# Patient Record
Sex: FEMALE | Race: BLACK OR AFRICAN AMERICAN | NOT HISPANIC OR LATINO | URBAN - METROPOLITAN AREA
[De-identification: names, ages, dates, MRNs, and addresses within clinical notes are randomized per-mention and may not be internally consistent; named-entity substitution may affect disease eponyms.]

---

## 2020-12-24 ENCOUNTER — EMERGENCY (EMERGENCY)
Facility: HOSPITAL | Age: 65
LOS: 1 days | Discharge: HOME | End: 2020-12-24
Attending: EMERGENCY MEDICINE | Admitting: EMERGENCY MEDICINE
Payer: MEDICARE

## 2020-12-24 VITALS
RESPIRATION RATE: 18 BRPM | HEIGHT: 67 IN | HEART RATE: 78 BPM | TEMPERATURE: 98 F | OXYGEN SATURATION: 99 % | DIASTOLIC BLOOD PRESSURE: 79 MMHG | WEIGHT: 167.99 LBS | SYSTOLIC BLOOD PRESSURE: 125 MMHG

## 2020-12-24 VITALS
OXYGEN SATURATION: 100 % | RESPIRATION RATE: 16 BRPM | TEMPERATURE: 98 F | DIASTOLIC BLOOD PRESSURE: 74 MMHG | HEART RATE: 72 BPM | SYSTOLIC BLOOD PRESSURE: 125 MMHG

## 2020-12-24 DIAGNOSIS — M50.90 CERVICAL DISC DISORDER, UNSPECIFIED, UNSPECIFIED CERVICAL REGION: Chronic | ICD-10-CM

## 2020-12-24 DIAGNOSIS — R10.13 EPIGASTRIC PAIN: ICD-10-CM

## 2020-12-24 DIAGNOSIS — Z98.890 OTHER SPECIFIED POSTPROCEDURAL STATES: Chronic | ICD-10-CM

## 2020-12-24 DIAGNOSIS — R07.89 OTHER CHEST PAIN: ICD-10-CM

## 2020-12-24 PROCEDURE — 84484 ASSAY OF TROPONIN QUANT: CPT

## 2020-12-24 PROCEDURE — 80053 COMPREHEN METABOLIC PANEL: CPT

## 2020-12-24 PROCEDURE — 99284 EMERGENCY DEPT VISIT MOD MDM: CPT | Mod: 25

## 2020-12-24 PROCEDURE — 71046 X-RAY EXAM CHEST 2 VIEWS: CPT

## 2020-12-24 PROCEDURE — 36415 COLL VENOUS BLD VENIPUNCTURE: CPT

## 2020-12-24 PROCEDURE — 99285 EMERGENCY DEPT VISIT HI MDM: CPT

## 2020-12-24 PROCEDURE — 85025 COMPLETE CBC W/AUTO DIFF WBC: CPT

## 2020-12-24 PROCEDURE — 96374 THER/PROPH/DIAG INJ IV PUSH: CPT

## 2020-12-24 PROCEDURE — 71046 X-RAY EXAM CHEST 2 VIEWS: CPT | Mod: 26

## 2020-12-24 RX ORDER — OXYCODONE HYDROCHLORIDE 5 MG/1
1 TABLET ORAL
Qty: 6 | Refills: 0
Start: 2020-12-24 | End: 2020-12-26

## 2020-12-24 RX ORDER — OXYCODONE HYDROCHLORIDE 5 MG/1
60 TABLET ORAL ONCE
Refills: 0 | Status: DISCONTINUED | OUTPATIENT
Start: 2020-12-24 | End: 2020-12-24

## 2020-12-24 RX ORDER — FAMOTIDINE 10 MG/ML
20 INJECTION INTRAVENOUS ONCE
Refills: 0 | Status: COMPLETED | OUTPATIENT
Start: 2020-12-24 | End: 2020-12-24

## 2020-12-24 RX ORDER — OXYCODONE HYDROCHLORIDE 5 MG/1
1 TABLET ORAL
Qty: 18 | Refills: 0
Start: 2020-12-24 | End: 2020-12-26

## 2020-12-24 RX ORDER — GABAPENTIN 400 MG/1
600 CAPSULE ORAL ONCE
Refills: 0 | Status: COMPLETED | OUTPATIENT
Start: 2020-12-24 | End: 2020-12-24

## 2020-12-24 RX ORDER — GABAPENTIN 400 MG/1
1 CAPSULE ORAL
Qty: 21 | Refills: 0
Start: 2020-12-24 | End: 2020-12-30

## 2020-12-24 RX ADMIN — OXYCODONE HYDROCHLORIDE 60 MILLIGRAM(S): 5 TABLET ORAL at 09:02

## 2020-12-24 RX ADMIN — GABAPENTIN 600 MILLIGRAM(S): 400 CAPSULE ORAL at 09:02

## 2020-12-24 RX ADMIN — Medication 30 MILLILITER(S): at 07:58

## 2020-12-24 RX ADMIN — FAMOTIDINE 20 MILLIGRAM(S): 10 INJECTION INTRAVENOUS at 07:58

## 2020-12-24 NOTE — ED PROVIDER NOTE - PROVIDER TOKENS
PROVIDER:[TOKEN:[[id number]]],PROVIDER:[TOKEN:[[id number]]],PROVIDER:[TOKEN:[[id number]]]

## 2020-12-24 NOTE — ED PROVIDER NOTE - CHPI ED SYMPTOMS NEG
+SOB with cp, no PIÑA, no decreased exercise capacity/no back pain/no dizziness/no fever/no nausea/no diaphoresis

## 2020-12-24 NOTE — ED PROVIDER NOTE - NSFOLLOWUPINSTRUCTIONS_ED_ALL_ED_FT
WHAT YOU NEED TO KNOW ABOUT EPIGASTRIC PAIN:    What do I need to know about epigastric pain? Epigastric pain is felt in the middle of the upper abdomen, between the ribs and the bellybutton. The pain may be mild or severe. Pain may spread from or to another part of your body. Epigastric pain may be a sign of a serious health problem that needs to be treated.     What causes epigastric pain? The cause of your pain may not be known. The following are common causes:     Inflammation of your stomach, liver, pancreas, or intestines  Heart problems, such as a heart attack  Digestion problems, such as indigestion, GERD, or lactose intolerance  Medical conditions, such as an ulcer, a hernia, irritable bowel syndrome (IBS), or cancer  A blockage in your bowels or gallbladder  A bladder infection  An injury or previous surgery in your abdomen    What other signs and symptoms may I have with epigastric pain? Signs and symptoms will depend on what is causing your pain.    Nausea, vomiting, bloating, constipation, or diarrhea  Loss of appetite, weight loss, feeling of fullness as you start to eat  Movement relieves the pain or makes it worse, or only certain positions are comfortable  Pain when you eat, or pain that is relieved when you eat or have a bowel movement  Sore throat or a hoarse voice    How is epigastric pain diagnosed and treated? Your healthcare provider will feel your abdomen to see if it is tender or rigid. He may change or stop any medicine you are taking that is causing your pain. Your pain may go away without treatment, or you may need any of the following:     Medicines may be given to treat pain or stop vomiting. You may also need medicines to reduce or control stomach acid, or treat an infections  Blood or urine tests may show problems such as infection or inflammation. The tests may also show how well your liver is working.  An x-ray is used to check your kidneys and bladder.  An ultrasound is used to check your gallbladder for stones or other blockage.  A bowel movement sample may be tested for blood.     How can I manage my symptoms?     Keep a record of your symptoms. Include when the pain starts, how long it lasts, and if it is sharp or dull. Also include any foods you ate or activities you did before the pain started. Keep track of anything that helped the pain.       Eat a variety of healthy foods. Healthy foods include fruits, vegetables, whole-grain breads, low-fat dairy products, beans, lean meats, and fish. Ask if you need to be on a special diet. Certain foods may cause your pain, such as alcohol or foods that are high in fat. You may need to eat smaller meals and to eat more often than usual.      Drink liquids as directed. Ask how much liquid to drink each day and which liquids are best for you. Do not have drinks that contain alcohol or caffeine.    Call 911 for any of the following:     You have any of the following signs of a heart attack:   Squeezing, pressure, or pain in your chest   and any of the following:   Discomfort or pain in your back, neck, jaw, stomach, or arm   Shortness of breath  Nausea or vomiting  Lightheadedness or a sudden cold sweat  You have severe pain that radiates to your jaw or back.    When should I seek immediate care?   You have severe pain that starts suddenly and quickly gets worse.  You cannot have a bowel movement and are vomiting.  You vomit or cough up blood.  You see blood in your urine or bowel movement.  You feel drowsy and your breathing is slower than usual.    When should I contact my healthcare provider?     You have a fever or chills.  You have yellowing of your skin or the whites of your eyes.  You vomit often or several times in a row.   You lose weight without trying.  You have questions or concerns about your condition or care.

## 2020-12-24 NOTE — ED PROVIDER NOTE - PATIENT PORTAL LINK FT
You can access the FollowMyHealth Patient Portal offered by [facility] by registering at the following website: [email or web address]. By joining [employer]’s FollowMyHealth portal, you will also be able to view your health information using other applications (apps) compatible with our system.

## 2020-12-24 NOTE — ED PROVIDER NOTE - CLINICAL SUMMARY MEDICAL DECISION MAKING FREE TEXT BOX
here w/ epigastric and LUQ pain that radiates to the chest after eating. concern for GI source but will also r/o ACS w/ EKG and 1 troponin. pt already w/ stress test done this year and symptoms are atypical. to f/u both her GI and Cards at [facility]. Asking for # for other spine surgeons for 2nd opinion while awaiting her f/u with her own on 12/30. #s given.

## 2020-12-24 NOTE — ED PROVIDER NOTE - OBJECTIVE STATEMENT
64yo F hx of many spinal surgeries at [facility] last August and planned for ?revision, here /w complaint of 2 weeks intermittent chest pain that radiates into her epigastric region and LUQ. Denies N/V/D, radiation to shoulders/jaw. Had stress test earlier this year prior to her spinal surgery and told normal. Saw a GI in the past but declined colonoscopy, no hx of endoscopy recently. pain always starts a little bit after eating, never right sided. denies hx of abdominal surgery.

## 2020-12-24 NOTE — ED PROVIDER NOTE - CARE PROVIDER_API CALL
Sukhi Velasquez  ORTHOPEDICS  [address] [place] [address]  Phone: [phone]  Fax: [phone]  Follow Up Time:     Estiven Luz (MD; MS)  Neurosurgery  [address] [place] [address]  Phone: [phone]  Fax: [phone]  Follow Up Time:     Paul Norton  NEUROSURGERY  [address], [address] [place] [address]  Phone: [phone]  Fax: [phone]  Follow Up Time:

## 2020-12-24 NOTE — ED ADULT NURSE NOTE - OBJECTIVE STATEMENT
64 y/o female with on and off cp for 2 weeks, pt more concerns on her hx of multiple cervical and spina sx  to seek a consults to a neurosurgeon.

## 2020-12-24 NOTE — ED ADULT NURSE NOTE - NSIMPLEMENTINTERV_GEN_ALL_ED
Implemented All Fall with Harm Risk Interventions:  [place] to call system. Call bell, personal items and telephone within reach. Instruct patient to call for assistance. Room bathroom lighting operational. Non-slip footwear when patient is off stretcher. Physically safe environment: no spills, clutter or unnecessary equipment. Stretcher in lowest position, wheels locked, appropriate side rails in place. Provide visual cue, wrist band, yellow gown, etc. Monitor gait and stability. Monitor for mental status changes and reorient to person, place, and time. Review medications for side effects contributing to fall risk. Reinforce activity limits and safety measures with patient and family. Provide visual clues: red socks.

## 2020-12-24 NOTE — ED PROVIDER NOTE - CARE PROVIDERS DIRECT ADDRESSES
[email or web address]

## 2020-12-24 NOTE — ED PROVIDER NOTE - PSH
Cervical disc disease  cervical sx  H/O breast surgery  left breast implant 1980's  H/O Spinal surgery

## 2020-12-29 PROBLEM — I10 ESSENTIAL (PRIMARY) HYPERTENSION: Chronic | Status: ACTIVE | Noted: 2020-12-24

## 2021-01-01 ENCOUNTER — OUTPATIENT (OUTPATIENT)
Dept: OUTPATIENT SERVICES | Facility: HOSPITAL | Age: 66
LOS: 1 days | End: 2021-01-01
Payer: MEDICARE

## 2021-01-01 DIAGNOSIS — Z98.890 OTHER SPECIFIED POSTPROCEDURAL STATES: Chronic | ICD-10-CM

## 2021-01-01 DIAGNOSIS — M50.90 CERVICAL DISC DISORDER, UNSPECIFIED, UNSPECIFIED CERVICAL REGION: Chronic | ICD-10-CM

## 2021-01-05 ENCOUNTER — EMERGENCY (EMERGENCY)
Facility: HOSPITAL | Age: 66
LOS: 1 days | Discharge: HOME | End: 2021-01-05
Attending: EMERGENCY MEDICINE | Admitting: EMERGENCY MEDICINE
Payer: MEDICARE

## 2021-01-05 VITALS
HEART RATE: 79 BPM | RESPIRATION RATE: 18 BRPM | HEIGHT: 67 IN | OXYGEN SATURATION: 100 % | TEMPERATURE: 98 F | DIASTOLIC BLOOD PRESSURE: 83 MMHG | SYSTOLIC BLOOD PRESSURE: 117 MMHG

## 2021-01-05 VITALS
TEMPERATURE: 98 F | RESPIRATION RATE: 17 BRPM | OXYGEN SATURATION: 100 % | DIASTOLIC BLOOD PRESSURE: 82 MMHG | SYSTOLIC BLOOD PRESSURE: 155 MMHG | HEART RATE: 74 BPM

## 2021-01-05 DIAGNOSIS — R07.89 OTHER CHEST PAIN: ICD-10-CM

## 2021-01-05 DIAGNOSIS — M54.9 DORSALGIA, UNSPECIFIED: ICD-10-CM

## 2021-01-05 DIAGNOSIS — M50.90 CERVICAL DISC DISORDER, UNSPECIFIED, UNSPECIFIED CERVICAL REGION: Chronic | ICD-10-CM

## 2021-01-05 DIAGNOSIS — Z98.890 OTHER SPECIFIED POSTPROCEDURAL STATES: Chronic | ICD-10-CM

## 2021-01-05 LAB
ALBUMIN SERPL ELPH-MCNC: 4.2 G/DL — SIGNIFICANT CHANGE UP (ref 3.3–5)
ALP SERPL-CCNC: 112 U/L — SIGNIFICANT CHANGE UP (ref 40–120)
ALT FLD-CCNC: 17 U/L — SIGNIFICANT CHANGE UP (ref 10–45)
ANION GAP SERPL CALC-SCNC: 12 MMOL/L — SIGNIFICANT CHANGE UP (ref 5–17)
APPEARANCE UR: CLEAR — SIGNIFICANT CHANGE UP
AST SERPL-CCNC: 20 U/L — SIGNIFICANT CHANGE UP (ref 10–40)
BACTERIA # UR AUTO: PRESENT /HPF
BASOPHILS # BLD AUTO: 0.03 K/UL — SIGNIFICANT CHANGE UP (ref 0–0.2)
BASOPHILS NFR BLD AUTO: 0.4 % — SIGNIFICANT CHANGE UP (ref 0–2)
BILIRUB SERPL-MCNC: 0.2 MG/DL — SIGNIFICANT CHANGE UP (ref 0.2–1.2)
BILIRUB UR-MCNC: NEGATIVE — SIGNIFICANT CHANGE UP
BUN SERPL-MCNC: 10 MG/DL — SIGNIFICANT CHANGE UP (ref 7–23)
CALCIUM SERPL-MCNC: 9.1 MG/DL — SIGNIFICANT CHANGE UP (ref 8.4–10.5)
CHLORIDE SERPL-SCNC: 102 MMOL/L — SIGNIFICANT CHANGE UP (ref 96–108)
CO2 SERPL-SCNC: 24 MMOL/L — SIGNIFICANT CHANGE UP (ref 22–31)
COLOR SPEC: YELLOW — SIGNIFICANT CHANGE UP
CREAT SERPL-MCNC: 0.57 MG/DL — SIGNIFICANT CHANGE UP (ref 0.5–1.3)
DIFF PNL FLD: NEGATIVE — SIGNIFICANT CHANGE UP
EOSINOPHIL # BLD AUTO: 0.1 K/UL — SIGNIFICANT CHANGE UP (ref 0–0.5)
EOSINOPHIL NFR BLD AUTO: 1.3 % — SIGNIFICANT CHANGE UP (ref 0–6)
EPI CELLS # UR: SIGNIFICANT CHANGE UP /HPF (ref 0–5)
GLUCOSE SERPL-MCNC: 100 MG/DL — HIGH (ref 70–99)
GLUCOSE UR QL: NEGATIVE — SIGNIFICANT CHANGE UP
HCT VFR BLD CALC: 43.8 % — SIGNIFICANT CHANGE UP (ref 34.5–45)
HGB BLD-MCNC: 13.8 G/DL — SIGNIFICANT CHANGE UP (ref 11.5–15.5)
IMM GRANULOCYTES NFR BLD AUTO: 0.1 % — SIGNIFICANT CHANGE UP (ref 0–1.5)
KETONES UR-MCNC: NEGATIVE — SIGNIFICANT CHANGE UP
LEUKOCYTE ESTERASE UR-ACNC: ABNORMAL
LYMPHOCYTES # BLD AUTO: 3.66 K/UL — HIGH (ref 1–3.3)
LYMPHOCYTES # BLD AUTO: 46.4 % — HIGH (ref 13–44)
MCHC RBC-ENTMCNC: 27.2 PG — SIGNIFICANT CHANGE UP (ref 27–34)
MCHC RBC-ENTMCNC: 31.5 GM/DL — LOW (ref 32–36)
MCV RBC AUTO: 86.2 FL — SIGNIFICANT CHANGE UP (ref 80–100)
MONOCYTES # BLD AUTO: 0.7 K/UL — SIGNIFICANT CHANGE UP (ref 0–0.9)
MONOCYTES NFR BLD AUTO: 8.9 % — SIGNIFICANT CHANGE UP (ref 2–14)
NEUTROPHILS # BLD AUTO: 3.38 K/UL — SIGNIFICANT CHANGE UP (ref 1.8–7.4)
NEUTROPHILS NFR BLD AUTO: 42.9 % — LOW (ref 43–77)
NITRITE UR-MCNC: NEGATIVE — SIGNIFICANT CHANGE UP
NRBC # BLD: 0 /100 WBCS — SIGNIFICANT CHANGE UP (ref 0–0)
PH UR: 6 — SIGNIFICANT CHANGE UP (ref 5–8)
PLATELET # BLD AUTO: 206 K/UL — SIGNIFICANT CHANGE UP (ref 150–400)
POTASSIUM SERPL-MCNC: 4.3 MMOL/L — SIGNIFICANT CHANGE UP (ref 3.5–5.3)
POTASSIUM SERPL-SCNC: 4.3 MMOL/L — SIGNIFICANT CHANGE UP (ref 3.5–5.3)
PROT SERPL-MCNC: 7.3 G/DL — SIGNIFICANT CHANGE UP (ref 6–8.3)
PROT UR-MCNC: NEGATIVE MG/DL — SIGNIFICANT CHANGE UP
RBC # BLD: 5.08 M/UL — SIGNIFICANT CHANGE UP (ref 3.8–5.2)
RBC # FLD: 14.8 % — HIGH (ref 10.3–14.5)
RBC CASTS # UR COMP ASSIST: < 5 /HPF — SIGNIFICANT CHANGE UP
SODIUM SERPL-SCNC: 138 MMOL/L — SIGNIFICANT CHANGE UP (ref 135–145)
SP GR SPEC: 1.01 — SIGNIFICANT CHANGE UP (ref 1–1.03)
TROPONIN T SERPL-MCNC: <0.01 NG/ML — SIGNIFICANT CHANGE UP (ref 0–0.01)
UROBILINOGEN FLD QL: 0.2 E.U./DL — SIGNIFICANT CHANGE UP
WBC # BLD: 7.88 K/UL — SIGNIFICANT CHANGE UP (ref 3.8–10.5)
WBC # FLD AUTO: 7.88 K/UL — SIGNIFICANT CHANGE UP (ref 3.8–10.5)
WBC UR QL: < 5 /HPF — SIGNIFICANT CHANGE UP

## 2021-01-05 PROCEDURE — 85025 COMPLETE CBC W/AUTO DIFF WBC: CPT

## 2021-01-05 PROCEDURE — 71275 CT ANGIOGRAPHY CHEST: CPT

## 2021-01-05 PROCEDURE — 71275 CT ANGIOGRAPHY CHEST: CPT | Mod: 26

## 2021-01-05 PROCEDURE — 36415 COLL VENOUS BLD VENIPUNCTURE: CPT

## 2021-01-05 PROCEDURE — 81001 URINALYSIS AUTO W/SCOPE: CPT

## 2021-01-05 PROCEDURE — 87086 URINE CULTURE/COLONY COUNT: CPT

## 2021-01-05 PROCEDURE — 99284 EMERGENCY DEPT VISIT MOD MDM: CPT | Mod: 25

## 2021-01-05 PROCEDURE — 80053 COMPREHEN METABOLIC PANEL: CPT

## 2021-01-05 PROCEDURE — 74177 CT ABD & PELVIS W/CONTRAST: CPT | Mod: 26

## 2021-01-05 PROCEDURE — 74177 CT ABD & PELVIS W/CONTRAST: CPT

## 2021-01-05 PROCEDURE — 84484 ASSAY OF TROPONIN QUANT: CPT

## 2021-01-05 PROCEDURE — 93005 ELECTROCARDIOGRAM TRACING: CPT

## 2021-01-05 PROCEDURE — 99285 EMERGENCY DEPT VISIT HI MDM: CPT

## 2021-01-05 PROCEDURE — 93010 ELECTROCARDIOGRAM REPORT: CPT

## 2021-01-05 RX ORDER — NITROFURANTOIN MACROCRYSTAL 50 MG
1 CAPSULE ORAL
Qty: 10 | Refills: 0
Start: 2021-01-05 | End: 2021-01-09

## 2021-01-05 RX ORDER — NITROFURANTOIN MACROCRYSTAL 50 MG
100 CAPSULE ORAL ONCE
Refills: 0 | Status: COMPLETED | OUTPATIENT
Start: 2021-01-05 | End: 2021-01-05

## 2021-01-05 RX ORDER — IOHEXOL 300 MG/ML
30 INJECTION, SOLUTION INTRAVENOUS ONCE
Refills: 0 | Status: COMPLETED | OUTPATIENT
Start: 2021-01-05 | End: 2021-01-05

## 2021-01-05 RX ORDER — SODIUM CHLORIDE 9 MG/ML
1000 INJECTION INTRAMUSCULAR; INTRAVENOUS; SUBCUTANEOUS ONCE
Refills: 0 | Status: COMPLETED | OUTPATIENT
Start: 2021-01-05 | End: 2021-01-05

## 2021-01-05 RX ORDER — CEFUROXIME AXETIL 250 MG
1 TABLET ORAL
Qty: 20 | Refills: 0
Start: 2021-01-05 | End: 2021-01-14

## 2021-01-05 RX ORDER — OXYCODONE HYDROCHLORIDE 5 MG/1
30 TABLET ORAL ONCE
Refills: 0 | Status: DISCONTINUED | OUTPATIENT
Start: 2021-01-05 | End: 2021-01-05

## 2021-01-05 RX ORDER — MORPHINE SULFATE 50 MG/1
4 CAPSULE, EXTENDED RELEASE ORAL ONCE
Refills: 0 | Status: DISCONTINUED | OUTPATIENT
Start: 2021-01-05 | End: 2021-01-05

## 2021-01-05 RX ADMIN — Medication 100 MILLIGRAM(S): at 03:54

## 2021-01-05 RX ADMIN — OXYCODONE HYDROCHLORIDE 30 MILLIGRAM(S): 5 TABLET ORAL at 02:45

## 2021-01-05 RX ADMIN — SODIUM CHLORIDE 1000 MILLILITER(S): 9 INJECTION INTRAMUSCULAR; INTRAVENOUS; SUBCUTANEOUS at 01:30

## 2021-01-05 RX ADMIN — IOHEXOL 30 MILLILITER(S): 300 INJECTION, SOLUTION INTRAVENOUS at 01:31

## 2021-01-05 RX ADMIN — SODIUM CHLORIDE 1000 MILLILITER(S): 9 INJECTION INTRAMUSCULAR; INTRAVENOUS; SUBCUTANEOUS at 02:29

## 2021-01-05 RX ADMIN — OXYCODONE HYDROCHLORIDE 30 MILLIGRAM(S): 5 TABLET ORAL at 02:15

## 2021-01-05 NOTE — ED PROVIDER NOTE - NSFOLLOWUPINSTRUCTIONS_ED_ALL_ED_FT
Acute Low Back Pain    WHAT YOU NEED TO KNOW:    Acute low back pain is sudden discomfort in your lower back area that lasts for up to 6 weeks. The discomfort makes it difficult to tolerate activity.     DISCHARGE INSTRUCTIONS:    Return to the emergency department if:   •You have severe pain.      •You have sudden stiffness and heaviness on both buttocks down to both legs.      •You have numbness or weakness in one leg, or pain in both legs.      •You have numbness in your genital area or across your lower back.      •You cannot control your urine or bowel movements.      Contact your healthcare provider if:   •You have a fever.      •You have pain at night or when you rest.      •Your pain does not get better with treatment.      •You have pain that worsens when you cough or sneeze.      •You suddenly feel something pop or snap in your back.      •You have questions or concerns about your condition or care.      Medicines: You may need any of the following:   •NSAIDs help decrease swelling and pain. This medicine is available with or without a doctor's order. NSAIDs can cause stomach bleeding or kidney problems in certain people. If you take blood thinner medicine, always ask your healthcare provider if NSAIDs are safe for you. Always read the medicine label and follow directions.      •Acetaminophen decreases pain and fever. It is available without a doctor's order. Ask how much to take and how often to take it. Follow directions. Read the labels of all other medicines you are using to see if they also contain acetaminophen, or ask your doctor or pharmacist. Acetaminophen can cause liver damage if not taken correctly. Do not use more than 4 grams (4,000 milligrams) total of acetaminophen in one day.       •Muscle relaxers decrease pain by relaxing the muscles in your lower spine.      •Prescription pain medicine may be given. Ask your healthcare provider how to take this medicine safely. Some prescription pain medicines contain acetaminophen. Do not take other medicines that contain acetaminophen without talking to your healthcare provider. Too much acetaminophen may cause liver damage. Prescription pain medicine may cause constipation. Ask your healthcare provider how to prevent or treat constipation.       Self-care:   •Stay active as much as you can without causing more pain. Bed rest could make your back pain worse. Start with some light exercises, such as walking. Avoid heavy lifting until your pain is gone. Ask for more information about the activities or exercises that are right for you.       •Apply ice on your back for 15 to 20 minutes every hour or as directed. Use an ice pack, or put crushed ice in a plastic bag. Cover it with a towel before you apply it to your skin. Ice helps prevent tissue damage and decreases swelling and pain.       •Apply heat on your back for 20 to 30 minutes every 2 hours for as many days as directed. Heat helps decrease pain and muscle spasms. Alternate heat and ice.      Prevent acute low back pain:   •Use proper body mechanics. ?Bend at the hips and knees when you  objects. Do not bend from the waist. Use your leg muscles as you lift the load. Do not use your back. Keep the object close to your chest as you lift it. Try not to twist or lift anything above your waist.      ?Change your position often when you stand for long periods of time. Rest one foot on a small box or footrest, and then switch to the other foot often.      ?Try not to sit for long periods of time. When you do, sit in a straight-backed chair with your feet flat on the floor. Never reach, pull, or push while you are sitting.      •Do exercises that strengthen your back muscles. Warm up before you exercise. Ask your healthcare provider the best exercises for you.      •Maintain a healthy weight. Ask your healthcare provider how much you should weigh. Ask him or her to help you create a weight loss plan if you are overweight.      Follow up with your healthcare provider as directed: Return for a follow-up visit if you still have pain after 1 to 3 weeks of treatment. You may need to visit an orthopedist if your back pain lasts longer than 12 weeks. Write down your questions so you remember to ask them during your visits.

## 2021-01-05 NOTE — ED ADULT NURSE NOTE - CHPI ED NUR SEVERITY2
enoxaparin, simethicone, metoprolol, colace, senna, atorvastatin, protonix, senna PAIN SCALE 4 OF 10.

## 2021-01-05 NOTE — ED PROVIDER NOTE - PATIENT PORTAL LINK FT
You can access the FollowMyHealth Patient Portal offered by [facility] by registering at the following website: [email or web address]. By joining [employer]’s FollowMyHealth portal, you will also be able to view your health information using other applications (apps) compatible with our system.

## 2021-01-05 NOTE — ED ADULT NURSE REASSESSMENT NOTE - NS ED NURSE REASSESS COMMENT FT1
pt. refused Morphine, which was originally ordered for pain, OXY IR ordered, awaiting readiness from pharmacy, states will go to restroom for ccms sample as soon as she is able

## 2021-01-05 NOTE — ED ADULT TRIAGE NOTE - ARRIVAL INFO ADDITIONAL COMMENTS
pt c/o 1 day of headache, chest pain.   hx shows visits for epigastric pain in the past and has seen GI but refused colonoscopy.

## 2021-01-05 NOTE — ED PROVIDER NOTE - OBJECTIVE STATEMENT
64 y/o f hx multiple spine surgeries at [facility], last 8/2020 presents c/o back pain which radiates to her left shoulder, left chest, left abdomen and down her leg since her last surgery.  Pt stating her surgeon told her she will need another surgery, she is not happy with her care there and wants to follow up with [facility].  Pt reports her symptoms unchanged but ran out of pain medication which was given when she came to [facility] ED 11 days ago with similar complaints.  Pt stating she doesn't have a pain management doctor currently, hasn't tried to make appointments with neurosurgery or GI as recommended.  Pt denies numbness/tingling to ext, weakness, saddle anesthesia, bowel/bladder incontinence, all other ROS negative.

## 2021-01-05 NOTE — ED ADULT NURSE NOTE - NS ED NURSE DC INFO COMPLEXITY
40w Simple: Patient demonstrates quick and easy understanding/Patient asked questions/Verbalized Understanding

## 2021-01-05 NOTE — ED ADULT NURSE NOTE - OBJECTIVE STATEMENT
· Arrival Info Additional Comments: pt c/o 1 day of headache, chest pain.   hx shows visits for epigastric pain in the past and has seen GI but refused colonoscopy.    denies complaint otherwise at present

## 2021-01-05 NOTE — ED ADULT NURSE REASSESSMENT NOTE - NS ED NURSE REASSESS COMMENT FT1
dispo. as noted, [facility] [place] notified to call EMS for t/p, awaiting arrival dispo. as noted, [facility] [place] notified to call EMS for t/p, awaiting arrival, address confirmed with pt.

## 2021-01-05 NOTE — ED PROVIDER NOTE - CARE PROVIDER_API CALL
Sharad Song  ANESTHESIOLOGY  [address] [place] [address]  Phone: [phone]  Fax: [phone]  Follow Up Time:

## 2021-01-05 NOTE — ED ADULT NURSE REASSESSMENT NOTE - NS ED NURSE REASSESS COMMENT FT1
pt. states doesn't want to wait for ambulance, informed that we have no control over when t/p arrives, understanding verbalized,  and wants to take cab home, edt notified, and will take pt. to cab via w/c, for t/p home

## 2021-01-06 LAB
CULTURE RESULTS: SIGNIFICANT CHANGE UP
SPECIMEN SOURCE: SIGNIFICANT CHANGE UP

## 2021-01-08 ENCOUNTER — APPOINTMENT (OUTPATIENT)
Dept: NEUROSURGERY | Facility: CLINIC | Age: 66
End: 2021-01-08
Payer: MEDICARE

## 2021-01-08 VITALS
TEMPERATURE: 97.8 F | SYSTOLIC BLOOD PRESSURE: 116 MMHG | HEART RATE: 70 BPM | RESPIRATION RATE: 18 BRPM | HEIGHT: 67 IN | DIASTOLIC BLOOD PRESSURE: 78 MMHG | WEIGHT: 160 LBS | BODY MASS INDEX: 25.11 KG/M2 | OXYGEN SATURATION: 99 %

## 2021-01-08 DIAGNOSIS — M54.5 LOW BACK PAIN: ICD-10-CM

## 2021-01-08 DIAGNOSIS — M25.511 PAIN IN RIGHT SHOULDER: ICD-10-CM

## 2021-01-08 PROCEDURE — 99205 OFFICE O/P NEW HI 60 MIN: CPT

## 2021-01-09 PROBLEM — M54.5 LOW BACK PAIN: Status: ACTIVE | Noted: 2021-01-08

## 2021-01-09 NOTE — DATA REVIEWED
[de-identified] : CT thoracic/lumbar spine 7/23/19:\par Impression: \par Loosening of the right vertebral screw at T6. No evidence of hardware fracture.\par Multilevel degenerative disc disease, stable from 5/25/19.

## 2021-01-09 NOTE — PHYSICAL EXAM
[General Appearance - In No Acute Distress] : in no acute distress [General Appearance - Alert] : alert [Oriented To Time, Place, And Person] : oriented to person, place, and time [No Visual Abnormalities] : no visible abnormailities [Antalgic] : antalgic [Sclera] : the sclera and conjunctiva were normal [Outer Ear] : the ears and nose were normal in appearance [Neck Appearance] : the appearance of the neck was normal [Abnormal Walk] : normal gait [Skin Color & Pigmentation] : normal skin color and pigmentation [FreeTextEntry1] : exam limited due to pain [Able to toe walk] : the patient was not able to toe walk [Able to heel walk] : the patient was not able to heel walk

## 2021-01-09 NOTE — HISTORY OF PRESENT ILLNESS
[de-identified] : 65 year old woman with past medical history HTN, sleep apnea, anxiety and multiple spinal surgeries who comes to the office today for second opinion regarding mid and low back pain. \par \par Ms. Redd states she has had approximately 10-15 spinal surgeries beginning in 2010. Her initial surgery was cervical spinal fusion at [facility] in [place] (she is unsure of name of surgeon). She states this surgery was done due to neck pain and difficulty walking. \par After this surgery, she sought opinion at [facility] with Dr. Trinidad. Over the past 10 years, she has had multiple surgeries with Dr. Trinidad. \par Her last lumbar surgery was in 12/2020 and was a lumbar-pelvis fusion. Following this surgery, she states she developed worsening low back pain radiating to LEFT lower quadrant as well as mid back pain radiating to LEFT breast. This pain is particularly worsened with walking and standing. She also reports abdominal distention beginning last year after prior surgery. She states she had multiple opinions with gastroenterologists as well as OBGYN and workup was negative.\par \par She states she did have revision of cervical surgery in August 2020 with Dr. Trinidad.\par \par She is in a wheelchair primarily. She states she does walk at home with assistance of grab bars but pain is very severe when she begins to walk. She states she has self- catheterized due to urinary retention since 2010. She denies bowel incontinence but reports worsening constipation due to increase in pain medications. She recently began bowel regimen and constipation has improved.\par \par She also reports worsening RIGHT Shoulder pain. She previously had LEFT shoulder surgery in [place] many years ago.\par She reports bilateral lower extremity weakness and upper extremity weakness (LEFT greater than RIGHT).

## 2021-01-09 NOTE — PLAN
[FreeTextEntry1] : CT thoracic and lumbar spine reviewed by Dr. Perez with patient, demonstrates loosening of T6 screw.\par Given patient's complex history involving many spinal surgeries, recommend consultation with spine specialist - will refer to Dr. Estiven Luz.\par \par Recommend evaluation of right shoulder pain - refer to Dr. [place]. Patient will call and schedule appointment.\par \par Patient verbalizes agreement and understanding with plan of care.\par

## 2021-01-11 NOTE — HISTORY OF PRESENT ILLNESS
[de-identified] : Patient is a 64 yo F with PMH of HTN, cervical and lumbar stenosis (h/o multiple cervical/thoracic/lumbar fusions) presents for neurosurgical evaluation.\par T9-S1 posterior fusion with Dr. Trinidad.@ [facility] (last sx on 12/12/2019 for lumbar, 8/2020 for cervical)\par She reports progressively worsening mid back to lower back pain which got markedly worsening after her first lumbar spine surgery in 2010. Pain is described as constant dull pain on her mid back (between scapula) down to her lower back radiating to LLE causing difficulty walking/standing. She can walk very short distance (less than 5 steps) and has been in wheelchair since 2010 and needs assist for ambulation. She endorses numbness on LLE but denies pain/weakness/numbness on upper extremities, BB dysfunction. She has been taking multiple medications for pain (Oxycotin/methocarbamol/gabapentin) which moderately relieve her pain but recur in 2-3 hours after. Previously she tried ESIs/PT prior to her surgery in 2010 but did not try after surgery.

## 2021-01-12 ENCOUNTER — APPOINTMENT (OUTPATIENT)
Dept: SPINE | Facility: CLINIC | Age: 66
End: 2021-01-12
Payer: MEDICARE

## 2021-01-12 ENCOUNTER — APPOINTMENT (OUTPATIENT)
Dept: SPINE | Facility: CLINIC | Age: 66
End: 2021-01-12

## 2021-01-12 VITALS
SYSTOLIC BLOOD PRESSURE: 110 MMHG | OXYGEN SATURATION: 99 % | BODY MASS INDEX: 25.71 KG/M2 | HEART RATE: 80 BPM | WEIGHT: 160 LBS | DIASTOLIC BLOOD PRESSURE: 78 MMHG | TEMPERATURE: 97.7 F | HEIGHT: 66 IN | RESPIRATION RATE: 20 BRPM

## 2021-01-12 VITALS
TEMPERATURE: 97.3 F | HEIGHT: 67 IN | WEIGHT: 160 LBS | HEART RATE: 65 BPM | OXYGEN SATURATION: 99 % | SYSTOLIC BLOOD PRESSURE: 91 MMHG | DIASTOLIC BLOOD PRESSURE: 63 MMHG | BODY MASS INDEX: 25.11 KG/M2 | RESPIRATION RATE: 14 BRPM

## 2021-01-12 PROCEDURE — 99214 OFFICE O/P EST MOD 30 MIN: CPT

## 2021-01-12 NOTE — REVIEW OF SYSTEMS
[Leg Weakness] : leg weakness [Numbness] : numbness [Tingling] : tingling [Difficulty Walking] : difficulty walking [Frequent Falls] : frequent falls [As Noted in HPI] : as noted in HPI [Negative] : Endocrine [FreeTextEntry8] : chronic urinary retention

## 2021-01-12 NOTE — HISTORY OF PRESENT ILLNESS
[de-identified] : Patient is a 66 yo ambidextrity F with PMH of HTN, CTS (b/l sx), chronic urinary retention (straight cath since 2010), cervical and lumbar stenosis (h/o multiple cervical/thoracic/lumbar fusions) presents for neurosurgical evaluation.\par As per patient, she had 3 cervical spine surgery (last sx in 8/2020 with Dr. Trinidad @ [facility]) and 21 thoracolumbar surgeries with Dr. Trinidad.@ [facility] (last sx on 12/12/2019)\par She reports progressively worsening neck and back pain which markedly worsening after her last lumbar surgeries. She reports severe constant dull pain on her posterior neck radiating to b/l shoulders to LEFT arm/hand and paresthesia on her R fingers causing significant difficulty performing ADLs (grabbing objects, writing) and severe constant dull pain on her lower back radiating to anterior LEFT side groin and b/l knee with b/l LE numbness/paresthesia. Pain is worsening by any body movement and walks very short distance (less than 5 steps) and has been using wheelchair mostly of time since 2010. She reports significant difficulty ambulating and balance problem causing multiple falls (once a week for the past one year). She was unable to make follow up appointment with pain management since 2008 and currently taking Oxycontin/Tramadol rx from ED (scheduled for new pain management at the end of this month).

## 2021-01-12 NOTE — PHYSICAL EXAM
[General Appearance - Alert] : alert [General Appearance - In No Acute Distress] : in no acute distress [General Appearance - Well Nourished] : well nourished [General Appearance - Well-Appearing] : healthy appearing [Oriented To Time, Place, And Person] : oriented to person, place, and time [Impaired Insight] : insight and judgment were intact [Affect] : the affect was normal [Memory Recent] : recent memory was not impaired [Person] : oriented to person [Place] : oriented to place [Time] : oriented to time [4] : T1 abductor digiti minimi 4/5 [2] : L2 Iliopsoas 2/5 [3] : L3 quadriceps 3/5 [5] : S1 flexor hallucis longus 5/5 [Romberg's Sign] : a positive Romberg's sign was present [Dysesthesia] : dysesthesia was present [Limited Balance] : the patient's balance was impaired [1+] : Brachioradialis left 1+ [2+] : Ankle jerk left 2+ [Straight-Leg Raise Test - Left] : straight leg raise of the left leg was positive [Straight-Leg Raise Test - Right] : straight leg raise of the right leg was positive [Antalgic] : antalgic [Soni] : Soni's sign was not demonstrated [FreeTextEntry6] : limited ROM on b/l shoulder joint 2/2 pain [FreeTextEntry8] : abnormal gait, unable to perform tandem gait 2/2 severe balance problem [Able to toe walk] : the patient was not able to toe walk [Able to heel walk] : the patient was not able to heel walk

## 2021-01-12 NOTE — ASSESSMENT
[FreeTextEntry1] : PLAN\par - f/u with pain management as scheduled\par - EOS full body AP/Lat\par - CT myelogram C/T/L spine\par - obtain previous medical record\par - RTC after complete images/medical record from previous surgeon

## 2021-01-12 NOTE — DATA REVIEWED
[de-identified] : C-spine wo on 6/16/2020 @ [facility]\par L-spine wo on 3/9/2020 @ [facility] [de-identified] : C-spine wo on 11/21/2020, 6/16/2020 @ [facility]SH\par L-spine wo on 6/16/2020 @ [facility]SH\par  [de-identified] : scoliosis Xray on 6/16/2020 @ [facility]

## 2021-01-20 ENCOUNTER — RESULT REVIEW (OUTPATIENT)
Age: 66
End: 2021-01-20

## 2021-01-20 ENCOUNTER — OUTPATIENT (OUTPATIENT)
Dept: OUTPATIENT SERVICES | Facility: HOSPITAL | Age: 66
LOS: 1 days | End: 2021-01-20
Payer: MEDICARE

## 2021-01-20 ENCOUNTER — APPOINTMENT (OUTPATIENT)
Dept: PHYSICAL MEDICINE AND REHAB | Facility: CLINIC | Age: 66
End: 2021-01-20
Payer: MEDICARE

## 2021-01-20 VITALS
OXYGEN SATURATION: 98 % | HEIGHT: 66 IN | WEIGHT: 160 LBS | BODY MASS INDEX: 25.71 KG/M2 | TEMPERATURE: 97.7 F | RESPIRATION RATE: 16 BRPM

## 2021-01-20 DIAGNOSIS — Z98.890 OTHER SPECIFIED POSTPROCEDURAL STATES: Chronic | ICD-10-CM

## 2021-01-20 DIAGNOSIS — M50.90 CERVICAL DISC DISORDER, UNSPECIFIED, UNSPECIFIED CERVICAL REGION: Chronic | ICD-10-CM

## 2021-01-20 PROCEDURE — 73030 X-RAY EXAM OF SHOULDER: CPT

## 2021-01-20 PROCEDURE — 73030 X-RAY EXAM OF SHOULDER: CPT | Mod: 26,50

## 2021-01-20 PROCEDURE — 99203 OFFICE O/P NEW LOW 30 MIN: CPT

## 2021-01-20 NOTE — DATA REVIEWED
[FreeTextEntry1] : XR SHOULDER 01/2021: Right arthroplasty. Left degenerative change of the glenohumeral joint with bone contacting bone and proliferative periarticular osteophytes and innumerable surrounding intra-articular loose bodies.

## 2021-01-20 NOTE — ASSESSMENT
[FreeTextEntry1] : Impression:\par 1. Left GH DJD\par \par Plan: After review of the history, physical examination, and imaging, the patient's symptoms are consistent with Left GH DJD. The patient is s/p R-TSA and CVA. The The imaging results and diagnosis were discussed with the patient. At this time the patient is in need of a Neurologist and General PMR to follow her post-CVA. I have also provided a referral to Dr. Sharp for evaluation for TSA. The patient will return to the office as needed. The patient expressed verbal understanding and is in agreement with the plan of care. All of the patient's questions and concerns were addressed during today's visit.\par

## 2021-01-20 NOTE — HISTORY OF PRESENT ILLNESS
[FreeTextEntry1] : Ms. VANNA DIAZ is a very pleasant 65 year female who comes in for evaluation of bilateral shoulder pain  that has been ongoing for 1 year without any specific injury or inciting event. The patient underwent R-TSA at [facility] and states she was told she is in need of the Left. She has also had 3 Strokes in the past few years without Neuro/PMR follow up. The pain is located primarily  on bilateral shoulders intermittent in nature and described as sharp. The pain is rated as 7/10 and ranges from 5-10/10. The patient's symptoms are aggravated by laying down, sitting and alleviated by OxyContin and Gabapentin. The patient denies any night pain, numbness/tingling, weakness, or bowel/bladder dysfunction. The patient has no other complaints at this time.\par

## 2021-01-20 NOTE — PHYSICAL EXAM
[FreeTextEntry1] : CERVICAL\par STRENGTH: 5/5 bilateral shoulder abductors, elbow flexors, elbow extensors, wrist extensors, hand intrinsics, long finger flexors to D3 and D5.\par TONE: Normal, No clonus.\par REFLEXES: Symmetric biceps, triceps, brachioradialis, pronator teres. Soni's negative bilaterally.\par SENSATION: Grossly intact to light touch bilateral upper extremities.\par INSP: Spine alignment is midline, with no evidence of scoliosis.\par CERVICAL ROM: Flexion, extension, side-bending, rotation, oblique extension all full and pain free.  \par Shoulder AROM: Limited ABd/Flex/IR/ER B/L (+) pain Left. \par PALP: There is no tenderness over the midline spinous processes, paravertebral muscles, trapezius, levator scapulae or shoulder region bilaterally.\par SPECIAL: Spurling's maneuver negative bilaterally. Axial Compression negative.

## 2021-01-22 ENCOUNTER — APPOINTMENT (OUTPATIENT)
Dept: ORTHOPEDIC SURGERY | Facility: CLINIC | Age: 66
End: 2021-01-22
Payer: MEDICARE

## 2021-01-22 VITALS — HEIGHT: 66 IN | BODY MASS INDEX: 25.71 KG/M2 | WEIGHT: 160 LBS

## 2021-01-22 DIAGNOSIS — T84.019A BROKEN INTERNAL JOINT PROSTHESIS, UNSPECIFIED SITE, INITIAL ENCOUNTER: ICD-10-CM

## 2021-01-22 DIAGNOSIS — M19.012 PRIMARY OSTEOARTHRITIS, LEFT SHOULDER: ICD-10-CM

## 2021-01-22 PROCEDURE — 99204 OFFICE O/P NEW MOD 45 MIN: CPT

## 2021-01-27 ENCOUNTER — EMERGENCY (EMERGENCY)
Facility: HOSPITAL | Age: 66
LOS: 1 days | Discharge: HOME | End: 2021-01-27
Attending: EMERGENCY MEDICINE | Admitting: EMERGENCY MEDICINE
Payer: MEDICARE

## 2021-01-27 VITALS
HEIGHT: 67 IN | HEART RATE: 56 BPM | WEIGHT: 162.04 LBS | RESPIRATION RATE: 16 BRPM | TEMPERATURE: 98 F | DIASTOLIC BLOOD PRESSURE: 92 MMHG | SYSTOLIC BLOOD PRESSURE: 142 MMHG | OXYGEN SATURATION: 97 %

## 2021-01-27 DIAGNOSIS — Z79.899 OTHER LONG TERM (CURRENT) DRUG THERAPY: ICD-10-CM

## 2021-01-27 DIAGNOSIS — R30.0 DYSURIA: ICD-10-CM

## 2021-01-27 DIAGNOSIS — R30.9 PAINFUL MICTURITION, UNSPECIFIED: ICD-10-CM

## 2021-01-27 DIAGNOSIS — Z88.2 ALLERGY STATUS TO SULFONAMIDES: ICD-10-CM

## 2021-01-27 DIAGNOSIS — M54.9 DORSALGIA, UNSPECIFIED: ICD-10-CM

## 2021-01-27 DIAGNOSIS — M50.90 CERVICAL DISC DISORDER, UNSPECIFIED, UNSPECIFIED CERVICAL REGION: Chronic | ICD-10-CM

## 2021-01-27 DIAGNOSIS — Z98.890 OTHER SPECIFIED POSTPROCEDURAL STATES: Chronic | ICD-10-CM

## 2021-01-27 DIAGNOSIS — I10 ESSENTIAL (PRIMARY) HYPERTENSION: ICD-10-CM

## 2021-01-27 PROCEDURE — 99284 EMERGENCY DEPT VISIT MOD MDM: CPT

## 2021-01-27 NOTE — ED ADULT TRIAGE NOTE - OTHER COMPLAINTS
pt c.o chronic back pain since 2009. hx multiple spinal cord surgeries. denies recent fall or injury. pt ambulates with cane.

## 2021-01-27 NOTE — ED ADULT NURSE NOTE - OBJECTIVE STATEMENT
received A&Ox3 65years old female pt brought in by EMS. pt's c/o " I have to self catherize to pee, and I have been having infection, I was on antibiotic, but burning sensation is back and I have lower back pain that radiates to the stomach." pt is pointing at the pelvic area for the radiated pain. aching flank pain on the left side. pt denies n/v/d/hematuria/fever. pt states the urine has been clear but causes burning sensation. pt has hx of multiple spinal surgeries, which is why pt has to self catherizie to urinate. and pt is wheelchair bound. currently, speaks in full sentences.

## 2021-01-27 NOTE — ED ADULT NURSE NOTE - PRIMARY CARE PROVIDER
Patient reports flank pain on a scale of 6 or 7/10 that started about 45 minutes ago. Patient reports that the pain comes in waves and his pain at that point is a 10/10. Patient had abdominal surgery about a week ago for cancer and a liver biopsy, but does not this this is related to that.
unknown

## 2021-01-28 VITALS
HEART RATE: 60 BPM | TEMPERATURE: 98 F | DIASTOLIC BLOOD PRESSURE: 88 MMHG | SYSTOLIC BLOOD PRESSURE: 140 MMHG | RESPIRATION RATE: 17 BRPM | OXYGEN SATURATION: 98 %

## 2021-01-28 LAB
APPEARANCE UR: CLEAR — SIGNIFICANT CHANGE UP
BILIRUB UR-MCNC: NEGATIVE — SIGNIFICANT CHANGE UP
COLOR SPEC: YELLOW — SIGNIFICANT CHANGE UP
DIFF PNL FLD: NEGATIVE — SIGNIFICANT CHANGE UP
GLUCOSE UR QL: NEGATIVE — SIGNIFICANT CHANGE UP
KETONES UR-MCNC: NEGATIVE — SIGNIFICANT CHANGE UP
LEUKOCYTE ESTERASE UR-ACNC: NEGATIVE — SIGNIFICANT CHANGE UP
NITRITE UR-MCNC: NEGATIVE — SIGNIFICANT CHANGE UP
PH UR: 7 — SIGNIFICANT CHANGE UP (ref 5–8)
PROT UR-MCNC: NEGATIVE MG/DL — SIGNIFICANT CHANGE UP
SP GR SPEC: 1.02 — SIGNIFICANT CHANGE UP (ref 1–1.03)
UROBILINOGEN FLD QL: 0.2 E.U./DL — SIGNIFICANT CHANGE UP

## 2021-01-28 PROCEDURE — 87086 URINE CULTURE/COLONY COUNT: CPT

## 2021-01-28 PROCEDURE — 74176 CT ABD & PELVIS W/O CONTRAST: CPT | Mod: 26,MC

## 2021-01-28 PROCEDURE — 81003 URINALYSIS AUTO W/O SCOPE: CPT

## 2021-01-28 PROCEDURE — 74176 CT ABD & PELVIS W/O CONTRAST: CPT

## 2021-01-28 PROCEDURE — 99284 EMERGENCY DEPT VISIT MOD MDM: CPT

## 2021-01-28 RX ORDER — IBUPROFEN 200 MG
600 TABLET ORAL ONCE
Refills: 0 | Status: COMPLETED | OUTPATIENT
Start: 2021-01-28 | End: 2021-01-28

## 2021-01-28 RX ORDER — DIAZEPAM 5 MG
5 TABLET ORAL ONCE
Refills: 0 | Status: DISCONTINUED | OUTPATIENT
Start: 2021-01-28 | End: 2021-01-28

## 2021-01-28 RX ORDER — METHOCARBAMOL 500 MG/1
750 TABLET, FILM COATED ORAL ONCE
Refills: 0 | Status: COMPLETED | OUTPATIENT
Start: 2021-01-28 | End: 2021-01-28

## 2021-01-28 RX ORDER — OXYCODONE AND ACETAMINOPHEN 5; 325 MG/1; MG/1
1 TABLET ORAL ONCE
Refills: 0 | Status: DISCONTINUED | OUTPATIENT
Start: 2021-01-28 | End: 2021-01-28

## 2021-01-28 RX ORDER — OXYCODONE AND ACETAMINOPHEN 5; 325 MG/1; MG/1
2 TABLET ORAL ONCE
Refills: 0 | Status: DISCONTINUED | OUTPATIENT
Start: 2021-01-28 | End: 2021-01-28

## 2021-01-28 RX ADMIN — METHOCARBAMOL 750 MILLIGRAM(S): 500 TABLET, FILM COATED ORAL at 00:59

## 2021-01-28 RX ADMIN — OXYCODONE AND ACETAMINOPHEN 2 TABLET(S): 5; 325 TABLET ORAL at 01:10

## 2021-01-28 NOTE — ED PROVIDER NOTE - CLINICAL SUMMARY MEDICAL DECISION MAKING FREE TEXT BOX
66 y/o F with back pain, flank pain, will check urine with culture and CT stone study, reassess. Patient given five mg of Valium, pO Motrin for pain. 64 y/o F with back pain, flank pain, will check urine with culture and CT stone study, reassess. Patient given Robaxin PO for pain.   On re-evaluation, pt is AAox3 and in NAD. vitals stable. pt instructed to follow up with spine surgeon as planned, return to ED for worsening condition. Pt expresses understanding.

## 2021-01-28 NOTE — ED PROVIDER NOTE - OBJECTIVE STATEMENT
no fever and no chills. 66 y/o F with chronic back pain, chronic urinary retention, self caths at home. Patient completed course for Abx for UTI 2d ago, comes in with persistent burning with urinary, pain with urination, pain radiating to L back. No fever, chills. Patient well appearing in ED.

## 2021-01-28 NOTE — ED PROVIDER NOTE - PATIENT PORTAL LINK FT
You can access the FollowMyHealth Patient Portal offered by [facility] by registering at the following website: [email or web address]. By joining [email or web address]’s FollowMyHealth portal, you will also be able to view your health information using other applications (apps) compatible with our system.

## 2021-01-28 NOTE — ED PROVIDER NOTE - PHYSICAL EXAMINATION
VITAL SIGNS: I have reviewed nursing notes and confirm.  CONSTITUTIONAL: Well-developed; well-nourished; in no acute distress.   SKIN:  warm and dry, no acute rash.   HEAD:  normocephalic, atraumatic.  EYES: EOM intact; conjunctiva and sclera clear.  ENT: No nasal discharge; airway clear.   NECK: Supple; non tender.  CARD: S1, S2 normal; no murmurs, gallops, or rubs. Regular rate and rhythm.   RESP:  Clear to auscultation b/l, no wheezes, rales or rhonchi.  ABD: Minimal CVAT to L side. Normal bowel sounds; soft; non-distended; non-tender; no guarding/ rebound.  EXT: Normal ROM. No clubbing, cyanosis or edema. 2+ pulses to b/l ue/le.  NEURO: Alert, oriented, grossly unremarkable  PSYCH: Cooperative, mood and affect appropriate.

## 2021-01-29 ENCOUNTER — APPOINTMENT (OUTPATIENT)
Dept: GASTROENTEROLOGY | Facility: CLINIC | Age: 66
End: 2021-01-29
Payer: MEDICARE

## 2021-01-29 VITALS
DIASTOLIC BLOOD PRESSURE: 66 MMHG | TEMPERATURE: 97.3 F | BODY MASS INDEX: 25.9 KG/M2 | WEIGHT: 165 LBS | OXYGEN SATURATION: 73 % | HEIGHT: 67 IN | HEART RATE: 73 BPM | SYSTOLIC BLOOD PRESSURE: 106 MMHG

## 2021-01-29 DIAGNOSIS — R10.12 LEFT UPPER QUADRANT PAIN: ICD-10-CM

## 2021-01-29 DIAGNOSIS — G89.29 LEFT UPPER QUADRANT PAIN: ICD-10-CM

## 2021-01-29 PROCEDURE — 99203 OFFICE O/P NEW LOW 30 MIN: CPT

## 2021-01-29 NOTE — ASSESSMENT
[FreeTextEntry1] : 66 yo female with LUQ pain/ discomfort, possible cyst of the pancreas on CT scan\par \par - EGD-EUS at [facility]\par - NPO after midnight\par - D/w pt regarding COVID testing pre-procedure as well as escort post-procedure\par - Risks of the procedure including bleeding, perforation, etc d/w the patient\par \par

## 2021-01-29 NOTE — HISTORY OF PRESENT ILLNESS
[FreeTextEntry1] : 64 yo female with LUQ pain/ discomfort.  Pt with a hx of multiple spine surgeries.  Pt with hx of abdominal pain, pain on the left side -- told that she was constipated.  Wanted to see a GI MD.  Told that there was nothing on the AXR and was distended on the LT side.  No N/V/D, no constipation. No BRBPR, no dark stools.  Appetite is decreased, weight is stable.  Gynecologist does not see anything wrong per patient..  Takes two 200 mg tabs of Advil if opiod does not work.  Last colonoscopy was a year ago for screening  -- normal, Dr. Croft.  EGD in [place] in the 1980's for stomach pain -- was normal.  1/5/21 CBC and CMP was normal.  1/28/21 CT A/P without contrast showed no acute abdominopelvic findings.  Another CT on 1/5/21 showed no PE, mild colonic diverticulosis without diverticulitis, limited visualization of uncinate process of pancreas demonstrates a possible hypodensity/ cystic structure.  \par \par No famhx of gastric, colon or pancreatic cancer.   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

## 2021-01-29 NOTE — PHYSICAL EXAM
[General Appearance - Alert] : alert [Sclera] : the sclera and conjunctiva were normal [Outer Ear] : the ears and nose were normal in appearance [Neck Appearance] : the appearance of the neck was normal [] : no respiratory distress [Abdomen Soft] : soft [Abdomen Mass (___ Cm)] : no abdominal mass palpated [Involuntary Movements] : no involuntary movements were seen [Skin Color & Pigmentation] : normal skin color and pigmentation [No Focal Deficits] : no focal deficits [Oriented To Time, Place, And Person] : oriented to person, place, and time [FreeTextEntry1] : mild LUQ discomfort

## 2021-01-30 LAB
CULTURE RESULTS: NO GROWTH — SIGNIFICANT CHANGE UP
SPECIMEN SOURCE: SIGNIFICANT CHANGE UP

## 2021-02-01 DIAGNOSIS — Z71.89 OTHER SPECIFIED COUNSELING: ICD-10-CM

## 2021-02-10 ENCOUNTER — APPOINTMENT (OUTPATIENT)
Dept: INTERNAL MEDICINE | Facility: CLINIC | Age: 66
End: 2021-02-10
Payer: MEDICARE

## 2021-02-10 VITALS
TEMPERATURE: 97 F | RESPIRATION RATE: 15 BRPM | WEIGHT: 165 LBS | HEIGHT: 67 IN | DIASTOLIC BLOOD PRESSURE: 90 MMHG | BODY MASS INDEX: 25.9 KG/M2 | HEART RATE: 67 BPM | OXYGEN SATURATION: 100 % | SYSTOLIC BLOOD PRESSURE: 150 MMHG

## 2021-02-10 DIAGNOSIS — Z00.00 ENCOUNTER FOR GENERAL ADULT MEDICAL EXAMINATION W/OUT ABNORMAL FINDINGS: ICD-10-CM

## 2021-02-10 DIAGNOSIS — Z74.8 OTHER PROBLEMS RELATED TO CARE PROVIDER DEPENDENCY: ICD-10-CM

## 2021-02-10 PROCEDURE — G0439: CPT

## 2021-02-10 PROCEDURE — 99204 OFFICE O/P NEW MOD 45 MIN: CPT | Mod: 25,GC

## 2021-02-11 PROBLEM — Z74.8 ASSISTANCE WITH TRANSPORTATION: Status: ACTIVE | Noted: 2021-02-11

## 2021-02-16 DIAGNOSIS — N39.0 URINARY TRACT INFECTION, SITE NOT SPECIFIED: ICD-10-CM

## 2021-02-16 DIAGNOSIS — R63.8 OTHER SYMPTOMS AND SIGNS CONCERNING FOOD AND FLUID INTAKE: ICD-10-CM

## 2021-02-16 RX ORDER — ADHESIVE TAPE 3"X 2.3 YD
50 MCG TAPE, NON-MEDICATED TOPICAL
Qty: 30 | Refills: 2 | Status: COMPLETED | COMMUNITY
Start: 2021-02-16 | End: 2021-02-16

## 2021-02-22 PROBLEM — K21.9 CHRONIC GERD: Status: RESOLVED | Noted: 2021-02-10 | Resolved: 2021-02-22

## 2021-02-22 PROBLEM — G56.03 BILATERAL CARPAL TUNNEL SYNDROME: Status: RESOLVED | Noted: 2021-01-12 | Resolved: 2021-02-22

## 2021-02-23 ENCOUNTER — APPOINTMENT (OUTPATIENT)
Dept: SPINE | Facility: CLINIC | Age: 66
End: 2021-02-23
Payer: MEDICARE

## 2021-02-23 ENCOUNTER — EMERGENCY (EMERGENCY)
Facility: HOSPITAL | Age: 66
LOS: 1 days | Discharge: HOME | End: 2021-02-23
Attending: EMERGENCY MEDICINE | Admitting: EMERGENCY MEDICINE
Payer: MEDICARE

## 2021-02-23 VITALS
HEART RATE: 77 BPM | TEMPERATURE: 98 F | SYSTOLIC BLOOD PRESSURE: 148 MMHG | DIASTOLIC BLOOD PRESSURE: 72 MMHG | RESPIRATION RATE: 19 BRPM | OXYGEN SATURATION: 99 %

## 2021-02-23 VITALS
OXYGEN SATURATION: 99 % | RESPIRATION RATE: 18 BRPM | SYSTOLIC BLOOD PRESSURE: 144 MMHG | DIASTOLIC BLOOD PRESSURE: 98 MMHG | HEART RATE: 83 BPM | BODY MASS INDEX: 25.9 KG/M2 | WEIGHT: 165 LBS | HEIGHT: 67 IN | TEMPERATURE: 98.3 F

## 2021-02-23 VITALS
RESPIRATION RATE: 18 BRPM | WEIGHT: 119.93 LBS | OXYGEN SATURATION: 100 % | SYSTOLIC BLOOD PRESSURE: 145 MMHG | DIASTOLIC BLOOD PRESSURE: 84 MMHG | TEMPERATURE: 98 F | HEIGHT: 67 IN | HEART RATE: 81 BPM

## 2021-02-23 DIAGNOSIS — Z98.890 OTHER SPECIFIED POSTPROCEDURAL STATES: Chronic | ICD-10-CM

## 2021-02-23 DIAGNOSIS — M50.90 CERVICAL DISC DISORDER, UNSPECIFIED, UNSPECIFIED CERVICAL REGION: Chronic | ICD-10-CM

## 2021-02-23 DIAGNOSIS — R10.32 LEFT LOWER QUADRANT PAIN: ICD-10-CM

## 2021-02-23 DIAGNOSIS — Z20.822 CONTACT WITH AND (SUSPECTED) EXPOSURE TO COVID-19: ICD-10-CM

## 2021-02-23 DIAGNOSIS — G56.03 CARPAL TUNNEL SYNDROM,BILATERAL UPPER LIMBS: ICD-10-CM

## 2021-02-23 DIAGNOSIS — K21.9 GASTRO-ESOPHAGEAL REFLUX DISEASE W/OUT ESOPHAGITIS: ICD-10-CM

## 2021-02-23 LAB
ALBUMIN SERPL ELPH-MCNC: 4 G/DL — SIGNIFICANT CHANGE UP (ref 3.3–5)
ALP SERPL-CCNC: 101 U/L — SIGNIFICANT CHANGE UP (ref 40–120)
ALT FLD-CCNC: 14 U/L — SIGNIFICANT CHANGE UP (ref 10–45)
ANION GAP SERPL CALC-SCNC: 12 MMOL/L — SIGNIFICANT CHANGE UP (ref 5–17)
APPEARANCE UR: CLEAR — SIGNIFICANT CHANGE UP
AST SERPL-CCNC: 29 U/L — SIGNIFICANT CHANGE UP (ref 10–40)
BASOPHILS # BLD AUTO: 0.02 K/UL — SIGNIFICANT CHANGE UP (ref 0–0.2)
BASOPHILS NFR BLD AUTO: 0.2 % — SIGNIFICANT CHANGE UP (ref 0–2)
BILIRUB SERPL-MCNC: 0.4 MG/DL — SIGNIFICANT CHANGE UP (ref 0.2–1.2)
BILIRUB UR-MCNC: NEGATIVE — SIGNIFICANT CHANGE UP
BUN SERPL-MCNC: 10 MG/DL — SIGNIFICANT CHANGE UP (ref 7–23)
CALCIUM SERPL-MCNC: 9.4 MG/DL — SIGNIFICANT CHANGE UP (ref 8.4–10.5)
CHLORIDE SERPL-SCNC: 103 MMOL/L — SIGNIFICANT CHANGE UP (ref 96–108)
CO2 SERPL-SCNC: 20 MMOL/L — LOW (ref 22–31)
COLOR SPEC: YELLOW — SIGNIFICANT CHANGE UP
CREAT SERPL-MCNC: 0.56 MG/DL — SIGNIFICANT CHANGE UP (ref 0.5–1.3)
DIFF PNL FLD: NEGATIVE — SIGNIFICANT CHANGE UP
EOSINOPHIL # BLD AUTO: 0.1 K/UL — SIGNIFICANT CHANGE UP (ref 0–0.5)
EOSINOPHIL NFR BLD AUTO: 1 % — SIGNIFICANT CHANGE UP (ref 0–6)
GLUCOSE SERPL-MCNC: 88 MG/DL — SIGNIFICANT CHANGE UP (ref 70–99)
GLUCOSE UR QL: NEGATIVE — SIGNIFICANT CHANGE UP
HCT VFR BLD CALC: 43 % — SIGNIFICANT CHANGE UP (ref 34.5–45)
HGB BLD-MCNC: 13.7 G/DL — SIGNIFICANT CHANGE UP (ref 11.5–15.5)
IMM GRANULOCYTES NFR BLD AUTO: 0.5 % — SIGNIFICANT CHANGE UP (ref 0–1.5)
KETONES UR-MCNC: ABNORMAL MG/DL
LACTATE SERPL-SCNC: 1.4 MMOL/L — SIGNIFICANT CHANGE UP (ref 0.5–2)
LEUKOCYTE ESTERASE UR-ACNC: NEGATIVE — SIGNIFICANT CHANGE UP
LIDOCAIN IGE QN: 11 U/L — SIGNIFICANT CHANGE UP (ref 7–60)
LYMPHOCYTES # BLD AUTO: 2.02 K/UL — SIGNIFICANT CHANGE UP (ref 1–3.3)
LYMPHOCYTES # BLD AUTO: 21 % — SIGNIFICANT CHANGE UP (ref 13–44)
MCHC RBC-ENTMCNC: 28 PG — SIGNIFICANT CHANGE UP (ref 27–34)
MCHC RBC-ENTMCNC: 31.9 GM/DL — LOW (ref 32–36)
MCV RBC AUTO: 87.8 FL — SIGNIFICANT CHANGE UP (ref 80–100)
MONOCYTES # BLD AUTO: 0.73 K/UL — SIGNIFICANT CHANGE UP (ref 0–0.9)
MONOCYTES NFR BLD AUTO: 7.6 % — SIGNIFICANT CHANGE UP (ref 2–14)
NEUTROPHILS # BLD AUTO: 6.71 K/UL — SIGNIFICANT CHANGE UP (ref 1.8–7.4)
NEUTROPHILS NFR BLD AUTO: 69.7 % — SIGNIFICANT CHANGE UP (ref 43–77)
NITRITE UR-MCNC: NEGATIVE — SIGNIFICANT CHANGE UP
NRBC # BLD: 0 /100 WBCS — SIGNIFICANT CHANGE UP (ref 0–0)
PH UR: 7 — SIGNIFICANT CHANGE UP (ref 5–8)
PLATELET # BLD AUTO: 170 K/UL — SIGNIFICANT CHANGE UP (ref 150–400)
POTASSIUM SERPL-MCNC: 4.5 MMOL/L — SIGNIFICANT CHANGE UP (ref 3.5–5.3)
POTASSIUM SERPL-SCNC: 4.5 MMOL/L — SIGNIFICANT CHANGE UP (ref 3.5–5.3)
PROT SERPL-MCNC: 7.2 G/DL — SIGNIFICANT CHANGE UP (ref 6–8.3)
PROT UR-MCNC: NEGATIVE MG/DL — SIGNIFICANT CHANGE UP
RBC # BLD: 4.9 M/UL — SIGNIFICANT CHANGE UP (ref 3.8–5.2)
RBC # FLD: 15.3 % — HIGH (ref 10.3–14.5)
SODIUM SERPL-SCNC: 135 MMOL/L — SIGNIFICANT CHANGE UP (ref 135–145)
SP GR SPEC: 1.01 — SIGNIFICANT CHANGE UP (ref 1–1.03)
UROBILINOGEN FLD QL: 0.2 E.U./DL — SIGNIFICANT CHANGE UP
WBC # BLD: 9.63 K/UL — SIGNIFICANT CHANGE UP (ref 3.8–10.5)
WBC # FLD AUTO: 9.63 K/UL — SIGNIFICANT CHANGE UP (ref 3.8–10.5)

## 2021-02-23 PROCEDURE — 71045 X-RAY EXAM CHEST 1 VIEW: CPT | Mod: 26

## 2021-02-23 PROCEDURE — 99285 EMERGENCY DEPT VISIT HI MDM: CPT

## 2021-02-23 PROCEDURE — 83690 ASSAY OF LIPASE: CPT

## 2021-02-23 PROCEDURE — G1004: CPT

## 2021-02-23 PROCEDURE — U0003: CPT

## 2021-02-23 PROCEDURE — 96375 TX/PRO/DX INJ NEW DRUG ADDON: CPT

## 2021-02-23 PROCEDURE — 85025 COMPLETE CBC W/AUTO DIFF WBC: CPT

## 2021-02-23 PROCEDURE — 71045 X-RAY EXAM CHEST 1 VIEW: CPT

## 2021-02-23 PROCEDURE — 99215 OFFICE O/P EST HI 40 MIN: CPT

## 2021-02-23 PROCEDURE — 74177 CT ABD & PELVIS W/CONTRAST: CPT | Mod: 26,ME

## 2021-02-23 PROCEDURE — 80053 COMPREHEN METABOLIC PANEL: CPT

## 2021-02-23 PROCEDURE — U0005: CPT

## 2021-02-23 PROCEDURE — 74177 CT ABD & PELVIS W/CONTRAST: CPT

## 2021-02-23 PROCEDURE — 96361 HYDRATE IV INFUSION ADD-ON: CPT

## 2021-02-23 PROCEDURE — 81003 URINALYSIS AUTO W/O SCOPE: CPT

## 2021-02-23 PROCEDURE — 96374 THER/PROPH/DIAG INJ IV PUSH: CPT | Mod: XU

## 2021-02-23 PROCEDURE — 83605 ASSAY OF LACTIC ACID: CPT

## 2021-02-23 PROCEDURE — 36415 COLL VENOUS BLD VENIPUNCTURE: CPT

## 2021-02-23 PROCEDURE — 99284 EMERGENCY DEPT VISIT MOD MDM: CPT | Mod: 25

## 2021-02-23 RX ORDER — DIPHENHYDRAMINE HCL 50 MG
25 CAPSULE ORAL ONCE
Refills: 0 | Status: COMPLETED | OUTPATIENT
Start: 2021-02-23 | End: 2021-02-23

## 2021-02-23 RX ORDER — OXYCODONE AND ACETAMINOPHEN 5; 325 MG/1; MG/1
1 TABLET ORAL ONCE
Refills: 0 | Status: DISCONTINUED | OUTPATIENT
Start: 2021-02-23 | End: 2021-02-23

## 2021-02-23 RX ORDER — OXYCODONE HYDROCHLORIDE 5 MG/1
5 TABLET ORAL ONCE
Refills: 0 | Status: DISCONTINUED | OUTPATIENT
Start: 2021-02-23 | End: 2021-02-23

## 2021-02-23 RX ORDER — SODIUM CHLORIDE 9 MG/ML
1000 INJECTION INTRAMUSCULAR; INTRAVENOUS; SUBCUTANEOUS ONCE
Refills: 0 | Status: COMPLETED | OUTPATIENT
Start: 2021-02-23 | End: 2021-02-23

## 2021-02-23 RX ORDER — MORPHINE SULFATE 50 MG/1
4 CAPSULE, EXTENDED RELEASE ORAL ONCE
Refills: 0 | Status: DISCONTINUED | OUTPATIENT
Start: 2021-02-23 | End: 2021-02-23

## 2021-02-23 RX ORDER — ONDANSETRON 8 MG/1
4 TABLET, FILM COATED ORAL ONCE
Refills: 0 | Status: COMPLETED | OUTPATIENT
Start: 2021-02-23 | End: 2021-02-23

## 2021-02-23 RX ORDER — IOHEXOL 300 MG/ML
30 INJECTION, SOLUTION INTRAVENOUS ONCE
Refills: 0 | Status: COMPLETED | OUTPATIENT
Start: 2021-02-23 | End: 2021-02-23

## 2021-02-23 RX ADMIN — SODIUM CHLORIDE 1000 MILLILITER(S): 9 INJECTION INTRAMUSCULAR; INTRAVENOUS; SUBCUTANEOUS at 18:45

## 2021-02-23 RX ADMIN — Medication 25 MILLIGRAM(S): at 20:05

## 2021-02-23 RX ADMIN — ONDANSETRON 4 MILLIGRAM(S): 8 TABLET, FILM COATED ORAL at 18:45

## 2021-02-23 RX ADMIN — OXYCODONE HYDROCHLORIDE 5 MILLIGRAM(S): 5 TABLET ORAL at 22:10

## 2021-02-23 RX ADMIN — MORPHINE SULFATE 4 MILLIGRAM(S): 50 CAPSULE, EXTENDED RELEASE ORAL at 20:05

## 2021-02-23 RX ADMIN — SODIUM CHLORIDE 1000 MILLILITER(S): 9 INJECTION INTRAMUSCULAR; INTRAVENOUS; SUBCUTANEOUS at 19:45

## 2021-02-23 RX ADMIN — IOHEXOL 30 MILLILITER(S): 300 INJECTION, SOLUTION INTRAVENOUS at 18:45

## 2021-02-23 NOTE — ED ADULT NURSE NOTE - NSIMPLEMENTINTERV_GEN_ALL_ED
Implemented All Fall Risk Interventions:  [place] to call system. Call bell, personal items and telephone within reach. Instruct patient to call for assistance. Room bathroom lighting operational. Non-slip footwear when patient is off stretcher. Physically safe environment: no spills, clutter or unnecessary equipment. Stretcher in lowest position, wheels locked, appropriate side rails in place. Provide visual cue, wrist band, yellow gown, etc. Monitor gait and stability. Monitor for mental status changes and reorient to person, place, and time. Review medications for side effects contributing to fall risk. Reinforce activity limits and safety measures with patient and family.

## 2021-02-23 NOTE — ED ADULT TRIAGE NOTE - CHIEF COMPLAINT QUOTE
Pt c/o left sided abdominal pain that began last night w/o associated sx. Denies fever, chills, NVD, back pain, urinary sx. Pt wheelchair bound from back pain.

## 2021-02-23 NOTE — ED PROVIDER NOTE - PROGRESS NOTE DETAILS
?opoid derivative allergy, Denies OP swelling when administered morphine, requesting benadryl Patient was sent to ED from Dr Luz's office; CT shows possible colitis and mild swelling of left psoas m, mild stranding around left femoral vessels.  There is no erythema or swelling of LLQ or left thigh.  No fever or leukocytosis.  patient states she chronically has loose stools due to her spine issues, and they have not worsened.  Discussed with neurosurg PA; plan for DC home. patient unable to walk, says she could not even get to bathroom at home; barely able to stand at bedside without legs buckling.  will treat pain, may need to admit for PT salome patient unable to walk due to pain, says she could not even get to bathroom at home; barely able to stand at bedside without legs buckling.  just received morphine for the pain, will admit for PT babitaal patient unable to walk due to pain, says she could not even get to bathroom at home; when I tried walking with her she was barely able to stand at bedside without legs buckling and said she could not take a step.  just received morphine for the pain, will admit for PT eval patient just walked up to me at workstation requesting more pain medication.  she is ambulating with a steady gait.  stable for DC. patient refusing Percocet, wants oxycodone without acetaminophen.

## 2021-02-23 NOTE — ED PROVIDER NOTE - PATIENT PORTAL LINK FT
You can access the FollowMyHealth Patient Portal offered by [facility] by registering at the following website: [email or web address]. By joining [employer]’s FollowMyHealth portal, you will also be able to view your health information using other applications (apps) compatible with our system.

## 2021-02-23 NOTE — ED PROVIDER NOTE - SHIFT CHANGE DETAILS
65F wheelchair bound from extensive spinal surgeries c/o llq abd pain. labs wnl. s/p ct a/p.    dispo: ct read

## 2021-02-23 NOTE — ED PROVIDER NOTE - OBJECTIVE STATEMENT
66 yo hx of multiple spine surgeries presenting with llq pain for one day, raditating to leg and assoc dysuria, intermittently straight caths. Denies f/c, NVD, black/bloody stool, urinary complaints, focal weakness/numbness, lightheadedness, back pain, rashes, recent travel, recent antibiotics, sick contacts, SOB/CP, URI symptoms. Normal PO intake, normal BMs, denies prior abdominal surgeries.

## 2021-02-23 NOTE — ED PROVIDER NOTE - CLINICAL SUMMARY MEDICAL DECISION MAKING FREE TEXT BOX
64 yo hx of multiple spine surgeries at [facility] presenting with llq pain for one day, radiating to leg and assoc dysuria, intermittently straight caths. no numbness, focal neuro complaints.  consider SBO, cystitis, colitis, inguinal hernia, vs other  -labs, CT a/p, ivf, reassess  Signed out to ALANA Mares.

## 2021-02-23 NOTE — ED PROVIDER NOTE - NSFOLLOWUPINSTRUCTIONS_ED_ALL_ED_FT
Take your pain medications as prescribed.  Follow up with your doctor tomorrow.  Return to the Emergency Department if you have any new or worsening symptoms, or if you have any concerns.  ===========================    Abdominal Pain    WHAT YOU NEED TO KNOW:    Abdominal pain can be dull, achy, or sharp. You may have pain in one area of your abdomen, or in your entire abdomen. Your pain may be caused by a condition such as constipation, food sensitivity or poisoning, infection, or a blockage. Abdominal pain can also be from a hernia, appendicitis, or an ulcer. Liver, gallbladder, or kidney conditions can also cause abdominal pain. The cause of your abdominal pain may be unknown.     DISCHARGE INSTRUCTIONS:    Return to the emergency department if:   •You have new chest pain or shortness of breath.      •You have pulsing pain in your upper abdomen or lower back that suddenly becomes constant.      •Your pain is in the right lower abdominal area and worsens with movement.       •You have a fever over 100.4°F (38°C) or shaking chills.       •You are vomiting and cannot keep food or liquids down.       •Your pain does not improve or gets worse over the next 8 to 12 hours.       •You see blood in your vomit or bowel movements, or they look black and tarry.       •Your skin or the whites of your eyes turn yellow.       •You are a woman and have a large amount of vaginal bleeding that is not your monthly period.       Contact your healthcare provider if:   •You have pain in your lower back.       •You are a man and have pain in your testicles.      •You have pain when you urinate.       •You have questions or concerns about your condition or care.      Follow up with your healthcare provider within 24 hours or as directed: Write down your questions so you remember to ask them during your visits.     Medicines:   •Medicines may be given to calm your stomach and prevent vomiting or to decrease pain. Ask how to take pain medicine safely.      •Take your medicine as directed. Contact your healthcare provider if you think your medicine is not helping or if you have side effects. Tell him of her if you are allergic to any medicine. Keep a list of the medicines, vitamins, and herbs you take. Include the amounts, and when and why you take them. Bring the list or the pill bottles to follow-up visits. Carry your medicine list with you in case of an emergency.

## 2021-02-23 NOTE — ED ADULT NURSE REASSESSMENT NOTE - NS ED NURSE REASSESS COMMENT FT1
EDT back to bedside helping pt. to get dressed
pt. dispo. as noted, in private room getting dressed, pt. calling car service, will awaiting arrival in ED
pt. refused Percocet, states "I don't take that," provider notified
pt. requesting to walk to restroom, EDT assisting,  remains at bedside as well
pt. medicated as noted, tim. well, awaiting final dispo. papers

## 2021-02-23 NOTE — ED ADULT NURSE NOTE - OBJECTIVE STATEMENT
Pt presents to ER with LLQ pain starting a few days ago associated with urinary frequency and urgency. Pt hx multiple spinal surgeries leaving her wheelchair bound at baseline, and pt straight caths herself for urine every few hours. Pt denies fevers, chills. Denies hematuria.

## 2021-02-24 LAB — SARS-COV-2 RNA SPEC QL NAA+PROBE: SIGNIFICANT CHANGE UP

## 2021-02-24 NOTE — ASSESSMENT
[FreeTextEntry1] : PLAN\par - will obtain Dr. Trinidad office record\par - will contact Dr. Mclaughlin to discuss further management plan (SCS trials or intrathecal pain pump)\par - smoking cessation\par - will consider sx in 6 month to one year after smoking cessation

## 2021-02-24 NOTE — END OF VISIT
[FreeTextEntry3] : Is good to see the patient today in the office.  As you may remember she was referral from one of my partners and has undergone extensive cervicothoracic and thoracolumbar fusions done at [facility].  We really need to obtain her prior records to understand what change of events led her to having this combination of surgeries as this is quite atypical.  Her screws are haloing and pulling out and she has severe sagittal imbalance as evidenced by her EOS x-ray which shows her severely pitched forward with compensation in her hips and her knees as well as her ankles.  I had a genesis discussion with her regarding the need for smoking cessation and also to reduce her pain medication as even if we were to entertain surgery would be impossible to control her pain postoperatively.  I have spoken to her pain doctor and will come up with a multidisciplinary plan to proceed.  She was suffering from severe groin and abdominal pain and so we encouraged her to go to the emergency department in fact escorted her down there.  I answered all of her questions to the best my ability.  I followed up on all of her action items and we have much work to do before proceeding.  Our next up would be to obtain the records and review.\par \par Additionally she reports that her hair is falling out and several other metabolic disturbances I am going to reach out to her internal medicine doctor who I actually went to medical school with to see if there is anything that can be done from that standpoint.\par \par Estiven Luz M.D., M.Sc.\par \par Department of Neurosurgery\par [facility]\par [facility]\par [facility]\par [place], [place]\par [email or web address] [phone] [Time Spent: ___ minutes] : I have spent [unfilled] minutes of time on the encounter.

## 2021-02-24 NOTE — HISTORY OF PRESENT ILLNESS
[de-identified] : Patient is a 64 yo ambidextrous F with PMH of HTN, CTS (b/l sx), chronic urinary retention (straight cath since 2010), cervical and lumbar stenosis (h/o multiple cervical/thoracic/lumbar fusions) presents for neurosurgical evaluation.\par As per patient, she had 3 cervical spine surgery (last sx in 8/2020 with Dr. Trinidad @ [facility]) and 21 thoracolumbar surgeries with Dr. Trinidad.@ [facility] (last sx on 12/12/2019)\par She reports progressively worsening neck and back pain which markedly worsening after her last lumbar surgeries. She reports severe constant dull pain on her posterior neck radiating to b/l shoulders to LEFT arm/hand and paresthesia on her R fingers causing significant difficulty performing ADLs (grabbing objects, writing) and severe constant dull pain on her lower back radiating to anterior LEFT side groin and b/l knee with b/l LE numbness/paresthesia. Pain is worsening by any body movement and walks very short distance (less than 5 steps) and has been using wheelchair mostly of time since 2010. She reports significant difficulty ambulating and balance problem causing multiple falls (once a week for the past one year). She is currently taking Oxycodone(30mg)/Oxycontin (60mg) from pain management. She was seen by Dr. Mclaughlin who recommends pain pump if she is not surgical candidate.\par \par Current daily cigarette smoker

## 2021-02-24 NOTE — DATA REVIEWED
[de-identified] : C-spine wo on 11/21/2020, 6/16/2020 @ [facility]\par CT myelogram whole spine on 2/21/2021 @ [facility] showed multilevel pseudoarthsis @ C/T/L spine\par L-spine wo on 6/16/2020 @ [facility]\par  [de-identified] : C-spine wo on 6/16/2020 @ [facility]\par L-spine wo on 3/9/2020 @ [facility] [de-identified] : EOS full body xray @ [facility] [de-identified] : scoliosis Xray on 6/16/2020 @ [facility]

## 2021-03-04 ENCOUNTER — APPOINTMENT (OUTPATIENT)
Dept: INTERNAL MEDICINE | Facility: CLINIC | Age: 66
End: 2021-03-04

## 2021-03-12 ENCOUNTER — NON-APPOINTMENT (OUTPATIENT)
Age: 66
End: 2021-03-12

## 2021-03-25 ENCOUNTER — APPOINTMENT (OUTPATIENT)
Dept: NEUROLOGY | Facility: CLINIC | Age: 66
End: 2021-03-25

## 2021-04-01 ENCOUNTER — APPOINTMENT (OUTPATIENT)
Dept: INTERNAL MEDICINE | Facility: CLINIC | Age: 66
End: 2021-04-01

## 2021-04-30 ENCOUNTER — APPOINTMENT (OUTPATIENT)
Dept: INTERNAL MEDICINE | Facility: CLINIC | Age: 66
End: 2021-04-30
Payer: MEDICARE

## 2021-04-30 VITALS
TEMPERATURE: 98.1 F | RESPIRATION RATE: 14 BRPM | BODY MASS INDEX: 25.43 KG/M2 | OXYGEN SATURATION: 98 % | WEIGHT: 162 LBS | DIASTOLIC BLOOD PRESSURE: 82 MMHG | HEIGHT: 67 IN | HEART RATE: 80 BPM | SYSTOLIC BLOOD PRESSURE: 128 MMHG

## 2021-04-30 PROCEDURE — 99204 OFFICE O/P NEW MOD 45 MIN: CPT

## 2021-04-30 RX ORDER — NALOXEGOL OXALATE 25 MG/1
TABLET, FILM COATED ORAL
Refills: 0 | Status: DISCONTINUED | COMMUNITY
End: 2021-04-30

## 2021-04-30 RX ORDER — NITROFURANTOIN (MONOHYDRATE/MACROCRYSTALS) 25; 75 MG/1; MG/1
100 CAPSULE ORAL
Refills: 0 | Status: DISCONTINUED | COMMUNITY
End: 2021-04-30

## 2021-04-30 RX ORDER — SIMVASTATIN 40 MG/1
40 TABLET, FILM COATED ORAL
Refills: 0 | Status: DISCONTINUED | COMMUNITY
End: 2021-04-30

## 2021-04-30 NOTE — PLAN
[FreeTextEntry1] : CVA - continue asa\par  - neuro eval with Dr Qiu\par HTN - cont metoprolol\par HLD- continue simvastatin\par OPioid dependence - continue weaning with pain management\par Anxiety- see below with smoking - she is on duloxetine- continue\par - she is interested in clonazpeam - Explained I do not advise this until she is off opioids.\par Smoker - just quit but afraid of bouncing back- we discussed use of wellbutrin - khan tart- reviewed potential adverse effects\par Tinea of foot - rx for clotrimazole\par \par f/up one month\par \par SOCIAL -M11 Q

## 2021-04-30 NOTE — HISTORY OF PRESENT ILLNESS
[de-identified] : THis is a 65 yo f with HTN, GAMALIEL, multiple spinal surgeries in the past\par HLD, CVA x3 - last one in [place] in 2016 (on asa)\par She is transferring her care from [place] to [facility].  She has seen a number of doctors on the [facility] team so far.\par She is requesting an M11 Q form to be completed. \par She self catheterizes since 2000.  \par She is from [place], came here for surgery and never returned.\par First surgery in 2009, last surgery 8/2020.\par She has been detoxing from cigarettes and opioids.  Currently down from 30mg to 15mg  Oxycontin 60mg to 20mg - following with pain management\par No friends or family here.\par

## 2021-04-30 NOTE — PHYSICAL EXAM
[Normal] : normal rate, regular rhythm, normal S1 and S2 and no murmur heard [No Edema] : there was no peripheral edema [de-identified] : thickned toenails b/l

## 2021-05-28 ENCOUNTER — APPOINTMENT (OUTPATIENT)
Dept: INTERNAL MEDICINE | Facility: CLINIC | Age: 66
End: 2021-05-28
Payer: MEDICARE

## 2021-05-28 VITALS
WEIGHT: 165 LBS | BODY MASS INDEX: 25.9 KG/M2 | HEIGHT: 67 IN | HEART RATE: 72 BPM | DIASTOLIC BLOOD PRESSURE: 80 MMHG | OXYGEN SATURATION: 99 % | SYSTOLIC BLOOD PRESSURE: 110 MMHG | RESPIRATION RATE: 14 BRPM

## 2021-05-28 DIAGNOSIS — R35.0 FREQUENCY OF MICTURITION: ICD-10-CM

## 2021-05-28 PROCEDURE — 99214 OFFICE O/P EST MOD 30 MIN: CPT

## 2021-05-28 RX ORDER — OXYBUTYNIN CHLORIDE 10 MG/1
10 TABLET, EXTENDED RELEASE ORAL
Refills: 0 | Status: DISCONTINUED | COMMUNITY
End: 2021-05-28

## 2021-05-29 NOTE — PHYSICAL EXAM
[Normal] : normal rate, regular rhythm, normal S1 and S2 and no murmur heard [No Edema] : there was no peripheral edema [de-identified] : thickned toenails b/l

## 2021-05-29 NOTE — REVIEW OF SYSTEMS
[Fatigue] : fatigue [Joint Pain] : joint pain [Back Pain] : back pain [Anxiety] : anxiety [Negative] : Heme/Lymph

## 2021-05-29 NOTE — PLAN
[FreeTextEntry1] : Urinary frequency - stop oxybutinin and start Myrbetriq\par - referral to  Dr Quintana\par - continue to take nitrofurantoin\par \par Smoking cessation- stop bupropion start chantix and monitor\par - reviewed need for complete smoking cessation before proceeding with cervical spinal surgery\par \par Pain management  - continue close f/up with pain management team\par \par Needs labs - she does not want it done today - will get it at next visit.
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
## 2021-06-10 ENCOUNTER — APPOINTMENT (OUTPATIENT)
Dept: NEUROLOGY | Facility: CLINIC | Age: 66
End: 2021-06-10
Payer: MEDICARE

## 2021-06-10 VITALS
BODY MASS INDEX: 25.74 KG/M2 | HEART RATE: 66 BPM | TEMPERATURE: 98.2 F | HEIGHT: 67 IN | DIASTOLIC BLOOD PRESSURE: 82 MMHG | SYSTOLIC BLOOD PRESSURE: 125 MMHG | OXYGEN SATURATION: 98 % | WEIGHT: 164 LBS

## 2021-06-10 DIAGNOSIS — R51.9 HEADACHE, UNSPECIFIED: ICD-10-CM

## 2021-06-10 PROCEDURE — 99204 OFFICE O/P NEW MOD 45 MIN: CPT

## 2021-06-19 NOTE — ED ADULT NURSE NOTE - NSFALLRSKUNASSIST_ED_ALL_ED
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 enjoys spending time with her boys, photography and exercising.   Employment- She is not currently working. She is trying to file for disability. Patient quit her last job at the end of May. Prior to that she was having issues at work because of her use. She overdosed and had to miss a day of work, was drinking at work on her lunch and would often call in to work.   Living- currently has an apartment that she rents, but is unsure what will happen as she no longer has a job or a way to pay her rent.   Financial- States that she is not doing well. She went from having a trust fund to not knowing how she will pay her rent. (Patient was very illusive when asked how she got the money to buy her drugs and alcohol. She stated that she does not buy them at all. Based on how she answered certain questions/the questions she avoided it is suspected that she may have been selling herself or trading sex for her drugs. This is not confirmed.)     3. What activities have you engaged in when using alcohol/other drugs that could be hazardous to you or others (i.e. driving a car/motorcycle/boat, operating machinery, unsafe sex, sharing needles for drugs or tattoos, etc     Has used a needle one time in the " "past but states that it was a long time ago. She states that she has been wanting to do this again recently (she has been wanting use/craving heroin again).   Unprotected sex.   Multiple sexual partners.   Driving.     4. How much time do you spend getting, using or getting over using alcohol or drugs? (DSM)     Patient reports that \"all day everyday\" consistent of getting using or recovering from use.     5. Reasons for drinking/drug use (Use the space below to record answers. It may not be necessary to ask each item.)  Like the feeling yes   Trying to forget problems yes   To cope with stress yes   To relieve physical pain yes   To cope with anxiety yes   To cope with depression yes   To relax or unwind yes   Makes it easier to talk with people yes   Partner encourages use yes   Most friends drink or use no   To cope with family problems yes   Afraid of withdrawal symptoms/to feel better yes   Other (specify) To function- alcohol     A. What concerns other people about your alcohol or drug use/Has anyone told you that you use too much? What did they say? (DSM)    Ex-boyfriend- is sober and has witnessed her relapse this time for the past year and a half. He feels that she is reckless, irresponsible and violent when she is drinking. She also has episodes of verbal aggression toward others that come out of nowhere.   Mother- has written the patient's eulogy. She is worried about her dying. Told the patient to delete her number and not to contact her. She does not believe that the patient should have to go through treatment again, she should just \"get it\" by now.   Ex-- is sober and knows that her drinking is very dangerous. He is worried about her safety and wellbeing as well as the people that she is around.   Best friend- is concerned about her behaviors and judgement of the situations that she has been putting herself in. 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 " She feels that the patient is going to die.     B. What did you think about that/ do you think you have a problem with alcohol or drug use?     She states that she knows that what they say is true, and she feels that her body is shutting down, she has bruises everywhere and states that she doesn't really eat.   What they say is true, but sadly they don't know the full extent of what is going on.     6. What changes are you willing to make? What substance are you willing to stop using? How are you going to do that? Have you tried that before? What interfered with your success with that goal?     She states that right now there is a part of her that wants help and another part of her that doesn't think that she can make it through an 11th treatment. She states that she is ready to die; she is not suicidal but she knows what her use has done/is doing to her body.     7. What would be helpful to you in making this change?     Leaving her apartment, and not living across the street from a bar or a liquor store.   She states that she has the option to go to [place] for a vacation with a friend to get away, but that she is undecided if she wants to do this.     Dimension IV Ratings   Readiness for Change - The placing authority must use the criteria in Dimension IV to determine a client s readiness for change.   RISK DESCRIPTIONS - Severity ratin  Client displays verbal compliance, but lacks consistent behaviors, has low motivation for change; and is passively involved in treatment.    REASONS SEVERITY WAS ASSIGNED - (What information did the person provide that supports your assessment of his or her readiness to change? How aware is the person of problems caused by continued use? How willing is she or he to make changes? What does the person feel would be helpful? What has the person been able to do without help?)    Client verbalizes that she feels that she has a problem with substance use, however also verbalizes a  great deal of contemplation and non-commitment to change at this time. She states that she is very unsure if she truly wants to stop her use at this time, but that she does know that it is a major problem that may be killing her. She was unsure about attending treatment at this time, but was not completely closed of to it. She was very inconsistent throughout the assessment in her motivation for change and treatment. She was calm and cooperative throughout the assessment, but did struggle with accurate timelines. She was also very evasive when questions about how she was getting drugs and risky behavior.         DIMENSION V - Relapse, Continued Use, and Continued Problem Potential   1. In what ways have you tried to control, cut-down or quit your use? If you have had periods of sobriety, how did you accomplish that? What was helpful? What happened to prevent you from continuing your sobriety? (DSM)     Patient reports that she has tried to quit before. She reports that she recently tried to taper herself off of alcohol. She has also gone to detox.   She states that she has had periods of sobriety.   She reports that she has had about 2.5 years of sobriety about 4 times in her life. With the most recent time this has happened being in 2016.   Her most recent longest period of sobriety since then was about 45 days starting in April 2018.   She relapsed due to relationships, co-parenting, finances, not having resources, not being able to hold a job, and her mental health.     2. Have you experienced cravings? If yes, ask follow up questions to determine if the person recognizes triggers and if the person has had any success in dealing with them.     Patient does endorse cravings.   She states that she is currently craving beer and heroin.   She states that she experiences cravings on a daily basis.   Some of her triggers are her using friends, the fact that she is about to lose her apartment, having a bar across the  street from her apartment, having money, and getting drugs from people for free.     3A. Have you been treated for alcohol/other drug abuse/dependence? yes  3B.  Number of times (Lif[facility]) (over what period): 10   3C.  Number of times completed treatment (Lif[facility]):  6  3D.  During the past 3 years have you participated in outpatient and/or residential?  yes  3E.  When and where? [facility] 2017- successful, [facility] - successful, [facility]resident and  [id number], unsure of others.   3F.  What was helpful?  What was not? Helpful- nothing from [facility] [place] [facility]- the EMDR therapy. Not Helpful- co-ed settings.     4. Support group participation: Have you/do you attend support group meetings to reduce/stop your alcohol/drug use? How recently? What was your experience? Are you willing to restart? If the person has not participated, is he or she willing?     Patient reports that she has attended AA sporadically since she was 18 years old.   She sometimes goes on days that she does not drink.   She has not been going to meetings lately/for the past month.     5. What would assist you in staying sober/straight?     Finances, being cautious about relationships.     Dimension V Ratings   Relapse/Continued Use/Continued problem potential - The placing authority must use the criteria in Dimension V to determine a client s relapse, continued use, and continued problem potential.   RISK DESCRIPTIONS - Severity ratin  No awareness of the negative impact of mental health problems or substance abuse.  No coping skills to arrest mental health or addiction illnesses, or prevent relapse.    REASONS SEVERITY WAS ASSIGNED - (What information did the person provide that indicates his or her understanding of relapse issues? What about the person s experience indicates how prone he or she is to relapse? What coping skills does the person have that decrease relapse potential?)      Patient reports that  "she has been through a number of treatments in her life, and so she appears to have some knowledge and understanding of addiction and recovery. Despite this knowledge she continues to use and presented as very contemplative about stopping or going to treatment which may indicate a lack of insight into her use and seriousness of the impact that it has had on her life, as well as her physical and mental health. Patient appears to lack the necessary coping skills to arrest her use outside of a controlled environment at this time, as well as to prevent relapse. Her mental health may also place her at an increased risk of continued use/relapse as she does not consistently take her medications or receive services to manage them effectively.          DIMENSION VI - Recovery Environment   1. Are you employed/attending school? Tell me about that.     Patient is not currently working.   She drinks during the day, and has set up a network of people who will support her using/give her drugs.     2A. Describe a typical day; evening for you. Work, school, social, leisure, volunteer, spiritual practices. Include time spent obtaining, using, recovering from drugs or alcohol. (DSM)     Waits until about 10 am to go drink, and then will sometimes go to different bars so they don't know how much she has been drinking. She states that she will occasionally go grocery shopping as well.   Patient reports that a typical day looks like the following;   Wake up, shower, get ready, go to the bar, meet up with other using friends and spend the rest of the day with them.   She reports that she has her kids on weekends, but has not had them for about 1 week. She states that she has been honest with their fathers about what is going on with her. She also states that she does not drink around her kids.   She states that she had a \"horrible acid trip\" the other day, and spent hours running around her neighborhood thinking that the  were out to " get her.   She reports that some days, depending on what she is using, experience some psychosis.     2B. How often do you spend more time than you planned using or use more than you planned? (DSM)     Patient reports that both of these things happen daily.     3. How important is using to your social connections? Do many of your family or friends use?     Patient states that most of her true friends don't use. She has a lot of sober people who know what is going on with her.   She also states that she has a solid network of using friends as well.   Patient's family does not use.     4A. Are you currently in a significant relationship?  No- but she states that it is complicated.   4B.  If yes, how long?    4C. Sexual Orientation:  Bisexual    5A. Who do you live with? Alone.  5B. Tell me about their alcohol/drug use and mental health issues. NA.  5C. Are you concerned for your safety there? No- but feels that a man from the bar may be stalking her.   5D. Are you concerned about the safety of anyone else who lives with you? NA    6A. Do you have children who live with you? no  If the person lives with children, ask follow-up questions to determine the person's relationship and responsibility, both legal and care giving; and what arrangements are made for supervision for the children when the person is not available.      6B. Do you have children who do not live with you?  yes, Explain:  2 sons (9, 6) Live with their fathers full time, she gets them on weekends. She has shared custody. Pays child support to oldest's dad.   If yes, ask follow up questions to learn where the children are, who has custody and what the person't relaltionship and responsibility is with these children and what hopes the person has for his or her future with these children.    7A. Who supports you in making changes in your alcohol or drug use? What are they willing to do to support you? Who is upset or angry about you making changes in your  alcohol or drug use? How big a problem is this for you?      Ex-, ex-boyfriend, best friend, other sober friends      7B. This table is provided to record information about the person s relationships and available support It is not necessary to ask each item; only to get a comprehensive picture of their support system.  How often can you count on the following people when you need someone?   Partner / Spouse    Parent(s)/Aunt(s)/Uncle(s)/Grandparents Always supportive   Sibling(s)/Cousin(s)    Child(victorina)    Other relative(s)    Friend(s)/neighbor(s)- sober friends Always supportive   Child(victorina) s father(s)/mother(s) (1) Always supportive   Support group member(s) Always supportive   Community of dahlia members    /counselor/therapist/healer Always supportive   Other (specify)      8A. What is your current living situation?     Lives in an apartment alone.     8B. What is your long term plan for where you will be living?    Patient reports that she has no idea where she will be living.   She would like to have either a 2 bedroom apartment or a home of her own.     8C. Tell me about your living environment/neighborhood? Ask enough follow up questions to determine safety, criminal activity, availability of alcohol and drugs, supportive or antagonistic to the person making changes.      Patient reports that her neighborhood is not safe. There is a lot of drinking and use in the area.     9. Criminal justice history: Gather current/recent history and any significant history related to substance use--Arrests? Convictions? Circumstances? Alcohol or drug involvement? Sentences? Still on probation or parole? Expectations of the court? Current court order? Any sex offenses - lifetime? What level? (DSM)    Patient reports that she had a DWI when she was 17, but she is not sure if it is still on her record. She may also have a charge for possession of paraphernalia.     10. What obstacles exist to  participating in treatment? (Time off work, childcare, funding, transportation, pending longterm time, living situation)    She herself and her willingness to go to treatment may be a barrier, as well as people in her life who are telling her not to go and that she doesn't need it.   She also reports that the idea of having to pack up her apartment and put it into a storage unit that she can't currently get into may also be a barrier.   She also states that she is tired of the confinement of the treatment setting.     Dimension VI Ratings   Recovery environment - The placing authority must use the criteria in Dimension VI to determine a client s recovery environment.   RISK DESCRIPTIONS - Severity ratin  Client has (A) Chronically antagonistic significant other, living environment, family, peer gorup or long-term criminal justice involvement that is harmful to recovery or treatment progress, or (B) Client has an actively antagonistic significant other, family, work, or living environment with immediate threat to the client's safety and well-being.      REASONS SEVERITY WAS ASSIGNED - (What support does the person have for making changes? What structure/stability does the person have in his or her daily life that willincrease the likelihood that changes can be sustained? 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 This appears to indicate an antagonistic environment. She reports that she is unsure of how she will pay her rent for her apartment, and is at risk of losing it. She was unsure of where she would live if that happened. Patient has developed a solid network of people who support her substance use by either giving her drugs or money for drugs. She does share that she has people in her life who are highly supportive of her recovery as well, however she  distances herself from these people when she is actively using. It does not appear that these supportive people are actively involved in her life at this time. Patient denies any current legal involvement. She is not currently working and so appears to lack any structured or meaningful activity in her daily life.                Client Choice/Exceptions     Would you like services specific to language, age, gender, culture, Latter day preference, race, ethnicity, sexual orientation or disability?  yes    If yes, specify:  Women's program- not [facility].    What particular treatment choices and options would you like to have?  Residential    Do you have a preference for a particular treatment program?  [facility], any [facility] program.         DSM-V Criteria for Substance Abuse  Instructions  Determine whether the client currently meets the criteria for a Substance Use Disorder using the diagnostic criteria in the DSM-V, pp. 481-589. Current means during the most recent 12 months outside a facility that controls access to substances.    Category of substance Severity ICD-10 Code/DSM V Code   [x]  Alcohol Use Disorder [] Mild  [] Moderate  [x] Severe [] (F10.10) (305.00)  [] (F10.20) (303.90)  [x] (F10.20) (303.90)   [x]  Cannabis Use Disorder [] Mild  [] Moderate  [x] Severe [] (F12.10) (305.20)  [] (F12.20) (304.30)  [x] (F12.20) (304.30)   [x] Hallucinogen Use Disorder [] Mild  [] Moderate  [x] Severe [] (F16.10) (305.30)  [] (F16.20) (304.50)  [x] (F16.20) (304.50)   []  Inhalant Use Disorder [] Mild  [] Moderate  [] Severe [] (F18.10) (305.90)  [] (F18.20) (304.60)  [] (F18.20) (304.60)   []  Opioid Use Disorder [] Mild  [] Moderate  [] Severe [] (F11.10) (305.50)  [] (F11.20) (304.00)  [] (F11.20) (304.00)   [x]  Sedative, Hypnotic, or Anxiolytic Use Disorder [] Mild  [] Moderate  [x] Severe [] (F13.10) (305.40)   [] (F13.20) (304.10)  [x] (F13.20) (304.10)   [x]  Stimulant Related Disorders []  Mild  [] Moderate  [x] Severe [] (F15.10) (305.70) Amphetamine type substance  [] (F14.10) (305.60) Cocaine  [] (F15.10) (305.70) Other or unspecified stimulant  [] (F15.20) (304.40) Amphetamine type substance  [] (F14.20) (304.20) Cocaine  [] (F15.20) (304.40) Other or unspecified stimulant  [x] (F15.20) (304.40) Amphetamine type substance  [x] (F14.20) (304.20) Cocaine  [] (F15.20) (304.40) Other or unspecified stimulant   []  Tobacco use Disorder [] Mild  [] Moderate  [] Severe [] (Z72.0) (305.1)  [] (F17.200) (305.1)  [] (F17.200) (305.1)   []  Other (or unknown) Substance Use Disorder [] Mild  [] Moderate  [] Severe [] (F19.10) (305.90)  [] (F19.20) (304.90)  [] (F19.20) (304.90)       Suggested Level of Care Necessary for Recovery  [x]  Inpatient  []  Extended Care [x]  Residential []  Outpatient  []  None            Collateral Contact Summary   Number of contacts made:  1  Contact with referring person:  yes     If court related records were reviewed, summarize here:  NA     [x]   Information from collateral contacts supported/largely agreed with information from the client and associated risk ratings.   []   Information from collateral contacts was significantly different from information from the client and lead to different risk ratings.      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   Patient is recommended to complete Inpatient treatment, or Residential treatment after going to detox.    Patient should seek medical attention  to monitor withdrawal if use is stopped prior to attending treatment.   This assessment can be updated with additional information within a 6 month period.      Collateral Contacts     Please duplicate this page for each contact.  If this includes information which is sensitive and not public, separate this page from the rest of the assessment before sharing.  Retain the page in the assessment file.   Name    Alanna Aguiar/Radius Relationship     Phone Number    [phone] Releases    yes       Information Provided:      Alanna reported the following:    Patient has had consistent issues with alcohol for at least the past year and a half.   Recently she has gotten crisis calls from the patient's friends stating that the patient is using harder drugs such as acid.   Patient has been to detox multiple times recently, but does not stay.   Her friends are calling her stating that they are concerned about her, and end up bringing her in to the ED.   They recently brought her in to [facility], where the patient was placed on a hold as they were unsure of her safety.   She believes that the patient's use stems from financial stress (not being able to afford her apartment or storage unit), and that they patient was benefit from therapy or an [facility] worker, however the patient has not agreed to any of that.   Patient is a risk to herself when she is using.   When she is sober she does well.     Attempted to contact Alanna 7/16/18 @ [phone]. No answer, left message to return call with any information.     Collateral Contacts     Name    Medical Record/Chart   Relationship     Phone Number     Releases           Information Provided:      Patient's medical record indicates numerous admissions to EDs and hospital units in the past 6 months.   Her ED visits are generally for suicidal thoughts or for her substance use.   Patient was admitted to [facility] ED on 7/16/18, and is currently admitted on the mental health floor.    Patient's UA from her ED admission on 7/16/18 tested positive for amphetamines, opiates, benzodiazepines and THC.         Staff Name and Title:  VERNA Kolb    Date:  7/9/2018  Time:  10:51 AM       no

## 2021-06-20 NOTE — ASSESSMENT
[FreeTextEntry1] : 1. Chronic daily headaches \par 2. Myeloradiculopathy\par 3. Chronic pain syndrome \par \par Start MRI brain and if negative will set up SPECT scan of spine

## 2021-06-20 NOTE — HISTORY OF PRESENT ILLNESS
[FreeTextEntry1] : Patient with multiple cervical and thoracic surgeries over the last 20 years with apparently no relief. She has been on a wheelchair since 2010 and has seen multiple doctors and also seen Dr. Mclaughlin for pain. In addition she is apparently going to go forward with a pain pump. \par \par She is here for headaches that have happened since first cervical surgery in 2008.

## 2021-06-22 ENCOUNTER — NON-APPOINTMENT (OUTPATIENT)
Age: 66
End: 2021-06-22

## 2021-06-28 ENCOUNTER — NON-APPOINTMENT (OUTPATIENT)
Age: 66
End: 2021-06-28

## 2021-07-01 ENCOUNTER — APPOINTMENT (OUTPATIENT)
Dept: SPINE | Facility: CLINIC | Age: 66
End: 2021-07-01
Payer: MEDICARE

## 2021-07-01 ENCOUNTER — NON-APPOINTMENT (OUTPATIENT)
Age: 66
End: 2021-07-01

## 2021-07-01 VITALS
TEMPERATURE: 98.4 F | HEIGHT: 67 IN | BODY MASS INDEX: 25.74 KG/M2 | RESPIRATION RATE: 18 BRPM | DIASTOLIC BLOOD PRESSURE: 89 MMHG | WEIGHT: 164 LBS | SYSTOLIC BLOOD PRESSURE: 124 MMHG | OXYGEN SATURATION: 99 % | HEART RATE: 98 BPM

## 2021-07-01 PROCEDURE — 99215 OFFICE O/P EST HI 40 MIN: CPT

## 2021-07-01 NOTE — DISCUSSION/SUMMARY
[de-identified] : I long discussion with the patient regarding the natural history of sagittal spinal deformity and she needs a very large operation to fix this.  She has some work-up that is still pending with one of our neurologist so I am going to touch base with them afterwards but have tentatively offered her a revision C2 to the pelvis posterior fusion with an L5 pedicle subtraction osteotomy.  This would need to be done over 2 days would be associated with approximately 18 to 20 hours of total surgery with extensive blood loss and potential a multiday stay in the ICU and a 1 to 2-week hospital stay with a virtual guarantee of rehab afterwards.  She seems have a good understanding and she is taking excellent care of her self including quitting smoking and so I would like to let work-up continue continue and tentatively have operative dates planned August 30 and September 1

## 2021-07-01 NOTE — HISTORY OF PRESENT ILLNESS
[de-identified] : The patient is a 66-year old woman who returns for evaluation of her severe spinal deformity

## 2021-07-06 ENCOUNTER — RX RENEWAL (OUTPATIENT)
Age: 66
End: 2021-07-06

## 2021-07-07 ENCOUNTER — NON-APPOINTMENT (OUTPATIENT)
Age: 66
End: 2021-07-07

## 2021-07-09 ENCOUNTER — APPOINTMENT (OUTPATIENT)
Dept: NEUROLOGY | Facility: CLINIC | Age: 66
End: 2021-07-09

## 2021-07-16 ENCOUNTER — APPOINTMENT (OUTPATIENT)
Dept: NEUROLOGY | Facility: CLINIC | Age: 66
End: 2021-07-16
Payer: MEDICARE

## 2021-07-16 VITALS
HEIGHT: 67 IN | DIASTOLIC BLOOD PRESSURE: 77 MMHG | BODY MASS INDEX: 25.74 KG/M2 | TEMPERATURE: 97.3 F | OXYGEN SATURATION: 98 % | HEART RATE: 63 BPM | WEIGHT: 164 LBS | SYSTOLIC BLOOD PRESSURE: 114 MMHG

## 2021-07-16 PROCEDURE — 99215 OFFICE O/P EST HI 40 MIN: CPT

## 2021-07-19 LAB
COTINE: 64.9 NG/ML
COTININE SERPL-MCNC: 1.3 NG/ML

## 2021-07-23 ENCOUNTER — NON-APPOINTMENT (OUTPATIENT)
Age: 66
End: 2021-07-23

## 2021-07-27 ENCOUNTER — APPOINTMENT (OUTPATIENT)
Dept: INTERNAL MEDICINE | Facility: CLINIC | Age: 66
End: 2021-07-27
Payer: MEDICARE

## 2021-07-27 VITALS
OXYGEN SATURATION: 97 % | WEIGHT: 164 LBS | BODY MASS INDEX: 25.74 KG/M2 | SYSTOLIC BLOOD PRESSURE: 124 MMHG | TEMPERATURE: 98.2 F | HEART RATE: 62 BPM | HEIGHT: 67 IN | DIASTOLIC BLOOD PRESSURE: 80 MMHG

## 2021-07-27 DIAGNOSIS — N39.0 URINARY TRACT INFECTION, SITE NOT SPECIFIED: ICD-10-CM

## 2021-07-27 DIAGNOSIS — M19.012 PRIMARY OSTEOARTHRITIS, LEFT SHOULDER: ICD-10-CM

## 2021-07-27 PROCEDURE — 36415 COLL VENOUS BLD VENIPUNCTURE: CPT

## 2021-07-27 PROCEDURE — 99214 OFFICE O/P EST MOD 30 MIN: CPT

## 2021-07-27 RX ORDER — SULFAMETHOXAZOLE AND TRIMETHOPRIM 800; 160 MG/1; MG/1
800-160 TABLET ORAL TWICE DAILY
Qty: 10 | Refills: 0 | Status: DISCONTINUED | COMMUNITY
Start: 2021-06-24 | End: 2021-07-27

## 2021-07-27 NOTE — HISTORY OF PRESENT ILLNESS
[FreeTextEntry1] : back pain and neck pain [de-identified] : THis is a 65 yo f with HTN, GAMALIEL, multiple spinal surgeries in the past\par HLD, CVA x3 - last one in [place] in 2016 (on asa)\par Recurrent UTI - having symptoms of burning and urinary frequency\par continues to take nitrofurantoin qod \par New dx of MS by Dr Qiu - surgery on hold for now.  Plan for LP for confirmation - Surgery on hold for now. \par Following with Dr Mclaughlin for her pain\par Stopped TOB completely!

## 2021-07-27 NOTE — PHYSICAL EXAM
[Normal] : normal rate, regular rhythm, normal S1 and S2 and no murmur heard [de-identified] : trace edema

## 2021-07-27 NOTE — PLAN
[FreeTextEntry1] : MS - reviewed the need for LP - - she will f/up with Dr Qiu\par \par UTI - start cipro\par ua and urine cx -  today and await results\par - Can continue myrbetriq\par \par Pain management - close f/up with Dr Mclaughlin\par \par SMoking - continue chantix\par \par Back pain- plan for surgery in the future with Dr Luz

## 2021-07-28 LAB
ALBUMIN SERPL ELPH-MCNC: 4.6 G/DL
ALP BLD-CCNC: 133 U/L
ALT SERPL-CCNC: 21 U/L
ANION GAP SERPL CALC-SCNC: 11 MMOL/L
APPEARANCE: CLEAR
AST SERPL-CCNC: 20 U/L
BASOPHILS # BLD AUTO: 0.03 K/UL
BASOPHILS NFR BLD AUTO: 0.6 %
BILIRUB SERPL-MCNC: 0.3 MG/DL
BILIRUBIN URINE: NEGATIVE
BLOOD URINE: NEGATIVE
BUN SERPL-MCNC: 12 MG/DL
CALCIUM SERPL-MCNC: 9.5 MG/DL
CHLORIDE SERPL-SCNC: 101 MMOL/L
CHOLEST SERPL-MCNC: 167 MG/DL
CO2 SERPL-SCNC: 26 MMOL/L
COLOR: YELLOW
CREAT SERPL-MCNC: 0.69 MG/DL
EOSINOPHIL # BLD AUTO: 0.1 K/UL
EOSINOPHIL NFR BLD AUTO: 1.9 %
ESTIMATED AVERAGE GLUCOSE: 134 MG/DL
GLUCOSE QUALITATIVE U: NEGATIVE
GLUCOSE SERPL-MCNC: 94 MG/DL
HBA1C MFR BLD HPLC: 6.3 %
HCT VFR BLD CALC: 42.7 %
HDLC SERPL-MCNC: 72 MG/DL
HGB BLD-MCNC: 13 G/DL
IMM GRANULOCYTES NFR BLD AUTO: 0.2 %
KETONES URINE: NEGATIVE
LDLC SERPL CALC-MCNC: 58 MG/DL
LEUKOCYTE ESTERASE URINE: NEGATIVE
LYMPHOCYTES # BLD AUTO: 2.55 K/UL
LYMPHOCYTES NFR BLD AUTO: 48.9 %
MAN DIFF?: NORMAL
MCHC RBC-ENTMCNC: 27.4 PG
MCHC RBC-ENTMCNC: 30.4 GM/DL
MCV RBC AUTO: 90.1 FL
MONOCYTES # BLD AUTO: 0.46 K/UL
MONOCYTES NFR BLD AUTO: 8.8 %
NEUTROPHILS # BLD AUTO: 2.06 K/UL
NEUTROPHILS NFR BLD AUTO: 39.6 %
NITRITE URINE: NEGATIVE
NONHDLC SERPL-MCNC: 95 MG/DL
PH URINE: 6
PLATELET # BLD AUTO: 188 K/UL
POTASSIUM SERPL-SCNC: 4.8 MMOL/L
PROT SERPL-MCNC: 6.8 G/DL
PROTEIN URINE: NEGATIVE
RBC # BLD: 4.74 M/UL
RBC # FLD: 14.8 %
SODIUM SERPL-SCNC: 139 MMOL/L
SPECIFIC GRAVITY URINE: 1.02
TRIGL SERPL-MCNC: 182 MG/DL
TSH SERPL-ACNC: 1.76 UIU/ML
UROBILINOGEN URINE: NORMAL
WBC # FLD AUTO: 5.21 K/UL

## 2021-07-29 ENCOUNTER — RX RENEWAL (OUTPATIENT)
Age: 66
End: 2021-07-29

## 2021-08-02 LAB
ALBUMIN SERPL ELPH-MCNC: 4.6 G/DL
ALP BLD-CCNC: 126 U/L
ALT SERPL-CCNC: 21 U/L
ANION GAP SERPL CALC-SCNC: 13 MMOL/L
APTT BLD: 26.8 SEC
AST SERPL-CCNC: 20 U/L
BASOPHILS # BLD AUTO: 0.03 K/UL
BASOPHILS NFR BLD AUTO: 0.6 %
BILIRUB SERPL-MCNC: 0.3 MG/DL
BUN SERPL-MCNC: 12 MG/DL
CALCIUM SERPL-MCNC: 9.7 MG/DL
CHLORIDE SERPL-SCNC: 102 MMOL/L
CO2 SERPL-SCNC: 24 MMOL/L
CREAT SERPL-MCNC: 0.7 MG/DL
EOSINOPHIL # BLD AUTO: 0.1 K/UL
EOSINOPHIL NFR BLD AUTO: 1.9 %
GLUCOSE SERPL-MCNC: 99 MG/DL
HCT VFR BLD CALC: 43.9 %
HGB BLD-MCNC: 13.8 G/DL
IMM GRANULOCYTES NFR BLD AUTO: 0.2 %
INR PPP: 0.9 RATIO
LYMPHOCYTES # BLD AUTO: 2.69 K/UL
LYMPHOCYTES NFR BLD AUTO: 52.1 %
MAN DIFF?: NORMAL
MCHC RBC-ENTMCNC: 28.3 PG
MCHC RBC-ENTMCNC: 31.4 GM/DL
MCV RBC AUTO: 90.1 FL
MONOCYTES # BLD AUTO: 0.52 K/UL
MONOCYTES NFR BLD AUTO: 10.1 %
NEUTROPHILS # BLD AUTO: 1.81 K/UL
NEUTROPHILS NFR BLD AUTO: 35.1 %
PLATELET # BLD AUTO: 204 K/UL
POTASSIUM SERPL-SCNC: 5.2 MMOL/L
PROT SERPL-MCNC: 7 G/DL
PT BLD: 10.7 SEC
RBC # BLD: 4.87 M/UL
RBC # FLD: 14.6 %
SODIUM SERPL-SCNC: 140 MMOL/L
WBC # FLD AUTO: 5.16 K/UL

## 2021-08-03 ENCOUNTER — RX RENEWAL (OUTPATIENT)
Age: 66
End: 2021-08-03

## 2021-08-13 ENCOUNTER — NON-APPOINTMENT (OUTPATIENT)
Age: 66
End: 2021-08-13

## 2021-08-25 ENCOUNTER — RX RENEWAL (OUTPATIENT)
Age: 66
End: 2021-08-25

## 2021-08-26 ENCOUNTER — RX RENEWAL (OUTPATIENT)
Age: 66
End: 2021-08-26

## 2021-08-31 ENCOUNTER — NON-APPOINTMENT (OUTPATIENT)
Age: 66
End: 2021-08-31

## 2021-09-01 PROCEDURE — G9005: CPT

## 2021-09-07 ENCOUNTER — NON-APPOINTMENT (OUTPATIENT)
Age: 66
End: 2021-09-07

## 2021-09-07 ENCOUNTER — APPOINTMENT (OUTPATIENT)
Dept: UROLOGY | Facility: CLINIC | Age: 66
End: 2021-09-07
Payer: MEDICARE

## 2021-09-07 VITALS — DIASTOLIC BLOOD PRESSURE: 83 MMHG | SYSTOLIC BLOOD PRESSURE: 123 MMHG | TEMPERATURE: 98 F | HEART RATE: 78 BPM

## 2021-09-07 PROCEDURE — 99204 OFFICE O/P NEW MOD 45 MIN: CPT

## 2021-09-07 PROCEDURE — 51798 US URINE CAPACITY MEASURE: CPT

## 2021-09-07 NOTE — HISTORY OF PRESENT ILLNESS
[FreeTextEntry1] : 65 yo F  presents with recurrent UTI.  Pt is wheelchair bound since  following spinal surgery. She has been performing intermittent catheterization since .  Lately she has been catheterizing several times an hour due to irritative symptoms, but historically she caths 4-6 times per day.  She was placed on Mirabegron but it is not helping her.   She is unable to provide a urine sample today. \par \par PMH: HTN, Emphysema/COPD, Sleep Apnea, HLD, CVA x3, MS, GAMALIEL\par PSH: Cervical laminectomy, Multiple spinal surgeries \par FH:\par SH:Former smoker; No current alcohol usage; No illicit drug use; Single; Unemployed\par Allergies: Opioids\par Meds: Nitrofurantoin qod, Mybertriq, Simvstatin\par \par UA:\par \par

## 2021-09-07 NOTE — ASSESSMENT
[FreeTextEntry1] : Pt is a 65 yo F with neurogenic bladder following spinal surgery >10 years ago. She performs intermittent catheterization and presents today complaining of multiple recent UTI's however she does not present with documented positive urine cultures.  \par \par We discussed her symptoms and I sent a urine for culture and cytology. Given her history, I will start with a cystoscopy once her urine culture results are available.

## 2021-09-07 NOTE — PHYSICAL EXAM

## 2021-09-07 NOTE — ADDENDUM
[FreeTextEntry1] : A portion of this note was written by [Latonya Ramirez] on 09/03/2021 acting as a scribe for Dr. Quintana. \par \par I have personally reviewed the chart and agree that the record accurately reflects my personal performance of the history, physical exam, assessment, and plan.

## 2021-09-07 NOTE — LETTER BODY
[Dear  ___] : Dear  [unfilled], [Consult Letter:] : I had the pleasure of evaluating your patient, [unfilled]. [Please see my note below.] : Please see my note below. [Consult Closing:] : Thank you very much for allowing me to participate in the care of this patient.  If you have any questions, please do not hesitate to contact me. [Sincerely,] : Sincerely, [FreeTextEntry3] : Dr. Claudia Quintana

## 2021-09-09 ENCOUNTER — RX RENEWAL (OUTPATIENT)
Age: 66
End: 2021-09-09

## 2021-09-09 RX ORDER — LIDOCAINE AND PRILOCAINE 25; 25 MG/G; MG/G
2.5-2.5 CREAM TOPICAL
Qty: 5 | Refills: 3 | Status: ACTIVE | COMMUNITY
Start: 2021-04-30 | End: 1900-01-01

## 2021-09-10 LAB
BACTERIA UR CULT: NORMAL
URINE CYTOLOGY: NORMAL

## 2021-09-15 DIAGNOSIS — K64.4 RESIDUAL HEMORRHOIDAL SKIN TAGS: ICD-10-CM

## 2021-09-27 ENCOUNTER — APPOINTMENT (OUTPATIENT)
Dept: INTERNAL MEDICINE | Facility: CLINIC | Age: 66
End: 2021-09-27
Payer: MEDICARE

## 2021-09-27 VITALS
HEIGHT: 67 IN | WEIGHT: 164 LBS | RESPIRATION RATE: 18 BRPM | TEMPERATURE: 97 F | OXYGEN SATURATION: 97 % | SYSTOLIC BLOOD PRESSURE: 126 MMHG | BODY MASS INDEX: 25.74 KG/M2 | HEART RATE: 68 BPM | DIASTOLIC BLOOD PRESSURE: 78 MMHG

## 2021-09-27 DIAGNOSIS — L30.9 DERMATITIS, UNSPECIFIED: ICD-10-CM

## 2021-09-27 DIAGNOSIS — K59.09 OTHER CONSTIPATION: ICD-10-CM

## 2021-09-27 PROCEDURE — 99214 OFFICE O/P EST MOD 30 MIN: CPT

## 2021-09-27 RX ORDER — TRIAMCINOLONE ACETONIDE 1 MG/G
0.1 CREAM TOPICAL 3 TIMES DAILY
Qty: 1 | Refills: 0 | Status: ACTIVE | COMMUNITY
Start: 2021-09-27 | End: 1900-01-01

## 2021-09-27 NOTE — PLAN
[FreeTextEntry1] : Eczema trial of triamcinolone\par  explained prilocaine not for this.issue\par \par Restart wellbutirn to help maintain her non-smoking status\par \par Urinary frequency - f/up Dr Quintana for botox\par \par  {Back pain - will review options with Dr Estiven Luz\par \par needs prevnar and pneumovax- for next visit.

## 2021-09-27 NOTE — HISTORY OF PRESENT ILLNESS
[FreeTextEntry1] : back pain and neck pain [de-identified] : THis is a 65 yo f with HTN, GAMALIEL, multiple spinal surgeries in the past\par HLD, CVA x3 - last one in [place] in 2016 (on asa)\par Recurrent UTI - having symptoms of burning and urinary frequency may be getting botox with Dr Quintana\par Rash on both arms - began 4 weeks ago. Saw another doctor - her pain doctor - prescribed  \par Constipation remains an issue \par Following with Dr Jean-Baptiste tomorrow.  She was told she DOES NOT have MS.   COntinues to have severe back pain\par Not smoking - .  [place] recalled but doing ok without it.   \par already had flu chot.\par needs pna vax

## 2021-09-27 NOTE — PHYSICAL EXAM
[Normal] : no acute distress, well nourished, well developed and well-appearing [No Edema] : there was no peripheral edema [de-identified] : dry scaling skin right forearm

## 2021-09-28 ENCOUNTER — APPOINTMENT (OUTPATIENT)
Dept: UROLOGY | Facility: CLINIC | Age: 66
End: 2021-09-28
Payer: MEDICARE

## 2021-09-28 VITALS — HEART RATE: 75 BPM | SYSTOLIC BLOOD PRESSURE: 119 MMHG | TEMPERATURE: 98.6 F | DIASTOLIC BLOOD PRESSURE: 86 MMHG

## 2021-09-28 PROCEDURE — 52000 CYSTOURETHROSCOPY: CPT

## 2021-09-28 PROCEDURE — 99214 OFFICE O/P EST MOD 30 MIN: CPT | Mod: 25

## 2021-09-29 LAB
BILIRUB UR QL STRIP: NORMAL
CLARITY UR: CLEAR
COLLECTION METHOD: NORMAL
GLUCOSE UR-MCNC: NORMAL
HCG UR QL: 0.2 EU/DL
HGB UR QL STRIP.AUTO: NORMAL
KETONES UR-MCNC: NORMAL
LEUKOCYTE ESTERASE UR QL STRIP: NORMAL
NITRITE UR QL STRIP: NORMAL
PH UR STRIP: 6
PROT UR STRIP-MCNC: NORMAL
SP GR UR STRIP: >-1.03

## 2021-09-29 NOTE — ASSESSMENT
[FreeTextEntry1] : Pt is a 65 yo F with neurogenic bladder following spinal surgery >10 years ago. She performs intermittent catheterization and presents today for cystoscopy.  On cystoscopy she has no tumor and no stone in the bladder.  She is interested in a trial of intravesical botox and I have reviewed with her the risks and benefits.  She would like to proceed.  Today's urine was sent for culture.

## 2021-09-29 NOTE — ADDENDUM
[FreeTextEntry1] : A portion of this note was written by [Latonya Ramirez] on 09/28/2021  acting as a scribe for Dr. Quintana. \par \par I have personally reviewed the chart and agree that the record accurately reflects my personal performance of the history, physical exam, assessment, and plan.

## 2021-09-29 NOTE — PHYSICAL EXAM

## 2021-09-29 NOTE — HISTORY OF PRESENT ILLNESS
[FreeTextEntry1] : 65 yo F  presents with recurrent UTI, and is here today for cystoscopy. Pt is wheelchair bound since  following spinal surgery. She has been performing intermittent catheterization since .  Lately she has been catheterizing several times an hour due to irritative symptoms, but historically she caths 4-6 times per day.  She was placed on Mirabegron but it is not helping her.   \par \par PMH: HTN, emphysema/COPD,sleep apnea, HLD, CVA x3, MS, GAMALIEL\par PSH: cervical laminectomy, multiple spinal surgeries \par FH:\par SH:former smoker; no current alcohol usage; no illicit drug use; single; unemployed\par Allergies: Opioids\par Meds: nitrofurantoin qod, mybertriq, simvstatin\par \par UA:\par \par

## 2021-09-30 ENCOUNTER — NON-APPOINTMENT (OUTPATIENT)
Age: 66
End: 2021-09-30

## 2021-09-30 ENCOUNTER — APPOINTMENT (OUTPATIENT)
Dept: SPINE | Facility: CLINIC | Age: 66
End: 2021-09-30
Payer: MEDICARE

## 2021-09-30 ENCOUNTER — TRANSCRIPTION ENCOUNTER (OUTPATIENT)
Age: 66
End: 2021-09-30

## 2021-09-30 VITALS
BODY MASS INDEX: 25.74 KG/M2 | WEIGHT: 164 LBS | HEART RATE: 67 BPM | OXYGEN SATURATION: 98 % | TEMPERATURE: 98 F | DIASTOLIC BLOOD PRESSURE: 87 MMHG | SYSTOLIC BLOOD PRESSURE: 130 MMHG | HEIGHT: 67 IN | RESPIRATION RATE: 18 BRPM

## 2021-09-30 LAB — BACTERIA UR CULT: NORMAL

## 2021-09-30 PROCEDURE — 99215 OFFICE O/P EST HI 40 MIN: CPT

## 2021-10-04 NOTE — HISTORY OF PRESENT ILLNESS
[de-identified] : The patient returns today.  On her last visit we had discussed fairly extensive surgery but I was asked by her neurologist here at [facility] to hold off as there was some confusion about whether or not she may have multiple sclerosis she was seen by 2 other neurologist at [place] who say that they do not believe that she has multiple sclerosis she is here to determine neck steps

## 2021-10-04 NOTE — DISCUSSION/SUMMARY
[de-identified] : Should the patient need spinal surgery she would need a fairly extensive 2-day two-stage operation.  I would like to clarify about her status and so I would like to reach out to the MS center at [place] to figure out exactly what is going on.  Ultimately I think she does need a lumbar puncture and for her pain I have asked my colleague to pursue an intrathecal pump and potentially to perform the lumbar puncture at the same time we can follow-up with her as soon as we have more information

## 2021-10-14 ENCOUNTER — APPOINTMENT (OUTPATIENT)
Dept: UROLOGY | Facility: CLINIC | Age: 66
End: 2021-10-14
Payer: MEDICARE

## 2021-10-14 ENCOUNTER — APPOINTMENT (OUTPATIENT)
Dept: UROLOGY | Facility: CLINIC | Age: 66
End: 2021-10-14

## 2021-10-14 VITALS — HEART RATE: 75 BPM | TEMPERATURE: 98 F | DIASTOLIC BLOOD PRESSURE: 74 MMHG | SYSTOLIC BLOOD PRESSURE: 110 MMHG

## 2021-10-14 VITALS — SYSTOLIC BLOOD PRESSURE: 110 MMHG | TEMPERATURE: 97.8 F | DIASTOLIC BLOOD PRESSURE: 74 MMHG | HEART RATE: 75 BPM

## 2021-10-14 PROCEDURE — 51784 ANAL/URINARY MUSCLE STUDY: CPT

## 2021-10-14 PROCEDURE — 51798 US URINE CAPACITY MEASURE: CPT | Mod: 59

## 2021-10-14 PROCEDURE — 51797 INTRAABDOMINAL PRESSURE TEST: CPT

## 2021-10-14 PROCEDURE — 51728 CYSTOMETROGRAM W/VP: CPT

## 2021-10-14 PROCEDURE — 99214 OFFICE O/P EST MOD 30 MIN: CPT | Mod: 25

## 2021-10-14 PROCEDURE — 51741 ELECTRO-UROFLOWMETRY FIRST: CPT

## 2021-10-14 NOTE — ASSESSMENT
[FreeTextEntry1] : Pt is a 67 yo F with MS/neurogenic bladder who is very bothered by urgency/frequency.  She performs intermittent catheterization and presents today for cystoscopy/Botox injection. Today's cystoscopy was normal. I injected 100 units of Botox. Pt will return for a f/u in 7-10 days.  She will bring her baseline UDS records from [facility] for my records.  \par \par Note- 100 Units chosen as I do not yet have access to her UDS study.  May need additional injection.  Will decide at  next visit.  \par \par Pt expressed understanding.

## 2021-10-14 NOTE — ADDENDUM
[FreeTextEntry1] : A portion of this note was written by [Latonya Ramirez] on 10/14/2021 acting as a scribe for Dr. Quintana. \par \par I have personally reviewed the chart and agree that the record accurately reflects my personal performance of the history, physical exam, assessment, and plan.

## 2021-10-14 NOTE — HISTORY OF PRESENT ILLNESS
[FreeTextEntry1] : 65 yo F  with MS on CIC with bothersome urgency/frequency here today for cystoscopy/Botox injection. Pt is wheelchair bound since .  Lately due to overactive bladder symptoms she has been catheterizing several times an hour due to irritative symptoms, but historically she caths 4-6 times per day.  She was placed on Mirabegron but it is not helping her.  She reports prior UDS at [place] where she has been receiving care but has not yet provided her records.  \par \par Udip: negative\par \par PMH: HTN, emphysema/COPD,sleep apnea, HLD, CVA x3, MS, GAMALIEL\par PSH: cervical laminectomy, multiple spinal surgeries \par FH:\par SH:former smoker; no current alcohol usage; no illicit drug use; single; unemployed\par Allergies: Opioids\par Meds: nitrofurantoin qod, mybertriq, simvstatin\par \par \par

## 2021-10-18 LAB — BACTERIA UR CULT: NORMAL

## 2021-11-05 ENCOUNTER — NON-APPOINTMENT (OUTPATIENT)
Age: 66
End: 2021-11-05

## 2021-11-30 ENCOUNTER — RX RENEWAL (OUTPATIENT)
Age: 66
End: 2021-11-30

## 2021-12-13 ENCOUNTER — APPOINTMENT (OUTPATIENT)
Dept: NEUROSURGERY | Facility: CLINIC | Age: 66
End: 2021-12-13
Payer: MEDICARE

## 2021-12-13 PROCEDURE — 99212 OFFICE O/P EST SF 10 MIN: CPT

## 2021-12-13 PROCEDURE — 99214 OFFICE O/P EST MOD 30 MIN: CPT

## 2021-12-13 NOTE — ASSESSMENT
[FreeTextEntry1] : we discussed the risk, benefits and alternatives of IT pump placement including butnot limited to bleeding infection, cSF leak, meningitis, catheter occlusion, granuloma causing cord compression and paralysis, overdose, withdrawl, pump failure, need for further procedures including revision/removal. She would like ot proceed. We will place 20ml programmable medtronic IT pump. I will discuss any CSF studies to be sent per neurology to r/o MS.

## 2021-12-15 ENCOUNTER — APPOINTMENT (OUTPATIENT)
Dept: BREAST CENTER | Facility: CLINIC | Age: 66
End: 2021-12-15

## 2021-12-21 ENCOUNTER — APPOINTMENT (OUTPATIENT)
Dept: INTERNAL MEDICINE | Facility: CLINIC | Age: 66
End: 2021-12-21
Payer: MEDICARE

## 2021-12-21 VITALS
SYSTOLIC BLOOD PRESSURE: 121 MMHG | OXYGEN SATURATION: 97 % | HEART RATE: 84 BPM | TEMPERATURE: 98.3 F | BODY MASS INDEX: 25.43 KG/M2 | WEIGHT: 162 LBS | DIASTOLIC BLOOD PRESSURE: 78 MMHG | HEIGHT: 67 IN

## 2021-12-21 DIAGNOSIS — E78.5 HYPERLIPIDEMIA, UNSPECIFIED: ICD-10-CM

## 2021-12-21 PROCEDURE — 93000 ELECTROCARDIOGRAM COMPLETE: CPT

## 2021-12-21 PROCEDURE — 99214 OFFICE O/P EST MOD 30 MIN: CPT

## 2021-12-21 RX ORDER — CIPROFLOXACIN AND DEXAMETHASONE 3; 1 MG/ML; MG/ML
0.3-0.1 SUSPENSION/ DROPS AURICULAR (OTIC) TWICE DAILY
Qty: 1 | Refills: 0 | Status: DISCONTINUED | COMMUNITY
Start: 2021-08-26 | End: 2021-12-21

## 2021-12-21 RX ORDER — VARENICLINE TARTRATE 0.5 (11)-1
0.5 MG X 11 & KIT ORAL
Qty: 1 | Refills: 2 | Status: DISCONTINUED | COMMUNITY
Start: 2021-05-28 | End: 2021-12-21

## 2021-12-21 RX ORDER — DICLOFENAC SODIUM 1% 10 MG/G
1 GEL TOPICAL DAILY
Qty: 1 | Refills: 2 | Status: DISCONTINUED | COMMUNITY
Start: 2021-02-16 | End: 2021-12-21

## 2021-12-21 RX ORDER — CIPROFLOXACIN HYDROCHLORIDE 500 MG/1
500 TABLET, FILM COATED ORAL
Qty: 14 | Refills: 0 | Status: DISCONTINUED | COMMUNITY
Start: 2021-07-07 | End: 2021-12-21

## 2021-12-21 RX ORDER — ASPIRIN 81 MG
81 TABLET, DELAYED RELEASE (ENTERIC COATED) ORAL
Refills: 0 | Status: DISCONTINUED | COMMUNITY
End: 2021-12-21

## 2021-12-21 RX ORDER — CLOTRIMAZOLE 10 MG/G
1 CREAM TOPICAL 3 TIMES DAILY
Qty: 1 | Refills: 3 | Status: DISCONTINUED | COMMUNITY
Start: 2021-04-30 | End: 2021-12-21

## 2021-12-21 RX ORDER — VARENICLINE TARTRATE 1 MG/1
1 TABLET, FILM COATED ORAL TWICE DAILY
Qty: 60 | Refills: 2 | Status: DISCONTINUED | COMMUNITY
Start: 2021-07-06 | End: 2021-12-21

## 2021-12-21 RX ORDER — ESOMEPRAZOLE MAGNESIUM 40 MG/1
40 CAPSULE, DELAYED RELEASE ORAL
Refills: 0 | Status: DISCONTINUED | COMMUNITY
End: 2021-12-21

## 2021-12-23 ENCOUNTER — APPOINTMENT (OUTPATIENT)
Dept: HEART AND VASCULAR | Facility: CLINIC | Age: 66
End: 2021-12-23
Payer: MEDICARE

## 2021-12-23 VITALS
SYSTOLIC BLOOD PRESSURE: 122 MMHG | TEMPERATURE: 98 F | OXYGEN SATURATION: 98 % | RESPIRATION RATE: 12 BRPM | DIASTOLIC BLOOD PRESSURE: 82 MMHG | HEART RATE: 82 BPM

## 2021-12-23 DIAGNOSIS — R06.02 SHORTNESS OF BREATH: ICD-10-CM

## 2021-12-23 PROCEDURE — 93306 TTE W/DOPPLER COMPLETE: CPT

## 2021-12-23 PROCEDURE — 99214 OFFICE O/P EST MOD 30 MIN: CPT

## 2021-12-23 NOTE — PHYSICAL EXAM
[Well Developed] : well developed [Well Nourished] : well nourished [No Acute Distress] : no acute distress [Normal Conjunctiva] : normal conjunctiva [Normal Venous Pressure] : normal venous pressure [No Carotid Bruit] : no carotid bruit [Normal S1, S2] : normal S1, S2 [No Murmur] : no murmur [No Rub] : no rub [No Gallop] : no gallop [Clear Lung Fields] : clear lung fields [Good Air Entry] : good air entry [No Respiratory Distress] : no respiratory distress  [Soft] : abdomen soft [Non Tender] : non-tender [No Masses/organomegaly] : no masses/organomegaly [Normal Bowel Sounds] : normal bowel sounds [No Edema] : no edema [No Cyanosis] : no cyanosis [No Clubbing] : no clubbing [No Rash] : no rash [Moves all extremities] : moves all extremities [No Focal Deficits] : no focal deficits [Normal Speech] : normal speech [Alert and Oriented] : alert and oriented [Normal memory] : normal memory [de-identified] : wheelchair

## 2021-12-23 NOTE — HISTORY OF PRESENT ILLNESS
[FreeTextEntry1] : for cardiac evaluation prior to IT pain implant\par limited functional capacity due to spine issues\par no new cardiac c/o\par chest pain is more breast pain\par sob at baseline

## 2022-01-03 PROBLEM — E78.5 HYPERLIPIDEMIA: Status: ACTIVE | Noted: 2021-02-16

## 2022-01-06 ENCOUNTER — RX RENEWAL (OUTPATIENT)
Age: 67
End: 2022-01-06

## 2022-01-06 LAB
ALBUMIN SERPL ELPH-MCNC: 4.3 G/DL
ALP BLD-CCNC: 144 U/L
ALT SERPL-CCNC: 23 U/L
ANION GAP SERPL CALC-SCNC: 13 MMOL/L
APTT BLD: 28.4 SEC
AST SERPL-CCNC: 25 U/L
BASOPHILS # BLD AUTO: 0.04 K/UL
BASOPHILS NFR BLD AUTO: 0.7 %
BILIRUB SERPL-MCNC: 0.2 MG/DL
BUN SERPL-MCNC: 13 MG/DL
CALCIUM SERPL-MCNC: 9.2 MG/DL
CHLORIDE SERPL-SCNC: 105 MMOL/L
CO2 SERPL-SCNC: 21 MMOL/L
CREAT SERPL-MCNC: 0.71 MG/DL
EOSINOPHIL # BLD AUTO: 0.15 K/UL
EOSINOPHIL NFR BLD AUTO: 2.6 %
GLUCOSE SERPL-MCNC: 126 MG/DL
HCT VFR BLD CALC: 44.3 %
HGB BLD-MCNC: 14 G/DL
IMM GRANULOCYTES NFR BLD AUTO: 0.2 %
INR PPP: 0.88 RATIO
LYMPHOCYTES # BLD AUTO: 2.94 K/UL
LYMPHOCYTES NFR BLD AUTO: 51.7 %
MAN DIFF?: NORMAL
MCHC RBC-ENTMCNC: 28.3 PG
MCHC RBC-ENTMCNC: 31.6 GM/DL
MCV RBC AUTO: 89.7 FL
MONOCYTES # BLD AUTO: 0.44 K/UL
MONOCYTES NFR BLD AUTO: 7.7 %
NEUTROPHILS # BLD AUTO: 2.11 K/UL
NEUTROPHILS NFR BLD AUTO: 37.1 %
PLATELET # BLD AUTO: 207 K/UL
POTASSIUM SERPL-SCNC: 4.8 MMOL/L
PROT SERPL-MCNC: 6.8 G/DL
PT BLD: 10.5 SEC
RBC # BLD: 4.94 M/UL
RBC # FLD: 14.9 %
SODIUM SERPL-SCNC: 140 MMOL/L
WBC # FLD AUTO: 5.69 K/UL

## 2022-01-11 NOTE — HISTORY OF PRESENT ILLNESS
[Sleep Apnea] : sleep apnea [No Adverse Anesthesia Reaction] : no adverse anesthesia reaction in self or family member [Asthma] : no asthma [COPD] : no COPD [FreeTextEntry1] : IT Pain pump implantation [FreeTextEntry2] : 1/13/22 [FreeTextEntry3] : Dr Ott [FreeTextEntry4] : THis is a 65 yo f with HTN, GAMALIEL (never on cpap- not tolerated), multiple spinal surgeries in the past\par HLD, CVA x3 - last one in [place] in 2016 (on asa)\par She describes a feeling of tightness in her chest when she exerts herself- mopping, cooking, cleaning  \par Also describes significant vision changes over the past 3 months.- wears over the counter glasses\par Continues to not smoke\par remains on opioid therapy through pain management\par

## 2022-01-11 NOTE — REVIEW OF SYSTEMS
[Fatigue] : fatigue [Dyspnea on Exertion] : dyspnea on exertion [Joint Pain] : joint pain [Negative] : Psychiatric [Cough] : no cough [FreeTextEntry5] : as above

## 2022-01-11 NOTE — PHYSICAL EXAM
[Normal] : affect was normal and insight and judgment were intact [de-identified] : trace swelling b/l le

## 2022-01-12 LAB — SARS-COV-2 N GENE NPH QL NAA+PROBE: NOT DETECTED

## 2022-01-12 NOTE — ASU PATIENT PROFILE, ADULT - FALL HARM RISK - CONCLUSION
How Severe Are Your Spot(S)?: mild What Is The Reason For Today's Visit?: Full Body Skin Examination What Is The Reason For Today's Visit? (Being Monitored For X): concerning skin lesions on an annual basis Fall with Harm Risk

## 2022-01-12 NOTE — ASU PATIENT PROFILE, ADULT - NSICDXPASTSURGICALHX_GEN_ALL_CORE_FT
PAST SURGICAL HISTORY:  H/O shoulder surgery     Previous back surgery     S/P cervical discectomy x4

## 2022-01-12 NOTE — ASU PATIENT PROFILE, ADULT - NSICDXPASTMEDICALHX_GEN_ALL_CORE_FT
PAST MEDICAL HISTORY:  Acute UTI     Anxiety and depression     Arthritis Left shoulder    Cervical pseudoarthrosis and lumbar spine    Cervical spondylosis     Chronic GERD     Chronic headaches     Chronic LUQ pain     Chronic UTI     Degenerative joint disease left shoulder    Eczema     External hemorrhoids     H/O carpal tunnel syndrome Bilateral    H/O kyphosis     H/O low back pain     High cholesterol     History of chronic constipation     Hypertension     Lumbosacral spondylosis     Multiple sclerosis     Pancreas cyst     Pre-diabetes     Right shoulder pain     Scoliosis     Seizure     SS (spinal stenosis)     Stroke x3    Urinary incontinence     Wheelchair dependent

## 2022-01-12 NOTE — ASU PATIENT PROFILE, ADULT - FALL HARM RISK - HARM RISK INTERVENTIONS

## 2022-01-12 NOTE — ASU PATIENT PROFILE, ADULT - NS PREOP UNDERSTANDS INFO
Pt states that she will be picked up from access at 6:30 am, unable to change time, Dr Ott made aware, pt need reswab for Covid in AM/yes

## 2022-01-13 ENCOUNTER — INPATIENT (INPATIENT)
Facility: HOSPITAL | Age: 67
LOS: 5 days | Discharge: NURSING, REHAB OR LONG-TERM CARE | End: 2022-01-19
Attending: STUDENT IN AN ORGANIZED HEALTH CARE EDUCATION/TRAINING PROGRAM | Admitting: STUDENT IN AN ORGANIZED HEALTH CARE EDUCATION/TRAINING PROGRAM
Payer: MEDICARE

## 2022-01-13 ENCOUNTER — APPOINTMENT (OUTPATIENT)
Dept: NEUROSURGERY | Facility: HOSPITAL | Age: 67
End: 2022-01-13
Payer: MEDICARE

## 2022-01-13 VITALS
WEIGHT: 162.92 LBS | OXYGEN SATURATION: 98 % | DIASTOLIC BLOOD PRESSURE: 80 MMHG | RESPIRATION RATE: 17 BRPM | TEMPERATURE: 98 F | HEIGHT: 67 IN | HEART RATE: 72 BPM | SYSTOLIC BLOOD PRESSURE: 118 MMHG

## 2022-01-13 DIAGNOSIS — Z91.19 PATIENT'S NONCOMPLIANCE WITH OTHER MEDICAL TREATMENT AND REGIMEN: ICD-10-CM

## 2022-01-13 DIAGNOSIS — F41.9 ANXIETY DISORDER, UNSPECIFIED: ICD-10-CM

## 2022-01-13 DIAGNOSIS — I10 ESSENTIAL (PRIMARY) HYPERTENSION: ICD-10-CM

## 2022-01-13 DIAGNOSIS — G89.29 OTHER CHRONIC PAIN: ICD-10-CM

## 2022-01-13 DIAGNOSIS — F11.20 OPIOID DEPENDENCE, UNCOMPLICATED: ICD-10-CM

## 2022-01-13 DIAGNOSIS — Z87.891 PERSONAL HISTORY OF NICOTINE DEPENDENCE: ICD-10-CM

## 2022-01-13 DIAGNOSIS — Z98.890 OTHER SPECIFIED POSTPROCEDURAL STATES: Chronic | ICD-10-CM

## 2022-01-13 DIAGNOSIS — Z96.611 PRESENCE OF RIGHT ARTIFICIAL SHOULDER JOINT: ICD-10-CM

## 2022-01-13 DIAGNOSIS — R52 PAIN, UNSPECIFIED: ICD-10-CM

## 2022-01-13 DIAGNOSIS — F32.A DEPRESSION, UNSPECIFIED: ICD-10-CM

## 2022-01-13 DIAGNOSIS — E78.5 HYPERLIPIDEMIA, UNSPECIFIED: ICD-10-CM

## 2022-01-13 DIAGNOSIS — R73.03 PREDIABETES: ICD-10-CM

## 2022-01-13 DIAGNOSIS — G47.33 OBSTRUCTIVE SLEEP APNEA (ADULT) (PEDIATRIC): ICD-10-CM

## 2022-01-13 DIAGNOSIS — G89.4 CHRONIC PAIN SYNDROME: ICD-10-CM

## 2022-01-13 DIAGNOSIS — Z99.3 DEPENDENCE ON WHEELCHAIR: ICD-10-CM

## 2022-01-13 DIAGNOSIS — M50.90 CERVICAL DISC DISORDER, UNSPECIFIED, UNSPECIFIED CERVICAL REGION: Chronic | ICD-10-CM

## 2022-01-13 DIAGNOSIS — M54.9 DORSALGIA, UNSPECIFIED: ICD-10-CM

## 2022-01-13 DIAGNOSIS — Z86.73 PERSONAL HISTORY OF TRANSIENT ISCHEMIC ATTACK (TIA), AND CEREBRAL INFARCTION WITHOUT RESIDUAL DEFICITS: ICD-10-CM

## 2022-01-13 DIAGNOSIS — K21.9 GASTRO-ESOPHAGEAL REFLUX DISEASE WITHOUT ESOPHAGITIS: ICD-10-CM

## 2022-01-13 DIAGNOSIS — J44.9 CHRONIC OBSTRUCTIVE PULMONARY DISEASE, UNSPECIFIED: ICD-10-CM

## 2022-01-13 DIAGNOSIS — G35 MULTIPLE SCLEROSIS: ICD-10-CM

## 2022-01-13 DIAGNOSIS — Z79.891 LONG TERM (CURRENT) USE OF OPIATE ANALGESIC: ICD-10-CM

## 2022-01-13 DIAGNOSIS — Z98.1 ARTHRODESIS STATUS: ICD-10-CM

## 2022-01-13 DIAGNOSIS — Z88.5 ALLERGY STATUS TO NARCOTIC AGENT: ICD-10-CM

## 2022-01-13 LAB
ALBUMIN SERPL ELPH-MCNC: 4 G/DL — SIGNIFICANT CHANGE UP (ref 3.5–5.2)
ALP SERPL-CCNC: 101 U/L — SIGNIFICANT CHANGE UP (ref 30–115)
ALT FLD-CCNC: 20 U/L — SIGNIFICANT CHANGE UP (ref 0–41)
ANION GAP SERPL CALC-SCNC: 15 MMOL/L — HIGH (ref 7–14)
APPEARANCE CSF: CLEAR — SIGNIFICANT CHANGE UP
AST SERPL-CCNC: 22 U/L — SIGNIFICANT CHANGE UP (ref 0–41)
BASOPHILS # BLD AUTO: 0.02 K/UL — SIGNIFICANT CHANGE UP (ref 0–0.2)
BASOPHILS NFR BLD AUTO: 0.2 % — SIGNIFICANT CHANGE UP (ref 0–1)
BILIRUB SERPL-MCNC: 0.3 MG/DL — SIGNIFICANT CHANGE UP (ref 0.2–1.2)
BUN SERPL-MCNC: 16 MG/DL — SIGNIFICANT CHANGE UP (ref 10–20)
CALCIUM SERPL-MCNC: 8.8 MG/DL — SIGNIFICANT CHANGE UP (ref 8.5–10.1)
CHLORIDE SERPL-SCNC: 104 MMOL/L — SIGNIFICANT CHANGE UP (ref 98–110)
CO2 SERPL-SCNC: 21 MMOL/L — SIGNIFICANT CHANGE UP (ref 17–32)
COLOR CSF: SIGNIFICANT CHANGE UP
CREAT SERPL-MCNC: 0.7 MG/DL — SIGNIFICANT CHANGE UP (ref 0.7–1.5)
EOSINOPHIL # BLD AUTO: 0.06 K/UL — SIGNIFICANT CHANGE UP (ref 0–0.7)
EOSINOPHIL NFR BLD AUTO: 0.6 % — SIGNIFICANT CHANGE UP (ref 0–8)
GLUCOSE SERPL-MCNC: 129 MG/DL — HIGH (ref 70–99)
HCT VFR BLD CALC: 40.8 % — SIGNIFICANT CHANGE UP (ref 37–47)
HGB BLD-MCNC: 13.1 G/DL — SIGNIFICANT CHANGE UP (ref 12–16)
IMM GRANULOCYTES NFR BLD AUTO: 0.3 % — SIGNIFICANT CHANGE UP (ref 0.1–0.3)
LYMPHOCYTES # BLD AUTO: 1.68 K/UL — SIGNIFICANT CHANGE UP (ref 1.2–3.4)
LYMPHOCYTES # BLD AUTO: 16.2 % — LOW (ref 20.5–51.1)
MCHC RBC-ENTMCNC: 27.8 PG — SIGNIFICANT CHANGE UP (ref 27–31)
MCHC RBC-ENTMCNC: 32.1 G/DL — SIGNIFICANT CHANGE UP (ref 32–37)
MCV RBC AUTO: 86.6 FL — SIGNIFICANT CHANGE UP (ref 81–99)
MONOCYTES # BLD AUTO: 0.24 K/UL — SIGNIFICANT CHANGE UP (ref 0.1–0.6)
MONOCYTES NFR BLD AUTO: 2.3 % — SIGNIFICANT CHANGE UP (ref 1.7–9.3)
NEUTROPHILS # BLD AUTO: 8.32 K/UL — HIGH (ref 1.4–6.5)
NEUTROPHILS # CSF: SIGNIFICANT CHANGE UP % (ref 0–6)
NEUTROPHILS NFR BLD AUTO: 80.4 % — HIGH (ref 42.2–75.2)
NRBC # BLD: 0 /100 WBCS — SIGNIFICANT CHANGE UP (ref 0–0)
NRBC NFR CSF: 0 /UL — SIGNIFICANT CHANGE UP (ref 0–5)
PLATELET # BLD AUTO: 199 K/UL — SIGNIFICANT CHANGE UP (ref 130–400)
POTASSIUM SERPL-MCNC: 3.9 MMOL/L — SIGNIFICANT CHANGE UP (ref 3.5–5)
POTASSIUM SERPL-SCNC: 3.9 MMOL/L — SIGNIFICANT CHANGE UP (ref 3.5–5)
PROT CSF-MCNC: 195 MG/DL — HIGH (ref 15–45)
PROT SERPL-MCNC: 6.4 G/DL — SIGNIFICANT CHANGE UP (ref 6–8)
RBC # BLD: 4.71 M/UL — SIGNIFICANT CHANGE UP (ref 4.2–5.4)
RBC # CSF: 317 /UL — SIGNIFICANT CHANGE UP (ref 0–0)
RBC # FLD: 14.7 % — HIGH (ref 11.5–14.5)
SARS-COV-2 RNA SPEC QL NAA+PROBE: SIGNIFICANT CHANGE UP
SODIUM SERPL-SCNC: 140 MMOL/L — SIGNIFICANT CHANGE UP (ref 135–146)
TUBE TYPE: SIGNIFICANT CHANGE UP
WBC # BLD: 10.35 K/UL — SIGNIFICANT CHANGE UP (ref 4.8–10.8)
WBC # FLD AUTO: 10.35 K/UL — SIGNIFICANT CHANGE UP (ref 4.8–10.8)

## 2022-01-13 PROCEDURE — 99223 1ST HOSP IP/OBS HIGH 75: CPT

## 2022-01-13 PROCEDURE — 62362 IMPLANT SPINE INFUSION PUMP: CPT

## 2022-01-13 PROCEDURE — 62350 IMPLANT SPINAL CANAL CATH: CPT

## 2022-01-13 RX ORDER — OXYCODONE HYDROCHLORIDE 5 MG/1
20 TABLET ORAL EVERY 8 HOURS
Refills: 0 | Status: DISCONTINUED | OUTPATIENT
Start: 2022-01-13 | End: 2022-01-19

## 2022-01-13 RX ORDER — ACETAMINOPHEN 500 MG
650 TABLET ORAL EVERY 6 HOURS
Refills: 0 | Status: DISCONTINUED | OUTPATIENT
Start: 2022-01-13 | End: 2022-01-13

## 2022-01-13 RX ORDER — HYDROMORPHONE HYDROCHLORIDE 2 MG/ML
1 INJECTION INTRAMUSCULAR; INTRAVENOUS; SUBCUTANEOUS
Refills: 0 | Status: DISCONTINUED | OUTPATIENT
Start: 2022-01-13 | End: 2022-01-13

## 2022-01-13 RX ORDER — METHOCARBAMOL 500 MG/1
750 TABLET, FILM COATED ORAL THREE TIMES A DAY
Refills: 0 | Status: DISCONTINUED | OUTPATIENT
Start: 2022-01-13 | End: 2022-01-19

## 2022-01-13 RX ORDER — SODIUM CHLORIDE 9 MG/ML
1000 INJECTION, SOLUTION INTRAVENOUS
Refills: 0 | Status: DISCONTINUED | OUTPATIENT
Start: 2022-01-13 | End: 2022-01-13

## 2022-01-13 RX ORDER — SODIUM CHLORIDE 9 MG/ML
1000 INJECTION INTRAMUSCULAR; INTRAVENOUS; SUBCUTANEOUS
Refills: 0 | Status: DISCONTINUED | OUTPATIENT
Start: 2022-01-13 | End: 2022-01-15

## 2022-01-13 RX ORDER — ACETAMINOPHEN 500 MG
650 TABLET ORAL EVERY 6 HOURS
Refills: 0 | Status: DISCONTINUED | OUTPATIENT
Start: 2022-01-13 | End: 2022-01-19

## 2022-01-13 RX ORDER — CEFAZOLIN SODIUM 1 G
1000 VIAL (EA) INJECTION EVERY 8 HOURS
Refills: 0 | Status: COMPLETED | OUTPATIENT
Start: 2022-01-13 | End: 2022-01-14

## 2022-01-13 RX ORDER — OXYCODONE HYDROCHLORIDE 5 MG/1
15 TABLET ORAL EVERY 4 HOURS
Refills: 0 | Status: DISCONTINUED | OUTPATIENT
Start: 2022-01-13 | End: 2022-01-13

## 2022-01-13 RX ORDER — BACLOFEN 100 %
10 POWDER (GRAM) MISCELLANEOUS
Refills: 0 | Status: DISCONTINUED | OUTPATIENT
Start: 2022-01-13 | End: 2022-01-19

## 2022-01-13 RX ORDER — ACETAMINOPHEN 500 MG
1000 TABLET ORAL ONCE
Refills: 0 | Status: DISCONTINUED | OUTPATIENT
Start: 2022-01-13 | End: 2022-01-13

## 2022-01-13 RX ORDER — PANTOPRAZOLE SODIUM 20 MG/1
40 TABLET, DELAYED RELEASE ORAL
Refills: 0 | Status: DISCONTINUED | OUTPATIENT
Start: 2022-01-13 | End: 2022-01-19

## 2022-01-13 RX ORDER — NALOXONE HYDROCHLORIDE 4 MG/.1ML
4 SPRAY NASAL ONCE
Refills: 0 | Status: DISCONTINUED | OUTPATIENT
Start: 2022-01-13 | End: 2022-01-14

## 2022-01-13 RX ORDER — OXYCODONE HYDROCHLORIDE 5 MG/1
15 TABLET ORAL EVERY 8 HOURS
Refills: 0 | Status: DISCONTINUED | OUTPATIENT
Start: 2022-01-13 | End: 2022-01-13

## 2022-01-13 RX ORDER — ONDANSETRON 8 MG/1
4 TABLET, FILM COATED ORAL EVERY 4 HOURS
Refills: 0 | Status: DISCONTINUED | OUTPATIENT
Start: 2022-01-13 | End: 2022-01-19

## 2022-01-13 RX ORDER — NALOXEGOL OXALATE 12.5 MG/1
25 TABLET, FILM COATED ORAL
Refills: 0 | Status: DISCONTINUED | OUTPATIENT
Start: 2022-01-13 | End: 2022-01-19

## 2022-01-13 RX ORDER — OXYCODONE HYDROCHLORIDE 5 MG/1
20 TABLET ORAL EVERY 12 HOURS
Refills: 0 | Status: DISCONTINUED | OUTPATIENT
Start: 2022-01-13 | End: 2022-01-13

## 2022-01-13 RX ORDER — DIPHENHYDRAMINE HCL 50 MG
25 CAPSULE ORAL ONCE
Refills: 0 | Status: COMPLETED | OUTPATIENT
Start: 2022-01-13 | End: 2022-01-13

## 2022-01-13 RX ORDER — ONDANSETRON 8 MG/1
4 TABLET, FILM COATED ORAL ONCE
Refills: 0 | Status: DISCONTINUED | OUTPATIENT
Start: 2022-01-13 | End: 2022-01-13

## 2022-01-13 RX ORDER — OXYCODONE HYDROCHLORIDE 5 MG/1
20 TABLET ORAL EVERY 8 HOURS
Refills: 0 | Status: DISCONTINUED | OUTPATIENT
Start: 2022-01-13 | End: 2022-01-13

## 2022-01-13 RX ORDER — DIPHENHYDRAMINE HCL 50 MG
25 CAPSULE ORAL EVERY 4 HOURS
Refills: 0 | Status: DISCONTINUED | OUTPATIENT
Start: 2022-01-13 | End: 2022-01-19

## 2022-01-13 RX ORDER — OXYCODONE HYDROCHLORIDE 5 MG/1
15 TABLET ORAL EVERY 4 HOURS
Refills: 0 | Status: DISCONTINUED | OUTPATIENT
Start: 2022-01-13 | End: 2022-01-14

## 2022-01-13 RX ORDER — SENNA PLUS 8.6 MG/1
2 TABLET ORAL AT BEDTIME
Refills: 0 | Status: DISCONTINUED | OUTPATIENT
Start: 2022-01-13 | End: 2022-01-18

## 2022-01-13 RX ADMIN — OXYCODONE HYDROCHLORIDE 15 MILLIGRAM(S): 5 TABLET ORAL at 21:25

## 2022-01-13 RX ADMIN — HYDROMORPHONE HYDROCHLORIDE 1 MILLIGRAM(S): 2 INJECTION INTRAMUSCULAR; INTRAVENOUS; SUBCUTANEOUS at 13:40

## 2022-01-13 RX ADMIN — OXYCODONE HYDROCHLORIDE 20 MILLIGRAM(S): 5 TABLET ORAL at 17:54

## 2022-01-13 RX ADMIN — Medication 100 MILLIGRAM(S): at 17:54

## 2022-01-13 RX ADMIN — METHOCARBAMOL 750 MILLIGRAM(S): 500 TABLET, FILM COATED ORAL at 15:28

## 2022-01-13 RX ADMIN — HYDROMORPHONE HYDROCHLORIDE 1 MILLIGRAM(S): 2 INJECTION INTRAMUSCULAR; INTRAVENOUS; SUBCUTANEOUS at 13:56

## 2022-01-13 RX ADMIN — OXYCODONE HYDROCHLORIDE 15 MILLIGRAM(S): 5 TABLET ORAL at 20:49

## 2022-01-13 RX ADMIN — Medication 25 MILLIGRAM(S): at 17:17

## 2022-01-13 RX ADMIN — METHOCARBAMOL 750 MILLIGRAM(S): 500 TABLET, FILM COATED ORAL at 21:08

## 2022-01-13 RX ADMIN — OXYCODONE HYDROCHLORIDE 20 MILLIGRAM(S): 5 TABLET ORAL at 21:45

## 2022-01-13 RX ADMIN — Medication 25 MILLIGRAM(S): at 20:49

## 2022-01-13 RX ADMIN — HYDROMORPHONE HYDROCHLORIDE 1 MILLIGRAM(S): 2 INJECTION INTRAMUSCULAR; INTRAVENOUS; SUBCUTANEOUS at 13:05

## 2022-01-13 RX ADMIN — HYDROMORPHONE HYDROCHLORIDE 1 MILLIGRAM(S): 2 INJECTION INTRAMUSCULAR; INTRAVENOUS; SUBCUTANEOUS at 13:20

## 2022-01-13 RX ADMIN — SODIUM CHLORIDE 75 MILLILITER(S): 9 INJECTION, SOLUTION INTRAVENOUS at 13:00

## 2022-01-13 RX ADMIN — OXYCODONE HYDROCHLORIDE 20 MILLIGRAM(S): 5 TABLET ORAL at 21:08

## 2022-01-13 RX ADMIN — Medication 650 MILLIGRAM(S): at 17:55

## 2022-01-13 NOTE — PRE-OP CHECKLIST - 1.
Patient can not ambulate on own. Patient is wheelchair bound and falls frequently. Patient states needs homecare/aide, usually has 9 hours.

## 2022-01-13 NOTE — H&P ADULT - ASSESSMENT
66F with chronic back pain and multiple spinal surgeries, questionable myelinating dz presenting for placement of pain pump     PLAN   - OR today with Dr. Ott for pain pump   - STAT covid swab prior to OR   - CSF to be sent per neurology recc  - Anticipate DC home today   - Discussed case with attending  66F with chronic back pain and multiple spinal surgeries, questionable myelinating dz presenting for placement of pain pump     PLAN   - OR today with Dr. Ott for pain pump   - STAT covid swab prior to OR   - CSF to be sent per neurology recc  - Discussed case with attending

## 2022-01-13 NOTE — PROGRESS NOTE ADULT - SUBJECTIVE AND OBJECTIVE BOX
NEUROSURGERY POST OP CHECK 01-13-22 @ 17:03    Dx: 66y Female with multiple surgeries done at OSH, presenting with worsening chronic back pain and work up for demyelinating disease POD#0 s/p pain pump     MEDICATIONS  (STANDING):  acetaminophen     Tablet .. 650 milliGRAM(s) Oral every 6 hours  acetaminophen   IVPB .. 1000 milliGRAM(s) IV Intermittent once  baclofen 10 milliGRAM(s) Oral two times a day  ceFAZolin   IVPB 1000 milliGRAM(s) IV Intermittent every 8 hours  lactated ringers. 1000 milliLiter(s) (75 mL/Hr) IV Continuous <Continuous>  methocarbamol 750 milliGRAM(s) Oral three times a day  naloxegol 25 milliGRAM(s) Oral before breakfast  oxyCODONE    IR 15 milliGRAM(s) Oral every 4 hours  oxyCODONE  ER Tablet 20 milliGRAM(s) Oral every 12 hours  pantoprazole    Tablet 40 milliGRAM(s) Oral before breakfast  sodium chloride 0.9%. 1000 milliLiter(s) (75 mL/Hr) IV Continuous <Continuous>    MEDICATIONS  (PRN):  diphenhydrAMINE 25 milliGRAM(s) Oral every 4 hours PRN Rash and/or Itching  HYDROmorphone  Injectable 1 milliGRAM(s) IV Push every 10 minutes PRN Severe Pain (7 - 10)  ondansetron Injectable 4 milliGRAM(s) IV Push once PRN Nausea and/or Vomiting  ondansetron Injectable 4 milliGRAM(s) IV Push every 4 hours PRN Nausea and/or Vomiting  senna 2 Tablet(s) Oral at bedtime PRN Constipation                          13.1   10.35 )-----------( 199      ( 13 Jan 2022 14:12 )             40.8     01-13    140  |  104  |  16  ----------------------------<  129<H>  3.9   |  21  |  0.7    Ca    8.8      13 Jan 2022 14:12    TPro  6.4  /  Alb  4.0  /  TBili  0.3  /  DBili  x   /  AST  22  /  ALT  20  /  AlkPhos  101  01-13    I&O's Summary    13 Jan 2022 07:01  -  13 Jan 2022 17:03  --------------------------------------------------------  IN: 150 mL / OUT: 450 mL / NET: -300 mL        PHYSICAL EXAM:  Awake, alert, following commands   PERRL, EOMI   MAEx4 with good strength   Incision - scant blood, intact   SILT     T(C): 36.6 (01-13-22 @ 16:41), Max: 36.8 (01-13-22 @ 15:00)  HR: 93 (01-13-22 @ 16:41) (72 - 93)  BP: 125/59 (01-13-22 @ 16:41) (118/80 - 177/94)  RR: 18 (01-13-22 @ 16:41) (9 - 22)  SpO2: 98% (01-13-22 @ 16:41) (97% - 100%)

## 2022-01-13 NOTE — PATIENT PROFILE ADULT - FALL HARM RISK - HARM RISK INTERVENTIONS
Assistance with ambulation/Assistance OOB with selected safe patient handling equipment/Communicate Risk of Fall with Harm to all staff/Discuss with provider need for PT consult/Monitor gait and stability/Provide patient with walking aids - walker, cane, crutches/Reinforce activity limits and safety measures with patient and family/Sit up slowly, dangle for a short time, stand at bedside before walking/Tailored Fall Risk Interventions/Use of alarms - bed, chair and/or voice tab/Visual Cue: Yellow wristband and red socks/Bed in lowest position, wheels locked, appropriate side rails in place/Call bell, personal items and telephone in reach/Instruct patient to call for assistance before getting out of bed or chair/Non-slip footwear when patient is out of bed/[place] to call system/Physically safe environment - no spills, clutter or unnecessary equipment/Purposeful Proactive Rounding/Room/bathroom lighting operational, light cord in reach

## 2022-01-13 NOTE — PROGRESS NOTE ADULT - ASSESSMENT
66F with multiple spinal surgeries done at OSH POD#0 s/p intrathecal pain pump     PLAN   - Low dose intrathecal morphine, will continue home doses of narcotics - pain management follow   - F/U case management / SW for dc planning for transportation and home health needs   -  66F with multiple spinal surgeries done at OSH POD#0 s/p intrathecal pain pump     PLAN   - Low dose intrathecal morphine, will continue home doses of narcotics - pain management follow   - F/U case management / SW for dc planning for transportation and home health needs   - PT/OT  - SCD for DVT ppx   - LE dopplers ordered   - Advanced diet as tolerated  - Discussed case with attending

## 2022-01-13 NOTE — H&P ADULT - HISTORY OF PRESENT ILLNESS
66F with hx of multiple spinal surgeries presenting with chronic back pain, and questionable myelinating dz. Requested CSF per neurology work up.

## 2022-01-13 NOTE — PATIENT PROFILE ADULT - NSTRANSFERDENTURES_GEN_A_NUR
[FreeTextEntry1] : Ms. Mortensen is a 43 year old female presenting to the office for a follow up visit for allergic rhinitis, asthma, cough, GERD, low vitamin D, post-nasal drip, sinus issues, snoring and shortness of breath. Her chief complaint is current illness.\par -she notes she got the flu in January, which left her with SOB, coughing, and wheezing,\par she was given prednisone, inhalers and ABx from [place] ([facility]), and they helped her symptoms, but the symptoms returned once she finished those medications\par -she currently has symptoms of chest tightness and back pain\par -she is producing some sputum, light green / off yellow or clear in color\par -she reports having some heartburn, occurring nightly when she goes to bed. She takes pepcid after she has her symptoms\par -she is currently using symbicort, budesonide via albuterol, zyrtec, nasal spray (flonase), and nasal wash, and believe they are helping her allergy sx. She still has some sputum buildup in her sinuses\par -she notes she believe she gets too much sleep, and wakes up tired\par -she is unsure if she snores\par -she denies any chest pain, chest pressure, diarrhea, constipation, dysphagia, dizziness, sour taste in the mouth full/upper/lower

## 2022-01-13 NOTE — H&P ADULT - NSHPPHYSICALEXAM_GEN_ALL_CORE
AOx3, appropriate, follows commands  PERRL, EOMI  CHANDLER x 4 with good strength   Sensation intact to light touch

## 2022-01-14 LAB — IGG CSF-MCNC: 19 MG/DL — HIGH

## 2022-01-14 PROCEDURE — 93970 EXTREMITY STUDY: CPT | Mod: 26

## 2022-01-14 RX ORDER — DIAZEPAM 5 MG
5 TABLET ORAL ONCE
Refills: 0 | Status: DISCONTINUED | OUTPATIENT
Start: 2022-01-14 | End: 2022-01-14

## 2022-01-14 RX ORDER — OXYCODONE HYDROCHLORIDE 5 MG/1
20 TABLET ORAL EVERY 4 HOURS
Refills: 0 | Status: DISCONTINUED | OUTPATIENT
Start: 2022-01-14 | End: 2022-01-15

## 2022-01-14 RX ORDER — SODIUM CHLORIDE 0.65 %
1 AEROSOL, SPRAY (ML) NASAL THREE TIMES A DAY
Refills: 0 | Status: DISCONTINUED | OUTPATIENT
Start: 2022-01-14 | End: 2022-01-19

## 2022-01-14 RX ORDER — GABAPENTIN 400 MG/1
600 CAPSULE ORAL THREE TIMES A DAY
Refills: 0 | Status: DISCONTINUED | OUTPATIENT
Start: 2022-01-14 | End: 2022-01-19

## 2022-01-14 RX ORDER — NALOXONE HYDROCHLORIDE 4 MG/.1ML
2 SPRAY NASAL
Refills: 0 | Status: DISCONTINUED | OUTPATIENT
Start: 2022-01-14 | End: 2022-01-19

## 2022-01-14 RX ADMIN — OXYCODONE HYDROCHLORIDE 20 MILLIGRAM(S): 5 TABLET ORAL at 22:48

## 2022-01-14 RX ADMIN — Medication 100 MILLIGRAM(S): at 14:35

## 2022-01-14 RX ADMIN — OXYCODONE HYDROCHLORIDE 20 MILLIGRAM(S): 5 TABLET ORAL at 14:35

## 2022-01-14 RX ADMIN — OXYCODONE HYDROCHLORIDE 20 MILLIGRAM(S): 5 TABLET ORAL at 18:49

## 2022-01-14 RX ADMIN — Medication 1 SPRAY(S): at 18:50

## 2022-01-14 RX ADMIN — Medication 650 MILLIGRAM(S): at 01:00

## 2022-01-14 RX ADMIN — Medication 25 MILLIGRAM(S): at 00:43

## 2022-01-14 RX ADMIN — METHOCARBAMOL 750 MILLIGRAM(S): 500 TABLET, FILM COATED ORAL at 05:02

## 2022-01-14 RX ADMIN — Medication 25 MILLIGRAM(S): at 22:48

## 2022-01-14 RX ADMIN — Medication 650 MILLIGRAM(S): at 05:01

## 2022-01-14 RX ADMIN — OXYCODONE HYDROCHLORIDE 20 MILLIGRAM(S): 5 TABLET ORAL at 05:14

## 2022-01-14 RX ADMIN — OXYCODONE HYDROCHLORIDE 15 MILLIGRAM(S): 5 TABLET ORAL at 00:43

## 2022-01-14 RX ADMIN — OXYCODONE HYDROCHLORIDE 20 MILLIGRAM(S): 5 TABLET ORAL at 14:55

## 2022-01-14 RX ADMIN — Medication 100 MILLIGRAM(S): at 01:26

## 2022-01-14 RX ADMIN — GABAPENTIN 600 MILLIGRAM(S): 400 CAPSULE ORAL at 14:36

## 2022-01-14 RX ADMIN — Medication 5 MILLIGRAM(S): at 21:30

## 2022-01-14 RX ADMIN — METHOCARBAMOL 750 MILLIGRAM(S): 500 TABLET, FILM COATED ORAL at 14:35

## 2022-01-14 RX ADMIN — NALOXEGOL OXALATE 25 MILLIGRAM(S): 12.5 TABLET, FILM COATED ORAL at 08:15

## 2022-01-14 RX ADMIN — Medication 650 MILLIGRAM(S): at 18:48

## 2022-01-14 RX ADMIN — Medication 25 MILLIGRAM(S): at 05:03

## 2022-01-14 RX ADMIN — PANTOPRAZOLE SODIUM 40 MILLIGRAM(S): 20 TABLET, DELAYED RELEASE ORAL at 05:02

## 2022-01-14 RX ADMIN — Medication 10 MILLIGRAM(S): at 05:02

## 2022-01-14 RX ADMIN — Medication 25 MILLIGRAM(S): at 18:49

## 2022-01-14 RX ADMIN — Medication 25 MILLIGRAM(S): at 14:36

## 2022-01-14 RX ADMIN — OXYCODONE HYDROCHLORIDE 15 MILLIGRAM(S): 5 TABLET ORAL at 05:02

## 2022-01-14 RX ADMIN — GABAPENTIN 600 MILLIGRAM(S): 400 CAPSULE ORAL at 22:49

## 2022-01-14 RX ADMIN — Medication 5 MILLIGRAM(S): at 02:05

## 2022-01-14 RX ADMIN — METHOCARBAMOL 750 MILLIGRAM(S): 500 TABLET, FILM COATED ORAL at 22:49

## 2022-01-14 RX ADMIN — Medication 650 MILLIGRAM(S): at 00:07

## 2022-01-14 RX ADMIN — OXYCODONE HYDROCHLORIDE 20 MILLIGRAM(S): 5 TABLET ORAL at 18:58

## 2022-01-14 RX ADMIN — Medication 10 MILLIGRAM(S): at 18:50

## 2022-01-14 NOTE — PHYSICAL THERAPY INITIAL EVALUATION ADULT - PERTINENT HX OF CURRENT PROBLEM, REHAB EVAL
66F with hx of multiple spinal surgeries presenting with chronic back pain, and questionable myelinating dz. Requested CSF per neurology work up. Admitted for placement of pain pump

## 2022-01-14 NOTE — PHYSICAL THERAPY INITIAL EVALUATION ADULT - GAIT DEVIATIONS NOTED, PT EVAL
unsteady gait, guarded with c/o pain on low back on exertion./decreased angelo/decreased step length/decreased stride length/increased stride width

## 2022-01-14 NOTE — PHYSICAL THERAPY INITIAL EVALUATION ADULT - GENERAL OBSERVATIONS, REHAB EVAL
Patient encountered lying in bed. Agreed to participate in therapy. Patient c/o low back pain on movement and exertion.

## 2022-01-14 NOTE — PROVIDER CONTACT NOTE (OTHER) - ACTION/TREATMENT ORDERED:
Patient has recently been seen by PCP within the last 6 months per protocol (12/20). Will refill DM supplies for 12 months.  Lab Results   Component Value Date/Time    HBA1C 6.6 (H) 11/05/2020 07:29 AM      Lab Results   Component Value Date/Time    MALBCRT see below 11/05/2020 07:28 AM    MICROALBUR <1.2 11/05/2020 07:28 AM      Lab Results   Component Value Date/Time    ALKPHOSPHAT 73 07/19/2019 06:34 AM    ASTSGOT 16 07/19/2019 06:34 AM    ALTSGPT 35 11/05/2020 07:29 AM    TBILIRUBIN 0.3 07/19/2019 06:34 AM         pt was medicated as orders

## 2022-01-14 NOTE — PHYSICAL THERAPY INITIAL EVALUATION ADULT - ADDITIONAL COMMENTS
Patient lives in building with elevator. She was independent in ADLs and ambulation short distances only holding to walls and furniture at home. Patient has electric wheelchair for community mobility.

## 2022-01-14 NOTE — OCCUPATIONAL THERAPY INITIAL EVALUATION ADULT - STANDING BALANCE: DYNAMIC, REHAB EVAL
Breathing spontaneous and unlabored. Breath sounds clear and equal bilaterally with regular rhythm. fair minus

## 2022-01-14 NOTE — OCCUPATIONAL THERAPY INITIAL EVALUATION ADULT - RANGE OF MOTION EXAMINATION
Bilateral shoulders ~0-90 ROM with hx of shoulder surgeries, distal WFL, c/o pain at shoulders and R wrist

## 2022-01-14 NOTE — OCCUPATIONAL THERAPY INITIAL EVALUATION ADULT - ADDITIONAL COMMENTS
As per pt she never takes a shower without someone assisting her, she has had HHAs in the past, unclear how long ago.  She reports she gets assistance from neighbors.

## 2022-01-14 NOTE — OCCUPATIONAL THERAPY INITIAL EVALUATION ADULT - GENERAL OBSERVATIONS, REHAB EVAL
Pt recv'd and left semifowler in bed +right abdominal dressing + lower lumbar dressing +alarm +callbell

## 2022-01-15 PROCEDURE — 99024 POSTOP FOLLOW-UP VISIT: CPT

## 2022-01-15 RX ORDER — HEPARIN SODIUM 5000 [USP'U]/ML
5000 INJECTION INTRAVENOUS; SUBCUTANEOUS EVERY 8 HOURS
Refills: 0 | Status: DISCONTINUED | OUTPATIENT
Start: 2022-01-15 | End: 2022-01-19

## 2022-01-15 RX ORDER — OXYCODONE HYDROCHLORIDE 5 MG/1
20 TABLET ORAL EVERY 4 HOURS
Refills: 0 | Status: DISCONTINUED | OUTPATIENT
Start: 2022-01-15 | End: 2022-01-17

## 2022-01-15 RX ADMIN — GABAPENTIN 600 MILLIGRAM(S): 400 CAPSULE ORAL at 06:00

## 2022-01-15 RX ADMIN — OXYCODONE HYDROCHLORIDE 20 MILLIGRAM(S): 5 TABLET ORAL at 18:09

## 2022-01-15 RX ADMIN — OXYCODONE HYDROCHLORIDE 20 MILLIGRAM(S): 5 TABLET ORAL at 14:18

## 2022-01-15 RX ADMIN — OXYCODONE HYDROCHLORIDE 20 MILLIGRAM(S): 5 TABLET ORAL at 21:54

## 2022-01-15 RX ADMIN — Medication 25 MILLIGRAM(S): at 05:57

## 2022-01-15 RX ADMIN — Medication 650 MILLIGRAM(S): at 18:12

## 2022-01-15 RX ADMIN — Medication 650 MILLIGRAM(S): at 02:01

## 2022-01-15 RX ADMIN — OXYCODONE HYDROCHLORIDE 20 MILLIGRAM(S): 5 TABLET ORAL at 09:52

## 2022-01-15 RX ADMIN — HEPARIN SODIUM 5000 UNIT(S): 5000 INJECTION INTRAVENOUS; SUBCUTANEOUS at 21:14

## 2022-01-15 RX ADMIN — OXYCODONE HYDROCHLORIDE 20 MILLIGRAM(S): 5 TABLET ORAL at 05:59

## 2022-01-15 RX ADMIN — NALOXEGOL OXALATE 25 MILLIGRAM(S): 12.5 TABLET, FILM COATED ORAL at 08:32

## 2022-01-15 RX ADMIN — OXYCODONE HYDROCHLORIDE 20 MILLIGRAM(S): 5 TABLET ORAL at 09:54

## 2022-01-15 RX ADMIN — Medication 10 MILLIGRAM(S): at 18:09

## 2022-01-15 RX ADMIN — GABAPENTIN 600 MILLIGRAM(S): 400 CAPSULE ORAL at 14:18

## 2022-01-15 RX ADMIN — Medication 25 MILLIGRAM(S): at 09:52

## 2022-01-15 RX ADMIN — METHOCARBAMOL 750 MILLIGRAM(S): 500 TABLET, FILM COATED ORAL at 06:00

## 2022-01-15 RX ADMIN — Medication 650 MILLIGRAM(S): at 18:09

## 2022-01-15 RX ADMIN — Medication 25 MILLIGRAM(S): at 18:09

## 2022-01-15 RX ADMIN — METHOCARBAMOL 750 MILLIGRAM(S): 500 TABLET, FILM COATED ORAL at 14:18

## 2022-01-15 RX ADMIN — Medication 650 MILLIGRAM(S): at 05:59

## 2022-01-15 RX ADMIN — HEPARIN SODIUM 5000 UNIT(S): 5000 INJECTION INTRAVENOUS; SUBCUTANEOUS at 15:40

## 2022-01-15 RX ADMIN — OXYCODONE HYDROCHLORIDE 20 MILLIGRAM(S): 5 TABLET ORAL at 21:17

## 2022-01-15 RX ADMIN — PANTOPRAZOLE SODIUM 40 MILLIGRAM(S): 20 TABLET, DELAYED RELEASE ORAL at 08:31

## 2022-01-15 RX ADMIN — Medication 10 MILLIGRAM(S): at 05:59

## 2022-01-15 RX ADMIN — GABAPENTIN 600 MILLIGRAM(S): 400 CAPSULE ORAL at 21:13

## 2022-01-15 RX ADMIN — OXYCODONE HYDROCHLORIDE 20 MILLIGRAM(S): 5 TABLET ORAL at 02:01

## 2022-01-15 RX ADMIN — METHOCARBAMOL 750 MILLIGRAM(S): 500 TABLET, FILM COATED ORAL at 21:13

## 2022-01-15 RX ADMIN — Medication 25 MILLIGRAM(S): at 02:01

## 2022-01-15 RX ADMIN — OXYCODONE HYDROCHLORIDE 20 MILLIGRAM(S): 5 TABLET ORAL at 14:19

## 2022-01-15 RX ADMIN — Medication 25 MILLIGRAM(S): at 14:18

## 2022-01-15 NOTE — PROGRESS NOTE ADULT - SUBJECTIVE AND OBJECTIVE BOX
POD # 2    S/P IT pain pump placement     pt seen and examined in room sitting in the chair pt is in NAD eating breakfast         Vital Signs Last 24 Hrs  T(C): 36.2 (15 Adria 2022 13:43), Max: 37.1 (14 Jan 2022 21:29)  T(F): 97.1 (15 Adria 2022 13:43), Max: 98.8 (14 Jan 2022 21:29)  HR: 78 (15 Adria 2022 13:43) (72 - 80)  BP: 154/67 (15 Adria 2022 13:43) (152/91 - 164/95)  BP(mean): --  RR: 18 (15 Adria 2022 13:43) (18 - 18)  SpO2: 98% (15 Adria 2022 13:43) (98% - 99%)    PHYSICAL EXAM:    ALert, Follows commands  MAEX4   MS bilateral UE's 5/5         bilateral LE's 5/5   Back incision clean dry intact         MEDICATIONS:  Antibiotics:    Neuro:  acetaminophen     Tablet .. 650 milliGRAM(s) Oral every 6 hours  baclofen 10 milliGRAM(s) Oral two times a day  diphenhydrAMINE 25 milliGRAM(s) Oral every 4 hours PRN  gabapentin 600 milliGRAM(s) Oral three times a day  methocarbamol 750 milliGRAM(s) Oral three times a day  ondansetron Injectable 4 milliGRAM(s) IV Push every 4 hours PRN  oxyCODONE    IR 20 milliGRAM(s) Oral every 4 hours  oxyCODONE  ER Tablet 20 milliGRAM(s) Oral every 8 hours    Anticoagulation:  heparin   Injectable 5000 Unit(s) SubCutaneous every 8 hours    OTHER:  bisacodyl 5 milliGRAM(s) Oral every 12 hours PRN  naloxegol 25 milliGRAM(s) Oral before breakfast  naloxone Injectable 2 milliGRAM(s) IV Push every 3 minutes PRN  pantoprazole    Tablet 40 milliGRAM(s) Oral before breakfast  senna 2 Tablet(s) Oral at bedtime PRN  sodium chloride 0.65% Nasal 1 Spray(s) Both Nostrils three times a day PRN    IVF:        LABS:                        13.1   10.35 )-----------( 199      ( 13 Jan 2022 14:12 )             40.8     01-13    140  |  104  |  16  ----------------------------<  129<H>  3.9   |  21  |  0.7    Ca    8.8      13 Jan 2022 14:12    TPro  x   /  Alb  3787  /  TBili  x   /  DBili  x   /  AST  x   /  ALT  x   /  AlkPhos  x   01-13        A/p      S/P IT pain pump placement            start SQ heparin            pain control per Pain management            PT/OT/Rehab

## 2022-01-16 PROCEDURE — 99024 POSTOP FOLLOW-UP VISIT: CPT

## 2022-01-16 RX ADMIN — METHOCARBAMOL 750 MILLIGRAM(S): 500 TABLET, FILM COATED ORAL at 05:10

## 2022-01-16 RX ADMIN — METHOCARBAMOL 750 MILLIGRAM(S): 500 TABLET, FILM COATED ORAL at 14:23

## 2022-01-16 RX ADMIN — OXYCODONE HYDROCHLORIDE 20 MILLIGRAM(S): 5 TABLET ORAL at 15:18

## 2022-01-16 RX ADMIN — Medication 25 MILLIGRAM(S): at 12:42

## 2022-01-16 RX ADMIN — METHOCARBAMOL 750 MILLIGRAM(S): 500 TABLET, FILM COATED ORAL at 21:09

## 2022-01-16 RX ADMIN — OXYCODONE HYDROCHLORIDE 20 MILLIGRAM(S): 5 TABLET ORAL at 21:10

## 2022-01-16 RX ADMIN — OXYCODONE HYDROCHLORIDE 20 MILLIGRAM(S): 5 TABLET ORAL at 02:13

## 2022-01-16 RX ADMIN — OXYCODONE HYDROCHLORIDE 20 MILLIGRAM(S): 5 TABLET ORAL at 21:29

## 2022-01-16 RX ADMIN — PANTOPRAZOLE SODIUM 40 MILLIGRAM(S): 20 TABLET, DELAYED RELEASE ORAL at 05:10

## 2022-01-16 RX ADMIN — HEPARIN SODIUM 5000 UNIT(S): 5000 INJECTION INTRAVENOUS; SUBCUTANEOUS at 05:11

## 2022-01-16 RX ADMIN — Medication 650 MILLIGRAM(S): at 00:47

## 2022-01-16 RX ADMIN — OXYCODONE HYDROCHLORIDE 20 MILLIGRAM(S): 5 TABLET ORAL at 12:42

## 2022-01-16 RX ADMIN — OXYCODONE HYDROCHLORIDE 20 MILLIGRAM(S): 5 TABLET ORAL at 14:23

## 2022-01-16 RX ADMIN — NALOXEGOL OXALATE 25 MILLIGRAM(S): 12.5 TABLET, FILM COATED ORAL at 09:08

## 2022-01-16 RX ADMIN — HEPARIN SODIUM 5000 UNIT(S): 5000 INJECTION INTRAVENOUS; SUBCUTANEOUS at 21:10

## 2022-01-16 RX ADMIN — OXYCODONE HYDROCHLORIDE 20 MILLIGRAM(S): 5 TABLET ORAL at 21:59

## 2022-01-16 RX ADMIN — OXYCODONE HYDROCHLORIDE 20 MILLIGRAM(S): 5 TABLET ORAL at 13:12

## 2022-01-16 RX ADMIN — GABAPENTIN 600 MILLIGRAM(S): 400 CAPSULE ORAL at 05:10

## 2022-01-16 RX ADMIN — Medication 10 MILLIGRAM(S): at 05:11

## 2022-01-16 RX ADMIN — OXYCODONE HYDROCHLORIDE 20 MILLIGRAM(S): 5 TABLET ORAL at 05:13

## 2022-01-16 RX ADMIN — Medication 25 MILLIGRAM(S): at 21:29

## 2022-01-16 RX ADMIN — Medication 650 MILLIGRAM(S): at 05:11

## 2022-01-16 RX ADMIN — GABAPENTIN 600 MILLIGRAM(S): 400 CAPSULE ORAL at 21:10

## 2022-01-16 RX ADMIN — GABAPENTIN 600 MILLIGRAM(S): 400 CAPSULE ORAL at 14:23

## 2022-01-16 RX ADMIN — Medication 650 MILLIGRAM(S): at 05:13

## 2022-01-16 RX ADMIN — Medication 25 MILLIGRAM(S): at 01:34

## 2022-01-16 RX ADMIN — OXYCODONE HYDROCHLORIDE 20 MILLIGRAM(S): 5 TABLET ORAL at 01:30

## 2022-01-16 RX ADMIN — OXYCODONE HYDROCHLORIDE 20 MILLIGRAM(S): 5 TABLET ORAL at 05:10

## 2022-01-16 RX ADMIN — HEPARIN SODIUM 5000 UNIT(S): 5000 INJECTION INTRAVENOUS; SUBCUTANEOUS at 14:23

## 2022-01-16 NOTE — PROGRESS NOTE ADULT - SUBJECTIVE AND OBJECTIVE BOX
POD # 3    S/P IT Pain pump Placement     pt seen and examined at bedside pt is alert, states her pain is improved       Vital Signs Last 24 Hrs  T(C): 36.4 (16 Jan 2022 05:01), Max: 36.7 (15 Adria 2022 17:14)  T(F): 97.6 (16 Jan 2022 05:01), Max: 98.1 (15 Adria 2022 17:14)  HR: 87 (16 Jan 2022 05:01) (76 - 87)  BP: 155/97 (16 Jan 2022 05:01) (127/83 - 155/97)  BP(mean): --  RR: 18 (16 Jan 2022 05:01) (18 - 18)  SpO2: 100% (16 Jan 2022 05:01) (98% - 100%)    PHYSICAL EXAM:    Alert, PERRL   MAEX4   MS bilateral UE';s equal         bilateral LE's equal   incision clean dry intact           MEDICATIONS:  Antibiotics:    Neuro:  acetaminophen     Tablet .. 650 milliGRAM(s) Oral every 6 hours  baclofen 10 milliGRAM(s) Oral two times a day  diphenhydrAMINE 25 milliGRAM(s) Oral every 4 hours PRN  gabapentin 600 milliGRAM(s) Oral three times a day  methocarbamol 750 milliGRAM(s) Oral three times a day  ondansetron Injectable 4 milliGRAM(s) IV Push every 4 hours PRN  oxyCODONE    IR 20 milliGRAM(s) Oral every 4 hours PRN  oxyCODONE  ER Tablet 20 milliGRAM(s) Oral every 8 hours    Anticoagulation:  heparin   Injectable 5000 Unit(s) SubCutaneous every 8 hours    OTHER:  bisacodyl 5 milliGRAM(s) Oral every 12 hours PRN  naloxegol 25 milliGRAM(s) Oral before breakfast  naloxone Injectable 2 milliGRAM(s) IV Push every 3 minutes PRN  pantoprazole    Tablet 40 milliGRAM(s) Oral before breakfast  senna 2 Tablet(s) Oral at bedtime PRN  sodium chloride 0.65% Nasal 1 Spray(s) Both Nostrils three times a day PRN      A/ P               S/P IT pain pump placement                    pain control                    PT/OT/Rehab

## 2022-01-17 ENCOUNTER — TRANSCRIPTION ENCOUNTER (OUTPATIENT)
Age: 67
End: 2022-01-17

## 2022-01-17 LAB
ANION GAP SERPL CALC-SCNC: 19 MMOL/L — HIGH (ref 7–14)
BUN SERPL-MCNC: 15 MG/DL — SIGNIFICANT CHANGE UP (ref 10–20)
CALCIUM SERPL-MCNC: 10 MG/DL — SIGNIFICANT CHANGE UP (ref 8.5–10.1)
CHLORIDE SERPL-SCNC: 102 MMOL/L — SIGNIFICANT CHANGE UP (ref 98–110)
CO2 SERPL-SCNC: 20 MMOL/L — SIGNIFICANT CHANGE UP (ref 17–32)
CREAT SERPL-MCNC: 0.7 MG/DL — SIGNIFICANT CHANGE UP (ref 0.7–1.5)
GLUCOSE SERPL-MCNC: 140 MG/DL — HIGH (ref 70–99)
HCT VFR BLD CALC: 43.7 % — SIGNIFICANT CHANGE UP (ref 37–47)
HGB BLD-MCNC: 14.5 G/DL — SIGNIFICANT CHANGE UP (ref 12–16)
MAGNESIUM SERPL-MCNC: 1.9 MG/DL — SIGNIFICANT CHANGE UP (ref 1.8–2.4)
MCHC RBC-ENTMCNC: 28 PG — SIGNIFICANT CHANGE UP (ref 27–31)
MCHC RBC-ENTMCNC: 33.2 G/DL — SIGNIFICANT CHANGE UP (ref 32–37)
MCV RBC AUTO: 84.5 FL — SIGNIFICANT CHANGE UP (ref 81–99)
NRBC # BLD: 0 /100 WBCS — SIGNIFICANT CHANGE UP (ref 0–0)
PHOSPHATE SERPL-MCNC: 3.4 MG/DL — SIGNIFICANT CHANGE UP (ref 2.1–4.9)
PLATELET # BLD AUTO: 239 K/UL — SIGNIFICANT CHANGE UP (ref 130–400)
POTASSIUM SERPL-MCNC: 3.9 MMOL/L — SIGNIFICANT CHANGE UP (ref 3.5–5)
POTASSIUM SERPL-SCNC: 3.9 MMOL/L — SIGNIFICANT CHANGE UP (ref 3.5–5)
RBC # BLD: 5.17 M/UL — SIGNIFICANT CHANGE UP (ref 4.2–5.4)
RBC # FLD: 14.9 % — HIGH (ref 11.5–14.5)
SARS-COV-2 RNA SPEC QL NAA+PROBE: SIGNIFICANT CHANGE UP
SODIUM SERPL-SCNC: 141 MMOL/L — SIGNIFICANT CHANGE UP (ref 135–146)
WBC # BLD: 8.87 K/UL — SIGNIFICANT CHANGE UP (ref 4.8–10.8)
WBC # FLD AUTO: 8.87 K/UL — SIGNIFICANT CHANGE UP (ref 4.8–10.8)

## 2022-01-17 PROCEDURE — 74019 RADEX ABDOMEN 2 VIEWS: CPT | Mod: 26

## 2022-01-17 RX ORDER — POLYETHYLENE GLYCOL 3350 17 G/17G
17 POWDER, FOR SOLUTION ORAL DAILY
Refills: 0 | Status: DISCONTINUED | OUTPATIENT
Start: 2022-01-17 | End: 2022-01-18

## 2022-01-17 RX ORDER — HEPARIN SODIUM 5000 [USP'U]/ML
5000 INJECTION INTRAVENOUS; SUBCUTANEOUS
Qty: 0 | Refills: 0 | DISCHARGE
Start: 2022-01-17

## 2022-01-17 RX ORDER — OXYBUTYNIN CHLORIDE 5 MG
10 TABLET ORAL
Refills: 0 | Status: DISCONTINUED | OUTPATIENT
Start: 2022-01-17 | End: 2022-01-19

## 2022-01-17 RX ORDER — OXYCODONE HYDROCHLORIDE 5 MG/1
20 TABLET ORAL EVERY 6 HOURS
Refills: 0 | Status: DISCONTINUED | OUTPATIENT
Start: 2022-01-17 | End: 2022-01-19

## 2022-01-17 RX ORDER — SENNA PLUS 8.6 MG/1
2 TABLET ORAL
Qty: 0 | Refills: 0 | DISCHARGE
Start: 2022-01-17

## 2022-01-17 RX ORDER — ACETAMINOPHEN 500 MG
2 TABLET ORAL
Qty: 0 | Refills: 0 | DISCHARGE
Start: 2022-01-17

## 2022-01-17 RX ADMIN — Medication 25 MILLIGRAM(S): at 16:46

## 2022-01-17 RX ADMIN — OXYCODONE HYDROCHLORIDE 20 MILLIGRAM(S): 5 TABLET ORAL at 23:27

## 2022-01-17 RX ADMIN — GABAPENTIN 600 MILLIGRAM(S): 400 CAPSULE ORAL at 05:03

## 2022-01-17 RX ADMIN — Medication 650 MILLIGRAM(S): at 05:03

## 2022-01-17 RX ADMIN — Medication 650 MILLIGRAM(S): at 17:22

## 2022-01-17 RX ADMIN — METHOCARBAMOL 750 MILLIGRAM(S): 500 TABLET, FILM COATED ORAL at 05:03

## 2022-01-17 RX ADMIN — Medication 10 MILLIGRAM(S): at 17:22

## 2022-01-17 RX ADMIN — OXYCODONE HYDROCHLORIDE 20 MILLIGRAM(S): 5 TABLET ORAL at 13:05

## 2022-01-17 RX ADMIN — Medication 10 MILLIGRAM(S): at 05:02

## 2022-01-17 RX ADMIN — OXYCODONE HYDROCHLORIDE 20 MILLIGRAM(S): 5 TABLET ORAL at 22:53

## 2022-01-17 RX ADMIN — NALOXEGOL OXALATE 25 MILLIGRAM(S): 12.5 TABLET, FILM COATED ORAL at 07:56

## 2022-01-17 RX ADMIN — HEPARIN SODIUM 5000 UNIT(S): 5000 INJECTION INTRAVENOUS; SUBCUTANEOUS at 05:04

## 2022-01-17 RX ADMIN — METHOCARBAMOL 750 MILLIGRAM(S): 500 TABLET, FILM COATED ORAL at 13:05

## 2022-01-17 RX ADMIN — Medication 25 MILLIGRAM(S): at 10:46

## 2022-01-17 RX ADMIN — OXYCODONE HYDROCHLORIDE 20 MILLIGRAM(S): 5 TABLET ORAL at 21:38

## 2022-01-17 RX ADMIN — OXYCODONE HYDROCHLORIDE 20 MILLIGRAM(S): 5 TABLET ORAL at 10:46

## 2022-01-17 RX ADMIN — METHOCARBAMOL 750 MILLIGRAM(S): 500 TABLET, FILM COATED ORAL at 21:35

## 2022-01-17 RX ADMIN — Medication 650 MILLIGRAM(S): at 05:33

## 2022-01-17 RX ADMIN — OXYCODONE HYDROCHLORIDE 20 MILLIGRAM(S): 5 TABLET ORAL at 05:03

## 2022-01-17 RX ADMIN — HEPARIN SODIUM 5000 UNIT(S): 5000 INJECTION INTRAVENOUS; SUBCUTANEOUS at 13:05

## 2022-01-17 RX ADMIN — GABAPENTIN 600 MILLIGRAM(S): 400 CAPSULE ORAL at 21:35

## 2022-01-17 RX ADMIN — Medication 650 MILLIGRAM(S): at 11:34

## 2022-01-17 RX ADMIN — OXYCODONE HYDROCHLORIDE 20 MILLIGRAM(S): 5 TABLET ORAL at 04:34

## 2022-01-17 RX ADMIN — Medication 25 MILLIGRAM(S): at 22:57

## 2022-01-17 RX ADMIN — Medication 25 MILLIGRAM(S): at 04:34

## 2022-01-17 RX ADMIN — HEPARIN SODIUM 5000 UNIT(S): 5000 INJECTION INTRAVENOUS; SUBCUTANEOUS at 21:35

## 2022-01-17 RX ADMIN — Medication 5 MILLIGRAM(S): at 22:02

## 2022-01-17 RX ADMIN — GABAPENTIN 600 MILLIGRAM(S): 400 CAPSULE ORAL at 13:05

## 2022-01-17 RX ADMIN — OXYCODONE HYDROCHLORIDE 20 MILLIGRAM(S): 5 TABLET ORAL at 22:08

## 2022-01-17 RX ADMIN — PANTOPRAZOLE SODIUM 40 MILLIGRAM(S): 20 TABLET, DELAYED RELEASE ORAL at 05:03

## 2022-01-17 RX ADMIN — OXYCODONE HYDROCHLORIDE 20 MILLIGRAM(S): 5 TABLET ORAL at 16:45

## 2022-01-17 RX ADMIN — POLYETHYLENE GLYCOL 3350 17 GRAM(S): 17 POWDER, FOR SOLUTION ORAL at 16:49

## 2022-01-17 NOTE — CONSULT NOTE ADULT - PROBLEM SELECTOR RECOMMENDATION 9
Patient now with postoperative pain improved.  1) Decrease oxycodone IR to 20mg Q6h; may plan tapering schedule back to 15mg Q8h over the next 3 days  2) Continue oxycodone ER 20mg Q8h; on discharge, patient can transition back to oxycodone ER 15mg Q8h  3) Continue IT morphine at low rate; may adjust in follow up   4) Continue gabapentin 600mg TID  5) Continue baclofen 10mg BID  6) Continue methocarbamol 750mg TID  7) Continue acetaminophen 650mg Q6h
Acute postsurgical pain in the setting of chronic generalized pain.  1) Increase oxycodone ER to 20mg Q8h  2) Change home oxycodone IR 15mg to Q4h prn  3) If patient fully awake and alert w/o evidence of sedation, may consider one time orders for hydromorphone IV 1mg prn but would be wary in the setting of GAMALIEL  4) Patient started very low dose intrathecal morphine, will have this increased on follow up with outpatient pain provider  5) Would change acetaminophen 650mg Q6h to standing  6) Continue baclofen 10mg BID  7) Continue methocarbamol 750mg TID; if concern for sedation, would consider holding one of the two muscle relaxant medications
Acute post surgical pain in the setting of chronic generalized pain.  1) Continue oxycodone ER 20mg Q8h  2) Increase oxycodone IR to 20mg Q4h prn; recommend tapering back to home regimen within 3-5 days  3) Continue IT morphine at low rate; will increase at outpatient follow up  4) Continue gabapentin 600mg TID  5) Continue baclofen 10mg BID  6) Continue methocarbamol 750mg TID  7) Continue acetaminophen 650mg Q6h

## 2022-01-17 NOTE — DISCHARGE NOTE PROVIDER - NSDCACTIVITY_GEN_ALL_CORE
Return to Work/School allowed/Showering allowed/Stairs allowed/Walking - Indoors allowed/Walking - Outdoors allowed/Follow Instructions Provided by your Surgical Team Return to Work/School allowed/Do not drive or operate machinery/Showering allowed/Do not make important decisions/Stairs allowed/Walking - Indoors allowed/No heavy lifting/straining/Walking - Outdoors allowed/Follow Instructions Provided by your Surgical Team

## 2022-01-17 NOTE — DISCHARGE NOTE PROVIDER - NSDCFUADDINST_GEN_ALL_CORE_FT
- Upon discharge,  please call to schedule a follow up with Dr. Ott in 1-2 weeks.  - Upon discharge, please call to make a follow up appointment with your primary care provider to discuss your recent hospitalization/operation.  - Keep dressings dry for 48 hours after which you may remove dressing and cleanse with soap and water in the shower (no scrubbing). Leave the steri strips (white tape) on your incisions, they will fall off on their own. Run water and soap over incision sites and pat dry (no scrubbing). No submerging your incision sites in water (i.e. no swimming or baths) for 2-3 weeks and avoid exposing the area to jets/streams of water.   - You can resume your normal activities as tolerated, but avoid heavy (>15lb.) lifting and strenuous exercise for 4-6 weeks.    - ***You were prescribed narcotic pain medications, take these only as needed.  Your pain should subside over the next few days.  While taking narcotic pain medications, you should not drive or operate heavy machinery.  If you were prescribed Percocet (oxycodone-acetaminophen) and are taking it with other medications containing acetaminophen (Tylenol), reduce your dose of percocet so that you  do not exceed 3,000 mg of acetaminophen per day.  - Narcotic pain medicine tends to cause constipation, you have can take an over-the-counter stool softener 3 times a day to help prevent that. Hold the stool softener if you start to have loose stools.  - If you experience fevers, chills, increasing abdominal pain, nausea, vomiting, inability to pass stool or gas, bleeding, or any other acute symptoms, please call your doctor and report to the emergency room immediately for further management.

## 2022-01-17 NOTE — CONSULT NOTE ADULT - CONSULT REASON
Post-surgical pain, chronic pain patient
Acute postoperative pain s/p intrathecal morphine pump placement
back pain
Acute postoperative pain s/p intrathecal morphine pump placement

## 2022-01-17 NOTE — DISCHARGE NOTE PROVIDER - NSDCFUADDAPPT_GEN_ALL_CORE_FT
Please call to make an appointment with Dr. Ott in 2 weeks. Please call to make an appointment with Dr. Ott in 2 weeks.    Please call your gastroenterologist for follow up appointment after discharge from rehab.

## 2022-01-17 NOTE — DISCHARGE NOTE PROVIDER - NSDCMRMEDTOKEN_GEN_ALL_CORE_FT
acetaminophen 325 mg oral tablet: 2 tab(s) orally every 6 hours  baclofen 10 mg oral tablet: orally 2 times a day  bisacodyl 5 mg oral delayed release tablet: 1 tab(s) orally every 12 hours, As needed, Constipation  diphenhydrAMINE 25 mg oral capsule: 1 cap(s) orally 3 times a day, As Needed  gabapentin 600 mg oral tablet: 1 tab(s) orally 3 times a day  heparin: 5000 unit(s) subcutaneous every 8 hours  methocarbamol 750 mg oral tablet: 2 tab(s) orally 3 times a day  Movantik 25 mg oral tablet: 1 tab(s) orally once a day (in the morning)  Myrbetriq 50 mg oral tablet, extended release: orally 2 times a day  omeprazole 20 mg oral delayed release capsule: orally 2 times a day  oxyCODONE 15 mg oral tablet: orally every 8 hours  OxyCONTIN 15 mg oral tablet, extended release: orally every 8 hours  senna oral tablet: 2 tab(s) orally once a day (at bedtime), As needed, Constipation   acetaminophen 325 mg oral tablet: 2 tab(s) orally every 6 hours  baclofen 10 mg oral tablet: orally 2 times a day  diphenhydrAMINE 25 mg oral capsule: 1 cap(s) orally 3 times a day, As Needed  gabapentin 600 mg oral tablet: 1 tab(s) orally 3 times a day  heparin: 5000 unit(s) subcutaneous every 8 hours  methocarbamol 750 mg oral tablet: 2 tab(s) orally 3 times a day  Movantik 25 mg oral tablet: 1 tab(s) orally once a day (in the morning)  Myrbetriq 50 mg oral tablet, extended release: orally 2 times a day  omeprazole 20 mg oral delayed release capsule: orally 2 times a day  oxyCODONE 15 mg oral tablet: orally every 8 hours  OxyCONTIN 15 mg oral tablet, extended release: orally every 8 hours  polyethylene glycol 3350 oral powder for reconstitution: 17 gram(s) orally 2 times a day  senna oral tablet: 2 tab(s) orally once a day (at bedtime)  sodium biphosphate-sodium phosphate 19 g-7 g rectal enema: 1 each rectal once a day, As needed, constipation

## 2022-01-17 NOTE — DISCHARGE NOTE PROVIDER - HOSPITAL COURSE
Patient is a 65 y/o woman with extensive history of chronic pain who presented on 1/13/2022 for placement of an intrathecal morphine pump. The patient has had 23 spine surgeries ranging from the cervical, thoracic, and lumbar spine. She has had minimal pain relief with these surgeries and has thus ended up on high dose chronic opioid therapy. Her pain and generalized weakness has also left her wheelchair bound with limited functional ability. The patient describes her pain as aching and throbbing and notes it diffusely throughout the spine. She also endorses chronic pain in both shoulder and has had shoulder replacement on the right.     Patient underwent placement of intrathecal pain pump with Dr. Ott 1/13/2022. Patient tolerated procedure well, was extubated in the OR and transferred to PACU for post operative monitoring. Patient was then transferred to the floor. Patient seen by pain management for post operative pain control, recommending adjustment of pain regimen. Patient was seen by PT/Physiatry recommending SNF. Patient has been tolerating PO diet, walking with assistance, and having normal urination/BM as patient self catheters. Patient is cleared for discharge and to follow up with Dr. Ott in 2 weeks upon discharge. Patient is a 67 y/o woman with extensive history of chronic pain who presented on 1/13/2022 for placement of an intrathecal morphine pump. The patient has had 23 spine surgeries ranging from the cervical, thoracic, and lumbar spine. She has had minimal pain relief with these surgeries and has thus ended up on high dose chronic opioid therapy. Her pain and generalized weakness has also left her wheelchair bound with limited functional ability. The patient describes her pain as aching and throbbing and notes it diffusely throughout the spine. She also endorses chronic pain in both shoulder and has had shoulder replacement on the right.     Patient underwent placement of intrathecal pain pump with Dr. Ott 1/13/2022. Patient tolerated procedure well, was extubated in the OR and transferred to PACU for post operative monitoring. Patient was then transferred to the floor. Patient seen by pain management for post operative pain control, recommending adjustment of pain regimen. Patient was seen by PT/Physiatry recommending SNF. Patient has been tolerating PO diet, walking with assistance, and having normal urination/BM as patient self catheters.   Patient reports a BM containing blood in the AM of 1/17/22.  Spoke with Gastroenterology fellow regarding patient's self report of "blood in stool".   Patient did not show hospital staff stool containing blood.  He noted patients history of pain medications and likelihood of constipation.  Also noted stable Hgb of 14.  KUB unremarkable.  Given stable Hgb patient would not be a candidate for colonoscopy on this admission.  He recommended increasing Miralax dose to BID, ordering Senna QHS and PRN Enemas.  Patient is stable to be d/c'd to SNF.  Can follow up with GI as outpatient PRN. Patient is cleared for discharge and to follow up with Dr. Ott in 2 weeks upon discharge.

## 2022-01-17 NOTE — DISCHARGE NOTE PROVIDER - CARE PROVIDER_API CALL
Farrah Ott)  Surgery  Neurosurgery  [address] [place] [address], [address]  [place] [address]  Phone: [phone]  Fax: [phone]  Follow Up Time: 2 weeks

## 2022-01-17 NOTE — CONSULT NOTE ADULT - SUBJECTIVE AND OBJECTIVE BOX
HPI:  66F with hx of multiple spinal surgeries presenting with chronic back pain, and questionable myelinating dz. Requested CSF per neurology work up.    s/p placement of pain pump on 1/13. Prior to hospitalization she was ambulating short distance in her apt and uses electric WC for long distance mobility    PAST MEDICAL & SURGICAL HISTORY:  Hypertension    SS (spinal stenosis)    High cholesterol    Stroke  x3    H/O carpal tunnel syndrome  Bilateral    Chronic GERD    History of chronic constipation    Seizure    Urinary incontinence    Pre-diabetes    Acute UTI    Anxiety and depression    Arthritis  Left shoulder    Eczema    External hemorrhoids    H/O kyphosis    Multiple sclerosis    Pancreas cyst    Scoliosis    Wheelchair dependent    Cervical pseudoarthrosis  and lumbar spine    Right shoulder pain    Cervical spondylosis    Chronic headaches    Chronic LUQ pain    Chronic UTI    Degenerative joint disease  left shoulder    H/O low back pain    Lumbosacral spondylosis    COPD (chronic obstructive pulmonary disease)    S/P cervical discectomy  x4    Previous back surgery    H/O shoulder surgery        Hospital Course:    TODAY'S SUBJECTIVE & REVIEW OF SYMPTOMS:     Constitutional WNL   Cardio WNL   Resp WNL   GI WNL  Heme WNL  Endo WNL  Skin WNL  MSK back pain  Neuro WNL  Cognitive WNL  Psych WNL      MEDICATIONS  (STANDING):  acetaminophen     Tablet .. 650 milliGRAM(s) Oral every 6 hours  baclofen 10 milliGRAM(s) Oral two times a day  gabapentin 600 milliGRAM(s) Oral three times a day  methocarbamol 750 milliGRAM(s) Oral three times a day  naloxegol 25 milliGRAM(s) Oral before breakfast  oxyCODONE    IR 20 milliGRAM(s) Oral every 4 hours  oxyCODONE  ER Tablet 20 milliGRAM(s) Oral every 8 hours  pantoprazole    Tablet 40 milliGRAM(s) Oral before breakfast  sodium chloride 0.9%. 1000 milliLiter(s) (75 mL/Hr) IV Continuous <Continuous>    MEDICATIONS  (PRN):  bisacodyl 5 milliGRAM(s) Oral every 12 hours PRN Constipation  diphenhydrAMINE 25 milliGRAM(s) Oral every 4 hours PRN Rash and/or Itching  naloxone Injectable 2 milliGRAM(s) IV Push every 3 minutes PRN opioid overdose  ondansetron Injectable 4 milliGRAM(s) IV Push every 4 hours PRN Nausea and/or Vomiting  senna 2 Tablet(s) Oral at bedtime PRN Constipation      FAMILY HISTORY:      Allergies    oxycodone (Pruritus)    Intolerances        SOCIAL HISTORY:    [  ] Etoh  [  ] Smoking  [  ] Substance abuse     Home Environment:  [  x ] Home Alone  [   ] Lives with Family  [   ] Home Health Aid    Dwelling:  [  x ] Apartment  [   ] Private House  [   ] Adult Home  [   ] Skilled Nursing Facility      [   ] Short Term  [   ] Long Term  [   ] Stairs       Elevator [ x  ]    FUNCTIONAL STATUS PTA: (Check all that apply)  Ambulation: [ x   ]Independent    [   ] Dependent     [   ] Non-Ambulatory  Assistive Device: [   ] SA Cane  [   ]  Q Cane  [   ] Walker  [x   ]  Wheelchair  ADL : [ x  ] Independent  [    ]  Dependent       Vital Signs Last 24 Hrs  T(C): 36.1 (14 Jan 2022 09:50), Max: 37 (14 Jan 2022 01:14)  T(F): 97 (14 Jan 2022 09:50), Max: 98.6 (14 Jan 2022 01:14)  HR: 77 (14 Jan 2022 09:50) (77 - 99)  BP: 124/70 (14 Jan 2022 09:50) (124/70 - 138/65)  BP(mean): --  RR: 18 (14 Jan 2022 09:50) (17 - 18)  SpO2: 99% (14 Jan 2022 09:50) (97% - 99%)      PHYSICAL EXAM: Awake & Alert  GENERAL: NAD  HEAD:  Normocephalic  CHEST/LUNG: Clear   HEART: S1S2+  ABDOMEN: Soft, Nontender  EXTREMITIES:  no calf tenderness    NERVOUS SYSTEM:  Cranial Nerves 2-12 intact [   ] Abnormal  [   ]  ROM: WFL all extremities [   ]  Abnormal [  x ]able to move UE > LE  Motor Strength: WFL all extremities  [   ]  Abnormal [x   ]  Sensation: intact to light touch [   x] Abnormal [   ]    FUNCTIONAL STATUS:  Bed Mobility: Independent [   ]  Supervision [   ]  Needs Assistance [  x ]  N/A [   ]  Transfers: Independent [   ]  Supervision [   ]  Needs Assistance [ x  ]  N/A [   ]   Ambulation: Independent [   ]  Supervision [   ]  Needs Assistance [ x  ]  N/A [   ]  ADL: Independent [   ] Requires Assistance [   ] N/A [   ]      LABS:                        13.1   10.35 )-----------( 199      ( 13 Jan 2022 14:12 )             40.8     01-13    140  |  104  |  16  ----------------------------<  129<H>  3.9   |  21  |  0.7    Ca    8.8      13 Jan 2022 14:12    TPro  x   /  Alb  3787  /  TBili  x   /  DBili  x   /  AST  x   /  ALT  x   /  AlkPhos  x   01-13          RADIOLOGY & ADDITIONAL STUDIES:  
Pain Medicine Follow Up Visit      Subjective  Patient states that incisional pain has improved significantly. At this point, she primarily complains of her chronic diffuse pain, including pain throughout her upper, middle, and lower back, her bilateral shoulders, and her lower extremities. The patient denies having any side effects with her medications, including constipation, sedation, or pruritis.       Current Medication Regimen  acetaminophen     Tablet .. 650 milliGRAM(s) Oral every 6 hours  baclofen 10 milliGRAM(s) Oral two times a day  bisacodyl 5 milliGRAM(s) Oral every 12 hours PRN  diphenhydrAMINE 25 milliGRAM(s) Oral every 4 hours PRN  gabapentin 600 milliGRAM(s) Oral three times a day  heparin   Injectable 5000 Unit(s) SubCutaneous every 8 hours  methocarbamol 750 milliGRAM(s) Oral three times a day  naloxegol 25 milliGRAM(s) Oral before breakfast  naloxone Injectable 2 milliGRAM(s) IV Push every 3 minutes PRN  ondansetron Injectable 4 milliGRAM(s) IV Push every 4 hours PRN  oxyCODONE    IR 20 milliGRAM(s) Oral every 4 hours PRN  oxyCODONE  ER Tablet 20 milliGRAM(s) Oral every 8 hours  pantoprazole    Tablet 40 milliGRAM(s) Oral before breakfast  senna 2 Tablet(s) Oral at bedtime PRN  sodium chloride 0.65% Nasal 1 Spray(s) Both Nostrils three times a day PRN    Allergies  oxycodone (Pruritus)      Physical Exam  T(C): 36 (01-17-22 @ 09:00), Max: 36.7 (01-16-22 @ 12:39)  HR: 118 (01-17-22 @ 09:00) (95 - 118)  BP: 126/89 (01-17-22 @ 09:00) (126/78 - 161/99)  RR: 18 (01-17-22 @ 09:00) (18 - 18)  SpO2: 100% (01-17-22 @ 09:00) (95% - 100%)  Gen: NAD, patient sitting in her wheelchair  Eyes: no glasses, scleral icterus  Head: Normocephalic / Atraumatic  CV: no JVD  Lungs: nonlabored breathing  Abdomen: nondistended, soft  Back: tenderness to palpation  Neuro: AOx3, Cranial nerves intact   Psych: normal affect        
Pain Medicine Follow Up Visit      Subjective  Patient complains of pain at the right upper quadrant intrathecal pump implant site as well as her chronic lower extremity pain. She denies having any side effects with her pain medications, including sedation, but reports that she continues to have breakthrough pain.     Current Medication Regimen  acetaminophen     Tablet .. 650 milliGRAM(s) Oral every 6 hours  baclofen 10 milliGRAM(s) Oral two times a day  bisacodyl 5 milliGRAM(s) Oral every 12 hours PRN  diphenhydrAMINE 25 milliGRAM(s) Oral every 4 hours PRN  gabapentin 600 milliGRAM(s) Oral three times a day  methocarbamol 750 milliGRAM(s) Oral three times a day  naloxegol 25 milliGRAM(s) Oral before breakfast  naloxone Injectable 2 milliGRAM(s) IV Push every 3 minutes PRN  ondansetron Injectable 4 milliGRAM(s) IV Push every 4 hours PRN  oxyCODONE    IR 20 milliGRAM(s) Oral every 4 hours  oxyCODONE  ER Tablet 20 milliGRAM(s) Oral every 8 hours  pantoprazole    Tablet 40 milliGRAM(s) Oral before breakfast  senna 2 Tablet(s) Oral at bedtime PRN  sodium chloride 0.9%. 1000 milliLiter(s) IV Continuous <Continuous>    Allergies  oxycodone (Pruritus)    Physical Exam  T(C): 36.1 (01-14-22 @ 09:50), Max: 37 (01-14-22 @ 01:14)  HR: 77 (01-14-22 @ 09:50) (77 - 99)  BP: 124/70 (01-14-22 @ 09:50) (124/70 - 138/65)  RR: 18 (01-14-22 @ 09:50) (18 - 18)  SpO2: 99% (01-14-22 @ 09:50) (98% - 99%)  Gen: Patient tearful during interaction  Eyes: no glasses, no scleral icterus  Head: Normocephalic / Atraumatic  CV: JVD  Lungs: nonlabored breathing  Abdomen: incision clean/dry/intact  : no jiménez catheter in place  Neuro: AOx3, Cranial nerves intact   Psych: normal affect
Pain Medicine Consult Note    History of Present Illness  Patient is a 65 y/o woman with extensive history of chronic pain who presented on 1/13/2022 for placement of an intrathecal morphine pump. The patient has had 23 spine surgeries ranging from the cervical, thoracic, and lumbar spine. She has had minimal pain relief with these surgeries and has thus ended up on high dose chronic opioid therapy. Her pain and generalized weakness has also left her wheelchair bound with limited functional ability. The patient describes her pain as aching and throbbing and notes it diffusely throughout the spine. She also endorses chronic pain in both shoulder and has had shoulder replacement on the right.     Current Inpatient Medication Regimen:  acetaminophen     Tablet .. 650 milliGRAM(s) Oral every 6 hours PRN  acetaminophen   IVPB .. 1000 milliGRAM(s) IV Intermittent once  baclofen 10 milliGRAM(s) Oral two times a day  ceFAZolin   IVPB 1000 milliGRAM(s) IV Intermittent every 8 hours  diphenhydrAMINE 25 milliGRAM(s) Oral every 4 hours PRN  HYDROmorphone  Injectable 1 milliGRAM(s) IV Push every 10 minutes PRN  lactated ringers. 1000 milliLiter(s) IV Continuous <Continuous>  methocarbamol 750 milliGRAM(s) Oral three times a day  naloxegol 25 milliGRAM(s) Oral before breakfast  ondansetron Injectable 4 milliGRAM(s) IV Push once PRN  ondansetron Injectable 4 milliGRAM(s) IV Push every 4 hours PRN  oxyCODONE    IR 15 milliGRAM(s) Oral every 8 hours PRN  oxyCODONE  ER Tablet 20 milliGRAM(s) Oral every 12 hours  pantoprazole    Tablet 40 milliGRAM(s) Oral before breakfast  senna 2 Tablet(s) Oral at bedtime PRN  sodium chloride 0.9%. 1000 milliLiter(s) IV Continuous <Continuous>    Home Analgesic Regimen:  Per patient and PDMP  oxycodone ER 15mg Q8h  oxycodone IR 15mg Q8h prn    Allergies:  oxycodone (Pruritus)    Past Medical History:  Hypertension  SS (spinal stenosis)  High cholesterol  Stroke  GERD  Seizure  GAMALIEL non compliant with CPAP  H/O carpal tunnel syndrome  History of chronic constipation  Urinary incontinence  Pre-diabetes  Acute UTI  Anxiety and depression  Eczema  External hemorrhoids  H/O kyphosis  Possible multiple sclerosis  Pancreas cyst  Wheelchair dependent  Cervical pseudoarthrosis  Right shoulder pain  Cervical spondylosis  Chronic headaches  Chronic LUQ pain  Chronic UTI  COPD (chronic obstructive pulmonary disease)    Past Surgical History:  Extensive cervical, thoracic, and lumbar fusions  right shoulder replacement    Family History:  Denies    Social History:  Tobacco - former longstanding smoker, quit in 2021   EtOH - denies  Drugs - denies    Review of Systems:  General: no fevers, chills  Eyes: no diplopia, blurred vision  ENT: no rhinorrhea  CV: no chest pain  Resp: no cough, dyspnea  GI: no abdominal pain, +constipation  : +urinary incontinence  Neuro: +generalized weakness in the lower extremities    Physical Exam:  T(C): 36.4 (01-13-22 @ 12:54), Max: 36.7 (01-13-22 @ 08:40)  HR: 80 (01-13-22 @ 13:45) (72 - 92)  BP: 134/96 (01-13-22 @ 13:45) (118/80 - 177/94)  RR: 22 (01-13-22 @ 13:45) (9 - 22)  SpO2: 100% (01-13-22 @ 13:45) (97% - 100%)  Gen: +patient complaining of incisional pain in the PACU  Eyes: no glasses, scleral icterus  Head: Normocephalic / Atraumatic  CV: no JVD  Lungs: nonlabored breathing  Abdomen: nondistended, [  ] soft  : no jiménez catheter in place  Neuro: AOx3, Cranial nerves intact  Extremities: limited ROM in bilateral shoulders  Gait: wheelchair bound  Psych: normal affect      Labs:  CBC  10.35 K/uL [4.80 - 10.80] > 13.1 g/dL [12.0 - 16.0] / 40.8 % [37.0 - 47.0] < 199 K/uL [130 - 400]      Imaging Studies:  n/a    45 minutes of face to face time was spent with the patient, more than 50% of which was spent on counseling.

## 2022-01-17 NOTE — DISCHARGE NOTE PROVIDER - NSDCCPCAREPLAN_GEN_ALL_CORE_FT
PRINCIPAL DISCHARGE DIAGNOSIS  Diagnosis: Chronic back pain  Assessment and Plan of Treatment:

## 2022-01-17 NOTE — PROGRESS NOTE ADULT - SUBJECTIVE AND OBJECTIVE BOX
Subjective: 66yFemale with a pmhx of G89.4/01244,41668    ^G89.4/08290,59606    Handoff    MEWS Score    Hypertension    SS (spinal stenosis)    High cholesterol    Stroke    H/O carpal tunnel syndrome    H/O carpal tunnel syndrome    Chronic GERD    History of chronic constipation    Seizure    Urinary incontinence    Pre-diabetes    Acute UTI    Anxiety    Anxiety and depression    Arthritis    Eczema    External hemorrhoids    H/O kyphosis    Multiple sclerosis    Pancreas cyst    Scoliosis    Wheelchair dependent    Cervical pseudoarthrosis    Right shoulder pain    Cervical spondylosis    Chronic headaches    Chronic LUQ pain    Chronic UTI    Degenerative joint disease    H/O low back pain    Lumbosacral spondylosis    COPD (chronic obstructive pulmonary disease)    Chronic generalized pain    S/P cervical discectomy    Previous back surgery    H/O shoulder surgery    SysAdmin_VstLnk      POD # 4  S/P IT Pain pump Placement   pt seen and examined at bedside. pt is alert, states her pain is improved         Allergies    oxycodone (Pruritus)    Intolerances        Vital Signs Last 24 Hrs  T(C): 36 (17 Jan 2022 09:00), Max: 36.7 (16 Jan 2022 12:39)  T(F): 96.8 (17 Jan 2022 09:00), Max: 98.1 (16 Jan 2022 12:39)  HR: 118 (17 Jan 2022 09:00) (95 - 118)  BP: 126/89 (17 Jan 2022 09:00) (126/78 - 161/99)  BP(mean): --  RR: 18 (17 Jan 2022 09:00) (18 - 18)  SpO2: 100% (17 Jan 2022 09:00) (95% - 100%)      acetaminophen     Tablet .. 650 milliGRAM(s) Oral every 6 hours  baclofen 10 milliGRAM(s) Oral two times a day  bisacodyl 5 milliGRAM(s) Oral every 12 hours PRN  diphenhydrAMINE 25 milliGRAM(s) Oral every 4 hours PRN  gabapentin 600 milliGRAM(s) Oral three times a day  heparin   Injectable 5000 Unit(s) SubCutaneous every 8 hours  methocarbamol 750 milliGRAM(s) Oral three times a day  naloxegol 25 milliGRAM(s) Oral before breakfast  naloxone Injectable 2 milliGRAM(s) IV Push every 3 minutes PRN  ondansetron Injectable 4 milliGRAM(s) IV Push every 4 hours PRN  oxyCODONE    IR 20 milliGRAM(s) Oral every 4 hours PRN  oxyCODONE  ER Tablet 20 milliGRAM(s) Oral every 8 hours  pantoprazole    Tablet 40 milliGRAM(s) Oral before breakfast  senna 2 Tablet(s) Oral at bedtime PRN  sodium chloride 0.65% Nasal 1 Spray(s) Both Nostrils three times a day PRN        01-16-22 @ 07:01  -  01-17-22 @ 07:00  --------------------------------------------------------  IN: 0 mL / OUT: 1100 mL / NET: -1100 mL        REVIEW OF SYSTEMS    [x ] A ten-point review of systems was otherwise negative except as noted.  [ ] Due to altered mental status/intubation, subjective information were not able to be obtained from the patient. History was obtained, to the extent possible, from review of the chart and collateral sources of information.      Exam:  Alert, PERRL   MAEX4   MS bilateral UE';s equal         bilateral LE's equal   incision clean dry intact               Imaging:    Assessment/Plan:                 S/P IT pain pump placement                    pain control - medications adjusted as per pain management recommendations                   PT/OT/Rehab                    pending auth for dc

## 2022-01-17 NOTE — CONSULT NOTE ADULT - ASSESSMENT
Patient is a 67 y/o woman with a longstanding history of chronic pain throughout the cervical, thoracic, and lumbar spine s/p multiple surgeries who now presented s/p intrathecal morphine pump implantation.
Patient is a 65 y/o woman with a longstanding history of chronic pain throughout the cervical, thoracic, and lumbar spine s/p multiple surgeries who now is POD#4 s/p intrathecal morphine pump implantation.
IMPRESSION: Rehab of chronic back pain / s/p intrathecal pain pump insertion    PRECAUTIONS: [   ] Cardiac  [   ] Respiratory  [   ] Seizures [   ] Contact Isolation  [   ] Droplet Isolation  [   ] Other    Weight Bearing Status:     RECOMMENDATION:    Out of Bed to Chair     DVT/Decubiti Prophylaxis    REHAB PLAN:     [ x   ] Bedside P/T 3-5 times a week   [ x   ]   Bedside O/T  2-3 times a week             [    ] Speech Therapy               [    ]  No Rehab Therapy Indicated   Conditioning/ROM                                    ADL  Bed Mobility                                               Conditioning/ROM  Transfers                                                     Bed Mobility  Sitting /Standing Balance                         Transfers                                        Gait Training                                               Sitting/Standing Balance  Stair Training [   ]Applicable                    Home equipment Eval                                                                        Splinting  [   ] Only      GOALS:   ADL   [  x  ]   Independent                    Transfers  [   x ] Independent                          Ambulation  [   x ] Independent     [ x    ] With device                            [    ]  CG                                                         [    ]  CG                                                                  [    ] CG                            [    ] Min A                                                   [    ] Min A                                                              [    ] Min  A          DISCHARGE PLAN:   [    ]  Good candidate for Intensive Rehabilitation/Hospital based                                             Will tolerate 3hrs Intensive Rehab Daily                                       [ x    ]  Short Term Rehab in Skilled Nursing Facility                           vs            [ x    ]  Home with Outpatient or  services                                         [     ]  Possible Candidate for Intensive Hospital based Rehab                                       
Patient is a 67 y/o woman with a longstanding history of chronic pain throughout the cervical, thoracic, and lumbar spine s/p multiple surgeries who now is POD#1 s/p intrathecal morphine pump implantation.

## 2022-01-18 ENCOUNTER — TRANSCRIPTION ENCOUNTER (OUTPATIENT)
Age: 67
End: 2022-01-18

## 2022-01-18 LAB — OLIGOCLONAL BANDS CSF ELPH-IMP: SIGNIFICANT CHANGE UP

## 2022-01-18 PROCEDURE — 99024 POSTOP FOLLOW-UP VISIT: CPT

## 2022-01-18 RX ORDER — SENNA PLUS 8.6 MG/1
2 TABLET ORAL AT BEDTIME
Refills: 0 | Status: DISCONTINUED | OUTPATIENT
Start: 2022-01-18 | End: 2022-01-19

## 2022-01-18 RX ORDER — POLYETHYLENE GLYCOL 3350 17 G/17G
17 POWDER, FOR SOLUTION ORAL
Qty: 0 | Refills: 0 | DISCHARGE
Start: 2022-01-18

## 2022-01-18 RX ORDER — POLYETHYLENE GLYCOL 3350 17 G/17G
17 POWDER, FOR SOLUTION ORAL
Refills: 0 | Status: DISCONTINUED | OUTPATIENT
Start: 2022-01-18 | End: 2022-01-19

## 2022-01-18 RX ADMIN — Medication 25 MILLIGRAM(S): at 21:40

## 2022-01-18 RX ADMIN — GABAPENTIN 600 MILLIGRAM(S): 400 CAPSULE ORAL at 21:39

## 2022-01-18 RX ADMIN — Medication 10 MILLIGRAM(S): at 17:23

## 2022-01-18 RX ADMIN — PANTOPRAZOLE SODIUM 40 MILLIGRAM(S): 20 TABLET, DELAYED RELEASE ORAL at 05:13

## 2022-01-18 RX ADMIN — METHOCARBAMOL 750 MILLIGRAM(S): 500 TABLET, FILM COATED ORAL at 13:13

## 2022-01-18 RX ADMIN — GABAPENTIN 600 MILLIGRAM(S): 400 CAPSULE ORAL at 13:13

## 2022-01-18 RX ADMIN — Medication 650 MILLIGRAM(S): at 17:23

## 2022-01-18 RX ADMIN — Medication 25 MILLIGRAM(S): at 17:35

## 2022-01-18 RX ADMIN — OXYCODONE HYDROCHLORIDE 20 MILLIGRAM(S): 5 TABLET ORAL at 08:51

## 2022-01-18 RX ADMIN — HEPARIN SODIUM 5000 UNIT(S): 5000 INJECTION INTRAVENOUS; SUBCUTANEOUS at 05:13

## 2022-01-18 RX ADMIN — OXYCODONE HYDROCHLORIDE 20 MILLIGRAM(S): 5 TABLET ORAL at 05:16

## 2022-01-18 RX ADMIN — Medication 650 MILLIGRAM(S): at 01:23

## 2022-01-18 RX ADMIN — METHOCARBAMOL 750 MILLIGRAM(S): 500 TABLET, FILM COATED ORAL at 21:39

## 2022-01-18 RX ADMIN — OXYCODONE HYDROCHLORIDE 20 MILLIGRAM(S): 5 TABLET ORAL at 13:14

## 2022-01-18 RX ADMIN — Medication 650 MILLIGRAM(S): at 23:57

## 2022-01-18 RX ADMIN — Medication 10 MILLIGRAM(S): at 05:13

## 2022-01-18 RX ADMIN — SENNA PLUS 2 TABLET(S): 8.6 TABLET ORAL at 21:39

## 2022-01-18 RX ADMIN — POLYETHYLENE GLYCOL 3350 17 GRAM(S): 17 POWDER, FOR SOLUTION ORAL at 17:23

## 2022-01-18 RX ADMIN — OXYCODONE HYDROCHLORIDE 20 MILLIGRAM(S): 5 TABLET ORAL at 21:40

## 2022-01-18 RX ADMIN — NALOXEGOL OXALATE 25 MILLIGRAM(S): 12.5 TABLET, FILM COATED ORAL at 05:15

## 2022-01-18 RX ADMIN — METHOCARBAMOL 750 MILLIGRAM(S): 500 TABLET, FILM COATED ORAL at 05:12

## 2022-01-18 RX ADMIN — Medication 10 MILLIGRAM(S): at 05:12

## 2022-01-18 RX ADMIN — OXYCODONE HYDROCHLORIDE 20 MILLIGRAM(S): 5 TABLET ORAL at 17:35

## 2022-01-18 RX ADMIN — OXYCODONE HYDROCHLORIDE 20 MILLIGRAM(S): 5 TABLET ORAL at 05:46

## 2022-01-18 RX ADMIN — POLYETHYLENE GLYCOL 3350 17 GRAM(S): 17 POWDER, FOR SOLUTION ORAL at 11:12

## 2022-01-18 RX ADMIN — Medication 650 MILLIGRAM(S): at 05:12

## 2022-01-18 RX ADMIN — Medication 25 MILLIGRAM(S): at 08:51

## 2022-01-18 RX ADMIN — Medication 650 MILLIGRAM(S): at 05:42

## 2022-01-18 RX ADMIN — HEPARIN SODIUM 5000 UNIT(S): 5000 INJECTION INTRAVENOUS; SUBCUTANEOUS at 21:40

## 2022-01-18 RX ADMIN — GABAPENTIN 600 MILLIGRAM(S): 400 CAPSULE ORAL at 05:13

## 2022-01-18 RX ADMIN — Medication 650 MILLIGRAM(S): at 11:12

## 2022-01-18 RX ADMIN — HEPARIN SODIUM 5000 UNIT(S): 5000 INJECTION INTRAVENOUS; SUBCUTANEOUS at 13:19

## 2022-01-18 RX ADMIN — Medication 5 MILLIGRAM(S): at 21:39

## 2022-01-18 RX ADMIN — Medication 650 MILLIGRAM(S): at 00:53

## 2022-01-18 NOTE — DISCHARGE NOTE NURSING/CASE MANAGEMENT/SOCIAL WORK - NSDCPEFALRISK_GEN_ALL_CORE
For information on Fall & Injury Prevention, visit: [email or web address] OR  [email or web address] OR  https://www.cdc.gov/steadi/patient.html

## 2022-01-18 NOTE — PROGRESS NOTE ADULT - SUBJECTIVE AND OBJECTIVE BOX
Subjective: 66yFemale with a pmhx of G89.4/37074,34020    ^G89.4/02172,73373    Handoff    MEWS Score    Hypertension    SS (spinal stenosis)    High cholesterol    Stroke    H/O carpal tunnel syndrome    H/O carpal tunnel syndrome    Chronic GERD    History of chronic constipation    Seizure    Urinary incontinence    Pre-diabetes    Acute UTI    Anxiety    Anxiety and depression    Arthritis    Eczema    External hemorrhoids    H/O kyphosis    Multiple sclerosis    Pancreas cyst    Scoliosis    Wheelchair dependent    Cervical pseudoarthrosis    Right shoulder pain    Cervical spondylosis    Chronic headaches    Chronic LUQ pain    Chronic UTI    Degenerative joint disease    H/O low back pain    Lumbosacral spondylosis    COPD (chronic obstructive pulmonary disease)    Insertion, intrathecal pain pump    Chronic back pain    Chronic generalized pain    S/P cervical discectomy    Previous back surgery    H/O shoulder surgery    SysAdmin_VstLnk        POD # 5  S/P IT Pain pump Placement     Pt seen and examined at bedside.  Pt c/o blood in her stool from yesterday.  Pt also c/o abdominal distention.  Pt sts she wants to go to SNF.  KUB performed: < from: Xray Abdomen 2 Views (01.17.22 @ 15:27) >  Impression:  Nonobstructive bowel gas pattern.    < end of copied text >      Allergies    oxycodone (Pruritus)    Intolerances      Vital Signs Last 24 Hrs  T(C): 36.9 (18 Jan 2022 05:10), Max: 36.9 (18 Jan 2022 05:10)  T(F): 98.4 (18 Jan 2022 05:10), Max: 98.4 (18 Jan 2022 05:10)  HR: 92 (18 Jan 2022 05:10) (92 - 121)  BP: 125/67 (18 Jan 2022 05:10) (123/85 - 136/79)  BP(mean): --  RR: 18 (18 Jan 2022 05:10) (18 - 18)  SpO2: 98% (18 Jan 2022 05:10) (98% - 99%)      acetaminophen     Tablet .. 650 milliGRAM(s) Oral every 6 hours  baclofen 10 milliGRAM(s) Oral two times a day  bisacodyl 5 milliGRAM(s) Oral every 12 hours PRN  diphenhydrAMINE 25 milliGRAM(s) Oral every 4 hours PRN  gabapentin 600 milliGRAM(s) Oral three times a day  heparin   Injectable 5000 Unit(s) SubCutaneous every 8 hours  methocarbamol 750 milliGRAM(s) Oral three times a day  naloxegol 25 milliGRAM(s) Oral before breakfast  naloxone Injectable 2 milliGRAM(s) IV Push every 3 minutes PRN  ondansetron Injectable 4 milliGRAM(s) IV Push every 4 hours PRN  oxybutynin 10 milliGRAM(s) Oral two times a day  oxyCODONE    IR 20 milliGRAM(s) Oral every 6 hours PRN  oxyCODONE  ER Tablet 20 milliGRAM(s) Oral every 8 hours  pantoprazole    Tablet 40 milliGRAM(s) Oral before breakfast  polyethylene glycol 3350 17 Gram(s) Oral daily  senna 2 Tablet(s) Oral at bedtime PRN  sodium chloride 0.65% Nasal 1 Spray(s) Both Nostrils three times a day PRN        01-17-22 @ 07:01  -  01-18-22 @ 07:00  --------------------------------------------------------  IN: 295 mL / OUT: 950 mL / NET: -655 mL        REVIEW OF SYSTEMS    [x ] A ten-point review of systems was otherwise negative except as noted.  [ ] Due to altered mental status/intubation, subjective information were not able to be obtained from the patient. History was obtained, to the extent possible, from review of the chart and collateral sources of information.      Neuro Exam:  AAOX3. Verbal function intact  pt not cooperative for exam  seated in wheelchair in bathroom doorway unwilling to move  abdomen soft, tender to touch  MAEx4      Wound: intact    CBC Full  -  ( 17 Jan 2022 11:00 )  WBC Count : 8.87 K/uL  RBC Count : 5.17 M/uL  Hemoglobin : 14.5 g/dL  Hematocrit : 43.7 %  Platelet Count - Automated : 239 K/uL  Mean Cell Volume : 84.5 fL  Mean Cell Hemoglobin : 28.0 pg  Mean Cell Hemoglobin Concentration : 33.2 g/dL  Auto Neutrophil # : x  Auto Lymphocyte # : x  Auto Monocyte # : x  Auto Eosinophil # : x  Auto Basophil # : x  Auto Neutrophil % : x  Auto Lymphocyte % : x  Auto Monocyte % : x  Auto Eosinophil % : x  Auto Basophil % : x    01-17    141  |  102  |  15  ----------------------------<  140<H>  3.9   |  20  |  0.7    Ca    10.0      17 Jan 2022 11:00  Phos  3.4     01-17  Mg     1.9     01-17          I&O's Detail    17 Jan 2022 07:01  -  18 Jan 2022 07:00  --------------------------------------------------------  IN:    Oral Fluid: 295 mL  Total IN: 295 mL    OUT:    Intermittent Catheterization - Urethral (mL): 500 mL    Voided (mL): 450 mL  Total OUT: 950 mL    Total NET: -655 mL        I&O's Summary    17 Jan 2022 07:01  -  18 Jan 2022 07:00  --------------------------------------------------------  IN: 295 mL / OUT: 950 mL / NET: -655 mL          Assessment/Plan:   S/P IT pain pump placement   pain control - medications adjusted as per pain management recommendations  PT/OT/Rehab   Pt has auth for d/c to SNF  will d/w attg

## 2022-01-18 NOTE — PROGRESS NOTE ADULT - REASON FOR ADMISSION
Placement of Pain pump

## 2022-01-18 NOTE — DISCHARGE NOTE NURSING/CASE MANAGEMENT/SOCIAL WORK - PATIENT PORTAL LINK FT
You can access the FollowMyHealth Patient Portal offered by [facility] by registering at the following website: [email or web address]. By joining [employer]’s FollowMyHealth portal, you will also be able to view your health information using other applications (apps) compatible with our system.

## 2022-01-18 NOTE — CHART NOTE - NSCHARTNOTEFT_GEN_A_CORE
PACU ANESTHESIA ADMISSION NOTE      Procedure: intrathecal pump placement  Post op diagnosis:  chronic pain    ____  Intubated  TV:______       Rate: ______      FiO2: ______    __X__  Patent Airway    __X__  Full return of protective reflexes    __X__  Full recovery from anesthesia / back to baseline status    Vitals:  T(F): 97.6  HR: 92  BP: 143/90   RR: 20   SpO2: 96%     Mental Status:  __X__ Awake   ___X__ Alert   _____ Drowsy   _____ Sedated    Nausea/Vomiting:  ____ Yes, See Post - Op Orders      __X__ No    Pain Scale (0-10):  _____    Treatment: ____ None    __X__ See Post - Op/PCA Orders    Post - Operative Fluids:   ____ Oral   _X___ See Post - Op Orders    Plan: Discharge:   ____Home       __X___Floor     _____Critical Care    _____  Other:_________________    Comments:
Called by the bedside RN multiple times throughout the day regarding patient being abusive to staff, requesting medications then subsequently refusing the medications.   Multiple visits to bedside by the PA on staff were made. At each instance patient was found very agitated at bedside, yelling at nursing staff and accusing the RN of withholding her medication. When asked for description of each event, patient speaks in a roundabout way, changing her version of events multiple times. Throughout these conversation, patient multiple times stated that she wants to go home.  Arrangements for transportation/discharge was offered to the patient but patient became very agitated and argumentative with the PA as well as refusing to be discharged due to lack of HHA and/or transportation.  Patient admitted that she had home medications at bedside and in presence of Nurse Manager were placed in an envelope, sealed and sent to pharmacy for safekeeping.    Patient is requesting all of her medications to be given at one time, at the same time refusing to state her name for the RN in order to confirm identity for medication administration purposes.   Dr. Ott informed of situation and a three way conversation between Dr. Ott, patient and PA took place, where patient was informed of protocols for medication administration and agreed to cooperate with staff.
Spoke with Gastroenterology fellow regarding patient's self report of "blood in stool".   Patient did not show hospital staff stool containing blood.  He noted patients history of pain medications and likelihood of constipation.  Also noted stable Hgb of 14.  KUB unremarkable.  Given stable Hgb patient would not be a candidate for colonoscopy on this admission.  He recommended increasing Miralax dose to BID, ordering Senna QHS and PRN Enemas.  Patient is stable to be d/c'd to SNF.  Can follow up with GI as outpatient PRN. d/w attg

## 2022-01-19 VITALS
OXYGEN SATURATION: 97 % | SYSTOLIC BLOOD PRESSURE: 184 MMHG | HEART RATE: 114 BPM | DIASTOLIC BLOOD PRESSURE: 78 MMHG | RESPIRATION RATE: 18 BRPM | TEMPERATURE: 102 F

## 2022-01-19 LAB — MBP CSF-MCNC: 4.4 NG/ML — SIGNIFICANT CHANGE UP (ref 0–4.7)

## 2022-01-19 RX ADMIN — OXYCODONE HYDROCHLORIDE 20 MILLIGRAM(S): 5 TABLET ORAL at 06:22

## 2022-01-19 RX ADMIN — OXYCODONE HYDROCHLORIDE 20 MILLIGRAM(S): 5 TABLET ORAL at 14:45

## 2022-01-19 RX ADMIN — HEPARIN SODIUM 5000 UNIT(S): 5000 INJECTION INTRAVENOUS; SUBCUTANEOUS at 06:29

## 2022-01-19 RX ADMIN — Medication 650 MILLIGRAM(S): at 06:23

## 2022-01-19 RX ADMIN — GABAPENTIN 600 MILLIGRAM(S): 400 CAPSULE ORAL at 06:22

## 2022-01-19 RX ADMIN — Medication 25 MILLIGRAM(S): at 15:38

## 2022-01-19 RX ADMIN — GABAPENTIN 600 MILLIGRAM(S): 400 CAPSULE ORAL at 14:44

## 2022-01-19 RX ADMIN — METHOCARBAMOL 750 MILLIGRAM(S): 500 TABLET, FILM COATED ORAL at 14:45

## 2022-01-19 RX ADMIN — Medication 5 MILLIGRAM(S): at 10:19

## 2022-01-19 RX ADMIN — Medication 25 MILLIGRAM(S): at 06:22

## 2022-01-19 RX ADMIN — PANTOPRAZOLE SODIUM 40 MILLIGRAM(S): 20 TABLET, DELAYED RELEASE ORAL at 06:22

## 2022-01-19 RX ADMIN — Medication 10 MILLIGRAM(S): at 06:23

## 2022-01-19 RX ADMIN — POLYETHYLENE GLYCOL 3350 17 GRAM(S): 17 POWDER, FOR SOLUTION ORAL at 06:23

## 2022-01-19 RX ADMIN — OXYCODONE HYDROCHLORIDE 20 MILLIGRAM(S): 5 TABLET ORAL at 02:51

## 2022-01-19 RX ADMIN — Medication 25 MILLIGRAM(S): at 02:56

## 2022-01-19 RX ADMIN — METHOCARBAMOL 750 MILLIGRAM(S): 500 TABLET, FILM COATED ORAL at 06:22

## 2022-01-21 NOTE — PROGRESS NOTE ADULT - SUBJECTIVE AND OBJECTIVE BOX
Please see pain management evaluation and recommendation by Dr. Lama on 01/13/2022.  D/W house staff to follow the recommendation of Dr. Lama. No

## 2022-01-25 ENCOUNTER — APPOINTMENT (OUTPATIENT)
Dept: NEUROSURGERY | Facility: CLINIC | Age: 67
End: 2022-01-25
Payer: MEDICARE

## 2022-01-25 PROCEDURE — 99024 POSTOP FOLLOW-UP VISIT: CPT

## 2022-01-27 RX ORDER — OXYCODONE HYDROCHLORIDE 5 MG/1
0 TABLET ORAL
Qty: 0 | Refills: 0 | DISCHARGE

## 2022-01-27 RX ORDER — ASPIRIN/CALCIUM CARB/MAGNESIUM 324 MG
1 TABLET ORAL
Qty: 0 | Refills: 0 | DISCHARGE

## 2022-01-29 NOTE — CDI QUERY NOTE - NSCDIOTHERTXTBX_GEN_ALL_CORE_HH
CDI Clarification 1:  Clinical Indicators:    66F with hx of multiple spinal surgeries presenting with chronic back pain, for placement of pain pump    Pre-Anesthesia Evaluation Adult: Home Medications:   OxyCONTIN 15 mg oral tablet, extended release	  oxyCODONE 15 mg oral tablet	  gabapentin 600 mg oral tablet	    01/13 S/P IT Pain pump Placement .  OR: the patient on significant amount of narcotics over years….    Medication:  - Low dose intrathecal morphine, will continue home doses of narcotics - pain management will  follow   - as per pain management - increase oxycodone IR to 20mcg q4h, maintain other meds  oxyCODONE    IR 20 milliGRAM(s) Oral every 4 hours  oxyCODONE  ER Tablet 20 milliGRAM(s) Oral every 8 hours        QUERY  Based on above clinical findings and your professional judgment please indicate if additional diagnosis is applicable  	Opioid Dependency to chronic back pain management  	Other ( Please specify)  	Clinically unable to determine.      Thank you,

## 2022-01-31 NOTE — HISTORY OF PRESENT ILLNESS
[Other Location: e.g. School (Enter Location, City,State)___] : at [unfilled], at the time of the visit. [Medical Office: ([facility])___] : at the medical office located in  [Verbal consent obtained from patient] : the patient, [unfilled] [FreeTextEntry1] : Ms. Redd is in the SNF s/p IT pump placement as she is unsafe to go home pending establishment of home care. She is not happy with the level of pain control as they are not providing her baseline narcotic medication in the NH. She has an appointment with Dr. Mclaughlin next week. CSF studies to be reviewed by neurology

## 2022-01-31 NOTE — ASSESSMENT
[FreeTextEntry1] : She has an appointment with Dr. Mclaughlin tomorrow at which point her medications can be clarified for the NH. The positive side is she has been getting PT which is helping. She is adressing issues related to her CIC which she does at home with the NH. I will see her in office for wound check in 1-2 weeks pending her ability to get to the office.

## 2022-02-18 PROBLEM — M41.9 SCOLIOSIS, UNSPECIFIED: Chronic | Status: ACTIVE | Noted: 2022-01-13

## 2022-02-18 PROBLEM — E78.00 PURE HYPERCHOLESTEROLEMIA, UNSPECIFIED: Chronic | Status: ACTIVE | Noted: 2022-01-13

## 2022-02-18 PROBLEM — M19.90 UNSPECIFIED OSTEOARTHRITIS, UNSPECIFIED SITE: Chronic | Status: ACTIVE | Noted: 2022-01-13

## 2022-02-18 PROBLEM — F41.9 ANXIETY DISORDER, UNSPECIFIED: Chronic | Status: ACTIVE | Noted: 2022-01-13

## 2022-02-18 PROBLEM — Z99.3 DEPENDENCE ON WHEELCHAIR: Chronic | Status: ACTIVE | Noted: 2022-01-13

## 2022-02-18 PROBLEM — R73.03 PREDIABETES: Chronic | Status: ACTIVE | Noted: 2022-01-13

## 2022-02-18 PROBLEM — R51.9 HEADACHE, UNSPECIFIED: Chronic | Status: ACTIVE | Noted: 2022-01-13

## 2022-02-18 PROBLEM — Z87.39 PERSONAL HISTORY OF OTHER DISEASES OF THE MUSCULOSKELETAL SYSTEM AND CONNECTIVE TISSUE: Chronic | Status: ACTIVE | Noted: 2022-01-13

## 2022-02-18 PROBLEM — I63.9 CEREBRAL INFARCTION, UNSPECIFIED: Chronic | Status: ACTIVE | Noted: 2022-01-13

## 2022-02-18 PROBLEM — Z86.69 PERSONAL HISTORY OF OTHER DISEASES OF THE NERVOUS SYSTEM AND SENSE ORGANS: Chronic | Status: ACTIVE | Noted: 2022-01-13

## 2022-02-18 PROBLEM — Z87.19 PERSONAL HISTORY OF OTHER DISEASES OF THE DIGESTIVE SYSTEM: Chronic | Status: ACTIVE | Noted: 2022-01-13

## 2022-02-18 PROBLEM — L30.9 DERMATITIS, UNSPECIFIED: Chronic | Status: ACTIVE | Noted: 2022-01-13

## 2022-02-18 PROBLEM — M48.00 SPINAL STENOSIS, SITE UNSPECIFIED: Chronic | Status: ACTIVE | Noted: 2022-01-13

## 2022-02-18 PROBLEM — J44.9 CHRONIC OBSTRUCTIVE PULMONARY DISEASE, UNSPECIFIED: Chronic | Status: ACTIVE | Noted: 2022-01-13

## 2022-02-18 PROBLEM — M47.812 SPONDYLOSIS WITHOUT MYELOPATHY OR RADICULOPATHY, CERVICAL REGION: Chronic | Status: ACTIVE | Noted: 2022-01-13

## 2022-02-18 PROBLEM — S12.9XXA FRACTURE OF NECK, UNSPECIFIED, INITIAL ENCOUNTER: Chronic | Status: ACTIVE | Noted: 2022-01-13

## 2022-02-18 PROBLEM — K21.9 GASTRO-ESOPHAGEAL REFLUX DISEASE WITHOUT ESOPHAGITIS: Chronic | Status: ACTIVE | Noted: 2022-01-13

## 2022-02-18 PROBLEM — K86.2 CYST OF PANCREAS: Chronic | Status: ACTIVE | Noted: 2022-01-13

## 2022-02-18 PROBLEM — M47.817 SPONDYLOSIS WITHOUT MYELOPATHY OR RADICULOPATHY, LUMBOSACRAL REGION: Chronic | Status: ACTIVE | Noted: 2022-01-13

## 2022-02-22 ENCOUNTER — APPOINTMENT (OUTPATIENT)
Dept: NEUROSURGERY | Facility: CLINIC | Age: 67
End: 2022-02-22
Payer: MEDICARE

## 2022-02-22 VITALS
WEIGHT: 162 LBS | TEMPERATURE: 97.6 F | BODY MASS INDEX: 25.43 KG/M2 | RESPIRATION RATE: 16 BRPM | HEIGHT: 67 IN | OXYGEN SATURATION: 99 %

## 2022-02-22 PROCEDURE — 99212 OFFICE O/P EST SF 10 MIN: CPT

## 2022-03-02 ENCOUNTER — NON-APPOINTMENT (OUTPATIENT)
Age: 67
End: 2022-03-02

## 2022-03-03 NOTE — ED PROVIDER NOTE - CLINICAL SUMMARY MEDICAL DECISION MAKING FREE TEXT BOX
Department  notified of request for emir BURROUGHS referrals started. Assigned CM/SW to follow up with pt/family on further discharge planning.      Darleen Luna   March 03, 2022   12:35 66 y/o f hx multiple back surgeries at [facility], self catheterizes 2/2 chronic retention presents c/o persistent back pain radiating to left chest and abdomen for the past 5 months; pt seen 11 days ago for similar complaints, d/c with outpatient referrals, pt stating she hasn't followed up yet, trying to change her doctors to [facility].  W/u in ED negative, including labs, trop, EKG, CT chest/abd/pelvis.  +trace leuks and bacteria in urine, will treat with ceftin.  D/c to make appointments with pain management, neurosurgery and GI.

## 2022-03-04 ENCOUNTER — RX RENEWAL (OUTPATIENT)
Age: 67
End: 2022-03-04

## 2022-03-11 ENCOUNTER — RX RENEWAL (OUTPATIENT)
Age: 67
End: 2022-03-11

## 2022-03-22 ENCOUNTER — APPOINTMENT (OUTPATIENT)
Dept: NEUROSURGERY | Facility: CLINIC | Age: 67
End: 2022-03-22
Payer: MEDICARE

## 2022-03-22 VITALS — WEIGHT: 162 LBS | BODY MASS INDEX: 25.43 KG/M2 | HEIGHT: 67 IN | RESPIRATION RATE: 16 BRPM

## 2022-03-22 PROCEDURE — 99213 OFFICE O/P EST LOW 20 MIN: CPT

## 2022-03-31 ENCOUNTER — APPOINTMENT (OUTPATIENT)
Dept: SPINE | Facility: CLINIC | Age: 67
End: 2022-03-31
Payer: MEDICARE

## 2022-03-31 VITALS
DIASTOLIC BLOOD PRESSURE: 81 MMHG | HEART RATE: 78 BPM | OXYGEN SATURATION: 91 % | WEIGHT: 160 LBS | TEMPERATURE: 97.7 F | HEIGHT: 67 IN | SYSTOLIC BLOOD PRESSURE: 113 MMHG | BODY MASS INDEX: 25.11 KG/M2

## 2022-03-31 DIAGNOSIS — M47.817 SPONDYLOSIS W/OUT MYELOPATHY OR RADICULOPATHY, LUMBOSACRAL REGION: ICD-10-CM

## 2022-03-31 PROCEDURE — 99215 OFFICE O/P EST HI 40 MIN: CPT

## 2022-04-04 ENCOUNTER — RX RENEWAL (OUTPATIENT)
Age: 67
End: 2022-04-04

## 2022-04-06 PROBLEM — M47.817 LUMBOSACRAL SPONDYLOSIS: Status: ACTIVE | Noted: 2021-07-01

## 2022-04-06 NOTE — HISTORY OF PRESENT ILLNESS
[de-identified] : The patient recently underwent intrathecal pain pump implantation by one of my colleagues at [place] she is done well and continues to be abstinent from nicotine and tobacco products she is interested in returning to her family in [place] and is here to discuss surgical intervention

## 2022-04-06 NOTE — DISCUSSION/SUMMARY
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 I think it is reasonable to consider surgical intervention I need to conference with some of my colleagues to discuss but tentatively I have offered her a date May 16 to revise this this would be a C2 to the pelvis with a revision and I retreatment of the pseudoarthrosis with possible extension to C1 she understands what a significant undertaking this and is willing to proceed and we will collect all the necessary clearances as she I will need neurology cardiology medicine and pulmonary clearance.\par \par Estiven Luz M.D., M.Sc.\par \[facility] Department of Neurosurgery\par [facility] [employer] [facility]\par [facility]\par [facility]\par [place], [place]\par [email or web address]\par [phone]

## 2022-04-06 NOTE — PHYSICAL EXAM
[Antalgic] : antalgic [Stooped] : stooped [Wheelchair] : uses a wheelchair [Soni's Sign] : negative Soni's sign [UE/LE] : Sensory: Intact in bilateral upper & lower extremities

## 2022-04-14 NOTE — ED ADULT TRIAGE NOTE - BSA (M2)
1.85 Inflammation Suggestive Of Cancer Camouflage Histology Text: There was a dense lymphocytic infiltrate which prevented adequate histologic evaluation of adjacent structures.  Residual cancer cells may be masked by the dense inflammation.

## 2022-04-16 RX ORDER — MULTIVIT 47/IRON/FOLATE 1/DHA 27-1-300MG
27-0.6-0.4-3 CAPSULE ORAL
Qty: 90 | Refills: 0 | Status: ACTIVE | COMMUNITY
Start: 2021-04-30 | End: 1900-01-01

## 2022-04-21 DIAGNOSIS — F51.04 PSYCHOPHYSIOLOGIC INSOMNIA: ICD-10-CM

## 2022-04-21 RX ORDER — CAMPHOR 0.45 %
25 GEL (GRAM) TOPICAL
Qty: 20 | Refills: 0 | Status: ACTIVE | COMMUNITY
Start: 2022-04-21 | End: 1900-01-01

## 2022-04-21 RX ORDER — MELATONIN 3 MG
3 CAPSULE ORAL
Qty: 30 | Refills: 5 | Status: ACTIVE | COMMUNITY
Start: 2022-04-21 | End: 1900-01-01

## 2022-05-04 ENCOUNTER — APPOINTMENT (OUTPATIENT)
Dept: INTERNAL MEDICINE | Facility: CLINIC | Age: 67
End: 2022-05-04
Payer: MEDICARE

## 2022-05-04 VITALS
HEART RATE: 79 BPM | WEIGHT: 160 LBS | SYSTOLIC BLOOD PRESSURE: 112 MMHG | TEMPERATURE: 98.6 F | BODY MASS INDEX: 25.11 KG/M2 | DIASTOLIC BLOOD PRESSURE: 80 MMHG | OXYGEN SATURATION: 98 % | HEIGHT: 67 IN

## 2022-05-04 PROCEDURE — 36415 COLL VENOUS BLD VENIPUNCTURE: CPT

## 2022-05-04 PROCEDURE — 99214 OFFICE O/P EST MOD 30 MIN: CPT

## 2022-05-04 RX ORDER — OXYCODONE HYDROCHLORIDE 30 MG/1
TABLET ORAL
Refills: 0 | Status: DISCONTINUED | COMMUNITY
End: 2022-05-04

## 2022-05-06 ENCOUNTER — NON-APPOINTMENT (OUTPATIENT)
Age: 67
End: 2022-05-06

## 2022-05-06 LAB
ALBUMIN SERPL ELPH-MCNC: 4.5 G/DL
ALP BLD-CCNC: 135 U/L
ALT SERPL-CCNC: 15 U/L
ANION GAP SERPL CALC-SCNC: 12 MMOL/L
APTT BLD: 27.8 SEC
AST SERPL-CCNC: 17 U/L
BASOPHILS # BLD AUTO: 0.04 K/UL
BASOPHILS NFR BLD AUTO: 0.7 %
BILIRUB SERPL-MCNC: 0.3 MG/DL
BUN SERPL-MCNC: 13 MG/DL
CALCIUM SERPL-MCNC: 9.1 MG/DL
CHLORIDE SERPL-SCNC: 106 MMOL/L
CO2 SERPL-SCNC: 22 MMOL/L
CREAT SERPL-MCNC: 0.6 MG/DL
EGFR: 98 ML/MIN/1.73M2
EOSINOPHIL # BLD AUTO: 0.11 K/UL
EOSINOPHIL NFR BLD AUTO: 2 %
ESTIMATED AVERAGE GLUCOSE: 157 MG/DL
GLUCOSE SERPL-MCNC: 108 MG/DL
HBA1C MFR BLD HPLC: 7.1 %
HCT VFR BLD CALC: 43.7 %
HGB BLD-MCNC: 13.8 G/DL
IMM GRANULOCYTES NFR BLD AUTO: 0.2 %
INR PPP: 0.91 RATIO
LYMPHOCYTES # BLD AUTO: 2.46 K/UL
LYMPHOCYTES NFR BLD AUTO: 45.8 %
MAN DIFF?: NORMAL
MCHC RBC-ENTMCNC: 27.8 PG
MCHC RBC-ENTMCNC: 31.6 GM/DL
MCV RBC AUTO: 87.9 FL
MONOCYTES # BLD AUTO: 0.41 K/UL
MONOCYTES NFR BLD AUTO: 7.6 %
NEUTROPHILS # BLD AUTO: 2.34 K/UL
NEUTROPHILS NFR BLD AUTO: 43.7 %
PLATELET # BLD AUTO: 198 K/UL
POTASSIUM SERPL-SCNC: 4.9 MMOL/L
PROT SERPL-MCNC: 6.8 G/DL
PT BLD: 10.6 SEC
RBC # BLD: 4.97 M/UL
RBC # FLD: 15.3 %
SODIUM SERPL-SCNC: 141 MMOL/L
WBC # FLD AUTO: 5.37 K/UL

## 2022-05-10 NOTE — ASSESSMENT
[Patient Requires Further Testing] : Patient requires further testing [FreeTextEntry4] : cardiac evaluation before proceeding with spinal surgery

## 2022-05-10 NOTE — HISTORY OF PRESENT ILLNESS
[Sleep Apnea] : sleep apnea [No Adverse Anesthesia Reaction] : no adverse anesthesia reaction in self or family member [Asthma] : no asthma [COPD] : no COPD [FreeTextEntry1] : spinal surgery [FreeTextEntry2] : TBD [FreeTextEntry3] : Dr Luz [FreeTextEntry4] : THis is a 65 yo f with HTN, GAMALIEL (never on cpap- not tolerated), multiple spinal surgeries in the past\par HLD, CVA x3 - last one in [place] in 2016 (on asa)\par \par pain pump in place - following montly with pain management  -Dr Mclaughlin\par

## 2022-05-10 NOTE — PHYSICAL EXAM
[Normal] : affect was normal and insight and judgment were intact [de-identified] : trace swelling b/l le

## 2022-05-11 ENCOUNTER — APPOINTMENT (OUTPATIENT)
Dept: OPHTHALMOLOGY | Facility: CLINIC | Age: 67
End: 2022-05-11

## 2022-05-12 ENCOUNTER — APPOINTMENT (OUTPATIENT)
Dept: HEART AND VASCULAR | Facility: CLINIC | Age: 67
End: 2022-05-12

## 2022-05-23 ENCOUNTER — NON-APPOINTMENT (OUTPATIENT)
Age: 67
End: 2022-05-23

## 2022-05-24 ENCOUNTER — OUTPATIENT (OUTPATIENT)
Dept: OUTPATIENT SERVICES | Facility: HOSPITAL | Age: 67
LOS: 1 days | End: 2022-05-24
Payer: MEDICARE

## 2022-05-24 ENCOUNTER — APPOINTMENT (OUTPATIENT)
Dept: HEART AND VASCULAR | Facility: CLINIC | Age: 67
End: 2022-05-24
Payer: MEDICARE

## 2022-05-24 ENCOUNTER — APPOINTMENT (OUTPATIENT)
Dept: CT IMAGING | Facility: HOSPITAL | Age: 67
End: 2022-05-24

## 2022-05-24 VITALS
WEIGHT: 160 LBS | DIASTOLIC BLOOD PRESSURE: 90 MMHG | BODY MASS INDEX: 25.11 KG/M2 | OXYGEN SATURATION: 94 % | SYSTOLIC BLOOD PRESSURE: 120 MMHG | HEART RATE: 94 BPM | HEIGHT: 67 IN | RESPIRATION RATE: 14 BRPM | TEMPERATURE: 97 F

## 2022-05-24 DIAGNOSIS — M50.90 CERVICAL DISC DISORDER, UNSPECIFIED, UNSPECIFIED CERVICAL REGION: Chronic | ICD-10-CM

## 2022-05-24 DIAGNOSIS — Z98.890 OTHER SPECIFIED POSTPROCEDURAL STATES: Chronic | ICD-10-CM

## 2022-05-24 DIAGNOSIS — R07.9 CHEST PAIN, UNSPECIFIED: ICD-10-CM

## 2022-05-24 DIAGNOSIS — R94.31 ABNORMAL ELECTROCARDIOGRAM [ECG] [EKG]: ICD-10-CM

## 2022-05-24 PROCEDURE — 93000 ELECTROCARDIOGRAM COMPLETE: CPT

## 2022-05-24 PROCEDURE — 72125 CT NECK SPINE W/O DYE: CPT | Mod: 26

## 2022-05-24 PROCEDURE — 72131 CT LUMBAR SPINE W/O DYE: CPT | Mod: 26

## 2022-05-24 PROCEDURE — 99215 OFFICE O/P EST HI 40 MIN: CPT

## 2022-05-24 PROCEDURE — 71046 X-RAY EXAM CHEST 2 VIEWS: CPT | Mod: 26

## 2022-05-24 PROCEDURE — 72128 CT CHEST SPINE W/O DYE: CPT | Mod: 26

## 2022-05-24 NOTE — HISTORY OF PRESENT ILLNESS
[FreeTextEntry1] : for cardiac evaluation prior to extensive spinal surgery\par limited functional capacity\par c/o episodes of chest pains

## 2022-05-24 NOTE — PHYSICAL EXAM
[Well Developed] : well developed [Well Nourished] : well nourished [No Acute Distress] : no acute distress [Normal Conjunctiva] : normal conjunctiva [Normal Venous Pressure] : normal venous pressure [No Carotid Bruit] : no carotid bruit [Normal S1, S2] : normal S1, S2 [No Murmur] : no murmur [No Rub] : no rub [No Gallop] : no gallop [Clear Lung Fields] : clear lung fields [Good Air Entry] : good air entry [No Respiratory Distress] : no respiratory distress  [Soft] : abdomen soft [Non Tender] : non-tender [No Masses/organomegaly] : no masses/organomegaly [Normal Bowel Sounds] : normal bowel sounds [No Edema] : no edema [No Cyanosis] : no cyanosis [No Clubbing] : no clubbing [No Rash] : no rash [Moves all extremities] : moves all extremities [No Focal Deficits] : no focal deficits [Normal Speech] : normal speech [Alert and Oriented] : alert and oriented [Normal memory] : normal memory [de-identified] : wheelchair

## 2022-05-24 NOTE — DISCUSSION/SUMMARY
[FreeTextEntry1] : abnormal ecg/cp\par prior echo reviewed with normal LVEF\par extensive spinal surgerypropused/ high risk\par recommend CT angio coronaries as best assessment of CAD and risk stratification in this case\par discussed with patient\par prior notes reviewed

## 2022-05-27 ENCOUNTER — APPOINTMENT (OUTPATIENT)
Dept: OPHTHALMOLOGY | Facility: CLINIC | Age: 67
End: 2022-05-27
Payer: MEDICARE

## 2022-05-27 ENCOUNTER — NON-APPOINTMENT (OUTPATIENT)
Age: 67
End: 2022-05-27

## 2022-05-27 DIAGNOSIS — Z96.611 PRESENCE OF RIGHT ARTIFICIAL SHOULDER JOINT: ICD-10-CM

## 2022-05-27 DIAGNOSIS — Z01.818 ENCOUNTER FOR OTHER PREPROCEDURAL EXAMINATION: ICD-10-CM

## 2022-05-27 DIAGNOSIS — M47.812 SPONDYLOSIS WITHOUT MYELOPATHY OR RADICULOPATHY, CERVICAL REGION: ICD-10-CM

## 2022-05-27 DIAGNOSIS — M51.84 OTHER INTERVERTEBRAL DISC DISORDERS, THORACIC REGION: ICD-10-CM

## 2022-05-27 DIAGNOSIS — M54.9 DORSALGIA, UNSPECIFIED: ICD-10-CM

## 2022-05-27 DIAGNOSIS — M48.04 SPINAL STENOSIS, THORACIC REGION: ICD-10-CM

## 2022-05-27 DIAGNOSIS — M41.84 OTHER FORMS OF SCOLIOSIS, THORACIC REGION: ICD-10-CM

## 2022-05-27 DIAGNOSIS — M48.02 SPINAL STENOSIS, CERVICAL REGION: ICD-10-CM

## 2022-05-27 PROCEDURE — 92004 COMPRE OPH EXAM NEW PT 1/>: CPT

## 2022-06-01 ENCOUNTER — RX RENEWAL (OUTPATIENT)
Age: 67
End: 2022-06-01

## 2022-06-09 ENCOUNTER — APPOINTMENT (OUTPATIENT)
Dept: SPINE | Facility: CLINIC | Age: 67
End: 2022-06-09
Payer: MEDICARE

## 2022-06-09 ENCOUNTER — APPOINTMENT (OUTPATIENT)
Dept: CT IMAGING | Facility: HOSPITAL | Age: 67
End: 2022-06-09

## 2022-06-09 ENCOUNTER — NON-APPOINTMENT (OUTPATIENT)
Age: 67
End: 2022-06-09

## 2022-06-09 VITALS
TEMPERATURE: 97 F | HEART RATE: 76 BPM | OXYGEN SATURATION: 90 % | HEIGHT: 67 IN | RESPIRATION RATE: 14 BRPM | SYSTOLIC BLOOD PRESSURE: 110 MMHG | DIASTOLIC BLOOD PRESSURE: 76 MMHG | BODY MASS INDEX: 25.11 KG/M2 | WEIGHT: 160 LBS

## 2022-06-09 PROCEDURE — 99215 OFFICE O/P EST HI 40 MIN: CPT

## 2022-06-10 NOTE — DISCUSSION/SUMMARY
[de-identified] : On her last visit we had indicated her for a very large surgery including a C2 to pelvis with a pedicle subtraction osteotomy on further review and given her pain control issues I think that such a large surgery would be ill advised very clearly the majority of her pain emanates from her mid thoracic region where she has severe subchondral cyst formation and haloing of her screws I think that we could accomplish our goals of relieving her pain and stabilizing her spinal issues by connecting her to construct some revising all of the halo screws this would be the form of a T1-L1 posterior revision instrumented fusion we discussed the risk benefits and alternatives to surgery today estimated blood loss and difficulties with postop pain control given her current level of narcotics despite these issues and the risk profile she wishes to proceed we will schedule her surgery for July 27 to allow optimal time for medical and cardiac clearance.\par \par Estiven Luz M.D., M.Sc.\par \par Department of Neurosurgery\par [facility]\par [facility]\par [facility]\par [place], [place]\talia [email or web address] [phone]

## 2022-06-10 NOTE — HISTORY OF PRESENT ILLNESS
[de-identified] : The patient returns today to discuss surgery.  We had previously indicated her for surgery but after work-up by her pain management doctor and my colleague in neurosurgery who placed her intrathecal pain pump we decided to postpone surgery she continues to have severe incapacitating back and midthoracic pain.  This limits her mobility.  Additionally she endorses the fear and anxiety of knowing that screws are loose and that she has unresolved structural spinal issues as an impediment to her improvement

## 2022-06-17 ENCOUNTER — RX RENEWAL (OUTPATIENT)
Age: 67
End: 2022-06-17

## 2022-06-22 ENCOUNTER — APPOINTMENT (OUTPATIENT)
Dept: PULMONOLOGY | Facility: CLINIC | Age: 67
End: 2022-06-22
Payer: MEDICARE

## 2022-06-22 VITALS
DIASTOLIC BLOOD PRESSURE: 87 MMHG | HEIGHT: 67 IN | SYSTOLIC BLOOD PRESSURE: 128 MMHG | TEMPERATURE: 97.7 F | OXYGEN SATURATION: 98 % | HEART RATE: 72 BPM

## 2022-06-22 DIAGNOSIS — F17.200 NICOTINE DEPENDENCE, UNSPECIFIED, UNCOMPLICATED: ICD-10-CM

## 2022-06-22 DIAGNOSIS — Z23 ENCOUNTER FOR IMMUNIZATION: ICD-10-CM

## 2022-06-22 DIAGNOSIS — G47.33 OBSTRUCTIVE SLEEP APNEA (ADULT) (PEDIATRIC): ICD-10-CM

## 2022-06-22 DIAGNOSIS — R06.83 SNORING: ICD-10-CM

## 2022-06-22 PROCEDURE — 99204 OFFICE O/P NEW MOD 45 MIN: CPT

## 2022-06-22 PROCEDURE — 99205 OFFICE O/P NEW HI 60 MIN: CPT | Mod: GC,25

## 2022-06-22 PROCEDURE — 90471 IMMUNIZATION ADMIN: CPT

## 2022-06-22 PROCEDURE — 90677 PCV20 VACCINE IM: CPT

## 2022-06-23 LAB — SARS-COV-2 N GENE NPH QL NAA+PROBE: NOT DETECTED

## 2022-06-27 ENCOUNTER — APPOINTMENT (OUTPATIENT)
Dept: PULMONOLOGY | Facility: CLINIC | Age: 67
End: 2022-06-27
Payer: MEDICARE

## 2022-06-27 PROCEDURE — 94727 GAS DIL/WSHOT DETER LNG VOL: CPT

## 2022-06-27 PROCEDURE — 94729 DIFFUSING CAPACITY: CPT

## 2022-06-27 PROCEDURE — 94060 EVALUATION OF WHEEZING: CPT

## 2022-06-28 ENCOUNTER — APPOINTMENT (OUTPATIENT)
Dept: NEUROSURGERY | Facility: CLINIC | Age: 67
End: 2022-06-28

## 2022-06-28 PROCEDURE — 99214 OFFICE O/P EST MOD 30 MIN: CPT

## 2022-07-22 ENCOUNTER — RX RENEWAL (OUTPATIENT)
Age: 67
End: 2022-07-22

## 2022-08-02 ENCOUNTER — OUTPATIENT (OUTPATIENT)
Dept: OUTPATIENT SERVICES | Facility: HOSPITAL | Age: 67
LOS: 1 days | End: 2022-08-02
Payer: MEDICARE

## 2022-08-02 ENCOUNTER — APPOINTMENT (OUTPATIENT)
Dept: SLEEP CENTER | Facility: HOME HEALTH | Age: 67
End: 2022-08-02

## 2022-08-02 DIAGNOSIS — Z98.890 OTHER SPECIFIED POSTPROCEDURAL STATES: Chronic | ICD-10-CM

## 2022-08-02 DIAGNOSIS — M50.90 CERVICAL DISC DISORDER, UNSPECIFIED, UNSPECIFIED CERVICAL REGION: Chronic | ICD-10-CM

## 2022-08-02 PROCEDURE — 95800 SLP STDY UNATTENDED: CPT | Mod: 26

## 2022-08-02 PROCEDURE — 95800 SLP STDY UNATTENDED: CPT

## 2022-08-03 DIAGNOSIS — G47.33 OBSTRUCTIVE SLEEP APNEA (ADULT) (PEDIATRIC): ICD-10-CM

## 2022-08-08 PROBLEM — G47.33 OBSTRUCTIVE SLEEP APNEA: Status: ACTIVE | Noted: 2022-08-08

## 2022-08-11 ENCOUNTER — APPOINTMENT (OUTPATIENT)
Dept: CT IMAGING | Facility: HOSPITAL | Age: 67
End: 2022-08-11

## 2022-08-11 ENCOUNTER — OUTPATIENT (OUTPATIENT)
Dept: OUTPATIENT SERVICES | Facility: HOSPITAL | Age: 67
LOS: 1 days | End: 2022-08-11
Payer: MEDICARE

## 2022-08-11 ENCOUNTER — NON-APPOINTMENT (OUTPATIENT)
Age: 67
End: 2022-08-11

## 2022-08-11 ENCOUNTER — APPOINTMENT (OUTPATIENT)
Dept: SLEEP CENTER | Facility: HOSPITAL | Age: 67
End: 2022-08-11

## 2022-08-11 ENCOUNTER — APPOINTMENT (OUTPATIENT)
Dept: HEART AND VASCULAR | Facility: CLINIC | Age: 67
End: 2022-08-11

## 2022-08-11 VITALS
RESPIRATION RATE: 14 BRPM | SYSTOLIC BLOOD PRESSURE: 130 MMHG | BODY MASS INDEX: 30.45 KG/M2 | OXYGEN SATURATION: 98 % | WEIGHT: 194 LBS | HEART RATE: 70 BPM | HEIGHT: 67 IN | TEMPERATURE: 98 F | DIASTOLIC BLOOD PRESSURE: 80 MMHG

## 2022-08-11 DIAGNOSIS — Z98.890 OTHER SPECIFIED POSTPROCEDURAL STATES: Chronic | ICD-10-CM

## 2022-08-11 DIAGNOSIS — I20.9 ANGINA PECTORIS, UNSPECIFIED: ICD-10-CM

## 2022-08-11 DIAGNOSIS — Z01.810 ENCOUNTER FOR PREPROCEDURAL CARDIOVASCULAR EXAMINATION: ICD-10-CM

## 2022-08-11 DIAGNOSIS — G47.33 OBSTRUCTIVE SLEEP APNEA (ADULT) (PEDIATRIC): ICD-10-CM

## 2022-08-11 DIAGNOSIS — M50.90 CERVICAL DISC DISORDER, UNSPECIFIED, UNSPECIFIED CERVICAL REGION: Chronic | ICD-10-CM

## 2022-08-11 LAB — POCT ISTAT CREATININE: 0.6 MG/DL — SIGNIFICANT CHANGE UP (ref 0.5–1.3)

## 2022-08-11 PROCEDURE — 95810 POLYSOM 6/> YRS 4/> PARAM: CPT

## 2022-08-11 PROCEDURE — 99214 OFFICE O/P EST MOD 30 MIN: CPT

## 2022-08-11 PROCEDURE — 95810 POLYSOM 6/> YRS 4/> PARAM: CPT | Mod: 26

## 2022-08-11 PROCEDURE — 82565 ASSAY OF CREATININE: CPT

## 2022-08-11 PROCEDURE — 75574 CT ANGIO HRT W/3D IMAGE: CPT | Mod: 26

## 2022-08-11 PROCEDURE — 75574 CT ANGIO HRT W/3D IMAGE: CPT

## 2022-08-11 PROCEDURE — 93000 ELECTROCARDIOGRAM COMPLETE: CPT

## 2022-08-12 NOTE — DISCUSSION/SUMMARY
[EKG obtained to assist in diagnosis and management of assessed problem(s)] : EKG obtained to assist in diagnosis and management of assessed problem(s) [FreeTextEntry1] : pre-op/ chest pains\par high risk surgery with prolonged anesthesia\par c/o chest pains- recommend CTA for rsik stratification/ evaluate CAD\par \par \par Addendum: 8/12/2022\par CTA no significant CAD, echo with normal LVEF and no significant valve disease

## 2022-08-12 NOTE — HISTORY OF PRESENT ILLNESS
[FreeTextEntry1] : cardiac evaluation prior to spine surgery\par need prolonged anesthesia\par limited functional capacity\par c/o chest pains

## 2022-08-12 NOTE — DISCHARGE NOTE PROVIDER - NSDCHOSPICE_GEN_A_CORE
No Cytokeratin Ae1/Ae3 - Negative Histology Text: CK staining demonstrates a normal density and pattern.

## 2022-08-12 NOTE — PHYSICAL EXAM
[Well Developed] : well developed [Well Nourished] : well nourished [No Acute Distress] : no acute distress [Normal Conjunctiva] : normal conjunctiva [Normal Venous Pressure] : normal venous pressure [No Carotid Bruit] : no carotid bruit [Normal S1, S2] : normal S1, S2 [No Murmur] : no murmur [No Rub] : no rub [No Gallop] : no gallop [Clear Lung Fields] : clear lung fields [Good Air Entry] : good air entry [No Respiratory Distress] : no respiratory distress  [Soft] : abdomen soft [Non Tender] : non-tender [No Masses/organomegaly] : no masses/organomegaly [Normal Bowel Sounds] : normal bowel sounds [No Edema] : no edema [No Cyanosis] : no cyanosis [No Clubbing] : no clubbing [No Rash] : no rash [Moves all extremities] : moves all extremities [No Focal Deficits] : no focal deficits [Normal Speech] : normal speech [Alert and Oriented] : alert and oriented [Normal memory] : normal memory [de-identified] : wheelchair

## 2022-08-15 ENCOUNTER — NON-APPOINTMENT (OUTPATIENT)
Age: 67
End: 2022-08-15

## 2022-08-15 ENCOUNTER — APPOINTMENT (OUTPATIENT)
Dept: INTERNAL MEDICINE | Facility: CLINIC | Age: 67
End: 2022-08-15

## 2022-08-15 VITALS
BODY MASS INDEX: 30.45 KG/M2 | WEIGHT: 194 LBS | DIASTOLIC BLOOD PRESSURE: 80 MMHG | HEIGHT: 67 IN | OXYGEN SATURATION: 98 % | RESPIRATION RATE: 14 BRPM | SYSTOLIC BLOOD PRESSURE: 132 MMHG | TEMPERATURE: 97.8 F | HEART RATE: 66 BPM

## 2022-08-15 DIAGNOSIS — Z01.818 ENCOUNTER FOR OTHER PREPROCEDURAL EXAMINATION: ICD-10-CM

## 2022-08-15 DIAGNOSIS — I10 ESSENTIAL (PRIMARY) HYPERTENSION: ICD-10-CM

## 2022-08-15 DIAGNOSIS — J44.9 CHRONIC OBSTRUCTIVE PULMONARY DISEASE, UNSPECIFIED: ICD-10-CM

## 2022-08-15 LAB — SARS-COV-2 N GENE NPH QL NAA+PROBE: NOT DETECTED

## 2022-08-15 PROCEDURE — 36415 COLL VENOUS BLD VENIPUNCTURE: CPT

## 2022-08-15 PROCEDURE — 99214 OFFICE O/P EST MOD 30 MIN: CPT

## 2022-08-15 RX ORDER — METHOCARBAMOL 750 MG/1
750 TABLET, FILM COATED ORAL 3 TIMES DAILY
Qty: 90 | Refills: 0 | Status: DISCONTINUED | COMMUNITY
Start: 2022-06-17 | End: 2022-08-15

## 2022-08-15 RX ORDER — ACETAMINOPHEN 500 MG/1
500 TABLET ORAL
Qty: 30 | Refills: 11 | Status: ACTIVE | COMMUNITY
Start: 2022-08-15 | End: 1900-01-01

## 2022-08-15 RX ORDER — BUPROPION HYDROCHLORIDE 150 MG/1
150 TABLET, FILM COATED, EXTENDED RELEASE ORAL
Qty: 60 | Refills: 5 | Status: ACTIVE | COMMUNITY
Start: 2021-04-30 | End: 1900-01-01

## 2022-08-16 ENCOUNTER — TRANSCRIPTION ENCOUNTER (OUTPATIENT)
Age: 67
End: 2022-08-16

## 2022-08-16 VITALS
DIASTOLIC BLOOD PRESSURE: 79 MMHG | SYSTOLIC BLOOD PRESSURE: 112 MMHG | HEIGHT: 66 IN | WEIGHT: 194.23 LBS | TEMPERATURE: 98 F | OXYGEN SATURATION: 100 % | RESPIRATION RATE: 18 BRPM | HEART RATE: 61 BPM

## 2022-08-16 LAB
ALBUMIN SERPL ELPH-MCNC: 4.4 G/DL
ALP BLD-CCNC: 118 U/L
ALT SERPL-CCNC: 13 U/L
ANION GAP SERPL CALC-SCNC: 11 MMOL/L
APTT BLD: 31.5 SEC
AST SERPL-CCNC: 18 U/L
BASOPHILS # BLD AUTO: 0.02 K/UL
BASOPHILS NFR BLD AUTO: 0.3 %
BILIRUB SERPL-MCNC: 0.3 MG/DL
BUN SERPL-MCNC: 11 MG/DL
CALCIUM SERPL-MCNC: 9.5 MG/DL
CHLORIDE SERPL-SCNC: 104 MMOL/L
CO2 SERPL-SCNC: 22 MMOL/L
CREAT SERPL-MCNC: 0.67 MG/DL
EGFR: 95 ML/MIN/1.73M2
EOSINOPHIL # BLD AUTO: 0.12 K/UL
EOSINOPHIL NFR BLD AUTO: 2.1 %
GLUCOSE SERPL-MCNC: 95 MG/DL
HCT VFR BLD CALC: 42 %
HGB BLD-MCNC: 12.8 G/DL
IMM GRANULOCYTES NFR BLD AUTO: 0 %
INR PPP: 0.97 RATIO
LYMPHOCYTES # BLD AUTO: 3.06 K/UL
LYMPHOCYTES NFR BLD AUTO: 53.5 %
MAN DIFF?: NORMAL
MCHC RBC-ENTMCNC: 27 PG
MCHC RBC-ENTMCNC: 30.5 GM/DL
MCV RBC AUTO: 88.6 FL
MONOCYTES # BLD AUTO: 0.44 K/UL
MONOCYTES NFR BLD AUTO: 7.7 %
NEUTROPHILS # BLD AUTO: 2.08 K/UL
NEUTROPHILS NFR BLD AUTO: 36.4 %
PLATELET # BLD AUTO: 198 K/UL
POTASSIUM SERPL-SCNC: 4.4 MMOL/L
PROT SERPL-MCNC: 7 G/DL
PT BLD: 11.2 SEC
RBC # BLD: 4.74 M/UL
RBC # FLD: 15 %
SODIUM SERPL-SCNC: 138 MMOL/L
WBC # FLD AUTO: 5.72 K/UL

## 2022-08-16 RX ORDER — OXYCODONE HYDROCHLORIDE 5 MG/1
0 TABLET ORAL
Qty: 0 | Refills: 0 | DISCHARGE

## 2022-08-16 RX ORDER — METOPROLOL TARTRATE 50 MG
0 TABLET ORAL
Qty: 0 | Refills: 0 | DISCHARGE

## 2022-08-16 RX ORDER — BACLOFEN 100 %
0 POWDER (GRAM) MISCELLANEOUS
Qty: 0 | Refills: 0 | DISCHARGE

## 2022-08-16 RX ORDER — OMEPRAZOLE 10 MG/1
0 CAPSULE, DELAYED RELEASE ORAL
Qty: 0 | Refills: 0 | DISCHARGE

## 2022-08-16 RX ORDER — METHOCARBAMOL 500 MG/1
2 TABLET, FILM COATED ORAL
Qty: 0 | Refills: 0 | DISCHARGE

## 2022-08-16 RX ORDER — GABAPENTIN 400 MG/1
1 CAPSULE ORAL
Qty: 0 | Refills: 0 | DISCHARGE

## 2022-08-16 RX ORDER — POVIDONE-IODINE 5 %
1 AEROSOL (ML) TOPICAL ONCE
Refills: 0 | Status: COMPLETED | OUTPATIENT
Start: 2022-08-17 | End: 2022-08-17

## 2022-08-16 RX ORDER — GABAPENTIN 400 MG/1
0 CAPSULE ORAL
Qty: 0 | Refills: 0 | DISCHARGE

## 2022-08-16 NOTE — PRE-OP CHECKLIST - INTERNAL PROSTHESES
hardware in spine, morphine pump/yes(specify) hardware in spine, morphine pump, right shoulder/yes(specify)

## 2022-08-16 NOTE — PRE-OP CHECKLIST - NS PREOP CHK CHLOROHEX WASH
History  Chief Complaint   Patient presents with    Earache     Pt presents to the ED for evaluation of headaches for the last 2 weeks, pt reports "a pop" in the left ear with "clear" drainage yesterday, and now left eye pain  Pt has hx of stroke, reports left side weakness frm the stroke     Headache       History provided by:  Patient and relative   used: Yes     76-year-old female presented with mild frontal headache which has been present for the last few weeks since she had her stroke  Patient has residual left facial droop and left-sided weakness  States the headaches wax and wane and she takes Motrin, Tylenol with some improvement  No worsening today  She developed some left-sided ear pain today and had felt a popping sensation in the here last night with some drainage of fluid  On exam here there is no fluid but the tympanic membrane is not bulging with some surrounding erythema  Will treat for otitis media  Blood pressure elevated  Patient states that this is her baseline  She was recently started on the medication after her CVA  Prior to Admission Medications   Prescriptions Last Dose Informant Patient Reported? Taking?    Canagliflozin (INVOKANA PO)   Yes No   Sig: Take by mouth   acetaminophen (TYLENOL) 500 MG chewable tablet   No No   Sig: Chew 1 tablet every 6 (six) hours as needed for mild pain   atorvastatin (LIPITOR) 80 mg tablet   No No   Sig: Take 1 tablet by mouth every evening   butalbital-acetaminophen-caffeine (FIORICET,ESGIC) -40 mg per tablet   No No   Sig: Take 1 tablet by mouth every 6 (six) hours as needed for headaches   cholecalciferol (VITAMIN D3) 1,000 units tablet   No No   Sig: Take 1 tablet by mouth daily   clonazePAM (KlonoPIN) 0 5 mg tablet   Yes No   Sig: Take 0 5 mg by mouth daily at bedtime as needed for anxiety or seizures     escitalopram (LEXAPRO) 10 mg tablet   No No   Sig: Take 1 tablet by mouth daily   fenofibrate (TRICOR) 145 mg tablet   Yes No   Sig: Take 145 mg by mouth daily   gabapentin (NEURONTIN) 300 mg capsule   No No   Sig: Take 1 capsule by mouth daily at bedtime   insulin lispro (HumaLOG) 100 units/mL injection   No No   Sig: Inject 12 Units under the skin 3 (three) times a day with meals   metoprolol tartrate (LOPRESSOR) 50 mg tablet   Yes No   Sig: Take 25 mg by mouth     pioglitazone (ACTOS) 30 mg tablet   Yes No   Sig: Take 30 mg by mouth daily   topiramate (TOPAMAX) 50 MG tablet   No No   Sig: Take 1 tablet by mouth daily at bedtime   valsartan-hydrochlorothiazide (DIOVAN-HCT) 320-25 MG per tablet   Yes No   Sig: Take 1 tablet by mouth daily      Facility-Administered Medications: None       Past Medical History:   Diagnosis Date    Diabetes mellitus ([facility] [address] )     Hyperlipidemia     Hypertension     Stroke [facility])        Past Surgical History:   Procedure Laterality Date    HYSTERECTOMY         History reviewed  No pertinent family history  I have reviewed and agree with the history as documented  Social History   Substance Use Topics    Smoking status: Never Smoker    Smokeless tobacco: Never Used    Alcohol use No        Review of Systems   Constitutional: Negative for activity change, appetite change, fatigue and fever  HENT: Positive for ear discharge and ear pain  Eyes: Negative for photophobia and visual disturbance  Respiratory: Negative for chest tightness and shortness of breath  Gastrointestinal: Negative for abdominal pain, nausea and vomiting  Musculoskeletal: Negative for back pain and neck pain  Skin: Negative for color change and pallor  Neurological: Positive for dizziness and headaches  Negative for weakness  Psychiatric/Behavioral: Negative for behavioral problems and confusion  All other systems reviewed and are negative        Physical Exam  ED Triage Vitals   Temperature Pulse Respirations Blood Pressure SpO2   11/28/17 1740 11/28/17 1737 11/28/17 1737 11/28/17 1737 11/28/17 1737 98 3 °F (36 8 °C) 91 18 (!) 206/92 96 %      Temp Source Heart Rate Source Patient Position - Orthostatic VS BP Location FiO2 (%)   11/28/17 1740 11/28/17 1737 11/28/17 1737 11/28/17 1737 --   Oral Monitor Sitting Right arm       Pain Score       11/28/17 1740       8           Orthostatic Vital Signs  Vitals:    11/28/17 1737 11/28/17 1759 11/28/17 1843   BP: (!) 206/92 (!) 201/97 (!) 181/104   Pulse: 91 82 82   Patient Position - Orthostatic VS: Sitting Sitting Lying       Physical Exam   Constitutional: She is oriented to person, place, and time  She appears well-developed and well-nourished  No distress  HENT:   Head: Normocephalic and atraumatic  Mouth/Throat: Oropharynx is clear and moist    Left TM mildly bulging with surrounding erythema  Eyes: Conjunctivae are normal  Pupils are equal, round, and reactive to light  Neck: Normal range of motion  Neck supple  Cardiovascular: Normal rate and regular rhythm  Pulmonary/Chest: Effort normal and breath sounds normal    Abdominal: Soft  She exhibits no distension  There is no tenderness  Neurological: She is alert and oriented to person, place, and time  No cranial nerve deficit or sensory deficit  She exhibits normal muscle tone  Coordination normal    Skin: Skin is warm and dry  No rash noted  Psychiatric: She has a normal mood and affect  Her behavior is normal    Nursing note and vitals reviewed  ED Medications  Medications   amoxicillin-clavulanate (AUGMENTIN) 875-125 mg per tablet 1 tablet (1 tablet Oral Given 11/28/17 1801)   acetaminophen (TYLENOL) tablet 975 mg (975 mg Oral Given 11/28/17 1801)       Diagnostic Studies  Results Reviewed     None                 No orders to display              Procedures  Procedures       Phone Contacts  ED Phone Contact    ED Course  ED Course as of Nov 28 1846   Tue Nov 28, 2017   1839  Patient's symptoms improves of somewhat with Tylenol    Hypertension is stable and she states that this is approximately her baseline  MDM  Number of Diagnoses or Management Options  Acute otitis media:   Headache:   Diagnosis management comments: 50-year-old female presented with left-sided ear pain beginning last night with a pop  She had previously drained some fluid last night  No evidence of external fluid  There is an effusion behind the tympanic membrane with some erythema  Will treat for otitis media  She also mentioned chronic headaches over the past few weeks with no recent changes  Will treat with antibiotics, have follow-up and return if symptoms worsen  Amount and/or Complexity of Data Reviewed  Obtain history from someone other than the patient: yes    Patient Progress  Patient progress: improved    CritCare Time    Disposition  Final diagnoses:   Acute otitis media   Headache     Time reflects when diagnosis was documented in both MDM as applicable and the Disposition within this note     Time User Action Codes Description Comment    11/28/2017  6:42 PM Brenden Marquez [H66 [id number]] Acute otitis media     11/28/2017  6:42 PM Nadir Ashlee [facility] Headache       ED Disposition     ED Disposition Condition Comment    Discharge  Melinda Carvajal discharge to home/self care      Condition at discharge: Good        Follow-up Information     Follow up With Specialties Details Why Contact Info Additional 45 Bryanna Irwin MD    [address] [address] [address] [place] [address] Emergency Department Emergency Medicine  If symptoms worsen [address] [id number] [phone] [facility] ED, [address], [place], [place], [address]        Patient's Medications   Discharge Prescriptions    AMOXICILLIN-CLAVULANATE (AUGMENTIN) 875-125 MG PER TABLET    Take 1 tablet by mouth every 12 (twelve) hours for 7 days       Start Date: 11/28/2017End Date: 12/5/2017       Order Dose: 1 tablet       Quantity: 14 tablet    Refills: 0     No discharge procedures on file      ED Provider  Electronically Signed by           Suzie Plata MD  11/28/17 5772 in ASU:

## 2022-08-16 NOTE — PATIENT PROFILE ADULT - FUNCTIONAL ASSESSMENT - DAILY ACTIVITY SECTION LABEL
-- DO NOT REPLY / DO NOT REPLY ALL --  -- Message is from the [facility]--      Patient is requesting a medication refill - medication is on active list   ONE LEFT  Was Medication Pended? Yes.    Rx Name and Dose:  HYDROcodone-acetaminophen (NORCO) 5-325 MG per tablet    Duration: 90 days    Pharmacy  [id number] - [address] [address] [place] [address] At [address] [place] [address]    Patient confirmed the above pharmacy as correct?  Yes    Does this request need an existing or new prescription at a pharmacy to be sent to a new pharmacy location?   No    Caller Information       Type Contact Phone    02/28/2022 10:02 AM CST Phone (Incoming) Betty March (Self) [phone] (M)          Alternative phone number: NONE    Turnaround time given to caller:   \"This message will be sent to [state Provider's name]. The clinical team will fulfill your request as soon as they review your message.\"  
.

## 2022-08-16 NOTE — PATIENT PROFILE ADULT - FALL HARM RISK - HARM RISK INTERVENTIONS
Assistance with ambulation/Assistance OOB with selected safe patient handling equipment/Communicate Risk of Fall with Harm to all staff/Discuss with provider need for PT consult/Monitor gait and stability/Provide patient with walking aids - walker, cane, crutches/Reinforce activity limits and safety measures with patient and family/Sit up slowly, dangle for a short time, stand at bedside before walking/Tailored Fall Risk Interventions/Use of alarms - bed, chair and/or voice tab/Visual Cue: Yellow wristband and red socks/Bed in lowest position, wheels locked, appropriate side rails in place/Call bell, personal items and telephone in reach/Instruct patient to call for assistance before getting out of bed or chair/Non-slip footwear when patient is out of bed/[place] to call system/Physically safe environment - no spills, clutter or unnecessary equipment/Purposeful Proactive Rounding/Room/bathroom lighting operational, light cord in reach

## 2022-08-16 NOTE — PATIENT PROFILE ADULT - WILL THE PATIENT ACCEPT THE PFIZER COVID-19 VACCINE IF ELIGIBLE AND IT IS AVAILABLE?
Patient unsure exactly when this happened. She notes hitting side on wall when stumbling in rush to go to bathroom and also sitting awkwardly when getting on commode.   -Nontender to palpation without noticeable swelling, but pain with hip flexion or when changing positions. Overall pain improved today, relieved by tylenol.  -Negative hip Xray, no acute fractures.   -Fall precautions, bedpan in addition to bedside commode  -Heat packs.   -Tylenol PRN for pain. -S/p IVF in ED  -Treatment of C. diff as above  -Bcx, GIcx negative  -hold antihypertensives.  -No fever, hypotension for 4 days. WBC improving.   -Lactate 2.4, will continue to trend. Not applicable

## 2022-08-16 NOTE — HISTORY OF PRESENT ILLNESS
[FreeTextEntry1] : spinal surgery [FreeTextEntry2] : 8/17/22 [FreeTextEntry3] : Dr Luz  [FreeTextEntry4] : THis is a 66 yo f with HTN, GAMALIEL, multiple spinal surgeries in the past - currently on pump\par HLD, CVA x3 - last one in [place] in 2016 (on asa- currently on hold pre-op)\par last 3 weeks ankles have been swollen - worse in AM and improves during the day -  right worse than left.  \par She finds the diclofenac help her leg pains. \par recently started on tizanadine, methacarbamol stopped \par s/p cardiac eval with Dr Irving

## 2022-08-16 NOTE — PLAN
[FreeTextEntry1] : \par Refill buproprion to help with smoking management post surgery\par \par HOLD diclofenac.\par \par ASA already on hold

## 2022-08-17 ENCOUNTER — TRANSCRIPTION ENCOUNTER (OUTPATIENT)
Age: 67
End: 2022-08-17

## 2022-08-17 ENCOUNTER — INPATIENT (INPATIENT)
Facility: HOSPITAL | Age: 67
LOS: 12 days | Discharge: NURSING, REHAB OR LONG-TERM CARE | DRG: 457 | End: 2022-08-30
Attending: NEUROLOGICAL SURGERY | Admitting: NEUROLOGICAL SURGERY
Payer: MEDICARE

## 2022-08-17 DIAGNOSIS — Z98.890 OTHER SPECIFIED POSTPROCEDURAL STATES: Chronic | ICD-10-CM

## 2022-08-17 DIAGNOSIS — Z96.611 PRESENCE OF RIGHT ARTIFICIAL SHOULDER JOINT: Chronic | ICD-10-CM

## 2022-08-17 LAB
ANION GAP SERPL CALC-SCNC: 11 MMOL/L — SIGNIFICANT CHANGE UP (ref 5–17)
APTT BLD: 24.1 SEC — LOW (ref 27.5–35.5)
BASE EXCESS BLDA CALC-SCNC: -3 MMOL/L — LOW (ref -2–3)
BASE EXCESS BLDA CALC-SCNC: -4.9 MMOL/L — LOW (ref -2–3)
BASE EXCESS BLDA CALC-SCNC: 0.5 MMOL/L — SIGNIFICANT CHANGE UP (ref -2–3)
BLD GP AB SCN SERPL QL: NEGATIVE — SIGNIFICANT CHANGE UP
BUN SERPL-MCNC: 9 MG/DL — SIGNIFICANT CHANGE UP (ref 7–23)
CA-I BLDA-SCNC: 1.13 MMOL/L — LOW (ref 1.15–1.33)
CA-I BLDA-SCNC: 1.2 MMOL/L — SIGNIFICANT CHANGE UP (ref 1.15–1.33)
CA-I BLDA-SCNC: 1.3 MMOL/L — SIGNIFICANT CHANGE UP (ref 1.15–1.33)
CALCIUM SERPL-MCNC: 7.9 MG/DL — LOW (ref 8.4–10.5)
CHLORIDE SERPL-SCNC: 109 MMOL/L — HIGH (ref 96–108)
CO2 BLDA-SCNC: 22 MMOL/L — SIGNIFICANT CHANGE UP (ref 19–24)
CO2 BLDA-SCNC: 24 MMOL/L — SIGNIFICANT CHANGE UP (ref 19–24)
CO2 BLDA-SCNC: 27 MMOL/L — HIGH (ref 19–24)
CO2 SERPL-SCNC: 19 MMOL/L — LOW (ref 22–31)
COHGB MFR BLDA: 1.6 % — SIGNIFICANT CHANGE UP
COHGB MFR BLDA: 1.7 % — SIGNIFICANT CHANGE UP
COHGB MFR BLDA: 3 % — SIGNIFICANT CHANGE UP
CREAT SERPL-MCNC: 0.56 MG/DL — SIGNIFICANT CHANGE UP (ref 0.5–1.3)
EGFR: 99 ML/MIN/1.73M2 — SIGNIFICANT CHANGE UP
GLUCOSE BLDA-MCNC: 115 MG/DL — HIGH (ref 70–99)
GLUCOSE BLDA-MCNC: 182 MG/DL — HIGH (ref 70–99)
GLUCOSE BLDA-MCNC: 190 MG/DL — HIGH (ref 70–99)
GLUCOSE BLDC GLUCOMTR-MCNC: 172 MG/DL — HIGH (ref 70–99)
GLUCOSE SERPL-MCNC: 202 MG/DL — HIGH (ref 70–99)
HCO3 BLDA-SCNC: 21 MMOL/L — SIGNIFICANT CHANGE UP (ref 21–28)
HCO3 BLDA-SCNC: 23 MMOL/L — SIGNIFICANT CHANGE UP (ref 21–28)
HCO3 BLDA-SCNC: 25 MMOL/L — SIGNIFICANT CHANGE UP (ref 21–28)
HCT VFR BLD CALC: 32.9 % — LOW (ref 34.5–45)
HGB BLD-MCNC: 10.7 G/DL — LOW (ref 11.5–15.5)
HGB BLDA-MCNC: 11.1 G/DL — LOW (ref 11.7–16.1)
HGB BLDA-MCNC: 11.4 G/DL — LOW (ref 11.7–16.1)
HGB BLDA-MCNC: 11.8 G/DL — SIGNIFICANT CHANGE UP (ref 11.7–16.1)
INR BLD: 1.1 — SIGNIFICANT CHANGE UP (ref 0.88–1.16)
LACTATE SERPL-SCNC: 3.2 MMOL/L — HIGH (ref 0.5–2)
MAGNESIUM SERPL-MCNC: 1.8 MG/DL — SIGNIFICANT CHANGE UP (ref 1.6–2.6)
MCHC RBC-ENTMCNC: 28.5 PG — SIGNIFICANT CHANGE UP (ref 27–34)
MCHC RBC-ENTMCNC: 32.5 GM/DL — SIGNIFICANT CHANGE UP (ref 32–36)
MCV RBC AUTO: 87.7 FL — SIGNIFICANT CHANGE UP (ref 80–100)
METHGB MFR BLDA: 0 % — SIGNIFICANT CHANGE UP
METHGB MFR BLDA: 0.3 % — SIGNIFICANT CHANGE UP
METHGB MFR BLDA: 0.5 % — SIGNIFICANT CHANGE UP
NRBC # BLD: 0 /100 WBCS — SIGNIFICANT CHANGE UP (ref 0–0)
OXYHGB MFR BLDA: 96.3 % — HIGH (ref 90–95)
OXYHGB MFR BLDA: 97.1 % — HIGH (ref 90–95)
OXYHGB MFR BLDA: 97.4 % — HIGH (ref 90–95)
PCO2 BLDA: 41 MMHG — HIGH (ref 32–35)
PCO2 BLDA: 42 MMHG — HIGH (ref 32–35)
PCO2 BLDA: 42 MMHG — HIGH (ref 32–35)
PH BLDA: 7.31 — LOW (ref 7.35–7.45)
PH BLDA: 7.34 — LOW (ref 7.35–7.45)
PH BLDA: 7.4 — SIGNIFICANT CHANGE UP (ref 7.35–7.45)
PHOSPHATE SERPL-MCNC: 4.4 MG/DL — SIGNIFICANT CHANGE UP (ref 2.5–4.5)
PLATELET # BLD AUTO: 143 K/UL — LOW (ref 150–400)
PO2 BLDA: 248 MMHG — HIGH (ref 83–108)
PO2 BLDA: 248 MMHG — HIGH (ref 83–108)
PO2 BLDA: 251 MMHG — HIGH (ref 83–108)
POTASSIUM BLDA-SCNC: 3.6 MMOL/L — SIGNIFICANT CHANGE UP (ref 3.5–5.1)
POTASSIUM BLDA-SCNC: 4 MMOL/L — SIGNIFICANT CHANGE UP (ref 3.5–5.1)
POTASSIUM BLDA-SCNC: 4.3 MMOL/L — SIGNIFICANT CHANGE UP (ref 3.5–5.1)
POTASSIUM SERPL-MCNC: 4.2 MMOL/L — SIGNIFICANT CHANGE UP (ref 3.5–5.3)
POTASSIUM SERPL-SCNC: 4.2 MMOL/L — SIGNIFICANT CHANGE UP (ref 3.5–5.3)
PROTHROM AB SERPL-ACNC: 13.1 SEC — SIGNIFICANT CHANGE UP (ref 10.5–13.4)
RBC # BLD: 3.75 M/UL — LOW (ref 3.8–5.2)
RBC # FLD: 14.1 % — SIGNIFICANT CHANGE UP (ref 10.3–14.5)
RH IG SCN BLD-IMP: POSITIVE — SIGNIFICANT CHANGE UP
SAO2 % BLDA: 99.3 % — HIGH (ref 94–98)
SAO2 % BLDA: 99.3 % — HIGH (ref 94–98)
SAO2 % BLDA: 99.4 % — HIGH (ref 94–98)
SODIUM BLDA-SCNC: 133 MMOL/L — LOW (ref 136–145)
SODIUM BLDA-SCNC: 133 MMOL/L — LOW (ref 136–145)
SODIUM BLDA-SCNC: 134 MMOL/L — LOW (ref 136–145)
SODIUM SERPL-SCNC: 139 MMOL/L — SIGNIFICANT CHANGE UP (ref 135–145)
WBC # BLD: 10.99 K/UL — HIGH (ref 3.8–10.5)
WBC # FLD AUTO: 10.99 K/UL — HIGH (ref 3.8–10.5)

## 2022-08-17 PROCEDURE — 22843 INSERT SPINE FIXATION DEVICE: CPT

## 2022-08-17 PROCEDURE — 62350 IMPLANT SPINAL CANAL CATH: CPT

## 2022-08-17 PROCEDURE — 22614 ARTHRD PST TQ 1NTRSPC EA ADD: CPT | Mod: 62

## 2022-08-17 PROCEDURE — 72020 X-RAY EXAM OF SPINE 1 VIEW: CPT | Mod: 26

## 2022-08-17 PROCEDURE — 22610 ARTHRD PST TQ 1NTRSPC THRC: CPT | Mod: 62

## 2022-08-17 PROCEDURE — 61783 SCAN PROC SPINAL: CPT

## 2022-08-17 PROCEDURE — 20939 BONE MARROW ASPIR BONE GRFG: CPT

## 2022-08-17 PROCEDURE — 22843 INSERT SPINE FIXATION DEVICE: CPT | Mod: 80

## 2022-08-17 DEVICE — IMPLANTABLE DEVICE: Type: IMPLANTABLE DEVICE | Status: FUNCTIONAL

## 2022-08-17 DEVICE — DURASEAL: Type: IMPLANTABLE DEVICE | Status: FUNCTIONAL

## 2022-08-17 DEVICE — GRAFT BONE VITOSS BIMODAL 5CC: Type: IMPLANTABLE DEVICE | Status: FUNCTIONAL

## 2022-08-17 DEVICE — SCREW EVEREST POLY 6.5X40MM: Type: IMPLANTABLE DEVICE | Status: FUNCTIONAL

## 2022-08-17 DEVICE — SURGIFLO HEMOSTATIC MATRIX KIT: Type: IMPLANTABLE DEVICE | Status: FUNCTIONAL

## 2022-08-17 DEVICE — SURGIFOAM PAD 8CM X 12.5CM X 10MM (100): Type: IMPLANTABLE DEVICE | Status: FUNCTIONAL

## 2022-08-17 DEVICE — GRAFT BONE INFUSE KIT LG: Type: IMPLANTABLE DEVICE | Status: FUNCTIONAL

## 2022-08-17 DEVICE — SET SCREW EVEREST CUSTOM: Type: IMPLANTABLE DEVICE | Status: FUNCTIONAL

## 2022-08-17 DEVICE — SCREW POLY 6.5X45MM: Type: IMPLANTABLE DEVICE | Status: FUNCTIONAL

## 2022-08-17 DEVICE — GRAFT VITOSIS BIMODAL 10CC: Type: IMPLANTABLE DEVICE | Status: FUNCTIONAL

## 2022-08-17 DEVICE — SCREW EVEREST POLY 5.5X35MM: Type: IMPLANTABLE DEVICE | Status: FUNCTIONAL

## 2022-08-17 DEVICE — SCREW EVEREST POLY 5.5X40MM: Type: IMPLANTABLE DEVICE | Status: FUNCTIONAL

## 2022-08-17 RX ORDER — CEFAZOLIN SODIUM 1 G
2000 VIAL (EA) INJECTION EVERY 8 HOURS
Refills: 0 | Status: COMPLETED | OUTPATIENT
Start: 2022-08-17 | End: 2022-08-18

## 2022-08-17 RX ORDER — CHLORHEXIDINE GLUCONATE 213 G/1000ML
1 SOLUTION TOPICAL ONCE
Refills: 0 | Status: COMPLETED | OUTPATIENT
Start: 2022-08-17 | End: 2022-08-17

## 2022-08-17 RX ORDER — NALOXONE HYDROCHLORIDE 4 MG/.1ML
0.1 SPRAY NASAL
Refills: 0 | Status: DISCONTINUED | OUTPATIENT
Start: 2022-08-17 | End: 2022-08-30

## 2022-08-17 RX ORDER — GLUCAGON INJECTION, SOLUTION 0.5 MG/.1ML
1 INJECTION, SOLUTION SUBCUTANEOUS ONCE
Refills: 0 | Status: DISCONTINUED | OUTPATIENT
Start: 2022-08-17 | End: 2022-08-17

## 2022-08-17 RX ORDER — APREPITANT 80 MG/1
40 CAPSULE ORAL ONCE
Refills: 0 | Status: COMPLETED | OUTPATIENT
Start: 2022-08-17 | End: 2022-08-17

## 2022-08-17 RX ORDER — ACETAMINOPHEN 500 MG
1000 TABLET ORAL ONCE
Refills: 0 | Status: COMPLETED | OUTPATIENT
Start: 2022-08-17 | End: 2022-08-17

## 2022-08-17 RX ORDER — DEXTROSE 50 % IN WATER 50 %
12.5 SYRINGE (ML) INTRAVENOUS ONCE
Refills: 0 | Status: DISCONTINUED | OUTPATIENT
Start: 2022-08-17 | End: 2022-08-25

## 2022-08-17 RX ORDER — CELECOXIB 200 MG/1
200 CAPSULE ORAL ONCE
Refills: 0 | Status: COMPLETED | OUTPATIENT
Start: 2022-08-17 | End: 2022-08-17

## 2022-08-17 RX ORDER — SODIUM CHLORIDE 9 MG/ML
1000 INJECTION, SOLUTION INTRAVENOUS
Refills: 0 | Status: DISCONTINUED | OUTPATIENT
Start: 2022-08-17 | End: 2022-08-17

## 2022-08-17 RX ORDER — ONDANSETRON 8 MG/1
4 TABLET, FILM COATED ORAL EVERY 6 HOURS
Refills: 0 | Status: DISCONTINUED | OUTPATIENT
Start: 2022-08-17 | End: 2022-08-30

## 2022-08-17 RX ORDER — ACETAMINOPHEN 500 MG
1000 TABLET ORAL EVERY 8 HOURS
Refills: 0 | Status: DISCONTINUED | OUTPATIENT
Start: 2022-08-17 | End: 2022-08-23

## 2022-08-17 RX ORDER — ONDANSETRON 8 MG/1
4 TABLET, FILM COATED ORAL EVERY 6 HOURS
Refills: 0 | Status: DISCONTINUED | OUTPATIENT
Start: 2022-08-17 | End: 2022-08-17

## 2022-08-17 RX ORDER — GABAPENTIN 400 MG/1
300 CAPSULE ORAL ONCE
Refills: 0 | Status: COMPLETED | OUTPATIENT
Start: 2022-08-17 | End: 2022-08-17

## 2022-08-17 RX ORDER — DEXAMETHASONE 0.5 MG/5ML
4 ELIXIR ORAL EVERY 6 HOURS
Refills: 0 | Status: COMPLETED | OUTPATIENT
Start: 2022-08-17 | End: 2022-08-20

## 2022-08-17 RX ORDER — ALBUTEROL 90 UG/1
2.5 AEROSOL, METERED ORAL EVERY 6 HOURS
Refills: 0 | Status: DISCONTINUED | OUTPATIENT
Start: 2022-08-17 | End: 2022-08-30

## 2022-08-17 RX ORDER — CHLORHEXIDINE GLUCONATE 213 G/1000ML
1 SOLUTION TOPICAL DAILY
Refills: 0 | Status: DISCONTINUED | OUTPATIENT
Start: 2022-08-17 | End: 2022-08-18

## 2022-08-17 RX ORDER — METHADONE HYDROCHLORIDE 40 MG/1
18 TABLET ORAL ONCE
Refills: 0 | Status: DISCONTINUED | OUTPATIENT
Start: 2022-08-17 | End: 2022-08-17

## 2022-08-17 RX ORDER — GABAPENTIN 400 MG/1
600 CAPSULE ORAL THREE TIMES A DAY
Refills: 0 | Status: DISCONTINUED | OUTPATIENT
Start: 2022-08-17 | End: 2022-08-17

## 2022-08-17 RX ORDER — SODIUM CHLORIDE 9 MG/ML
1000 INJECTION INTRAMUSCULAR; INTRAVENOUS; SUBCUTANEOUS
Refills: 0 | Status: DISCONTINUED | OUTPATIENT
Start: 2022-08-17 | End: 2022-08-18

## 2022-08-17 RX ORDER — DEXTROSE 50 % IN WATER 50 %
25 SYRINGE (ML) INTRAVENOUS ONCE
Refills: 0 | Status: DISCONTINUED | OUTPATIENT
Start: 2022-08-17 | End: 2022-08-17

## 2022-08-17 RX ORDER — CHOLECALCIFEROL (VITAMIN D3) 125 MCG
2000 CAPSULE ORAL DAILY
Refills: 0 | Status: DISCONTINUED | OUTPATIENT
Start: 2022-08-17 | End: 2022-08-30

## 2022-08-17 RX ORDER — SENNA PLUS 8.6 MG/1
2 TABLET ORAL AT BEDTIME
Refills: 0 | Status: DISCONTINUED | OUTPATIENT
Start: 2022-08-17 | End: 2022-08-30

## 2022-08-17 RX ORDER — GABAPENTIN 400 MG/1
600 CAPSULE ORAL THREE TIMES A DAY
Refills: 0 | Status: DISCONTINUED | OUTPATIENT
Start: 2022-08-17 | End: 2022-08-30

## 2022-08-17 RX ORDER — DEXTROSE 50 % IN WATER 50 %
15 SYRINGE (ML) INTRAVENOUS ONCE
Refills: 0 | Status: DISCONTINUED | OUTPATIENT
Start: 2022-08-17 | End: 2022-08-17

## 2022-08-17 RX ORDER — PANTOPRAZOLE SODIUM 20 MG/1
40 TABLET, DELAYED RELEASE ORAL
Refills: 0 | Status: DISCONTINUED | OUTPATIENT
Start: 2022-08-17 | End: 2022-08-30

## 2022-08-17 RX ORDER — SIMVASTATIN 20 MG/1
40 TABLET, FILM COATED ORAL AT BEDTIME
Refills: 0 | Status: DISCONTINUED | OUTPATIENT
Start: 2022-08-17 | End: 2022-08-30

## 2022-08-17 RX ORDER — DIAZEPAM 5 MG
5 TABLET ORAL EVERY 8 HOURS
Refills: 0 | Status: DISCONTINUED | OUTPATIENT
Start: 2022-08-17 | End: 2022-08-21

## 2022-08-17 RX ORDER — DULOXETINE HYDROCHLORIDE 30 MG/1
60 CAPSULE, DELAYED RELEASE ORAL DAILY
Refills: 0 | Status: DISCONTINUED | OUTPATIENT
Start: 2022-08-17 | End: 2022-08-30

## 2022-08-17 RX ORDER — HYDROMORPHONE HYDROCHLORIDE 2 MG/ML
30 INJECTION INTRAMUSCULAR; INTRAVENOUS; SUBCUTANEOUS
Refills: 0 | Status: DISCONTINUED | OUTPATIENT
Start: 2022-08-17 | End: 2022-08-20

## 2022-08-17 RX ORDER — NALOXEGOL OXALATE 12.5 MG/1
25 TABLET, FILM COATED ORAL DAILY
Refills: 0 | Status: DISCONTINUED | OUTPATIENT
Start: 2022-08-17 | End: 2022-08-30

## 2022-08-17 RX ORDER — DEXTROSE 50 % IN WATER 50 %
25 SYRINGE (ML) INTRAVENOUS ONCE
Refills: 0 | Status: DISCONTINUED | OUTPATIENT
Start: 2022-08-17 | End: 2022-08-25

## 2022-08-17 RX ORDER — NOREPINEPHRINE BITARTRATE/D5W 8 MG/250ML
0.05 PLASTIC BAG, INJECTION (ML) INTRAVENOUS
Qty: 8 | Refills: 0 | Status: DISCONTINUED | OUTPATIENT
Start: 2022-08-17 | End: 2022-08-17

## 2022-08-17 RX ORDER — DIPHENHYDRAMINE HCL 50 MG
25 CAPSULE ORAL EVERY 6 HOURS
Refills: 0 | Status: DISCONTINUED | OUTPATIENT
Start: 2022-08-17 | End: 2022-08-30

## 2022-08-17 RX ORDER — BUPROPION HYDROCHLORIDE 150 MG/1
150 TABLET, EXTENDED RELEASE ORAL DAILY
Refills: 0 | Status: DISCONTINUED | OUTPATIENT
Start: 2022-08-18 | End: 2022-08-30

## 2022-08-17 RX ORDER — ACETAMINOPHEN 500 MG
650 TABLET ORAL EVERY 6 HOURS
Refills: 0 | Status: DISCONTINUED | OUTPATIENT
Start: 2022-08-17 | End: 2022-08-17

## 2022-08-17 RX ORDER — OXYCODONE HYDROCHLORIDE 5 MG/1
1 TABLET ORAL
Qty: 0 | Refills: 0 | DISCHARGE

## 2022-08-17 RX ORDER — POLYETHYLENE GLYCOL 3350 17 G/17G
17 POWDER, FOR SOLUTION ORAL DAILY
Refills: 0 | Status: DISCONTINUED | OUTPATIENT
Start: 2022-08-17 | End: 2022-08-21

## 2022-08-17 RX ORDER — METOPROLOL TARTRATE 50 MG
50 TABLET ORAL
Refills: 0 | Status: DISCONTINUED | OUTPATIENT
Start: 2022-08-17 | End: 2022-08-30

## 2022-08-17 RX ORDER — SODIUM CHLORIDE 9 MG/ML
1000 INJECTION INTRAMUSCULAR; INTRAVENOUS; SUBCUTANEOUS ONCE
Refills: 0 | Status: COMPLETED | OUTPATIENT
Start: 2022-08-17 | End: 2022-08-17

## 2022-08-17 RX ORDER — NITROFURANTOIN MACROCRYSTAL 50 MG
50 CAPSULE ORAL
Refills: 0 | Status: DISCONTINUED | OUTPATIENT
Start: 2022-08-17 | End: 2022-08-18

## 2022-08-17 RX ORDER — INSULIN LISPRO 100/ML
VIAL (ML) SUBCUTANEOUS
Refills: 0 | Status: DISCONTINUED | OUTPATIENT
Start: 2022-08-17 | End: 2022-08-30

## 2022-08-17 RX ADMIN — CHLORHEXIDINE GLUCONATE 1 APPLICATION(S): 213 SOLUTION TOPICAL at 07:08

## 2022-08-17 RX ADMIN — Medication 2: at 23:38

## 2022-08-17 RX ADMIN — Medication 1 APPLICATION(S): at 07:09

## 2022-08-17 RX ADMIN — CELECOXIB 200 MILLIGRAM(S): 200 CAPSULE ORAL at 07:06

## 2022-08-17 RX ADMIN — Medication 400 MILLIGRAM(S): at 23:18

## 2022-08-17 RX ADMIN — SODIUM CHLORIDE 1000 MILLILITER(S): 9 INJECTION INTRAMUSCULAR; INTRAVENOUS; SUBCUTANEOUS at 19:26

## 2022-08-17 RX ADMIN — Medication 100 MILLIGRAM(S): at 23:08

## 2022-08-17 RX ADMIN — Medication 1000 MILLIGRAM(S): at 07:09

## 2022-08-17 RX ADMIN — APREPITANT 40 MILLIGRAM(S): 80 CAPSULE ORAL at 07:06

## 2022-08-17 RX ADMIN — Medication 1000 MILLIGRAM(S): at 23:18

## 2022-08-17 RX ADMIN — Medication 4 MILLIGRAM(S): at 23:18

## 2022-08-17 RX ADMIN — Medication 1000 MILLIGRAM(S): at 07:06

## 2022-08-17 RX ADMIN — CELECOXIB 200 MILLIGRAM(S): 200 CAPSULE ORAL at 07:09

## 2022-08-17 NOTE — H&P ADULT - NSHPLABSRESULTS_GEN_ALL_CORE
< from: CT Lumbar Spine No Cont (05.24.22 @ 22:28) >    Status post anterior cervical discectomy and fusion at C2-C3 with ventral   plate and screw fixation and interbody cage device. There is no evidence   of hardware fracture. There is no evidence of lucency surrounding the   hardware.    Status post decompressive laminectomy and posterior fusion C2-T6. There   are bilateral lateral mass screws C2-C5, left lateral mass screw at C6,   and bilateral transpedicular screws T1-T6. There is lucency surrounding   the right C2 lateral massscrew (series 2 image 23). The left C2 lateral   mass screw extends along the lateral aspect of the spinal canal. There is   lucency surrounding the bilateral T5 and T6 screws consistent with   hardware loosening.    Status posterior fusion T9 through the pelvis with decompressive   laminectomies at these levels.. There are bilateral transpedicular screws   and vertical rods T9-T12 and L2-L5. There is a left S1 screw and   bilateral sacroiliac screws. There are abandoned screws in the right   sacrum. There is lucency surrounding the bilateral T9-T11 transpedicular   screws and the left T12 transpedicular screw compatible with loosening.   There is increased sclerosis of the T8-T11 vertebral bodies compared to   prior CT chest with increase lucency in the endplates of T8-T9 and   T9-T10. These findings are suspicious for osteolysis in the setting of   hardware loosening. The left T9 transpedicular screw extends superiorly   into the T8-T9 disc space with increased superior displacement compared   to prior CT chest.    Status post discectomy with anterior fusion L5-S1.    Intrathecal pain pump device is noted. The leads enter the spinal canal   at the L4 level and extend cranially to the T8-T9 level. The visualized   portions of the leads are intact without evidence of fracture.    < end of copied text >    < from: CT Lumbar Spine No Cont (05.24.22 @ 22:28) >    MPRESSION:    Posterior fusion C2-T6 with lucency involving the T5 and T6 screws   compatible with hardware loosening.    Posterior fusion T9 through the pelvis. Hardware loosening and osteolysis   involving the T9-T11 screws bilaterally with increased sclerosis in the   vertebral bodies T8-T10 and erosive changes at the endplates of T8-9 and   T9-10 compared to prior CT chest 1/5/2021. Lucency surrounding the left   T12 transpedicular screw suspicious for loosening.    < end of copied text >

## 2022-08-17 NOTE — H&P ADULT - NSICDXPASTSURGICALHX_GEN_ALL_CORE_FT
PAST SURGICAL HISTORY:  H/O breast surgery left breast implant 1980's    H/O Spinal surgery lumbar x 30    H/O total shoulder replacement, right     S/P cervical discectomy x4

## 2022-08-17 NOTE — PROGRESS NOTE ADULT - SUBJECTIVE AND OBJECTIVE BOX
H&P:  "68 y/o female with PMHx HTN, HLD, CVA x 3 (last was 2016 with right hand weakness residual), GAMALIEL not using CPAP, Emphysema, ex-25 pack year smoker quit 2021, Chronic Constipation on Movantik, Urinary Incontinence chronically self straight cath at home, chronic UTI, Breast Implant Rupture with Chronic Leak, B/L CTS s/p release, s/p Intrathecal Morphine pump for pain, with chronic neck and back pain worsening for years s/p multiple spinal surgeries including laminectomies and fusion of cervical, thoracic and lumbar spine, most recently done in 2019 and 2020 at [facility] by Dr. Trinidad.  She is wheelchair dependent.  She also has history of right shoulder replacement.  She reports bilateral shoulder pain, bilateral leg pain and burning to the bilateral calves.  She reports right hand greater than arm and leg weakness."    68 yo F with history of HTN, HLD, prior strokes with residual RUE weakness, GAMALIEL noncompliant with CPAP, COPD, prior extensive smoking history, chronic constipation, urinary incontinence, chronic UTI, ruptured breast implant, bilateral CTS release surgery, intrathecal morphine pump and chronic pain s/p multiple spinal surgeries who is now s/p T3 - L1 posterior fusion (EBL 500cc, 1L UOP, 4200 crystalloid, 1u PRBC) and admitted to NSICU.  She remains intubated with progressive improvement in level of arousal    Drug Dosing Weight  Height (cm): 167.6 (17 Aug 2022 07:46)  Weight (kg): 88.1 (17 Aug 2022 07:46)  BMI (kg/m2): 31.4 (17 Aug 2022 07:46)  BSA (m2): 1.98 (17 Aug 2022 07:46)    PAST MEDICAL & SURGICAL HISTORY:  HTN (hypertension)  SS (spinal stenosis)  High cholesterol  Strokex3  H/O carpal tunnel syndrome Bilateral  Chronic GERD  History of chronic constipation  Urinary incontinence  self cath as needed  Pre-diabetes  Anxiety and depression  Eczema  H/O kyphosis  Pancreas cyst  Scoliosis  Wheelchair dependent  Cervical pseudoarthrosis  and lumbar spine  Cervical spondylosis  Chronic headaches  Degenerative joint disease  left shoulder  Lumbosacral spondylosis  COPD (chronic obstructive pulmonary disease)  H/O Spinal surgery lumbar x 30  H/O breast surgery  left breast implant 1980&#x27;s  S/P cervical discectomy  x4  H/O total shoulder replacement, right      REVIEW OF SYSTEMS: [ ] Unable to Assess due to neurologic exam   [x] All ROS addressed below are non-contributory, except:  Neuro: [ ] Headache [ ] Back pain [ ] Numbness [ ] Weakness [ ] Ataxia [ ] Dizziness [ ] Aphasia [ ] Dysarthria [ ] Visual disturbance  Resp: [ ] Shortness of breath/dyspnea, [ ] Orthopnea [ ] Cough  CV: [ ] Chest pain [ ] Palpitation [ ] Lightheadedness [ ] Syncope  Renal: [ ] Thirst [ ] Edema  GI: [ ] Nausea [ ] Emesis [ ] Abdominal pain [ ] Constipation [ ] Diarrhea  Hem: [ ] Hematemesis [ ] bright red blood per rectum  ID: [ ] Fever [ ] Chills [ ] Dysuria  ENT: [ ] Rhinorrhea    PHYSICAL EXAM:    General: No Acute Distress   Neurological: awake, refuses examination, wiggles toes, moves UE b/l AG purposeful  Pulmonary: Clear to Auscultation, No Rales, No Rhonchi, No Wheezes   Cardiovascular: S1, S2, Regular Rate and Rhythm   Gastrointestinal: Soft, Nontender, Nondistended   Extremities: No calf tenderness       ICU Vital Signs Last 24 Hrs  T(C): 36.8 (18 Aug 2022 01:50), Max: 36.8 (17 Aug 2022 22:05)  T(F): 98.2 (18 Aug 2022 01:50), Max: 98.2 (17 Aug 2022 22:05)  HR: 69 (18 Aug 2022 01:00) (64 - 84)  BP: 125/64 (17 Aug 2022 19:30) (87/51 - 141/85)  BP(mean): 89 (17 Aug 2022 19:30) (63 - 108)  ABP: 115/100 (18 Aug 2022 01:00) (79/62 - 118/76)  ABP(mean): 108 (18 Aug 2022 01:00) (70 - 108)  RR: 16 (18 Aug 2022 01:00) (14 - 18)  SpO2: 100% (18 Aug 2022 01:00) (98% - 100%)      08-17-22 @ 07:01  -  08-18-22 @ 01:51  --------------------------------------------------------  IN: 1603.4 mL / OUT: 720 mL / NET: 883.4 mL    Mode: SIMV with PS, RR (machine): 8, TV (machine): 450, FiO2: 100, PEEP: 5, PS: 10, ITime: 0.1, MAP: 8, PIP: 14    acetaminophen     Tablet .. 1000 milliGRAM(s) Oral every 8 hours  ALBUTerol   0.042% 2.5 milliGRAM(s) Nebulizer every 6 hours PRN  bisacodyl 5 milliGRAM(s) Oral every 12 hours  buPROPion XL (24-Hour) . 150 milliGRAM(s) Oral daily  ceFAZolin   IVPB 2000 milliGRAM(s) IV Intermittent every 8 hours  chlorhexidine 2% Cloths 1 Application(s) Topical daily  cholecalciferol 2000 Unit(s) Oral daily  dexAMETHasone  Injectable 4 milliGRAM(s) IV Push every 6 hours  dextrose 50% Injectable 25 Gram(s) IV Push once  dextrose 50% Injectable 12.5 Gram(s) IV Push once  diazepam    Tablet 5 milliGRAM(s) Oral every 8 hours  diphenhydrAMINE 25 milliGRAM(s) Oral every 6 hours PRN  DULoxetine 60 milliGRAM(s) Oral daily  gabapentin 600 milliGRAM(s) Oral three times a day  HYDROmorphone PCA (1 mG/mL) 30 milliLiter(s) PCA Continuous PCA Continuous  insulin lispro (ADMELOG) corrective regimen sliding scale   SubCutaneous Before meals and at bedtime  metoprolol tartrate 50 milliGRAM(s) Oral two times a day  naloxegol 25 milliGRAM(s) Oral daily  naloxone Injectable 0.1 milliGRAM(s) IV Push every 3 minutes PRN  nitrofurantoin monohydrate/macrocrystals (MACROBID) 50 milliGRAM(s) Oral <User Schedule>  ondansetron Injectable 4 milliGRAM(s) IV Push every 6 hours PRN  pantoprazole    Tablet 40 milliGRAM(s) Oral before breakfast  polyethylene glycol 3350 17 Gram(s) Oral daily  senna 2 Tablet(s) Oral at bedtime  simvastatin 40 milliGRAM(s) Oral at bedtime  sodium chloride 0.9%. 1000 milliLiter(s) (75 mL/Hr) IV Continuous <Continuous>      LABS:  Na: 139 (08-17 @ 18:08)  K: 4.2 (08-17 @ 18:08)  Cl: 109 (08-17 @ 18:08)  CO2: 19 (08-17 @ 18:08)  BUN: 9 (08-17 @ 18:08)  Cr: 0.56 (08-17 @ 18:08)  Glu: 202(08-17 @ 18:08)    Hgb: 10.7 (08-17 @ 18:08)  Hct: 32.9 (08-17 @ 18:08)  WBC: 10.99 (08-17 @ 18:08)  Plt: 143 (08-17 @ 18:08)    INR: 1.10 08-17-22 @ 18:26  PTT: 24.1 08-17-22 @ 18:26    ABG - ( 17 Aug 2022 16:47 )  pH, Arterial: 7.31  pH, Blood: x     /  pCO2: 42    /  pO2: 251   / HCO3: 21    / Base Excess: -4.9  /  SaO2: x

## 2022-08-17 NOTE — BRIEF OPERATIVE NOTE - OPERATION/FINDINGS
T3-L1 posterior fusion Revision of T3-L1 posterior fusion. Primary repair of durotomy with gore-reginaldo suture and fat graft.  Revision of intrathecal catheter tubing for morphine pump.  (Pump interrogated at end of procedure, correct settings confirmed by [employer] rep.)

## 2022-08-17 NOTE — H&P ADULT - ASSESSMENT
68 y/o female with PMHx HTN, HLD, CVA x 3 (last was 2016 with right hand weakness residual), GAMALIEL not using CPAP, Emphysema, ex-25 pack year smoker quit 2021, Chronic Constipation on Movantik, Urinary Incontinence chronically self straight cath at home, chronic UTI, Breast Implant Rupture with Chronic Leak, B/L CTS s/p release, s/p Intrathecal Morphine pump for pain, with chronic neck and back pain worsening for years s/p multiple spinal surgeries including laminectomies and fusion of cervical, thoracic and lumbar spine, most recently done in 2019 and 2020 at [facility] by Dr. Trinidad. CT findings include hardware loosening at T5, T6, T9-T12, for revision of fusion T1-L1 today.    - NPO  - 3M  - Perioperative enhanced antibiotics   - SCDs  - anticipate NCC ICU postop for neuromonitoring and pain control  - Dr. Gurwinder Mclaughlin for pain consult, recs for pre/intra/postop obtained, communicated with anesthesia, and appreciated  - continue home meds  - continue intermittent straight cath once jiménez d/c tomorrow  - patient once again recommended to see plastic surgery as outpatient for chronic breast implant rupture, risk of YEHUDA-ALCL if textured implants.  Patient unsure of implant type and states she was recommended to see plastic surgeon but refused to see for consult due to financial reasons.  Consider breast referral if patient refusing plastics.    All above d/w Dr. Luz who formed above plan.

## 2022-08-17 NOTE — H&P ADULT - NSICDXPASTMEDICALHX_GEN_ALL_CORE_FT
PAST MEDICAL HISTORY:  Anxiety and depression     Cervical pseudoarthrosis and lumbar spine    Cervical spondylosis     Chronic GERD     Chronic headaches     COPD (chronic obstructive pulmonary disease)     Degenerative joint disease left shoulder    Eczema     H/O carpal tunnel syndrome Bilateral    H/O kyphosis     High cholesterol     History of chronic constipation     HTN (hypertension)     Lumbosacral spondylosis     Pancreas cyst     Pre-diabetes     Scoliosis     SS (spinal stenosis)     Stroke x3    Urinary incontinence self cath as needed    Wheelchair dependent

## 2022-08-17 NOTE — H&P ADULT - NSHPPHYSICALEXAM_GEN_ALL_CORE
Constitutional:  68 y/o female awake, alert in no acute distress.  Eyes:  Sclera anicteric, conjunctiva noninjected.  ENMT: Oropharyngeal mucosa moist, pink. Tongue midline.    Respiratory: Clear to auscultation bilaterally.  No rales, rhonchi, wheezes.  Cardiovascular: Regular rate and rhythm.  S1, S2 heard.  Gastrointestinal:  Soft, nontender, nondistended.  +BS.  Genitourinary:  Deferred.  Rectal: Deferred.  Vascular: Extremities warm, no ulcers, no discoloration of skin.   Neurological: Gen: AA&O x 3, conversant, appropriate.      CN II-XII grossly intact.    Motor: CHANDLER x 4, 4+/5 throughout L UE/LE.  Right UE/LE 4/5.    Sens: Sensation intact to light touch throughout, but hyperesthesia bilateral medial calves.    DTRs: 2+ symmetric throughout.    Plantar downgoing bilaterally.  No clonus.      No pronator drift, no dysmetria.  Skin: Warm, dry, no erythema.

## 2022-08-17 NOTE — H&P ADULT - HISTORY OF PRESENT ILLNESS
68 y/o female with PMHx HTN, HLD, CVA x 3 (last was 2016 with right hand weakness residual), GAMALIEL not using CPAP, Emphysema, ex-25 pack year smoker quit 2021, Chronic Constipation on Movantik, Urinary Incontinence chronically self straight cath at home, chronic UTI, Breast Implant Rupture with Chronic Leak, B/L CTS s/p release, s/p Intrathecal Morphine pump for pain, with chronic neck and back pain worsening for years s/p multiple spinal surgeries including laminectomies and fusion of cervical, thoracic and lumbar spine, most recently done in 2019 and 2020 at [facility] by Dr. Trinidad.  She is wheelchair dependent.  She also has history of right shoulder replacement.  She reports bilateral shoulder pain, bilateral leg pain and burning to the bilateral calves.  She reports right hand greater than arm and leg weakness.   ***********************************************  ADULT NSICU H&P  VANNA DIAZ [id number] [facility] [id number]  ***********************************************  H&P:  "68 y/o female with PMHx HTN, HLD, CVA x 3 (last was 2016 with right hand weakness residual), GAMALIEL not using CPAP, Emphysema, ex-25 pack year smoker quit 2021, Chronic Constipation on Movantik, Urinary Incontinence chronically self straight cath at home, chronic UTI, Breast Implant Rupture with Chronic Leak, B/L CTS s/p release, s/p Intrathecal Morphine pump for pain, with chronic neck and back pain worsening for years s/p multiple spinal surgeries including laminectomies and fusion of cervical, thoracic and lumbar spine, most recently done in 2019 and 2020 at [facility] by Dr. Trinidad.  She is wheelchair dependent.  She also has history of right shoulder replacement.  She reports bilateral shoulder pain, bilateral leg pain and burning to the bilateral calves.  She reports right hand greater than arm and leg weakness."    68 yo F with history of HTN, HLD, prior strokes with residual RUE weakness, GAMALIEL noncompliant with CPAP, COPD, prior extensive smoking history, chronic constipation, urinary incontinence, chronic UTI, ruptured breast implant, bilateral CTS release surgery, intrathecal morphine pump and chronic pain s/p multiple spinal surgeries who is now s/p T3 - L1 posterior fusion (EBL 500cc, 1L UOP, 4200 crystalloid, 1u PRBC) and admitted to NSICU.  She remains intubated with progressive improvement in level of arousal    VITALS:    ICU Vital Signs Last 24 Hrs  T(C): 35.2 (17 Aug 2022 18:03), Max: 35.2 (17 Aug 2022 18:03)  T(F): 95.4 (17 Aug 2022 18:03), Max: 95.4 (17 Aug 2022 18:03)  HR: --  BP: --  BP(mean): --  ABP: --  ABP(mean): --  RR: --  SpO2: --            I&O's Summary      EXAM:     Hiren Coma Scale: 2/1T/6 = 7  Deferred full examination at this time due to hemodynamic instability  Intubated on pressure support and breathing spontaneously  Opens eyes to noxious stimuli  Gaze conjugate  PERRL (NPi > 3 bilaterally)  No gaze preference  Spontaneously moving BL UE/LE    LABORATORY DATA:    ABG - ( 17 Aug 2022 16:47 )  pH, Arterial: 7.31  pH, Blood: x     /  pCO2: 42    /  pO2: 251   / HCO3: 21    / Base Excess: -4.9  /  SaO2: x                                       10.7   10.99 )-----------( 143      ( 17 Aug 2022 18:08 )             32.9             MEDICATIONS  (STANDING):  acetaminophen     Tablet .. 1000 milliGRAM(s) Oral every 8 hours  bisacodyl 5 milliGRAM(s) Oral every 12 hours  ceFAZolin   IVPB 2000 milliGRAM(s) IV Intermittent every 8 hours  chlorhexidine 2% Cloths 1 Application(s) Topical daily  cholecalciferol 2000 Unit(s) Oral daily  dexAMETHasone  Injectable 4 milliGRAM(s) IV Push every 6 hours  dextrose 5%. 1000 milliLiter(s) (50 mL/Hr) IV Continuous <Continuous>  dextrose 5%. 1000 milliLiter(s) (100 mL/Hr) IV Continuous <Continuous>  dextrose 50% Injectable 25 Gram(s) IV Push once  dextrose 50% Injectable 12.5 Gram(s) IV Push once  dextrose 50% Injectable 25 Gram(s) IV Push once  diazepam    Tablet 5 milliGRAM(s) Oral every 8 hours  DULoxetine 60 milliGRAM(s) Oral daily  gabapentin 600 milliGRAM(s) Oral three times a day  gabapentin 600 milliGRAM(s) Oral three times a day  glucagon  Injectable 1 milliGRAM(s) IntraMuscular once  HYDROmorphone PCA (1 mG/mL) 30 milliLiter(s) PCA Continuous PCA Continuous  insulin lispro (ADMELOG) corrective regimen sliding scale   SubCutaneous three times a day before meals  metoprolol tartrate 50 milliGRAM(s) Oral two times a day  naloxegol 25 milliGRAM(s) Oral daily  nitrofurantoin monohydrate/macrocrystals (MACROBID) 50 milliGRAM(s) Oral <User Schedule>  norepinephrine Infusion 0.05 MICROgram(s)/kG/Min (8.26 mL/Hr) IV Continuous <Continuous>  ondansetron   Disintegrating Tablet 4 milliGRAM(s) Oral every 6 hours  pantoprazole    Tablet 40 milliGRAM(s) Oral before breakfast  polyethylene glycol 3350 17 Gram(s) Oral daily  senna 2 Tablet(s) Oral at bedtime  simvastatin 40 milliGRAM(s) Oral at bedtime  sodium chloride 0.9% Bolus 1000 milliLiter(s) IV Bolus once  sodium chloride 0.9%. 1000 milliLiter(s) (75 mL/Hr) IV Continuous <Continuous>    MEDICATIONS  (PRN):  acetaminophen     Tablet .. 650 milliGRAM(s) Oral every 6 hours PRN Temp greater or equal to 38C (100.4F), Mild Pain (1 - 3)  ALBUTerol   0.042% 2.5 milliGRAM(s) Nebulizer every 6 hours PRN Shortness of Breath and/or Wheezing  dextrose Oral Gel 15 Gram(s) Oral once PRN Blood Glucose LESS THAN 70 milliGRAM(s)/deciliter  diphenhydrAMINE 25 milliGRAM(s) Oral every 6 hours PRN Rash and/or Itching  naloxone Injectable 0.1 milliGRAM(s) IV Push every 3 minutes PRN For ANY of the following changes in patient status:  A. RR LESS THAN 10 breaths per minute, B. Oxygen saturation LESS THAN 90%, C. Sedation score of 6  ondansetron Injectable 4 milliGRAM(s) IV Push every 6 hours PRN Nausea        ***********************************************  ASSESSMENT AND PLAN  ***********************************************    This is a case of a T3 - L1 posterior fusion (EBL 500cc, 1L UOP, 4200 crystalloid, 1u PRBC) in a 68 yo F with history of HTN, HLD, prior strokes with residual RUE weakness, GAMALIEL noncompliant with CPAP, COPD, prior extensive smoking history, chronic constipation, urinary incontinence, chronic UTI, ruptured breast implant, bilateral CTS release surgery, intrathecal morphine pump.    NEURO  #S/p T3 - L1 posterior fusion, Dr. Luz, POD#0  - Admit NSICU, Q1h neuro checks / Q1h vital signs  - Acute pain consultation, dilaudid PCA  - Home medications    PULM  - Currently on pressure support passing SBT; consider extubation when patient awake and following commands  - SpO2 goal > 92%, supplemental O2 and pulm toileting as needed    CARDIO  - BP goal: MAP > 85 pending full examination, Levophed     GI  - Diet: NPO for now anticipating extubation  - Stress ulcer prophylaxis: PPI unless extubated    /RENAL  - Monitor UOP/volume status, BUN/SCr    HEME  - Maintain Hb > 7.0, PLT > 100,000K  - SCDs, holding chemoppx    ID  - Monitor for infectious s/s, fever curve, leukocytosis  - Periop ancef    ENDO  #History of pre-diabetes  - RASS      08-17-22 @ 18:42

## 2022-08-17 NOTE — PROGRESS NOTE ADULT - SUBJECTIVE AND OBJECTIVE BOX
NEUROSURGERY POST OP NOTE:    POD# 0 S/P    S:       T(C): 36.8 (08-17-22 @ 22:05), Max: 36.8 (08-17-22 @ 22:05)  HR: 65 (08-17-22 @ 22:00) (65 - 84)  BP: 125/64 (08-17-22 @ 19:30) (87/51 - 141/85)  RR: 18 (08-17-22 @ 22:00) (14 - 18)  SpO2: 100% (08-17-22 @ 22:00) (98% - 100%)      08-17-22 @ 07:01  -  08-17-22 @ 23:07  --------------------------------------------------------  IN: 1153.4 mL / OUT: 125 mL / NET: 1028.4 mL        acetaminophen     Tablet .. 1000 milliGRAM(s) Oral every 8 hours  ALBUTerol   0.042% 2.5 milliGRAM(s) Nebulizer every 6 hours PRN  bisacodyl 5 milliGRAM(s) Oral every 12 hours  ceFAZolin   IVPB 2000 milliGRAM(s) IV Intermittent every 8 hours  chlorhexidine 2% Cloths 1 Application(s) Topical daily  cholecalciferol 2000 Unit(s) Oral daily  dexAMETHasone  Injectable 4 milliGRAM(s) IV Push every 6 hours  dextrose 50% Injectable 25 Gram(s) IV Push once  dextrose 50% Injectable 12.5 Gram(s) IV Push once  diazepam    Tablet 5 milliGRAM(s) Oral every 8 hours  diphenhydrAMINE 25 milliGRAM(s) Oral every 6 hours PRN  DULoxetine 60 milliGRAM(s) Oral daily  gabapentin 600 milliGRAM(s) Oral three times a day  HYDROmorphone PCA (1 mG/mL) 30 milliLiter(s) PCA Continuous PCA Continuous  insulin lispro (ADMELOG) corrective regimen sliding scale   SubCutaneous Before meals and at bedtime  metoprolol tartrate 50 milliGRAM(s) Oral two times a day  naloxegol 25 milliGRAM(s) Oral daily  naloxone Injectable 0.1 milliGRAM(s) IV Push every 3 minutes PRN  nitrofurantoin monohydrate/macrocrystals (MACROBID) 50 milliGRAM(s) Oral <User Schedule>  norepinephrine Infusion 0.05 MICROgram(s)/kG/Min IV Continuous <Continuous>  ondansetron Injectable 4 milliGRAM(s) IV Push every 6 hours PRN  pantoprazole    Tablet 40 milliGRAM(s) Oral before breakfast  polyethylene glycol 3350 17 Gram(s) Oral daily  senna 2 Tablet(s) Oral at bedtime  simvastatin 40 milliGRAM(s) Oral at bedtime  sodium chloride 0.9%. 1000 milliLiter(s) IV Continuous <Continuous>      RADIOLOGY:     Exam:    WOUND/DRAINS:    DEVICES:       Assessment: 67yFemale      Plan:       NEUROSURGERY POST OP NOTE:    POD# 0 S/P T3-L1 fusion    S: Patient appears lethargic, constantly needs arousal to maintain conversation. will hold off on PCA pump until patient is less lethargic. Able to OE spontaneously, refuses to answer orientation questions due to agitation, follow commands in all extremities, lifts UE antigravity distally, wiggles toes to commands, pain limited exam.       T(C): 36.8 (08-17-22 @ 22:05), Max: 36.8 (08-17-22 @ 22:05)  HR: 65 (08-17-22 @ 22:00) (65 - 84)  BP: 125/64 (08-17-22 @ 19:30) (87/51 - 141/85)  RR: 18 (08-17-22 @ 22:00) (14 - 18)  SpO2: 100% (08-17-22 @ 22:00) (98% - 100%)      08-17-22 @ 07:01  -  08-17-22 @ 23:07  --------------------------------------------------------  IN: 1153.4 mL / OUT: 125 mL / NET: 1028.4 mL        acetaminophen     Tablet .. 1000 milliGRAM(s) Oral every 8 hours  ALBUTerol   0.042% 2.5 milliGRAM(s) Nebulizer every 6 hours PRN  bisacodyl 5 milliGRAM(s) Oral every 12 hours  ceFAZolin   IVPB 2000 milliGRAM(s) IV Intermittent every 8 hours  chlorhexidine 2% Cloths 1 Application(s) Topical daily  cholecalciferol 2000 Unit(s) Oral daily  dexAMETHasone  Injectable 4 milliGRAM(s) IV Push every 6 hours  dextrose 50% Injectable 25 Gram(s) IV Push once  dextrose 50% Injectable 12.5 Gram(s) IV Push once  diazepam    Tablet 5 milliGRAM(s) Oral every 8 hours  diphenhydrAMINE 25 milliGRAM(s) Oral every 6 hours PRN  DULoxetine 60 milliGRAM(s) Oral daily  gabapentin 600 milliGRAM(s) Oral three times a day  HYDROmorphone PCA (1 mG/mL) 30 milliLiter(s) PCA Continuous PCA Continuous  insulin lispro (ADMELOG) corrective regimen sliding scale   SubCutaneous Before meals and at bedtime  metoprolol tartrate 50 milliGRAM(s) Oral two times a day  naloxegol 25 milliGRAM(s) Oral daily  naloxone Injectable 0.1 milliGRAM(s) IV Push every 3 minutes PRN  nitrofurantoin monohydrate/macrocrystals (MACROBID) 50 milliGRAM(s) Oral <User Schedule>  norepinephrine Infusion 0.05 MICROgram(s)/kG/Min IV Continuous <Continuous>  ondansetron Injectable 4 milliGRAM(s) IV Push every 6 hours PRN  pantoprazole    Tablet 40 milliGRAM(s) Oral before breakfast  polyethylene glycol 3350 17 Gram(s) Oral daily  senna 2 Tablet(s) Oral at bedtime  simvastatin 40 milliGRAM(s) Oral at bedtime  sodium chloride 0.9%. 1000 milliLiter(s) IV Continuous <Continuous>      RADIOLOGY:   < from: Xray Spine 1 View, Bedside (08.17.22 @ 14:41) >  IMPRESSION: Patient status spinal fusion revision now extending from C2   through the pelvis.          Exam:  General: NAD, pt is comfortably sitting up in bed, on room air  HEENT: EOMI b/l, face symmetric, tongue midline, neck FROM  Cardiovascular: Regular rate and rhythm  Respiratory: nonlabored breathing, normal chest rise  GI: abdomen soft, nontender, nondistended  Neuro: CN II-XII grossly intact, PERRL 3mm briskly reactive   CHANDLER x4 spontaneously, lifts UE antigrav distally, wiggles toes to commands  Extremities: distal pulses 2+ x4  Wound/incision: unable to view spine incision, pain limited  Drain: 4 deep HMVs        Assessment:   68 y/o female with PMHx HTN, HLD, CVA x 3 (last was 2016 with right hand weakness residual), GAMALIEL not using CPAP, Emphysema, ex-25 pack year smoker quit 2021, Chronic Constipation on Movantik, Urinary Incontinence chronically self straight cath at home, chronic UTI, Breast Implant Rupture with Chronic Leak, B/L CTS s/p release, s/p Intrathecal Morphine pump for pain, with chronic neck and back pain worsening for years s/p multiple spinal surgeries including laminectomies and fusion of cervical, thoracic and lumbar spine, most recently done in 2019 and 2020 at [facility] by Dr. Trinidad. CT findings include hardware loosening at T5, T6, T9-T12. S/p T3-L1 revision of fusion, c/b durotomy with primary repair, loss of left side motors, and repair of intrathecal morphine pump (8/17).      Plan:    NEURO:  - neuro/spinal/vital checks q1hr  - pain management reccs: Valium 5q8, dilaudid PCA  - cont movantik 25mg qd  - 4 deep HMVs, monitor outputs   - decadron 4q6 x 3 days  - ERAS protocol: tylenol 1g q8, gabapentin 600mg TID  - PT/OT when able   - cont wellbutrin 150mg qd, duloxetine 60mg qd    CARDS:  - MAP goal > 85  - off levophed gtt  - cont lopressor 50mg BID, lipitor 40mg qd    PULM:   - extrubated in NSICU to NC 8/17  - Albuterol 2.5q6 PRN    GI:   - ADAT  - protonix while on decadron  - bowel regimen    RENAL:  - jiménez in place, remove in AM  - h/o chronic urniary retnation, straight cath at home    HEME:   - SCDs for DVT ppx  - no chemoprophylaxis at this time     ID:   - post op ancef, macrobid for chronic UTI  - afebrile, no leukocytosis    ENDO:  - ISS, f/u A1C    DISPO: ICU status, full code, dispo pend PT/OT     Assessment and Plan d/w Dr. Luz and Dr. Beck

## 2022-08-18 LAB
A1C WITH ESTIMATED AVERAGE GLUCOSE RESULT: 6.6 % — HIGH (ref 4–5.6)
ANION GAP SERPL CALC-SCNC: 9 MMOL/L — SIGNIFICANT CHANGE UP (ref 5–17)
BASOPHILS # BLD AUTO: 0.01 K/UL — SIGNIFICANT CHANGE UP (ref 0–0.2)
BASOPHILS NFR BLD AUTO: 0.1 % — SIGNIFICANT CHANGE UP (ref 0–2)
BUN SERPL-MCNC: 10 MG/DL — SIGNIFICANT CHANGE UP (ref 7–23)
CALCIUM SERPL-MCNC: 8.5 MG/DL — SIGNIFICANT CHANGE UP (ref 8.4–10.5)
CHLORIDE SERPL-SCNC: 105 MMOL/L — SIGNIFICANT CHANGE UP (ref 96–108)
CO2 SERPL-SCNC: 23 MMOL/L — SIGNIFICANT CHANGE UP (ref 22–31)
CREAT SERPL-MCNC: 0.54 MG/DL — SIGNIFICANT CHANGE UP (ref 0.5–1.3)
EGFR: 100 ML/MIN/1.73M2 — SIGNIFICANT CHANGE UP
EOSINOPHIL # BLD AUTO: 0 K/UL — SIGNIFICANT CHANGE UP (ref 0–0.5)
EOSINOPHIL NFR BLD AUTO: 0 % — SIGNIFICANT CHANGE UP (ref 0–6)
ESTIMATED AVERAGE GLUCOSE: 143 MG/DL — HIGH (ref 68–114)
GLUCOSE BLDC GLUCOMTR-MCNC: 138 MG/DL — HIGH (ref 70–99)
GLUCOSE BLDC GLUCOMTR-MCNC: 148 MG/DL — HIGH (ref 70–99)
GLUCOSE BLDC GLUCOMTR-MCNC: 178 MG/DL — HIGH (ref 70–99)
GLUCOSE BLDC GLUCOMTR-MCNC: 185 MG/DL — HIGH (ref 70–99)
GLUCOSE SERPL-MCNC: 152 MG/DL — HIGH (ref 70–99)
HCT VFR BLD CALC: 32.5 % — LOW (ref 34.5–45)
HCV AB S/CO SERPL IA: 0.03 S/CO — SIGNIFICANT CHANGE UP
HCV AB SERPL-IMP: SIGNIFICANT CHANGE UP
HGB BLD-MCNC: 10.7 G/DL — LOW (ref 11.5–15.5)
IMM GRANULOCYTES NFR BLD AUTO: 0.5 % — SIGNIFICANT CHANGE UP (ref 0–1.5)
LYMPHOCYTES # BLD AUTO: 1.27 K/UL — SIGNIFICANT CHANGE UP (ref 1–3.3)
LYMPHOCYTES # BLD AUTO: 9.8 % — LOW (ref 13–44)
MAGNESIUM SERPL-MCNC: 1.9 MG/DL — SIGNIFICANT CHANGE UP (ref 1.6–2.6)
MCHC RBC-ENTMCNC: 28.5 PG — SIGNIFICANT CHANGE UP (ref 27–34)
MCHC RBC-ENTMCNC: 32.9 GM/DL — SIGNIFICANT CHANGE UP (ref 32–36)
MCV RBC AUTO: 86.4 FL — SIGNIFICANT CHANGE UP (ref 80–100)
MONOCYTES # BLD AUTO: 0.9 K/UL — SIGNIFICANT CHANGE UP (ref 0–0.9)
MONOCYTES NFR BLD AUTO: 6.9 % — SIGNIFICANT CHANGE UP (ref 2–14)
NEUTROPHILS # BLD AUTO: 10.7 K/UL — HIGH (ref 1.8–7.4)
NEUTROPHILS NFR BLD AUTO: 82.7 % — HIGH (ref 43–77)
NRBC # BLD: 0 /100 WBCS — SIGNIFICANT CHANGE UP (ref 0–0)
PHOSPHATE SERPL-MCNC: 3.1 MG/DL — SIGNIFICANT CHANGE UP (ref 2.5–4.5)
PLATELET # BLD AUTO: 169 K/UL — SIGNIFICANT CHANGE UP (ref 150–400)
POTASSIUM SERPL-MCNC: 4.5 MMOL/L — SIGNIFICANT CHANGE UP (ref 3.5–5.3)
POTASSIUM SERPL-SCNC: 4.5 MMOL/L — SIGNIFICANT CHANGE UP (ref 3.5–5.3)
RBC # BLD: 3.76 M/UL — LOW (ref 3.8–5.2)
RBC # FLD: 13.9 % — SIGNIFICANT CHANGE UP (ref 10.3–14.5)
SODIUM SERPL-SCNC: 137 MMOL/L — SIGNIFICANT CHANGE UP (ref 135–145)
WBC # BLD: 12.95 K/UL — HIGH (ref 3.8–10.5)
WBC # FLD AUTO: 12.95 K/UL — HIGH (ref 3.8–10.5)

## 2022-08-18 RX ORDER — MAGNESIUM OXIDE 400 MG ORAL TABLET 241.3 MG
400 TABLET ORAL ONCE
Refills: 0 | Status: COMPLETED | OUTPATIENT
Start: 2022-08-18 | End: 2022-08-18

## 2022-08-18 RX ORDER — ENOXAPARIN SODIUM 100 MG/ML
40 INJECTION SUBCUTANEOUS
Refills: 0 | Status: DISCONTINUED | OUTPATIENT
Start: 2022-08-18 | End: 2022-08-30

## 2022-08-18 RX ORDER — NITROFURANTOIN MACROCRYSTAL 50 MG
50 CAPSULE ORAL
Refills: 0 | Status: DISCONTINUED | OUTPATIENT
Start: 2022-08-18 | End: 2022-08-30

## 2022-08-18 RX ADMIN — Medication 1000 MILLIGRAM(S): at 22:26

## 2022-08-18 RX ADMIN — Medication 4 MILLIGRAM(S): at 06:40

## 2022-08-18 RX ADMIN — MAGNESIUM OXIDE 400 MG ORAL TABLET 400 MILLIGRAM(S): 241.3 TABLET ORAL at 09:58

## 2022-08-18 RX ADMIN — PANTOPRAZOLE SODIUM 40 MILLIGRAM(S): 20 TABLET, DELAYED RELEASE ORAL at 06:41

## 2022-08-18 RX ADMIN — BUPROPION HYDROCHLORIDE 150 MILLIGRAM(S): 150 TABLET, EXTENDED RELEASE ORAL at 11:39

## 2022-08-18 RX ADMIN — Medication 4 MILLIGRAM(S): at 11:39

## 2022-08-18 RX ADMIN — GABAPENTIN 600 MILLIGRAM(S): 400 CAPSULE ORAL at 22:24

## 2022-08-18 RX ADMIN — GABAPENTIN 600 MILLIGRAM(S): 400 CAPSULE ORAL at 14:30

## 2022-08-18 RX ADMIN — Medication 50 MILLIGRAM(S): at 18:18

## 2022-08-18 RX ADMIN — DULOXETINE HYDROCHLORIDE 60 MILLIGRAM(S): 30 CAPSULE, DELAYED RELEASE ORAL at 11:38

## 2022-08-18 RX ADMIN — SIMVASTATIN 40 MILLIGRAM(S): 20 TABLET, FILM COATED ORAL at 22:25

## 2022-08-18 RX ADMIN — HYDROMORPHONE HYDROCHLORIDE 30 MILLILITER(S): 2 INJECTION INTRAMUSCULAR; INTRAVENOUS; SUBCUTANEOUS at 11:27

## 2022-08-18 RX ADMIN — Medication 1000 MILLIGRAM(S): at 14:30

## 2022-08-18 RX ADMIN — ENOXAPARIN SODIUM 40 MILLIGRAM(S): 100 INJECTION SUBCUTANEOUS at 22:26

## 2022-08-18 RX ADMIN — Medication 50 MILLIGRAM(S): at 11:26

## 2022-08-18 RX ADMIN — Medication 2000 UNIT(S): at 11:38

## 2022-08-18 RX ADMIN — Medication 5 MILLIGRAM(S): at 18:19

## 2022-08-18 RX ADMIN — Medication 1000 MILLIGRAM(S): at 06:40

## 2022-08-18 RX ADMIN — Medication 1000 MILLIGRAM(S): at 07:13

## 2022-08-18 RX ADMIN — GABAPENTIN 600 MILLIGRAM(S): 400 CAPSULE ORAL at 06:40

## 2022-08-18 RX ADMIN — CHLORHEXIDINE GLUCONATE 1 APPLICATION(S): 213 SOLUTION TOPICAL at 11:40

## 2022-08-18 RX ADMIN — Medication 4 MILLIGRAM(S): at 18:16

## 2022-08-18 RX ADMIN — Medication 2: at 22:25

## 2022-08-18 RX ADMIN — Medication 2: at 18:14

## 2022-08-18 RX ADMIN — Medication 100 MILLIGRAM(S): at 06:40

## 2022-08-18 RX ADMIN — Medication 5 MILLIGRAM(S): at 06:40

## 2022-08-18 RX ADMIN — Medication 1000 MILLIGRAM(S): at 23:20

## 2022-08-18 RX ADMIN — Medication 50 MILLIGRAM(S): at 06:41

## 2022-08-18 RX ADMIN — NALOXEGOL OXALATE 25 MILLIGRAM(S): 12.5 TABLET, FILM COATED ORAL at 11:39

## 2022-08-18 NOTE — PHYSICAL THERAPY INITIAL EVALUATION ADULT - GAIT DEVIATIONS NOTED, PT EVAL
Demo unsteady gait during small ambulation trial 2/2 BLE weakness./decreased angelo/increased time in double stance/decreased velocity of limb motion/decreased step length/decreased stride length/decreased weight-shifting ability

## 2022-08-18 NOTE — OCCUPATIONAL THERAPY INITIAL EVALUATION ADULT - FINE MOTOR COORDINATION, RIGHT HAND, MANIPULATE OBJECTS, OT EVAL
Patient with difficulty R hand grasp 3/5- difficulty with pincer grasp utilizing a pen to sign papers/moderate impairment

## 2022-08-18 NOTE — PROGRESS NOTE ADULT - SUBJECTIVE AND OBJECTIVE BOX
INTERVAL HISTORY: HPI:  ***********************************************  ADULT NSICU H&P  VANNA DIAZ [id number] [facility] [id number]  ***********************************************  H&P:  "66 y/o female with PMHx HTN, HLD, CVA x 3 (last was 2016 with right hand weakness residual), GAMALIEL not using CPAP, Emphysema, ex-25 pack year smoker quit 2021, Chronic Constipation on Movantik, Urinary Incontinence chronically self straight cath at home, chronic UTI, Breast Implant Rupture with Chronic Leak, B/L CTS s/p release, s/p Intrathecal Morphine pump for pain, with chronic neck and back pain worsening for years s/p multiple spinal surgeries including laminectomies and fusion of cervical, thoracic and lumbar spine, most recently done in 2019 and 2020 at [facility] by Dr. Trinidad.  She is wheelchair dependent.  She also has history of right shoulder replacement.  She reports bilateral shoulder pain, bilateral leg pain and burning to the bilateral calves.  She reports right hand greater than arm and leg weakness."    66 yo F with history of HTN, HLD, prior strokes with residual RUE weakness, GAMALIEL noncompliant with CPAP, COPD, prior extensive smoking history, chronic constipation, urinary incontinence, chronic UTI, ruptured breast implant, bilateral CTS release surgery, intrathecal morphine pump and chronic pain s/p multiple spinal surgeries who is now s/p T3 - L1 posterior fusion (EBL 500cc, 1L UOP, 4200 crystalloid, 1u PRBC) and admitted to NSICU.  She remains intubated with progressive improvement in level of arousal    VITALS:    ICU Vital Signs Last 24 Hrs  T(C): 35.2 (17 Aug 2022 18:03), Max: 35.2 (17 Aug 2022 18:03)  T(F): 95.4 (17 Aug 2022 18:03), Max: 95.4 (17 Aug 2022 18:03)  HR: --  BP: --  BP(mean): --  ABP: --  ABP(mean): --  RR: --  SpO2: --            I&O's Summary      EXAM:     Hiren Coma Scale: 2/1T/6 = 7  Deferred full examination at this time due to hemodynamic instability  Intubated on pressure support and breathing spontaneously  Opens eyes to noxious stimuli  Gaze conjugate  PERRL (NPi > 3 bilaterally)  No gaze preference  Spontaneously moving BL UE/LE    LABORATORY DATA:    ABG - ( 17 Aug 2022 16:47 )  pH, Arterial: 7.31  pH, Blood: x     /  pCO2: 42    /  pO2: 251   / HCO3: 21    / Base Excess: -4.9  /  SaO2: x                                       10.7   10.99 )-----------( 143      ( 17 Aug 2022 18:08 )             32.9             MEDICATIONS  (STANDING):  acetaminophen     Tablet .. 1000 milliGRAM(s) Oral every 8 hours  bisacodyl 5 milliGRAM(s) Oral every 12 hours  ceFAZolin   IVPB 2000 milliGRAM(s) IV Intermittent every 8 hours  chlorhexidine 2% Cloths 1 Application(s) Topical daily  cholecalciferol 2000 Unit(s) Oral daily  dexAMETHasone  Injectable 4 milliGRAM(s) IV Push every 6 hours  dextrose 5%. 1000 milliLiter(s) (50 mL/Hr) IV Continuous <Continuous>  dextrose 5%. 1000 milliLiter(s) (100 mL/Hr) IV Continuous <Continuous>  dextrose 50% Injectable 25 Gram(s) IV Push once  dextrose 50% Injectable 12.5 Gram(s) IV Push once  dextrose 50% Injectable 25 Gram(s) IV Push once  diazepam    Tablet 5 milliGRAM(s) Oral every 8 hours  DULoxetine 60 milliGRAM(s) Oral daily  gabapentin 600 milliGRAM(s) Oral three times a day  gabapentin 600 milliGRAM(s) Oral three times a day  glucagon  Injectable 1 milliGRAM(s) IntraMuscular once  HYDROmorphone PCA (1 mG/mL) 30 milliLiter(s) PCA Continuous PCA Continuous  insulin lispro (ADMELOG) corrective regimen sliding scale   SubCutaneous three times a day before meals  metoprolol tartrate 50 milliGRAM(s) Oral two times a day  naloxegol 25 milliGRAM(s) Oral daily  nitrofurantoin monohydrate/macrocrystals (MACROBID) 50 milliGRAM(s) Oral <User Schedule>  norepinephrine Infusion 0.05 MICROgram(s)/kG/Min (8.26 mL/Hr) IV Continuous <Continuous>  ondansetron   Disintegrating Tablet 4 milliGRAM(s) Oral every 6 hours  pantoprazole    Tablet 40 milliGRAM(s) Oral before breakfast  polyethylene glycol 3350 17 Gram(s) Oral daily  senna 2 Tablet(s) Oral at bedtime  simvastatin 40 milliGRAM(s) Oral at bedtime  sodium chloride 0.9% Bolus 1000 milliLiter(s) IV Bolus once  sodium chloride 0.9%. 1000 milliLiter(s) (75 mL/Hr) IV Continuous <Continuous>    MEDICATIONS  (PRN):  acetaminophen     Tablet .. 650 milliGRAM(s) Oral every 6 hours PRN Temp greater or equal to 38C (100.4F), Mild Pain (1 - 3)  ALBUTerol   0.042% 2.5 milliGRAM(s) Nebulizer every 6 hours PRN Shortness of Breath and/or Wheezing  dextrose Oral Gel 15 Gram(s) Oral once PRN Blood Glucose LESS THAN 70 milliGRAM(s)/deciliter  diphenhydrAMINE 25 milliGRAM(s) Oral every 6 hours PRN Rash and/or Itching  naloxone Injectable 0.1 milliGRAM(s) IV Push every 3 minutes PRN For ANY of the following changes in patient status:  A. RR LESS THAN 10 breaths per minute, B. Oxygen saturation LESS THAN 90%, C. Sedation score of 6  ondansetron Injectable 4 milliGRAM(s) IV Push every 6 hours PRN Nausea        ***********************************************  ASSESSMENT AND PLAN  ***********************************************    This is a case of a T3 - L1 posterior fusion (EBL 500cc, 1L UOP, 4200 crystalloid, 1u PRBC) in a 66 yo F with history of HTN, HLD, prior strokes with residual RUE weakness, GAMALIEL noncompliant with CPAP, COPD, prior extensive smoking history, chronic constipation, urinary incontinence, chronic UTI, ruptured breast implant, bilateral CTS release surgery, intrathecal morphine pump.    NEURO  #S/p T3 - L1 posterior fusion, Dr. Luz, POD#0  - Admit NSICU, Q1h neuro checks / Q1h vital signs  - Acute pain consultation, dilaudid PCA  - Home medications    PULM  - Currently on pressure support passing SBT; consider extubation when patient awake and following commands  - SpO2 goal > 92%, supplemental O2 and pulm toileting as needed    CARDIO  - BP goal: MAP > 85 pending full examination, Levophed     GI  - Diet: NPO for now anticipating extubation  - Stress ulcer prophylaxis: PPI unless extubated    /RENAL  - Monitor UOP/volume status, BUN/SCr    HEME  - Maintain Hb > 7.0, PLT > 100,000K  - SCDs, holding chemoppx    ID  - Monitor for infectious s/s, fever curve, leukocytosis  - Periop ancef    ENDO  #History of pre-diabetes  - RASS      08-17-22 @ 18:42       (17 Aug 2022 07:46)      MEDICATIONS  (STANDING):  acetaminophen     Tablet .. 1000 milliGRAM(s) Oral every 8 hours  bisacodyl 5 milliGRAM(s) Oral every 12 hours  buPROPion XL (24-Hour) . 150 milliGRAM(s) Oral daily  ceFAZolin   IVPB 2000 milliGRAM(s) IV Intermittent every 8 hours  chlorhexidine 2% Cloths 1 Application(s) Topical daily  cholecalciferol 2000 Unit(s) Oral daily  dexAMETHasone  Injectable 4 milliGRAM(s) IV Push every 6 hours  dextrose 50% Injectable 25 Gram(s) IV Push once  dextrose 50% Injectable 12.5 Gram(s) IV Push once  diazepam    Tablet 5 milliGRAM(s) Oral every 8 hours  DULoxetine 60 milliGRAM(s) Oral daily  gabapentin 600 milliGRAM(s) Oral three times a day  HYDROmorphone PCA (1 mG/mL) 30 milliLiter(s) PCA Continuous PCA Continuous  insulin lispro (ADMELOG) corrective regimen sliding scale   SubCutaneous Before meals and at bedtime  metoprolol tartrate 50 milliGRAM(s) Oral two times a day  naloxegol 25 milliGRAM(s) Oral daily  nitrofurantoin monohydrate/macrocrystals (MACROBID) 50 milliGRAM(s) Oral <User Schedule>  pantoprazole    Tablet 40 milliGRAM(s) Oral before breakfast  polyethylene glycol 3350 17 Gram(s) Oral daily  senna 2 Tablet(s) Oral at bedtime  simvastatin 40 milliGRAM(s) Oral at bedtime  sodium chloride 0.9%. 1000 milliLiter(s) (75 mL/Hr) IV Continuous <Continuous>    MEDICATIONS  (PRN):  ALBUTerol   0.042% 2.5 milliGRAM(s) Nebulizer every 6 hours PRN Shortness of Breath and/or Wheezing  diphenhydrAMINE 25 milliGRAM(s) Oral every 6 hours PRN Rash and/or Itching  naloxone Injectable 0.1 milliGRAM(s) IV Push every 3 minutes PRN For ANY of the following changes in patient status:  A. RR LESS THAN 10 breaths per minute, B. Oxygen saturation LESS THAN 90%, C. Sedation score of 6  ondansetron Injectable 4 milliGRAM(s) IV Push every 6 hours PRN Nausea      Drug Dosing Weight  Height (cm): 167.6 (17 Aug 2022 07:46)  Weight (kg): 88.1 (17 Aug 2022 07:46)  BMI (kg/m2): 31.4 (17 Aug 2022 07:46)  BSA (m2): 1.98 (17 Aug 2022 07:46)    PAST MEDICAL & SURGICAL HISTORY:  HTN (hypertension)  SS (spinal stenosis)  High cholesterol  Strokex3  H/O carpal tunnel syndrome Bilateral  Chronic GERD  History of chronic constipation  Urinary incontinence  self cath as needed  Pre-diabetes  Anxiety and depression  Eczema  H/O kyphosis  Pancreas cyst  Scoliosis  Wheelchair dependent  Cervical pseudoarthrosis  and lumbar spine  Cervical spondylosis  Chronic headaches  Degenerative joint disease  left shoulder  Lumbosacral spondylosis  COPD (chronic obstructive pulmonary disease)  H/O Spinal surgery lumbar x 30  H/O breast surgery  left breast implant 1980&#x27;s  S/P cervical discectomy  x4  H/O total shoulder replacement, right      REVIEW OF SYSTEMS: [ ] Unable to Assess due to neurologic exam   [ ] All ROS addressed below are non-contributory, except:  Neuro: [ ] Headache [ ] Back pain [ ] Numbness [ ] Weakness [ ] Ataxia [ ] Dizziness [ ] Aphasia [ ] Dysarthria [ ] Visual disturbance  Resp: [ ] Shortness of breath/dyspnea, [ ] Orthopnea [ ] Cough  CV: [ ] Chest pain [ ] Palpitation [ ] Lightheadedness [ ] Syncope  Renal: [ ] Thirst [ ] Edema  GI: [ ] Nausea [ ] Emesis [ ] Abdominal pain [ ] Constipation [ ] Diarrhea  Hem: [ ] Hematemesis [ ] bright red blood per rectum  ID: [ ] Fever [ ] Chills [ ] Dysuria  ENT: [ ] Rhinorrhea    PHYSICAL EXAM:    General: No Acute Distress   Neurological: awake,   muscle Strength normal in all four extremities, No Drift, Sensation to Light Touch Intact  Pulmonary: Clear to Auscultation, No Rales, No Rhonchi, No Wheezes   Cardiovascular: S1, S2, Regular Rate and Rhythm   Gastrointestinal: Soft, Nontender, Nondistended   Extremities: No calf tenderness     ICU Vital Signs Last 24 Hrs  T(C): 36.8 (17 Aug 2022 22:05), Max: 36.8 (17 Aug 2022 22:05)  T(F): 98.2 (17 Aug 2022 22:05), Max: 98.2 (17 Aug 2022 22:05)  HR: 64 (17 Aug 2022 23:00) (64 - 84)  BP: 125/64 (17 Aug 2022 19:30) (87/51 - 141/85)  BP(mean): 89 (17 Aug 2022 19:30) (63 - 108)  ABP: 110/87 (17 Aug 2022 23:00) (79/62 - 118/76)  ABP(mean): 99 (17 Aug 2022 23:00) (70 - 99)  RR: 16 (17 Aug 2022 23:00) (14 - 18)  SpO2: 100% (17 Aug 2022 23:00) (98% - 100%)    O2 Parameters below as of 17 Aug 2022 23:00  Patient On (Oxygen Delivery Method): nasal cannula  O2 Flow (L/min): 2        ABG - ( 17 Aug 2022 16:47 )  pH, Arterial: 7.31  pH, Blood: x     /  pCO2: 42    /  pO2: 251   / HCO3: 21    / Base Excess: -4.9  /  SaO2: x         I&O's Detail    17 Aug 2022 07:01  -  18 Aug 2022 01:06  --------------------------------------------------------  IN:    Norepinephrine: 3.4 mL    sodium chloride 0.9%: 150 mL    Sodium Chloride 0.9% Bolus: 1000 mL  Total IN: 1153.4 mL    OUT:    Indwelling Catheter - Urethral (mL): 125 mL  Total OUT: 125 mL    Total NET: 1028.4 mL        Mode: SIMV with PS  RR (machine): 8  TV (machine): 450  FiO2: 100  PEEP: 5  PS: 10  ITime: 0.1  MAP: 8  PIP: 14        LABS:  CBC Full  -  ( 17 Aug 2022 18:08 )  WBC Count : 10.99 K/uL  RBC Count : 3.75 M/uL  Hemoglobin : 10.7 g/dL  Hematocrit : 32.9 %  Platelet Count - Automated : 143 K/uL  Mean Cell Volume : 87.7 fl  Mean Cell Hemoglobin : 28.5 pg  Mean Cell Hemoglobin Concentration : 32.5 gm/dL    08-17    139  |  109<H>  |  9   ----------------------------<  202<H>  4.2   |  19<L>  |  0.56    Ca    7.9<L>      17 Aug 2022 18:08  Phos  4.4     08-17  Mg     1.8     08-17      PT/INR - ( 17 Aug 2022 18:26 )   PT: 13.1 sec;   INR: 1.10          PTT - ( 17 Aug 2022 18:26 )  PTT:24.1 sec      RADIOLOGY & ADDITIONAL STUDIES:   INTERVAL HISTORY: HPI:  ***********************************************  ADULT NSICU H&P  VANNA DIAZ [id number] [facility] [id number]  ***********************************************  H&P:  "66 y/o female with PMHx HTN, HLD, CVA x 3 (last was 2016 with right hand weakness residual), GAMALIEL not using CPAP, Emphysema, ex-25 pack year smoker quit 2021, Chronic Constipation on Movantik, Urinary Incontinence chronically self straight cath at home, chronic UTI, Breast Implant Rupture with Chronic Leak, B/L CTS s/p release, s/p Intrathecal Morphine pump for pain, with chronic neck and back pain worsening for years s/p multiple spinal surgeries including laminectomies and fusion of cervical, thoracic and lumbar spine, most recently done in 2019 and 2020 at [facility] by Dr. Trinidad.  She is wheelchair dependent.  She also has history of right shoulder replacement.  She reports bilateral shoulder pain, bilateral leg pain and burning to the bilateral calves.  She reports right hand greater than arm and leg weakness."    68 yo F with history of HTN, HLD, prior strokes with residual RUE weakness, GAMALIEL noncompliant with CPAP, COPD, prior extensive smoking history, chronic constipation, urinary incontinence, chronic UTI, ruptured breast implant, bilateral CTS release surgery, intrathecal morphine pump and chronic pain s/p multiple spinal surgeries who is now s/p T3 - L1 posterior fusion (EBL 500cc, 1L UOP, 4200 crystalloid, 1u PRBC) and admitted to NSICU.  She remains intubated with progressive improvement in level of arousal    Drug Dosing Weight  Height (cm): 167.6 (17 Aug 2022 07:46)  Weight (kg): 88.1 (17 Aug 2022 07:46)  BMI (kg/m2): 31.4 (17 Aug 2022 07:46)  BSA (m2): 1.98 (17 Aug 2022 07:46)    PAST MEDICAL & SURGICAL HISTORY:  HTN (hypertension)  SS (spinal stenosis)  High cholesterol  Strokex3  H/O carpal tunnel syndrome Bilateral  Chronic GERD  History of chronic constipation  Urinary incontinence  self cath as needed  Pre-diabetes  Anxiety and depression  Eczema  H/O kyphosis  Pancreas cyst  Scoliosis  Wheelchair dependent  Cervical pseudoarthrosis  and lumbar spine  Cervical spondylosis  Chronic headaches  Degenerative joint disease  left shoulder  Lumbosacral spondylosis  COPD (chronic obstructive pulmonary disease)  H/O Spinal surgery lumbar x 30  H/O breast surgery  left breast implant 1980&#x27;s  S/P cervical discectomy  x4  H/O total shoulder replacement, right      REVIEW OF SYSTEMS: [ ] Unable to Assess due to neurologic exam   [x] All ROS addressed below are non-contributory, except:  Neuro: [ ] Headache [ ] Back pain [ ] Numbness [ ] Weakness [ ] Ataxia [ ] Dizziness [ ] Aphasia [ ] Dysarthria [ ] Visual disturbance  Resp: [ ] Shortness of breath/dyspnea, [ ] Orthopnea [ ] Cough  CV: [ ] Chest pain [ ] Palpitation [ ] Lightheadedness [ ] Syncope  Renal: [ ] Thirst [ ] Edema  GI: [ ] Nausea [ ] Emesis [ ] Abdominal pain [ ] Constipation [ ] Diarrhea  Hem: [ ] Hematemesis [ ] bright red blood per rectum  ID: [ ] Fever [ ] Chills [ ] Dysuria  ENT: [ ] Rhinorrhea    PHYSICAL EXAM:    General: No Acute Distress   Neurological: awake, refuses examination, wiggles toes, moves UE b/l AG purposeful  Pulmonary: Clear to Auscultation, No Rales, No Rhonchi, No Wheezes   Cardiovascular: S1, S2, Regular Rate and Rhythm   Gastrointestinal: Soft, Nontender, Nondistended   Extremities: No calf tenderness       ICU Vital Signs Last 24 Hrs  T(C): 36.8 (18 Aug 2022 01:50), Max: 36.8 (17 Aug 2022 22:05)  T(F): 98.2 (18 Aug 2022 01:50), Max: 98.2 (17 Aug 2022 22:05)  HR: 69 (18 Aug 2022 01:00) (64 - 84)  BP: 125/64 (17 Aug 2022 19:30) (87/51 - 141/85)  BP(mean): 89 (17 Aug 2022 19:30) (63 - 108)  ABP: 115/100 (18 Aug 2022 01:00) (79/62 - 118/76)  ABP(mean): 108 (18 Aug 2022 01:00) (70 - 108)  RR: 16 (18 Aug 2022 01:00) (14 - 18)  SpO2: 100% (18 Aug 2022 01:00) (98% - 100%)      08-17-22 @ 07:01  -  08-18-22 @ 01:51  --------------------------------------------------------  IN: 1603.4 mL / OUT: 720 mL / NET: 883.4 mL    Mode: SIMV with PS, RR (machine): 8, TV (machine): 450, FiO2: 100, PEEP: 5, PS: 10, ITime: 0.1, MAP: 8, PIP: 14    acetaminophen     Tablet .. 1000 milliGRAM(s) Oral every 8 hours  ALBUTerol   0.042% 2.5 milliGRAM(s) Nebulizer every 6 hours PRN  bisacodyl 5 milliGRAM(s) Oral every 12 hours  buPROPion XL (24-Hour) . 150 milliGRAM(s) Oral daily  ceFAZolin   IVPB 2000 milliGRAM(s) IV Intermittent every 8 hours  chlorhexidine 2% Cloths 1 Application(s) Topical daily  cholecalciferol 2000 Unit(s) Oral daily  dexAMETHasone  Injectable 4 milliGRAM(s) IV Push every 6 hours  dextrose 50% Injectable 25 Gram(s) IV Push once  dextrose 50% Injectable 12.5 Gram(s) IV Push once  diazepam    Tablet 5 milliGRAM(s) Oral every 8 hours  diphenhydrAMINE 25 milliGRAM(s) Oral every 6 hours PRN  DULoxetine 60 milliGRAM(s) Oral daily  gabapentin 600 milliGRAM(s) Oral three times a day  HYDROmorphone PCA (1 mG/mL) 30 milliLiter(s) PCA Continuous PCA Continuous  insulin lispro (ADMELOG) corrective regimen sliding scale   SubCutaneous Before meals and at bedtime  metoprolol tartrate 50 milliGRAM(s) Oral two times a day  naloxegol 25 milliGRAM(s) Oral daily  naloxone Injectable 0.1 milliGRAM(s) IV Push every 3 minutes PRN  nitrofurantoin monohydrate/macrocrystals (MACROBID) 50 milliGRAM(s) Oral <User Schedule>  ondansetron Injectable 4 milliGRAM(s) IV Push every 6 hours PRN  pantoprazole    Tablet 40 milliGRAM(s) Oral before breakfast  polyethylene glycol 3350 17 Gram(s) Oral daily  senna 2 Tablet(s) Oral at bedtime  simvastatin 40 milliGRAM(s) Oral at bedtime  sodium chloride 0.9%. 1000 milliLiter(s) (75 mL/Hr) IV Continuous <Continuous>      LABS:  Na: 139 (08-17 @ 18:08)  K: 4.2 (08-17 @ 18:08)  Cl: 109 (08-17 @ 18:08)  CO2: 19 (08-17 @ 18:08)  BUN: 9 (08-17 @ 18:08)  Cr: 0.56 (08-17 @ 18:08)  Glu: 202(08-17 @ 18:08)    Hgb: 10.7 (08-17 @ 18:08)  Hct: 32.9 (08-17 @ 18:08)  WBC: 10.99 (08-17 @ 18:08)  Plt: 143 (08-17 @ 18:08)    INR: 1.10 08-17-22 @ 18:26  PTT: 24.1 08-17-22 @ 18:26    ABG - ( 17 Aug 2022 16:47 )  pH, Arterial: 7.31  pH, Blood: x     /  pCO2: 42    /  pO2: 251   / HCO3: 21    / Base Excess: -4.9  /  SaO2: x

## 2022-08-18 NOTE — OCCUPATIONAL THERAPY INITIAL EVALUATION ADULT - LEVEL OF INDEPENDENCE: GROOMING, OT EVAL
Patient able to grasp paper towel with R hand and bring to face to wipe tears from eyes- patient with fine motor deficits/stand-by assist

## 2022-08-18 NOTE — PHYSICAL THERAPY INITIAL EVALUATION ADULT - ADDITIONAL COMMENTS
Patient is a limited community ambulator who lives alone in an elevator access apartment, no [facility]. Patient reports owning motorized WC for community ambulation and states she "furniture surfs" when ambulating in house. Patient endorses several falls in past 6 months. Patient states she does not have any HHA and reports being independent with all ADLs. Of note, patient owns RW at home for use but does not use.

## 2022-08-18 NOTE — PROGRESS NOTE ADULT - SUBJECTIVE AND OBJECTIVE BOX
***********************************************  ADULT NSICU PROGRESS NOTE  VANNA DIAZ [id number] [facility] [id number]  ***********************************************    24H INTERVAL EVENTS: no events overnight.      ROS: negative except per mentioned above in 24h interval events.      HOSPITAL COURSE CARRIED FORWARD:    "H&P:  "68 y/o female with PMHx HTN, HLD, CVA x 3 (last was 2016 with right hand weakness residual), GAMALIEL not using CPAP, Emphysema, ex-25 pack year smoker quit 2021, Chronic Constipation on Movantik, Urinary Incontinence chronically self straight cath at home, chronic UTI, Breast Implant Rupture with Chronic Leak, B/L CTS s/p release, s/p Intrathecal Morphine pump for pain, with chronic neck and back pain worsening for years s/p multiple spinal surgeries including laminectomies and fusion of cervical, thoracic and lumbar spine, most recently done in 2019 and 2020 at [facility] by Dr. Trinidad.  She is wheelchair dependent.  She also has history of right shoulder replacement.  She reports bilateral shoulder pain, bilateral leg pain and burning to the bilateral calves.  She reports right hand greater than arm and leg weakness."    68 yo F with history of HTN, HLD, prior strokes with residual RUE weakness, GAMALIEL noncompliant with CPAP, COPD, prior extensive smoking history, chronic constipation, urinary incontinence, chronic UTI, ruptured breast implant, bilateral CTS release surgery, intrathecal morphine pump and chronic pain s/p multiple spinal surgeries who is now s/p T3 - L1 posterior fusion (EBL 500cc, 1L UOP, 4200 crystalloid, 1u PRBC) and admitted to NSICU.  She remains intubated with progressive improvement in level of arousal"    8/18: extubated overnight; stepped down.    VITALS:    ICU Vital Signs Last 24 Hrs  T(C): 36.8 (18 Aug 2022 13:35), Max: 36.9 (18 Aug 2022 09:16)  T(F): 98.3 (18 Aug 2022 13:35), Max: 98.4 (18 Aug 2022 09:16)  HR: 72 (18 Aug 2022 13:35) (64 - 96)  BP: 139/70 (18 Aug 2022 13:35) (87/51 - 162/80)  BP(mean): 90 (18 Aug 2022 12:30) (63 - 117)  ABP: 139/91 (18 Aug 2022 05:00) (79/62 - 139/91)  ABP(mean): 111 (18 Aug 2022 05:00) (70 - 122)  RR: 18 (18 Aug 2022 13:35) (14 - 18)  SpO2: 100% (18 Aug 2022 13:35) (98% - 100%)    O2 Parameters below as of 18 Aug 2022 13:35  Patient On (Oxygen Delivery Method): nasal cannula  O2 Flow (L/min): 2          Mode: SIMV with PS  RR (machine): 8  TV (machine): 450  FiO2: 100  PEEP: 5  PS: 10  ITime: 0.1  MAP: 8  PIP: 14      I&O's Summary    17 Aug 2022 07:01  -  18 Aug 2022 07:00  --------------------------------------------------------  IN: 1978.4 mL / OUT: 1530 mL / NET: 448.4 mL    18 Aug 2022 07:01  -  18 Aug 2022 15:34  --------------------------------------------------------  IN: 465 mL / OUT: 1075 mL / NET: -610 mL        EXAM:     Bedford Coma Scale:     General: normocephalic, atraumatic, laying in bed, in no distress  Neuro     MS: A/Ox3, cooperative, normal attention, no neglect, comprehension intact, speech with preserved fluency, naming, and repetition    CN: PERRL, VF FTC, EOMI and no ptosis bilaterally, sensation intact to crude touch V1-V3, face symmetric, hearing grossly intact    Mot: bulk normal, tone normal, power (RL) UE: 4/5 LE: 5/3    Sens: intact to crude touch but diminished in bilateral lower extremities    Reflexes: deferred    Coord: no dysmetria or ataxia on finger to nose, no focal bradykinetic movements    Gait: deferred  Chest: nonlabored respirations, no adventitious lung sounds bilaterally, heart regular rate/rhythm, present S1/S2, no murmurs or rubs  Abdomen: nondistended, soft and nontender without peritoneal signs, normoactive bowel sounds  Extremities: no clubbing, well-perfused, no edema    LABORATORY DATA:    ABG - ( 17 Aug 2022 16:47 )  pH, Arterial: 7.31  pH, Blood: x     /  pCO2: 42    /  pO2: 251   / HCO3: 21    / Base Excess: -4.9  /  SaO2: x                                       10.7   12.95 )-----------( 169      ( 18 Aug 2022 05:40 )             32.5     08-18    137  |  105  |  10  ----------------------------<  152<H>  4.5   |  23  |  0.54    Ca    8.5      18 Aug 2022 05:40  Phos  3.1     08-18  Mg     1.9     08-18        PT/INR - ( 17 Aug 2022 18:26 )   PT: 13.1 sec;   INR: 1.10          PTT - ( 17 Aug 2022 18:26 )  PTT:24.1 sec    MEDICATIONS  (STANDING):  acetaminophen     Tablet .. 1000 milliGRAM(s) Oral every 8 hours  bisacodyl 5 milliGRAM(s) Oral every 12 hours  buPROPion XL (24-Hour) . 150 milliGRAM(s) Oral daily  cholecalciferol 2000 Unit(s) Oral daily  dexAMETHasone  Injectable 4 milliGRAM(s) IV Push every 6 hours  dextrose 50% Injectable 25 Gram(s) IV Push once  dextrose 50% Injectable 12.5 Gram(s) IV Push once  diazepam    Tablet 5 milliGRAM(s) Oral every 8 hours  DULoxetine 60 milliGRAM(s) Oral daily  enoxaparin Injectable 40 milliGRAM(s) SubCutaneous <User Schedule>  gabapentin 600 milliGRAM(s) Oral three times a day  HYDROmorphone PCA (1 mG/mL) 30 milliLiter(s) PCA Continuous PCA Continuous  insulin lispro (ADMELOG) corrective regimen sliding scale   SubCutaneous Before meals and at bedtime  metoprolol tartrate 50 milliGRAM(s) Oral two times a day  naloxegol 25 milliGRAM(s) Oral daily  nitrofurantoin macrocrystals (MACRODANTIN) 50 milliGRAM(s) Oral <User Schedule>  pantoprazole    Tablet 40 milliGRAM(s) Oral before breakfast  polyethylene glycol 3350 17 Gram(s) Oral daily  senna 2 Tablet(s) Oral at bedtime  simvastatin 40 milliGRAM(s) Oral at bedtime    MEDICATIONS  (PRN):  ALBUTerol   0.042% 2.5 milliGRAM(s) Nebulizer every 6 hours PRN Shortness of Breath and/or Wheezing  diphenhydrAMINE 25 milliGRAM(s) Oral every 6 hours PRN Rash and/or Itching  naloxone Injectable 0.1 milliGRAM(s) IV Push every 3 minutes PRN For ANY of the following changes in patient status:  A. RR LESS THAN 10 breaths per minute, B. Oxygen saturation LESS THAN 90%, C. Sedation score of 6  ondansetron Injectable 4 milliGRAM(s) IV Push every 6 hours PRN Nausea      ***********************************************  ASSESSMENT AND PLAN  ***********************************************  This is a case of a T3 - L1 posterior fusion (EBL 500cc, 1L UOP, 4200 crystalloid, 1u PRBC) in a 68 yo F with history of HTN, HLD, prior strokes with residual RUE weakness, GAMALIEL noncompliant with CPAP, COPD, prior extensive smoking history, chronic constipation, urinary incontinence, chronic UTI, ruptured breast implant, bilateral CTS release surgery, intrathecal morphine pump.    NEURO  #S/p T3 - L1 posterior fusion, Dr. Luz, POD#1  - Admit NSICU, Q4h neuro checks / Q4h vital signs  - Acute pain consultation, dilaudid PCA  - Home medications    PULM  - Extubated 8/17, no issues  - SpO2 goal > 92%, supplemental O2 and pulm toileting as needed    CARDIO  - BP goal: NORMOTENSION    GI  - Diet: ADAT  - Stress ulcer prophylaxis: no    /RENAL  - Monitor UOP/volume status, BUN/SCr    HEME  - Maintain Hb > 7.0, PLT > 100,000K  - SCDs, holding chemoppx    ID  - Monitor for infectious s/s, fever curve, leukocytosis  - Periop ancef    ENDO  #History of pre-diabetes  - RASS

## 2022-08-18 NOTE — OCCUPATIONAL THERAPY INITIAL EVALUATION ADULT - MODIFIED CLINICAL TEST OF SENSORY INTEGRATION IN BALANCE TEST
Patient performed functional side step x 10 to R, x 5 to L with Min A x 2 persons with b/l HH and trunk asssit

## 2022-08-18 NOTE — PHYSICAL THERAPY INITIAL EVALUATION ADULT - IMPAIRMENTS CONTRIBUTING TO GAIT DEVIATIONS, PT EVAL
impaired endurance/impaired balance/decreased flexibility/pain/impaired postural control/decreased ROM/decreased strength

## 2022-08-18 NOTE — OCCUPATIONAL THERAPY INITIAL EVALUATION ADULT - ADDITIONAL COMMENTS
Patient reports living alone in an elevator access apartment building with no ZACKARY. Patient states she owns an electric wheelchair for community ambulation and owns a RW however does not use 2/2 multiple falls from decreased b/l hand grasp. Patient furniture surfs at home. Patient has a bathtub shower with shower chair and grab bars. Patient able to use electric w/c to grocery shop.

## 2022-08-18 NOTE — OCCUPATIONAL THERAPY INITIAL EVALUATION ADULT - MD ORDER
CT findings include hardware loosening at T5, T6, T9-T12. S/p T3-L1 revision of fusion, c/b durotomy with primary repair, loss of left side motors, and repair of intrathecal morphine pump (8/17).

## 2022-08-18 NOTE — PHYSICAL THERAPY INITIAL EVALUATION ADULT - BED MOBILITY LIMITATIONS, REHAB EVAL
Tolerated sitting EOB for ~10 minutes, demos good trunk control/static balance while EOB./decreased ability to use legs for bridging/pushing/impaired ability to control trunk for mobility

## 2022-08-18 NOTE — PHYSICAL THERAPY INITIAL EVALUATION ADULT - MANUAL MUSCLE TESTING RESULTS, REHAB EVAL
(B) GS/TA/RHL/FHL 5/5 strength; (B) hip flexion 4/5; R knee extension 4+/5; L: knee extension 4-/5; L  strength 4/5; R  strength 3/5; L shoulder flexion 2+/5; L elbow flex/ext 3+/5; R shoulder flexion/extension 3+/5; R elbow flex/ext 4/5 strength.

## 2022-08-18 NOTE — PHYSICAL THERAPY INITIAL EVALUATION ADULT - ACTIVE RANGE OF MOTION EXAMINATION, REHAB EVAL
**Impaired L shoulder ROM **/bilateral upper extremity Active ROM was WFL (within functional limits)/bilateral  lower extremity Active ROM was WFL (within functional limits)

## 2022-08-18 NOTE — PHYSICAL THERAPY INITIAL EVALUATION ADULT - GENERAL OBSERVATIONS, REHAB EVAL
PT IE completed. Patient received supine in bed +tele, +IV, +2LO2 via NC, +hemovac x4, +spinal dressing C/D/I, +(B) SCDs, NAD, willing to work with PT.

## 2022-08-18 NOTE — PROGRESS NOTE ADULT - SUBJECTIVE AND OBJECTIVE BOX
S/Overnight events: given IV tylenol for pain control. Patient remains relatively lethargic, difficult to tolerate PO. holding PCA due to lethargy. pain limited exam.       Hospital Course:   8/17: POD0 s/p T3-L1 revision of fusion. extubated in NSICU  8/18: KAMRAN overnight.         Vital Signs Last 24 Hrs  T(C): 36.8 (17 Aug 2022 22:05), Max: 36.8 (17 Aug 2022 22:05)  T(F): 98.2 (17 Aug 2022 22:05), Max: 98.2 (17 Aug 2022 22:05)  HR: 64 (17 Aug 2022 23:00) (64 - 84)  BP: 125/64 (17 Aug 2022 19:30) (87/51 - 141/85)  BP(mean): 89 (17 Aug 2022 19:30) (63 - 108)  RR: 16 (17 Aug 2022 23:00) (14 - 18)  SpO2: 100% (17 Aug 2022 23:00) (98% - 100%)    Parameters below as of 17 Aug 2022 23:00  Patient On (Oxygen Delivery Method): nasal cannula  O2 Flow (L/min): 2      I&O's Detail    17 Aug 2022 07:01  -  18 Aug 2022 00:59  --------------------------------------------------------  IN:    Norepinephrine: 3.4 mL    sodium chloride 0.9%: 150 mL    Sodium Chloride 0.9% Bolus: 1000 mL  Total IN: 1153.4 mL    OUT:    Indwelling Catheter - Urethral (mL): 125 mL  Total OUT: 125 mL    Total NET: 1028.4 mL        I&O's Summary    17 Aug 2022 07:01  -  18 Aug 2022 00:59  --------------------------------------------------------  IN: 1153.4 mL / OUT: 125 mL / NET: 1028.4 mL        PHYSICAL EXAM:  General: NAD, pt is comfortably sitting up in bed, on room air  HEENT: EOMI b/l, face symmetric, tongue midline, neck FROM  Cardiovascular: Regular rate and rhythm  Respiratory: nonlabored breathing, normal chest rise  GI: abdomen soft, nontender, nondistended  Neuro: CN II-XII grossly intact, PERRL 3mm briskly reactive   CHANDLER x4 spontaneously, lifts UE antigrav distally, wiggles toes to commands  Extremities: distal pulses 2+ x4  Wound/incision: unable to view spine incision, pain limited  Drain: 4 deep HMVs      TUBES/LINES:  [] CVC  [] A-line  [] Lumbar Drain  [] Ventriculostomy  [x] Jiménez  [] Other    DIET:  [] NPO  [x] Mechanical  [] Tube feeds    LABS:                        10.7   10.99 )-----------( 143      ( 17 Aug 2022 18:08 )             32.9     08-17    139  |  109<H>  |  9   ----------------------------<  202<H>  4.2   |  19<L>  |  0.56    Ca    7.9<L>      17 Aug 2022 18:08  Phos  4.4     08-17  Mg     1.8     08-17      PT/INR - ( 17 Aug 2022 18:26 )   PT: 13.1 sec;   INR: 1.10          PTT - ( 17 Aug 2022 18:26 )  PTT:24.1 sec        CAPILLARY BLOOD GLUCOSE      POCT Blood Glucose.: 172 mg/dL (17 Aug 2022 23:33)      Drug Levels: [] N/A    CSF Analysis: [] N/A      Allergies    oxycodone (Pruritus)    Intolerances      MEDICATIONS:  Antibiotics:  ceFAZolin   IVPB 2000 milliGRAM(s) IV Intermittent every 8 hours  nitrofurantoin monohydrate/macrocrystals (MACROBID) 50 milliGRAM(s) Oral <User Schedule>    Neuro:  acetaminophen     Tablet .. 1000 milliGRAM(s) Oral every 8 hours  buPROPion XL (24-Hour) . 150 milliGRAM(s) Oral daily  diazepam    Tablet 5 milliGRAM(s) Oral every 8 hours  diphenhydrAMINE 25 milliGRAM(s) Oral every 6 hours PRN  DULoxetine 60 milliGRAM(s) Oral daily  gabapentin 600 milliGRAM(s) Oral three times a day  HYDROmorphone PCA (1 mG/mL) 30 milliLiter(s) PCA Continuous PCA Continuous  ondansetron Injectable 4 milliGRAM(s) IV Push every 6 hours PRN    Anticoagulation:    OTHER:  ALBUTerol   0.042% 2.5 milliGRAM(s) Nebulizer every 6 hours PRN  bisacodyl 5 milliGRAM(s) Oral every 12 hours  chlorhexidine 2% Cloths 1 Application(s) Topical daily  dexAMETHasone  Injectable 4 milliGRAM(s) IV Push every 6 hours  dextrose 50% Injectable 25 Gram(s) IV Push once  dextrose 50% Injectable 12.5 Gram(s) IV Push once  insulin lispro (ADMELOG) corrective regimen sliding scale   SubCutaneous Before meals and at bedtime  metoprolol tartrate 50 milliGRAM(s) Oral two times a day  naloxegol 25 milliGRAM(s) Oral daily  naloxone Injectable 0.1 milliGRAM(s) IV Push every 3 minutes PRN  pantoprazole    Tablet 40 milliGRAM(s) Oral before breakfast  polyethylene glycol 3350 17 Gram(s) Oral daily  senna 2 Tablet(s) Oral at bedtime  simvastatin 40 milliGRAM(s) Oral at bedtime    IVF:  cholecalciferol 2000 Unit(s) Oral daily  sodium chloride 0.9%. 1000 milliLiter(s) IV Continuous <Continuous>    CULTURES:    RADIOLOGY & ADDITIONAL TESTS:  < from: Xray Spine 1 View, Bedside (08.17.22 @ 14:41) >  IMPRESSION: Patient status spinal fusion revision now extending from C2   through the pelvis.        ASSESSMENT:  66 y/o female with PMHx HTN, HLD, CVA x 3 (last was 2016 with right hand weakness residual), GAMALIEL not using CPAP, Emphysema, ex-25 pack year smoker quit 2021, Chronic Constipation on Movantik, Urinary Incontinence chronically self straight cath at home, chronic UTI, Breast Implant Rupture with Chronic Leak, B/L CTS s/p release, s/p Intrathecal Morphine pump for pain, with chronic neck and back pain worsening for years s/p multiple spinal surgeries including laminectomies and fusion of cervical, thoracic and lumbar spine, most recently done in 2019 and 2020 at [facility] by Dr. Trinidad. CT findings include hardware loosening at T5, T6, T9-T12. S/p T3-L1 revision of fusion, c/b durotomy with primary repair, loss of left side motors, and repair of intrathecal morphine pump (8/17).    M40.209    Handoff    HTN (hypertension)    Back pain    Hypertension    SS (spinal stenosis)    High cholesterol    Stroke    H/O carpal tunnel syndrome    H/O carpal tunnel syndrome    Chronic GERD    History of chronic constipation    Seizure    Urinary incontinence    Pre-diabetes    Acute UTI    Anxiety    Anxiety and depression    Arthritis    Eczema    External hemorrhoids    H/O kyphosis    Multiple sclerosis    Pancreas cyst    Scoliosis    Wheelchair dependent    Cervical pseudoarthrosis    Right shoulder pain    Cervical spondylosis    Chronic headaches    Chronic LUQ pain    Chronic UTI    Degenerative joint disease    H/O low back pain    Lumbosacral spondylosis    COPD (chronic obstructive pulmonary disease)    Back pain    Lumbar pseudoarthrosis    Pseudoarthrosis of lumbar spine    Fusion, spine, thoracolumbar, posterior approach    Cervical disc disease    H/O Spinal surgery    H/O breast surgery    S/P cervical discectomy    Previous back surgery    H/O shoulder surgery    H/O total shoulder replacement, right    [email or web address]        PLAN:  NEURO:  - neuro/spinal/vital checks q1hr  - pain management reccs: Valium 5q8, dilaudid PCA  - cont movantik 25mg qd  - 4 deep HMVs, monitor outputs   - decadron 4q6 x 3 days  - ERAS protocol: tylenol 1g q8, gabapentin 600mg TID  - PT/OT when able   - cont wellbutrin 150mg qd, duloxetine 60mg qd    CARDS:  - MAP goal > 85  - off levophed gtt  - cont lopressor 50mg BID, lipitor 40mg qd    PULM:   - extrubated in NSICU to NC 8/17  - Albuterol 2.5q6 PRN    GI:   - ADAT  - protonix while on decadron  - bowel regimen    RENAL:  - jiménez in place, remove in AM  - h/o chronic urniary retnation, straight cath at home    HEME:   - SCDs for DVT ppx  - no chemoprophylaxis at this time     ID:   - post op ancef, macrobid for chronic UTI  - afebrile, no leukocytosis    ENDO:  - ISS, f/u A1C    DISPO: ICU status, full code, dispo pend PT/OT     Assessment and Plan d/w Dr. Luz and Dr. eBck

## 2022-08-18 NOTE — OCCUPATIONAL THERAPY INITIAL EVALUATION ADULT - PERTINENT HX OF CURRENT PROBLEM, REHAB EVAL
66 y/o female with PMHx HTN, HLD, CVA x 3 (last was 2016 with right hand weakness residual), GAMALIEL not using CPAP, Emphysema, ex-25 pack year smoker quit 2021, Chronic Constipation on Movantik, Urinary Incontinence chronically self straight cath at home, chronic UTI, Breast Implant Rupture with Chronic Leak, B/L CTS s/p release, s/p Intrathecal Morphine pump for pain, with chronic neck and back pain worsening for years s/p multiple spinal surgeries including laminectomies and fusion of cervical, thoracic and lumbar spine, most recently done in 2019 and 2020 at [facility] by Dr. Trinidad.

## 2022-08-18 NOTE — OCCUPATIONAL THERAPY INITIAL EVALUATION ADULT - DIAGNOSIS, OT EVAL
Patient POD #1 s/p T3-L1 fusion, presents with decreased sensation and muscle strength to BLE, decreased muscle strength of BUE LUE>RUE, deficits with overall balance and activity tolerance impacting independence with functional activities and mobility.

## 2022-08-19 LAB
GLUCOSE BLDC GLUCOMTR-MCNC: 146 MG/DL — HIGH (ref 70–99)
GLUCOSE BLDC GLUCOMTR-MCNC: 159 MG/DL — HIGH (ref 70–99)
GLUCOSE BLDC GLUCOMTR-MCNC: 164 MG/DL — HIGH (ref 70–99)
GLUCOSE BLDC GLUCOMTR-MCNC: 197 MG/DL — HIGH (ref 70–99)
GLUCOSE BLDC GLUCOMTR-MCNC: 197 MG/DL — HIGH (ref 70–99)

## 2022-08-19 PROCEDURE — 99233 SBSQ HOSP IP/OBS HIGH 50: CPT

## 2022-08-19 PROCEDURE — 99024 POSTOP FOLLOW-UP VISIT: CPT

## 2022-08-19 RX ADMIN — PANTOPRAZOLE SODIUM 40 MILLIGRAM(S): 20 TABLET, DELAYED RELEASE ORAL at 06:27

## 2022-08-19 RX ADMIN — Medication 50 MILLIGRAM(S): at 07:30

## 2022-08-19 RX ADMIN — GABAPENTIN 600 MILLIGRAM(S): 400 CAPSULE ORAL at 14:38

## 2022-08-19 RX ADMIN — Medication 5 MILLIGRAM(S): at 00:22

## 2022-08-19 RX ADMIN — Medication 1000 MILLIGRAM(S): at 06:27

## 2022-08-19 RX ADMIN — POLYETHYLENE GLYCOL 3350 17 GRAM(S): 17 POWDER, FOR SOLUTION ORAL at 13:13

## 2022-08-19 RX ADMIN — Medication 5 MILLIGRAM(S): at 06:27

## 2022-08-19 RX ADMIN — Medication 4 MILLIGRAM(S): at 06:26

## 2022-08-19 RX ADMIN — Medication 1000 MILLIGRAM(S): at 07:27

## 2022-08-19 RX ADMIN — Medication 1000 MILLIGRAM(S): at 15:10

## 2022-08-19 RX ADMIN — Medication 1000 MILLIGRAM(S): at 14:38

## 2022-08-19 RX ADMIN — Medication 2000 UNIT(S): at 13:14

## 2022-08-19 RX ADMIN — Medication 4 MILLIGRAM(S): at 13:14

## 2022-08-19 RX ADMIN — GABAPENTIN 600 MILLIGRAM(S): 400 CAPSULE ORAL at 06:27

## 2022-08-19 RX ADMIN — BUPROPION HYDROCHLORIDE 150 MILLIGRAM(S): 150 TABLET, EXTENDED RELEASE ORAL at 13:14

## 2022-08-19 RX ADMIN — Medication 4 MILLIGRAM(S): at 19:09

## 2022-08-19 RX ADMIN — NALOXEGOL OXALATE 25 MILLIGRAM(S): 12.5 TABLET, FILM COATED ORAL at 14:39

## 2022-08-19 RX ADMIN — DULOXETINE HYDROCHLORIDE 60 MILLIGRAM(S): 30 CAPSULE, DELAYED RELEASE ORAL at 12:34

## 2022-08-19 RX ADMIN — Medication 50 MILLIGRAM(S): at 19:10

## 2022-08-19 RX ADMIN — Medication 50 MILLIGRAM(S): at 06:27

## 2022-08-19 RX ADMIN — Medication 2: at 07:39

## 2022-08-19 RX ADMIN — Medication 4 MILLIGRAM(S): at 00:23

## 2022-08-19 NOTE — PROGRESS NOTE ADULT - SUBJECTIVE AND OBJECTIVE BOX
About to have drain removed.  Feeling a lot better since surgery.  Denies any severe pain at this time.        Remaining ROS negative       PHYSICAL EXAM:    General: no acute distress, lying on side for drain removal  HEENT: anicteric sclera; MMM  Cardiovascular: +S1/S2, RRR, no murmurs, rubs or gallops  Respiratory: CTA B/L; no W/R/R  Gastrointestinal: soft, NT/ND; +BSx4  Extremities: WWP; no edema  Psychiatric: pleasant mood and affect  Dermatologic: 4 drains present on back    VITAL SIGNS:  Vital Signs Last 24 Hrs  T(C): 37 (19 Aug 2022 06:00), Max: 37 (19 Aug 2022 06:00)  T(F): 98.6 (19 Aug 2022 06:00), Max: 98.6 (19 Aug 2022 06:00)  HR: 66 (19 Aug 2022 09:52) (66 - 80)  BP: 127/81 (19 Aug 2022 09:52) (124/63 - 166/90)  BP(mean): --  RR: 16 (19 Aug 2022 09:52) (16 - 18)  SpO2: 98% (19 Aug 2022 09:52) (98% - 100%)    Parameters below as of 19 Aug 2022 09:52  Patient On (Oxygen Delivery Method): room air          MEDICATIONS:  MEDICATIONS  (STANDING):  acetaminophen     Tablet .. 1000 milliGRAM(s) Oral every 8 hours  bisacodyl 5 milliGRAM(s) Oral every 12 hours  buPROPion XL (24-Hour) . 150 milliGRAM(s) Oral daily  cholecalciferol 2000 Unit(s) Oral daily  dexAMETHasone  Injectable 4 milliGRAM(s) IV Push every 6 hours  dextrose 50% Injectable 25 Gram(s) IV Push once  dextrose 50% Injectable 12.5 Gram(s) IV Push once  diazepam    Tablet 5 milliGRAM(s) Oral every 8 hours  DULoxetine 60 milliGRAM(s) Oral daily  enoxaparin Injectable 40 milliGRAM(s) SubCutaneous <User Schedule>  gabapentin 600 milliGRAM(s) Oral three times a day  HYDROmorphone PCA (1 mG/mL) 30 milliLiter(s) PCA Continuous PCA Continuous  insulin lispro (ADMELOG) corrective regimen sliding scale   SubCutaneous Before meals and at bedtime  metoprolol tartrate 50 milliGRAM(s) Oral two times a day  naloxegol 25 milliGRAM(s) Oral daily  nitrofurantoin macrocrystals (MACRODANTIN) 50 milliGRAM(s) Oral <User Schedule>  pantoprazole    Tablet 40 milliGRAM(s) Oral before breakfast  polyethylene glycol 3350 17 Gram(s) Oral daily  senna 2 Tablet(s) Oral at bedtime  simvastatin 40 milliGRAM(s) Oral at bedtime    MEDICATIONS  (PRN):  ALBUTerol   0.042% 2.5 milliGRAM(s) Nebulizer every 6 hours PRN Shortness of Breath and/or Wheezing  diphenhydrAMINE 25 milliGRAM(s) Oral every 6 hours PRN Rash and/or Itching  naloxone Injectable 0.1 milliGRAM(s) IV Push every 3 minutes PRN For ANY of the following changes in patient status:  A. RR LESS THAN 10 breaths per minute, B. Oxygen saturation LESS THAN 90%, C. Sedation score of 6  ondansetron Injectable 4 milliGRAM(s) IV Push every 6 hours PRN Nausea      ALLERGIES:  Allergies    oxycodone (Pruritus)    Intolerances        LABS:                        10.7   12.95 )-----------( 169      ( 18 Aug 2022 05:40 )             32.5     08-18    137  |  105  |  10  ----------------------------<  152<H>  4.5   |  23  |  0.54    Ca    8.5      18 Aug 2022 05:40  Phos  3.1     08-18  Mg     1.9     08-18      PT/INR - ( 17 Aug 2022 18:26 )   PT: 13.1 sec;   INR: 1.10          PTT - ( 17 Aug 2022 18:26 )  PTT:24.1 sec    CAPILLARY BLOOD GLUCOSE      POCT Blood Glucose.: 146 mg/dL (19 Aug 2022 12:48)      RADIOLOGY & ADDITIONAL TESTS: Reviewed.

## 2022-08-19 NOTE — PROGRESS NOTE ADULT - SUBJECTIVE AND OBJECTIVE BOX
HPI:  "66 y/o female with PMHx HTN, HLD, CVA x 3 (last was 2016 with right hand weakness residual), GAMALIEL not using CPAP, Emphysema, ex-25 pack year smoker quit 2021, Chronic Constipation on Movantik, Urinary Incontinence chronically self straight cath at home, chronic UTI, Breast Implant Rupture with Chronic Leak, B/L CTS s/p release, s/p Intrathecal Morphine pump for pain, with chronic neck and back pain worsening for years s/p multiple spinal surgeries including laminectomies and fusion of cervical, thoracic and lumbar spine, most recently done in 2019 and 2020 at [facility] by Dr. Trinidad.  She is wheelchair dependent.  She also has history of right shoulder replacement.  She reports bilateral shoulder pain, bilateral leg pain and burning to the bilateral calves.  She reports right hand greater than arm and leg weakness." Now s/p T3 - L1 posterior fusion (EBL 500cc, 1L UOP, 4200 crystalloid, 1u PRBC) and admitted to NSICU.        Subjective:  Patient reports mild pain and denies any other complaints.     Hospital course:  8/17: POD0 s/p T3-L1 revision of fusion. extubated in NSICU  8/18: POD1 KAMRAN overnight. Started on PCA. Tx to SDU. PT/OT rec AR.  8/19: POD2, KAMRAN overnight, neuro exam stable    Vital Signs Last 24 Hrs  T(C): 36.8 (18 Aug 2022 21:04), Max: 36.9 (18 Aug 2022 09:16)  T(F): 98.3 (18 Aug 2022 21:04), Max: 98.4 (18 Aug 2022 09:16)  HR: 80 (18 Aug 2022 21:04) (69 - 96)  BP: 126/68 (18 Aug 2022 21:04) (121/68 - 162/80)  BP(mean): 90 (18 Aug 2022 12:30) (90 - 117)  RR: 18 (18 Aug 2022 21:04) (16 - 18)  SpO2: 100% (18 Aug 2022 21:04) (100% - 100%)    Parameters below as of 18 Aug 2022 21:04  Patient On (Oxygen Delivery Method): room air        I&O's Summary    17 Aug 2022 07:01  -  18 Aug 2022 07:00  --------------------------------------------------------  IN: 1978.4 mL / OUT: 1530 mL / NET: 448.4 mL    18 Aug 2022 07:01  -  19 Aug 2022 00:44  --------------------------------------------------------  IN: 465 mL / OUT: 1975 mL / NET: -1510 mL        PHYSICAL EXAM:  General: patient seen laying supine in bed in NAD  Neuro: AAOx3, follows commands, opens eyes spontaneously, speech clear and fluent,  face symmetric,  sensation intact to light touch throughout, LUE strength 4+/5, RUE 4+/5, b/l LEs HF/DF/PF 4+/5 KF/KE 5/5  HEENT: PERRL  Neck: supple  Cardiac: RRR, S1S2  Pulmonary: chest rise symmetric  Abdomen: soft, nontender, nondistended  Ext: perfusing well  Skin: warm, dry  Wound: thoracolumbar incision dressing c/d/i        TUBES/LINES:  [] Castillo  [] Lumbar Drain  [x] Wound Drains - HMV x 4  [] Others      DIET:  [] NPO  [x] Mechanical  [] Tube feeds    LABS:                        10.7   12.95 )-----------( 169      ( 18 Aug 2022 05:40 )             32.5     08-18    137  |  105  |  10  ----------------------------<  152<H>  4.5   |  23  |  0.54    Ca    8.5      18 Aug 2022 05:40  Phos  3.1     08-18  Mg     1.9     08-18      PT/INR - ( 17 Aug 2022 18:26 )   PT: 13.1 sec;   INR: 1.10          PTT - ( 17 Aug 2022 18:26 )  PTT:24.1 sec        CAPILLARY BLOOD GLUCOSE      POCT Blood Glucose.: 185 mg/dL (18 Aug 2022 22:18)  POCT Blood Glucose.: 178 mg/dL (18 Aug 2022 17:57)  POCT Blood Glucose.: 138 mg/dL (18 Aug 2022 11:27)  POCT Blood Glucose.: 148 mg/dL (18 Aug 2022 06:30)      Drug Levels: [] N/A    CSF Analysis: [] N/A      Allergies    oxycodone (Pruritus)    Intolerances      MEDICATIONS:  Antibiotics:  nitrofurantoin macrocrystals (MACRODANTIN) 50 milliGRAM(s) Oral <User Schedule>    Neuro:  acetaminophen     Tablet .. 1000 milliGRAM(s) Oral every 8 hours  buPROPion XL (24-Hour) . 150 milliGRAM(s) Oral daily  diazepam    Tablet 5 milliGRAM(s) Oral every 8 hours  diphenhydrAMINE 25 milliGRAM(s) Oral every 6 hours PRN  DULoxetine 60 milliGRAM(s) Oral daily  gabapentin 600 milliGRAM(s) Oral three times a day  HYDROmorphone PCA (1 mG/mL) 30 milliLiter(s) PCA Continuous PCA Continuous  ondansetron Injectable 4 milliGRAM(s) IV Push every 6 hours PRN    Anticoagulation:  enoxaparin Injectable 40 milliGRAM(s) SubCutaneous <User Schedule>    OTHER:  ALBUTerol   0.042% 2.5 milliGRAM(s) Nebulizer every 6 hours PRN  bisacodyl 5 milliGRAM(s) Oral every 12 hours  dexAMETHasone  Injectable 4 milliGRAM(s) IV Push every 6 hours  dextrose 50% Injectable 25 Gram(s) IV Push once  dextrose 50% Injectable 12.5 Gram(s) IV Push once  insulin lispro (ADMELOG) corrective regimen sliding scale   SubCutaneous Before meals and at bedtime  metoprolol tartrate 50 milliGRAM(s) Oral two times a day  naloxegol 25 milliGRAM(s) Oral daily  naloxone Injectable 0.1 milliGRAM(s) IV Push every 3 minutes PRN  pantoprazole    Tablet 40 milliGRAM(s) Oral before breakfast  polyethylene glycol 3350 17 Gram(s) Oral daily  senna 2 Tablet(s) Oral at bedtime  simvastatin 40 milliGRAM(s) Oral at bedtime    IVF:  cholecalciferol 2000 Unit(s) Oral daily    CULTURES:    RADIOLOGY & ADDITIONAL TESTS:    M40.209    Handoff    MEWS Score    HTN (hypertension)    Back pain    Hypertension    SS (spinal stenosis)    High cholesterol    Stroke    H/O carpal tunnel syndrome    H/O carpal tunnel syndrome    Chronic GERD    History of chronic constipation    Seizure    Urinary incontinence    Pre-diabetes    Acute UTI    Anxiety    Anxiety and depression    Arthritis    Eczema    External hemorrhoids    H/O kyphosis    Multiple sclerosis    Pancreas cyst    Scoliosis    Wheelchair dependent    Cervical pseudoarthrosis    Right shoulder pain    Cervical spondylosis    Chronic headaches    Chronic LUQ pain    Chronic UTI    Degenerative joint disease    H/O low back pain    Lumbosacral spondylosis    COPD (chronic obstructive pulmonary disease)    Back pain    Lumbar pseudoarthrosis    Pseudoarthrosis of lumbar spine    Fusion, spine, thoracolumbar, posterior approach    Cervical disc disease    H/O Spinal surgery    H/O breast surgery    S/P cervical discectomy    Previous back surgery    H/O shoulder surgery    H/O total shoulder replacement, right    [email or web address]        ASSESSMENT:  66 y/o female with PMHx HTN, HLD, CVA x 3 (last was 2016 with right hand weakness residual), GAMALIEL not using CPAP, Emphysema, ex-25 pack year smoker quit 2021, Chronic Constipation on Movantik, Urinary Incontinence chronically self straight cath at home, chronic UTI, Breast Implant Rupture with Chronic Leak, B/L CTS s/p release, s/p Intrathecal Morphine pump for pain, with chronic neck and back pain worsening for years s/p multiple spinal surgeries including laminectomies and fusion of cervical, thoracic and lumbar spine, most recently done in 2019 and 2020 at [facility] by Dr. Trinidad. CT findings include hardware loosening at T5, T6, T9-T12. S/p T3-L1 revision of fusion, c/b durotomy with primary repair, loss of left side motors, and repair of intrathecal morphine pump (8/17).    PLAN:  NEURO:  - neuro/spinal/vital checks q4hr  - pain management reccs: Valium 5q8, dilaudid PCA  - cont movantik 25mg qd  - 4 deep HMVs, monitor outputs   - decadron 4q6 x 3 days  - ERAS protocol: tylenol 1g q8, gabapentin 600mg TID  - PT/OT when able   - cont wellbutrin 150mg qd, duloxetine 60mg qd    CARDS:  - normotensive SBP goal  - cont lopressor 50mg BID, lipitor 40mg qd    PULM:   - extrubated in NSICU to NC 8/17  - Albuterol 2.5q6 PRN    GI:   - regular diet  - protonix while on decadron  - bowel regimen  - last BM 8/18    RENAL:  - straight cath prn  - h/o chronic urniary retnation, straight cath at home    HEME:   - SCDs for DVT ppx, SQL    ID:   - macrobid for chronic UTI  - afebrile, no leukocytosis    ENDO:  - ISS, A1C 6.6    DISPO: SDU status, full code, PT/OT rec AR    Assessment and Plan d/w Dr. Luz           Assessment:  Present when checked    []  GCS  E   V  M     Heart Failure: []Acute, [] acute on chronic , []chronic  Heart Failure:  [] Diastolic (HFpEF), [] Systolic (HFrEF), []Combined (HFpEF and HFrEF), [] RHF, [] Pulm HTN, [] Other    [] DARRYL, [] ATN, [] AIN, [] other  [] CKD1, [] CKD2, [] CKD 3, [] CKD 4, [] CKD 5, []ESRD    Encephalopathy: [] Metabolic, [] Hepatic, [] toxic, [] Neurological, [] Other    Abnormal Nurtitional Status: [] malnurtition (see nutrition note), [ ]underweight: BMI < 19, [] morbid obesity: BMI >40, [] Cachexia    [] Sepsis  [] hypovolemic shock,[] cardiogenic shock, [] hemorrhagic shock, [] neuogenic shock  [] Acute Respiratory Failure  []Cerebral edema, [] Brain compression/ herniation,   [] Functional quadriplegia  [] Acute blood loss anemia

## 2022-08-19 NOTE — PROCEDURE NOTE - ADDITIONAL PROCEDURE DETAILS
Dressing was removed, area was cleansed with chlorhexidine. drain was removed. area was cleansed again, and puncture hole was closed with steri strip.

## 2022-08-20 LAB
ANION GAP SERPL CALC-SCNC: 12 MMOL/L — SIGNIFICANT CHANGE UP (ref 5–17)
BUN SERPL-MCNC: 16 MG/DL — SIGNIFICANT CHANGE UP (ref 7–23)
CALCIUM SERPL-MCNC: 8.6 MG/DL — SIGNIFICANT CHANGE UP (ref 8.4–10.5)
CHLORIDE SERPL-SCNC: 100 MMOL/L — SIGNIFICANT CHANGE UP (ref 96–108)
CO2 SERPL-SCNC: 22 MMOL/L — SIGNIFICANT CHANGE UP (ref 22–31)
CREAT SERPL-MCNC: 0.53 MG/DL — SIGNIFICANT CHANGE UP (ref 0.5–1.3)
EGFR: 101 ML/MIN/1.73M2 — SIGNIFICANT CHANGE UP
GLUCOSE BLDC GLUCOMTR-MCNC: 150 MG/DL — HIGH (ref 70–99)
GLUCOSE BLDC GLUCOMTR-MCNC: 201 MG/DL — HIGH (ref 70–99)
GLUCOSE BLDC GLUCOMTR-MCNC: 202 MG/DL — HIGH (ref 70–99)
GLUCOSE BLDC GLUCOMTR-MCNC: 215 MG/DL — HIGH (ref 70–99)
GLUCOSE SERPL-MCNC: 176 MG/DL — HIGH (ref 70–99)
HCT VFR BLD CALC: 33 % — LOW (ref 34.5–45)
HGB BLD-MCNC: 10.4 G/DL — LOW (ref 11.5–15.5)
MAGNESIUM SERPL-MCNC: 1.9 MG/DL — SIGNIFICANT CHANGE UP (ref 1.6–2.6)
MCHC RBC-ENTMCNC: 28.4 PG — SIGNIFICANT CHANGE UP (ref 27–34)
MCHC RBC-ENTMCNC: 31.5 GM/DL — LOW (ref 32–36)
MCV RBC AUTO: 90.2 FL — SIGNIFICANT CHANGE UP (ref 80–100)
NRBC # BLD: 0 /100 WBCS — SIGNIFICANT CHANGE UP (ref 0–0)
PHOSPHATE SERPL-MCNC: 3.2 MG/DL — SIGNIFICANT CHANGE UP (ref 2.5–4.5)
PLATELET # BLD AUTO: 152 K/UL — SIGNIFICANT CHANGE UP (ref 150–400)
POTASSIUM SERPL-MCNC: 4.1 MMOL/L — SIGNIFICANT CHANGE UP (ref 3.5–5.3)
POTASSIUM SERPL-SCNC: 4.1 MMOL/L — SIGNIFICANT CHANGE UP (ref 3.5–5.3)
RBC # BLD: 3.66 M/UL — LOW (ref 3.8–5.2)
RBC # FLD: 14.7 % — HIGH (ref 10.3–14.5)
SODIUM SERPL-SCNC: 134 MMOL/L — LOW (ref 135–145)
WBC # BLD: 11.66 K/UL — HIGH (ref 3.8–10.5)
WBC # FLD AUTO: 11.66 K/UL — HIGH (ref 3.8–10.5)

## 2022-08-20 PROCEDURE — 99233 SBSQ HOSP IP/OBS HIGH 50: CPT

## 2022-08-20 PROCEDURE — 99024 POSTOP FOLLOW-UP VISIT: CPT

## 2022-08-20 RX ORDER — HYDROMORPHONE HYDROCHLORIDE 2 MG/ML
0.25 INJECTION INTRAMUSCULAR; INTRAVENOUS; SUBCUTANEOUS ONCE
Refills: 0 | Status: DISCONTINUED | OUTPATIENT
Start: 2022-08-20 | End: 2022-08-20

## 2022-08-20 RX ORDER — HYDROMORPHONE HYDROCHLORIDE 2 MG/ML
4 INJECTION INTRAMUSCULAR; INTRAVENOUS; SUBCUTANEOUS EVERY 4 HOURS
Refills: 0 | Status: DISCONTINUED | OUTPATIENT
Start: 2022-08-20 | End: 2022-08-26

## 2022-08-20 RX ORDER — HYDROMORPHONE HYDROCHLORIDE 2 MG/ML
6 INJECTION INTRAMUSCULAR; INTRAVENOUS; SUBCUTANEOUS EVERY 4 HOURS
Refills: 0 | Status: DISCONTINUED | OUTPATIENT
Start: 2022-08-20 | End: 2022-08-26

## 2022-08-20 RX ADMIN — PANTOPRAZOLE SODIUM 40 MILLIGRAM(S): 20 TABLET, DELAYED RELEASE ORAL at 06:51

## 2022-08-20 RX ADMIN — ENOXAPARIN SODIUM 40 MILLIGRAM(S): 100 INJECTION SUBCUTANEOUS at 21:37

## 2022-08-20 RX ADMIN — HYDROMORPHONE HYDROCHLORIDE 6 MILLIGRAM(S): 2 INJECTION INTRAMUSCULAR; INTRAVENOUS; SUBCUTANEOUS at 17:00

## 2022-08-20 RX ADMIN — DULOXETINE HYDROCHLORIDE 60 MILLIGRAM(S): 30 CAPSULE, DELAYED RELEASE ORAL at 11:59

## 2022-08-20 RX ADMIN — Medication 50 MILLIGRAM(S): at 06:51

## 2022-08-20 RX ADMIN — Medication 4: at 22:45

## 2022-08-20 RX ADMIN — Medication 4 MILLIGRAM(S): at 18:26

## 2022-08-20 RX ADMIN — Medication 5 MILLIGRAM(S): at 14:50

## 2022-08-20 RX ADMIN — Medication 2000 UNIT(S): at 12:00

## 2022-08-20 RX ADMIN — HYDROMORPHONE HYDROCHLORIDE 6 MILLIGRAM(S): 2 INJECTION INTRAMUSCULAR; INTRAVENOUS; SUBCUTANEOUS at 22:40

## 2022-08-20 RX ADMIN — POLYETHYLENE GLYCOL 3350 17 GRAM(S): 17 POWDER, FOR SOLUTION ORAL at 12:00

## 2022-08-20 RX ADMIN — Medication 1000 MILLIGRAM(S): at 15:00

## 2022-08-20 RX ADMIN — Medication 5 MILLIGRAM(S): at 07:10

## 2022-08-20 RX ADMIN — GABAPENTIN 600 MILLIGRAM(S): 400 CAPSULE ORAL at 14:51

## 2022-08-20 RX ADMIN — Medication 5 MILLIGRAM(S): at 12:01

## 2022-08-20 RX ADMIN — GABAPENTIN 600 MILLIGRAM(S): 400 CAPSULE ORAL at 06:53

## 2022-08-20 RX ADMIN — NALOXEGOL OXALATE 25 MILLIGRAM(S): 12.5 TABLET, FILM COATED ORAL at 12:00

## 2022-08-20 RX ADMIN — Medication 50 MILLIGRAM(S): at 06:50

## 2022-08-20 RX ADMIN — Medication 1000 MILLIGRAM(S): at 08:00

## 2022-08-20 RX ADMIN — HYDROMORPHONE HYDROCHLORIDE 0.25 MILLIGRAM(S): 2 INJECTION INTRAMUSCULAR; INTRAVENOUS; SUBCUTANEOUS at 03:28

## 2022-08-20 RX ADMIN — Medication 50 MILLIGRAM(S): at 18:25

## 2022-08-20 RX ADMIN — SENNA PLUS 2 TABLET(S): 8.6 TABLET ORAL at 00:25

## 2022-08-20 RX ADMIN — ENOXAPARIN SODIUM 40 MILLIGRAM(S): 100 INJECTION SUBCUTANEOUS at 00:25

## 2022-08-20 RX ADMIN — SIMVASTATIN 40 MILLIGRAM(S): 20 TABLET, FILM COATED ORAL at 21:37

## 2022-08-20 RX ADMIN — GABAPENTIN 600 MILLIGRAM(S): 400 CAPSULE ORAL at 00:26

## 2022-08-20 RX ADMIN — Medication 4 MILLIGRAM(S): at 06:53

## 2022-08-20 RX ADMIN — HYDROMORPHONE HYDROCHLORIDE 6 MILLIGRAM(S): 2 INJECTION INTRAMUSCULAR; INTRAVENOUS; SUBCUTANEOUS at 17:14

## 2022-08-20 RX ADMIN — Medication 1000 MILLIGRAM(S): at 00:25

## 2022-08-20 RX ADMIN — SIMVASTATIN 40 MILLIGRAM(S): 20 TABLET, FILM COATED ORAL at 00:26

## 2022-08-20 RX ADMIN — Medication 2: at 00:24

## 2022-08-20 RX ADMIN — Medication 4: at 12:15

## 2022-08-20 RX ADMIN — Medication 5 MILLIGRAM(S): at 00:47

## 2022-08-20 RX ADMIN — HYDROMORPHONE HYDROCHLORIDE 0.25 MILLIGRAM(S): 2 INJECTION INTRAMUSCULAR; INTRAVENOUS; SUBCUTANEOUS at 04:16

## 2022-08-20 RX ADMIN — Medication 1000 MILLIGRAM(S): at 14:50

## 2022-08-20 RX ADMIN — Medication 4: at 08:26

## 2022-08-20 RX ADMIN — BUPROPION HYDROCHLORIDE 150 MILLIGRAM(S): 150 TABLET, EXTENDED RELEASE ORAL at 12:01

## 2022-08-20 RX ADMIN — Medication 1000 MILLIGRAM(S): at 01:07

## 2022-08-20 RX ADMIN — Medication 1000 MILLIGRAM(S): at 07:14

## 2022-08-20 RX ADMIN — HYDROMORPHONE HYDROCHLORIDE 0.25 MILLIGRAM(S): 2 INJECTION INTRAMUSCULAR; INTRAVENOUS; SUBCUTANEOUS at 01:15

## 2022-08-20 RX ADMIN — Medication 4 MILLIGRAM(S): at 00:46

## 2022-08-20 RX ADMIN — GABAPENTIN 600 MILLIGRAM(S): 400 CAPSULE ORAL at 21:37

## 2022-08-20 RX ADMIN — HYDROMORPHONE HYDROCHLORIDE 6 MILLIGRAM(S): 2 INJECTION INTRAMUSCULAR; INTRAVENOUS; SUBCUTANEOUS at 21:37

## 2022-08-20 RX ADMIN — HYDROMORPHONE HYDROCHLORIDE 0.25 MILLIGRAM(S): 2 INJECTION INTRAMUSCULAR; INTRAVENOUS; SUBCUTANEOUS at 02:00

## 2022-08-20 RX ADMIN — Medication 4 MILLIGRAM(S): at 12:00

## 2022-08-20 NOTE — PROVIDER CONTACT NOTE (OTHER) - ACTION/TREATMENT ORDERED:
Pt was not bladder scanned. Pt did not allow for Yamileth RN to attempt to catheterize her. Pt attempted to catheterize herself, but failed. Eventually REDDY Harrington successfully catheterized patient.

## 2022-08-20 NOTE — PROGRESS NOTE ADULT - SUBJECTIVE AND OBJECTIVE BOX
Feeling a lot of pain.  Also has not had a bowel movement.       Remaining ROS negative       PHYSICAL EXAM:    General: no acute distress, sitting up in bed  HEENT: anicteric sclera; MMM  Cardiovascular: +S1/S2, RRR, no murmurs, rubs or gallops  Respiratory: CTA B/L; no W/R/R  Gastrointestinal: soft, NT/ND; +BSx4  Extremities: WWP; no edema  Psychiatric: pleasant mood and affect      VITAL SIGNS:  Vital Signs Last 24 Hrs  T(C): 36.7 (20 Aug 2022 10:00), Max: 36.7 (20 Aug 2022 10:00)  T(F): 98 (20 Aug 2022 10:00), Max: 98 (20 Aug 2022 10:00)  HR: 82 (20 Aug 2022 10:00) (70 - 82)  BP: 146/78 (20 Aug 2022 10:00) (114/84 - 175/84)  BP(mean): --  RR: 17 (20 Aug 2022 10:00) (17 - 18)  SpO2: 82% (20 Aug 2022 10:00) (82% - 100%)    Parameters below as of 20 Aug 2022 10:00  Patient On (Oxygen Delivery Method): room air          MEDICATIONS:  MEDICATIONS  (STANDING):  acetaminophen     Tablet .. 1000 milliGRAM(s) Oral every 8 hours  bisacodyl 5 milliGRAM(s) Oral every 12 hours  buPROPion XL (24-Hour) . 150 milliGRAM(s) Oral daily  cholecalciferol 2000 Unit(s) Oral daily  dexAMETHasone  Injectable 4 milliGRAM(s) IV Push every 6 hours  dextrose 50% Injectable 25 Gram(s) IV Push once  dextrose 50% Injectable 12.5 Gram(s) IV Push once  diazepam    Tablet 5 milliGRAM(s) Oral every 8 hours  DULoxetine 60 milliGRAM(s) Oral daily  enoxaparin Injectable 40 milliGRAM(s) SubCutaneous <User Schedule>  gabapentin 600 milliGRAM(s) Oral three times a day  HYDROmorphone PCA (1 mG/mL) 30 milliLiter(s) PCA Continuous PCA Continuous  insulin lispro (ADMELOG) corrective regimen sliding scale   SubCutaneous Before meals and at bedtime  metoprolol tartrate 50 milliGRAM(s) Oral two times a day  naloxegol 25 milliGRAM(s) Oral daily  nitrofurantoin macrocrystals (MACRODANTIN) 50 milliGRAM(s) Oral <User Schedule>  pantoprazole    Tablet 40 milliGRAM(s) Oral before breakfast  polyethylene glycol 3350 17 Gram(s) Oral daily  senna 2 Tablet(s) Oral at bedtime  simvastatin 40 milliGRAM(s) Oral at bedtime    MEDICATIONS  (PRN):  ALBUTerol   0.042% 2.5 milliGRAM(s) Nebulizer every 6 hours PRN Shortness of Breath and/or Wheezing  diphenhydrAMINE 25 milliGRAM(s) Oral every 6 hours PRN Rash and/or Itching  naloxone Injectable 0.1 milliGRAM(s) IV Push every 3 minutes PRN For ANY of the following changes in patient status:  A. RR LESS THAN 10 breaths per minute, B. Oxygen saturation LESS THAN 90%, C. Sedation score of 6  ondansetron Injectable 4 milliGRAM(s) IV Push every 6 hours PRN Nausea      ALLERGIES:  Allergies    oxycodone (Pruritus)    Intolerances        LABS:                        10.4   11.66 )-----------( 152      ( 20 Aug 2022 10:11 )             33.0     08-20    134<L>  |  100  |  16  ----------------------------<  176<H>  4.1   |  22  |  0.53    Ca    8.6      20 Aug 2022 10:11  Phos  3.2     08-20  Mg     1.9     08-20          CAPILLARY BLOOD GLUCOSE      POCT Blood Glucose.: 202 mg/dL (20 Aug 2022 11:39)      RADIOLOGY & ADDITIONAL TESTS: Reviewed.

## 2022-08-20 NOTE — PROVIDER CONTACT NOTE (OTHER) - ASSESSMENT
Pt was last straight catheterized at 7pm urine output=1,000cc, however now patient is requesting to be straight catheterized again even though it hasn't been six hours since last catheterization.

## 2022-08-20 NOTE — PROVIDER CONTACT NOTE (OTHER) - ASSESSMENT
Attempted to bladder scan patient prior to catheterization but pt became upset. Pt stated "they never bladder scan me before I need to be catheterized. Why are you doing this now. Your not treating me like a human being. My pu**y is raw. Why can't you just do it".

## 2022-08-20 NOTE — PROGRESS NOTE ADULT - SUBJECTIVE AND OBJECTIVE BOX
HPI:  H&P:  "66 y/o female with PMHx HTN, HLD, CVA x 3 (last was 2016 with right hand weakness residual), GAMALIEL not using CPAP, Emphysema, ex-25 pack year smoker quit 2021, Chronic Constipation on Movantik, Urinary Incontinence chronically self straight cath at home, chronic UTI, Breast Implant Rupture with Chronic Leak, B/L CTS s/p release, s/p Intrathecal Morphine pump for pain, with chronic neck and back pain worsening for years s/p multiple spinal surgeries including laminectomies and fusion of cervical, thoracic and lumbar spine, most recently done in 2019 and 2020 at [facility] by Dr. Trinidad.  She is wheelchair dependent.  She also has history of right shoulder replacement.  She reports bilateral shoulder pain, bilateral leg pain and burning to the bilateral calves.  She reports right hand greater than arm and leg weakness."    66 yo F with history of HTN, HLD, prior strokes with residual RUE weakness, GAMALIEL noncompliant with CPAP, COPD, prior extensive smoking history, chronic constipation, urinary incontinence, chronic UTI, ruptured breast implant, bilateral CTS release surgery, intrathecal morphine pump and chronic pain s/p multiple spinal surgeries who is now s/p T3 - L1 posterior fusion (EBL 500cc, 1L UOP, 4200 crystalloid, 1u PRBC) and admitted to NSICU.  She remains intubated with progressive improvement in level of arousal      Subjective:  Overnight patient c/o pain uncontrolled by current pain regimen and dilaudid PCA. Patient alert and satting 100% on RA at time of assessment. Given additional 0.25 IVP dilaudid.   Hospital course:  8/17: POD0 s/p T3-L1 revision of fusion. extubated in NSICU  8/18: POD1 KAMRAN overnight. Started on PCA. Tx to SDU. PT/OT rec AR.  8/19: POD2, overnight episode of agitation and screaming due to concern over pain control. neuro exam stable.  8/20: POD3, extra 0.25mg IV dilaudid given o/n for pain. neuro exam stable    Vital Signs Last 24 Hrs  T(C): 36.3 (20 Aug 2022 01:00), Max: 37 (19 Aug 2022 06:00)  T(F): 97.3 (20 Aug 2022 01:00), Max: 98.6 (19 Aug 2022 06:00)  HR: 72 (20 Aug 2022 01:00) (66 - 80)  BP: 165/87 (20 Aug 2022 01:00) (114/84 - 166/90)  BP(mean): --  RR: 17 (20 Aug 2022 01:00) (16 - 18)  SpO2: 94% (20 Aug 2022 01:00) (94% - 100%)    Parameters below as of 20 Aug 2022 01:00  Patient On (Oxygen Delivery Method): room air        I&O's Summary    18 Aug 2022 07:01  -  19 Aug 2022 07:00  --------------------------------------------------------  IN: 465 mL / OUT: 3780 mL / NET: -3315 mL    19 Aug 2022 07:01  -  20 Aug 2022 01:49  --------------------------------------------------------  IN: 600 mL / OUT: 2150 mL / NET: -1550 mL        PHYSICAL EXAM:  General: patient seen laying supine in bed in NAD  Neuro: AAOx3, follows commands, opens eyes spontaneously, speech clear and fluent,  face symmetric,  sensation intact to light touch throughout, LUE strength 4+/5, RUE 4+/5, b/l LEs HF/DF/PF 4+/5 KF/KE 5/5  HEENT: PERRL  Neck: supple  Cardiac: RRR, S1S2  Pulmonary: chest rise symmetric  Abdomen: soft, nontender, nondistended  Ext: perfusing well  Skin: warm, dry  Wound: thoracolumbar incision dressing c/d/i        TUBES/LINES:  [] Castillo  [] Lumbar Drain  [x] Wound Drains - HMV x 3  [] Others      DIET:  [] NPO  [x] Mechanical  [] Tube feeds  LABS:                        10.7   12.95 )-----------( 169      ( 18 Aug 2022 05:40 )             32.5     08-18    137  |  105  |  10  ----------------------------<  152<H>  4.5   |  23  |  0.54    Ca    8.5      18 Aug 2022 05:40  Phos  3.1     08-18  Mg     1.9     08-18              CAPILLARY BLOOD GLUCOSE      POCT Blood Glucose.: 197 mg/dL (19 Aug 2022 23:56)  POCT Blood Glucose.: 197 mg/dL (19 Aug 2022 21:58)  POCT Blood Glucose.: 164 mg/dL (19 Aug 2022 17:06)  POCT Blood Glucose.: 146 mg/dL (19 Aug 2022 12:48)  POCT Blood Glucose.: 159 mg/dL (19 Aug 2022 07:31)      Drug Levels: [] N/A    CSF Analysis: [] N/A      Allergies    oxycodone (Pruritus)    Intolerances      MEDICATIONS:  Antibiotics:  nitrofurantoin macrocrystals (MACRODANTIN) 50 milliGRAM(s) Oral <User Schedule>    Neuro:  acetaminophen     Tablet .. 1000 milliGRAM(s) Oral every 8 hours  buPROPion XL (24-Hour) . 150 milliGRAM(s) Oral daily  diazepam    Tablet 5 milliGRAM(s) Oral every 8 hours  diphenhydrAMINE 25 milliGRAM(s) Oral every 6 hours PRN  DULoxetine 60 milliGRAM(s) Oral daily  gabapentin 600 milliGRAM(s) Oral three times a day  HYDROmorphone PCA (1 mG/mL) 30 milliLiter(s) PCA Continuous PCA Continuous  ondansetron Injectable 4 milliGRAM(s) IV Push every 6 hours PRN    Anticoagulation:  enoxaparin Injectable 40 milliGRAM(s) SubCutaneous <User Schedule>    OTHER:  ALBUTerol   0.042% 2.5 milliGRAM(s) Nebulizer every 6 hours PRN  bisacodyl 5 milliGRAM(s) Oral every 12 hours  dexAMETHasone  Injectable 4 milliGRAM(s) IV Push every 6 hours  dextrose 50% Injectable 25 Gram(s) IV Push once  dextrose 50% Injectable 12.5 Gram(s) IV Push once  insulin lispro (ADMELOG) corrective regimen sliding scale   SubCutaneous Before meals and at bedtime  metoprolol tartrate 50 milliGRAM(s) Oral two times a day  naloxegol 25 milliGRAM(s) Oral daily  naloxone Injectable 0.1 milliGRAM(s) IV Push every 3 minutes PRN  pantoprazole    Tablet 40 milliGRAM(s) Oral before breakfast  polyethylene glycol 3350 17 Gram(s) Oral daily  senna 2 Tablet(s) Oral at bedtime  simvastatin 40 milliGRAM(s) Oral at bedtime    IVF:  cholecalciferol 2000 Unit(s) Oral daily    CULTURES:    RADIOLOGY & ADDITIONAL TESTS:    M40.209    Handoff    MEWS Score    HTN (hypertension)    Back pain    Hypertension    SS (spinal stenosis)    High cholesterol    Stroke    H/O carpal tunnel syndrome    H/O carpal tunnel syndrome    Chronic GERD    History of chronic constipation    Seizure    Urinary incontinence    Pre-diabetes    Acute UTI    Anxiety    Anxiety and depression    Arthritis    Eczema    External hemorrhoids    H/O kyphosis    Multiple sclerosis    Pancreas cyst    Scoliosis    Wheelchair dependent    Cervical pseudoarthrosis    Right shoulder pain    Cervical spondylosis    Chronic headaches    Chronic LUQ pain    Chronic UTI    Degenerative joint disease    H/O low back pain    Lumbosacral spondylosis    COPD (chronic obstructive pulmonary disease)    Back pain    Lumbar pseudoarthrosis    Pseudoarthrosis of lumbar spine    Fusion, spine, thoracolumbar, posterior approach    Cervical disc disease    H/O Spinal surgery    H/O breast surgery    S/P cervical discectomy    Previous back surgery    H/O shoulder surgery    H/O total shoulder replacement, right    SysAdmin_[email or web address]        ASSESSMENT:  66 y/o female with PMHx HTN, HLD, CVA x 3 (last was 2016 with right hand weakness residual), GAMALIEL not using CPAP, Emphysema, ex-25 pack year smoker quit 2021, Chronic Constipation on Movantik, Urinary Incontinence chronically self straight cath at home, chronic UTI, Breast Implant Rupture with Chronic Leak, B/L CTS s/p release, s/p Intrathecal Morphine pump for pain, with chronic neck and back pain worsening for years s/p multiple spinal surgeries including laminectomies and fusion of cervical, thoracic and lumbar spine, most recently done in 2019 and 2020 at [facility] by Dr. Trinidad. CT findings include hardware loosening at T5, T6, T9-T12. S/p T3-L1 revision of fusion, c/b durotomy with primary repair, loss of left side motors, and repair of intrathecal morphine pump (8/17).    PLAN:  NEURO:  - neuro/spinal/vital checks q4hr  - pain management reccs: Valium 5q8, dilaudid PCA  - cont movantik 25mg qd  - 3 deep HMVs, monitor outputs, HMV#4 d/c 8/19  - decadron 4q6 x 3 days  - ERAS protocol: tylenol 1g q8, gabapentin 600mg TID  - PT/OT when able   - cont wellbutrin 150mg qd, duloxetine 60mg qd  - postop xrays when drains out    CARDS:  - normotensive SBP goal  - cont lopressor 50mg BID, lipitor 40mg qd    PULM:   - extrubated in NSICU to NC 8/17  - Albuterol 2.5q6 PRN    GI:   - regular diet  - protonix while on decadron  - bowel regimen  - last BM 8/18    RENAL:  - straight cath prn  - h/o chronic urniary retnation, straight cath at home    HEME:   - SCDs for DVT ppx, SQL    ID:   - macrobid for chronic UTI  - afebrile, no leukocytosis    ENDO:  - ISS, A1C 6.6    DISPO: SDU status, full code, PT/OT rec AR    Assessment and Plan d/w Dr. Luz           Assessment:  Present when checked    []  GCS  E   V  M     Heart Failure: []Acute, [] acute on chronic , []chronic  Heart Failure:  [] Diastolic (HFpEF), [] Systolic (HFrEF), []Combined (HFpEF and HFrEF), [] RHF, [] Pulm HTN, [] Other    [] DARRYL, [] ATN, [] AIN, [] other  [] CKD1, [] CKD2, [] CKD 3, [] CKD 4, [] CKD 5, []ESRD    Encephalopathy: [] Metabolic, [] Hepatic, [] toxic, [] Neurological, [] Other    Abnormal Nurtitional Status: [] malnurtition (see nutrition note), [ ]underweight: BMI < 19, [] morbid obesity: BMI >40, [] Cachexia    [] Sepsis  [] hypovolemic shock,[] cardiogenic shock, [] hemorrhagic shock, [] neuogenic shock  [] Acute Respiratory Failure  []Cerebral edema, [] Brain compression/ herniation,   [] Functional quadriplegia  [] Acute blood loss anemia   HPI:  H&P:  "66 y/o female with PMHx HTN, HLD, CVA x 3 (last was 2016 with right hand weakness residual), GAMALIEL not using CPAP, Emphysema, ex-25 pack year smoker quit 2021, Chronic Constipation on Movantik, Urinary Incontinence chronically self straight cath at home, chronic UTI, Breast Implant Rupture with Chronic Leak, B/L CTS s/p release, s/p Intrathecal Morphine pump for pain, with chronic neck and back pain worsening for years s/p multiple spinal surgeries including laminectomies and fusion of cervical, thoracic and lumbar spine, most recently done in 2019 and 2020 at [facility] by Dr. Trinidad.  She is wheelchair dependent.  She also has history of right shoulder replacement.  She reports bilateral shoulder pain, bilateral leg pain and burning to the bilateral calves.  She reports right hand greater than arm and leg weakness."    66 yo F with history of HTN, HLD, prior strokes with residual RUE weakness, GAMALIEL noncompliant with CPAP, COPD, prior extensive smoking history, chronic constipation, urinary incontinence, chronic UTI, ruptured breast implant, bilateral CTS release surgery, intrathecal morphine pump and chronic pain s/p multiple spinal surgeries who is now s/p T3 - L1 posterior fusion (EBL 500cc, 1L UOP, 4200 crystalloid, 1u PRBC) and admitted to NSICU.  She remains intubated with progressive improvement in level of arousal      Subjective:  Overnight patient c/o pain uncontrolled by current pain regimen and dilaudid PCA. Patient alert and satting 100% on RA at time of assessment. Given additional 0.25 IVP dilaudid.   Hospital course:  8/17: POD0 s/p T3-L1 revision of fusion. extubated in NSICU  8/18: POD1 KAMRAN overnight. Started on PCA. Tx to SDU. PT/OT rec AR.  8/19: POD2, overnight episode of agitation and screaming due to concern over pain control. neuro exam stable. 1 HMV drain removed.  8/20: POD3, extra 0.25mg IV dilaudid given o/n for pain. neuro exam stable    Vital Signs Last 24 Hrs  T(C): 36.3 (20 Aug 2022 01:00), Max: 37 (19 Aug 2022 06:00)  T(F): 97.3 (20 Aug 2022 01:00), Max: 98.6 (19 Aug 2022 06:00)  HR: 72 (20 Aug 2022 01:00) (66 - 80)  BP: 165/87 (20 Aug 2022 01:00) (114/84 - 166/90)  BP(mean): --  RR: 17 (20 Aug 2022 01:00) (16 - 18)  SpO2: 94% (20 Aug 2022 01:00) (94% - 100%)    Parameters below as of 20 Aug 2022 01:00  Patient On (Oxygen Delivery Method): room air        I&O's Summary    18 Aug 2022 07:01  -  19 Aug 2022 07:00  --------------------------------------------------------  IN: 465 mL / OUT: 3780 mL / NET: -3315 mL    19 Aug 2022 07:01  -  20 Aug 2022 01:49  --------------------------------------------------------  IN: 600 mL / OUT: 2150 mL / NET: -1550 mL        PHYSICAL EXAM:  General: patient seen laying supine in bed in NAD  Neuro: AAOx3, follows commands, opens eyes spontaneously, speech clear and fluent,  face symmetric,  sensation intact to light touch throughout, LUE strength 4+/5, RUE 4+/5, b/l LEs HF/DF/PF 4+/5 KF/KE 5/5  HEENT: PERRL  Neck: supple  Cardiac: RRR, S1S2  Pulmonary: chest rise symmetric  Abdomen: soft, nontender, nondistended  Ext: perfusing well  Skin: warm, dry  Wound: thoracolumbar incision dressing c/d/i        TUBES/LINES:  [] Castillo  [] Lumbar Drain  [x] Wound Drains - HMV x 3  [] Others      DIET:  [] NPO  [x] Mechanical  [] Tube feeds  LABS:                        10.7   12.95 )-----------( 169      ( 18 Aug 2022 05:40 )             32.5     08-18    137  |  105  |  10  ----------------------------<  152<H>  4.5   |  23  |  0.54    Ca    8.5      18 Aug 2022 05:40  Phos  3.1     08-18  Mg     1.9     08-18              CAPILLARY BLOOD GLUCOSE      POCT Blood Glucose.: 197 mg/dL (19 Aug 2022 23:56)  POCT Blood Glucose.: 197 mg/dL (19 Aug 2022 21:58)  POCT Blood Glucose.: 164 mg/dL (19 Aug 2022 17:06)  POCT Blood Glucose.: 146 mg/dL (19 Aug 2022 12:48)  POCT Blood Glucose.: 159 mg/dL (19 Aug 2022 07:31)      Drug Levels: [] N/A    CSF Analysis: [] N/A      Allergies    oxycodone (Pruritus)    Intolerances      MEDICATIONS:  Antibiotics:  nitrofurantoin macrocrystals (MACRODANTIN) 50 milliGRAM(s) Oral <User Schedule>    Neuro:  acetaminophen     Tablet .. 1000 milliGRAM(s) Oral every 8 hours  buPROPion XL (24-Hour) . 150 milliGRAM(s) Oral daily  diazepam    Tablet 5 milliGRAM(s) Oral every 8 hours  diphenhydrAMINE 25 milliGRAM(s) Oral every 6 hours PRN  DULoxetine 60 milliGRAM(s) Oral daily  gabapentin 600 milliGRAM(s) Oral three times a day  HYDROmorphone PCA (1 mG/mL) 30 milliLiter(s) PCA Continuous PCA Continuous  ondansetron Injectable 4 milliGRAM(s) IV Push every 6 hours PRN    Anticoagulation:  enoxaparin Injectable 40 milliGRAM(s) SubCutaneous <User Schedule>    OTHER:  ALBUTerol   0.042% 2.5 milliGRAM(s) Nebulizer every 6 hours PRN  bisacodyl 5 milliGRAM(s) Oral every 12 hours  dexAMETHasone  Injectable 4 milliGRAM(s) IV Push every 6 hours  dextrose 50% Injectable 25 Gram(s) IV Push once  dextrose 50% Injectable 12.5 Gram(s) IV Push once  insulin lispro (ADMELOG) corrective regimen sliding scale   SubCutaneous Before meals and at bedtime  metoprolol tartrate 50 milliGRAM(s) Oral two times a day  naloxegol 25 milliGRAM(s) Oral daily  naloxone Injectable 0.1 milliGRAM(s) IV Push every 3 minutes PRN  pantoprazole    Tablet 40 milliGRAM(s) Oral before breakfast  polyethylene glycol 3350 17 Gram(s) Oral daily  senna 2 Tablet(s) Oral at bedtime  simvastatin 40 milliGRAM(s) Oral at bedtime    IVF:  cholecalciferol 2000 Unit(s) Oral daily    CULTURES:    RADIOLOGY & ADDITIONAL TESTS:    M40.209    Handoff    MEWS Score    HTN (hypertension)    Back pain    Hypertension    SS (spinal stenosis)    High cholesterol    Stroke    H/O carpal tunnel syndrome    H/O carpal tunnel syndrome    Chronic GERD    History of chronic constipation    Seizure    Urinary incontinence    Pre-diabetes    Acute UTI    Anxiety    Anxiety and depression    Arthritis    Eczema    External hemorrhoids    H/O kyphosis    Multiple sclerosis    Pancreas cyst    Scoliosis    Wheelchair dependent    Cervical pseudoarthrosis    Right shoulder pain    Cervical spondylosis    Chronic headaches    Chronic LUQ pain    Chronic UTI    Degenerative joint disease    H/O low back pain    Lumbosacral spondylosis    COPD (chronic obstructive pulmonary disease)    Back pain    Lumbar pseudoarthrosis    Pseudoarthrosis of lumbar spine    Fusion, spine, thoracolumbar, posterior approach    Cervical disc disease    H/O Spinal surgery    H/O breast surgery    S/P cervical discectomy    Previous back surgery    H/O shoulder surgery    H/O total shoulder replacement, right    SysAdmin_VstLnk        ASSESSMENT:  66 y/o female with PMHx HTN, HLD, CVA x 3 (last was 2016 with right hand weakness residual), GAMALIEL not using CPAP, Emphysema, ex-25 pack year smoker quit 2021, Chronic Constipation on Movantik, Urinary Incontinence chronically self straight cath at home, chronic UTI, Breast Implant Rupture with Chronic Leak, B/L CTS s/p release, s/p Intrathecal Morphine pump for pain, with chronic neck and back pain worsening for years s/p multiple spinal surgeries including laminectomies and fusion of cervical, thoracic and lumbar spine, most recently done in 2019 and 2020 at [facility] by Dr. Trinidad. CT findings include hardware loosening at T5, T6, T9-T12. S/p T3-L1 revision of fusion, c/b durotomy with primary repair, loss of left side motors, and repair of intrathecal morphine pump (8/17).    PLAN:  NEURO:  - neuro/spinal/vital checks q4hr  - pain management reccs: Valium 5q8, dilaudid PCA  - cont movantik 25mg qd  - 3 deep HMVs, monitor outputs, HMV#4 d/c 8/19  - decadron 4q6 x 3 days  - ERAS protocol: tylenol 1g q8, gabapentin 600mg TID  - PT/OT when able   - cont wellbutrin 150mg qd, duloxetine 60mg qd  - postop xrays when drains out    CARDS:  - normotensive SBP goal  - cont lopressor 50mg BID, lipitor 40mg qd    PULM:   - extrubated in NSICU to NC 8/17  - Albuterol 2.5q6 PRN    GI:   - regular diet  - protonix while on decadron  - bowel regimen  - last BM 8/18    RENAL:  - straight cath prn  - h/o chronic urniary retnation, straight cath at home    HEME:   - SCDs for DVT ppx, SQL    ID:   - macrobid for chronic UTI  - afebrile, no leukocytosis    ENDO:  - ISS, A1C 6.6    DISPO: SDU status, full code, PT/OT rec AR    Assessment and Plan d/w Dr. Luz           Assessment:  Present when checked    []  GCS  E   V  M     Heart Failure: []Acute, [] acute on chronic , []chronic  Heart Failure:  [] Diastolic (HFpEF), [] Systolic (HFrEF), []Combined (HFpEF and HFrEF), [] RHF, [] Pulm HTN, [] Other    [] DARRYL, [] ATN, [] AIN, [] other  [] CKD1, [] CKD2, [] CKD 3, [] CKD 4, [] CKD 5, []ESRD    Encephalopathy: [] Metabolic, [] Hepatic, [] toxic, [] Neurological, [] Other    Abnormal Nurtitional Status: [] malnurtition (see nutrition note), [ ]underweight: BMI < 19, [] morbid obesity: BMI >40, [] Cachexia    [] Sepsis  [] hypovolemic shock,[] cardiogenic shock, [] hemorrhagic shock, [] neuogenic shock  [] Acute Respiratory Failure  []Cerebral edema, [] Brain compression/ herniation,   [] Functional quadriplegia  [] Acute blood loss anemia

## 2022-08-20 NOTE — PROVIDER CONTACT NOTE (OTHER) - ASSESSMENT
Pt was in pain at a level 10/10. Despite receiving PCA Dilaudid and Valium 5mg. Pt insisted on getting additional pain medication.

## 2022-08-20 NOTE — PROVIDER CONTACT NOTE (OTHER) - ASSESSMENT
Pt was in pain rated 10/10 so I reached out to ALANA Godoy in hopes of getting one time dose of medication to calm pt.

## 2022-08-21 LAB
ALBUMIN SERPL ELPH-MCNC: 3.6 G/DL — SIGNIFICANT CHANGE UP (ref 3.3–5)
ALP SERPL-CCNC: 80 U/L — SIGNIFICANT CHANGE UP (ref 40–120)
ALT FLD-CCNC: 24 U/L — SIGNIFICANT CHANGE UP (ref 10–45)
ANION GAP SERPL CALC-SCNC: 10 MMOL/L — SIGNIFICANT CHANGE UP (ref 5–17)
AST SERPL-CCNC: 23 U/L — SIGNIFICANT CHANGE UP (ref 10–40)
BILIRUB SERPL-MCNC: 0.3 MG/DL — SIGNIFICANT CHANGE UP (ref 0.2–1.2)
BUN SERPL-MCNC: 17 MG/DL — SIGNIFICANT CHANGE UP (ref 7–23)
CALCIUM SERPL-MCNC: 8.9 MG/DL — SIGNIFICANT CHANGE UP (ref 8.4–10.5)
CHLORIDE SERPL-SCNC: 100 MMOL/L — SIGNIFICANT CHANGE UP (ref 96–108)
CO2 SERPL-SCNC: 28 MMOL/L — SIGNIFICANT CHANGE UP (ref 22–31)
CREAT SERPL-MCNC: 0.7 MG/DL — SIGNIFICANT CHANGE UP (ref 0.5–1.3)
EGFR: 94 ML/MIN/1.73M2 — SIGNIFICANT CHANGE UP
GLUCOSE BLDC GLUCOMTR-MCNC: 125 MG/DL — HIGH (ref 70–99)
GLUCOSE BLDC GLUCOMTR-MCNC: 156 MG/DL — HIGH (ref 70–99)
GLUCOSE BLDC GLUCOMTR-MCNC: 183 MG/DL — HIGH (ref 70–99)
GLUCOSE BLDC GLUCOMTR-MCNC: 207 MG/DL — HIGH (ref 70–99)
GLUCOSE SERPL-MCNC: 154 MG/DL — HIGH (ref 70–99)
HCT VFR BLD CALC: 33.4 % — LOW (ref 34.5–45)
HGB BLD-MCNC: 10.9 G/DL — LOW (ref 11.5–15.5)
MAGNESIUM SERPL-MCNC: 2 MG/DL — SIGNIFICANT CHANGE UP (ref 1.6–2.6)
MCHC RBC-ENTMCNC: 28.3 PG — SIGNIFICANT CHANGE UP (ref 27–34)
MCHC RBC-ENTMCNC: 32.6 GM/DL — SIGNIFICANT CHANGE UP (ref 32–36)
MCV RBC AUTO: 86.8 FL — SIGNIFICANT CHANGE UP (ref 80–100)
NRBC # BLD: 0 /100 WBCS — SIGNIFICANT CHANGE UP (ref 0–0)
PHOSPHATE SERPL-MCNC: 3 MG/DL — SIGNIFICANT CHANGE UP (ref 2.5–4.5)
PLATELET # BLD AUTO: 216 K/UL — SIGNIFICANT CHANGE UP (ref 150–400)
POTASSIUM SERPL-MCNC: 4.2 MMOL/L — SIGNIFICANT CHANGE UP (ref 3.5–5.3)
POTASSIUM SERPL-SCNC: 4.2 MMOL/L — SIGNIFICANT CHANGE UP (ref 3.5–5.3)
PROT SERPL-MCNC: 6.5 G/DL — SIGNIFICANT CHANGE UP (ref 6–8.3)
RBC # BLD: 3.85 M/UL — SIGNIFICANT CHANGE UP (ref 3.8–5.2)
RBC # FLD: 14.5 % — SIGNIFICANT CHANGE UP (ref 10.3–14.5)
SODIUM SERPL-SCNC: 138 MMOL/L — SIGNIFICANT CHANGE UP (ref 135–145)
WBC # BLD: 12.37 K/UL — HIGH (ref 3.8–10.5)
WBC # FLD AUTO: 12.37 K/UL — HIGH (ref 3.8–10.5)

## 2022-08-21 PROCEDURE — 99024 POSTOP FOLLOW-UP VISIT: CPT

## 2022-08-21 PROCEDURE — 99232 SBSQ HOSP IP/OBS MODERATE 35: CPT

## 2022-08-21 RX ORDER — HYDROMORPHONE HYDROCHLORIDE 2 MG/ML
0.5 INJECTION INTRAMUSCULAR; INTRAVENOUS; SUBCUTANEOUS ONCE
Refills: 0 | Status: DISCONTINUED | OUTPATIENT
Start: 2022-08-21 | End: 2022-08-21

## 2022-08-21 RX ORDER — LACTULOSE 10 G/15ML
20 SOLUTION ORAL ONCE
Refills: 0 | Status: COMPLETED | OUTPATIENT
Start: 2022-08-21 | End: 2022-08-21

## 2022-08-21 RX ORDER — DIAZEPAM 5 MG
5 TABLET ORAL EVERY 6 HOURS
Refills: 0 | Status: DISCONTINUED | OUTPATIENT
Start: 2022-08-21 | End: 2022-08-24

## 2022-08-21 RX ORDER — POLYETHYLENE GLYCOL 3350 17 G/17G
17 POWDER, FOR SOLUTION ORAL EVERY 12 HOURS
Refills: 0 | Status: DISCONTINUED | OUTPATIENT
Start: 2022-08-21 | End: 2022-08-30

## 2022-08-21 RX ORDER — LIDOCAINE 4 G/100G
1 CREAM TOPICAL EVERY 24 HOURS
Refills: 0 | Status: DISCONTINUED | OUTPATIENT
Start: 2022-08-21 | End: 2022-08-30

## 2022-08-21 RX ORDER — HYDROMORPHONE HYDROCHLORIDE 2 MG/ML
0.25 INJECTION INTRAMUSCULAR; INTRAVENOUS; SUBCUTANEOUS EVERY 4 HOURS
Refills: 0 | Status: DISCONTINUED | OUTPATIENT
Start: 2022-08-21 | End: 2022-08-21

## 2022-08-21 RX ORDER — HYDROMORPHONE HYDROCHLORIDE 2 MG/ML
0.25 INJECTION INTRAMUSCULAR; INTRAVENOUS; SUBCUTANEOUS ONCE
Refills: 0 | Status: DISCONTINUED | OUTPATIENT
Start: 2022-08-21 | End: 2022-08-22

## 2022-08-21 RX ADMIN — Medication 1000 MILLIGRAM(S): at 08:10

## 2022-08-21 RX ADMIN — LIDOCAINE 1 PATCH: 4 CREAM TOPICAL at 22:03

## 2022-08-21 RX ADMIN — Medication 1000 MILLIGRAM(S): at 18:10

## 2022-08-21 RX ADMIN — NALOXEGOL OXALATE 25 MILLIGRAM(S): 12.5 TABLET, FILM COATED ORAL at 12:34

## 2022-08-21 RX ADMIN — HYDROMORPHONE HYDROCHLORIDE 0.5 MILLIGRAM(S): 2 INJECTION INTRAMUSCULAR; INTRAVENOUS; SUBCUTANEOUS at 21:02

## 2022-08-21 RX ADMIN — Medication 5 MILLIGRAM(S): at 12:30

## 2022-08-21 RX ADMIN — Medication 5 MILLIGRAM(S): at 12:34

## 2022-08-21 RX ADMIN — HYDROMORPHONE HYDROCHLORIDE 6 MILLIGRAM(S): 2 INJECTION INTRAMUSCULAR; INTRAVENOUS; SUBCUTANEOUS at 04:46

## 2022-08-21 RX ADMIN — Medication 1000 MILLIGRAM(S): at 07:18

## 2022-08-21 RX ADMIN — HYDROMORPHONE HYDROCHLORIDE 6 MILLIGRAM(S): 2 INJECTION INTRAMUSCULAR; INTRAVENOUS; SUBCUTANEOUS at 05:45

## 2022-08-21 RX ADMIN — HYDROMORPHONE HYDROCHLORIDE 6 MILLIGRAM(S): 2 INJECTION INTRAMUSCULAR; INTRAVENOUS; SUBCUTANEOUS at 13:52

## 2022-08-21 RX ADMIN — HYDROMORPHONE HYDROCHLORIDE 6 MILLIGRAM(S): 2 INJECTION INTRAMUSCULAR; INTRAVENOUS; SUBCUTANEOUS at 22:03

## 2022-08-21 RX ADMIN — LIDOCAINE 1 PATCH: 4 CREAM TOPICAL at 18:46

## 2022-08-21 RX ADMIN — LIDOCAINE 1 PATCH: 4 CREAM TOPICAL at 09:42

## 2022-08-21 RX ADMIN — Medication 1000 MILLIGRAM(S): at 00:19

## 2022-08-21 RX ADMIN — HYDROMORPHONE HYDROCHLORIDE 6 MILLIGRAM(S): 2 INJECTION INTRAMUSCULAR; INTRAVENOUS; SUBCUTANEOUS at 14:30

## 2022-08-21 RX ADMIN — LIDOCAINE 1 PATCH: 4 CREAM TOPICAL at 09:41

## 2022-08-21 RX ADMIN — SIMVASTATIN 40 MILLIGRAM(S): 20 TABLET, FILM COATED ORAL at 22:03

## 2022-08-21 RX ADMIN — HYDROMORPHONE HYDROCHLORIDE 6 MILLIGRAM(S): 2 INJECTION INTRAMUSCULAR; INTRAVENOUS; SUBCUTANEOUS at 22:42

## 2022-08-21 RX ADMIN — HYDROMORPHONE HYDROCHLORIDE 6 MILLIGRAM(S): 2 INJECTION INTRAMUSCULAR; INTRAVENOUS; SUBCUTANEOUS at 17:58

## 2022-08-21 RX ADMIN — Medication 2: at 17:57

## 2022-08-21 RX ADMIN — Medication 5 MILLIGRAM(S): at 00:19

## 2022-08-21 RX ADMIN — GABAPENTIN 600 MILLIGRAM(S): 400 CAPSULE ORAL at 13:52

## 2022-08-21 RX ADMIN — ENOXAPARIN SODIUM 40 MILLIGRAM(S): 100 INJECTION SUBCUTANEOUS at 22:04

## 2022-08-21 RX ADMIN — Medication 50 MILLIGRAM(S): at 05:58

## 2022-08-21 RX ADMIN — GABAPENTIN 600 MILLIGRAM(S): 400 CAPSULE ORAL at 22:04

## 2022-08-21 RX ADMIN — Medication 1000 MILLIGRAM(S): at 17:11

## 2022-08-21 RX ADMIN — Medication 5 MILLIGRAM(S): at 07:18

## 2022-08-21 RX ADMIN — Medication 1000 MILLIGRAM(S): at 01:20

## 2022-08-21 RX ADMIN — HYDROMORPHONE HYDROCHLORIDE 6 MILLIGRAM(S): 2 INJECTION INTRAMUSCULAR; INTRAVENOUS; SUBCUTANEOUS at 10:26

## 2022-08-21 RX ADMIN — SENNA PLUS 2 TABLET(S): 8.6 TABLET ORAL at 22:04

## 2022-08-21 RX ADMIN — Medication 2000 UNIT(S): at 12:32

## 2022-08-21 RX ADMIN — DULOXETINE HYDROCHLORIDE 60 MILLIGRAM(S): 30 CAPSULE, DELAYED RELEASE ORAL at 12:33

## 2022-08-21 RX ADMIN — HYDROMORPHONE HYDROCHLORIDE 6 MILLIGRAM(S): 2 INJECTION INTRAMUSCULAR; INTRAVENOUS; SUBCUTANEOUS at 18:46

## 2022-08-21 RX ADMIN — HYDROMORPHONE HYDROCHLORIDE 0.5 MILLIGRAM(S): 2 INJECTION INTRAMUSCULAR; INTRAVENOUS; SUBCUTANEOUS at 21:27

## 2022-08-21 RX ADMIN — Medication 5 MILLIGRAM(S): at 19:00

## 2022-08-21 RX ADMIN — Medication 4: at 23:21

## 2022-08-21 RX ADMIN — GABAPENTIN 600 MILLIGRAM(S): 400 CAPSULE ORAL at 05:58

## 2022-08-21 RX ADMIN — BUPROPION HYDROCHLORIDE 150 MILLIGRAM(S): 150 TABLET, EXTENDED RELEASE ORAL at 12:33

## 2022-08-21 RX ADMIN — Medication 50 MILLIGRAM(S): at 07:41

## 2022-08-21 RX ADMIN — PANTOPRAZOLE SODIUM 40 MILLIGRAM(S): 20 TABLET, DELAYED RELEASE ORAL at 07:19

## 2022-08-21 RX ADMIN — HYDROMORPHONE HYDROCHLORIDE 6 MILLIGRAM(S): 2 INJECTION INTRAMUSCULAR; INTRAVENOUS; SUBCUTANEOUS at 09:39

## 2022-08-21 RX ADMIN — Medication 2: at 08:19

## 2022-08-21 NOTE — CHART NOTE - NSCHARTNOTEFT_GEN_A_CORE
POD 4. KAMRAN overnight. PCA off now on PO meds. pending rehab, hx of chronic constipation, increased miralax to twice a day and given lactulose and had bowel movement.Valium increased to q 6 hours for muscle spasm. Pending XRs

## 2022-08-21 NOTE — PROGRESS NOTE ADULT - SUBJECTIVE AND OBJECTIVE BOX
HPI:  H&P:  "68 y/o female with PMHx HTN, HLD, CVA x 3 (last was 2016 with right hand weakness residual), GAMALIEL not using CPAP, Emphysema, ex-25 pack year smoker quit 2021, Chronic Constipation on Movantik, Urinary Incontinence chronically self straight cath at home, chronic UTI, Breast Implant Rupture with Chronic Leak, B/L CTS s/p release, s/p Intrathecal Morphine pump for pain, with chronic neck and back pain worsening for years s/p multiple spinal surgeries including laminectomies and fusion of cervical, thoracic and lumbar spine, most recently done in 2019 and 2020 at [facility] by Dr. Trinidad.  She is wheelchair dependent.  She also has history of right shoulder replacement.  She reports bilateral shoulder pain, bilateral leg pain and burning to the bilateral calves.  She reports right hand greater than arm and leg weakness."    66 yo F with history of HTN, HLD, prior strokes with residual RUE weakness, GAMALIEL noncompliant with CPAP, COPD, prior extensive smoking history, chronic constipation, urinary incontinence, chronic UTI, ruptured breast implant, bilateral CTS release surgery, intrathecal morphine pump and chronic pain s/p multiple spinal surgeries who is now s/p T3 - L1 posterior fusion (EBL 500cc, 1L UOP, 4200 crystalloid, 1u PRBC) and admitted to NSICU.  She remains intubated with progressive improvement in level of arousal      Subjective:  Overnight patient c/o pain uncontrolled by current pain regimen and dilaudid PCA. Patient alert and satting 100% on RA at time of assessment. Given additional 0.25 IVP dilaudid.   Hospital course:  8/17: POD0 s/p T3-L1 revision of fusion. extubated in NSICU  8/18: POD1 KAMRAN overnight. Started on PCA. Tx to SDU. PT/OT rec AR.  8/19: POD2, overnight episode of agitation and screaming due to concern over pain control. neuro exam stable. 1 HMV drain removed.  8/20: POD3, extra 0.25mg IV dilaudid given o/n for pain. neuro exam stable  8/21: POD 4. KAMRAN overnight. PCA off now on PO meds. pending rehab    Vital Signs Last 24 Hrs  T(C): 36.6 (20 Aug 2022 20:58), Max: 36.7 (20 Aug 2022 10:00)  T(F): 97.9 (20 Aug 2022 20:58), Max: 98 (20 Aug 2022 10:00)  HR: 66 (20 Aug 2022 20:58) (66 - 82)  BP: 152/95 (20 Aug 2022 20:58) (145/74 - 175/84)  BP(mean): --  RR: 18 (20 Aug 2022 20:58) (17 - 18)  SpO2: 95% (20 Aug 2022 20:58) (82% - 98%)    Parameters below as of 20 Aug 2022 20:58  Patient On (Oxygen Delivery Method): room air        I&O's Detail    19 Aug 2022 07:01  -  20 Aug 2022 07:00  --------------------------------------------------------  IN:    Oral Fluid: 600 mL  Total IN: 600 mL    OUT:    Accordian (mL): 255 mL    Accordian (mL): 255 mL    Accordian (mL): 200 mL    Accordian (mL): 5 mL    Intermittent Catheterization - Urethral (mL): 1500 mL    Voided (mL): 1000 mL  Total OUT: 3215 mL    Total NET: -2615 mL      20 Aug 2022 07:01  -  21 Aug 2022 00:13  --------------------------------------------------------  IN:  Total IN: 0 mL    OUT:    Accordian (mL): 100 mL    Accordian (mL): 50 mL    Accordian (mL): 50 mL    Intermittent Catheterization - Urethral (mL): 2250 mL  Total OUT: 2450 mL    Total NET: -2450 mL        I&O's Summary    19 Aug 2022 07:01  -  20 Aug 2022 07:00  --------------------------------------------------------  IN: 600 mL / OUT: 3215 mL / NET: -2615 mL    20 Aug 2022 07:01  -  21 Aug 2022 00:13  --------------------------------------------------------  IN: 0 mL / OUT: 2450 mL / NET: -2450 mL        PHYSICAL EXAM:  General: patient seen laying supine in bed in NAD  Neuro: AAOx3, follows commands, opens eyes spontaneously, speech clear and fluent,  face symmetric,  sensation intact to light touch throughout, LUE strength 4+/5, RUE 4+/5, b/l LEs HF/DF/PF 4+/5 KF/KE 5/5  HEENT: PERRL  Neck: supple  Cardiac: RRR, S1S2  Pulmonary: chest rise symmetric  Abdomen: soft, nontender, nondistended  Ext: perfusing well  Skin: warm, dry  Wound: thoracolumbar incision dressing c/d/i      TUBES/LINES:  [] CVC  [] A-line  [] Lumbar Drain  [] Ventriculostomy  [] Other    DIET:  [] NPO  [] Mechanical  [] Tube feeds    LABS:                        10.4   11.66 )-----------( 152      ( 20 Aug 2022 10:11 )             33.0     08-20    134<L>  |  100  |  16  ----------------------------<  176<H>  4.1   |  22  |  0.53    Ca    8.6      20 Aug 2022 10:11  Phos  3.2     08-20  Mg     1.9     08-20              CAPILLARY BLOOD GLUCOSE      POCT Blood Glucose.: 215 mg/dL (20 Aug 2022 22:33)  POCT Blood Glucose.: 150 mg/dL (20 Aug 2022 17:44)  POCT Blood Glucose.: 202 mg/dL (20 Aug 2022 11:39)  POCT Blood Glucose.: 201 mg/dL (20 Aug 2022 08:06)      Drug Levels: [] N/A    CSF Analysis: [] N/A      Allergies    oxycodone (Pruritus)    Intolerances      MEDICATIONS:  Antibiotics:  nitrofurantoin macrocrystals (MACRODANTIN) 50 milliGRAM(s) Oral <User Schedule>    Neuro:  acetaminophen     Tablet .. 1000 milliGRAM(s) Oral every 8 hours  buPROPion XL (24-Hour) . 150 milliGRAM(s) Oral daily  diazepam    Tablet 5 milliGRAM(s) Oral every 8 hours  diphenhydrAMINE 25 milliGRAM(s) Oral every 6 hours PRN  DULoxetine 60 milliGRAM(s) Oral daily  gabapentin 600 milliGRAM(s) Oral three times a day  HYDROmorphone   Tablet 4 milliGRAM(s) Oral every 4 hours PRN  HYDROmorphone   Tablet 6 milliGRAM(s) Oral every 4 hours PRN  ondansetron Injectable 4 milliGRAM(s) IV Push every 6 hours PRN    Anticoagulation:  enoxaparin Injectable 40 milliGRAM(s) SubCutaneous <User Schedule>    OTHER:  ALBUTerol   0.042% 2.5 milliGRAM(s) Nebulizer every 6 hours PRN  bisacodyl 5 milliGRAM(s) Oral every 12 hours  dextrose 50% Injectable 25 Gram(s) IV Push once  dextrose 50% Injectable 12.5 Gram(s) IV Push once  insulin lispro (ADMELOG) corrective regimen sliding scale   SubCutaneous Before meals and at bedtime  metoprolol tartrate 50 milliGRAM(s) Oral two times a day  naloxegol 25 milliGRAM(s) Oral daily  naloxone Injectable 0.1 milliGRAM(s) IV Push every 3 minutes PRN  pantoprazole    Tablet 40 milliGRAM(s) Oral before breakfast  polyethylene glycol 3350 17 Gram(s) Oral daily  senna 2 Tablet(s) Oral at bedtime  simvastatin 40 milliGRAM(s) Oral at bedtime    IVF:  cholecalciferol 2000 Unit(s) Oral daily    CULTURES:    RADIOLOGY & ADDITIONAL TESTS:      ASSESSMENT:  68 y/o female with PMHx HTN, HLD, CVA x 3 (last was 2016 with right hand weakness residual), GAMALIEL not using CPAP, Emphysema, ex-25 pack year smoker quit 2021, Chronic Constipation on Movantik, Urinary Incontinence chronically self straight cath at home, chronic UTI, Breast Implant Rupture with Chronic Leak, B/L CTS s/p release, s/p Intrathecal Morphine pump for pain, with chronic neck and back pain worsening for years s/p multiple spinal surgeries including laminectomies and fusion of cervical, thoracic and lumbar spine, most recently done in 2019 and 2020 at [facility] by Dr. Trinidad. CT findings include hardware loosening at T5, T6, T9-T12. S/p T3-L1 revision of fusion, c/b durotomy with primary repair, loss of left side motors, and repair of intrathecal morphine pump (8/17)    M40.209    Handoff    MEWS Score    HTN (hypertension)    Back pain    Hypertension    SS (spinal stenosis)    High cholesterol    Stroke    H/O carpal tunnel syndrome    H/O carpal tunnel syndrome    Chronic GERD    History of chronic constipation    Seizure    Urinary incontinence    Pre-diabetes    Acute UTI    Anxiety    Anxiety and depression    Arthritis    Eczema    External hemorrhoids    H/O kyphosis    Multiple sclerosis    Pancreas cyst    Scoliosis    Wheelchair dependent    Cervical pseudoarthrosis    Right shoulder pain    Cervical spondylosis    Chronic headaches    Chronic LUQ pain    Chronic UTI    Degenerative joint disease    H/O low back pain    Lumbosacral spondylosis    COPD (chronic obstructive pulmonary disease)    Back pain    Lumbar pseudoarthrosis    Pseudoarthrosis of lumbar spine    Fusion, spine, thoracolumbar, posterior approach    Cervical disc disease    H/O Spinal surgery    H/O breast surgery    S/P cervical discectomy    Previous back surgery    H/O shoulder surgery    H/O total shoulder replacement, right    [id number]        PLAN:  NEURO:  - neuro/spinal/vital checks q4hr  - pain management reccs: Valium 5q8, dilaudid PO  - cont movantik 25mg qd  - 3 deep HMVs, monitor outputs, HMV#4 d/c 8/19  - decadron 4q6 x 3 days  - ERAS protocol: tylenol 1g q8, gabapentin 600mg TID  - PT/OT when able   - cont wellbutrin 150mg qd, duloxetine 60mg qd  - postop xrays when drains out    CARDS:  - normotensive SBP goal  - cont lopressor 50mg BID, lipitor 40mg qd    PULM:   - extrubated in NSICU to NC 8/17  - Albuterol 2.5q6 PRN    GI:   - regular diet  - protonix while on decadron  - bowel regimen  - last BM 8/18    RENAL:  - straight cath prn  - h/o chronic urniary retnation, straight cath at home    HEME:   - SCDs for DVT ppx, SQL    ID:   - macrobid for chronic UTI  - afebrile, no leukocytosis    ENDO:  - ISS, A1C 6.6    DISPO: SDU status, full code, PT/OT rec AR    Assessment and Plan d/w Dr. Luz       Assessment:  Present when checked    []  GCS  E   V  M     Heart Failure: []Acute, [] acute on chronic , []chronic  Heart Failure:  [] Diastolic (HFpEF), [] Systolic (HFrEF), []Combined (HFpEF and HFrEF), [] RHF, [] Pulm HTN, [] Other    [] DARRYL, [] ATN, [] AIN, [] other  [] CKD1, [] CKD2, [] CKD 3, [] CKD 4, [] CKD 5, []ESRD    Encephalopathy: [] Metabolic, [] Hepatic, [] toxic, [] Neurological, [] Other    Abnormal Nurtitional Status: [] malnurtition (see nutrition note), [ ]underweight: BMI < 19, [] morbid obesity: BMI >40, [] Cachexia    [] Sepsis  [] hypovolemic shock,[] cardiogenic shock, [] hemorrhagic shock, [] neuogenic shock  [] Acute Respiratory Failure  []Cerebral edema, [] Brain compression/ herniation,   [] Functional quadriplegia  [] Acute blood loss anemia

## 2022-08-21 NOTE — PROGRESS NOTE ADULT - SUBJECTIVE AND OBJECTIVE BOX
Feeling in less pain today.  Still no bowel movement.    Remaining ROS negative       PHYSICAL EXAM:    General: WDWN  HEENT: anicteric sclera; MMM  Respiratory: normal respiratory effort  Neurological: no focal deficits  Psychiatric: pleasant mood and affect  Dermatologic: 3 drains in place    VITAL SIGNS:  Vital Signs Last 24 Hrs  T(C): 36.6 (22 Aug 2022 06:00), Max: 37.1 (21 Aug 2022 13:00)  T(F): 97.8 (22 Aug 2022 06:00), Max: 98.8 (21 Aug 2022 13:00)  HR: 79 (22 Aug 2022 06:00) (71 - 84)  BP: 132/79 (22 Aug 2022 06:00) (110/63 - 168/85)  BP(mean): --  RR: 16 (22 Aug 2022 06:00) (16 - 20)  SpO2: 100% (22 Aug 2022 06:00) (97% - 100%)    Parameters below as of 22 Aug 2022 06:00  Patient On (Oxygen Delivery Method): room air          MEDICATIONS:  MEDICATIONS  (STANDING):  acetaminophen     Tablet .. 1000 milliGRAM(s) Oral every 8 hours  bisacodyl 5 milliGRAM(s) Oral every 12 hours  buPROPion XL (24-Hour) . 150 milliGRAM(s) Oral daily  cholecalciferol 2000 Unit(s) Oral daily  dextrose 50% Injectable 25 Gram(s) IV Push once  dextrose 50% Injectable 12.5 Gram(s) IV Push once  diazepam    Tablet 5 milliGRAM(s) Oral every 6 hours  DULoxetine 60 milliGRAM(s) Oral daily  enoxaparin Injectable 40 milliGRAM(s) SubCutaneous <User Schedule>  gabapentin 600 milliGRAM(s) Oral three times a day  insulin lispro (ADMELOG) corrective regimen sliding scale   SubCutaneous Before meals and at bedtime  lidocaine   4% Patch 1 Patch Transdermal every 24 hours  lidocaine   4% Patch 1 Patch Transdermal every 24 hours  metoprolol tartrate 50 milliGRAM(s) Oral two times a day  naloxegol 25 milliGRAM(s) Oral daily  nitrofurantoin macrocrystals (MACRODANTIN) 50 milliGRAM(s) Oral <User Schedule>  pantoprazole    Tablet 40 milliGRAM(s) Oral before breakfast  polyethylene glycol 3350 17 Gram(s) Oral every 12 hours  senna 2 Tablet(s) Oral at bedtime  simvastatin 40 milliGRAM(s) Oral at bedtime    MEDICATIONS  (PRN):  ALBUTerol   0.042% 2.5 milliGRAM(s) Nebulizer every 6 hours PRN Shortness of Breath and/or Wheezing  diphenhydrAMINE 25 milliGRAM(s) Oral every 6 hours PRN Rash and/or Itching  HYDROmorphone   Tablet 4 milliGRAM(s) Oral every 4 hours PRN Moderate Pain (4 - 6)  HYDROmorphone   Tablet 6 milliGRAM(s) Oral every 4 hours PRN Severe Pain (7 - 10)  HYDROmorphone  Injectable 0.25 milliGRAM(s) IV Push every 4 hours PRN breakthrough pain  naloxone Injectable 0.1 milliGRAM(s) IV Push every 3 minutes PRN For ANY of the following changes in patient status:  A. RR LESS THAN 10 breaths per minute, B. Oxygen saturation LESS THAN 90%, C. Sedation score of 6  ondansetron Injectable 4 milliGRAM(s) IV Push every 6 hours PRN Nausea      ALLERGIES:  Allergies    oxycodone (Pruritus)    Intolerances        LABS:                        11.4   10.41 )-----------( 225      ( 22 Aug 2022 07:16 )             35.3     08-22    138  |  101  |  17  ----------------------------<  195<H>  4.2   |  27  |  0.65    Ca    8.8      22 Aug 2022 07:16  Phos  3.2     08-22  Mg     2.1     08-22    TPro  6.5  /  Alb  3.6  /  TBili  0.3  /  DBili  x   /  AST  23  /  ALT  24  /  AlkPhos  80  08-21        CAPILLARY BLOOD GLUCOSE      POCT Blood Glucose.: 160 mg/dL (22 Aug 2022 07:51)      RADIOLOGY & ADDITIONAL TESTS: Reviewed.

## 2022-08-22 LAB
ANION GAP SERPL CALC-SCNC: 10 MMOL/L — SIGNIFICANT CHANGE UP (ref 5–17)
BUN SERPL-MCNC: 17 MG/DL — SIGNIFICANT CHANGE UP (ref 7–23)
CALCIUM SERPL-MCNC: 8.8 MG/DL — SIGNIFICANT CHANGE UP (ref 8.4–10.5)
CHLORIDE SERPL-SCNC: 101 MMOL/L — SIGNIFICANT CHANGE UP (ref 96–108)
CO2 SERPL-SCNC: 27 MMOL/L — SIGNIFICANT CHANGE UP (ref 22–31)
CREAT SERPL-MCNC: 0.65 MG/DL — SIGNIFICANT CHANGE UP (ref 0.5–1.3)
EGFR: 96 ML/MIN/1.73M2 — SIGNIFICANT CHANGE UP
GLUCOSE BLDC GLUCOMTR-MCNC: 144 MG/DL — HIGH (ref 70–99)
GLUCOSE BLDC GLUCOMTR-MCNC: 151 MG/DL — HIGH (ref 70–99)
GLUCOSE BLDC GLUCOMTR-MCNC: 160 MG/DL — HIGH (ref 70–99)
GLUCOSE BLDC GLUCOMTR-MCNC: 168 MG/DL — HIGH (ref 70–99)
GLUCOSE SERPL-MCNC: 195 MG/DL — HIGH (ref 70–99)
HCT VFR BLD CALC: 35.3 % — SIGNIFICANT CHANGE UP (ref 34.5–45)
HGB BLD-MCNC: 11.4 G/DL — LOW (ref 11.5–15.5)
MAGNESIUM SERPL-MCNC: 2.1 MG/DL — SIGNIFICANT CHANGE UP (ref 1.6–2.6)
MCHC RBC-ENTMCNC: 28.2 PG — SIGNIFICANT CHANGE UP (ref 27–34)
MCHC RBC-ENTMCNC: 32.3 GM/DL — SIGNIFICANT CHANGE UP (ref 32–36)
MCV RBC AUTO: 87.4 FL — SIGNIFICANT CHANGE UP (ref 80–100)
NRBC # BLD: 0 /100 WBCS — SIGNIFICANT CHANGE UP (ref 0–0)
PHOSPHATE SERPL-MCNC: 3.2 MG/DL — SIGNIFICANT CHANGE UP (ref 2.5–4.5)
PLATELET # BLD AUTO: 225 K/UL — SIGNIFICANT CHANGE UP (ref 150–400)
POTASSIUM SERPL-MCNC: 4.2 MMOL/L — SIGNIFICANT CHANGE UP (ref 3.5–5.3)
POTASSIUM SERPL-SCNC: 4.2 MMOL/L — SIGNIFICANT CHANGE UP (ref 3.5–5.3)
RBC # BLD: 4.04 M/UL — SIGNIFICANT CHANGE UP (ref 3.8–5.2)
RBC # FLD: 14.2 % — SIGNIFICANT CHANGE UP (ref 10.3–14.5)
SODIUM SERPL-SCNC: 138 MMOL/L — SIGNIFICANT CHANGE UP (ref 135–145)
WBC # BLD: 10.41 K/UL — SIGNIFICANT CHANGE UP (ref 3.8–10.5)
WBC # FLD AUTO: 10.41 K/UL — SIGNIFICANT CHANGE UP (ref 3.8–10.5)

## 2022-08-22 PROCEDURE — 72070 X-RAY EXAM THORAC SPINE 2VWS: CPT | Mod: 26

## 2022-08-22 PROCEDURE — 99024 POSTOP FOLLOW-UP VISIT: CPT

## 2022-08-22 PROCEDURE — 99232 SBSQ HOSP IP/OBS MODERATE 35: CPT

## 2022-08-22 RX ORDER — HYDROMORPHONE HYDROCHLORIDE 2 MG/ML
0.25 INJECTION INTRAMUSCULAR; INTRAVENOUS; SUBCUTANEOUS ONCE
Refills: 0 | Status: DISCONTINUED | OUTPATIENT
Start: 2022-08-22 | End: 2022-08-22

## 2022-08-22 RX ORDER — SODIUM CHLORIDE 9 MG/ML
500 INJECTION INTRAMUSCULAR; INTRAVENOUS; SUBCUTANEOUS ONCE
Refills: 0 | Status: COMPLETED | OUTPATIENT
Start: 2022-08-22 | End: 2022-08-22

## 2022-08-22 RX ADMIN — BUPROPION HYDROCHLORIDE 150 MILLIGRAM(S): 150 TABLET, EXTENDED RELEASE ORAL at 11:21

## 2022-08-22 RX ADMIN — HYDROMORPHONE HYDROCHLORIDE 6 MILLIGRAM(S): 2 INJECTION INTRAMUSCULAR; INTRAVENOUS; SUBCUTANEOUS at 19:41

## 2022-08-22 RX ADMIN — LIDOCAINE 1 PATCH: 4 CREAM TOPICAL at 18:02

## 2022-08-22 RX ADMIN — Medication 1000 MILLIGRAM(S): at 15:51

## 2022-08-22 RX ADMIN — DULOXETINE HYDROCHLORIDE 60 MILLIGRAM(S): 30 CAPSULE, DELAYED RELEASE ORAL at 11:17

## 2022-08-22 RX ADMIN — Medication 1000 MILLIGRAM(S): at 01:06

## 2022-08-22 RX ADMIN — Medication 5 MILLIGRAM(S): at 15:51

## 2022-08-22 RX ADMIN — GABAPENTIN 600 MILLIGRAM(S): 400 CAPSULE ORAL at 06:42

## 2022-08-22 RX ADMIN — HYDROMORPHONE HYDROCHLORIDE 6 MILLIGRAM(S): 2 INJECTION INTRAMUSCULAR; INTRAVENOUS; SUBCUTANEOUS at 23:34

## 2022-08-22 RX ADMIN — Medication 5 MILLIGRAM(S): at 01:06

## 2022-08-22 RX ADMIN — Medication 1000 MILLIGRAM(S): at 23:37

## 2022-08-22 RX ADMIN — HYDROMORPHONE HYDROCHLORIDE 0.25 MILLIGRAM(S): 2 INJECTION INTRAMUSCULAR; INTRAVENOUS; SUBCUTANEOUS at 00:50

## 2022-08-22 RX ADMIN — Medication 5 MILLIGRAM(S): at 22:11

## 2022-08-22 RX ADMIN — PANTOPRAZOLE SODIUM 40 MILLIGRAM(S): 20 TABLET, DELAYED RELEASE ORAL at 06:42

## 2022-08-22 RX ADMIN — HYDROMORPHONE HYDROCHLORIDE 6 MILLIGRAM(S): 2 INJECTION INTRAMUSCULAR; INTRAVENOUS; SUBCUTANEOUS at 02:34

## 2022-08-22 RX ADMIN — HYDROMORPHONE HYDROCHLORIDE 0.25 MILLIGRAM(S): 2 INJECTION INTRAMUSCULAR; INTRAVENOUS; SUBCUTANEOUS at 00:21

## 2022-08-22 RX ADMIN — HYDROMORPHONE HYDROCHLORIDE 6 MILLIGRAM(S): 2 INJECTION INTRAMUSCULAR; INTRAVENOUS; SUBCUTANEOUS at 14:57

## 2022-08-22 RX ADMIN — SIMVASTATIN 40 MILLIGRAM(S): 20 TABLET, FILM COATED ORAL at 23:35

## 2022-08-22 RX ADMIN — Medication 1000 MILLIGRAM(S): at 08:50

## 2022-08-22 RX ADMIN — Medication 1000 MILLIGRAM(S): at 16:25

## 2022-08-22 RX ADMIN — HYDROMORPHONE HYDROCHLORIDE 6 MILLIGRAM(S): 2 INJECTION INTRAMUSCULAR; INTRAVENOUS; SUBCUTANEOUS at 15:31

## 2022-08-22 RX ADMIN — Medication 5 MILLIGRAM(S): at 11:17

## 2022-08-22 RX ADMIN — Medication 1000 MILLIGRAM(S): at 01:50

## 2022-08-22 RX ADMIN — LIDOCAINE 1 PATCH: 4 CREAM TOPICAL at 11:20

## 2022-08-22 RX ADMIN — Medication 2: at 12:53

## 2022-08-22 RX ADMIN — POLYETHYLENE GLYCOL 3350 17 GRAM(S): 17 POWDER, FOR SOLUTION ORAL at 17:42

## 2022-08-22 RX ADMIN — Medication 5 MILLIGRAM(S): at 07:24

## 2022-08-22 RX ADMIN — Medication 2: at 17:41

## 2022-08-22 RX ADMIN — Medication 1000 MILLIGRAM(S): at 22:30

## 2022-08-22 RX ADMIN — Medication 50 MILLIGRAM(S): at 17:41

## 2022-08-22 RX ADMIN — Medication 2: at 08:12

## 2022-08-22 RX ADMIN — Medication 50 MILLIGRAM(S): at 06:42

## 2022-08-22 RX ADMIN — GABAPENTIN 600 MILLIGRAM(S): 400 CAPSULE ORAL at 23:35

## 2022-08-22 RX ADMIN — ENOXAPARIN SODIUM 40 MILLIGRAM(S): 100 INJECTION SUBCUTANEOUS at 23:35

## 2022-08-22 RX ADMIN — Medication 2000 UNIT(S): at 11:18

## 2022-08-22 RX ADMIN — NALOXEGOL OXALATE 25 MILLIGRAM(S): 12.5 TABLET, FILM COATED ORAL at 11:19

## 2022-08-22 RX ADMIN — HYDROMORPHONE HYDROCHLORIDE 6 MILLIGRAM(S): 2 INJECTION INTRAMUSCULAR; INTRAVENOUS; SUBCUTANEOUS at 06:41

## 2022-08-22 RX ADMIN — HYDROMORPHONE HYDROCHLORIDE 6 MILLIGRAM(S): 2 INJECTION INTRAMUSCULAR; INTRAVENOUS; SUBCUTANEOUS at 03:30

## 2022-08-22 RX ADMIN — Medication 1000 MILLIGRAM(S): at 08:11

## 2022-08-22 RX ADMIN — HYDROMORPHONE HYDROCHLORIDE 6 MILLIGRAM(S): 2 INJECTION INTRAMUSCULAR; INTRAVENOUS; SUBCUTANEOUS at 18:59

## 2022-08-22 RX ADMIN — SODIUM CHLORIDE 500 MILLILITER(S): 9 INJECTION INTRAMUSCULAR; INTRAVENOUS; SUBCUTANEOUS at 13:20

## 2022-08-22 NOTE — HISTORY OF PRESENT ILLNESS
[TextBox_4] : 06/22/2022 [Tahirenberger]: Asked to evaluate patient by Dr Luz for Pre-op evaluation for spinal surgery. Ms. Redd is a former smoker with 25pk/yr smoking history, quit 1 year ago and PMHx of HTN, HLD, CVA x3, GAMALIEL. She is Going for revision of spinal hardware, and surgery to her cervical/thoracic/lumbar spine on July 27th.  Her spinal problems started after she had bilateral carpel tunnel surgery and subsequently developed hand tremors and was told she had polio vs ALS. She subsequently had multiple surgeries done at [facility]. Last surgery was 2019. Has a morphine pump for pain. Never had complications with anesthesia. No history of DVTs/PEs. She was diagnosed with GAMALIEL after her last surgery in 2019 where she had an anterior intervention on her C-spine. She was given CPAP but self-discontinued due to intolerance to face mask but is willing to try nasal interface. Still snoring, feeling tired throughout the day. Not exercising now due to pain and feeling unsteady, but in December/Jan was using a stationary bike for up to an hour at a time. Now gets SOB doing ADLs at home. Had been on a PRN inhaler previously, but ran out. Was given another script for inhaler by her PCP. Thinks it is helping a little. No orthopnea. Originally from [place], moved to [place], then [place]  [place]. 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 No pets, birds, mold in the home. Was following with a pulmonologist at [facility] who retired.  [ESS] : 10

## 2022-08-22 NOTE — PROGRESS NOTE ADULT - SUBJECTIVE AND OBJECTIVE BOX
HPI:  ***********************************************  ADULT NSICU H&P  VANNA DIAZ [id number] [facility] [id number]  ***********************************************  H&P:  "68 y/o female with PMHx HTN, HLD, CVA x 3 (last was 2016 with right hand weakness residual), GAMALIEL not using CPAP, Emphysema, ex-25 pack year smoker quit 2021, Chronic Constipation on Movantik, Urinary Incontinence chronically self straight cath at home, chronic UTI, Breast Implant Rupture with Chronic Leak, B/L CTS s/p release, s/p Intrathecal Morphine pump for pain, with chronic neck and back pain worsening for years s/p multiple spinal surgeries including laminectomies and fusion of cervical, thoracic and lumbar spine, most recently done in 2019 and 2020 at [facility] by Dr. Trinidad.  She is wheelchair dependent.  She also has history of right shoulder replacement.  She reports bilateral shoulder pain, bilateral leg pain and burning to the bilateral calves.  She reports right hand greater than arm and leg weakness."    66 yo F with history of HTN, HLD, prior strokes with residual RUE weakness, GAMALIEL noncompliant with CPAP, COPD, prior extensive smoking history, chronic constipation, urinary incontinence, chronic UTI, ruptured breast implant, bilateral CTS release surgery, intrathecal morphine pump and chronic pain s/p multiple spinal surgeries who is now s/p T3 - L1 posterior fusion (EBL 500cc, 1L UOP, 4200 crystalloid, 1u PRBC) and admitted to NSICU.  She remains intubated with progressive improvement in level of arousal  8/17: POD0 s/p T3-L1 revision of fusion. extubated in NSICU  8/18: POD1 KAMRAN overnight. Started on PCA. Tx to SDU. PT/OT rec AR.  8/19: POD2, overnight episode of agitation and screaming due to concern over pain control. neuro exam stable. 1 HMV drain removed.  8/20: POD3, extra 0.25mg IV dilaudid x 2 given o/n for pain. neuro exam stable  8/21: POD 4. KAMRAN overnight. PCA off now on PO meds. pending rehab, hx of chronic constipation, increased miralax to twice a day and given lactulose and had bowel movement.Valium increased to q 6 hours for muscle spasm. Pending XRs   8/22: POD5, complaining of severe pain o/n requiring additional dilaudid pushes       I&O's Summary    20 Aug 2022 07:01  -  21 Aug 2022 07:00  --------------------------------------------------------  IN: 0 mL / OUT: 2620 mL / NET: -2620 mL    21 Aug 2022 07:01  -  22 Aug 2022 01:05  --------------------------------------------------------  IN: 800 mL / OUT: 2230 mL / NET: -1430 mL  HYSICAL EXAM:  General: patient seen laying supine in bed in NAD  Neuro: AAOx3, follows commands, opens eyes spontaneously, speech clear and fluent,  face symmetric,  sensation intact to light touch throughout, motor strength difficult to assess due to pain limitation but at least 4/5 throughout   HEENT: PERRL  Neck: supple  Cardiac: RRR, S1S2  Pulmonary: chest rise symmetric  Abdomen: soft, nontender, nondistended  Ext: perfusing well  Skin: warm, dry  Wound: thoracolumbar incision dressing c/d/i      LABS:                        10.9   12.37 )-----------( 216      ( 21 Aug 2022 05:46 )             33.4     08-21    138  |  100  |  17  ----------------------------<  154<H>  4.2   |  28  |  0.70    Ca    8.9      21 Aug 2022 05:46  Phos  3.0     08-21  Mg     2.0     08-21    TPro  6.5  /  Alb  3.6  /  TBili  0.3  /  DBili  x   /  AST  23  /  ALT  24  /  AlkPhos  80  08-21            CAPILLARY BLOOD GLUCOSE      POCT Blood Glucose.: 207 mg/dL (21 Aug 2022 22:40)  POCT Blood Glucose.: 183 mg/dL (21 Aug 2022 17:15)  POCT Blood Glucose.: 125 mg/dL (21 Aug 2022 11:48)  POCT Blood Glucose.: 156 mg/dL (21 Aug 2022 08:00)      Drug Levels: [] N/A    CSF Analysis: [] N/A      Allergies    oxycodone (Pruritus)    Intolerances      MEDICATIONS:  Antibiotics:  nitrofurantoin macrocrystals (MACRODANTIN) 50 milliGRAM(s) Oral <User Schedule>    Neuro:  acetaminophen     Tablet .. 1000 milliGRAM(s) Oral every 8 hours  buPROPion XL (24-Hour) . 150 milliGRAM(s) Oral daily  diazepam    Tablet 5 milliGRAM(s) Oral every 6 hours  diphenhydrAMINE 25 milliGRAM(s) Oral every 6 hours PRN  DULoxetine 60 milliGRAM(s) Oral daily  gabapentin 600 milliGRAM(s) Oral three times a day  HYDROmorphone   Tablet 4 milliGRAM(s) Oral every 4 hours PRN  HYDROmorphone   Tablet 6 milliGRAM(s) Oral every 4 hours PRN  HYDROmorphone  Injectable 0.25 milliGRAM(s) IV Push every 4 hours PRN  ondansetron Injectable 4 milliGRAM(s) IV Push every 6 hours PRN    Anticoagulation:  enoxaparin Injectable 40 milliGRAM(s) SubCutaneous <User Schedule>    OTHER:  ALBUTerol   0.042% 2.5 milliGRAM(s) Nebulizer every 6 hours PRN  bisacodyl 5 milliGRAM(s) Oral every 12 hours  dextrose 50% Injectable 25 Gram(s) IV Push once  dextrose 50% Injectable 12.5 Gram(s) IV Push once  insulin lispro (ADMELOG) corrective regimen sliding scale   SubCutaneous Before meals and at bedtime  lidocaine   4% Patch 1 Patch Transdermal every 24 hours  lidocaine   4% Patch 1 Patch Transdermal every 24 hours  metoprolol tartrate 50 milliGRAM(s) Oral two times a day  naloxegol 25 milliGRAM(s) Oral daily  naloxone Injectable 0.1 milliGRAM(s) IV Push every 3 minutes PRN  pantoprazole    Tablet 40 milliGRAM(s) Oral before breakfast  polyethylene glycol 3350 17 Gram(s) Oral every 12 hours  senna 2 Tablet(s) Oral at bedtime  simvastatin 40 milliGRAM(s) Oral at bedtime    IVF:  cholecalciferol 2000 Unit(s) Oral daily    CULTURES:    RADIOLOGY & ADDITIONAL TESTS:  68 y/o female with PMHx HTN, HLD, CVA x 3 (last was 2016 with right hand weakness residual), GAMALIEL not using CPAP, Emphysema, ex-25 pack year smoker quit 2021, Chronic Constipation on Movantik, Urinary Incontinence chronically self straight cath at home, chronic UTI, Breast Implant Rupture with Chronic Leak, B/L CTS s/p release, s/p Intrathecal Morphine pump for pain, with chronic neck and back pain worsening for years s/p multiple spinal surgeries including laminectomies and fusion of cervical, thoracic and lumbar spine, most recently done in 2019 and 2020 at [facility] by Dr. Trinidad. CT findings include hardware loosening at T5, T6, T9-T12. S/p T3-L1 revision of fusion, c/b durotomy with primary repair, loss of left side motors, and repair of intrathecal morphine pump (8/17).    PLAN:  NEURO:  - neuro/spinal/vital checks q4hr  - pain management reccs: Valium 5q8, dilaudid PO  - cont movantik 25mg qd  - 3 deep HMVs, monitor outputs, HMV#4 d/c 8/19  - decadron 4q6 x 3 days  - ERAS protocol: tylenol 1g q8, gabapentin 600mg TID  - PT/OT when able   - cont wellbutrin 150mg qd, duloxetine 60mg qd  - postop xrays when drains out    CARDS:  - normotensive SBP goal  - cont lopressor 50mg BID, lipitor 40mg qd    PULM:   - extrubated in NSICU to NC 8/17  - Albuterol 2.5q6 PRN    GI:   - regular diet  - protonix while on decadron  - bowel regimen  - last BM 8/21    RENAL:  - straight cath prn  - h/o chronic urniary retnation, straight cath at home    HEME:   - SCDs for DVT ppx, SQL    ID:   - macrobid for chronic UTI  - afebrile, no leukocytosis    ENDO:  - ISS, A1C 6.6    DISPO: SDU status, full code, PT/OT rec AR    Assessment and Plan d/w Dr. Luz

## 2022-08-22 NOTE — DIETITIAN INITIAL EVALUATION ADULT - PERSON TAUGHT/METHOD
Encourage PO and lean protein intake. Deferred further diet edu until follow pup 2/2 pain and fatigue/verbal instruction

## 2022-08-22 NOTE — PROGRESS NOTE ADULT - SUBJECTIVE AND OBJECTIVE BOX
She has improvement in her pain, but quite lethargic from all the medications.  Had a bowel movement.         Remaining ROS negative       PHYSICAL EXAM:    General: WDWN  Cards: RRR, normal S1/S2, no murmurs, rubs or gallops  HEENT: anicteric sclera; MMM  Respiratory: normal respiratory effort, CTAB, no wheeze, crackles, rales or rhonchi  Neurological: no focal deficits  Psychiatric: pleasant mood and affect  Dermatologic: warm and dry          MEDICATIONS:  MEDICATIONS  (STANDING):  acetaminophen     Tablet .. 1000 milliGRAM(s) Oral every 8 hours  bisacodyl 5 milliGRAM(s) Oral every 12 hours  buPROPion XL (24-Hour) . 150 milliGRAM(s) Oral daily  cholecalciferol 2000 Unit(s) Oral daily  dextrose 50% Injectable 12.5 Gram(s) IV Push once  dextrose 50% Injectable 25 Gram(s) IV Push once  diazepam    Tablet 5 milliGRAM(s) Oral every 6 hours  DULoxetine 60 milliGRAM(s) Oral daily  enoxaparin Injectable 40 milliGRAM(s) SubCutaneous <User Schedule>  gabapentin 600 milliGRAM(s) Oral three times a day  insulin lispro (ADMELOG) corrective regimen sliding scale   SubCutaneous Before meals and at bedtime  lidocaine   4% Patch 1 Patch Transdermal every 24 hours  lidocaine   4% Patch 1 Patch Transdermal every 24 hours  metoprolol tartrate 50 milliGRAM(s) Oral two times a day  naloxegol 25 milliGRAM(s) Oral daily  nitrofurantoin macrocrystals (MACRODANTIN) 50 milliGRAM(s) Oral <User Schedule>  pantoprazole    Tablet 40 milliGRAM(s) Oral before breakfast  polyethylene glycol 3350 17 Gram(s) Oral every 12 hours  senna 2 Tablet(s) Oral at bedtime  simvastatin 40 milliGRAM(s) Oral at bedtime    MEDICATIONS  (PRN):  ALBUTerol   0.042% 2.5 milliGRAM(s) Nebulizer every 6 hours PRN Shortness of Breath and/or Wheezing  diphenhydrAMINE 25 milliGRAM(s) Oral every 6 hours PRN Rash and/or Itching  HYDROmorphone   Tablet 4 milliGRAM(s) Oral every 4 hours PRN Moderate Pain (4 - 6)  HYDROmorphone   Tablet 6 milliGRAM(s) Oral every 4 hours PRN Severe Pain (7 - 10)  naloxone Injectable 0.1 milliGRAM(s) IV Push every 3 minutes PRN For ANY of the following changes in patient status:  A. RR LESS THAN 10 breaths per minute, B. Oxygen saturation LESS THAN 90%, C. Sedation score of 6  ondansetron Injectable 4 milliGRAM(s) IV Push every 6 hours PRN Nausea      ALLERGIES:  Allergies    oxycodone (Pruritus)    Intolerances        LABS:                        11.4   10.41 )-----------( 225      ( 22 Aug 2022 07:16 )             35.3     08-23    131<L>  |  101  |  13  ----------------------------<  137<H>  See Note   |  20<L>  |  0.55    Ca    8.3<L>      23 Aug 2022 05:30  Phos  3.5     08-23  Mg     2.0     08-23          CAPILLARY BLOOD GLUCOSE      POCT Blood Glucose.: 140 mg/dL (23 Aug 2022 07:27)      RADIOLOGY & ADDITIONAL TESTS: Reviewed.

## 2022-08-22 NOTE — DIETITIAN INITIAL EVALUATION ADULT - OTHER CALCULATIONS
lbs. %%. IBW used to calculate energy needs due to pt's current body weight exceeding 120% of IBW. Needs adjusted for age and wt, post op demands

## 2022-08-22 NOTE — CONSULT NOTE ADULT - SUBJECTIVE AND OBJECTIVE BOX
Patient is a 67y old  Female who presents with a chief complaint of chronic back and neck pain worsening for years (22 Aug 2022 01:04)        HPI:  ***********************************************  ADULT NSICU H&P  [facility] [id number] [facility] [id number]  ***********************************************  H&P:  "68 y/o female with PMHx HTN, HLD, CVA x 3 (last was 2016 with right hand weakness residual), GAMALIEL not using CPAP, Emphysema, ex-25 pack year smoker quit 2021, Chronic Constipation on Movantik, Urinary Incontinence chronically self straight cath at home, chronic UTI, Breast Implant Rupture with Chronic Leak, B/L CTS s/p release, s/p Intrathecal Morphine pump for pain, with chronic neck and back pain worsening for years s/p multiple spinal surgeries including laminectomies and fusion of cervical, thoracic and lumbar spine, most recently done in 2019 and 2020 at [facility] by Dr. Trinidad.  She is wheelchair dependent.  She also has history of right shoulder replacement.  She reports bilateral shoulder pain, bilateral leg pain and burning to the bilateral calves.  She reports right hand greater than arm and leg weakness."    66 yo F with history of HTN, HLD, prior strokes with residual RUE weakness, GAMALIEL noncompliant with CPAP, COPD, prior extensive smoking history, chronic constipation, urinary incontinence, chronic UTI, ruptured breast implant, bilateral CTS release surgery, intrathecal morphine pump and chronic pain s/p multiple spinal surgeries who is now s/p T3 - L1 posterior fusion (EBL 500cc, 1L UOP, 4200 crystalloid, 1u PRBC) and admitted to NSICU.  She remains intubated with progressive improvement in level of arousal            MEDICATIONS  (STANDING):  acetaminophen     Tablet .. 1000 milliGRAM(s) Oral every 8 hours  bisacodyl 5 milliGRAM(s) Oral every 12 hours  ceFAZolin   IVPB 2000 milliGRAM(s) IV Intermittent every 8 hours  chlorhexidine 2% Cloths 1 Application(s) Topical daily  cholecalciferol 2000 Unit(s) Oral daily  dexAMETHasone  Injectable 4 milliGRAM(s) IV Push every 6 hours  dextrose 5%. 1000 milliLiter(s) (50 mL/Hr) IV Continuous <Continuous>  dextrose 5%. 1000 milliLiter(s) (100 mL/Hr) IV Continuous <Continuous>  dextrose 50% Injectable 25 Gram(s) IV Push once  dextrose 50% Injectable 12.5 Gram(s) IV Push once  dextrose 50% Injectable 25 Gram(s) IV Push once  diazepam    Tablet 5 milliGRAM(s) Oral every 8 hours  DULoxetine 60 milliGRAM(s) Oral daily  gabapentin 600 milliGRAM(s) Oral three times a day  gabapentin 600 milliGRAM(s) Oral three times a day  glucagon  Injectable 1 milliGRAM(s) IntraMuscular once  HYDROmorphone PCA (1 mG/mL) 30 milliLiter(s) PCA Continuous PCA Continuous  insulin lispro (ADMELOG) corrective regimen sliding scale   SubCutaneous three times a day before meals  metoprolol tartrate 50 milliGRAM(s) Oral two times a day  naloxegol 25 milliGRAM(s) Oral daily  nitrofurantoin monohydrate/macrocrystals (MACROBID) 50 milliGRAM(s) Oral <User Schedule>  norepinephrine Infusion 0.05 MICROgram(s)/kG/Min (8.26 mL/Hr) IV Continuous <Continuous>  ondansetron   Disintegrating Tablet 4 milliGRAM(s) Oral every 6 hours  pantoprazole    Tablet 40 milliGRAM(s) Oral before breakfast  polyethylene glycol 3350 17 Gram(s) Oral daily  senna 2 Tablet(s) Oral at bedtime  simvastatin 40 milliGRAM(s) Oral at bedtime  sodium chloride 0.9% Bolus 1000 milliLiter(s) IV Bolus once  sodium chloride 0.9%. 1000 milliLiter(s) (75 mL/Hr) IV Continuous <Continuous>    MEDICATIONS  (PRN):  acetaminophen     Tablet .. 650 milliGRAM(s) Oral every 6 hours PRN Temp greater or equal to 38C (100.4F), Mild Pain (1 - 3)  ALBUTerol   0.042% 2.5 milliGRAM(s) Nebulizer every 6 hours PRN Shortness of Breath and/or Wheezing  dextrose Oral Gel 15 Gram(s) Oral once PRN Blood Glucose LESS THAN 70 milliGRAM(s)/deciliter  diphenhydrAMINE 25 milliGRAM(s) Oral every 6 hours PRN Rash and/or Itching  naloxone Injectable 0.1 milliGRAM(s) IV Push every 3 minutes PRN For ANY of the following changes in patient status:  A. RR LESS THAN 10 breaths per minute, B. Oxygen saturation LESS THAN 90%, C. Sedation score of 6  ondansetron Injectable 4 milliGRAM(s) IV Push every 6 hours PRN Nausea      This is a case of a T3 - L1 posterior fusion (EBL 500cc, 1L UOP, 4200 crystalloid, 1u PRBC) in a 66 yo F with history of HTN, HLD, prior strokes with residual RUE weakness, GAMALIEL noncompliant with CPAP, COPD, prior extensive smoking history, chronic constipation, urinary incontinence, chronic UTI, ruptured breast implant, bilateral CTS release surgery, intrathecal morphine pump.    NEURO  #S/p T3 - L1 posterior fusion, Dr. Luz, POD#0  - Admit NSICU, Q1h neuro checks / Q1h vital signs  - Acute pain consultation, dilaudid PCA  - Home medications    PULM  - Currently on pressure support passing SBT; consider extubation when patient awake and following commands  - SpO2 goal > 92%, supplemental O2 and pulm toileting as needed    CARDIO  - BP goal: MAP > 85 pending full examination, Levophed     GI  - Diet: NPO for now anticipating extubation  - Stress ulcer prophylaxis: PPI unless extubated    /RENAL  - Monitor UOP/volume status, BUN/SCr    HEME  - Maintain Hb > 7.0, PLT > 100,000K  - SCDs, holding chemoppx    ID  - Monitor for infectious s/s, fever curve, leukocytosis  - Periop ancef    ENDO  #History of pre-diabetes  - RASS      08-17-22 @ 18:42       (17 Aug 2022 07:46)      PAST MEDICAL & SURGICAL HISTORY:  HTN (hypertension)      SS (spinal stenosis)      High cholesterol      Stroke  x3      H/O carpal tunnel syndrome  Bilateral      Chronic GERD      History of chronic constipation      Urinary incontinence  self cath as needed      Pre-diabetes      Anxiety and depression      Eczema      H/O kyphosis      Pancreas cyst      Scoliosis      Wheelchair dependent      Cervical pseudoarthrosis  and lumbar spine      Cervical spondylosis      Chronic headaches      Degenerative joint disease  left shoulder      Lumbosacral spondylosis      COPD (chronic obstructive pulmonary disease)      H/O Spinal surgery  lumbar x 30      H/O breast surgery  left breast implant 1980&#x27;s      S/P cervical discectomy  x4      H/O total shoulder replacement, right          MEDICATIONS  (STANDING):  acetaminophen     Tablet .. 1000 milliGRAM(s) Oral every 8 hours  bisacodyl 5 milliGRAM(s) Oral every 12 hours  buPROPion XL (24-Hour) . 150 milliGRAM(s) Oral daily  cholecalciferol 2000 Unit(s) Oral daily  dextrose 50% Injectable 25 Gram(s) IV Push once  dextrose 50% Injectable 12.5 Gram(s) IV Push once  diazepam    Tablet 5 milliGRAM(s) Oral every 6 hours  DULoxetine 60 milliGRAM(s) Oral daily  enoxaparin Injectable 40 milliGRAM(s) SubCutaneous <User Schedule>  gabapentin 600 milliGRAM(s) Oral three times a day  insulin lispro (ADMELOG) corrective regimen sliding scale   SubCutaneous Before meals and at bedtime  lidocaine   4% Patch 1 Patch Transdermal every 24 hours  lidocaine   4% Patch 1 Patch Transdermal every 24 hours  metoprolol tartrate 50 milliGRAM(s) Oral two times a day  naloxegol 25 milliGRAM(s) Oral daily  nitrofurantoin macrocrystals (MACRODANTIN) 50 milliGRAM(s) Oral <User Schedule>  pantoprazole    Tablet 40 milliGRAM(s) Oral before breakfast  polyethylene glycol 3350 17 Gram(s) Oral every 12 hours  senna 2 Tablet(s) Oral at bedtime  simvastatin 40 milliGRAM(s) Oral at bedtime    MEDICATIONS  (PRN):  ALBUTerol   0.042% 2.5 milliGRAM(s) Nebulizer every 6 hours PRN Shortness of Breath and/or Wheezing  diphenhydrAMINE 25 milliGRAM(s) Oral every 6 hours PRN Rash and/or Itching  HYDROmorphone   Tablet 4 milliGRAM(s) Oral every 4 hours PRN Moderate Pain (4 - 6)  HYDROmorphone   Tablet 6 milliGRAM(s) Oral every 4 hours PRN Severe Pain (7 - 10)  HYDROmorphone  Injectable 0.25 milliGRAM(s) IV Push every 4 hours PRN breakthrough pain  naloxone Injectable 0.1 milliGRAM(s) IV Push every 3 minutes PRN For ANY of the following changes in patient status:  A. RR LESS THAN 10 breaths per minute, B. Oxygen saturation LESS THAN 90%, C. Sedation score of 6  ondansetron Injectable 4 milliGRAM(s) IV Push every 6 hours PRN Nausea        Social History:                -  Home Living Status :  lives alone in an elevator accessible apartment building         -  Prior Home Care Services :   none          -  Family support : none          -  Occupation : artist     Baseline Functional Level Prior to Admission :             - ADL's/ IADL's :  independent          - ambulatory status PTA :  walked w/o devices at home has a rolling walker  , uses the walls and furniture for support, h/o mechanical falls , uses motorized wheelchair in the community        Radiology :  < from: Xray Spine 1 View, Bedside (08.17.22 @ 14:41) >  ACC: [id number] EXAM:  XR SPINE 1 VIEW BEDSIDE                          PROCEDURE DATE:  08/17/2022          INTERPRETATION:  PROCEDURE: AP and lateral views (2) of the entire spine    INDICATION: Post-op    COMPARISON: CT spine 5/24/2022    FINDINGS: The patient is status post interval spinal fusion revision with   rods and screws now spanning the entire spine from the C2 level through   the pelvis. The patient is also status post previous anterior cervical   plating and screw fixation at the C2/3 level as well as fusion at L5/S1   with disc spacer with integrated screw. There is multilevel laminectomy.    The patient is status post right shoulder arthroplasty which is   incompletely evaluated.    A endotracheal tube is present.    IMPRESSION: Patient status spinal fusion revision now extending from C2   through the pelvis      Vital Signs Last 24 Hrs  T(C): 36.6 (22 Aug 2022 06:00), Max: 37.1 (21 Aug 2022 13:00)  T(F): 97.8 (22 Aug 2022 06:00), Max: 98.8 (21 Aug 2022 13:00)  HR: 79 (22 Aug 2022 06:00) (66 - 84)  BP: 132/79 (22 Aug 2022 06:00) (110/63 - 168/85)  BP(mean): --  RR: 16 (22 Aug 2022 06:00) (16 - 20)  SpO2: 100% (22 Aug 2022 06:00) (97% - 100%)    Parameters below as of 22 Aug 2022 06:00  Patient On (Oxygen Delivery Method): room air        REVIEW OF SYSTEMS:  per  HPI            Physical Exam:  WDWN 67 y o [place] woman lying in semi Loving's position , awake , alert , c/o back pain       Neurologic Exam:    Alert and oriented x 4     Motor Exam:    Right UE:                : 4/5  wrist extensors/ flexors: 4/5  biceps :   5/5                    triceps :  5/5  deltoid :  4/5                                Left UE:               dec effort sec to pain     : 3+/5  wrist extensors/ flexors : 3+/5  biceps :   3+ to 4-/5                    triceps :  3+/5  deltoid :  3-/5         Right LE:            dec effort sec to pain    > 3+/5 throughout     Left LE:                  > 3+/5 throughout       Sensation:         intact to light touch x 4 extremities                         DTR :                     biceps/brachioradialis : equal                                              patella/ankle : equal     PT/OT assessment on 8/21/2022    Cognitive/Neuro/Behavioral  Cognitive/Neuro/Behavioral [WDL Definition: Alert; opens eyes spontaneously; arouses to voice or touch; oriented x 4; follows commands; speech spontaneous, logical; purposeful motor response; behavior appropriate to situation]: WDL except  Mood/Behavior: agitated    Language Assistance  Preferred Language to Address Healthcare Preferred Language to Address Healthcare: English    Therapeutic Interventions      Bed Mobility  Bed Mobility Training Rolling/Turning: minimum assist (75% patient effort);  1 person assist  Bed Mobility Training Scooting: minimum assist (75% patient effort);  1 person assist  Bed Mobility Training Sit-to-Supine: moderate assist (50% patient effort);  1 person assist  Bed Mobility Training Supine-to-Sit: moderate assist (50% patient effort);  1 person assist  Bed Mobility Training Limitations: impaired postural control;  decreased strength;  impaired ability to control trunk for mobility;  decreased ability to use legs for bridging/pushing    Sit-Stand Transfer Training  Transfer Training Sit-to-Stand Transfer: minimum assist (75% patient effort);  1 person assist;  rolling walker  Transfer Training Stand-to-Sit Transfer: minimum assist (75% patient effort);  1 person assist;  rolling walker  Sit-to-Stand Transfer Training Transfer Safety Analysis: decreased weight-shifting ability;  Displays unsafe BUE hand placements on RW prior to sit->stand transfer. VC provided however pt agitated to comply;  impaired balance    Gait Training  Gait Training: moderate assist (50% patient effort);  1 person assist;  rolling walker;  15 feet  Gait Analysis: 2-point gait   decreased step length;  decreased toe clearance;  crouch;  decreased angelo;  decreased weight-shifting ability;  decreased trunk rotation;  Pt displayed hunched posture w/ amb. Dec step length and angelo noted. Pt w/ tendency to buckle R knee however no falls noted. Required assist at times for RW management.;  decreased strength;  impaired balance;  impaired postural control      Therapeutic Exercise  Therapeutic Exercise Detail: Patient completed functional mobility with min A x1, with 3 LOBs with mod A x1 to recover and R knee buckling noted. benefited from +chair follow as patient required 1 seated rest break. Patient benefited from max verbal cues for safety awareness throughout.     Upper Body Dressing Training  Upper Body Dressing Training Assistance: moderate assist (50% patient effort);  nonverbal cues (demo/gestures);  1 person assist;  verbal cues;  patient reporting shoulder pain, therefore requesting increased assistance with donning UB garment ;  pain;  decreased strength;  impaired balance;  decreased ROM        PM&R Impression :     67 F with PMH including HTN, HLD, CVA, GAMALIEL, emphysema, urinary incontinence, and multiple spinal surgeries presenting for revision of fusion, repair of intrathecal morphine pump and durotomy      Disposition plan recommendation:     1) excellent acute rehab candidate : meets the criteria for acute rehab placement    2) patient is extremely motivated in her rehab and recovery and actively participating in PT and OT     3) can tolerate > 3 hrs PT/OT / day

## 2022-08-22 NOTE — PHYSICAL EXAM
[No Acute Distress] : no acute distress [Normal Oropharynx] : normal oropharynx [No Neck Mass] : no neck mass [Normal Rate/Rhythm] : normal rate/rhythm [Normal S1, S2] : normal s1, s2 [No Murmurs] : no murmurs [No Resp Distress] : no resp distress [No Abnormalities] : no abnormalities [Benign] : benign [Normal Color/ Pigmentation] : normal color/ pigmentation [Oriented x3] : oriented x3 [Normal Affect] : normal affect [TextBox_68] : Bibasilar inspiratory crackles [TextBox_105] : Clubbing in UE digits bilaterally

## 2022-08-22 NOTE — DIETITIAN INITIAL EVALUATION ADULT - PERTINENT MEDS FT
MEDICATIONS  (STANDING):  acetaminophen     Tablet .. 1000 milliGRAM(s) Oral every 8 hours  bisacodyl 5 milliGRAM(s) Oral every 12 hours  buPROPion XL (24-Hour) . 150 milliGRAM(s) Oral daily  cholecalciferol 2000 Unit(s) Oral daily  dextrose 50% Injectable 25 Gram(s) IV Push once  dextrose 50% Injectable 12.5 Gram(s) IV Push once  diazepam    Tablet 5 milliGRAM(s) Oral every 6 hours  DULoxetine 60 milliGRAM(s) Oral daily  enoxaparin Injectable 40 milliGRAM(s) SubCutaneous <User Schedule>  gabapentin 600 milliGRAM(s) Oral three times a day  insulin lispro (ADMELOG) corrective regimen sliding scale   SubCutaneous Before meals and at bedtime  lidocaine   4% Patch 1 Patch Transdermal every 24 hours  lidocaine   4% Patch 1 Patch Transdermal every 24 hours  metoprolol tartrate 50 milliGRAM(s) Oral two times a day  naloxegol 25 milliGRAM(s) Oral daily  nitrofurantoin macrocrystals (MACRODANTIN) 50 milliGRAM(s) Oral <User Schedule>  pantoprazole    Tablet 40 milliGRAM(s) Oral before breakfast  polyethylene glycol 3350 17 Gram(s) Oral every 12 hours  senna 2 Tablet(s) Oral at bedtime  simvastatin 40 milliGRAM(s) Oral at bedtime  sodium chloride 0.9% Bolus 500 milliLiter(s) IV Bolus once    MEDICATIONS  (PRN):  ALBUTerol   0.042% 2.5 milliGRAM(s) Nebulizer every 6 hours PRN Shortness of Breath and/or Wheezing  diphenhydrAMINE 25 milliGRAM(s) Oral every 6 hours PRN Rash and/or Itching  HYDROmorphone   Tablet 4 milliGRAM(s) Oral every 4 hours PRN Moderate Pain (4 - 6)  HYDROmorphone   Tablet 6 milliGRAM(s) Oral every 4 hours PRN Severe Pain (7 - 10)  HYDROmorphone  Injectable 0.25 milliGRAM(s) IV Push every 4 hours PRN breakthrough pain  naloxone Injectable 0.1 milliGRAM(s) IV Push every 3 minutes PRN For ANY of the following changes in patient status:  A. RR LESS THAN 10 breaths per minute, B. Oxygen saturation LESS THAN 90%, C. Sedation score of 6  ondansetron Injectable 4 milliGRAM(s) IV Push every 6 hours PRN Nausea

## 2022-08-22 NOTE — DIETITIAN INITIAL EVALUATION ADULT - OTHER INFO
66 y/o female with PMHx HTN, HLD, CVA x 3 (last was 2016 with right hand weakness residual), GAMALIEL not using CPAP, Emphysema, ex-25 pack year smoker quit 2021, Chronic Constipation on Movantik, Urinary Incontinence chronically self straight cath at home, chronic UTI, Breast Implant Rupture with Chronic Leak, B/L CTS s/p release, s/p Intrathecal Morphine pump for pain, with chronic neck and back pain worsening for years s/p multiple spinal surgeries including laminectomies and fusion of cervical, thoracic and lumbar spine, most recently done in 2019 and 2020 at [facility] by Dr. Trinidad. CT findings include hardware loosening at T5, T6, T9-T12. S/p T3-L1 revision of fusion, c/b durotomy with primary repair, loss of left side motors, and repair of intrathecal morphine pump (8/17).    Pt seen resting in bed. Limited information obtained at this time, d/t pt unable to focus on assessment, in and out of sleep. Currently on a regular diet, but reports lack of appetite, 2/2 to intense pain 10/10 per flowsheet, noted on additional dilaudid pushes per chart, also with active order of lidocaine patches. She reports PTA, had normal intake, denies changes in wt. Would like counselling for wt loss as she reports CBW to high. Deferred diet edu until pt able to focus and participate during conversation. Encouraged PO intake as able with emphasis on lean protein intake. Pt not amenable to ONS for support of EER at this time. NKFA. Denies n/v. Pt with constipation, given miralax BID and lactulose, BM 8/21. Skin: Filippo 16, surgical incision noted. No PU or edema noted. RD to follow per protocol. Please see below for nutr recs.

## 2022-08-22 NOTE — CHART NOTE - NSCHARTNOTEFT_GEN_A_CORE
POD5, complaining of severe pain o/n requiring additional dilaudid pushes. HMV#3 removed and incisional dressing changed. Given 500cc bolus for pressure in 90s, patient mildly sedated from pain medication. Pain regimen discussed with Dr. Mclaughlin and recommends to keep regimen the same at this time. Accepted to [facility], pending insurance authorization. POD5, complaining of severe pain o/n requiring additional dilaudid pushes. HMV#3 removed and incisional dressing changed. Given 500cc bolus for pressure in 90s, patient mildly sedated from pain medication. Pain regimen discussed with Dr. Mclaughlin and recommends to keep regimen the same at this time. Accepted to [facility], pending insurance authorization. Post op XRs performed

## 2022-08-22 NOTE — CONSULT NOTE ADULT - ASSESSMENT
per Neurosurgery     66 y/o female with PMHx HTN, HLD, CVA x 3 (last was 2016 with right hand weakness residual), GAMALIEL not using CPAP, Emphysema, ex-25 pack year smoker quit 2021, Chronic Constipation on Movantik, Urinary Incontinence chronically self straight cath at home, chronic UTI, Breast Implant Rupture with Chronic Leak, B/L CTS s/p release, s/p Intrathecal Morphine pump for pain, with chronic neck and back pain worsening for years s/p multiple spinal surgeries including laminectomies and fusion of cervical, thoracic and lumbar spine, most recently done in 2019 and 2020 at [facility] by Dr. Trinidad. CT findings include hardware loosening at T5, T6, T9-T12. S/p T3-L1 revision of fusion, c/b durotomy with primary repair, loss of left side motors, and repair of intrathecal morphine pump (8/17).    PLAN:  NEURO:  - neuro/spinal/vital checks q4hr  - pain management reccs: Valium 5q8, dilaudid PO  - cont movantik 25mg qd  - 3 deep HMVs, monitor outputs, HMV#4 d/c 8/19  - decadron 4q6 x 3 days  - ERAS protocol: tylenol 1g q8, gabapentin 600mg TID  - PT/OT when able   - cont wellbutrin 150mg qd, duloxetine 60mg qd  - postop xrays when drains out    CARDS:  - normotensive SBP goal  - cont lopressor 50mg BID, lipitor 40mg qd    PULM:   - extrubated in NSICU to NC 8/17  - Albuterol 2.5q6 PRN    GI:   - regular diet  - protonix while on decadron  - bowel regimen  - last BM 8/21    RENAL:  - straight cath prn  - h/o chronic urniary retnation, straight cath at home    HEME:   - SCDs for DVT ppx, SQL    ID:   - macrobid for chronic UTI  - afebrile, no leukocytosis    ENDO:  - ISS, A1C 6.6

## 2022-08-22 NOTE — REVIEW OF SYSTEMS
[Fatigue] : fatigue [SOB on Exertion] : sob on exertion [Back Pain] : back pain [Negative] : Gastrointestinal [Fever] : no fever [Chills] : no chills [Cough] : no cough [Sputum] : no sputum

## 2022-08-22 NOTE — DIETITIAN INITIAL EVALUATION ADULT - ADD RECOMMEND
1. Continue with regular diet  2. Encourage PO as able, anticipate increased PO with pain management  2. BM and pain regimen per team  3. Monitor lytes, replete prn  4. Diet edu prn

## 2022-08-22 NOTE — ASSESSMENT
[FreeTextEntry1] : Reviewed:\par \par CXR [facility] 5/24/22 personally reviewed : Lungs clear, thoracic and lumbar hardware in place\par \par CTA Chest [facility] 1/5/21 personally reviewed : mild centrilobular emphysema, no PE\par \par Impression:\par GAMALIEL\par COPD\par Pre-op evaluation\par \par Plan:\par Ms. Redd has a history of COPD and GAMALIEL, not using CPAP who presents for pre-op evaluation for spinal surgery with history of many spinal surgeries in the past. No history of anesthesia complications, never had DVT/PE. SOB has been worse, somewhat improves with albuterol. SOB may be related to COPD vs spinal pain vs restrictive pattern from spinal process. Will obtain PFTs to see if there is lung process that can be optimized prior to surgery. Will obtain HST to eval for GAMALIEL and set her up with nasal interface for PAP. Patient will need GAMALIEL precautions with prolonged post-op monitoring following surgery.\par --\par Attending Addendum\par \par Seen and examined by me and agree w above. Needs some further evaluation prior to planned spinal surgery, though has tolerated multiple surgeries in the past. There is a stated hx of COPD and she has increased dyspnea. Will obtain PFT to diagnose and categorize COPD if present and optimize prior to OR. Will also obtain HST to optimize sleep apnea prior to OR. [facility] request records from [facility].\par --\par PFT 6/27/22: no obstruction, FEV1 68%, mod restriction, TLC 63%, DLCO 46%\par She does not have COPD. The DLCO is explained by the emphysema.\par She will get the HST.\par --\par HST [facility] 8/2022: AHI 4.4, <1% below 89% / spoke to her, PSG\par --\par Addendum 8/12/22 There is no pulmonary contraindication to proceeding to the OR without completing her evaluation for sleep apnea. Her care team should be prepared to treat with PAP for any post operative apnea. VTE ppx per surgery.\par --\par PSG [facility] 8/2022: mild SDB prb not sig - mostly hypopneas / LM on VM [id number] Comprehensive

## 2022-08-22 NOTE — DIETITIAN INITIAL EVALUATION ADULT - PERTINENT LABORATORY DATA
08-22    138  |  101  |  17  ----------------------------<  195<H>  4.2   |  27  |  0.65    Ca    8.8      22 Aug 2022 07:16  Phos  3.2     08-22  Mg     2.1     08-22    TPro  6.5  /  Alb  3.6  /  TBili  0.3  /  DBili  x   /  AST  23  /  ALT  24  /  AlkPhos  80  08-21  POCT Blood Glucose.: 168 mg/dL (08-22-22 @ 11:54)  A1C with Estimated Average Glucose Result: 6.6 % (08-18-22 @ 05:40)

## 2022-08-23 LAB
ANION GAP SERPL CALC-SCNC: 10 MMOL/L — SIGNIFICANT CHANGE UP (ref 5–17)
ANION GAP SERPL CALC-SCNC: 10 MMOL/L — SIGNIFICANT CHANGE UP (ref 5–17)
BUN SERPL-MCNC: 11 MG/DL — SIGNIFICANT CHANGE UP (ref 7–23)
BUN SERPL-MCNC: 13 MG/DL — SIGNIFICANT CHANGE UP (ref 7–23)
CALCIUM SERPL-MCNC: 8.3 MG/DL — LOW (ref 8.4–10.5)
CALCIUM SERPL-MCNC: 8.6 MG/DL — SIGNIFICANT CHANGE UP (ref 8.4–10.5)
CHLORIDE SERPL-SCNC: 101 MMOL/L — SIGNIFICANT CHANGE UP (ref 96–108)
CHLORIDE SERPL-SCNC: 98 MMOL/L — SIGNIFICANT CHANGE UP (ref 96–108)
CO2 SERPL-SCNC: 20 MMOL/L — LOW (ref 22–31)
CO2 SERPL-SCNC: 27 MMOL/L — SIGNIFICANT CHANGE UP (ref 22–31)
CREAT SERPL-MCNC: 0.54 MG/DL — SIGNIFICANT CHANGE UP (ref 0.5–1.3)
CREAT SERPL-MCNC: 0.55 MG/DL — SIGNIFICANT CHANGE UP (ref 0.5–1.3)
EGFR: 100 ML/MIN/1.73M2 — SIGNIFICANT CHANGE UP
EGFR: 100 ML/MIN/1.73M2 — SIGNIFICANT CHANGE UP
GLUCOSE BLDC GLUCOMTR-MCNC: 140 MG/DL — HIGH (ref 70–99)
GLUCOSE BLDC GLUCOMTR-MCNC: 152 MG/DL — HIGH (ref 70–99)
GLUCOSE BLDC GLUCOMTR-MCNC: 180 MG/DL — HIGH (ref 70–99)
GLUCOSE SERPL-MCNC: 137 MG/DL — HIGH (ref 70–99)
GLUCOSE SERPL-MCNC: 138 MG/DL — HIGH (ref 70–99)
HCT VFR BLD CALC: 37.5 % — SIGNIFICANT CHANGE UP (ref 34.5–45)
HGB BLD-MCNC: 12.2 G/DL — SIGNIFICANT CHANGE UP (ref 11.5–15.5)
MAGNESIUM SERPL-MCNC: 2 MG/DL — SIGNIFICANT CHANGE UP (ref 1.6–2.6)
MCHC RBC-ENTMCNC: 28.2 PG — SIGNIFICANT CHANGE UP (ref 27–34)
MCHC RBC-ENTMCNC: 32.5 GM/DL — SIGNIFICANT CHANGE UP (ref 32–36)
MCV RBC AUTO: 86.8 FL — SIGNIFICANT CHANGE UP (ref 80–100)
NRBC # BLD: 0 /100 WBCS — SIGNIFICANT CHANGE UP (ref 0–0)
PHOSPHATE SERPL-MCNC: 3.5 MG/DL — SIGNIFICANT CHANGE UP (ref 2.5–4.5)
PLATELET # BLD AUTO: 236 K/UL — SIGNIFICANT CHANGE UP (ref 150–400)
POTASSIUM SERPL-MCNC: SIGNIFICANT CHANGE UP (ref 3.5–5.3)
POTASSIUM SERPL-MCNC: SIGNIFICANT CHANGE UP MMOL/L (ref 3.5–5.3)
POTASSIUM SERPL-SCNC: SIGNIFICANT CHANGE UP (ref 3.5–5.3)
POTASSIUM SERPL-SCNC: SIGNIFICANT CHANGE UP MMOL/L (ref 3.5–5.3)
RBC # BLD: 4.32 M/UL — SIGNIFICANT CHANGE UP (ref 3.8–5.2)
RBC # FLD: 14.2 % — SIGNIFICANT CHANGE UP (ref 10.3–14.5)
SODIUM SERPL-SCNC: 131 MMOL/L — LOW (ref 135–145)
SODIUM SERPL-SCNC: 135 MMOL/L — SIGNIFICANT CHANGE UP (ref 135–145)
WBC # BLD: 12.63 K/UL — HIGH (ref 3.8–10.5)
WBC # FLD AUTO: 12.63 K/UL — HIGH (ref 3.8–10.5)

## 2022-08-23 PROCEDURE — 76937 US GUIDE VASCULAR ACCESS: CPT | Mod: 26

## 2022-08-23 PROCEDURE — 99232 SBSQ HOSP IP/OBS MODERATE 35: CPT

## 2022-08-23 PROCEDURE — 99024 POSTOP FOLLOW-UP VISIT: CPT

## 2022-08-23 PROCEDURE — 36000 PLACE NEEDLE IN VEIN: CPT

## 2022-08-23 RX ORDER — HYDROMORPHONE HYDROCHLORIDE 2 MG/ML
0.25 INJECTION INTRAMUSCULAR; INTRAVENOUS; SUBCUTANEOUS ONCE
Refills: 0 | Status: DISCONTINUED | OUTPATIENT
Start: 2022-08-23 | End: 2022-08-23

## 2022-08-23 RX ORDER — ACETAMINOPHEN 500 MG
1000 TABLET ORAL EVERY 6 HOURS
Refills: 0 | Status: DISCONTINUED | OUTPATIENT
Start: 2022-08-23 | End: 2022-08-23

## 2022-08-23 RX ORDER — HYDRALAZINE HCL 50 MG
10 TABLET ORAL ONCE
Refills: 0 | Status: DISCONTINUED | OUTPATIENT
Start: 2022-08-23 | End: 2022-08-23

## 2022-08-23 RX ORDER — ACETAMINOPHEN 500 MG
1000 TABLET ORAL ONCE
Refills: 0 | Status: COMPLETED | OUTPATIENT
Start: 2022-08-23 | End: 2022-08-23

## 2022-08-23 RX ORDER — ACETAMINOPHEN 500 MG
1000 TABLET ORAL EVERY 8 HOURS
Refills: 0 | Status: DISCONTINUED | OUTPATIENT
Start: 2022-08-23 | End: 2022-08-30

## 2022-08-23 RX ADMIN — Medication 1000 MILLIGRAM(S): at 16:05

## 2022-08-23 RX ADMIN — Medication 2: at 22:07

## 2022-08-23 RX ADMIN — HYDROMORPHONE HYDROCHLORIDE 0.25 MILLIGRAM(S): 2 INJECTION INTRAMUSCULAR; INTRAVENOUS; SUBCUTANEOUS at 23:44

## 2022-08-23 RX ADMIN — LIDOCAINE 1 PATCH: 4 CREAM TOPICAL at 00:45

## 2022-08-23 RX ADMIN — HYDROMORPHONE HYDROCHLORIDE 6 MILLIGRAM(S): 2 INJECTION INTRAMUSCULAR; INTRAVENOUS; SUBCUTANEOUS at 23:08

## 2022-08-23 RX ADMIN — Medication 25 MILLIGRAM(S): at 22:15

## 2022-08-23 RX ADMIN — Medication 2000 UNIT(S): at 12:22

## 2022-08-23 RX ADMIN — Medication 5 MILLIGRAM(S): at 09:12

## 2022-08-23 RX ADMIN — Medication 5 MILLIGRAM(S): at 21:02

## 2022-08-23 RX ADMIN — Medication 5 MILLIGRAM(S): at 16:02

## 2022-08-23 RX ADMIN — HYDROMORPHONE HYDROCHLORIDE 6 MILLIGRAM(S): 2 INJECTION INTRAMUSCULAR; INTRAVENOUS; SUBCUTANEOUS at 18:20

## 2022-08-23 RX ADMIN — SENNA PLUS 2 TABLET(S): 8.6 TABLET ORAL at 22:08

## 2022-08-23 RX ADMIN — ENOXAPARIN SODIUM 40 MILLIGRAM(S): 100 INJECTION SUBCUTANEOUS at 22:08

## 2022-08-23 RX ADMIN — BUPROPION HYDROCHLORIDE 150 MILLIGRAM(S): 150 TABLET, EXTENDED RELEASE ORAL at 12:22

## 2022-08-23 RX ADMIN — HYDROMORPHONE HYDROCHLORIDE 6 MILLIGRAM(S): 2 INJECTION INTRAMUSCULAR; INTRAVENOUS; SUBCUTANEOUS at 01:20

## 2022-08-23 RX ADMIN — HYDROMORPHONE HYDROCHLORIDE 6 MILLIGRAM(S): 2 INJECTION INTRAMUSCULAR; INTRAVENOUS; SUBCUTANEOUS at 17:20

## 2022-08-23 RX ADMIN — Medication 50 MILLIGRAM(S): at 07:30

## 2022-08-23 RX ADMIN — GABAPENTIN 600 MILLIGRAM(S): 400 CAPSULE ORAL at 22:08

## 2022-08-23 RX ADMIN — HYDROMORPHONE HYDROCHLORIDE 6 MILLIGRAM(S): 2 INJECTION INTRAMUSCULAR; INTRAVENOUS; SUBCUTANEOUS at 04:33

## 2022-08-23 RX ADMIN — Medication 1000 MILLIGRAM(S): at 10:12

## 2022-08-23 RX ADMIN — GABAPENTIN 600 MILLIGRAM(S): 400 CAPSULE ORAL at 06:26

## 2022-08-23 RX ADMIN — Medication 5 MILLIGRAM(S): at 02:33

## 2022-08-23 RX ADMIN — HYDROMORPHONE HYDROCHLORIDE 0.25 MILLIGRAM(S): 2 INJECTION INTRAMUSCULAR; INTRAVENOUS; SUBCUTANEOUS at 00:50

## 2022-08-23 RX ADMIN — SIMVASTATIN 40 MILLIGRAM(S): 20 TABLET, FILM COATED ORAL at 22:08

## 2022-08-23 RX ADMIN — DULOXETINE HYDROCHLORIDE 60 MILLIGRAM(S): 30 CAPSULE, DELAYED RELEASE ORAL at 12:22

## 2022-08-23 RX ADMIN — HYDROMORPHONE HYDROCHLORIDE 6 MILLIGRAM(S): 2 INJECTION INTRAMUSCULAR; INTRAVENOUS; SUBCUTANEOUS at 12:30

## 2022-08-23 RX ADMIN — PANTOPRAZOLE SODIUM 40 MILLIGRAM(S): 20 TABLET, DELAYED RELEASE ORAL at 05:29

## 2022-08-23 RX ADMIN — Medication 1000 MILLIGRAM(S): at 17:05

## 2022-08-23 RX ADMIN — HYDROMORPHONE HYDROCHLORIDE 6 MILLIGRAM(S): 2 INJECTION INTRAMUSCULAR; INTRAVENOUS; SUBCUTANEOUS at 22:08

## 2022-08-23 RX ADMIN — HYDROMORPHONE HYDROCHLORIDE 6 MILLIGRAM(S): 2 INJECTION INTRAMUSCULAR; INTRAVENOUS; SUBCUTANEOUS at 13:30

## 2022-08-23 RX ADMIN — HYDROMORPHONE HYDROCHLORIDE 0.25 MILLIGRAM(S): 2 INJECTION INTRAMUSCULAR; INTRAVENOUS; SUBCUTANEOUS at 00:38

## 2022-08-23 RX ADMIN — Medication 50 MILLIGRAM(S): at 06:26

## 2022-08-23 RX ADMIN — Medication 1000 MILLIGRAM(S): at 09:12

## 2022-08-23 RX ADMIN — Medication 5 MILLIGRAM(S): at 05:29

## 2022-08-23 RX ADMIN — Medication 2: at 12:43

## 2022-08-23 NOTE — PROGRESS NOTE ADULT - SUBJECTIVE AND OBJECTIVE BOX
She is fast asleep in her room, appears comfortable.  No significant issues reported overnight      Remaining ROS negative       PHYSICAL EXAM:    General: WDWN, resting in bed  HEENT: NC/AT; MMM  Cardiovascular: +S1/S2, RRR, no murmurs, rubs or gallops  Respiratory: normal respiratory effort, CTAB  Extremities: WWP; no edema  Dermatologic: no appreciable wounds or damage to the skin    VITAL SIGNS:  Vital Signs Last 24 Hrs  T(C): 36.8 (23 Aug 2022 09:30), Max: 36.8 (23 Aug 2022 09:30)  T(F): 98.3 (23 Aug 2022 09:30), Max: 98.3 (23 Aug 2022 09:30)  HR: 67 (23 Aug 2022 09:30) (66 - 83)  BP: 165/98 (23 Aug 2022 09:30) (109/68 - 179/99)  BP(mean): --  RR: 17 (23 Aug 2022 09:30) (16 - 18)  SpO2: 100% (23 Aug 2022 09:30) (99% - 100%)    Parameters below as of 23 Aug 2022 09:30  Patient On (Oxygen Delivery Method): room air          MEDICATIONS:  MEDICATIONS  (STANDING):  acetaminophen     Tablet .. 1000 milliGRAM(s) Oral every 8 hours  bisacodyl 5 milliGRAM(s) Oral every 12 hours  buPROPion XL (24-Hour) . 150 milliGRAM(s) Oral daily  cholecalciferol 2000 Unit(s) Oral daily  dextrose 50% Injectable 12.5 Gram(s) IV Push once  dextrose 50% Injectable 25 Gram(s) IV Push once  diazepam    Tablet 5 milliGRAM(s) Oral every 6 hours  DULoxetine 60 milliGRAM(s) Oral daily  enoxaparin Injectable 40 milliGRAM(s) SubCutaneous <User Schedule>  gabapentin 600 milliGRAM(s) Oral three times a day  insulin lispro (ADMELOG) corrective regimen sliding scale   SubCutaneous Before meals and at bedtime  lidocaine   4% Patch 1 Patch Transdermal every 24 hours  lidocaine   4% Patch 1 Patch Transdermal every 24 hours  metoprolol tartrate 50 milliGRAM(s) Oral two times a day  naloxegol 25 milliGRAM(s) Oral daily  nitrofurantoin macrocrystals (MACRODANTIN) 50 milliGRAM(s) Oral <User Schedule>  pantoprazole    Tablet 40 milliGRAM(s) Oral before breakfast  polyethylene glycol 3350 17 Gram(s) Oral every 12 hours  senna 2 Tablet(s) Oral at bedtime  simvastatin 40 milliGRAM(s) Oral at bedtime    MEDICATIONS  (PRN):  ALBUTerol   0.042% 2.5 milliGRAM(s) Nebulizer every 6 hours PRN Shortness of Breath and/or Wheezing  diphenhydrAMINE 25 milliGRAM(s) Oral every 6 hours PRN Rash and/or Itching  HYDROmorphone   Tablet 4 milliGRAM(s) Oral every 4 hours PRN Moderate Pain (4 - 6)  HYDROmorphone   Tablet 6 milliGRAM(s) Oral every 4 hours PRN Severe Pain (7 - 10)  naloxone Injectable 0.1 milliGRAM(s) IV Push every 3 minutes PRN For ANY of the following changes in patient status:  A. RR LESS THAN 10 breaths per minute, B. Oxygen saturation LESS THAN 90%, C. Sedation score of 6  ondansetron Injectable 4 milliGRAM(s) IV Push every 6 hours PRN Nausea      ALLERGIES:  Allergies    oxycodone (Pruritus)    Intolerances        LABS:                        12.2   12.63 )-----------( 236      ( 23 Aug 2022 11:21 )             37.5     08-23    135  |  98  |  11  ----------------------------<  138<H>  See note   |  27  |  0.54    Ca    8.6      23 Aug 2022 11:21  Phos  3.5     08-23  Mg     2.0     08-23          CAPILLARY BLOOD GLUCOSE      POCT Blood Glucose.: 140 mg/dL (23 Aug 2022 07:27)      RADIOLOGY & ADDITIONAL TESTS: Reviewed.

## 2022-08-23 NOTE — CHART NOTE - NSCHARTNOTEFT_GEN_A_CORE
POD6, still complains of pain but sleepy with current regimen, plan for Dr. Mclaughlin to interrogate intrathecal pain pump tomorrow. Hemovac removed, US guided IV placed for access. Pending insurance authorization for [facility].

## 2022-08-23 NOTE — PROGRESS NOTE ADULT - SUBJECTIVE AND OBJECTIVE BOX
HPI:  ***********************************************  ADULT NSICU H&P  VANNA DIAZ [id number] [facility] [id number]  ***********************************************  H&P:  "66 y/o female with PMHx HTN, HLD, CVA x 3 (last was 2016 with right hand weakness residual), GAMALIEL not using CPAP, Emphysema, ex-25 pack year smoker quit 2021, Chronic Constipation on Movantik, Urinary Incontinence chronically self straight cath at home, chronic UTI, Breast Implant Rupture with Chronic Leak, B/L CTS s/p release, s/p Intrathecal Morphine pump for pain, with chronic neck and back pain worsening for years s/p multiple spinal surgeries including laminectomies and fusion of cervical, thoracic and lumbar spine, most recently done in 2019 and 2020 at [facility] by Dr. Trinidad.  She is wheelchair dependent.  She also has history of right shoulder replacement.  She reports bilateral shoulder pain, bilateral leg pain and burning to the bilateral calves.  She reports right hand greater than arm and leg weakness."    66 yo F with history of HTN, HLD, prior strokes with residual RUE weakness, GAMALIEL noncompliant with CPAP, COPD, prior extensive smoking history, chronic constipation, urinary incontinence, chronic UTI, ruptured breast implant, bilateral CTS release surgery, intrathecal morphine pump and chronic pain s/p multiple spinal surgeries who is now s/p T3 - L1 posterior fusion (EBL 500cc, 1L UOP, 4200 crystalloid, 1u PRBC) and admitted to NSICU.  She remains intubated with progressive improvement in level of arousal    8/17: POD0 s/p T3-L1 revision of fusion. extubated in NSICU  8/18: POD1 KAMRAN overnight. Started on PCA. Tx to SDU. PT/OT rec AR.  8/19: POD2, overnight episode of agitation and screaming due to concern over pain control. neuro exam stable. 1 HMV drain removed.  8/20: POD3, extra 0.25mg IV dilaudid x 2 given o/n for pain. neuro exam stable  8/21: POD 4. KAMRAN overnight. PCA off now on PO meds. pending rehab, hx of chronic constipation, increased miralax to twice a day and given lactulose and had bowel movement.Valium increased to q 6 hours for muscle spasm. Pending XRs   8/22: POD5, complaining of severe pain o/n requiring additional dilaudid pushes. HMV#3 removed and incisional dressing changed. Given 500cc bolus for pressure in 90s, patient mildly sedated from pain medication. Pain regimen discussed with Dr. Mclaughlin and recommends to keep regimen the same at this time. Accepted to [facility] [place], pending insurance authorization. Post op XRs performed   8/23: POD6, still complaints of pain   OVERNIGHT EVENTS: complains of pain, req 1 extra push Dilaudid   Vital Signs Last 24 Hrs  T(C): 36.4 (22 Aug 2022 23:31), Max: 36.7 (22 Aug 2022 22:10)  T(F): 97.5 (22 Aug 2022 23:31), Max: 98.1 (22 Aug 2022 22:10)  HR: 66 (22 Aug 2022 23:31) (65 - 83)  BP: 134/84 (22 Aug 2022 23:31) (92/64 - 143/90)  BP(mean): --  RR: 17 (22 Aug 2022 23:31) (16 - 18)  SpO2: 99% (22 Aug 2022 23:31) (99% - 100%)    Parameters below as of 22 Aug 2022 23:31  Patient On (Oxygen Delivery Method): room air        I&O's Summary    21 Aug 2022 07:01  -  22 Aug 2022 07:00  --------------------------------------------------------  IN: 800 mL / OUT: 3935 mL / NET: -3135 mL    22 Aug 2022 07:01  -  23 Aug 2022 02:53  --------------------------------------------------------  IN: 0 mL / OUT: 710 mL / NET: -710 mL    PHYSICAL EXAM:  General: patient seen laying supine in bed in NAD  Neuro: AAOx3, follows commands, opens eyes spontaneously, speech clear and fluent,  face symmetric,  sensation intact to light touch throughout, motor strength difficult to assess due to pain limitation but at least 4/5 throughout   HEENT: PERRL  Neck: supple  Cardiac: RRR, S1S2  Pulmonary: chest rise symmetric  Abdomen: soft, nontender, nondistended  Ext: perfusing well  Skin: warm, dry  Wound: thoracolumbar incision dressing c/d/i    LABS:                        11.4   10.41 )-----------( 225      ( 22 Aug 2022 07:16 )             35.3     08-22    138  |  101  |  17  ----------------------------<  195<H>  4.2   |  27  |  0.65    Ca    8.8      22 Aug 2022 07:16  Phos  3.2     08-22  Mg     2.1     08-22    TPro  6.5  /  Alb  3.6  /  TBili  0.3  /  DBili  x   /  AST  23  /  ALT  24  /  AlkPhos  80  08-21            CAPILLARY BLOOD GLUCOSE      POCT Blood Glucose.: 144 mg/dL (22 Aug 2022 22:15)  POCT Blood Glucose.: 151 mg/dL (22 Aug 2022 17:19)  POCT Blood Glucose.: 168 mg/dL (22 Aug 2022 11:54)  POCT Blood Glucose.: 160 mg/dL (22 Aug 2022 07:51)      Drug Levels: [] N/A    CSF Analysis: [] N/A      Allergies    oxycodone (Pruritus)    Intolerances      MEDICATIONS:  Antibiotics:  nitrofurantoin macrocrystals (MACRODANTIN) 50 milliGRAM(s) Oral <User Schedule>    Neuro:  acetaminophen     Tablet .. 1000 milliGRAM(s) Oral every 8 hours  buPROPion XL (24-Hour) . 150 milliGRAM(s) Oral daily  diazepam    Tablet 5 milliGRAM(s) Oral every 6 hours  diphenhydrAMINE 25 milliGRAM(s) Oral every 6 hours PRN  DULoxetine 60 milliGRAM(s) Oral daily  gabapentin 600 milliGRAM(s) Oral three times a day  HYDROmorphone   Tablet 4 milliGRAM(s) Oral every 4 hours PRN  HYDROmorphone   Tablet 6 milliGRAM(s) Oral every 4 hours PRN  ondansetron Injectable 4 milliGRAM(s) IV Push every 6 hours PRN    Anticoagulation:  enoxaparin Injectable 40 milliGRAM(s) SubCutaneous <User Schedule>    OTHER:  ALBUTerol   0.042% 2.5 milliGRAM(s) Nebulizer every 6 hours PRN  bisacodyl 5 milliGRAM(s) Oral every 12 hours  dextrose 50% Injectable 12.5 Gram(s) IV Push once  dextrose 50% Injectable 25 Gram(s) IV Push once  insulin lispro (ADMELOG) corrective regimen sliding scale   SubCutaneous Before meals and at bedtime  lidocaine   4% Patch 1 Patch Transdermal every 24 hours  lidocaine   4% Patch 1 Patch Transdermal every 24 hours  metoprolol tartrate 50 milliGRAM(s) Oral two times a day  naloxegol 25 milliGRAM(s) Oral daily  naloxone Injectable 0.1 milliGRAM(s) IV Push every 3 minutes PRN  pantoprazole    Tablet 40 milliGRAM(s) Oral before breakfast  polyethylene glycol 3350 17 Gram(s) Oral every 12 hours  senna 2 Tablet(s) Oral at bedtime  simvastatin 40 milliGRAM(s) Oral at bedtime    IVF:  cholecalciferol 2000 Unit(s) Oral daily    CULTURES:    RADIOLOGY & ADDITIONAL TESTS:  66 y/o female with PMHx HTN, HLD, CVA x 3 (last was 2016 with right hand weakness residual), GAMALIEL not using CPAP, Emphysema, ex-25 pack year smoker quit 2021, Chronic Constipation on Movantik, Urinary Incontinence chronically self straight cath at home, chronic UTI, Breast Implant Rupture with Chronic Leak, B/L CTS s/p release, s/p Intrathecal Morphine pump for pain, with chronic neck and back pain worsening for years s/p multiple spinal surgeries including laminectomies and fusion of cervical, thoracic and lumbar spine, most recently done in 2019 and 2020 at [facility] by Dr. Trinidad. CT findings include hardware loosening at T5, T6, T9-T12. S/p T3-L1 revision of fusion, c/b durotomy with primary repair, loss of left side motors, and repair of intrathecal morphine pump (8/17).    PLAN:  NEURO:  - neuro/spinal/vital checks q4hr  - pain management reccs: Valium 5q8, dilaudid PO  - cont movantik 25mg qd  - 3 deep HMVs, monitor outputs, HMV#4 d/c 8/19  - decadron 4q6 x 3 days  - ERAS protocol: tylenol 1g q8, gabapentin 600mg TID  - PT/OT when able   - cont wellbutrin 150mg qd, duloxetine 60mg qd  - postop xrays completed 8/22     CARDS:  - normotensive SBP goal  - cont lopressor 50mg BID, lipitor 40mg qd    PULM:   - extrubated in NSICU to NC 8/17  - Albuterol 2.5q6 PRN    GI:   - regular diet  - protonix while on decadron  - bowel regimen  - last BM 8/21    RENAL:  - straight cath prn  - h/o chronic urniary retnation, straight cath at home    HEME:   - SCDs for DVT ppx, SQL    ID:   - macrobid for chronic UTI  - afebrile, no leukocytosis    ENDO:  - ISS, A1C 6.6    DISPO: SDU status, full code, PT/OT rec AR    Assessment and Plan d/w Dr. Luz

## 2022-08-24 LAB
ANION GAP SERPL CALC-SCNC: 10 MMOL/L — SIGNIFICANT CHANGE UP (ref 5–17)
BUN SERPL-MCNC: 8 MG/DL — SIGNIFICANT CHANGE UP (ref 7–23)
CALCIUM SERPL-MCNC: 8.9 MG/DL — SIGNIFICANT CHANGE UP (ref 8.4–10.5)
CHLORIDE SERPL-SCNC: 99 MMOL/L — SIGNIFICANT CHANGE UP (ref 96–108)
CO2 SERPL-SCNC: 26 MMOL/L — SIGNIFICANT CHANGE UP (ref 22–31)
CREAT SERPL-MCNC: 0.49 MG/DL — LOW (ref 0.5–1.3)
EGFR: 103 ML/MIN/1.73M2 — SIGNIFICANT CHANGE UP
GLUCOSE BLDC GLUCOMTR-MCNC: 126 MG/DL — HIGH (ref 70–99)
GLUCOSE BLDC GLUCOMTR-MCNC: 126 MG/DL — HIGH (ref 70–99)
GLUCOSE BLDC GLUCOMTR-MCNC: 164 MG/DL — HIGH (ref 70–99)
GLUCOSE BLDC GLUCOMTR-MCNC: 168 MG/DL — HIGH (ref 70–99)
GLUCOSE SERPL-MCNC: 161 MG/DL — HIGH (ref 70–99)
HCT VFR BLD CALC: 37 % — SIGNIFICANT CHANGE UP (ref 34.5–45)
HGB BLD-MCNC: 12 G/DL — SIGNIFICANT CHANGE UP (ref 11.5–15.5)
MAGNESIUM SERPL-MCNC: 2 MG/DL — SIGNIFICANT CHANGE UP (ref 1.6–2.6)
MCHC RBC-ENTMCNC: 28 PG — SIGNIFICANT CHANGE UP (ref 27–34)
MCHC RBC-ENTMCNC: 32.4 GM/DL — SIGNIFICANT CHANGE UP (ref 32–36)
MCV RBC AUTO: 86.4 FL — SIGNIFICANT CHANGE UP (ref 80–100)
NRBC # BLD: 0 /100 WBCS — SIGNIFICANT CHANGE UP (ref 0–0)
PHOSPHATE SERPL-MCNC: 3.6 MG/DL — SIGNIFICANT CHANGE UP (ref 2.5–4.5)
PLATELET # BLD AUTO: 256 K/UL — SIGNIFICANT CHANGE UP (ref 150–400)
POTASSIUM SERPL-MCNC: 4 MMOL/L — SIGNIFICANT CHANGE UP (ref 3.5–5.3)
POTASSIUM SERPL-SCNC: 4 MMOL/L — SIGNIFICANT CHANGE UP (ref 3.5–5.3)
RBC # BLD: 4.28 M/UL — SIGNIFICANT CHANGE UP (ref 3.8–5.2)
RBC # FLD: 14.1 % — SIGNIFICANT CHANGE UP (ref 10.3–14.5)
SODIUM SERPL-SCNC: 135 MMOL/L — SIGNIFICANT CHANGE UP (ref 135–145)
WBC # BLD: 14.9 K/UL — HIGH (ref 3.8–10.5)
WBC # FLD AUTO: 14.9 K/UL — HIGH (ref 3.8–10.5)

## 2022-08-24 PROCEDURE — 99232 SBSQ HOSP IP/OBS MODERATE 35: CPT

## 2022-08-24 RX ORDER — SODIUM CHLORIDE 9 MG/ML
1000 INJECTION INTRAMUSCULAR; INTRAVENOUS; SUBCUTANEOUS ONCE
Refills: 0 | Status: COMPLETED | OUTPATIENT
Start: 2022-08-24 | End: 2022-08-24

## 2022-08-24 RX ORDER — DIAZEPAM 5 MG
5 TABLET ORAL EVERY 6 HOURS
Refills: 0 | Status: DISCONTINUED | OUTPATIENT
Start: 2022-08-24 | End: 2022-08-26

## 2022-08-24 RX ADMIN — HYDROMORPHONE HYDROCHLORIDE 6 MILLIGRAM(S): 2 INJECTION INTRAMUSCULAR; INTRAVENOUS; SUBCUTANEOUS at 02:12

## 2022-08-24 RX ADMIN — Medication 1000 MILLIGRAM(S): at 17:00

## 2022-08-24 RX ADMIN — Medication 1000 MILLIGRAM(S): at 10:15

## 2022-08-24 RX ADMIN — SENNA PLUS 2 TABLET(S): 8.6 TABLET ORAL at 23:13

## 2022-08-24 RX ADMIN — Medication 50 MILLIGRAM(S): at 06:13

## 2022-08-24 RX ADMIN — BUPROPION HYDROCHLORIDE 150 MILLIGRAM(S): 150 TABLET, EXTENDED RELEASE ORAL at 12:13

## 2022-08-24 RX ADMIN — Medication 5 MILLIGRAM(S): at 03:09

## 2022-08-24 RX ADMIN — Medication 1000 MILLIGRAM(S): at 17:30

## 2022-08-24 RX ADMIN — Medication 5 MILLIGRAM(S): at 17:00

## 2022-08-24 RX ADMIN — NALOXEGOL OXALATE 25 MILLIGRAM(S): 12.5 TABLET, FILM COATED ORAL at 12:13

## 2022-08-24 RX ADMIN — ENOXAPARIN SODIUM 40 MILLIGRAM(S): 100 INJECTION SUBCUTANEOUS at 23:12

## 2022-08-24 RX ADMIN — HYDROMORPHONE HYDROCHLORIDE 6 MILLIGRAM(S): 2 INJECTION INTRAMUSCULAR; INTRAVENOUS; SUBCUTANEOUS at 15:00

## 2022-08-24 RX ADMIN — Medication 2: at 08:37

## 2022-08-24 RX ADMIN — Medication 2000 UNIT(S): at 12:13

## 2022-08-24 RX ADMIN — HYDROMORPHONE HYDROCHLORIDE 6 MILLIGRAM(S): 2 INJECTION INTRAMUSCULAR; INTRAVENOUS; SUBCUTANEOUS at 14:25

## 2022-08-24 RX ADMIN — SIMVASTATIN 40 MILLIGRAM(S): 20 TABLET, FILM COATED ORAL at 23:14

## 2022-08-24 RX ADMIN — DULOXETINE HYDROCHLORIDE 60 MILLIGRAM(S): 30 CAPSULE, DELAYED RELEASE ORAL at 13:08

## 2022-08-24 RX ADMIN — GABAPENTIN 600 MILLIGRAM(S): 400 CAPSULE ORAL at 23:14

## 2022-08-24 RX ADMIN — Medication 5 MILLIGRAM(S): at 09:37

## 2022-08-24 RX ADMIN — Medication 5 MILLIGRAM(S): at 06:13

## 2022-08-24 RX ADMIN — GABAPENTIN 600 MILLIGRAM(S): 400 CAPSULE ORAL at 13:09

## 2022-08-24 RX ADMIN — Medication 50 MILLIGRAM(S): at 07:27

## 2022-08-24 RX ADMIN — HYDROMORPHONE HYDROCHLORIDE 6 MILLIGRAM(S): 2 INJECTION INTRAMUSCULAR; INTRAVENOUS; SUBCUTANEOUS at 06:13

## 2022-08-24 RX ADMIN — HYDROMORPHONE HYDROCHLORIDE 0.25 MILLIGRAM(S): 2 INJECTION INTRAMUSCULAR; INTRAVENOUS; SUBCUTANEOUS at 00:15

## 2022-08-24 RX ADMIN — Medication 1000 MILLIGRAM(S): at 00:20

## 2022-08-24 RX ADMIN — Medication 2: at 23:49

## 2022-08-24 RX ADMIN — Medication 1000 MILLIGRAM(S): at 00:09

## 2022-08-24 RX ADMIN — PANTOPRAZOLE SODIUM 40 MILLIGRAM(S): 20 TABLET, DELAYED RELEASE ORAL at 07:27

## 2022-08-24 RX ADMIN — GABAPENTIN 600 MILLIGRAM(S): 400 CAPSULE ORAL at 06:12

## 2022-08-24 RX ADMIN — Medication 1000 MILLIGRAM(S): at 09:38

## 2022-08-24 NOTE — PROGRESS NOTE ADULT - SUBJECTIVE AND OBJECTIVE BOX
She says that her pain is under better control.  Has not had a bowel movement today.  Has no other complaints at this time.       Remaining ROS negative       PHYSICAL EXAM:    General: WDWN, sitting up in chair  HEENT: NC/AT; MMM  Cardiovascular: +S1/S2, RRR, no murmurs, rubs or gallops  Respiratory: normal respiratory effort, CTAB  Abdomen: soft, nontender, nondistended, normoactive bowel sounds  Extremities: WWP; no edema  Dermatologic: no appreciable wounds or damage to the skin    VITAL SIGNS:  Vital Signs Last 24 Hrs  T(C): 36.4 (24 Aug 2022 08:10), Max: 36.8 (24 Aug 2022 04:52)  T(F): 97.6 (24 Aug 2022 08:10), Max: 98.2 (24 Aug 2022 04:52)  HR: 85 (24 Aug 2022 08:10) (85 - 101)  BP: 118/88 (24 Aug 2022 08:10) (118/88 - 143/88)  BP(mean): --  RR: 18 (24 Aug 2022 08:10) (17 - 18)  SpO2: 100% (24 Aug 2022 08:10) (96% - 100%)    Parameters below as of 24 Aug 2022 08:10  Patient On (Oxygen Delivery Method): room air          MEDICATIONS:  MEDICATIONS  (STANDING):  acetaminophen     Tablet .. 1000 milliGRAM(s) Oral every 8 hours  bisacodyl 5 milliGRAM(s) Oral every 12 hours  buPROPion XL (24-Hour) . 150 milliGRAM(s) Oral daily  cholecalciferol 2000 Unit(s) Oral daily  dextrose 50% Injectable 25 Gram(s) IV Push once  dextrose 50% Injectable 12.5 Gram(s) IV Push once  diazepam    Tablet 5 milliGRAM(s) Oral every 6 hours  DULoxetine 60 milliGRAM(s) Oral daily  enoxaparin Injectable 40 milliGRAM(s) SubCutaneous <User Schedule>  gabapentin 600 milliGRAM(s) Oral three times a day  insulin lispro (ADMELOG) corrective regimen sliding scale   SubCutaneous Before meals and at bedtime  lidocaine   4% Patch 1 Patch Transdermal every 24 hours  lidocaine   4% Patch 1 Patch Transdermal every 24 hours  metoprolol tartrate 50 milliGRAM(s) Oral two times a day  naloxegol 25 milliGRAM(s) Oral daily  nitrofurantoin macrocrystals (MACRODANTIN) 50 milliGRAM(s) Oral <User Schedule>  pantoprazole    Tablet 40 milliGRAM(s) Oral before breakfast  polyethylene glycol 3350 17 Gram(s) Oral every 12 hours  senna 2 Tablet(s) Oral at bedtime  simvastatin 40 milliGRAM(s) Oral at bedtime    MEDICATIONS  (PRN):  ALBUTerol   0.042% 2.5 milliGRAM(s) Nebulizer every 6 hours PRN Shortness of Breath and/or Wheezing  bisacodyl Suppository 10 milliGRAM(s) Rectal daily PRN Constipation  diphenhydrAMINE 25 milliGRAM(s) Oral every 6 hours PRN Rash and/or Itching  HYDROmorphone   Tablet 4 milliGRAM(s) Oral every 4 hours PRN Moderate Pain (4 - 6)  HYDROmorphone   Tablet 6 milliGRAM(s) Oral every 4 hours PRN Severe Pain (7 - 10)  naloxone Injectable 0.1 milliGRAM(s) IV Push every 3 minutes PRN For ANY of the following changes in patient status:  A. RR LESS THAN 10 breaths per minute, B. Oxygen saturation LESS THAN 90%, C. Sedation score of 6  ondansetron Injectable 4 milliGRAM(s) IV Push every 6 hours PRN Nausea      ALLERGIES:  Allergies    oxycodone (Pruritus)    Intolerances        LABS:                        12.0   14.90 )-----------( 256      ( 24 Aug 2022 06:37 )             37.0     08-24    135  |  99  |  8   ----------------------------<  161<H>  4.0   |  26  |  0.49<L>    Ca    8.9      24 Aug 2022 06:37  Phos  3.6     08-24  Mg     2.0     08-24          CAPILLARY BLOOD GLUCOSE      POCT Blood Glucose.: 126 mg/dL (24 Aug 2022 12:25)      RADIOLOGY & ADDITIONAL TESTS: Reviewed.

## 2022-08-24 NOTE — CHART NOTE - NSCHARTNOTEFT_GEN_A_CORE
The patient is ordered for oral hydromorphone 4mg for moderate pain every 4 hours as needed and 6mg for severe pain every 4 hours as needed She has an intrathecal morphine pump in place that was surgically implanted in the body that delivers  morphine to her spinal canal.

## 2022-08-24 NOTE — PROGRESS NOTE ADULT - SUBJECTIVE AND OBJECTIVE BOX
HPI:  ***********************************************  ADULT NSICU H&P  VANNA DIAZ [id number] [facility] [id number] [id number]  ***********************************************  H&P:  "68 y/o female with PMHx HTN, HLD, CVA x 3 (last was 2016 with right hand weakness residual), GAMALIEL not using CPAP, Emphysema, ex-25 pack year smoker quit 2021, Chronic Constipation on Movantik, Urinary Incontinence chronically self straight cath at home, chronic UTI, Breast Implant Rupture with Chronic Leak, B/L CTS s/p release, s/p Intrathecal Morphine pump for pain, with chronic neck and back pain worsening for years s/p multiple spinal surgeries including laminectomies and fusion of cervical, thoracic and lumbar spine, most recently done in 2019 and 2020 at [facility] by Dr. Trinidad.  She is wheelchair dependent.  She also has history of right shoulder replacement.  She reports bilateral shoulder pain, bilateral leg pain and burning to the bilateral calves.  She reports right hand greater than arm and leg weakness."    68 yo F with history of HTN, HLD, prior strokes with residual RUE weakness, GAMALIEL noncompliant with CPAP, COPD, prior extensive smoking history, chronic constipation, urinary incontinence, chronic UTI, ruptured breast implant, bilateral CTS release surgery, intrathecal morphine pump and chronic pain s/p multiple spinal surgeries who is now s/p T3 - L1 posterior fusion (EBL 500cc, 1L UOP, 4200 crystalloid, 1u PRBC) and admitted to NSICU.  She remains intubated with progressive improvement in level of arousal  8/17: POD0 s/p T3-L1 revision of fusion. extubated in NSICU  8/18: POD1 KAMRAN overnight. Started on PCA. Tx to SDU. PT/OT rec AR.  8/19: POD2, overnight episode of agitation and screaming due to concern over pain control. neuro exam stable. 1 HMV drain removed.  8/20: POD3, extra 0.25mg IV dilaudid x 2 given o/n for pain. neuro exam stable  8/21: POD 4. KAMRAN overnight. PCA off now on PO meds. pending rehab, hx of chronic constipation, increased miralax to twice a day and given lactulose and had bowel movement.Valium increased to q 6 hours for muscle spasm. Pending XRs   8/22: POD5, complaining of severe pain o/n requiring additional dilaudid pushes. HMV#3 removed and incisional dressing changed. Given 500cc bolus for pressure in 90s, patient mildly sedated from pain medication. Pain regimen discussed with Dr. Mclaughlin and recommends to keep regimen the same at this time. Accepted to [facility], pending insurance authorization. Post op XRs performed   8/23: POD6, still complains of pain but sleepy with current regimen, plan for Dr. Mclaughlin to interrogate intrathecal pain pump tomorrow. Hemovac removed, US guided IV placed for access. Pending insurance authorization for [facility].  8/24: POD7, still complains of pain, requiring 1 push of dilaudid      OVERNIGHT EVENTS: KAMRAN   Vital Signs Last 24 Hrs  T(C): 36.6 (23 Aug 2022 23:52), Max: 36.8 (23 Aug 2022 09:30)  T(F): 97.8 (23 Aug 2022 23:52), Max: 98.3 (23 Aug 2022 09:30)  HR: 101 (23 Aug 2022 23:52) (67 - 101)  BP: 134/89 (23 Aug 2022 23:52) (134/74 - 179/99)  BP(mean): --  RR: 18 (23 Aug 2022 23:52) (17 - 18)  SpO2: 99% (23 Aug 2022 23:52) (98% - 100%)    Parameters below as of 23 Aug 2022 23:52  Patient On (Oxygen Delivery Method): room air        I&O's Summary    22 Aug 2022 07:01  -  23 Aug 2022 07:00  --------------------------------------------------------  IN: 0 mL / OUT: 1360 mL / NET: -1360 mL    23 Aug 2022 07:01  -  24 Aug 2022 03:09  --------------------------------------------------------  IN: 310 mL / OUT: 1900 mL / NET: -1590 mL    PHYSICAL EXAM:  General: patient seen laying supine in bed in NAD  Neuro: AAOx3, follows commands, opens eyes spontaneously, speech clear and fluent,  face symmetric,  sensation intact to light touch throughout, motor strength difficult to assess due to pain limitation but at least 4/5 throughout   HEENT: PERRL  Neck: supple  Cardiac: RRR, S1S2  Pulmonary: chest rise symmetric  Abdomen: soft, nontender, nondistended  Ext: perfusing well  Skin: warm, dry  Wound: thoracolumbar incision dressing c/d/i  LABS:                        12.2   12.63 )-----------( 236      ( 23 Aug 2022 11:21 )             37.5     08-23    135  |  98  |  11  ----------------------------<  138<H>  See note   |  27  |  0.54    Ca    8.6      23 Aug 2022 11:21  Phos  3.5     08-23  Mg     2.0     08-23              CAPILLARY BLOOD GLUCOSE      POCT Blood Glucose.: 152 mg/dL (23 Aug 2022 21:34)  POCT Blood Glucose.: 180 mg/dL (23 Aug 2022 12:40)  POCT Blood Glucose.: 140 mg/dL (23 Aug 2022 07:27)      Drug Levels: [] N/A    CSF Analysis: [] N/A      Allergies    oxycodone (Pruritus)    Intolerances      MEDICATIONS:  Antibiotics:  nitrofurantoin macrocrystals (MACRODANTIN) 50 milliGRAM(s) Oral <User Schedule>    Neuro:  acetaminophen     Tablet .. 1000 milliGRAM(s) Oral every 8 hours  buPROPion XL (24-Hour) . 150 milliGRAM(s) Oral daily  diazepam    Tablet 5 milliGRAM(s) Oral every 6 hours  diphenhydrAMINE 25 milliGRAM(s) Oral every 6 hours PRN  DULoxetine 60 milliGRAM(s) Oral daily  gabapentin 600 milliGRAM(s) Oral three times a day  HYDROmorphone   Tablet 4 milliGRAM(s) Oral every 4 hours PRN  HYDROmorphone   Tablet 6 milliGRAM(s) Oral every 4 hours PRN  ondansetron Injectable 4 milliGRAM(s) IV Push every 6 hours PRN    Anticoagulation:  enoxaparin Injectable 40 milliGRAM(s) SubCutaneous <User Schedule>    OTHER:  ALBUTerol   0.042% 2.5 milliGRAM(s) Nebulizer every 6 hours PRN  bisacodyl 5 milliGRAM(s) Oral every 12 hours  dextrose 50% Injectable 25 Gram(s) IV Push once  dextrose 50% Injectable 12.5 Gram(s) IV Push once  insulin lispro (ADMELOG) corrective regimen sliding scale   SubCutaneous Before meals and at bedtime  lidocaine   4% Patch 1 Patch Transdermal every 24 hours  lidocaine   4% Patch 1 Patch Transdermal every 24 hours  metoprolol tartrate 50 milliGRAM(s) Oral two times a day  naloxegol 25 milliGRAM(s) Oral daily  naloxone Injectable 0.1 milliGRAM(s) IV Push every 3 minutes PRN  pantoprazole    Tablet 40 milliGRAM(s) Oral before breakfast  polyethylene glycol 3350 17 Gram(s) Oral every 12 hours  senna 2 Tablet(s) Oral at bedtime  simvastatin 40 milliGRAM(s) Oral at bedtime  68 y/o female with PMHx HTN, HLD, CVA x 3 (last was 2016 with right hand weakness residual), GAMALIEL not using CPAP, Emphysema, ex-25 pack year smoker quit 2021, Chronic Constipation on Movantik, Urinary Incontinence chronically self straight cath at home, chronic UTI, Breast Implant Rupture with Chronic Leak, B/L CTS s/p release, s/p Intrathecal Morphine pump for pain, with chronic neck and back pain worsening for years s/p multiple spinal surgeries including laminectomies and fusion of cervical, thoracic and lumbar spine, most recently done in 2019 and 2020 at [facility] by Dr. Trinidad. CT findings include hardware loosening at T5, T6, T9-T12. S/p T3-L1 revision of fusion, c/b durotomy with primary repair, loss of left side motors, and repair of intrathecal morphine pump (8/17).    PLAN:  NEURO:  - neuro/spinal/vital checks q4hr  - pain management reccs: Valium 5q8, dilaudid PO  - cont movantik 25mg qd  - 3 deep HMVs, monitor outputs, HMV#4 d/c 8/19  - decadron 4q6 x 3 days  - ERAS protocol: tylenol 1g q8, gabapentin 600mg TID  - PT/OT when able   - cont wellbutrin 150mg qd, duloxetine 60mg qd  - postop xrays completed 8/22     CARDS:  - normotensive SBP goal  - cont lopressor 50mg BID, lipitor 40mg qd    PULM:   - extrubated in NSICU to NC 8/17  - Albuterol 2.5q6 PRN    GI:   - regular diet  - protonix while on decadron  - bowel regimen  - last BM 8/21    RENAL:  - straight cath prn  - h/o chronic urniary retnation, straight cath at home    HEME:   - SCDs for DVT ppx, SQL    ID:   - macrobid for chronic UTI  - afebrile, no leukocytosis    ENDO:  - ISS, A1C 6.6    DISPO: SDU status, full code, PT/OT rec AR    Assessment and Plan d/w Dr. Luz

## 2022-08-24 NOTE — PROGRESS NOTE ADULT - SUBJECTIVE AND OBJECTIVE BOX
Physical Medicine and Rehabilitation Progress Note :    Patient is a 67y old  Female who presents with a chief complaint of chronic back and neck pain worsening for years (24 Aug 2022 03:08)      HPI:  ***********************************************  ADULT NSICU H&P  VANNA DIAZ [id number] [facility] 08EA [id number]  ***********************************************  H&P:  "68 y/o female with PMHx HTN, HLD, CVA x 3 (last was 2016 with right hand weakness residual), GAMALIEL not using CPAP, Emphysema, ex-25 pack year smoker quit 2021, Chronic Constipation on Movantik, Urinary Incontinence chronically self straight cath at home, chronic UTI, Breast Implant Rupture with Chronic Leak, B/L CTS s/p release, s/p Intrathecal Morphine pump for pain, with chronic neck and back pain worsening for years s/p multiple spinal surgeries including laminectomies and fusion of cervical, thoracic and lumbar spine, most recently done in 2019 and 2020 at [facility] by Dr. Trinidad.  She is wheelchair dependent.  She also has history of right shoulder replacement.  She reports bilateral shoulder pain, bilateral leg pain and burning to the bilateral calves.  She reports right hand greater than arm and leg weakness."    66 yo F with history of HTN, HLD, prior strokes with residual RUE weakness, GAMALIEL noncompliant with CPAP, COPD, prior extensive smoking history, chronic constipation, urinary incontinence, chronic UTI, ruptured breast implant, bilateral CTS release surgery, intrathecal morphine pump and chronic pain s/p multiple spinal surgeries who is now s/p T3 - L1 posterior fusion (EBL 500cc, 1L UOP, 4200 crystalloid, 1u PRBC) and admitted to NSICU.  She remains intubated with progressive improvement in level of arousal       MEDICATIONS  (STANDING):  acetaminophen     Tablet .. 1000 milliGRAM(s) Oral every 8 hours  bisacodyl 5 milliGRAM(s) Oral every 12 hours  ceFAZolin   IVPB 2000 milliGRAM(s) IV Intermittent every 8 hours  chlorhexidine 2% Cloths 1 Application(s) Topical daily  cholecalciferol 2000 Unit(s) Oral daily  dexAMETHasone  Injectable 4 milliGRAM(s) IV Push every 6 hours  dextrose 5%. 1000 milliLiter(s) (50 mL/Hr) IV Continuous <Continuous>  dextrose 5%. 1000 milliLiter(s) (100 mL/Hr) IV Continuous <Continuous>  dextrose 50% Injectable 25 Gram(s) IV Push once  dextrose 50% Injectable 12.5 Gram(s) IV Push once  dextrose 50% Injectable 25 Gram(s) IV Push once  diazepam    Tablet 5 milliGRAM(s) Oral every 8 hours  DULoxetine 60 milliGRAM(s) Oral daily  gabapentin 600 milliGRAM(s) Oral three times a day  gabapentin 600 milliGRAM(s) Oral three times a day  glucagon  Injectable 1 milliGRAM(s) IntraMuscular once  HYDROmorphone PCA (1 mG/mL) 30 milliLiter(s) PCA Continuous PCA Continuous  insulin lispro (ADMELOG) corrective regimen sliding scale   SubCutaneous three times a day before meals  metoprolol tartrate 50 milliGRAM(s) Oral two times a day  naloxegol 25 milliGRAM(s) Oral daily  nitrofurantoin monohydrate/macrocrystals (MACROBID) 50 milliGRAM(s) Oral <User Schedule>  norepinephrine Infusion 0.05 MICROgram(s)/kG/Min (8.26 mL/Hr) IV Continuous <Continuous>  ondansetron   Disintegrating Tablet 4 milliGRAM(s) Oral every 6 hours  pantoprazole    Tablet 40 milliGRAM(s) Oral before breakfast  polyethylene glycol 3350 17 Gram(s) Oral daily  senna 2 Tablet(s) Oral at bedtime  simvastatin 40 milliGRAM(s) Oral at bedtime  sodium chloride 0.9% Bolus 1000 milliLiter(s) IV Bolus once  sodium chloride 0.9%. 1000 milliLiter(s) (75 mL/Hr) IV Continuous <Continuous>    MEDICATIONS  (PRN):  acetaminophen     Tablet .. 650 milliGRAM(s) Oral every 6 hours PRN Temp greater or equal to 38C (100.4F), Mild Pain (1 - 3)  ALBUTerol   0.042% 2.5 milliGRAM(s) Nebulizer every 6 hours PRN Shortness of Breath and/or Wheezing  dextrose Oral Gel 15 Gram(s) Oral once PRN Blood Glucose LESS THAN 70 milliGRAM(s)/deciliter  diphenhydrAMINE 25 milliGRAM(s) Oral every 6 hours PRN Rash and/or Itching  naloxone Injectable 0.1 milliGRAM(s) IV Push every 3 minutes PRN For ANY of the following changes in patient status:  A. RR LESS THAN 10 breaths per minute, B. Oxygen saturation LESS THAN 90%, C. Sedation score of 6  ondansetron Injectable 4 milliGRAM(s) IV Push every 6 hours PRN Nausea       MEDICATIONS  (PRN):  ALBUTerol   0.042% 2.5 milliGRAM(s) Nebulizer every 6 hours PRN Shortness of Breath and/or Wheezing  bisacodyl Suppository 10 milliGRAM(s) Rectal daily PRN Constipation  diphenhydrAMINE 25 milliGRAM(s) Oral every 6 hours PRN Rash and/or Itching  HYDROmorphone   Tablet 4 milliGRAM(s) Oral every 4 hours PRN Moderate Pain (4 - 6)  HYDROmorphone   Tablet 6 milliGRAM(s) Oral every 4 hours PRN Severe Pain (7 - 10)  naloxone Injectable 0.1 milliGRAM(s) IV Push every 3 minutes PRN For ANY of the following changes in patient status:  A. RR LESS THAN 10 breaths per minute, B. Oxygen saturation LESS THAN 90%, C. Sedation score of 6  ondansetron Injectable 4 milliGRAM(s) IV Push every 6 hours PRN Nausea    Currently Undergoing Physical/ Occupational Therapy at bedside    PT Functional Status Assessment :         Therapeutic Interventions      Bed Mobility  Bed Mobility Training Rolling/Turning: contact guard;  verbal cues;  bed rails  Bed Mobility Training Scooting: contact guard;  verbal cues  Bed Mobility Training Sit-to-Supine: moderate assist (50% patient effort);  1 person assist;  verbal cues;  for BLE advancement onto bed  Bed Mobility Training Supine-to-Sit: minimum assist (75% patient effort);  1 person assist;  verbal cues;  bed rails  Bed Mobility Training Limitations: decreased ability to use arms for pushing/pulling;  decreased ability to use legs for bridging/pushing;  impaired ability to control trunk for mobility;  Tolerated sitting EOB for ~10 minutes, demos impaired trunk control benefiting from PT assist to prevent LOB;  decreased flexibility;  decreased ROM;  decreased strength;  impaired balance;  impaired postural control;  pain    Sit-Stand Transfer Training  Transfer Training Sit-to-Stand Transfer: minimum assist (75% patient effort);  contact guard;  1 person assist;  verbal cues;  rolling walker  Transfer Training Stand-to-Sit Transfer: contact guard;  minimum assist (75% patient effort);  1 person assist;  verbal cues;  rolling walker  Sit-to-Stand Transfer Training Transfer Safety Analysis: decreased balance;  decreased weight-shifting ability;  Performed sit<>stand x 2 trials. Demo impaired standing endurance and balance. Demo impaired eccentric control during descend;  decreased flexibility;  decreased ROM;  decreased strength;  impaired balance;  impaired postural control;  pain;  rolling walker    Gait Training  Gait Training: minimum assist (75% patient effort);  2 person assist;  verbal cues;  rolling walker;  ~6 sidesteps at EOB; limited ambulation distance 2/2 patient reporting overall fatigue and back pain  Gait Analysis: decreased angelo;  crouch;  ataxic;  increased time in double stance;  decreased hip/knee flexion;  decreased velocity of limb motion;  shuffling;  decreased step length;  decreased stride length;  decreased toe clearance;  decreased weight-shifting ability;  Demo unsteady gait with flexed forward posture, benefiting from 1-2 person assist for safety. Inconsistent step length and impaired balance while ambulating. No LOB observed.;  decreased flexibility;  decreased ROM;  decreased strength;  impaired balance;  impaired coordination;  impaired postural control;  pain;  ~6 sidesteps at EOB; limited ambulation distance 2/2 patient reporting overall fatigue and back pain;  rolling walker          PM&R Impression : as above    Current Disposition Plan Recommendations :   acute rehab placement

## 2022-08-25 LAB
ANION GAP SERPL CALC-SCNC: 12 MMOL/L — SIGNIFICANT CHANGE UP (ref 5–17)
BUN SERPL-MCNC: 13 MG/DL — SIGNIFICANT CHANGE UP (ref 7–23)
CALCIUM SERPL-MCNC: 8.6 MG/DL — SIGNIFICANT CHANGE UP (ref 8.4–10.5)
CHLORIDE SERPL-SCNC: 100 MMOL/L — SIGNIFICANT CHANGE UP (ref 96–108)
CO2 SERPL-SCNC: 19 MMOL/L — LOW (ref 22–31)
CREAT SERPL-MCNC: 0.65 MG/DL — SIGNIFICANT CHANGE UP (ref 0.5–1.3)
EGFR: 96 ML/MIN/1.73M2 — SIGNIFICANT CHANGE UP
GLUCOSE BLDC GLUCOMTR-MCNC: 143 MG/DL — HIGH (ref 70–99)
GLUCOSE BLDC GLUCOMTR-MCNC: 171 MG/DL — HIGH (ref 70–99)
GLUCOSE BLDC GLUCOMTR-MCNC: 186 MG/DL — HIGH (ref 70–99)
GLUCOSE BLDC GLUCOMTR-MCNC: 221 MG/DL — HIGH (ref 70–99)
GLUCOSE SERPL-MCNC: 144 MG/DL — HIGH (ref 70–99)
HCT VFR BLD CALC: 36.4 % — SIGNIFICANT CHANGE UP (ref 34.5–45)
HGB BLD-MCNC: 11.3 G/DL — LOW (ref 11.5–15.5)
MAGNESIUM SERPL-MCNC: 2.1 MG/DL — SIGNIFICANT CHANGE UP (ref 1.6–2.6)
MCHC RBC-ENTMCNC: 28.3 PG — SIGNIFICANT CHANGE UP (ref 27–34)
MCHC RBC-ENTMCNC: 31 GM/DL — LOW (ref 32–36)
MCV RBC AUTO: 91.2 FL — SIGNIFICANT CHANGE UP (ref 80–100)
NRBC # BLD: 0 /100 WBCS — SIGNIFICANT CHANGE UP (ref 0–0)
PHOSPHATE SERPL-MCNC: 3.3 MG/DL — SIGNIFICANT CHANGE UP (ref 2.5–4.5)
PLATELET # BLD AUTO: 252 K/UL — SIGNIFICANT CHANGE UP (ref 150–400)
POTASSIUM SERPL-MCNC: 3.9 MMOL/L — SIGNIFICANT CHANGE UP (ref 3.5–5.3)
POTASSIUM SERPL-SCNC: 3.9 MMOL/L — SIGNIFICANT CHANGE UP (ref 3.5–5.3)
RBC # BLD: 3.99 M/UL — SIGNIFICANT CHANGE UP (ref 3.8–5.2)
RBC # FLD: 14.6 % — HIGH (ref 10.3–14.5)
SODIUM SERPL-SCNC: 131 MMOL/L — LOW (ref 135–145)
WBC # BLD: 14.07 K/UL — HIGH (ref 3.8–10.5)
WBC # FLD AUTO: 14.07 K/UL — HIGH (ref 3.8–10.5)

## 2022-08-25 PROCEDURE — 72125 CT NECK SPINE W/O DYE: CPT | Mod: 26

## 2022-08-25 PROCEDURE — 99232 SBSQ HOSP IP/OBS MODERATE 35: CPT

## 2022-08-25 PROCEDURE — 73600 X-RAY EXAM OF ANKLE: CPT | Mod: 26,RT

## 2022-08-25 PROCEDURE — 70450 CT HEAD/BRAIN W/O DYE: CPT | Mod: 26

## 2022-08-25 RX ORDER — POTASSIUM CHLORIDE 20 MEQ
10 PACKET (EA) ORAL ONCE
Refills: 0 | Status: COMPLETED | OUTPATIENT
Start: 2022-08-25 | End: 2022-08-25

## 2022-08-25 RX ADMIN — GABAPENTIN 600 MILLIGRAM(S): 400 CAPSULE ORAL at 05:56

## 2022-08-25 RX ADMIN — BUPROPION HYDROCHLORIDE 150 MILLIGRAM(S): 150 TABLET, EXTENDED RELEASE ORAL at 12:10

## 2022-08-25 RX ADMIN — DULOXETINE HYDROCHLORIDE 60 MILLIGRAM(S): 30 CAPSULE, DELAYED RELEASE ORAL at 12:10

## 2022-08-25 RX ADMIN — HYDROMORPHONE HYDROCHLORIDE 6 MILLIGRAM(S): 2 INJECTION INTRAMUSCULAR; INTRAVENOUS; SUBCUTANEOUS at 12:24

## 2022-08-25 RX ADMIN — Medication 1000 MILLIGRAM(S): at 01:10

## 2022-08-25 RX ADMIN — NALOXEGOL OXALATE 25 MILLIGRAM(S): 12.5 TABLET, FILM COATED ORAL at 12:11

## 2022-08-25 RX ADMIN — HYDROMORPHONE HYDROCHLORIDE 6 MILLIGRAM(S): 2 INJECTION INTRAMUSCULAR; INTRAVENOUS; SUBCUTANEOUS at 02:53

## 2022-08-25 RX ADMIN — Medication 5 MILLIGRAM(S): at 05:56

## 2022-08-25 RX ADMIN — HYDROMORPHONE HYDROCHLORIDE 6 MILLIGRAM(S): 2 INJECTION INTRAMUSCULAR; INTRAVENOUS; SUBCUTANEOUS at 06:55

## 2022-08-25 RX ADMIN — POLYETHYLENE GLYCOL 3350 17 GRAM(S): 17 POWDER, FOR SOLUTION ORAL at 05:56

## 2022-08-25 RX ADMIN — Medication 1000 MILLIGRAM(S): at 09:09

## 2022-08-25 RX ADMIN — Medication 2: at 09:05

## 2022-08-25 RX ADMIN — HYDROMORPHONE HYDROCHLORIDE 6 MILLIGRAM(S): 2 INJECTION INTRAMUSCULAR; INTRAVENOUS; SUBCUTANEOUS at 22:40

## 2022-08-25 RX ADMIN — Medication 1000 MILLIGRAM(S): at 00:10

## 2022-08-25 RX ADMIN — Medication 2000 UNIT(S): at 12:10

## 2022-08-25 RX ADMIN — LIDOCAINE 1 PATCH: 4 CREAM TOPICAL at 18:07

## 2022-08-25 RX ADMIN — ENOXAPARIN SODIUM 40 MILLIGRAM(S): 100 INJECTION SUBCUTANEOUS at 22:17

## 2022-08-25 RX ADMIN — HYDROMORPHONE HYDROCHLORIDE 6 MILLIGRAM(S): 2 INJECTION INTRAMUSCULAR; INTRAVENOUS; SUBCUTANEOUS at 05:55

## 2022-08-25 RX ADMIN — GABAPENTIN 600 MILLIGRAM(S): 400 CAPSULE ORAL at 22:06

## 2022-08-25 RX ADMIN — Medication 25 MILLIGRAM(S): at 06:05

## 2022-08-25 RX ADMIN — Medication 4: at 12:53

## 2022-08-25 RX ADMIN — GABAPENTIN 600 MILLIGRAM(S): 400 CAPSULE ORAL at 13:29

## 2022-08-25 RX ADMIN — LIDOCAINE 1 PATCH: 4 CREAM TOPICAL at 09:10

## 2022-08-25 RX ADMIN — HYDROMORPHONE HYDROCHLORIDE 6 MILLIGRAM(S): 2 INJECTION INTRAMUSCULAR; INTRAVENOUS; SUBCUTANEOUS at 16:28

## 2022-08-25 RX ADMIN — Medication 5 MILLIGRAM(S): at 13:36

## 2022-08-25 RX ADMIN — HYDROMORPHONE HYDROCHLORIDE 6 MILLIGRAM(S): 2 INJECTION INTRAMUSCULAR; INTRAVENOUS; SUBCUTANEOUS at 15:58

## 2022-08-25 RX ADMIN — SIMVASTATIN 40 MILLIGRAM(S): 20 TABLET, FILM COATED ORAL at 22:07

## 2022-08-25 RX ADMIN — HYDROMORPHONE HYDROCHLORIDE 6 MILLIGRAM(S): 2 INJECTION INTRAMUSCULAR; INTRAVENOUS; SUBCUTANEOUS at 22:07

## 2022-08-25 RX ADMIN — HYDROMORPHONE HYDROCHLORIDE 6 MILLIGRAM(S): 2 INJECTION INTRAMUSCULAR; INTRAVENOUS; SUBCUTANEOUS at 01:53

## 2022-08-25 RX ADMIN — Medication 10 MILLIEQUIVALENT(S): at 12:11

## 2022-08-25 RX ADMIN — PANTOPRAZOLE SODIUM 40 MILLIGRAM(S): 20 TABLET, DELAYED RELEASE ORAL at 06:05

## 2022-08-25 RX ADMIN — Medication 50 MILLIGRAM(S): at 06:05

## 2022-08-25 RX ADMIN — Medication 5 MILLIGRAM(S): at 23:41

## 2022-08-25 RX ADMIN — Medication 1000 MILLIGRAM(S): at 09:39

## 2022-08-25 RX ADMIN — HYDROMORPHONE HYDROCHLORIDE 6 MILLIGRAM(S): 2 INJECTION INTRAMUSCULAR; INTRAVENOUS; SUBCUTANEOUS at 11:54

## 2022-08-25 NOTE — PROVIDER CONTACT NOTE (FALL NOTIFICATION) - ASSESSMENT
Pt found on floor with walker on the floor. Pt's back partially leaning on wall. No apparent injuries, swelling, cuts to pt's head or on the patient's body. Pt reported hitting her head on wall. /92, HR 89, RR 20, O2 sat 99% on room air.

## 2022-08-25 NOTE — PROGRESS NOTE ADULT - SUBJECTIVE AND OBJECTIVE BOX
HPI:  *"68 y/o female with PMHx HTN, HLD, CVA x 3 (last was 2016 with right hand weakness residual), GAMALIEL not using CPAP, Emphysema, ex-25 pack year smoker quit 2021, Chronic Constipation on Movantik, Urinary Incontinence chronically self straight cath at home, chronic UTI, Breast Implant Rupture with Chronic Leak, B/L CTS s/p release, s/p Intrathecal Morphine pump for pain, with chronic neck and back pain worsening for years s/p multiple spinal surgeries including laminectomies and fusion of cervical, thoracic and lumbar spine, most recently done in 2019 and 2020 at [facility] by Dr. Trinidad.  She is wheelchair dependent.  She also has history of right shoulder replacement.  She reports bilateral shoulder pain, bilateral leg pain and burning to the bilateral calves.  She reports right hand greater than arm and leg weakness."    66 yo F with history of HTN, HLD, prior strokes with residual RUE weakness, GAMALIEL noncompliant with CPAP, COPD, prior extensive smoking history, chronic constipation, urinary incontinence, chronic UTI, ruptured breast implant, bilateral CTS release surgery, intrathecal morphine pump and chronic pain s/p multiple spinal surgeries who is now s/p T3 - L1 posterior fusion (EBL 500cc, 1L UOP, 4200 crystalloid, 1u PRBC) and admitted to NSICU.  She remains intubated with progressive improvement in level of arousal    Hospital Course:     8/17: POD0 s/p T3-L1 revision of fusion. extubated in NSICU  8/18: POD1 KAMRAN overnight. Started on PCA. Tx to SDU. PT/OT rec AR.  8/19: POD2, overnight episode of agitation and screaming due to concern over pain control. neuro exam stable. 1 HMV drain removed.  8/20: POD3, extra 0.25mg IV dilaudid x 2 given o/n for pain. neuro exam stable  8/21: POD 4. KAMRAN overnight. PCA off now on PO meds. pending rehab, hx of chronic constipation, increased miralax to twice a day and given lactulose and had bowel movement.Valium increased to q 6 hours for muscle spasm. Pending XRs   8/22: POD5, complaining of severe pain o/n requiring additional dilaudid pushes. HMV#3 removed and incisional dressing changed. Given 500cc bolus for pressure in 90s, patient mildly sedated from pain medication. Pain regimen discussed with Dr. Mclaughlin and recommends to keep regimen the same at this time. Accepted to [facility], pending insurance authorization. Post op XRs performed   8/23: POD6, still complains of pain but sleepy with current regimen, plan for Dr. Mclaughlin to interrogate intrathecal pain pump tomorrow. Hemovac removed, US guided IV placed for access. Pending insurance authorization for [facility].  8/24: POD7, still complains of pain, requiring 1 push of dilaudid   8/25: POD#8. Besides complaining of pain, patient appears comfortable. CT head and neck was performed.    OVERNIGHT EVENTS:  Had a fall when trying to walk without a walker.    Vital Signs Last 24 Hrs  T(C): 36.4 (25 Aug 2022 01:34), Max: 36.8 (24 Aug 2022 04:52)  T(F): 97.6 (25 Aug 2022 01:34), Max: 98.2 (24 Aug 2022 04:52)  HR: 88 (25 Aug 2022 01:34) (85 - 110)  BP: 115/78 (25 Aug 2022 01:34) (77/56 - 137/87)  BP(mean): --  RR: 18 (25 Aug 2022 01:34) (17 - 18)  SpO2: 98% (25 Aug 2022 01:34) (96% - 100%)    Parameters below as of 25 Aug 2022 01:34  Patient On (Oxygen Delivery Method): room air        I&O's Summary    23 Aug 2022 07:01  -  24 Aug 2022 07:00  --------------------------------------------------------  IN: 310 mL / OUT: 2760 mL / NET: -2450 mL    24 Aug 2022 07:01  -  25 Aug 2022 02:39  --------------------------------------------------------  IN: 200 mL / OUT: 1075 mL / NET: -875 mL        PHYSICAL EXAM:    General: patient seen laying supine in bed in NAD  Neuro: AAOx3, follows commands, opens eyes spontaneously, speech clear and fluent,  face symmetric,  sensation intact to light touch throughout, motor strength difficult to assess due to pain limitation but at least 4/5 throughout   HEENT: PERRL  Neck: supple  Cardiac: RRR, S1S2  Pulmonary: chest rise symmetric  Abdomen: soft, nontender, nondistended  Ext: perfusing well  Skin: warm, dry  Wound: thoracolumbar incision dressing c/d/i    TUBES/LINES:  [] Castillo  [] Lumbar Drain  [x] Wound Drains - 1 hemovac  [] Others      DIET: Regular    LABS:                        12.0   14.90 )-----------( 256      ( 24 Aug 2022 06:37 )             37.0     08-24    135  |  99  |  8   ----------------------------<  161<H>  4.0   |  26  |  0.49<L>    Ca    8.9      24 Aug 2022 06:37  Phos  3.6     08-24  Mg     2.0     08-24              CAPILLARY BLOOD GLUCOSE      POCT Blood Glucose.: 164 mg/dL (24 Aug 2022 23:32)  POCT Blood Glucose.: 126 mg/dL (24 Aug 2022 17:45)  POCT Blood Glucose.: 126 mg/dL (24 Aug 2022 12:25)  POCT Blood Glucose.: 168 mg/dL (24 Aug 2022 08:14)      Drug Levels: [] N/A    CSF Analysis: [] N/A      Allergies    oxycodone (Pruritus)    Intolerances      MEDICATIONS:  Antibiotics:  nitrofurantoin macrocrystals (MACRODANTIN) 50 milliGRAM(s) Oral <User Schedule>    Neuro:  acetaminophen     Tablet .. 1000 milliGRAM(s) Oral every 8 hours  buPROPion XL (24-Hour) . 150 milliGRAM(s) Oral daily  diazepam    Tablet 5 milliGRAM(s) Oral every 6 hours PRN  diphenhydrAMINE 25 milliGRAM(s) Oral every 6 hours PRN  DULoxetine 60 milliGRAM(s) Oral daily  gabapentin 600 milliGRAM(s) Oral three times a day  HYDROmorphone   Tablet 6 milliGRAM(s) Oral every 4 hours PRN  HYDROmorphone   Tablet 4 milliGRAM(s) Oral every 4 hours PRN  ondansetron Injectable 4 milliGRAM(s) IV Push every 6 hours PRN    Anticoagulation:  enoxaparin Injectable 40 milliGRAM(s) SubCutaneous <User Schedule>    OTHER:  ALBUTerol   0.042% 2.5 milliGRAM(s) Nebulizer every 6 hours PRN  bisacodyl 5 milliGRAM(s) Oral every 12 hours  bisacodyl Suppository 10 milliGRAM(s) Rectal daily PRN  dextrose 50% Injectable 25 Gram(s) IV Push once  dextrose 50% Injectable 12.5 Gram(s) IV Push once  insulin lispro (ADMELOG) corrective regimen sliding scale   SubCutaneous Before meals and at bedtime  lidocaine   4% Patch 1 Patch Transdermal every 24 hours  lidocaine   4% Patch 1 Patch Transdermal every 24 hours  metoprolol tartrate 50 milliGRAM(s) Oral two times a day  naloxegol 25 milliGRAM(s) Oral daily  naloxone Injectable 0.1 milliGRAM(s) IV Push every 3 minutes PRN  pantoprazole    Tablet 40 milliGRAM(s) Oral before breakfast  polyethylene glycol 3350 17 Gram(s) Oral every 12 hours  senna 2 Tablet(s) Oral at bedtime  simvastatin 40 milliGRAM(s) Oral at bedtime    IVF:  cholecalciferol 2000 Unit(s) Oral daily    CULTURES:    RADIOLOGY & ADDITIONAL TESTS:      ASSESSMENT:    68 y/o female with PMHx HTN, HLD, CVA x 3 (last was 2016 with right hand weakness residual), GAMALIEL not using CPAP, Emphysema, ex-25 pack year smoker quit 2021, Chronic Constipation on Movantik, Urinary Incontinence chronically self straight cath at home, chronic UTI, Breast Implant Rupture with Chronic Leak, B/L CTS s/p release, s/p Intrathecal Morphine pump for pain, with chronic neck and back pain worsening for years s/p multiple spinal surgeries including laminectomies and fusion of cervical, thoracic and lumbar spine, most recently done in 2019 and 2020 at [facility] by Dr. Trinidad. CT findings include hardware loosening at T5, T6, T9-T12. S/p T3-L1 revision of fusion, c/b durotomy with primary repair, loss of left side motors, and repair of intrathecal morphine pump (8/17).    M40.209    Handoff    MEWS Score    HTN (hypertension)    Back pain    Hypertension    SS (spinal stenosis)    High cholesterol    Stroke    H/O carpal tunnel syndrome    H/O carpal tunnel syndrome    Chronic GERD    History of chronic constipation    Seizure    Urinary incontinence    Pre-diabetes    Acute UTI    Anxiety    Anxiety and depression    Arthritis    Eczema    External hemorrhoids    H/O kyphosis    Multiple sclerosis    Pancreas cyst    Scoliosis    Wheelchair dependent    Cervical pseudoarthrosis    Right shoulder pain    Cervical spondylosis    Chronic headaches    Chronic LUQ pain    Chronic UTI    Degenerative joint disease    H/O low back pain    Lumbosacral spondylosis    COPD (chronic obstructive pulmonary disease)    Back pain    Lumbar pseudoarthrosis    Pseudoarthrosis of lumbar spine    Fusion, spine, thoracolumbar, posterior approach    Cervical disc disease    H/O Spinal surgery    H/O breast surgery    S/P cervical discectomy    Previous back surgery    H/O shoulder surgery    H/O total shoulder replacement, right    Room Service Assist    [email or web address]        PLAN:  NEURO:  - neuro/spinal/vital checks q4hr  - pain management reccs: Valium 5q8, dilaudid PO  - cont movantik 25mg qd  - 3 deep HMVs, monitor outputs, HMV#4 d/c 8/19  - decadron 4q6 x 3 days  - ERAS protocol: tylenol 1g q8, gabapentin 600mg TID  - PT/OT when able   - cont wellbutrin 150mg qd, duloxetine 60mg qd  - postop xrays completed 8/22     CARDS:  - normotensive SBP goal  - cont lopressor 50mg BID, lipitor 40mg qd    PULM:   - extrubated in NSICU to NC 8/17  - Albuterol 2.5q6 PRN    GI:   - regular diet  - protonix while on decadron  - bowel regimen  - last BM 8/21    RENAL:  - straight cath prn  - h/o chronic urniary retnation, straight cath at home    HEME:   - SCDs for DVT ppx, SQL    ID:   - macrobid for chronic UTI  - afebrile, no leukocytosis    ENDO:  - ISS, A1C 6.6  DVT PROPHYLAXIS:  [] Venodynes                                [] Heparin/Lovenox    DISPOSITION: TAIWO    Assessment:  Present when checked    []  GCS  E   V  M     Heart Failure: []Acute, [] acute on chronic , []chronic  Heart Failure:  [] Diastolic (HFpEF), [] Systolic (HFrEF), []Combined (HFpEF and HFrEF), [] RHF, [] Pulm HTN, [] Other    [] DARRYL, [] ATN, [] AIN, [] other  [] CKD1, [] CKD2, [] CKD 3, [] CKD 4, [] CKD 5, []ESRD    Encephalopathy: [] Metabolic, [] Hepatic, [] toxic, [] Neurological, [] Other    Abnormal Nurtitional Status: [] malnurtition (see nutrition note), [ ]underweight: BMI < 19, [] morbid obesity: BMI >40, [] Cachexia    [] Sepsis  [] hypovolemic shock,[] cardiogenic shock, [] hemorrhagic shock, [] neuogenic shock  [] Acute Respiratory Failure  []Cerebral edema, [] Brain compression/ herniation,   [] Functional quadriplegia  [] Acute blood loss anemia

## 2022-08-25 NOTE — PROVIDER CONTACT NOTE (FALL NOTIFICATION) - SITUATION
A loud scream was heard from patient room and nursing staff found patient on the floor. Pt refused bed alarm at start of shift. Pt threatened to throw herself on floor on day shift 08/24/22.

## 2022-08-25 NOTE — PROGRESS NOTE ADULT - SUBJECTIVE AND OBJECTIVE BOX
She feels fine - had a fall out of bed, and her R ankles hurts.  Still has some back pain too.     Remaining ROS negative     PHYSICAL EXAM:    General: WDWN, sitting up in chair  HEENT: NC/AT; MMM  Cardiovascular: +S1/S2, RRR, no murmurs, rubs or gallops  Respiratory: normal respiratory effort, CTAB  Abdomen: soft, nontender, nondistended, normoactive bowel sounds  Extremities: WWP; no edema  Dermatologic: no appreciable wounds or damage to the skin    VITAL SIGNS:  Vital Signs Last 24 Hrs  T(C): 36.4 (25 Aug 2022 09:17), Max: 36.4 (24 Aug 2022 14:26)  T(F): 97.6 (25 Aug 2022 09:17), Max: 97.6 (25 Aug 2022 01:34)  HR: 97 (25 Aug 2022 09:17) (81 - 110)  BP: 111/75 (25 Aug 2022 09:17) (77/56 - 140/92)  BP(mean): --  RR: 18 (25 Aug 2022 09:17) (17 - 20)  SpO2: 95% (25 Aug 2022 09:17) (94% - 100%)    Parameters below as of 25 Aug 2022 09:17  Patient On (Oxygen Delivery Method): room air          MEDICATIONS:  MEDICATIONS  (STANDING):  acetaminophen     Tablet .. 1000 milliGRAM(s) Oral every 8 hours  bisacodyl 5 milliGRAM(s) Oral every 12 hours  buPROPion XL (24-Hour) . 150 milliGRAM(s) Oral daily  cholecalciferol 2000 Unit(s) Oral daily  dextrose 50% Injectable 25 Gram(s) IV Push once  dextrose 50% Injectable 12.5 Gram(s) IV Push once  DULoxetine 60 milliGRAM(s) Oral daily  enoxaparin Injectable 40 milliGRAM(s) SubCutaneous <User Schedule>  gabapentin 600 milliGRAM(s) Oral three times a day  insulin lispro (ADMELOG) corrective regimen sliding scale   SubCutaneous Before meals and at bedtime  lidocaine   4% Patch 1 Patch Transdermal every 24 hours  lidocaine   4% Patch 1 Patch Transdermal every 24 hours  metoprolol tartrate 50 milliGRAM(s) Oral two times a day  naloxegol 25 milliGRAM(s) Oral daily  nitrofurantoin macrocrystals (MACRODANTIN) 50 milliGRAM(s) Oral <User Schedule>  pantoprazole    Tablet 40 milliGRAM(s) Oral before breakfast  polyethylene glycol 3350 17 Gram(s) Oral every 12 hours  senna 2 Tablet(s) Oral at bedtime  simvastatin 40 milliGRAM(s) Oral at bedtime    MEDICATIONS  (PRN):  ALBUTerol   0.042% 2.5 milliGRAM(s) Nebulizer every 6 hours PRN Shortness of Breath and/or Wheezing  bisacodyl Suppository 10 milliGRAM(s) Rectal daily PRN Constipation  diazepam    Tablet 5 milliGRAM(s) Oral every 6 hours PRN muscle spasm  diphenhydrAMINE 25 milliGRAM(s) Oral every 6 hours PRN Rash and/or Itching  HYDROmorphone   Tablet 6 milliGRAM(s) Oral every 4 hours PRN Severe Pain (7 - 10)  HYDROmorphone   Tablet 4 milliGRAM(s) Oral every 4 hours PRN Moderate Pain (4 - 6)  naloxone Injectable 0.1 milliGRAM(s) IV Push every 3 minutes PRN For ANY of the following changes in patient status:  A. RR LESS THAN 10 breaths per minute, B. Oxygen saturation LESS THAN 90%, C. Sedation score of 6  ondansetron Injectable 4 milliGRAM(s) IV Push every 6 hours PRN Nausea      ALLERGIES:  Allergies    oxycodone (Pruritus)    Intolerances        LABS:                        11.3   14.07 )-----------( 252      ( 25 Aug 2022 06:48 )             36.4     08-25    131<L>  |  100  |  13  ----------------------------<  144<H>  3.9   |  19<L>  |  0.65    Ca    8.6      25 Aug 2022 06:48  Phos  3.3     08-25  Mg     2.1     08-25          CAPILLARY BLOOD GLUCOSE      POCT Blood Glucose.: 186 mg/dL (25 Aug 2022 08:57)      RADIOLOGY & ADDITIONAL TESTS: Reviewed.

## 2022-08-25 NOTE — PROVIDER CONTACT NOTE (FALL NOTIFICATION) - ACTION/TREATMENT ORDERED:
Manual lift of patient back to bed with help of 4 other RN and PCA staff members, bed alarm on, pt instructed to use call bed and not to get out of bed without assistance, head and neck CT ordered.

## 2022-08-25 NOTE — PROVIDER CONTACT NOTE (FALL NOTIFICATION) - BACKGROUND
Pt s/p T3-L1 revision on 8/17/22. Hx of urinary incontinence, spinal stenosis, HTN, HLD, CVA 3x ( last was in 2016).

## 2022-08-25 NOTE — CHART NOTE - NSCHARTNOTEFT_GEN_A_CORE
Admitting Diagnosis:   Patient is a 67y old  Female who presents with a chief complaint of chronic back and neck pain worsening for years (25 Aug 2022 12:11)      PAST MEDICAL & SURGICAL HISTORY:  HTN (hypertension)      SS (spinal stenosis)      High cholesterol      Stroke  x3      H/O carpal tunnel syndrome  Bilateral      Chronic GERD      History of chronic constipation      Urinary incontinence  self cath as needed      Pre-diabetes      Anxiety and depression      Eczema      H/O kyphosis      Pancreas cyst      Scoliosis      Wheelchair dependent      Cervical pseudoarthrosis  and lumbar spine      Cervical spondylosis      Chronic headaches      Degenerative joint disease  left shoulder      Lumbosacral spondylosis      COPD (chronic obstructive pulmonary disease)      H/O Spinal surgery  lumbar x 30      H/O breast surgery  left breast implant 1980&#x27;s      S/P cervical discectomy  x4      H/O total shoulder replacement, right          Current Nutrition Order:   CST CHO/no snack    PO Intake: Good (%) [   ]  Fair (50-75%) [   ] Poor (<25%) [  x ]     GI Issues: She reports nausea, no emesis, chronic constipation, On BM regimen (dulcolax, senna, miralax, was given lactulose on 8/21, had BM)    Pain: Reports pain in back    Skin Integrity: Filippo 20, surgical incision noted. No PU or edema.     Labs:   08-25    131<L>  |  100  |  13  ----------------------------<  144<H>  3.9   |  19<L>  |  0.65    Ca    8.6      25 Aug 2022 06:48  Phos  3.3     08-25  Mg     2.1     08-25      CAPILLARY BLOOD GLUCOSE      POCT Blood Glucose.: 221 mg/dL (25 Aug 2022 12:25)  POCT Blood Glucose.: 186 mg/dL (25 Aug 2022 08:57)  POCT Blood Glucose.: 164 mg/dL (24 Aug 2022 23:32)  POCT Blood Glucose.: 126 mg/dL (24 Aug 2022 17:45)      Medications:  MEDICATIONS  (STANDING):  acetaminophen     Tablet .. 1000 milliGRAM(s) Oral every 8 hours  bisacodyl 5 milliGRAM(s) Oral every 12 hours  buPROPion XL (24-Hour) . 150 milliGRAM(s) Oral daily  cholecalciferol 2000 Unit(s) Oral daily  dextrose 50% Injectable 25 Gram(s) IV Push once  dextrose 50% Injectable 12.5 Gram(s) IV Push once  DULoxetine 60 milliGRAM(s) Oral daily  enoxaparin Injectable 40 milliGRAM(s) SubCutaneous <User Schedule>  gabapentin 600 milliGRAM(s) Oral three times a day  insulin lispro (ADMELOG) corrective regimen sliding scale   SubCutaneous Before meals and at bedtime  lidocaine   4% Patch 1 Patch Transdermal every 24 hours  lidocaine   4% Patch 1 Patch Transdermal every 24 hours  metoprolol tartrate 50 milliGRAM(s) Oral two times a day  naloxegol 25 milliGRAM(s) Oral daily  nitrofurantoin macrocrystals (MACRODANTIN) 50 milliGRAM(s) Oral <User Schedule>  pantoprazole    Tablet 40 milliGRAM(s) Oral before breakfast  polyethylene glycol 3350 17 Gram(s) Oral every 12 hours  senna 2 Tablet(s) Oral at bedtime  simvastatin 40 milliGRAM(s) Oral at bedtime    MEDICATIONS  (PRN):  ALBUTerol   0.042% 2.5 milliGRAM(s) Nebulizer every 6 hours PRN Shortness of Breath and/or Wheezing  bisacodyl Suppository 10 milliGRAM(s) Rectal daily PRN Constipation  diazepam    Tablet 5 milliGRAM(s) Oral every 6 hours PRN muscle spasm  diphenhydrAMINE 25 milliGRAM(s) Oral every 6 hours PRN Rash and/or Itching  HYDROmorphone   Tablet 6 milliGRAM(s) Oral every 4 hours PRN Severe Pain (7 - 10)  HYDROmorphone   Tablet 4 milliGRAM(s) Oral every 4 hours PRN Moderate Pain (4 - 6)  naloxone Injectable 0.1 milliGRAM(s) IV Push every 3 minutes PRN For ANY of the following changes in patient status:  A. RR LESS THAN 10 breaths per minute, B. Oxygen saturation LESS THAN 90%, C. Sedation score of 6  ondansetron Injectable 4 milliGRAM(s) IV Push every 6 hours PRN Nausea    Adm Anthropometrics:  Height for BMI (FEET)	5 Feet  Height for BMI (INCHES)	6 Inch(s)  Height for BMI (CENTIMETERS)	167.64 Centimeter(s)  Weight for BMI (lbs)	194 lb  Weight for BMI (kg)	88 kg  Body Mass Index	31.3    Weight Change: No new wts in EMR. Please obtain new wts as able    Estimated energy needs:   lbs. %%. IBW used to calculate energy needs due to pt's current body weight exceeding 120% of IBW. Needs adjusted for age and wt, post op demands  4804-1388 kcals (23-27 kcals/kg, IBW)  70.68-82.46 g protein (1.2-1.4 g/kg, IBW)  6532-1764 mls (30-35 mls/kg, IBW)    Subjective:  68 y/o female with PMHx HTN, HLD, CVA x 3 (last was 2016 with right hand weakness residual), GAMALIEL not using CPAP, Emphysema, ex-25 pack year smoker quit 2021, Chronic Constipation on Movantik, Urinary Incontinence chronically self straight cath at home, chronic UTI, Breast Implant Rupture with Chronic Leak, B/L CTS s/p release, s/p Intrathecal Morphine pump for pain, with chronic neck and back pain worsening for years s/p multiple spinal surgeries including laminectomies and fusion of cervical, thoracic and lumbar spine, most recently done in 2019 and 2020 at [facility] by Dr. Trinidad. CT findings include hardware loosening at T5, T6, T9-T12. S/p T3-L1 revision of fusion, c/b durotomy with primary repair, loss of left side motors, and repair of intrathecal morphine pump (8/17). Pending AR. S/p fall overnight 8/25.    Pt seen resting in bed, remains sleepy but has eyes half open, limited information obtained, difficulty obtaining responses from pt during visit. She reported not eating breakfast, did not realized tray was taken away while she was sleeping, now has lunch tray set up in room, untouched. Endorsing having issues with ordering meals d/t not being allowed sugary items. Reviewed current diet with pt, discussed reason for CST CHO diet, noted POCT trends, 164, 186, 221 mg/dl, Glu 144 mg/dl. Pt verbalized understanding. Encouraged PO intake as able, continue to emphasize importance of nutrition s/p surgery. Pt complaining of nausea, attempted to get up from bed, made RN aware. RD to follow per protocol. Please see below for nutr recs.    Previous Nutrition Diagnosis:  Inadeqate Energy Intake  RT poor appetite 2/2 intense pain  AEB <50% PO intake    Active [x]  Resolved [   ]    Goal: Consistently meet >75% est needs during hospital stay    Recommendations:  1. Continue current diet  2. Encourage PO as able  3. BM and pain regimen per team  4. Monitor BG, POCT, lytes, replete prn  5. Diet edu prn    Education: Encouraged PO    Risk Level: High [  x ] Moderate [   ] Low [   ]

## 2022-08-26 ENCOUNTER — TRANSCRIPTION ENCOUNTER (OUTPATIENT)
Age: 67
End: 2022-08-26

## 2022-08-26 LAB
GLUCOSE BLDC GLUCOMTR-MCNC: 131 MG/DL — HIGH (ref 70–99)
GLUCOSE BLDC GLUCOMTR-MCNC: 152 MG/DL — HIGH (ref 70–99)
GLUCOSE BLDC GLUCOMTR-MCNC: 154 MG/DL — HIGH (ref 70–99)
GLUCOSE BLDC GLUCOMTR-MCNC: 161 MG/DL — HIGH (ref 70–99)
SARS-COV-2 RNA SPEC QL NAA+PROBE: NEGATIVE — SIGNIFICANT CHANGE UP

## 2022-08-26 PROCEDURE — 99232 SBSQ HOSP IP/OBS MODERATE 35: CPT

## 2022-08-26 PROCEDURE — 99024 POSTOP FOLLOW-UP VISIT: CPT

## 2022-08-26 PROCEDURE — 99233 SBSQ HOSP IP/OBS HIGH 50: CPT

## 2022-08-26 RX ORDER — HYDROMORPHONE HYDROCHLORIDE 2 MG/ML
6 INJECTION INTRAMUSCULAR; INTRAVENOUS; SUBCUTANEOUS EVERY 4 HOURS
Refills: 0 | Status: DISCONTINUED | OUTPATIENT
Start: 2022-08-26 | End: 2022-08-30

## 2022-08-26 RX ORDER — DIAZEPAM 5 MG
5 TABLET ORAL EVERY 4 HOURS
Refills: 0 | Status: DISCONTINUED | OUTPATIENT
Start: 2022-08-26 | End: 2022-08-30

## 2022-08-26 RX ORDER — HYDROMORPHONE HYDROCHLORIDE 2 MG/ML
4 INJECTION INTRAMUSCULAR; INTRAVENOUS; SUBCUTANEOUS EVERY 4 HOURS
Refills: 0 | Status: DISCONTINUED | OUTPATIENT
Start: 2022-08-26 | End: 2022-08-30

## 2022-08-26 RX ADMIN — PANTOPRAZOLE SODIUM 40 MILLIGRAM(S): 20 TABLET, DELAYED RELEASE ORAL at 06:23

## 2022-08-26 RX ADMIN — Medication 1000 MILLIGRAM(S): at 02:15

## 2022-08-26 RX ADMIN — Medication 2000 UNIT(S): at 13:01

## 2022-08-26 RX ADMIN — DULOXETINE HYDROCHLORIDE 60 MILLIGRAM(S): 30 CAPSULE, DELAYED RELEASE ORAL at 15:13

## 2022-08-26 RX ADMIN — Medication 1000 MILLIGRAM(S): at 23:30

## 2022-08-26 RX ADMIN — Medication 1000 MILLIGRAM(S): at 15:13

## 2022-08-26 RX ADMIN — Medication 5 MILLIGRAM(S): at 09:00

## 2022-08-26 RX ADMIN — Medication 1000 MILLIGRAM(S): at 01:43

## 2022-08-26 RX ADMIN — LIDOCAINE 1 PATCH: 4 CREAM TOPICAL at 22:57

## 2022-08-26 RX ADMIN — Medication 1000 MILLIGRAM(S): at 09:00

## 2022-08-26 RX ADMIN — GABAPENTIN 600 MILLIGRAM(S): 400 CAPSULE ORAL at 22:13

## 2022-08-26 RX ADMIN — GABAPENTIN 600 MILLIGRAM(S): 400 CAPSULE ORAL at 06:23

## 2022-08-26 RX ADMIN — Medication 1000 MILLIGRAM(S): at 15:43

## 2022-08-26 RX ADMIN — Medication 2: at 17:27

## 2022-08-26 RX ADMIN — HYDROMORPHONE HYDROCHLORIDE 6 MILLIGRAM(S): 2 INJECTION INTRAMUSCULAR; INTRAVENOUS; SUBCUTANEOUS at 13:32

## 2022-08-26 RX ADMIN — Medication 2: at 22:13

## 2022-08-26 RX ADMIN — HYDROMORPHONE HYDROCHLORIDE 6 MILLIGRAM(S): 2 INJECTION INTRAMUSCULAR; INTRAVENOUS; SUBCUTANEOUS at 13:02

## 2022-08-26 RX ADMIN — HYDROMORPHONE HYDROCHLORIDE 6 MILLIGRAM(S): 2 INJECTION INTRAMUSCULAR; INTRAVENOUS; SUBCUTANEOUS at 01:37

## 2022-08-26 RX ADMIN — HYDROMORPHONE HYDROCHLORIDE 6 MILLIGRAM(S): 2 INJECTION INTRAMUSCULAR; INTRAVENOUS; SUBCUTANEOUS at 19:17

## 2022-08-26 RX ADMIN — Medication 2: at 09:00

## 2022-08-26 RX ADMIN — Medication 50 MILLIGRAM(S): at 06:22

## 2022-08-26 RX ADMIN — LIDOCAINE 1 PATCH: 4 CREAM TOPICAL at 18:57

## 2022-08-26 RX ADMIN — GABAPENTIN 600 MILLIGRAM(S): 400 CAPSULE ORAL at 13:01

## 2022-08-26 RX ADMIN — HYDROMORPHONE HYDROCHLORIDE 6 MILLIGRAM(S): 2 INJECTION INTRAMUSCULAR; INTRAVENOUS; SUBCUTANEOUS at 02:00

## 2022-08-26 RX ADMIN — NALOXEGOL OXALATE 25 MILLIGRAM(S): 12.5 TABLET, FILM COATED ORAL at 13:02

## 2022-08-26 RX ADMIN — Medication 5 MILLIGRAM(S): at 22:13

## 2022-08-26 RX ADMIN — SIMVASTATIN 40 MILLIGRAM(S): 20 TABLET, FILM COATED ORAL at 22:14

## 2022-08-26 RX ADMIN — HYDROMORPHONE HYDROCHLORIDE 6 MILLIGRAM(S): 2 INJECTION INTRAMUSCULAR; INTRAVENOUS; SUBCUTANEOUS at 06:22

## 2022-08-26 RX ADMIN — LIDOCAINE 1 PATCH: 4 CREAM TOPICAL at 22:56

## 2022-08-26 RX ADMIN — HYDROMORPHONE HYDROCHLORIDE 6 MILLIGRAM(S): 2 INJECTION INTRAMUSCULAR; INTRAVENOUS; SUBCUTANEOUS at 07:01

## 2022-08-26 RX ADMIN — Medication 1000 MILLIGRAM(S): at 09:30

## 2022-08-26 RX ADMIN — Medication 5 MILLIGRAM(S): at 04:01

## 2022-08-26 RX ADMIN — LIDOCAINE 1 PATCH: 4 CREAM TOPICAL at 10:04

## 2022-08-26 RX ADMIN — Medication 1000 MILLIGRAM(S): at 22:31

## 2022-08-26 RX ADMIN — Medication 5 MILLIGRAM(S): at 22:31

## 2022-08-26 RX ADMIN — Medication 5 MILLIGRAM(S): at 14:32

## 2022-08-26 RX ADMIN — LIDOCAINE 1 PATCH: 4 CREAM TOPICAL at 10:03

## 2022-08-26 RX ADMIN — ENOXAPARIN SODIUM 40 MILLIGRAM(S): 100 INJECTION SUBCUTANEOUS at 22:12

## 2022-08-26 RX ADMIN — SENNA PLUS 2 TABLET(S): 8.6 TABLET ORAL at 22:30

## 2022-08-26 RX ADMIN — BUPROPION HYDROCHLORIDE 150 MILLIGRAM(S): 150 TABLET, EXTENDED RELEASE ORAL at 15:13

## 2022-08-26 NOTE — PROGRESS NOTE ADULT - SUBJECTIVE AND OBJECTIVE BOX
Physical Medicine and Rehabilitation Progress Note :    Patient is a 67y old  Female who presents with a chief complaint of chronic back and neck pain worsening for years (26 Aug 2022 10:28)      HPI:  ***********************************************  ADULT NSICU H&P  VANNA DIAZ [id number] [facility] [id number]  ***********************************************  H&P:  "68 y/o female with PMHx HTN, HLD, CVA x 3 (last was 2016 with right hand weakness residual), GAMALIEL not using CPAP, Emphysema, ex-25 pack year smoker quit 2021, Chronic Constipation on Movantik, Urinary Incontinence chronically self straight cath at home, chronic UTI, Breast Implant Rupture with Chronic Leak, B/L CTS s/p release, s/p Intrathecal Morphine pump for pain, with chronic neck and back pain worsening for years s/p multiple spinal surgeries including laminectomies and fusion of cervical, thoracic and lumbar spine, most recently done in 2019 and 2020 at [facility] by Dr. Trinidad.  She is wheelchair dependent.  She also has history of right shoulder replacement.  She reports bilateral shoulder pain, bilateral leg pain and burning to the bilateral calves.  She reports right hand greater than arm and leg weakness."    68 yo F with history of HTN, HLD, prior strokes with residual RUE weakness, GAMALIEL noncompliant with CPAP, COPD, prior extensive smoking history, chronic constipation, urinary incontinence, chronic UTI, ruptured breast implant, bilateral CTS release surgery, intrathecal morphine pump and chronic pain s/p multiple spinal surgeries who is now s/p T3 - L1 posterior fusion (EBL 500cc, 1L UOP, 4200 crystalloid, 1u PRBC) and admitted to NSICU.  She remains intubated with progressive improvement in level of arousal    VITALS:    ICU Vital Signs Last 24 Hrs  T(C): 35.2 (17 Aug 2022 18:03), Max: 35.2 (17 Aug 2022 18:03)  T(F): 95.4 (17 Aug 2022 18:03), Max: 95.4 (17 Aug 2022 18:03)  HR: --  BP: --  BP(mean): --  ABP: --  ABP(mean): --  RR: --  SpO2: --            I&O's Summary      EXAM:     [place] Coma Scale: 2/1T/6 = 7  Deferred full examination at this time due to hemodynamic instability  Intubated on pressure support and breathing spontaneously  Opens eyes to noxious stimuli  Gaze conjugate  PERRL (NPi > 3 bilaterally)  No gaze preference  Spontaneously moving BL UE/LE    LABORATORY DATA:    ABG - ( 17 Aug 2022 16:47 )  pH, Arterial: 7.31  pH, Blood: x     /  pCO2: 42    /  pO2: 251   / HCO3: 21    / Base Excess: -4.9  /  SaO2: x                                       10.7   10.99 )-----------( 143      ( 17 Aug 2022 18:08 )             32.9             MEDICATIONS  (STANDING):  acetaminophen     Tablet .. 1000 milliGRAM(s) Oral every 8 hours  bisacodyl 5 milliGRAM(s) Oral every 12 hours  ceFAZolin   IVPB 2000 milliGRAM(s) IV Intermittent every 8 hours  chlorhexidine 2% Cloths 1 Application(s) Topical daily  cholecalciferol 2000 Unit(s) Oral daily  dexAMETHasone  Injectable 4 milliGRAM(s) IV Push every 6 hours  dextrose 5%. 1000 milliLiter(s) (50 mL/Hr) IV Continuous <Continuous>  dextrose 5%. 1000 milliLiter(s) (100 mL/Hr) IV Continuous <Continuous>  dextrose 50% Injectable 25 Gram(s) IV Push once  dextrose 50% Injectable 12.5 Gram(s) IV Push once  dextrose 50% Injectable 25 Gram(s) IV Push once  diazepam    Tablet 5 milliGRAM(s) Oral every 8 hours  DULoxetine 60 milliGRAM(s) Oral daily  gabapentin 600 milliGRAM(s) Oral three times a day  gabapentin 600 milliGRAM(s) Oral three times a day  glucagon  Injectable 1 milliGRAM(s) IntraMuscular once  HYDROmorphone PCA (1 mG/mL) 30 milliLiter(s) PCA Continuous PCA Continuous  insulin lispro (ADMELOG) corrective regimen sliding scale   SubCutaneous three times a day before meals  metoprolol tartrate 50 milliGRAM(s) Oral two times a day  naloxegol 25 milliGRAM(s) Oral daily  nitrofurantoin monohydrate/macrocrystals (MACROBID) 50 milliGRAM(s) Oral <User Schedule>  norepinephrine Infusion 0.05 MICROgram(s)/kG/Min (8.26 mL/Hr) IV Continuous <Continuous>  ondansetron   Disintegrating Tablet 4 milliGRAM(s) Oral every 6 hours  pantoprazole    Tablet 40 milliGRAM(s) Oral before breakfast  polyethylene glycol 3350 17 Gram(s) Oral daily  senna 2 Tablet(s) Oral at bedtime  simvastatin 40 milliGRAM(s) Oral at bedtime  sodium chloride 0.9% Bolus 1000 milliLiter(s) IV Bolus once  sodium chloride 0.9%. 1000 milliLiter(s) (75 mL/Hr) IV Continuous <Continuous>    MEDICATIONS  (PRN):  acetaminophen     Tablet .. 650 milliGRAM(s) Oral every 6 hours PRN Temp greater or equal to 38C (100.4F), Mild Pain (1 - 3)  ALBUTerol   0.042% 2.5 milliGRAM(s) Nebulizer every 6 hours PRN Shortness of Breath and/or Wheezing  dextrose Oral Gel 15 Gram(s) Oral once PRN Blood Glucose LESS THAN 70 milliGRAM(s)/deciliter  diphenhydrAMINE 25 milliGRAM(s) Oral every 6 hours PRN Rash and/or Itching  naloxone Injectable 0.1 milliGRAM(s) IV Push every 3 minutes PRN For ANY of the following changes in patient status:  A. RR LESS THAN 10 breaths per minute, B. Oxygen saturation LESS THAN 90%, C. Sedation score of 6  ondansetron Injectable 4 milliGRAM(s) IV Push every 6 hours PRN Nausea         Vital Signs Last 24 Hrs  T(C): 36.7 (26 Aug 2022 08:50), Max: 36.7 (26 Aug 2022 08:50)  T(F): 98.1 (26 Aug 2022 08:50), Max: 98.1 (26 Aug 2022 08:50)  HR: 79 (26 Aug 2022 08:50) (71 - 112)  BP: 100/74 (26 Aug 2022 08:50) (100/74 - 114/74)  BP(mean): --  RR: 16 (26 Aug 2022 08:50) (16 - 18)  SpO2: 95% (26 Aug 2022 08:50) (95% - 100%)    Parameters below as of 26 Aug 2022 08:50  Patient On (Oxygen Delivery Method): room air    Medications    ALBUTerol   0.042% 2.5 milliGRAM(s) Nebulizer every 6 hours PRN Shortness of Breath and/or Wheezing  bisacodyl Suppository 10 milliGRAM(s) Rectal daily PRN Constipation  diazepam    Tablet 5 milliGRAM(s) Oral every 4 hours PRN muscle spasm  diphenhydrAMINE 25 milliGRAM(s) Oral every 6 hours PRN Rash and/or Itching  HYDROmorphone   Tablet 4 milliGRAM(s) Oral every 4 hours PRN Moderate Pain (4 - 6)  HYDROmorphone   Tablet 6 milliGRAM(s) Oral every 4 hours PRN Severe Pain (7 - 10)  naloxone Injectable 0.1 milliGRAM(s) IV Push every 3 minutes PRN For ANY of the following changes in patient status:  A. RR LESS THAN 10 breaths per minute, B. Oxygen saturation LESS THAN 90%, C. Sedation score of 6  ondansetron Injectable 4 milliGRAM(s) IV Push every 6 hours PRN Nausea    Currently Undergoing Physical/ Occupational Therapy at bedside    PT Functional Status Assessment :   8/25/2022    Pain Assessment/Number Scale (0-10) Adult  Presence of Pain: complains of pain/discomfort  Body Location: Bilateral:   arm  Pain Rating (0-10): Rest: 10   Pain Rating (0-10): Activity: 10     Cognitive/Neuro      Language Assistance  Preferred Language to Address Healthcare Preferred Language to Address Healthcare: English    Therapeutic Interventions      Bed Mobility  Bed Mobility Training Rolling/Turning: contact guard;  verbal cues;  bed rails;  (+) log roll  Bed Mobility Training Scooting: contact guard;  verbal cues;  minimum assist (75% patient effort);  1 person assist  Bed Mobility Training Sit-to-Supine: minimum assist (75% patient effort);  1 person assist;  verbal cues  Bed Mobility Training Supine-to-Sit: minimum assist (75% patient effort);  1 person assist;  verbal cues;  bed rails  Bed Mobility Training Limitations: decreased ability to use arms for pushing/pulling;  decreased ability to use legs for bridging/pushing;  impaired ability to control trunk for mobility;  decreased flexibility;  decreased ROM;  decreased strength;  impaired balance;  impaired postural control;  pain    Therapeutic Exercise  Therapeutic Exercise Detail: BUE functional reaching, (B) elbow flexion and (B) 1 set x 10 reps each. Limited therex this session 2/2 increasing (B) arm "cramping"             PM&R Impression : as above    Current Disposition Plan Recommendations :    acute rehab placement , patient denied for AR, appeal to denial initiated

## 2022-08-26 NOTE — DISCHARGE NOTE PROVIDER - NSDCMRMEDTOKEN_GEN_ALL_CORE_FT
albuterol 90 mcg/inh inhalation aerosol: 2 puff(s) inhaled every 6 hours, As Needed  aspirin 81 mg oral delayed release tablet: 1 tab(s) orally once a day  bisacodyl 5 mg oral delayed release tablet: 1 tab(s) orally once a day  buPROPion 150 mg/24 hours (XL) oral tablet, extended release: 1 tab(s) orally every 24 hours  diclofenac sodium 75 mg oral delayed release tablet: 1 tab(s) orally 2 times a day, As Needed  diphenhydrAMINE 25 mg oral capsule: 1 cap(s) orally 3 times a day, As Needed  DULoxetine 60 mg oral delayed release capsule: 1 cap(s) orally once a day  esomeprazole 40 mg oral delayed release capsule: 1 cap(s) orally once a day  gabapentin 600 mg oral tablet: 1 tab(s) orally 3 times a day   methocarbamol 750 mg oral tablet: 2 tab(s) orally 3 times a day, As Needed  metoprolol tartrate 50 mg oral tablet: 1 tab(s) orally 2 times a day  Movantik 25 mg oral tablet: 1 tab(s) orally once a day (in the morning)  Myrbetriq 50 mg oral tablet, extended release: orally 2 times a day  nitrofurantoin macrocrystals 50 mg oral capsule: 1 cap(s) orally once a day  simvastatin 40 mg oral tablet: 1 tab(s) orally once a day (at bedtime)  tiZANidine 4 mg oral tablet: 2 tab(s) orally every 8 hours, As Needed  Vitamin D3 50 mcg (2000 intl units) oral tablet: 1 tab(s) orally once a day   acetaminophen 500 mg oral tablet: 2 tab(s) orally every 8 hours  albuterol 90 mcg/inh inhalation aerosol: 2 puff(s) inhaled every 6 hours, As Needed  aspirin 81 mg oral delayed release tablet: 1 tab(s) orally once a day  bisacodyl 5 mg oral delayed release tablet: 1 tab(s) orally once a day  buPROPion 150 mg/24 hours (XL) oral tablet, extended release: 1 tab(s) orally every 24 hours  diazePAM 5 mg oral tablet: 1 tab(s) orally every 4 hours, As needed, muscle spasm  diclofenac sodium 75 mg oral delayed release tablet: 1 tab(s) orally 2 times a day, As Needed  diphenhydrAMINE 25 mg oral capsule: 1 cap(s) orally 3 times a day, As Needed  DULoxetine 60 mg oral delayed release capsule: 1 cap(s) orally once a day  enoxaparin: 40 unit(s) subcutaneous once a day (at bedtime) for thromboprophylaxis   esomeprazole 40 mg oral delayed release capsule: 1 cap(s) orally once a day  gabapentin 600 mg oral tablet: 1 tab(s) orally 3 times a day   HYDROmorphone 2 mg oral tablet: 3 tab(s) orally every 4 hours, As needed, Severe Pain (7 - 10)  HYDROmorphone 4 mg oral tablet: 1 tab(s) orally every 4 hours, As needed, Moderate Pain (4 - 6)  lidocaine 4% topical film: Apply topically to affected area once a day to affected area   methocarbamol 750 mg oral tablet: 2 tab(s) orally 3 times a day, As Needed  metoprolol tartrate 50 mg oral tablet: 1 tab(s) orally 2 times a day  Movantik 25 mg oral tablet: 1 tab(s) orally once a day (in the morning)  Myrbetriq 50 mg oral tablet, extended release: orally 2 times a day  naloxone: 0.1 milligram(s) injectable every 3 minutes as needed  nitrofurantoin macrocrystals 50 mg oral capsule: 1 cap(s) orally once a day  ondansetron 2 mg/mL injectable solution: 4 milligram(s) injectable every 6 hours, As Needed for nausea/vomiting   pantoprazole 40 mg oral delayed release tablet: 1 tab(s) orally once a day (before a meal)  polyethylene glycol 3350 oral powder for reconstitution: 17 gram(s) orally every 12 hours  senna leaf extract oral tablet: 2 tab(s) orally once a day (at bedtime)  simvastatin 40 mg oral tablet: 1 tab(s) orally once a day (at bedtime)  sodium chloride 1 g oral tablet: 1 tab(s) orally every 8 hours  tiZANidine 4 mg oral tablet: 2 tab(s) orally every 8 hours, As Needed  Vitamin D3 50 mcg (2000 intl units) oral tablet: 1 tab(s) orally once a day

## 2022-08-26 NOTE — DISCHARGE NOTE PROVIDER - CARE PROVIDER_API CALL
Estiven Luz; MS)  Neurosurgery  [address] [place] [address]  Phone: [phone]  Fax: [phone]  Follow Up Time:     Gurwinder Mclaughlin)  Anesthesiology; Pain Medicine  [address] [place] [id number]  Phone: [phone]  Fax: [phone]  Follow Up Time:

## 2022-08-26 NOTE — DISCHARGE NOTE PROVIDER - HOSPITAL COURSE
8/17: POD0 s/p T3-L1 revision of fusion. extubated in NSICU  8/18: POD1 KAMRAN overnight. Started on PCA. Tx to SDU. PT/OT rec AR.  8/19: POD2, overnight episode of agitation and screaming due to concern over pain control. neuro exam stable. 1 HMV drain removed.  8/20: POD3, extra 0.25mg IV dilaudid x 2 given o/n for pain. neuro exam stable  8/21: POD 4. KAMRAN overnight. PCA off now on PO meds. pending rehab, hx of chronic constipation, increased miralax to twice a day and given lactulose and had bowel movement.Valium increased to q 6 hours for muscle spasm. Pending XRs   8/22: POD5, complaining of severe pain o/n requiring additional dilaudid pushes. HMV#3 removed and incisional dressing changed. Given 500cc bolus for pressure in 90s, patient mildly sedated from pain medication. Pain regimen discussed with Dr. Mclaughlin and recommends to keep regimen the same at this time. Accepted to [facility], pending insurance authorization. Post op XRs performed   8/23: POD6, still complains of pain but sleepy with current regimen, plan for Dr. Mclaughlin to interrogate intrathecal pain pump tomorrow. Hemovac removed, US guided IV placed for access. Pending insurance authorization for [facility].  8/24: POD7, still complains of pain, requiring 1 push of dilaudid    8/25: POD 8, hypotensive last night, metoprolol/valium held. Had fall out of bed last night, no head trauma reported. BP normalized in AM. Pt complaining of R ankle pain, xray obtained. Receieved 6mg dilaudid however claims to have "lost" a pill. Has 1 HMV remaining.   8/26: POD9, still w/ "continuous pain" takes oxycodone and oxycontin at home. Valium prn q4.    HPI:  66 y/o female with PMHx HTN, HLD, CVA x 3 (last was 2016 with right hand weakness residual), GAMALIEL not using CPAP, Emphysema, ex-25 pack year smoker quit 2021, Chronic Constipation on Movantik, Urinary Incontinence chronically self straight cath at home, chronic UTI, Breast Implant Rupture with Chronic Leak, B/L CTS s/p release, s/p Intrathecal Morphine pump for pain, with chronic neck and back pain worsening for years s/p multiple spinal surgeries including laminectomies and fusion of cervical, thoracic and lumbar spine, most recently done in 2019 and 2020 at [facility] by Dr. Trinidad.  She is wheelchair dependent.  She also has history of right shoulder replacement.  She reports bilateral shoulder pain, bilateral leg pain and burning to the bilateral calves.  She reports right hand greater than arm and leg weakness    Hospital Course:  8/17: POD0 s/p T3-L1 revision of fusion. extubated in NSICU  8/18: POD1 KAMRAN overnight. Started on PCA. Tx to SDU. PT/OT rec AR.  8/19: POD2, overnight episode of agitation and screaming due to concern over pain control. neuro exam stable. 1 HMV drain removed.  8/20: POD3, extra 0.25mg IV dilaudid x 2 given o/n for pain. neuro exam stable  8/21: POD 4. KAMRAN overnight. PCA off now on PO meds. pending rehab, hx of chronic constipation, increased miralax to twice a day and given lactulose and had bowel movement.Valium increased to q 6 hours for muscle spasm. Pending XRs   8/22: POD5, complaining of severe pain o/n requiring additional dilaudid pushes. HMV#3 removed and incisional dressing changed. Given 500cc bolus for pressure in 90s, patient mildly sedated from pain medication. Pain regimen discussed with Dr. Mclaughlin and recommends to keep regimen the same at this time. Accepted to [facility], pending insurance authorization. Post op XRs performed   8/23: POD6, still complains of pain but sleepy with current regimen, plan for Dr. Mclaguhlin to interrogate intrathecal pain pump tomorrow. Hemovac removed, US guided IV placed for access. Pending insurance authorization for [facility].  8/24: POD7, still complains of pain, requiring 1 push of dilaudid    8/25: POD 8, hypotensive last night, metoprolol/valium held. Had fall out of bed last night, no head trauma reported. BP normalized in AM. Pt complaining of R ankle pain, xray obtained. Receieved 6mg dilaudid however claims to have "lost" a pill. Has 1 HMV remaining.   8/26: POD9, still w/ "continuous pain" takes oxycodone and oxycontin at home. Valium prn q4.   8/27: POD10. pain controlled overnight. awaiting auth for rehab. Had a slip and near fall episode this afternoon, reinforced that she needs OOB assistance.   8/28: POD11, pain adequately controlled. Refused mag citrate/senna/miralax only will take Dulcolax and Movantik.   8/29: POD12. Received lactulose, needs a BM.        Patient evaluated by PT/OT who recommended: AR, now discharging to [facility]  Patient is going to rehab    Intraoperative durotomy (primarily repaired), loss of motors on the left (post operative stable moving all extremities symmetrically), repair of intrathecal morphine pump    Exam on day of discharge:  General: patient seen laying supine in bed in NAD  Neuro: AAOx3, FC, OE spontaneously, speech clear and fluent, CNII-XI grossly intact, face symmetric, no pronator drift, strength 4/5 b/l UE HG 5/5 proximally and b/l LE HF 2/5 KF 3/5 DF/PF 5/5, sensation grossly intact to light touch throughout  HEENT: PERRL, EOMI  Neck: supple  Cardiac: RRR, S1S2  Pulmonary: chest rise symmetric  Abdomen: soft, nontender, nondistended  Ext: perfusing well  Skin: warm, dry  Wound: thoracolumbar incision c/d/i with dressing in place      Patient is neurologically stable. Vitals and labs are stable. Pain is adequately controlled.         HPI:  68 y/o female with PMHx HTN, HLD, CVA x 3 (last was 2016 with right hand weakness residual), GAMALIEL not using CPAP, Emphysema, ex-25 pack year smoker quit 2021, Chronic Constipation on Movantik, Urinary Incontinence chronically self straight cath at home, chronic UTI, Breast Implant Rupture with Chronic Leak, B/L CTS s/p release, s/p Intrathecal Morphine pump for pain, with chronic neck and back pain worsening for years s/p multiple spinal surgeries including laminectomies and fusion of cervical, thoracic and lumbar spine, most recently done in 2019 and 2020 at [facility] by Dr. Trinidad.  She is wheelchair dependent.  She also has history of right shoulder replacement.  She reports bilateral shoulder pain, bilateral leg pain and burning to the bilateral calves.  She reports right hand greater than arm and leg weakness    Hospital Course:  8/17: POD0 s/p T3-L1 revision of fusion. extubated in NSICU  8/18: POD1 KAMRAN overnight. Started on PCA. Tx to SDU. PT/OT rec AR.  8/19: POD2, overnight episode of agitation and screaming due to concern over pain control. neuro exam stable. 1 HMV drain removed.  8/20: POD3, extra 0.25mg IV dilaudid x 2 given o/n for pain. neuro exam stable  8/21: POD 4. KAMRAN overnight. PCA off now on PO meds. pending rehab, hx of chronic constipation, increased miralax to twice a day and given lactulose and had bowel movement.Valium increased to q 6 hours for muscle spasm. Pending XRs   8/22: POD5, complaining of severe pain o/n requiring additional dilaudid pushes. HMV#3 removed and incisional dressing changed. Given 500cc bolus for pressure in 90s, patient mildly sedated from pain medication. Pain regimen discussed with Dr. Mclaughlin and recommends to keep regimen the same at this time. Accepted to [facility], pending insurance authorization. Post op XRs performed   8/23: POD6, still complains of pain but sleepy with current regimen, plan for Dr. Mclaughlin to interrogate intrathecal pain pump tomorrow. Hemovac removed, US guided IV placed for access. Pending insurance authorization for [facility].  8/24: POD7, still complains of pain, requiring 1 push of dilaudid    8/25: POD 8, hypotensive last night, metoprolol/valium held. Had fall out of bed last night, no head trauma reported. BP normalized in AM. Pt complaining of R ankle pain, xray obtained. Receieved 6mg dilaudid however claims to have "lost" a pill. Has 1 HMV remaining.   8/26: POD9, still w/ "continuous pain" takes oxycodone and oxycontin at home. Valium prn q4.   8/27: POD10. pain controlled overnight. awaiting auth for rehab. Had a slip and near fall episode this afternoon, reinforced that she needs OOB assistance.   8/28: POD11, pain adequately controlled. Refused mag citrate/senna/miralax only will take Dulcolax and Movantik.   8/29: POD12. Received lactulose, needs a BM.        Patient evaluated by PT/OT who recommended: TAIWO  Patient is going to rehab ([facility])    Intraoperative durotomy (primarily repaired), loss of motors on the left (post operative stable moving all extremities symmetrically), repair of intrathecal morphine pump    Exam on day of discharge:  General: patient seen laying supine in bed in NAD  Neuro: AAOx3, FC, OE spontaneously, speech clear and fluent, CNII-XI grossly intact, face symmetric, no pronator drift, strength 4/5 b/l UE HG 5/5 proximally and b/l LE HF 2/5 KF 3/5 DF/PF 5/5, sensation grossly intact to light touch throughout  HEENT: PERRL, EOMI  Neck: supple  Cardiac: RRR, S1S2  Pulmonary: chest rise symmetric  Abdomen: soft, nontender, nondistended  Ext: perfusing well  Skin: warm, dry  Wound: thoracolumbar incision c/d/i with dressing in place      Patient is neurologically stable. Vitals and labs are stable. Pain is adequately controlled.         HPI:  66 y/o female with PMHx HTN, HLD, CVA x 3 (last was 2016 with right hand weakness residual), GAMALIEL not using CPAP, Emphysema, ex-25 pack year smoker quit 2021, Chronic Constipation on Movantik, Urinary Incontinence chronically self straight cath at home, chronic UTI, Breast Implant Rupture with Chronic Leak, B/L CTS s/p release, s/p Intrathecal Morphine pump for pain, with chronic neck and back pain worsening for years s/p multiple spinal surgeries including laminectomies and fusion of cervical, thoracic and lumbar spine, most recently done in 2019 and 2020 at [facility] by Dr. Trinidad.  She is wheelchair dependent.  She also has history of right shoulder replacement.  She reports bilateral shoulder pain, bilateral leg pain and burning to the bilateral calves.  She reports right hand greater than arm and leg weakness    Hospital Course:  8/17: POD0 s/p T3-L1 revision of fusion. extubated in NSICU  8/18: POD1 KAMRAN overnight. Started on PCA. Tx to SDU. PT/OT rec AR.  8/19: POD2, overnight episode of agitation and screaming due to concern over pain control. neuro exam stable. 1 HMV drain removed.  8/20: POD3, extra 0.25mg IV dilaudid x 2 given o/n for pain. neuro exam stable  8/21: POD 4. KAMRAN overnight. PCA off now on PO meds. pending rehab, hx of chronic constipation, increased miralax to twice a day and given lactulose and had bowel movement.Valium increased to q 6 hours for muscle spasm. Pending XRs   8/22: POD5, complaining of severe pain o/n requiring additional dilaudid pushes. HMV#3 removed and incisional dressing changed. Given 500cc bolus for pressure in 90s, patient mildly sedated from pain medication. Pain regimen discussed with Dr. Mclaughlin and recommends to keep regimen the same at this time. Accepted to [facility], pending insurance authorization. Post op XRs performed   8/23: POD6, still complains of pain but sleepy with current regimen, plan for Dr. Mclaughlin to interrogate intrathecal pain pump tomorrow. Hemovac removed, US guided IV placed for access. Pending insurance authorization for [facility].  8/24: POD7, still complains of pain, requiring 1 push of dilaudid    8/25: POD 8, hypotensive last night, metoprolol/valium held. Had fall out of bed last night, no head trauma reported. BP normalized in AM. Pt complaining of R ankle pain, xray obtained. Receieved 6mg dilaudid however claims to have "lost" a pill. Has 1 HMV remaining.   8/26: POD9, still w/ "continuous pain" takes oxycodone and oxycontin at home. Valium prn q4.   8/27: POD10. pain controlled overnight. awaiting auth for rehab. Had a slip and near fall episode this afternoon, reinforced that she needs OOB assistance.   8/28: POD11, pain adequately controlled. Refused mag citrate/senna/miralax only will take Dulcolax and Movantik.   8/29: POD12. Received lactulose, needs a BM.  8/30: POD13, KAMRAN overnight, pain controlled, [facility].       Patient evaluated by PT/OT who recommended: TAIWO  Patient is going to rehab ([facility])    Intraoperative durotomy (primarily repaired), loss of motors on the left (post operative stable moving all extremities symmetrically), repair of intrathecal morphine pump    Exam on day of discharge:  General: patient seen laying supine in bed in NAD  Neuro: AAOx3, FC, OE spontaneously, speech clear and fluent, CNII-XI grossly intact, face symmetric, no pronator drift, strength 4/5 b/l UE HG 5/5 proximally and b/l LE HF 2/5 KF 3/5 DF/PF 5/5, sensation grossly intact to light touch throughout  HEENT: PERRL, EOMI  Neck: supple  Cardiac: RRR, S1S2  Pulmonary: chest rise symmetric  Abdomen: soft, nontender, nondistended  Ext: perfusing well  Skin: warm, dry  Wound: thoracolumbar incision c/d/i with dressing in place      Patient is neurologically stable. Vitals and labs are stable. Pain is adequately controlled.

## 2022-08-26 NOTE — DISCHARGE NOTE PROVIDER - CARE PROVIDERS DIRECT ADDRESSES
[email or web address]

## 2022-08-26 NOTE — DISCHARGE NOTE PROVIDER - NSDCFUADDINST_GEN_ALL_CORE_FT
NP Note discussed with  Primary Attending    Patient is a 89y old  Female who presents with a chief complaint of shortness of breath and cough (2019 16:12)  HPI    Patient is a 89 year old female, with PMH of dementia AAO x1 baseline, hypothyroidism, HTN, HLD and has HHA 9 hours Mon-Sat, BIBEMS from home for shortness of breath and cough. Pt found to have RLL PNA per CXR , likely aspiration, for which pt was administered antibiotics, now on Zosyn.  Pt also found with NSTEMI, with  elevated Trop 4.1 -->3.9-->3.2, for which Heparin drip was initiated. Pt is DNR/DNI , per HCP /daughter . Pt seen by Palliative. Pt daughter requests comfort feeds, home hospice evaluation,  no tube feedings,     INTERVAL HPI/OVERNIGHT EVENTS: Pt seen and examined this morning. Pt is nonverbal. Daughter at bedside.     MEDICATIONS  (STANDING):  albuterol/ipratropium for Nebulization 3 milliLiter(s) Nebulizer every 6 hours  amLODIPine   Tablet 10 milliGRAM(s) Oral daily  aspirin enteric coated 81 milliGRAM(s) Oral daily  cholecalciferol 1000 Unit(s) Oral daily  dextrose 5% + sodium chloride 0.45%. 1000 milliLiter(s) (50 mL/Hr) IV Continuous <Continuous>  donepezil 5 milliGRAM(s) Oral at bedtime  insulin lispro (HumaLOG) corrective regimen sliding scale   SubCutaneous every 6 hours  multivitamin/minerals 1 Tablet(s) Oral daily  piperacillin/tazobactam IVPB.. 3.375 Gram(s) IV Intermittent every 12 hours    MEDICATIONS  (PRN):      __________________________________________________  REVIEW OF SYSTEMS:    Unable to illicit ROS due to pt nonverbal.       Vital Signs Last 24 Hrs  T(C): 36.5 (2019 16:40), Max: 37.3 (2019 05:29)  T(F): 97.7 (2019 16:40), Max: 99.2 (2019 05:29)  HR: 77 (2019 16:40) (61 - 108)  BP: 164/108 (2019 16:40) (147/89 - 185/75)  BP(mean): --  RR: 20 (2019 16:40) (18 - 20)  SpO2: 98% (2019 16:40) (94% - 100%)    ________________________________________________  PHYSICAL EXAM:  GENERAL:cachectic.  NAD  HEENT: Normocephalic;  conjunctivae and sclerae clear; moist mucous membranes;   NECK : supple  CHEST/LUNG: Effort normal. Clear upper lobes.   HEART: S1 S2  regular; no murmurs, gallops or rubs  ABDOMEN: Soft, Nontender, Nondistended; Bowel sounds present  EXTREMITIES: no cyanosis; no edema; no calf tenderness  SKIN: warm and dry; no rash  NERVOUS SYSTEM:  Awake.no new deficits    _________________________________________________  LABS:                        11.9   7.95  )-----------( 182      ( 2019 06:28 )             38.3         145  |  113<H>  |  49<H>  ----------------------------<  95  3.3<L>   |  21<L>  |  1.65<H>    Ca    8.6      2019 06:28  Phos  3.9       Mg     2.2         TPro  6.4  /  Alb  3.4<L>  /  TBili  1.7<H>  /  DBili  x   /  AST  101<H>  /  ALT  86<H>  /  AlkPhos  208<H>      PT/INR - ( 2019 13:39 )   PT: 14.3 sec;   INR: 1.28 ratio         PTT - ( 2019 18:26 )  PTT:28.8 sec  Urinalysis Basic - ( 2019 13:39 )    Color: Yellow / Appearance: Clear / S.025 / pH: x  Gluc: x / Ketone: Trace  / Bili: Negative / Urobili: 4   Blood: x / Protein: 500 mg/dL / Nitrite: Negative   Leuk Esterase: Trace / RBC: 0-2 /HPF / WBC 0-2 /HPF   Sq Epi: x / Non Sq Epi: Few /HPF / Bacteria: Few /HPF      CAPILLARY BLOOD GLUCOSE      POCT Blood Glucose.: 71 mg/dL (2019 18:44)  POCT Blood Glucose.: 69 mg/dL (2019 17:43)  POCT Blood Glucose.: 99 mg/dL (2019 12:05)  POCT Blood Glucose.: 89 mg/dL (2019 08:40)  POCT Blood Glucose.: 98 mg/dL (2019 06:25)  POCT Blood Glucose.: 109 mg/dL (2019 00:09)        RADIOLOGY & ADDITIONAL TESTS:    Imaging Personally Reviewed:  YES/NO    Consultant(s) Notes Reviewed:   YES/ No    Care Discussed with Consultants :     Plan of care was discussed with patient and /or primary care giver; all questions and concerns were addressed and care was aligned with patient's wishes. SHOWERING ALLOWED in 2 days from 8/26:  ON 8/28 you may REMOVE OUTER dressings.  Underneath the dressings you will see PRINEO and STERI-STRIPS.  Please do NOT remove these.  They may fall off over the next 1-2 weeks. Patient may SHOWER on 8/28.  NO SUBMERSION OF WOUND IN BATHTUB, SWIMMING POOL OR ANY OTHER STILL WATER.  Let the water run down the back in the shower and after shower pat wound dry with clean towel.  Call the office if there is any drainage, redness, swelling, fever over 101F.  Neurosurgery follow up appointment date/time:  - prineo and steri-strips in place   - please call the office to confirm appointment: [phone]     Wound Care:  - you can shower with soap and water, no baths or swimming pools, DO NOT REMOVAL STERI-STRIPS THEY WILL FALL OFF ON THEIR OWN   - leave incision open to air     Activity:  - fatigue is common after surgery, rest if you feel tired   - no bending, lifting, twisting or heavy lifting   - walking is recommended, ambulate as tolerated  - you may shower when you get home, pat dry with clean towel  - no driving within 24 hours of anesthesia or while taking prescription pain medications   - keep hydrated, drink plenty of water      Inpatient consults:  - pain management: follow-up with Dr. Mclaughlin or your outpatient pain management physician     Please also follow up with your primary care doctor.     Pain Expectations:  - pain after surgery is expected  - please take pain meds as prescribed     Medications:  - take medications as prescribed   - adverse effects of medications were discussed   - pain medications can cause constipation, you should eat a high fiber diet and may take a stool softener while on pain meds      Call the office or come to ED if:  - wound has drainage or bleeding, increased redness or pain at incision site, neurological change, fever (>101), chills, night sweats, syncope, nausea/vomiting      Playback:  - discharge instructions are on playback      WITHIN 24 HOURS OF DISCHARGE, PLEASE CONTACT NEURO PA  WITH ANY QUESTIONS OR CONCERNS: [phone]   OTHERWISE, PLEASE CALL THE OFFICE WITH ANY QUESTIONS OR CONCERNS: [phone]

## 2022-08-26 NOTE — PROCEDURE NOTE - GENERAL PROCEDURE DETAILS
Skin was prepped with chlorhexidine, anchoring drain suture was removed. HMV taken off suction and removed without resistance, tip was visualized intact. Steri strips were placed over drain exit. Incision was cleaned with chlorhexidine and replaced with medipore dressing.
Hemovac drain removed after area prepped with chlorhexidine.  Steri strip, sterile gauze and dressing applied.
see bellow

## 2022-08-26 NOTE — DISCHARGE NOTE PROVIDER - NSDCCPCAREPLAN_GEN_ALL_CORE_FT
PRINCIPAL DISCHARGE DIAGNOSIS  Diagnosis: Back pain with history of spinal surgery  Assessment and Plan of Treatment:

## 2022-08-26 NOTE — DISCHARGE NOTE PROVIDER - NSDCFUADDAPPT_GEN_ALL_CORE_FT
Please follow-up with Dr. Luz. Call the office to confirm appointment     Please follow-up with primary care doctor  Please follow-up with Dr. Luz. TeleHealth 10:00am 9/7/22.    Please follow-up with primary care doctor

## 2022-08-26 NOTE — PROGRESS NOTE ADULT - SUBJECTIVE AND OBJECTIVE BOX
O/N Events:  Subjective/ROS: Denies HA, CP, SOB, n/v, changes in bowel/urinary habits.  12pt ROS otherwise negative.    VITALS  Vital Signs Last 24 Hrs  T(C): 36.7 (26 Aug 2022 12:28), Max: 36.7 (26 Aug 2022 08:50)  T(F): 98 (26 Aug 2022 12:28), Max: 98.1 (26 Aug 2022 08:50)  HR: 92 (26 Aug 2022 12:28) (71 - 112)  BP: 124/68 (26 Aug 2022 12:28) (100/74 - 124/68)  BP(mean): --  RR: 17 (26 Aug 2022 12:28) (16 - 18)  SpO2: 97% (26 Aug 2022 12:28) (95% - 100%)    Parameters below as of 26 Aug 2022 12:28  Patient On (Oxygen Delivery Method): room air        I&O's Summary    25 Aug 2022 07:01  -  26 Aug 2022 07:00  --------------------------------------------------------  IN: 320 mL / OUT: 1345 mL / NET: -1025 mL    26 Aug 2022 07:01  -  26 Aug 2022 16:26  --------------------------------------------------------  IN: 520 mL / OUT: 800 mL / NET: -280 mL        CAPILLARY BLOOD GLUCOSE      POCT Blood Glucose.: 131 mg/dL (26 Aug 2022 11:50)  POCT Blood Glucose.: 161 mg/dL (26 Aug 2022 08:30)  POCT Blood Glucose.: 143 mg/dL (25 Aug 2022 21:47)  POCT Blood Glucose.: 171 mg/dL (25 Aug 2022 18:16)      PHYSICAL EXAM  General: A&Ox3; NAD, sleeping in bed  Head: NC/AT; PERRL; EOMI; anicteric sclera  Neck: Supple; no JVD  Respiratory: CTA B/L; no wheezes/crackles/rales auscultated w/ good air movement  Cardiovascular: Regular rhythm/rate; S1/S2; no gallops or murmurs auscultated  Gastrointestinal: Soft; NTND w/out rebound tenderness or guarding; bowel sounds normal  Extremities: WWP; no edema or cyanosis; radial/pedal pulses palpable  Neurological:  CNII-XII grossly intact; no obvious focal deficits  Skin: No rashes noted  Vasc: +2 DP/PT pulses b/l   Psych: Appropriate Affect    MEDICATIONS  (STANDING):  acetaminophen     Tablet .. 1000 milliGRAM(s) Oral every 8 hours  bisacodyl 5 milliGRAM(s) Oral every 12 hours  buPROPion XL (24-Hour) . 150 milliGRAM(s) Oral daily  cholecalciferol 2000 Unit(s) Oral daily  DULoxetine 60 milliGRAM(s) Oral daily  enoxaparin Injectable 40 milliGRAM(s) SubCutaneous <User Schedule>  gabapentin 600 milliGRAM(s) Oral three times a day  insulin lispro (ADMELOG) corrective regimen sliding scale   SubCutaneous Before meals and at bedtime  lidocaine   4% Patch 1 Patch Transdermal every 24 hours  lidocaine   4% Patch 1 Patch Transdermal every 24 hours  metoprolol tartrate 50 milliGRAM(s) Oral two times a day  naloxegol 25 milliGRAM(s) Oral daily  nitrofurantoin macrocrystals (MACRODANTIN) 50 milliGRAM(s) Oral <User Schedule>  pantoprazole    Tablet 40 milliGRAM(s) Oral before breakfast  polyethylene glycol 3350 17 Gram(s) Oral every 12 hours  senna 2 Tablet(s) Oral at bedtime  simvastatin 40 milliGRAM(s) Oral at bedtime    MEDICATIONS  (PRN):  ALBUTerol   0.042% 2.5 milliGRAM(s) Nebulizer every 6 hours PRN Shortness of Breath and/or Wheezing  bisacodyl Suppository 10 milliGRAM(s) Rectal daily PRN Constipation  diazepam    Tablet 5 milliGRAM(s) Oral every 4 hours PRN muscle spasm  diphenhydrAMINE 25 milliGRAM(s) Oral every 6 hours PRN Rash and/or Itching  HYDROmorphone   Tablet 4 milliGRAM(s) Oral every 4 hours PRN Moderate Pain (4 - 6)  HYDROmorphone   Tablet 6 milliGRAM(s) Oral every 4 hours PRN Severe Pain (7 - 10)  naloxone Injectable 0.1 milliGRAM(s) IV Push every 3 minutes PRN For ANY of the following changes in patient status:  A. RR LESS THAN 10 breaths per minute, B. Oxygen saturation LESS THAN 90%, C. Sedation score of 6  ondansetron Injectable 4 milliGRAM(s) IV Push every 6 hours PRN Nausea      oxycodone (Pruritus)      LABS                        11.3   14.07 )-----------( 252      ( 25 Aug 2022 06:48 )             36.4     08-25    131<L>  |  100  |  13  ----------------------------<  144<H>  3.9   |  19<L>  |  0.65    Ca    8.6      25 Aug 2022 06:48  Phos  3.3     08-25  Mg     2.1     08-25                IMAGING/EKG/ETC: reviewed

## 2022-08-26 NOTE — DISCHARGE NOTE PROVIDER - NSDCACTIVITY_GEN_ALL_CORE
Bathing allowed/Do not drive or operate machinery/No heavy lifting/straining Do not drive or operate machinery/No heavy lifting/straining Do not drive or operate machinery/Showering allowed/Do not make important decisions/Stairs allowed/Walking - Indoors allowed/No heavy lifting/straining/Walking - Outdoors allowed/Follow Instructions Provided by your Surgical Team

## 2022-08-26 NOTE — CHART NOTE - NSCHARTNOTEFT_GEN_A_CORE
Psychiatry unsuccessful in reaching team regarding consult by phone and on 9U.  Per chart, pt is medically ready for discharge.  Consult cancelled, please place consult in future if needed.

## 2022-08-26 NOTE — PROGRESS NOTE ADULT - SUBJECTIVE AND OBJECTIVE BOX
HPI:   66 y/o female with PMHx HTN, HLD, CVA x 3 (last was 2016 with right hand weakness residual), GAMALIEL not using CPAP, Emphysema, ex-25 pack year smoker quit 2021, Chronic Constipation on Movantik, Urinary Incontinence chronically self straight cath at home, chronic UTI, Breast Implant Rupture with Chronic Leak, B/L CTS s/p release, s/p Intrathecal Morphine pump for pain, with chronic neck and back pain worsening for years s/p multiple spinal surgeries including laminectomies and fusion of cervical, thoracic and lumbar spine, most recently done in 2019 and 2020 at [facility] by Dr. Trinidad.  She is wheelchair dependent.  She also has history of right shoulder replacement.  She reports bilateral shoulder pain, bilateral leg pain and burning to the bilateral calves.  She reports right hand greater than arm and leg weakness."    68 yo F with history of HTN, HLD, prior strokes with residual RUE weakness, GAMALIEL noncompliant with CPAP, COPD, prior extensive smoking history, chronic constipation, urinary incontinence, chronic UTI, ruptured breast implant, bilateral CTS release surgery, intrathecal morphine pump and chronic pain s/p multiple spinal surgeries who is now s/p T3 - L1 posterior fusion (EBL 500cc, 1L UOP, 4200 crystalloid, 1u PRBC) and admitted to NSICU.  She remains intubated with progressive improvement in level of arousal     (17 Aug 2022 07:46)    OVERNIGHT EVENTS: Continue to complain of constant pain. Received her pain medications.     Hospital Course:   8/17: POD0 s/p T3-L1 revision of fusion. extubated in NSICU  8/18: POD1 KAMRAN overnight. Started on PCA. Tx to SDU. PT/OT rec AR.  8/19: POD2, overnight episode of agitation and screaming due to concern over pain control. neuro exam stable. 1 HMV drain removed.  8/20: POD3, extra 0.25mg IV dilaudid x 2 given o/n for pain. neuro exam stable  8/21: POD 4. KAMRAN overnight. PCA off now on PO meds. pending rehab, hx of chronic constipation, increased miralax to twice a day and given lactulose and had bowel movement.Valium increased to q 6 hours for muscle spasm. Pending XRs   8/22: POD5, complaining of severe pain o/n requiring additional dilaudid pushes. HMV#3 removed and incisional dressing changed. Given 500cc bolus for pressure in 90s, patient mildly sedated from pain medication. Pain regimen discussed with Dr. Mclaughlin and recommends to keep regimen the same at this time. Accepted to [facility], pending insurance authorization. Post op XRs performed   8/23: POD6, still complains of pain but sleepy with current regimen, plan for Dr. Mclaughlin to interrogate intrathecal pain pump tomorrow. Hemovac removed, US guided IV placed for access. Pending insurance authorization for [facility].  8/24: POD7, still complains of pain, requiring 1 push of dilaudid    8/25: POD 8, hypotensive last night, metoprolol/valium held. Had fall out of bed last night, no head trauma reported. BP normalized in AM. Pt complaining of R ankle pain, xray obtained. Receieved 6mg dilaudid however claims to have "lost" a pill. Has 1 HMV remaining.   8/26: POD9, still w/ "continuous pain" takes oxycodone and oxycontin at home.     Vital Signs Last 24 Hrs  T(C): 36.6 (25 Aug 2022 20:34), Max: 36.6 (25 Aug 2022 20:34)  T(F): 97.8 (25 Aug 2022 20:34), Max: 97.8 (25 Aug 2022 20:34)  HR: 112 (26 Aug 2022 01:34) (74 - 112)  BP: 110/77 (26 Aug 2022 01:34) (109/77 - 114/74)  BP(mean): --  RR: 18 (26 Aug 2022 01:34) (18 - 18)  SpO2: 100% (26 Aug 2022 01:34) (94% - 100%)    Parameters below as of 26 Aug 2022 01:34  Patient On (Oxygen Delivery Method): room air        I&O's Summary    24 Aug 2022 07:01  -  25 Aug 2022 07:00  --------------------------------------------------------  IN: 200 mL / OUT: 1725 mL / NET: -1525 mL    25 Aug 2022 07:01  -  26 Aug 2022 03:41  --------------------------------------------------------  IN: 320 mL / OUT: 775 mL / NET: -455 mL        PHYSICAL EXAM:  General: patient seen laying supine in bed in NAD  Neuro: AAOx3, FC, OE spontaneously, speech clear and fluent, CNII-XI grossly intact, face symmetric, no pronator drift, strength 4/5 b/l UE HG 5/5 proximally and b/l LE HF 2/5 KF 3/5 DF/PF 5/5, sensation grossly intact to light touch throughout  HEENT: PERRL, EOMI  Neck: supple  Cardiac: RRR, S1S2  Pulmonary: chest rise symmetric  Abdomen: soft, nontender, nondistended  Ext: perfusing well  Skin: warm, dry  Wound: thoracolumbar incision c/d/i with dressing in place    HMV x 1       LABS:                        11.3   14.07 )-----------( 252      ( 25 Aug 2022 06:48 )             36.4     08-25    131<L>  |  100  |  13  ----------------------------<  144<H>  3.9   |  19<L>  |  0.65    Ca    8.6      25 Aug 2022 06:48  Phos  3.3     08-25  Mg     2.1     08-25              CAPILLARY BLOOD GLUCOSE      POCT Blood Glucose.: 143 mg/dL (25 Aug 2022 21:47)  POCT Blood Glucose.: 171 mg/dL (25 Aug 2022 18:16)  POCT Blood Glucose.: 221 mg/dL (25 Aug 2022 12:25)  POCT Blood Glucose.: 186 mg/dL (25 Aug 2022 08:57)      Drug Levels: [] N/A    CSF Analysis: [] N/A      Allergies    oxycodone (Pruritus)    Intolerances      MEDICATIONS:  Antibiotics:  nitrofurantoin macrocrystals (MACRODANTIN) 50 milliGRAM(s) Oral <User Schedule>    Neuro:  acetaminophen     Tablet .. 1000 milliGRAM(s) Oral every 8 hours  buPROPion XL (24-Hour) . 150 milliGRAM(s) Oral daily  diazepam    Tablet 5 milliGRAM(s) Oral every 6 hours PRN  diphenhydrAMINE 25 milliGRAM(s) Oral every 6 hours PRN  DULoxetine 60 milliGRAM(s) Oral daily  gabapentin 600 milliGRAM(s) Oral three times a day  HYDROmorphone   Tablet 4 milliGRAM(s) Oral every 4 hours PRN  HYDROmorphone   Tablet 6 milliGRAM(s) Oral every 4 hours PRN  ondansetron Injectable 4 milliGRAM(s) IV Push every 6 hours PRN    Anticoagulation:  enoxaparin Injectable 40 milliGRAM(s) SubCutaneous <User Schedule>    OTHER:  ALBUTerol   0.042% 2.5 milliGRAM(s) Nebulizer every 6 hours PRN  bisacodyl 5 milliGRAM(s) Oral every 12 hours  bisacodyl Suppository 10 milliGRAM(s) Rectal daily PRN  insulin lispro (ADMELOG) corrective regimen sliding scale   SubCutaneous Before meals and at bedtime  lidocaine   4% Patch 1 Patch Transdermal every 24 hours  lidocaine   4% Patch 1 Patch Transdermal every 24 hours  metoprolol tartrate 50 milliGRAM(s) Oral two times a day  naloxegol 25 milliGRAM(s) Oral daily  naloxone Injectable 0.1 milliGRAM(s) IV Push every 3 minutes PRN  pantoprazole    Tablet 40 milliGRAM(s) Oral before breakfast  polyethylene glycol 3350 17 Gram(s) Oral every 12 hours  senna 2 Tablet(s) Oral at bedtime  simvastatin 40 milliGRAM(s) Oral at bedtime    IVF:  cholecalciferol 2000 Unit(s) Oral daily    CULTURES:    RADIOLOGY & ADDITIONAL TESTS:      ASSESSMENT:  66 y/o female with PMHx HTN, HLD, CVA x 3 (last was 2016 with right hand weakness residual), GAMALIEL not using CPAP, Emphysema, ex-25 pack year smoker quit 2021, Chronic Constipation on Movantik, Urinary Incontinence chronically self straight cath at home, chronic UTI, Breast Implant Rupture with Chronic Leak, B/L CTS s/p release, s/p Intrathecal Morphine pump for pain, with chronic neck and back pain worsening for years s/p multiple spinal surgeries including laminectomies and fusion of cervical, thoracic and lumbar spine, most recently done in 2019 and 2020 at [facility] by Dr. Trinidad. CT findings include hardware loosening at T5, T6, T9-T12. S/p T3-L1 revision of fusion, c/b durotomy with primary repair, loss of left side motors, and repair of intrathecal morphine pump (8/17).    M40.209    Handoff    MEWS Score    HTN (hypertension)    Back pain    Hypertension    SS (spinal stenosis)    High cholesterol    Stroke    H/O carpal tunnel syndrome    H/O carpal tunnel syndrome    Chronic GERD    History of chronic constipation    Seizure    Urinary incontinence    Pre-diabetes    Acute UTI    Anxiety    Anxiety and depression    Arthritis    Eczema    External hemorrhoids    H/O kyphosis    Multiple sclerosis    Pancreas cyst    Scoliosis    Wheelchair dependent    Cervical pseudoarthrosis    Right shoulder pain    Cervical spondylosis    Chronic headaches    Chronic LUQ pain    Chronic UTI    Degenerative joint disease    H/O low back pain    Lumbosacral spondylosis    COPD (chronic obstructive pulmonary disease)    Back pain    Lumbar pseudoarthrosis    Pseudoarthrosis of lumbar spine    Fusion, spine, thoracolumbar, posterior approach    Cervical disc disease    H/O Spinal surgery    H/O breast surgery    S/P cervical discectomy    Previous back surgery    H/O shoulder surgery    H/O total shoulder replacement, right    Room Service Assist    [email or web address]        PLAN:  NEURO:  - neuro/spinal/vital checks q4hr  - pain management reccs: Valium 5q8, dilaudid PO  - cont movantik 25mg qd  - 1 deep HMVs, monitor outputs, HMV#4 d/c 8/19  - decadron 4q6 x 3 days  - ERAS protocol: tylenol 1g q8, gabapentin 600mg TID  - PT/OT when able   - cont wellbutrin 150mg qd, duloxetine 60mg qd  - postop xrays completed 8/22     CARDS:  - normotensive SBP goal  - cont lopressor 50mg BID, lipitor 40mg qd    PULM:   - extrubated in NSICU to NC 8/17  - Albuterol 2.5q6 PRN    GI:   - regular diet  - protonix while on decadron  - bowel regimen  - last BM 8/21    RENAL:  - straight cath prn  - h/o chronic urniary retnation, straight cath at home    HEME:   - SCDs for DVT ppx, SQL    ID:   - macrobid for chronic UTI  - afebrile, no leukocytosis    ENDO:  - ISS, A1C 6.6    DISPO: SDU status, full code, PT/OT rec AR    Assessment and Plan d/w Dr. Luz           Assessment:  Present when checked    []  GCS  E   V  M     Heart Failure: []Acute, [] acute on chronic , []chronic  Heart Failure:  [] Diastolic (HFpEF), [] Systolic (HFrEF), []Combined (HFpEF and HFrEF), [] RHF, [] Pulm HTN, [] Other    [] DARRYL, [] ATN, [] AIN, [] other  [] CKD1, [] CKD2, [] CKD 3, [] CKD 4, [] CKD 5, []ESRD    Encephalopathy: [] Metabolic, [] Hepatic, [] toxic, [] Neurological, [] Other    Abnormal Nurtitional Status: [] malnurtition (see nutrition note), [ ]underweight: BMI < 19, [] morbid obesity: BMI >40, [] Cachexia    [] Sepsis  [] hypovolemic shock,[] cardiogenic shock, [] hemorrhagic shock, [] neuogenic shock  [] Acute Respiratory Failure  []Cerebral edema, [] Brain compression/ herniation,   [] Functional quadriplegia  [] Acute blood loss anemia

## 2022-08-26 NOTE — DISCHARGE NOTE PROVIDER - NSDCCPTREATMENT_GEN_ALL_CORE_FT
PRINCIPAL PROCEDURE  Procedure: Fusion, spine, thoracolumbar, posterior approach  Findings and Treatment:

## 2022-08-27 LAB
GLUCOSE BLDC GLUCOMTR-MCNC: 131 MG/DL — HIGH (ref 70–99)
GLUCOSE BLDC GLUCOMTR-MCNC: 133 MG/DL — HIGH (ref 70–99)
GLUCOSE BLDC GLUCOMTR-MCNC: 153 MG/DL — HIGH (ref 70–99)
GLUCOSE BLDC GLUCOMTR-MCNC: 172 MG/DL — HIGH (ref 70–99)

## 2022-08-27 PROCEDURE — 99232 SBSQ HOSP IP/OBS MODERATE 35: CPT

## 2022-08-27 RX ORDER — LACTULOSE 10 G/15ML
10 SOLUTION ORAL ONCE
Refills: 0 | Status: COMPLETED | OUTPATIENT
Start: 2022-08-27 | End: 2022-08-27

## 2022-08-27 RX ORDER — SODIUM CHLORIDE 9 MG/ML
1 INJECTION INTRAMUSCULAR; INTRAVENOUS; SUBCUTANEOUS EVERY 8 HOURS
Refills: 0 | Status: DISCONTINUED | OUTPATIENT
Start: 2022-08-27 | End: 2022-08-30

## 2022-08-27 RX ORDER — MULTIVIT WITH MIN/MFOLATE/K2 340-15/3 G
1 POWDER (GRAM) ORAL ONCE
Refills: 0 | Status: DISCONTINUED | OUTPATIENT
Start: 2022-08-27 | End: 2022-08-27

## 2022-08-27 RX ADMIN — LIDOCAINE 1 PATCH: 4 CREAM TOPICAL at 09:03

## 2022-08-27 RX ADMIN — GABAPENTIN 600 MILLIGRAM(S): 400 CAPSULE ORAL at 06:37

## 2022-08-27 RX ADMIN — HYDROMORPHONE HYDROCHLORIDE 6 MILLIGRAM(S): 2 INJECTION INTRAMUSCULAR; INTRAVENOUS; SUBCUTANEOUS at 05:08

## 2022-08-27 RX ADMIN — BUPROPION HYDROCHLORIDE 150 MILLIGRAM(S): 150 TABLET, EXTENDED RELEASE ORAL at 17:39

## 2022-08-27 RX ADMIN — SODIUM CHLORIDE 1 GRAM(S): 9 INJECTION INTRAMUSCULAR; INTRAVENOUS; SUBCUTANEOUS at 14:35

## 2022-08-27 RX ADMIN — LIDOCAINE 1 PATCH: 4 CREAM TOPICAL at 18:09

## 2022-08-27 RX ADMIN — Medication 50 MILLIGRAM(S): at 06:37

## 2022-08-27 RX ADMIN — Medication 5 MILLIGRAM(S): at 02:33

## 2022-08-27 RX ADMIN — Medication 10 MILLIGRAM(S): at 23:16

## 2022-08-27 RX ADMIN — Medication 2000 UNIT(S): at 13:37

## 2022-08-27 RX ADMIN — SIMVASTATIN 40 MILLIGRAM(S): 20 TABLET, FILM COATED ORAL at 22:53

## 2022-08-27 RX ADMIN — LIDOCAINE 1 PATCH: 4 CREAM TOPICAL at 09:04

## 2022-08-27 RX ADMIN — SODIUM CHLORIDE 1 GRAM(S): 9 INJECTION INTRAMUSCULAR; INTRAVENOUS; SUBCUTANEOUS at 22:53

## 2022-08-27 RX ADMIN — GABAPENTIN 600 MILLIGRAM(S): 400 CAPSULE ORAL at 22:52

## 2022-08-27 RX ADMIN — DULOXETINE HYDROCHLORIDE 60 MILLIGRAM(S): 30 CAPSULE, DELAYED RELEASE ORAL at 13:37

## 2022-08-27 RX ADMIN — Medication 1000 MILLIGRAM(S): at 18:09

## 2022-08-27 RX ADMIN — NALOXEGOL OXALATE 25 MILLIGRAM(S): 12.5 TABLET, FILM COATED ORAL at 13:37

## 2022-08-27 RX ADMIN — HYDROMORPHONE HYDROCHLORIDE 6 MILLIGRAM(S): 2 INJECTION INTRAMUSCULAR; INTRAVENOUS; SUBCUTANEOUS at 00:04

## 2022-08-27 RX ADMIN — GABAPENTIN 600 MILLIGRAM(S): 400 CAPSULE ORAL at 13:37

## 2022-08-27 RX ADMIN — HYDROMORPHONE HYDROCHLORIDE 6 MILLIGRAM(S): 2 INJECTION INTRAMUSCULAR; INTRAVENOUS; SUBCUTANEOUS at 04:38

## 2022-08-27 RX ADMIN — Medication 1000 MILLIGRAM(S): at 07:44

## 2022-08-27 RX ADMIN — HYDROMORPHONE HYDROCHLORIDE 6 MILLIGRAM(S): 2 INJECTION INTRAMUSCULAR; INTRAVENOUS; SUBCUTANEOUS at 09:09

## 2022-08-27 RX ADMIN — HYDROMORPHONE HYDROCHLORIDE 6 MILLIGRAM(S): 2 INJECTION INTRAMUSCULAR; INTRAVENOUS; SUBCUTANEOUS at 01:05

## 2022-08-27 RX ADMIN — Medication 2: at 06:47

## 2022-08-27 RX ADMIN — PANTOPRAZOLE SODIUM 40 MILLIGRAM(S): 20 TABLET, DELAYED RELEASE ORAL at 06:37

## 2022-08-27 RX ADMIN — Medication 5 MILLIGRAM(S): at 06:37

## 2022-08-27 RX ADMIN — Medication 1000 MILLIGRAM(S): at 06:44

## 2022-08-27 RX ADMIN — Medication 50 MILLIGRAM(S): at 06:38

## 2022-08-27 RX ADMIN — ENOXAPARIN SODIUM 40 MILLIGRAM(S): 100 INJECTION SUBCUTANEOUS at 22:53

## 2022-08-27 RX ADMIN — Medication 2: at 11:34

## 2022-08-27 RX ADMIN — Medication 5 MILLIGRAM(S): at 17:39

## 2022-08-27 RX ADMIN — Medication 5 MILLIGRAM(S): at 11:18

## 2022-08-27 RX ADMIN — HYDROMORPHONE HYDROCHLORIDE 6 MILLIGRAM(S): 2 INJECTION INTRAMUSCULAR; INTRAVENOUS; SUBCUTANEOUS at 08:39

## 2022-08-27 RX ADMIN — Medication 1000 MILLIGRAM(S): at 17:39

## 2022-08-27 NOTE — PROGRESS NOTE ADULT - SUBJECTIVE AND OBJECTIVE BOX
Patient seen and examined at bedside this morning. No acute events overnight. Eating well, reports pain to lower extremities but able to maneuver to a seated position on my evaluation. Salt tabs started.    Vitals:   T 98.1 HR 87 /85 RR 16 spo2 97% room air     PE  General: awake alert NAD   HEENT: PERRLA EOMI   Cardio S1 S2 RRR no m/r/g   Lungs CTA bilaterally no wheezes  Abdomen: soft NT ND   Extremities: pulses 2+    Ins: 5553.4   Outs 22542    MEDICATIONS  (STANDING):  acetaminophen     Tablet .. 1000 milliGRAM(s) Oral every 8 hours  bisacodyl 5 milliGRAM(s) Oral every 12 hours  buPROPion XL (24-Hour) . 150 milliGRAM(s) Oral daily  cholecalciferol 2000 Unit(s) Oral daily  DULoxetine 60 milliGRAM(s) Oral daily  enoxaparin Injectable 40 milliGRAM(s) SubCutaneous <User Schedule>  gabapentin 600 milliGRAM(s) Oral three times a day  insulin lispro (ADMELOG) corrective regimen sliding scale   SubCutaneous Before meals and at bedtime  lidocaine   4% Patch 1 Patch Transdermal every 24 hours  lidocaine   4% Patch 1 Patch Transdermal every 24 hours  metoprolol tartrate 50 milliGRAM(s) Oral two times a day  naloxegol 25 milliGRAM(s) Oral daily  nitrofurantoin macrocrystals (MACRODANTIN) 50 milliGRAM(s) Oral <User Schedule>  pantoprazole    Tablet 40 milliGRAM(s) Oral before breakfast  polyethylene glycol 3350 17 Gram(s) Oral every 12 hours  senna 2 Tablet(s) Oral at bedtime  simvastatin 40 milliGRAM(s) Oral at bedtime    MEDICATIONS  (PRN):  ALBUTerol   0.042% 2.5 milliGRAM(s) Nebulizer every 6 hours PRN Shortness of Breath and/or Wheezing  bisacodyl Suppository 10 milliGRAM(s) Rectal daily PRN Constipation  diazepam    Tablet 5 milliGRAM(s) Oral every 4 hours PRN muscle spasm  diphenhydrAMINE 25 milliGRAM(s) Oral every 6 hours PRN Rash and/or Itching  HYDROmorphone   Tablet 4 milliGRAM(s) Oral every 4 hours PRN Moderate Pain (4 - 6)  HYDROmorphone   Tablet 6 milliGRAM(s) Oral every 4 hours PRN Severe Pain (7 - 10)  naloxone Injectable 0.1 milliGRAM(s) IV Push every 3 minutes PRN For ANY of the following changes in patient status:  A. RR LESS THAN 10 breaths per minute, B. Oxygen saturation LESS THAN 90%, C. Sedation score of 6  ondansetron Injectable 4 milliGRAM(s) IV Push every 6 hours PRN Nausea      oxycodone (Pruritus)    Labs:  WBC 14.07  Hgb 11.3   Hct 36.4 Platelets 252    BMP: 131 K+ 3.9 chloride 100 Co2 19 BUN 13 cr 0.65 glucose 96    Imaging reviewed   Patient seen and examined at bedside this morning. No acute events overnight. Eating well, reports pain to lower extremities but able to maneuver to a seated position on my evaluation. Salt tabs started. Case reviewed with neurosurgery team.    Vitals:   T 98.1 HR 87 /85 RR 16 spo2 97% room air     PE  General: awake alert NAD   HEENT: PERRLA EOMI   Cardio S1 S2 RRR no m/r/g   Lungs CTA bilaterally no wheezes  Abdomen: soft NT ND   Extremities: pulses 2+    Ins: 5553.4   Outs 84245    MEDICATIONS  (STANDING):  acetaminophen     Tablet .. 1000 milliGRAM(s) Oral every 8 hours  bisacodyl 5 milliGRAM(s) Oral every 12 hours  buPROPion XL (24-Hour) . 150 milliGRAM(s) Oral daily  cholecalciferol 2000 Unit(s) Oral daily  DULoxetine 60 milliGRAM(s) Oral daily  enoxaparin Injectable 40 milliGRAM(s) SubCutaneous <User Schedule>  gabapentin 600 milliGRAM(s) Oral three times a day  insulin lispro (ADMELOG) corrective regimen sliding scale   SubCutaneous Before meals and at bedtime  lidocaine   4% Patch 1 Patch Transdermal every 24 hours  lidocaine   4% Patch 1 Patch Transdermal every 24 hours  metoprolol tartrate 50 milliGRAM(s) Oral two times a day  naloxegol 25 milliGRAM(s) Oral daily  nitrofurantoin macrocrystals (MACRODANTIN) 50 milliGRAM(s) Oral <User Schedule>  pantoprazole    Tablet 40 milliGRAM(s) Oral before breakfast  polyethylene glycol 3350 17 Gram(s) Oral every 12 hours  senna 2 Tablet(s) Oral at bedtime  simvastatin 40 milliGRAM(s) Oral at bedtime    MEDICATIONS  (PRN):  ALBUTerol   0.042% 2.5 milliGRAM(s) Nebulizer every 6 hours PRN Shortness of Breath and/or Wheezing  bisacodyl Suppository 10 milliGRAM(s) Rectal daily PRN Constipation  diazepam    Tablet 5 milliGRAM(s) Oral every 4 hours PRN muscle spasm  diphenhydrAMINE 25 milliGRAM(s) Oral every 6 hours PRN Rash and/or Itching  HYDROmorphone   Tablet 4 milliGRAM(s) Oral every 4 hours PRN Moderate Pain (4 - 6)  HYDROmorphone   Tablet 6 milliGRAM(s) Oral every 4 hours PRN Severe Pain (7 - 10)  naloxone Injectable 0.1 milliGRAM(s) IV Push every 3 minutes PRN For ANY of the following changes in patient status:  A. RR LESS THAN 10 breaths per minute, B. Oxygen saturation LESS THAN 90%, C. Sedation score of 6  ondansetron Injectable 4 milliGRAM(s) IV Push every 6 hours PRN Nausea      oxycodone (Pruritus)    Labs:  WBC 14.07  Hgb 11.3   Hct 36.4 Platelets 252    BMP: 131 K+ 3.9 chloride 100 Co2 19 BUN 13 cr 0.65 glucose 96    Imaging reviewed

## 2022-08-27 NOTE — PROGRESS NOTE ADULT - SUBJECTIVE AND OBJECTIVE BOX
HPI:   66 y/o female with PMHx HTN, HLD, CVA x 3 (last was 2016 with right hand weakness residual), GAMALIEL not using CPAP, Emphysema, ex-25 pack year smoker quit 2021, Chronic Constipation on Movantik, Urinary Incontinence chronically self straight cath at home, chronic UTI, Breast Implant Rupture with Chronic Leak, B/L CTS s/p release, s/p Intrathecal Morphine pump for pain, with chronic neck and back pain worsening for years s/p multiple spinal surgeries including laminectomies and fusion of cervical, thoracic and lumbar spine, most recently done in 2019 and 2020 at [facility] by Dr. Trinidad.  She is wheelchair dependent.  She also has history of right shoulder replacement.  She reports bilateral shoulder pain, bilateral leg pain and burning to the bilateral calves.  She reports right hand greater than arm and leg weakness."    68 yo F with history of HTN, HLD, prior strokes with residual RUE weakness, GAMALIEL noncompliant with CPAP, COPD, prior extensive smoking history, chronic constipation, urinary incontinence, chronic UTI, ruptured breast implant, bilateral CTS release surgery, intrathecal morphine pump and chronic pain s/p multiple spinal surgeries who is now s/p T3 - L1 posterior fusion (EBL 500cc, 1L UOP, 4200 crystalloid, 1u PRBC) and admitted to NSICU.  She remains intubated with progressive improvement in level of arousal      Hospital Course:   8/17: POD0 s/p T3-L1 revision of fusion. extubated in NSICU  8/18: POD1 KAMRAN overnight. Started on PCA. Tx to SDU. PT/OT rec AR.  8/19: POD2, overnight episode of agitation and screaming due to concern over pain control. neuro exam stable. 1 HMV drain removed.  8/20: POD3, extra 0.25mg IV dilaudid x 2 given o/n for pain. neuro exam stable  8/21: POD 4. KAMRAN overnight. PCA off now on PO meds. pending rehab, hx of chronic constipation, increased miralax to twice a day and given lactulose and had bowel movement.Valium increased to q 6 hours for muscle spasm. Pending XRs   8/22: POD5, complaining of severe pain o/n requiring additional dilaudid pushes. HMV#3 removed and incisional dressing changed. Given 500cc bolus for pressure in 90s, patient mildly sedated from pain medication. Pain regimen discussed with Dr. Mclaughlin and recommends to keep regimen the same at this time. Accepted to [facility], [place], operated by [facility], pending insurance authorization. Post op XRs performed   8/23: POD6, still complains of pain but sleepy with current regimen, plan for Dr. Mclaughlin to interrogate intrathecal pain pump tomorrow. Hemovac removed, US guided IV placed for access. Pending insurance authorization for [facility], [place], operated by [facility].  8/24: POD7, still complains of pain, requiring 1 push of dilaudid    8/25: POD 8, hypotensive last night, metoprolol/valium held. Had fall out of bed last night, no head trauma reported. BP normalized in AM. Pt complaining of R ankle pain, xray obtained. Receieved 6mg dilaudid however claims to have "lost" a pill. Has 1 HMV remaining.   8/26: POD9, still w/ "continuous pain" takes oxycodone and oxycontin at home.   8/27: POD10. pain controlled overnight. awaiting auth for rehab     Vital Signs Last 24 Hrs  T(C): 36.6 (27 Aug 2022 00:28), Max: 36.9 (26 Aug 2022 21:50)  T(F): 97.8 (27 Aug 2022 00:28), Max: 98.5 (26 Aug 2022 21:50)  HR: 104 (27 Aug 2022 00:28) (71 - 114)  BP: 108/76 (27 Aug 2022 00:28) (100/74 - 124/68)  BP(mean): --  RR: 19 (27 Aug 2022 00:28) (16 - 19)  SpO2: 98% (26 Aug 2022 21:50) (95% - 100%)    Parameters below as of 27 Aug 2022 00:28  Patient On (Oxygen Delivery Method): room air        I&O's Detail    25 Aug 2022 07:01  -  26 Aug 2022 07:00  --------------------------------------------------------  IN:    Oral Fluid: 320 mL  Total IN: 320 mL    OUT:    Accordian (mL): 120 mL    Intermittent Catheterization - Urethral (mL): 1225 mL  Total OUT: 1345 mL    Total NET: -1025 mL      26 Aug 2022 07:01  -  27 Aug 2022 01:38  --------------------------------------------------------  IN:    Oral Fluid: 880 mL  Total IN: 880 mL    OUT:    Accordian (mL): 100 mL    Intermittent Catheterization - Urethral (mL): 1750 mL  Total OUT: 1850 mL    Total NET: -970 mL        I&O's Summary    25 Aug 2022 07:01  -  26 Aug 2022 07:00  --------------------------------------------------------  IN: 320 mL / OUT: 1345 mL / NET: -1025 mL    26 Aug 2022 07:01  -  27 Aug 2022 01:38  --------------------------------------------------------  IN: 880 mL / OUT: 1850 mL / NET: -970 mL        PHYSICAL EXAM:      TUBES/LINES:  [] CVC  [] A-line  [] Lumbar Drain  [] Ventriculostomy  [] Other    DIET:  [] NPO  [] Mechanical  [] Tube feeds    LABS:                        11.3   14.07 )-----------( 252      ( 25 Aug 2022 06:48 )             36.4     08-25    131<L>  |  100  |  13  ----------------------------<  144<H>  3.9   |  19<L>  |  0.65    Ca    8.6      25 Aug 2022 06:48  Phos  3.3     08-25  Mg     2.1     08-25              CAPILLARY BLOOD GLUCOSE      POCT Blood Glucose.: 152 mg/dL (26 Aug 2022 22:02)  POCT Blood Glucose.: 154 mg/dL (26 Aug 2022 17:16)  POCT Blood Glucose.: 131 mg/dL (26 Aug 2022 11:50)  POCT Blood Glucose.: 161 mg/dL (26 Aug 2022 08:30)      Drug Levels: [] N/A    CSF Analysis: [] N/A      Allergies    oxycodone (Pruritus)    Intolerances      MEDICATIONS:  Antibiotics:  nitrofurantoin macrocrystals (MACRODANTIN) 50 milliGRAM(s) Oral <User Schedule>    Neuro:  acetaminophen     Tablet .. 1000 milliGRAM(s) Oral every 8 hours  buPROPion XL (24-Hour) . 150 milliGRAM(s) Oral daily  diazepam    Tablet 5 milliGRAM(s) Oral every 4 hours PRN  diphenhydrAMINE 25 milliGRAM(s) Oral every 6 hours PRN  DULoxetine 60 milliGRAM(s) Oral daily  gabapentin 600 milliGRAM(s) Oral three times a day  HYDROmorphone   Tablet 6 milliGRAM(s) Oral every 4 hours PRN  HYDROmorphone   Tablet 4 milliGRAM(s) Oral every 4 hours PRN  ondansetron Injectable 4 milliGRAM(s) IV Push every 6 hours PRN    Anticoagulation:  enoxaparin Injectable 40 milliGRAM(s) SubCutaneous <User Schedule>    OTHER:  ALBUTerol   0.042% 2.5 milliGRAM(s) Nebulizer every 6 hours PRN  bisacodyl 5 milliGRAM(s) Oral every 12 hours  bisacodyl Suppository 10 milliGRAM(s) Rectal daily PRN  insulin lispro (ADMELOG) corrective regimen sliding scale   SubCutaneous Before meals and at bedtime  lidocaine   4% Patch 1 Patch Transdermal every 24 hours  lidocaine   4% Patch 1 Patch Transdermal every 24 hours  metoprolol tartrate 50 milliGRAM(s) Oral two times a day  naloxegol 25 milliGRAM(s) Oral daily  naloxone Injectable 0.1 milliGRAM(s) IV Push every 3 minutes PRN  pantoprazole    Tablet 40 milliGRAM(s) Oral before breakfast  polyethylene glycol 3350 17 Gram(s) Oral every 12 hours  senna 2 Tablet(s) Oral at bedtime  simvastatin 40 milliGRAM(s) Oral at bedtime    IVF:  cholecalciferol 2000 Unit(s) Oral daily    CULTURES:    RADIOLOGY & ADDITIONAL TESTS:      ASSESSMENT:  66 y/o female with PMHx HTN, HLD, CVA x 3 (last was 2016 with right hand weakness residual), GAMALIEL not using CPAP, Emphysema, ex-25 pack year smoker quit 2021, Chronic Constipation on Movantik, Urinary Incontinence chronically self straight cath at home, chronic UTI, Breast Implant Rupture with Chronic Leak, B/L CTS s/p release, s/p Intrathecal Morphine pump for pain, with chronic neck and back pain worsening for years s/p multiple spinal surgeries including laminectomies and fusion of cervical, thoracic and lumbar spine, most recently done in 2019 and 2020 at [facility] by Dr. Trinidad. CT findings include hardware loosening at T5, T6, T9-T12. S/p T3-L1 revision of fusion, c/b durotomy with primary repair, loss of left side motors, and repair of intrathecal morphine pump (8/17).    M40.209    Handoff    MEWS Score    HTN (hypertension)    Back pain    Hypertension    SS (spinal stenosis)    High cholesterol    Stroke    H/O carpal tunnel syndrome    H/O carpal tunnel syndrome    Chronic GERD    History of chronic constipation    Seizure    Urinary incontinence    Pre-diabetes    Acute UTI    Anxiety    Anxiety and depression    Arthritis    Eczema    External hemorrhoids    H/O kyphosis    Multiple sclerosis    Pancreas cyst    Scoliosis    Wheelchair dependent    Cervical pseudoarthrosis    Right shoulder pain    Cervical spondylosis    Chronic headaches    Chronic LUQ pain    Chronic UTI    Degenerative joint disease    H/O low back pain    Lumbosacral spondylosis    COPD (chronic obstructive pulmonary disease)    Back pain    Lumbar pseudoarthrosis    Pseudoarthrosis of lumbar spine    Back pain with history of spinal surgery    Fusion, spine, thoracolumbar, posterior approach    Cervical disc disease    H/O Spinal surgery    H/O breast surgery    S/P cervical discectomy    Previous back surgery    H/O shoulder surgery    H/O total shoulder replacement, right    Room Service Assist    [email or web address]        PLAN:  NEURO:  - neuro/spinal/vital checks q4hr  - pain management reccs: Valium 5q8, dilaudid PO  - cont movantik 25mg qd-   - HMV x 4 removed  - decadron 4q6 x 3 days  - ERAS protocol: tylenol 1g q8, gabapentin 600mg TID  - PT/OT when able   - cont wellbutrin 150mg qd, duloxetine 60mg qd  - postop xrays completed 8/22     CARDS:  - normotensive SBP goal  - cont lopressor 50mg BID, lipitor 40mg qd    PULM:   - extrubated in NSICU to NC 8/17  - Albuterol 2.5q6 PRN    GI:   - regular diet  - protonix while on decadron  - bowel regimen  - last BM 8/21    RENAL:  - straight cath prn  - h/o chronic urniary retnation, straight cath at home    HEME:   - SCDs for DVT ppx, SQL    ID:   - macrobid for chronic UTI  - afebrile, no leukocytosis    ENDO:  - ISS, A1C 6.6    DISPO: SDU status, full code, PT/OT rec AR    Assessment and Plan d/w Dr. Luz             Assessment:  Present when checked    []  GCS  E   V  M     Heart Failure: []Acute, [] acute on chronic , []chronic  Heart Failure:  [] Diastolic (HFpEF), [] Systolic (HFrEF), []Combined (HFpEF and HFrEF), [] RHF, [] Pulm HTN, [] Other    [] DARRYL, [] ATN, [] AIN, [] other  [] CKD1, [] CKD2, [] CKD 3, [] CKD 4, [] CKD 5, []ESRD    Encephalopathy: [] Metabolic, [] Hepatic, [] toxic, [] Neurological, [] Other    Abnormal Nurtitional Status: [] malnurtition (see nutrition note), [ ]underweight: BMI < 19, [] morbid obesity: BMI >40, [] Cachexia    [] Sepsis  [] hypovolemic shock,[] cardiogenic shock, [] hemorrhagic shock, [] neuogenic shock  [] Acute Respiratory Failure  []Cerebral edema, [] Brain compression/ herniation,   [] Functional quadriplegia  [] Acute blood loss anemia   HPI:   66 y/o female with PMHx HTN, HLD, CVA x 3 (last was 2016 with right hand weakness residual), GAMALIEL not using CPAP, Emphysema, ex-25 pack year smoker quit 2021, Chronic Constipation on Movantik, Urinary Incontinence chronically self straight cath at home, chronic UTI, Breast Implant Rupture with Chronic Leak, B/L CTS s/p release, s/p Intrathecal Morphine pump for pain, with chronic neck and back pain worsening for years s/p multiple spinal surgeries including laminectomies and fusion of cervical, thoracic and lumbar spine, most recently done in 2019 and 2020 at [facility] by Dr. Trinidad.  She is wheelchair dependent.  She also has history of right shoulder replacement.  She reports bilateral shoulder pain, bilateral leg pain and burning to the bilateral calves.  She reports right hand greater than arm and leg weakness."    66 yo F with history of HTN, HLD, prior strokes with residual RUE weakness, GAMALIEL noncompliant with CPAP, COPD, prior extensive smoking history, chronic constipation, urinary incontinence, chronic UTI, ruptured breast implant, bilateral CTS release surgery, intrathecal morphine pump and chronic pain s/p multiple spinal surgeries who is now s/p T3 - L1 posterior fusion (EBL 500cc, 1L UOP, 4200 crystalloid, 1u PRBC) and admitted to NSICU.  She remains intubated with progressive improvement in level of arousal      Hospital Course:   8/17: POD0 s/p T3-L1 revision of fusion. extubated in NSICU  8/18: POD1 KAMRAN overnight. Started on PCA. Tx to SDU. PT/OT rec AR.  8/19: POD2, overnight episode of agitation and screaming due to concern over pain control. neuro exam stable. 1 HMV drain removed.  8/20: POD3, extra 0.25mg IV dilaudid x 2 given o/n for pain. neuro exam stable  8/21: POD 4. KAMRAN overnight. PCA off now on PO meds. pending rehab, hx of chronic constipation, increased miralax to twice a day and given lactulose and had bowel movement.Valium increased to q 6 hours for muscle spasm. Pending XRs   8/22: POD5, complaining of severe pain o/n requiring additional dilaudid pushes. HMV#3 removed and incisional dressing changed. Given 500cc bolus for pressure in 90s, patient mildly sedated from pain medication. Pain regimen discussed with Dr. Mclaughlin and recommends to keep regimen the same at this time. Accepted to [facility] [place], pending insurance authorization. Post op XRs performed   8/23: POD6, still complains of pain but sleepy with current regimen, plan for Dr. Mclaughlin to interrogate intrathecal pain pump tomorrow. Hemovac removed, US guided IV placed for access. Pending insurance authorization for [facility].  8/24: POD7, still complains of pain, requiring 1 push of dilaudid    8/25: POD 8, hypotensive last night, metoprolol/valium held. Had fall out of bed last night, no head trauma reported. BP normalized in AM. Pt complaining of R ankle pain, xray obtained. Receieved 6mg dilaudid however claims to have "lost" a pill. Has 1 HMV remaining.   8/26: POD9, still w/ "continuous pain" takes oxycodone and oxycontin at home.   8/27: POD10. pain controlled overnight. awaiting auth for rehab     Vital Signs Last 24 Hrs  T(C): 36.6 (27 Aug 2022 00:28), Max: 36.9 (26 Aug 2022 21:50)  T(F): 97.8 (27 Aug 2022 00:28), Max: 98.5 (26 Aug 2022 21:50)  HR: 104 (27 Aug 2022 00:28) (71 - 114)  BP: 108/76 (27 Aug 2022 00:28) (100/74 - 124/68)  BP(mean): --  RR: 19 (27 Aug 2022 00:28) (16 - 19)  SpO2: 98% (26 Aug 2022 21:50) (95% - 100%)    Parameters below as of 27 Aug 2022 00:28  Patient On (Oxygen Delivery Method): room air        I&O's Detail    25 Aug 2022 07:01  -  26 Aug 2022 07:00  --------------------------------------------------------  IN:    Oral Fluid: 320 mL  Total IN: 320 mL    OUT:    Accordian (mL): 120 mL    Intermittent Catheterization - Urethral (mL): 1225 mL  Total OUT: 1345 mL    Total NET: -1025 mL      26 Aug 2022 07:01  -  27 Aug 2022 01:38  --------------------------------------------------------  IN:    Oral Fluid: 880 mL  Total IN: 880 mL    OUT:    Accordian (mL): 100 mL    Intermittent Catheterization - Urethral (mL): 1750 mL  Total OUT: 1850 mL    Total NET: -970 mL        I&O's Summary    25 Aug 2022 07:01  -  26 Aug 2022 07:00  --------------------------------------------------------  IN: 320 mL / OUT: 1345 mL / NET: -1025 mL    26 Aug 2022 07:01  -  27 Aug 2022 01:38  --------------------------------------------------------  IN: 880 mL / OUT: 1850 mL / NET: -970 mL        PHYSICAL EXAM:  General: patient seen laying supine in bed in NAD  Neuro: AAOx3, FC, OE spontaneously, speech clear and fluent, CNII-XI grossly intact, face symmetric, no pronator drift, strength 4/5 b/l UE HG 5/5 proximally and b/l LE HF 2/5 KF 3/5 DF/PF 5/5, sensation grossly intact to light touch throughout  HEENT: PERRL, EOMI  Neck: supple  Cardiac: RRR, S1S2  Pulmonary: chest rise symmetric  Abdomen: soft, nontender, nondistended  Ext: perfusing well  Skin: warm, dry  Wound: thoracolumbar incision c/d/i with dressing in place    TUBES/LINES:  [] CVC  [] A-line  [] Lumbar Drain  [] Ventriculostomy  [] Other    DIET:  [] NPO  [] Mechanical  [] Tube feeds    LABS:                        11.3   14.07 )-----------( 252      ( 25 Aug 2022 06:48 )             36.4     08-25    131<L>  |  100  |  13  ----------------------------<  144<H>  3.9   |  19<L>  |  0.65    Ca    8.6      25 Aug 2022 06:48  Phos  3.3     08-25  Mg     2.1     08-25              CAPILLARY BLOOD GLUCOSE      POCT Blood Glucose.: 152 mg/dL (26 Aug 2022 22:02)  POCT Blood Glucose.: 154 mg/dL (26 Aug 2022 17:16)  POCT Blood Glucose.: 131 mg/dL (26 Aug 2022 11:50)  POCT Blood Glucose.: 161 mg/dL (26 Aug 2022 08:30)      Drug Levels: [] N/A    CSF Analysis: [] N/A      Allergies    oxycodone (Pruritus)    Intolerances      MEDICATIONS:  Antibiotics:  nitrofurantoin macrocrystals (MACRODANTIN) 50 milliGRAM(s) Oral <User Schedule>    Neuro:  acetaminophen     Tablet .. 1000 milliGRAM(s) Oral every 8 hours  buPROPion XL (24-Hour) . 150 milliGRAM(s) Oral daily  diazepam    Tablet 5 milliGRAM(s) Oral every 4 hours PRN  diphenhydrAMINE 25 milliGRAM(s) Oral every 6 hours PRN  DULoxetine 60 milliGRAM(s) Oral daily  gabapentin 600 milliGRAM(s) Oral three times a day  HYDROmorphone   Tablet 6 milliGRAM(s) Oral every 4 hours PRN  HYDROmorphone   Tablet 4 milliGRAM(s) Oral every 4 hours PRN  ondansetron Injectable 4 milliGRAM(s) IV Push every 6 hours PRN    Anticoagulation:  enoxaparin Injectable 40 milliGRAM(s) SubCutaneous <User Schedule>    OTHER:  ALBUTerol   0.042% 2.5 milliGRAM(s) Nebulizer every 6 hours PRN  bisacodyl 5 milliGRAM(s) Oral every 12 hours  bisacodyl Suppository 10 milliGRAM(s) Rectal daily PRN  insulin lispro (ADMELOG) corrective regimen sliding scale   SubCutaneous Before meals and at bedtime  lidocaine   4% Patch 1 Patch Transdermal every 24 hours  lidocaine   4% Patch 1 Patch Transdermal every 24 hours  metoprolol tartrate 50 milliGRAM(s) Oral two times a day  naloxegol 25 milliGRAM(s) Oral daily  naloxone Injectable 0.1 milliGRAM(s) IV Push every 3 minutes PRN  pantoprazole    Tablet 40 milliGRAM(s) Oral before breakfast  polyethylene glycol 3350 17 Gram(s) Oral every 12 hours  senna 2 Tablet(s) Oral at bedtime  simvastatin 40 milliGRAM(s) Oral at bedtime    IVF:  cholecalciferol 2000 Unit(s) Oral daily    CULTURES:    RADIOLOGY & ADDITIONAL TESTS:      ASSESSMENT:  66 y/o female with PMHx HTN, HLD, CVA x 3 (last was 2016 with right hand weakness residual), GAMALIEL not using CPAP, Emphysema, ex-25 pack year smoker quit 2021, Chronic Constipation on Movantik, Urinary Incontinence chronically self straight cath at home, chronic UTI, Breast Implant Rupture with Chronic Leak, B/L CTS s/p release, s/p Intrathecal Morphine pump for pain, with chronic neck and back pain worsening for years s/p multiple spinal surgeries including laminectomies and fusion of cervical, thoracic and lumbar spine, most recently done in 2019 and 2020 at [facility] by Dr. Trinidad. CT findings include hardware loosening at T5, T6, T9-T12. S/p T3-L1 revision of fusion, c/b durotomy with primary repair, loss of left side motors, and repair of intrathecal morphine pump (8/17).    M40.209    Handoff    MEWS Score    HTN (hypertension)    Back pain    Hypertension    SS (spinal stenosis)    High cholesterol    Stroke    H/O carpal tunnel syndrome    H/O carpal tunnel syndrome    Chronic GERD    History of chronic constipation    Seizure    Urinary incontinence    Pre-diabetes    Acute UTI    Anxiety    Anxiety and depression    Arthritis    Eczema    External hemorrhoids    H/O kyphosis    Multiple sclerosis    Pancreas cyst    Scoliosis    Wheelchair dependent    Cervical pseudoarthrosis    Right shoulder pain    Cervical spondylosis    Chronic headaches    Chronic LUQ pain    Chronic UTI    Degenerative joint disease    H/O low back pain    Lumbosacral spondylosis    COPD (chronic obstructive pulmonary disease)    Back pain    Lumbar pseudoarthrosis    Pseudoarthrosis of lumbar spine    Back pain with history of spinal surgery    Fusion, spine, thoracolumbar, posterior approach    Cervical disc disease    H/O Spinal surgery    H/O breast surgery    S/P cervical discectomy    Previous back surgery    H/O shoulder surgery    H/O total shoulder replacement, right    Room Service Assist    [email or web address]        PLAN:  NEURO:  - neuro/spinal/vital checks q4hr  - pain management reccs: Valium 5q8, dilaudid PO  - cont movantik 25mg qd-   - HMV x 4 removed  - decadron 4q6 x 3 days  - ERAS protocol: tylenol 1g q8, gabapentin 600mg TID  - PT/OT when able   - cont wellbutrin 150mg qd, duloxetine 60mg qd  - postop xrays completed 8/22     CARDS:  - normotensive SBP goal  - cont lopressor 50mg BID, lipitor 40mg qd    PULM:   - extrubated in NSICU to NC 8/17  - Albuterol 2.5q6 PRN    GI:   - regular diet  - protonix while on decadron  - bowel regimen  - last BM 8/21    RENAL:  - straight cath prn  - h/o chronic urniary retnation, straight cath at home    HEME:   - SCDs for DVT ppx, SQL    ID:   - macrobid for chronic UTI  - afebrile, no leukocytosis    ENDO:  - ISS, A1C 6.6    DISPO: SDU status, full code, PT/OT rec AR    Assessment and Plan d/w Dr. Luz             Assessment:  Present when checked    []  GCS  E   V  M     Heart Failure: []Acute, [] acute on chronic , []chronic  Heart Failure:  [] Diastolic (HFpEF), [] Systolic (HFrEF), []Combined (HFpEF and HFrEF), [] RHF, [] Pulm HTN, [] Other    [] DARRYL, [] ATN, [] AIN, [] other  [] CKD1, [] CKD2, [] CKD 3, [] CKD 4, [] CKD 5, []ESRD    Encephalopathy: [] Metabolic, [] Hepatic, [] toxic, [] Neurological, [] Other    Abnormal Nurtitional Status: [] malnurtition (see nutrition note), [ ]underweight: BMI < 19, [] morbid obesity: BMI >40, [] Cachexia    [] Sepsis  [] hypovolemic shock,[] cardiogenic shock, [] hemorrhagic shock, [] neuogenic shock  [] Acute Respiratory Failure  []Cerebral edema, [] Brain compression/ herniation,   [] Functional quadriplegia  [] Acute blood loss anemia

## 2022-08-27 NOTE — CHART NOTE - NSCHARTNOTEFT_GEN_A_CORE
POD10. pain controlled overnight. awaiting auth for rehab. Had a slip and near fall episode this afternoon, reinforced that she needs OOB assistance.

## 2022-08-28 LAB
ANION GAP SERPL CALC-SCNC: 9 MMOL/L — SIGNIFICANT CHANGE UP (ref 5–17)
BUN SERPL-MCNC: 13 MG/DL — SIGNIFICANT CHANGE UP (ref 7–23)
CALCIUM SERPL-MCNC: 8.4 MG/DL — SIGNIFICANT CHANGE UP (ref 8.4–10.5)
CHLORIDE SERPL-SCNC: 101 MMOL/L — SIGNIFICANT CHANGE UP (ref 96–108)
CO2 SERPL-SCNC: 25 MMOL/L — SIGNIFICANT CHANGE UP (ref 22–31)
CREAT SERPL-MCNC: 0.58 MG/DL — SIGNIFICANT CHANGE UP (ref 0.5–1.3)
EGFR: 99 ML/MIN/1.73M2 — SIGNIFICANT CHANGE UP
GLUCOSE BLDC GLUCOMTR-MCNC: 112 MG/DL — HIGH (ref 70–99)
GLUCOSE BLDC GLUCOMTR-MCNC: 114 MG/DL — HIGH (ref 70–99)
GLUCOSE BLDC GLUCOMTR-MCNC: 117 MG/DL — HIGH (ref 70–99)
GLUCOSE BLDC GLUCOMTR-MCNC: 159 MG/DL — HIGH (ref 70–99)
GLUCOSE BLDC GLUCOMTR-MCNC: 160 MG/DL — HIGH (ref 70–99)
GLUCOSE SERPL-MCNC: 256 MG/DL — HIGH (ref 70–99)
HCT VFR BLD CALC: 33.5 % — LOW (ref 34.5–45)
HGB BLD-MCNC: 10.6 G/DL — LOW (ref 11.5–15.5)
MAGNESIUM SERPL-MCNC: 1.8 MG/DL — SIGNIFICANT CHANGE UP (ref 1.6–2.6)
MCHC RBC-ENTMCNC: 28.3 PG — SIGNIFICANT CHANGE UP (ref 27–34)
MCHC RBC-ENTMCNC: 31.6 GM/DL — LOW (ref 32–36)
MCV RBC AUTO: 89.3 FL — SIGNIFICANT CHANGE UP (ref 80–100)
NRBC # BLD: 0 /100 WBCS — SIGNIFICANT CHANGE UP (ref 0–0)
PHOSPHATE SERPL-MCNC: 3 MG/DL — SIGNIFICANT CHANGE UP (ref 2.5–4.5)
PLATELET # BLD AUTO: 263 K/UL — SIGNIFICANT CHANGE UP (ref 150–400)
POTASSIUM SERPL-MCNC: 4.2 MMOL/L — SIGNIFICANT CHANGE UP (ref 3.5–5.3)
POTASSIUM SERPL-SCNC: 4.2 MMOL/L — SIGNIFICANT CHANGE UP (ref 3.5–5.3)
RBC # BLD: 3.75 M/UL — LOW (ref 3.8–5.2)
RBC # FLD: 14.7 % — HIGH (ref 10.3–14.5)
SODIUM SERPL-SCNC: 135 MMOL/L — SIGNIFICANT CHANGE UP (ref 135–145)
WBC # BLD: 10.22 K/UL — SIGNIFICANT CHANGE UP (ref 3.8–10.5)
WBC # FLD AUTO: 10.22 K/UL — SIGNIFICANT CHANGE UP (ref 3.8–10.5)

## 2022-08-28 PROCEDURE — 99232 SBSQ HOSP IP/OBS MODERATE 35: CPT

## 2022-08-28 RX ORDER — MULTIVIT WITH MIN/MFOLATE/K2 340-15/3 G
1 POWDER (GRAM) ORAL ONCE
Refills: 0 | Status: DISCONTINUED | OUTPATIENT
Start: 2022-08-28 | End: 2022-08-28

## 2022-08-28 RX ORDER — LACTULOSE 10 G/15ML
10 SOLUTION ORAL ONCE
Refills: 0 | Status: COMPLETED | OUTPATIENT
Start: 2022-08-28 | End: 2022-08-28

## 2022-08-28 RX ORDER — HYDROMORPHONE HYDROCHLORIDE 2 MG/ML
6 INJECTION INTRAMUSCULAR; INTRAVENOUS; SUBCUTANEOUS ONCE
Refills: 0 | Status: DISCONTINUED | OUTPATIENT
Start: 2022-08-28 | End: 2022-08-28

## 2022-08-28 RX ADMIN — HYDROMORPHONE HYDROCHLORIDE 6 MILLIGRAM(S): 2 INJECTION INTRAMUSCULAR; INTRAVENOUS; SUBCUTANEOUS at 11:51

## 2022-08-28 RX ADMIN — SODIUM CHLORIDE 1 GRAM(S): 9 INJECTION INTRAMUSCULAR; INTRAVENOUS; SUBCUTANEOUS at 22:02

## 2022-08-28 RX ADMIN — LIDOCAINE 1 PATCH: 4 CREAM TOPICAL at 09:13

## 2022-08-28 RX ADMIN — PANTOPRAZOLE SODIUM 40 MILLIGRAM(S): 20 TABLET, DELAYED RELEASE ORAL at 06:35

## 2022-08-28 RX ADMIN — Medication 50 MILLIGRAM(S): at 06:36

## 2022-08-28 RX ADMIN — SODIUM CHLORIDE 1 GRAM(S): 9 INJECTION INTRAMUSCULAR; INTRAVENOUS; SUBCUTANEOUS at 06:35

## 2022-08-28 RX ADMIN — GABAPENTIN 600 MILLIGRAM(S): 400 CAPSULE ORAL at 15:00

## 2022-08-28 RX ADMIN — HYDROMORPHONE HYDROCHLORIDE 6 MILLIGRAM(S): 2 INJECTION INTRAMUSCULAR; INTRAVENOUS; SUBCUTANEOUS at 18:19

## 2022-08-28 RX ADMIN — NALOXEGOL OXALATE 25 MILLIGRAM(S): 12.5 TABLET, FILM COATED ORAL at 13:00

## 2022-08-28 RX ADMIN — Medication 1000 MILLIGRAM(S): at 00:33

## 2022-08-28 RX ADMIN — Medication 2: at 09:11

## 2022-08-28 RX ADMIN — HYDROMORPHONE HYDROCHLORIDE 6 MILLIGRAM(S): 2 INJECTION INTRAMUSCULAR; INTRAVENOUS; SUBCUTANEOUS at 22:33

## 2022-08-28 RX ADMIN — GABAPENTIN 600 MILLIGRAM(S): 400 CAPSULE ORAL at 06:35

## 2022-08-28 RX ADMIN — DULOXETINE HYDROCHLORIDE 60 MILLIGRAM(S): 30 CAPSULE, DELAYED RELEASE ORAL at 13:00

## 2022-08-28 RX ADMIN — Medication 50 MILLIGRAM(S): at 18:19

## 2022-08-28 RX ADMIN — Medication 50 MILLIGRAM(S): at 06:35

## 2022-08-28 RX ADMIN — LIDOCAINE 1 PATCH: 4 CREAM TOPICAL at 20:16

## 2022-08-28 RX ADMIN — Medication 2000 UNIT(S): at 13:00

## 2022-08-28 RX ADMIN — Medication 25 MILLIGRAM(S): at 22:55

## 2022-08-28 RX ADMIN — Medication 1000 MILLIGRAM(S): at 18:18

## 2022-08-28 RX ADMIN — Medication 2: at 22:02

## 2022-08-28 RX ADMIN — POLYETHYLENE GLYCOL 3350 17 GRAM(S): 17 POWDER, FOR SOLUTION ORAL at 18:20

## 2022-08-28 RX ADMIN — BUPROPION HYDROCHLORIDE 150 MILLIGRAM(S): 150 TABLET, EXTENDED RELEASE ORAL at 12:59

## 2022-08-28 RX ADMIN — Medication 10 MILLIGRAM(S): at 12:59

## 2022-08-28 RX ADMIN — SIMVASTATIN 40 MILLIGRAM(S): 20 TABLET, FILM COATED ORAL at 22:01

## 2022-08-28 RX ADMIN — Medication 1000 MILLIGRAM(S): at 08:19

## 2022-08-28 RX ADMIN — Medication 5 MILLIGRAM(S): at 00:33

## 2022-08-28 RX ADMIN — HYDROMORPHONE HYDROCHLORIDE 6 MILLIGRAM(S): 2 INJECTION INTRAMUSCULAR; INTRAVENOUS; SUBCUTANEOUS at 21:35

## 2022-08-28 RX ADMIN — Medication 1000 MILLIGRAM(S): at 01:30

## 2022-08-28 RX ADMIN — ENOXAPARIN SODIUM 40 MILLIGRAM(S): 100 INJECTION SUBCUTANEOUS at 22:03

## 2022-08-28 RX ADMIN — GABAPENTIN 600 MILLIGRAM(S): 400 CAPSULE ORAL at 22:04

## 2022-08-28 RX ADMIN — SODIUM CHLORIDE 1 GRAM(S): 9 INJECTION INTRAMUSCULAR; INTRAVENOUS; SUBCUTANEOUS at 15:42

## 2022-08-28 NOTE — PROGRESS NOTE ADULT - SUBJECTIVE AND OBJECTIVE BOX
HPI:  66 y/o female with PMHx HTN, HLD, CVA x 3 (last was 2016 with right hand weakness residual), GAMALIEL not using CPAP, Emphysema, ex-25 pack year smoker quit 2021, Chronic Constipation on Movantik, Urinary Incontinence chronically self straight cath at home, chronic UTI, Breast Implant Rupture with Chronic Leak, B/L CTS s/p release, s/p Intrathecal Morphine pump for pain, with chronic neck and back pain worsening for years s/p multiple spinal surgeries including laminectomies and fusion of cervical, thoracic and lumbar spine, most recently done in 2019 and 2020 at [facility] by Dr. Trinidad. CT findings include hardware loosening at T5, T6, T9-T12. S/p T3-L1 revision of fusion, c/b durotomy with primary repair, loss of left side motors, and repair of intrathecal morphine pump (8/17).    OVERNIGHT EVENTS: KAMRAN, refusing bowel regimen and AM labs.     Hospital Course:   8/17: POD0 s/p T3-L1 revision of fusion. extubated in NSICU  8/18: POD1 KAMRAN overnight. Started on PCA. Tx to SDU. PT/OT rec AR.  8/19: POD2, overnight episode of agitation and screaming due to concern over pain control. neuro exam stable. 1 HMV drain removed.  8/20: POD3, extra 0.25mg IV dilaudid x 2 given o/n for pain. neuro exam stable  8/21: POD 4. KAMRAN overnight. PCA off now on PO meds. pending rehab, hx of chronic constipation, increased miralax to twice a day and given lactulose and had bowel movement.Valium increased to q 6 hours for muscle spasm. Pending XRs   8/22: POD5, complaining of severe pain o/n requiring additional dilaudid pushes. HMV#3 removed and incisional dressing changed. Given 500cc bolus for pressure in 90s, patient mildly sedated from pain medication. Pain regimen discussed with Dr. Mclaughlin and recommends to keep regimen the same at this time. Accepted to [facility], pending insurance authorization. Post op XRs performed   8/23: POD6, still complains of pain but sleepy with current regimen, plan for Dr. Mclaughlin to interrogate intrathecal pain pump tomorrow. Hemovac removed, US guided IV placed for access. Pending insurance authorization for [facility].  8/24: POD7, still complains of pain, requiring 1 push of dilaudid    8/25: POD 8, hypotensive last night, metoprolol/valium held. Had fall out of bed last night, no head trauma reported. BP normalized in AM. Pt complaining of R ankle pain, xray obtained. Receieved 6mg dilaudid however claims to have "lost" a pill. Has 1 HMV remaining.   8/26: POD9, still w/ "continuous pain" takes oxycodone and oxycontin at home. Valium prn q4.   8/27: POD10. pain controlled overnight. awaiting auth for rehab. Had a slip and near fall episode this afternoon, reinforced that she needs OOB assistance.   8/28: POD11, pain adequately controlled. Refused mag citrate/senna/miralax only will take Dulcolax and Movantik.     Vital Signs Last 24 Hrs  T(C): 36.7 (28 Aug 2022 05:19), Max: 37.5 (27 Aug 2022 16:46)  T(F): 98 (28 Aug 2022 05:19), Max: 99.5 (27 Aug 2022 16:46)  HR: 100 (28 Aug 2022 05:19) (73 - 118)  BP: 122/83 (28 Aug 2022 05:19) (112/75 - 124/85)  BP(mean): --  RR: 18 (28 Aug 2022 05:19) (16 - 19)  SpO2: 97% (28 Aug 2022 05:19) (96% - 100%)    Parameters below as of 28 Aug 2022 05:19  Patient On (Oxygen Delivery Method): room air        I&O's Summary    26 Aug 2022 07:01  -  27 Aug 2022 07:00  --------------------------------------------------------  IN: 880 mL / OUT: 2250 mL / NET: -1370 mL    27 Aug 2022 07:01  -  28 Aug 2022 05:35  --------------------------------------------------------  IN: 550 mL / OUT: 900 mL / NET: -350 mL        PHYSICAL EXAM:  General: patient seen laying supine in bed in NAD  Neuro: AAOx3, FC, OE spontaneously, speech clear and fluent, CNII-XI grossly intact, face symmetric, no pronator drift, strength 4/5 b/l UE HG 5/5 proximally and b/l LE HF 2/5 KF 3/5 DF/PF 5/5, sensation grossly intact to light touch throughout  HEENT: PERRL, EOMI  Neck: supple  Cardiac: RRR, S1S2  Pulmonary: chest rise symmetric  Abdomen: soft, nontender, nondistended  Ext: perfusing well  Skin: warm, dry  Wound: thoracolumbar incision c/d/i with dressing in place    TUBES/LINES:  [] Castillo  [] Lumbar Drain  [] Wound Drains  [] Others      DIET:  [] NPO  [x] Mechanical  [] Tube feeds    LABS:      CAPILLARY BLOOD GLUCOSE      POCT Blood Glucose.: 131 mg/dL (27 Aug 2022 21:50)  POCT Blood Glucose.: 133 mg/dL (27 Aug 2022 17:45)  POCT Blood Glucose.: 153 mg/dL (27 Aug 2022 11:31)  POCT Blood Glucose.: 172 mg/dL (27 Aug 2022 06:41)      Drug Levels: [] N/A    CSF Analysis: [] N/A      Allergies    oxycodone (Pruritus)    Intolerances      MEDICATIONS:  Antibiotics:  nitrofurantoin macrocrystals (MACRODANTIN) 50 milliGRAM(s) Oral <User Schedule>    Neuro:  acetaminophen     Tablet .. 1000 milliGRAM(s) Oral every 8 hours  buPROPion XL (24-Hour) . 150 milliGRAM(s) Oral daily  diazepam    Tablet 5 milliGRAM(s) Oral every 4 hours PRN  diphenhydrAMINE 25 milliGRAM(s) Oral every 6 hours PRN  DULoxetine 60 milliGRAM(s) Oral daily  gabapentin 600 milliGRAM(s) Oral three times a day  HYDROmorphone   Tablet 6 milliGRAM(s) Oral every 4 hours PRN  HYDROmorphone   Tablet 4 milliGRAM(s) Oral every 4 hours PRN  ondansetron Injectable 4 milliGRAM(s) IV Push every 6 hours PRN    Anticoagulation:  enoxaparin Injectable 40 milliGRAM(s) SubCutaneous <User Schedule>    OTHER:  ALBUTerol   0.042% 2.5 milliGRAM(s) Nebulizer every 6 hours PRN  bisacodyl 10 milliGRAM(s) Oral every 12 hours  bisacodyl Suppository 10 milliGRAM(s) Rectal daily PRN  insulin lispro (ADMELOG) corrective regimen sliding scale   SubCutaneous Before meals and at bedtime  lidocaine   4% Patch 1 Patch Transdermal every 24 hours  lidocaine   4% Patch 1 Patch Transdermal every 24 hours  metoprolol tartrate 50 milliGRAM(s) Oral two times a day  naloxegol 25 milliGRAM(s) Oral daily  naloxone Injectable 0.1 milliGRAM(s) IV Push every 3 minutes PRN  pantoprazole    Tablet 40 milliGRAM(s) Oral before breakfast  polyethylene glycol 3350 17 Gram(s) Oral every 12 hours  senna 2 Tablet(s) Oral at bedtime  simvastatin 40 milliGRAM(s) Oral at bedtime    IVF:  cholecalciferol 2000 Unit(s) Oral daily  sodium chloride 1 Gram(s) Oral every 8 hours    CULTURES:    RADIOLOGY & ADDITIONAL TESTS:      ASSESSMENT:  67y Female s/p T3-L1 revision of fusion, c/b durotomy with primary repair, loss of left side motors, and repair of intrathecal morphine pump (8/17).    M40.209    Handoff    MEWS Score    HTN (hypertension)    Back pain    Hypertension    SS (spinal stenosis)    High cholesterol    Stroke    H/O carpal tunnel syndrome    H/O carpal tunnel syndrome    Chronic GERD    History of chronic constipation    Seizure    Urinary incontinence    Pre-diabetes    Acute UTI    Anxiety    Anxiety and depression    Arthritis    Eczema    External hemorrhoids    H/O kyphosis    Multiple sclerosis    Pancreas cyst    Scoliosis    Wheelchair dependent    Cervical pseudoarthrosis    Right shoulder pain    Cervical spondylosis    Chronic headaches    Chronic LUQ pain    Chronic UTI    Degenerative joint disease    H/O low back pain    Lumbosacral spondylosis    COPD (chronic obstructive pulmonary disease)    Back pain    Lumbar pseudoarthrosis    Pseudoarthrosis of lumbar spine    Back pain with history of spinal surgery    Fusion, spine, thoracolumbar, posterior approach    Cervical disc disease    H/O Spinal surgery    H/O breast surgery    S/P cervical discectomy    Previous back surgery    H/O shoulder surgery    H/O total shoulder replacement, right    Room Service Assist    [email or web address]        PLAN:  NEURO:  - neuro/spinal/vital checks q4hr  - pain management reccs: Valium 5q8, dilaudid PO  - cont movantik 25mg qd-   - HMV x 4 removed  - decadron 4q6 x 3 days  - ERAS protocol: tylenol 1g q8, gabapentin 600mg TID  - PT/OT when able   - cont wellbutrin 150mg qd, duloxetine 60mg qd  - postop xrays completed 8/22     CARDS:  - normotensive SBP goal  - cont lopressor 50mg BID, lipitor 40mg qd    PULM:   - extrubated in NSICU to NC 8/17  - Albuterol 2.5q6 PRN    GI:   - regular diet  - protonix while on decadron  - bowel regimen  - last BM 8/21    RENAL:  - straight cath prn  - h/o chronic urniary retnation, straight cath at home    HEME:   - SCDs for DVT ppx, SQL    ID:   - macrobid for chronic UTI  - afebrile, no leukocytosis    ENDO:  - ISS, A1C 6.6    DISPO: SDU status, full code, PT/OT rec AR    Assessment and Plan d/w Dr. Luz           Assessment:  Present when checked    []  GCS  E   V  M     Heart Failure: []Acute, [] acute on chronic , []chronic  Heart Failure:  [] Diastolic (HFpEF), [] Systolic (HFrEF), []Combined (HFpEF and HFrEF), [] RHF, [] Pulm HTN, [] Other    [] DARRYL, [] ATN, [] AIN, [] other  [] CKD1, [] CKD2, [] CKD 3, [] CKD 4, [] CKD 5, []ESRD    Encephalopathy: [] Metabolic, [] Hepatic, [] toxic, [] Neurological, [] Other    Abnormal Nurtitional Status: [] malnurtition (see nutrition note), [ ]underweight: BMI < 19, [] morbid obesity: BMI >40, [] Cachexia    [] Sepsis  [] hypovolemic shock,[] cardiogenic shock, [] hemorrhagic shock, [] neuogenic shock  [] Acute Respiratory Failure  []Cerebral edema, [] Brain compression/ herniation,   [] Functional quadriplegia  [] Acute blood loss anemia

## 2022-08-28 NOTE — PROGRESS NOTE ADULT - SUBJECTIVE AND OBJECTIVE BOX
Patient seen and examined at bedside this morning. No acute events overnight. Eating well. Agreeable to labs. Case reviewed with neurosurgery team.    Vitals:   T 98.4 HR 88 /75 RR 17 spo2 98% room air     PE  General: awake alert NAD   HEENT: PERRLA EOMI   Cardio S1 S2 RRR no m/r/g   Lungs CTA bilaterally no wheezes  Abdomen: soft NT ND   Extremities: pulses 2+    Ins: 6103  Outs [id number]    MEDICATIONS  (STANDING):  acetaminophen     Tablet .. 1000 milliGRAM(s) Oral every 8 hours  bisacodyl 5 milliGRAM(s) Oral every 12 hours  buPROPion XL (24-Hour) . 150 milliGRAM(s) Oral daily  cholecalciferol 2000 Unit(s) Oral daily  DULoxetine 60 milliGRAM(s) Oral daily  enoxaparin Injectable 40 milliGRAM(s) SubCutaneous <User Schedule>  gabapentin 600 milliGRAM(s) Oral three times a day  insulin lispro (ADMELOG) corrective regimen sliding scale   SubCutaneous Before meals and at bedtime  lidocaine   4% Patch 1 Patch Transdermal every 24 hours  lidocaine   4% Patch 1 Patch Transdermal every 24 hours  metoprolol tartrate 50 milliGRAM(s) Oral two times a day  naloxegol 25 milliGRAM(s) Oral daily  nitrofurantoin macrocrystals (MACRODANTIN) 50 milliGRAM(s) Oral <User Schedule>  pantoprazole Tablet 40 milliGRAM(s) Oral before breakfast  polyethylene glycol 3350 17 Gram(s) Oral every 12 hours  senna 2 Tablet(s) Oral at bedtime    Labs 8/25  WBC 14.07  Hgb 11.3   Hct 36.4 Platelets 252    BMP: 131 K+ 3.9 chloride 100 Co2 19 BUN 13 cr 0.65 glucose 96    Imaging reviewed

## 2022-08-29 ENCOUNTER — TRANSCRIPTION ENCOUNTER (OUTPATIENT)
Age: 67
End: 2022-08-29

## 2022-08-29 LAB
GLUCOSE BLDC GLUCOMTR-MCNC: 127 MG/DL — HIGH (ref 70–99)
GLUCOSE BLDC GLUCOMTR-MCNC: 127 MG/DL — HIGH (ref 70–99)
GLUCOSE BLDC GLUCOMTR-MCNC: 154 MG/DL — HIGH (ref 70–99)
GLUCOSE BLDC GLUCOMTR-MCNC: 167 MG/DL — HIGH (ref 70–99)
SARS-COV-2 RNA SPEC QL NAA+PROBE: NEGATIVE — SIGNIFICANT CHANGE UP

## 2022-08-29 RX ORDER — ONDANSETRON 8 MG/1
4 TABLET, FILM COATED ORAL
Qty: 0 | Refills: 0 | DISCHARGE
Start: 2022-08-29

## 2022-08-29 RX ORDER — NALOXONE HYDROCHLORIDE 4 MG/.1ML
0.1 SPRAY NASAL
Qty: 0 | Refills: 0 | DISCHARGE
Start: 2022-08-29

## 2022-08-29 RX ORDER — SENNA PLUS 8.6 MG/1
2 TABLET ORAL
Qty: 0 | Refills: 0 | DISCHARGE
Start: 2022-08-29

## 2022-08-29 RX ORDER — SODIUM CHLORIDE 9 MG/ML
1 INJECTION INTRAMUSCULAR; INTRAVENOUS; SUBCUTANEOUS
Qty: 0 | Refills: 0 | DISCHARGE
Start: 2022-08-29

## 2022-08-29 RX ORDER — DIAZEPAM 5 MG
1 TABLET ORAL
Qty: 0 | Refills: 0 | DISCHARGE
Start: 2022-08-29

## 2022-08-29 RX ORDER — ACETAMINOPHEN 500 MG
2 TABLET ORAL
Qty: 0 | Refills: 0 | DISCHARGE
Start: 2022-08-29

## 2022-08-29 RX ORDER — PANTOPRAZOLE SODIUM 20 MG/1
1 TABLET, DELAYED RELEASE ORAL
Qty: 0 | Refills: 0 | DISCHARGE
Start: 2022-08-29

## 2022-08-29 RX ORDER — LIDOCAINE 4 G/100G
1 CREAM TOPICAL
Qty: 0 | Refills: 0 | DISCHARGE
Start: 2022-08-29

## 2022-08-29 RX ORDER — HYDROMORPHONE HYDROCHLORIDE 2 MG/ML
1 INJECTION INTRAMUSCULAR; INTRAVENOUS; SUBCUTANEOUS
Qty: 0 | Refills: 0 | DISCHARGE
Start: 2022-08-29

## 2022-08-29 RX ORDER — HYDROMORPHONE HYDROCHLORIDE 2 MG/ML
3 INJECTION INTRAMUSCULAR; INTRAVENOUS; SUBCUTANEOUS
Qty: 0 | Refills: 0 | DISCHARGE
Start: 2022-08-29

## 2022-08-29 RX ORDER — POLYETHYLENE GLYCOL 3350 17 G/17G
17 POWDER, FOR SOLUTION ORAL
Qty: 0 | Refills: 0 | DISCHARGE
Start: 2022-08-29

## 2022-08-29 RX ORDER — ENOXAPARIN SODIUM 100 MG/ML
40 INJECTION SUBCUTANEOUS
Qty: 0 | Refills: 0 | DISCHARGE
Start: 2022-08-29

## 2022-08-29 RX ADMIN — HYDROMORPHONE HYDROCHLORIDE 6 MILLIGRAM(S): 2 INJECTION INTRAMUSCULAR; INTRAVENOUS; SUBCUTANEOUS at 13:52

## 2022-08-29 RX ADMIN — Medication 1000 MILLIGRAM(S): at 17:52

## 2022-08-29 RX ADMIN — LIDOCAINE 1 PATCH: 4 CREAM TOPICAL at 19:31

## 2022-08-29 RX ADMIN — Medication 2000 UNIT(S): at 13:23

## 2022-08-29 RX ADMIN — Medication 10 MILLIGRAM(S): at 00:01

## 2022-08-29 RX ADMIN — POLYETHYLENE GLYCOL 3350 17 GRAM(S): 17 POWDER, FOR SOLUTION ORAL at 06:03

## 2022-08-29 RX ADMIN — HYDROMORPHONE HYDROCHLORIDE 6 MILLIGRAM(S): 2 INJECTION INTRAMUSCULAR; INTRAVENOUS; SUBCUTANEOUS at 05:00

## 2022-08-29 RX ADMIN — NALOXEGOL OXALATE 25 MILLIGRAM(S): 12.5 TABLET, FILM COATED ORAL at 13:23

## 2022-08-29 RX ADMIN — Medication 1000 MILLIGRAM(S): at 09:21

## 2022-08-29 RX ADMIN — Medication 1000 MILLIGRAM(S): at 17:22

## 2022-08-29 RX ADMIN — Medication 10 MILLIGRAM(S): at 13:24

## 2022-08-29 RX ADMIN — GABAPENTIN 600 MILLIGRAM(S): 400 CAPSULE ORAL at 06:03

## 2022-08-29 RX ADMIN — Medication 1000 MILLIGRAM(S): at 02:10

## 2022-08-29 RX ADMIN — HYDROMORPHONE HYDROCHLORIDE 6 MILLIGRAM(S): 2 INJECTION INTRAMUSCULAR; INTRAVENOUS; SUBCUTANEOUS at 09:51

## 2022-08-29 RX ADMIN — BUPROPION HYDROCHLORIDE 150 MILLIGRAM(S): 150 TABLET, EXTENDED RELEASE ORAL at 13:23

## 2022-08-29 RX ADMIN — LIDOCAINE 1 PATCH: 4 CREAM TOPICAL at 21:50

## 2022-08-29 RX ADMIN — DULOXETINE HYDROCHLORIDE 60 MILLIGRAM(S): 30 CAPSULE, DELAYED RELEASE ORAL at 13:23

## 2022-08-29 RX ADMIN — SIMVASTATIN 40 MILLIGRAM(S): 20 TABLET, FILM COATED ORAL at 23:26

## 2022-08-29 RX ADMIN — GABAPENTIN 600 MILLIGRAM(S): 400 CAPSULE ORAL at 13:23

## 2022-08-29 RX ADMIN — PANTOPRAZOLE SODIUM 40 MILLIGRAM(S): 20 TABLET, DELAYED RELEASE ORAL at 06:03

## 2022-08-29 RX ADMIN — HYDROMORPHONE HYDROCHLORIDE 6 MILLIGRAM(S): 2 INJECTION INTRAMUSCULAR; INTRAVENOUS; SUBCUTANEOUS at 23:25

## 2022-08-29 RX ADMIN — Medication 2: at 17:22

## 2022-08-29 RX ADMIN — HYDROMORPHONE HYDROCHLORIDE 6 MILLIGRAM(S): 2 INJECTION INTRAMUSCULAR; INTRAVENOUS; SUBCUTANEOUS at 09:21

## 2022-08-29 RX ADMIN — GABAPENTIN 600 MILLIGRAM(S): 400 CAPSULE ORAL at 23:26

## 2022-08-29 RX ADMIN — LIDOCAINE 1 PATCH: 4 CREAM TOPICAL at 09:21

## 2022-08-29 RX ADMIN — HYDROMORPHONE HYDROCHLORIDE 6 MILLIGRAM(S): 2 INJECTION INTRAMUSCULAR; INTRAVENOUS; SUBCUTANEOUS at 17:52

## 2022-08-29 RX ADMIN — ENOXAPARIN SODIUM 40 MILLIGRAM(S): 100 INJECTION SUBCUTANEOUS at 23:27

## 2022-08-29 RX ADMIN — Medication 1000 MILLIGRAM(S): at 09:51

## 2022-08-29 RX ADMIN — Medication 5 MILLIGRAM(S): at 10:31

## 2022-08-29 RX ADMIN — HYDROMORPHONE HYDROCHLORIDE 6 MILLIGRAM(S): 2 INJECTION INTRAMUSCULAR; INTRAVENOUS; SUBCUTANEOUS at 17:22

## 2022-08-29 RX ADMIN — Medication 1000 MILLIGRAM(S): at 01:18

## 2022-08-29 RX ADMIN — Medication 2: at 12:07

## 2022-08-29 RX ADMIN — HYDROMORPHONE HYDROCHLORIDE 6 MILLIGRAM(S): 2 INJECTION INTRAMUSCULAR; INTRAVENOUS; SUBCUTANEOUS at 13:22

## 2022-08-29 RX ADMIN — SENNA PLUS 2 TABLET(S): 8.6 TABLET ORAL at 23:25

## 2022-08-29 RX ADMIN — Medication 50 MILLIGRAM(S): at 06:03

## 2022-08-29 RX ADMIN — SODIUM CHLORIDE 1 GRAM(S): 9 INJECTION INTRAMUSCULAR; INTRAVENOUS; SUBCUTANEOUS at 23:26

## 2022-08-29 RX ADMIN — HYDROMORPHONE HYDROCHLORIDE 6 MILLIGRAM(S): 2 INJECTION INTRAMUSCULAR; INTRAVENOUS; SUBCUTANEOUS at 06:06

## 2022-08-29 NOTE — DISCHARGE NOTE NURSING/CASE MANAGEMENT/SOCIAL WORK - PATIENT PORTAL LINK FT
You can access the FollowMyHealth Patient Portal offered by [facility] by registering at the following website: [email or web address]. By joining [facility]’s FollowMyHealth portal, you will also be able to view your health information using other applications (apps) compatible with our system.

## 2022-08-29 NOTE — PROGRESS NOTE ADULT - SUBJECTIVE AND OBJECTIVE BOX
Physical Medicine and Rehabilitation Progress Note :    Patient is a 67y old  Female who presents with a chief complaint of chronic back and neck pain worsening for years (29 Aug 2022 00:33)      HPI:  ***********************************************  ADULT NSICU H&P  VANNA DIAZ [id number] [facility] [id number]  ***********************************************  H&P:  "66 y/o female with PMHx HTN, HLD, CVA x 3 (last was 2016 with right hand weakness residual), GAMALIEL not using CPAP, Emphysema, ex-25 pack year smoker quit 2021, Chronic Constipation on Movantik, Urinary Incontinence chronically self straight cath at home, chronic UTI, Breast Implant Rupture with Chronic Leak, B/L CTS s/p release, s/p Intrathecal Morphine pump for pain, with chronic neck and back pain worsening for years s/p multiple spinal surgeries including laminectomies and fusion of cervical, thoracic and lumbar spine, most recently done in 2019 and 2020 at [facility] by Dr. Trinidad.  She is wheelchair dependent.  She also has history of right shoulder replacement.  She reports bilateral shoulder pain, bilateral leg pain and burning to the bilateral calves.  She reports right hand greater than arm and leg weakness."    66 yo F with history of HTN, HLD, prior strokes with residual RUE weakness, GAMALIEL noncompliant with CPAP, COPD, prior extensive smoking history, chronic constipation, urinary incontinence, chronic UTI, ruptured breast implant, bilateral CTS release surgery, intrathecal morphine pump and chronic pain s/p multiple spinal surgeries who is now s/p T3 - L1 posterior fusion (EBL 500cc, 1L UOP, 4200 crystalloid, 1u PRBC) and admitted to NSICU.  She remains intubated with progressive improvement in level of arousal    VITALS:    ICU Vital Signs Last 24 Hrs  T(C): 35.2 (17 Aug 2022 18:03), Max: 35.2 (17 Aug 2022 18:03)  T(F): 95.4 (17 Aug 2022 18:03), Max: 95.4 (17 Aug 2022 18:03)  HR: --  BP: --  BP(mean): --  ABP: --  ABP(mean): --  RR: --  SpO2: --            I&O's Summary      EXAM:     Hiren Coma Scale: 2/1T/6 = 7  Deferred full examination at this time due to hemodynamic instability  Intubated on pressure support and breathing spontaneously  Opens eyes to noxious stimuli  Gaze conjugate  PERRL (NPi > 3 bilaterally)  No gaze preference  Spontaneously moving BL UE/LE    LABORATORY DATA:    ABG - ( 17 Aug 2022 16:47 )  pH, Arterial: 7.31  pH, Blood: x     /  pCO2: 42    /  pO2: 251   / HCO3: 21    / Base Excess: -4.9  /  SaO2: x                                       10.7   10.99 )-----------( 143      ( 17 Aug 2022 18:08 )             32.9             MEDICATIONS  (STANDING):  acetaminophen     Tablet .. 1000 milliGRAM(s) Oral every 8 hours  bisacodyl 5 milliGRAM(s) Oral every 12 hours  ceFAZolin   IVPB 2000 milliGRAM(s) IV Intermittent every 8 hours  chlorhexidine 2% Cloths 1 Application(s) Topical daily  cholecalciferol 2000 Unit(s) Oral daily  dexAMETHasone  Injectable 4 milliGRAM(s) IV Push every 6 hours  dextrose 5%. 1000 milliLiter(s) (50 mL/Hr) IV Continuous <Continuous>  dextrose 5%. 1000 milliLiter(s) (100 mL/Hr) IV Continuous <Continuous>  dextrose 50% Injectable 25 Gram(s) IV Push once  dextrose 50% Injectable 12.5 Gram(s) IV Push once  dextrose 50% Injectable 25 Gram(s) IV Push once  diazepam    Tablet 5 milliGRAM(s) Oral every 8 hours  DULoxetine 60 milliGRAM(s) Oral daily  gabapentin 600 milliGRAM(s) Oral three times a day  gabapentin 600 milliGRAM(s) Oral three times a day  glucagon  Injectable 1 milliGRAM(s) IntraMuscular once  HYDROmorphone PCA (1 mG/mL) 30 milliLiter(s) PCA Continuous PCA Continuous  insulin lispro (ADMELOG) corrective regimen sliding scale   SubCutaneous three times a day before meals  metoprolol tartrate 50 milliGRAM(s) Oral two times a day  naloxegol 25 milliGRAM(s) Oral daily  nitrofurantoin monohydrate/macrocrystals (MACROBID) 50 milliGRAM(s) Oral <User Schedule>  norepinephrine Infusion 0.05 MICROgram(s)/kG/Min (8.26 mL/Hr) IV Continuous <Continuous>  ondansetron   Disintegrating Tablet 4 milliGRAM(s) Oral every 6 hours  pantoprazole    Tablet 40 milliGRAM(s) Oral before breakfast  polyethylene glycol 3350 17 Gram(s) Oral daily  senna 2 Tablet(s) Oral at bedtime  simvastatin 40 milliGRAM(s) Oral at bedtime  sodium chloride 0.9% Bolus 1000 milliLiter(s) IV Bolus once  sodium chloride 0.9%. 1000 milliLiter(s) (75 mL/Hr) IV Continuous <Continuous>    MEDICATIONS  (PRN):  acetaminophen     Tablet .. 650 milliGRAM(s) Oral every 6 hours PRN Temp greater or equal to 38C (100.4F), Mild Pain (1 - 3)  ALBUTerol   0.042% 2.5 milliGRAM(s) Nebulizer every 6 hours PRN Shortness of Breath and/or Wheezing  dextrose Oral Gel 15 Gram(s) Oral once PRN Blood Glucose LESS THAN 70 milliGRAM(s)/deciliter  diphenhydrAMINE 25 milliGRAM(s) Oral every 6 hours PRN Rash and/or Itching  naloxone Injectable 0.1 milliGRAM(s) IV Push every 3 minutes PRN For ANY of the following changes in patient status:  A. RR LESS THAN 10 breaths per minute, B. Oxygen saturation LESS THAN 90%, C. Sedation score of 6  ondansetron Injectable 4 milliGRAM(s) IV Push every 6 hours PRN Nausea        ***********************************************  ASSESSMENT AND PLAN  ***********************************************    This is a case of a T3 - L1 posterior fusion (EBL 500cc, 1L UOP, 4200 crystalloid, 1u PRBC) in a 66 yo F with history of HTN, HLD, prior strokes with residual RUE weakness, GAMALIEL noncompliant with CPAP, COPD, prior extensive smoking history, chronic constipation, urinary incontinence, chronic UTI, ruptured breast implant, bilateral CTS release surgery, intrathecal morphine pump.    NEURO  #S/p T3 - L1 posterior fusion, Dr. Luz, POD#0  - Admit NSICU, Q1h neuro checks / Q1h vital signs  - Acute pain consultation, dilaudid PCA  - Home medications    PULM  - Currently on pressure support passing SBT; consider extubation when patient awake and following commands  - SpO2 goal > 92%, supplemental O2 and pulm toileting as needed    CARDIO  - BP goal: MAP > 85 pending full examination, Levophed     GI  - Diet: NPO for now anticipating extubation  - Stress ulcer prophylaxis: PPI unless extubated    /RENAL  - Monitor UOP/volume status, BUN/SCr    HEME  - Maintain Hb > 7.0, PLT > 100,000K  - SCDs, holding chemoppx    ID  - Monitor for infectious s/s, fever curve, leukocytosis  - Periop ancef    ENDO  #History of pre-diabetes  - RASS      08-17-22 @ 18:42       (17 Aug 2022 07:46)                            10.6   10.22 )-----------( 263      ( 28 Aug 2022 09:37 )             33.5       08-28    135  |  101  |  13  ----------------------------<  256<H>  4.2   |  25  |  0.58    Ca    8.4      28 Aug 2022 09:37  Phos  3.0     08-28  Mg     1.8     08-28      Vital Signs Last 24 Hrs  T(C): 36.4 (29 Aug 2022 09:15), Max: 37.3 (28 Aug 2022 19:53)  T(F): 97.6 (29 Aug 2022 09:15), Max: 99.1 (28 Aug 2022 19:53)  HR: 99 (29 Aug 2022 09:15) (76 - 99)  BP: 112/78 (29 Aug 2022 09:15) (103/71 - 128/78)  BP(mean): --  RR: 17 (29 Aug 2022 09:15) (16 - 17)  SpO2: 98% (29 Aug 2022 09:15) (97% - 100%)    Parameters below as of 29 Aug 2022 09:15  Patient On (Oxygen Delivery Method): room air        MEDICATIONS  (STANDING):  acetaminophen     Tablet .. 1000 milliGRAM(s) Oral every 8 hours  bisacodyl 10 milliGRAM(s) Oral every 12 hours  buPROPion XL (24-Hour) . 150 milliGRAM(s) Oral daily  cholecalciferol 2000 Unit(s) Oral daily  DULoxetine 60 milliGRAM(s) Oral daily  enoxaparin Injectable 40 milliGRAM(s) SubCutaneous <User Schedule>  gabapentin 600 milliGRAM(s) Oral three times a day  insulin lispro (ADMELOG) corrective regimen sliding scale   SubCutaneous Before meals and at bedtime  lidocaine   4% Patch 1 Patch Transdermal every 24 hours  lidocaine   4% Patch 1 Patch Transdermal every 24 hours  metoprolol tartrate 50 milliGRAM(s) Oral two times a day  naloxegol 25 milliGRAM(s) Oral daily  nitrofurantoin macrocrystals (MACRODANTIN) 50 milliGRAM(s) Oral <User Schedule>  pantoprazole    Tablet 40 milliGRAM(s) Oral before breakfast  polyethylene glycol 3350 17 Gram(s) Oral every 12 hours  senna 2 Tablet(s) Oral at bedtime  simvastatin 40 milliGRAM(s) Oral at bedtime  sodium chloride 1 Gram(s) Oral every 8 hours    MEDICATIONS  (PRN):  ALBUTerol   0.042% 2.5 milliGRAM(s) Nebulizer every 6 hours PRN Shortness of Breath and/or Wheezing  bisacodyl Suppository 10 milliGRAM(s) Rectal daily PRN Constipation  diazepam    Tablet 5 milliGRAM(s) Oral every 4 hours PRN muscle spasm  diphenhydrAMINE 25 milliGRAM(s) Oral every 6 hours PRN Rash and/or Itching  HYDROmorphone   Tablet 6 milliGRAM(s) Oral every 4 hours PRN Severe Pain (7 - 10)  HYDROmorphone   Tablet 4 milliGRAM(s) Oral every 4 hours PRN Moderate Pain (4 - 6)  naloxone Injectable 0.1 milliGRAM(s) IV Push every 3 minutes PRN For ANY of the following changes in patient status:  A. RR LESS THAN 10 breaths per minute, B. Oxygen saturation LESS THAN 90%, C. Sedation score of 6  ondansetron Injectable 4 milliGRAM(s) IV Push every 6 hours PRN Nausea    Currently Undergoing Physical/ Occupational Therapy at bedside    PT/OT Functional Status Assessment :   8/28/2022    Pain Assessment/Number Scale (0-10) Adult  Presence of Pain: complains of pain/discomfort  Body Location: back  Pain Rating (0-10): Rest: 8   Pain Rating (0-10): Activity: 7     Therapeutic Interventions      Bed Mobility  Bed Mobility Training Rolling/Turning: moderate assist (50% patient effort);  verbal cues;  nonverbal cues (demo/gestures);  1 person assist;  bed rails  Bed Mobility Training Sit-to-Supine: moderate assist (50% patient effort);  verbal cues;  nonverbal cues (demo/gestures);  1 person assist;  to manage BLE  Bed Mobility Training Supine-to-Sit: moderate assist (50% patient effort);  verbal cues;  nonverbal cues (demo/gestures);  1 person assist;  to manage trunk;  HOB elevated  Bed Mobility Training Limitations: impaired ability to control trunk for mobility;  decreased ability to use arms for pushing/pulling;  pain;  impaired balance;  decreased strength    Sit-Stand Transfer Training  Transfer Training Sit-to-Stand Transfer: contact guard;  verbal cues;  nonverbal cues (demo/gestures);  2 person assist;  weight-bearing as tolerated   rolling walker  Transfer Training Stand-to-Sit Transfer: contact guard;  verbal cues;  nonverbal cues (demo/gestures);  2 person assist;  weight-bearing as tolerated   rolling walker;  decreased safety awareness  Sit-to-Stand Transfer Training Transfer Safety Analysis: decreased balance;  pain;  impaired balance;  rolling walker    Gait Training  Gait Training: contact guard;  2 person assist;  verbal cues;  nonverbal cues (demo/gestures);  weight-bearing as tolerated   rolling walker;  3 side steps at EOB  Gait Analysis: shuffling steps;  pain;  impaired balance;  rolling walker      Therapeutic Exercise  Therapeutic Exercise Detail: Pt sat EOB ~8 mins with supervision to maintain upright posture. Pt performed 4 sidesteps towards HOB with RW and CGA x2, mod verbal cues for sequencing, max verbal cues for safe RW management and to bring RW closer to body.     Eating/Self-Feeding Training  Eating/Self-Feeding Training Assistance: supervsion;  supervision;  verbal cues;  nonverbal cues (demo/gestures);  to drink from cup seated EOB, performed with LUE;  decreased strength;  impaired balance              PM&R Impression : as above    Current Disposition Plan Re  :    acute rehab placement

## 2022-08-29 NOTE — DISCHARGE NOTE NURSING/CASE MANAGEMENT/SOCIAL WORK - NSDCFUADDAPPT_GEN_ALL_CORE_FT
Please follow-up with Dr. Luz. TeleHealth 10:00am 9/7/22.    Please follow-up with primary care doctor

## 2022-08-29 NOTE — DISCHARGE NOTE NURSING/CASE MANAGEMENT/SOCIAL WORK - NSDCPEFALRISK_GEN_ALL_CORE
For information on Fall & Injury Prevention, visit: [email or web address] OR  [email or web address] OR  https://www.cdc.gov/steadi/patient.html

## 2022-08-29 NOTE — PROGRESS NOTE ADULT - SUBJECTIVE AND OBJECTIVE BOX
HPI:   68 y/o female with PMHx HTN, HLD, CVA x 3 (last was 2016 with right hand weakness residual), GAMALIEL not using CPAP, Emphysema, ex-25 pack year smoker quit 2021, Chronic Constipation on Movantik, Urinary Incontinence chronically self straight cath at home, chronic UTI, Breast Implant Rupture with Chronic Leak, B/L CTS s/p release, s/p Intrathecal Morphine pump for pain, with chronic neck and back pain worsening for years s/p multiple spinal surgeries including laminectomies and fusion of cervical, thoracic and lumbar spine, most recently done in 2019 and 2020 at [facility] by Dr. Trinidad.  She is wheelchair dependent.  She also has history of right shoulder replacement.  She reports bilateral shoulder pain, bilateral leg pain and burning to the bilateral calves.  She reports right hand greater than arm and leg weakness    OVERNIGHT EVENTS: KAMRAN pt complaining of continuous pain despite intrathecal morphine pump.     Hospital Course:   8/17: POD0 s/p T3-L1 revision of fusion. extubated in NSICU  8/18: POD1 KAMRAN overnight. Started on PCA. Tx to SDU. PT/OT rec AR.  8/19: POD2, overnight episode of agitation and screaming due to concern over pain control. neuro exam stable. 1 HMV drain removed.  8/20: POD3, extra 0.25mg IV dilaudid x 2 given o/n for pain. neuro exam stable  8/21: POD 4. KAMRAN overnight. PCA off now on PO meds. pending rehab, hx of chronic constipation, increased miralax to twice a day and given lactulose and had bowel movement.Valium increased to q 6 hours for muscle spasm. Pending XRs   8/22: POD5, complaining of severe pain o/n requiring additional dilaudid pushes. HMV#3 removed and incisional dressing changed. Given 500cc bolus for pressure in 90s, patient mildly sedated from pain medication. Pain regimen discussed with Dr. Mclaughlin and recommends to keep regimen the same at this time. Accepted to [facility], pending insurance authorization. Post op XRs performed   8/23: POD6, still complains of pain but sleepy with current regimen, plan for Dr. Mclaughlin to interrogate intrathecal pain pump tomorrow. Hemovac removed, US guided IV placed for access. Pending insurance authorization for [facility].  8/24: POD7, still complains of pain, requiring 1 push of dilaudid    8/25: POD 8, hypotensive last night, metoprolol/valium held. Had fall out of bed last night, no head trauma reported. BP normalized in AM. Pt complaining of R ankle pain, xray obtained. Receieved 6mg dilaudid however claims to have "lost" a pill. Has 1 HMV remaining.   8/26: POD9, still w/ "continuous pain" takes oxycodone and oxycontin at home. Valium prn q4.   8/27: POD10. pain controlled overnight. awaiting auth for rehab. Had a slip and near fall episode this afternoon, reinforced that she needs OOB assistance.   8/28: POD11, pain adequately controlled. Refused mag citrate/senna/miralax only will take Dulcolax and Movantik.   8/29: POD12. Received lactulose, needs a BM.    Vital Signs Last 24 Hrs  T(C): 37.3 (28 Aug 2022 19:53), Max: 37.3 (28 Aug 2022 19:53)  T(F): 99.1 (28 Aug 2022 19:53), Max: 99.1 (28 Aug 2022 19:53)  HR: 76 (28 Aug 2022 21:38) (76 - 100)  BP: 114/78 (28 Aug 2022 21:38) (103/71 - 130/75)  BP(mean): --  RR: 17 (28 Aug 2022 21:38) (16 - 18)  SpO2: 97% (28 Aug 2022 21:38) (97% - 100%)    Parameters below as of 28 Aug 2022 21:38  Patient On (Oxygen Delivery Method): room air        I&O's Summary    27 Aug 2022 07:01  -  28 Aug 2022 07:00  --------------------------------------------------------  IN: 550 mL / OUT: 1500 mL / NET: -950 mL    28 Aug 2022 07:01  -  29 Aug 2022 00:33  --------------------------------------------------------  IN: 1020 mL / OUT: 600 mL / NET: 420 mL        PHYSICAL EXAM:  General: patient seen laying supine in bed in NAD  Neuro: AAOx3, FC, OE spontaneously, speech clear and fluent, CNII-XI grossly intact, face symmetric, no pronator drift, strength 4/5 b/l UE HG 5/5 proximally and b/l LE HF 2/5 KF 3/5 DF/PF 5/5, sensation grossly intact to light touch throughout  HEENT: PERRL, EOMI  Neck: supple  Cardiac: RRR, S1S2  Pulmonary: chest rise symmetric  Abdomen: soft, nontender, nondistended  Ext: perfusing well  Skin: warm, dry  Wound: thoracolumbar incision c/d/i with dressing in place    TUBES/LINES:  [] Castillo  [] Lumbar Drain  [] Wound Drains  [] Others      DIET:  [] NPO  [x] Mechanical  [] Tube feeds    LABS:                        10.6   10.22 )-----------( 263      ( 28 Aug 2022 09:37 )             33.5     08-28    135  |  101  |  13  ----------------------------<  256<H>  4.2   |  25  |  0.58    Ca    8.4      28 Aug 2022 09:37  Phos  3.0     08-28  Mg     1.8     08-28              CAPILLARY BLOOD GLUCOSE      POCT Blood Glucose.: 159 mg/dL (28 Aug 2022 21:43)  POCT Blood Glucose.: 117 mg/dL (28 Aug 2022 16:54)  POCT Blood Glucose.: 112 mg/dL (28 Aug 2022 13:14)  POCT Blood Glucose.: 114 mg/dL (28 Aug 2022 12:53)  POCT Blood Glucose.: 160 mg/dL (28 Aug 2022 08:55)      Drug Levels: [] N/A    CSF Analysis: [] N/A      Allergies    oxycodone (Pruritus)    Intolerances      MEDICATIONS:  Antibiotics:  nitrofurantoin macrocrystals (MACRODANTIN) 50 milliGRAM(s) Oral <User Schedule>    Neuro:  acetaminophen     Tablet .. 1000 milliGRAM(s) Oral every 8 hours  buPROPion XL (24-Hour) . 150 milliGRAM(s) Oral daily  diazepam    Tablet 5 milliGRAM(s) Oral every 4 hours PRN  diphenhydrAMINE 25 milliGRAM(s) Oral every 6 hours PRN  DULoxetine 60 milliGRAM(s) Oral daily  gabapentin 600 milliGRAM(s) Oral three times a day  HYDROmorphone   Tablet 6 milliGRAM(s) Oral every 4 hours PRN  HYDROmorphone   Tablet 4 milliGRAM(s) Oral every 4 hours PRN  ondansetron Injectable 4 milliGRAM(s) IV Push every 6 hours PRN    Anticoagulation:  enoxaparin Injectable 40 milliGRAM(s) SubCutaneous <User Schedule>    OTHER:  ALBUTerol   0.042% 2.5 milliGRAM(s) Nebulizer every 6 hours PRN  bisacodyl 10 milliGRAM(s) Oral every 12 hours  bisacodyl Suppository 10 milliGRAM(s) Rectal daily PRN  insulin lispro (ADMELOG) corrective regimen sliding scale   SubCutaneous Before meals and at bedtime  lidocaine   4% Patch 1 Patch Transdermal every 24 hours  lidocaine   4% Patch 1 Patch Transdermal every 24 hours  metoprolol tartrate 50 milliGRAM(s) Oral two times a day  naloxegol 25 milliGRAM(s) Oral daily  naloxone Injectable 0.1 milliGRAM(s) IV Push every 3 minutes PRN  pantoprazole    Tablet 40 milliGRAM(s) Oral before breakfast  polyethylene glycol 3350 17 Gram(s) Oral every 12 hours  senna 2 Tablet(s) Oral at bedtime  simvastatin 40 milliGRAM(s) Oral at bedtime    IVF:  cholecalciferol 2000 Unit(s) Oral daily  sodium chloride 1 Gram(s) Oral every 8 hours    CULTURES:    RADIOLOGY & ADDITIONAL TESTS:      ASSESSMENT:  68 y/o female with PMHx HTN, HLD, CVA x 3 (last was 2016 with right hand weakness residual), GAMALIEL not using CPAP, Emphysema, ex-25 pack year smoker quit 2021, Chronic Constipation on Movantik, Urinary Incontinence chronically self straight cath at home, chronic UTI, Breast Implant Rupture with Chronic Leak, B/L CTS s/p release, s/p Intrathecal Morphine pump for pain, with chronic neck and back pain worsening for years s/p multiple spinal surgeries including laminectomies and fusion of cervical, thoracic and lumbar spine, most recently done in 2019 and 2020 at [facility] by Dr. Trinidad. CT findings include hardware loosening at T5, T6, T9-T12. S/p T3-L1 revision of fusion, c/b durotomy with primary repair, loss of left side motors, and repair of intrathecal morphine pump (8/17).    M40.209    Handoff    MEWS Score    HTN (hypertension)    Back pain    Hypertension    SS (spinal stenosis)    High cholesterol    Stroke    H/O carpal tunnel syndrome    H/O carpal tunnel syndrome    Chronic GERD    History of chronic constipation    Seizure    Urinary incontinence    Pre-diabetes    Acute UTI    Anxiety    Anxiety and depression    Arthritis    Eczema    External hemorrhoids    H/O kyphosis    Multiple sclerosis    Pancreas cyst    Scoliosis    Wheelchair dependent    Cervical pseudoarthrosis    Right shoulder pain    Cervical spondylosis    Chronic headaches    Chronic LUQ pain    Chronic UTI    Degenerative joint disease    H/O low back pain    Lumbosacral spondylosis    COPD (chronic obstructive pulmonary disease)    Back pain    Lumbar pseudoarthrosis    Pseudoarthrosis of lumbar spine    Back pain with history of spinal surgery    Fusion, spine, thoracolumbar, posterior approach    Cervical disc disease    H/O Spinal surgery    H/O breast surgery    S/P cervical discectomy    Previous back surgery    H/O shoulder surgery    H/O total shoulder replacement, right    Room Service Assist    [email or web address]        PLAN:  NEURO:  - neuro/spinal/vital checks q4hr  - pain management reccs: Valium 5q8, dilaudid PO  - cont movantik 25mg qd-   - HMV x 4 removed  - decadron 4q6 x 3 days  - ERAS protocol: tylenol 1g q8, gabapentin 600mg TID  - PT/OT when able   - cont wellbutrin 150mg qd, duloxetine 60mg qd  - postop xrays completed 8/22     CARDS:  - normotensive SBP goal  - cont lopressor 50mg BID, lipitor 40mg qd    PULM:   - extrubated in NSICU to NC 8/17  - Albuterol 2.5q6 PRN    GI:   - regular diet  - protonix while on decadron  - bowel regimen  - last BM 8/21    RENAL:  - straight cath prn  - h/o chronic urniary retnation, straight cath at home    HEME:   - SCDs for DVT ppx, SQL    ID:   - macrobid for chronic UTI  - afebrile, no leukocytosis    ENDO:  - ISS, A1C 6.6    DISPO: SDU status, full code, PT/OT rec AR    Assessment and Plan d/w Dr. Luz       Assessment:  Present when checked    []  GCS  E   V  M     Heart Failure: []Acute, [] acute on chronic , []chronic  Heart Failure:  [] Diastolic (HFpEF), [] Systolic (HFrEF), []Combined (HFpEF and HFrEF), [] RHF, [] Pulm HTN, [] Other    [] DARRYL, [] ATN, [] AIN, [] other  [] CKD1, [] CKD2, [] CKD 3, [] CKD 4, [] CKD 5, []ESRD    Encephalopathy: [] Metabolic, [] Hepatic, [] toxic, [] Neurological, [] Other    Abnormal Nurtitional Status: [] malnurtition (see nutrition note), [ ]underweight: BMI < 19, [] morbid obesity: BMI >40, [] Cachexia    [] Sepsis  [] hypovolemic shock,[] cardiogenic shock, [] hemorrhagic shock, [] neuogenic shock  [] Acute Respiratory Failure  []Cerebral edema, [] Brain compression/ herniation,   [] Functional quadriplegia  [] Acute blood loss anemia

## 2022-08-30 VITALS — HEART RATE: 122 BPM | OXYGEN SATURATION: 98 % | RESPIRATION RATE: 17 BRPM

## 2022-08-30 LAB
GLUCOSE BLDC GLUCOMTR-MCNC: 125 MG/DL — HIGH (ref 70–99)
GLUCOSE BLDC GLUCOMTR-MCNC: 148 MG/DL — HIGH (ref 70–99)
GLUCOSE BLDC GLUCOMTR-MCNC: 211 MG/DL — HIGH (ref 70–99)

## 2022-08-30 PROCEDURE — 99232 SBSQ HOSP IP/OBS MODERATE 35: CPT

## 2022-08-30 PROCEDURE — 72128 CT CHEST SPINE W/O DYE: CPT

## 2022-08-30 PROCEDURE — 82962 GLUCOSE BLOOD TEST: CPT

## 2022-08-30 PROCEDURE — 99024 POSTOP FOLLOW-UP VISIT: CPT

## 2022-08-30 PROCEDURE — 72131 CT LUMBAR SPINE W/O DYE: CPT

## 2022-08-30 PROCEDURE — 71046 X-RAY EXAM CHEST 2 VIEWS: CPT

## 2022-08-30 PROCEDURE — 72125 CT NECK SPINE W/O DYE: CPT

## 2022-08-30 RX ORDER — SODIUM CHLORIDE 9 MG/ML
500 INJECTION, SOLUTION INTRAVENOUS ONCE
Refills: 0 | Status: DISCONTINUED | OUTPATIENT
Start: 2022-08-30 | End: 2022-08-30

## 2022-08-30 RX ADMIN — HYDROMORPHONE HYDROCHLORIDE 6 MILLIGRAM(S): 2 INJECTION INTRAMUSCULAR; INTRAVENOUS; SUBCUTANEOUS at 16:13

## 2022-08-30 RX ADMIN — Medication 4: at 12:21

## 2022-08-30 RX ADMIN — SODIUM CHLORIDE 1 GRAM(S): 9 INJECTION INTRAMUSCULAR; INTRAVENOUS; SUBCUTANEOUS at 06:42

## 2022-08-30 RX ADMIN — GABAPENTIN 600 MILLIGRAM(S): 400 CAPSULE ORAL at 13:58

## 2022-08-30 RX ADMIN — HYDROMORPHONE HYDROCHLORIDE 6 MILLIGRAM(S): 2 INJECTION INTRAMUSCULAR; INTRAVENOUS; SUBCUTANEOUS at 00:25

## 2022-08-30 RX ADMIN — Medication 50 MILLIGRAM(S): at 16:09

## 2022-08-30 RX ADMIN — SODIUM CHLORIDE 1 GRAM(S): 9 INJECTION INTRAMUSCULAR; INTRAVENOUS; SUBCUTANEOUS at 13:59

## 2022-08-30 RX ADMIN — DULOXETINE HYDROCHLORIDE 60 MILLIGRAM(S): 30 CAPSULE, DELAYED RELEASE ORAL at 13:59

## 2022-08-30 RX ADMIN — LIDOCAINE 1 PATCH: 4 CREAM TOPICAL at 09:30

## 2022-08-30 RX ADMIN — Medication 50 MILLIGRAM(S): at 06:42

## 2022-08-30 RX ADMIN — GABAPENTIN 600 MILLIGRAM(S): 400 CAPSULE ORAL at 06:42

## 2022-08-30 RX ADMIN — Medication 10 MILLIGRAM(S): at 13:58

## 2022-08-30 RX ADMIN — Medication 1000 MILLIGRAM(S): at 09:29

## 2022-08-30 RX ADMIN — HYDROMORPHONE HYDROCHLORIDE 6 MILLIGRAM(S): 2 INJECTION INTRAMUSCULAR; INTRAVENOUS; SUBCUTANEOUS at 07:26

## 2022-08-30 RX ADMIN — HYDROMORPHONE HYDROCHLORIDE 6 MILLIGRAM(S): 2 INJECTION INTRAMUSCULAR; INTRAVENOUS; SUBCUTANEOUS at 08:26

## 2022-08-30 RX ADMIN — PANTOPRAZOLE SODIUM 40 MILLIGRAM(S): 20 TABLET, DELAYED RELEASE ORAL at 06:42

## 2022-08-30 RX ADMIN — NALOXEGOL OXALATE 25 MILLIGRAM(S): 12.5 TABLET, FILM COATED ORAL at 13:59

## 2022-08-30 RX ADMIN — Medication 2000 UNIT(S): at 13:59

## 2022-08-30 RX ADMIN — Medication 1000 MILLIGRAM(S): at 09:59

## 2022-08-30 RX ADMIN — POLYETHYLENE GLYCOL 3350 17 GRAM(S): 17 POWDER, FOR SOLUTION ORAL at 06:43

## 2022-08-30 RX ADMIN — BUPROPION HYDROCHLORIDE 150 MILLIGRAM(S): 150 TABLET, EXTENDED RELEASE ORAL at 14:04

## 2022-08-30 NOTE — CHART NOTE - NSCHARTNOTESELECT_GEN_ALL_CORE
Event Note
neurosurgery
Event Note
Follow up/Nutrition Services
Pain Medication/Event Note

## 2022-08-30 NOTE — PROGRESS NOTE ADULT - ASSESSMENT
67F with PMH including HTN, HLD, CVA, GAMALIEL, emphysema, urinary incontinence, and multiple spinal surgeries presenting for revision of fusion, repair of intrathecal morphine pump and durotomy.      #Revision of fusion  #Intrathecal morphine pump repair  - plan per neurosurgery team  - 3 drains still in place, transitioned to PO pain medications  - still having siginificant pain, even on current regimen.  Recommend pain management consult.  She tells me that she has a outpatient pain specialist.      #Urinary incontinence  #Chronic UTIs  - patient normally straight caths at home  - continue macrobid for chronic UTI    #HTN  #HLD  #Prior CVA  - continue home medications    #Constipation  - escalate bowel regimen      
67F with PMH including HTN, HLD, CVA, GAMALIEL, emphysema, urinary incontinence, and multiple spinal surgeries presenting for revision of fusion, repair of intrathecal morphine pump and durotomy.      #Revision of fusion  #Intrathecal morphine pump repair  - plan per neurosurgery team  - continue on current pain regimen  - maintain on bowel regimen     #Urinary incontinence  #Chronic UTIs  - patient normally straight caths at home  - continue macrobid for chronic UTI    #HTN  #HLD  #Prior CVA  - continue home medications    #Constipation  - continue bowel regimen  - give lactulose if no BM in 24 hours    Pending placement at [facility] acute rehab  
67F with PMH including HTN, HLD, CVA, GAMALIEL, emphysema, urinary incontinence, and multiple spinal surgeries presenting for revision of fusion, repair of intrathecal morphine pump and durotomy.      #Revision of fusion  #Intrathecal morphine pump repair  - plan per neurosurgery team  - one drain was removed today  - still on dilaudid PCA, which we will attempt to wean off of soon    #Urinary incontinence  #Chronic UTIs  - patient normally straight caths at home  - continue macrobid for chronic UTI    #HTN  #HLD  #Prior CVA  - continue home medications      
67F with PMH including HTN, HLD, CVA, GAMALIEL, emphysema, urinary incontinence, and multiple spinal surgeries presenting for revision of fusion, repair of intrathecal morphine pump and durotomy.      #Revision of fusion  #Intrathecal morphine pump repair  - plan per neurosurgery team  - one drain was removed yesterday  - still on dilaudid PCA, which we will attempt to wean off of soon  - still having siginificant pain, even on current regimen.  Recommend pain management consult.  She tells me that she has a outpatient pain specialist.      #Urinary incontinence  #Chronic UTIs  - patient normally straight caths at home  - continue macrobid for chronic UTI    #HTN  #HLD  #Prior CVA  - continue home medications    #Constipation  - escalate bowel regimen      
67F with PMH including HTN, HLD, CVA, GAMALIEL, emphysema, urinary incontinence, and multiple spinal surgeries presenting for revision of fusion, repair of intrathecal morphine pump and durotomy.      #Revision of fusion  #Intrathecal morphine pump repair  - plan per neurosurgery team  - transitioned to PO pain medications - has significant lethargy with doses of valium and narcotics  - maintain on bowel regimen     #Urinary incontinence  #Chronic UTIs  - patient normally straight caths at home  - continue macrobid for chronic UTI    #HTN  #HLD  #Prior CVA  - continue home medications    #Constipation  - escalate bowel regimen      
67F with PMH including HTN, HLD, CVA, GAMALIEL, emphysema, urinary incontinence, and multiple spinal surgeries presenting for revision of fusion, repair of intrathecal morphine pump and durotomy.      #Revision of fusion  #Intrathecal morphine pump repair  - plan per neurosurgery team  - transitioned to PO pain medications - less lethargic today and not falling asleep midsentence  - maintain on bowel regimen     #Urinary incontinence  #Chronic UTIs  - patient normally straight caths at home  - continue macrobid for chronic UTI    #HTN  #HLD  #Prior CVA  - continue home medications    #Constipation  - continue bowel regimen  - give lactulose if no BM in 24 hours      
67F with PMH including HTN, HLD, CVA, GAMALIEL, emphysema, urinary incontinence, and multiple spinal surgeries presenting for revision of fusion, repair of intrathecal morphine pump and durotomy.      #Revision of fusion  #Intrathecal morphine pump repair  Plan per neurosurgery team  Continue on current pain regimen  Maintain on bowel regimen     #Urinary incontinence  #Chronic UTIs  Patient normally straight caths at home  Continue macrobid for chronic UTI    #Hyponatremia   Refusing labs, last BMP 8/25. Agreeable to labs  Salt tablets once daily    #HTN  #HLD    #Prior CVA  Continue home medications    #Constipation  Continue bowel regimen  Give lactulose if no BM in 24 hours    Dispo: pending placement
per Neurosurgery    66 y/o female with PMHx HTN, HLD, CVA x 3 (last was 2016 with right hand weakness residual), GAMALIEL not using CPAP, Emphysema, ex-25 pack year smoker quit 2021, Chronic Constipation on Movantik, Urinary Incontinence chronically self straight cath at home, chronic UTI, Breast Implant Rupture with Chronic Leak, B/L CTS s/p release, s/p Intrathecal Morphine pump for pain, with chronic neck and back pain worsening for years s/p multiple spinal surgeries including laminectomies and fusion of cervical, thoracic and lumbar spine, most recently done in 2019 and 2020 at [facility] by Dr. Trinidad. CT findings include hardware loosening at T5, T6, T9-T12. S/p T3-L1 revision of fusion, c/b durotomy with primary repair, loss of left side motors, and repair of intrathecal morphine pump (8/17).PLAN:  NEURO:  - neuro/spinal/vital checks q4hr  - pain management reccs: Valium 5q8, dilaudid PO  - cont movantik 25mg qd-   - HMV x 4 removed  - decadron 4q6 x 3 days  - ERAS protocol: tylenol 1g q8, gabapentin 600mg TID  - PT/OT when able   - cont wellbutrin 150mg qd, duloxetine 60mg qd  - postop xrays completed 8/22     CARDS:  - normotensive SBP goal  - cont lopressor 50mg BID, lipitor 40mg qd    PULM:   - extrubated in NSICU to NC 8/17  - Albuterol 2.5q6 PRN    GI:   - regular diet  - protonix while on decadron  - bowel regimen  - last BM 8/21    RENAL:  - straight cath prn  - h/o chronic urniary retnation, straight cath at home    HEME:   - SCDs for DVT ppx, SQL    ID:   - macrobid for chronic UTI  - afebrile, no leukocytosis    ENDO:  - ISS, A1C 6.6    DISPO: SDU status, full code, PT/OT rec AR
per Neurosurgery    68 y/o female with PMHx HTN, HLD, CVA x 3 (last was 2016 with right hand weakness residual), GAMALIEL not using CPAP, Emphysema, ex-25 pack year smoker quit 2021, Chronic Constipation on Movantik, Urinary Incontinence chronically self straight cath at home, chronic UTI, Breast Implant Rupture with Chronic Leak, B/L CTS s/p release, s/p Intrathecal Morphine pump for pain, with chronic neck and back pain worsening for years s/p multiple spinal surgeries including laminectomies and fusion of cervical, thoracic and lumbar spine, most recently done in 2019 and 2020 at [facility] by Dr. Trinidad. CT findings include hardware loosening at T5, T6, T9-T12. S/p T3-L1 revision of fusion, c/b durotomy with primary repair, loss of left side motors, and repair of intrathecal morphine pump (8/17).      PLAN:  NEURO:  - neuro/spinal/vital checks q4hr  - pain management reccs: Valium 5q8, dilaudid PO  - cont movantik 25mg qd  - 1 deep HMVs, monitor outputs, HMV#4 d/c 8/19  - decadron 4q6 x 3 days  - ERAS protocol: tylenol 1g q8, gabapentin 600mg TID  - PT/OT when able   - cont wellbutrin 150mg qd, duloxetine 60mg qd  - postop xrays completed 8/22     CARDS:  - normotensive SBP goal  - cont lopressor 50mg BID, lipitor 40mg qd    PULM:   - extrubated in NSICU to NC 8/17  - Albuterol 2.5q6 PRN    GI:   - regular diet  - protonix while on decadron  - bowel regimen  - last BM 8/21    RENAL:  - straight cath prn  - h/o chronic urinary retention, straight cath at home    HEME:   - SCDs for DVT ppx, SQL    ID:   - macrobid for chronic UTI  - afebrile, no leukocytosis    ENDO:  - ISS, A1C 6.6    
67F with PMH including HTN, HLD, CVA, GAMALIEL, emphysema, urinary incontinence, and multiple spinal surgeries presenting for revision of fusion, repair of intrathecal morphine pump and durotomy.      #Revision of fusion  #Intrathecal morphine pump repair  Plan per neurosurgery team  Continue on current pain regimen  Maintain on bowel regimen     #Urinary incontinence  #Chronic UTIs  Patient normally straight caths at home  Continue macrobid for chronic UTI    #Hyponatremia   Refusing labs, last BMP 8/25   Salt tablets once daily    #HTN  #HLD    #Prior CVA  Continue home medications    #Constipation  Continue bowel regimen  Give lactulose if no BM in 24 hours    Dispo: pending placement 
per Neurosurgery    66 y/o female with PMHx HTN, HLD, CVA x 3 (last was 2016 with right hand weakness residual), GAMALIEL not using CPAP, Emphysema, ex-25 pack year smoker quit 2021, Chronic Constipation on Movantik, Urinary Incontinence chronically self straight cath at home, chronic UTI, Breast Implant Rupture with Chronic Leak, B/L CTS s/p release, s/p Intrathecal Morphine pump for pain, with chronic neck and back pain worsening for years s/p multiple spinal surgeries including laminectomies and fusion of cervical, thoracic and lumbar spine, most recently done in 2019 and 2020 at [facility] by Dr. Trinidad. CT findings include hardware loosening at T5, T6, T9-T12. S/p T3-L1 revision of fusion, c/b durotomy with primary repair, loss of left side motors, and repair of intrathecal morphine pump (8/17).    PLAN:  NEURO:  - neuro/spinal/vital checks q4hr  - pain management reccs: Valium 5q8, dilaudid PO  - cont movantik 25mg qd  - 3 deep HMVs, monitor outputs, HMV#4 d/c 8/19  - decadron 4q6 x 3 days  - ERAS protocol: tylenol 1g q8, gabapentin 600mg TID  - PT/OT when able   - cont wellbutrin 150mg qd, duloxetine 60mg qd  - postop xrays completed 8/22     CARDS:  - normotensive SBP goal  - cont lopressor 50mg BID, lipitor 40mg qd    PULM:   - extrubated in NSICU to NC 8/17  - Albuterol 2.5q6 PRN    GI:   - regular diet  - protonix while on decadron  - bowel regimen  - last BM 8/21    RENAL:  - straight cath prn  - h/o chronic urniary retnation, straight cath at home    HEME:   - SCDs for DVT ppx, SQL    ID:   - macrobid for chronic UTI  - afebrile, no leukocytosis    ENDO:  - ISS, A1C 6.6    DISPO: SDU status, full code, PM&R / PT/OT rec AR
67F with PMH including HTN, HLD, CVA, GAMALIEL, emphysema, urinary incontinence, and multiple spinal surgeries presenting for revision of fusion, repair of intrathecal morphine pump and durotomy.      #Revision of fusion  #Intrathecal morphine pump repair  - plan per neurosurgery team  - continue on current pain regimen  - maintain on bowel regimen     #Urinary incontinence  #Chronic UTIs  - patient normally straight caths at home  - continue macrobid for chronic UTI    #HTN  #HLD  #Prior CVA  - continue home medications    #Constipation  - continue bowel regimen  - give lactulose if no BM in 24 hours    Pending placement   
67F with PMH including HTN, HLD, CVA, GAMALIEL, emphysema, urinary incontinence, and multiple spinal surgeries presenting for revision of fusion, repair of intrathecal morphine pump and durotomy.      #Revision of fusion  #Intrathecal morphine pump repair  - plan per neurosurgery team  - transitioned to PO pain medications    #Urinary incontinence  #Chronic UTIs  - patient normally straight caths at home  - continue macrobid for chronic UTI    #HTN  #HLD  #Prior CVA  - continue home medications    #Constipation  - escalate bowel regimen      
68 yo F with history of HTN, HLD, prior strokes with residual RUE weakness, GAMALIEL noncompliant with CPAP, COPD, prior extensive smoking history, chronic constipation, urinary incontinence, chronic UTI, ruptured breast implant, bilateral CTS release surgery, intrathecal morphine pump.    NEURO   T3 - L1 posterior fusion  #S/p T3 - L1 posterior fusion, Dr. Luz, POD#0  - Admit NSICU, Q1h neuro checks / Q1h vital signs  - patient w/o pain, d/c PCA pump, c/w gabapentin 600mg TID  - dexamethasone 4q6 stop after 3 days per nsgy  - c/w bupropion XL 150mg    PULM  -extubated  - SpO2 goal > 92%, supplemental O2 and pulm toileting as needed    CARDIO  - BP goal: MAP > 85 ; off pressors   -PVCs seen on tele, c/w metoprolol 25mg BID    GI  -Diet: NPO for now, passed bedside swallow examination  -Stress ulcer prophylaxis: PPI while on steroids   -bowel regimen senna, Miralax, bisacodyl    /RENAL  - Monitor UOP/volume status, BUN/SCr    HEME  - Maintain Hb > 7.0, PLT > 100,000K   (EBL 500cc, 1L UOP, 4200 crystalloid, 1u PRBC)  - SCDs, holding chemoppx    ID  - Monitor for infectious s/s, fever curve, leukocytosis  -post op ancef  chronic UTI on nitrofurantonin 50mg qd     ENDO  #History of pre-diabetes  - RASS   
67F with PMH including HTN, HLD, CVA, GAMALIEL, emphysema, urinary incontinence, and multiple spinal surgeries presenting for revision of fusion, repair of intrathecal morphine pump and durotomy.      #Revision of fusion  #Intrathecal morphine pump repair  - plan per neurosurgery.  Continue on current pain regimen, and maintain on bowel regimen.  Can have lactulose if not BM after 24 hours  Continue on current pain regimen  Maintain on bowel regimen     #Urinary incontinence  #Chronic UTIs  Patient normally straight caths at home  Continue macrobid for chronic UTI    #Hyponatremia   Salt tablets once daily    #HTN  #HLD    #Prior CVA  Continue home medications    #Constipation  Continue bowel regimen  Give lactulose if no BM in 24 hours    Dispo: going to rehab today
 T3 - L1 posterior fusion (EBL 500cc, 1L UOP, 4200 crystalloid, 1u PRBC) in a 68 yo F with history of HTN, HLD, prior strokes with residual RUE weakness, GAMALIEL noncompliant with CPAP, COPD, prior extensive smoking history, chronic constipation, urinary incontinence, chronic UTI, ruptured breast implant, bilateral CTS release surgery, intrathecal morphine pump.    NEURO  #S/p T3 - L1 posterior fusion, Dr. Luz, POD#0  - Admit NSICU, Q1h neuro checks / Q1h vital signs  - Acute pain consultation, dilaudid PCA  - Home medications    PULM  - Currently on pressure support passing SBT; consider extubation when patient awake and following commands  - SpO2 goal > 92%, supplemental O2 and pulm toileting as needed    CARDIO  - BP goal: MAP > 85 pending full examination, Levophed     GI  - Diet: NPO for now anticipating extubation  - Stress ulcer prophylaxis: PPI unless extubated    /RENAL  - Monitor UOP/volume status, BUN/SCr    HEME  - Maintain Hb > 7.0, PLT > 100,000K  - SCDs, holding chemoppx    ID  - Monitor for infectious s/s, fever curve, leukocytosis  - Periop ancef    ENDO  #History of pre-diabetes  - RASS

## 2022-08-30 NOTE — CHART NOTE - NSCHARTNOTEFT_GEN_A_CORE
Patient had tachycardia this afternoon secondary not getting her AM metoprolol. All other vital signs remain stable and with normal range. Patient has new  no complaint, denies dizziness, chest pain, palpitations.  Metoprolol was given. Patient can be discharge to [facility]. Patient had tachycardia this afternoon secondary not getting her metoprolol yesterday and this morning as clearly ordered. All other vital signs remain stable and with normal range. Patient has new no complaint, denies dizziness, chest pain, palpitations.  Metoprolol was given. Patient can be discharge to [facility].

## 2022-08-30 NOTE — PROGRESS NOTE ADULT - PROVIDER SPECIALTY LIST ADULT
Internal Medicine
Neurosurgery
Rehab Medicine
Hospitalist
NSICU
Neurosurgery
Hospitalist
NSICU
Neurosurgery
Rehab Medicine
Rehab Medicine
Hospitalist
NSICU
Neurosurgery

## 2022-08-30 NOTE — PROGRESS NOTE ADULT - SUBJECTIVE AND OBJECTIVE BOX
HPI:  ***********************************************  ADULT NSICU H&P  VANNA DIAZ [id number] [facility] [id number]  ***********************************************  H&P:  "66 y/o female with PMHx HTN, HLD, CVA x 3 (last was 2016 with right hand weakness residual), GAMALIEL not using CPAP, Emphysema, ex-25 pack year smoker quit 2021, Chronic Constipation on Movantik, Urinary Incontinence chronically self straight cath at home, chronic UTI, Breast Implant Rupture with Chronic Leak, B/L CTS s/p release, s/p Intrathecal Morphine pump for pain, with chronic neck and back pain worsening for years s/p multiple spinal surgeries including laminectomies and fusion of cervical, thoracic and lumbar spine, most recently done in 2019 and 2020 at [facility] by Dr. Trinidad.  She is wheelchair dependent.  She also has history of right shoulder replacement.  She reports bilateral shoulder pain, bilateral leg pain and burning to the bilateral calves.  She reports right hand greater than arm and leg weakness."    66 yo F with history of HTN, HLD, prior strokes with residual RUE weakness, GAMALIEL noncompliant with CPAP, COPD, prior extensive smoking history, chronic constipation, urinary incontinence, chronic UTI, ruptured breast implant, bilateral CTS release surgery, intrathecal morphine pump and chronic pain s/p multiple spinal surgeries who is now s/p T3 - L1 posterior fusion (EBL 500cc, 1L UOP, 4200 crystalloid, 1u PRBC) and admitted to NSICU.  She remains intubated with progressive improvement in level of arousal    OVERNIGHT EVENTS: KAMRAN overnight, neuro stable, pain controlled     HOSPITAL COURSE:  8/17: POD0 s/p T3-L1 revision of fusion. extubated in NSICU  8/18: POD1 KAMRAN overnight. Started on PCA. Tx to SDU. PT/OT rec AR.  8/19: POD2, overnight episode of agitation and screaming due to concern over pain control. neuro exam stable. 1 HMV drain removed.  8/20: POD3, extra 0.25mg IV dilaudid x 2 given o/n for pain. neuro exam stable  8/21: POD 4. KAMRAN overnight. PCA off now on PO meds. pending rehab, hx of chronic constipation, increased miralax to twice a day and given lactulose and had bowel movement.Valium increased to q 6 hours for muscle spasm. Pending XRs   8/22: POD5, complaining of severe pain o/n requiring additional dilaudid pushes. HMV#3 removed and incisional dressing changed. Given 500cc bolus for pressure in 90s, patient mildly sedated from pain medication. Pain regimen discussed with Dr. Mclaughlin and recommends to keep regimen the same at this time. Accepted to [facility], [place], pending insurance authorization. Post op XRs performed   8/23: POD6, still complains of pain but sleepy with current regimen, plan for Dr. Mclaughlin to interrogate intrathecal pain pump tomorrow. Hemovac removed, US guided IV placed for access. Pending insurance authorization for [facility].  8/24: POD7, still complains of pain, requiring 1 push of dilaudid    8/25: POD 8, hypotensive last night, metoprolol/valium held. Had fall out of bed last night, no head trauma reported. BP normalized in AM. Pt complaining of R ankle pain, xray obtained. Receieved 6mg dilaudid however claims to have "lost" a pill. Has 1 HMV remaining.   8/26: POD9, still w/ "continuous pain" takes oxycodone and oxycontin at home. Valium prn q4.   8/27: POD10. pain controlled overnight. awaiting auth for rehab. Had a slip and near fall episode this afternoon, reinforced that she needs OOB assistance.   8/28: POD11, pain adequately controlled. Refused mag citrate/senna/miralax only will take Dulcolax and Movantik.   8/29: POD12. Received lactulose, had a BM. Wound dressing removed today. Incision clean, dry, intact. negative COVID result today. Dispo to [place] rehab tomorrow  8/30: POD13, KAMRAN overnight, pain controlled, pending discharge to [place] rehab tomorow pending bed availability.       Vital Signs Last 24 Hrs  T(C): 36.4 (29 Aug 2022 20:17), Max: 36.8 (29 Aug 2022 17:00)  T(F): 97.5 (29 Aug 2022 20:17), Max: 98.3 (29 Aug 2022 17:00)  HR: 105 (29 Aug 2022 20:17) (89 - 105)  BP: 119/73 (29 Aug 2022 20:17) (109/75 - 119/73)  BP(mean): --  RR: 18 (29 Aug 2022 20:17) (16 - 18)  SpO2: 100% (29 Aug 2022 20:17) (96% - 100%)    Parameters below as of 29 Aug 2022 20:17  Patient On (Oxygen Delivery Method): room air        I&O's Summary    28 Aug 2022 07:01  -  29 Aug 2022 07:00  --------------------------------------------------------  IN: 1020 mL / OUT: 1775 mL / NET: -755 mL    29 Aug 2022 07:01  -  30 Aug 2022 00:45  --------------------------------------------------------  IN: 1060 mL / OUT: 925 mL / NET: 135 mL        PHYSICAL EXAM:  General: patient seen laying supine in bed in NAD  Neuro: AAOx3, FC, OE spontaneously, speech clear and fluent, CNII-XI grossly intact, face symmetric, no pronator drift, strength 4/5 b/l UE HG 5/5 proximally and b/l LE HF 2/5 KF 3/5 DF/PF 5/5, sensation grossly intact to light touch throughout  HEENT: PERRL, EOMI  Neck: supple  Cardiac: RRR, S1S2  Pulmonary: chest rise symmetric  Abdomen: soft, nontender, nondistended  Ext: perfusing well  Skin: warm, dry  Wound: thoracolumbar incision c/d/i     TUBES/LINES:  [] Castillo  [] Lumbar Drain  [] Wound Drains  [] Others      DIET:  [] NPO  [X] Mechanical  [] Tube feeds    LABS:                        10.6   10.22 )-----------( 263      ( 28 Aug 2022 09:37 )             33.5     08-28    135  |  101  |  13  ----------------------------<  256<H>  4.2   |  25  |  0.58    Ca    8.4      28 Aug 2022 09:37  Phos  3.0     08-28  Mg     1.8     08-28              CAPILLARY BLOOD GLUCOSE      POCT Blood Glucose.: 127 mg/dL (29 Aug 2022 21:49)  POCT Blood Glucose.: 154 mg/dL (29 Aug 2022 17:05)  POCT Blood Glucose.: 167 mg/dL (29 Aug 2022 11:50)  POCT Blood Glucose.: 127 mg/dL (29 Aug 2022 07:41)      Drug Levels: [] N/A    CSF Analysis: [] N/A      Allergies    oxycodone (Pruritus)    Intolerances      MEDICATIONS:  Antibiotics:  nitrofurantoin macrocrystals (MACRODANTIN) 50 milliGRAM(s) Oral <User Schedule>    Neuro:  acetaminophen     Tablet .. 1000 milliGRAM(s) Oral every 8 hours  buPROPion XL (24-Hour) . 150 milliGRAM(s) Oral daily  diazepam    Tablet 5 milliGRAM(s) Oral every 4 hours PRN  diphenhydrAMINE 25 milliGRAM(s) Oral every 6 hours PRN  DULoxetine 60 milliGRAM(s) Oral daily  gabapentin 600 milliGRAM(s) Oral three times a day  HYDROmorphone   Tablet 6 milliGRAM(s) Oral every 4 hours PRN  HYDROmorphone   Tablet 4 milliGRAM(s) Oral every 4 hours PRN  ondansetron Injectable 4 milliGRAM(s) IV Push every 6 hours PRN    Anticoagulation:  enoxaparin Injectable 40 milliGRAM(s) SubCutaneous <User Schedule>    OTHER:  ALBUTerol   0.042% 2.5 milliGRAM(s) Nebulizer every 6 hours PRN  bisacodyl 10 milliGRAM(s) Oral every 12 hours  bisacodyl Suppository 10 milliGRAM(s) Rectal daily PRN  insulin lispro (ADMELOG) corrective regimen sliding scale   SubCutaneous Before meals and at bedtime  lidocaine   4% Patch 1 Patch Transdermal every 24 hours  lidocaine   4% Patch 1 Patch Transdermal every 24 hours  metoprolol tartrate 50 milliGRAM(s) Oral two times a day  naloxegol 25 milliGRAM(s) Oral daily  naloxone Injectable 0.1 milliGRAM(s) IV Push every 3 minutes PRN  pantoprazole    Tablet 40 milliGRAM(s) Oral before breakfast  polyethylene glycol 3350 17 Gram(s) Oral every 12 hours  senna 2 Tablet(s) Oral at bedtime  simvastatin 40 milliGRAM(s) Oral at bedtime    IVF:  cholecalciferol 2000 Unit(s) Oral daily  sodium chloride 1 Gram(s) Oral every 8 hours    CULTURES:    RADIOLOGY & ADDITIONAL TESTS:    ASSESSMENT  66 y/o female with PMHx HTN, HLD, CVA x 3 (last was 2016 with right hand weakness residual), GAMALIEL not using CPAP, Emphysema, ex-25 pack year smoker quit 2021, Chronic Constipation on Movantik, Urinary Incontinence chronically self straight cath at home, chronic UTI, Breast Implant Rupture with Chronic Leak, B/L CTS s/p release, s/p Intrathecal Morphine pump for pain, with chronic neck and back pain worsening for years s/p multiple spinal surgeries including laminectomies and fusion of cervical, thoracic and lumbar spine, most recently done in 2019 and 2020 at [facility] by Dr. Trinidad. CT findings include hardware loosening at T5, T6, T9-T12. S/p T3-L1 revision of fusion, c/b durotomy with primary repair, loss of left side motors, and repair of intrathecal morphine pump (8/17).    M40.209    Handoff    MEWS Score    HTN (hypertension)    Back pain    Hypertension    SS (spinal stenosis)    High cholesterol    Stroke    H/O carpal tunnel syndrome    H/O carpal tunnel syndrome    Chronic GERD    History of chronic constipation    Seizure    Urinary incontinence    Pre-diabetes    Acute UTI    Anxiety    Anxiety and depression    Arthritis    Eczema    External hemorrhoids    H/O kyphosis    Multiple sclerosis    Pancreas cyst    Scoliosis    Wheelchair dependent    Cervical pseudoarthrosis    Right shoulder pain    Cervical spondylosis    Chronic headaches    Chronic LUQ pain    Chronic UTI    Degenerative joint disease    H/O low back pain    Lumbosacral spondylosis    COPD (chronic obstructive pulmonary disease)    Back pain    Lumbar pseudoarthrosis    Pseudoarthrosis of lumbar spine    Fusion, spine, thoracolumbar, posterior approach    Back pain with history of spinal surgery    Fusion, spine, thoracolumbar, posterior approach    Cervical disc disease    H/O Spinal surgery    H/O breast surgery    S/P cervical discectomy    Previous back surgery    H/O shoulder surgery    H/O total shoulder replacement, right    Room Service Assist    [email or web address]        PLAN:  PLAN:  NEURO:  - neuro/spinal/vital checks q4hr  - pain management reccs: Valium 5q8, dilaudid PO  - cont movantik 25mg qd-   - HMV x 4 removed  - decadron 4q6 x 3 days completed  - ERAS protocol: tylenol 1g q8, gabapentin 600mg TID  - PT/OT when able   - cont wellbutrin 150mg qd, duloxetine 60mg qd  - postop xrays completed 8/22     CARDS:  - normotensive SBP goal  - cont lopressor 50mg BID, lipitor 40mg qd    PULM:   - extrubated in NSICU to NC 8/17  - Albuterol 2.5q6 PRN    GI:   - regular diet  - protonix while on decadron  - bowel regimen  - last BM 8/29    RENAL:  - straight cath prn  - h/o chronic urniary retnation, straight cath at home    HEME:   - SCDs for DVT ppx, SQL    ID:   - macrobid for chronic UTI  - afebrile, no leukocytosis    ENDO:  - ISS, A1C 6.6    DISPO: SDU status, full code, PT/OT rec AR, denied auth going to [facility]     Assessment and Plan d/w Dr. Luz         Assessment:  Present when checked    []  GCS  E   V  M     Heart Failure: []Acute, [] acute on chronic , []chronic  Heart Failure:  [] Diastolic (HFpEF), [] Systolic (HFrEF), []Combined (HFpEF and HFrEF), [] RHF, [] Pulm HTN, [] Other    [] DARRYL, [] ATN, [] AIN, [] other  [] CKD1, [] CKD2, [] CKD 3, [] CKD 4, [] CKD 5, []ESRD    Encephalopathy: [] Metabolic, [] Hepatic, [] toxic, [] Neurological, [] Other    Abnormal Nurtitional Status: [] malnurtition (see nutrition note), [ ]underweight: BMI < 19, [] morbid obesity: BMI >40, [] Cachexia    [] Sepsis  [] hypovolemic shock,[] cardiogenic shock, [] hemorrhagic shock, [] neuogenic shock  [] Acute Respiratory Failure  []Cerebral edema, [] Brain compression/ herniation,   [] Functional quadriplegia  [] Acute blood loss anemia

## 2022-08-30 NOTE — PROGRESS NOTE ADULT - SUBJECTIVE AND OBJECTIVE BOX
She is in good spirits and ready to be discharged to rehab facility today.  She is concerned that she will develop diabetes, as there is a strong family history.  Denies any significant pain and states that everything is under control.  Had a bowel movement recently.          PHYSICAL EXAM:    General: WDWN, sitting up in bed  HEENT: anicteric sclera; MMM  Cardiovascular: +S1/S2, RRR, no murmurs, rubs or gallops  Respiratory: CTA B/L; no W/R/R  Gastrointestinal: soft, NT/ND; +BSx4  Extremities: WWP; no edema  Neurological: speech is fluent, no acute deficits noted  Psychiatric: pleasant mood and affect  Dermatologic: warm and dry    VITAL SIGNS:  Vital Signs Last 24 Hrs  T(C): 36.7 (30 Aug 2022 09:04), Max: 36.9 (30 Aug 2022 05:17)  T(F): 98.1 (30 Aug 2022 09:04), Max: 98.4 (30 Aug 2022 05:17)  HR: 116 (30 Aug 2022 09:04) (103 - 116)  BP: 107/64 (30 Aug 2022 09:04) (104/62 - 134/80)  BP(mean): --  RR: 18 (30 Aug 2022 09:04) (17 - 18)  SpO2: 99% (30 Aug 2022 09:04) (96% - 100%)    Parameters below as of 30 Aug 2022 09:04  Patient On (Oxygen Delivery Method): room air          MEDICATIONS:  MEDICATIONS  (STANDING):  acetaminophen     Tablet .. 1000 milliGRAM(s) Oral every 8 hours  bisacodyl 10 milliGRAM(s) Oral every 12 hours  buPROPion XL (24-Hour) . 150 milliGRAM(s) Oral daily  cholecalciferol 2000 Unit(s) Oral daily  DULoxetine 60 milliGRAM(s) Oral daily  enoxaparin Injectable 40 milliGRAM(s) SubCutaneous <User Schedule>  gabapentin 600 milliGRAM(s) Oral three times a day  insulin lispro (ADMELOG) corrective regimen sliding scale   SubCutaneous Before meals and at bedtime  lidocaine   4% Patch 1 Patch Transdermal every 24 hours  lidocaine   4% Patch 1 Patch Transdermal every 24 hours  metoprolol tartrate 50 milliGRAM(s) Oral two times a day  naloxegol 25 milliGRAM(s) Oral daily  nitrofurantoin macrocrystals (MACRODANTIN) 50 milliGRAM(s) Oral <User Schedule>  pantoprazole    Tablet 40 milliGRAM(s) Oral before breakfast  polyethylene glycol 3350 17 Gram(s) Oral every 12 hours  senna 2 Tablet(s) Oral at bedtime  simvastatin 40 milliGRAM(s) Oral at bedtime  sodium chloride 1 Gram(s) Oral every 8 hours    MEDICATIONS  (PRN):  ALBUTerol   0.042% 2.5 milliGRAM(s) Nebulizer every 6 hours PRN Shortness of Breath and/or Wheezing  bisacodyl Suppository 10 milliGRAM(s) Rectal daily PRN Constipation  diazepam    Tablet 5 milliGRAM(s) Oral every 4 hours PRN muscle spasm  diphenhydrAMINE 25 milliGRAM(s) Oral every 6 hours PRN Rash and/or Itching  HYDROmorphone   Tablet 6 milliGRAM(s) Oral every 4 hours PRN Severe Pain (7 - 10)  HYDROmorphone   Tablet 4 milliGRAM(s) Oral every 4 hours PRN Moderate Pain (4 - 6)  naloxone Injectable 0.1 milliGRAM(s) IV Push every 3 minutes PRN For ANY of the following changes in patient status:  A. RR LESS THAN 10 breaths per minute, B. Oxygen saturation LESS THAN 90%, C. Sedation score of 6  ondansetron Injectable 4 milliGRAM(s) IV Push every 6 hours PRN Nausea      ALLERGIES:  Allergies    oxycodone (Pruritus)    Intolerances        LABS:              CAPILLARY BLOOD GLUCOSE      POCT Blood Glucose.: 125 mg/dL (30 Aug 2022 08:07)      RADIOLOGY & ADDITIONAL TESTS: Reviewed.

## 2022-09-07 PROBLEM — R32 UNSPECIFIED URINARY INCONTINENCE: Chronic | Status: ACTIVE | Noted: 2022-01-13

## 2022-09-08 DIAGNOSIS — F32.A DEPRESSION, UNSPECIFIED: ICD-10-CM

## 2022-09-08 DIAGNOSIS — Z88.5 ALLERGY STATUS TO NARCOTIC AGENT: ICD-10-CM

## 2022-09-08 DIAGNOSIS — F41.9 ANXIETY DISORDER, UNSPECIFIED: ICD-10-CM

## 2022-09-08 DIAGNOSIS — M96.0 PSEUDARTHROSIS AFTER FUSION OR ARTHRODESIS: ICD-10-CM

## 2022-09-08 DIAGNOSIS — R29.898 OTHER SYMPTOMS AND SIGNS INVOLVING THE MUSCULOSKELETAL SYSTEM: ICD-10-CM
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
## 2022-09-08 DIAGNOSIS — E87.1 HYPO-OSMOLALITY AND HYPONATREMIA: ICD-10-CM

## 2022-09-08 DIAGNOSIS — E78.5 HYPERLIPIDEMIA, UNSPECIFIED: ICD-10-CM

## 2022-09-08 DIAGNOSIS — T85.41XA BREAKDOWN (MECHANICAL) OF BREAST PROSTHESIS AND IMPLANT, INITIAL ENCOUNTER: ICD-10-CM

## 2022-09-08 DIAGNOSIS — M96.1 POSTLAMINECTOMY SYNDROME, NOT ELSEWHERE CLASSIFIED: ICD-10-CM
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
## 2022-09-08 DIAGNOSIS — Z87.891 PERSONAL HISTORY OF NICOTINE DEPENDENCE: ICD-10-CM

## 2022-09-08 DIAGNOSIS — N39.0 URINARY TRACT INFECTION, SITE NOT SPECIFIED: ICD-10-CM

## 2022-09-08 DIAGNOSIS — Z91.19 PATIENT'S NONCOMPLIANCE WITH OTHER MEDICAL TREATMENT AND REGIMEN: ICD-10-CM

## 2022-09-08 DIAGNOSIS — M62.838 OTHER MUSCLE SPASM: ICD-10-CM

## 2022-09-08 DIAGNOSIS — K21.9 GASTRO-ESOPHAGEAL REFLUX DISEASE WITHOUT ESOPHAGITIS: ICD-10-CM
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
## 2022-09-08 DIAGNOSIS — G47.33 OBSTRUCTIVE SLEEP APNEA (ADULT) (PEDIATRIC): ICD-10-CM

## 2022-09-08 DIAGNOSIS — T85.690A OTHER MECHANICAL COMPLICATION OF CRANIAL OR SPINAL INFUSION CATHETER, INITIAL ENCOUNTER: ICD-10-CM

## 2022-09-08 DIAGNOSIS — I10 ESSENTIAL (PRIMARY) HYPERTENSION: ICD-10-CM

## 2022-09-08 DIAGNOSIS — I69.361: ICD-10-CM

## 2022-09-08 DIAGNOSIS — R73.03 PREDIABETES: ICD-10-CM
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
## 2022-09-08 DIAGNOSIS — Z99.3 DEPENDENCE ON WHEELCHAIR: ICD-10-CM

## 2022-09-08 DIAGNOSIS — J43.9 EMPHYSEMA, UNSPECIFIED: ICD-10-CM

## 2022-09-08 DIAGNOSIS — R32 UNSPECIFIED URINARY INCONTINENCE: ICD-10-CM

## 2022-09-08 DIAGNOSIS — T84.216A BREAKDOWN (MECHANICAL) OF INTERNAL FIXATION DEVICE OF VERTEBRAE, INITIAL ENCOUNTER: ICD-10-CM

## 2022-09-08 DIAGNOSIS — Z96.611 PRESENCE OF RIGHT ARTIFICIAL SHOULDER JOINT: ICD-10-CM

## 2022-09-08 DIAGNOSIS — K59.09 OTHER CONSTIPATION: ICD-10-CM

## 2022-09-15 PROCEDURE — 85027 COMPLETE CBC AUTOMATED: CPT

## 2022-09-15 PROCEDURE — 72125 CT NECK SPINE W/O DYE: CPT

## 2022-09-15 PROCEDURE — 85730 THROMBOPLASTIN TIME PARTIAL: CPT

## 2022-09-15 PROCEDURE — 97161 PT EVAL LOW COMPLEX 20 MIN: CPT

## 2022-09-15 PROCEDURE — 70450 CT HEAD/BRAIN W/O DYE: CPT

## 2022-09-15 PROCEDURE — 86901 BLOOD TYPING SEROLOGIC RH(D): CPT

## 2022-09-15 PROCEDURE — 97530 THERAPEUTIC ACTIVITIES: CPT

## 2022-09-15 PROCEDURE — 83735 ASSAY OF MAGNESIUM: CPT

## 2022-09-15 PROCEDURE — 36415 COLL VENOUS BLD VENIPUNCTURE: CPT

## 2022-09-15 PROCEDURE — 87635 SARS-COV-2 COVID-19 AMP PRB: CPT

## 2022-09-15 PROCEDURE — 97116 GAIT TRAINING THERAPY: CPT

## 2022-09-15 PROCEDURE — 97110 THERAPEUTIC EXERCISES: CPT

## 2022-09-15 PROCEDURE — 85025 COMPLETE CBC W/AUTO DIFF WBC: CPT

## 2022-09-15 PROCEDURE — 86803 HEPATITIS C AB TEST: CPT

## 2022-09-15 PROCEDURE — 85610 PROTHROMBIN TIME: CPT

## 2022-09-15 PROCEDURE — 73600 X-RAY EXAM OF ANKLE: CPT

## 2022-09-15 PROCEDURE — C1889: CPT

## 2022-09-15 PROCEDURE — 86850 RBC ANTIBODY SCREEN: CPT

## 2022-09-15 PROCEDURE — 76000 FLUOROSCOPY <1 HR PHYS/QHP: CPT

## 2022-09-15 PROCEDURE — 83605 ASSAY OF LACTIC ACID: CPT

## 2022-09-15 PROCEDURE — 86923 COMPATIBILITY TEST ELECTRIC: CPT

## 2022-09-15 PROCEDURE — P9016: CPT

## 2022-09-15 PROCEDURE — 80053 COMPREHEN METABOLIC PANEL: CPT

## 2022-09-15 PROCEDURE — C1713: CPT

## 2022-09-15 PROCEDURE — 84295 ASSAY OF SERUM SODIUM: CPT

## 2022-09-15 PROCEDURE — 72070 X-RAY EXAM THORAC SPINE 2VWS: CPT

## 2022-09-15 PROCEDURE — 82947 ASSAY GLUCOSE BLOOD QUANT: CPT

## 2022-09-15 PROCEDURE — 36430 TRANSFUSION BLD/BLD COMPNT: CPT

## 2022-09-15 PROCEDURE — 86900 BLOOD TYPING SEROLOGIC ABO: CPT

## 2022-09-15 PROCEDURE — 80048 BASIC METABOLIC PNL TOTAL CA: CPT

## 2022-09-15 PROCEDURE — 82330 ASSAY OF CALCIUM: CPT

## 2022-09-15 PROCEDURE — 82962 GLUCOSE BLOOD TEST: CPT

## 2022-09-15 PROCEDURE — 84132 ASSAY OF SERUM POTASSIUM: CPT

## 2022-09-15 PROCEDURE — 97535 SELF CARE MNGMENT TRAINING: CPT

## 2022-09-15 PROCEDURE — 84100 ASSAY OF PHOSPHORUS: CPT

## 2022-09-15 PROCEDURE — 72020 X-RAY EXAM OF SPINE 1 VIEW: CPT

## 2022-09-15 PROCEDURE — 83036 HEMOGLOBIN GLYCOSYLATED A1C: CPT

## 2022-09-15 PROCEDURE — 85018 HEMOGLOBIN: CPT

## 2022-09-16 ENCOUNTER — EMERGENCY (EMERGENCY)
Facility: HOSPITAL | Age: 67
LOS: 1 days | Discharge: HOME | End: 2022-09-16
Attending: STUDENT IN AN ORGANIZED HEALTH CARE EDUCATION/TRAINING PROGRAM | Admitting: STUDENT IN AN ORGANIZED HEALTH CARE EDUCATION/TRAINING PROGRAM
Payer: MEDICARE

## 2022-09-16 VITALS
SYSTOLIC BLOOD PRESSURE: 112 MMHG | HEART RATE: 82 BPM | WEIGHT: 160.06 LBS | TEMPERATURE: 98 F | DIASTOLIC BLOOD PRESSURE: 76 MMHG | OXYGEN SATURATION: 99 % | RESPIRATION RATE: 17 BRPM | HEIGHT: 66 IN

## 2022-09-16 VITALS
TEMPERATURE: 97 F | OXYGEN SATURATION: 95 % | HEART RATE: 94 BPM | RESPIRATION RATE: 17 BRPM | DIASTOLIC BLOOD PRESSURE: 82 MMHG | SYSTOLIC BLOOD PRESSURE: 121 MMHG

## 2022-09-16 DIAGNOSIS — Z98.890 OTHER SPECIFIED POSTPROCEDURAL STATES: Chronic | ICD-10-CM

## 2022-09-16 DIAGNOSIS — Z96.611 PRESENCE OF RIGHT ARTIFICIAL SHOULDER JOINT: Chronic | ICD-10-CM

## 2022-09-16 LAB
ALBUMIN SERPL ELPH-MCNC: 4.1 G/DL — SIGNIFICANT CHANGE UP (ref 3.3–5)
ALP SERPL-CCNC: 154 U/L — HIGH (ref 40–120)
ALT FLD-CCNC: 10 U/L — SIGNIFICANT CHANGE UP (ref 10–45)
ANION GAP SERPL CALC-SCNC: 10 MMOL/L — SIGNIFICANT CHANGE UP (ref 5–17)
APPEARANCE UR: CLEAR — SIGNIFICANT CHANGE UP
APTT BLD: 28.1 SEC — SIGNIFICANT CHANGE UP (ref 27.5–35.5)
AST SERPL-CCNC: 12 U/L — SIGNIFICANT CHANGE UP (ref 10–40)
BACTERIA # UR AUTO: ABNORMAL /HPF
BASOPHILS # BLD AUTO: 0.05 K/UL — SIGNIFICANT CHANGE UP (ref 0–0.2)
BASOPHILS NFR BLD AUTO: 0.6 % — SIGNIFICANT CHANGE UP (ref 0–2)
BILIRUB SERPL-MCNC: 0.3 MG/DL — SIGNIFICANT CHANGE UP (ref 0.2–1.2)
BILIRUB UR-MCNC: NEGATIVE — SIGNIFICANT CHANGE UP
BUN SERPL-MCNC: 9 MG/DL — SIGNIFICANT CHANGE UP (ref 7–23)
CALCIUM SERPL-MCNC: 9.5 MG/DL — SIGNIFICANT CHANGE UP (ref 8.4–10.5)
CHLORIDE SERPL-SCNC: 102 MMOL/L — SIGNIFICANT CHANGE UP (ref 96–108)
CO2 SERPL-SCNC: 27 MMOL/L — SIGNIFICANT CHANGE UP (ref 22–31)
COLOR SPEC: YELLOW — SIGNIFICANT CHANGE UP
CREAT SERPL-MCNC: 0.79 MG/DL — SIGNIFICANT CHANGE UP (ref 0.5–1.3)
DIFF PNL FLD: ABNORMAL
EGFR: 81 ML/MIN/1.73M2 — SIGNIFICANT CHANGE UP
EOSINOPHIL # BLD AUTO: 0.2 K/UL — SIGNIFICANT CHANGE UP (ref 0–0.5)
EOSINOPHIL NFR BLD AUTO: 2.5 % — SIGNIFICANT CHANGE UP (ref 0–6)
EPI CELLS # UR: SIGNIFICANT CHANGE UP /HPF (ref 0–5)
GLUCOSE SERPL-MCNC: 140 MG/DL — HIGH (ref 70–99)
GLUCOSE UR QL: NEGATIVE — SIGNIFICANT CHANGE UP
HCT VFR BLD CALC: 32.3 % — LOW (ref 34.5–45)
HGB BLD-MCNC: 10 G/DL — LOW (ref 11.5–15.5)
IMM GRANULOCYTES NFR BLD AUTO: 0.2 % — SIGNIFICANT CHANGE UP (ref 0–0.9)
INR BLD: 1.09 — SIGNIFICANT CHANGE UP (ref 0.88–1.16)
KETONES UR-MCNC: NEGATIVE — SIGNIFICANT CHANGE UP
LEUKOCYTE ESTERASE UR-ACNC: ABNORMAL
LYMPHOCYTES # BLD AUTO: 2.04 K/UL — SIGNIFICANT CHANGE UP (ref 1–3.3)
LYMPHOCYTES # BLD AUTO: 25.1 % — SIGNIFICANT CHANGE UP (ref 13–44)
MAGNESIUM SERPL-MCNC: 2 MG/DL — SIGNIFICANT CHANGE UP (ref 1.6–2.6)
MCHC RBC-ENTMCNC: 26.5 PG — LOW (ref 27–34)
MCHC RBC-ENTMCNC: 31 GM/DL — LOW (ref 32–36)
MCV RBC AUTO: 85.7 FL — SIGNIFICANT CHANGE UP (ref 80–100)
MONOCYTES # BLD AUTO: 1.02 K/UL — HIGH (ref 0–0.9)
MONOCYTES NFR BLD AUTO: 12.5 % — SIGNIFICANT CHANGE UP (ref 2–14)
NEUTROPHILS # BLD AUTO: 4.81 K/UL — SIGNIFICANT CHANGE UP (ref 1.8–7.4)
NEUTROPHILS NFR BLD AUTO: 59.1 % — SIGNIFICANT CHANGE UP (ref 43–77)
NITRITE UR-MCNC: POSITIVE
NRBC # BLD: 0 /100 WBCS — SIGNIFICANT CHANGE UP (ref 0–0)
PH UR: 7 — SIGNIFICANT CHANGE UP (ref 5–8)
PHOSPHATE SERPL-MCNC: 3.8 MG/DL — SIGNIFICANT CHANGE UP (ref 2.5–4.5)
PLATELET # BLD AUTO: 357 K/UL — SIGNIFICANT CHANGE UP (ref 150–400)
POTASSIUM SERPL-MCNC: 4.4 MMOL/L — SIGNIFICANT CHANGE UP (ref 3.5–5.3)
POTASSIUM SERPL-SCNC: 4.4 MMOL/L — SIGNIFICANT CHANGE UP (ref 3.5–5.3)
PROT SERPL-MCNC: 7.5 G/DL — SIGNIFICANT CHANGE UP (ref 6–8.3)
PROT UR-MCNC: 30 MG/DL
PROTHROM AB SERPL-ACNC: 13 SEC — SIGNIFICANT CHANGE UP (ref 10.5–13.4)
RBC # BLD: 3.77 M/UL — LOW (ref 3.8–5.2)
RBC # FLD: 14.6 % — HIGH (ref 10.3–14.5)
RBC CASTS # UR COMP ASSIST: < 5 /HPF — SIGNIFICANT CHANGE UP
SARS-COV-2 RNA SPEC QL NAA+PROBE: NEGATIVE — SIGNIFICANT CHANGE UP
SODIUM SERPL-SCNC: 139 MMOL/L — SIGNIFICANT CHANGE UP (ref 135–145)
SP GR SPEC: 1.01 — SIGNIFICANT CHANGE UP (ref 1–1.03)
TROPONIN T SERPL-MCNC: 0.01 NG/ML — SIGNIFICANT CHANGE UP (ref 0–0.01)
UROBILINOGEN FLD QL: 0.2 E.U./DL — SIGNIFICANT CHANGE UP
WBC # BLD: 8.14 K/UL — SIGNIFICANT CHANGE UP (ref 3.8–10.5)
WBC # FLD AUTO: 8.14 K/UL — SIGNIFICANT CHANGE UP (ref 3.8–10.5)
WBC UR QL: ABNORMAL /HPF

## 2022-09-16 PROCEDURE — 99285 EMERGENCY DEPT VISIT HI MDM: CPT | Mod: 25

## 2022-09-16 PROCEDURE — 80053 COMPREHEN METABOLIC PANEL: CPT

## 2022-09-16 PROCEDURE — 84443 ASSAY THYROID STIM HORMONE: CPT

## 2022-09-16 PROCEDURE — 70450 CT HEAD/BRAIN W/O DYE: CPT | Mod: 26,MA

## 2022-09-16 PROCEDURE — 81001 URINALYSIS AUTO W/SCOPE: CPT

## 2022-09-16 PROCEDURE — 71045 X-RAY EXAM CHEST 1 VIEW: CPT

## 2022-09-16 PROCEDURE — 93010 ELECTROCARDIOGRAM REPORT: CPT

## 2022-09-16 PROCEDURE — 99282 EMERGENCY DEPT VISIT SF MDM: CPT

## 2022-09-16 PROCEDURE — 87086 URINE CULTURE/COLONY COUNT: CPT

## 2022-09-16 PROCEDURE — 84480 ASSAY TRIIODOTHYRONINE (T3): CPT

## 2022-09-16 PROCEDURE — 71045 X-RAY EXAM CHEST 1 VIEW: CPT | Mod: 26

## 2022-09-16 PROCEDURE — 84436 ASSAY OF TOTAL THYROXINE: CPT

## 2022-09-16 PROCEDURE — 84484 ASSAY OF TROPONIN QUANT: CPT

## 2022-09-16 PROCEDURE — 96374 THER/PROPH/DIAG INJ IV PUSH: CPT

## 2022-09-16 PROCEDURE — 93005 ELECTROCARDIOGRAM TRACING: CPT

## 2022-09-16 PROCEDURE — 72125 CT NECK SPINE W/O DYE: CPT | Mod: MA

## 2022-09-16 PROCEDURE — 84100 ASSAY OF PHOSPHORUS: CPT

## 2022-09-16 PROCEDURE — 72125 CT NECK SPINE W/O DYE: CPT | Mod: 26,MA

## 2022-09-16 PROCEDURE — 83735 ASSAY OF MAGNESIUM: CPT

## 2022-09-16 PROCEDURE — 70450 CT HEAD/BRAIN W/O DYE: CPT | Mod: MA

## 2022-09-16 PROCEDURE — 85025 COMPLETE CBC W/AUTO DIFF WBC: CPT

## 2022-09-16 PROCEDURE — 83880 ASSAY OF NATRIURETIC PEPTIDE: CPT

## 2022-09-16 PROCEDURE — 87186 SC STD MICRODIL/AGAR DIL: CPT

## 2022-09-16 PROCEDURE — 85610 PROTHROMBIN TIME: CPT

## 2022-09-16 PROCEDURE — 87635 SARS-COV-2 COVID-19 AMP PRB: CPT

## 2022-09-16 PROCEDURE — 36415 COLL VENOUS BLD VENIPUNCTURE: CPT

## 2022-09-16 PROCEDURE — 85730 THROMBOPLASTIN TIME PARTIAL: CPT

## 2022-09-16 RX ORDER — CEFTRIAXONE 500 MG/1
1000 INJECTION, POWDER, FOR SOLUTION INTRAMUSCULAR; INTRAVENOUS ONCE
Refills: 0 | Status: COMPLETED | OUTPATIENT
Start: 2022-09-16 | End: 2022-09-16

## 2022-09-16 RX ADMIN — CEFTRIAXONE 100 MILLIGRAM(S): 500 INJECTION, POWDER, FOR SOLUTION INTRAMUSCULAR; INTRAVENOUS at 18:38

## 2022-09-16 NOTE — ED PROVIDER NOTE - PROGRESS NOTE DETAILS
UTI noted on labs, Ceftx ordered, will advise abx at NH. CT neg for acute findings. Will DC back to NH s/p Abx.

## 2022-09-16 NOTE — CONSULT NOTE ADULT - SUBJECTIVE AND OBJECTIVE BOX
CHIEF COMPLAINT/ REASON FOR CONSULTATION: pt reports falls at rehab     HPI: Taken from previous discharge on 8/26/22   "66 y/o female with PMHx HTN, HLD, CVA x 3 (last was 2016 with right hand weakness residual), GAMALIEL not using CPAP, Emphysema, ex-25 pack year smoker quit 2021, Chronic Constipation on Movantik, Urinary Incontinence chronically self straight cath at home, chronic UTI, Breast Implant Rupture with Chronic Leak, B/L CTS s/p release, s/p Intrathecal Morphine pump for pain, with chronic neck and back pain worsening for years s/p multiple spinal surgeries including laminectomies and fusion of cervical, thoracic and lumbar spine, most recently done in 2019 and 2020 at [facility] by Dr. Trinidad.  She is wheelchair dependent.  She also has history of right shoulder replacement.  She reports bilateral shoulder pain, bilateral leg pain and burning to the bilateral calves.  She reports right hand greater than arm and leg weakness."    66yo F previously admitted to [facility] for low back pain, s/p T3-L1 revision of prior fusion (with Dr. Luz on 8/17/22), presents to ED today complaining of "falls at rehab and feeling tired". Pt reports her only pain is in her back (chronic back pain, same distribution), and denies head pain. Pt unable to report where exactly she fell, states she last fell yesterday. Denies LOC, dizziness. Pt states she uses a wheelchair at the rehab.       PAST MEDICAL HISTORY   HTN (hypertension)    Back pain    Hypertension    SS (spinal stenosis)    High cholesterol    Stroke    H/O carpal tunnel syndrome    H/O carpal tunnel syndrome    Chronic GERD    History of chronic constipation    Seizure    Urinary incontinence    Pre-diabetes    Acute UTI    Anxiety    Anxiety and depression    Arthritis    Eczema    External hemorrhoids    H/O kyphosis    Multiple sclerosis    Pancreas cyst    Scoliosis    Wheelchair dependent    Cervical pseudoarthrosis    Right shoulder pain    Cervical spondylosis    Chronic headaches    Chronic LUQ pain    Chronic UTI    Degenerative joint disease    H/O low back pain    Lumbosacral spondylosis    COPD (chronic obstructive pulmonary disease)      PAST SURGICAL HISTORY   Cervical disc disease    H/O Spinal surgery    H/O breast surgery    S/P cervical discectomy    Previous back surgery    H/O shoulder surgery    H/O total shoulder replacement, right      oxycodone (Pruritus)      MEDICATIONS:  Antibiotics:    Neuro:    Anticoagulation:    Other:      SOCIAL HISTORY:   Occupation: N/A   Marital Status: N/A     FAMILY HISTORY:      REVIEW OF SYSTEMS:  Check here if all are normal other than Neurological [X]  General:  Eyes:  ENT:  Cardiac:  Respiratory:  GI:  Musculoskeletal:   Skin:  Neurologic:   Psychiatric:     PHYSICAL EXAMINATION:   T(C): 36.4 (09-16-22 @ 16:44), Max: 36.4 (09-16-22 @ 15:52)  HR: 75 (09-16-22 @ 16:44) (75 - 82)  BP: 102/70 (09-16-22 @ 16:44) (102/70 - 112/76)  RR: 17 (09-16-22 @ 16:44) (17 - 17)  SpO2: 95% (09-16-22 @ 16:44) (95% - 99%)  Wt(kg): --Height (cm): 167.6 (09-16 @ 15:52)  Weight (kg): 72.6 (09-16 @ 15:52)    EXAM:  General: NAD, pt is comfortably laying in hospital bed, A&Ox3, on RA   HEENT: CN II-XII grossly intact, PERRL 4mm, EOMI b/l, face symmetric   Cardiovascular: RRR, normal S1 and S2   Respiratory: lungs CTAB, no wheezing, rhonchi, or crackles   GI: normoactive BS to auscultation, abd soft, NTND   Neuro: no aphasia, speech clear, no dysmetria, no pronator drift  b/l UE strength 5/5, b/l LE strength (effort limited exam) HF/KF 2/5, DF/PF 4/5   sensation intact to light touch throughout   Extremities: distal pulses 2+ x4         LABS:                        10.0   8.14  )-----------( 357      ( 16 Sep 2022 16:29 )             32.3           PT/INR - ( 16 Sep 2022 16:29 )   PT: 13.0 sec;   INR: 1.09          PTT - ( 16 Sep 2022 16:29 )  PTT:28.1 sec      RADIOLOGY & ADDITIONAL STUDIES:

## 2022-09-16 NOTE — CONSULT NOTE ADULT - ASSESSMENT
68 y/o female with PMHx HTN, HLD, CVA x 3 (last was 2016 with right hand weakness residual), GAMALIEL not using CPAP, Emphysema, ex-25 pack year smoker quit 2021, Chronic Constipation on Movantik, Urinary Incontinence chronically self straight cath at home, chronic UTI, Breast Implant Rupture with Chronic Leak, B/L CTS s/p release, s/p Intrathecal Morphine pump for pain, with chronic neck and back pain worsening for years s/p multiple spinal surgeries including laminectomies and fusion of cervical, thoracic and lumbar spine, most recently done in 2019 and 2020 at [facility] by Dr. Trinidad, previously admitted to [facility] for low back pain, s/p T3-L1 revision of prior fusion (with Dr. Luz on 8/17/22), presents to ED today complaining of "falls at rehab and feeling tired". Pt reports her only pain is in her back (chronic back pain, same distribution), and denies head pain. Pt unable to report where exactly she fell, states she last fell yesterday. Denies LOC, dizziness.     PLAN:   - patient seen and evaluated in ED, discussed with Dr. Luz  - if no acute findings in ED work-up, cleared for hospital discharge from Neurosurgery perspective   - please call with questions or concerns     NSGY Consult Phone #: [phone]
no

## 2022-09-16 NOTE — ED ADULT NURSE NOTE - CHIEF COMPLAINT QUOTE
Pt BIBEMS from [facility] home due to "excessive fatigue." Pt endorses feeling "just fine." Pt in rehab after spinal surgery. Pt states "I fall everyday there, it's terrible." per EMS, NH staff states "pt pretends to fall everyday and says she hits her head." Pt AAOx3, appears drowsy but easily arousalable. Denies fever, vision changes, HA, SOB, or CP.

## 2022-09-16 NOTE — ED PROVIDER NOTE - NSFOLLOWUPINSTRUCTIONS_ED_ALL_ED_FT
Pt has UTI.  Rx: Cefdinir 300 mg tabs. Take 1 tab twice a day for 7 days. 14 tabs total.    Follow up with your primary medical doctor as soon as possible.  Return to the emergency department if your symptoms worsen or if you develop new symptoms.    Urinary Tract Infection    A urinary tract infection (UTI) is an infection of any part of the urinary tract, which includes the kidneys, ureters, bladder, and urethra. Risk factors include ignoring your need to urinate, wiping back to front if female, being an uncircumcised male, and having diabetes or a weak immune system. Symptoms include frequent urination, pain or burning with urination, foul smelling urine, cloudy urine, pain in the lower abdomen, blood in the urine, and fever. If you were prescribed an antibiotic medicine, take it as told by your health care provider. Do not stop taking the antibiotic even if you start to feel better.    SEEK IMMEDIATE MEDICAL CARE IF YOU HAVE ANY OF THE FOLLOWING SYMPTOMS: severe back or abdominal pain, fever, inability to keep fluids or medicine down, dizziness/lightheadedness, or a change in mental status.

## 2022-09-16 NOTE — ED PROVIDER NOTE - NS ED ROS FT
CONSTITUTIONAL: No fever, no chills, + fatigue  EYES: No eye redness, no visual changes  ENT: No ear pain, no sore throat  CARDIOVASCULAR: No chest pain, no palpitations  RESPIRATORY: No cough, no SOB  GI: No abdominal pain, no nausea, no vomiting, no constipation, no diarrhea  GENITOURINARY: No dysuria, no frequency, no hematuria  MUSCULOSKELETAL: +back pain (chronic), + chronic joint pain, no myalgias  SKIN: No rash, no peripheral edema  NEURO: No headache, no confusion    ALL OTHER SYSTEMS NEGATIVE.

## 2022-09-16 NOTE — ED PROVIDER NOTE - PHYSICAL EXAMINATION
CONSTITUTIONAL: Non-toxic; in no apparent distress, appears drowsy  HEAD: Normocephalic; atraumatic  EYES: PERRL; EOM intact   ENMT: External appears normal  NECK: Supple; non-tender  CARD: Normal S1, S2; no murmurs, rubs, or gallops  RESP: Normal chest excursion with respiration; breath sounds clear and equal bilaterally  ABD: Soft, non-distended; non-tender  EXT: Normal ROM in all four extremities; non-tender to palpation  SKIN: Warm, dry, no rash  NEURO:  No focal neurological deficiencies, CN 2-12 intact BL, no dysmetria, no pronator drift, strength/sensation intact throughout, normal cognition

## 2022-09-16 NOTE — ED ADULT TRIAGE NOTE - CHIEF COMPLAINT QUOTE
Pt BIBEMS from [facility] home due to "excessive fatigue." Pt endorses feeling "just fine." Pt in rehab after spinal surgery. Pt states "I fall everyday there, it's terrible." per EMS, NH staff states "pt pretends to fall everyday and says she hits her head." Pt AAOx3, appears drowsy but easily arousalable. Denies fever, vision changes, HA, SOB, or CP.

## 2022-09-16 NOTE — ED ADULT NURSE NOTE - OBJECTIVE STATEMENT
.  67years female alert to near drowsy mental state (AOX2/person/place) received by EMS.  -complain of fall down and fatigue.  Hx of COPD, s/p Lt breast ca and spinal surgery. pt states that she fall down everyday and hit head today. pt also states fatigue.   -denied chest pain, SOB, dizziness, headache, n/v/d, abdomen pain.  Pt is in the bed comfortably at this time. Will continue to monitor and document any changes.

## 2022-09-16 NOTE — ED PROVIDER NOTE - PATIENT PORTAL LINK FT
You can access the FollowMyHealth Patient Portal offered by [facility] by registering at the following website: [email or web address]. By joining [employer]’s FollowMyHealth portal, you will also be able to view your health information using other applications (apps) compatible with our system.

## 2022-09-16 NOTE — ED PROVIDER NOTE - OBJECTIVE STATEMENT
HTN, HLD, CVA x 3 (last was 2016 with right hand weakness residual), GAMALIEL not using CPAP, Emphysema, ex-25 pack year smoker quit 2021, Chronic Constipation on Movantik, Urinary Incontinence chronically self straight cath at home, chronic UTI, Breast Implant Rupture with Chronic Leak, B/L CTS s/p release, s/p Intrathecal Morphine pump for pain, with chronic neck and back pain worsening for years s/p multiple spinal surgeries including laminectomies and fusion of cervical, thoracic and lumbar spine, most recently done in 2019 and 2020 at [facility] by Dr. Trinidad. YESENIA from [facility] for reported fatigue and falls at rehab. Pt states "I fall everyday there, it's terrible." NH staff states "pt pretends to fall everyday and says she hits her head." Pt AAOx3, appears drowsy but easily arousalable to voice. Denies fever, vision changes, HA, SOB, CP, melena, dysuria. Pt c/o back pain and joint pain that is chronic and at her baseline.

## 2022-09-16 NOTE — ED PROVIDER NOTE - CLINICAL SUMMARY MEDICAL DECISION MAKING FREE TEXT BOX
Generalized weakness at NH, fall w head trauma, no reported LOC. Will CTH and CSpine, no signs of acute trauma on exam.  Will w/u for infectious vs metabolic vs intracranial cause  Pt does not appear septic on exam  Labs  CXR, CT head  UA   Pt seen by NSx team who states pt is at her mental baseline, if CTH neg and w/u neg no need for readmission from their perspective.

## 2022-09-17 LAB — T3 SERPL-MCNC: 156 NG/DL — SIGNIFICANT CHANGE UP (ref 80–200)

## 2022-09-19 LAB
-  AMPICILLIN/SULBACTAM: SIGNIFICANT CHANGE UP
-  AMPICILLIN: SIGNIFICANT CHANGE UP
-  CEFAZOLIN: SIGNIFICANT CHANGE UP
-  CEFTRIAXONE: SIGNIFICANT CHANGE UP
-  CIPROFLOXACIN: SIGNIFICANT CHANGE UP
-  ERTAPENEM: SIGNIFICANT CHANGE UP
-  GENTAMICIN: SIGNIFICANT CHANGE UP
-  NITROFURANTOIN: SIGNIFICANT CHANGE UP
-  PIPERACILLIN/TAZOBACTAM: SIGNIFICANT CHANGE UP
-  TOBRAMYCIN: SIGNIFICANT CHANGE UP
-  TRIMETHOPRIM/SULFAMETHOXAZOLE: SIGNIFICANT CHANGE UP
CULTURE RESULTS: SIGNIFICANT CHANGE UP
METHOD TYPE: SIGNIFICANT CHANGE UP
ORGANISM # SPEC MICROSCOPIC CNT: SIGNIFICANT CHANGE UP
ORGANISM # SPEC MICROSCOPIC CNT: SIGNIFICANT CHANGE UP
SPECIMEN SOURCE: SIGNIFICANT CHANGE UP

## 2022-09-20 DIAGNOSIS — Z87.440 PERSONAL HISTORY OF URINARY (TRACT) INFECTIONS: ICD-10-CM

## 2022-09-20 DIAGNOSIS — G40.909 EPILEPSY, UNSPECIFIED, NOT INTRACTABLE, WITHOUT STATUS EPILEPTICUS: ICD-10-CM

## 2022-09-20 DIAGNOSIS — G47.33 OBSTRUCTIVE SLEEP APNEA (ADULT) (PEDIATRIC): ICD-10-CM

## 2022-09-20 DIAGNOSIS — Z98.82 BREAST IMPLANT STATUS: ICD-10-CM

## 2022-09-20 DIAGNOSIS — L30.9 DERMATITIS, UNSPECIFIED: ICD-10-CM

## 2022-09-20 DIAGNOSIS — K59.09 OTHER CONSTIPATION: ICD-10-CM

## 2022-09-20 DIAGNOSIS — G35 MULTIPLE SCLEROSIS: ICD-10-CM

## 2022-09-20 DIAGNOSIS — J43.9 EMPHYSEMA, UNSPECIFIED: ICD-10-CM

## 2022-09-20 DIAGNOSIS — E78.00 PURE HYPERCHOLESTEROLEMIA, UNSPECIFIED: ICD-10-CM

## 2022-09-20 DIAGNOSIS — Z87.891 PERSONAL HISTORY OF NICOTINE DEPENDENCE: ICD-10-CM

## 2022-09-20 DIAGNOSIS — M19.90 UNSPECIFIED OSTEOARTHRITIS, UNSPECIFIED SITE: ICD-10-CM
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
## 2022-09-20 DIAGNOSIS — Z88.5 ALLERGY STATUS TO NARCOTIC AGENT: ICD-10-CM

## 2022-09-20 DIAGNOSIS — Z99.3 DEPENDENCE ON WHEELCHAIR: ICD-10-CM

## 2022-09-20 DIAGNOSIS — R10.12 LEFT UPPER QUADRANT PAIN: ICD-10-CM

## 2022-09-20 DIAGNOSIS — I10 ESSENTIAL (PRIMARY) HYPERTENSION: ICD-10-CM

## 2022-09-20 DIAGNOSIS — Z96.611 PRESENCE OF RIGHT ARTIFICIAL SHOULDER JOINT: ICD-10-CM

## 2022-09-20 DIAGNOSIS — Z98.1 ARTHRODESIS STATUS: ICD-10-CM

## 2022-09-20 DIAGNOSIS — R32 UNSPECIFIED URINARY INCONTINENCE: ICD-10-CM

## 2022-09-20 DIAGNOSIS — M54.2 CERVICALGIA: ICD-10-CM

## 2022-09-20 DIAGNOSIS — K21.9 GASTRO-ESOPHAGEAL REFLUX DISEASE WITHOUT ESOPHAGITIS: ICD-10-CM

## 2022-09-20 DIAGNOSIS — F41.8 OTHER SPECIFIED ANXIETY DISORDERS: ICD-10-CM

## 2022-09-20 DIAGNOSIS — Z79.01 LONG TERM (CURRENT) USE OF ANTICOAGULANTS: ICD-10-CM

## 2022-09-20 DIAGNOSIS — S09.90XA UNSPECIFIED INJURY OF HEAD, INITIAL ENCOUNTER: ICD-10-CM

## 2022-09-20 DIAGNOSIS — N39.0 URINARY TRACT INFECTION, SITE NOT SPECIFIED: ICD-10-CM

## 2022-09-20 DIAGNOSIS — G89.29 OTHER CHRONIC PAIN: ICD-10-CM

## 2022-09-20 DIAGNOSIS — M41.9 SCOLIOSIS, UNSPECIFIED: ICD-10-CM

## 2022-09-20 DIAGNOSIS — K64.4 RESIDUAL HEMORRHOIDAL SKIN TAGS: ICD-10-CM

## 2022-09-20 DIAGNOSIS — M47.817 SPONDYLOSIS WITHOUT MYELOPATHY OR RADICULOPATHY, LUMBOSACRAL REGION: ICD-10-CM

## 2022-09-20 DIAGNOSIS — Z79.82 LONG TERM (CURRENT) USE OF ASPIRIN: ICD-10-CM

## 2022-09-20 DIAGNOSIS — I69.331 MONOPLEGIA OF UPPER LIMB FOLLOWING CEREBRAL INFARCTION AFFECTING RIGHT DOMINANT SIDE: ICD-10-CM

## 2022-09-20 DIAGNOSIS — R51.9 HEADACHE, UNSPECIFIED: ICD-10-CM

## 2022-09-20 DIAGNOSIS — R53.83 OTHER FATIGUE: ICD-10-CM

## 2022-09-20 DIAGNOSIS — Z20.822 CONTACT WITH AND (SUSPECTED) EXPOSURE TO COVID-19: ICD-10-CM
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
## 2022-10-20 ENCOUNTER — APPOINTMENT (OUTPATIENT)
Dept: SPINE | Facility: CLINIC | Age: 67
End: 2022-10-20

## 2022-10-20 ENCOUNTER — OUTPATIENT (OUTPATIENT)
Dept: OUTPATIENT SERVICES | Facility: HOSPITAL | Age: 67
LOS: 1 days | End: 2022-10-20
Payer: MEDICARE

## 2022-10-20 ENCOUNTER — NON-APPOINTMENT (OUTPATIENT)
Age: 67
End: 2022-10-20

## 2022-10-20 VITALS
BODY MASS INDEX: 26.37 KG/M2 | WEIGHT: 168 LBS | TEMPERATURE: 98.6 F | HEART RATE: 70 BPM | DIASTOLIC BLOOD PRESSURE: 70 MMHG | RESPIRATION RATE: 15 BRPM | SYSTOLIC BLOOD PRESSURE: 99 MMHG | OXYGEN SATURATION: 99 % | HEIGHT: 67 IN

## 2022-10-20 DIAGNOSIS — Z87.898 PERSONAL HISTORY OF OTHER SPECIFIED CONDITIONS: ICD-10-CM

## 2022-10-20 DIAGNOSIS — Z98.890 OTHER SPECIFIED POSTPROCEDURAL STATES: Chronic | ICD-10-CM

## 2022-10-20 DIAGNOSIS — S12.9XXA FRACTURE OF NECK, UNSPECIFIED, INITIAL ENCOUNTER: ICD-10-CM

## 2022-10-20 DIAGNOSIS — Z96.611 PRESENCE OF RIGHT ARTIFICIAL SHOULDER JOINT: Chronic | ICD-10-CM

## 2022-10-20 DIAGNOSIS — M41.20 OTHER IDIOPATHIC SCOLIOSIS, SITE UNSPECIFIED: ICD-10-CM

## 2022-10-20 PROCEDURE — 99024 POSTOP FOLLOW-UP VISIT: CPT

## 2022-10-20 PROCEDURE — 72082 X-RAY EXAM ENTIRE SPI 2/3 VW: CPT

## 2022-10-20 PROCEDURE — 72082 X-RAY EXAM ENTIRE SPI 2/3 VW: CPT | Mod: 26

## 2022-10-20 NOTE — HISTORY OF PRESENT ILLNESS
[FreeTextEntry1] : 68 y/o female with PMHx HTN, HLD, CVA x 3 (last was 2016 with right hand weakness residual), GAMALIEL not using CPAP, Emphysema, ex-25 pack year smoker quit 2021, Chronic Constipation on Movantik, Urinary Incontinence chronically self straight cath at home, chronic UTI, Breast Implant Rupture with Chronic Leak, B/L CTS s/p release, s/p Intrathecal Morphine pump for pain, with chronic neck and back pain worsening for years s/p multiple spinal surgeries including laminectomies and fusion of cervical, thoracic and lumbar spine, most recently done in 2019 and 2020 at [facility] by Dr. Trinidad.  She is wheelchair dependent.  She also has history of right shoulder replacement.  She reports bilateral shoulder pain, bilateral leg pain and burning to the bilateral calves.  She reports right hand greater than arm and leg weakness\par \par She underwent elective revision of thoracolumbar fusion.\par \par Hospital Course: \par 8/17: POD0 s/p T3-L1 revision of fusion. extubated in NSICU\par 8/18: POD1 KAMRAN overnight. Started on PCA. Tx to SDU. PT/OT rec AR.\par 8/19: POD2, overnight episode of agitation and screaming due to concern over pain control. neuro exam stable. 1 HMV drain removed.\par 8/20: POD3, extra 0.25mg IV dilaudid x 2 given o/n for pain. neuro exam stable\par 8/21: POD 4. KAMRAN overnight. PCA off now on PO meds. pending rehab, hx of chronic constipation, increased miralax to twice a day and given lactulose and had bowel movement.Valium increased to q 6 hours for muscle spasm. Pending XRs \par 8/22: POD5, complaining of severe pain o/n requiring additional dilaudid pushes. HMV#3 removed and incisional dressing changed. Given 500cc bolus for pressure in 90s, patient mildly sedated from pain medication. Pain regimen discussed with Dr. Mclaughlin and recommends to keep regimen the same at this time. Accepted to [facility], pending insurance authorization. Post op XRs performed \par 8/23: POD6, still complains of pain but sleepy with current regimen, plan for Dr. Mclaughlin to interrogate intrathecal pain pump tomorrow. Hemovac removed, US guided IV placed for access. Pending insurance authorization for [facility].\par 8/24: POD7, still complains of pain, requiring 1 push of dilaudid  \par 8/25: POD 8, hypotensive last night, metoprolol/valium held. Had fall out of bed last night, no head trauma reported. BP normalized in AM. Pt complaining of R ankle pain, xray obtained. Receieved 6mg dilaudid however claims to have "lost" a pill. Has 1 HMV remaining. \par 8/26: POD9, still w/ "continuous pain" takes oxycodone and oxycontin at home. Valium prn q4. \par 8/27: POD10. pain controlled overnight. awaiting auth for rehab. Had a slip and near fall episode this afternoon, reinforced that she needs OOB assistance. \par 8/28: POD11, pain adequately controlled. Refused mag citrate/senna/miralax only will take Dulcolax and Movantik. \par 8/29: POD12. Received lactulose, needs a BM.\par \par She was discharged to rehab ([facility]) on 8/30/2022\par \par Today she returns and states she is doing well, denies worsening pain/numbness/paresthesia/weakness or any other focal neuro deficits. Currently staying in NH and pending plan for home discharge as per patient.\par

## 2022-10-20 NOTE — ASSESSMENT
[FreeTextEntry1] : PLAN\par - continue PT\par - TLSO brace\par - RTC in one month (XRay T/L spine 2 views at that time)

## 2022-10-20 NOTE — REASON FOR VISIT
[de-identified] : revision of T3-L1 fusion and T2-L1 posterior fusion [de-identified] : 8/17/2022 [de-identified] : 8

## 2022-10-20 NOTE — PHYSICAL EXAM
[General Appearance - Alert] : alert [General Appearance - In No Acute Distress] : in no acute distress [General Appearance - Well Nourished] : well nourished [General Appearance - Well-Appearing] : healthy appearing [Longitudinal] : longitudinal [Well-Healed] : well-healed [No Drainage] : without drainage [Normal Skin] : normal [FreeTextEntry1] : back [Oriented To Time, Place, And Person] : oriented to person, place, and time [Impaired Insight] : insight and judgment were intact [Affect] : the affect was normal [Memory Recent] : recent memory was not impaired [Limited Balance] : the patient's balance was impaired

## 2022-11-10 ENCOUNTER — APPOINTMENT (OUTPATIENT)
Dept: SPINE | Facility: CLINIC | Age: 67
End: 2022-11-10

## 2022-11-17 ENCOUNTER — APPOINTMENT (OUTPATIENT)
Dept: SPINE | Facility: CLINIC | Age: 67
End: 2022-11-17

## 2022-12-01 ENCOUNTER — APPOINTMENT (OUTPATIENT)
Dept: SPINE | Facility: CLINIC | Age: 67
End: 2022-12-01

## 2022-12-21 ENCOUNTER — OUTPATIENT (OUTPATIENT)
Dept: OUTPATIENT SERVICES | Facility: HOSPITAL | Age: 67
LOS: 1 days | End: 2022-12-21
Payer: MEDICARE

## 2022-12-21 ENCOUNTER — APPOINTMENT (OUTPATIENT)
Dept: SPINE | Facility: CLINIC | Age: 67
End: 2022-12-21

## 2022-12-21 VITALS
DIASTOLIC BLOOD PRESSURE: 74 MMHG | TEMPERATURE: 96.5 F | HEIGHT: 67 IN | WEIGHT: 150.36 LBS | SYSTOLIC BLOOD PRESSURE: 107 MMHG | HEART RATE: 77 BPM | OXYGEN SATURATION: 97 % | BODY MASS INDEX: 23.6 KG/M2

## 2022-12-21 DIAGNOSIS — Z98.890 OTHER SPECIFIED POSTPROCEDURAL STATES: Chronic | ICD-10-CM

## 2022-12-21 DIAGNOSIS — Z83.3 FAMILY HISTORY OF DIABETES MELLITUS: ICD-10-CM

## 2022-12-21 DIAGNOSIS — M25.519 PAIN IN UNSPECIFIED SHOULDER: ICD-10-CM

## 2022-12-21 DIAGNOSIS — R29.898 OTHER SYMPTOMS AND SIGNS INVOLVING THE MUSCULOSKELETAL SYSTEM: ICD-10-CM

## 2022-12-21 DIAGNOSIS — Z96.611 PRESENCE OF RIGHT ARTIFICIAL SHOULDER JOINT: Chronic | ICD-10-CM

## 2022-12-21 DIAGNOSIS — Z87.19 PERSONAL HISTORY OF OTHER DISEASES OF THE DIGESTIVE SYSTEM: ICD-10-CM

## 2022-12-21 DIAGNOSIS — Z82.49 FAMILY HISTORY OF ISCHEMIC HEART DISEASE AND OTHER DISEASES OF THE CIRCULATORY SYSTEM: ICD-10-CM

## 2022-12-21 PROCEDURE — 99214 OFFICE O/P EST MOD 30 MIN: CPT

## 2022-12-21 PROCEDURE — 72082 X-RAY EXAM ENTIRE SPI 2/3 VW: CPT

## 2022-12-21 PROCEDURE — 72082 X-RAY EXAM ENTIRE SPI 2/3 VW: CPT | Mod: 26

## 2022-12-21 NOTE — HISTORY OF PRESENT ILLNESS
[de-identified] : 68 yo F who underwent revision of posterior fusion for pseudoarthrosis, failed back syndrome.\par Today she reports markedly improving preop back pain but frequent falls at home with LE weakness for the past one month.\par She denies head trauma but feels her legs are weaker and cannot walk/stand long.\par She denies any other focal neuro deficits today. Continues to use electric wheelchair.

## 2022-12-21 NOTE — REASON FOR VISIT
[Follow-Up Visit] : a follow-up visit for [Spinal Stenosis] : spinal stenosis [FreeTextEntry2] : s/p T3-L1 revision of fusion, T2-L1 posterior fusion (8/17/2022)

## 2022-12-21 NOTE — ASSESSMENT
[FreeTextEntry1] : worsening LE weakness (L>R proximal) with frequent falls.\par \par PLAN\par - MRI head and whole spine wo\par - PT for gait training, strengthening exercise\par - ortho sx for shoulder pain (h/o arthritis) for possible CSI trials\par - f/u after MRI to review

## 2022-12-21 NOTE — PHYSICAL EXAM
[Wheelchair] : uses a wheelchair [] : Motor: [UE Motor Strength NL] : Motor strength of the bilateral upper extremities is normal [___/5] : left ([unfilled]/5) [de-identified] : Xray T/L spine today in PACS showed stable posterior fusion in thoracolumbar spine, no acute fractures noted.

## 2023-01-05 ENCOUNTER — OUTPATIENT (OUTPATIENT)
Dept: OUTPATIENT SERVICES | Facility: HOSPITAL | Age: 68
LOS: 1 days | End: 2023-01-05
Payer: MEDICARE

## 2023-01-05 ENCOUNTER — APPOINTMENT (OUTPATIENT)
Dept: MRI IMAGING | Facility: HOSPITAL | Age: 68
End: 2023-01-05
Payer: MEDICARE

## 2023-01-05 DIAGNOSIS — Z98.890 OTHER SPECIFIED POSTPROCEDURAL STATES: Chronic | ICD-10-CM

## 2023-01-05 DIAGNOSIS — Z96.611 PRESENCE OF RIGHT ARTIFICIAL SHOULDER JOINT: Chronic | ICD-10-CM

## 2023-01-05 PROCEDURE — 72141 MRI NECK SPINE W/O DYE: CPT | Mod: 26,MH

## 2023-01-05 PROCEDURE — 72148 MRI LUMBAR SPINE W/O DYE: CPT | Mod: 26,MH

## 2023-01-05 PROCEDURE — 72146 MRI CHEST SPINE W/O DYE: CPT | Mod: 26,MH

## 2023-01-05 PROCEDURE — 70551 MRI BRAIN STEM W/O DYE: CPT

## 2023-01-05 PROCEDURE — 72146 MRI CHEST SPINE W/O DYE: CPT

## 2023-01-05 PROCEDURE — 70551 MRI BRAIN STEM W/O DYE: CPT | Mod: 26,MH

## 2023-01-05 PROCEDURE — 72148 MRI LUMBAR SPINE W/O DYE: CPT

## 2023-01-05 PROCEDURE — 72141 MRI NECK SPINE W/O DYE: CPT

## 2023-01-10 DIAGNOSIS — I63.81 OTHER CEREBRAL INFARCTION DUE TO OCCLUSION OR STENOSIS OF SMALL ARTERY: ICD-10-CM

## 2023-01-10 DIAGNOSIS — M43.17 SPONDYLOLISTHESIS, LUMBOSACRAL REGION: ICD-10-CM

## 2023-01-10 DIAGNOSIS — R29.6 REPEATED FALLS: ICD-10-CM

## 2023-01-10 DIAGNOSIS — M43.16 SPONDYLOLISTHESIS, LUMBAR REGION: ICD-10-CM

## 2023-01-10 DIAGNOSIS — Z98.1 ARTHRODESIS STATUS: ICD-10-CM

## 2023-01-10 DIAGNOSIS — M54.50 LOW BACK PAIN, UNSPECIFIED: ICD-10-CM

## 2023-01-10 DIAGNOSIS — G31.89 OTHER SPECIFIED DEGENERATIVE DISEASES OF NERVOUS SYSTEM: ICD-10-CM

## 2023-01-10 DIAGNOSIS — M47.816 SPONDYLOSIS WITHOUT MYELOPATHY OR RADICULOPATHY, LUMBAR REGION: ICD-10-CM

## 2023-01-10 DIAGNOSIS — I67.82 CEREBRAL ISCHEMIA: ICD-10-CM

## 2023-01-10 DIAGNOSIS — R53.1 WEAKNESS: ICD-10-CM

## 2023-01-11 DIAGNOSIS — G95.89 OTHER SPECIFIED DISEASES OF SPINAL CORD: ICD-10-CM

## 2023-01-11 DIAGNOSIS — M43.12 SPONDYLOLISTHESIS, CERVICAL REGION: ICD-10-CM

## 2023-01-22 ENCOUNTER — NON-APPOINTMENT (OUTPATIENT)
Age: 68
End: 2023-01-22

## 2023-01-25 RX ORDER — DIAPER,BRIEF,ADULT, DISPOSABLE
EACH MISCELLANEOUS
Qty: 180 | Refills: 3 | Status: ACTIVE | OUTPATIENT
Start: 2023-01-25

## 2023-01-30 ENCOUNTER — RX RENEWAL (OUTPATIENT)
Age: 68
End: 2023-01-30

## 2023-03-06 ENCOUNTER — RX RENEWAL (OUTPATIENT)
Age: 68
End: 2023-03-06

## 2023-03-17 ENCOUNTER — OUTPATIENT (OUTPATIENT)
Dept: OUTPATIENT SERVICES | Facility: HOSPITAL | Age: 68
LOS: 1 days | End: 2023-03-17
Payer: MEDICARE

## 2023-03-17 DIAGNOSIS — Z96.611 PRESENCE OF RIGHT ARTIFICIAL SHOULDER JOINT: Chronic | ICD-10-CM

## 2023-03-17 DIAGNOSIS — Z98.890 OTHER SPECIFIED POSTPROCEDURAL STATES: Chronic | ICD-10-CM

## 2023-03-17 PROCEDURE — 73030 X-RAY EXAM OF SHOULDER: CPT | Mod: 26,LT

## 2023-03-17 PROCEDURE — 73560 X-RAY EXAM OF KNEE 1 OR 2: CPT | Mod: 26,50

## 2023-03-17 PROCEDURE — 73030 X-RAY EXAM OF SHOULDER: CPT

## 2023-03-17 PROCEDURE — 73560 X-RAY EXAM OF KNEE 1 OR 2: CPT

## 2023-03-27 ENCOUNTER — RX RENEWAL (OUTPATIENT)
Age: 68
End: 2023-03-27

## 2023-04-09 ENCOUNTER — NON-APPOINTMENT (OUTPATIENT)
Age: 68
End: 2023-04-09

## 2023-04-17 DIAGNOSIS — R32 UNSPECIFIED URINARY INCONTINENCE: ICD-10-CM

## 2023-04-19 ENCOUNTER — RX RENEWAL (OUTPATIENT)
Age: 68
End: 2023-04-19

## 2023-04-19 RX ORDER — MULTIVITAMIN WITH FOLIC ACID 400 MCG
TABLET ORAL
Qty: 90 | Refills: 0 | Status: ACTIVE | COMMUNITY
Start: 2023-04-19 | End: 1900-01-01

## 2023-04-21 ENCOUNTER — NON-APPOINTMENT (OUTPATIENT)
Age: 68
End: 2023-04-21

## 2023-04-26 ENCOUNTER — EMERGENCY (EMERGENCY)
Facility: HOSPITAL | Age: 68
LOS: 1 days | Discharge: HOME | End: 2023-04-26
Attending: STUDENT IN AN ORGANIZED HEALTH CARE EDUCATION/TRAINING PROGRAM | Admitting: STUDENT IN AN ORGANIZED HEALTH CARE EDUCATION/TRAINING PROGRAM
Payer: MEDICARE

## 2023-04-26 VITALS
SYSTOLIC BLOOD PRESSURE: 134 MMHG | DIASTOLIC BLOOD PRESSURE: 85 MMHG | TEMPERATURE: 98 F | WEIGHT: 167.99 LBS | HEART RATE: 82 BPM | OXYGEN SATURATION: 100 % | RESPIRATION RATE: 18 BRPM | HEIGHT: 67 IN

## 2023-04-26 DIAGNOSIS — Z98.1 ARTHRODESIS STATUS: ICD-10-CM

## 2023-04-26 DIAGNOSIS — Z98.890 OTHER SPECIFIED POSTPROCEDURAL STATES: Chronic | ICD-10-CM

## 2023-04-26 DIAGNOSIS — Z96.611 PRESENCE OF RIGHT ARTIFICIAL SHOULDER JOINT: Chronic | ICD-10-CM

## 2023-04-26 DIAGNOSIS — J43.9 EMPHYSEMA, UNSPECIFIED: ICD-10-CM

## 2023-04-26 DIAGNOSIS — F41.8 OTHER SPECIFIED ANXIETY DISORDERS: ICD-10-CM

## 2023-04-26 DIAGNOSIS — Z88.5 ALLERGY STATUS TO NARCOTIC AGENT: ICD-10-CM

## 2023-04-26 DIAGNOSIS — Z87.440 PERSONAL HISTORY OF URINARY (TRACT) INFECTIONS: ICD-10-CM

## 2023-04-26 DIAGNOSIS — K21.9 GASTRO-ESOPHAGEAL REFLUX DISEASE WITHOUT ESOPHAGITIS: ICD-10-CM

## 2023-04-26 DIAGNOSIS — E78.00 PURE HYPERCHOLESTEROLEMIA, UNSPECIFIED: ICD-10-CM

## 2023-04-26 DIAGNOSIS — I10 ESSENTIAL (PRIMARY) HYPERTENSION: ICD-10-CM

## 2023-04-26 DIAGNOSIS — R10.9 UNSPECIFIED ABDOMINAL PAIN: ICD-10-CM

## 2023-04-26 DIAGNOSIS — G47.33 OBSTRUCTIVE SLEEP APNEA (ADULT) (PEDIATRIC): ICD-10-CM

## 2023-04-26 DIAGNOSIS — Z87.891 PERSONAL HISTORY OF NICOTINE DEPENDENCE: ICD-10-CM

## 2023-04-26 DIAGNOSIS — R32 UNSPECIFIED URINARY INCONTINENCE: ICD-10-CM

## 2023-04-26 DIAGNOSIS — Z79.82 LONG TERM (CURRENT) USE OF ASPIRIN: ICD-10-CM

## 2023-04-26 DIAGNOSIS — Z79.01 LONG TERM (CURRENT) USE OF ANTICOAGULANTS: ICD-10-CM

## 2023-04-26 DIAGNOSIS — R29.898 OTHER SYMPTOMS AND SIGNS INVOLVING THE MUSCULOSKELETAL SYSTEM: ICD-10-CM

## 2023-04-26 DIAGNOSIS — I69.331 MONOPLEGIA OF UPPER LIMB FOLLOWING CEREBRAL INFARCTION AFFECTING RIGHT DOMINANT SIDE: ICD-10-CM

## 2023-04-26 DIAGNOSIS — Z96.611 PRESENCE OF RIGHT ARTIFICIAL SHOULDER JOINT: ICD-10-CM

## 2023-04-26 DIAGNOSIS — K59.09 OTHER CONSTIPATION: ICD-10-CM

## 2023-04-26 DIAGNOSIS — Z87.2 PERSONAL HISTORY OF DISEASES OF THE SKIN AND SUBCUTANEOUS TISSUE: ICD-10-CM

## 2023-04-26 DIAGNOSIS — Z87.39 PERSONAL HISTORY OF OTHER DISEASES OF THE MUSCULOSKELETAL SYSTEM AND CONNECTIVE TISSUE: ICD-10-CM

## 2023-04-26 LAB
ANION GAP SERPL CALC-SCNC: 11 MMOL/L — SIGNIFICANT CHANGE UP (ref 5–17)
APPEARANCE UR: CLEAR — SIGNIFICANT CHANGE UP
BILIRUB UR-MCNC: NEGATIVE — SIGNIFICANT CHANGE UP
BUN SERPL-MCNC: 9 MG/DL — SIGNIFICANT CHANGE UP (ref 7–23)
CALCIUM SERPL-MCNC: 8.6 MG/DL — SIGNIFICANT CHANGE UP (ref 8.4–10.5)
CHLORIDE SERPL-SCNC: 103 MMOL/L — SIGNIFICANT CHANGE UP (ref 96–108)
CO2 SERPL-SCNC: 23 MMOL/L — SIGNIFICANT CHANGE UP (ref 22–31)
COLOR SPEC: YELLOW — SIGNIFICANT CHANGE UP
CREAT SERPL-MCNC: 0.63 MG/DL — SIGNIFICANT CHANGE UP (ref 0.5–1.3)
DIFF PNL FLD: NEGATIVE — SIGNIFICANT CHANGE UP
EGFR: 96 ML/MIN/1.73M2 — SIGNIFICANT CHANGE UP
GLUCOSE SERPL-MCNC: 117 MG/DL — HIGH (ref 70–99)
GLUCOSE UR QL: NEGATIVE — SIGNIFICANT CHANGE UP
HCT VFR BLD CALC: 34.8 % — SIGNIFICANT CHANGE UP (ref 34.5–45)
HGB BLD-MCNC: 10.7 G/DL — LOW (ref 11.5–15.5)
KETONES UR-MCNC: NEGATIVE — SIGNIFICANT CHANGE UP
LEUKOCYTE ESTERASE UR-ACNC: NEGATIVE — SIGNIFICANT CHANGE UP
MCHC RBC-ENTMCNC: 24.8 PG — LOW (ref 27–34)
MCHC RBC-ENTMCNC: 30.7 GM/DL — LOW (ref 32–36)
MCV RBC AUTO: 80.6 FL — SIGNIFICANT CHANGE UP (ref 80–100)
NITRITE UR-MCNC: NEGATIVE — SIGNIFICANT CHANGE UP
NRBC # BLD: 0 /100 WBCS — SIGNIFICANT CHANGE UP (ref 0–0)
NT-PROBNP SERPL-SCNC: 80 PG/ML — SIGNIFICANT CHANGE UP (ref 0–300)
PH UR: 7 — SIGNIFICANT CHANGE UP (ref 5–8)
PLATELET # BLD AUTO: 257 K/UL — SIGNIFICANT CHANGE UP (ref 150–400)
POTASSIUM SERPL-MCNC: 4.4 MMOL/L — SIGNIFICANT CHANGE UP (ref 3.5–5.3)
POTASSIUM SERPL-SCNC: 4.4 MMOL/L — SIGNIFICANT CHANGE UP (ref 3.5–5.3)
PROT UR-MCNC: NEGATIVE MG/DL — SIGNIFICANT CHANGE UP
RBC # BLD: 4.32 M/UL — SIGNIFICANT CHANGE UP (ref 3.8–5.2)
RBC # FLD: 18.7 % — HIGH (ref 10.3–14.5)
SODIUM SERPL-SCNC: 137 MMOL/L — SIGNIFICANT CHANGE UP (ref 135–145)
SP GR SPEC: 1.02 — SIGNIFICANT CHANGE UP (ref 1–1.03)
TROPONIN T SERPL-MCNC: 0.01 NG/ML — SIGNIFICANT CHANGE UP (ref 0–0.01)
UROBILINOGEN FLD QL: 0.2 E.U./DL — SIGNIFICANT CHANGE UP
WBC # BLD: 5.03 K/UL — SIGNIFICANT CHANGE UP (ref 3.8–10.5)
WBC # FLD AUTO: 5.03 K/UL — SIGNIFICANT CHANGE UP (ref 3.8–10.5)

## 2023-04-26 PROCEDURE — 99285 EMERGENCY DEPT VISIT HI MDM: CPT

## 2023-04-26 PROCEDURE — 93970 EXTREMITY STUDY: CPT | Mod: 26

## 2023-04-26 PROCEDURE — 74176 CT ABD & PELVIS W/O CONTRAST: CPT | Mod: 26,MA

## 2023-04-26 RX ORDER — MORPHINE SULFATE 50 MG/1
4 CAPSULE, EXTENDED RELEASE ORAL ONCE
Refills: 0 | Status: DISCONTINUED | OUTPATIENT
Start: 2023-04-26 | End: 2023-04-26

## 2023-04-26 RX ADMIN — MORPHINE SULFATE 4 MILLIGRAM(S): 50 CAPSULE, EXTENDED RELEASE ORAL at 21:10

## 2023-04-26 NOTE — ED ADULT NURSE NOTE - ISOLATION TYPE:
Attempted to contact. Line kept ringing and was unable to leave a voicemail. Will call back at a later time.   None

## 2023-04-26 NOTE — ED ADULT NURSE NOTE - OBJECTIVE STATEMENT
67 yo F presents to ED co L sided back and neck pain x 2 days. Pt states, "I have had 31 back surgeries, so I have a morphine pump in my stomach that works 24/7. However, I think it may have stopped working, so I tried to medicate myself my melting some herbs together. I think the herbs may have messed up my kidney or my heart because I catheterized myself 2 days ago and there was fresh blood in it." Pt reports pain on L side of back radiating up to her neck and around to her belly button. Pt reports not being able to lay on left side. Pt reports swollen ankles and fingers. Pt reports blurry vision. Pt reports dark patches on legs. Pt reports she usually is able to stand to get into her wheelchair, but is having a hard time standing now. Denies CP, N/V/D, f/c, headache.

## 2023-04-26 NOTE — ED ADULT TRIAGE NOTE - CHIEF COMPLAINT QUOTE
Pt reports right flank/right lower back pain along w leg swelling and "dark spots in my legs" since 2 weeks.. pt also reports 1 episode of blood in the vagina when wiping 2 days ago. Pt self straight cath for urine. Pt reports having a pump in her "belly that connects to my spinal for my pain."

## 2023-04-27 ENCOUNTER — APPOINTMENT (OUTPATIENT)
Dept: SPINE | Facility: CLINIC | Age: 68
End: 2023-04-27
Payer: MEDICARE

## 2023-04-27 ENCOUNTER — OUTPATIENT (OUTPATIENT)
Dept: OUTPATIENT SERVICES | Facility: HOSPITAL | Age: 68
LOS: 1 days | End: 2023-04-27
Payer: MEDICARE

## 2023-04-27 VITALS
WEIGHT: 168 LBS | TEMPERATURE: 98.3 F | OXYGEN SATURATION: 96 % | DIASTOLIC BLOOD PRESSURE: 68 MMHG | BODY MASS INDEX: 26.37 KG/M2 | SYSTOLIC BLOOD PRESSURE: 89 MMHG | HEART RATE: 71 BPM | HEIGHT: 67 IN

## 2023-04-27 DIAGNOSIS — R73.03 PREDIABETES.: ICD-10-CM

## 2023-04-27 DIAGNOSIS — Z98.890 OTHER SPECIFIED POSTPROCEDURAL STATES: Chronic | ICD-10-CM

## 2023-04-27 DIAGNOSIS — Z87.898 PERSONAL HISTORY OF OTHER SPECIFIED CONDITIONS: ICD-10-CM

## 2023-04-27 DIAGNOSIS — I10 ESSENTIAL (PRIMARY) HYPERTENSION: ICD-10-CM

## 2023-04-27 DIAGNOSIS — G35 MULTIPLE SCLEROSIS: ICD-10-CM

## 2023-04-27 DIAGNOSIS — J38.7 OTHER DISEASES OF LARYNX: ICD-10-CM

## 2023-04-27 DIAGNOSIS — M40.209 UNSPECIFIED KYPHOSIS, SITE UNSPECIFIED: ICD-10-CM

## 2023-04-27 DIAGNOSIS — Z87.891 PERSONAL HISTORY OF NICOTINE DEPENDENCE: ICD-10-CM

## 2023-04-27 DIAGNOSIS — Z86.73 PERSONAL HISTORY OF TRANSIENT ISCHEMIC ATTACK (TIA), AND CEREBRAL INFARCTION W/OUT RESIDUAL DEFICITS: ICD-10-CM

## 2023-04-27 PROCEDURE — 74176 CT ABD & PELVIS W/O CONTRAST: CPT | Mod: MA

## 2023-04-27 PROCEDURE — 72082 X-RAY EXAM ENTIRE SPI 2/3 VW: CPT

## 2023-04-27 PROCEDURE — 83880 ASSAY OF NATRIURETIC PEPTIDE: CPT

## 2023-04-27 PROCEDURE — 72082 X-RAY EXAM ENTIRE SPI 2/3 VW: CPT | Mod: 26

## 2023-04-27 PROCEDURE — 80048 BASIC METABOLIC PNL TOTAL CA: CPT

## 2023-04-27 PROCEDURE — 99213 OFFICE O/P EST LOW 20 MIN: CPT

## 2023-04-27 PROCEDURE — 96375 TX/PRO/DX INJ NEW DRUG ADDON: CPT

## 2023-04-27 PROCEDURE — 85027 COMPLETE CBC AUTOMATED: CPT

## 2023-04-27 PROCEDURE — 93005 ELECTROCARDIOGRAM TRACING: CPT

## 2023-04-27 PROCEDURE — 36415 COLL VENOUS BLD VENIPUNCTURE: CPT

## 2023-04-27 PROCEDURE — 96374 THER/PROPH/DIAG INJ IV PUSH: CPT

## 2023-04-27 PROCEDURE — 81003 URINALYSIS AUTO W/O SCOPE: CPT

## 2023-04-27 PROCEDURE — 84484 ASSAY OF TROPONIN QUANT: CPT

## 2023-04-27 PROCEDURE — 93970 EXTREMITY STUDY: CPT

## 2023-04-27 PROCEDURE — 87086 URINE CULTURE/COLONY COUNT: CPT

## 2023-04-27 PROCEDURE — 99285 EMERGENCY DEPT VISIT HI MDM: CPT | Mod: 25

## 2023-04-27 RX ORDER — DIPHENHYDRAMINE HCL 50 MG
25 CAPSULE ORAL ONCE
Refills: 0 | Status: COMPLETED | OUTPATIENT
Start: 2023-04-27 | End: 2023-04-27

## 2023-04-27 RX ADMIN — Medication 25 MILLIGRAM(S): at 00:56

## 2023-04-27 NOTE — ASSESSMENT
[FreeTextEntry1] : Images were reviewed by Dr. Luz today. Her persistent balance problem is likely 2/2 MS but not from spinal disease. \par \par PLAN\par - refer to neurology for MS evaluation\par - ENT referral for incidental finding of supraglottic larynx lesion\par - RTC in 6 month-1 year for post op or sooner for new/worsening symptoms

## 2023-04-27 NOTE — ED PROVIDER NOTE - PATIENT PORTAL LINK FT
You can access the FollowMyHealth Patient Portal offered by [facility] by registering at the following website: [email or web address]. By joining [employer]’s FollowMyHealth portal, you will also be able to view your health information using other applications (apps) compatible with our system.

## 2023-04-27 NOTE — ED PROVIDER NOTE - HIV OFFER
5/20/21  3:57 PM    Population Health Referral received by this ACM; ED Visit 5/5/21 for shortness of breath. ACM outreach to patient today in order to follow-up and to offer care management services; two patient identifiers verified. Pt requests ACM outreach on a later date. Previously Declined (within the last year)

## 2023-04-27 NOTE — ED PROVIDER NOTE - OBJECTIVE STATEMENT
HTN, HLD, CVA x 3 (last was 2016 with right hand weakness residual), GAMALIEL not using CPAP, Emphysema, ex-25 pack year smoker quit 2021, Chronic Constipation on Movantik, Urinary Incontinence chronically self straight cath at home, chronic UTI, Breast Implant Rupture with Chronic Leak, B/L CTS s/p release, s/p Intrathecal Morphine pump for pain, with chronic neck and back pain worsening for years s/p multiple spinal surgeries including laminectomies and fusion of cervical, thoracic and lumbar spine, most recently done in 2019 and 2020 at [facility] by Dr. Trinidad,  s/p T3-L1 revision of prior fusion (with Dr. Luz on 8/17/22) presenting for left flank pain for several days. additionally with dark spots on legs. self caths for urine, noticed some blood on toilet tissue when wiping after cath 2 days ago. also complaining of bilateral arm weakness.

## 2023-04-27 NOTE — PHYSICAL EXAM
[Normal] : Gait: normal [Wheelchair] : uses a wheelchair [] : Motor: [UE Motor Strength NL] : Motor strength of the bilateral upper extremities is normal [___/5] : left ([unfilled]/5) [de-identified] : MRI brain and whole spine wo cont on 1/5/23 in PACS showed chronic lacunar infarct in L corona radiata, L putamen and L cerebellar otherwise unremarkable MRI. \par Xray scoliosis Xray today in PACS showed stable posterior fusion

## 2023-04-27 NOTE — ED PROVIDER NOTE - PHYSICAL EXAMINATION
General: Awake, alert and oriented. No acute distress. Appears stated age.   Skin: Skin in warm, dry and intact without rashes or lesions. Appropriate color for ethnicity  HENMT: head normocephalic and atraumatic; bilateral external ears without swelling. no nasal discharge. moist oral mucosa. supple neck, trachea midline  EYES: Conjunctiva clear. nonicteric sclera. EOM intact, Eyelids are normal in appearance without swelling or lesions.  Cardiac: well perfused  Respiratory: breathing comfortably on room air. no audible wheezing or stridor  Abdominal: nondistended, soft, nontender  MSK: Neck and back are without deformity, visible external skin changes, or signs of trauma. Curvature of the cervical, thoracic, and lumbar spine are within normal limits. no external signs of trauma. midl bilateral calf ttp on palpation  Neurological: The patient is awake, alert and oriented to person, place, and time with normal speech. CN 2-12 grossly intact. no apparent deficits. Memory is normal and thought process is intact. 5/5 strength in bilateral upper extremities, 4/4 strength on bilateral lower extremities   Psychiatric: Appropriate mood and affect. Good judgement and insight

## 2023-04-27 NOTE — ED PROVIDER NOTE - CLINICAL SUMMARY MEDICAL DECISION MAKING FREE TEXT BOX
Evaluate for possible UTI, renal stone with unremarkable work-up.  DVT study negative.  No sign of infection to legs.  No trauma to legs.  Discussed with neurosurgery, patient appears at her neurologic baseline, has follow-up with neurosurgery scheduled for this week.  Stable for discharge.

## 2023-04-28 LAB
CULTURE RESULTS: NO GROWTH — SIGNIFICANT CHANGE UP
SPECIMEN SOURCE: SIGNIFICANT CHANGE UP

## 2023-05-05 ENCOUNTER — NON-APPOINTMENT (OUTPATIENT)
Age: 68
End: 2023-05-05

## 2023-05-05 ENCOUNTER — LABORATORY RESULT (OUTPATIENT)
Age: 68
End: 2023-05-05

## 2023-05-05 ENCOUNTER — APPOINTMENT (OUTPATIENT)
Dept: INTERNAL MEDICINE | Facility: CLINIC | Age: 68
End: 2023-05-05
Payer: MEDICARE

## 2023-05-05 VITALS
DIASTOLIC BLOOD PRESSURE: 70 MMHG | HEART RATE: 86 BPM | SYSTOLIC BLOOD PRESSURE: 112 MMHG | RESPIRATION RATE: 16 BRPM | OXYGEN SATURATION: 98 %

## 2023-05-05 DIAGNOSIS — F32.A ANXIETY DISORDER, UNSPECIFIED: ICD-10-CM

## 2023-05-05 DIAGNOSIS — I63.9 CEREBRAL INFARCTION, UNSPECIFIED: ICD-10-CM

## 2023-05-05 DIAGNOSIS — F41.9 ANXIETY DISORDER, UNSPECIFIED: ICD-10-CM

## 2023-05-05 PROCEDURE — 36415 COLL VENOUS BLD VENIPUNCTURE: CPT

## 2023-05-05 PROCEDURE — 99214 OFFICE O/P EST MOD 30 MIN: CPT

## 2023-05-05 RX ORDER — CETIRIZINE HYDROCHLORIDE 10 MG/1
10 TABLET, COATED ORAL
Qty: 90 | Refills: 3 | Status: ACTIVE | COMMUNITY
Start: 2022-04-04 | End: 1900-01-01

## 2023-05-05 RX ORDER — MIRABEGRON 50 MG/1
50 TABLET, FILM COATED, EXTENDED RELEASE ORAL
Qty: 90 | Refills: 3 | Status: ACTIVE | COMMUNITY
Start: 2021-05-28 | End: 1900-01-01

## 2023-05-05 RX ORDER — ESOMEPRAZOLE MAGNESIUM 40 MG/1
40 CAPSULE, DELAYED RELEASE ORAL
Qty: 90 | Refills: 3 | Status: ACTIVE | COMMUNITY
Start: 2021-07-29 | End: 1900-01-01

## 2023-05-05 RX ORDER — GABAPENTIN 600 MG/1
600 TABLET, COATED ORAL 3 TIMES DAILY
Qty: 270 | Refills: 3 | Status: ACTIVE | COMMUNITY
Start: 1900-01-01 | End: 1900-01-01

## 2023-05-05 RX ORDER — MULTIVITAMIN
TABLET ORAL DAILY
Qty: 90 | Refills: 3 | Status: ACTIVE | COMMUNITY
Start: 2022-05-04 | End: 1900-01-01

## 2023-05-05 RX ORDER — NITROFURANTOIN MACROCRYSTALS 50 MG/1
50 CAPSULE ORAL
Qty: 90 | Refills: 3 | Status: ACTIVE | COMMUNITY
Start: 2021-02-16 | End: 1900-01-01

## 2023-05-05 RX ORDER — BISACODYL 5 MG/1
5 TABLET ORAL DAILY
Qty: 180 | Refills: 1 | Status: ACTIVE | COMMUNITY
Start: 2022-03-01 | End: 1900-01-01

## 2023-05-05 RX ORDER — SIMVASTATIN 40 MG/1
40 TABLET, FILM COATED ORAL
Qty: 90 | Refills: 3 | Status: ACTIVE | COMMUNITY
Start: 2021-02-16 | End: 1900-01-01

## 2023-05-05 RX ORDER — DULOXETINE HYDROCHLORIDE 60 MG/1
60 CAPSULE, DELAYED RELEASE PELLETS ORAL
Qty: 90 | Refills: 3 | Status: ACTIVE | COMMUNITY
Start: 2021-04-30 | End: 1900-01-01

## 2023-05-05 RX ORDER — TIZANIDINE 4 MG/1
4 TABLET ORAL
Qty: 90 | Refills: 3 | Status: ACTIVE | COMMUNITY
Start: 2022-08-12 | End: 1900-01-01

## 2023-05-05 NOTE — PHYSICAL EXAM
[Normal] : affect was normal and insight and judgment were intact [de-identified] : trace swelling b/l le

## 2023-05-05 NOTE — HISTORY OF PRESENT ILLNESS
[FreeTextEntry1] : back pain and neck pain [de-identified] : THis is a 65 yo f with HTN, GAMALIEL, multiple spinal surgeries in the past\par HLD, CVA x3 \par s/p sinal surgery - then went to a NH for 5 months.  CUrrently she is at home.  Here today on an electric wheelchair.\par she is planning on moving to [place] next month. \par recent leg swelling.  Reports she had blood work at [facility] ER - labs ok.\par has a referral to see neurology\par recently at [facility] - told she was anemic, needs follow up

## 2023-05-06 ENCOUNTER — NON-APPOINTMENT (OUTPATIENT)
Age: 68
End: 2023-05-06

## 2023-05-06 LAB
ALBUMIN SERPL ELPH-MCNC: 4.4 G/DL
ALP BLD-CCNC: 165 U/L
ALT SERPL-CCNC: 11 U/L
ANION GAP SERPL CALC-SCNC: 13 MMOL/L
AST SERPL-CCNC: 17 U/L
BASOPHILS # BLD AUTO: 0.03 K/UL
BASOPHILS NFR BLD AUTO: 0.6 %
BILIRUB SERPL-MCNC: 0.2 MG/DL
BUN SERPL-MCNC: 9 MG/DL
CALCIUM SERPL-MCNC: 9.6 MG/DL
CHLORIDE SERPL-SCNC: 105 MMOL/L
CO2 SERPL-SCNC: 24 MMOL/L
CREAT SERPL-MCNC: 0.66 MG/DL
EGFR: 95 ML/MIN/1.73M2
EOSINOPHIL # BLD AUTO: 0.08 K/UL
EOSINOPHIL NFR BLD AUTO: 1.6 %
FERRITIN SERPL-MCNC: 61 NG/ML
GGT SERPL-CCNC: 22 U/L
GLUCOSE SERPL-MCNC: 143 MG/DL
HCT VFR BLD CALC: 36.9 %
HGB BLD-MCNC: 11 G/DL
IMM GRANULOCYTES NFR BLD AUTO: 0.2 %
IRON SATN MFR SERPL: 6 %
IRON SERPL-MCNC: 28 UG/DL
LYMPHOCYTES # BLD AUTO: 2.2 K/UL
LYMPHOCYTES NFR BLD AUTO: 44.2 %
MAN DIFF?: NORMAL
MCHC RBC-ENTMCNC: 25.3 PG
MCHC RBC-ENTMCNC: 29.8 GM/DL
MCV RBC AUTO: 84.8 FL
MONOCYTES # BLD AUTO: 0.48 K/UL
MONOCYTES NFR BLD AUTO: 9.6 %
NEUTROPHILS # BLD AUTO: 2.18 K/UL
NEUTROPHILS NFR BLD AUTO: 43.8 %
PLATELET # BLD AUTO: 236 K/UL
POTASSIUM SERPL-SCNC: 4.7 MMOL/L
PROT SERPL-MCNC: 7 G/DL
RBC # BLD: 4.35 M/UL
RBC # FLD: 20.2 %
SODIUM SERPL-SCNC: 142 MMOL/L
TIBC SERPL-MCNC: 490 UG/DL
UIBC SERPL-MCNC: 462 UG/DL
WBC # FLD AUTO: 4.98 K/UL

## 2023-05-08 RX ORDER — DICLOFENAC SODIUM 1% 10 MG/G
1 GEL TOPICAL
Qty: 100 | Refills: 3 | Status: ACTIVE | COMMUNITY
Start: 2022-07-25 | End: 1900-01-01

## 2023-05-08 RX ORDER — CHOLECALCIFEROL (VITAMIN D3) 125 MCG
143 (45 FE) CAPSULE ORAL
Qty: 30 | Refills: 3 | Status: ACTIVE | COMMUNITY
Start: 2023-05-08 | End: 1900-01-01

## 2023-05-14 ENCOUNTER — RX RENEWAL (OUTPATIENT)
Age: 68
End: 2023-05-14

## 2023-05-14 RX ORDER — ALBUTEROL SULFATE 90 UG/1
108 (90 BASE) INHALANT RESPIRATORY (INHALATION) EVERY 4 HOURS
Qty: 18 | Refills: 5 | Status: ACTIVE | COMMUNITY
Start: 2022-05-04 | End: 1900-01-01

## 2023-05-17 ENCOUNTER — APPOINTMENT (OUTPATIENT)
Dept: NEUROLOGY | Facility: CLINIC | Age: 68
End: 2023-05-17
Payer: MEDICARE

## 2023-05-17 VITALS
TEMPERATURE: 97.3 F | BODY MASS INDEX: 28.72 KG/M2 | HEIGHT: 67 IN | OXYGEN SATURATION: 97 % | WEIGHT: 183 LBS | SYSTOLIC BLOOD PRESSURE: 134 MMHG | DIASTOLIC BLOOD PRESSURE: 85 MMHG | HEART RATE: 79 BPM

## 2023-05-17 PROCEDURE — 99417 PROLNG OP E/M EACH 15 MIN: CPT

## 2023-05-17 PROCEDURE — 99215 OFFICE O/P EST HI 40 MIN: CPT

## 2023-05-18 NOTE — HISTORY OF PRESENT ILLNESS
[FreeTextEntry1] : Reason for consult: r/o MS\par \par HPI: VANNA DIAZ is a 68 year old woman \par \par Referred by Dr. Luz for possible MS.\par HX is unclear.\par Has had severe fatigue.\par Main issue has been foot swelling and black toes associated with pain in legs from the knees down. \par Has had several spinal surgeries in cervical and lumbar spine over the years. \par Had MRIs in 2021 that raised question of MS. Saw Dr. Qiu who felt this could be MS. \par Had been seeing neurologist Dr. Melba Pineda who referred to MS neurologist at [facility] (Carlota Franklin) who subsequently told her she doesn't have MS after testing was done.\par Notably had an LP in 1/2022 during a lumbar laminectomy demonstrated absent OCBs, WBC 0, protein 195, and slightly elevated igg synth but normal index.\par Notably reports 3 episodes of "strokes" but unclear if these are strokes.\par Notably also reports that she has \par \par \par ROS/Current Sx:\par chronic leg pain\par chronic leg swelling\par fatigue - new as of 2 months. \par BB dysfunction - on mirabegron\par \par PMHX:\par lumbar and cervical surgeries\par chronic pain - on oxycodone and morphine pump\par stroke 2016 - R hand clumsiness/weakness\par another stroke 2016 1m after the first affecting the same RUE. \par another stroke 2010 or 2009 - though no symptoms, only on "xrays". \par pre-DM\par \par MEDS:\par asa81\par gabapentin 600tid for many years\par tizanidine 4mg BID for about 1y\par cymbalta 60mg\par mirabegron\par melatonin\par zocor\par pain pump\par \par ALL: morphine derivatives\par \par SHx: past tob. \par \par FHx: no MS\par \par \par Vitals: unrevealing\par \par Exam:\par \par AO3.  Normally conversant.  Follows commands, names, and repeats.  Good attention.\par \par PERRL, VFF, EOMI, no nystagmus, face symmetric, TUP at midline.\par \par Motor: \par                                                 R:                               L:\par Del                                           5                                5\par Bi                                              5                               5\par Tri                                            5                               5\par Wrist Extensors                      5-                               5\par Finger abductors                    5-                               5\par                                         5-                               5 \par \par HF                                           5-                               5-\par KE                                           5                               5\par KF                                           5                               5\par DF                                           5                               5\par PF                                           5                               5\par \par Tone                                       R                               L\par UE                                          0                                0 \par LE                                          0                                0\par \par Sensory                                RUE                      LUE                 RLE                LLE     \par LT                                           +                            +                      +                   +\par Vib                                          +                            +                   mod distal loss BL LEs\par \par Reflexes:\par hypo throughout\par \par TOES mute BL\par \par Coordination:\par                                              R                             L                       \par FTN                                       0                             0 \par WILLEM                                      0                            0\par HTS                                      0                             0 \par \par Other                                                                          \par \par \par LP in 1/2022 during a lumbar laminectomy demonstrated absent OCBs, WBC 0, protein 195, and slightly elevated igg synth but normal index.\par \par \par MRI brain 1/2023 - nonspecific t2h in a patter most suggestive of microvascular disease\par \par MRI C and T spine 1/2023 - some myelomalacia in the cervical cord but no distinct lesions to suggest MS. there is an "owl's eyes" sign which is compatible with prior known compression.\par \par \par AP: 69yo w/ multiple spine surgeries, chronic pain and foot swelling who presented to rule out MS after brain MRI demonstrated nonspecific lesions. The appearance of these lesions do not highly suggest MS and given her age, this is unlikely. CSF in 1/2022 did not reveal OCBs. While there are spinal cord abnormalities, this is chronic myelomalacia with an "owl's eyes" cord signal change compatible with prior compression.\par \par all questions answered, education provided, management discussed at length.\par \par - f/u with Dr. Luz \par - advised ENT evaluation for laryngeal lesion.\par - f/u with PMD for foot swelling, consider vascular evaluation and potentially rheumatology evaluation.\par - f/u with pain management for chronic pain\par - consider EMG for possible peripheral neuropathy, but given foot swelling, this may be very technically difficult. \par - will recommend Dr. Crisostomo to add a B12 check to her next blood work.\par \par

## 2023-05-24 ENCOUNTER — APPOINTMENT (OUTPATIENT)
Dept: VASCULAR SURGERY | Facility: CLINIC | Age: 68
End: 2023-05-24
Payer: MEDICARE

## 2023-05-24 VITALS
WEIGHT: 183 LBS | HEART RATE: 88 BPM | OXYGEN SATURATION: 97 % | HEIGHT: 67 IN | DIASTOLIC BLOOD PRESSURE: 80 MMHG | SYSTOLIC BLOOD PRESSURE: 105 MMHG | TEMPERATURE: 97 F | BODY MASS INDEX: 28.72 KG/M2

## 2023-05-24 DIAGNOSIS — M79.89 PAIN IN RIGHT LOWER LEG: ICD-10-CM

## 2023-05-24 DIAGNOSIS — M79.89 PAIN IN LEFT LOWER LEG: ICD-10-CM

## 2023-05-24 DIAGNOSIS — M79.89 OTHER SPECIFIED SOFT TISSUE DISORDERS: ICD-10-CM

## 2023-05-24 DIAGNOSIS — L30.9 DERMATITIS, UNSPECIFIED: ICD-10-CM

## 2023-05-24 DIAGNOSIS — M79.661 PAIN IN RIGHT LOWER LEG: ICD-10-CM

## 2023-05-24 DIAGNOSIS — I83.893 VARICOSE VEINS OF BILATERAL LOWER EXTREMITIES WITH OTHER COMPLICATIONS: ICD-10-CM

## 2023-05-24 DIAGNOSIS — M79.662 PAIN IN LEFT LOWER LEG: ICD-10-CM

## 2023-05-24 PROCEDURE — 99203 OFFICE O/P NEW LOW 30 MIN: CPT | Mod: 25

## 2023-05-24 PROCEDURE — 93970 EXTREMITY STUDY: CPT

## 2023-05-24 NOTE — DATA REVIEWED
[FreeTextEntry1] : b/l vle - normal study both legs, no reflux and no DVT\par ADELA            rt 1.2 left 1.3

## 2023-05-24 NOTE — VITALS
[Dull] : dull [Aching] : aching [Tender] : tender [FreeTextEntry3] : both lower legs tender indurated dermatitis

## 2023-05-24 NOTE — REASON FOR VISIT
[Initial Evaluation] : an initial evaluation [FreeTextEntry1] : pt here for evaluation of b/l severe lower extremity edema with tender, dark indurated skin, hx smoking, no sx claudication or rest pain - b/l vle performed by RVT was normal venous tudy both legs, no reflux and no dvt/svt; b/l  ADELA rt 1.20 left 1.31, evidence calcification. Pt instructed to wear daily thigh high support hose 20-30 mmHg, stop smoking, followup with PCP. Also referred her to DPM to trim toe nails which are extremely long.

## 2023-05-24 NOTE — PHYSICAL EXAM
[JVD] : no jugular venous distention  [2+] : left 2+ [Ankle Swelling (On Exam)] : present [Ankle Swelling Bilaterally] : bilaterally  [Ankle Swelling On The Left] : moderate [Varicose Veins Of Lower Extremities] : bilaterally [] : bilaterally [No Rash or Lesion] : No rash or lesion [Purpura] : no purpura  [Petechiae] : no petechiae [Skin Ulcer] : no ulcer [Skin Induration] : induration [Alert] : alert [Oriented to Person] : oriented to person [Oriented to Place] : oriented to place [Oriented to Time] : oriented to time [Calm] : calm [de-identified] : elderly nad [de-identified] : wnl [FreeTextEntry1] : b.l swollen lower legs with tender dark indurated dermatitis,  no ulcers, no large varicose veins [de-identified] : wnl

## 2023-05-30 ENCOUNTER — APPOINTMENT (OUTPATIENT)
Dept: HEART AND VASCULAR | Facility: CLINIC | Age: 68
End: 2023-05-30

## 2023-05-31 ENCOUNTER — APPOINTMENT (OUTPATIENT)
Dept: HEART AND VASCULAR | Facility: CLINIC | Age: 68
End: 2023-05-31
Payer: MEDICARE

## 2023-05-31 ENCOUNTER — NON-APPOINTMENT (OUTPATIENT)
Age: 68
End: 2023-05-31

## 2023-05-31 VITALS — SYSTOLIC BLOOD PRESSURE: 115 MMHG | DIASTOLIC BLOOD PRESSURE: 71 MMHG

## 2023-05-31 VITALS
HEART RATE: 77 BPM | WEIGHT: 183 LBS | HEIGHT: 67 IN | SYSTOLIC BLOOD PRESSURE: 137 MMHG | BODY MASS INDEX: 28.72 KG/M2 | TEMPERATURE: 97.2 F | OXYGEN SATURATION: 97 % | DIASTOLIC BLOOD PRESSURE: 69 MMHG

## 2023-05-31 DIAGNOSIS — I49.1 ATRIAL PREMATURE DEPOLARIZATION: ICD-10-CM

## 2023-05-31 PROCEDURE — 93000 ELECTROCARDIOGRAM COMPLETE: CPT

## 2023-05-31 PROCEDURE — 36415 COLL VENOUS BLD VENIPUNCTURE: CPT

## 2023-05-31 PROCEDURE — 99215 OFFICE O/P EST HI 40 MIN: CPT

## 2023-05-31 NOTE — DISCUSSION/SUMMARY
[Patient] : the patient [EKG obtained to assist in diagnosis and management of assessed problem(s)] : EKG obtained to assist in diagnosis and management of assessed problem(s) [___ Month(s)] : in [unfilled] month(s) [FreeTextEntry1] : \par 67 y/o female with  h/o type 2 dm, cad, overweight, seizure, cva, pati, htn, gerd who presents for initial evaluation today\par \par -continue asa, statin\par -continue BB\par -ekg ordered today - nsr w pac, normal intervals, no st/t changes\par -endo referral for type 2 dm\par -labs 2023 reviewed\par -advised f/up for anemia w pcp\par -ordered lipids, A1c, tsh, urine\par -EP referral given cva, ILR\par -24 hour heart monitor for pac\par -CTA 2022: calcium score 38 (71%), <25% LM, minimal pLAD, mild noncalcified mural plaque desc thoracic ao, mild mail pa 3.1 cm mild centriblob emphsyema, probable intracapsular rupture of left breast prosthesis. multiple solid nodules measuring up to 12 mm around prosthesis for which mammo correlation suggested\par -Echo 2021: ef 65%, no wma, mild ai, trace mr\par -will need plastic for implant removal\par -order CTA for cp, sob\par -ordered Echo for cp, sob\par -counseled on cvd risk factors\par -f/up 2-3 months for htn, cad\par \par I have spent 60 minutes reviewing labs, records, tests and discussing cvd risk factors, cp evaluation

## 2023-05-31 NOTE — PHYSICAL EXAM
[Well Developed] : well developed [Well Nourished] : well nourished [No Acute Distress] : no acute distress [Normal Conjunctiva] : normal conjunctiva [Normal Venous Pressure] : normal venous pressure [No Carotid Bruit] : no carotid bruit [Normal S1, S2] : normal S1, S2 [No Rub] : no rub [No Gallop] : no gallop [Clear Lung Fields] : clear lung fields [Good Air Entry] : good air entry [No Respiratory Distress] : no respiratory distress  [Soft] : abdomen soft [Non Tender] : non-tender [No Masses/organomegaly] : no masses/organomegaly [Normal Bowel Sounds] : normal bowel sounds [Normal Gait] : normal gait [No Edema] : no edema [No Cyanosis] : no cyanosis [No Clubbing] : no clubbing [No Varicosities] : no varicosities [No Rash] : no rash [No Skin Lesions] : no skin lesions [Moves all extremities] : moves all extremities [No Focal Deficits] : no focal deficits [Normal Speech] : normal speech [Alert and Oriented] : alert and oriented [Normal memory] : normal memory [de-identified] : 2/6 fabiola anderson

## 2023-05-31 NOTE — HISTORY OF PRESENT ILLNESS
[FreeTextEntry1] : \par 67 y/o female with  h/o type 2 dm, cad, overweight, seizure, cva, pati, htn, gerd who presents for initial evaluation today\par \par notes 3 months of new cp, sob\par notes intermittent lh and palpitations\par no syncope\par notes some mild edema\par \par seen by cards 2022\par  \par \par CTA 2022: calcium score 38 (71%), <25% LM, minimal pLAD, mild noncalcified mural plaque desc thoracic ao, mild mail pa 3.1 cm mild centriblob emphsyema, probable intracapsular rupture of left breast prosthesis. multiple solid nodules measuring up to 12 mm around prosthesis for which mammo correlation suggested\par \par Echo 2021: ef 65%, no wma, mlid ai, trace mr\par \par seen by neuro for possible MS, h/o cva\par \par seen by vascular 5/23\par  vle performed by RVT was normal venous study both legs, no reflux and no dvt/svt; b/l ADELA rt 1.20 left 1.31, evidence calcification.\par \par uses wheelchair \par \par PMH/PSH:\par cad\par type 2 dm\par seizure\par cva 2016, 2010\par htn\par gerd\par spinal fusion\par shoulder surgery\par overweight\par pati\par \par \par ALL:\par morphine\par \par \par MEDS:\par asa 81 mg qd\par esomeprazole 40 mg qd\par neurontin 600 mg tid\par melatonin\par metoprolol tartrate 50 mg bid\par mvi\par myrbetriq 50 \par zocor 40 mg qd\par tizanidine 4 mg qd\par fe\par duloxetine 60 mg qd\par certirizine\par \par baclofen\par albuterol prn\par \par \par SH:\par former smoker 15-67 10 cigs/day\par social etoh\par no drugs\par from [place] lived [place]\par actress, model, imports textiles\par single\par no children\par lives alone\par \par FH:\par mother - unknown\par father - unknown

## 2023-06-01 LAB
CHOLEST SERPL-MCNC: 132 MG/DL
ESTIMATED AVERAGE GLUCOSE: 192 MG/DL
HBA1C MFR BLD HPLC: 8.3 %
HDLC SERPL-MCNC: 51 MG/DL
LDLC SERPL CALC-MCNC: 62 MG/DL
NONHDLC SERPL-MCNC: 81 MG/DL
TRIGL SERPL-MCNC: 97 MG/DL
TSH SERPL-ACNC: 0.9 UIU/ML

## 2023-06-01 RX ORDER — METFORMIN HYDROCHLORIDE 500 MG/1
500 TABLET, COATED ORAL
Qty: 60 | Refills: 5 | Status: ACTIVE | COMMUNITY
Start: 2023-06-01 | End: 1900-01-01

## 2023-06-02 ENCOUNTER — APPOINTMENT (OUTPATIENT)
Dept: GASTROENTEROLOGY | Facility: CLINIC | Age: 68
End: 2023-06-02
Payer: MEDICARE

## 2023-06-02 VITALS
RESPIRATION RATE: 18 BRPM | OXYGEN SATURATION: 97 % | TEMPERATURE: 97.8 F | SYSTOLIC BLOOD PRESSURE: 103 MMHG | BODY MASS INDEX: 28.72 KG/M2 | HEART RATE: 82 BPM | DIASTOLIC BLOOD PRESSURE: 79 MMHG | WEIGHT: 183 LBS | HEIGHT: 67 IN

## 2023-06-02 DIAGNOSIS — K86.2 CYST OF PANCREAS: ICD-10-CM

## 2023-06-02 DIAGNOSIS — D50.9 IRON DEFICIENCY ANEMIA, UNSPECIFIED: ICD-10-CM

## 2023-06-02 PROCEDURE — 99215 OFFICE O/P EST HI 40 MIN: CPT

## 2023-06-06 ENCOUNTER — APPOINTMENT (OUTPATIENT)
Dept: HEART AND VASCULAR | Facility: CLINIC | Age: 68
End: 2023-06-06

## 2023-06-07 ENCOUNTER — NON-APPOINTMENT (OUTPATIENT)
Age: 68
End: 2023-06-07

## 2023-06-08 ENCOUNTER — NON-APPOINTMENT (OUTPATIENT)
Age: 68
End: 2023-06-08

## 2023-06-12 RX ORDER — METOPROLOL TARTRATE 50 MG/1
50 TABLET, FILM COATED ORAL TWICE DAILY
Qty: 270 | Refills: 0 | Status: ACTIVE | COMMUNITY
Start: 1900-01-01 | End: 1900-01-01

## 2023-06-13 NOTE — REVIEW OF SYSTEMS
[Negative] : Heme/Lymph [As Noted in HPI] : as noted in HPI Complex Repair And W Plasty Text: The defect edges were debeveled with a #15 scalpel blade.  The primary defect was closed partially with a complex linear closure.  Given the location of the remaining defect, shape of the defect and the proximity to free margins a W plasty was deemed most appropriate for complete closure of the defect.  Using a sterile surgical marker, an appropriate advancement flap was drawn incorporating the defect and placing the expected incisions within the relaxed skin tension lines where possible.    The area thus outlined was incised deep to adipose tissue with a #15 scalpel blade.  The skin margins were undermined to an appropriate distance in all directions utilizing iris scissors.

## 2023-06-13 NOTE — ASSESSMENT
March 11, 2020      Junaid Gunter  [address]    [facility] [address]        Dear ,    We are writing to inform you of your test results.          Your platelet counts are remaining low though improved from the past. Its showing the picture of possible bone marrow suppression to produce the blood cells due to your Chronic hepatitis C infection. Recommend to follow Hepatology / Liver specialist by keeping up the follow up appointment with them and starting off on the medication for Hepatitis C prescribed by him to prevent further worsening of your condition.   Please let me know if you have any questions.          Resulted Orders   CBC with platelets differential   Result Value Ref Range    WBC 5.2 4.0 - 11.0 10e9/L    RBC Count 3.49 (L) 3.8 - 5.2 10e12/L    Hemoglobin 11.3 (L) 11.7 - 15.7 g/dL    Hematocrit 36.0 35.0 - 47.0 %     (H) 78 - 100 fl    MCH 32.4 26.5 - 33.0 pg    MCHC 31.4 (L) 31.5 - 36.5 g/dL    RDW 13.8 10.0 - 15.0 %    Platelet Count 134 (L) 150 - 450 10e9/L    % Neutrophils 69.0 %    % Lymphocytes 27.0 %    % Monocytes 3.0 %    % Basophils 1.0 %    Absolute Neutrophil 3.5 1.6 - 8.3 10e9/L    Absolute Lymphocytes 1.4 0.8 - 5.3 10e9/L    Absolute Monocytes 0.2 0.0 - 1.3 10e9/L    Absolute Basophils 0.1 0.0 - 0.2 10e9/L    RBC Morphology Consistent with reported results     Platelet Estimate       Automated count confirmed.  Platelet morphology is normal.    Diff Method Manual Differential    Hemoglobin A1c   Result Value Ref Range    Hemoglobin A1C 5.1 0 - 5.6 %      Comment:      Normal <5.7% Prediabetes 5.7-6.4%  Diabetes 6.5% or higher - adopted from ADA   consensus guidelines.     Vitamin B12   Result Value Ref Range    Vitamin B12 662 193 - 986 pg/mL   Folate   Result Value Ref Range    Folate 16.1 >5.4 ng/mL       If you have any questions or concerns, please call the clinic at the number listed above.       Sincerely,        Sherlyn Orozco MD                 [FreeTextEntry1] : 67 yo female with DM, CAD, CVA, GAMALIEL hx of multiple spine surgeries with left sided abdominal pain and iron deficiency anemia.\par \par - EGD and colonoscopy at [facility]\par - Two day GoLYTELY prep at [facility]\par - D/w pt regarding escort post-procedure\par - Risks of the procedure including bleeding, perforation, etc d/w the patient\par - Await full cards evaluation prior to scheduling\par - MRI/MRCP to evaluate the pancreas for evidence of a cyst

## 2023-06-13 NOTE — HISTORY OF PRESENT ILLNESS
[FreeTextEntry1] : Dr. Croft for screening -- normal -- 2020 [de-identified] : 1/28/21 CT A/P without contrast showed no acute abdominopelvic findings.\par \par 1/5/21 CT A/P showed no PE, mild colonic diverticulosis without diverticulitis, limited visualization of uncinate process of pancreas demonstrates a possible hypodensity/ cystic structure

## 2023-06-14 ENCOUNTER — RX RENEWAL (OUTPATIENT)
Age: 68
End: 2023-06-14

## 2023-06-14 RX ORDER — POLYETHYLENE GLYCOL 3350 AND ELECTROLYTES WITH LEMON FLAVOR 236; 22.74; 6.74; 5.86; 2.97 G/4L; G/4L; G/4L; G/4L; G/4L
236 POWDER, FOR SOLUTION ORAL
Qty: 1 | Refills: 0 | Status: ACTIVE | COMMUNITY
Start: 2023-06-14 | End: 1900-01-01

## 2023-06-20 ENCOUNTER — RX RENEWAL (OUTPATIENT)
Age: 68
End: 2023-06-20

## 2023-06-20 RX ORDER — FERROUS SULFATE 137(45) MG
142 (45 FE) TABLET, EXTENDED RELEASE ORAL
Qty: 30 | Refills: 5 | Status: ACTIVE | COMMUNITY
Start: 2023-06-20 | End: 1900-01-01

## 2023-06-20 RX ORDER — MULTIVIT-MIN/FOLIC/VIT K/LYCOP 400-300MCG
50 MCG TABLET ORAL
Qty: 90 | Refills: 2 | Status: ACTIVE | COMMUNITY
Start: 2023-06-20 | End: 1900-01-01

## 2023-06-27 ENCOUNTER — APPOINTMENT (OUTPATIENT)
Dept: CT IMAGING | Facility: CLINIC | Age: 68
End: 2023-06-27
Payer: MEDICARE

## 2023-06-27 ENCOUNTER — OUTPATIENT (OUTPATIENT)
Dept: OUTPATIENT SERVICES | Facility: HOSPITAL | Age: 68
LOS: 1 days | End: 2023-06-27

## 2023-06-27 DIAGNOSIS — Z96.611 PRESENCE OF RIGHT ARTIFICIAL SHOULDER JOINT: Chronic | ICD-10-CM

## 2023-06-27 DIAGNOSIS — Z98.890 OTHER SPECIFIED POSTPROCEDURAL STATES: Chronic | ICD-10-CM

## 2023-06-27 PROCEDURE — 75574 CT ANGIO HRT W/3D IMAGE: CPT | Mod: 26

## 2023-07-07 ENCOUNTER — APPOINTMENT (OUTPATIENT)
Dept: NEUROSURGERY | Facility: CLINIC | Age: 68
End: 2023-07-07

## 2023-07-11 ENCOUNTER — APPOINTMENT (OUTPATIENT)
Dept: NEUROSURGERY | Facility: CLINIC | Age: 68
End: 2023-07-11
Payer: MEDICARE

## 2023-07-11 VITALS
SYSTOLIC BLOOD PRESSURE: 103 MMHG | DIASTOLIC BLOOD PRESSURE: 79 MMHG | HEIGHT: 67 IN | BODY MASS INDEX: 28.72 KG/M2 | RESPIRATION RATE: 18 BRPM | TEMPERATURE: 97.8 F | HEART RATE: 82 BPM | WEIGHT: 183 LBS | OXYGEN SATURATION: 97 %

## 2023-07-11 DIAGNOSIS — M54.6 PAIN IN THORACIC SPINE: ICD-10-CM

## 2023-07-11 DIAGNOSIS — S32.009K UNSPECIFIED FRACTURE OF UNSPECIFIED LUMBAR VERTEBRA, SUBSEQUENT ENCOUNTER FOR FRACTURE WITH NONUNION: ICD-10-CM

## 2023-07-11 PROCEDURE — 99214 OFFICE O/P EST MOD 30 MIN: CPT

## 2023-07-13 ENCOUNTER — RESULT REVIEW (OUTPATIENT)
Age: 68
End: 2023-07-13

## 2023-07-14 ENCOUNTER — APPOINTMENT (OUTPATIENT)
Dept: CT IMAGING | Facility: CLINIC | Age: 68
End: 2023-07-14
Payer: MEDICARE

## 2023-07-14 ENCOUNTER — OUTPATIENT (OUTPATIENT)
Dept: OUTPATIENT SERVICES | Facility: HOSPITAL | Age: 68
LOS: 1 days | End: 2023-07-14

## 2023-07-14 DIAGNOSIS — Z98.890 OTHER SPECIFIED POSTPROCEDURAL STATES: Chronic | ICD-10-CM

## 2023-07-14 DIAGNOSIS — Z96.611 PRESENCE OF RIGHT ARTIFICIAL SHOULDER JOINT: Chronic | ICD-10-CM

## 2023-07-14 PROCEDURE — 74178 CT ABD&PLV WO CNTR FLWD CNTR: CPT | Mod: 26

## 2023-07-28 ENCOUNTER — NON-APPOINTMENT (OUTPATIENT)
Age: 68
End: 2023-07-28

## 2023-08-01 ENCOUNTER — APPOINTMENT (OUTPATIENT)
Dept: NEUROSURGERY | Facility: CLINIC | Age: 68
End: 2023-08-01
Payer: MEDICARE

## 2023-08-01 VITALS
TEMPERATURE: 97.8 F | RESPIRATION RATE: 18 BRPM | HEART RATE: 82 BPM | BODY MASS INDEX: 28.72 KG/M2 | DIASTOLIC BLOOD PRESSURE: 79 MMHG | SYSTOLIC BLOOD PRESSURE: 103 MMHG | HEIGHT: 67 IN | OXYGEN SATURATION: 97 % | WEIGHT: 183 LBS

## 2023-08-01 DIAGNOSIS — M96.1 POSTLAMINECTOMY SYNDROME, NOT ELSEWHERE CLASSIFIED: ICD-10-CM

## 2023-08-01 PROCEDURE — 99213 OFFICE O/P EST LOW 20 MIN: CPT

## 2023-08-02 ENCOUNTER — RX RENEWAL (OUTPATIENT)
Age: 68
End: 2023-08-02

## 2023-08-02 RX ORDER — NALOXEGOL OXALATE 25 MG/1
25 TABLET, FILM COATED ORAL
Qty: 30 | Refills: 0 | Status: ACTIVE | COMMUNITY
Start: 2023-08-02 | End: 1900-01-01

## 2023-08-04 RX ORDER — IBUPROFEN 400 MG/1
400 TABLET, FILM COATED ORAL
Qty: 120 | Refills: 0 | Status: ACTIVE | COMMUNITY
Start: 2022-04-04 | End: 1900-01-01

## 2023-08-05 NOTE — ED ADULT NURSE REASSESSMENT NOTE - NS ED NURSE REASSESS COMMENT FT1
percocet given per MD approval. pt attempts to take at home oxycodone extended release after taking the prescribed percocet. pt educated not to do that. purpose and side effects of medications given to pt.
pt discharged. pt wheeled out to the front for taxi by ED tech
normal...

## 2023-08-08 PROBLEM — M96.1 FAILED BACK SURGICAL SYNDROME: Status: ACTIVE | Noted: 2021-01-11

## 2023-08-16 ENCOUNTER — APPOINTMENT (OUTPATIENT)
Dept: HEART AND VASCULAR | Facility: CLINIC | Age: 68
End: 2023-08-16
Payer: MEDICARE

## 2023-08-16 VITALS
DIASTOLIC BLOOD PRESSURE: 80 MMHG | HEART RATE: 60 BPM | OXYGEN SATURATION: 97 % | TEMPERATURE: 97 F | SYSTOLIC BLOOD PRESSURE: 130 MMHG

## 2023-08-16 PROCEDURE — 99214 OFFICE O/P EST MOD 30 MIN: CPT | Mod: 25

## 2023-08-16 NOTE — DISCUSSION/SUMMARY
[Patient] : the patient [___ Month(s)] : in [unfilled] month(s) [FreeTextEntry1] : 67 y/o female with h/o type 2 dm, cad, overweight, seizure, cva, pati, htn, gerd who presents for f/up today.    -continue asa, statin  -continue BB  -ekg 5/23 - nsr w pac, normal intervals, no st/t changes  -endo referral for type 2 dm  -labs 2023 reviewed  -advised f/up for anemia w pcp  -EP referral given cva, ILR  -CTA 2022: calcium score 38 (71%), <25% LM, minimal pLAD, mild noncalcified mural plaque desc thoracic ao, mild mail pa 3.1 cm mild centriblob emphsyema, probable intracapsular rupture of left breast prosthesis. multiple solid nodules measuring up to 12 mm around prosthesis for which mammo correlation suggested  -Echo 2021: ef 65%, no wma, mild ai, trace mr  -will need plastic for implant removal  -CTA cor 6/23: calcium score 39 (83%), <25% LM, mild mid LAD, minimal D1, minimal prox LCx, biatrial enlargement   -ordered Echo for cp, sob  -counseled on cvd risk factors  -f/up 1 months for htn, cad, clearance    I have spent 30 minutes reviewing labs, records, tests and discussing cvd risk factors, cp evaluation.

## 2023-08-16 NOTE — HISTORY OF PRESENT ILLNESS
[FreeTextEntry1] : 67 y/o female with h/o type 2 dm, cad, overweight, seizure, cva, pati, htn, gerd who presents for f/up today  last seen 5/23    -CTA cor 6/23: calcium score 39 (83%), <25% LM, mild mid LAD, minimal D1, minimal prox LCx, biatrial enlargement  -Echo pending in september -referred to EP - has not seen yet -referred to endo   awaiting echo for clearance for egd/colo   noted cp, sob, intermittent lh and palpitations no syncope notes some mild edema          CTA 2022: calcium score 38 (71%), <25% LM, minimal pLAD, mild noncalcified mural plaque desc thoracic ao, mild mail pa 3.1 cm mild centriblob emphsyema, probable intracapsular rupture of left breast prosthesis. multiple solid nodules measuring up to 12 mm around prosthesis for which mammo correlation suggested    Echo 2021: ef 65%, no wma, mlid ai, trace mr    seen by neuro for possible MS, h/o cva    seen by vascular 5/23  vle performed by RVT was normal venous study both legs, no reflux and no dvt/svt; b/l ADELA rt 1.20 left 1.31, evidence calcification.    uses wheelchair    PMH/PSH:  cad  type 2 dm  seizure  cva 2016, 2010  htn  gerd  spinal fusion  shoulder surgery  overweight  pati      ALL:  morphine      MEDS:  asa 81 mg qd  esomeprazole 40 mg qd  neurontin 600 mg tid  melatonin  metoprolol tartrate 50 mg bid  mvi  myrbetriq 50  zocor 40 mg qd  tizanidine 4 mg qd  fe  duloxetine 60 mg qd  certirizine    baclofen  albuterol prn      SH:  former smoker 15-67 10 cigs/day  social etoh  no drugs  from [place]  lived [place] 1987  actress, model, imports textiles  single  no children  lives alone    FH:  mother - unknown  father - unknown

## 2023-08-21 ENCOUNTER — NON-APPOINTMENT (OUTPATIENT)
Age: 68
End: 2023-08-21

## 2023-09-05 ENCOUNTER — APPOINTMENT (OUTPATIENT)
Dept: HEART AND VASCULAR | Facility: CLINIC | Age: 68
End: 2023-09-05

## 2023-09-07 ENCOUNTER — EMERGENCY (EMERGENCY)
Facility: HOSPITAL | Age: 68
LOS: 1 days | Discharge: HOME | End: 2023-09-07
Attending: EMERGENCY MEDICINE | Admitting: EMERGENCY MEDICINE
Payer: MEDICARE

## 2023-09-07 VITALS
DIASTOLIC BLOOD PRESSURE: 83 MMHG | WEIGHT: 167.99 LBS | OXYGEN SATURATION: 97 % | TEMPERATURE: 98 F | RESPIRATION RATE: 16 BRPM | SYSTOLIC BLOOD PRESSURE: 117 MMHG | HEART RATE: 69 BPM

## 2023-09-07 DIAGNOSIS — Z96.611 PRESENCE OF RIGHT ARTIFICIAL SHOULDER JOINT: Chronic | ICD-10-CM

## 2023-09-07 DIAGNOSIS — R21 RASH AND OTHER NONSPECIFIC SKIN ERUPTION: ICD-10-CM

## 2023-09-07 DIAGNOSIS — E78.00 PURE HYPERCHOLESTEROLEMIA, UNSPECIFIED: ICD-10-CM

## 2023-09-07 DIAGNOSIS — Z98.890 OTHER SPECIFIED POSTPROCEDURAL STATES: Chronic | ICD-10-CM

## 2023-09-07 DIAGNOSIS — F41.9 ANXIETY DISORDER, UNSPECIFIED: ICD-10-CM

## 2023-09-07 DIAGNOSIS — L30.9 DERMATITIS, UNSPECIFIED: ICD-10-CM

## 2023-09-07 DIAGNOSIS — I10 ESSENTIAL (PRIMARY) HYPERTENSION: ICD-10-CM

## 2023-09-07 DIAGNOSIS — K59.09 OTHER CONSTIPATION: ICD-10-CM

## 2023-09-07 DIAGNOSIS — J44.9 CHRONIC OBSTRUCTIVE PULMONARY DISEASE, UNSPECIFIED: ICD-10-CM

## 2023-09-07 DIAGNOSIS — Z88.6 ALLERGY STATUS TO ANALGESIC AGENT: ICD-10-CM

## 2023-09-07 DIAGNOSIS — R05.9 COUGH, UNSPECIFIED: ICD-10-CM

## 2023-09-07 DIAGNOSIS — Z87.2 PERSONAL HISTORY OF DISEASES OF THE SKIN AND SUBCUTANEOUS TISSUE: ICD-10-CM

## 2023-09-07 DIAGNOSIS — Z87.39 PERSONAL HISTORY OF OTHER DISEASES OF THE MUSCULOSKELETAL SYSTEM AND CONNECTIVE TISSUE: ICD-10-CM

## 2023-09-07 DIAGNOSIS — Z79.82 LONG TERM (CURRENT) USE OF ASPIRIN: ICD-10-CM

## 2023-09-07 DIAGNOSIS — Z87.09 PERSONAL HISTORY OF OTHER DISEASES OF THE RESPIRATORY SYSTEM: ICD-10-CM

## 2023-09-07 DIAGNOSIS — Z99.3 DEPENDENCE ON WHEELCHAIR: ICD-10-CM

## 2023-09-07 DIAGNOSIS — Z87.891 PERSONAL HISTORY OF NICOTINE DEPENDENCE: ICD-10-CM

## 2023-09-07 DIAGNOSIS — Z86.73 PERSONAL HISTORY OF TRANSIENT ISCHEMIC ATTACK (TIA), AND CEREBRAL INFARCTION WITHOUT RESIDUAL DEFICITS: ICD-10-CM

## 2023-09-07 DIAGNOSIS — Z20.822 CONTACT WITH AND (SUSPECTED) EXPOSURE TO COVID-19: ICD-10-CM

## 2023-09-07 DIAGNOSIS — Z96.611 PRESENCE OF RIGHT ARTIFICIAL SHOULDER JOINT: ICD-10-CM

## 2023-09-07 DIAGNOSIS — F32.A DEPRESSION, UNSPECIFIED: ICD-10-CM

## 2023-09-07 LAB
APPEARANCE UR: CLEAR — SIGNIFICANT CHANGE UP
BILIRUB UR-MCNC: NEGATIVE — SIGNIFICANT CHANGE UP
COLOR SPEC: YELLOW — SIGNIFICANT CHANGE UP
DIFF PNL FLD: NEGATIVE — SIGNIFICANT CHANGE UP
GLUCOSE UR QL: NEGATIVE — SIGNIFICANT CHANGE UP
KETONES UR-MCNC: NEGATIVE — SIGNIFICANT CHANGE UP
LEUKOCYTE ESTERASE UR-ACNC: ABNORMAL
NITRITE UR-MCNC: NEGATIVE — SIGNIFICANT CHANGE UP
PH UR: 6.5 — SIGNIFICANT CHANGE UP (ref 5–8)
PROT UR-MCNC: NEGATIVE MG/DL — SIGNIFICANT CHANGE UP
SARS-COV-2 RNA SPEC QL NAA+PROBE: NEGATIVE — SIGNIFICANT CHANGE UP
SP GR SPEC: 1.01 — SIGNIFICANT CHANGE UP (ref 1–1.03)
UROBILINOGEN FLD QL: 1 E.U./DL — SIGNIFICANT CHANGE UP

## 2023-09-07 PROCEDURE — 81001 URINALYSIS AUTO W/SCOPE: CPT

## 2023-09-07 PROCEDURE — 99284 EMERGENCY DEPT VISIT MOD MDM: CPT

## 2023-09-07 PROCEDURE — 99283 EMERGENCY DEPT VISIT LOW MDM: CPT

## 2023-09-07 PROCEDURE — 87635 SARS-COV-2 COVID-19 AMP PRB: CPT

## 2023-09-07 RX ORDER — MUPIROCIN 20 MG/G
1 OINTMENT TOPICAL ONCE
Refills: 0 | Status: COMPLETED | OUTPATIENT
Start: 2023-09-07 | End: 2023-09-07

## 2023-09-07 RX ADMIN — MUPIROCIN 1 APPLICATION(S): 20 OINTMENT TOPICAL at 17:49

## 2023-09-07 NOTE — ED PROVIDER NOTE - CLINICAL SUMMARY MEDICAL DECISION MAKING FREE TEXT BOX
right arm dermatitis appears like possible impetigo. has been using alcohol and two old creams (antifungal/steroid) advised to stop. switch to mupirocin  also thought something in right ear. no fb seen.  also with some possible irritation/discharge with cath. on daily macrobid prophylaxis. ua not definitive for uti. will defer additional abx/ change pending culture.   f/u pmd. return precautions discussed

## 2023-09-07 NOTE — ED ADULT NURSE NOTE - NSFALLUNIVINTERV_ED_ALL_ED
Bed/Stretcher in lowest position, wheels locked, appropriate side rails in place/Call bell, personal items and telephone in reach/Instruct patient to call for assistance before getting out of bed/chair/stretcher/Non-slip footwear applied when patient is off stretcher/[place] to call system/Physically safe environment - no spills, clutter or unnecessary equipment/Purposeful proactive rounding/Room/bathroom lighting operational, light cord in reach

## 2023-09-07 NOTE — ED PROVIDER NOTE - OBJECTIVE STATEMENT
HTN, HLD, CVA x 3 (last was 2016 with right hand weakness residual), GAMALIEL not using CPAP, Emphysema, ex-25 pack year smoker quit 2021, Chronic Constipation on Movantik, Urinary Incontinence chronically self straight cath at home, chronic UTI, Breast Implant Rupture with Chronic Leak, B/L CTS s/p release, s/p Intrathecal Morphine pump for pain, with chronic neck and back pain worsening for years s/p multiple spinal surgeries including laminectomies and fusion of cervical, thoracic and lumbar spine, most recently done in 2019 and 2020 at [facility] by Dr. Trinidad,  s/p T3-L1 revision of prior fusion (with Dr. Luz on 8/17/22), here with rash to right forearm for past few days, feeling like something might be in her right ear after cleaning with cotton swab, and some clear discharge noted when she self catheterizes herself. No fever, chills. Has been applying alcohol and old clotrimazone/ trimacinolone creams to rash without relief. Itchy.

## 2023-09-07 NOTE — ED PROVIDER NOTE - PATIENT PORTAL LINK FT
You can access the FollowMyHealth Patient Portal offered by [facility] by registering at the following website: [email or web address]. By joining [facility]’s FollowMyHealth portal, you will also be able to view your health information using other applications (apps) compatible with our system.

## 2023-09-07 NOTE — ED PROVIDER NOTE - SKIN, MLM
right forearm with some patches of erythematous plaques underlying skin appears dry scaly with overlying serous crusting. no vesicles.

## 2023-09-07 NOTE — ED PROVIDER NOTE - NSFOLLOWUPINSTRUCTIONS_ED_ALL_ED_FT
Use mupirocin ointment on rash on arm twice daily next 7 days. Stop alcohol and other ointments. Please see your primary care provider for followup.  Call for appointment.  If you have any problems with followup, please call the ED Referral Coordinator at [phone].  Return to the ER if symptoms worsen or other concerns.    Rash    A rash is a change in the color of the skin. A rash can also change the way your skin feels. There are many different conditions and factors that can cause a rash, most of which are not dangerous. Make sure to follow up with your primary care physician or a dermatologist as instructed by your health care provider.    SEEK IMMEDIATE MEDICAL CARE IF YOU HAVE ANY OF THE FOLLOWING SYMPTOMS: fever, blisters, a rash inside your mouth, vaginal or anal pain, or altered mental status.

## 2023-09-11 ENCOUNTER — APPOINTMENT (OUTPATIENT)
Dept: HEART AND VASCULAR | Facility: CLINIC | Age: 68
End: 2023-09-11

## 2023-09-11 ENCOUNTER — NON-APPOINTMENT (OUTPATIENT)
Age: 68
End: 2023-09-11

## 2023-09-12 ENCOUNTER — APPOINTMENT (OUTPATIENT)
Dept: HEART AND VASCULAR | Facility: CLINIC | Age: 68
End: 2023-09-12
Payer: MEDICARE

## 2023-09-12 ENCOUNTER — RESULT REVIEW (OUTPATIENT)
Age: 68
End: 2023-09-12

## 2023-09-12 PROCEDURE — 93306 TTE W/DOPPLER COMPLETE: CPT

## 2023-09-13 LAB
FERRITIN SERPL-MCNC: 69 NG/ML
IRON SATN MFR SERPL: 38 %
IRON SERPL-MCNC: 152 UG/DL
TIBC SERPL-MCNC: 397 UG/DL
UIBC SERPL-MCNC: 245 UG/DL

## 2023-09-13 RX ORDER — ASPIRIN 81 MG/1
81 TABLET, COATED ORAL
Qty: 90 | Refills: 1 | Status: ACTIVE | COMMUNITY
Start: 2021-05-21 | End: 1900-01-01

## 2023-09-15 ENCOUNTER — NON-APPOINTMENT (OUTPATIENT)
Age: 68
End: 2023-09-15

## 2023-09-18 ENCOUNTER — APPOINTMENT (OUTPATIENT)
Dept: BREAST CENTER | Facility: CLINIC | Age: 68
End: 2023-09-18

## 2023-09-26 ENCOUNTER — NON-APPOINTMENT (OUTPATIENT)
Age: 68
End: 2023-09-26

## 2023-09-27 ENCOUNTER — APPOINTMENT (OUTPATIENT)
Dept: ENDOCRINOLOGY | Facility: CLINIC | Age: 68
End: 2023-09-27

## 2023-09-28 ENCOUNTER — APPOINTMENT (OUTPATIENT)
Dept: BREAST CENTER | Facility: CLINIC | Age: 68
End: 2023-09-28
Payer: MEDICARE

## 2023-09-28 VITALS
WEIGHT: 168 LBS | DIASTOLIC BLOOD PRESSURE: 76 MMHG | SYSTOLIC BLOOD PRESSURE: 122 MMHG | HEART RATE: 77 BPM | HEIGHT: 67 IN | BODY MASS INDEX: 26.37 KG/M2

## 2023-09-28 DIAGNOSIS — T85.43XA LEAKAGE OF BREAST PROSTHESIS AND IMPLANT, INITIAL ENCOUNTER: ICD-10-CM

## 2023-09-28 PROCEDURE — 99204 OFFICE O/P NEW MOD 45 MIN: CPT

## 2023-10-02 ENCOUNTER — APPOINTMENT (OUTPATIENT)
Dept: HEART AND VASCULAR | Facility: CLINIC | Age: 68
End: 2023-10-02
Payer: MEDICARE

## 2023-10-02 VITALS
HEART RATE: 70 BPM | HEIGHT: 67 IN | WEIGHT: 168 LBS | SYSTOLIC BLOOD PRESSURE: 124 MMHG | TEMPERATURE: 96 F | DIASTOLIC BLOOD PRESSURE: 83 MMHG | BODY MASS INDEX: 26.37 KG/M2

## 2023-10-02 PROCEDURE — 99204 OFFICE O/P NEW MOD 45 MIN: CPT | Mod: 25

## 2023-10-02 PROCEDURE — 93000 ELECTROCARDIOGRAM COMPLETE: CPT

## 2023-10-02 RX ORDER — BACLOFEN 10 MG/1
10 TABLET ORAL AT BEDTIME
Qty: 30 | Refills: 1 | Status: DISCONTINUED | COMMUNITY
Start: 2021-07-16 | End: 2023-10-02

## 2023-10-03 ENCOUNTER — APPOINTMENT (OUTPATIENT)
Dept: HEART AND VASCULAR | Facility: CLINIC | Age: 68
End: 2023-10-03

## 2023-10-05 ENCOUNTER — APPOINTMENT (OUTPATIENT)
Dept: PLASTIC SURGERY | Facility: CLINIC | Age: 68
End: 2023-10-05
Payer: MEDICARE

## 2023-10-05 PROCEDURE — 99202 OFFICE O/P NEW SF 15 MIN: CPT

## 2023-10-09 ENCOUNTER — APPOINTMENT (OUTPATIENT)
Dept: GASTROENTEROLOGY | Facility: HOSPITAL | Age: 68
End: 2023-10-09

## 2023-10-12 ENCOUNTER — OUTPATIENT (OUTPATIENT)
Dept: OUTPATIENT SERVICES | Facility: HOSPITAL | Age: 68
LOS: 1 days | End: 2023-10-12
Payer: MEDICARE

## 2023-10-12 ENCOUNTER — NON-APPOINTMENT (OUTPATIENT)
Age: 68
End: 2023-10-12

## 2023-10-12 ENCOUNTER — APPOINTMENT (OUTPATIENT)
Dept: ULTRASOUND IMAGING | Facility: CLINIC | Age: 68
End: 2023-10-12

## 2023-10-12 ENCOUNTER — RESULT REVIEW (OUTPATIENT)
Age: 68
End: 2023-10-12

## 2023-10-12 ENCOUNTER — APPOINTMENT (OUTPATIENT)
Dept: BREAST CENTER | Facility: CLINIC | Age: 68
End: 2023-10-12
Payer: MEDICARE

## 2023-10-12 ENCOUNTER — APPOINTMENT (OUTPATIENT)
Dept: MAMMOGRAPHY | Facility: CLINIC | Age: 68
End: 2023-10-12

## 2023-10-12 ENCOUNTER — APPOINTMENT (OUTPATIENT)
Dept: MRI IMAGING | Facility: CLINIC | Age: 68
End: 2023-10-12

## 2023-10-12 VITALS
SYSTOLIC BLOOD PRESSURE: 121 MMHG | BODY MASS INDEX: 27.94 KG/M2 | HEART RATE: 72 BPM | WEIGHT: 178 LBS | DIASTOLIC BLOOD PRESSURE: 85 MMHG | HEIGHT: 67 IN

## 2023-10-12 DIAGNOSIS — Z98.890 OTHER SPECIFIED POSTPROCEDURAL STATES: Chronic | ICD-10-CM

## 2023-10-12 DIAGNOSIS — Z96.611 PRESENCE OF RIGHT ARTIFICIAL SHOULDER JOINT: Chronic | ICD-10-CM

## 2023-10-12 DIAGNOSIS — T85.43XA LEAKAGE OF BREAST PROSTHESIS AND IMPLANT, INITIAL ENCOUNTER: ICD-10-CM

## 2023-10-12 DIAGNOSIS — Z12.39 ENCOUNTER FOR OTHER SCREENING FOR MALIGNANT NEOPLASM OF BREAST: ICD-10-CM

## 2023-10-12 PROCEDURE — 99213 OFFICE O/P EST LOW 20 MIN: CPT

## 2023-10-12 PROCEDURE — 77066 DX MAMMO INCL CAD BI: CPT | Mod: 26

## 2023-10-12 PROCEDURE — G0279: CPT | Mod: 26

## 2023-10-12 PROCEDURE — 76641 ULTRASOUND BREAST COMPLETE: CPT | Mod: 26,50

## 2023-10-16 ENCOUNTER — NON-APPOINTMENT (OUTPATIENT)
Age: 68
End: 2023-10-16

## 2023-10-16 PROCEDURE — 74181 MRI ABDOMEN W/O CONTRAST: CPT | Mod: 26

## 2023-10-19 NOTE — PRE-OP CHECKLIST - WAS PATIENT ON BETA BLOCKER?
Detail Level: Zone Quality 431: Preventive Care And Screening: Unhealthy Alcohol Use - Screening: Patient not identified as an unhealthy alcohol user when screened for unhealthy alcohol use using a systematic screening method Quality 130: Documentation Of Current Medications In The Medical Record: Current Medications Documented Quality 226: Preventive Care And Screening: Tobacco Use: Screening And Cessation Intervention: Patient screened for tobacco use and is an ex/non-smoker No

## 2023-10-23 ENCOUNTER — APPOINTMENT (OUTPATIENT)
Dept: INTERNAL MEDICINE | Facility: CLINIC | Age: 68
End: 2023-10-23
Payer: MEDICARE

## 2023-10-23 VITALS
BODY MASS INDEX: 27.78 KG/M2 | DIASTOLIC BLOOD PRESSURE: 83 MMHG | SYSTOLIC BLOOD PRESSURE: 128 MMHG | OXYGEN SATURATION: 94 % | HEART RATE: 80 BPM | TEMPERATURE: 97 F | WEIGHT: 177 LBS | HEIGHT: 67 IN

## 2023-10-23 DIAGNOSIS — M47.812 SPONDYLOSIS W/OUT MYELOPATHY OR RADICULOPATHY, CERVICAL REGION: ICD-10-CM

## 2023-10-23 DIAGNOSIS — M48.061 SPINAL STENOSIS, LUMBAR REGION WITHOUT NEUROGENIC CLAUDICATION: ICD-10-CM

## 2023-10-23 DIAGNOSIS — Z99.3 DEPENDENCE ON WHEELCHAIR: ICD-10-CM

## 2023-10-23 PROCEDURE — G0439: CPT

## 2023-10-25 ENCOUNTER — APPOINTMENT (OUTPATIENT)
Dept: HEART AND VASCULAR | Facility: CLINIC | Age: 68
End: 2023-10-25
Payer: MEDICARE

## 2023-10-25 PROCEDURE — 93248 EXT ECG>7D<15D REV&INTERPJ: CPT

## 2023-10-30 ENCOUNTER — APPOINTMENT (OUTPATIENT)
Dept: HEART AND VASCULAR | Facility: CLINIC | Age: 68
End: 2023-10-30
Payer: MEDICARE

## 2023-10-30 ENCOUNTER — NON-APPOINTMENT (OUTPATIENT)
Age: 68
End: 2023-10-30

## 2023-10-30 VITALS
BODY MASS INDEX: 27.78 KG/M2 | WEIGHT: 177 LBS | DIASTOLIC BLOOD PRESSURE: 64 MMHG | HEIGHT: 67 IN | HEART RATE: 74 BPM | SYSTOLIC BLOOD PRESSURE: 107 MMHG

## 2023-10-30 PROCEDURE — 93000 ELECTROCARDIOGRAM COMPLETE: CPT

## 2023-10-30 PROCEDURE — 99213 OFFICE O/P EST LOW 20 MIN: CPT | Mod: 25

## 2023-11-09 ENCOUNTER — APPOINTMENT (OUTPATIENT)
Dept: SPINE | Facility: CLINIC | Age: 68
End: 2023-11-09

## 2023-11-09 VITALS
RESPIRATION RATE: 18 BRPM | DIASTOLIC BLOOD PRESSURE: 79 MMHG | WEIGHT: 177 LBS | SYSTOLIC BLOOD PRESSURE: 117 MMHG | TEMPERATURE: 97.2 F | HEART RATE: 80 BPM | BODY MASS INDEX: 33.42 KG/M2 | HEIGHT: 61 IN | OXYGEN SATURATION: 98 %

## 2023-11-15 ENCOUNTER — OUTPATIENT (OUTPATIENT)
Dept: OUTPATIENT SERVICES | Facility: HOSPITAL | Age: 68
LOS: 1 days | End: 2023-11-15
Payer: MEDICARE

## 2023-11-15 DIAGNOSIS — Z98.890 OTHER SPECIFIED POSTPROCEDURAL STATES: Chronic | ICD-10-CM

## 2023-11-15 DIAGNOSIS — Z96.611 PRESENCE OF RIGHT ARTIFICIAL SHOULDER JOINT: Chronic | ICD-10-CM

## 2023-11-15 PROCEDURE — 72082 X-RAY EXAM ENTIRE SPI 2/3 VW: CPT | Mod: 26

## 2023-11-15 PROCEDURE — 72082 X-RAY EXAM ENTIRE SPI 2/3 VW: CPT

## 2023-11-16 ENCOUNTER — NON-APPOINTMENT (OUTPATIENT)
Age: 68
End: 2023-11-16

## 2023-11-16 ENCOUNTER — APPOINTMENT (OUTPATIENT)
Dept: SPINE | Facility: CLINIC | Age: 68
End: 2023-11-16
Payer: MEDICARE

## 2023-11-16 VITALS
HEIGHT: 61 IN | DIASTOLIC BLOOD PRESSURE: 82 MMHG | OXYGEN SATURATION: 97 % | HEART RATE: 63 BPM | SYSTOLIC BLOOD PRESSURE: 138 MMHG | RESPIRATION RATE: 18 BRPM | WEIGHT: 177 LBS | BODY MASS INDEX: 33.42 KG/M2

## 2023-11-16 PROCEDURE — 99215 OFFICE O/P EST HI 40 MIN: CPT

## 2023-11-22 ENCOUNTER — APPOINTMENT (OUTPATIENT)
Dept: PAIN MANAGEMENT | Facility: CLINIC | Age: 68
End: 2023-11-22

## 2023-11-30 ENCOUNTER — APPOINTMENT (OUTPATIENT)
Dept: INTERNAL MEDICINE | Facility: CLINIC | Age: 68
End: 2023-11-30
Payer: MEDICARE

## 2023-11-30 VITALS
OXYGEN SATURATION: 98 % | DIASTOLIC BLOOD PRESSURE: 77 MMHG | SYSTOLIC BLOOD PRESSURE: 127 MMHG | TEMPERATURE: 96.7 F | BODY MASS INDEX: 33.44 KG/M2 | HEART RATE: 72 BPM | WEIGHT: 177 LBS

## 2023-11-30 DIAGNOSIS — M48.02 SPINAL STENOSIS, CERVICAL REGION: ICD-10-CM

## 2023-11-30 DIAGNOSIS — Z98.1 ARTHRODESIS STATUS: ICD-10-CM

## 2023-11-30 DIAGNOSIS — I25.10 ATHEROSCLEROTIC HEART DISEASE OF NATIVE CORONARY ARTERY W/OUT ANGINA PECTORIS: ICD-10-CM

## 2023-11-30 DIAGNOSIS — E11.9 TYPE 2 DIABETES MELLITUS W/OUT COMPLICATIONS: ICD-10-CM

## 2023-11-30 PROCEDURE — 36415 COLL VENOUS BLD VENIPUNCTURE: CPT

## 2023-11-30 PROCEDURE — 99214 OFFICE O/P EST MOD 30 MIN: CPT | Mod: 25

## 2023-12-04 ENCOUNTER — APPOINTMENT (OUTPATIENT)
Dept: NEUROSURGERY | Facility: HOSPITAL | Age: 68
End: 2023-12-04
Payer: MEDICARE

## 2023-12-04 ENCOUNTER — TRANSCRIPTION ENCOUNTER (OUTPATIENT)
Age: 68
End: 2023-12-04

## 2023-12-04 ENCOUNTER — INPATIENT (INPATIENT)
Facility: HOSPITAL | Age: 68
LOS: 31 days | Discharge: NURSING, REHAB OR LONG-TERM CARE | DRG: 459 | End: 2024-01-05
Attending: NEUROLOGICAL SURGERY | Admitting: NEUROLOGICAL SURGERY
Payer: MEDICARE

## 2023-12-04 VITALS
HEART RATE: 72 BPM | SYSTOLIC BLOOD PRESSURE: 138 MMHG | TEMPERATURE: 98 F | OXYGEN SATURATION: 99 % | DIASTOLIC BLOOD PRESSURE: 77 MMHG | RESPIRATION RATE: 17 BRPM

## 2023-12-04 DIAGNOSIS — Z98.890 OTHER SPECIFIED POSTPROCEDURAL STATES: Chronic | ICD-10-CM

## 2023-12-04 DIAGNOSIS — Z96.611 PRESENCE OF RIGHT ARTIFICIAL SHOULDER JOINT: Chronic | ICD-10-CM

## 2023-12-04 LAB
A1C WITH ESTIMATED AVERAGE GLUCOSE RESULT: 7 % — HIGH (ref 4–5.6)
A1C WITH ESTIMATED AVERAGE GLUCOSE RESULT: 7 % — HIGH (ref 4–5.6)
ALBUMIN SERPL ELPH-MCNC: 3.8 G/DL — SIGNIFICANT CHANGE UP (ref 3.3–5)
ALBUMIN SERPL ELPH-MCNC: 3.8 G/DL — SIGNIFICANT CHANGE UP (ref 3.3–5)
ALP SERPL-CCNC: 119 U/L — SIGNIFICANT CHANGE UP (ref 40–120)
ALP SERPL-CCNC: 119 U/L — SIGNIFICANT CHANGE UP (ref 40–120)
ALT FLD-CCNC: 10 U/L — SIGNIFICANT CHANGE UP (ref 10–45)
ALT FLD-CCNC: 10 U/L — SIGNIFICANT CHANGE UP (ref 10–45)
ANION GAP SERPL CALC-SCNC: 10 MMOL/L — SIGNIFICANT CHANGE UP (ref 5–17)
ANION GAP SERPL CALC-SCNC: 10 MMOL/L — SIGNIFICANT CHANGE UP (ref 5–17)
AST SERPL-CCNC: 17 U/L — SIGNIFICANT CHANGE UP (ref 10–40)
AST SERPL-CCNC: 17 U/L — SIGNIFICANT CHANGE UP (ref 10–40)
BILIRUB SERPL-MCNC: 0.3 MG/DL — SIGNIFICANT CHANGE UP (ref 0.2–1.2)
BILIRUB SERPL-MCNC: 0.3 MG/DL — SIGNIFICANT CHANGE UP (ref 0.2–1.2)
BLD GP AB SCN SERPL QL: NEGATIVE — SIGNIFICANT CHANGE UP
BLD GP AB SCN SERPL QL: NEGATIVE — SIGNIFICANT CHANGE UP
BUN SERPL-MCNC: 9 MG/DL — SIGNIFICANT CHANGE UP (ref 7–23)
BUN SERPL-MCNC: 9 MG/DL — SIGNIFICANT CHANGE UP (ref 7–23)
CALCIUM SERPL-MCNC: 9.5 MG/DL — SIGNIFICANT CHANGE UP (ref 8.4–10.5)
CALCIUM SERPL-MCNC: 9.5 MG/DL — SIGNIFICANT CHANGE UP (ref 8.4–10.5)
CHLORIDE SERPL-SCNC: 107 MMOL/L — SIGNIFICANT CHANGE UP (ref 96–108)
CHLORIDE SERPL-SCNC: 107 MMOL/L — SIGNIFICANT CHANGE UP (ref 96–108)
CO2 SERPL-SCNC: 23 MMOL/L — SIGNIFICANT CHANGE UP (ref 22–31)
CO2 SERPL-SCNC: 23 MMOL/L — SIGNIFICANT CHANGE UP (ref 22–31)
CREAT SERPL-MCNC: 0.63 MG/DL — SIGNIFICANT CHANGE UP (ref 0.5–1.3)
CREAT SERPL-MCNC: 0.63 MG/DL — SIGNIFICANT CHANGE UP (ref 0.5–1.3)
EGFR: 96 ML/MIN/1.73M2 — SIGNIFICANT CHANGE UP
EGFR: 96 ML/MIN/1.73M2 — SIGNIFICANT CHANGE UP
ESTIMATED AVERAGE GLUCOSE: 154 MG/DL — HIGH (ref 68–114)
ESTIMATED AVERAGE GLUCOSE: 154 MG/DL — HIGH (ref 68–114)
GLUCOSE BLDC GLUCOMTR-MCNC: 150 MG/DL — HIGH (ref 70–99)
GLUCOSE BLDC GLUCOMTR-MCNC: 150 MG/DL — HIGH (ref 70–99)
GLUCOSE BLDC GLUCOMTR-MCNC: 223 MG/DL — HIGH (ref 70–99)
GLUCOSE BLDC GLUCOMTR-MCNC: 223 MG/DL — HIGH (ref 70–99)
GLUCOSE SERPL-MCNC: 105 MG/DL — HIGH (ref 70–99)
GLUCOSE SERPL-MCNC: 105 MG/DL — HIGH (ref 70–99)
HCT VFR BLD CALC: 42.7 % — SIGNIFICANT CHANGE UP (ref 34.5–45)
HCT VFR BLD CALC: 42.7 % — SIGNIFICANT CHANGE UP (ref 34.5–45)
HGB BLD-MCNC: 13.6 G/DL — SIGNIFICANT CHANGE UP (ref 11.5–15.5)
HGB BLD-MCNC: 13.6 G/DL — SIGNIFICANT CHANGE UP (ref 11.5–15.5)
INR BLD: 0.91 — SIGNIFICANT CHANGE UP (ref 0.85–1.18)
INR BLD: 0.91 — SIGNIFICANT CHANGE UP (ref 0.85–1.18)
MAGNESIUM SERPL-MCNC: 1.8 MG/DL — SIGNIFICANT CHANGE UP (ref 1.6–2.6)
MAGNESIUM SERPL-MCNC: 1.8 MG/DL — SIGNIFICANT CHANGE UP (ref 1.6–2.6)
MCHC RBC-ENTMCNC: 27.9 PG — SIGNIFICANT CHANGE UP (ref 27–34)
MCHC RBC-ENTMCNC: 27.9 PG — SIGNIFICANT CHANGE UP (ref 27–34)
MCHC RBC-ENTMCNC: 31.9 GM/DL — LOW (ref 32–36)
MCHC RBC-ENTMCNC: 31.9 GM/DL — LOW (ref 32–36)
MCV RBC AUTO: 87.7 FL — SIGNIFICANT CHANGE UP (ref 80–100)
MCV RBC AUTO: 87.7 FL — SIGNIFICANT CHANGE UP (ref 80–100)
NRBC # BLD: 0 /100 WBCS — SIGNIFICANT CHANGE UP (ref 0–0)
NRBC # BLD: 0 /100 WBCS — SIGNIFICANT CHANGE UP (ref 0–0)
PHOSPHATE SERPL-MCNC: 3.3 MG/DL — SIGNIFICANT CHANGE UP (ref 2.5–4.5)
PHOSPHATE SERPL-MCNC: 3.3 MG/DL — SIGNIFICANT CHANGE UP (ref 2.5–4.5)
PLATELET # BLD AUTO: 221 K/UL — SIGNIFICANT CHANGE UP (ref 150–400)
PLATELET # BLD AUTO: 221 K/UL — SIGNIFICANT CHANGE UP (ref 150–400)
POTASSIUM SERPL-MCNC: 4.4 MMOL/L — SIGNIFICANT CHANGE UP (ref 3.5–5.3)
POTASSIUM SERPL-MCNC: 4.4 MMOL/L — SIGNIFICANT CHANGE UP (ref 3.5–5.3)
POTASSIUM SERPL-SCNC: 4.4 MMOL/L — SIGNIFICANT CHANGE UP (ref 3.5–5.3)
POTASSIUM SERPL-SCNC: 4.4 MMOL/L — SIGNIFICANT CHANGE UP (ref 3.5–5.3)
PROT SERPL-MCNC: 7 G/DL — SIGNIFICANT CHANGE UP (ref 6–8.3)
PROT SERPL-MCNC: 7 G/DL — SIGNIFICANT CHANGE UP (ref 6–8.3)
PROTHROM AB SERPL-ACNC: 10.4 SEC — SIGNIFICANT CHANGE UP (ref 9.5–13)
PROTHROM AB SERPL-ACNC: 10.4 SEC — SIGNIFICANT CHANGE UP (ref 9.5–13)
RBC # BLD: 4.87 M/UL — SIGNIFICANT CHANGE UP (ref 3.8–5.2)
RBC # BLD: 4.87 M/UL — SIGNIFICANT CHANGE UP (ref 3.8–5.2)
RBC # FLD: 14.2 % — SIGNIFICANT CHANGE UP (ref 10.3–14.5)
RBC # FLD: 14.2 % — SIGNIFICANT CHANGE UP (ref 10.3–14.5)
RH IG SCN BLD-IMP: POSITIVE — SIGNIFICANT CHANGE UP
RH IG SCN BLD-IMP: POSITIVE — SIGNIFICANT CHANGE UP
SODIUM SERPL-SCNC: 140 MMOL/L — SIGNIFICANT CHANGE UP (ref 135–145)
SODIUM SERPL-SCNC: 140 MMOL/L — SIGNIFICANT CHANGE UP (ref 135–145)
WBC # BLD: 5.76 K/UL — SIGNIFICANT CHANGE UP (ref 3.8–10.5)
WBC # BLD: 5.76 K/UL — SIGNIFICANT CHANGE UP (ref 3.8–10.5)
WBC # FLD AUTO: 5.76 K/UL — SIGNIFICANT CHANGE UP (ref 3.8–10.5)
WBC # FLD AUTO: 5.76 K/UL — SIGNIFICANT CHANGE UP (ref 3.8–10.5)

## 2023-12-04 PROCEDURE — 72125 CT NECK SPINE W/O DYE: CPT | Mod: 26

## 2023-12-04 PROCEDURE — 72131 CT LUMBAR SPINE W/O DYE: CPT | Mod: 26

## 2023-12-04 PROCEDURE — 72128 CT CHEST SPINE W/O DYE: CPT | Mod: 26

## 2023-12-04 PROCEDURE — 99223 1ST HOSP IP/OBS HIGH 75: CPT

## 2023-12-04 RX ORDER — DIPHENHYDRAMINE HCL 50 MG
25 CAPSULE ORAL EVERY 6 HOURS
Refills: 0 | Status: DISCONTINUED | OUTPATIENT
Start: 2023-12-04 | End: 2023-12-05

## 2023-12-04 RX ORDER — HYDROMORPHONE HYDROCHLORIDE 2 MG/ML
4 INJECTION INTRAMUSCULAR; INTRAVENOUS; SUBCUTANEOUS EVERY 4 HOURS
Refills: 0 | Status: DISCONTINUED | OUTPATIENT
Start: 2023-12-04 | End: 2023-12-05

## 2023-12-04 RX ORDER — DIPHENHYDRAMINE HCL 50 MG
1 CAPSULE ORAL
Qty: 0 | Refills: 0 | DISCHARGE

## 2023-12-04 RX ORDER — BUPROPION HYDROCHLORIDE 150 MG/1
1 TABLET, EXTENDED RELEASE ORAL
Qty: 0 | Refills: 0 | DISCHARGE

## 2023-12-04 RX ORDER — ACETAMINOPHEN 500 MG
1000 TABLET ORAL ONCE
Refills: 0 | Status: COMPLETED | OUTPATIENT
Start: 2023-12-05 | End: 2023-12-05

## 2023-12-04 RX ORDER — NITROFURANTOIN MACROCRYSTAL 50 MG
1 CAPSULE ORAL
Qty: 0 | Refills: 0 | DISCHARGE

## 2023-12-04 RX ORDER — POLYETHYLENE GLYCOL 3350 17 G/17G
17 POWDER, FOR SOLUTION ORAL DAILY
Refills: 0 | Status: DISCONTINUED | OUTPATIENT
Start: 2023-12-04 | End: 2023-12-04

## 2023-12-04 RX ORDER — SENNA PLUS 8.6 MG/1
2 TABLET ORAL AT BEDTIME
Refills: 0 | Status: DISCONTINUED | OUTPATIENT
Start: 2023-12-04 | End: 2023-12-04

## 2023-12-04 RX ORDER — ALBUTEROL 90 UG/1
2 AEROSOL, METERED ORAL EVERY 6 HOURS
Refills: 0 | Status: DISCONTINUED | OUTPATIENT
Start: 2023-12-04 | End: 2023-12-05

## 2023-12-04 RX ORDER — POVIDONE-IODINE 5 %
1 AEROSOL (ML) TOPICAL ONCE
Refills: 0 | Status: COMPLETED | OUTPATIENT
Start: 2023-12-04 | End: 2023-12-05

## 2023-12-04 RX ORDER — GABAPENTIN 400 MG/1
800 CAPSULE ORAL THREE TIMES A DAY
Refills: 0 | Status: DISCONTINUED | OUTPATIENT
Start: 2023-12-04 | End: 2023-12-05

## 2023-12-04 RX ORDER — INSULIN LISPRO 100/ML
VIAL (ML) SUBCUTANEOUS
Refills: 0 | Status: DISCONTINUED | OUTPATIENT
Start: 2023-12-04 | End: 2023-12-05

## 2023-12-04 RX ORDER — HYDROMORPHONE HYDROCHLORIDE 2 MG/ML
2 INJECTION INTRAMUSCULAR; INTRAVENOUS; SUBCUTANEOUS ONCE
Refills: 0 | Status: DISCONTINUED | OUTPATIENT
Start: 2023-12-04 | End: 2023-12-04

## 2023-12-04 RX ORDER — DULOXETINE HYDROCHLORIDE 30 MG/1
60 CAPSULE, DELAYED RELEASE ORAL DAILY
Refills: 0 | Status: DISCONTINUED | OUTPATIENT
Start: 2023-12-04 | End: 2023-12-05

## 2023-12-04 RX ORDER — ACETAMINOPHEN 500 MG
650 TABLET ORAL EVERY 6 HOURS
Refills: 0 | Status: DISCONTINUED | OUTPATIENT
Start: 2023-12-04 | End: 2023-12-05

## 2023-12-04 RX ORDER — METHOCARBAMOL 500 MG/1
2 TABLET, FILM COATED ORAL
Qty: 0 | Refills: 0 | DISCHARGE

## 2023-12-04 RX ORDER — METOPROLOL TARTRATE 50 MG
75 TABLET ORAL
Refills: 0 | Status: DISCONTINUED | OUTPATIENT
Start: 2023-12-04 | End: 2023-12-05

## 2023-12-04 RX ORDER — DICLOFENAC SODIUM 75 MG/1
1 TABLET, DELAYED RELEASE ORAL
Qty: 0 | Refills: 0 | DISCHARGE

## 2023-12-04 RX ORDER — MIRABEGRON 50 MG/1
0 TABLET, EXTENDED RELEASE ORAL
Qty: 0 | Refills: 0 | DISCHARGE

## 2023-12-04 RX ORDER — SIMVASTATIN 20 MG/1
40 TABLET, FILM COATED ORAL AT BEDTIME
Refills: 0 | Status: DISCONTINUED | OUTPATIENT
Start: 2023-12-04 | End: 2023-12-05

## 2023-12-04 RX ORDER — ESOMEPRAZOLE MAGNESIUM 40 MG/1
1 CAPSULE, DELAYED RELEASE ORAL
Qty: 0 | Refills: 0 | DISCHARGE

## 2023-12-04 RX ORDER — APREPITANT 80 MG/1
40 CAPSULE ORAL ONCE
Refills: 0 | Status: COMPLETED | OUTPATIENT
Start: 2023-12-05 | End: 2023-12-05

## 2023-12-04 RX ORDER — HYDROMORPHONE HYDROCHLORIDE 2 MG/ML
2 INJECTION INTRAMUSCULAR; INTRAVENOUS; SUBCUTANEOUS EVERY 4 HOURS
Refills: 0 | Status: DISCONTINUED | OUTPATIENT
Start: 2023-12-04 | End: 2023-12-05

## 2023-12-04 RX ORDER — METHOCARBAMOL 500 MG/1
500 TABLET, FILM COATED ORAL EVERY 8 HOURS
Refills: 0 | Status: DISCONTINUED | OUTPATIENT
Start: 2023-12-04 | End: 2023-12-05

## 2023-12-04 RX ORDER — CHLORHEXIDINE GLUCONATE 213 G/1000ML
1 SOLUTION TOPICAL EVERY 12 HOURS
Refills: 0 | Status: COMPLETED | OUTPATIENT
Start: 2023-12-04 | End: 2023-12-05

## 2023-12-04 RX ADMIN — SIMVASTATIN 40 MILLIGRAM(S): 20 TABLET, FILM COATED ORAL at 21:51

## 2023-12-04 RX ADMIN — CHLORHEXIDINE GLUCONATE 1 APPLICATION(S): 213 SOLUTION TOPICAL at 18:50

## 2023-12-04 RX ADMIN — Medication 5 MILLIGRAM(S): at 21:50

## 2023-12-04 RX ADMIN — HYDROMORPHONE HYDROCHLORIDE 4 MILLIGRAM(S): 2 INJECTION INTRAMUSCULAR; INTRAVENOUS; SUBCUTANEOUS at 23:04

## 2023-12-04 RX ADMIN — Medication 4: at 17:50

## 2023-12-04 RX ADMIN — HYDROMORPHONE HYDROCHLORIDE 4 MILLIGRAM(S): 2 INJECTION INTRAMUSCULAR; INTRAVENOUS; SUBCUTANEOUS at 19:50

## 2023-12-04 RX ADMIN — Medication 25 MILLIGRAM(S): at 18:50

## 2023-12-04 RX ADMIN — Medication 75 MILLIGRAM(S): at 21:50

## 2023-12-04 RX ADMIN — METHOCARBAMOL 500 MILLIGRAM(S): 500 TABLET, FILM COATED ORAL at 22:03

## 2023-12-04 RX ADMIN — GABAPENTIN 800 MILLIGRAM(S): 400 CAPSULE ORAL at 20:35

## 2023-12-04 RX ADMIN — DULOXETINE HYDROCHLORIDE 60 MILLIGRAM(S): 30 CAPSULE, DELAYED RELEASE ORAL at 21:50

## 2023-12-04 RX ADMIN — HYDROMORPHONE HYDROCHLORIDE 4 MILLIGRAM(S): 2 INJECTION INTRAMUSCULAR; INTRAVENOUS; SUBCUTANEOUS at 18:50

## 2023-12-04 RX ADMIN — HYDROMORPHONE HYDROCHLORIDE 4 MILLIGRAM(S): 2 INJECTION INTRAMUSCULAR; INTRAVENOUS; SUBCUTANEOUS at 22:04

## 2023-12-04 NOTE — CONSULT NOTE ADULT - ASSESSMENT
67 yo female with Hx HTN, HLD, CVA x 3 (last was 2016 with right hand weakness residual), GAMALIEL not using CPAP, Emphysema, ex-25 pack year smoker now restarted, chronic constipation on Movantik, b/l CTS s/p release, s/p Intrathecal Morphine pump for pain, with chronic neck and back pain worsening for years s/p multiple spinal surgeries including laminectomies and fusion of cervical, thoracic and lumbar spine, s/p T3-L1 revision of prior fusion (with  on 8/17/22). Xray done showing broken rods/displacement. Presents for elective C2-S2 instrumented fusion    ***INCOMPLETE    #Preoperative assessment  #HTN  #CVA  - awaiting results of recent long term outpatient cardiac monitoring to monitor for occult Afib, follows with Dr Jeter 67 yo female with Hx HTN, HLD, CVA x 3 (last was 2016 with right hand weakness residual), GAMALIEL not using CPAP, Emphysema, ex-25 pack year smoker now restarted, chronic constipation on Movantik, b/l CTS s/p release, s/p Intrathecal Morphine pump for pain, with chronic neck and back pain worsening for years s/p multiple spinal surgeries including laminectomies and fusion of cervical, thoracic and lumbar spine, s/p T3-L1 revision of prior fusion (with  on 8/17/22). Xray done showing broken rods/displacement. Presents for elective C2-S2 instrumented fusion      #Neck/back pain  #Preoperative assessment  - plan for C2-S2 fusion  - operative plan per NSG team  - RCRI score 2, class III risk, Hassan score 0.3% AASHISH risk 30 days post op  - METs < 4 wheelchair bound  - CCTA 6/23: calcium score 39 (83%), <25% LM, mild mid LAD, minimal D1, minimal prox LCx, biatrial enlargement   - Echo 9/23 with normal LV EF as above  - Intermediate risk for intermediate risk procedure, no further cardiac workup indicated. Medically optimized for OR unless any significant concerning lab deviation from patient's outpatient labs 9/12/23    #HTN  - c/w home Lopressor 75mg BID    #CAD  #CVA  - resume ASA post operatively when safe to do so  - c/w home atorvastatin  - awaiting results of recent long term outpatient cardiac monitoring to monitor for occult Afib to determine if a role for full dose AVC, follows with Dr Jeter    #DM  - f/u A1c, on Metformin 500mg BID at home  - ISS while inpatient, will evaluate for basal bolus needs    #Constipation  - likely due to chronic opioid use  - c/w bowel regimen, Movantik if on formulary

## 2023-12-04 NOTE — H&P ADULT - HISTORY OF PRESENT ILLNESS
66 y/o female with PMHx HTN, HLD, CVA x 3 (last was 2016 with right hand  weakness residual), GAMALIEL not using CPAP, Emphysema, ex-25 pack year smoker quit  2021, Chronic Constipation on Movantik, Urinary Incontinence chronically self  straight cath at home, chronic UTI, Breast Implant Rupture with Chronic Leak,  B/L CTS s/p release, s/p Intrathecal Morphine pump for pain, with chronic neck  and back pain worsening for years s/p multiple spinal surgeries including  laminectomies and fusion of cervical, thoracic and lumbar spine,  done in 2019 and 2020 at [facility] by Dr. Trinidad, previously admitted to [facility]  Neurosurgery for low back pain, s/p T3-L1 revision of prior fusion (with Dr. Luz on 8/17/22). Xray done showing broken rods/displacement. Presents for elective C2-S2 instrumented fusion. 66 y/o female with PMHx HTN, HLD, CVA x 3 (last was 2016 with right hand  weakness residual), GAMALIEL not using CPAP, Emphysema, ex-25 pack year smoker quit  2021, Chronic Constipation on Movantik, Urinary Incontinence chronically self  straight cath at home, chronic UTI, Breast Implant Rupture with Chronic Leak,  B/L CTS s/p release, s/p Intrathecal Morphine pump for pain, with chronic neck  and back pain worsening for years s/p multiple spinal surgeries including  laminectomies and fusion of cervical, thoracic and lumbar spine,  done in 2019 and 2020 at [facility] by Dr. Trinidad, previously admitted to [facility]  Neurosurgery for low back pain, s/p T3-L1 revision of prior fusion (with Dr. Luz on 8/17/22). Xray done showing broken rods/displacement. Presents for elective C2-S2 instrumented fusion. 68 y/o female with PMHx HTN, HLD, CVA x 3 (last was 2016 with right hand weakness residual), GAMALIEL not using CPAP, Emphysema, 25 pack year smoker, Chronic Constipation on Movantik, Urinary Incontinence chronically self straight cath at home, chronic UTI, Breast Implant Rupture with Chronic Leak repair 10/2023, B/L CTS s/p release, s/p Intrathecal Morphine pump for pain, with chronic neck and back pain worsening for years s/p multiple spinal surgeries including laminectomies and fusion of cervical, thoracic and lumbar spine initially done in 2009 and recently in 2019 and 2020 at [facility] by Dr. Trinidad, s/p T3-L1 revision of prior fusion (with Dr. Luz on 8/17/22). Outpatient Xray 11/16/2023 showing broken rods/displacement. Presents for elective C2-S2 instrumentation and fusion. Pt endorses weakness of b/l LE and burning pain in b/l lower extremities radiating to both feet,  68 y/o female with PMHx HTN, HLD, CVA x 3 (last was 2016 with right hand weakness residual), GAMALIEL not using CPAP, Emphysema, 25 pack year smoker, Chronic Constipation on Movantik, Urinary Incontinence chronically self straight cath at home, chronic UTI, Breast Implant Rupture with Chronic Leak repair 10/2023, B/L CTS s/p release, s/p Intrathecal Morphine pump for pain, with chronic neck and back pain worsening for years s/p multiple spinal surgeries including laminectomies and fusion of cervical, thoracic and lumbar spine initially done in 2009 and recently in 2019 and 2020 at [facility] by Dr. Trinidad, s/p T3-L1 revision of prior fusion (with Dr. Luz on 8/17/22). Outpatient Xray 11/16/2023 showing broken rods/displacement. Presents for elective C2-S2 instrumentation and fusion. Pt endorses weakness of b/l LE and burning pain in b/l lower extremities radiating to both feet,  66 y/o female with PMHx HTN, HLD, CVA x 3 (last was 2016 with right hand weakness residual), GAMALIEL not using CPAP, Emphysema, former 25 pack year smoker restarted this week of surgery, Chronic Constipation on Movantik, Urinary Incontinence chronically self straight cath at home, chronic UTI, Breast Implant Rupture with Chronic Leak repair 10/2023, B/L CTS s/p release, s/p Intrathecal Morphine pump for pain, with chronic neck and back pain worsening for years s/p multiple spinal surgeries including laminectomies and fusion of cervical, thoracic and lumbar spine initially done in 2009 and recently in 2019 and 2020 at [facility] by Dr. Trinidad, s/p T3-L1 revision of prior fusion (with Dr. Luz on 8/17/22). Outpatient Xray 11/16/2023 showing broken rods/displacement. Presents for elective C2-S2 instrumentation and fusion. Pt endorses weakness of b/l LE and burning pain in b/l lower extremities radiating to both feet,  68 y/o female with PMHx HTN, HLD, CVA x 3 (last was 2016 with right hand weakness residual), GAMALIEL not using CPAP, Emphysema, former 25 pack year smoker restarted this week of surgery, Chronic Constipation on Movantik, Urinary Incontinence chronically self straight cath at home, chronic UTI, Breast Implant Rupture with Chronic Leak repair 10/2023, B/L CTS s/p release, s/p Intrathecal Morphine pump for pain, with chronic neck and back pain worsening for years s/p multiple spinal surgeries including laminectomies and fusion of cervical, thoracic and lumbar spine initially done in 2009 and recently in 2019 and 2020 at [facility] by Dr. Trinidad, s/p T3-L1 revision of prior fusion (with Dr. Luz on 8/17/22). Outpatient Xray 11/16/2023 showing broken rods/displacement. Presents for elective C2-S2 instrumentation and fusion. Pt endorses weakness of b/l LE and burning pain in b/l lower extremities radiating to both feet,

## 2023-12-04 NOTE — H&P ADULT - ASSESSMENT
66 y/o female with PMHx HTN, HLD, CVA x 3 (last was 2016 with right hand weakness residual), GAMALIEL not using CPAP, Emphysema, 25 pack year smoker, Chronic Constipation on Movantik, Urinary Incontinence chronically self straight cath at home, chronic UTI, Breast Implant Rupture with Chronic Leak repair 10/2023, B/L CTS s/p release, s/p Intrathecal Morphine pump for pain, with chronic neck and back pain worsening for years s/p multiple spinal surgeries including laminectomies and fusion of cervical, thoracic and lumbar spine initially done in 2009 and recently in 2019 and 2020 at [facility] by Dr. Trinidad, s/p T3-L1 revision of prior fusion (with Dr. Luz on 8/17/22). Outpatient Xray 11/16/2023 showing broken rods/displacement. Presents for elective C2-S2 instrumentation and fusion.    Plan:  - Admit to 8wo  - Obtain CT C/T/L spine  - Obtain MRI T spine  - Pre-op/consent for OR tomorrow C2-S2 instrumentation and fusion    Pregnancy test? (serum hcg for any female < 57 y/o) (within 48hrs): [ ] Negative Result  [ ] Positive Result  [x ] N/A : male or female > 57 y/o      Have there been 2 T&S during this admission?  _ Y  _ N PENDING  Is there an active T&S within 72hrs?  _ Y  _ N PENDING     Last dose of antiplatelet/anticoagulation drug? ASA 1 week ago    3M nasal swab ordered?  _x Y  _ N    Cranial surgery: Order written for hair to be shampooed night before surgery and morning before surgery? _ Y  _x N  Chlorhexidine Wipes ordered for neck down?  _x Y  _ N  (twice a day if 1 day before surgery, daily for 3 days if 3 days prior, daily if in ICU)    Implanted Devices (pacemaker, drug pump...etc):   _x Y  _ N           If YES --> [employer] consulted to interrogate device: _x  Y  _ N           If YES --> [employer] called to let them know patient going for surgery:                             [ ] device needs to be turned off                               [ ] magnet needs to be placed for surgery                              [ ] nothing to do per EP, may proceed with cautery use in OR                                       CXR:  EKG: Non-ischemic 12/4  Echo 9/23  CONCLUSIONS:  1. Left ventricular systolic function is normal with an ejection fraction of 58 % by Burroughs's method of disks.  2. There is mild (grade 1) left ventricular diastolic dysfunction.  3. Right ventricular cavity is normal in size, normal wall thickness and normal systolic function.  4. The aortic valve is tricuspid with normal structure without stenosis with normal systolic excursion.  5. Structurally normal mitral valve with normal leaflet excursion.  6. No pericardial effusion seen.    Medical Clearances: Dr. Turcios  Other Clearances:                  Assessment/Plan:    - OR tomorrow for C2-S2 instrumentation and fusion  - NPO after midnight (except meds)  - IVF at midnight  - Hold all AC  - PRBCs on hold for OR   - 3M nasal swab, chlorhexidine wipes - ordered   - F/u labs, coags, T&S  - Preop imaging studies pending  - Consent in chart   - Medically optimized for OR, per Dr. turcios    D/w Dr. Luz   68 y/o female with PMHx HTN, HLD, CVA x 3 (last was 2016 with right hand weakness residual), GAMALIEL not using CPAP, Emphysema, 25 pack year smoker, Chronic Constipation on Movantik, Urinary Incontinence chronically self straight cath at home, chronic UTI, Breast Implant Rupture with Chronic Leak repair 10/2023, B/L CTS s/p release, s/p Intrathecal Morphine pump for pain, with chronic neck and back pain worsening for years s/p multiple spinal surgeries including laminectomies and fusion of cervical, thoracic and lumbar spine initially done in 2009 and recently in 2019 and 2020 at [facility] by Dr. Trinidad, s/p T3-L1 revision of prior fusion (with Dr. Luz on 8/17/22). Outpatient Xray 11/16/2023 showing broken rods/displacement. Presents for elective C2-S2 instrumentation and fusion.    Plan:  - Admit to 8wo  - Obtain CT C/T/L spine  - Obtain MRI T spine  - Pre-op/consent for OR tomorrow C2-S2 instrumentation and fusion    Pregnancy test? (serum hcg for any female < 57 y/o) (within 48hrs): [ ] Negative Result  [ ] Positive Result  [x ] N/A : male or female > 57 y/o      Have there been 2 T&S during this admission?  _ Y  _ N PENDING  Is there an active T&S within 72hrs?  _ Y  _ N PENDING     Last dose of antiplatelet/anticoagulation drug? ASA 1 week ago    3M nasal swab ordered?  _x Y  _ N    Cranial surgery: Order written for hair to be shampooed night before surgery and morning before surgery? _ Y  _x N  Chlorhexidine Wipes ordered for neck down?  _x Y  _ N  (twice a day if 1 day before surgery, daily for 3 days if 3 days prior, daily if in ICU)    Implanted Devices (pacemaker, drug pump...etc):   _x Y  _ N           If YES --> [employer] consulted to interrogate device: _x  Y  _ N           If YES --> [employer] called to let them know patient going for surgery:                             [ ] device needs to be turned off                               [ ] magnet needs to be placed for surgery                              [ ] nothing to do per EP, may proceed with cautery use in OR                                       CXR:  EKG: Non-ischemic 12/4  Echo 9/23  CONCLUSIONS:  1. Left ventricular systolic function is normal with an ejection fraction of 58 % by Burroughs's method of disks.  2. There is mild (grade 1) left ventricular diastolic dysfunction.  3. Right ventricular cavity is normal in size, normal wall thickness and normal systolic function.  4. The aortic valve is tricuspid with normal structure without stenosis with normal systolic excursion.  5. Structurally normal mitral valve with normal leaflet excursion.  6. No pericardial effusion seen.    Medical Clearances: Dr. Turcios  Other Clearances:                  Assessment/Plan:    - OR tomorrow for C2-S2 instrumentation and fusion  - NPO after midnight (except meds)  - IVF at midnight  - Hold all AC  - PRBCs on hold for OR   - 3M nasal swab, chlorhexidine wipes - ordered   - F/u labs, coags, T&S  - Preop imaging studies pending  - Consent in chart   - Medically optimized for OR, per Dr. turcios    D/w Dr. Luz

## 2023-12-04 NOTE — CONSULT NOTE ADULT - SUBJECTIVE AND OBJECTIVE BOX
Patient is a 68y old  Female who presents with a chief complaint of     HPI:  66 y/o female with PMHx HTN, HLD, CVA x 3 (last was 2016 with right hand  weakness residual), GAMALIEL not using CPAP, Emphysema, ex-25 pack year smoker quit  2021, Chronic Constipation on Movantik, Urinary Incontinence chronically self  straight cath at home, chronic UTI, Breast Implant Rupture with Chronic Leak,  B/L CTS s/p release, s/p Intrathecal Morphine pump for pain, with chronic neck  and back pain worsening for years s/p multiple spinal surgeries including  laminectomies and fusion of cervical, thoracic and lumbar spine,  done in 2019 and 2020 at [facility] by Dr. Trinidad, previously admitted to [facility]  Neurosurgery for low back pain, s/p T3-L1 revision of prior fusion (with Dr. Luz on 8/17/22). Xray done showing broken rods/displacement. Presents for elective C2-S2 instrumented fusion. (04 Dec 2023 15:44)    Additional history obtained from patient, PCP (Samir Topete) and outpatient chart.  Patient reports she has intrathecal morphine pump for pain but does not feel it is adequately covering her current pain and has self increased her Gabapentin.  Reports the only changes to her other medications has been changing robaxin to Tizanidine and addition of metformin.     With regards to cardiac history she follows with Dr Monet Alfonso, had coronary CTA in 2022:  calcium score 38 (71%), <25% LM, minimal pLAD, mild noncalcified mural plaque desc thoracic ao, mild mail pa 3.1 cm.  Repeat coronary CTA 6/23: calcium score 39 (83%), <25% LM, mild mid LAD, minimal D1, minimal prox LCx, biatrial enlargement   - EKG 5/31 NSR with PAC, normal intervals, no ST/T changes    Echo 9/23  CONCLUSIONS:    1. Left ventricular systolic function is normal with an ejection fraction of 58 % by Burroughs's method of disks.  2. There is mild (grade 1) left ventricular diastolic dysfunction.  3. Right ventricular cavity is normal in size, normal wall thickness and normal systolic function.  4. The aortic valve is tricuspid with normal structure without stenosis with normal systolic excursion.  5. Structurally normal mitral valve with normal leaflet excursion.  6. No pericardial effusion seen.      REVIEW OF SYSTEMS: see HPI    PAST MEDICAL & SURGICAL HISTORY:  HTN (hypertension)  SS (spinal stenosis)  High cholesterol  Stroke  x3  H/O carpal tunnel syndrome  Bilateral  Chronic GERD  History of chronic constipation  Urinary incontinence  self cath as needed  Pre-diabetes  Anxiety and depression  Eczema  H/O kyphosis  Pancreas cyst  Scoliosis  Wheelchair dependent  Cervical pseudoarthrosis  and lumbar spine  Cervical spondylosis  Chronic headaches  Degenerative joint disease  left shoulder  Lumbosacral spondylosis  COPD (chronic obstructive pulmonary disease)  H/O Spinal surgery  lumbar x 30  H/O breast surgery  left breast implant 1980's  S/P cervical discectomy  x4  H/O total shoulder replacement, right      FAMILY HISTORY:  Family hx of DM    SOCIAL HISTORY:  Tobacco use: Former smoker 25 pack year hx but now restarted smoking this week 2 cigarettes a week  EtOH use: No current use, former social use  Recreational drug use: None    MEDICATIONS:  MEDICATIONS  (STANDING):  insulin lispro (ADMELOG) corrective regimen sliding scale   SubCutaneous Before meals and at bedtime  polyethylene glycol 3350 17 Gram(s) Oral daily  senna 2 Tablet(s) Oral at bedtime    MEDICATIONS  (PRN):      ALLERGIES:  Allergies    oxycodone (Pruritus)    Intolerances        VITAL SIGNS:        PHYSICAL EXAM:  Constitutional: NAD, comfortable in electric wheelchair  HEENT: PERRLA, EOMI, MMM  Neck: Supple  Respiratory: CTA B/L. No w/r/r.   Cardiovascular: RRR, normal S1 and S2, no m/r/g.   Gastrointestinal: soft NTND  Back: wearing back brace   Extremities: wwp  Neurological: AAOx3, no CN deficits, strength and sensation intact throughout.   Skin: No gross skin abnormalities or rashes        LABS:                  CAPILLARY BLOOD GLUCOSE              RADIOLOGY & ADDITIONAL TESTS: Reviewed. Patient is a 68y old  Female who presents with a chief complaint of     HPI:  68 y/o female with PMHx HTN, HLD, CVA x 3 (last was 2016 with right hand  weakness residual), GAMALIEL not using CPAP, Emphysema, ex-25 pack year smoker quit  2021, Chronic Constipation on Movantik, Urinary Incontinence chronically self  straight cath at home, chronic UTI, Breast Implant Rupture with Chronic Leak,  B/L CTS s/p release, s/p Intrathecal Morphine pump for pain, with chronic neck  and back pain worsening for years s/p multiple spinal surgeries including  laminectomies and fusion of cervical, thoracic and lumbar spine,  done in 2019 and 2020 at [facility] by Dr. Trinidad, previously admitted to [facility]  Neurosurgery for low back pain, s/p T3-L1 revision of prior fusion (with Dr. Luz on 8/17/22). Xray done showing broken rods/displacement. Presents for elective C2-S2 instrumented fusion. (04 Dec 2023 15:44)    Additional history obtained from patient, PCP (Samir Topete) and outpatient chart.  Patient reports she has intrathecal morphine pump for pain but does not feel it is adequately covering her current pain and has self increased her Gabapentin.  Reports the only changes to her other medications has been changing robaxin to Tizanidine and addition of metformin.     With regards to cardiac history she follows with Dr Monet Alfonso, had coronary CTA in 2022:  calcium score 38 (71%), <25% LM, minimal pLAD, mild noncalcified mural plaque desc thoracic ao, mild mail pa 3.1 cm.  Repeat coronary CTA 6/23: calcium score 39 (83%), <25% LM, mild mid LAD, minimal D1, minimal prox LCx, biatrial enlargement   - EKG 5/31 NSR with PAC, normal intervals, no ST/T changes    Echo 9/23  CONCLUSIONS:    1. Left ventricular systolic function is normal with an ejection fraction of 58 % by Burroughs's method of disks.  2. There is mild (grade 1) left ventricular diastolic dysfunction.  3. Right ventricular cavity is normal in size, normal wall thickness and normal systolic function.  4. The aortic valve is tricuspid with normal structure without stenosis with normal systolic excursion.  5. Structurally normal mitral valve with normal leaflet excursion.  6. No pericardial effusion seen.      REVIEW OF SYSTEMS: see HPI    PAST MEDICAL & SURGICAL HISTORY:  HTN (hypertension)  SS (spinal stenosis)  High cholesterol  Stroke  x3  H/O carpal tunnel syndrome  Bilateral  Chronic GERD  History of chronic constipation  Urinary incontinence  self cath as needed  Pre-diabetes  Anxiety and depression  Eczema  H/O kyphosis  Pancreas cyst  Scoliosis  Wheelchair dependent  Cervical pseudoarthrosis  and lumbar spine  Cervical spondylosis  Chronic headaches  Degenerative joint disease  left shoulder  Lumbosacral spondylosis  COPD (chronic obstructive pulmonary disease)  H/O Spinal surgery  lumbar x 30  H/O breast surgery  left breast implant 1980's  S/P cervical discectomy  x4  H/O total shoulder replacement, right      FAMILY HISTORY:  Family hx of DM    SOCIAL HISTORY:  Tobacco use: Former smoker 25 pack year hx but now restarted smoking this week 2 cigarettes a week  EtOH use: No current use, former social use  Recreational drug use: None    MEDICATIONS:  MEDICATIONS  (STANDING):  insulin lispro (ADMELOG) corrective regimen sliding scale   SubCutaneous Before meals and at bedtime  polyethylene glycol 3350 17 Gram(s) Oral daily  senna 2 Tablet(s) Oral at bedtime    MEDICATIONS  (PRN):      ALLERGIES:  Allergies    oxycodone (Pruritus)    Intolerances        VITAL SIGNS:        PHYSICAL EXAM:  Constitutional: NAD, comfortable in electric wheelchair  HEENT: PERRLA, EOMI, MMM  Neck: Supple  Respiratory: CTA B/L. No w/r/r.   Cardiovascular: RRR, normal S1 and S2, no m/r/g.   Gastrointestinal: soft NTND  Back: wearing back brace   Extremities: wwp  Neurological: AAOx3, no CN deficits, strength and sensation intact throughout.   Skin: No gross skin abnormalities or rashes        LABS:                  CAPILLARY BLOOD GLUCOSE              RADIOLOGY & ADDITIONAL TESTS: Reviewed.

## 2023-12-04 NOTE — H&P ADULT - NSHPPHYSICALEXAM_GEN_ALL_CORE
GENERAL: NAD, sitting comfortably in chair  HEAD:  Atraumatic, Normocephalic  EYES: EOMI, PERRLA, conjunctiva and sclera clear  ENT: Moist mucous membranes  NECK: Supple, No JVD  CHEST/LUNG: Clear to auscultation bilaterally; No rales, rhonchi, wheezing, or rubs. Unlabored respirations  HEART: Regular rate and rhythm; No murmurs, rubs, or gallops  ABDOMEN: Bowel sounds present; Soft, Nontender, Nondistended. No hepatomegaly  EXTREMITIES:  2+ Peripheral Pulses, brisk capillary refill. No clubbing, cyanosis, or edema  NERVOUS SYSTEM:  Alert & Oriented X3, speech clear. RUE HG 4/5, o/w bl upper extremities 5/5, LLE HF 4/5, KE/KF 5/5, DF/PF 4+/5; RLE HF 3/5, KE/KF 4-/5, DF/PF 4+/5  No hoffmans, no clonus  SKIN: No rashes or lesions

## 2023-12-05 ENCOUNTER — TRANSCRIPTION ENCOUNTER (OUTPATIENT)
Age: 68
End: 2023-12-05

## 2023-12-05 LAB
ANION GAP SERPL CALC-SCNC: 6 MMOL/L — SIGNIFICANT CHANGE UP (ref 5–17)
ANION GAP SERPL CALC-SCNC: 6 MMOL/L — SIGNIFICANT CHANGE UP (ref 5–17)
APTT BLD: 27.7 SEC — SIGNIFICANT CHANGE UP (ref 24.5–35.6)
APTT BLD: 27.7 SEC — SIGNIFICANT CHANGE UP (ref 24.5–35.6)
BASE EXCESS BLDA CALC-SCNC: -0.8 MMOL/L — SIGNIFICANT CHANGE UP (ref -2–3)
BASE EXCESS BLDA CALC-SCNC: -0.8 MMOL/L — SIGNIFICANT CHANGE UP (ref -2–3)
BASE EXCESS BLDA CALC-SCNC: -1.5 MMOL/L — SIGNIFICANT CHANGE UP (ref -2–3)
BASE EXCESS BLDA CALC-SCNC: -1.5 MMOL/L — SIGNIFICANT CHANGE UP (ref -2–3)
BASE EXCESS BLDA CALC-SCNC: -1.9 MMOL/L — SIGNIFICANT CHANGE UP (ref -2–3)
BASE EXCESS BLDA CALC-SCNC: -1.9 MMOL/L — SIGNIFICANT CHANGE UP (ref -2–3)
BASE EXCESS BLDA CALC-SCNC: -2.6 MMOL/L — LOW (ref -2–3)
BASE EXCESS BLDA CALC-SCNC: -2.6 MMOL/L — LOW (ref -2–3)
BASE EXCESS BLDA CALC-SCNC: 0.7 MMOL/L — SIGNIFICANT CHANGE UP (ref -2–3)
BASE EXCESS BLDA CALC-SCNC: 0.7 MMOL/L — SIGNIFICANT CHANGE UP (ref -2–3)
BUN SERPL-MCNC: 7 MG/DL — SIGNIFICANT CHANGE UP (ref 7–23)
BUN SERPL-MCNC: 7 MG/DL — SIGNIFICANT CHANGE UP (ref 7–23)
CA-I BLDA-SCNC: 1.14 MMOL/L — LOW (ref 1.15–1.33)
CA-I BLDA-SCNC: 1.14 MMOL/L — LOW (ref 1.15–1.33)
CA-I BLDA-SCNC: 1.16 MMOL/L — SIGNIFICANT CHANGE UP (ref 1.15–1.33)
CA-I BLDA-SCNC: 1.16 MMOL/L — SIGNIFICANT CHANGE UP (ref 1.15–1.33)
CA-I BLDA-SCNC: 1.17 MMOL/L — SIGNIFICANT CHANGE UP (ref 1.15–1.33)
CA-I BLDA-SCNC: 1.17 MMOL/L — SIGNIFICANT CHANGE UP (ref 1.15–1.33)
CA-I BLDA-SCNC: 1.18 MMOL/L — SIGNIFICANT CHANGE UP (ref 1.15–1.33)
CA-I BLDA-SCNC: 1.18 MMOL/L — SIGNIFICANT CHANGE UP (ref 1.15–1.33)
CA-I BLDA-SCNC: 1.19 MMOL/L — SIGNIFICANT CHANGE UP (ref 1.15–1.33)
CA-I BLDA-SCNC: 1.19 MMOL/L — SIGNIFICANT CHANGE UP (ref 1.15–1.33)
CALCIUM SERPL-MCNC: 8.2 MG/DL — LOW (ref 8.4–10.5)
CALCIUM SERPL-MCNC: 8.2 MG/DL — LOW (ref 8.4–10.5)
CHLORIDE SERPL-SCNC: 110 MMOL/L — HIGH (ref 96–108)
CHLORIDE SERPL-SCNC: 110 MMOL/L — HIGH (ref 96–108)
CO2 BLDA-SCNC: 24 MMOL/L — SIGNIFICANT CHANGE UP (ref 19–24)
CO2 BLDA-SCNC: 24 MMOL/L — SIGNIFICANT CHANGE UP (ref 19–24)
CO2 BLDA-SCNC: 25 MMOL/L — HIGH (ref 19–24)
CO2 BLDA-SCNC: 26 MMOL/L — HIGH (ref 19–24)
CO2 BLDA-SCNC: 26 MMOL/L — HIGH (ref 19–24)
CO2 BLDA-SCNC: 27 MMOL/L — HIGH (ref 19–24)
CO2 BLDA-SCNC: 27 MMOL/L — HIGH (ref 19–24)
CO2 SERPL-SCNC: 25 MMOL/L — SIGNIFICANT CHANGE UP (ref 22–31)
CO2 SERPL-SCNC: 25 MMOL/L — SIGNIFICANT CHANGE UP (ref 22–31)
COHGB MFR BLDA: 1.5 % — SIGNIFICANT CHANGE UP
COHGB MFR BLDA: 1.5 % — SIGNIFICANT CHANGE UP
COHGB MFR BLDA: 1.8 % — SIGNIFICANT CHANGE UP
COHGB MFR BLDA: 1.8 % — SIGNIFICANT CHANGE UP
COHGB MFR BLDA: 2 % — SIGNIFICANT CHANGE UP
COHGB MFR BLDA: 2 % — SIGNIFICANT CHANGE UP
COHGB MFR BLDA: 2.1 % — SIGNIFICANT CHANGE UP
CREAT SERPL-MCNC: 0.46 MG/DL — LOW (ref 0.5–1.3)
CREAT SERPL-MCNC: 0.46 MG/DL — LOW (ref 0.5–1.3)
EGFR: 104 ML/MIN/1.73M2 — SIGNIFICANT CHANGE UP
EGFR: 104 ML/MIN/1.73M2 — SIGNIFICANT CHANGE UP
GLUCOSE BLDA-MCNC: 137 MG/DL — HIGH (ref 70–99)
GLUCOSE BLDA-MCNC: 137 MG/DL — HIGH (ref 70–99)
GLUCOSE BLDA-MCNC: 159 MG/DL — HIGH (ref 70–99)
GLUCOSE BLDA-MCNC: 159 MG/DL — HIGH (ref 70–99)
GLUCOSE BLDA-MCNC: 174 MG/DL — HIGH (ref 70–99)
GLUCOSE BLDA-MCNC: 174 MG/DL — HIGH (ref 70–99)
GLUCOSE BLDA-MCNC: 186 MG/DL — HIGH (ref 70–99)
GLUCOSE BLDA-MCNC: 186 MG/DL — HIGH (ref 70–99)
GLUCOSE BLDA-MCNC: 192 MG/DL — HIGH (ref 70–99)
GLUCOSE BLDA-MCNC: 192 MG/DL — HIGH (ref 70–99)
GLUCOSE BLDC GLUCOMTR-MCNC: 116 MG/DL — HIGH (ref 70–99)
GLUCOSE BLDC GLUCOMTR-MCNC: 116 MG/DL — HIGH (ref 70–99)
GLUCOSE BLDC GLUCOMTR-MCNC: 152 MG/DL — HIGH (ref 70–99)
GLUCOSE BLDC GLUCOMTR-MCNC: 152 MG/DL — HIGH (ref 70–99)
GLUCOSE SERPL-MCNC: 161 MG/DL — HIGH (ref 70–99)
GLUCOSE SERPL-MCNC: 161 MG/DL — HIGH (ref 70–99)
HCO3 BLDA-SCNC: 23 MMOL/L — SIGNIFICANT CHANGE UP (ref 21–28)
HCO3 BLDA-SCNC: 23 MMOL/L — SIGNIFICANT CHANGE UP (ref 21–28)
HCO3 BLDA-SCNC: 24 MMOL/L — SIGNIFICANT CHANGE UP (ref 21–28)
HCO3 BLDA-SCNC: 25 MMOL/L — SIGNIFICANT CHANGE UP (ref 21–28)
HCO3 BLDA-SCNC: 25 MMOL/L — SIGNIFICANT CHANGE UP (ref 21–28)
HCT VFR BLD CALC: 34.3 % — LOW (ref 34.5–45)
HCT VFR BLD CALC: 34.3 % — LOW (ref 34.5–45)
HGB BLD-MCNC: 11.4 G/DL — LOW (ref 11.5–15.5)
HGB BLD-MCNC: 11.4 G/DL — LOW (ref 11.5–15.5)
HGB BLDA-MCNC: 11.4 G/DL — LOW (ref 11.7–16.1)
HGB BLDA-MCNC: 11.5 G/DL — LOW (ref 11.7–16.1)
HGB BLDA-MCNC: 11.5 G/DL — LOW (ref 11.7–16.1)
HGB BLDA-MCNC: 11.6 G/DL — LOW (ref 11.7–16.1)
INR BLD: 1.01 — SIGNIFICANT CHANGE UP (ref 0.85–1.18)
INR BLD: 1.01 — SIGNIFICANT CHANGE UP (ref 0.85–1.18)
MAGNESIUM SERPL-MCNC: 1.3 MG/DL — LOW (ref 1.6–2.6)
MAGNESIUM SERPL-MCNC: 1.3 MG/DL — LOW (ref 1.6–2.6)
MCHC RBC-ENTMCNC: 28.4 PG — SIGNIFICANT CHANGE UP (ref 27–34)
MCHC RBC-ENTMCNC: 28.4 PG — SIGNIFICANT CHANGE UP (ref 27–34)
MCHC RBC-ENTMCNC: 33.2 GM/DL — SIGNIFICANT CHANGE UP (ref 32–36)
MCHC RBC-ENTMCNC: 33.2 GM/DL — SIGNIFICANT CHANGE UP (ref 32–36)
MCV RBC AUTO: 85.5 FL — SIGNIFICANT CHANGE UP (ref 80–100)
MCV RBC AUTO: 85.5 FL — SIGNIFICANT CHANGE UP (ref 80–100)
METHGB MFR BLDA: 0.5 % — SIGNIFICANT CHANGE UP
METHGB MFR BLDA: 0.5 % — SIGNIFICANT CHANGE UP
METHGB MFR BLDA: 0.6 % — SIGNIFICANT CHANGE UP
METHGB MFR BLDA: 0.6 % — SIGNIFICANT CHANGE UP
METHGB MFR BLDA: 0.7 % — SIGNIFICANT CHANGE UP
METHGB MFR BLDA: 0.7 % — SIGNIFICANT CHANGE UP
METHGB MFR BLDA: 0.8 % — SIGNIFICANT CHANGE UP
METHGB MFR BLDA: 0.8 % — SIGNIFICANT CHANGE UP
METHGB MFR BLDA: 0.9 % — SIGNIFICANT CHANGE UP
METHGB MFR BLDA: 0.9 % — SIGNIFICANT CHANGE UP
NRBC # BLD: 0 /100 WBCS — SIGNIFICANT CHANGE UP (ref 0–0)
NRBC # BLD: 0 /100 WBCS — SIGNIFICANT CHANGE UP (ref 0–0)
OXYHGB MFR BLDA: 96.6 % — HIGH (ref 90–95)
OXYHGB MFR BLDA: 96.6 % — HIGH (ref 90–95)
OXYHGB MFR BLDA: 96.8 % — HIGH (ref 90–95)
OXYHGB MFR BLDA: 97 % — HIGH (ref 90–95)
OXYHGB MFR BLDA: 97 % — HIGH (ref 90–95)
OXYHGB MFR BLDA: 97.4 % — HIGH (ref 90–95)
OXYHGB MFR BLDA: 97.4 % — HIGH (ref 90–95)
PCO2 BLDA: 40 MMHG — SIGNIFICANT CHANGE UP (ref 32–45)
PCO2 BLDA: 40 MMHG — SIGNIFICANT CHANGE UP (ref 32–45)
PCO2 BLDA: 41 MMHG — SIGNIFICANT CHANGE UP (ref 32–45)
PCO2 BLDA: 43 MMHG — SIGNIFICANT CHANGE UP (ref 32–45)
PH BLDA: 7.34 — LOW (ref 7.35–7.45)
PH BLDA: 7.34 — LOW (ref 7.35–7.45)
PH BLDA: 7.35 — SIGNIFICANT CHANGE UP (ref 7.35–7.45)
PH BLDA: 7.35 — SIGNIFICANT CHANGE UP (ref 7.35–7.45)
PH BLDA: 7.37 — SIGNIFICANT CHANGE UP (ref 7.35–7.45)
PH BLDA: 7.37 — SIGNIFICANT CHANGE UP (ref 7.35–7.45)
PH BLDA: 7.38 — SIGNIFICANT CHANGE UP (ref 7.35–7.45)
PH BLDA: 7.38 — SIGNIFICANT CHANGE UP (ref 7.35–7.45)
PH BLDA: 7.41 — SIGNIFICANT CHANGE UP (ref 7.35–7.45)
PH BLDA: 7.41 — SIGNIFICANT CHANGE UP (ref 7.35–7.45)
PHOSPHATE SERPL-MCNC: 3.3 MG/DL — SIGNIFICANT CHANGE UP (ref 2.5–4.5)
PHOSPHATE SERPL-MCNC: 3.3 MG/DL — SIGNIFICANT CHANGE UP (ref 2.5–4.5)
PLATELET # BLD AUTO: 207 K/UL — SIGNIFICANT CHANGE UP (ref 150–400)
PLATELET # BLD AUTO: 207 K/UL — SIGNIFICANT CHANGE UP (ref 150–400)
PO2 BLDA: 172 MMHG — HIGH (ref 83–108)
PO2 BLDA: 172 MMHG — HIGH (ref 83–108)
PO2 BLDA: 176 MMHG — HIGH (ref 83–108)
PO2 BLDA: 176 MMHG — HIGH (ref 83–108)
PO2 BLDA: 180 MMHG — HIGH (ref 83–108)
PO2 BLDA: 180 MMHG — HIGH (ref 83–108)
PO2 BLDA: 188 MMHG — HIGH (ref 83–108)
PO2 BLDA: 188 MMHG — HIGH (ref 83–108)
PO2 BLDA: 193 MMHG — HIGH (ref 83–108)
PO2 BLDA: 193 MMHG — HIGH (ref 83–108)
POTASSIUM BLDA-SCNC: 3.6 MMOL/L — SIGNIFICANT CHANGE UP (ref 3.5–5.1)
POTASSIUM BLDA-SCNC: 3.6 MMOL/L — SIGNIFICANT CHANGE UP (ref 3.5–5.1)
POTASSIUM BLDA-SCNC: 4 MMOL/L — SIGNIFICANT CHANGE UP (ref 3.5–5.1)
POTASSIUM BLDA-SCNC: 4 MMOL/L — SIGNIFICANT CHANGE UP (ref 3.5–5.1)
POTASSIUM BLDA-SCNC: 4.2 MMOL/L — SIGNIFICANT CHANGE UP (ref 3.5–5.1)
POTASSIUM BLDA-SCNC: 4.5 MMOL/L — SIGNIFICANT CHANGE UP (ref 3.5–5.1)
POTASSIUM BLDA-SCNC: 4.5 MMOL/L — SIGNIFICANT CHANGE UP (ref 3.5–5.1)
POTASSIUM SERPL-MCNC: 4.4 MMOL/L — SIGNIFICANT CHANGE UP (ref 3.5–5.3)
POTASSIUM SERPL-MCNC: 4.4 MMOL/L — SIGNIFICANT CHANGE UP (ref 3.5–5.3)
POTASSIUM SERPL-SCNC: 4.4 MMOL/L — SIGNIFICANT CHANGE UP (ref 3.5–5.3)
POTASSIUM SERPL-SCNC: 4.4 MMOL/L — SIGNIFICANT CHANGE UP (ref 3.5–5.3)
PROTHROM AB SERPL-ACNC: 11.5 SEC — SIGNIFICANT CHANGE UP (ref 9.5–13)
PROTHROM AB SERPL-ACNC: 11.5 SEC — SIGNIFICANT CHANGE UP (ref 9.5–13)
RBC # BLD: 4.01 M/UL — SIGNIFICANT CHANGE UP (ref 3.8–5.2)
RBC # BLD: 4.01 M/UL — SIGNIFICANT CHANGE UP (ref 3.8–5.2)
RBC # FLD: 13.9 % — SIGNIFICANT CHANGE UP (ref 10.3–14.5)
RBC # FLD: 13.9 % — SIGNIFICANT CHANGE UP (ref 10.3–14.5)
SAO2 % BLDA: 98.9 % — HIGH (ref 94–98)
SAO2 % BLDA: 98.9 % — HIGH (ref 94–98)
SAO2 % BLDA: 99.4 % — HIGH (ref 94–98)
SAO2 % BLDA: 99.4 % — HIGH (ref 94–98)
SAO2 % BLDA: 99.6 % — HIGH (ref 94–98)
SAO2 % BLDA: 99.6 % — HIGH (ref 94–98)
SAO2 % BLDA: 99.8 % — HIGH (ref 94–98)
SODIUM BLDA-SCNC: 133 MMOL/L — LOW (ref 136–145)
SODIUM BLDA-SCNC: 133 MMOL/L — LOW (ref 136–145)
SODIUM BLDA-SCNC: 134 MMOL/L — LOW (ref 136–145)
SODIUM BLDA-SCNC: 135 MMOL/L — LOW (ref 136–145)
SODIUM BLDA-SCNC: 135 MMOL/L — LOW (ref 136–145)
SODIUM BLDA-SCNC: 136 MMOL/L — SIGNIFICANT CHANGE UP (ref 136–145)
SODIUM BLDA-SCNC: 136 MMOL/L — SIGNIFICANT CHANGE UP (ref 136–145)
SODIUM SERPL-SCNC: 141 MMOL/L — SIGNIFICANT CHANGE UP (ref 135–145)
SODIUM SERPL-SCNC: 141 MMOL/L — SIGNIFICANT CHANGE UP (ref 135–145)
WBC # BLD: 10.41 K/UL — SIGNIFICANT CHANGE UP (ref 3.8–10.5)
WBC # BLD: 10.41 K/UL — SIGNIFICANT CHANGE UP (ref 3.8–10.5)
WBC # FLD AUTO: 10.41 K/UL — SIGNIFICANT CHANGE UP (ref 3.8–10.5)
WBC # FLD AUTO: 10.41 K/UL — SIGNIFICANT CHANGE UP (ref 3.8–10.5)

## 2023-12-05 PROCEDURE — 22848 INSERT PELV FIXATION DEVICE: CPT | Mod: 1L,80,59

## 2023-12-05 PROCEDURE — 22849 REINSERT SPINAL FIXATION: CPT | Mod: 1L

## 2023-12-05 PROCEDURE — 22610 ARTHRD PST TQ 1NTRSPC THRC: CPT | Mod: 1L,62

## 2023-12-05 PROCEDURE — 22600 ARTHRD PST TQ 1NTRSPC CRV: CPT | Mod: 1L,62

## 2023-12-05 PROCEDURE — 22614 ARTHRD PST TQ 1NTRSPC EA ADD: CPT | Mod: 1L,62

## 2023-12-05 PROCEDURE — 20939 BONE MARROW ASPIR BONE GRFG: CPT | Mod: 1L

## 2023-12-05 PROCEDURE — 22848 INSERT PELV FIXATION DEVICE: CPT | Mod: 1L,59

## 2023-12-05 PROCEDURE — 22849 REINSERT SPINAL FIXATION: CPT | Mod: 1L,80

## 2023-12-05 PROCEDURE — 71045 X-RAY EXAM CHEST 1 VIEW: CPT | Mod: 26

## 2023-12-05 PROCEDURE — 61783 SCAN PROC SPINAL: CPT | Mod: 1L

## 2023-12-05 PROCEDURE — 72020 X-RAY EXAM OF SPINE 1 VIEW: CPT | Mod: 26

## 2023-12-05 PROCEDURE — 99291 CRITICAL CARE FIRST HOUR: CPT

## 2023-12-05 PROCEDURE — XXXXX: CPT | Mod: 1L

## 2023-12-05 DEVICE — SET SCREW EVEREST CUSTOM: Type: IMPLANTABLE DEVICE | Status: FUNCTIONAL

## 2023-12-05 DEVICE — IMPLANTABLE DEVICE: Type: IMPLANTABLE DEVICE | Status: FUNCTIONAL

## 2023-12-05 DEVICE — BONE FILLER BIOCOMPOSITE STIMULAN RAPID CURE 10CC: Type: IMPLANTABLE DEVICE | Status: FUNCTIONAL

## 2023-12-05 DEVICE — MATRIX COLLAGEN PURAPLY AM 6X9CM: Type: IMPLANTABLE DEVICE | Status: FUNCTIONAL

## 2023-12-05 DEVICE — GRAFT VITOSIS BIMODAL 20CC 25X100X8MM: Type: IMPLANTABLE DEVICE | Status: FUNCTIONAL

## 2023-12-05 DEVICE — SCREW EVEREST POLY 6.5X35MM: Type: IMPLANTABLE DEVICE | Status: FUNCTIONAL

## 2023-12-05 DEVICE — GRAFT BONE INFUSE KIT LG: Type: IMPLANTABLE DEVICE | Status: FUNCTIONAL

## 2023-12-05 DEVICE — SCREW SET: Type: IMPLANTABLE DEVICE | Status: FUNCTIONAL

## 2023-12-05 DEVICE — CELLERATE SURGICAL POWDER RX 5GM: Type: IMPLANTABLE DEVICE | Status: FUNCTIONAL

## 2023-12-05 RX ORDER — GABAPENTIN 400 MG/1
800 CAPSULE ORAL THREE TIMES A DAY
Refills: 0 | Status: DISCONTINUED | OUTPATIENT
Start: 2023-12-05 | End: 2023-12-05

## 2023-12-05 RX ORDER — SODIUM CHLORIDE 9 MG/ML
1000 INJECTION, SOLUTION INTRAVENOUS
Refills: 0 | Status: DISCONTINUED | OUTPATIENT
Start: 2023-12-05 | End: 2023-12-06

## 2023-12-05 RX ORDER — DEXTROSE 50 % IN WATER 50 %
15 SYRINGE (ML) INTRAVENOUS ONCE
Refills: 0 | Status: DISCONTINUED | OUTPATIENT
Start: 2023-12-05 | End: 2023-12-06

## 2023-12-05 RX ORDER — SIMVASTATIN 20 MG/1
40 TABLET, FILM COATED ORAL AT BEDTIME
Refills: 0 | Status: DISCONTINUED | OUTPATIENT
Start: 2023-12-05 | End: 2023-12-05

## 2023-12-05 RX ORDER — ALBUTEROL 90 UG/1
2 AEROSOL, METERED ORAL EVERY 6 HOURS
Refills: 0 | Status: DISCONTINUED | OUTPATIENT
Start: 2023-12-05 | End: 2023-12-05

## 2023-12-05 RX ORDER — CHLORHEXIDINE GLUCONATE 213 G/1000ML
15 SOLUTION TOPICAL EVERY 12 HOURS
Refills: 0 | Status: DISCONTINUED | OUTPATIENT
Start: 2023-12-05 | End: 2023-12-06

## 2023-12-05 RX ORDER — DEXTROSE 50 % IN WATER 50 %
12.5 SYRINGE (ML) INTRAVENOUS ONCE
Refills: 0 | Status: DISCONTINUED | OUTPATIENT
Start: 2023-12-05 | End: 2023-12-06

## 2023-12-05 RX ORDER — FENTANYL CITRATE 50 UG/ML
25 INJECTION INTRAVENOUS
Refills: 0 | Status: DISCONTINUED | OUTPATIENT
Start: 2023-12-05 | End: 2023-12-06

## 2023-12-05 RX ORDER — GLUCAGON INJECTION, SOLUTION 0.5 MG/.1ML
1 INJECTION, SOLUTION SUBCUTANEOUS ONCE
Refills: 0 | Status: DISCONTINUED | OUTPATIENT
Start: 2023-12-05 | End: 2023-12-06

## 2023-12-05 RX ORDER — GLUCAGON INJECTION, SOLUTION 0.5 MG/.1ML
1 INJECTION, SOLUTION SUBCUTANEOUS ONCE
Refills: 0 | Status: DISCONTINUED | OUTPATIENT
Start: 2023-12-05 | End: 2023-12-05

## 2023-12-05 RX ORDER — METHOCARBAMOL 500 MG/1
500 TABLET, FILM COATED ORAL EVERY 8 HOURS
Refills: 0 | Status: DISCONTINUED | OUTPATIENT
Start: 2023-12-05 | End: 2023-12-05

## 2023-12-05 RX ORDER — SODIUM CHLORIDE 9 MG/ML
10 INJECTION INTRAMUSCULAR; INTRAVENOUS; SUBCUTANEOUS
Refills: 0 | Status: DISCONTINUED | OUTPATIENT
Start: 2023-12-05 | End: 2023-12-10

## 2023-12-05 RX ORDER — POLYETHYLENE GLYCOL 3350 17 G/17G
17 POWDER, FOR SOLUTION ORAL DAILY
Refills: 0 | Status: DISCONTINUED | OUTPATIENT
Start: 2023-12-05 | End: 2023-12-07

## 2023-12-05 RX ORDER — SODIUM CHLORIDE 9 MG/ML
1000 INJECTION, SOLUTION INTRAVENOUS
Refills: 0 | Status: DISCONTINUED | OUTPATIENT
Start: 2023-12-05 | End: 2023-12-05

## 2023-12-05 RX ORDER — PHENYLEPHRINE HYDROCHLORIDE 10 MG/ML
0.1 INJECTION INTRAVENOUS
Qty: 40 | Refills: 0 | Status: DISCONTINUED | OUTPATIENT
Start: 2023-12-05 | End: 2023-12-08

## 2023-12-05 RX ORDER — DEXTROSE 50 % IN WATER 50 %
15 SYRINGE (ML) INTRAVENOUS ONCE
Refills: 0 | Status: DISCONTINUED | OUTPATIENT
Start: 2023-12-05 | End: 2023-12-05

## 2023-12-05 RX ORDER — CHLORHEXIDINE GLUCONATE 213 G/1000ML
1 SOLUTION TOPICAL
Refills: 0 | Status: DISCONTINUED | OUTPATIENT
Start: 2023-12-05 | End: 2023-12-05

## 2023-12-05 RX ORDER — ALBUTEROL 90 UG/1
2 AEROSOL, METERED ORAL EVERY 6 HOURS
Refills: 0 | Status: DISCONTINUED | OUTPATIENT
Start: 2023-12-05 | End: 2023-12-10

## 2023-12-05 RX ORDER — METOPROLOL TARTRATE 50 MG
75 TABLET ORAL
Refills: 0 | Status: DISCONTINUED | OUTPATIENT
Start: 2023-12-05 | End: 2023-12-05

## 2023-12-05 RX ORDER — POLYETHYLENE GLYCOL 3350 17 G/17G
17 POWDER, FOR SOLUTION ORAL DAILY
Refills: 0 | Status: DISCONTINUED | OUTPATIENT
Start: 2023-12-05 | End: 2023-12-05

## 2023-12-05 RX ORDER — ACETAMINOPHEN 500 MG
1000 TABLET ORAL EVERY 8 HOURS
Refills: 0 | Status: DISCONTINUED | OUTPATIENT
Start: 2023-12-05 | End: 2023-12-05

## 2023-12-05 RX ORDER — MAGNESIUM SULFATE 500 MG/ML
4 VIAL (ML) INJECTION ONCE
Refills: 0 | Status: COMPLETED | OUTPATIENT
Start: 2023-12-05 | End: 2023-12-06

## 2023-12-05 RX ORDER — HYDROMORPHONE HYDROCHLORIDE 2 MG/ML
30 INJECTION INTRAMUSCULAR; INTRAVENOUS; SUBCUTANEOUS
Refills: 0 | Status: DISCONTINUED | OUTPATIENT
Start: 2023-12-05 | End: 2023-12-05

## 2023-12-05 RX ORDER — PANTOPRAZOLE SODIUM 20 MG/1
40 TABLET, DELAYED RELEASE ORAL DAILY
Refills: 0 | Status: DISCONTINUED | OUTPATIENT
Start: 2023-12-05 | End: 2023-12-06

## 2023-12-05 RX ORDER — HYDROMORPHONE HYDROCHLORIDE 2 MG/ML
1 INJECTION INTRAMUSCULAR; INTRAVENOUS; SUBCUTANEOUS EVERY 4 HOURS
Refills: 0 | Status: DISCONTINUED | OUTPATIENT
Start: 2023-12-05 | End: 2023-12-06

## 2023-12-05 RX ORDER — DULOXETINE HYDROCHLORIDE 30 MG/1
60 CAPSULE, DELAYED RELEASE ORAL DAILY
Refills: 0 | Status: DISCONTINUED | OUTPATIENT
Start: 2023-12-05 | End: 2023-12-10

## 2023-12-05 RX ORDER — CHLORHEXIDINE GLUCONATE 213 G/1000ML
1 SOLUTION TOPICAL
Refills: 0 | Status: DISCONTINUED | OUTPATIENT
Start: 2023-12-05 | End: 2023-12-06

## 2023-12-05 RX ORDER — CEFAZOLIN SODIUM 1 G
2000 VIAL (EA) INJECTION EVERY 8 HOURS
Refills: 0 | Status: DISCONTINUED | OUTPATIENT
Start: 2023-12-05 | End: 2023-12-05

## 2023-12-05 RX ORDER — PROPOFOL 10 MG/ML
10 INJECTION, EMULSION INTRAVENOUS
Qty: 1000 | Refills: 0 | Status: DISCONTINUED | OUTPATIENT
Start: 2023-12-05 | End: 2023-12-06

## 2023-12-05 RX ORDER — HYDROMORPHONE HYDROCHLORIDE 2 MG/ML
1 INJECTION INTRAMUSCULAR; INTRAVENOUS; SUBCUTANEOUS EVERY 4 HOURS
Refills: 0 | Status: DISCONTINUED | OUTPATIENT
Start: 2023-12-05 | End: 2023-12-05

## 2023-12-05 RX ORDER — NALOXONE HYDROCHLORIDE 4 MG/.1ML
0.1 SPRAY NASAL
Refills: 0 | Status: DISCONTINUED | OUTPATIENT
Start: 2023-12-05 | End: 2023-12-05

## 2023-12-05 RX ORDER — HYDROMORPHONE HYDROCHLORIDE 2 MG/ML
0.5 INJECTION INTRAMUSCULAR; INTRAVENOUS; SUBCUTANEOUS ONCE
Refills: 0 | Status: DISCONTINUED | OUTPATIENT
Start: 2023-12-05 | End: 2023-12-05

## 2023-12-05 RX ORDER — DEXTROSE 50 % IN WATER 50 %
25 SYRINGE (ML) INTRAVENOUS ONCE
Refills: 0 | Status: DISCONTINUED | OUTPATIENT
Start: 2023-12-05 | End: 2023-12-05

## 2023-12-05 RX ORDER — CHLORHEXIDINE GLUCONATE 213 G/1000ML
1 SOLUTION TOPICAL
Refills: 0 | Status: DISCONTINUED | OUTPATIENT
Start: 2023-12-05 | End: 2023-12-08

## 2023-12-05 RX ORDER — DEXMEDETOMIDINE HYDROCHLORIDE IN 0.9% SODIUM CHLORIDE 4 UG/ML
0.2 INJECTION INTRAVENOUS
Qty: 400 | Refills: 0 | Status: DISCONTINUED | OUTPATIENT
Start: 2023-12-05 | End: 2023-12-06

## 2023-12-05 RX ORDER — CHLORHEXIDINE GLUCONATE 213 G/1000ML
15 SOLUTION TOPICAL EVERY 12 HOURS
Refills: 0 | Status: DISCONTINUED | OUTPATIENT
Start: 2023-12-05 | End: 2023-12-05

## 2023-12-05 RX ORDER — DULOXETINE HYDROCHLORIDE 30 MG/1
60 CAPSULE, DELAYED RELEASE ORAL DAILY
Refills: 0 | Status: DISCONTINUED | OUTPATIENT
Start: 2023-12-05 | End: 2023-12-05

## 2023-12-05 RX ORDER — ONDANSETRON 8 MG/1
4 TABLET, FILM COATED ORAL EVERY 6 HOURS
Refills: 0 | Status: DISCONTINUED | OUTPATIENT
Start: 2023-12-05 | End: 2023-12-05

## 2023-12-05 RX ORDER — ACETAMINOPHEN 500 MG
1000 TABLET ORAL ONCE
Refills: 0 | Status: COMPLETED | OUTPATIENT
Start: 2023-12-05 | End: 2023-12-05

## 2023-12-05 RX ORDER — DEXTROSE 50 % IN WATER 50 %
12.5 SYRINGE (ML) INTRAVENOUS ONCE
Refills: 0 | Status: DISCONTINUED | OUTPATIENT
Start: 2023-12-05 | End: 2023-12-05

## 2023-12-05 RX ORDER — SODIUM CHLORIDE 9 MG/ML
1000 INJECTION INTRAMUSCULAR; INTRAVENOUS; SUBCUTANEOUS
Refills: 0 | Status: DISCONTINUED | OUTPATIENT
Start: 2023-12-05 | End: 2023-12-07

## 2023-12-05 RX ORDER — NALOXONE HYDROCHLORIDE 4 MG/.1ML
0.1 SPRAY NASAL
Refills: 0 | Status: DISCONTINUED | OUTPATIENT
Start: 2023-12-05 | End: 2023-12-10

## 2023-12-05 RX ORDER — DEXTROSE 50 % IN WATER 50 %
25 SYRINGE (ML) INTRAVENOUS ONCE
Refills: 0 | Status: DISCONTINUED | OUTPATIENT
Start: 2023-12-05 | End: 2023-12-06

## 2023-12-05 RX ORDER — METHOCARBAMOL 500 MG/1
500 TABLET, FILM COATED ORAL EVERY 8 HOURS
Refills: 0 | Status: DISCONTINUED | OUTPATIENT
Start: 2023-12-05 | End: 2023-12-08

## 2023-12-05 RX ORDER — INSULIN LISPRO 100/ML
VIAL (ML) SUBCUTANEOUS
Refills: 0 | Status: DISCONTINUED | OUTPATIENT
Start: 2023-12-05 | End: 2023-12-05

## 2023-12-05 RX ORDER — CEFAZOLIN SODIUM 1 G
2000 VIAL (EA) INJECTION EVERY 8 HOURS
Refills: 0 | Status: COMPLETED | OUTPATIENT
Start: 2023-12-05 | End: 2023-12-06

## 2023-12-05 RX ORDER — SIMVASTATIN 20 MG/1
40 TABLET, FILM COATED ORAL AT BEDTIME
Refills: 0 | Status: DISCONTINUED | OUTPATIENT
Start: 2023-12-05 | End: 2023-12-10

## 2023-12-05 RX ORDER — KETOROLAC TROMETHAMINE 30 MG/ML
15 SYRINGE (ML) INJECTION EVERY 6 HOURS
Refills: 0 | Status: DISCONTINUED | OUTPATIENT
Start: 2023-12-05 | End: 2023-12-06

## 2023-12-05 RX ORDER — INFLUENZA VIRUS VACCINE 15; 15; 15; 15 UG/.5ML; UG/.5ML; UG/.5ML; UG/.5ML
0.7 SUSPENSION INTRAMUSCULAR ONCE
Refills: 0 | Status: DISCONTINUED | OUTPATIENT
Start: 2023-12-05 | End: 2024-01-05

## 2023-12-05 RX ORDER — SENNA PLUS 8.6 MG/1
2 TABLET ORAL AT BEDTIME
Refills: 0 | Status: DISCONTINUED | OUTPATIENT
Start: 2023-12-05 | End: 2023-12-05

## 2023-12-05 RX ORDER — DIPHENHYDRAMINE HCL 50 MG
25 CAPSULE ORAL EVERY 6 HOURS
Refills: 0 | Status: DISCONTINUED | OUTPATIENT
Start: 2023-12-05 | End: 2023-12-06

## 2023-12-05 RX ORDER — SODIUM CHLORIDE 9 MG/ML
1000 INJECTION INTRAMUSCULAR; INTRAVENOUS; SUBCUTANEOUS
Refills: 0 | Status: DISCONTINUED | OUTPATIENT
Start: 2023-12-05 | End: 2023-12-05

## 2023-12-05 RX ORDER — INFLUENZA VIRUS VACCINE 15; 15; 15; 15 UG/.5ML; UG/.5ML; UG/.5ML; UG/.5ML
0.7 SUSPENSION INTRAMUSCULAR ONCE
Refills: 0 | Status: DISCONTINUED | OUTPATIENT
Start: 2023-12-05 | End: 2023-12-05

## 2023-12-05 RX ORDER — GABAPENTIN 400 MG/1
800 CAPSULE ORAL THREE TIMES A DAY
Refills: 0 | Status: DISCONTINUED | OUTPATIENT
Start: 2023-12-05 | End: 2023-12-10

## 2023-12-05 RX ORDER — INSULIN LISPRO 100/ML
VIAL (ML) SUBCUTANEOUS EVERY 6 HOURS
Refills: 0 | Status: DISCONTINUED | OUTPATIENT
Start: 2023-12-05 | End: 2023-12-07

## 2023-12-05 RX ORDER — ONDANSETRON 8 MG/1
4 TABLET, FILM COATED ORAL EVERY 6 HOURS
Refills: 0 | Status: DISCONTINUED | OUTPATIENT
Start: 2023-12-05 | End: 2023-12-10

## 2023-12-05 RX ORDER — DIPHENHYDRAMINE HCL 50 MG
25 CAPSULE ORAL EVERY 6 HOURS
Refills: 0 | Status: DISCONTINUED | OUTPATIENT
Start: 2023-12-05 | End: 2023-12-05

## 2023-12-05 RX ADMIN — Medication 1 APPLICATION(S): at 06:46

## 2023-12-05 RX ADMIN — FENTANYL CITRATE 25 MICROGRAM(S): 50 INJECTION INTRAVENOUS at 23:51

## 2023-12-05 RX ADMIN — APREPITANT 40 MILLIGRAM(S): 80 CAPSULE ORAL at 06:12

## 2023-12-05 RX ADMIN — DEXMEDETOMIDINE HYDROCHLORIDE IN 0.9% SODIUM CHLORIDE 4.24 MICROGRAM(S)/KG/HR: 4 INJECTION INTRAVENOUS at 21:56

## 2023-12-05 RX ADMIN — Medication 100 MILLIGRAM(S): at 22:05

## 2023-12-05 RX ADMIN — Medication 400 MILLIGRAM(S): at 23:40

## 2023-12-05 RX ADMIN — SODIUM CHLORIDE 85 MILLILITER(S): 9 INJECTION INTRAMUSCULAR; INTRAVENOUS; SUBCUTANEOUS at 21:56

## 2023-12-05 RX ADMIN — PROPOFOL 5.08 MICROGRAM(S)/KG/MIN: 10 INJECTION, EMULSION INTRAVENOUS at 21:56

## 2023-12-05 RX ADMIN — CHLORHEXIDINE GLUCONATE 1 APPLICATION(S): 213 SOLUTION TOPICAL at 05:45

## 2023-12-05 RX ADMIN — Medication 1000 MILLIGRAM(S): at 06:12

## 2023-12-05 RX ADMIN — HYDROMORPHONE HYDROCHLORIDE 4 MILLIGRAM(S): 2 INJECTION INTRAMUSCULAR; INTRAVENOUS; SUBCUTANEOUS at 02:55

## 2023-12-05 RX ADMIN — HYDROMORPHONE HYDROCHLORIDE 4 MILLIGRAM(S): 2 INJECTION INTRAMUSCULAR; INTRAVENOUS; SUBCUTANEOUS at 06:12

## 2023-12-05 RX ADMIN — PHENYLEPHRINE HYDROCHLORIDE 3.18 MICROGRAM(S)/KG/MIN: 10 INJECTION INTRAVENOUS at 21:56

## 2023-12-05 RX ADMIN — GABAPENTIN 800 MILLIGRAM(S): 400 CAPSULE ORAL at 06:02

## 2023-12-05 RX ADMIN — HYDROMORPHONE HYDROCHLORIDE 4 MILLIGRAM(S): 2 INJECTION INTRAMUSCULAR; INTRAVENOUS; SUBCUTANEOUS at 01:55

## 2023-12-05 NOTE — PRE-ANESTHESIA EVALUATION ADULT - NSANTHPMHFT_GEN_ALL_CORE
68 y/o female with PMHx HTN, HLD, CVA x 3 (last was 2016 with right hand weakness residual), GAMALIEL not using CPAP, Emphysema, former 25 pack year smoker restarted this week of surgery, Chronic Constipation on Movantik, Urinary Incontinence chronically self straight cath at home, chronic UTI, Breast Implant Rupture with Chronic Leak repair 10/2023, B/L CTS s/p release, s/p Intrathecal Morphine pump for pain, with chronic neck and back pain worsening for years s/p multiple spinal surgeries including laminectomies and fusion of cervical, thoracic and lumbar spine initially done in 2009 and recently in 2019 and 2020 at [facility] by Dr. Trinidad, s/p T3-L1 revision of prior fusion (with Dr. Luz on 8/17/22). Outpatient Xray 11/16/2023 showing broken rods/displacement. Presents for elective C2-S2 instrumentation and fusion. Pt endorses weakness of b/l LE and burning pain in b/l lower extremities radiating to both feet 68 y/o female with PMHx HTN, HLD, CVA x 3 (last was 2016 with right hand weakness residual), GAMALIEL not using CPAP, Emphysema, former 25 pack year smoker restarted this week of surgery, Chronic Constipation on Movantik, Urinary Incontinence chronically self straight cath at home, chronic UTI, Breast Implant Rupture with Chronic Leak repair 10/2023, B/L CTS s/p release, s/p Intrathecal Morphine pump for pain, with chronic neck and back pain worsening for years s/p multiple spinal surgeries including laminectomies and fusion of cervical, thoracic and lumbar spine initially done in 2009 and recently in 2019 and 2020 at [facility] by Dr. Trinidad, s/p T3-L1 revision of prior fusion (with Dr. Luz on 8/17/22). Outpatient Xray 11/16/2023 showing broken rods/displacement. Presents for elective C2-S2 instrumentation and fusion. Pt endorses weakness of b/l LE and burning pain in b/l lower extremities radiating to both feet

## 2023-12-05 NOTE — PATIENT PROFILE ADULT - DO YOU FEEL THREATENED BY OTHERS?
Pt called asking if you feel ok for him to start going to the gym with his shoulder issues? He is asking if you feel best for him to strength train or try PT to help? Or finish his Meloxicam before starting anything?     Please call pt once addressed no

## 2023-12-05 NOTE — BRIEF OPERATIVE NOTE - NSICDXBRIEFPROCEDURE_GEN_ALL_CORE_FT
PROCEDURES:  Revision of posterolateral fusion of lumbar spine 05-Dec-2023 21:01:20  Juan Ferrer  Revision of fusion of thoracic spine 05-Dec-2023 21:01:30  Juan Ferrer

## 2023-12-05 NOTE — PROGRESS NOTE ADULT - ASSESSMENT
68y/F with  pseudoarthrosis, s/p C2 to pelvis fusion, (12/05/2023, Dr. Luz)  Hypertension dyslipidemia pre DM  LBP, arthritis  h/o stroke with R residuals, seizure disorder, anxiety and depression, MS  h/o CTS  GERD, chronic constipation, external hemorrhoids, pancreatic cyst  urinary incontinence  eczema  COPD     PLAN:   NEURO: spine checks q1h, VS q1h, PRN pain meds with PCA pump, ERAS protocol, insert NGT; sedation - taper propofol, switch to precedex  standing Xrays on stepdown  3 HMV & 2 JPs - monitor output, keep for now  MAP augmentation  REHAB:  physical therapy evaluation and management    EARLY MOB:      PULM:  cont full vent support, CPAP trial of weaning; extubate to HFNC   CARDIO:  MAP >90, titrate neosynephrine to SBP goal, TTE  ENDO:  Blood sugar goals 140-180 mg/dL, continue insulin sliding scale  GI:  PPI for GI prophylaxis  DIET: NPO  RENAL:  keep IVF, d/c jiménez in AM  HEM/ONC: check post-op Hb  VTE Prophylaxis: SCDs, no DVT chemoprophylaxis for now as patient is high risk for bleed (fresh post-op)  ID: afebrile, no leukocytosis, ancef x2 doses then d/c   Social: will update family    Active issues:  What's keeping patient in the ICU? pressors, intubated  What is this patient's dispo plan?      ATTENDING ATTESTATION:  I was physically present for the key portions of the evaluation and management (E/M) service provided.  I agree with the above history, physical and plan, which I have reviewed and edited where appropriate.    Patient at high risk for neurological deterioration or death due to:  ICU delirium, aspiration PNA, DVT / PE.  Critical care time:  I have personally provided 60 minutes of critical care time, excluding time spent on separate procedures.      Plan discussed with RN, house staff.

## 2023-12-05 NOTE — PRE-ANESTHESIA EVALUATION ADULT - NSANTHADDINFOFT_GEN_ALL_CORE
Likely post-op intubation discussed with patient Likely post-op intubation discussed with patient. Discussed patient at high risk for intra/post-op complications including, but not limited to, cardiac, respiratory and neurologic injury.

## 2023-12-05 NOTE — PRE-ANESTHESIA EVALUATION ADULT - NSRADCARDRESULTSFT_GEN_ALL_CORE
reviewed reviewed  coronary CTA 6/23: calcium score 39 (83%), <25% LM, mild mid LAD, minimal D1, minimal prox LCx, biatrial enlargement   - EKG 5/31 NSR with PAC, normal intervals, no ST/T changes    Echo 9/23  CONCLUSIONS:    1. Left ventricular systolic function is normal with an ejection fraction of 58 % by Burroughs's method of disks.  2. There is mild (grade 1) left ventricular diastolic dysfunction.  3. Right ventricular cavity is normal in size, normal wall thickness and normal systolic function.  4. The aortic valve is tricuspid with normal structure without stenosis with normal systolic excursion.  5. Structurally normal mitral valve with normal leaflet excursion.  6. No pericardial effusion seen.

## 2023-12-05 NOTE — PROGRESS NOTE ADULT - SUBJECTIVE AND OBJECTIVE BOX
NEUROSURGERY POST OP NOTE:    POD# 0 S/P C2-S2 instrumentation/fusion    S: intubated      T(C): 36.1 (12-06-23 @ 00:58), Max: 36.9 (12-05-23 @ 05:46)  HR: 77 (12-05-23 @ 22:15) (59 - 77)  BP: 126/82 (12-05-23 @ 22:15) (102/71 - 199/110)  RR: 14 (12-05-23 @ 22:15) (9 - 17)  SpO2: 100% (12-05-23 @ 22:15) (94% - 100%)      12-04-23 @ 07:01  -  12-05-23 @ 07:00  --------------------------------------------------------  IN: 0 mL / OUT: 2150 mL / NET: -2150 mL        albuterol    90 MICROgram(s) HFA Inhaler 2 Puff(s) Inhalation every 6 hours PRN  bisacodyl 5 milliGRAM(s) Oral at bedtime  ceFAZolin   IVPB 2000 milliGRAM(s) IV Intermittent every 8 hours  chlorhexidine 0.12% Liquid 15 milliLiter(s) Oral Mucosa every 12 hours  chlorhexidine 2% Cloths 1 Application(s) Topical <User Schedule>  chlorhexidine 4% Liquid 1 Application(s) Topical <User Schedule>  dexMEDEtomidine Infusion 0.2 MICROgram(s)/kG/Hr IV Continuous <Continuous>  dextrose 5%. 1000 milliLiter(s) IV Continuous <Continuous>  dextrose 5%. 1000 milliLiter(s) IV Continuous <Continuous>  dextrose 50% Injectable 25 Gram(s) IV Push once  dextrose 50% Injectable 12.5 Gram(s) IV Push once  dextrose 50% Injectable 25 Gram(s) IV Push once  dextrose Oral Gel 15 Gram(s) Oral once PRN  diphenhydrAMINE 25 milliGRAM(s) Oral every 6 hours PRN  DULoxetine 60 milliGRAM(s) Oral daily  fentaNYL    Injectable 25 MICROGram(s) IV Push every 2 hours PRN  gabapentin 800 milliGRAM(s) Oral three times a day  glucagon  Injectable 1 milliGRAM(s) IntraMuscular once  HYDROmorphone  Injectable 1 milliGRAM(s) IV Push every 4 hours  influenza  Vaccine (HIGH DOSE) 0.7 milliLiter(s) IntraMuscular once  insulin lispro (ADMELOG) corrective regimen sliding scale   SubCutaneous every 6 hours  ketorolac   Injectable 15 milliGRAM(s) IV Push every 6 hours  methocarbamol 500 milliGRAM(s) Oral every 8 hours  multivitamin 1 Tablet(s) Oral daily  naloxone Injectable 0.1 milliGRAM(s) IV Push every 3 minutes PRN  ondansetron Injectable 4 milliGRAM(s) IV Push every 6 hours PRN  pantoprazole  Injectable 40 milliGRAM(s) IV Push daily  phenylephrine    Infusion 0.1 MICROgram(s)/kG/Min IV Continuous <Continuous>  polyethylene glycol 3350 17 Gram(s) Oral daily  propofol Infusion 10 MICROgram(s)/kG/Min IV Continuous <Continuous>  simvastatin 40 milliGRAM(s) Oral at bedtime  sodium chloride 0.9% lock flush 10 milliLiter(s) IV Push every 1 hour PRN  sodium chloride 0.9%. 1000 milliLiter(s) IV Continuous <Continuous>      RADIOLOGY:     Exam:  GEN: laying in bed, NAD  NEURO: alert. not FC, OE to voice, face symmetric. CNII-XII intact. PERRL. LUE 5/5, RUE 3/5, b/l LEs 0  CV: RRR +S1/S2  PULM: CTAB  GI: Abd soft, NT/ND  EXT: ext warm, dry, nontender    WOUND/DRAINS: cervical-pelvis incision c/d/i    DEVICES:  HMVx3, JPx2      Assessment: 69 yo female with Hx HTN, HLD, CVA x 3 (last in 2016 with residual R hand weakness), GAMALIEL not using CPAP, Emphysema, ex-25 pack year smoker now restarted, chronic constipation on Movantik, chronic urinary retention (SC at home), b/l CTS s/p release, s/p Intrathecal Morphine pump for pain, with chronic neck and back pain worsening for years s/p multiple spinal surgeries including laminectomies and fusion of cervical, thoracic and lumbar spine, s/p T3-L1 revision of prior fusion (with  on 8/17/22). Xray 11/16/23 showing broken rods/displacement. Now s/p C2-S2 instrumentation and fusion (12/5)      Plan:  Neuro:  - Neuro/vitals q1h  - pain control: modified ERAS, IT morphine pump, tylenol, iv dilaudid (resume when taking po: dilaudid 2/4 prn, robaxin, home gabapentin 800 TID, home tizanidine)  - C/w home cymbalta  - HMVx3, JPx2 both per plastics  - sedation: wean propofol to precedex    Cardio:  - MAP>90, parminder gtt  - holding home metoprolol while on pressors   - C/w home simvastatin     Pulm:  - intubated, full vent support  - hx GAMALIEL, no CPAP at home    GI:  - NPO   - Bowel regimen  - gi ppx protonix while intubated    Renal:  - IVF while NPO  - +jiménez (SC at home)    Endo:  - ISS  - f/u a1c    Heme:  - SCDs DVT ppx  - 500 cell saver intraop    ID:  - afebrile  - post op ancef    Dispo:  - full code, ICU, dispo pending    D/w Dr. Luz, Dr. Sanders      Assessment:  Present when checked    []  GCS  E   V  M     Heart Failure: []Acute, [] acute on chronic , []chronic  Heart Failure:  [] Diastolic (HFpEF), [] Systolic (HFrEF), []Combined (HFpEF and HFrEF), [] RHF, [] Pulm HTN, [] Other    [] DARRYL, [] ATN, [] AIN, [] other  [] CKD1, [] CKD2, [] CKD 3, [] CKD 4, [] CKD 5, []ESRD    Encephalopathy: [] Metabolic, [] Hepatic, [] toxic, [] Neurological, [] Other    Abnormal Nurtitional Status: [] malnurtition (see nutrition note), [ ]underweight: BMI < 19, [] morbid obesity: BMI >40, [] Cachexia    [] Sepsis  [] hypovolemic shock,[] cardiogenic shock, [] hemorrhagic shock, [] neuogenic shock  [] Acute Respiratory Failure  []Cerebral edema, [] Brain compression/ herniation,   [] Functional quadriplegia  [] Acute blood loss anemia       NEUROSURGERY POST OP NOTE:    POD# 0 S/P C2-S2 instrumentation/fusion    S: intubated      T(C): 36.1 (12-06-23 @ 00:58), Max: 36.9 (12-05-23 @ 05:46)  HR: 77 (12-05-23 @ 22:15) (59 - 77)  BP: 126/82 (12-05-23 @ 22:15) (102/71 - 199/110)  RR: 14 (12-05-23 @ 22:15) (9 - 17)  SpO2: 100% (12-05-23 @ 22:15) (94% - 100%)      12-04-23 @ 07:01  -  12-05-23 @ 07:00  --------------------------------------------------------  IN: 0 mL / OUT: 2150 mL / NET: -2150 mL        albuterol    90 MICROgram(s) HFA Inhaler 2 Puff(s) Inhalation every 6 hours PRN  bisacodyl 5 milliGRAM(s) Oral at bedtime  ceFAZolin   IVPB 2000 milliGRAM(s) IV Intermittent every 8 hours  chlorhexidine 0.12% Liquid 15 milliLiter(s) Oral Mucosa every 12 hours  chlorhexidine 2% Cloths 1 Application(s) Topical <User Schedule>  chlorhexidine 4% Liquid 1 Application(s) Topical <User Schedule>  dexMEDEtomidine Infusion 0.2 MICROgram(s)/kG/Hr IV Continuous <Continuous>  dextrose 5%. 1000 milliLiter(s) IV Continuous <Continuous>  dextrose 5%. 1000 milliLiter(s) IV Continuous <Continuous>  dextrose 50% Injectable 25 Gram(s) IV Push once  dextrose 50% Injectable 12.5 Gram(s) IV Push once  dextrose 50% Injectable 25 Gram(s) IV Push once  dextrose Oral Gel 15 Gram(s) Oral once PRN  diphenhydrAMINE 25 milliGRAM(s) Oral every 6 hours PRN  DULoxetine 60 milliGRAM(s) Oral daily  fentaNYL    Injectable 25 MICROGram(s) IV Push every 2 hours PRN  gabapentin 800 milliGRAM(s) Oral three times a day  glucagon  Injectable 1 milliGRAM(s) IntraMuscular once  HYDROmorphone  Injectable 1 milliGRAM(s) IV Push every 4 hours  influenza  Vaccine (HIGH DOSE) 0.7 milliLiter(s) IntraMuscular once  insulin lispro (ADMELOG) corrective regimen sliding scale   SubCutaneous every 6 hours  ketorolac   Injectable 15 milliGRAM(s) IV Push every 6 hours  methocarbamol 500 milliGRAM(s) Oral every 8 hours  multivitamin 1 Tablet(s) Oral daily  naloxone Injectable 0.1 milliGRAM(s) IV Push every 3 minutes PRN  ondansetron Injectable 4 milliGRAM(s) IV Push every 6 hours PRN  pantoprazole  Injectable 40 milliGRAM(s) IV Push daily  phenylephrine    Infusion 0.1 MICROgram(s)/kG/Min IV Continuous <Continuous>  polyethylene glycol 3350 17 Gram(s) Oral daily  propofol Infusion 10 MICROgram(s)/kG/Min IV Continuous <Continuous>  simvastatin 40 milliGRAM(s) Oral at bedtime  sodium chloride 0.9% lock flush 10 milliLiter(s) IV Push every 1 hour PRN  sodium chloride 0.9%. 1000 milliLiter(s) IV Continuous <Continuous>      RADIOLOGY:     Exam:  GEN: laying in bed, NAD  NEURO: alert. not FC, OE to voice, face symmetric. CNII-XII intact. PERRL. LUE 5/5, RUE 3/5, b/l LEs 0  CV: RRR +S1/S2  PULM: CTAB  GI: Abd soft, NT/ND  EXT: ext warm, dry, nontender    WOUND/DRAINS: cervical-pelvis incision c/d/i    DEVICES:  HMVx3, JPx2      Assessment: 67 yo female with Hx HTN, HLD, CVA x 3 (last in 2016 with residual R hand weakness), GAMALIEL not using CPAP, Emphysema, ex-25 pack year smoker now restarted, chronic constipation on Movantik, chronic urinary retention (SC at home), b/l CTS s/p release, s/p Intrathecal Morphine pump for pain, with chronic neck and back pain worsening for years s/p multiple spinal surgeries including laminectomies and fusion of cervical, thoracic and lumbar spine, s/p T3-L1 revision of prior fusion (with  on 8/17/22). Xray 11/16/23 showing broken rods/displacement. Now s/p C2-S2 instrumentation and fusion (12/5)      Plan:  Neuro:  - Neuro/vitals q1h  - pain control: modified ERAS, IT morphine pump, tylenol, iv dilaudid (resume when taking po: dilaudid 2/4 prn, robaxin, home gabapentin 800 TID, home tizanidine)  - C/w home cymbalta  - HMVx3, JPx2 both per plastics  - sedation: wean propofol to precedex    Cardio:  - MAP>90, parminder gtt  - holding home metoprolol while on pressors   - C/w home simvastatin     Pulm:  - intubated, full vent support  - hx GAMALIEL, no CPAP at home    GI:  - NPO   - Bowel regimen  - gi ppx protonix while intubated    Renal:  - IVF while NPO  - +jiménez (SC at home)    Endo:  - ISS  - f/u a1c    Heme:  - SCDs DVT ppx  - 500 cell saver intraop    ID:  - afebrile  - post op ancef    Dispo:  - full code, ICU, dispo pending    D/w Dr. Luz, Dr. Sanders      Assessment:  Present when checked    []  GCS  E   V  M     Heart Failure: []Acute, [] acute on chronic , []chronic  Heart Failure:  [] Diastolic (HFpEF), [] Systolic (HFrEF), []Combined (HFpEF and HFrEF), [] RHF, [] Pulm HTN, [] Other    [] DARRYL, [] ATN, [] AIN, [] other  [] CKD1, [] CKD2, [] CKD 3, [] CKD 4, [] CKD 5, []ESRD    Encephalopathy: [] Metabolic, [] Hepatic, [] toxic, [] Neurological, [] Other    Abnormal Nurtitional Status: [] malnurtition (see nutrition note), [ ]underweight: BMI < 19, [] morbid obesity: BMI >40, [] Cachexia    [] Sepsis  [] hypovolemic shock,[] cardiogenic shock, [] hemorrhagic shock, [] neuogenic shock  [] Acute Respiratory Failure  []Cerebral edema, [] Brain compression/ herniation,   [] Functional quadriplegia  [] Acute blood loss anemia

## 2023-12-05 NOTE — PRE-ANESTHESIA EVALUATION ADULT - NSANTHPEFT_GEN_ALL_CORE
GEN: A&Ox3, NAD  HEENT: no abnormal facies, neck unremarkable  CV: RRR, no M/R/G  PULM: CTABL, breathing comfortably on RA  NEURO: moves all 4 extremities, no gross deficit GEN: A&Ox3, NAD, lethargic  HEENT: no abnormal facies, neck unremarkable  CV: RRR, no M/R/G  PULM: CTABL, breathing comfortably on RA  NEURO: moves all 4 extremities, no gross deficit GEN: A&Ox3, NAD, lethargic  HEENT: no abnormal facies, neck unremarkable  CV: RRR, no M/R/G  PULM: CTABL, breathing comfortably on RA  NEURO: right sided weakness (residual from CVA) GEN: A&Ox3, NAD, lethargic  HEENT: no abnormal facies, neck unremarkable  CV: RRR, no M/R/G  PULM: decreased BS b/l, breathing comfortably on RA  NEURO: right sided weakness (residual from CVA)

## 2023-12-05 NOTE — PATIENT PROFILE ADULT - FALL HARM RISK - HARM RISK INTERVENTIONS
Assistance with ambulation/Assistance OOB with selected safe patient handling equipment/Communicate Risk of Fall with Harm to all staff/Discuss with provider need for PT consult/Monitor gait and stability/Provide patient with walking aids - walker, cane, crutches/Reinforce activity limits and safety measures with patient and family/Tailored Fall Risk Interventions/Visual Cue: Yellow wristband and red socks/Bed in lowest position, wheels locked, appropriate side rails in place/Call bell, personal items and telephone in reach/Instruct patient to call for assistance before getting out of bed or chair/Non-slip footwear when patient is out of bed/[place] to call system/Physically safe environment - no spills, clutter or unnecessary equipment/Purposeful Proactive Rounding/Room/bathroom lighting operational, light cord in reach Assistance with ambulation/Assistance OOB with selected safe patient handling equipment/Communicate Risk of Fall with Harm to all staff/Discuss with provider need for PT consult/Monitor gait and stability/Provide patient with walking aids - walker, cane, crutches/Reinforce activity limits and safety measures with patient and family/Tailored Fall Risk Interventions/Visual Cue: Yellow wristband and red socks/Bed in lowest position, wheels locked, appropriate side rails in place/Call bell, personal items and telephone in reach/Instruct patient to call for assistance before getting out of bed or chair/Non-slip footwear when patient is out of bed/[place] to call system/Physically safe environment - no spills, clutter or unnecessary equipment/Purposeful Proactive Rounding/Room/bathroom lighting operational, light cord in reach

## 2023-12-05 NOTE — PROGRESS NOTE ADULT - SUBJECTIVE AND OBJECTIVE BOX
=================================  NEUROCRITICAL CARE ATTENDING NOTE  =================================    VANNA DIAZ   MRN-[id number]  Summary:  68y/F  HTN, HLD, CVA x 3 (last was 2016 with right hand weakness residual), GAMALIEL not using CPAP, Emphysema, former 25 pack year smoker restarted this week of surgery, Chronic Constipation on Movantik, Urinary Incontinence chronically self straight cath at home, chronic UTI, Breast Implant Rupture with Chronic Leak repair 10/2023, B/L CTS s/p release, s/p Intrathecal Morphine pump for pain, with chronic neck and back pain worsening for years s/p multiple spinal surgeries including laminectomies and fusion of cervical, thoracic and lumbar spine initially done in 2009 and recently in 2019 and 2020 at [facility] by Dr. Trinidad, s/p T3-L1 revision of prior fusion (with Dr. Luz on 8/17/22). Outpatient Xray 11/16/2023 showing broken rods/displacement. Presents for elective C2-S2 instrumentation and fusion. Pt endorses weakness of b/l LE and burning pain in b/l lower extremities radiating to both feet,  (04 Dec 2023 15:44)    COURSE IN THE HOSPITAL:  12/04 admitted to [facility]  12/05 POD0 C2 to pelvis fusion, intraoperative lost monitoring (B motor and sensory) initially responsive to HDA to MAP of 120s, but lost monitoring again, becoming unresponsvie to HDA to MAP even of 140s; remained intubated, brought to ICU.    Past Medical History: HTN (hypertension)  Back pain Hypertension SS (spinal stenosis) High cholesterol Stroke H/O carpal tunnel syndrome H/O carpal tunnel syndrome Chronic GERD History of chronic constipation Seizure Urinary incontinence Pre-diabetes Acute UTI Anxiety and depression Arthritis Eczema External hemorrhoids H/O kyphosis Multiple sclerosis Pancreas cyst Scoliosis Wheelchair dependent Cervical pseudoarthrosis Right shoulder pain Cervical spondylosis Chronic headachesChronic LUQ pain Chronic UTI Degenerative joint disease H/O low back mir Lumbosacral spondylosis COPD (chronic obstructive pulmonary disease)  Allergies:  oxycodone (Pruritus)  Home meds:   ·	albuterol 90 mcg/inh inhalation aerosol: 2 puff(s) inhaled every 6 hours, As Needed  ·	aspirin 81 mg oral delayed release tablet: 1 tab(s) orally once a day  ·	DULoxetine 60 mg oral delayed release capsule: 1 cap(s) orally once a day  ·	gabapentin 800 mg oral tablet: 1 tab(s) orally 3 times a day  ·	MetFORMIN (Eqv-Glumetza) 500 mg oral tablet, extended release: 1 tab(s) orally 2 times a day  ·	metoprolol tartrate 50 mg oral tablet: 1.5 tab(s) orally 2 times a day  ·	Movantik 25 mg oral tablet: 1 tab(s) orally once a day (in the morning)  ·	simvastatin 40 mg oral tablet: 1 tab(s) orally once a day (at bedtime)  ·	tiZANidine 4 mg oral tablet: 2 tab(s) orally 2 times a day as needed for -  ·	Vitamin D3 50 mcg (2000 intl units) oral tablet: 1 tab(s) orally once a day    PHYSICAL EXAMINATION  T(C): 36.9 (12-05 @ 07:39), Max: 36.9 (12-05 @ 05:46) HR: 59 (12-05 @ 20:30) (59 - 74) BP: 173/94 (12-05 @ 20:30) (146/79 - 173/94) RR: 17 (12-05 @ 07:39) (17 - 17) SpO2: 100% (12-05 @ 20:30) (94% - 100%)  NEUROLOGIC EXAMINATION:  Patient   GENERAL: intubated 40% 450 12 +5 AC  EENT:  anicteric  CARDIOVASCULAR: (+) S1 S2, normal rate and regular rhythm  PULMONARY: clear to auscultation bilaterally  ABDOMEN: soft, nontender with normoactive bowel sounds  EXTREMITIES: no edema  SKIN: no rash    LABS:  CAPILLARY BLOOD GLUCOSE 116 150                         13.6   5.76  )-----------( 221      ( 04 Dec 2023 21:00 )             42.7     140  |  107  |  9   ----------------------------<  105<H>  4.4   |  23  |  0.63    Ca    9.5      04 Dec 2023 21:00  Phos  3.3     12-04  Mg     1.8     12-04    TPro  7.0  /  Alb  3.8  /  TBili  0.3  /  DBili  x   /  AST  17  /  ALT  10  /  AlkPhos  119  12-04 12-04 @ 07:01  -  12-05 @ 07:00  --------------------------------------------------------  IN: 0 mL / OUT: 2150 mL / NET: -2150 mL    Bacteriology:  CSF studies:  EEG:  Neuroimaging:  Other imaging:    MEDICATIONS: 12-05    ·	ceFAZolin   IVPB 2000 IV Intermittent every 8 hours  ·	acetaminophen     Tablet .. 1000 Oral every 8 hours  ·	DULoxetine 60 Oral daily  ·	gabapentin 800 Oral three times a day  ·	methocarbamol 500 Oral every 8 hours  ·	ondansetron   Disintegrating Tablet 4 Oral every 6 hours  ·	bisacodyl 5 Oral at bedtime  ·	polyethylene glycol 3350 17 Oral daily  ·	insulin lispro (ADMELOG) corrective regimen sliding scale  SubCutaneous every 6 hours  ·	simvastatin 40 Oral at bedtime  ·	multivitamin 1 Oral daily  ·	albuterol    90 MICROgram(s) HFA Inhaler 2 Inhalation every 6 hours PRN  ·	diphenhydrAMINE 25 Oral every 6 hours PRN  ·	naloxone Injectable 0.1 IV Push every 3 minutes PRN  ·	ondansetron Injectable 4 IV Push every 6 hours PRN    IV FLUIDS: NS@85cc/hr  DRIPS:  - neosynephrine drip @ 0.01  - precedex off  - propofol 25   DIET: NPO  Lines: R IJ R radial [place]  Drains:    ·	3 HMV with 2 JPs  ·	Castillo   Wounds:    CODE STATUS:  Full Code                       GOALS OF CARE:  aggressive                      DISPOSITION:  ICU =================================  NEUROCRITICAL CARE ATTENDING NOTE  =================================    VANNA DIAZ   MRN-[id number]  Summary:  68y/F  HTN, HLD, CVA x 3 (last was 2016 with right hand weakness residual), GAMALIEL not using CPAP, Emphysema, former 25 pack year smoker restarted this week of surgery, Chronic Constipation on Movantik, Urinary Incontinence chronically self straight cath at home, chronic UTI, Breast Implant Rupture with Chronic Leak repair 10/2023, B/L CTS s/p release, s/p Intrathecal Morphine pump for pain, with chronic neck and back pain worsening for years s/p multiple spinal surgeries including laminectomies and fusion of cervical, thoracic and lumbar spine initially done in 2009 and recently in 2019 and 2020 at [facility] by Dr. Trinidad, s/p T3-L1 revision of prior fusion (with Dr. Luz on 8/17/22). Outpatient Xray 11/16/2023 showing broken rods/displacement. Presents for elective C2-S2 instrumentation and fusion. Pt endorses weakness of b/l LE and burning pain in b/l lower extremities radiating to both feet,  (04 Dec 2023 15:44)    COURSE IN THE HOSPITAL:  12/04 admitted to [facility]  12/05 POD0 C2 to pelvis fusion, intraoperative lost monitoring (B motor and sensory) initially responsive to HDA to MAP of 120s, but lost monitoring again, becoming unresponsvie to HDA to MAP even of 140s; remained intubated, brought to ICU.    Past Medical History: HTN (hypertension)  Back pain Hypertension SS (spinal stenosis) High cholesterol Stroke H/O carpal tunnel syndrome H/O carpal tunnel syndrome Chronic GERD History of chronic constipation Seizure Urinary incontinence Pre-diabetes Acute UTI Anxiety and depression Arthritis Eczema External hemorrhoids H/O kyphosis Multiple sclerosis Pancreas cyst Scoliosis Wheelchair dependent Cervical pseudoarthrosis Right shoulder pain Cervical spondylosis Chronic headachesChronic LUQ pain Chronic UTI Degenerative joint disease H/O low back mir Lumbosacral spondylosis COPD (chronic obstructive pulmonary disease)  Allergies:  oxycodone (Pruritus)  Home meds:   ·	albuterol 90 mcg/inh inhalation aerosol: 2 puff(s) inhaled every 6 hours, As Needed  ·	aspirin 81 mg oral delayed release tablet: 1 tab(s) orally once a day  ·	DULoxetine 60 mg oral delayed release capsule: 1 cap(s) orally once a day  ·	gabapentin 800 mg oral tablet: 1 tab(s) orally 3 times a day  ·	MetFORMIN (Eqv-Glumetza) 500 mg oral tablet, extended release: 1 tab(s) orally 2 times a day  ·	metoprolol tartrate 50 mg oral tablet: 1.5 tab(s) orally 2 times a day  ·	Movantik 25 mg oral tablet: 1 tab(s) orally once a day (in the morning)  ·	simvastatin 40 mg oral tablet: 1 tab(s) orally once a day (at bedtime)  ·	tiZANidine 4 mg oral tablet: 2 tab(s) orally 2 times a day as needed for -  ·	Vitamin D3 50 mcg (2000 intl units) oral tablet: 1 tab(s) orally once a day    PHYSICAL EXAMINATION  T(C): 36.9 (12-05 @ 07:39), Max: 36.9 (12-05 @ 05:46) HR: 59 (12-05 @ 20:30) (59 - 74) BP: 173/94 (12-05 @ 20:30) (146/79 - 173/94) RR: 17 (12-05 @ 07:39) (17 - 17) SpO2: 100% (12-05 @ 20:30) (94% - 100%)  NEUROLOGIC EXAMINATION:  Patient   GENERAL: intubated 40% 450 12 +5 AC  EENT:  anicteric  CARDIOVASCULAR: (+) S1 S2, normal rate and regular rhythm  PULMONARY: clear to auscultation bilaterally  ABDOMEN: soft, nontender with normoactive bowel sounds  EXTREMITIES: no edema  SKIN: no rash    LABS:  CAPILLARY BLOOD GLUCOSE 116 150                         13.6   5.76  )-----------( 221      ( 04 Dec 2023 21:00 )             42.7     140  |  107  |  9   ----------------------------<  105<H>  4.4   |  23  |  0.63    Ca    9.5      04 Dec 2023 21:00  Phos  3.3     12-04  Mg     1.8     12-04    TPro  7.0  /  Alb  3.8  /  TBili  0.3  /  DBili  x   /  AST  17  /  ALT  10  /  AlkPhos  119  12-04 12-04 @ 07:01  -  12-05 @ 07:00  --------------------------------------------------------  IN: 0 mL / OUT: 2150 mL / NET: -2150 mL    Bacteriology:  CSF studies:  EEG:  Neuroimaging:  Other imaging:    MEDICATIONS: 12-05    ·	ceFAZolin   IVPB 2000 IV Intermittent every 8 hours  ·	acetaminophen     Tablet .. 1000 Oral every 8 hours  ·	DULoxetine 60 Oral daily  ·	gabapentin 800 Oral three times a day  ·	methocarbamol 500 Oral every 8 hours  ·	ondansetron   Disintegrating Tablet 4 Oral every 6 hours  ·	bisacodyl 5 Oral at bedtime  ·	polyethylene glycol 3350 17 Oral daily  ·	insulin lispro (ADMELOG) corrective regimen sliding scale  SubCutaneous every 6 hours  ·	simvastatin 40 Oral at bedtime  ·	multivitamin 1 Oral daily  ·	albuterol    90 MICROgram(s) HFA Inhaler 2 Inhalation every 6 hours PRN  ·	diphenhydrAMINE 25 Oral every 6 hours PRN  ·	naloxone Injectable 0.1 IV Push every 3 minutes PRN  ·	ondansetron Injectable 4 IV Push every 6 hours PRN    IV FLUIDS: NS@85cc/hr  DRIPS:  - neosynephrine drip @ 0.01  - precedex off  - propofol 25   DIET: NPO  Lines: R IJ R radial Krupa  Drains:    ·	3 HMV with 2 JPs  ·	Castillo   Wounds:    CODE STATUS:  Full Code                       GOALS OF CARE:  aggressive                      DISPOSITION:  ICU =================================  NEUROCRITICAL CARE ATTENDING NOTE  =================================    VANNA DIAZ   MRN-[id number]  Summary:  68y/F  HTN, HLD, CVA x 3 (last was 2016 with right hand weakness residual), GAMALIEL not using CPAP, Emphysema, former 25 pack year smoker restarted this week of surgery, Chronic Constipation on Movantik, Urinary Incontinence chronically self straight cath at home, chronic UTI, Breast Implant Rupture with Chronic Leak repair 10/2023, B/L CTS s/p release, s/p Intrathecal Morphine pump for pain, with chronic neck and back pain worsening for years s/p multiple spinal surgeries including laminectomies and fusion of cervical, thoracic and lumbar spine initially done in 2009 and recently in 2019 and 2020 at [facility] by Dr. Trinidad, s/p T3-L1 revision of prior fusion (with Dr. Luz on 8/17/22). Outpatient Xray 11/16/2023 showing broken rods/displacement. Presents for elective C2-S2 instrumentation and fusion. Pt endorses weakness of b/l LE and burning pain in b/l lower extremities radiating to both feet,  (04 Dec 2023 15:44)    COURSE IN THE HOSPITAL:  12/04 admitted to [facility]  12/05 POD0 C2 to pelvis fusion, intraoperative lost monitoring (B motor and sensory) initially responsive to HDA to MAP of 120s, but lost monitoring again, becoming unresponsvie to HDA to MAP even of 140s; remained intubated, brought to ICU.    Past Medical History: HTN (hypertension)  Back pain Hypertension SS (spinal stenosis) High cholesterol Stroke H/O carpal tunnel syndrome H/O carpal tunnel syndrome Chronic GERD History of chronic constipation Seizure Urinary incontinence Pre-diabetes Acute UTI Anxiety and depression Arthritis Eczema External hemorrhoids H/O kyphosis Multiple sclerosis Pancreas cyst Scoliosis Wheelchair dependent Cervical pseudoarthrosis Right shoulder pain Cervical spondylosis Chronic headachesChronic LUQ pain Chronic UTI Degenerative joint disease H/O low back mir Lumbosacral spondylosis COPD (chronic obstructive pulmonary disease)  Allergies:  oxycodone (Pruritus)  Home meds:   ·	albuterol 90 mcg/inh inhalation aerosol: 2 puff(s) inhaled every 6 hours, As Needed  ·	aspirin 81 mg oral delayed release tablet: 1 tab(s) orally once a day  ·	DULoxetine 60 mg oral delayed release capsule: 1 cap(s) orally once a day  ·	gabapentin 800 mg oral tablet: 1 tab(s) orally 3 times a day  ·	MetFORMIN (Eqv-Glumetza) 500 mg oral tablet, extended release: 1 tab(s) orally 2 times a day  ·	metoprolol tartrate 50 mg oral tablet: 1.5 tab(s) orally 2 times a day  ·	Movantik 25 mg oral tablet: 1 tab(s) orally once a day (in the morning)  ·	simvastatin 40 mg oral tablet: 1 tab(s) orally once a day (at bedtime)  ·	tiZANidine 4 mg oral tablet: 2 tab(s) orally 2 times a day as needed for -  ·	Vitamin D3 50 mcg (2000 intl units) oral tablet: 1 tab(s) orally once a day    PHYSICAL EXAMINATION  T(C): 36.9 (12-05 @ 07:39), Max: 36.9 (12-05 @ 05:46) HR: 59 (12-05 @ 20:30) (59 - 74) BP: 173/94 (12-05 @ 20:30) (146/79 - 173/94) RR: 17 (12-05 @ 07:39) (17 - 17) SpO2: 100% (12-05 @ 20:30) (94% - 100%)  NEUROLOGIC EXAMINATION:  Patient OE to voice, not FC< L UE 5/5 R UE distally 3/5 BL LE 0/5  GENERAL: intubated 40% 450 12 +5 AC  EENT:  anicteric  CARDIOVASCULAR: (+) S1 S2, normal rate and regular rhythm  PULMONARY: clear to auscultation bilaterally  ABDOMEN: soft, nontender with normoactive bowel sounds  EXTREMITIES: no edema  SKIN: no rash    LABS:  CAPILLARY BLOOD GLUCOSE 116 150                         13.6   5.76  )-----------( 221      ( 04 Dec 2023 21:00 )             42.7     140  |  107  |  9   ----------------------------<  105<H>  4.4   |  23  |  0.63    Ca    9.5      04 Dec 2023 21:00  Phos  3.3     12-04  Mg     1.8     12-04    TPro  7.0  /  Alb  3.8  /  TBili  0.3  /  DBili  x   /  AST  17  /  ALT  10  /  AlkPhos  119  12-04 12-04 @ 07:01  -  12-05 @ 07:00  --------------------------------------------------------  IN: 0 mL / OUT: 2150 mL / NET: -2150 mL    Bacteriology:  CSF studies:  EEG:  Neuroimaging:  Other imaging:    MEDICATIONS: 12-05    ·	ceFAZolin   IVPB 2000 IV Intermittent every 8 hours  ·	acetaminophen     Tablet .. 1000 Oral every 8 hours  ·	DULoxetine 60 Oral daily  ·	gabapentin 800 Oral three times a day  ·	methocarbamol 500 Oral every 8 hours  ·	ondansetron   Disintegrating Tablet 4 Oral every 6 hours  ·	bisacodyl 5 Oral at bedtime  ·	polyethylene glycol 3350 17 Oral daily  ·	insulin lispro (ADMELOG) corrective regimen sliding scale  SubCutaneous every 6 hours  ·	simvastatin 40 Oral at bedtime  ·	multivitamin 1 Oral daily  ·	albuterol    90 MICROgram(s) HFA Inhaler 2 Inhalation every 6 hours PRN  ·	diphenhydrAMINE 25 Oral every 6 hours PRN  ·	naloxone Injectable 0.1 IV Push every 3 minutes PRN  ·	ondansetron Injectable 4 IV Push every 6 hours PRN    IV FLUIDS: NS@85cc/hr  DRIPS:  - neosynephrine drip @ 0.01  - precedex off  - propofol 25   DIET: NPO  Lines: R IJ R radial [place]  Drains:    ·	3 HMV with 2 JPs  ·	Castillo   Wounds:    CODE STATUS:  Full Code                       GOALS OF CARE:  aggressive                      DISPOSITION:  ICU =================================  NEUROCRITICAL CARE ATTENDING NOTE  =================================    VANNA DIAZ   MRN-[id number]  Summary:  68y/F  HTN, HLD, CVA x 3 (last was 2016 with right hand weakness residual), GAMALIEL not using CPAP, Emphysema, former 25 pack year smoker restarted this week of surgery, Chronic Constipation on Movantik, Urinary Incontinence chronically self straight cath at home, chronic UTI, Breast Implant Rupture with Chronic Leak repair 10/2023, B/L CTS s/p release, s/p Intrathecal Morphine pump for pain, with chronic neck and back pain worsening for years s/p multiple spinal surgeries including laminectomies and fusion of cervical, thoracic and lumbar spine initially done in 2009 and recently in 2019 and 2020 at [facility] by Dr. Trinidad, s/p T3-L1 revision of prior fusion (with Dr. Luz on 8/17/22). Outpatient Xray 11/16/2023 showing broken rods/displacement. Presents for elective C2-S2 instrumentation and fusion. Pt endorses weakness of b/l LE and burning pain in b/l lower extremities radiating to both feet,  (04 Dec 2023 15:44)    COURSE IN THE HOSPITAL:  12/04 admitted to [facility]  12/05 POD0 C2 to pelvis fusion, intraoperative lost monitoring (B motor and sensory) initially responsive to HDA to MAP of 120s, but lost monitoring again, becoming unresponsvie to HDA to MAP even of 140s; remained intubated, brought to ICU.    Past Medical History: HTN (hypertension)  Back pain Hypertension SS (spinal stenosis) High cholesterol Stroke H/O carpal tunnel syndrome H/O carpal tunnel syndrome Chronic GERD History of chronic constipation Seizure Urinary incontinence Pre-diabetes Acute UTI Anxiety and depression Arthritis Eczema External hemorrhoids H/O kyphosis Multiple sclerosis Pancreas cyst Scoliosis Wheelchair dependent Cervical pseudoarthrosis Right shoulder pain Cervical spondylosis Chronic headachesChronic LUQ pain Chronic UTI Degenerative joint disease H/O low back mir Lumbosacral spondylosis COPD (chronic obstructive pulmonary disease)  Allergies:  oxycodone (Pruritus)  Home meds:   ·	albuterol 90 mcg/inh inhalation aerosol: 2 puff(s) inhaled every 6 hours, As Needed  ·	aspirin 81 mg oral delayed release tablet: 1 tab(s) orally once a day  ·	DULoxetine 60 mg oral delayed release capsule: 1 cap(s) orally once a day  ·	gabapentin 800 mg oral tablet: 1 tab(s) orally 3 times a day  ·	MetFORMIN (Eqv-Glumetza) 500 mg oral tablet, extended release: 1 tab(s) orally 2 times a day  ·	metoprolol tartrate 50 mg oral tablet: 1.5 tab(s) orally 2 times a day  ·	Movantik 25 mg oral tablet: 1 tab(s) orally once a day (in the morning)  ·	simvastatin 40 mg oral tablet: 1 tab(s) orally once a day (at bedtime)  ·	tiZANidine 4 mg oral tablet: 2 tab(s) orally 2 times a day as needed for -  ·	Vitamin D3 50 mcg (2000 intl units) oral tablet: 1 tab(s) orally once a day    PHYSICAL EXAMINATION  T(C): 36.9 (12-05 @ 07:39), Max: 36.9 (12-05 @ 05:46) HR: 59 (12-05 @ 20:30) (59 - 74) BP: 173/94 (12-05 @ 20:30) (146/79 - 173/94) RR: 17 (12-05 @ 07:39) (17 - 17) SpO2: 100% (12-05 @ 20:30) (94% - 100%)  NEUROLOGIC EXAMINATION:  Patient OE to voice, not FC< L UE 5/5 R UE distally 3/5 BL LE 0/5  GENERAL: intubated 40% 450 12 +5 AC  EENT:  anicteric  CARDIOVASCULAR: (+) S1 S2, normal rate and regular rhythm  PULMONARY: clear to auscultation bilaterally  ABDOMEN: soft, nontender with normoactive bowel sounds  EXTREMITIES: no edema  SKIN: no rash    LABS:  CAPILLARY BLOOD GLUCOSE 116 150                         13.6   5.76  )-----------( 221      ( 04 Dec 2023 21:00 )             42.7     140  |  107  |  9   ----------------------------<  105<H>  4.4   |  23  |  0.63    Ca    9.5      04 Dec 2023 21:00  Phos  3.3     12-04  Mg     1.8     12-04    TPro  7.0  /  Alb  3.8  /  TBili  0.3  /  DBili  x   /  AST  17  /  ALT  10  /  AlkPhos  119  12-04 12-04 @ 07:01  -  12-05 @ 07:00  --------------------------------------------------------  IN: 0 mL / OUT: 2150 mL / NET: -2150 mL    Bacteriology:  CSF studies:  EEG:  Neuroimaging:  Other imaging:    MEDICATIONS: 12-05    ·	ceFAZolin   IVPB 2000 IV Intermittent every 8 hours  ·	acetaminophen     Tablet .. 1000 Oral every 8 hours  ·	DULoxetine 60 Oral daily  ·	gabapentin 800 Oral three times a day  ·	methocarbamol 500 Oral every 8 hours  ·	ondansetron   Disintegrating Tablet 4 Oral every 6 hours  ·	bisacodyl 5 Oral at bedtime  ·	polyethylene glycol 3350 17 Oral daily  ·	insulin lispro (ADMELOG) corrective regimen sliding scale  SubCutaneous every 6 hours  ·	simvastatin 40 Oral at bedtime  ·	multivitamin 1 Oral daily  ·	albuterol    90 MICROgram(s) HFA Inhaler 2 Inhalation every 6 hours PRN  ·	diphenhydrAMINE 25 Oral every 6 hours PRN  ·	naloxone Injectable 0.1 IV Push every 3 minutes PRN  ·	ondansetron Injectable 4 IV Push every 6 hours PRN    IV FLUIDS: NS@85cc/hr  DRIPS:  - neosynephrine drip @ 0.01  - precedex off  - propofol 25   DIET: NPO  Lines: R IJ R radial [place]  Drains:    ·	3 HMV with 2 JPs  ·	Castillo   Wounds:    CODE STATUS:  Full Code                       GOALS OF CARE:  aggressive                      DISPOSITION:  ICU

## 2023-12-06 LAB
ANION GAP SERPL CALC-SCNC: 10 MMOL/L — SIGNIFICANT CHANGE UP (ref 5–17)
ANION GAP SERPL CALC-SCNC: 10 MMOL/L — SIGNIFICANT CHANGE UP (ref 5–17)
BASE EXCESS BLDA CALC-SCNC: -2 MMOL/L — SIGNIFICANT CHANGE UP (ref -2–3)
BASE EXCESS BLDA CALC-SCNC: -2 MMOL/L — SIGNIFICANT CHANGE UP (ref -2–3)
BUN SERPL-MCNC: 9 MG/DL — SIGNIFICANT CHANGE UP (ref 7–23)
BUN SERPL-MCNC: 9 MG/DL — SIGNIFICANT CHANGE UP (ref 7–23)
CALCIUM SERPL-MCNC: 8.3 MG/DL — LOW (ref 8.4–10.5)
CALCIUM SERPL-MCNC: 8.3 MG/DL — LOW (ref 8.4–10.5)
CHLORIDE SERPL-SCNC: 104 MMOL/L — SIGNIFICANT CHANGE UP (ref 96–108)
CHLORIDE SERPL-SCNC: 104 MMOL/L — SIGNIFICANT CHANGE UP (ref 96–108)
CO2 BLDA-SCNC: 24 MMOL/L — SIGNIFICANT CHANGE UP (ref 19–24)
CO2 BLDA-SCNC: 24 MMOL/L — SIGNIFICANT CHANGE UP (ref 19–24)
CO2 SERPL-SCNC: 23 MMOL/L — SIGNIFICANT CHANGE UP (ref 22–31)
CO2 SERPL-SCNC: 23 MMOL/L — SIGNIFICANT CHANGE UP (ref 22–31)
CREAT SERPL-MCNC: 0.48 MG/DL — LOW (ref 0.5–1.3)
CREAT SERPL-MCNC: 0.48 MG/DL — LOW (ref 0.5–1.3)
EGFR: 102 ML/MIN/1.73M2 — SIGNIFICANT CHANGE UP
EGFR: 102 ML/MIN/1.73M2 — SIGNIFICANT CHANGE UP
GAS PNL BLDA: SIGNIFICANT CHANGE UP
GAS PNL BLDA: SIGNIFICANT CHANGE UP
GLUCOSE BLDC GLUCOMTR-MCNC: 110 MG/DL — HIGH (ref 70–99)
GLUCOSE BLDC GLUCOMTR-MCNC: 110 MG/DL — HIGH (ref 70–99)
GLUCOSE BLDC GLUCOMTR-MCNC: 115 MG/DL — HIGH (ref 70–99)
GLUCOSE BLDC GLUCOMTR-MCNC: 115 MG/DL — HIGH (ref 70–99)
GLUCOSE BLDC GLUCOMTR-MCNC: 153 MG/DL — HIGH (ref 70–99)
GLUCOSE BLDC GLUCOMTR-MCNC: 153 MG/DL — HIGH (ref 70–99)
GLUCOSE BLDC GLUCOMTR-MCNC: 166 MG/DL — HIGH (ref 70–99)
GLUCOSE BLDC GLUCOMTR-MCNC: 166 MG/DL — HIGH (ref 70–99)
GLUCOSE SERPL-MCNC: 142 MG/DL — HIGH (ref 70–99)
GLUCOSE SERPL-MCNC: 142 MG/DL — HIGH (ref 70–99)
HCO3 BLDA-SCNC: 23 MMOL/L — SIGNIFICANT CHANGE UP (ref 21–28)
HCO3 BLDA-SCNC: 23 MMOL/L — SIGNIFICANT CHANGE UP (ref 21–28)
HCT VFR BLD CALC: 33.5 % — LOW (ref 34.5–45)
HCT VFR BLD CALC: 33.5 % — LOW (ref 34.5–45)
HGB BLD-MCNC: 11.2 G/DL — LOW (ref 11.5–15.5)
HGB BLD-MCNC: 11.2 G/DL — LOW (ref 11.5–15.5)
MAGNESIUM SERPL-MCNC: 2.7 MG/DL — HIGH (ref 1.6–2.6)
MAGNESIUM SERPL-MCNC: 2.7 MG/DL — HIGH (ref 1.6–2.6)
MCHC RBC-ENTMCNC: 27.8 PG — SIGNIFICANT CHANGE UP (ref 27–34)
MCHC RBC-ENTMCNC: 27.8 PG — SIGNIFICANT CHANGE UP (ref 27–34)
MCHC RBC-ENTMCNC: 33.4 GM/DL — SIGNIFICANT CHANGE UP (ref 32–36)
MCHC RBC-ENTMCNC: 33.4 GM/DL — SIGNIFICANT CHANGE UP (ref 32–36)
MCV RBC AUTO: 83.1 FL — SIGNIFICANT CHANGE UP (ref 80–100)
MCV RBC AUTO: 83.1 FL — SIGNIFICANT CHANGE UP (ref 80–100)
NRBC # BLD: 0 /100 WBCS — SIGNIFICANT CHANGE UP (ref 0–0)
NRBC # BLD: 0 /100 WBCS — SIGNIFICANT CHANGE UP (ref 0–0)
PCO2 BLDA: 40 MMHG — SIGNIFICANT CHANGE UP (ref 32–45)
PCO2 BLDA: 40 MMHG — SIGNIFICANT CHANGE UP (ref 32–45)
PH BLDA: 7.37 — SIGNIFICANT CHANGE UP (ref 7.35–7.45)
PH BLDA: 7.37 — SIGNIFICANT CHANGE UP (ref 7.35–7.45)
PHOSPHATE SERPL-MCNC: 4.1 MG/DL — SIGNIFICANT CHANGE UP (ref 2.5–4.5)
PHOSPHATE SERPL-MCNC: 4.1 MG/DL — SIGNIFICANT CHANGE UP (ref 2.5–4.5)
PLATELET # BLD AUTO: 222 K/UL — SIGNIFICANT CHANGE UP (ref 150–400)
PLATELET # BLD AUTO: 222 K/UL — SIGNIFICANT CHANGE UP (ref 150–400)
PO2 BLDA: 200 MMHG — HIGH (ref 83–108)
PO2 BLDA: 200 MMHG — HIGH (ref 83–108)
POTASSIUM SERPL-MCNC: 4.2 MMOL/L — SIGNIFICANT CHANGE UP (ref 3.5–5.3)
POTASSIUM SERPL-MCNC: 4.2 MMOL/L — SIGNIFICANT CHANGE UP (ref 3.5–5.3)
POTASSIUM SERPL-SCNC: 4.2 MMOL/L — SIGNIFICANT CHANGE UP (ref 3.5–5.3)
POTASSIUM SERPL-SCNC: 4.2 MMOL/L — SIGNIFICANT CHANGE UP (ref 3.5–5.3)
RBC # BLD: 4.03 M/UL — SIGNIFICANT CHANGE UP (ref 3.8–5.2)
RBC # BLD: 4.03 M/UL — SIGNIFICANT CHANGE UP (ref 3.8–5.2)
RBC # FLD: 13.7 % — SIGNIFICANT CHANGE UP (ref 10.3–14.5)
RBC # FLD: 13.7 % — SIGNIFICANT CHANGE UP (ref 10.3–14.5)
SAO2 % BLDA: 100 % — HIGH (ref 94–98)
SAO2 % BLDA: 100 % — HIGH (ref 94–98)
SODIUM SERPL-SCNC: 137 MMOL/L — SIGNIFICANT CHANGE UP (ref 135–145)
SODIUM SERPL-SCNC: 137 MMOL/L — SIGNIFICANT CHANGE UP (ref 135–145)
WBC # BLD: 15.76 K/UL — HIGH (ref 3.8–10.5)
WBC # BLD: 15.76 K/UL — HIGH (ref 3.8–10.5)
WBC # FLD AUTO: 15.76 K/UL — HIGH (ref 3.8–10.5)
WBC # FLD AUTO: 15.76 K/UL — HIGH (ref 3.8–10.5)

## 2023-12-06 PROCEDURE — 99291 CRITICAL CARE FIRST HOUR: CPT

## 2023-12-06 PROCEDURE — 72128 CT CHEST SPINE W/O DYE: CPT | Mod: 26

## 2023-12-06 RX ORDER — ACETAMINOPHEN 500 MG
1000 TABLET ORAL ONCE
Refills: 0 | Status: COMPLETED | OUTPATIENT
Start: 2023-12-06 | End: 2023-12-06

## 2023-12-06 RX ORDER — DIAZEPAM 5 MG
2 TABLET ORAL EVERY 8 HOURS
Refills: 0 | Status: DISCONTINUED | OUTPATIENT
Start: 2023-12-06 | End: 2023-12-06

## 2023-12-06 RX ORDER — KETOROLAC TROMETHAMINE 30 MG/ML
30 SYRINGE (ML) INJECTION EVERY 6 HOURS
Refills: 0 | Status: DISCONTINUED | OUTPATIENT
Start: 2023-12-06 | End: 2023-12-07

## 2023-12-06 RX ORDER — MIDODRINE HYDROCHLORIDE 2.5 MG/1
10 TABLET ORAL EVERY 8 HOURS
Refills: 0 | Status: DISCONTINUED | OUTPATIENT
Start: 2023-12-06 | End: 2023-12-10

## 2023-12-06 RX ORDER — HYDROMORPHONE HYDROCHLORIDE 2 MG/ML
0.25 INJECTION INTRAMUSCULAR; INTRAVENOUS; SUBCUTANEOUS ONCE
Refills: 0 | Status: DISCONTINUED | OUTPATIENT
Start: 2023-12-06 | End: 2023-12-06

## 2023-12-06 RX ORDER — DEXMEDETOMIDINE HYDROCHLORIDE IN 0.9% SODIUM CHLORIDE 4 UG/ML
0.2 INJECTION INTRAVENOUS
Qty: 400 | Refills: 0 | Status: DISCONTINUED | OUTPATIENT
Start: 2023-12-06 | End: 2023-12-08

## 2023-12-06 RX ORDER — NOREPINEPHRINE BITARTRATE/D5W 8 MG/250ML
0.05 PLASTIC BAG, INJECTION (ML) INTRAVENOUS
Qty: 8 | Refills: 0 | Status: DISCONTINUED | OUTPATIENT
Start: 2023-12-06 | End: 2023-12-06

## 2023-12-06 RX ORDER — HYDROMORPHONE HYDROCHLORIDE 2 MG/ML
0.5 INJECTION INTRAMUSCULAR; INTRAVENOUS; SUBCUTANEOUS ONCE
Refills: 0 | Status: DISCONTINUED | OUTPATIENT
Start: 2023-12-06 | End: 2023-12-06

## 2023-12-06 RX ADMIN — CHLORHEXIDINE GLUCONATE 15 MILLILITER(S): 213 SOLUTION TOPICAL at 05:47

## 2023-12-06 RX ADMIN — Medication 30 MILLIGRAM(S): at 22:31

## 2023-12-06 RX ADMIN — HYDROMORPHONE HYDROCHLORIDE 1 MILLIGRAM(S): 2 INJECTION INTRAMUSCULAR; INTRAVENOUS; SUBCUTANEOUS at 05:48

## 2023-12-06 RX ADMIN — Medication 15 MILLIGRAM(S): at 00:40

## 2023-12-06 RX ADMIN — FENTANYL CITRATE 25 MICROGRAM(S): 50 INJECTION INTRAVENOUS at 00:16

## 2023-12-06 RX ADMIN — SODIUM CHLORIDE 85 MILLILITER(S): 9 INJECTION INTRAMUSCULAR; INTRAVENOUS; SUBCUTANEOUS at 20:31

## 2023-12-06 RX ADMIN — PHENYLEPHRINE HYDROCHLORIDE 3.18 MICROGRAM(S)/KG/MIN: 10 INJECTION INTRAVENOUS at 13:06

## 2023-12-06 RX ADMIN — FENTANYL CITRATE 25 MICROGRAM(S): 50 INJECTION INTRAVENOUS at 03:06

## 2023-12-06 RX ADMIN — HYDROMORPHONE HYDROCHLORIDE 0.5 MILLIGRAM(S): 2 INJECTION INTRAMUSCULAR; INTRAVENOUS; SUBCUTANEOUS at 20:31

## 2023-12-06 RX ADMIN — HYDROMORPHONE HYDROCHLORIDE 1 MILLIGRAM(S): 2 INJECTION INTRAMUSCULAR; INTRAVENOUS; SUBCUTANEOUS at 06:41

## 2023-12-06 RX ADMIN — FENTANYL CITRATE 25 MICROGRAM(S): 50 INJECTION INTRAVENOUS at 03:43

## 2023-12-06 RX ADMIN — Medication 15 MILLIGRAM(S): at 17:09

## 2023-12-06 RX ADMIN — PHENYLEPHRINE HYDROCHLORIDE 3.18 MICROGRAM(S)/KG/MIN: 10 INJECTION INTRAVENOUS at 20:31

## 2023-12-06 RX ADMIN — Medication 15 MILLIGRAM(S): at 12:59

## 2023-12-06 RX ADMIN — Medication 2 MILLIGRAM(S): at 09:53

## 2023-12-06 RX ADMIN — Medication 1000 MILLIGRAM(S): at 12:59

## 2023-12-06 RX ADMIN — Medication 15 MILLIGRAM(S): at 12:44

## 2023-12-06 RX ADMIN — HYDROMORPHONE HYDROCHLORIDE 1 MILLIGRAM(S): 2 INJECTION INTRAMUSCULAR; INTRAVENOUS; SUBCUTANEOUS at 03:43

## 2023-12-06 RX ADMIN — Medication 25 GRAM(S): at 00:19

## 2023-12-06 RX ADMIN — Medication 15 MILLIGRAM(S): at 00:19

## 2023-12-06 RX ADMIN — Medication 100 MILLIGRAM(S): at 05:47

## 2023-12-06 RX ADMIN — DEXMEDETOMIDINE HYDROCHLORIDE IN 0.9% SODIUM CHLORIDE 4.24 MICROGRAM(S)/KG/HR: 4 INJECTION INTRAVENOUS at 17:03

## 2023-12-06 RX ADMIN — Medication 2: at 23:00

## 2023-12-06 RX ADMIN — Medication 15 MILLIGRAM(S): at 06:41

## 2023-12-06 RX ADMIN — Medication 400 MILLIGRAM(S): at 12:44

## 2023-12-06 RX ADMIN — Medication 1000 MILLIGRAM(S): at 00:16

## 2023-12-06 RX ADMIN — CHLORHEXIDINE GLUCONATE 1 APPLICATION(S): 213 SOLUTION TOPICAL at 05:48

## 2023-12-06 RX ADMIN — SODIUM CHLORIDE 85 MILLILITER(S): 9 INJECTION INTRAMUSCULAR; INTRAVENOUS; SUBCUTANEOUS at 12:42

## 2023-12-06 RX ADMIN — Medication 2 MILLIGRAM(S): at 06:44

## 2023-12-06 RX ADMIN — HYDROMORPHONE HYDROCHLORIDE 0.25 MILLIGRAM(S): 2 INJECTION INTRAMUSCULAR; INTRAVENOUS; SUBCUTANEOUS at 10:58

## 2023-12-06 RX ADMIN — HYDROMORPHONE HYDROCHLORIDE 0.25 MILLIGRAM(S): 2 INJECTION INTRAMUSCULAR; INTRAVENOUS; SUBCUTANEOUS at 11:13

## 2023-12-06 RX ADMIN — HYDROMORPHONE HYDROCHLORIDE 0.5 MILLIGRAM(S): 2 INJECTION INTRAMUSCULAR; INTRAVENOUS; SUBCUTANEOUS at 21:17

## 2023-12-06 RX ADMIN — Medication 2: at 00:19

## 2023-12-06 RX ADMIN — DEXMEDETOMIDINE HYDROCHLORIDE IN 0.9% SODIUM CHLORIDE 4.24 MICROGRAM(S)/KG/HR: 4 INJECTION INTRAVENOUS at 20:31

## 2023-12-06 RX ADMIN — Medication 15 MILLIGRAM(S): at 17:24

## 2023-12-06 RX ADMIN — Medication 2: at 06:44

## 2023-12-06 RX ADMIN — DEXMEDETOMIDINE HYDROCHLORIDE IN 0.9% SODIUM CHLORIDE 4.24 MICROGRAM(S)/KG/HR: 4 INJECTION INTRAVENOUS at 11:10

## 2023-12-06 RX ADMIN — HYDROMORPHONE HYDROCHLORIDE 1 MILLIGRAM(S): 2 INJECTION INTRAMUSCULAR; INTRAVENOUS; SUBCUTANEOUS at 02:49

## 2023-12-06 RX ADMIN — Medication 30 MILLIGRAM(S): at 23:34

## 2023-12-06 RX ADMIN — Medication 15 MILLIGRAM(S): at 05:47

## 2023-12-06 NOTE — CHART NOTE - NSCHARTNOTEFT_GEN_A_CORE
POD1. KAMRAN o/n neuro stable. remains intubated. Extubated 6am. Precedex gtt prn. Remains on parminder gtt for MAP goal >90. Valium made prn. CT thoracic spine bc MRI unavailable.

## 2023-12-06 NOTE — DIETITIAN INITIAL EVALUATION ADULT - OTHER INFO
67 yo female with Hx HTN, HLD, DM, CVA x 3 (last in 2016 with residual R hand weakness), GAMALIEL not using CPAP, Emphysema, ex-25 pack year smoker now restarted, chronic constipation on Movantik, chronic urinary retention (SC at home), b/l CTS s/p release, s/p Intrathecal Morphine pump for pain, with chronic neck and back pain worsening for years s/p multiple spinal surgeries including laminectomies and fusion of cervical, thoracic and lumbar spine, s/p T3-L1 revision of prior fusion (with  on 8/17/22). Xray 11/16/23 showing broken rods/displacement. Now s/p C2-S2 instrumentation and fusion (12/5)    Chart, labs and meds reviewed. Tmax 36.8 C. MAPs  mm Hg. Labs significant for H/H 11.2/33.5L; POCT 115-166H; glucose 142H; creatinine 0.48L; HgbA1c 7.0H; magnesium 2.7H (previously 1.3L). Meds significant for insulin sliding scale, Dulcolax, multivitamin, Zofran, single pressor (phenylephrine), polyethylene glycol. IV fluids: Sodium Chloride 0.9% 1000 mL @ 85 mL/hr. Skin: Filippo scale score 13; generalized 2+ edema; intact except for surgical incisions cervical spine; multiple drains. GI: abdomen distended; no bowel movement documented. Weights: upon chart review, patient's last two documented weights (April 2023 and September 2023), patient reported a weight of 168 pounds; this represents a possible significant weight gain of 19 pounds or 11 % over 3 months (possible* as weight was reported and not measured).    Met with patient on [address] twice; patient lethargic or sedated at the time of visit and unable to participate in nutrition interview. No visitors in the room; no contacts listed in chart. Will complete nutrition interview as able when patient available.    69 yo female with Hx HTN, HLD, DM, CVA x 3 (last in 2016 with residual R hand weakness), GAMALIEL not using CPAP, Emphysema, ex-25 pack year smoker now restarted, chronic constipation on Movantik, chronic urinary retention (SC at home), b/l CTS s/p release, s/p Intrathecal Morphine pump for pain, with chronic neck and back pain worsening for years s/p multiple spinal surgeries including laminectomies and fusion of cervical, thoracic and lumbar spine, s/p T3-L1 revision of prior fusion (with  on 8/17/22). Xray 11/16/23 showing broken rods/displacement. Now s/p C2-S2 instrumentation and fusion (12/5)    Chart, labs and meds reviewed. Tmax 36.8 C. MAPs  mm Hg. Labs significant for H/H 11.2/33.5L; POCT 115-166H; glucose 142H; creatinine 0.48L; HgbA1c 7.0H; magnesium 2.7H (previously 1.3L). Meds significant for insulin sliding scale, Dulcolax, multivitamin, Zofran, single pressor (phenylephrine), polyethylene glycol. IV fluids: Sodium Chloride 0.9% 1000 mL @ 85 mL/hr. Skin: Filippo scale score 13; generalized 2+ edema; intact except for surgical incisions cervical spine; multiple drains. GI: abdomen distended; no bowel movement documented. Weights: upon chart review, patient's last two documented weights (April 2023 and September 2023), patient reported a weight of 168 pounds; this represents a possible significant weight gain of 19 pounds or 11 % over 3 months (possible* as weight was reported and not measured).    Met with patient on [facility] twice; patient lethargic or sedated at the time of visit and unable to participate in nutrition interview. No visitors in the room; no contacts listed in chart. Will complete nutrition interview as able when patient available.

## 2023-12-06 NOTE — PROGRESS NOTE ADULT - SUBJECTIVE AND OBJECTIVE BOX
NSCU Progress Note    Assessment/Hospital Course:    68y/F  HTN, HLD, CVA x 3 (last was 2016 with right hand weakness residual), GAMALIEL not using CPAP, Emphysema, former 25 pack year smoker restarted this week of surgery, Chronic Constipation on Movantik, Urinary Incontinence chronically self straight cath at home, chronic UTI, Breast Implant Rupture with Chronic Leak repair 10/2023, B/L CTS s/p release, s/p Intrathecal Morphine pump for pain, with chronic neck and back pain worsening for years s/p multiple spinal surgeries including laminectomies and fusion of cervical, thoracic and lumbar spine initially done in 2009 and recently in 2019 and 2020 at [facility] by Dr. Trinidad, s/p T3-L1 revision of prior fusion (with Dr. Luz on 8/17/22). Outpatient Xray 11/16/2023 showing broken rods/displacement. Presents for elective C2-S2 instrumentation and fusion. Pt endorses weakness of b/l LE and burning pain in b/l lower extremities radiating to both feet,  (04 Dec 2023 15:44)      24 Hour Events/Subjective:  - POD1 C2 to pelvis fusion, intraoperative lost monitoring, re-gained at MAP>120, lost again and unresponsive to even MAPs>140  - extuabted stably overnight  - this am complaining of pain      REVIEW OF SYSTEMS:  - negative except as above    VITALS:   - Reviewed      IMAGING/DATA:   - Reviewed          PHYSICAL EXAM:    General: anxious  CVS: RR  Pulm: CTAB  GI: Soft, NTND  Extremities: No LE Edema  Neuro: EOS, AOx1-2 (effort, non-compliant with exam), seth, BUE 5/5 (RUE effort 4+ bi/tri?), BLE 0/5, sensation in tact throughout   NSCU Progress Note    Assessment/Hospital Course:    68y/F  HTN, HLD, CVA x 3 (last was 2016 with right hand weakness residual), GAMALIEL not using CPAP, Emphysema, former 25 pack year smoker restarted this week of surgery, Chronic Constipation on Movantik, Urinary Incontinence chronically self straight cath at home, chronic UTI, Breast Implant Rupture with Chronic Leak repair 10/2023, B/L CTS s/p release, s/p Intrathecal Morphine pump for pain, with chronic neck and back pain worsening for years s/p multiple spinal surgeries including laminectomies and fusion of cervical, thoracic and lumbar spine initially done in 2009 and recently in 2019 and 2020 at [facility] by Dr. Trinidad, s/p T3-L1 revision of prior fusion (with Dr. Luz on 8/17/22). Outpatient Xray 11/16/2023 showing broken rods/displacement. Presents for elective C2-S2 instrumentation and fusion. Pt endorses weakness of b/l LE and burning pain in b/l lower extremities radiating to both feet,  (04 Dec 2023 15:44)      24 Hour Events/Subjective:  - POD1 C2 to pelvis fusion, intraoperative lost monitoring, re-gained at MAP>120, lost again and unresponsive to even MAPs>140  - extuabted stably overnight  - this am complaining of pain      REVIEW OF SYSTEMS:  - negative except as above    VITALS:   - Reviewed      IMAGING/DATA:   - Reviewed          PHYSICAL EXAM:    General: anxious  CVS: RR  Pulm: CTAB  GI: Soft, NTND  Extremities: No LE Edema  Neuro: EOS, AOx1-2 (effort, non-compliant with exam), seth, BUE 5/5 (RUE effort 4+ bi/tri?), BLE 0/5, sensation in tact throughout

## 2023-12-06 NOTE — PROGRESS NOTE ADULT - ASSESSMENT
68y/F with  pseudoarthrosis, s/p C2 to pelvis fusion, (12/05/2023, Dr. Luz)  Hypertension dyslipidemia pre DM  LBP, arthritis  h/o stroke with R residuals, seizure disorder, anxiety and depression, MS  h/o CTS  GERD, chronic constipation, external hemorrhoids, pancreatic cyst  urinary incontinence  eczema  COPD     PLAN:   NEURO: spine checks q1h, VS q1h, PRN pain meds with PCA pump, ERAS protocol, insert NGT; sedation - taper propofol, switch to precedex  standing Xrays on stepdown  3 HMV & 2 JPs - monitor output, keep for now  MAP augmentation  REHAB:  physical therapy evaluation and management    EARLY MOB:      PULM:  cont full vent support, CPAP trial of weaning; extubate to HFNC   CARDIO:  MAP >90, titrate neosynephrine to SBP goal, TTE  ENDO:  Blood sugar goals 140-180 mg/dL, continue insulin sliding scale  GI:  PPI for GI prophylaxis  DIET: NPO  RENAL:  keep IVF, d/c jiménez in AM  HEM/ONC: check post-op Hb  VTE Prophylaxis: SCDs, no DVT chemoprophylaxis for now as patient is high risk for bleed (fresh post-op)  ID: afebrile, no leukocytosis, ancef x2 doses then d/c   Social: will update family    Active issues:  What's keeping patient in the ICU? pressors, intubated  What is this patient's dispo plan?      ATTENDING ATTESTATION:  I was physically present for the key portions of the evaluation and management (E/M) service provided.  I agree with the above history, physical and plan, which I have reviewed and edited where appropriate.    Patient at high risk for neurological deterioration or death due to:  ICU delirium, aspiration PNA, DVT / PE.  Critical care time:  I have personally provided 45 minutes of critical care time, excluding time spent on separate procedures.      Plan discussed with RN, house staff. 68y/F with  pseudoarthrosis, s/p C2 to pelvis fusion, (12/05/2023, Dr. Luz)  Hypertension dyslipidemia Diabetes Mellitus controlled  LBP, arthritis  h/o stroke with R residuals, seizure disorder, anxiety and depression, MS  h/o CTS  GERD, chronic constipation, external hemorrhoids, pancreatic cyst  urinary incontinence  eczema  COPD     PLAN:   NEURO: neurochecks q2h, VS q1h, PRN pain meds with PCA pump, ERAS protocol, sedation with precedex to RASS of 0 to -1  standing Xrays on stepdown  3 HMV & 2 JPs - monitor output, keep for now  MAP augmentation  REHAB:  physical therapy evaluation and management    EARLY MOB:  HOB up     PULM:  room air, incentive spirometry  CARDIO:  MAP >90, titrate neosynephrine to SBP goal, TTE; midodrine   ENDO:  Blood sugar goals 140-180 mg/dL, continue insulin sliding scale  GI:  bowel regimen  DIET: s/s tomorrow  RENAL:  keep IVF  HEM/ONC: hb stable  VTE Prophylaxis: SCDs, no DVT chemoprophylaxis for now as patient is high risk for bleed (late case), will re-evaluate tomorrow  ID: afebrile, leukocytosis  Social: will update family    Active issues:  What's keeping patient in the ICU? pressors, sedation gtt  What is this patient's dispo plan?     ATTENDING ATTESTATION:  I was physically present for the key portions of the evaluation and management (E/M) service provided.  I agree with the above history, physical and plan, which I have reviewed and edited where appropriate.    Patient at high risk for neurological deterioration or death due to:  ICU delirium, aspiration PNA, DVT / PE.  Critical care time:  I have personally provided 45 minutes of critical care time, excluding time spent on separate procedures.      Plan discussed with RN, house staff.

## 2023-12-06 NOTE — DIETITIAN INITIAL EVALUATION ADULT - PERTINENT LABORATORY DATA
12-06    137  |  104  |  9   ----------------------------<  142<H>  4.2   |  23  |  0.48<L>    Ca    8.3<L>      06 Dec 2023 05:30  Phos  4.1     12-06  Mg     2.7     12-06    TPro  7.0  /  Alb  3.8  /  TBili  0.3  /  DBili  x   /  AST  17  /  ALT  10  /  AlkPhos  119  12-04  POCT Blood Glucose.: 115 mg/dL (12-06-23 @ 11:03)  A1C with Estimated Average Glucose Result: 7.0 % (12-04-23 @ 21:00)

## 2023-12-06 NOTE — PROGRESS NOTE ADULT - SUBJECTIVE AND OBJECTIVE BOX
=================================  NEUROCRITICAL CARE ATTENDING NOTE  =================================    VANNA DIAZ   MRN-[id number]  Summary:  68y/F  HTN, HLD, CVA x 3 (last was 2016 with right hand weakness residual), GAMALIEL not using CPAP, Emphysema, former 25 pack year smoker restarted this week of surgery, Chronic Constipation on Movantik, Urinary Incontinence chronically self straight cath at home, chronic UTI, Breast Implant Rupture with Chronic Leak repair 10/2023, B/L CTS s/p release, s/p Intrathecal Morphine pump for pain, with chronic neck and back pain worsening for years s/p multiple spinal surgeries including laminectomies and fusion of cervical, thoracic and lumbar spine initially done in 2009 and recently in 2019 and 2020 at [facility] by Dr. Trinidad, s/p T3-L1 revision of prior fusion (with Dr. Luz on 8/17/22). Outpatient Xray 11/16/2023 showing broken rods/displacement. Presents for elective C2-S2 instrumentation and fusion. Pt endorses weakness of b/l LE and burning pain in b/l lower extremities radiating to both feet,  (04 Dec 2023 15:44)    COURSE IN THE HOSPITAL:  12/04 admitted to [facility]  12/05 POD0 C2 to pelvis fusion, intraoperative lost monitoring (B motor and sensory) initially responsive to HDA to MAP of 120s, but lost monitoring again, becoming unresponsvie to HDA to MAP even of 140s; remained intubated, brought to ICU.    Past Medical History: HTN (hypertension)  Back pain Hypertension SS (spinal stenosis) High cholesterol Stroke H/O carpal tunnel syndrome H/O carpal tunnel syndrome Chronic GERD History of chronic constipation Seizure Urinary incontinence Pre-diabetes Acute UTI Anxiety and depression Arthritis Eczema External hemorrhoids H/O kyphosis Multiple sclerosis Pancreas cyst Scoliosis Wheelchair dependent Cervical pseudoarthrosis Right shoulder pain Cervical spondylosis Chronic headachesChronic LUQ pain Chronic UTI Degenerative joint disease H/O low back mir Lumbosacral spondylosis COPD (chronic obstructive pulmonary disease)  Allergies:  oxycodone (Pruritus)  Home meds:   ·	albuterol 90 mcg/inh inhalation aerosol: 2 puff(s) inhaled every 6 hours, As Needed  ·	aspirin 81 mg oral delayed release tablet: 1 tab(s) orally once a day  ·	DULoxetine 60 mg oral delayed release capsule: 1 cap(s) orally once a day  ·	gabapentin 800 mg oral tablet: 1 tab(s) orally 3 times a day  ·	MetFORMIN (Eqv-Glumetza) 500 mg oral tablet, extended release: 1 tab(s) orally 2 times a day  ·	metoprolol tartrate 50 mg oral tablet: 1.5 tab(s) orally 2 times a day  ·	Movantik 25 mg oral tablet: 1 tab(s) orally once a day (in the morning)  ·	simvastatin 40 mg oral tablet: 1 tab(s) orally once a day (at bedtime)  ·	tiZANidine 4 mg oral tablet: 2 tab(s) orally 2 times a day as needed for -  ·	Vitamin D3 50 mcg (2000 intl units) oral tablet: 1 tab(s) orally once a day    PHYSICAL EXAMINATION  T(C): 36 (12-06 @ 18:09), Max: 36.8 (12-06 @ 09:02) HR: 63 (12-06 @ 19:00) (59 - 93) BP: 140/65 (12-06 @ 18:00) (102/71 - 199/110) RR: 14 (12-06 @ 19:00) (9 - 25) SpO2: 100% (12-06 @ 19:00) (95% - 100%)  NEUROLOGIC EXAMINATION:  Patient OE to voice, not FC< L UE 5/5 R UE distally 3/5 BL LE 0/5  GENERAL: intubated 40% 450 12 +5 AC  EENT:  anicteric  CARDIOVASCULAR: (+) S1 S2, normal rate and regular rhythm  PULMONARY: clear to auscultation bilaterally  ABDOMEN: soft, nontender with normoactive bowel sounds  EXTREMITIES: no edema  SKIN: no rash    LABS: 12-06  CAPILLARY BLOOD GLUCOSE 110 115 166 152    (10.41)   11.2 (11.4)  15.76 )-----------( 222      ( 06 Dec 2023 05:30 )             33.5     137  |  104  |  9   ----------------------------<  142<H>  4.2   |  23  |  0.48<L>    Ca    8.3<L>      06 Dec 2023 05:30  Phos  4.1     12-06  Mg     2.7     12-06    TPro  7.0  /  Alb  3.8  /  TBili  0.3  /  DBili  x   /  AST  17  /  ALT  10  /  AlkPhos  119  12-04 12-05 @ 07:01  - 12-06 @ 07:00  --------------------------------------------------------  IN: 1369.6 mL / OUT: 1460 mL / NET: -90.4 mL    12-06 @ 07:01  -  12-06 @ 19:53  --------------------------------------------------------  IN: 1405.1 mL / OUT: 1775 mL / NET: -369.9 mL     Bacteriology:  CSF studies:  EEG:  Neuroimaging:  Other imaging:    MEDICATIONS: 12-06    ·	DULoxetine 60 Oral daily  ·	gabapentin 800 Oral three times a day  ·	methocarbamol 500 Oral every 8 hours  ·	midodrine 10 milliGRAM(s) Oral every 8 hours  ·	bisacodyl 5 Oral at bedtime  ·	polyethylene glycol 3350 17 Oral daily  ·	insulin lispro (ADMELOG) corrective regimen sliding scale  SubCutaneous every 6 hours  ·	simvastatin 40 Oral at bedtime  ·	multivitamin 1 Oral daily  ·	albuterol    90 MICROgram(s) HFA Inhaler 2 Inhalation every 6 hours PRN  ·	ketorolac   Injectable 30 IV Push every 6 hours PRN  ·	naloxone Injectable 0.1 IV Push every 3 minutes PRN  ·	ondansetron Injectable 4 IV Push every 6 hours PRN    IV FLUIDS: NS@85cc/hr  DRIPS:  ·	dexMEDEtomidine Infusion 0.2 IV Continuous <Continuous>  ·	phenylephrine    Infusion 0.1 MICROgram(s)/kG/Min IV Continuous <Continuous>  Lines: R IJ R radial [place]  Drains:    ·	3 HMV with 2 JPs  ·	Castillo   Wounds:    CODE STATUS:  Full Code                       GOALS OF CARE:  aggressive                      DISPOSITION:  ICU =================================  NEUROCRITICAL CARE ATTENDING NOTE  =================================    VANNA DIAZ   [id number]  Summary:  68y/F  HTN, HLD, CVA x 3 (last was 2016 with right hand weakness residual), GAMALIEL not using CPAP, Emphysema, former 25 pack year smoker restarted this week of surgery, Chronic Constipation on Movantik, Urinary Incontinence chronically self straight cath at home, chronic UTI, Breast Implant Rupture with Chronic Leak repair 10/2023, B/L CTS s/p release, s/p Intrathecal Morphine pump for pain, with chronic neck and back pain worsening for years s/p multiple spinal surgeries including laminectomies and fusion of cervical, thoracic and lumbar spine initially done in 2009 and recently in 2019 and 2020 at [facility] by Dr. Trinidad, s/p T3-L1 revision of prior fusion (with Dr. Luz on 8/17/22). Outpatient Xray 11/16/2023 showing broken rods/displacement. Presents for elective C2-S2 instrumentation and fusion. Pt endorses weakness of b/l LE and burning pain in b/l lower extremities radiating to both feet,  (04 Dec 2023 15:44)    COURSE IN THE HOSPITAL:  12/04 admitted to [facility]  12/05 POD0 C2 to pelvis fusion, intraoperative lost monitoring (B motor and sensory) initially responsive to HDA to MAP of 120s, but lost monitoring again, becoming unresponsvie to HDA to MAP even of 140s; remained intubated, brought to ICU.    Past Medical History: HTN (hypertension)  Back pain Hypertension SS (spinal stenosis) High cholesterol Stroke H/O carpal tunnel syndrome H/O carpal tunnel syndrome Chronic GERD History of chronic constipation Seizure Urinary incontinence Pre-diabetes Acute UTI Anxiety and depression Arthritis Eczema External hemorrhoids H/O kyphosis Multiple sclerosis Pancreas cyst Scoliosis Wheelchair dependent Cervical pseudoarthrosis Right shoulder pain Cervical spondylosis Chronic headachesChronic LUQ pain Chronic UTI Degenerative joint disease H/O low back mir Lumbosacral spondylosis COPD (chronic obstructive pulmonary disease)  Allergies:  oxycodone (Pruritus)  Home meds:   ·	albuterol 90 mcg/inh inhalation aerosol: 2 puff(s) inhaled every 6 hours, As Needed  ·	aspirin 81 mg oral delayed release tablet: 1 tab(s) orally once a day  ·	DULoxetine 60 mg oral delayed release capsule: 1 cap(s) orally once a day  ·	gabapentin 800 mg oral tablet: 1 tab(s) orally 3 times a day  ·	MetFORMIN (Eqv-Glumetza) 500 mg oral tablet, extended release: 1 tab(s) orally 2 times a day  ·	metoprolol tartrate 50 mg oral tablet: 1.5 tab(s) orally 2 times a day  ·	Movantik 25 mg oral tablet: 1 tab(s) orally once a day (in the morning)  ·	simvastatin 40 mg oral tablet: 1 tab(s) orally once a day (at bedtime)  ·	tiZANidine 4 mg oral tablet: 2 tab(s) orally 2 times a day as needed for -  ·	Vitamin D3 50 mcg (2000 intl units) oral tablet: 1 tab(s) orally once a day    PHYSICAL EXAMINATION  T(C): 36 (12-06 @ 18:09), Max: 36.8 (12-06 @ 09:02) HR: 63 (12-06 @ 19:00) (59 - 93) BP: 140/65 (12-06 @ 18:00) (102/71 - 199/110) RR: 14 (12-06 @ 19:00) (9 - 25) SpO2: 100% (12-06 @ 19:00) (95% - 100%)  NEUROLOGIC EXAMINATION:  Patient OE to voice, not FC< L UE 5/5 R UE distally 3/5 BL LE 0/5  GENERAL: intubated 40% 450 12 +5 AC  EENT:  anicteric  CARDIOVASCULAR: (+) S1 S2, normal rate and regular rhythm  PULMONARY: clear to auscultation bilaterally  ABDOMEN: soft, nontender with normoactive bowel sounds  EXTREMITIES: no edema  SKIN: no rash    LABS: 12-06  CAPILLARY BLOOD GLUCOSE 110 115 166 152    (10.41)   11.2 (11.4)  15.76 )-----------( 222      ( 06 Dec 2023 05:30 )             33.5     137  |  104  |  9   ----------------------------<  142<H>  4.2   |  23  |  0.48<L>    Ca    8.3<L>      06 Dec 2023 05:30  Phos  4.1     12-06  Mg     2.7     12-06    TPro  7.0  /  Alb  3.8  /  TBili  0.3  /  DBili  x   /  AST  17  /  ALT  10  /  AlkPhos  119  12-04 12-05 @ 07:01  - 12-06 @ 07:00  --------------------------------------------------------  IN: 1369.6 mL / OUT: 1460 mL / NET: -90.4 mL    12-06 @ 07:01  -  12-06 @ 19:53  --------------------------------------------------------  IN: 1405.1 mL / OUT: 1775 mL / NET: -369.9 mL     Bacteriology:  CSF studies:  EEG:  Neuroimaging:  Other imaging:    MEDICATIONS: 12-06    ·	DULoxetine 60 Oral daily  ·	gabapentin 800 Oral three times a day  ·	methocarbamol 500 Oral every 8 hours  ·	midodrine 10 milliGRAM(s) Oral every 8 hours  ·	bisacodyl 5 Oral at bedtime  ·	polyethylene glycol 3350 17 Oral daily  ·	insulin lispro (ADMELOG) corrective regimen sliding scale  SubCutaneous every 6 hours  ·	simvastatin 40 Oral at bedtime  ·	multivitamin 1 Oral daily  ·	albuterol    90 MICROgram(s) HFA Inhaler 2 Inhalation every 6 hours PRN  ·	ketorolac   Injectable 30 IV Push every 6 hours PRN  ·	naloxone Injectable 0.1 IV Push every 3 minutes PRN  ·	ondansetron Injectable 4 IV Push every 6 hours PRN    IV FLUIDS: NS@85cc/hr  DRIPS:  ·	dexMEDEtomidine Infusion 0.2 IV Continuous <Continuous>  ·	phenylephrine    Infusion 0.1 MICROgram(s)/kG/Min IV Continuous <Continuous>  Lines: R IJ R radial [place]  Drains:    ·	3 HMV with 2 JPs  ·	Castillo   Wounds:    CODE STATUS:  Full Code                       GOALS OF CARE:  aggressive                      DISPOSITION:  ICU =================================  NEUROCRITICAL CARE ATTENDING NOTE  =================================    VANNA DIAZ   [id number]  Summary:  68y/F  HTN, HLD, CVA x 3 (last was 2016 with right hand weakness residual), GAMALIEL not using CPAP, Emphysema, former 25 pack year smoker restarted this week of surgery, Chronic Constipation on Movantik, Urinary Incontinence chronically self straight cath at home, chronic UTI, Breast Implant Rupture with Chronic Leak repair 10/2023, B/L CTS s/p release, s/p Intrathecal Morphine pump for pain, with chronic neck and back pain worsening for years s/p multiple spinal surgeries including laminectomies and fusion of cervical, thoracic and lumbar spine initially done in 2009 and recently in 2019 and 2020 at [facility] by Dr. Trinidad, s/p T3-L1 revision of prior fusion (with Dr. Luz on 8/17/22). Outpatient Xray 11/16/2023 showing broken rods/displacement. Presents for elective C2-S2 instrumentation and fusion. Pt endorses weakness of b/l LE and burning pain in b/l lower extremities radiating to both feet,  (04 Dec 2023 15:44)    COURSE IN THE HOSPITAL:  12/04 admitted to [facility]  12/05 POD0 C2 to pelvis fusion, intraoperative lost monitoring (B motor and sensory) initially responsive to HDA to MAP of 120s, but lost monitoring again, becoming unresponsvie to HDA to MAP even of 140s; remained intubated, brought to ICU.  12/06 POD1 precedex + valium, agitated    Past Medical History: HTN (hypertension)  Back pain Hypertension SS (spinal stenosis) High cholesterol Stroke H/O carpal tunnel syndrome H/O carpal tunnel syndrome Chronic GERD History of chronic constipation Seizure Urinary incontinence Pre-diabetes Acute UTI Anxiety and depression Arthritis Eczema External hemorrhoids H/O kyphosis Multiple sclerosis Pancreas cyst Scoliosis Wheelchair dependent Cervical pseudoarthrosis Right shoulder pain Cervical spondylosis Chronic headachesChronic LUQ pain Chronic UTI Degenerative joint disease H/O low back mir Lumbosacral spondylosis COPD (chronic obstructive pulmonary disease)  Allergies:  oxycodone (Pruritus)  Home meds:   ·	albuterol 90 mcg/inh inhalation aerosol: 2 puff(s) inhaled every 6 hours, As Needed  ·	aspirin 81 mg oral delayed release tablet: 1 tab(s) orally once a day  ·	DULoxetine 60 mg oral delayed release capsule: 1 cap(s) orally once a day  ·	gabapentin 800 mg oral tablet: 1 tab(s) orally 3 times a day  ·	MetFORMIN (Eqv-Glumetza) 500 mg oral tablet, extended release: 1 tab(s) orally 2 times a day  ·	metoprolol tartrate 50 mg oral tablet: 1.5 tab(s) orally 2 times a day  ·	Movantik 25 mg oral tablet: 1 tab(s) orally once a day (in the morning)  ·	simvastatin 40 mg oral tablet: 1 tab(s) orally once a day (at bedtime)  ·	tiZANidine 4 mg oral tablet: 2 tab(s) orally 2 times a day as needed for -  ·	Vitamin D3 50 mcg (2000 intl units) oral tablet: 1 tab(s) orally once a day    PHYSICAL EXAMINATION  T(C): 36 (12-06 @ 18:09), Max: 36.8 (12-06 @ 09:02) HR: 63 (12-06 @ 19:00) (59 - 93) BP: 140/65 (12-06 @ 18:00) (102/71 - 199/110) RR: 14 (12-06 @ 19:00) (9 - 25) SpO2: 100% (12-06 @ 19:00) (95% - 100%)  NEUROLOGIC EXAMINATION:  Patient Ox1, agitated, frustrated, waxing and waning; FC, B UE 5/5 BL LE 0/5; sensory loss below T10   GENERAL: room air  EENT:  anicteric  CARDIOVASCULAR: (+) S1 S2, normal rate and regular rhythm  PULMONARY: clear to auscultation bilaterally  ABDOMEN: soft, nontender with normoactive bowel sounds  EXTREMITIES: no edema  SKIN: no rash    LABS: 12-06  CAPILLARY BLOOD GLUCOSE 110 115 166 152    (10.41)   11.2 (11.4)  15.76 )-----------( 222      ( 06 Dec 2023 05:30 )             33.5     137  |  104  |  9   ----------------------------<  142<H>  4.2   |  23  |  0.48<L>    Ca    8.3<L>      06 Dec 2023 05:30  Phos  4.1     12-06  Mg     2.7     12-06    TPro  7.0  /  Alb  3.8  /  TBili  0.3  /  DBili  x   /  AST  17  /  ALT  10  /  AlkPhos  119  12-04    HbA1C = 7.0 (12-04)    12-05 @ 07:01  -  12-06 @ 07:00  --------------------------------------------------------  IN: 1369.6 mL / OUT: 1460 mL / NET: -90.4 mL    12-06 @ 07:01  -  12-06 @ 19:53  --------------------------------------------------------  IN: 1405.1 mL / OUT: 1775 mL / NET: -369.9 mL     Bacteriology:  CSF studies:  EEG:  Neuroimaging:  Other imaging:    MEDICATIONS: 12-06    ·	DULoxetine 60 Oral daily  ·	gabapentin 800 Oral three times a day  ·	methocarbamol 500 Oral every 8 hours  ·	midodrine 10 milliGRAM(s) Oral every 8 hours  ·	bisacodyl 5 Oral at bedtime  ·	polyethylene glycol 3350 17 Oral daily  ·	insulin lispro (ADMELOG) corrective regimen sliding scale  SubCutaneous every 6 hours  ·	simvastatin 40 Oral at bedtime  ·	multivitamin 1 Oral daily  ·	albuterol    90 MICROgram(s) HFA Inhaler 2 Inhalation every 6 hours PRN  ·	ketorolac   Injectable 30 IV Push every 6 hours PRN  ·	naloxone Injectable 0.1 IV Push every 3 minutes PRN  ·	ondansetron Injectable 4 IV Push every 6 hours PRN    IV FLUIDS: NS@85cc/hr  DRIPS:  ·	dexMEDEtomidine Infusion 0.2 IV Continuous <Continuous> - 1.0  ·	phenylephrine    Infusion 0.1 MICROgram(s)/kG/Min IV Continuous <Continuous> 0.3  Lines: R IJ R radial [place]  Drains:    ·	3 HMV with 2 JPs  OUT:    Accordian (mL): 125 mL    Accordian (mL): 100 mL    Accordian (mL): 100 mL    Bulb (mL): 100 mL    Bulb (mL): 25 mL  Wounds:    CODE STATUS:  Full Code                       GOALS OF CARE:  aggressive                      DISPOSITION:  ICU =================================  NEUROCRITICAL CARE ATTENDING NOTE  =================================    VANNA DIAZ   MRN-[id number]  Summary:  68y/F  HTN, HLD, CVA x 3 (last was 2016 with right hand weakness residual), GAMALIEL not using CPAP, Emphysema, former 25 pack year smoker restarted this week of surgery, Chronic Constipation on Movantik, Urinary Incontinence chronically self straight cath at home, chronic UTI, Breast Implant Rupture with Chronic Leak repair 10/2023, B/L CTS s/p release, s/p Intrathecal Morphine pump for pain, with chronic neck and back pain worsening for years s/p multiple spinal surgeries including laminectomies and fusion of cervical, thoracic and lumbar spine initially done in 2009 and recently in 2019 and 2020 at [facility] by Dr. Trinidad, s/p T3-L1 revision of prior fusion (with Dr. Luz on 8/17/22). Outpatient Xray 11/16/2023 showing broken rods/displacement. Presents for elective C2-S2 instrumentation and fusion. Pt endorses weakness of b/l LE and burning pain in b/l lower extremities radiating to both feet,  (04 Dec 2023 15:44)    COURSE IN THE HOSPITAL:  12/04 admitted to [facility]  12/05 POD0 C2 to pelvis fusion, intraoperative lost monitoring (B motor and sensory) initially responsive to HDA to MAP of 120s, but lost monitoring again, becoming unresponsvie to HDA to MAP even of 140s; remained intubated, brought to ICU.  12/06 POD1 precedex + valium, agitated    Past Medical History: HTN (hypertension)  Back pain Hypertension SS (spinal stenosis) High cholesterol Stroke H/O carpal tunnel syndrome H/O carpal tunnel syndrome Chronic GERD History of chronic constipation Seizure Urinary incontinence Pre-diabetes Acute UTI Anxiety and depression Arthritis Eczema External hemorrhoids H/O kyphosis Multiple sclerosis Pancreas cyst Scoliosis Wheelchair dependent Cervical pseudoarthrosis Right shoulder pain Cervical spondylosis Chronic headachesChronic LUQ pain Chronic UTI Degenerative joint disease H/O low back mir Lumbosacral spondylosis COPD (chronic obstructive pulmonary disease)  Allergies:  oxycodone (Pruritus)  Home meds:   ·	albuterol 90 mcg/inh inhalation aerosol: 2 puff(s) inhaled every 6 hours, As Needed  ·	aspirin 81 mg oral delayed release tablet: 1 tab(s) orally once a day  ·	DULoxetine 60 mg oral delayed release capsule: 1 cap(s) orally once a day  ·	gabapentin 800 mg oral tablet: 1 tab(s) orally 3 times a day  ·	MetFORMIN (Eqv-Glumetza) 500 mg oral tablet, extended release: 1 tab(s) orally 2 times a day  ·	metoprolol tartrate 50 mg oral tablet: 1.5 tab(s) orally 2 times a day  ·	Movantik 25 mg oral tablet: 1 tab(s) orally once a day (in the morning)  ·	simvastatin 40 mg oral tablet: 1 tab(s) orally once a day (at bedtime)  ·	tiZANidine 4 mg oral tablet: 2 tab(s) orally 2 times a day as needed for -  ·	Vitamin D3 50 mcg (2000 intl units) oral tablet: 1 tab(s) orally once a day    PHYSICAL EXAMINATION  T(C): 36 (12-06 @ 18:09), Max: 36.8 (12-06 @ 09:02) HR: 63 (12-06 @ 19:00) (59 - 93) BP: 140/65 (12-06 @ 18:00) (102/71 - 199/110) RR: 14 (12-06 @ 19:00) (9 - 25) SpO2: 100% (12-06 @ 19:00) (95% - 100%)  NEUROLOGIC EXAMINATION:  Patient Ox1, agitated, frustrated, waxing and waning; FC, B UE 5/5 BL LE 0/5; sensory loss below T10   GENERAL: room air  EENT:  anicteric  CARDIOVASCULAR: (+) S1 S2, normal rate and regular rhythm  PULMONARY: clear to auscultation bilaterally  ABDOMEN: soft, nontender with normoactive bowel sounds  EXTREMITIES: no edema  SKIN: no rash    LABS: 12-06  CAPILLARY BLOOD GLUCOSE 110 115 166 152    (10.41)   11.2 (11.4)  15.76 )-----------( 222      ( 06 Dec 2023 05:30 )             33.5     137  |  104  |  9   ----------------------------<  142<H>  4.2   |  23  |  0.48<L>    Ca    8.3<L>      06 Dec 2023 05:30  Phos  4.1     12-06  Mg     2.7     12-06    TPro  7.0  /  Alb  3.8  /  TBili  0.3  /  DBili  x   /  AST  17  /  ALT  10  /  AlkPhos  119  12-04    HbA1C = 7.0 (12-04)    12-05 @ 07:01  -  12-06 @ 07:00  --------------------------------------------------------  IN: 1369.6 mL / OUT: 1460 mL / NET: -90.4 mL    12-06 @ 07:01  -  12-06 @ 19:53  --------------------------------------------------------  IN: 1405.1 mL / OUT: 1775 mL / NET: -369.9 mL     Bacteriology:  CSF studies:  EEG:  Neuroimaging:  Other imaging:    MEDICATIONS: 12-06    ·	DULoxetine 60 Oral daily  ·	gabapentin 800 Oral three times a day  ·	methocarbamol 500 Oral every 8 hours  ·	midodrine 10 milliGRAM(s) Oral every 8 hours  ·	bisacodyl 5 Oral at bedtime  ·	polyethylene glycol 3350 17 Oral daily  ·	insulin lispro (ADMELOG) corrective regimen sliding scale  SubCutaneous every 6 hours  ·	simvastatin 40 Oral at bedtime  ·	multivitamin 1 Oral daily  ·	albuterol    90 MICROgram(s) HFA Inhaler 2 Inhalation every 6 hours PRN  ·	ketorolac   Injectable 30 IV Push every 6 hours PRN  ·	naloxone Injectable 0.1 IV Push every 3 minutes PRN  ·	ondansetron Injectable 4 IV Push every 6 hours PRN    IV FLUIDS: NS@85cc/hr  DRIPS:  ·	dexMEDEtomidine Infusion 0.2 IV Continuous <Continuous> - 1.0  ·	phenylephrine    Infusion 0.1 MICROgram(s)/kG/Min IV Continuous <Continuous> 0.3  Lines: R IJ R radial [place]  Drains:    ·	3 HMV with 2 JPs  OUT:    Accordian (mL): 125 mL    Accordian (mL): 100 mL    Accordian (mL): 100 mL    Bulb (mL): 100 mL    Bulb (mL): 25 mL  Wounds:    CODE STATUS:  Full Code                       GOALS OF CARE:  aggressive                      DISPOSITION:  ICU

## 2023-12-06 NOTE — PROGRESS NOTE ADULT - SUBJECTIVE AND OBJECTIVE BOX
HPI:  68 y/o female with PMHx HTN, HLD, CVA x 3 (last was 2016 with right hand weakness residual), GAMALIEL not using CPAP, Emphysema, former 25 pack year smoker restarted this week of surgery, Chronic Constipation on Movantik, Urinary Incontinence chronically self straight cath at home, chronic UTI, Breast Implant Rupture with Chronic Leak repair 10/2023, B/L CTS s/p release, s/p Intrathecal Morphine pump for pain, with chronic neck and back pain worsening for years s/p multiple spinal surgeries including laminectomies and fusion of cervical, thoracic and lumbar spine initially done in 2009 and recently in 2019 and 2020 at [facility] by Dr. Trinidad, s/p T3-L1 revision of prior fusion (with Dr. Luz on 8/17/22). Outpatient Xray 11/16/2023 showing broken rods/displacement. Presents for elective C2-S2 instrumentation and fusion. Pt endorses weakness of b/l LE and burning pain in b/l lower extremities radiating to both feet,  (04 Dec 2023 15:44)    OVERNIGHT EVENTS: KAMRAN    Hospital Course:   12/4: Admitted for spinal fusion d/t damaged hardware.   12/5: Neuro stable. POD0 C2-S2 instrumentation/fusion (intraop b/l LE motor loss). on parminder for MAP>90  12/6: POD1. KAMRAN o/n neuro stable. remains intubated.    Vital Signs Last 24 Hrs  T(C): 36.1 (06 Dec 2023 00:58), Max: 36.9 (05 Dec 2023 05:46)  T(F): 97 (06 Dec 2023 00:58), Max: 98.5 (05 Dec 2023 05:46)  HR: 62 (06 Dec 2023 01:00) (59 - 77)  BP: 126/82 (05 Dec 2023 22:15) (102/71 - 199/110)  BP(mean): 99 (05 Dec 2023 22:15) (82 - 125)  RR: 13 (06 Dec 2023 01:00) (9 - 17)  SpO2: 100% (06 Dec 2023 01:00) (94% - 100%)    Parameters below as of 06 Dec 2023 01:00  Patient On (Oxygen Delivery Method): ventilator    O2 Concentration (%): 40    I&O's Summary    04 Dec 2023 07:01  -  05 Dec 2023 07:00  --------------------------------------------------------  IN: 0 mL / OUT: 2150 mL / NET: -2150 mL        PHYSICAL EXAM:  GEN: laying in bed, NAD  NEURO: alert. not FC, OE to voice, face symmetric. CNII-XII intact. PERRL. LUE 5/5, RUE 3/5, b/l LEs 0  CV: RRR +S1/S2  PULM: CTAB  GI: Abd soft, NT/ND  EXT: ext warm, dry, nontender    WOUND/DRAINS: cervical-pelvis incision c/d/i    TUBES/LINES:  [] Jiménez  [] Lumbar Drain  [x] Wound Drains: HMVx3, JPx2  [] Others      DIET:  [x] NPO  [] Mechanical  [] Tube feeds    LABS:                        11.4   10.41 )-----------( 207      ( 05 Dec 2023 20:59 )             34.3     12-05    141  |  110<H>  |  7   ----------------------------<  161<H>  4.4   |  25  |  0.46<L>    Ca    8.2<L>      05 Dec 2023 20:59  Phos  3.3     12-05  Mg     1.3     12-05    TPro  7.0  /  Alb  3.8  /  TBili  0.3  /  DBili  x   /  AST  17  /  ALT  10  /  AlkPhos  119  12-04    PT/INR - ( 05 Dec 2023 20:59 )   PT: 11.5 sec;   INR: 1.01          PTT - ( 05 Dec 2023 20:59 )  PTT:27.7 sec  Urinalysis Basic - ( 05 Dec 2023 20:59 )    Color: x / Appearance: x / SG: x / pH: x  Gluc: 161 mg/dL / Ketone: x  / Bili: x / Urobili: x   Blood: x / Protein: x / Nitrite: x   Leuk Esterase: x / RBC: x / WBC x   Sq Epi: x / Non Sq Epi: x / Bacteria: x          CAPILLARY BLOOD GLUCOSE      POCT Blood Glucose.: 152 mg/dL (05 Dec 2023 23:27)  POCT Blood Glucose.: 116 mg/dL (05 Dec 2023 06:28)      Drug Levels: [] N/A    CSF Analysis: [] N/A      Allergies    oxycodone (Pruritus)    Intolerances      MEDICATIONS:  Antibiotics:  ceFAZolin   IVPB 2000 milliGRAM(s) IV Intermittent every 8 hours    Neuro:  dexMEDEtomidine Infusion 0.2 MICROgram(s)/kG/Hr IV Continuous <Continuous>  diphenhydrAMINE 25 milliGRAM(s) Oral every 6 hours PRN  DULoxetine 60 milliGRAM(s) Oral daily  fentaNYL    Injectable 25 MICROGram(s) IV Push every 2 hours PRN  gabapentin 800 milliGRAM(s) Oral three times a day  HYDROmorphone  Injectable 1 milliGRAM(s) IV Push every 4 hours  ketorolac   Injectable 15 milliGRAM(s) IV Push every 6 hours  methocarbamol 500 milliGRAM(s) Oral every 8 hours  ondansetron Injectable 4 milliGRAM(s) IV Push every 6 hours PRN  propofol Infusion 10 MICROgram(s)/kG/Min IV Continuous <Continuous>    Anticoagulation:    OTHER:  albuterol    90 MICROgram(s) HFA Inhaler 2 Puff(s) Inhalation every 6 hours PRN  bisacodyl 5 milliGRAM(s) Oral at bedtime  chlorhexidine 0.12% Liquid 15 milliLiter(s) Oral Mucosa every 12 hours  chlorhexidine 2% Cloths 1 Application(s) Topical <User Schedule>  chlorhexidine 4% Liquid 1 Application(s) Topical <User Schedule>  dextrose 50% Injectable 12.5 Gram(s) IV Push once  dextrose 50% Injectable 25 Gram(s) IV Push once  dextrose 50% Injectable 25 Gram(s) IV Push once  dextrose Oral Gel 15 Gram(s) Oral once PRN  glucagon  Injectable 1 milliGRAM(s) IntraMuscular once  influenza  Vaccine (HIGH DOSE) 0.7 milliLiter(s) IntraMuscular once  insulin lispro (ADMELOG) corrective regimen sliding scale   SubCutaneous every 6 hours  naloxone Injectable 0.1 milliGRAM(s) IV Push every 3 minutes PRN  pantoprazole  Injectable 40 milliGRAM(s) IV Push daily  phenylephrine    Infusion 0.1 MICROgram(s)/kG/Min IV Continuous <Continuous>  polyethylene glycol 3350 17 Gram(s) Oral daily  simvastatin 40 milliGRAM(s) Oral at bedtime    IVF:  dextrose 5%. 1000 milliLiter(s) IV Continuous <Continuous>  dextrose 5%. 1000 milliLiter(s) IV Continuous <Continuous>  multivitamin 1 Tablet(s) Oral daily  sodium chloride 0.9%. 1000 milliLiter(s) IV Continuous <Continuous>    CULTURES:    RADIOLOGY & ADDITIONAL TESTS:      ASSESSMENT:  67 yo female with Hx HTN, HLD, CVA x 3 (last in 2016 with residual R hand weakness), GAMALIEL not using CPAP, Emphysema, ex-25 pack year smoker now restarted, chronic constipation on Movantik, chronic urinary retention (SC at home), b/l CTS s/p release, s/p Intrathecal Morphine pump for pain, with chronic neck and back pain worsening for years s/p multiple spinal surgeries including laminectomies and fusion of cervical, thoracic and lumbar spine, s/p T3-L1 revision of prior fusion (with  on 8/17/22). Xray 11/16/23 showing broken rods/displacement. Now s/p C2-S2 instrumentation and fusion (12/5)    S12.9XXA    Handoff    MEWS Score    HTN (hypertension)    Back pain    Hypertension    SS (spinal stenosis)    High cholesterol    Stroke    H/O carpal tunnel syndrome    H/O carpal tunnel syndrome    Chronic GERD    History of chronic constipation    Seizure    Urinary incontinence    Pre-diabetes    Acute UTI    Anxiety    Anxiety and depression    Arthritis    Eczema    External hemorrhoids    H/O kyphosis    Multiple sclerosis    Pancreas cyst    Scoliosis    Wheelchair dependent    Cervical pseudoarthrosis    Right shoulder pain    Cervical spondylosis    Chronic headaches    Chronic LUQ pain    Chronic UTI    Degenerative joint disease    H/O low back pain    Lumbosacral spondylosis    COPD (chronic obstructive pulmonary disease)    Back pain    Back pain    Back pain    Revision of posterolateral fusion of lumbar spine    Revision of fusion of thoracic spine    Cervical disc disease    H/O Spinal surgery    H/O breast surgery    S/P cervical discectomy    Previous back surgery    H/O shoulder surgery    H/O total shoulder replacement, right    SysAdmin_VstLnk        PLAN:  Neuro:  - Neuro/vitals q1h  - pain control: modified ERAS, IT morphine pump, tylenol, iv dilaudid (resume when taking po: dilaudid 2/4 prn, robaxin, home gabapentin 800 TID, home tizanidine)  - C/w home cymbalta  - HMVx3, JPx2 both per plastics  - sedation: wean propofol to precedex    Cardio:  - MAP>90, parminder gtt  - holding home metoprolol while on pressors   - C/w home simvastatin     Pulm:  - intubated, full vent support  - hx GAMALIEL, no CPAP at home    GI:  - NPO   - Bowel regimen  - gi ppx protonix while intubated    Renal:  - IVF while NPO  - +jiménez (SC at home)    Endo:  - ISS  - f/u a1c    Heme:  - SCDs DVT ppx  - 500 cell saver intraop    ID:  - afebrile  - post op ancef    Dispo:  - full code, ICU, dispo pending    D/w Dr. Luz, Dr. Sanders      Assessment:  Present when checked    []  GCS  E   V  M     Heart Failure: []Acute, [] acute on chronic , []chronic  Heart Failure:  [] Diastolic (HFpEF), [] Systolic (HFrEF), []Combined (HFpEF and HFrEF), [] RHF, [] Pulm HTN, [] Other    [] DARRYL, [] ATN, [] AIN, [] other  [] CKD1, [] CKD2, [] CKD 3, [] CKD 4, [] CKD 5, []ESRD    Encephalopathy: [] Metabolic, [] Hepatic, [] toxic, [] Neurological, [] Other    Abnormal Nurtitional Status: [] malnurtition (see nutrition note), [ ]underweight: BMI < 19, [] morbid obesity: BMI >40, [] Cachexia    [] Sepsis  [] hypovolemic shock,[] cardiogenic shock, [] hemorrhagic shock, [] neuogenic shock  [] Acute Respiratory Failure  []Cerebral edema, [] Brain compression/ herniation,   [] Functional quadriplegia  [] Acute blood loss anemia   HPI:  66 y/o female with PMHx HTN, HLD, CVA x 3 (last was 2016 with right hand weakness residual), GAMALIEL not using CPAP, Emphysema, former 25 pack year smoker restarted this week of surgery, Chronic Constipation on Movantik, Urinary Incontinence chronically self straight cath at home, chronic UTI, Breast Implant Rupture with Chronic Leak repair 10/2023, B/L CTS s/p release, s/p Intrathecal Morphine pump for pain, with chronic neck and back pain worsening for years s/p multiple spinal surgeries including laminectomies and fusion of cervical, thoracic and lumbar spine initially done in 2009 and recently in 2019 and 2020 at [facility] by Dr. Trinidad, s/p T3-L1 revision of prior fusion (with Dr. Luz on 8/17/22). Outpatient Xray 11/16/2023 showing broken rods/displacement. Presents for elective C2-S2 instrumentation and fusion. Pt endorses weakness of b/l LE and burning pain in b/l lower extremities radiating to both feet,  (04 Dec 2023 15:44)    OVERNIGHT EVENTS: KAMRAN    Hospital Course:   12/4: Admitted for spinal fusion d/t damaged hardware.   12/5: Neuro stable. POD0 C2-S2 instrumentation/fusion (intraop b/l LE motor loss). on parminder for MAP>90  12/6: POD1. KAMRAN o/n neuro stable. remains intubated.    Vital Signs Last 24 Hrs  T(C): 36.1 (06 Dec 2023 00:58), Max: 36.9 (05 Dec 2023 05:46)  T(F): 97 (06 Dec 2023 00:58), Max: 98.5 (05 Dec 2023 05:46)  HR: 62 (06 Dec 2023 01:00) (59 - 77)  BP: 126/82 (05 Dec 2023 22:15) (102/71 - 199/110)  BP(mean): 99 (05 Dec 2023 22:15) (82 - 125)  RR: 13 (06 Dec 2023 01:00) (9 - 17)  SpO2: 100% (06 Dec 2023 01:00) (94% - 100%)    Parameters below as of 06 Dec 2023 01:00  Patient On (Oxygen Delivery Method): ventilator    O2 Concentration (%): 40    I&O's Summary    04 Dec 2023 07:01  -  05 Dec 2023 07:00  --------------------------------------------------------  IN: 0 mL / OUT: 2150 mL / NET: -2150 mL        PHYSICAL EXAM:  GEN: laying in bed, NAD  NEURO: alert. not FC, OE to voice, face symmetric. CNII-XII intact. PERRL. LUE 5/5, RUE 3/5, b/l LEs 0  CV: RRR +S1/S2  PULM: CTAB  GI: Abd soft, NT/ND  EXT: ext warm, dry, nontender    WOUND/DRAINS: cervical-pelvis incision c/d/i    TUBES/LINES:  [] Jiménez  [] Lumbar Drain  [x] Wound Drains: HMVx3, JPx2  [] Others      DIET:  [x] NPO  [] Mechanical  [] Tube feeds    LABS:                        11.4   10.41 )-----------( 207      ( 05 Dec 2023 20:59 )             34.3     12-05    141  |  110<H>  |  7   ----------------------------<  161<H>  4.4   |  25  |  0.46<L>    Ca    8.2<L>      05 Dec 2023 20:59  Phos  3.3     12-05  Mg     1.3     12-05    TPro  7.0  /  Alb  3.8  /  TBili  0.3  /  DBili  x   /  AST  17  /  ALT  10  /  AlkPhos  119  12-04    PT/INR - ( 05 Dec 2023 20:59 )   PT: 11.5 sec;   INR: 1.01          PTT - ( 05 Dec 2023 20:59 )  PTT:27.7 sec  Urinalysis Basic - ( 05 Dec 2023 20:59 )    Color: x / Appearance: x / SG: x / pH: x  Gluc: 161 mg/dL / Ketone: x  / Bili: x / Urobili: x   Blood: x / Protein: x / Nitrite: x   Leuk Esterase: x / RBC: x / WBC x   Sq Epi: x / Non Sq Epi: x / Bacteria: x          CAPILLARY BLOOD GLUCOSE      POCT Blood Glucose.: 152 mg/dL (05 Dec 2023 23:27)  POCT Blood Glucose.: 116 mg/dL (05 Dec 2023 06:28)      Drug Levels: [] N/A    CSF Analysis: [] N/A      Allergies    oxycodone (Pruritus)    Intolerances      MEDICATIONS:  Antibiotics:  ceFAZolin   IVPB 2000 milliGRAM(s) IV Intermittent every 8 hours    Neuro:  dexMEDEtomidine Infusion 0.2 MICROgram(s)/kG/Hr IV Continuous <Continuous>  diphenhydrAMINE 25 milliGRAM(s) Oral every 6 hours PRN  DULoxetine 60 milliGRAM(s) Oral daily  fentaNYL    Injectable 25 MICROGram(s) IV Push every 2 hours PRN  gabapentin 800 milliGRAM(s) Oral three times a day  HYDROmorphone  Injectable 1 milliGRAM(s) IV Push every 4 hours  ketorolac   Injectable 15 milliGRAM(s) IV Push every 6 hours  methocarbamol 500 milliGRAM(s) Oral every 8 hours  ondansetron Injectable 4 milliGRAM(s) IV Push every 6 hours PRN  propofol Infusion 10 MICROgram(s)/kG/Min IV Continuous <Continuous>    Anticoagulation:    OTHER:  albuterol    90 MICROgram(s) HFA Inhaler 2 Puff(s) Inhalation every 6 hours PRN  bisacodyl 5 milliGRAM(s) Oral at bedtime  chlorhexidine 0.12% Liquid 15 milliLiter(s) Oral Mucosa every 12 hours  chlorhexidine 2% Cloths 1 Application(s) Topical <User Schedule>  chlorhexidine 4% Liquid 1 Application(s) Topical <User Schedule>  dextrose 50% Injectable 12.5 Gram(s) IV Push once  dextrose 50% Injectable 25 Gram(s) IV Push once  dextrose 50% Injectable 25 Gram(s) IV Push once  dextrose Oral Gel 15 Gram(s) Oral once PRN  glucagon  Injectable 1 milliGRAM(s) IntraMuscular once  influenza  Vaccine (HIGH DOSE) 0.7 milliLiter(s) IntraMuscular once  insulin lispro (ADMELOG) corrective regimen sliding scale   SubCutaneous every 6 hours  naloxone Injectable 0.1 milliGRAM(s) IV Push every 3 minutes PRN  pantoprazole  Injectable 40 milliGRAM(s) IV Push daily  phenylephrine    Infusion 0.1 MICROgram(s)/kG/Min IV Continuous <Continuous>  polyethylene glycol 3350 17 Gram(s) Oral daily  simvastatin 40 milliGRAM(s) Oral at bedtime    IVF:  dextrose 5%. 1000 milliLiter(s) IV Continuous <Continuous>  dextrose 5%. 1000 milliLiter(s) IV Continuous <Continuous>  multivitamin 1 Tablet(s) Oral daily  sodium chloride 0.9%. 1000 milliLiter(s) IV Continuous <Continuous>    CULTURES:    RADIOLOGY & ADDITIONAL TESTS:      ASSESSMENT:  69 yo female with Hx HTN, HLD, CVA x 3 (last in 2016 with residual R hand weakness), GAMALIEL not using CPAP, Emphysema, ex-25 pack year smoker now restarted, chronic constipation on Movantik, chronic urinary retention (SC at home), b/l CTS s/p release, s/p Intrathecal Morphine pump for pain, with chronic neck and back pain worsening for years s/p multiple spinal surgeries including laminectomies and fusion of cervical, thoracic and lumbar spine, s/p T3-L1 revision of prior fusion (with  on 8/17/22). Xray 11/16/23 showing broken rods/displacement. Now s/p C2-S2 instrumentation and fusion (12/5)    S12.9XXA    Handoff    MEWS Score    HTN (hypertension)    Back pain    Hypertension    SS (spinal stenosis)    High cholesterol    Stroke    H/O carpal tunnel syndrome    H/O carpal tunnel syndrome    Chronic GERD    History of chronic constipation    Seizure    Urinary incontinence    Pre-diabetes    Acute UTI    Anxiety    Anxiety and depression    Arthritis    Eczema    External hemorrhoids    H/O kyphosis    Multiple sclerosis    Pancreas cyst    Scoliosis    Wheelchair dependent    Cervical pseudoarthrosis    Right shoulder pain    Cervical spondylosis    Chronic headaches    Chronic LUQ pain    Chronic UTI    Degenerative joint disease    H/O low back pain    Lumbosacral spondylosis    COPD (chronic obstructive pulmonary disease)    Back pain    Back pain    Back pain    Revision of posterolateral fusion of lumbar spine    Revision of fusion of thoracic spine    Cervical disc disease    H/O Spinal surgery    H/O breast surgery    S/P cervical discectomy    Previous back surgery    H/O shoulder surgery    H/O total shoulder replacement, right    SysAdmin_VstLnk        PLAN:  Neuro:  - Neuro/vitals q1h  - pain control: modified ERAS, IT morphine pump, tylenol, iv dilaudid (resume when taking po: dilaudid 2/4 prn, robaxin, home gabapentin 800 TID, home tizanidine)  - C/w home cymbalta  - HMVx3, JPx2 both per plastics  - sedation: wean propofol to precedex    Cardio:  - MAP>90, parminder gtt  - holding home metoprolol while on pressors   - C/w home simvastatin     Pulm:  - intubated, full vent support  - hx GAMALIEL, no CPAP at home    GI:  - NPO   - Bowel regimen  - gi ppx protonix while intubated    Renal:  - IVF while NPO  - +jiménez (SC at home)    Endo:  - ISS  - f/u a1c    Heme:  - SCDs DVT ppx  - 500 cell saver intraop    ID:  - afebrile  - post op ancef    Dispo:  - full code, ICU, dispo pending    D/w Dr. Luz, Dr. Sanders      Assessment:  Present when checked    []  GCS  E   V  M     Heart Failure: []Acute, [] acute on chronic , []chronic  Heart Failure:  [] Diastolic (HFpEF), [] Systolic (HFrEF), []Combined (HFpEF and HFrEF), [] RHF, [] Pulm HTN, [] Other    [] DARRYL, [] ATN, [] AIN, [] other  [] CKD1, [] CKD2, [] CKD 3, [] CKD 4, [] CKD 5, []ESRD    Encephalopathy: [] Metabolic, [] Hepatic, [] toxic, [] Neurological, [] Other    Abnormal Nurtitional Status: [] malnurtition (see nutrition note), [ ]underweight: BMI < 19, [] morbid obesity: BMI >40, [] Cachexia    [] Sepsis  [] hypovolemic shock,[] cardiogenic shock, [] hemorrhagic shock, [] neuogenic shock  [] Acute Respiratory Failure  []Cerebral edema, [] Brain compression/ herniation,   [] Functional quadriplegia  [] Acute blood loss anemia

## 2023-12-06 NOTE — DIETITIAN INITIAL EVALUATION ADULT - PERTINENT MEDS FT
MEDICATIONS  (STANDING):  bisacodyl 5 milliGRAM(s) Oral at bedtime  chlorhexidine 2% Cloths 1 Application(s) Topical <User Schedule>  dexMEDEtomidine Infusion 0.2 MICROgram(s)/kG/Hr (4.24 mL/Hr) IV Continuous <Continuous>  DULoxetine 60 milliGRAM(s) Oral daily  gabapentin 800 milliGRAM(s) Oral three times a day  influenza  Vaccine (HIGH DOSE) 0.7 milliLiter(s) IntraMuscular once  insulin lispro (ADMELOG) corrective regimen sliding scale   SubCutaneous every 6 hours  ketorolac   Injectable 15 milliGRAM(s) IV Push every 6 hours  methocarbamol 500 milliGRAM(s) Oral every 8 hours  midodrine 10 milliGRAM(s) Oral every 8 hours  multivitamin 1 Tablet(s) Oral daily  phenylephrine    Infusion 0.1 MICROgram(s)/kG/Min (3.18 mL/Hr) IV Continuous <Continuous>  polyethylene glycol 3350 17 Gram(s) Oral daily  simvastatin 40 milliGRAM(s) Oral at bedtime  sodium chloride 0.9%. 1000 milliLiter(s) (85 mL/Hr) IV Continuous <Continuous>    MEDICATIONS  (PRN):  albuterol    90 MICROgram(s) HFA Inhaler 2 Puff(s) Inhalation every 6 hours PRN Shortness of Breath  ketorolac   Injectable 30 milliGRAM(s) IV Push every 6 hours PRN Severe Pain (7 - 10)  naloxone Injectable 0.1 milliGRAM(s) IV Push every 3 minutes PRN For ANY of the following changes in patient status:  A. RR LESS THAN 10 breaths per minute, B. Oxygen saturation LESS THAN 90%, C. Sedation score of 6  ondansetron Injectable 4 milliGRAM(s) IV Push every 6 hours PRN Nausea  sodium chloride 0.9% lock flush 10 milliLiter(s) IV Push every 1 hour PRN Pre/post blood products, medications, blood draw, and to maintain line patency

## 2023-12-06 NOTE — DIETITIAN INITIAL EVALUATION ADULT - NUTRITIONGOAL OUTCOME1
Diet to be advanced in 24-48 hours as medically feasible  Consistently meet >75% of nutrition needs via most appropriate route for nutrition

## 2023-12-06 NOTE — PROGRESS NOTE ADULT - ASSESSMENT
ASSESSMENT/PLAN:    s/p C2 to pelvis fusion, intraoperative lost monitoring, re-gained at MAP>120, lost again and unresponsive to even MAPs>140    NEURO:  - neuro checks q1h  - MRI today, will likely need sedation, unable to lie still d/t pain  - drains per plastics/nsg  - pain following  - standing xr on stepdown    CVS:  - MAP>90  - levo  - will likely need prolnged pressors, will start midodrine once oral access  - TLC paced intra-op 12/5, requires for pressors    PULM:  - RA  - IS As tolerated  - Aspiration precautions    RENAL:  - Fluids: 50cc/h NS while NPO and on pressors  - daily IOs    GI:  - Diet: SLP eval  - Bowel regimen: miralax, senna    ENDO:   - FS goal 120-180    HEME/ONC:  - SCDs  - Chemoppx: likely strt tonight if cleared by NSG    ID:  - monitor for fevers

## 2023-12-06 NOTE — DIETITIAN INITIAL EVALUATION ADULT - OTHER CALCULATIONS
HT (inches): 67       WT (pounds): 186.7 (12/5)       BMI (kg/m^2): 29.2       IBW (pounds): 135 +/- 10%       %IBW: 138.3 %  Ideal body weight (IBW) being used as Pt is critically ill and >=100% of ideal body weight. Fluids at team's discretion.

## 2023-12-06 NOTE — DIETITIAN INITIAL EVALUATION ADULT - LAB (SPECIFY)
monitor BMP, CBC, glucose, lytes, trend renal indices, LFTs, POCT   - Of note, patient with history of hypomagnesemia; please continue to monitor and replete as needed

## 2023-12-06 NOTE — DIETITIAN INITIAL EVALUATION ADULT - ADD RECOMMEND
- If patient cleared for PO diet, recommend advance as tolerated to CONSISTENT CARBOHYDRATE DIET   - Defer consistency to team/S+S  - If PO diet is not feasible or indicated, recommend consult nutrition team/dietitians for alternate means of nutrition (EN or PN) recommendations  - Monitor PO intake/appetite, diet tolerance, GI distress, labs (electrolytes, CMP)  - Of note, patient with history of hypomagnesemia; please continue to monitor and replete as needed  - Requesting to trend weights (weekly) as able  - Honor Pt food preferences as able  - Pain and bowel regimen as per team  - Team notified of the above recommendations

## 2023-12-07 LAB
ANION GAP SERPL CALC-SCNC: 8 MMOL/L — SIGNIFICANT CHANGE UP (ref 5–17)
ANION GAP SERPL CALC-SCNC: 8 MMOL/L — SIGNIFICANT CHANGE UP (ref 5–17)
BUN SERPL-MCNC: 10 MG/DL — SIGNIFICANT CHANGE UP (ref 7–23)
BUN SERPL-MCNC: 10 MG/DL — SIGNIFICANT CHANGE UP (ref 7–23)
CALCIUM SERPL-MCNC: 8.4 MG/DL — SIGNIFICANT CHANGE UP (ref 8.4–10.5)
CALCIUM SERPL-MCNC: 8.4 MG/DL — SIGNIFICANT CHANGE UP (ref 8.4–10.5)
CHLORIDE SERPL-SCNC: 113 MMOL/L — HIGH (ref 96–108)
CHLORIDE SERPL-SCNC: 113 MMOL/L — HIGH (ref 96–108)
CO2 SERPL-SCNC: 23 MMOL/L — SIGNIFICANT CHANGE UP (ref 22–31)
CO2 SERPL-SCNC: 23 MMOL/L — SIGNIFICANT CHANGE UP (ref 22–31)
CREAT SERPL-MCNC: 0.47 MG/DL — LOW (ref 0.5–1.3)
CREAT SERPL-MCNC: 0.47 MG/DL — LOW (ref 0.5–1.3)
EGFR: 103 ML/MIN/1.73M2 — SIGNIFICANT CHANGE UP
EGFR: 103 ML/MIN/1.73M2 — SIGNIFICANT CHANGE UP
GLUCOSE BLDC GLUCOMTR-MCNC: 112 MG/DL — HIGH (ref 70–99)
GLUCOSE BLDC GLUCOMTR-MCNC: 112 MG/DL — HIGH (ref 70–99)
GLUCOSE BLDC GLUCOMTR-MCNC: 117 MG/DL — HIGH (ref 70–99)
GLUCOSE BLDC GLUCOMTR-MCNC: 117 MG/DL — HIGH (ref 70–99)
GLUCOSE BLDC GLUCOMTR-MCNC: 126 MG/DL — HIGH (ref 70–99)
GLUCOSE BLDC GLUCOMTR-MCNC: 126 MG/DL — HIGH (ref 70–99)
GLUCOSE SERPL-MCNC: 129 MG/DL — HIGH (ref 70–99)
GLUCOSE SERPL-MCNC: 129 MG/DL — HIGH (ref 70–99)
HCT VFR BLD CALC: 28.5 % — LOW (ref 34.5–45)
HCT VFR BLD CALC: 28.5 % — LOW (ref 34.5–45)
HGB BLD-MCNC: 9.4 G/DL — LOW (ref 11.5–15.5)
HGB BLD-MCNC: 9.4 G/DL — LOW (ref 11.5–15.5)
MAGNESIUM SERPL-MCNC: 1.8 MG/DL — SIGNIFICANT CHANGE UP (ref 1.6–2.6)
MAGNESIUM SERPL-MCNC: 1.8 MG/DL — SIGNIFICANT CHANGE UP (ref 1.6–2.6)
MCHC RBC-ENTMCNC: 27.6 PG — SIGNIFICANT CHANGE UP (ref 27–34)
MCHC RBC-ENTMCNC: 27.6 PG — SIGNIFICANT CHANGE UP (ref 27–34)
MCHC RBC-ENTMCNC: 33 GM/DL — SIGNIFICANT CHANGE UP (ref 32–36)
MCHC RBC-ENTMCNC: 33 GM/DL — SIGNIFICANT CHANGE UP (ref 32–36)
MCV RBC AUTO: 83.6 FL — SIGNIFICANT CHANGE UP (ref 80–100)
MCV RBC AUTO: 83.6 FL — SIGNIFICANT CHANGE UP (ref 80–100)
NRBC # BLD: 0 /100 WBCS — SIGNIFICANT CHANGE UP (ref 0–0)
NRBC # BLD: 0 /100 WBCS — SIGNIFICANT CHANGE UP (ref 0–0)
PHOSPHATE SERPL-MCNC: 2.6 MG/DL — SIGNIFICANT CHANGE UP (ref 2.5–4.5)
PHOSPHATE SERPL-MCNC: 2.6 MG/DL — SIGNIFICANT CHANGE UP (ref 2.5–4.5)
PLATELET # BLD AUTO: 188 K/UL — SIGNIFICANT CHANGE UP (ref 150–400)
PLATELET # BLD AUTO: 188 K/UL — SIGNIFICANT CHANGE UP (ref 150–400)
POTASSIUM SERPL-MCNC: 4 MMOL/L — SIGNIFICANT CHANGE UP (ref 3.5–5.3)
POTASSIUM SERPL-MCNC: 4 MMOL/L — SIGNIFICANT CHANGE UP (ref 3.5–5.3)
POTASSIUM SERPL-SCNC: 4 MMOL/L — SIGNIFICANT CHANGE UP (ref 3.5–5.3)
POTASSIUM SERPL-SCNC: 4 MMOL/L — SIGNIFICANT CHANGE UP (ref 3.5–5.3)
RBC # BLD: 3.41 M/UL — LOW (ref 3.8–5.2)
RBC # BLD: 3.41 M/UL — LOW (ref 3.8–5.2)
RBC # FLD: 13.9 % — SIGNIFICANT CHANGE UP (ref 10.3–14.5)
RBC # FLD: 13.9 % — SIGNIFICANT CHANGE UP (ref 10.3–14.5)
SODIUM SERPL-SCNC: 144 MMOL/L — SIGNIFICANT CHANGE UP (ref 135–145)
SODIUM SERPL-SCNC: 144 MMOL/L — SIGNIFICANT CHANGE UP (ref 135–145)
WBC # BLD: 12.12 K/UL — HIGH (ref 3.8–10.5)
WBC # BLD: 12.12 K/UL — HIGH (ref 3.8–10.5)
WBC # FLD AUTO: 12.12 K/UL — HIGH (ref 3.8–10.5)
WBC # FLD AUTO: 12.12 K/UL — HIGH (ref 3.8–10.5)

## 2023-12-07 PROCEDURE — 99221 1ST HOSP IP/OBS SF/LOW 40: CPT

## 2023-12-07 PROCEDURE — 99291 CRITICAL CARE FIRST HOUR: CPT

## 2023-12-07 PROCEDURE — 72146 MRI CHEST SPINE W/O DYE: CPT | Mod: 26

## 2023-12-07 RX ORDER — DIAZEPAM 5 MG
2 TABLET ORAL ONCE
Refills: 0 | Status: DISCONTINUED | OUTPATIENT
Start: 2023-12-07 | End: 2023-12-07

## 2023-12-07 RX ORDER — OXYCODONE HYDROCHLORIDE 5 MG/1
10 TABLET ORAL EVERY 4 HOURS
Refills: 0 | Status: DISCONTINUED | OUTPATIENT
Start: 2023-12-07 | End: 2023-12-08

## 2023-12-07 RX ORDER — OXYCODONE HYDROCHLORIDE 5 MG/1
5 TABLET ORAL ONCE
Refills: 0 | Status: DISCONTINUED | OUTPATIENT
Start: 2023-12-07 | End: 2023-12-07

## 2023-12-07 RX ORDER — SENNA PLUS 8.6 MG/1
2 TABLET ORAL AT BEDTIME
Refills: 0 | Status: DISCONTINUED | OUTPATIENT
Start: 2023-12-07 | End: 2023-12-08

## 2023-12-07 RX ORDER — HYDROMORPHONE HYDROCHLORIDE 2 MG/ML
0.5 INJECTION INTRAMUSCULAR; INTRAVENOUS; SUBCUTANEOUS ONCE
Refills: 0 | Status: DISCONTINUED | OUTPATIENT
Start: 2023-12-07 | End: 2023-12-07

## 2023-12-07 RX ORDER — OXYCODONE HYDROCHLORIDE 5 MG/1
5 TABLET ORAL EVERY 4 HOURS
Refills: 0 | Status: DISCONTINUED | OUTPATIENT
Start: 2023-12-07 | End: 2023-12-08

## 2023-12-07 RX ORDER — BENZOCAINE 10 %
1 GEL (GRAM) MUCOUS MEMBRANE THREE TIMES A DAY
Refills: 0 | Status: DISCONTINUED | OUTPATIENT
Start: 2023-12-07 | End: 2024-01-05

## 2023-12-07 RX ORDER — HYDROMORPHONE HYDROCHLORIDE 2 MG/ML
0.5 INJECTION INTRAMUSCULAR; INTRAVENOUS; SUBCUTANEOUS
Refills: 0 | Status: DISCONTINUED | OUTPATIENT
Start: 2023-12-07 | End: 2023-12-07

## 2023-12-07 RX ORDER — ENOXAPARIN SODIUM 100 MG/ML
40 INJECTION SUBCUTANEOUS EVERY 24 HOURS
Refills: 0 | Status: DISCONTINUED | OUTPATIENT
Start: 2023-12-07 | End: 2023-12-08

## 2023-12-07 RX ORDER — MAGNESIUM SULFATE 500 MG/ML
2 VIAL (ML) INJECTION ONCE
Refills: 0 | Status: COMPLETED | OUTPATIENT
Start: 2023-12-07 | End: 2023-12-07

## 2023-12-07 RX ORDER — ACETAMINOPHEN 500 MG
1000 TABLET ORAL ONCE
Refills: 0 | Status: DISCONTINUED | OUTPATIENT
Start: 2023-12-07 | End: 2023-12-07

## 2023-12-07 RX ORDER — POLYETHYLENE GLYCOL 3350 17 G/17G
17 POWDER, FOR SOLUTION ORAL
Refills: 0 | Status: DISCONTINUED | OUTPATIENT
Start: 2023-12-07 | End: 2023-12-10

## 2023-12-07 RX ADMIN — CHLORHEXIDINE GLUCONATE 1 APPLICATION(S): 213 SOLUTION TOPICAL at 06:50

## 2023-12-07 RX ADMIN — MIDODRINE HYDROCHLORIDE 10 MILLIGRAM(S): 2.5 TABLET ORAL at 21:22

## 2023-12-07 RX ADMIN — OXYCODONE HYDROCHLORIDE 5 MILLIGRAM(S): 5 TABLET ORAL at 16:51

## 2023-12-07 RX ADMIN — PHENYLEPHRINE HYDROCHLORIDE 3.18 MICROGRAM(S)/KG/MIN: 10 INJECTION INTRAVENOUS at 18:30

## 2023-12-07 RX ADMIN — HYDROMORPHONE HYDROCHLORIDE 0.5 MILLIGRAM(S): 2 INJECTION INTRAMUSCULAR; INTRAVENOUS; SUBCUTANEOUS at 09:18

## 2023-12-07 RX ADMIN — OXYCODONE HYDROCHLORIDE 5 MILLIGRAM(S): 5 TABLET ORAL at 19:27

## 2023-12-07 RX ADMIN — OXYCODONE HYDROCHLORIDE 5 MILLIGRAM(S): 5 TABLET ORAL at 15:51

## 2023-12-07 RX ADMIN — METHOCARBAMOL 500 MILLIGRAM(S): 500 TABLET, FILM COATED ORAL at 21:22

## 2023-12-07 RX ADMIN — HYDROMORPHONE HYDROCHLORIDE 0.5 MILLIGRAM(S): 2 INJECTION INTRAMUSCULAR; INTRAVENOUS; SUBCUTANEOUS at 08:48

## 2023-12-07 RX ADMIN — SIMVASTATIN 40 MILLIGRAM(S): 20 TABLET, FILM COATED ORAL at 21:23

## 2023-12-07 RX ADMIN — Medication 30 MILLIGRAM(S): at 04:03

## 2023-12-07 RX ADMIN — Medication 5 MILLIGRAM(S): at 21:23

## 2023-12-07 RX ADMIN — Medication 2 MILLIGRAM(S): at 04:05

## 2023-12-07 RX ADMIN — GABAPENTIN 800 MILLIGRAM(S): 400 CAPSULE ORAL at 14:30

## 2023-12-07 RX ADMIN — Medication 25 GRAM(S): at 07:34

## 2023-12-07 RX ADMIN — Medication 2 MILLIGRAM(S): at 09:48

## 2023-12-07 RX ADMIN — OXYCODONE HYDROCHLORIDE 10 MILLIGRAM(S): 5 TABLET ORAL at 21:22

## 2023-12-07 RX ADMIN — HYDROMORPHONE HYDROCHLORIDE 0.5 MILLIGRAM(S): 2 INJECTION INTRAMUSCULAR; INTRAVENOUS; SUBCUTANEOUS at 06:43

## 2023-12-07 RX ADMIN — Medication 30 MILLIGRAM(S): at 04:50

## 2023-12-07 RX ADMIN — HYDROMORPHONE HYDROCHLORIDE 0.5 MILLIGRAM(S): 2 INJECTION INTRAMUSCULAR; INTRAVENOUS; SUBCUTANEOUS at 04:00

## 2023-12-07 RX ADMIN — HYDROMORPHONE HYDROCHLORIDE 0.5 MILLIGRAM(S): 2 INJECTION INTRAMUSCULAR; INTRAVENOUS; SUBCUTANEOUS at 13:22

## 2023-12-07 RX ADMIN — Medication 30 MILLIGRAM(S): at 09:34

## 2023-12-07 RX ADMIN — OXYCODONE HYDROCHLORIDE 10 MILLIGRAM(S): 5 TABLET ORAL at 22:00

## 2023-12-07 RX ADMIN — HYDROMORPHONE HYDROCHLORIDE 0.5 MILLIGRAM(S): 2 INJECTION INTRAMUSCULAR; INTRAVENOUS; SUBCUTANEOUS at 03:28

## 2023-12-07 RX ADMIN — Medication 2 MILLIGRAM(S): at 06:32

## 2023-12-07 RX ADMIN — METHOCARBAMOL 500 MILLIGRAM(S): 500 TABLET, FILM COATED ORAL at 14:31

## 2023-12-07 RX ADMIN — Medication 62.5 MILLIMOLE(S): at 09:48

## 2023-12-07 RX ADMIN — HYDROMORPHONE HYDROCHLORIDE 0.5 MILLIGRAM(S): 2 INJECTION INTRAMUSCULAR; INTRAVENOUS; SUBCUTANEOUS at 05:53

## 2023-12-07 RX ADMIN — DEXMEDETOMIDINE HYDROCHLORIDE IN 0.9% SODIUM CHLORIDE 4.24 MICROGRAM(S)/KG/HR: 4 INJECTION INTRAVENOUS at 18:29

## 2023-12-07 RX ADMIN — DEXMEDETOMIDINE HYDROCHLORIDE IN 0.9% SODIUM CHLORIDE 4.24 MICROGRAM(S)/KG/HR: 4 INJECTION INTRAVENOUS at 14:05

## 2023-12-07 RX ADMIN — Medication 30 MILLIGRAM(S): at 10:00

## 2023-12-07 RX ADMIN — HYDROMORPHONE HYDROCHLORIDE 0.5 MILLIGRAM(S): 2 INJECTION INTRAMUSCULAR; INTRAVENOUS; SUBCUTANEOUS at 03:17

## 2023-12-07 RX ADMIN — ENOXAPARIN SODIUM 40 MILLIGRAM(S): 100 INJECTION SUBCUTANEOUS at 21:23

## 2023-12-07 RX ADMIN — OXYCODONE HYDROCHLORIDE 5 MILLIGRAM(S): 5 TABLET ORAL at 18:29

## 2023-12-07 RX ADMIN — MIDODRINE HYDROCHLORIDE 10 MILLIGRAM(S): 2.5 TABLET ORAL at 14:31

## 2023-12-07 RX ADMIN — GABAPENTIN 800 MILLIGRAM(S): 400 CAPSULE ORAL at 21:46

## 2023-12-07 RX ADMIN — HYDROMORPHONE HYDROCHLORIDE 0.5 MILLIGRAM(S): 2 INJECTION INTRAMUSCULAR; INTRAVENOUS; SUBCUTANEOUS at 02:30

## 2023-12-07 RX ADMIN — SENNA PLUS 2 TABLET(S): 8.6 TABLET ORAL at 21:47

## 2023-12-07 RX ADMIN — HYDROMORPHONE HYDROCHLORIDE 0.5 MILLIGRAM(S): 2 INJECTION INTRAMUSCULAR; INTRAVENOUS; SUBCUTANEOUS at 13:52

## 2023-12-07 NOTE — PROGRESS NOTE ADULT - ASSESSMENT
68y/F with  pseudoarthrosis, s/p C2 to pelvis fusion, (12/05/2023, Dr. Luz)  Hypertension dyslipidemia Diabetes Mellitus controlled  LBP, arthritis  h/o stroke with R residuals, seizure disorder, anxiety and depression, MS  h/o CTS  GERD, chronic constipation, external hemorrhoids, pancreatic cyst  urinary incontinence  eczema  COPD     PLAN:   NEURO: neurochecks q2h, VS q1h, PRN pain meds with PCA pump, ERAS protocol, sedation with precedex to RASS of 0 to -1  standing Xrays on stepdown  3 HMV & 2 JPs - monitor output, keep for now  MAP augmentation  REHAB:  physical therapy evaluation and management    EARLY MOB:  HOB up     PULM:  room air, incentive spirometry  CARDIO:  MAP >90, titrate neosynephrine to SBP goal, TTE; midodrine   ENDO:  Blood sugar goals 140-180 mg/dL, continue insulin sliding scale  GI:  bowel regimen  DIET: s/s tomorrow  RENAL:  keep IVF  HEM/ONC: hb stable  VTE Prophylaxis: SCDs, no DVT chemoprophylaxis for now as patient is high risk for bleed (late case), will re-evaluate tomorrow  ID: afebrile, leukocytosis  Social: will update family    Active issues:  What's keeping patient in the ICU? pressors, sedation gtt  What is this patient's dispo plan?     ATTENDING ATTESTATION:  I was physically present for the key portions of the evaluation and management (E/M) service provided.  I agree with the above history, physical and plan, which I have reviewed and edited where appropriate.    Patient at high risk for neurological deterioration or death due to:  ICU delirium, aspiration PNA, DVT / PE.  Critical care time:  I have personally provided 45 minutes of critical care time, excluding time spent on separate procedures.      Plan discussed with RN, house staff. 68y/F with  pseudoarthrosis, s/p C2 to pelvis fusion, (12/05/2023, Dr. Luz)  Hypertension dyslipidemia Diabetes Mellitus controlled  LBP, arthritis  h/o stroke with R residuals, seizure disorder, anxiety and depression, MS  h/o CTS  GERD, chronic constipation, external hemorrhoids, pancreatic cyst  urinary incontinence  eczema  COPD     PLAN:   NEURO: neurochecks q2h, VS q1h, PRN pain meds with PCA pump, ERAS protocol, sedation with precedex to RASS of 0 to -1  standing Xrays on stepdown  3 HMV & 2 JPs - monitor output, keep for now  MAP augmentation  REHAB:  physical therapy evaluation and management    EARLY MOB:  HOB up     PULM:  room air, incentive spirometry  CARDIO:  MAP >90, titrate neosynephrine to SBP goal, TTE; midodrine 10mg PO q8h   ENDO:  Blood sugar goals 140-180 mg/dL, off insulin sliding scale  GI:  bowel regimen  DIET: regular diet  RENAL:  IVL  HEM/ONC: hb drop this morning - check FOBT  VTE Prophylaxis: SCDs, SQL  ID: afebrile, leukocytosis  Social: will update family    Active issues:  What's keeping patient in the ICU? pressors, sedation gtt  What is this patient's dispo plan? stepdown tomorrow if off drips    ATTENDING ATTESTATION:  I was physically present for the key portions of the evaluation and management (E/M) service provided.  I agree with the above history, physical and plan, which I have reviewed and edited where appropriate.    Patient at high risk for neurological deterioration or death due to:  ICU delirium, aspiration PNA, DVT / PE.  Critical care time:  I have personally provided 45 minutes of critical care time, excluding time spent on separate procedures.      Plan discussed with RN, house staff.

## 2023-12-07 NOTE — PROGRESS NOTE ADULT - SUBJECTIVE AND OBJECTIVE BOX
HPI:  68 y/o female with PMHx HTN, HLD, CVA x 3 (last was 2016 with right hand weakness residual), GAMALIEL not using CPAP, Emphysema, former 25 pack year smoker restarted this week of surgery, Chronic Constipation on Movantik, Urinary Incontinence chronically self straight cath at home, chronic UTI, Breast Implant Rupture with Chronic Leak repair 10/2023, B/L CTS s/p release, s/p Intrathecal Morphine pump for pain, with chronic neck and back pain worsening for years s/p multiple spinal surgeries including laminectomies and fusion of cervical, thoracic and lumbar spine initially done in 2009 and recently in 2019 and 2020 at [facility] by Dr. Trinidad, s/p T3-L1 revision of prior fusion (with Dr. Luz on 8/17/22). Outpatient Xray 11/16/2023 showing broken rods/displacement. Presents for elective C2-S2 instrumentation and fusion. Pt endorses weakness of b/l LE and burning pain in b/l lower extremities radiating to both feet,  (04 Dec 2023 15:44)    Hospital Course:  12/4: Admitted for spinal fusion d/t damaged hardware.   12/5: Neuro stable. POD0 C2-S2 instrumentation/fusion (intraop b/l LE motor loss). on parminder for MAP>90  12/6: POD1. KAMRAN o/n neuro stable. remains intubated. Extubated 6am. Precedex gtt prn. Remains on parminder gtt for MAP goal >90. Valium made prn. CT thoracic spine bc MRI unavailable. Attempted NGT, unsuccessful (resistance @ 35).   12/7: Given IV dilaudid 0.5mg for pain. Neuro exam stable.    Vital Signs Last 24 Hrs  T(C): 36 (06 Dec 2023 22:31), Max: 36.8 (06 Dec 2023 09:02)  T(F): 96.8 (06 Dec 2023 22:31), Max: 98.2 (06 Dec 2023 09:02)  HR: 63 (06 Dec 2023 23:00) (59 - 93)  BP: 140/65 (06 Dec 2023 18:00) (110/66 - 144/72)  BP(mean): 94 (06 Dec 2023 18:00) (83 - 102)  RR: 16 (06 Dec 2023 23:00) (12 - 25)  SpO2: 100% (06 Dec 2023 23:00) (95% - 100%)    Parameters below as of 06 Dec 2023 23:00  Patient On (Oxygen Delivery Method): room air        I&O's Summary    05 Dec 2023 07:01  -  06 Dec 2023 07:00  --------------------------------------------------------  IN: 1369.6 mL / OUT: 1460 mL / NET: -90.4 mL    06 Dec 2023 07:01  -  07 Dec 2023 00:26  --------------------------------------------------------  IN: 1405.1 mL / OUT: 1775 mL / NET: -369.9 mL    PHYSICAL EXAM:  GEN: laying in bed, NAD  NEURO: AAOx1 to self however patient not cooperating with the rest of the orientation exam, FC  CNII-XII intact. PERRL, EOM, face symmetric  Motor: RUE/LUE 5/5, RLE/LLE 0/5, unable to assess sensation as patient refusing to be assessed  CV: RRR +S1/S2  PULM: CTAB  GI: Abd soft, NT/ND  EXT: ext warm, dry, nontender  WOUND/DRAINS: cervical-pelvis incision c/d/i    TUBES/LINES:  [ ] R IJ (12/5- )  [ ] R leyda (12/5- )  [ ] HMV x3 (12/5- )  [ ] YIFAN x2 (12/5- )    DIET:  [x] NPO  [] Mechanical  [] Tube feeds    LABS:                        11.2   15.76 )-----------( 222      ( 06 Dec 2023 05:30 )             33.5     12-06    137  |  104  |  9   ----------------------------<  142<H>  4.2   |  23  |  0.48<L>    Ca    8.3<L>      06 Dec 2023 05:30  Phos  4.1     12-06  Mg     2.7     12-06      PT/INR - ( 05 Dec 2023 20:59 )   PT: 11.5 sec;   INR: 1.01          PTT - ( 05 Dec 2023 20:59 )  PTT:27.7 sec  Urinalysis Basic - ( 06 Dec 2023 05:30 )    Color: x / Appearance: x / SG: x / pH: x  Gluc: 142 mg/dL / Ketone: x  / Bili: x / Urobili: x   Blood: x / Protein: x / Nitrite: x   Leuk Esterase: x / RBC: x / WBC x   Sq Epi: x / Non Sq Epi: x / Bacteria: x          CAPILLARY BLOOD GLUCOSE      POCT Blood Glucose.: 153 mg/dL (06 Dec 2023 22:05)  POCT Blood Glucose.: 110 mg/dL (06 Dec 2023 16:12)  POCT Blood Glucose.: 115 mg/dL (06 Dec 2023 11:03)  POCT Blood Glucose.: 166 mg/dL (06 Dec 2023 05:56)      Drug Levels: [] N/A    CSF Analysis: [] N/A      Allergies    oxycodone (Pruritus)    Intolerances      MEDICATIONS:  Antibiotics:    Neuro:  dexMEDEtomidine Infusion 0.2 MICROgram(s)/kG/Hr IV Continuous <Continuous>  DULoxetine 60 milliGRAM(s) Oral daily  gabapentin 800 milliGRAM(s) Oral three times a day  ketorolac   Injectable 30 milliGRAM(s) IV Push every 6 hours PRN  methocarbamol 500 milliGRAM(s) Oral every 8 hours  ondansetron Injectable 4 milliGRAM(s) IV Push every 6 hours PRN    Anticoagulation:    OTHER:  albuterol    90 MICROgram(s) HFA Inhaler 2 Puff(s) Inhalation every 6 hours PRN  bisacodyl 5 milliGRAM(s) Oral at bedtime  chlorhexidine 2% Cloths 1 Application(s) Topical <User Schedule>  influenza  Vaccine (HIGH DOSE) 0.7 milliLiter(s) IntraMuscular once  insulin lispro (ADMELOG) corrective regimen sliding scale   SubCutaneous every 6 hours  midodrine 10 milliGRAM(s) Oral every 8 hours  naloxone Injectable 0.1 milliGRAM(s) IV Push every 3 minutes PRN  phenylephrine    Infusion 0.1 MICROgram(s)/kG/Min IV Continuous <Continuous>  polyethylene glycol 3350 17 Gram(s) Oral daily  simvastatin 40 milliGRAM(s) Oral at bedtime    IVF:  multivitamin 1 Tablet(s) Oral daily  sodium chloride 0.9%. 1000 milliLiter(s) IV Continuous <Continuous>    CULTURES:    RADIOLOGY & ADDITIONAL TESTS:      ASSESSMENT:  67 yo female with Hx HTN, HLD, DM, CVA x 3 (last in 2016 with residual R hand weakness), GAMALIEL not using CPAP, Emphysema, ex-25 pack year smoker now restarted, chronic constipation on Movantik, chronic urinary retention (SC at home), b/l CTS s/p release, s/p Intrathecal Morphine pump for pain, with chronic neck and back pain worsening for years s/p multiple spinal surgeries including laminectomies and fusion of cervical, thoracic and lumbar spine, s/p T3-L1 revision of prior fusion (with  on 8/17/22). Xray 11/16/23 showing broken rods/displacement. Now s/p C2-S2 instrumentation and fusion (12/5)    S12.9XXA    Handoff    MEWS Score    HTN (hypertension)    Back pain    Hypertension    SS (spinal stenosis)    High cholesterol    Stroke    H/O carpal tunnel syndrome    H/O carpal tunnel syndrome    Chronic GERD    History of chronic constipation    Seizure    Urinary incontinence    Pre-diabetes    Acute UTI    Anxiety    Anxiety and depression    Arthritis    Eczema    External hemorrhoids    H/O kyphosis    Multiple sclerosis    Pancreas cyst    Scoliosis    Wheelchair dependent    Cervical pseudoarthrosis    Right shoulder pain    Cervical spondylosis    Chronic headaches    Chronic LUQ pain    Chronic UTI    Degenerative joint disease    H/O low back pain    Lumbosacral spondylosis    COPD (chronic obstructive pulmonary disease)    Back pain    Back pain    Back pain    Revision of posterolateral fusion of lumbar spine    Revision of fusion of thoracic spine    Cervical disc disease    H/O Spinal surgery    H/O breast surgery    S/P cervical discectomy    Previous back surgery    H/O shoulder surgery    H/O total shoulder replacement, right    [email or web address]      PLAN:  Neuro:  - Neuro q2h/vitals q1h  - pain control: modified ERAS, IT morphine pump, iv tylenol, iv dilaudid prn, iv valium PRN (resume when taking po: dilaudid 2/4 prn, robaxin, home gabapentin 800 TID, home tizanidine)  - C/w home cymbalta  - HMVx3, JPx2 both per plastics  - Sedation: precedex gtt prn   - MRI thoracic pending   - CT thoracic spine 12/6 performed- f/u results    Cardio:  - MAP>90, parminder gtt  - midodrine 10mg q8h  - holding home metoprolol while on pressors   - C/w home simvastatin     Pulm:  - extubated 12/6  - hx GAMALIEL, no CPAP at home    GI:  - NPO  - Bowel regimen  - s/s eval    Renal:  - IVF while NPO  - PRN straight cath    Endo:  - ISS  - a1c: 7    Heme:  - SCDs DVT ppx  - 500 cell saver intraop    ID:  - afebrile  - post op ancef    Dispo:  - full code, ICU, dispo pending    D/w Dr. Luz, Dr. Sanders HPI:  66 y/o female with PMHx HTN, HLD, CVA x 3 (last was 2016 with right hand weakness residual), GAMALIEL not using CPAP, Emphysema, former 25 pack year smoker restarted this week of surgery, Chronic Constipation on Movantik, Urinary Incontinence chronically self straight cath at home, chronic UTI, Breast Implant Rupture with Chronic Leak repair 10/2023, B/L CTS s/p release, s/p Intrathecal Morphine pump for pain, with chronic neck and back pain worsening for years s/p multiple spinal surgeries including laminectomies and fusion of cervical, thoracic and lumbar spine initially done in 2009 and recently in 2019 and 2020 at [facility] by Dr. Trinidad, s/p T3-L1 revision of prior fusion (with Dr. Luz on 8/17/22). Outpatient Xray 11/16/2023 showing broken rods/displacement. Presents for elective C2-S2 instrumentation and fusion. Pt endorses weakness of b/l LE and burning pain in b/l lower extremities radiating to both feet,  (04 Dec 2023 15:44)    Hospital Course:  12/4: Admitted for spinal fusion d/t damaged hardware.   12/5: Neuro stable. POD0 C2-S2 instrumentation/fusion (intraop b/l LE motor loss). on parminder for MAP>90  12/6: POD1. KAMRAN o/n neuro stable. remains intubated. Extubated 6am. Precedex gtt prn. Remains on parminder gtt for MAP goal >90. Valium made prn. CT thoracic spine bc MRI unavailable. Attempted NGT, unsuccessful (resistance @ 35).   12/7: Given IV dilaudid 0.5mg for pain. Neuro exam stable.    Vital Signs Last 24 Hrs  T(C): 36 (06 Dec 2023 22:31), Max: 36.8 (06 Dec 2023 09:02)  T(F): 96.8 (06 Dec 2023 22:31), Max: 98.2 (06 Dec 2023 09:02)  HR: 63 (06 Dec 2023 23:00) (59 - 93)  BP: 140/65 (06 Dec 2023 18:00) (110/66 - 144/72)  BP(mean): 94 (06 Dec 2023 18:00) (83 - 102)  RR: 16 (06 Dec 2023 23:00) (12 - 25)  SpO2: 100% (06 Dec 2023 23:00) (95% - 100%)    Parameters below as of 06 Dec 2023 23:00  Patient On (Oxygen Delivery Method): room air        I&O's Summary    05 Dec 2023 07:01  -  06 Dec 2023 07:00  --------------------------------------------------------  IN: 1369.6 mL / OUT: 1460 mL / NET: -90.4 mL    06 Dec 2023 07:01  -  07 Dec 2023 00:26  --------------------------------------------------------  IN: 1405.1 mL / OUT: 1775 mL / NET: -369.9 mL    PHYSICAL EXAM:  GEN: laying in bed, NAD  NEURO: AAOx1 to self however patient not cooperating with the rest of the orientation exam, FC  CNII-XII intact. PERRL, EOM, face symmetric  Motor: RUE/LUE 5/5, RLE/LLE 0/5, unable to assess sensation as patient refusing to be assessed  CV: RRR +S1/S2  PULM: CTAB  GI: Abd soft, NT/ND  EXT: ext warm, dry, nontender  WOUND/DRAINS: cervical-pelvis incision c/d/i    TUBES/LINES:  [ ] R IJ (12/5- )  [ ] R leyda (12/5- )  [ ] HMV x3 (12/5- )  [ ] YIFAN x2 (12/5- )    DIET:  [x] NPO  [] Mechanical  [] Tube feeds    LABS:                        11.2   15.76 )-----------( 222      ( 06 Dec 2023 05:30 )             33.5     12-06    137  |  104  |  9   ----------------------------<  142<H>  4.2   |  23  |  0.48<L>    Ca    8.3<L>      06 Dec 2023 05:30  Phos  4.1     12-06  Mg     2.7     12-06      PT/INR - ( 05 Dec 2023 20:59 )   PT: 11.5 sec;   INR: 1.01          PTT - ( 05 Dec 2023 20:59 )  PTT:27.7 sec  Urinalysis Basic - ( 06 Dec 2023 05:30 )    Color: x / Appearance: x / SG: x / pH: x  Gluc: 142 mg/dL / Ketone: x  / Bili: x / Urobili: x   Blood: x / Protein: x / Nitrite: x   Leuk Esterase: x / RBC: x / WBC x   Sq Epi: x / Non Sq Epi: x / Bacteria: x          CAPILLARY BLOOD GLUCOSE      POCT Blood Glucose.: 153 mg/dL (06 Dec 2023 22:05)  POCT Blood Glucose.: 110 mg/dL (06 Dec 2023 16:12)  POCT Blood Glucose.: 115 mg/dL (06 Dec 2023 11:03)  POCT Blood Glucose.: 166 mg/dL (06 Dec 2023 05:56)      Drug Levels: [] N/A    CSF Analysis: [] N/A      Allergies    oxycodone (Pruritus)    Intolerances      MEDICATIONS:  Antibiotics:    Neuro:  dexMEDEtomidine Infusion 0.2 MICROgram(s)/kG/Hr IV Continuous <Continuous>  DULoxetine 60 milliGRAM(s) Oral daily  gabapentin 800 milliGRAM(s) Oral three times a day  ketorolac   Injectable 30 milliGRAM(s) IV Push every 6 hours PRN  methocarbamol 500 milliGRAM(s) Oral every 8 hours  ondansetron Injectable 4 milliGRAM(s) IV Push every 6 hours PRN    Anticoagulation:    OTHER:  albuterol    90 MICROgram(s) HFA Inhaler 2 Puff(s) Inhalation every 6 hours PRN  bisacodyl 5 milliGRAM(s) Oral at bedtime  chlorhexidine 2% Cloths 1 Application(s) Topical <User Schedule>  influenza  Vaccine (HIGH DOSE) 0.7 milliLiter(s) IntraMuscular once  insulin lispro (ADMELOG) corrective regimen sliding scale   SubCutaneous every 6 hours  midodrine 10 milliGRAM(s) Oral every 8 hours  naloxone Injectable 0.1 milliGRAM(s) IV Push every 3 minutes PRN  phenylephrine    Infusion 0.1 MICROgram(s)/kG/Min IV Continuous <Continuous>  polyethylene glycol 3350 17 Gram(s) Oral daily  simvastatin 40 milliGRAM(s) Oral at bedtime    IVF:  multivitamin 1 Tablet(s) Oral daily  sodium chloride 0.9%. 1000 milliLiter(s) IV Continuous <Continuous>    CULTURES:    RADIOLOGY & ADDITIONAL TESTS:      ASSESSMENT:  69 yo female with Hx HTN, HLD, DM, CVA x 3 (last in 2016 with residual R hand weakness), GAMALIEL not using CPAP, Emphysema, ex-25 pack year smoker now restarted, chronic constipation on Movantik, chronic urinary retention (SC at home), b/l CTS s/p release, s/p Intrathecal Morphine pump for pain, with chronic neck and back pain worsening for years s/p multiple spinal surgeries including laminectomies and fusion of cervical, thoracic and lumbar spine, s/p T3-L1 revision of prior fusion (with  on 8/17/22). Xray 11/16/23 showing broken rods/displacement. Now s/p C2-S2 instrumentation and fusion (12/5)    S12.9XXA    Handoff    MEWS Score    HTN (hypertension)    Back pain    Hypertension    SS (spinal stenosis)    High cholesterol    Stroke    H/O carpal tunnel syndrome    H/O carpal tunnel syndrome    Chronic GERD    History of chronic constipation    Seizure    Urinary incontinence    Pre-diabetes    Acute UTI    Anxiety    Anxiety and depression    Arthritis    Eczema    External hemorrhoids    H/O kyphosis    Multiple sclerosis    Pancreas cyst    Scoliosis    Wheelchair dependent    Cervical pseudoarthrosis    Right shoulder pain    Cervical spondylosis    Chronic headaches    Chronic LUQ pain    Chronic UTI    Degenerative joint disease    H/O low back pain    Lumbosacral spondylosis    COPD (chronic obstructive pulmonary disease)    Back pain    Back pain    Back pain    Revision of posterolateral fusion of lumbar spine    Revision of fusion of thoracic spine    Cervical disc disease    H/O Spinal surgery    H/O breast surgery    S/P cervical discectomy    Previous back surgery    H/O shoulder surgery    H/O total shoulder replacement, right    [email or web address]      PLAN:  Neuro:  - Neuro q2h/vitals q1h  - pain control: modified ERAS, IT morphine pump, iv tylenol, iv dilaudid prn, iv valium PRN (resume when taking po: dilaudid 2/4 prn, robaxin, home gabapentin 800 TID, home tizanidine)  - C/w home cymbalta  - HMVx3, JPx2 both per plastics  - Sedation: precedex gtt prn   - MRI thoracic pending   - CT thoracic spine 12/6 performed- f/u results    Cardio:  - MAP>90, parminder gtt  - midodrine 10mg q8h  - holding home metoprolol while on pressors   - C/w home simvastatin     Pulm:  - extubated 12/6  - hx GAMALIEL, no CPAP at home    GI:  - NPO  - Bowel regimen  - s/s eval    Renal:  - IVF while NPO  - PRN straight cath    Endo:  - ISS  - a1c: 7    Heme:  - SCDs DVT ppx  - 500 cell saver intraop    ID:  - afebrile  - post op ancef    Dispo:  - full code, ICU, dispo pending    D/w Dr. Luz, Dr. Sanders

## 2023-12-07 NOTE — BH CONSULTATION LIAISON ASSESSMENT NOTE - SUMMARY
Ms. Redd is a 69 yo female who smokes cigarettes (hx of 25 pack years), with an unknown PPHx and with a PMHx HTN, HLD, CVA x 3 (last was 2016 with right hand weakness residual), GAMALIEL not using CPAP, Emphysema, Chronic Constipation on Movantik, Urinary Incontinence , s/p Intrathecal Morphine pump for pain, with chronic neck and back pain worsening for years s/p multiple spinal surgeries including laminectomies and fusion of cervical, thoracic and lumbar spine initially done in 2009 and recently in 2019 and 2020 at [facility] by Dr. Trinidad, s/p T3-L1 revision of prior fusion who is currently hospitalized for and elective C2-S2 instrumentation and fusion. Psychiatry was consulted to perform a mental health evaluation for underlying psychiatric illness as patient has allegedly been verbally abusive to staff.    Upon evaluation the patient presents as acutely delirious with waxing and waning levels of consciousness, periods of acute agitation, and disorientation. At this time there is no cause to suspect an underlying psychiatric illness. The patient would benefit from delirium precautions being implemented as detailed below, agitation may be managed by primary team. As the patient at this time does not appear acutely suicidal and does not appear to have any decompensated major psychiatric illness, she would be unlikely to benefit from inpatient psychiatric care.    # Delirium precautions include:   - Place patient's bed near the window   - Optimize sleep-wake cycle, minimize environmental noise   - Reorientation techniques and memory cues such as calendar, clocks, family photos   - Use verbal redirection as first line   - Minimize lines and restraints, unless patient a danger to self or others    - Ensure adequate pain control, use opioid sparing regimens when possible   - Minimize use of anticholinergic, antihistaminic, and benzodiazepine medications \    #Agitation  - As per primary team Ms. Redd is a 69 yo female who smokes cigarettes (hx of 25 pack years), with an unknown PPHx and with a PMHx HTN, HLD, CVA x 3 (last was 2016 with right hand weakness residual), GAMALIEL not using CPAP, Emphysema, Chronic Constipation on Movantik, Urinary Incontinence , s/p Intrathecal Morphine pump for pain, with chronic neck and back pain worsening for years s/p multiple spinal surgeries including laminectomies and fusion of cervical, thoracic and lumbar spine initially done in 2009 and recently in 2019 and 2020 at [facility] by Dr. Trinidad, s/p T3-L1 revision of prior fusion who is currently hospitalized for and elective C2-S2 instrumentation and fusion. Psychiatry was consulted to perform a mental health evaluation for underlying psychiatric illness as patient has allegedly been verbally abusive to staff.    Upon evaluation the patient presents as acutely delirious with waxing and waning levels of consciousness, periods of acute agitation, and disorientation. At this time there is no cause to suspect an underlying psychiatric illness. The patient would benefit from delirium precautions being implemented as detailed below, agitation may be managed by primary team. As the patient at this time does not appear acutely suicidal and does not appear to have any decompensated major psychiatric illness, she would be unlikely to benefit from inpatient psychiatric care.    # Delirium precautions include:   - Place patient's bed near the window   - Optimize sleep-wake cycle, minimize environmental noise   - Reorientation techniques and memory cues such as calendar, clocks, family photos   - Use verbal redirection as first line   - Minimize lines and restraints, unless patient a danger to self or others    - Ensure adequate pain control, use opioid sparing regimens when possible   - Minimize use of anticholinergic, antihistaminic, and benzodiazepine medications \    #Agitation  - As per primary team

## 2023-12-07 NOTE — BH CONSULTATION LIAISON ASSESSMENT NOTE - NSBHCHARTREVIEWINVESTIGATE_PSY_A_CORE FT
Ventricular Rate 73 BPM    Atrial Rate 73 BPM    P-R Interval 174 ms    QRS Duration 78 ms    Q-T Interval 412 ms    QTC Calculation(Bazett) 453 ms    P Axis 51 degrees    R Axis -16 degrees    T Axis 13 degrees    Diagnosis Line Normal sinus rhythm    Confirmed by Edison Méndez ([id number]) on 5/2/2023 6:05:39 PM

## 2023-12-07 NOTE — BH CONSULTATION LIAISON ASSESSMENT NOTE - NSBHCHARTREVIEWVS_PSY_A_CORE FT
Vital Signs Last 24 Hrs  T(C): 36.4 (07 Dec 2023 14:03), Max: 36.4 (07 Dec 2023 05:06)  T(F): 97.5 (07 Dec 2023 14:03), Max: 97.5 (07 Dec 2023 05:06)  HR: 64 (07 Dec 2023 15:20) (61 - 94)  BP: 173/85 (07 Dec 2023 15:20) (121/79 - 191/91)  BP(mean): 113 (07 Dec 2023 15:20) (84 - 131)  RR: 17 (07 Dec 2023 15:20) (13 - 27)  SpO2: 100% (07 Dec 2023 15:20) (93% - 100%)    Parameters below as of 07 Dec 2023 15:20  Patient On (Oxygen Delivery Method): room air

## 2023-12-07 NOTE — BH CONSULTATION LIAISON ASSESSMENT NOTE - NSBHCONSULTFOLLOWAFTERCARE_PSY_A_CORE FT
At this time, there is no reason to believe that the patient has an underlying psychiatric illness or that she will require psychiatric outpatient care followup PMD for further eval within 1wk (prior anxiety dx outpt by IM physician in 2021)

## 2023-12-07 NOTE — BH CONSULTATION LIAISON ASSESSMENT NOTE - NSBHATTESTCOMMENTATTENDFT_PSY_A_CORE
69yo F hx HTn, HLD, CVAx3, GAMALIEL, COPD, chronic URI and constipation, chronic pain with pump, many spinal surgeries, s/p C2-S2 fusion on 12/5, admitted to ICU, agitated, on precedex currently. Team c/s psychiatry to eval if underlying psychiatric disorder as pt at times verbally abusive.  Chart reviewed, pt on deanna Cymbalta 60mg (held as NPO), valium 2mg, dilaudid.  She had not followed up as recommended with medical providers. Anxiety and Depression listed in chart.  PSYCKES reviewed- dx unspec anx and depressive disorders and breathing related sleep disorder; no psychiatric rx or controlled substance. Pt unable to maintain wakefulness to participate in any meaningful eval with writer, answered no questions, opened eyes briefly in response to voice.  Pt answered few questions with PGY2.  Pt likely with delirium currently, agitation management per primary team as on precedex, please reconsult PRN when pt able to participate. 67yo F hx HTn, HLD, CVAx3, GAMALIEL, COPD, chronic URI and constipation, chronic pain with pump, many spinal surgeries, s/p C2-S2 fusion on 12/5, admitted to ICU, agitated, on precedex currently. Team c/s psychiatry to eval if underlying psychiatric disorder as pt at times verbally abusive.  Chart reviewed, pt on deanna Cymbalta 60mg (held as NPO), valium 2mg, dilaudid.  She had not followed up as recommended with medical providers. Anxiety and Depression listed in chart.  PSYCKES reviewed- dx unspec anx and depressive disorders and breathing related sleep disorder; no psychiatric rx or controlled substance. Pt unable to maintain wakefulness to participate in any meaningful eval with writer, answered no questions, opened eyes briefly in response to voice.  Pt answered few questions with PGY2.  Pt likely with delirium currently, agitation management per primary team as on precedex, please reconsult PRN when pt able to participate.

## 2023-12-07 NOTE — BH CONSULTATION LIAISON ASSESSMENT NOTE - HPI (INCLUDE ILLNESS QUALITY, SEVERITY, DURATION, TIMING, CONTEXT, MODIFYING FACTORS, ASSOCIATED SIGNS AND SYMPTOMS)
Ms. Redd is a 69 yo female who smokes cigarettes (hx of 25 pack years), with an unknown PPHx and with a PMHx HTN, HLD, CVA x 3 (last was 2016 with right hand weakness residual), GAMALIEL not using CPAP, Emphysema, Chronic Constipation on Movantik, Urinary Incontinence , s/p Intrathecal Morphine pump for pain, with chronic neck and back pain worsening for years s/p multiple spinal surgeries including laminectomies and fusion of cervical, thoracic and lumbar spine initially done in 2009 and recently in 2019 and 2020 at [facility] by Dr. Trinidad, s/p T3-L1 revision of prior fusion who is currently hospitalized for and elective C2-S2 instrumentation and fusion. Psychiatry was consulted to perform a mental health evaluation for underlying psychiatric illness as patient has allegedly been verbally abusive to staff.    The patient was evaluated at bedside in the ICU by a resident and attending psychiatrist. Upon approach, the patient was sleeping. She is very somnolent, and had to be repeatedly awoken (vocally) throughout the interview. Her affect is constricted and irritable. She is incompletely oriented, she was able to state her name and that she just had surgery, but stated that she was located in a "U", and stated that she did not know the year or month. She denies any suicidal or homicidal ideations. After being asked, she became frustrated with the team, and stated "You're crazy" and commanded "Get out of my room."    Upon re-evaluation 10 minutes later, the patient continued to sleep/appear asleep despite multiple attempts to wake her via calling her name.    Per collateral obtained from PA: The patient has allegedly been verbally abusive to staff, requiring up to 1 mg of Precedex for agitation. This morning she stated "I want everyone to die" and stated "you're all ganging up on me". This is the provider's first day of working with the patient but she had been told that the patient has a "difficult personality" by others. Provider did not know if there is a next of kin involved, has not seen anyone at bedside. Ms. Redd is a 69 yo female who smokes cigarettes (hx of 25 pack years), with an unknown PPHx and with a PMHx HTN, HLD, CVA x 3 (last was 2016 with right hand weakness residual), GAMALIEL not using CPAP, Emphysema, Chronic Constipation on Movantik, Urinary Incontinence , s/p Intrathecal Morphine pump for pain, with chronic neck and back pain worsening for years s/p multiple spinal surgeries including laminectomies and fusion of cervical, thoracic and lumbar spine initially done in 2009 and recently in 2019 and 2020 at [facility] by Dr. Trinidad, s/p T3-L1 revision of prior fusion who is currently hospitalized for and elective C2-S2 instrumentation and fusion. Psychiatry was consulted to perform a mental health evaluation for underlying psychiatric illness as patient has allegedly been verbally abusive to staff.    The patient was evaluated at bedside in the ICU by a resident and attending psychiatrist. Upon approach, the patient was sleeping. She is very somnolent, and had to be repeatedly awoken (vocally) throughout the interview. Her affect is constricted and irritable. She is incompletely oriented, she was able to state her name and that she just had surgery, but stated that she was located in a "U", and stated that she did not know the year or month. She denies any suicidal or homicidal ideations. After being asked, she became frustrated with the team, and stated "You're crazy" and commanded "Get out of my room."    Upon re-evaluation 10 minutes later, the patient continued to sleep/appear asleep despite multiple attempts to wake her via calling her name.    Per collateral obtained from PA: The patient has allegedly been verbally abusive to staff, requiring up to 1 mg of Precedex for agitation. This morning she stated "I want everyone to die" and stated "you're all ganging up on me". This is the provider's first day of working with the patient but she had been told that the patient has a "difficult personality" by others. Provider did not know if there is a next of kin involved, has not seen anyone at bedside.

## 2023-12-07 NOTE — BH CONSULTATION LIAISON ASSESSMENT NOTE - NSBHCHARTREVIEWLAB_PSY_A_CORE FT
Complete Blood Count in AM (12.07.23 @ 04:37)   Nucleated RBC: 0 /100 WBCs  WBC Count: 12.12 K/uL  RBC Count: 3.41 M/uL  Hemoglobin: 9.4 g/dL  Hematocrit: 28.5 %  Mean Cell Volume: 83.6 fl  Mean Cell Hemoglobin: 27.6 pg  Mean Cell Hemoglobin Conc: 33.0 gm/dL  Red Cell Distrib Width: 13.9 %  Platelet Count - Automated: 188 K/uL

## 2023-12-07 NOTE — BH CONSULTATION LIAISON ASSESSMENT NOTE - RISK ASSESSMENT
The patient's risk factors for mental health decompensation, especially delirium, include her multiple chronic medical issues, her acute pain, and being hospitalized on an ICU floor, and her current disorientation. Many of her protective factors at this time cannot be assessed given patient's lack of cooperation.

## 2023-12-07 NOTE — PROGRESS NOTE ADULT - SUBJECTIVE AND OBJECTIVE BOX
=================================  NEUROCRITICAL CARE ATTENDING NOTE  =================================    VANNA DIAZ   MRN-[id number]  Summary:  68y/F  HTN, HLD, CVA x 3 (last was 2016 with right hand weakness residual), GAMALIEL not using CPAP, Emphysema, former 25 pack year smoker restarted this week of surgery, Chronic Constipation on Movantik, Urinary Incontinence chronically self straight cath at home, chronic UTI, Breast Implant Rupture with Chronic Leak repair 10/2023, B/L CTS s/p release, s/p Intrathecal Morphine pump for pain, with chronic neck and back pain worsening for years s/p multiple spinal surgeries including laminectomies and fusion of cervical, thoracic and lumbar spine initially done in 2009 and recently in 2019 and 2020 at [facility] by Dr. Trinidad, s/p T3-L1 revision of prior fusion (with Dr. Luz on 8/17/22). Outpatient Xray 11/16/2023 showing broken rods/displacement. Presents for elective C2-S2 instrumentation and fusion. Pt endorses weakness of b/l LE and burning pain in b/l lower extremities radiating to both feet,  (04 Dec 2023 15:44)    COURSE IN THE HOSPITAL:  12/04 admitted to [facility]  12/05 POD0 C2 to pelvis fusion, intraoperative lost monitoring (B motor and sensory) initially responsive to HDA to MAP of 120s, but lost monitoring again, becoming unresponsvie to HDA to MAP even of 140s; remained intubated, brought to ICU.  12/06 POD1 precedex + valium, agitated  12/07      Past Medical History: HTN (hypertension)  Back pain Hypertension SS (spinal stenosis) High cholesterol Stroke H/O carpal tunnel syndrome H/O carpal tunnel syndrome Chronic GERD History of chronic constipation Seizure Urinary incontinence Pre-diabetes Acute UTI Anxiety and depression Arthritis Eczema External hemorrhoids H/O kyphosis Multiple sclerosis Pancreas cyst Scoliosis Wheelchair dependent Cervical pseudoarthrosis Right shoulder pain Cervical spondylosis Chronic headachesChronic LUQ pain Chronic UTI Degenerative joint disease H/O low back mir Lumbosacral spondylosis COPD (chronic obstructive pulmonary disease)  Allergies:  oxycodone (Pruritus)  Home meds:   ·	albuterol 90 mcg/inh inhalation aerosol: 2 puff(s) inhaled every 6 hours, As Needed  ·	aspirin 81 mg oral delayed release tablet: 1 tab(s) orally once a day  ·	DULoxetine 60 mg oral delayed release capsule: 1 cap(s) orally once a day  ·	gabapentin 800 mg oral tablet: 1 tab(s) orally 3 times a day  ·	MetFORMIN (Eqv-Glumetza) 500 mg oral tablet, extended release: 1 tab(s) orally 2 times a day  ·	metoprolol tartrate 50 mg oral tablet: 1.5 tab(s) orally 2 times a day  ·	Movantik 25 mg oral tablet: 1 tab(s) orally once a day (in the morning)  ·	simvastatin 40 mg oral tablet: 1 tab(s) orally once a day (at bedtime)  ·	tiZANidine 4 mg oral tablet: 2 tab(s) orally 2 times a day as needed for -  ·	Vitamin D3 50 mcg (2000 intl units) oral tablet: 1 tab(s) orally once a day    PHYSICAL EXAMINATION  T(C): 36.7 (12-07 @ 19:00), Max: 36.7 (12-07 @ 19:00) HR: 69 (12-07 @ 19:00) (61 - 94) BP: 175/84 (12-07 @ 19:00) (117/76 - 191/91) RR: 15 (12-07 @ 19:00) (13 - 27) SpO2: 99% (12-07 @ 19:00) (93% - 100%)   NEUROLOGIC EXAMINATION:  Patient Ox1, agitated, frustrated, waxing and waning; FC, B UE 5/5 BL LE 0/5; sensory loss below T10   GENERAL: room air  EENT:  anicteric  CARDIOVASCULAR: (+) S1 S2, normal rate and regular rhythm  PULMONARY: clear to auscultation bilaterally  ABDOMEN: soft, nontender with normoactive bowel sounds  EXTREMITIES: no edema  SKIN: no rash    LABS: 12-07  CAPILLARY BLOOD GLUCOSE 126 117 112 153    (15.76)  9.4  (11.2)  12.12 )-----------( 188      ( 07 Dec 2023 04:37 )             28.5    (137)  144  |  113<H>  |  10  ----------------------------<  129<H>  4.0   |  23  |  0.47<L>    Ca    8.4      07 Dec 2023 04:37  Phos  2.6     12-07  Mg     1.8     12-07 12-06 @ 07:01  -  12-07 @ 07:00  --------------------------------------------------------  IN: 2313.9 mL / OUT: 2990 mL / NET: -676.1 mL    12-07 @ 07:01  - 12-07 @ 20:21  --------------------------------------------------------  IN: 1454.9 mL / OUT: 2510 mL / NET: -1055.1 mL    Bacteriology:  CSF studies:  EEG:  Neuroimaging:  Other imaging:    MEDICATIONS: 12-07    ·	enoxaparin Injectable 40 SubCutaneous every 24 hours  ·	DULoxetine 60 Oral daily  ·	gabapentin 800 Oral three times a day  ·	methocarbamol 500 Oral every 8 hours  ·	midodrine 10 milliGRAM(s) Oral every 8 hours  ·	bisacodyl 5 Oral at bedtime  ·	polyethylene glycol 3350 17 Oral two times a day  ·	senna 2 Oral at bedtime  ·	simvastatin 40 Oral at bedtime  ·	multivitamin 1 Oral daily  ·	albuterol    90 MICROgram(s) HFA Inhaler 2 Inhalation every 6 hours PRN  ·	benzocaine 20% Spray 1 Mucosal three times a day PRN  ·	naloxone Injectable 0.1 IV Push every 3 minutes PRN  ·	ondansetron Injectable 4 IV Push every 6 hours PRN  ·	oxyCODONE    IR 10 Oral every 4 hours PRN  ·	oxyCODONE    IR 5 Oral every 4 hours PRN    IV FLUIDS: NS@85cc/hr  DRIPS:  ·	Morphine 1 milliGRAM / mL 17.9 mL 17.9 IntraThecal Continuous Pump  ·	dexMEDEtomidine Infusion 0.2 IV Continuous <Continuous> - 1.0  ·	phenylephrine    Infusion 0.1 MICROgram(s)/kG/Min IV Continuous <Continuous> 0.3  Lines: R IJ R radial [place]  Drains:    ·	3 HMV with 2 JPs  OUT:    Accordian (mL): 125 mL    Accordian (mL): 100 mL    Accordian (mL): 100 mL    Bulb (mL): 100 mL    Bulb (mL): 25 mL  Wounds:    CODE STATUS:  Full Code                       GOALS OF CARE:  aggressive                      DISPOSITION:  ICU =================================  NEUROCRITICAL CARE ATTENDING NOTE  =================================    VANNA DIAZ   [id number]  Summary:  68y/F  HTN, HLD, CVA x 3 (last was 2016 with right hand weakness residual), GAMALIEL not using CPAP, Emphysema, former 25 pack year smoker restarted this week of surgery, Chronic Constipation on Movantik, Urinary Incontinence chronically self straight cath at home, chronic UTI, Breast Implant Rupture with Chronic Leak repair 10/2023, B/L CTS s/p release, s/p Intrathecal Morphine pump for pain, with chronic neck and back pain worsening for years s/p multiple spinal surgeries including laminectomies and fusion of cervical, thoracic and lumbar spine initially done in 2009 and recently in 2019 and 2020 at [facility] by Dr. Trinidad, s/p T3-L1 revision of prior fusion (with Dr. Luz on 8/17/22). Outpatient Xray 11/16/2023 showing broken rods/displacement. Presents for elective C2-S2 instrumentation and fusion. Pt endorses weakness of b/l LE and burning pain in b/l lower extremities radiating to both feet,  (04 Dec 2023 15:44)    COURSE IN THE HOSPITAL:  12/04 admitted to [facility]  12/05 POD0 C2 to pelvis fusion, intraoperative lost monitoring (B motor and sensory) initially responsive to HDA to MAP of 120s, but lost monitoring again, becoming unresponsvie to HDA to MAP even of 140s; remained intubated, brought to ICU.  12/06 POD1 precedex + valium, agitated  12/07      Past Medical History: HTN (hypertension)  Back pain Hypertension SS (spinal stenosis) High cholesterol Stroke H/O carpal tunnel syndrome H/O carpal tunnel syndrome Chronic GERD History of chronic constipation Seizure Urinary incontinence Pre-diabetes Acute UTI Anxiety and depression Arthritis Eczema External hemorrhoids H/O kyphosis Multiple sclerosis Pancreas cyst Scoliosis Wheelchair dependent Cervical pseudoarthrosis Right shoulder pain Cervical spondylosis Chronic headachesChronic LUQ pain Chronic UTI Degenerative joint disease H/O low back mir Lumbosacral spondylosis COPD (chronic obstructive pulmonary disease)  Allergies:  oxycodone (Pruritus)  Home meds:   ·	albuterol 90 mcg/inh inhalation aerosol: 2 puff(s) inhaled every 6 hours, As Needed  ·	aspirin 81 mg oral delayed release tablet: 1 tab(s) orally once a day  ·	DULoxetine 60 mg oral delayed release capsule: 1 cap(s) orally once a day  ·	gabapentin 800 mg oral tablet: 1 tab(s) orally 3 times a day  ·	MetFORMIN (Eqv-Glumetza) 500 mg oral tablet, extended release: 1 tab(s) orally 2 times a day  ·	metoprolol tartrate 50 mg oral tablet: 1.5 tab(s) orally 2 times a day  ·	Movantik 25 mg oral tablet: 1 tab(s) orally once a day (in the morning)  ·	simvastatin 40 mg oral tablet: 1 tab(s) orally once a day (at bedtime)  ·	tiZANidine 4 mg oral tablet: 2 tab(s) orally 2 times a day as needed for -  ·	Vitamin D3 50 mcg (2000 intl units) oral tablet: 1 tab(s) orally once a day    PHYSICAL EXAMINATION  T(C): 36.7 (12-07 @ 19:00), Max: 36.7 (12-07 @ 19:00) HR: 69 (12-07 @ 19:00) (61 - 94) BP: 175/84 (12-07 @ 19:00) (117/76 - 191/91) RR: 15 (12-07 @ 19:00) (13 - 27) SpO2: 99% (12-07 @ 19:00) (93% - 100%)   NEUROLOGIC EXAMINATION:  Patient Ox1, agitated, frustrated, waxing and waning; FC, B UE 5/5 BL LE 0/5; sensory loss below T10   GENERAL: room air  EENT:  anicteric  CARDIOVASCULAR: (+) S1 S2, normal rate and regular rhythm  PULMONARY: clear to auscultation bilaterally  ABDOMEN: soft, nontender with normoactive bowel sounds  EXTREMITIES: no edema  SKIN: no rash    LABS: 12-07  CAPILLARY BLOOD GLUCOSE 126 117 112 153    (15.76)  9.4  (11.2)  12.12 )-----------( 188      ( 07 Dec 2023 04:37 )             28.5    (137)  144  |  113<H>  |  10  ----------------------------<  129<H>  4.0   |  23  |  0.47<L>    Ca    8.4      07 Dec 2023 04:37  Phos  2.6     12-07  Mg     1.8     12-07 12-06 @ 07:01  -  12-07 @ 07:00  --------------------------------------------------------  IN: 2313.9 mL / OUT: 2990 mL / NET: -676.1 mL    12-07 @ 07:01  - 12-07 @ 20:21  --------------------------------------------------------  IN: 1454.9 mL / OUT: 2510 mL / NET: -1055.1 mL    Bacteriology:  CSF studies:  EEG:  Neuroimaging:  Other imaging:    MEDICATIONS: 12-07    ·	enoxaparin Injectable 40 SubCutaneous every 24 hours  ·	DULoxetine 60 Oral daily  ·	gabapentin 800 Oral three times a day  ·	methocarbamol 500 Oral every 8 hours  ·	midodrine 10 milliGRAM(s) Oral every 8 hours  ·	bisacodyl 5 Oral at bedtime  ·	polyethylene glycol 3350 17 Oral two times a day  ·	senna 2 Oral at bedtime  ·	simvastatin 40 Oral at bedtime  ·	multivitamin 1 Oral daily  ·	albuterol    90 MICROgram(s) HFA Inhaler 2 Inhalation every 6 hours PRN  ·	benzocaine 20% Spray 1 Mucosal three times a day PRN  ·	naloxone Injectable 0.1 IV Push every 3 minutes PRN  ·	ondansetron Injectable 4 IV Push every 6 hours PRN  ·	oxyCODONE    IR 10 Oral every 4 hours PRN  ·	oxyCODONE    IR 5 Oral every 4 hours PRN    IV FLUIDS: NS@85cc/hr  DRIPS:  ·	Morphine 1 milliGRAM / mL 17.9 mL 17.9 IntraThecal Continuous Pump  ·	dexMEDEtomidine Infusion 0.2 IV Continuous <Continuous> - 1.0  ·	phenylephrine    Infusion 0.1 MICROgram(s)/kG/Min IV Continuous <Continuous> 0.3  Lines: R IJ R radial [place]  Drains:    ·	3 HMV with 2 JPs  OUT:    Accordian (mL): 125 mL    Accordian (mL): 100 mL    Accordian (mL): 100 mL    Bulb (mL): 100 mL    Bulb (mL): 25 mL  Wounds:    CODE STATUS:  Full Code                       GOALS OF CARE:  aggressive                      DISPOSITION:  ICU =================================  NEUROCRITICAL CARE ATTENDING NOTE  =================================    VANNA DIAZ   MRN-[id number]  Summary:  68y/F  HTN, HLD, CVA x 3 (last was 2016 with right hand weakness residual), GAMALIEL not using CPAP, Emphysema, former 25 pack year smoker restarted this week of surgery, Chronic Constipation on Movantik, Urinary Incontinence chronically self straight cath at home, chronic UTI, Breast Implant Rupture with Chronic Leak repair 10/2023, B/L CTS s/p release, s/p Intrathecal Morphine pump for pain, with chronic neck and back pain worsening for years s/p multiple spinal surgeries including laminectomies and fusion of cervical, thoracic and lumbar spine initially done in 2009 and recently in 2019 and 2020 at [facility] by Dr. Trinidad, s/p T3-L1 revision of prior fusion (with Dr. Luz on 8/17/22). Outpatient Xray 11/16/2023 showing broken rods/displacement. Presents for elective C2-S2 instrumentation and fusion. Pt endorses weakness of b/l LE and burning pain in b/l lower extremities radiating to both feet,  (04 Dec 2023 15:44)    COURSE IN THE HOSPITAL:  12/04 admitted to [facility]  12/05 POD0 C2 to pelvis fusion, intraoperative lost monitoring (B motor and sensory) initially responsive to HDA to MAP of 120s, but lost monitoring again, becoming unresponsvie to HDA to MAP even of 140s; remained intubated, brought to ICU.  12/06 POD1 precedex + valium, agitated  12/07 MRI done, agitated, remains on precedex    Past Medical History: HTN (hypertension)  Back pain Hypertension SS (spinal stenosis) High cholesterol Stroke H/O carpal tunnel syndrome H/O carpal tunnel syndrome Chronic GERD History of chronic constipation Seizure Urinary incontinence Pre-diabetes Acute UTI Anxiety and depression Arthritis Eczema External hemorrhoids H/O kyphosis Multiple sclerosis Pancreas cyst Scoliosis Wheelchair dependent Cervical pseudoarthrosis Right shoulder pain Cervical spondylosis Chronic headachesChronic LUQ pain Chronic UTI Degenerative joint disease H/O low back mir Lumbosacral spondylosis COPD (chronic obstructive pulmonary disease)  Allergies:  oxycodone (Pruritus)  Home meds:   ·	albuterol 90 mcg/inh inhalation aerosol: 2 puff(s) inhaled every 6 hours, As Needed  ·	aspirin 81 mg oral delayed release tablet: 1 tab(s) orally once a day  ·	DULoxetine 60 mg oral delayed release capsule: 1 cap(s) orally once a day  ·	gabapentin 800 mg oral tablet: 1 tab(s) orally 3 times a day  ·	MetFORMIN (Eqv-Glumetza) 500 mg oral tablet, extended release: 1 tab(s) orally 2 times a day  ·	metoprolol tartrate 50 mg oral tablet: 1.5 tab(s) orally 2 times a day  ·	Movantik 25 mg oral tablet: 1 tab(s) orally once a day (in the morning)  ·	simvastatin 40 mg oral tablet: 1 tab(s) orally once a day (at bedtime)  ·	tiZANidine 4 mg oral tablet: 2 tab(s) orally 2 times a day as needed for -  ·	Vitamin D3 50 mcg (2000 intl units) oral tablet: 1 tab(s) orally once a day    PHYSICAL EXAMINATION  T(C): 36.7 (12-07 @ 19:00), Max: 36.7 (12-07 @ 19:00) HR: 69 (12-07 @ 19:00) (61 - 94) BP: 175/84 (12-07 @ 19:00) (117/76 - 191/91 - mostly 120s) RR: 15 (12-07 @ 19:00) (13 - 27) SpO2: 99% (12-07 @ 19:00) (93% - 100%)   NEUROLOGIC EXAMINATION:  Patient lethargic, easily rousable, Ox3, agitated; FC, B UE 5/5 BL LE 0/5; sensory loss below T10   GENERAL: room air  EENT:  anicteric  CARDIOVASCULAR: (+) S1 S2, normal rate and regular rhythm  PULMONARY: clear to auscultation bilaterally  ABDOMEN: soft, nontender with normoactive bowel sounds  EXTREMITIES: no edema  SKIN: no rash    LABS: 12-07  CAPILLARY BLOOD GLUCOSE 126 117 112 153    (15.76)  9.4  (11.2)  12.12 )-----------( 188      ( 07 Dec 2023 04:37 )             28.5    (137)  144  |  113<H>  |  10  ----------------------------<  129<H>  4.0   |  23  |  0.47<L>    Ca    8.4      07 Dec 2023 04:37  Phos  2.6     12-07  Mg     1.8     12-07 12-06 @ 07:01  - 12-07 @ 07:00  --------------------------------------------------------  IN: 2313.9 mL / OUT: 2990 mL / NET: -676.1 mL    12-07 @ 07:01 - 12-07 @ 20:21  --------------------------------------------------------  IN: 1454.9 mL / OUT: 2510 mL / NET: -1055.1 mL    Bacteriology:  CSF studies:  EEG:  Neuroimaging:  Other imaging:    MEDICATIONS: 12-07    ·	enoxaparin Injectable 40 SubCutaneous every 24 hours  ·	DULoxetine 60 Oral daily  ·	gabapentin 800 Oral three times a day  ·	methocarbamol 500 Oral every 8 hours  ·	midodrine 10 milliGRAM(s) Oral every 8 hours  ·	bisacodyl 5 Oral at bedtime  ·	polyethylene glycol 3350 17 Oral two times a day  ·	senna 2 Oral at bedtime  ·	simvastatin 40 Oral at bedtime  ·	multivitamin 1 Oral daily  ·	albuterol    90 MICROgram(s) HFA Inhaler 2 Inhalation every 6 hours PRN  ·	benzocaine 20% Spray 1 Mucosal three times a day PRN  ·	naloxone Injectable 0.1 IV Push every 3 minutes PRN  ·	ondansetron Injectable 4 IV Push every 6 hours PRN  ·	oxyCODONE    IR 10 Oral every 4 hours PRN  ·	oxyCODONE    IR 5 Oral every 4 hours PRN    IV FLUIDS: IVL  DRIPS:  ·	Morphine 1 milliGRAM / mL 17.9 mL 17.9 IntraThecal Continuous Pump  ·	dexMEDEtomidine Infusion 0.2 IV Continuous <Continuous> - 0.7  ·	phenylephrine    Infusion 0.1 MICROgram(s)/kG/Min IV Continuous <Continuous> 0.1  Lines: R IJ R radial [place]  Diet: regular  Drains:    ·	3 HMV with 2 JPs    Accordian (mL): 210 mL    Accordian (mL): 130 mL    Accordian (mL): 125 mL    Bulb (mL): 125 mL    Bulb (mL): 50 mL  Wounds    CODE STATUS:  Full Code                       GOALS OF CARE:  aggressive                      DISPOSITION:  ICU =================================  NEUROCRITICAL CARE ATTENDING NOTE  =================================    VANNA DIAZ   MRN-[id number]  Summary:  68y/F  HTN, HLD, CVA x 3 (last was 2016 with right hand weakness residual), GAMALIEL not using CPAP, Emphysema, former 25 pack year smoker restarted this week of surgery, Chronic Constipation on Movantik, Urinary Incontinence chronically self straight cath at home, chronic UTI, Breast Implant Rupture with Chronic Leak repair 10/2023, B/L CTS s/p release, s/p Intrathecal Morphine pump for pain, with chronic neck and back pain worsening for years s/p multiple spinal surgeries including laminectomies and fusion of cervical, thoracic and lumbar spine initially done in 2009 and recently in 2019 and 2020 at [facility] by Dr. Trinidad, s/p T3-L1 revision of prior fusion (with Dr. Luz on 8/17/22). Outpatient Xray 11/16/2023 showing broken rods/displacement. Presents for elective C2-S2 instrumentation and fusion. Pt endorses weakness of b/l LE and burning pain in b/l lower extremities radiating to both feet,  (04 Dec 2023 15:44)    COURSE IN THE HOSPITAL:  12/04 admitted to [facility]  12/05 POD0 C2 to pelvis fusion, intraoperative lost monitoring (B motor and sensory) initially responsive to HDA to MAP of 120s, but lost monitoring again, becoming unresponsvie to HDA to MAP even of 140s; remained intubated, brought to ICU.  12/06 POD1 precedex + valium, agitated  12/07 MRI done, agitated, remains on precedex    Past Medical History: HTN (hypertension)  Back pain Hypertension SS (spinal stenosis) High cholesterol Stroke H/O carpal tunnel syndrome H/O carpal tunnel syndrome Chronic GERD History of chronic constipation Seizure Urinary incontinence Pre-diabetes Acute UTI Anxiety and depression Arthritis Eczema External hemorrhoids H/O kyphosis Multiple sclerosis Pancreas cyst Scoliosis Wheelchair dependent Cervical pseudoarthrosis Right shoulder pain Cervical spondylosis Chronic headachesChronic LUQ pain Chronic UTI Degenerative joint disease H/O low back mir Lumbosacral spondylosis COPD (chronic obstructive pulmonary disease)  Allergies:  oxycodone (Pruritus)  Home meds:   ·	albuterol 90 mcg/inh inhalation aerosol: 2 puff(s) inhaled every 6 hours, As Needed  ·	aspirin 81 mg oral delayed release tablet: 1 tab(s) orally once a day  ·	DULoxetine 60 mg oral delayed release capsule: 1 cap(s) orally once a day  ·	gabapentin 800 mg oral tablet: 1 tab(s) orally 3 times a day  ·	MetFORMIN (Eqv-Glumetza) 500 mg oral tablet, extended release: 1 tab(s) orally 2 times a day  ·	metoprolol tartrate 50 mg oral tablet: 1.5 tab(s) orally 2 times a day  ·	Movantik 25 mg oral tablet: 1 tab(s) orally once a day (in the morning)  ·	simvastatin 40 mg oral tablet: 1 tab(s) orally once a day (at bedtime)  ·	tiZANidine 4 mg oral tablet: 2 tab(s) orally 2 times a day as needed for -  ·	Vitamin D3 50 mcg (2000 intl units) oral tablet: 1 tab(s) orally once a day    PHYSICAL EXAMINATION  T(C): 36.7 (12-07 @ 19:00), Max: 36.7 (12-07 @ 19:00) HR: 69 (12-07 @ 19:00) (61 - 94) BP: 175/84 (12-07 @ 19:00) (117/76 - 191/91 - mostly 120s) RR: 15 (12-07 @ 19:00) (13 - 27) SpO2: 99% (12-07 @ 19:00) (93% - 100%)   NEUROLOGIC EXAMINATION:  Patient lethargic, easily rousable, Ox3, agitated; FC, B UE 5/5 BL LE 0/5; sensory loss below T10   GENERAL: room air  EENT:  anicteric  CARDIOVASCULAR: (+) S1 S2, normal rate and regular rhythm  PULMONARY: clear to auscultation bilaterally  ABDOMEN: soft, nontender with normoactive bowel sounds  EXTREMITIES: no edema  SKIN: no rash    LABS: 12-07  CAPILLARY BLOOD GLUCOSE 126 117 112 153    (15.76)  9.4  (11.2)  12.12 )-----------( 188      ( 07 Dec 2023 04:37 )             28.5    (137)  144  |  113<H>  |  10  ----------------------------<  129<H>  4.0   |  23  |  0.47<L>    Ca    8.4      07 Dec 2023 04:37  Phos  2.6     12-07  Mg     1.8     12-07 12-06 @ 07:01  - 12-07 @ 07:00  --------------------------------------------------------  IN: 2313.9 mL / OUT: 2990 mL / NET: -676.1 mL    12-07 @ 07:01 - 12-07 @ 20:21  --------------------------------------------------------  IN: 1454.9 mL / OUT: 2510 mL / NET: -1055.1 mL    Bacteriology:  CSF studies:  EEG:  Neuroimaging:  Other imaging:    MEDICATIONS: 12-07    ·	enoxaparin Injectable 40 SubCutaneous every 24 hours  ·	DULoxetine 60 Oral daily  ·	gabapentin 800 Oral three times a day  ·	methocarbamol 500 Oral every 8 hours  ·	midodrine 10 milliGRAM(s) Oral every 8 hours  ·	bisacodyl 5 Oral at bedtime  ·	polyethylene glycol 3350 17 Oral two times a day  ·	senna 2 Oral at bedtime  ·	simvastatin 40 Oral at bedtime  ·	multivitamin 1 Oral daily  ·	albuterol    90 MICROgram(s) HFA Inhaler 2 Inhalation every 6 hours PRN  ·	benzocaine 20% Spray 1 Mucosal three times a day PRN  ·	naloxone Injectable 0.1 IV Push every 3 minutes PRN  ·	ondansetron Injectable 4 IV Push every 6 hours PRN  ·	oxyCODONE    IR 10 Oral every 4 hours PRN  ·	oxyCODONE    IR 5 Oral every 4 hours PRN    IV FLUIDS: IVL  DRIPS:  ·	Morphine 1 milliGRAM / mL 17.9 mL 17.9 IntraThecal Continuous Pump  ·	dexMEDEtomidine Infusion 0.2 IV Continuous <Continuous> - 0.7  ·	phenylephrine    Infusion 0.1 MICROgram(s)/kG/Min IV Continuous <Continuous> 0.1  Lines: R IJ R radial [place]  Diet: regular  Drains:    ·	3 HMV with 2 JPs    Accordian (mL): 210 mL    Accordian (mL): 130 mL    Accordian (mL): 125 mL    Bulb (mL): 125 mL    Bulb (mL): 50 mL  Wounds    CODE STATUS:  Full Code                       GOALS OF CARE:  aggressive                      DISPOSITION:  ICU

## 2023-12-07 NOTE — PROGRESS NOTE ADULT - SUBJECTIVE AND OBJECTIVE BOX
NSCU Progress Note    Assessment/Hospital Course:    68y/F  HTN, HLD, CVA x 3 (last was 2016 with right hand weakness residual), GAMALIEL not using CPAP, Emphysema, former 25 pack year smoker restarted this week of surgery, Chronic Constipation on Movantik, Urinary Incontinence chronically self straight cath at home, chronic UTI, Breast Implant Rupture with Chronic Leak repair 10/2023, B/L CTS s/p release, s/p Intrathecal Morphine pump for pain, with chronic neck and back pain worsening for years s/p multiple spinal surgeries including laminectomies and fusion of cervical, thoracic and lumbar spine initially done in 2009 and recently in 2019 and 2020 at [facility] by Dr. Trinidad, s/p T3-L1 revision of prior fusion (with Dr. Luz on 8/17/22). Outpatient Xray 11/16/2023 showing broken rods/displacement. Presents for elective C2-S2 instrumentation and fusion. Pt endorses weakness of b/l LE and burning pain in b/l lower extremities radiating to both feet,  (04 Dec 2023 15:44)      24 Hour Events/Subjective:  - POD2 C2 to pelvis fusion, intraoperative lost monitoring, re-gained at MAP>120, lost again and unresponsive to even MAPs>140  - ongoing pressor requirements for map goals      REVIEW OF SYSTEMS:  - negative except as above    VITALS:   - Reviewed      IMAGING/DATA:   - Reviewed          PHYSICAL EXAM:    General: anxious  CVS: RR  Pulm: CTAB  GI: Soft, NTND  Extremities: No LE Edema  Neuro: EOS, AOx1-2 (effort, non-compliant with exam), seth, BUE 5/5 (RUE effort 4+ bi/tri?), BLE 0/5, sensation in tact throughout   NSCU Progress Note    Assessment/Hospital Course:    68y/F  HTN, HLD, CVA x 3 (last was 2016 with right hand weakness residual), GAMALIEL not using CPAP, Emphysema, former 25 pack year smoker restarted this week of surgery, Chronic Constipation on Movantik, Urinary Incontinence chronically self straight cath at home, chronic UTI, Breast Implant Rupture with Chronic Leak repair 10/2023, B/L CTS s/p release, s/p Intrathecal Morphine pump for pain, with chronic neck and back pain worsening for years s/p multiple spinal surgeries including laminectomies and fusion of cervical, thoracic and lumbar spine initially done in 2009 and recently in 2019 and 2020 at [facility] by Dr. Trinidad, s/p T3-L1 revision of prior fusion (with Dr. Luz on 8/17/22). Outpatient Xray 11/16/2023 showing broken rods/displacement. Presents for elective C2-S2 instrumentation and fusion. Pt endorses weakness of b/l LE and burning pain in b/l lower extremities radiating to both feet,  (04 Dec 2023 15:44)      24 Hour Events/Subjective:  - POD2 C2 to pelvis fusion, intraoperative lost monitoring, re-gained at MAP>120, lost again and unresponsive to even MAPs>140  - ongoing pressor requirements for map goals      REVIEW OF SYSTEMS:  - negative except as above    VITALS:   - Reviewed      IMAGING/DATA:   - Reviewed          PHYSICAL EXAM:    General: anxious  CVS: RR  Pulm: CTAB  GI: Soft, NTND  Extremities: No LE Edema  Neuro: EOS, AOx1-2 (effort, non-compliant with exam), seth, BUE 5/5 (RUE effort 4+ bi/tri?), BLE 0/5, sensation in tact throughout

## 2023-12-07 NOTE — BH CONSULTATION LIAISON ASSESSMENT NOTE - CURRENT MEDICATION
MEDICATIONS  (STANDING):  bisacodyl 5 milliGRAM(s) Oral at bedtime  chlorhexidine 2% Cloths 1 Application(s) Topical <User Schedule>  dexMEDEtomidine Infusion 0.2 MICROgram(s)/kG/Hr (4.24 mL/Hr) IV Continuous <Continuous>  DULoxetine 60 milliGRAM(s) Oral daily  enoxaparin Injectable 40 milliGRAM(s) SubCutaneous every 24 hours  gabapentin 800 milliGRAM(s) Oral three times a day  influenza  Vaccine (HIGH DOSE) 0.7 milliLiter(s) IntraMuscular once  insulin lispro (ADMELOG) corrective regimen sliding scale   SubCutaneous every 6 hours  methocarbamol 500 milliGRAM(s) Oral every 8 hours  midodrine 10 milliGRAM(s) Oral every 8 hours  Morphine 1 milliGRAM / mL 17.9 mL 17.9 mL IntraThecal Continuous Pump  multivitamin 1 Tablet(s) Oral daily  phenylephrine    Infusion 0.1 MICROgram(s)/kG/Min (3.18 mL/Hr) IV Continuous <Continuous>  polyethylene glycol 3350 17 Gram(s) Oral daily  simvastatin 40 milliGRAM(s) Oral at bedtime  sodium chloride 0.9%. 1000 milliLiter(s) (85 mL/Hr) IV Continuous <Continuous>    MEDICATIONS  (PRN):  albuterol    90 MICROgram(s) HFA Inhaler 2 Puff(s) Inhalation every 6 hours PRN Shortness of Breath  benzocaine 20% Spray 1 Spray(s) Mucosal three times a day PRN Sore throat  ketorolac   Injectable 30 milliGRAM(s) IV Push every 6 hours PRN Severe Pain (7 - 10)  naloxone Injectable 0.1 milliGRAM(s) IV Push every 3 minutes PRN For ANY of the following changes in patient status:  A. RR LESS THAN 10 breaths per minute, B. Oxygen saturation LESS THAN 90%, C. Sedation score of 6  ondansetron Injectable 4 milliGRAM(s) IV Push every 6 hours PRN Nausea  oxyCODONE    IR 5 milliGRAM(s) Oral every 4 hours PRN Moderate Pain (4 - 6)  sodium chloride 0.9% lock flush 10 milliLiter(s) IV Push every 1 hour PRN Pre/post blood products, medications, blood draw, and to maintain line patency

## 2023-12-07 NOTE — BH CONSULTATION LIAISON ASSESSMENT NOTE - ADDITIONAL DETAILS / COMMENTS
Orientation: Intact to self and situation. Disoriented to place and date (disoriented to month and year)  Registration of 3 words: Unable to perform as patient not cooperative  Recall of 3 words: Unable to perform as patient not cooperative  Attention: Unable to assess as patient not cooperative, however she appeared very somnolent

## 2023-12-08 LAB
ANION GAP SERPL CALC-SCNC: 10 MMOL/L — SIGNIFICANT CHANGE UP (ref 5–17)
ANION GAP SERPL CALC-SCNC: 10 MMOL/L — SIGNIFICANT CHANGE UP (ref 5–17)
BUN SERPL-MCNC: 11 MG/DL — SIGNIFICANT CHANGE UP (ref 7–23)
BUN SERPL-MCNC: 11 MG/DL — SIGNIFICANT CHANGE UP (ref 7–23)
CALCIUM SERPL-MCNC: 8.4 MG/DL — SIGNIFICANT CHANGE UP (ref 8.4–10.5)
CALCIUM SERPL-MCNC: 8.4 MG/DL — SIGNIFICANT CHANGE UP (ref 8.4–10.5)
CHLORIDE SERPL-SCNC: 106 MMOL/L — SIGNIFICANT CHANGE UP (ref 96–108)
CHLORIDE SERPL-SCNC: 106 MMOL/L — SIGNIFICANT CHANGE UP (ref 96–108)
CO2 SERPL-SCNC: 22 MMOL/L — SIGNIFICANT CHANGE UP (ref 22–31)
CO2 SERPL-SCNC: 22 MMOL/L — SIGNIFICANT CHANGE UP (ref 22–31)
CREAT SERPL-MCNC: 0.49 MG/DL — LOW (ref 0.5–1.3)
CREAT SERPL-MCNC: 0.49 MG/DL — LOW (ref 0.5–1.3)
EGFR: 102 ML/MIN/1.73M2 — SIGNIFICANT CHANGE UP
EGFR: 102 ML/MIN/1.73M2 — SIGNIFICANT CHANGE UP
GLUCOSE SERPL-MCNC: 100 MG/DL — HIGH (ref 70–99)
GLUCOSE SERPL-MCNC: 100 MG/DL — HIGH (ref 70–99)
HCT VFR BLD CALC: 26.9 % — LOW (ref 34.5–45)
HCT VFR BLD CALC: 26.9 % — LOW (ref 34.5–45)
HGB BLD-MCNC: 9 G/DL — LOW (ref 11.5–15.5)
HGB BLD-MCNC: 9 G/DL — LOW (ref 11.5–15.5)
MAGNESIUM SERPL-MCNC: 1.6 MG/DL — SIGNIFICANT CHANGE UP (ref 1.6–2.6)
MAGNESIUM SERPL-MCNC: 1.6 MG/DL — SIGNIFICANT CHANGE UP (ref 1.6–2.6)
MCHC RBC-ENTMCNC: 28.1 PG — SIGNIFICANT CHANGE UP (ref 27–34)
MCHC RBC-ENTMCNC: 28.1 PG — SIGNIFICANT CHANGE UP (ref 27–34)
MCHC RBC-ENTMCNC: 33.5 GM/DL — SIGNIFICANT CHANGE UP (ref 32–36)
MCHC RBC-ENTMCNC: 33.5 GM/DL — SIGNIFICANT CHANGE UP (ref 32–36)
MCV RBC AUTO: 84.1 FL — SIGNIFICANT CHANGE UP (ref 80–100)
MCV RBC AUTO: 84.1 FL — SIGNIFICANT CHANGE UP (ref 80–100)
NRBC # BLD: 0 /100 WBCS — SIGNIFICANT CHANGE UP (ref 0–0)
NRBC # BLD: 0 /100 WBCS — SIGNIFICANT CHANGE UP (ref 0–0)
PHOSPHATE SERPL-MCNC: 3.2 MG/DL — SIGNIFICANT CHANGE UP (ref 2.5–4.5)
PHOSPHATE SERPL-MCNC: 3.2 MG/DL — SIGNIFICANT CHANGE UP (ref 2.5–4.5)
PLATELET # BLD AUTO: 212 K/UL — SIGNIFICANT CHANGE UP (ref 150–400)
PLATELET # BLD AUTO: 212 K/UL — SIGNIFICANT CHANGE UP (ref 150–400)
POTASSIUM SERPL-MCNC: 3.5 MMOL/L — SIGNIFICANT CHANGE UP (ref 3.5–5.3)
POTASSIUM SERPL-MCNC: 3.5 MMOL/L — SIGNIFICANT CHANGE UP (ref 3.5–5.3)
POTASSIUM SERPL-SCNC: 3.5 MMOL/L — SIGNIFICANT CHANGE UP (ref 3.5–5.3)
POTASSIUM SERPL-SCNC: 3.5 MMOL/L — SIGNIFICANT CHANGE UP (ref 3.5–5.3)
RBC # BLD: 3.2 M/UL — LOW (ref 3.8–5.2)
RBC # BLD: 3.2 M/UL — LOW (ref 3.8–5.2)
RBC # FLD: 14.4 % — SIGNIFICANT CHANGE UP (ref 10.3–14.5)
RBC # FLD: 14.4 % — SIGNIFICANT CHANGE UP (ref 10.3–14.5)
SODIUM SERPL-SCNC: 138 MMOL/L — SIGNIFICANT CHANGE UP (ref 135–145)
SODIUM SERPL-SCNC: 138 MMOL/L — SIGNIFICANT CHANGE UP (ref 135–145)
TROPONIN T, HIGH SENSITIVITY RESULT: 22 NG/L — SIGNIFICANT CHANGE UP (ref 0–51)
TROPONIN T, HIGH SENSITIVITY RESULT: 22 NG/L — SIGNIFICANT CHANGE UP (ref 0–51)
TROPONIN T, HIGH SENSITIVITY RESULT: 24 NG/L — SIGNIFICANT CHANGE UP (ref 0–51)
TROPONIN T, HIGH SENSITIVITY RESULT: 24 NG/L — SIGNIFICANT CHANGE UP (ref 0–51)
WBC # BLD: 10.96 K/UL — HIGH (ref 3.8–10.5)
WBC # BLD: 10.96 K/UL — HIGH (ref 3.8–10.5)
WBC # FLD AUTO: 10.96 K/UL — HIGH (ref 3.8–10.5)
WBC # FLD AUTO: 10.96 K/UL — HIGH (ref 3.8–10.5)

## 2023-12-08 PROCEDURE — 99291 CRITICAL CARE FIRST HOUR: CPT | Mod: 1L

## 2023-12-08 RX ORDER — SENNA PLUS 8.6 MG/1
2 TABLET ORAL AT BEDTIME
Refills: 0 | Status: DISCONTINUED | OUTPATIENT
Start: 2023-12-08 | End: 2023-12-10

## 2023-12-08 RX ORDER — HYDROMORPHONE HYDROCHLORIDE 2 MG/ML
0.5 INJECTION INTRAMUSCULAR; INTRAVENOUS; SUBCUTANEOUS ONCE
Refills: 0 | Status: DISCONTINUED | OUTPATIENT
Start: 2023-12-08 | End: 2023-12-08

## 2023-12-08 RX ORDER — OXYCODONE HYDROCHLORIDE 5 MG/1
15 TABLET ORAL EVERY 4 HOURS
Refills: 0 | Status: DISCONTINUED | OUTPATIENT
Start: 2023-12-08 | End: 2023-12-10

## 2023-12-08 RX ORDER — MAGNESIUM SULFATE 500 MG/ML
1 VIAL (ML) INJECTION ONCE
Refills: 0 | Status: DISCONTINUED | OUTPATIENT
Start: 2023-12-08 | End: 2023-12-08

## 2023-12-08 RX ORDER — SODIUM CHLORIDE 9 MG/ML
1000 INJECTION INTRAMUSCULAR; INTRAVENOUS; SUBCUTANEOUS
Refills: 0 | Status: DISCONTINUED | OUTPATIENT
Start: 2023-12-08 | End: 2023-12-10

## 2023-12-08 RX ORDER — POTASSIUM CHLORIDE 20 MEQ
40 PACKET (EA) ORAL ONCE
Refills: 0 | Status: COMPLETED | OUTPATIENT
Start: 2023-12-08 | End: 2023-12-08

## 2023-12-08 RX ORDER — DIAZEPAM 5 MG
2 TABLET ORAL ONCE
Refills: 0 | Status: DISCONTINUED | OUTPATIENT
Start: 2023-12-08 | End: 2023-12-08

## 2023-12-08 RX ORDER — SODIUM CHLORIDE 9 MG/ML
1000 INJECTION, SOLUTION INTRAVENOUS ONCE
Refills: 0 | Status: COMPLETED | OUTPATIENT
Start: 2023-12-08 | End: 2023-12-08

## 2023-12-08 RX ORDER — MAGNESIUM SULFATE 500 MG/ML
2 VIAL (ML) INJECTION ONCE
Refills: 0 | Status: COMPLETED | OUTPATIENT
Start: 2023-12-08 | End: 2023-12-08

## 2023-12-08 RX ORDER — HYDROMORPHONE HYDROCHLORIDE 2 MG/ML
0.5 INJECTION INTRAMUSCULAR; INTRAVENOUS; SUBCUTANEOUS
Refills: 0 | Status: DISCONTINUED | OUTPATIENT
Start: 2023-12-08 | End: 2023-12-10

## 2023-12-08 RX ORDER — OXYCODONE HYDROCHLORIDE 5 MG/1
30 TABLET ORAL EVERY 4 HOURS
Refills: 0 | Status: DISCONTINUED | OUTPATIENT
Start: 2023-12-08 | End: 2023-12-08

## 2023-12-08 RX ORDER — ALPRAZOLAM 0.25 MG
1 TABLET ORAL ONCE
Refills: 0 | Status: DISCONTINUED | OUTPATIENT
Start: 2023-12-08 | End: 2023-12-08

## 2023-12-08 RX ORDER — DIAZEPAM 5 MG
5 TABLET ORAL EVERY 8 HOURS
Refills: 0 | Status: DISCONTINUED | OUTPATIENT
Start: 2023-12-08 | End: 2023-12-10

## 2023-12-08 RX ORDER — LACTULOSE 10 G/15ML
10 SOLUTION ORAL ONCE
Refills: 0 | Status: COMPLETED | OUTPATIENT
Start: 2023-12-08 | End: 2023-12-08

## 2023-12-08 RX ORDER — OXYCODONE HYDROCHLORIDE 5 MG/1
30 TABLET ORAL EVERY 4 HOURS
Refills: 0 | Status: DISCONTINUED | OUTPATIENT
Start: 2023-12-08 | End: 2023-12-10

## 2023-12-08 RX ORDER — OXYCODONE HYDROCHLORIDE 5 MG/1
20 TABLET ORAL ONCE
Refills: 0 | Status: DISCONTINUED | OUTPATIENT
Start: 2023-12-08 | End: 2023-12-08

## 2023-12-08 RX ADMIN — Medication 40 MILLIEQUIVALENT(S): at 06:59

## 2023-12-08 RX ADMIN — MIDODRINE HYDROCHLORIDE 10 MILLIGRAM(S): 2.5 TABLET ORAL at 23:49

## 2023-12-08 RX ADMIN — Medication 1 TABLET(S): at 11:49

## 2023-12-08 RX ADMIN — MIDODRINE HYDROCHLORIDE 10 MILLIGRAM(S): 2.5 TABLET ORAL at 14:26

## 2023-12-08 RX ADMIN — Medication 25 GRAM(S): at 07:00

## 2023-12-08 RX ADMIN — OXYCODONE HYDROCHLORIDE 10 MILLIGRAM(S): 5 TABLET ORAL at 03:16

## 2023-12-08 RX ADMIN — OXYCODONE HYDROCHLORIDE 30 MILLIGRAM(S): 5 TABLET ORAL at 16:50

## 2023-12-08 RX ADMIN — HYDROMORPHONE HYDROCHLORIDE 0.5 MILLIGRAM(S): 2 INJECTION INTRAMUSCULAR; INTRAVENOUS; SUBCUTANEOUS at 22:30

## 2023-12-08 RX ADMIN — OXYCODONE HYDROCHLORIDE 20 MILLIGRAM(S): 5 TABLET ORAL at 08:21

## 2023-12-08 RX ADMIN — OXYCODONE HYDROCHLORIDE 30 MILLIGRAM(S): 5 TABLET ORAL at 15:50

## 2023-12-08 RX ADMIN — HYDROMORPHONE HYDROCHLORIDE 0.5 MILLIGRAM(S): 2 INJECTION INTRAMUSCULAR; INTRAVENOUS; SUBCUTANEOUS at 23:30

## 2023-12-08 RX ADMIN — DULOXETINE HYDROCHLORIDE 60 MILLIGRAM(S): 30 CAPSULE, DELAYED RELEASE ORAL at 11:49

## 2023-12-08 RX ADMIN — OXYCODONE HYDROCHLORIDE 5 MILLIGRAM(S): 5 TABLET ORAL at 04:24

## 2023-12-08 RX ADMIN — GABAPENTIN 800 MILLIGRAM(S): 400 CAPSULE ORAL at 22:30

## 2023-12-08 RX ADMIN — Medication 1 MILLIGRAM(S): at 05:55

## 2023-12-08 RX ADMIN — METHOCARBAMOL 500 MILLIGRAM(S): 500 TABLET, FILM COATED ORAL at 05:55

## 2023-12-08 RX ADMIN — CHLORHEXIDINE GLUCONATE 1 APPLICATION(S): 213 SOLUTION TOPICAL at 07:41

## 2023-12-08 RX ADMIN — HYDROMORPHONE HYDROCHLORIDE 0.5 MILLIGRAM(S): 2 INJECTION INTRAMUSCULAR; INTRAVENOUS; SUBCUTANEOUS at 05:55

## 2023-12-08 RX ADMIN — HYDROMORPHONE HYDROCHLORIDE 0.5 MILLIGRAM(S): 2 INJECTION INTRAMUSCULAR; INTRAVENOUS; SUBCUTANEOUS at 04:00

## 2023-12-08 RX ADMIN — SODIUM CHLORIDE 1000 MILLILITER(S): 9 INJECTION, SOLUTION INTRAVENOUS at 09:00

## 2023-12-08 RX ADMIN — GABAPENTIN 800 MILLIGRAM(S): 400 CAPSULE ORAL at 06:58

## 2023-12-08 RX ADMIN — OXYCODONE HYDROCHLORIDE 20 MILLIGRAM(S): 5 TABLET ORAL at 10:07

## 2023-12-08 RX ADMIN — HYDROMORPHONE HYDROCHLORIDE 0.5 MILLIGRAM(S): 2 INJECTION INTRAMUSCULAR; INTRAVENOUS; SUBCUTANEOUS at 19:05

## 2023-12-08 RX ADMIN — HYDROMORPHONE HYDROCHLORIDE 0.5 MILLIGRAM(S): 2 INJECTION INTRAMUSCULAR; INTRAVENOUS; SUBCUTANEOUS at 19:20

## 2023-12-08 RX ADMIN — HYDROMORPHONE HYDROCHLORIDE 0.5 MILLIGRAM(S): 2 INJECTION INTRAMUSCULAR; INTRAVENOUS; SUBCUTANEOUS at 07:42

## 2023-12-08 RX ADMIN — MIDODRINE HYDROCHLORIDE 10 MILLIGRAM(S): 2.5 TABLET ORAL at 05:56

## 2023-12-08 RX ADMIN — OXYCODONE HYDROCHLORIDE 10 MILLIGRAM(S): 5 TABLET ORAL at 06:58

## 2023-12-08 RX ADMIN — OXYCODONE HYDROCHLORIDE 10 MILLIGRAM(S): 5 TABLET ORAL at 07:42

## 2023-12-08 RX ADMIN — HYDROMORPHONE HYDROCHLORIDE 0.5 MILLIGRAM(S): 2 INJECTION INTRAMUSCULAR; INTRAVENOUS; SUBCUTANEOUS at 03:36

## 2023-12-08 RX ADMIN — Medication 5 MILLIGRAM(S): at 22:30

## 2023-12-08 RX ADMIN — OXYCODONE HYDROCHLORIDE 30 MILLIGRAM(S): 5 TABLET ORAL at 23:49

## 2023-12-08 RX ADMIN — LACTULOSE 10 GRAM(S): 10 SOLUTION ORAL at 06:59

## 2023-12-08 RX ADMIN — OXYCODONE HYDROCHLORIDE 5 MILLIGRAM(S): 5 TABLET ORAL at 08:00

## 2023-12-08 RX ADMIN — Medication 5 MILLIGRAM(S): at 09:12

## 2023-12-08 RX ADMIN — Medication 2 MILLIGRAM(S): at 04:01

## 2023-12-08 RX ADMIN — POLYETHYLENE GLYCOL 3350 17 GRAM(S): 17 POWDER, FOR SOLUTION ORAL at 05:56

## 2023-12-08 RX ADMIN — GABAPENTIN 800 MILLIGRAM(S): 400 CAPSULE ORAL at 13:28

## 2023-12-08 RX ADMIN — OXYCODONE HYDROCHLORIDE 30 MILLIGRAM(S): 5 TABLET ORAL at 12:50

## 2023-12-08 RX ADMIN — OXYCODONE HYDROCHLORIDE 30 MILLIGRAM(S): 5 TABLET ORAL at 11:50

## 2023-12-08 RX ADMIN — OXYCODONE HYDROCHLORIDE 10 MILLIGRAM(S): 5 TABLET ORAL at 01:50

## 2023-12-08 RX ADMIN — SIMVASTATIN 40 MILLIGRAM(S): 20 TABLET, FILM COATED ORAL at 22:30

## 2023-12-08 NOTE — PROGRESS NOTE ADULT - ASSESSMENT
ASSESSMENT/PLAN:    s/p C2 to pelvis fusion, intraoperative lost monitoring, re-gained at MAP>120, lost again and unresponsive to even MAPs>140    NEURO:  - neuro checks q1h  - drains per plastics/nsg  - pain following  - standing xr on stepdown    CVS:  - MAP>90  - levo  - will likely need prolnged pressors, midodrine 10q8h, wean pressors as tolerated  - TLC paced intra-op 12/5, requires for pressors    PULM:  - RA  - IS As tolerated  - Aspiration precautions    RENAL:  - Fluids: 50cc/h NS while on pressors  - daily IOs    GI:  - Diet: regular  - Bowel regimen: miralax, senna    ENDO:   - FS goal 120-180    HEME/ONC:  - SCDs  - Chemoppx: sql    ID:  - monitor for fevers       ASSESSMENT/PLAN:    s/p C2 to pelvis fusion, intraoperative lost monitoring, re-gained at MAP>120, lost again and unresponsive to even MAPs>140    NEURO:  - neuro checks q1h  - drains per plastics/nsg  - pain following  - standing xr on stepdown    CVS:  - MAP>90  - levo  - will likely need prolonged pressors, midodrine 10q8h, wean pressors as tolerated  - TLC paced intra-op 12/5, required for pressors, plan to dc this weekend, place picc line/mid  - trop, ekg while on pressors  - svt, likely fluid dpletion, 2L net neg    PULM:  - RA  - IS As tolerated  - Aspiration precautions    RENAL:  - Fluids: 50cc/h NS while on pressors; 1L IVB  - daily IOs    GI:  - Diet: regular  - Bowel regimen: miralax, senna  - lactulose      ENDO:   - FS goal 120-180    HEME/ONC:  - SCDs  - Chemoppx: sql  - trend h/h, if isnt fluid responsive, will order cbc to monitor h/h and ocnsider transfusion prn    ID:  - monitor for fevers

## 2023-12-08 NOTE — PROCEDURE NOTE - ADDITIONAL PROCEDURE DETAILS
2 Hemovac drains removed from suction, suture cut, drain catheter removed with no resistance, steri-strips placed over 4 separate catheter drainage sites, no complications.

## 2023-12-08 NOTE — PROGRESS NOTE ADULT - NSPROGADDITIONALINFOA_GEN_ALL_CORE
Patient Name: VANNA REDD     Subjective:    (1) Chief Complaint: Decreased motor strength and decreased sensation in lower extremities    (2) HPI: Ms. Redd has experienced decreased motor strength and decreased sensation in her lower extremities since her extubation on a Wednesday. The etiology behind the changes monitored during the case is unclear but it is likely related to changes in her neurologic status.       Objective:    (1) Vital Signs: Elevated mean arterial pressures    (2) Physical Exam Findings: Decreased motor strength and decreased sensation in lower extremities.  KAREEM A SCI level T9/10    (3) Laboratory data:     (4) Imaging Results: CT scan was unremarkable with no evidence of screw malposition, fracture, or change in alignment. MRI was difficult to evaluate due to artifact. Question of possible stenosis at T12/L1 or T11/12    (5) Other Diagnostic Data: Plan to obtain the CT myelogram    (6) Other Clinicians: Discussed case with Ms. Redd and several other colleagues including Dr. Ott and Dr. Ayers     Assessment:    (1) Problems:       (a) Decreased motor strength       (b) Decreased sensation in lower extremities       (c) Elevation in mean arterial pressures    (2) Differential Diagnosis:       (a) Changes in neurologic status     Plan:    (1) Item:       (a) Obtain the CT myelogram in the morning       (b) Possible exploration if any identified areas of stenosis       (c) Manage her condition in the ICU with elevated mean arterial pressures as we have been doing since surgery Tuesday pm       (d) Further discuss with colleagues regarding her condition    (2) Summary: The plan is to obtain the CT myelogram in the morning to further identify any possible areas of stenosis. The process and findings would be further discussed with other clinicians. For the time being, she will be managed in the ICU with elevated mean arterial pressures.  Pending the results if there is any area of stenosis identified we will go to OR for exploration and revision decompression as needed

## 2023-12-08 NOTE — PROGRESS NOTE ADULT - SUBJECTIVE AND OBJECTIVE BOX
NSCU Progress Note    Assessment/Hospital Course:    68y/F  HTN, HLD, CVA x 3 (last was 2016 with right hand weakness residual), GAMALIEL not using CPAP, Emphysema, former 25 pack year smoker restarted this week of surgery, Chronic Constipation on Movantik, Urinary Incontinence chronically self straight cath at home, chronic UTI, Breast Implant Rupture with Chronic Leak repair 10/2023, B/L CTS s/p release, s/p Intrathecal Morphine pump for pain, with chronic neck and back pain worsening for years s/p multiple spinal surgeries including laminectomies and fusion of cervical, thoracic and lumbar spine initially done in 2009 and recently in 2019 and 2020 at [facility] by Dr. Trinidad, s/p T3-L1 revision of prior fusion (with Dr. Luz on 8/17/22). Outpatient Xray 11/16/2023 showing broken rods/displacement. Presents for elective C2-S2 instrumentation and fusion. Pt endorses weakness of b/l LE and burning pain in b/l lower extremities radiating to both feet,  (04 Dec 2023 15:44)      24 Hour Events/Subjective:  - POD3 C2 to pelvis fusion, intraoperative lost monitoring, re-gained at MAP>120, lost again and unresponsive to even MAPs>140  - ongoing pressor requirements for map goals  - MRI poor study d/t artifact  - h/h trending down, vss      REVIEW OF SYSTEMS:  - negative except as above    VITALS:   - Reviewed      IMAGING/DATA:   - Reviewed          PHYSICAL EXAM:    General: anxious  CVS: RR  Pulm: CTAB  GI: Soft, NTND  Extremities: No LE Edema  Neuro: EOS, AOx1-2 (effort, non-compliant with exam), seth, BUE 5/5 (RUE effort 4+ bi/tri?), BLE 0/5, sensation in tact throughout   NSCU Progress Note    Assessment/Hospital Course:    68y/F  HTN, HLD, CVA x 3 (last was 2016 with right hand weakness residual), GAMALIEL not using CPAP, Emphysema, former 25 pack year smoker restarted this week of surgery, Chronic Constipation on Movantik, Urinary Incontinence chronically self straight cath at home, chronic UTI, Breast Implant Rupture with Chronic Leak repair 10/2023, B/L CTS s/p release, s/p Intrathecal Morphine pump for pain, with chronic neck and back pain worsening for years s/p multiple spinal surgeries including laminectomies and fusion of cervical, thoracic and lumbar spine initially done in 2009 and recently in 2019 and 2020 at [facility] by Dr. Trinidad, s/p T3-L1 revision of prior fusion (with Dr. Luz on 8/17/22). Outpatient Xray 11/16/2023 showing broken rods/displacement. Presents for elective C2-S2 instrumentation and fusion. Pt endorses weakness of b/l LE and burning pain in b/l lower extremities radiating to both feet,  (04 Dec 2023 15:44)      24 Hour Events/Subjective:  - POD3 C2 to pelvis fusion, intraoperative lost monitoring, re-gained at MAP>120, lost again and unresponsive to even MAPs>140  - ongoing pressor requirements for map goals  - MRI poor study d/t artifact  - h/h trending down, vss      REVIEW OF SYSTEMS:  - negative except as above    VITALS:   - Reviewed      IMAGING/DATA:   - Reviewed          PHYSICAL EXAM:    General: anxious  CVS: RR  Pulm: CTAB  GI: Soft, NTND  Extremities: No LE Edema  Neuro: EOS, AOx1-2 (effort, non-compliant with exam), seth, BUE 5/5 (RUE effort 4+ bi/tri?), BLE 0/5, sensation in tact throughout   NSCU Progress Note    Assessment/Hospital Course:    68y/F  HTN, HLD, CVA x 3 (last was 2016 with right hand weakness residual), GAMALIEL not using CPAP, Emphysema, former 25 pack year smoker restarted this week of surgery, Chronic Constipation on Movantik, Urinary Incontinence chronically self straight cath at home, chronic UTI, Breast Implant Rupture with Chronic Leak repair 10/2023, B/L CTS s/p release, s/p Intrathecal Morphine pump for pain, with chronic neck and back pain worsening for years s/p multiple spinal surgeries including laminectomies and fusion of cervical, thoracic and lumbar spine initially done in 2009 and recently in 2019 and 2020 at [facility] by Dr. Trinidad, s/p T3-L1 revision of prior fusion (with Dr. Luz on 8/17/22). Outpatient Xray 11/16/2023 showing broken rods/displacement. Presents for elective C2-S2 instrumentation and fusion. Pt endorses weakness of b/l LE and burning pain in b/l lower extremities radiating to both feet,  (04 Dec 2023 15:44)      24 Hour Events/Subjective:  - POD3 C2 to pelvis fusion, intraoperative lost monitoring, re-gained at MAP>120, lost again and unresponsive to even MAPs>140  - ongoing pressor requirements for map goals  - MRI poor study d/t artifact  - h/h trending down, vss except epsiode of sinus tachy 120s, resolved without intervention      REVIEW OF SYSTEMS:  - negative except as above    VITALS:   - Reviewed      IMAGING/DATA:   - Reviewed          PHYSICAL EXAM:    General: anxious  CVS: RR  Pulm: CTAB  GI: Soft, NTND  Extremities: No LE Edema  Neuro: EOS, AOx1-2 (effort, non-compliant with exam), seth, BUE 5/5 (RUE effort 4+ bi/tri?), BLE 0/5, sensation in tact throughout   NSCU Progress Note    Assessment/Hospital Course:    68y/F  HTN, HLD, CVA x 3 (last was 2016 with right hand weakness residual), GAMALIEL not using CPAP, Emphysema, former 25 pack year smoker restarted this week of surgery, Chronic Constipation on Movantik, Urinary Incontinence chronically self straight cath at home, chronic UTI, Breast Implant Rupture with Chronic Leak repair 10/2023, B/L CTS s/p release, s/p Intrathecal Morphine pump for pain, with chronic neck and back pain worsening for years s/p multiple spinal surgeries including laminectomies and fusion of cervical, thoracic and lumbar spine initially done in 2009 and recently in 2019 and 2020 at [facility] by Dr. Trinidad, s/p T3-L1 revision of prior fusion (with Dr. Luz on 8/17/22). Outpatient Xray 11/16/2023 showing broken rods/displacement. Presents for elective C2-S2 instrumentation and fusion. Pt endorses weakness of b/l LE and burning pain in b/l lower extremities radiating to both feet,  (04 Dec 2023 15:44)      24 Hour Events/Subjective:  - POD3 C2 to pelvis fusion, intraoperative lost monitoring, re-gained at MAP>120, lost again and unresponsive to even MAPs>140  - ongoing pressor requirements for map goals  - MRI poor study d/t artifact  - h/h trending down, vss except epsiode of sinus tachy 120s, resolved without intervention      REVIEW OF SYSTEMS:  - negative except as above    VITALS:   - Reviewed      IMAGING/DATA:   - Reviewed          PHYSICAL EXAM:    General: anxious  CVS: RR  Pulm: CTAB  GI: Soft, NTND  Extremities: No LE Edema  Neuro: EOS, AOx1-2 (effort, non-compliant with exam), seth, BUE 5/5 (RUE effort 4+ bi/tri?), BLE 0/5, sensation in tact throughout

## 2023-12-08 NOTE — PROGRESS NOTE ADULT - SUBJECTIVE AND OBJECTIVE BOX
HPI:  66 y/o female with PMHx HTN, HLD, CVA x 3 (last was 2016 with right hand weakness residual), GAMALIEL not using CPAP, Emphysema, former 25 pack year smoker restarted this week of surgery, Chronic Constipation on Movantik, Urinary Incontinence chronically self straight cath at home, chronic UTI, Breast Implant Rupture with Chronic Leak repair 10/2023, B/L CTS s/p release, s/p Intrathecal Morphine pump for pain, with chronic neck and back pain worsening for years s/p multiple spinal surgeries including laminectomies and fusion of cervical, thoracic and lumbar spine initially done in 2009 and recently in 2019 and 2020 at [facility] by Dr. Trinidad, s/p T3-L1 revision of prior fusion (with Dr. Luz on 8/17/22). Outpatient Xray 11/16/2023 showing broken rods/displacement. Presents for elective C2-S2 instrumentation and fusion. Pt endorses weakness of b/l LE and burning pain in b/l lower extremities radiating to both feet,  (04 Dec 2023 15:44)    Hospital Course:  12/4: Admitted for spinal fusion d/t damaged hardware.   12/5: Neuro stable. POD0 C2-S2 instrumentation/fusion (intraop b/l LE motor loss). on parminder for MAP>90  12/6: POD1. KAMRAN o/n neuro stable. remains intubated. Extubated 6am. Precedex gtt prn. Remains on parminder gtt for MAP goal >90. Valium made prn. CT thoracic spine bc MRI unavailable. Attempted NGT, unsuccessful (resistance @ 35).   12/7: Given IV dilaudid 0.5mg for pain. Neuro exam stable. SQL started tongiht. Increased precedex to 1.0. Responds well to valium. Consulting psych given paranoia ?homicidal statements. Passed bedside dyspphagia and tolerating PO meds. MRI complete, f/u read.   12/8: KAMRAN overnight. Pt remains on precedex gtt. Neuro exam unchanged.    Vital Signs Last 24 Hrs  T(C): 36.4 (07 Dec 2023 22:10), Max: 36.7 (07 Dec 2023 19:00)  T(F): 97.5 (07 Dec 2023 22:10), Max: 98.1 (07 Dec 2023 19:00)  HR: 72 (07 Dec 2023 20:00) (61 - 94)  BP: 120/77 (07 Dec 2023 20:00) (117/76 - 191/91)  BP(mean): 92 (07 Dec 2023 20:00) (84 - 131)  RR: 19 (07 Dec 2023 20:00) (13 - 27)  SpO2: 98% (07 Dec 2023 20:00) (93% - 100%)    Parameters below as of 07 Dec 2023 20:00  Patient On (Oxygen Delivery Method): room air        I&O's Summary    06 Dec 2023 07:01  -  07 Dec 2023 07:00  --------------------------------------------------------  IN: 2313.9 mL / OUT: 2990 mL / NET: -676.1 mL    07 Dec 2023 07:01  -  07 Dec 2023 22:51  --------------------------------------------------------  IN: 1458.1 mL / OUT: 2510 mL / NET: -1051.9 mL      PHYSICAL EXAM:  GEN: laying in bed, NAD  NEURO: AAOx1 to self however patient not cooperating with the rest of the orientation exam, FC  CNII-XII intact. PERRL, EOM, face symmetric  Motor: RUE/LUE 5/5, RLE/LLE 0/5, unable to assess sensation as patient refusing to be assessed  CV: RRR +S1/S2  PULM: CTAB  GI: Abd soft, NT/ND  EXT: ext warm, dry, nontender  WOUND/DRAINS: cervical-pelvis incision c/d/i    TUBES/LINES:  [ ] R IJ (12/5- )  [ ] R leyda (12/5- )  [ ] HMV x3 (12/5- )  [ ] YIFAN x2 (12/5- )    DIET:  [] NPO  [x] Mechanical  [] Tube feeds    LABS:                        9.4    12.12 )-----------( 188      ( 07 Dec 2023 04:37 )             28.5     12-07    144  |  113<H>  |  10  ----------------------------<  129<H>  4.0   |  23  |  0.47<L>    Ca    8.4      07 Dec 2023 04:37  Phos  2.6     12-07  Mg     1.8     12-07        Urinalysis Basic - ( 07 Dec 2023 04:37 )    Color: x / Appearance: x / SG: x / pH: x  Gluc: 129 mg/dL / Ketone: x  / Bili: x / Urobili: x   Blood: x / Protein: x / Nitrite: x   Leuk Esterase: x / RBC: x / WBC x   Sq Epi: x / Non Sq Epi: x / Bacteria: x          CAPILLARY BLOOD GLUCOSE      POCT Blood Glucose.: 126 mg/dL (07 Dec 2023 18:02)  POCT Blood Glucose.: 117 mg/dL (07 Dec 2023 11:09)  POCT Blood Glucose.: 112 mg/dL (07 Dec 2023 06:34)      Drug Levels: [] N/A    CSF Analysis: [] N/A      Allergies    No Known Allergies    Intolerances      MEDICATIONS:  Antibiotics:    Neuro:  dexMEDEtomidine Infusion 0.2 MICROgram(s)/kG/Hr IV Continuous <Continuous>  DULoxetine 60 milliGRAM(s) Oral daily  gabapentin 800 milliGRAM(s) Oral three times a day  methocarbamol 500 milliGRAM(s) Oral every 8 hours  ondansetron Injectable 4 milliGRAM(s) IV Push every 6 hours PRN  oxyCODONE    IR 10 milliGRAM(s) Oral every 4 hours PRN  oxyCODONE    IR 5 milliGRAM(s) Oral every 4 hours PRN    Anticoagulation:  enoxaparin Injectable 40 milliGRAM(s) SubCutaneous every 24 hours    OTHER:  albuterol    90 MICROgram(s) HFA Inhaler 2 Puff(s) Inhalation every 6 hours PRN  benzocaine 20% Spray 1 Spray(s) Mucosal three times a day PRN  bisacodyl 5 milliGRAM(s) Oral at bedtime  chlorhexidine 2% Cloths 1 Application(s) Topical <User Schedule>  influenza  Vaccine (HIGH DOSE) 0.7 milliLiter(s) IntraMuscular once  midodrine 10 milliGRAM(s) Oral every 8 hours  Morphine 1 milliGRAM / mL 17.9 mL 17.9 mL IntraThecal Continuous Pump  naloxone Injectable 0.1 milliGRAM(s) IV Push every 3 minutes PRN  phenylephrine    Infusion 0.1 MICROgram(s)/kG/Min IV Continuous <Continuous>  polyethylene glycol 3350 17 Gram(s) Oral two times a day  senna 2 Tablet(s) Oral at bedtime  simvastatin 40 milliGRAM(s) Oral at bedtime    IVF:  multivitamin 1 Tablet(s) Oral daily    CULTURES:    RADIOLOGY & ADDITIONAL TESTS:      ASSESSMENT:  69 yo female with Hx HTN, HLD, DM, CVA x 3 (last in 2016 with residual R hand weakness), GAMALIEL not using CPAP, Emphysema, ex-25 pack year smoker now restarted, chronic constipation on Movantik, chronic urinary retention (SC at home), b/l CTS s/p release, s/p Intrathecal Morphine pump for pain, with chronic neck and back pain worsening for years s/p multiple spinal surgeries including laminectomies and fusion of cervical, thoracic and lumbar spine, s/p T3-L1 revision of prior fusion (with  on 8/17/22). Xray 11/16/23 showing broken rods/displacement. Now s/p C2-S2 instrumentation and fusion (12/5).       S12.9XXA    Handoff    MEWS Score    HTN (hypertension)    Back pain    Hypertension    SS (spinal stenosis)    High cholesterol    Stroke    H/O carpal tunnel syndrome    H/O carpal tunnel syndrome    Chronic GERD    History of chronic constipation    Seizure    Urinary incontinence    Pre-diabetes    Acute UTI    Anxiety    Anxiety and depression    Arthritis    Eczema    External hemorrhoids    H/O kyphosis    Multiple sclerosis    Pancreas cyst    Scoliosis    Wheelchair dependent    Cervical pseudoarthrosis    Right shoulder pain    Cervical spondylosis    Chronic headaches    Chronic LUQ pain    Chronic UTI    Degenerative joint disease    H/O low back pain    Lumbosacral spondylosis    COPD (chronic obstructive pulmonary disease)    Back pain    Back pain    Back pain    Delirium    Revision of posterolateral fusion of lumbar spine    Revision of fusion of thoracic spine    Cervical disc disease    H/O Spinal surgery    H/O breast surgery    S/P cervical discectomy    Previous back surgery    H/O shoulder surgery    H/O total shoulder replacement, right    [email or web address]      PLAN:  Neuro:  - Neuro q2h/vitals q1h  - pain control: modified ERAS, IT morphine pump, oxycodone 5/10 prn, robaxin, home gabapentin 800 TID, home tizanidine?  - C/w home cymbalta  - HMVx3, JPx2 both per plastics  - Sedation: precedex gtt prn   - MRI thoracic pending official read  - CT thoracic spine 12/6: postop changes    Cardio:  - MAP>90, parminder gtt  - midodrine 10mg q8h  - holding home metoprolol while on pressors   - C/w home simvastatin     Pulm:  - extubated 12/6  - hx GAMALIEL, no CPAP at home    GI:  - regular diet  - Bowel regimen    Renal:  - IVL  - PRN straight cath    Endo:  - ISS dc'd  - a1c: 7    Heme:  - SCDs DVT ppx, SQL  - 500 cell saver intraop    ID:  - afebrile  - post op ancef    Dispo:  - full code, ICU, dispo pending PT/OT    D/w Dr. Luz, Dr. Sanders   HPI:  68 y/o female with PMHx HTN, HLD, CVA x 3 (last was 2016 with right hand weakness residual), GAMALIEL not using CPAP, Emphysema, former 25 pack year smoker restarted this week of surgery, Chronic Constipation on Movantik, Urinary Incontinence chronically self straight cath at home, chronic UTI, Breast Implant Rupture with Chronic Leak repair 10/2023, B/L CTS s/p release, s/p Intrathecal Morphine pump for pain, with chronic neck and back pain worsening for years s/p multiple spinal surgeries including laminectomies and fusion of cervical, thoracic and lumbar spine initially done in 2009 and recently in 2019 and 2020 at [facility] by Dr. Trinidad, s/p T3-L1 revision of prior fusion (with Dr. Luz on 8/17/22). Outpatient Xray 11/16/2023 showing broken rods/displacement. Presents for elective C2-S2 instrumentation and fusion. Pt endorses weakness of b/l LE and burning pain in b/l lower extremities radiating to both feet,  (04 Dec 2023 15:44)    Hospital Course:  12/4: Admitted for spinal fusion d/t damaged hardware.   12/5: Neuro stable. POD0 C2-S2 instrumentation/fusion (intraop b/l LE motor loss). on parminder for MAP>90  12/6: POD1. KAMRAN o/n neuro stable. remains intubated. Extubated 6am. Precedex gtt prn. Remains on parminder gtt for MAP goal >90. Valium made prn. CT thoracic spine bc MRI unavailable. Attempted NGT, unsuccessful (resistance @ 35).   12/7: Given IV dilaudid 0.5mg for pain. Neuro exam stable. SQL started tongiht. Increased precedex to 1.0. Responds well to valium. Consulting psych given paranoia ?homicidal statements. Passed bedside dyspphagia and tolerating PO meds. MRI complete, f/u read.   12/8: KAMRAN overnight. Pt remains on precedex gtt. Neuro exam unchanged.    Vital Signs Last 24 Hrs  T(C): 36.4 (07 Dec 2023 22:10), Max: 36.7 (07 Dec 2023 19:00)  T(F): 97.5 (07 Dec 2023 22:10), Max: 98.1 (07 Dec 2023 19:00)  HR: 72 (07 Dec 2023 20:00) (61 - 94)  BP: 120/77 (07 Dec 2023 20:00) (117/76 - 191/91)  BP(mean): 92 (07 Dec 2023 20:00) (84 - 131)  RR: 19 (07 Dec 2023 20:00) (13 - 27)  SpO2: 98% (07 Dec 2023 20:00) (93% - 100%)    Parameters below as of 07 Dec 2023 20:00  Patient On (Oxygen Delivery Method): room air        I&O's Summary    06 Dec 2023 07:01  -  07 Dec 2023 07:00  --------------------------------------------------------  IN: 2313.9 mL / OUT: 2990 mL / NET: -676.1 mL    07 Dec 2023 07:01  -  07 Dec 2023 22:51  --------------------------------------------------------  IN: 1458.1 mL / OUT: 2510 mL / NET: -1051.9 mL      PHYSICAL EXAM:  GEN: laying in bed, NAD  NEURO: AAOx1 to self however patient not cooperating with the rest of the orientation exam, FC  CNII-XII intact. PERRL, EOM, face symmetric  Motor: RUE/LUE 5/5, RLE/LLE 0/5, unable to assess sensation as patient refusing to be assessed  CV: RRR +S1/S2  PULM: CTAB  GI: Abd soft, NT/ND  EXT: ext warm, dry, nontender  WOUND/DRAINS: cervical-pelvis incision c/d/i    TUBES/LINES:  [ ] R IJ (12/5- )  [ ] R leyda (12/5- )  [ ] HMV x3 (12/5- )  [ ] YIFAN x2 (12/5- )    DIET:  [] NPO  [x] Mechanical  [] Tube feeds    LABS:                        9.4    12.12 )-----------( 188      ( 07 Dec 2023 04:37 )             28.5     12-07    144  |  113<H>  |  10  ----------------------------<  129<H>  4.0   |  23  |  0.47<L>    Ca    8.4      07 Dec 2023 04:37  Phos  2.6     12-07  Mg     1.8     12-07        Urinalysis Basic - ( 07 Dec 2023 04:37 )    Color: x / Appearance: x / SG: x / pH: x  Gluc: 129 mg/dL / Ketone: x  / Bili: x / Urobili: x   Blood: x / Protein: x / Nitrite: x   Leuk Esterase: x / RBC: x / WBC x   Sq Epi: x / Non Sq Epi: x / Bacteria: x          CAPILLARY BLOOD GLUCOSE      POCT Blood Glucose.: 126 mg/dL (07 Dec 2023 18:02)  POCT Blood Glucose.: 117 mg/dL (07 Dec 2023 11:09)  POCT Blood Glucose.: 112 mg/dL (07 Dec 2023 06:34)      Drug Levels: [] N/A    CSF Analysis: [] N/A      Allergies    No Known Allergies    Intolerances      MEDICATIONS:  Antibiotics:    Neuro:  dexMEDEtomidine Infusion 0.2 MICROgram(s)/kG/Hr IV Continuous <Continuous>  DULoxetine 60 milliGRAM(s) Oral daily  gabapentin 800 milliGRAM(s) Oral three times a day  methocarbamol 500 milliGRAM(s) Oral every 8 hours  ondansetron Injectable 4 milliGRAM(s) IV Push every 6 hours PRN  oxyCODONE    IR 10 milliGRAM(s) Oral every 4 hours PRN  oxyCODONE    IR 5 milliGRAM(s) Oral every 4 hours PRN    Anticoagulation:  enoxaparin Injectable 40 milliGRAM(s) SubCutaneous every 24 hours    OTHER:  albuterol    90 MICROgram(s) HFA Inhaler 2 Puff(s) Inhalation every 6 hours PRN  benzocaine 20% Spray 1 Spray(s) Mucosal three times a day PRN  bisacodyl 5 milliGRAM(s) Oral at bedtime  chlorhexidine 2% Cloths 1 Application(s) Topical <User Schedule>  influenza  Vaccine (HIGH DOSE) 0.7 milliLiter(s) IntraMuscular once  midodrine 10 milliGRAM(s) Oral every 8 hours  Morphine 1 milliGRAM / mL 17.9 mL 17.9 mL IntraThecal Continuous Pump  naloxone Injectable 0.1 milliGRAM(s) IV Push every 3 minutes PRN  phenylephrine    Infusion 0.1 MICROgram(s)/kG/Min IV Continuous <Continuous>  polyethylene glycol 3350 17 Gram(s) Oral two times a day  senna 2 Tablet(s) Oral at bedtime  simvastatin 40 milliGRAM(s) Oral at bedtime    IVF:  multivitamin 1 Tablet(s) Oral daily    CULTURES:    RADIOLOGY & ADDITIONAL TESTS:      ASSESSMENT:  67 yo female with Hx HTN, HLD, DM, CVA x 3 (last in 2016 with residual R hand weakness), GAMALIEL not using CPAP, Emphysema, ex-25 pack year smoker now restarted, chronic constipation on Movantik, chronic urinary retention (SC at home), b/l CTS s/p release, s/p Intrathecal Morphine pump for pain, with chronic neck and back pain worsening for years s/p multiple spinal surgeries including laminectomies and fusion of cervical, thoracic and lumbar spine, s/p T3-L1 revision of prior fusion (with  on 8/17/22). Xray 11/16/23 showing broken rods/displacement. Now s/p C2-S2 instrumentation and fusion (12/5).       S12.9XXA    Handoff    MEWS Score    HTN (hypertension)    Back pain    Hypertension    SS (spinal stenosis)    High cholesterol    Stroke    H/O carpal tunnel syndrome    H/O carpal tunnel syndrome    Chronic GERD    History of chronic constipation    Seizure    Urinary incontinence    Pre-diabetes    Acute UTI    Anxiety    Anxiety and depression    Arthritis    Eczema    External hemorrhoids    H/O kyphosis    Multiple sclerosis    Pancreas cyst    Scoliosis    Wheelchair dependent    Cervical pseudoarthrosis    Right shoulder pain    Cervical spondylosis    Chronic headaches    Chronic LUQ pain    Chronic UTI    Degenerative joint disease    H/O low back pain    Lumbosacral spondylosis    COPD (chronic obstructive pulmonary disease)    Back pain    Back pain    Back pain    Delirium    Revision of posterolateral fusion of lumbar spine    Revision of fusion of thoracic spine    Cervical disc disease    H/O Spinal surgery    H/O breast surgery    S/P cervical discectomy    Previous back surgery    H/O shoulder surgery    H/O total shoulder replacement, right    [email or web address]      PLAN:  Neuro:  - Neuro q2h/vitals q1h  - pain control: modified ERAS, IT morphine pump, oxycodone 5/10 prn, robaxin, home gabapentin 800 TID, home tizanidine?  - C/w home cymbalta  - HMVx3, JPx2 both per plastics  - Sedation: precedex gtt prn   - MRI thoracic pending official read  - CT thoracic spine 12/6: postop changes    Cardio:  - MAP>90, parminder gtt  - midodrine 10mg q8h  - holding home metoprolol while on pressors   - C/w home simvastatin     Pulm:  - extubated 12/6  - hx GAMALIEL, no CPAP at home    GI:  - regular diet  - Bowel regimen    Renal:  - IVL  - PRN straight cath    Endo:  - ISS dc'd  - a1c: 7    Heme:  - SCDs DVT ppx, SQL  - 500 cell saver intraop    ID:  - afebrile  - post op ancef    Dispo:  - full code, ICU, dispo pending PT/OT    D/w Dr. Luz, Dr. Sanders

## 2023-12-09 ENCOUNTER — TRANSCRIPTION ENCOUNTER (OUTPATIENT)
Age: 68
End: 2023-12-09

## 2023-12-09 LAB
ALBUMIN SERPL ELPH-MCNC: 3 G/DL — LOW (ref 3.3–5)
ALBUMIN SERPL ELPH-MCNC: 3 G/DL — LOW (ref 3.3–5)
ALP SERPL-CCNC: 73 U/L — SIGNIFICANT CHANGE UP (ref 40–120)
ALP SERPL-CCNC: 73 U/L — SIGNIFICANT CHANGE UP (ref 40–120)
ALT FLD-CCNC: 13 U/L — SIGNIFICANT CHANGE UP (ref 10–45)
ALT FLD-CCNC: 13 U/L — SIGNIFICANT CHANGE UP (ref 10–45)
ANION GAP SERPL CALC-SCNC: 7 MMOL/L — SIGNIFICANT CHANGE UP (ref 5–17)
ANION GAP SERPL CALC-SCNC: 7 MMOL/L — SIGNIFICANT CHANGE UP (ref 5–17)
APTT BLD: 19.8 SEC — LOW (ref 24.5–35.6)
APTT BLD: 19.8 SEC — LOW (ref 24.5–35.6)
AST SERPL-CCNC: 16 U/L — SIGNIFICANT CHANGE UP (ref 10–40)
AST SERPL-CCNC: 16 U/L — SIGNIFICANT CHANGE UP (ref 10–40)
BILIRUB SERPL-MCNC: 0.4 MG/DL — SIGNIFICANT CHANGE UP (ref 0.2–1.2)
BILIRUB SERPL-MCNC: 0.4 MG/DL — SIGNIFICANT CHANGE UP (ref 0.2–1.2)
BLD GP AB SCN SERPL QL: NEGATIVE — SIGNIFICANT CHANGE UP
BLD GP AB SCN SERPL QL: NEGATIVE — SIGNIFICANT CHANGE UP
BUN SERPL-MCNC: 6 MG/DL — LOW (ref 7–23)
BUN SERPL-MCNC: 6 MG/DL — LOW (ref 7–23)
CALCIUM SERPL-MCNC: 8.6 MG/DL — SIGNIFICANT CHANGE UP (ref 8.4–10.5)
CALCIUM SERPL-MCNC: 8.6 MG/DL — SIGNIFICANT CHANGE UP (ref 8.4–10.5)
CHLORIDE SERPL-SCNC: 107 MMOL/L — SIGNIFICANT CHANGE UP (ref 96–108)
CHLORIDE SERPL-SCNC: 107 MMOL/L — SIGNIFICANT CHANGE UP (ref 96–108)
CO2 SERPL-SCNC: 26 MMOL/L — SIGNIFICANT CHANGE UP (ref 22–31)
CO2 SERPL-SCNC: 26 MMOL/L — SIGNIFICANT CHANGE UP (ref 22–31)
CREAT SERPL-MCNC: 0.47 MG/DL — LOW (ref 0.5–1.3)
CREAT SERPL-MCNC: 0.47 MG/DL — LOW (ref 0.5–1.3)
EGFR: 103 ML/MIN/1.73M2 — SIGNIFICANT CHANGE UP
EGFR: 103 ML/MIN/1.73M2 — SIGNIFICANT CHANGE UP
GLUCOSE SERPL-MCNC: 126 MG/DL — HIGH (ref 70–99)
GLUCOSE SERPL-MCNC: 126 MG/DL — HIGH (ref 70–99)
HCT VFR BLD CALC: 25.5 % — LOW (ref 34.5–45)
HCT VFR BLD CALC: 25.5 % — LOW (ref 34.5–45)
HGB BLD-MCNC: 8.2 G/DL — LOW (ref 11.5–15.5)
HGB BLD-MCNC: 8.2 G/DL — LOW (ref 11.5–15.5)
INR BLD: 0.92 — SIGNIFICANT CHANGE UP (ref 0.85–1.18)
INR BLD: 0.92 — SIGNIFICANT CHANGE UP (ref 0.85–1.18)
MCHC RBC-ENTMCNC: 27.9 PG — SIGNIFICANT CHANGE UP (ref 27–34)
MCHC RBC-ENTMCNC: 27.9 PG — SIGNIFICANT CHANGE UP (ref 27–34)
MCHC RBC-ENTMCNC: 32.2 GM/DL — SIGNIFICANT CHANGE UP (ref 32–36)
MCHC RBC-ENTMCNC: 32.2 GM/DL — SIGNIFICANT CHANGE UP (ref 32–36)
MCV RBC AUTO: 86.7 FL — SIGNIFICANT CHANGE UP (ref 80–100)
MCV RBC AUTO: 86.7 FL — SIGNIFICANT CHANGE UP (ref 80–100)
NRBC # BLD: 0 /100 WBCS — SIGNIFICANT CHANGE UP (ref 0–0)
NRBC # BLD: 0 /100 WBCS — SIGNIFICANT CHANGE UP (ref 0–0)
PLATELET # BLD AUTO: 177 K/UL — SIGNIFICANT CHANGE UP (ref 150–400)
PLATELET # BLD AUTO: 177 K/UL — SIGNIFICANT CHANGE UP (ref 150–400)
POTASSIUM SERPL-MCNC: 4.2 MMOL/L — SIGNIFICANT CHANGE UP (ref 3.5–5.3)
POTASSIUM SERPL-MCNC: 4.2 MMOL/L — SIGNIFICANT CHANGE UP (ref 3.5–5.3)
POTASSIUM SERPL-SCNC: 4.2 MMOL/L — SIGNIFICANT CHANGE UP (ref 3.5–5.3)
POTASSIUM SERPL-SCNC: 4.2 MMOL/L — SIGNIFICANT CHANGE UP (ref 3.5–5.3)
PROT SERPL-MCNC: 5.4 G/DL — LOW (ref 6–8.3)
PROT SERPL-MCNC: 5.4 G/DL — LOW (ref 6–8.3)
PROTHROM AB SERPL-ACNC: 10.5 SEC — SIGNIFICANT CHANGE UP (ref 9.5–13)
PROTHROM AB SERPL-ACNC: 10.5 SEC — SIGNIFICANT CHANGE UP (ref 9.5–13)
RBC # BLD: 2.94 M/UL — LOW (ref 3.8–5.2)
RBC # BLD: 2.94 M/UL — LOW (ref 3.8–5.2)
RBC # FLD: 14.6 % — HIGH (ref 10.3–14.5)
RBC # FLD: 14.6 % — HIGH (ref 10.3–14.5)
RH IG SCN BLD-IMP: POSITIVE — SIGNIFICANT CHANGE UP
RH IG SCN BLD-IMP: POSITIVE — SIGNIFICANT CHANGE UP
SODIUM SERPL-SCNC: 140 MMOL/L — SIGNIFICANT CHANGE UP (ref 135–145)
SODIUM SERPL-SCNC: 140 MMOL/L — SIGNIFICANT CHANGE UP (ref 135–145)
WBC # BLD: 8.43 K/UL — SIGNIFICANT CHANGE UP (ref 3.8–10.5)
WBC # BLD: 8.43 K/UL — SIGNIFICANT CHANGE UP (ref 3.8–10.5)
WBC # FLD AUTO: 8.43 K/UL — SIGNIFICANT CHANGE UP (ref 3.8–10.5)
WBC # FLD AUTO: 8.43 K/UL — SIGNIFICANT CHANGE UP (ref 3.8–10.5)

## 2023-12-09 PROCEDURE — 72129 CT CHEST SPINE W/DYE: CPT | Mod: 26

## 2023-12-09 PROCEDURE — 99233 SBSQ HOSP IP/OBS HIGH 50: CPT

## 2023-12-09 PROCEDURE — 77003 FLUOROGUIDE FOR SPINE INJECT: CPT | Mod: 26

## 2023-12-09 PROCEDURE — 99152 MOD SED SAME PHYS/QHP 5/>YRS: CPT

## 2023-12-09 PROCEDURE — 72132 CT LUMBAR SPINE W/DYE: CPT | Mod: 26

## 2023-12-09 PROCEDURE — 62284 INJECTION FOR MYELOGRAM: CPT

## 2023-12-09 RX ORDER — LIDOCAINE HYDROCHLORIDE AND EPINEPHRINE 10; 10 MG/ML; UG/ML
10 INJECTION, SOLUTION INFILTRATION; PERINEURAL ONCE
Refills: 0 | Status: COMPLETED | OUTPATIENT
Start: 2023-12-09 | End: 2023-12-09

## 2023-12-09 RX ADMIN — SIMVASTATIN 40 MILLIGRAM(S): 20 TABLET, FILM COATED ORAL at 22:19

## 2023-12-09 RX ADMIN — OXYCODONE HYDROCHLORIDE 30 MILLIGRAM(S): 5 TABLET ORAL at 15:19

## 2023-12-09 RX ADMIN — GABAPENTIN 800 MILLIGRAM(S): 400 CAPSULE ORAL at 15:41

## 2023-12-09 RX ADMIN — Medication 5 MILLIGRAM(S): at 14:11

## 2023-12-09 RX ADMIN — OXYCODONE HYDROCHLORIDE 30 MILLIGRAM(S): 5 TABLET ORAL at 03:48

## 2023-12-09 RX ADMIN — SODIUM CHLORIDE 75 MILLILITER(S): 9 INJECTION INTRAMUSCULAR; INTRAVENOUS; SUBCUTANEOUS at 03:21

## 2023-12-09 RX ADMIN — Medication 5 MILLIGRAM(S): at 22:19

## 2023-12-09 RX ADMIN — LIDOCAINE HYDROCHLORIDE AND EPINEPHRINE 10 MILLILITER(S): 10; 10 INJECTION, SOLUTION INFILTRATION; PERINEURAL at 22:51

## 2023-12-09 RX ADMIN — POLYETHYLENE GLYCOL 3350 17 GRAM(S): 17 POWDER, FOR SOLUTION ORAL at 05:50

## 2023-12-09 RX ADMIN — MIDODRINE HYDROCHLORIDE 10 MILLIGRAM(S): 2.5 TABLET ORAL at 15:40

## 2023-12-09 RX ADMIN — OXYCODONE HYDROCHLORIDE 30 MILLIGRAM(S): 5 TABLET ORAL at 15:50

## 2023-12-09 RX ADMIN — HYDROMORPHONE HYDROCHLORIDE 0.5 MILLIGRAM(S): 2 INJECTION INTRAMUSCULAR; INTRAVENOUS; SUBCUTANEOUS at 04:56

## 2023-12-09 RX ADMIN — OXYCODONE HYDROCHLORIDE 30 MILLIGRAM(S): 5 TABLET ORAL at 04:44

## 2023-12-09 RX ADMIN — GABAPENTIN 800 MILLIGRAM(S): 400 CAPSULE ORAL at 05:49

## 2023-12-09 RX ADMIN — OXYCODONE HYDROCHLORIDE 30 MILLIGRAM(S): 5 TABLET ORAL at 00:49

## 2023-12-09 RX ADMIN — Medication 5 MILLIGRAM(S): at 22:21

## 2023-12-09 RX ADMIN — MIDODRINE HYDROCHLORIDE 10 MILLIGRAM(S): 2.5 TABLET ORAL at 05:50

## 2023-12-09 RX ADMIN — OXYCODONE HYDROCHLORIDE 30 MILLIGRAM(S): 5 TABLET ORAL at 12:22

## 2023-12-09 RX ADMIN — Medication 1 TABLET(S): at 11:32

## 2023-12-09 RX ADMIN — HYDROMORPHONE HYDROCHLORIDE 0.5 MILLIGRAM(S): 2 INJECTION INTRAMUSCULAR; INTRAVENOUS; SUBCUTANEOUS at 05:20

## 2023-12-09 RX ADMIN — OXYCODONE HYDROCHLORIDE 30 MILLIGRAM(S): 5 TABLET ORAL at 11:32

## 2023-12-09 RX ADMIN — DULOXETINE HYDROCHLORIDE 60 MILLIGRAM(S): 30 CAPSULE, DELAYED RELEASE ORAL at 11:32

## 2023-12-09 RX ADMIN — OXYCODONE HYDROCHLORIDE 30 MILLIGRAM(S): 5 TABLET ORAL at 20:53

## 2023-12-09 RX ADMIN — OXYCODONE HYDROCHLORIDE 30 MILLIGRAM(S): 5 TABLET ORAL at 21:50

## 2023-12-09 RX ADMIN — Medication 5 MILLIGRAM(S): at 05:50

## 2023-12-09 NOTE — PROGRESS NOTE ADULT - SUBJECTIVE AND OBJECTIVE BOX
Patient was seen and examined at bedside. Case discuss with resident. Pt reports that she is having some flank pain.     OBJECTIVE:  Vital Signs Last 24 Hrs  T(C): 36.6 (09 Dec 2023 08:46), Max: 36.7 (08 Dec 2023 18:44)  T(F): 97.8 (09 Dec 2023 08:46), Max: 98.1 (08 Dec 2023 18:44)  HR: 89 (09 Dec 2023 08:46) (88 - 121)  BP: 139/67 (09 Dec 2023 08:46) (110/58 - 149/67)  BP(mean): 84 (08 Dec 2023 23:48) (76 - 101)  RR: 18 (09 Dec 2023 08:46) (14 - 20)  SpO2: 98% (09 Dec 2023 08:46) (96% - 100%)    PHYSICAL EXAM:  Gen: NAD laying in bed; appears sleepy and tired   CV: RRR, +S1/S2, no mumur  Pulm: CTA b/l no wheezing or crackles   Abd: soft, NTND + BS no rebound or guarding       LABS:                        8.2    8.43  )-----------( 177      ( 09 Dec 2023 09:15 )             25.5     12-09    140  |  107  |  6<L>  ----------------------------<  126<H>  4.2   |  26  |  0.47<L>    Ca    8.6      09 Dec 2023 09:15  Phos  3.2     12-08  Mg     1.6     12-08    TPro  5.4<L>  /  Alb  3.0<L>  /  TBili  0.4  /  DBili  x   /  AST  16  /  ALT  13  /  AlkPhos  73  12-09    LIVER FUNCTIONS - ( 09 Dec 2023 09:15 )  Alb: 3.0 g/dL / Pro: 5.4 g/dL / ALK PHOS: 73 U/L / ALT: 13 U/L / AST: 16 U/L / GGT: x           PT/INR - ( 09 Dec 2023 09:15 )   PT: 10.5 sec;   INR: 0.92    PTT:19.8 sec    albuterol    90 MICROgram(s) HFA Inhaler 2 Puff(s) Inhalation every 6 hours PRN  benzocaine 20% Spray 1 Spray(s) Mucosal three times a day PRN  bisacodyl 5 milliGRAM(s) Oral at bedtime  diazepam    Tablet 5 milliGRAM(s) Oral every 8 hours  DULoxetine 60 milliGRAM(s) Oral daily  gabapentin 800 milliGRAM(s) Oral three times a day  HYDROmorphone  Injectable 0.5 milliGRAM(s) IV Push every 3 hours PRN  influenza  Vaccine (HIGH DOSE) 0.7 milliLiter(s) IntraMuscular once  midodrine 10 milliGRAM(s) Oral every 8 hours  Morphine 1 milliGRAM / mL 17.9 mL 17.9 mL IntraThecal Continuous Pump  multivitamin 1 Tablet(s) Oral daily  naloxone Injectable 0.1 milliGRAM(s) IV Push every 3 minutes PRN  ondansetron Injectable 4 milliGRAM(s) IV Push every 6 hours PRN  oxyCODONE    IR 15 milliGRAM(s) Oral every 4 hours PRN  oxyCODONE    IR 30 milliGRAM(s) Oral every 4 hours PRN  polyethylene glycol 3350 17 Gram(s) Oral two times a day  senna 2 Tablet(s) Oral at bedtime  simvastatin 40 milliGRAM(s) Oral at bedtime  sodium chloride 0.9% lock flush 10 milliLiter(s) IV Push every 1 hour PRN  sodium chloride 0.9%. 1000 milliLiter(s) IV Continuous <Continuous>      A/P 69 yo female with Hx HTN, HLD, DM, CVA x 3 (last in 2016 with residual R hand weakness), GAMALIEL not using CPAP, Emphysema, ex-25 pack year smoker now restarted, chronic constipation on Movantik, chronic urinary retention (SC at home), b/l CTS s/p release, s/p Intrathecal Morphine pump for pain, with chronic neck and back pain worsening for years s/p multiple spinal surgeries including laminectomies and fusion of cervical, thoracic and lumbar spine, s/p T3-L1 revision of prior fusion (with  on 8/17/22). Xray 11/16/23 showing broken rods/displacement. Now s/p C2-S2 instrumentation and fusion (12/5).     # s/p C2-S2 instrumentation and fusion (12/5).   -Pain management, drain  and bowel regime as per Neurosurgery   -Will f/u CT myelogram (pending)     #HTN  -Pt is on Metoprolol at home   - Will continue midodrine 10mg PO q8 and attempt to titrate if BP allows     #GAMALIEL  -Pt is not on CPAP at home     #CAD  #CVA  -Will  resume ASA post operatively when deemed safe to do so by the Neurosurgery team   - Continue  atorvastatin  - awaiting results of recent long term outpatient cardiac monitoring to monitor for occult Afib ; Pt  follows with Dr Jeter    #Diabetes  -Pt's HgbA1c 7   - Pt is on Metformin 500mg BID at home  -Continue ISS ; Pt FS are WNL     #DISPO  -As per NSGY    Patient was seen and examined at bedside. Case discuss with resident. Pt reports that she is having some flank pain.     OBJECTIVE:  Vital Signs Last 24 Hrs  T(C): 36.6 (09 Dec 2023 08:46), Max: 36.7 (08 Dec 2023 18:44)  T(F): 97.8 (09 Dec 2023 08:46), Max: 98.1 (08 Dec 2023 18:44)  HR: 89 (09 Dec 2023 08:46) (88 - 121)  BP: 139/67 (09 Dec 2023 08:46) (110/58 - 149/67)  BP(mean): 84 (08 Dec 2023 23:48) (76 - 101)  RR: 18 (09 Dec 2023 08:46) (14 - 20)  SpO2: 98% (09 Dec 2023 08:46) (96% - 100%)    PHYSICAL EXAM:  Gen: NAD laying in bed; appears sleepy and tired   CV: RRR, +S1/S2, no mumur  Pulm: CTA b/l no wheezing or crackles   Abd: soft, NTND + BS no rebound or guarding       LABS:                        8.2    8.43  )-----------( 177      ( 09 Dec 2023 09:15 )             25.5     12-09    140  |  107  |  6<L>  ----------------------------<  126<H>  4.2   |  26  |  0.47<L>    Ca    8.6      09 Dec 2023 09:15  Phos  3.2     12-08  Mg     1.6     12-08    TPro  5.4<L>  /  Alb  3.0<L>  /  TBili  0.4  /  DBili  x   /  AST  16  /  ALT  13  /  AlkPhos  73  12-09    LIVER FUNCTIONS - ( 09 Dec 2023 09:15 )  Alb: 3.0 g/dL / Pro: 5.4 g/dL / ALK PHOS: 73 U/L / ALT: 13 U/L / AST: 16 U/L / GGT: x           PT/INR - ( 09 Dec 2023 09:15 )   PT: 10.5 sec;   INR: 0.92    PTT:19.8 sec    albuterol    90 MICROgram(s) HFA Inhaler 2 Puff(s) Inhalation every 6 hours PRN  benzocaine 20% Spray 1 Spray(s) Mucosal three times a day PRN  bisacodyl 5 milliGRAM(s) Oral at bedtime  diazepam    Tablet 5 milliGRAM(s) Oral every 8 hours  DULoxetine 60 milliGRAM(s) Oral daily  gabapentin 800 milliGRAM(s) Oral three times a day  HYDROmorphone  Injectable 0.5 milliGRAM(s) IV Push every 3 hours PRN  influenza  Vaccine (HIGH DOSE) 0.7 milliLiter(s) IntraMuscular once  midodrine 10 milliGRAM(s) Oral every 8 hours  Morphine 1 milliGRAM / mL 17.9 mL 17.9 mL IntraThecal Continuous Pump  multivitamin 1 Tablet(s) Oral daily  naloxone Injectable 0.1 milliGRAM(s) IV Push every 3 minutes PRN  ondansetron Injectable 4 milliGRAM(s) IV Push every 6 hours PRN  oxyCODONE    IR 15 milliGRAM(s) Oral every 4 hours PRN  oxyCODONE    IR 30 milliGRAM(s) Oral every 4 hours PRN  polyethylene glycol 3350 17 Gram(s) Oral two times a day  senna 2 Tablet(s) Oral at bedtime  simvastatin 40 milliGRAM(s) Oral at bedtime  sodium chloride 0.9% lock flush 10 milliLiter(s) IV Push every 1 hour PRN  sodium chloride 0.9%. 1000 milliLiter(s) IV Continuous <Continuous>      A/P 67 yo female with Hx HTN, HLD, DM, CVA x 3 (last in 2016 with residual R hand weakness), GAMALIEL not using CPAP, Emphysema, ex-25 pack year smoker now restarted, chronic constipation on Movantik, chronic urinary retention (SC at home), b/l CTS s/p release, s/p Intrathecal Morphine pump for pain, with chronic neck and back pain worsening for years s/p multiple spinal surgeries including laminectomies and fusion of cervical, thoracic and lumbar spine, s/p T3-L1 revision of prior fusion (with  on 8/17/22). Xray 11/16/23 showing broken rods/displacement. Now s/p C2-S2 instrumentation and fusion (12/5).     # s/p C2-S2 instrumentation and fusion (12/5).   -Pain management, drain  and bowel regime as per Neurosurgery   -Will f/u CT myelogram (pending)   -Pt is for the OR tomorrow for exploration of fusion with possible decompression of T9-L2 ; Pt had recent preop assessment completed on 12/4.     #HTN  -Pt is on Metoprolol at home   - Will continue midodrine 10mg PO q8 and attempt to titrate if BP allows     #GAMALIEL  -Pt is not on CPAP at home     #CAD  #CVA  -Will  resume ASA post operatively when deemed safe to do so by the Neurosurgery team   - Continue  atorvastatin  - awaiting results of recent long term outpatient cardiac monitoring to monitor for occult Afib ; Pt  follows with Dr Jeter    #Diabetes  -Pt's HgbA1c 7   - Pt is on Metformin 500mg BID at home  -Continue ISS ; Pt FS are WNL     #DISPO  -As per NSGY    Patient was seen and examined at bedside. Case discuss with resident. Pt reports that she is having some flank pain.     OBJECTIVE:  Vital Signs Last 24 Hrs  T(C): 36.6 (09 Dec 2023 08:46), Max: 36.7 (08 Dec 2023 18:44)  T(F): 97.8 (09 Dec 2023 08:46), Max: 98.1 (08 Dec 2023 18:44)  HR: 89 (09 Dec 2023 08:46) (88 - 121)  BP: 139/67 (09 Dec 2023 08:46) (110/58 - 149/67)  BP(mean): 84 (08 Dec 2023 23:48) (76 - 101)  RR: 18 (09 Dec 2023 08:46) (14 - 20)  SpO2: 98% (09 Dec 2023 08:46) (96% - 100%)    PHYSICAL EXAM:  Gen: NAD laying in bed; appears sleepy and tired   CV: RRR, +S1/S2, no mumur  Pulm: CTA b/l no wheezing or crackles   Abd: soft, NTND + BS no rebound or guarding       LABS:                        8.2    8.43  )-----------( 177      ( 09 Dec 2023 09:15 )             25.5     12-09    140  |  107  |  6<L>  ----------------------------<  126<H>  4.2   |  26  |  0.47<L>    Ca    8.6      09 Dec 2023 09:15  Phos  3.2     12-08  Mg     1.6     12-08    TPro  5.4<L>  /  Alb  3.0<L>  /  TBili  0.4  /  DBili  x   /  AST  16  /  ALT  13  /  AlkPhos  73  12-09    LIVER FUNCTIONS - ( 09 Dec 2023 09:15 )  Alb: 3.0 g/dL / Pro: 5.4 g/dL / ALK PHOS: 73 U/L / ALT: 13 U/L / AST: 16 U/L / GGT: x           PT/INR - ( 09 Dec 2023 09:15 )   PT: 10.5 sec;   INR: 0.92    PTT:19.8 sec    albuterol    90 MICROgram(s) HFA Inhaler 2 Puff(s) Inhalation every 6 hours PRN  benzocaine 20% Spray 1 Spray(s) Mucosal three times a day PRN  bisacodyl 5 milliGRAM(s) Oral at bedtime  diazepam    Tablet 5 milliGRAM(s) Oral every 8 hours  DULoxetine 60 milliGRAM(s) Oral daily  gabapentin 800 milliGRAM(s) Oral three times a day  HYDROmorphone  Injectable 0.5 milliGRAM(s) IV Push every 3 hours PRN  influenza  Vaccine (HIGH DOSE) 0.7 milliLiter(s) IntraMuscular once  midodrine 10 milliGRAM(s) Oral every 8 hours  Morphine 1 milliGRAM / mL 17.9 mL 17.9 mL IntraThecal Continuous Pump  multivitamin 1 Tablet(s) Oral daily  naloxone Injectable 0.1 milliGRAM(s) IV Push every 3 minutes PRN  ondansetron Injectable 4 milliGRAM(s) IV Push every 6 hours PRN  oxyCODONE    IR 15 milliGRAM(s) Oral every 4 hours PRN  oxyCODONE    IR 30 milliGRAM(s) Oral every 4 hours PRN  polyethylene glycol 3350 17 Gram(s) Oral two times a day  senna 2 Tablet(s) Oral at bedtime  simvastatin 40 milliGRAM(s) Oral at bedtime  sodium chloride 0.9% lock flush 10 milliLiter(s) IV Push every 1 hour PRN  sodium chloride 0.9%. 1000 milliLiter(s) IV Continuous <Continuous>      A/P 69 yo female with Hx HTN, HLD, DM, CVA x 3 (last in 2016 with residual R hand weakness), GAMALIEL not using CPAP, Emphysema, ex-25 pack year smoker now restarted, chronic constipation on Movantik, chronic urinary retention (SC at home), b/l CTS s/p release, s/p Intrathecal Morphine pump for pain, with chronic neck and back pain worsening for years s/p multiple spinal surgeries including laminectomies and fusion of cervical, thoracic and lumbar spine, s/p T3-L1 revision of prior fusion (with  on 8/17/22). Xray 11/16/23 showing broken rods/displacement. Now s/p C2-S2 instrumentation and fusion (12/5).     # s/p C2-S2 instrumentation and fusion (12/5).   -Pain management, drain  and bowel regime as per Neurosurgery   -Will f/u CT myelogram (pending)   -Pt is for the OR tomorrow 12/10 for exploration of fusion with possible decompression of T9-L2 ; Pt had recent preop assessment completed on 12/4.     #HTN  -Pt is on Metoprolol at home   - Will continue midodrine 10mg PO q8 and attempt to titrate if BP allows     #GAMALIEL  -Pt is not on CPAP at home     #CAD  #CVA  -Will  resume ASA post operatively when deemed safe to do so by the Neurosurgery team   - Continue  atorvastatin  - awaiting results of recent long term outpatient cardiac monitoring to monitor for occult Afib ; Pt  follows with Dr Jeter    #Diabetes  -Pt's HgbA1c 7   - Pt is on Metformin 500mg BID at home  -Continue ISS ; Pt FS are WNL     #DISPO  -As per NSGY    Patient was seen and examined at bedside. Case discuss with resident. Pt reports that she is having some flank pain.     OBJECTIVE:  Vital Signs Last 24 Hrs  T(C): 36.6 (09 Dec 2023 08:46), Max: 36.7 (08 Dec 2023 18:44)  T(F): 97.8 (09 Dec 2023 08:46), Max: 98.1 (08 Dec 2023 18:44)  HR: 89 (09 Dec 2023 08:46) (88 - 121)  BP: 139/67 (09 Dec 2023 08:46) (110/58 - 149/67)  BP(mean): 84 (08 Dec 2023 23:48) (76 - 101)  RR: 18 (09 Dec 2023 08:46) (14 - 20)  SpO2: 98% (09 Dec 2023 08:46) (96% - 100%)    PHYSICAL EXAM:  Gen: NAD laying in bed; appears sleepy and tired   CV: RRR, +S1/S2, no mumur  Pulm: CTA b/l no wheezing or crackles   Abd: soft, NTND + BS no rebound or guarding       LABS:                        8.2    8.43  )-----------( 177      ( 09 Dec 2023 09:15 )             25.5     12-09    140  |  107  |  6<L>  ----------------------------<  126<H>  4.2   |  26  |  0.47<L>    Ca    8.6      09 Dec 2023 09:15  Phos  3.2     12-08  Mg     1.6     12-08    TPro  5.4<L>  /  Alb  3.0<L>  /  TBili  0.4  /  DBili  x   /  AST  16  /  ALT  13  /  AlkPhos  73  12-09    LIVER FUNCTIONS - ( 09 Dec 2023 09:15 )  Alb: 3.0 g/dL / Pro: 5.4 g/dL / ALK PHOS: 73 U/L / ALT: 13 U/L / AST: 16 U/L / GGT: x           PT/INR - ( 09 Dec 2023 09:15 )   PT: 10.5 sec;   INR: 0.92    PTT:19.8 sec    albuterol    90 MICROgram(s) HFA Inhaler 2 Puff(s) Inhalation every 6 hours PRN  benzocaine 20% Spray 1 Spray(s) Mucosal three times a day PRN  bisacodyl 5 milliGRAM(s) Oral at bedtime  diazepam    Tablet 5 milliGRAM(s) Oral every 8 hours  DULoxetine 60 milliGRAM(s) Oral daily  gabapentin 800 milliGRAM(s) Oral three times a day  HYDROmorphone  Injectable 0.5 milliGRAM(s) IV Push every 3 hours PRN  influenza  Vaccine (HIGH DOSE) 0.7 milliLiter(s) IntraMuscular once  midodrine 10 milliGRAM(s) Oral every 8 hours  Morphine 1 milliGRAM / mL 17.9 mL 17.9 mL IntraThecal Continuous Pump  multivitamin 1 Tablet(s) Oral daily  naloxone Injectable 0.1 milliGRAM(s) IV Push every 3 minutes PRN  ondansetron Injectable 4 milliGRAM(s) IV Push every 6 hours PRN  oxyCODONE    IR 15 milliGRAM(s) Oral every 4 hours PRN  oxyCODONE    IR 30 milliGRAM(s) Oral every 4 hours PRN  polyethylene glycol 3350 17 Gram(s) Oral two times a day  senna 2 Tablet(s) Oral at bedtime  simvastatin 40 milliGRAM(s) Oral at bedtime  sodium chloride 0.9% lock flush 10 milliLiter(s) IV Push every 1 hour PRN  sodium chloride 0.9%. 1000 milliLiter(s) IV Continuous <Continuous>      A/P 67 yo female with Hx HTN, HLD, DM, CVA x 3 (last in 2016 with residual R hand weakness), GAMALIEL not using CPAP, Emphysema, ex-25 pack year smoker now restarted, chronic constipation on Movantik, chronic urinary retention (SC at home), b/l CTS s/p release, s/p Intrathecal Morphine pump for pain, with chronic neck and back pain worsening for years s/p multiple spinal surgeries including laminectomies and fusion of cervical, thoracic and lumbar spine, s/p T3-L1 revision of prior fusion (with  on 8/17/22). Xray 11/16/23 showing broken rods/displacement. Now s/p C2-S2 instrumentation and fusion (12/5).     # s/p C2-S2 instrumentation and fusion (12/5).   -Pain management, drain  and bowel regime as per Neurosurgery   -Will f/u CT myelogram (pending)   -Pt is for the OR tomorrow 12/10 for exploration of fusion with possible decompression of T9-L2 ; Pt had recent preop assessment completed on 12/4.     #HTN  -Pt is on Metoprolol at home   - Will continue midodrine 10mg PO q8 and attempt to titrate if BP allows     #GAMALIEL  -Pt is not on CPAP at home     #CAD  #CVA  -Will  resume ASA post operatively when deemed safe to do so by the Neurosurgery team   - Continue  atorvastatin  - awaiting results of recent long term outpatient cardiac monitoring to monitor for occult Afib ; Pt  follows with Dr Jeter    #Diabetes  -Pt's HgbA1c 7   - Pt is on Metformin 500mg BID at home  -Continue ISS ; Pt FS are WNL     #DISPO  -As per NSGY

## 2023-12-09 NOTE — PROGRESS NOTE ADULT - SUBJECTIVE AND OBJECTIVE BOX
HPI:  66 y/o female with PMHx HTN, HLD, CVA x 3 (last was 2016 with right hand weakness residual), GAMALIEL not using CPAP, Emphysema, former 25 pack year smoker restarted this week of surgery, Chronic Constipation on Movantik, Urinary Incontinence chronically self straight cath at home, chronic UTI, Breast Implant Rupture with Chronic Leak repair 10/2023, B/L CTS s/p release, s/p Intrathecal Morphine pump for pain, with chronic neck and back pain worsening for years s/p multiple spinal surgeries including laminectomies and fusion of cervical, thoracic and lumbar spine initially done in 2009 and recently in 2019 and 2020 at [facility] by Dr. Trinidad, s/p T3-L1 revision of prior fusion (with Dr. Luz on 8/17/22). Outpatient Xray 11/16/2023 showing broken rods/displacement. Presents for elective C2-S2 instrumentation and fusion. Pt endorses weakness of b/l LE and burning pain in b/l lower extremities radiating to both feet,  (04 Dec 2023 15:44)    OVERNIGHT EVENTS: POD4, KAMRAN, CT myelogram tomorrow, pre-op for OR tomorrow possible decompression/revision    Hospital course:   12/4: Admitted for spinal fusion d/t damaged hardware.   12/5: Neuro stable. POD0 C2-S2 instrumentation/fusion (intraop b/l LE motor loss). on parminder for MAP>90  12/6: POD1. KAMRAN o/n neuro stable. remains intubated. Extubated 6am. Precedex gtt prn. Remains on parminder gtt for MAP goal >90. Valium made prn. CT thoracic spine bc MRI unavailable. Attempted NGT, unsuccessful (resistance @ 35).   12/7: POD2 Given IV dilaudid 0.5mg for pain. Neuro exam stable. SQL started tongiht. Increased precedex to 1.0. Responds well to valium. Consulting psych given paranoia ?homicidal statements. Passed bedside dyspphagia and tolerating PO meds. MRI complete, f/u read.   12/8: POD3 Pt reporting incisional pain, given dilaudid 0.5mg IV x2. Pt appearing anxious, given xanax 1mg. Neuro exam unchanged. Pain regimen increased to oxy 15/30 mg, pending further recs. Given 1 L bolus for tachycardia. Trops negative. 2 HMV drains removed.   12/9: POD4, KAMRAN, CT myelogram tomorrow, pre-op for OR tomorrow possible decompression/revision    Vital Signs Last 24 Hrs  T(C): 36.7 (08 Dec 2023 18:44), Max: 38 (08 Dec 2023 05:27)  T(F): 98.1 (08 Dec 2023 18:44), Max: 100.4 (08 Dec 2023 05:27)  HR: 114 (08 Dec 2023 18:44) (67 - 137)  BP: 115/73 (08 Dec 2023 18:44) (97/76 - 155/75)  BP(mean): 80 (08 Dec 2023 16:00) (76 - 105)  RR: 20 (08 Dec 2023 18:44) (14 - 25)  SpO2: 97% (08 Dec 2023 18:44) (95% - 100%)    Parameters below as of 08 Dec 2023 18:44  Patient On (Oxygen Delivery Method): room air        I&O's Summary    07 Dec 2023 07:01  -  08 Dec 2023 07:00  --------------------------------------------------------  IN: 1665.7 mL / OUT: 3655 mL / NET: -1989.3 mL    08 Dec 2023 07:01  -  08 Dec 2023 19:53  --------------------------------------------------------  IN: 1008.4 mL / OUT: 430 mL / NET: 578.4 mL        PHYSICAL EXAM:  GEN: laying in bed, NAD  NEURO: AAOx1 to self however patient not cooperating with the rest of the orientation exam, FC  CNII-XII intact. PERRL, EOM, face symmetric  Motor: RUE/LUE 5/5, RLE/LLE 0/5, No sensation below T11, no sensation on rectal tone exam, mildly decreased rectal tone at rest with slight increase with squeeze, no discernable response to bulbocavernosus reflex  CV: RRR +S1/S2  PULM: CTAB  GI: Abd soft, NT/ND  EXT: ext warm, dry, nontender  WOUND/DRAINS: cervical-pelvis incision c/d/i    TUBES/LINES:  [ ] R IJ (12/5- )  [ ] R leyda (12/5- )  [ ] HMV x3 (12/5- )  [ ] YIFAN x2 (12/5- )    DIET:  [] NPO  [x] Mechanical  [] Tube feeds    LABS:                        9.0    10.96 )-----------( 212      ( 08 Dec 2023 05:53 )             26.9     12-08    138  |  106  |  11  ----------------------------<  100<H>  3.5   |  22  |  0.49<L>    Ca    8.4      08 Dec 2023 05:53  Phos  3.2     12-08  Mg     1.6     12-08        Urinalysis Basic - ( 08 Dec 2023 05:53 )    Color: x / Appearance: x / SG: x / pH: x  Gluc: 100 mg/dL / Ketone: x  / Bili: x / Urobili: x   Blood: x / Protein: x / Nitrite: x   Leuk Esterase: x / RBC: x / WBC x   Sq Epi: x / Non Sq Epi: x / Bacteria: x          CAPILLARY BLOOD GLUCOSE          Drug Levels: [] N/A    CSF Analysis: [] N/A      Allergies    No Known Allergies    Intolerances        Home Medications:  albuterol 90 mcg/inh inhalation aerosol: 2 puff(s) inhaled every 6 hours, As Needed (04 Dec 2023 17:40)  aspirin 81 mg oral delayed release tablet: 1 tab(s) orally once a day (04 Dec 2023 17:40)  DULoxetine 60 mg oral delayed release capsule: 1 cap(s) orally once a day (04 Dec 2023 17:40)  gabapentin 800 mg oral tablet: 1 tab(s) orally 3 times a day (04 Dec 2023 18:06)  MetFORMIN (Eqv-Glumetza) 500 mg oral tablet, extended release: 1 tab(s) orally 2 times a day (04 Dec 2023 18:05)  metoprolol tartrate 50 mg oral tablet: 1.5 tab(s) orally 2 times a day (04 Dec 2023 17:42)  Movantik 25 mg oral tablet: 1 tab(s) orally once a day (in the morning) (04 Dec 2023 17:40)  simvastatin 40 mg oral tablet: 1 tab(s) orally once a day (at bedtime) (04 Dec 2023 17:40)  tiZANidine 4 mg oral tablet: 2 tab(s) orally 2 times a day as needed for - (04 Dec 2023 17:39)  Vitamin D3 50 mcg (2000 intl units) oral tablet: 1 tab(s) orally once a day (04 Dec 2023 17:40)      MEDICATIONS:  MEDICATIONS  (STANDING):  bisacodyl 5 milliGRAM(s) Oral at bedtime  diazepam    Tablet 5 milliGRAM(s) Oral every 8 hours  DULoxetine 60 milliGRAM(s) Oral daily  gabapentin 800 milliGRAM(s) Oral three times a day  influenza  Vaccine (HIGH DOSE) 0.7 milliLiter(s) IntraMuscular once  midodrine 10 milliGRAM(s) Oral every 8 hours  Morphine 1 milliGRAM / mL 17.9 mL 17.9 mL IntraThecal Continuous Pump  multivitamin 1 Tablet(s) Oral daily  polyethylene glycol 3350 17 Gram(s) Oral two times a day  senna 2 Tablet(s) Oral at bedtime  simvastatin 40 milliGRAM(s) Oral at bedtime  sodium chloride 0.9%. 1000 milliLiter(s) (75 mL/Hr) IV Continuous <Continuous>    MEDICATIONS  (PRN):  albuterol    90 MICROgram(s) HFA Inhaler 2 Puff(s) Inhalation every 6 hours PRN Shortness of Breath  benzocaine 20% Spray 1 Spray(s) Mucosal three times a day PRN Sore throat  HYDROmorphone  Injectable 0.5 milliGRAM(s) IV Push every 3 hours PRN Breakthrough Pain  naloxone Injectable 0.1 milliGRAM(s) IV Push every 3 minutes PRN For ANY of the following changes in patient status:  A. RR LESS THAN 10 breaths per minute, B. Oxygen saturation LESS THAN 90%, C. Sedation score of 6  ondansetron Injectable 4 milliGRAM(s) IV Push every 6 hours PRN Nausea  oxyCODONE    IR 15 milliGRAM(s) Oral every 4 hours PRN Moderate Pain (4 - 6)  oxyCODONE    IR 30 milliGRAM(s) Oral every 4 hours PRN Severe Pain (7 - 10)  sodium chloride 0.9% lock flush 10 milliLiter(s) IV Push every 1 hour PRN Pre/post blood products, medications, blood draw, and to maintain line patency      CULTURES:      RADIOLOGY & ADDITIONAL TESTS:      ASSESSMENT:  67 yo female with Hx HTN, HLD, DM, CVA x 3 (last in 2016 with residual R hand weakness), GAMALIEL not using CPAP, Emphysema, ex-25 pack year smoker now restarted, chronic constipation on Movantik, chronic urinary retention (SC at home), b/l CTS s/p release, s/p Intrathecal Morphine pump for pain, with chronic neck and back pain worsening for years s/p multiple spinal surgeries including laminectomies and fusion of cervical, thoracic and lumbar spine, s/p T3-L1 revision of prior fusion (with  on 8/17/22). Xray 11/16/23 showing broken rods/displacement. Now s/p C2-S2 instrumentation and fusion (12/5).     Plan:  Neuro:  - Neuro/vitals q4   - pain control: modified ERAS, IT morphine pump, oxycodone 15/30 prn, valium, home gabapentin 800 TID  - C/w home cymbalta  - HMVx1, JPx2 both per plastics  - Sedation: precedex gtt dc'd  - MRI thoracic complete  - CT thoracic spine 12/6: postop changes  - CT myelogram pending     Cardio:  - Normotensive  - midodrine 10mg q8h  - holding home metoprolol while on pressors   - C/w home simvastatin     Pulm:  - extubated 12/6  - hx GAMALIEL, no CPAP at home    GI:  - NPO after midnight  - Bowel regimen    Renal:  - IVF while NPO  - PRN straight cath    Endo:  - ISS dc'd  - a1c: 7    Heme:  - SCDs DVT ppx, SQL  - 500 cell saver intraop    ID:  - afebrile  - post op ancef    Dispo:  - full code, tele, dispo pending PT/OT    D/w Dr. Luz, Dr. Sanders    DVT PROPHYLAXIS:  [x] Venodynes                                [x] Heparin/Lovenox      Assessment: present when checked     [] GCS   E   V   M     Heart Failure: [] Acute, [] acute on chronic, [] chronic   Heart Failure: [] Diastolic (HFpEF), [] Systolic (HRrEF), [] Combined (HFpEF and HFrEF), [] RHF, [] Pulm HTN, [] Other     [] DARRYL, [] ATN, [] AIN, [] other   [] CKD1, [] CKD2, [] CKD3, [] CKD4, [] CKD5, [] ESRD     Encephalopathy: [] Metabolic, [] Hepatic, [] Toxic, [] Neurological, [] Other     Abnormal Nutritional Status: [] malnutrition (see nutrition note), []underweight: BMI <19, [] morbid obesity: BMI >40, [] Cachexia     [] Sepsis   [] Hypovolemic shock, [] Cardiogenic shock, [] Hemorrhagic shock, [] Neurogenic shock   [] Acute respiratory failure   [] Cerebral edema, [] Brain compression / herniation   [] Functional quadriplegia   [] Acute blood loss anemia  HPI:  66 y/o female with PMHx HTN, HLD, CVA x 3 (last was 2016 with right hand weakness residual), GAMALIEL not using CPAP, Emphysema, former 25 pack year smoker restarted this week of surgery, Chronic Constipation on Movantik, Urinary Incontinence chronically self straight cath at home, chronic UTI, Breast Implant Rupture with Chronic Leak repair 10/2023, B/L CTS s/p release, s/p Intrathecal Morphine pump for pain, with chronic neck and back pain worsening for years s/p multiple spinal surgeries including laminectomies and fusion of cervical, thoracic and lumbar spine initially done in 2009 and recently in 2019 and 2020 at [facility] by Dr. Trinidad, s/p T3-L1 revision of prior fusion (with Dr. Luz on 8/17/22). Outpatient Xray 11/16/2023 showing broken rods/displacement. Presents for elective C2-S2 instrumentation and fusion. Pt endorses weakness of b/l LE and burning pain in b/l lower extremities radiating to both feet,  (04 Dec 2023 15:44)    OVERNIGHT EVENTS: POD4, KAMRAN, CT myelogram tomorrow, pre-op for OR tomorrow possible decompression/revision    Hospital course:   12/4: Admitted for spinal fusion d/t damaged hardware.   12/5: Neuro stable. POD0 C2-S2 instrumentation/fusion (intraop b/l LE motor loss). on parminder for MAP>90  12/6: POD1. KAMRAN o/n neuro stable. remains intubated. Extubated 6am. Precedex gtt prn. Remains on parminder gtt for MAP goal >90. Valium made prn. CT thoracic spine bc MRI unavailable. Attempted NGT, unsuccessful (resistance @ 35).   12/7: POD2 Given IV dilaudid 0.5mg for pain. Neuro exam stable. SQL started tongiht. Increased precedex to 1.0. Responds well to valium. Consulting psych given paranoia ?homicidal statements. Passed bedside dyspphagia and tolerating PO meds. MRI complete, f/u read.   12/8: POD3 Pt reporting incisional pain, given dilaudid 0.5mg IV x2. Pt appearing anxious, given xanax 1mg. Neuro exam unchanged. Pain regimen increased to oxy 15/30 mg, pending further recs. Given 1 L bolus for tachycardia. Trops negative. 2 HMV drains removed.   12/9: POD4, KAMRAN, CT myelogram tomorrow, pre-op for OR tomorrow possible decompression/revision    Vital Signs Last 24 Hrs  T(C): 36.7 (08 Dec 2023 18:44), Max: 38 (08 Dec 2023 05:27)  T(F): 98.1 (08 Dec 2023 18:44), Max: 100.4 (08 Dec 2023 05:27)  HR: 114 (08 Dec 2023 18:44) (67 - 137)  BP: 115/73 (08 Dec 2023 18:44) (97/76 - 155/75)  BP(mean): 80 (08 Dec 2023 16:00) (76 - 105)  RR: 20 (08 Dec 2023 18:44) (14 - 25)  SpO2: 97% (08 Dec 2023 18:44) (95% - 100%)    Parameters below as of 08 Dec 2023 18:44  Patient On (Oxygen Delivery Method): room air        I&O's Summary    07 Dec 2023 07:01  -  08 Dec 2023 07:00  --------------------------------------------------------  IN: 1665.7 mL / OUT: 3655 mL / NET: -1989.3 mL    08 Dec 2023 07:01  -  08 Dec 2023 19:53  --------------------------------------------------------  IN: 1008.4 mL / OUT: 430 mL / NET: 578.4 mL        PHYSICAL EXAM:  GEN: laying in bed, NAD  NEURO: AAOx1 to self however patient not cooperating with the rest of the orientation exam, FC  CNII-XII intact. PERRL, EOM, face symmetric  Motor: RUE/LUE 5/5, RLE/LLE 0/5, No sensation below T11, no sensation on rectal tone exam, mildly decreased rectal tone at rest with slight increase with squeeze, no discernable response to bulbocavernosus reflex  CV: RRR +S1/S2  PULM: CTAB  GI: Abd soft, NT/ND  EXT: ext warm, dry, nontender  WOUND/DRAINS: cervical-pelvis incision c/d/i    TUBES/LINES:  [ ] R IJ (12/5- )  [ ] R leyda (12/5- )  [ ] HMV x3 (12/5- )  [ ] YIFAN x2 (12/5- )    DIET:  [] NPO  [x] Mechanical  [] Tube feeds    LABS:                        9.0    10.96 )-----------( 212      ( 08 Dec 2023 05:53 )             26.9     12-08    138  |  106  |  11  ----------------------------<  100<H>  3.5   |  22  |  0.49<L>    Ca    8.4      08 Dec 2023 05:53  Phos  3.2     12-08  Mg     1.6     12-08        Urinalysis Basic - ( 08 Dec 2023 05:53 )    Color: x / Appearance: x / SG: x / pH: x  Gluc: 100 mg/dL / Ketone: x  / Bili: x / Urobili: x   Blood: x / Protein: x / Nitrite: x   Leuk Esterase: x / RBC: x / WBC x   Sq Epi: x / Non Sq Epi: x / Bacteria: x          CAPILLARY BLOOD GLUCOSE          Drug Levels: [] N/A    CSF Analysis: [] N/A      Allergies    No Known Allergies    Intolerances        Home Medications:  albuterol 90 mcg/inh inhalation aerosol: 2 puff(s) inhaled every 6 hours, As Needed (04 Dec 2023 17:40)  aspirin 81 mg oral delayed release tablet: 1 tab(s) orally once a day (04 Dec 2023 17:40)  DULoxetine 60 mg oral delayed release capsule: 1 cap(s) orally once a day (04 Dec 2023 17:40)  gabapentin 800 mg oral tablet: 1 tab(s) orally 3 times a day (04 Dec 2023 18:06)  MetFORMIN (Eqv-Glumetza) 500 mg oral tablet, extended release: 1 tab(s) orally 2 times a day (04 Dec 2023 18:05)  metoprolol tartrate 50 mg oral tablet: 1.5 tab(s) orally 2 times a day (04 Dec 2023 17:42)  Movantik 25 mg oral tablet: 1 tab(s) orally once a day (in the morning) (04 Dec 2023 17:40)  simvastatin 40 mg oral tablet: 1 tab(s) orally once a day (at bedtime) (04 Dec 2023 17:40)  tiZANidine 4 mg oral tablet: 2 tab(s) orally 2 times a day as needed for - (04 Dec 2023 17:39)  Vitamin D3 50 mcg (2000 intl units) oral tablet: 1 tab(s) orally once a day (04 Dec 2023 17:40)      MEDICATIONS:  MEDICATIONS  (STANDING):  bisacodyl 5 milliGRAM(s) Oral at bedtime  diazepam    Tablet 5 milliGRAM(s) Oral every 8 hours  DULoxetine 60 milliGRAM(s) Oral daily  gabapentin 800 milliGRAM(s) Oral three times a day  influenza  Vaccine (HIGH DOSE) 0.7 milliLiter(s) IntraMuscular once  midodrine 10 milliGRAM(s) Oral every 8 hours  Morphine 1 milliGRAM / mL 17.9 mL 17.9 mL IntraThecal Continuous Pump  multivitamin 1 Tablet(s) Oral daily  polyethylene glycol 3350 17 Gram(s) Oral two times a day  senna 2 Tablet(s) Oral at bedtime  simvastatin 40 milliGRAM(s) Oral at bedtime  sodium chloride 0.9%. 1000 milliLiter(s) (75 mL/Hr) IV Continuous <Continuous>    MEDICATIONS  (PRN):  albuterol    90 MICROgram(s) HFA Inhaler 2 Puff(s) Inhalation every 6 hours PRN Shortness of Breath  benzocaine 20% Spray 1 Spray(s) Mucosal three times a day PRN Sore throat  HYDROmorphone  Injectable 0.5 milliGRAM(s) IV Push every 3 hours PRN Breakthrough Pain  naloxone Injectable 0.1 milliGRAM(s) IV Push every 3 minutes PRN For ANY of the following changes in patient status:  A. RR LESS THAN 10 breaths per minute, B. Oxygen saturation LESS THAN 90%, C. Sedation score of 6  ondansetron Injectable 4 milliGRAM(s) IV Push every 6 hours PRN Nausea  oxyCODONE    IR 15 milliGRAM(s) Oral every 4 hours PRN Moderate Pain (4 - 6)  oxyCODONE    IR 30 milliGRAM(s) Oral every 4 hours PRN Severe Pain (7 - 10)  sodium chloride 0.9% lock flush 10 milliLiter(s) IV Push every 1 hour PRN Pre/post blood products, medications, blood draw, and to maintain line patency      CULTURES:      RADIOLOGY & ADDITIONAL TESTS:      ASSESSMENT:  67 yo female with Hx HTN, HLD, DM, CVA x 3 (last in 2016 with residual R hand weakness), GAMALIEL not using CPAP, Emphysema, ex-25 pack year smoker now restarted, chronic constipation on Movantik, chronic urinary retention (SC at home), b/l CTS s/p release, s/p Intrathecal Morphine pump for pain, with chronic neck and back pain worsening for years s/p multiple spinal surgeries including laminectomies and fusion of cervical, thoracic and lumbar spine, s/p T3-L1 revision of prior fusion (with  on 8/17/22). Xray 11/16/23 showing broken rods/displacement. Now s/p C2-S2 instrumentation and fusion (12/5).     Plan:  Neuro:  - Neuro/vitals q4   - pain control: modified ERAS, IT morphine pump, oxycodone 15/30 prn, valium, home gabapentin 800 TID  - C/w home cymbalta  - HMVx1, JPx2 both per plastics  - Sedation: precedex gtt dc'd  - MRI thoracic complete  - CT thoracic spine 12/6: postop changes  - CT myelogram pending     Cardio:  - Normotensive  - midodrine 10mg q8h  - holding home metoprolol while on pressors   - C/w home simvastatin     Pulm:  - extubated 12/6  - hx GAMALIEL, no CPAP at home    GI:  - NPO after midnight  - Bowel regimen    Renal:  - IVF while NPO  - PRN straight cath    Endo:  - ISS dc'd  - a1c: 7    Heme:  - SCDs DVT ppx, SQL  - 500 cell saver intraop    ID:  - afebrile  - post op ancef    Dispo:  - full code, tele, dispo pending PT/OT    D/w Dr. Luz, Dr. Sanders    DVT PROPHYLAXIS:  [x] Venodynes                                [x] Heparin/Lovenox      Assessment: present when checked     [] GCS   E   V   M     Heart Failure: [] Acute, [] acute on chronic, [] chronic   Heart Failure: [] Diastolic (HFpEF), [] Systolic (HRrEF), [] Combined (HFpEF and HFrEF), [] RHF, [] Pulm HTN, [] Other     [] DARRYL, [] ATN, [] AIN, [] other   [] CKD1, [] CKD2, [] CKD3, [] CKD4, [] CKD5, [] ESRD     Encephalopathy: [] Metabolic, [] Hepatic, [] Toxic, [] Neurological, [] Other     Abnormal Nutritional Status: [] malnutrition (see nutrition note), []underweight: BMI <19, [] morbid obesity: BMI >40, [] Cachexia     [] Sepsis   [] Hypovolemic shock, [] Cardiogenic shock, [] Hemorrhagic shock, [] Neurogenic shock   [] Acute respiratory failure   [] Cerebral edema, [] Brain compression / herniation   [] Functional quadriplegia   [] Acute blood loss anemia

## 2023-12-10 ENCOUNTER — TRANSCRIPTION ENCOUNTER (OUTPATIENT)
Age: 68
End: 2023-12-10

## 2023-12-10 LAB
ANION GAP SERPL CALC-SCNC: 9 MMOL/L — SIGNIFICANT CHANGE UP (ref 5–17)
ANION GAP SERPL CALC-SCNC: 9 MMOL/L — SIGNIFICANT CHANGE UP (ref 5–17)
BASE EXCESS BLDA CALC-SCNC: -0.7 MMOL/L — SIGNIFICANT CHANGE UP (ref -2–3)
BASE EXCESS BLDA CALC-SCNC: -0.7 MMOL/L — SIGNIFICANT CHANGE UP (ref -2–3)
BASE EXCESS BLDA CALC-SCNC: 0.3 MMOL/L — SIGNIFICANT CHANGE UP (ref -2–3)
BASE EXCESS BLDA CALC-SCNC: 0.3 MMOL/L — SIGNIFICANT CHANGE UP (ref -2–3)
BUN SERPL-MCNC: 6 MG/DL — LOW (ref 7–23)
BUN SERPL-MCNC: 6 MG/DL — LOW (ref 7–23)
CALCIUM SERPL-MCNC: 8.6 MG/DL — SIGNIFICANT CHANGE UP (ref 8.4–10.5)
CALCIUM SERPL-MCNC: 8.6 MG/DL — SIGNIFICANT CHANGE UP (ref 8.4–10.5)
CHLORIDE SERPL-SCNC: 106 MMOL/L — SIGNIFICANT CHANGE UP (ref 96–108)
CHLORIDE SERPL-SCNC: 106 MMOL/L — SIGNIFICANT CHANGE UP (ref 96–108)
CO2 BLDA-SCNC: 24 MMOL/L — SIGNIFICANT CHANGE UP (ref 19–24)
CO2 BLDA-SCNC: 24 MMOL/L — SIGNIFICANT CHANGE UP (ref 19–24)
CO2 BLDA-SCNC: 26 MMOL/L — HIGH (ref 19–24)
CO2 BLDA-SCNC: 26 MMOL/L — HIGH (ref 19–24)
CO2 SERPL-SCNC: 25 MMOL/L — SIGNIFICANT CHANGE UP (ref 22–31)
CO2 SERPL-SCNC: 25 MMOL/L — SIGNIFICANT CHANGE UP (ref 22–31)
CREAT SERPL-MCNC: 0.44 MG/DL — LOW (ref 0.5–1.3)
CREAT SERPL-MCNC: 0.44 MG/DL — LOW (ref 0.5–1.3)
EGFR: 105 ML/MIN/1.73M2 — SIGNIFICANT CHANGE UP
EGFR: 105 ML/MIN/1.73M2 — SIGNIFICANT CHANGE UP
GLUCOSE BLDC GLUCOMTR-MCNC: 191 MG/DL — HIGH (ref 70–99)
GLUCOSE BLDC GLUCOMTR-MCNC: 191 MG/DL — HIGH (ref 70–99)
GLUCOSE BLDC GLUCOMTR-MCNC: 206 MG/DL — HIGH (ref 70–99)
GLUCOSE BLDC GLUCOMTR-MCNC: 206 MG/DL — HIGH (ref 70–99)
GLUCOSE SERPL-MCNC: 146 MG/DL — HIGH (ref 70–99)
GLUCOSE SERPL-MCNC: 146 MG/DL — HIGH (ref 70–99)
HCO3 BLDA-SCNC: 23 MMOL/L — SIGNIFICANT CHANGE UP (ref 21–28)
HCO3 BLDA-SCNC: 23 MMOL/L — SIGNIFICANT CHANGE UP (ref 21–28)
HCO3 BLDA-SCNC: 25 MMOL/L — SIGNIFICANT CHANGE UP (ref 21–28)
HCO3 BLDA-SCNC: 25 MMOL/L — SIGNIFICANT CHANGE UP (ref 21–28)
HCT VFR BLD CALC: 28.1 % — LOW (ref 34.5–45)
HCT VFR BLD CALC: 28.1 % — LOW (ref 34.5–45)
HGB BLD-MCNC: 9.5 G/DL — LOW (ref 11.5–15.5)
HGB BLD-MCNC: 9.5 G/DL — LOW (ref 11.5–15.5)
HOROWITZ INDEX BLDA+IHG-RTO: 40 — SIGNIFICANT CHANGE UP
HOROWITZ INDEX BLDA+IHG-RTO: 40 — SIGNIFICANT CHANGE UP
MAGNESIUM SERPL-MCNC: 1.6 MG/DL — SIGNIFICANT CHANGE UP (ref 1.6–2.6)
MAGNESIUM SERPL-MCNC: 1.6 MG/DL — SIGNIFICANT CHANGE UP (ref 1.6–2.6)
MCHC RBC-ENTMCNC: 28.8 PG — SIGNIFICANT CHANGE UP (ref 27–34)
MCHC RBC-ENTMCNC: 28.8 PG — SIGNIFICANT CHANGE UP (ref 27–34)
MCHC RBC-ENTMCNC: 33.8 GM/DL — SIGNIFICANT CHANGE UP (ref 32–36)
MCHC RBC-ENTMCNC: 33.8 GM/DL — SIGNIFICANT CHANGE UP (ref 32–36)
MCV RBC AUTO: 85.2 FL — SIGNIFICANT CHANGE UP (ref 80–100)
MCV RBC AUTO: 85.2 FL — SIGNIFICANT CHANGE UP (ref 80–100)
NRBC # BLD: 0 /100 WBCS — SIGNIFICANT CHANGE UP (ref 0–0)
NRBC # BLD: 0 /100 WBCS — SIGNIFICANT CHANGE UP (ref 0–0)
PCO2 BLDA: 36 MMHG — SIGNIFICANT CHANGE UP (ref 32–45)
PCO2 BLDA: 36 MMHG — SIGNIFICANT CHANGE UP (ref 32–45)
PCO2 BLDA: 38 MMHG — SIGNIFICANT CHANGE UP (ref 32–45)
PCO2 BLDA: 38 MMHG — SIGNIFICANT CHANGE UP (ref 32–45)
PH BLDA: 7.42 — SIGNIFICANT CHANGE UP (ref 7.35–7.45)
PHOSPHATE SERPL-MCNC: 3.6 MG/DL — SIGNIFICANT CHANGE UP (ref 2.5–4.5)
PHOSPHATE SERPL-MCNC: 3.6 MG/DL — SIGNIFICANT CHANGE UP (ref 2.5–4.5)
PLATELET # BLD AUTO: 179 K/UL — SIGNIFICANT CHANGE UP (ref 150–400)
PLATELET # BLD AUTO: 179 K/UL — SIGNIFICANT CHANGE UP (ref 150–400)
PO2 BLDA: 122 MMHG — HIGH (ref 83–108)
PO2 BLDA: 122 MMHG — HIGH (ref 83–108)
PO2 BLDA: 349 MMHG — HIGH (ref 83–108)
PO2 BLDA: 349 MMHG — HIGH (ref 83–108)
POTASSIUM SERPL-MCNC: 4.2 MMOL/L — SIGNIFICANT CHANGE UP (ref 3.5–5.3)
POTASSIUM SERPL-MCNC: 4.2 MMOL/L — SIGNIFICANT CHANGE UP (ref 3.5–5.3)
POTASSIUM SERPL-SCNC: 4.2 MMOL/L — SIGNIFICANT CHANGE UP (ref 3.5–5.3)
POTASSIUM SERPL-SCNC: 4.2 MMOL/L — SIGNIFICANT CHANGE UP (ref 3.5–5.3)
RBC # BLD: 3.3 M/UL — LOW (ref 3.8–5.2)
RBC # BLD: 3.3 M/UL — LOW (ref 3.8–5.2)
RBC # FLD: 14.6 % — HIGH (ref 10.3–14.5)
RBC # FLD: 14.6 % — HIGH (ref 10.3–14.5)
SAO2 % BLDA: 99 % — HIGH (ref 94–98)
SAO2 % BLDA: 99 % — HIGH (ref 94–98)
SAO2 % BLDA: 99.7 % — HIGH (ref 94–98)
SAO2 % BLDA: 99.7 % — HIGH (ref 94–98)
SODIUM SERPL-SCNC: 140 MMOL/L — SIGNIFICANT CHANGE UP (ref 135–145)
SODIUM SERPL-SCNC: 140 MMOL/L — SIGNIFICANT CHANGE UP (ref 135–145)
WBC # BLD: 11.25 K/UL — HIGH (ref 3.8–10.5)
WBC # BLD: 11.25 K/UL — HIGH (ref 3.8–10.5)
WBC # FLD AUTO: 11.25 K/UL — HIGH (ref 3.8–10.5)
WBC # FLD AUTO: 11.25 K/UL — HIGH (ref 3.8–10.5)

## 2023-12-10 PROCEDURE — 63048 LAM FACETEC &FORAMOT EA ADDL: CPT | Mod: 58

## 2023-12-10 PROCEDURE — 99291 CRITICAL CARE FIRST HOUR: CPT

## 2023-12-10 PROCEDURE — 22614 ARTHRD PST TQ 1NTRSPC EA ADD: CPT | Mod: 58

## 2023-12-10 PROCEDURE — 22610 ARTHRD PST TQ 1NTRSPC THRC: CPT | Mod: 58

## 2023-12-10 PROCEDURE — 71045 X-RAY EXAM CHEST 1 VIEW: CPT | Mod: 26

## 2023-12-10 PROCEDURE — 63046 LAM FACETEC & FORAMOT THRC: CPT | Mod: 58

## 2023-12-10 DEVICE — MATRIX DURAGEN PLUS DURAL REGENERATION 3X3: Type: IMPLANTABLE DEVICE | Status: FUNCTIONAL

## 2023-12-10 DEVICE — GRAFT BONE INFUSE KIT SM: Type: IMPLANTABLE DEVICE | Status: FUNCTIONAL

## 2023-12-10 DEVICE — SURGIFOAM PAD 8CM X 12.5CM X 10MM (100): Type: IMPLANTABLE DEVICE | Status: FUNCTIONAL

## 2023-12-10 DEVICE — DURASEAL: Type: IMPLANTABLE DEVICE | Status: FUNCTIONAL

## 2023-12-10 DEVICE — FLOSEAL NT 5ML: Type: IMPLANTABLE DEVICE | Status: FUNCTIONAL

## 2023-12-10 RX ORDER — DEXMEDETOMIDINE HYDROCHLORIDE IN 0.9% SODIUM CHLORIDE 4 UG/ML
0.2 INJECTION INTRAVENOUS
Qty: 400 | Refills: 0 | Status: DISCONTINUED | OUTPATIENT
Start: 2023-12-10 | End: 2023-12-11

## 2023-12-10 RX ORDER — GABAPENTIN 400 MG/1
800 CAPSULE ORAL THREE TIMES A DAY
Refills: 0 | Status: DISCONTINUED | OUTPATIENT
Start: 2023-12-10 | End: 2023-12-10

## 2023-12-10 RX ORDER — CHLORHEXIDINE GLUCONATE 213 G/1000ML
1 SOLUTION TOPICAL EVERY 12 HOURS
Refills: 0 | Status: DISCONTINUED | OUTPATIENT
Start: 2023-12-10 | End: 2023-12-10

## 2023-12-10 RX ORDER — INSULIN LISPRO 100/ML
VIAL (ML) SUBCUTANEOUS
Refills: 0 | Status: DISCONTINUED | OUTPATIENT
Start: 2023-12-10 | End: 2023-12-22

## 2023-12-10 RX ORDER — DIAZEPAM 5 MG
5 TABLET ORAL EVERY 8 HOURS
Refills: 0 | Status: DISCONTINUED | OUTPATIENT
Start: 2023-12-10 | End: 2023-12-10

## 2023-12-10 RX ORDER — SODIUM CHLORIDE 9 MG/ML
1000 INJECTION INTRAMUSCULAR; INTRAVENOUS; SUBCUTANEOUS
Refills: 0 | Status: DISCONTINUED | OUTPATIENT
Start: 2023-12-10 | End: 2023-12-11

## 2023-12-10 RX ORDER — NICARDIPINE HYDROCHLORIDE 30 MG/1
5 CAPSULE, EXTENDED RELEASE ORAL
Qty: 40 | Refills: 0 | Status: DISCONTINUED | OUTPATIENT
Start: 2023-12-10 | End: 2023-12-10

## 2023-12-10 RX ORDER — OXYCODONE HYDROCHLORIDE 5 MG/1
15 TABLET ORAL EVERY 4 HOURS
Refills: 0 | Status: DISCONTINUED | OUTPATIENT
Start: 2023-12-10 | End: 2023-12-14

## 2023-12-10 RX ORDER — PANTOPRAZOLE SODIUM 20 MG/1
40 TABLET, DELAYED RELEASE ORAL DAILY
Refills: 0 | Status: DISCONTINUED | OUTPATIENT
Start: 2023-12-10 | End: 2023-12-12

## 2023-12-10 RX ORDER — PHENYLEPHRINE HYDROCHLORIDE 10 MG/ML
0.1 INJECTION INTRAVENOUS
Qty: 40 | Refills: 0 | Status: DISCONTINUED | OUTPATIENT
Start: 2023-12-10 | End: 2023-12-10

## 2023-12-10 RX ORDER — OXYCODONE HYDROCHLORIDE 5 MG/1
30 TABLET ORAL EVERY 4 HOURS
Refills: 0 | Status: DISCONTINUED | OUTPATIENT
Start: 2023-12-10 | End: 2023-12-14

## 2023-12-10 RX ORDER — CHLORHEXIDINE GLUCONATE 213 G/1000ML
15 SOLUTION TOPICAL EVERY 12 HOURS
Refills: 0 | Status: DISCONTINUED | OUTPATIENT
Start: 2023-12-10 | End: 2023-12-11

## 2023-12-10 RX ORDER — MAGNESIUM SULFATE 500 MG/ML
4 VIAL (ML) INJECTION ONCE
Refills: 0 | Status: COMPLETED | OUTPATIENT
Start: 2023-12-10 | End: 2023-12-10

## 2023-12-10 RX ORDER — HYDROMORPHONE HYDROCHLORIDE 2 MG/ML
0.5 INJECTION INTRAMUSCULAR; INTRAVENOUS; SUBCUTANEOUS
Refills: 0 | Status: DISCONTINUED | OUTPATIENT
Start: 2023-12-10 | End: 2023-12-14

## 2023-12-10 RX ORDER — SODIUM CHLORIDE 9 MG/ML
1000 INJECTION INTRAMUSCULAR; INTRAVENOUS; SUBCUTANEOUS ONCE
Refills: 0 | Status: COMPLETED | OUTPATIENT
Start: 2023-12-10 | End: 2023-12-10

## 2023-12-10 RX ORDER — SIMVASTATIN 20 MG/1
40 TABLET, FILM COATED ORAL AT BEDTIME
Refills: 0 | Status: DISCONTINUED | OUTPATIENT
Start: 2023-12-10 | End: 2024-01-05

## 2023-12-10 RX ORDER — FENTANYL CITRATE 50 UG/ML
50 INJECTION INTRAVENOUS ONCE
Refills: 0 | Status: DISCONTINUED | OUTPATIENT
Start: 2023-12-10 | End: 2023-12-10

## 2023-12-10 RX ORDER — NALOXONE HYDROCHLORIDE 4 MG/.1ML
0.1 SPRAY NASAL
Refills: 0 | Status: DISCONTINUED | OUTPATIENT
Start: 2023-12-10 | End: 2024-01-05

## 2023-12-10 RX ORDER — SENNA PLUS 8.6 MG/1
2 TABLET ORAL AT BEDTIME
Refills: 0 | Status: DISCONTINUED | OUTPATIENT
Start: 2023-12-10 | End: 2024-01-05

## 2023-12-10 RX ORDER — NOREPINEPHRINE BITARTRATE/D5W 8 MG/250ML
0.05 PLASTIC BAG, INJECTION (ML) INTRAVENOUS
Qty: 8 | Refills: 0 | Status: DISCONTINUED | OUTPATIENT
Start: 2023-12-10 | End: 2023-12-11

## 2023-12-10 RX ORDER — MIDODRINE HYDROCHLORIDE 2.5 MG/1
10 TABLET ORAL EVERY 8 HOURS
Refills: 0 | Status: DISCONTINUED | OUTPATIENT
Start: 2023-12-10 | End: 2023-12-10

## 2023-12-10 RX ORDER — CEFAZOLIN SODIUM 1 G
2000 VIAL (EA) INJECTION EVERY 8 HOURS
Refills: 0 | Status: COMPLETED | OUTPATIENT
Start: 2023-12-10 | End: 2023-12-11

## 2023-12-10 RX ORDER — POVIDONE-IODINE 5 %
1 AEROSOL (ML) TOPICAL ONCE
Refills: 0 | Status: COMPLETED | OUTPATIENT
Start: 2023-12-10 | End: 2023-12-10

## 2023-12-10 RX ORDER — PROPOFOL 10 MG/ML
10 INJECTION, EMULSION INTRAVENOUS
Qty: 1000 | Refills: 0 | Status: DISCONTINUED | OUTPATIENT
Start: 2023-12-10 | End: 2023-12-10

## 2023-12-10 RX ORDER — POLYETHYLENE GLYCOL 3350 17 G/17G
17 POWDER, FOR SOLUTION ORAL
Refills: 0 | Status: DISCONTINUED | OUTPATIENT
Start: 2023-12-10 | End: 2024-01-05

## 2023-12-10 RX ORDER — ONDANSETRON 8 MG/1
4 TABLET, FILM COATED ORAL EVERY 6 HOURS
Refills: 0 | Status: DISCONTINUED | OUTPATIENT
Start: 2023-12-10 | End: 2024-01-05

## 2023-12-10 RX ADMIN — PROPOFOL 5.08 MICROGRAM(S)/KG/MIN: 10 INJECTION, EMULSION INTRAVENOUS at 17:00

## 2023-12-10 RX ADMIN — FENTANYL CITRATE 50 MICROGRAM(S): 50 INJECTION INTRAVENOUS at 15:15

## 2023-12-10 RX ADMIN — OXYCODONE HYDROCHLORIDE 30 MILLIGRAM(S): 5 TABLET ORAL at 06:23

## 2023-12-10 RX ADMIN — Medication 1 APPLICATION(S): at 08:55

## 2023-12-10 RX ADMIN — Medication 100 MILLIGRAM(S): at 19:35

## 2023-12-10 RX ADMIN — OXYCODONE HYDROCHLORIDE 30 MILLIGRAM(S): 5 TABLET ORAL at 05:47

## 2023-12-10 RX ADMIN — Medication 25 GRAM(S): at 15:04

## 2023-12-10 RX ADMIN — Medication 2: at 22:35

## 2023-12-10 RX ADMIN — FENTANYL CITRATE 50 MICROGRAM(S): 50 INJECTION INTRAVENOUS at 15:03

## 2023-12-10 RX ADMIN — GABAPENTIN 800 MILLIGRAM(S): 400 CAPSULE ORAL at 05:46

## 2023-12-10 RX ADMIN — Medication 4: at 17:00

## 2023-12-10 RX ADMIN — SODIUM CHLORIDE 1000 MILLILITER(S): 9 INJECTION INTRAMUSCULAR; INTRAVENOUS; SUBCUTANEOUS at 18:57

## 2023-12-10 RX ADMIN — SODIUM CHLORIDE 85 MILLILITER(S): 9 INJECTION INTRAMUSCULAR; INTRAVENOUS; SUBCUTANEOUS at 15:04

## 2023-12-10 RX ADMIN — CHLORHEXIDINE GLUCONATE 1 APPLICATION(S): 213 SOLUTION TOPICAL at 06:26

## 2023-12-10 RX ADMIN — DEXMEDETOMIDINE HYDROCHLORIDE IN 0.9% SODIUM CHLORIDE 4.24 MICROGRAM(S)/KG/HR: 4 INJECTION INTRAVENOUS at 15:03

## 2023-12-10 NOTE — PROGRESS NOTE ADULT - SUBJECTIVE AND OBJECTIVE BOX
=================================  NEUROCRITICAL CARE ATTENDING NOTE  =================================    VANNA DIAZ   MRN-[id number]  Summary:  68y/F  HTN, HLD, CVA x 3 (last was 2016 with right hand weakness residual), GAMALIEL not using CPAP, Emphysema, former 25 pack year smoker restarted this week of surgery, Chronic Constipation on Movantik, Urinary Incontinence chronically self straight cath at home, chronic UTI, Breast Implant Rupture with Chronic Leak repair 10/2023, B/L CTS s/p release, s/p Intrathecal Morphine pump for pain, with chronic neck and back pain worsening for years s/p multiple spinal surgeries including laminectomies and fusion of cervical, thoracic and lumbar spine initially done in 2009 and recently in 2019 and 2020 at [facility] by Dr. Trinidad, s/p T3-L1 revision of prior fusion (with Dr. Luz on 8/17/22). Outpatient Xray 11/16/2023 showing broken rods/displacement. Presents for elective C2-S2 instrumentation and fusion. Pt endorses weakness of b/l LE and burning pain in b/l lower extremities radiating to both feet,  (04 Dec 2023 15:44)    COURSE IN THE HOSPITAL:  12/04 admitted to [facility]  12/05 POD0 C2 to pelvis fusion, intraoperative lost monitoring (B motor and sensory) initially responsive to HDA to MAP of 120s, but lost monitoring again, becoming unresponsvie to HDA to MAP even of 140s; remained intubated, brought to ICU.  12/06 POD1 precedex + valium, agitated  12/07 MRI done, agitated, remains on precedex    12/10 POD#0    Past Medical History: HTN (hypertension)  Back pain Hypertension SS (spinal stenosis) High cholesterol Stroke H/O carpal tunnel syndrome H/O carpal tunnel syndrome Chronic GERD History of chronic constipation Seizure Urinary incontinence Pre-diabetes Acute UTI Anxiety and depression Arthritis Eczema External hemorrhoids H/O kyphosis Multiple sclerosis Pancreas cyst Scoliosis Wheelchair dependent Cervical pseudoarthrosis Right shoulder pain Cervical spondylosis Chronic headachesChronic LUQ pain Chronic UTI Degenerative joint disease H/O low back mir Lumbosacral spondylosis COPD (chronic obstructive pulmonary disease)  Allergies:  oxycodone (Pruritus)  Home meds:   ·	albuterol 90 mcg/inh inhalation aerosol: 2 puff(s) inhaled every 6 hours, As Needed  ·	aspirin 81 mg oral delayed release tablet: 1 tab(s) orally once a day  ·	DULoxetine 60 mg oral delayed release capsule: 1 cap(s) orally once a day  ·	gabapentin 800 mg oral tablet: 1 tab(s) orally 3 times a day  ·	MetFORMIN (Eqv-Glumetza) 500 mg oral tablet, extended release: 1 tab(s) orally 2 times a day  ·	metoprolol tartrate 50 mg oral tablet: 1.5 tab(s) orally 2 times a day  ·	Movantik 25 mg oral tablet: 1 tab(s) orally once a day (in the morning)  ·	simvastatin 40 mg oral tablet: 1 tab(s) orally once a day (at bedtime)  ·	tiZANidine 4 mg oral tablet: 2 tab(s) orally 2 times a day as needed for -  ·	Vitamin D3 50 mcg (2000 intl units) oral tablet: 1 tab(s) orally once a day    PHYSICAL EXAMINATION  T(C): 35.7 (12-10 @ 15:30), Max: 37.1 (12-10 @ 08:35) HR: 74 (12-10 @ 17:00) (70 - 111) BP: 105/60 (12-10 @ 17:00) (105/60 - 190/115) RR: 12 (12-10 @ 16:00) (12 - 19) SpO2: 100% (12-10 @ 17:00) (96% - 100%)  NEUROLOGIC EXAMINATION:  Patient lethargic, easily rousable, Ox3, agitated; FC, B UE 5/5 BL LE 0/5; sensory loss below T10   GENERAL: room air  EENT:  anicteric  CARDIOVASCULAR: (+) S1 S2, normal rate and regular rhythm  PULMONARY: clear to auscultation bilaterally  ABDOMEN: soft, nontender with normoactive bowel sounds  EXTREMITIES: no edema  SKIN: no rash    LABS: 12-10  CAPILLARY BLOOD GLUCOSE 206             (8.43)  9.5  (8.2)   11.25 )-----------( 179      ( 10 Dec 2023 14:03 )             28.1   (140)  140  |  106  |  6<L>  ----------------------------<  146<H>  4.2   |  25  |  0.44<L>    Ca    8.6      10 Dec 2023 14:03  Phos  3.6     12-10  Mg     1.6     12-10    TPro  5.4<L>  /  Alb  3.0<L>  /  TBili  0.4  /  DBili  x   /  AST  16  /  ALT  13  /  AlkPhos  73  12-09 12-09 @ 07:01  -  12-10 @ 07:00  --------------------------------------------------------  IN: 1070 mL / OUT: 2500 mL / NET: -1430 mL    12-10 @ 07:01  -  12-10 @ 18:54  --------------------------------------------------------  IN: 1453.1 mL / OUT: 2290 mL / NET: -836.9 mL     Bacteriology:  CSF studies:  EEG:  Neuroimaging:  Other imaging:    MEDICATIONS: 12-10    ·	ceFAZolin   IVPB 2000 IV Intermittent every 8 hours  ·	diazepam    Tablet 5 Oral every 8 hours  ·	gabapentin 800 Oral three times a day  ·	propofol Infusion 10 IV Continuous <Continuous>  ·	bisacodyl 5 Oral at bedtime  ·	pantoprazole  Injectable 40 IV Push daily  ·	polyethylene glycol 3350 17 Oral two times a day  ·	senna 2 Oral at bedtime  ·	insulin lispro (ADMELOG) corrective regimen sliding scale  SubCutaneous Before meals and at bedtime  ·	simvastatin 40 Oral at bedtime  ·	multivitamin 1 Oral daily  ·	benzocaine 20% Spray 1 Mucosal three times a day PRN  ·	HYDROmorphone  Injectable 0.5 IV Push every 3 hours PRN  ·	naloxone Injectable 0.1 IV Push every 3 minutes PRN  ·	ondansetron Injectable 4 IV Push every 6 hours PRN  ·	oxyCODONE    IR 15 Oral every 4 hours PRN  ·	oxyCODONE    IR 30 Oral every 4 hours PRN    IV FLUIDS: IVL  DRIPS:  ·	Morphine 1 milliGRAM / mL 17.9 mL 17.9 IntraThecal Continuous Pump  ·	dexMEDEtomidine Infusion 0.2 MICROgram(s)/kG/Hr (4.24 mL/Hr) IV Continuous <Continuous>  ·	propofol Infusion 10 MICROgram(s)/kG/Min (5.08 mL/Hr) IV Continuous <Continuous  Lines: R IJ R radial Krupa  Diet: regular  Drains:       Wounds    CODE STATUS:  Full Code                       GOALS OF CARE:  aggressive                      DISPOSITION:  ICU =================================  NEUROCRITICAL CARE ATTENDING NOTE  =================================    VANNA DIAZ   [id number]  Summary:  68y/F  HTN, HLD, CVA x 3 (last was 2016 with right hand weakness residual), GAMALIEL not using CPAP, Emphysema, former 25 pack year smoker restarted this week of surgery, Chronic Constipation on Movantik, Urinary Incontinence chronically self straight cath at home, chronic UTI, Breast Implant Rupture with Chronic Leak repair 10/2023, B/L CTS s/p release, s/p Intrathecal Morphine pump for pain, with chronic neck and back pain worsening for years s/p multiple spinal surgeries including laminectomies and fusion of cervical, thoracic and lumbar spine initially done in 2009 and recently in 2019 and 2020 at [facility] by Dr. Trinidad, s/p T3-L1 revision of prior fusion (with Dr. Luz on 8/17/22). Outpatient Xray 11/16/2023 showing broken rods/displacement. Presents for elective C2-S2 instrumentation and fusion. Pt endorses weakness of b/l LE and burning pain in b/l lower extremities radiating to both feet,  (04 Dec 2023 15:44)    COURSE IN THE HOSPITAL:  12/04 admitted to [facility]  12/05 POD0 C2 to pelvis fusion, intraoperative lost monitoring (B motor and sensory) initially responsive to HDA to MAP of 120s, but lost monitoring again, becoming unresponsvie to HDA to MAP even of 140s; remained intubated, brought to ICU.  12/06 POD1 precedex + valium, agitated  12/07 MRI done, agitated, remains on precedex    12/10 POD#0    Past Medical History: HTN (hypertension)  Back pain Hypertension SS (spinal stenosis) High cholesterol Stroke H/O carpal tunnel syndrome H/O carpal tunnel syndrome Chronic GERD History of chronic constipation Seizure Urinary incontinence Pre-diabetes Acute UTI Anxiety and depression Arthritis Eczema External hemorrhoids H/O kyphosis Multiple sclerosis Pancreas cyst Scoliosis Wheelchair dependent Cervical pseudoarthrosis Right shoulder pain Cervical spondylosis Chronic headachesChronic LUQ pain Chronic UTI Degenerative joint disease H/O low back mir Lumbosacral spondylosis COPD (chronic obstructive pulmonary disease)  Allergies:  oxycodone (Pruritus)  Home meds:   ·	albuterol 90 mcg/inh inhalation aerosol: 2 puff(s) inhaled every 6 hours, As Needed  ·	aspirin 81 mg oral delayed release tablet: 1 tab(s) orally once a day  ·	DULoxetine 60 mg oral delayed release capsule: 1 cap(s) orally once a day  ·	gabapentin 800 mg oral tablet: 1 tab(s) orally 3 times a day  ·	MetFORMIN (Eqv-Glumetza) 500 mg oral tablet, extended release: 1 tab(s) orally 2 times a day  ·	metoprolol tartrate 50 mg oral tablet: 1.5 tab(s) orally 2 times a day  ·	Movantik 25 mg oral tablet: 1 tab(s) orally once a day (in the morning)  ·	simvastatin 40 mg oral tablet: 1 tab(s) orally once a day (at bedtime)  ·	tiZANidine 4 mg oral tablet: 2 tab(s) orally 2 times a day as needed for -  ·	Vitamin D3 50 mcg (2000 intl units) oral tablet: 1 tab(s) orally once a day    PHYSICAL EXAMINATION  T(C): 35.7 (12-10 @ 15:30), Max: 37.1 (12-10 @ 08:35) HR: 74 (12-10 @ 17:00) (70 - 111) BP: 105/60 (12-10 @ 17:00) (105/60 - 190/115) RR: 12 (12-10 @ 16:00) (12 - 19) SpO2: 100% (12-10 @ 17:00) (96% - 100%)  NEUROLOGIC EXAMINATION:  Patient lethargic, easily rousable, Ox3, agitated; FC, B UE 5/5 BL LE 0/5; sensory loss below T10   GENERAL: room air  EENT:  anicteric  CARDIOVASCULAR: (+) S1 S2, normal rate and regular rhythm  PULMONARY: clear to auscultation bilaterally  ABDOMEN: soft, nontender with normoactive bowel sounds  EXTREMITIES: no edema  SKIN: no rash    LABS: 12-10  CAPILLARY BLOOD GLUCOSE 206             (8.43)  9.5  (8.2)   11.25 )-----------( 179      ( 10 Dec 2023 14:03 )             28.1   (140)  140  |  106  |  6<L>  ----------------------------<  146<H>  4.2   |  25  |  0.44<L>    Ca    8.6      10 Dec 2023 14:03  Phos  3.6     12-10  Mg     1.6     12-10    TPro  5.4<L>  /  Alb  3.0<L>  /  TBili  0.4  /  DBili  x   /  AST  16  /  ALT  13  /  AlkPhos  73  12-09 12-09 @ 07:01  -  12-10 @ 07:00  --------------------------------------------------------  IN: 1070 mL / OUT: 2500 mL / NET: -1430 mL    12-10 @ 07:01  -  12-10 @ 18:54  --------------------------------------------------------  IN: 1453.1 mL / OUT: 2290 mL / NET: -836.9 mL     Bacteriology:  CSF studies:  EEG:  Neuroimaging:  Other imaging:    MEDICATIONS: 12-10    ·	ceFAZolin   IVPB 2000 IV Intermittent every 8 hours  ·	diazepam    Tablet 5 Oral every 8 hours  ·	gabapentin 800 Oral three times a day  ·	propofol Infusion 10 IV Continuous <Continuous>  ·	bisacodyl 5 Oral at bedtime  ·	pantoprazole  Injectable 40 IV Push daily  ·	polyethylene glycol 3350 17 Oral two times a day  ·	senna 2 Oral at bedtime  ·	insulin lispro (ADMELOG) corrective regimen sliding scale  SubCutaneous Before meals and at bedtime  ·	simvastatin 40 Oral at bedtime  ·	multivitamin 1 Oral daily  ·	benzocaine 20% Spray 1 Mucosal three times a day PRN  ·	HYDROmorphone  Injectable 0.5 IV Push every 3 hours PRN  ·	naloxone Injectable 0.1 IV Push every 3 minutes PRN  ·	ondansetron Injectable 4 IV Push every 6 hours PRN  ·	oxyCODONE    IR 15 Oral every 4 hours PRN  ·	oxyCODONE    IR 30 Oral every 4 hours PRN    IV FLUIDS: IVL  DRIPS:  ·	Morphine 1 milliGRAM / mL 17.9 mL 17.9 IntraThecal Continuous Pump  ·	dexMEDEtomidine Infusion 0.2 MICROgram(s)/kG/Hr (4.24 mL/Hr) IV Continuous <Continuous>  ·	propofol Infusion 10 MICROgram(s)/kG/Min (5.08 mL/Hr) IV Continuous <Continuous  Lines: R IJ R radial Krupa  Diet: regular  Drains:       Wounds    CODE STATUS:  Full Code                       GOALS OF CARE:  aggressive                      DISPOSITION:  ICU =================================  NEUROCRITICAL CARE ATTENDING NOTE  =================================    VANNA DIAZ   MRN-[id number]  Summary:  68y/F  HTN, HLD, CVA x 3 (last was 2016 with right hand weakness residual), GAMALIEL not using CPAP, Emphysema, former 25 pack year smoker restarted this week of surgery, Chronic Constipation on Movantik, Urinary Incontinence chronically self straight cath at home, chronic UTI, Breast Implant Rupture with Chronic Leak repair 10/2023, B/L CTS s/p release, s/p Intrathecal Morphine pump for pain, with chronic neck and back pain worsening for years s/p multiple spinal surgeries including laminectomies and fusion of cervical, thoracic and lumbar spine initially done in 2009 and recently in 2019 and 2020 at [facility] by Dr. Trinidad, s/p T3-L1 revision of prior fusion (with Dr. Luz on 8/17/22). Outpatient Xray 11/16/2023 showing broken rods/displacement. Presents for elective C2-S2 instrumentation and fusion. Pt endorses weakness of b/l LE and burning pain in b/l lower extremities radiating to both feet,  (04 Dec 2023 15:44)    COURSE IN THE HOSPITAL:  12/04 admitted to [facility]  12/05 POD0 C2 to pelvis fusion, intraoperative lost monitoring (B motor and sensory) initially responsive to HDA to MAP of 120s, but lost monitoring again, becoming unresponsvie to HDA to MAP even of 140s; remained intubated, brought to ICU.  12/06 POD1 precedex + valium, agitated  12/07 MRI done, agitated, remains on precedex    12/10 POD#0 s/p revision of thoracolumbar fusion, additional rods    Past Medical History: HTN (hypertension)  Back pain Hypertension SS (spinal stenosis) High cholesterol Stroke H/O carpal tunnel syndrome H/O carpal tunnel syndrome Chronic GERD History of chronic constipation Seizure Urinary incontinence Pre-diabetes Acute UTI Anxiety and depression Arthritis Eczema External hemorrhoids H/O kyphosis Multiple sclerosis Pancreas cyst Scoliosis Wheelchair dependent Cervical pseudoarthrosis Right shoulder pain Cervical spondylosis Chronic headachesChronic LUQ pain Chronic UTI Degenerative joint disease H/O low back mir Lumbosacral spondylosis COPD (chronic obstructive pulmonary disease)  Allergies:  oxycodone (Pruritus)  Home meds:   ·	albuterol 90 mcg/inh inhalation aerosol: 2 puff(s) inhaled every 6 hours, As Needed  ·	aspirin 81 mg oral delayed release tablet: 1 tab(s) orally once a day  ·	DULoxetine 60 mg oral delayed release capsule: 1 cap(s) orally once a day  ·	gabapentin 800 mg oral tablet: 1 tab(s) orally 3 times a day  ·	MetFORMIN (Eqv-Glumetza) 500 mg oral tablet, extended release: 1 tab(s) orally 2 times a day  ·	metoprolol tartrate 50 mg oral tablet: 1.5 tab(s) orally 2 times a day  ·	Movantik 25 mg oral tablet: 1 tab(s) orally once a day (in the morning)  ·	simvastatin 40 mg oral tablet: 1 tab(s) orally once a day (at bedtime)  ·	tiZANidine 4 mg oral tablet: 2 tab(s) orally 2 times a day as needed for -  ·	Vitamin D3 50 mcg (2000 intl units) oral tablet: 1 tab(s) orally once a day    PHYSICAL EXAMINATION  T(C): 35.7 (12-10 @ 15:30), Max: 37.1 (12-10 @ 08:35) HR: 74 (12-10 @ 17:00) (70 - 111) BP: 105/60 (12-10 @ 17:00) (105/60 - 190/115) RR: 12 (12-10 @ 16:00) (12 - 19) SpO2: 100% (12-10 @ 17:00) (96% - 100%)  NEUROLOGIC EXAMINATION:  Patient OE to voice, not FC, nodded to questions, squeezed hands, purposeful, B UE strong, BLE 0/5  GENERAL: room air  EENT:  anicteric  CARDIOVASCULAR: (+) S1 S2, normal rate and regular rhythm  PULMONARY: clear to auscultation bilaterally  ABDOMEN: soft, nontender with normoactive bowel sounds  EXTREMITIES: no edema  SKIN: no rash    LABS: 12-10  CAPILLARY BLOOD GLUCOSE 206             (8.43)  9.5  (8.2)   11.25 )-----------( 179      ( 10 Dec 2023 14:03 )             28.1   (140)  140  |  106  |  6<L>  ----------------------------<  146<H>  4.2   |  25  |  0.44<L>    Ca    8.6      10 Dec 2023 14:03  Phos  3.6     12-10  Mg     1.6     12-10    TPro  5.4<L>  /  Alb  3.0<L>  /  TBili  0.4  /  DBili  x   /  AST  16  /  ALT  13  /  AlkPhos  73  12-09 12-09 @ 07:01  -  12-10 @ 07:00  --------------------------------------------------------  IN: 1070 mL / OUT: 2500 mL / NET: -1430 mL    12-10 @ 07:01  -  12-10 @ 18:54  --------------------------------------------------------  IN: 1453.1 mL / OUT: 2290 mL / NET: -836.9 mL    HbA1C = 7.0 (12-04)    ABG - ( 10 Dec 2023 17:12 )  pH, Arterial: 7.42  pH, Blood: x     /  pCO2: 36    /  pO2: 122   / HCO3: 23    / Base Excess: -0.7  /  SaO2: 99.0      Bacteriology:  CSF studies:  EEG:  Neuroimaging:  Other imaging:    MEDICATIONS: 12-10    ·	ceFAZolin   IVPB 2000 IV Intermittent every 8 hours  ·	diazepam    Tablet 5 Oral every 8 hours  ·	gabapentin 800 Oral three times a day  ·	isacodyl 5 Oral at bedtime  ·	pantoprazole  Injectable 40 IV Push daily  ·	polyethylene glycol 3350 17 Oral two times a day  ·	senna 2 Oral at bedtime  ·	insulin lispro (ADMELOG) corrective regimen sliding scale  SubCutaneous Before meals and at bedtime  ·	simvastatin 40 Oral at bedtime  ·	multivitamin 1 Oral daily  ·	benzocaine 20% Spray 1 Mucosal three times a day PRN  ·	HYDROmorphone  Injectable 0.5 IV Push every 3 hours PRN  ·	naloxone Injectable 0.1 IV Push every 3 minutes PRN  ·	ondansetron Injectable 4 IV Push every 6 hours PRN  ·	oxyCODONE    IR 15 Oral every 4 hours PRN  ·	oxyCODONE    IR 30 Oral every 4 hours PRN    IV FLUIDS: NS@85cc/hr  DRIPS:  ·	Morphine 1 milliGRAM / mL 17.9 mL 17.9 IntraThecal Continuous Pump  ·	dexMEDEtomidine Infusion 0.2 MICROgram(s)/kG/Hr (4.24 mL/Hr) IV Continuous <Continuous> 0.3  ·	propofol Infusion 10 MICROgram(s)/kG/Min (5.08 mL/Hr) IV Continuous <Continuous> 10  Lines: R IJ R radial Krupa  Diet: NPO  Drains:      Accordian (mL): 150 mL    Bulb (mL): 23 mL    Bulb (mL): 27 mL   Wounds    CODE STATUS:  Full Code                       GOALS OF CARE:  aggressive                      DISPOSITION:  ICU =================================  NEUROCRITICAL CARE ATTENDING NOTE  =================================    VANNA DIAZ   MRN-[id number]  Summary:  68y/F  HTN, HLD, CVA x 3 (last was 2016 with right hand weakness residual), GAMALIEL not using CPAP, Emphysema, former 25 pack year smoker restarted this week of surgery, Chronic Constipation on Movantik, Urinary Incontinence chronically self straight cath at home, chronic UTI, Breast Implant Rupture with Chronic Leak repair 10/2023, B/L CTS s/p release, s/p Intrathecal Morphine pump for pain, with chronic neck and back pain worsening for years s/p multiple spinal surgeries including laminectomies and fusion of cervical, thoracic and lumbar spine initially done in 2009 and recently in 2019 and 2020 at [facility] by Dr. Trinidad, s/p T3-L1 revision of prior fusion (with Dr. Luz on 8/17/22). Outpatient Xray 11/16/2023 showing broken rods/displacement. Presents for elective C2-S2 instrumentation and fusion. Pt endorses weakness of b/l LE and burning pain in b/l lower extremities radiating to both feet,  (04 Dec 2023 15:44)    COURSE IN THE HOSPITAL:  12/04 admitted to [facility]  12/05 POD0 C2 to pelvis fusion, intraoperative lost monitoring (B motor and sensory) initially responsive to HDA to MAP of 120s, but lost monitoring again, becoming unresponsvie to HDA to MAP even of 140s; remained intubated, brought to ICU.  12/06 POD1 precedex + valium, agitated  12/07 MRI done, agitated, remains on precedex    12/10 POD#0 s/p revision of thoracolumbar fusion, additional rods    Past Medical History: HTN (hypertension)  Back pain Hypertension SS (spinal stenosis) High cholesterol Stroke H/O carpal tunnel syndrome H/O carpal tunnel syndrome Chronic GERD History of chronic constipation Seizure Urinary incontinence Pre-diabetes Acute UTI Anxiety and depression Arthritis Eczema External hemorrhoids H/O kyphosis Multiple sclerosis Pancreas cyst Scoliosis Wheelchair dependent Cervical pseudoarthrosis Right shoulder pain Cervical spondylosis Chronic headachesChronic LUQ pain Chronic UTI Degenerative joint disease H/O low back mir Lumbosacral spondylosis COPD (chronic obstructive pulmonary disease)  Allergies:  oxycodone (Pruritus)  Home meds:   ·	albuterol 90 mcg/inh inhalation aerosol: 2 puff(s) inhaled every 6 hours, As Needed  ·	aspirin 81 mg oral delayed release tablet: 1 tab(s) orally once a day  ·	DULoxetine 60 mg oral delayed release capsule: 1 cap(s) orally once a day  ·	gabapentin 800 mg oral tablet: 1 tab(s) orally 3 times a day  ·	MetFORMIN (Eqv-Glumetza) 500 mg oral tablet, extended release: 1 tab(s) orally 2 times a day  ·	metoprolol tartrate 50 mg oral tablet: 1.5 tab(s) orally 2 times a day  ·	Movantik 25 mg oral tablet: 1 tab(s) orally once a day (in the morning)  ·	simvastatin 40 mg oral tablet: 1 tab(s) orally once a day (at bedtime)  ·	tiZANidine 4 mg oral tablet: 2 tab(s) orally 2 times a day as needed for -  ·	Vitamin D3 50 mcg (2000 intl units) oral tablet: 1 tab(s) orally once a day    PHYSICAL EXAMINATION  T(C): 35.7 (12-10 @ 15:30), Max: 37.1 (12-10 @ 08:35) HR: 74 (12-10 @ 17:00) (70 - 111) BP: 105/60 (12-10 @ 17:00) (105/60 - 190/115) RR: 12 (12-10 @ 16:00) (12 - 19) SpO2: 100% (12-10 @ 17:00) (96% - 100%)  NEUROLOGIC EXAMINATION:  Patient OE to voice, not FC, nodded to questions, squeezed hands, purposeful, B UE strong, BLE 0/5  GENERAL: room air  EENT:  anicteric  CARDIOVASCULAR: (+) S1 S2, normal rate and regular rhythm  PULMONARY: clear to auscultation bilaterally  ABDOMEN: soft, nontender with normoactive bowel sounds  EXTREMITIES: no edema  SKIN: no rash    LABS: 12-10  CAPILLARY BLOOD GLUCOSE 206             (8.43)  9.5  (8.2)   11.25 )-----------( 179      ( 10 Dec 2023 14:03 )             28.1   (140)  140  |  106  |  6<L>  ----------------------------<  146<H>  4.2   |  25  |  0.44<L>    Ca    8.6      10 Dec 2023 14:03  Phos  3.6     12-10  Mg     1.6     12-10    TPro  5.4<L>  /  Alb  3.0<L>  /  TBili  0.4  /  DBili  x   /  AST  16  /  ALT  13  /  AlkPhos  73  12-09 12-09 @ 07:01  -  12-10 @ 07:00  --------------------------------------------------------  IN: 1070 mL / OUT: 2500 mL / NET: -1430 mL    12-10 @ 07:01  -  12-10 @ 18:54  --------------------------------------------------------  IN: 1453.1 mL / OUT: 2290 mL / NET: -836.9 mL    HbA1C = 7.0 (12-04)    ABG - ( 10 Dec 2023 17:12 )  pH, Arterial: 7.42  pH, Blood: x     /  pCO2: 36    /  pO2: 122   / HCO3: 23    / Base Excess: -0.7  /  SaO2: 99.0      Bacteriology:  CSF studies:  EEG:  Neuroimaging:  Other imaging:    MEDICATIONS: 12-10    ·	ceFAZolin   IVPB 2000 IV Intermittent every 8 hours  ·	diazepam    Tablet 5 Oral every 8 hours  ·	gabapentin 800 Oral three times a day  ·	isacodyl 5 Oral at bedtime  ·	pantoprazole  Injectable 40 IV Push daily  ·	polyethylene glycol 3350 17 Oral two times a day  ·	senna 2 Oral at bedtime  ·	insulin lispro (ADMELOG) corrective regimen sliding scale  SubCutaneous Before meals and at bedtime  ·	simvastatin 40 Oral at bedtime  ·	multivitamin 1 Oral daily  ·	benzocaine 20% Spray 1 Mucosal three times a day PRN  ·	HYDROmorphone  Injectable 0.5 IV Push every 3 hours PRN  ·	naloxone Injectable 0.1 IV Push every 3 minutes PRN  ·	ondansetron Injectable 4 IV Push every 6 hours PRN  ·	oxyCODONE    IR 15 Oral every 4 hours PRN  ·	oxyCODONE    IR 30 Oral every 4 hours PRN    IV FLUIDS: NS@85cc/hr  DRIPS:  ·	Morphine 1 milliGRAM / mL 17.9 mL 17.9 IntraThecal Continuous Pump  ·	dexMEDEtomidine Infusion 0.2 MICROgram(s)/kG/Hr (4.24 mL/Hr) IV Continuous <Continuous> 0.3  ·	propofol Infusion 10 MICROgram(s)/kG/Min (5.08 mL/Hr) IV Continuous <Continuous> 10  Lines: R IJ R radial Krupa  Diet: NPO  Drains:      Accordian (mL): 150 mL    Bulb (mL): 23 mL    Bulb (mL): 27 mL   Wounds    CODE STATUS:  Full Code                       GOALS OF CARE:  aggressive                      DISPOSITION:  ICU

## 2023-12-10 NOTE — PROGRESS NOTE ADULT - ASSESSMENT
ASSESSMENT/PLAN:    s/p C2 to pelvis fusion, intraoperative lost monitoring, re-gained at MAP>120, lost again and unresponsive to even MAPs>140 s/p refractory to MAP goals now s/p T9- L2 decompression, confirmed on ultrasound      NEURO:  - neurochecks q1h  - MAP goals per below  - pain following  - Morphine pump ON  - drains per plastics/nsg  - Sedation: RASS 0 - -2 with propofol    CVS:  - MAP>90  - hold midodrine d/t hypertension  - TLC paced intra-op 12/10    PULM:  - remainted intubted d/t requiring to lay flat  - ETT to vent  - cxr for ett confirmation/TLC  - VAP bundle  - CPAP as toelrated, plan to extubate tomorrow    RENAL:  - Fluids: 50cc/h NS while NPO  - daily IOs    GI:  - Diet: NPO for extubation tomorrow  - PPI while intubated  - Bowel regimen: miralax, senna    ENDO:   - FS goal 120-180    HEME/ONC:  - SCDs  - Chemoppx: hold d/t fresh post op    ID:  - monitor for fevers

## 2023-12-10 NOTE — PROGRESS NOTE ADULT - ASSESSMENT
68y/F with  pseudoarthrosis, s/p C2 to pelvis fusion, (12/05/2023, Dr. Luz)  Hypertension dyslipidemia Diabetes Mellitus controlled  LBP, arthritis  h/o stroke with R residuals, seizure disorder, anxiety and depression, MS  h/o CTS  GERD, chronic constipation, external hemorrhoids, pancreatic cyst  urinary incontinence  eczema  COPD     PLAN:   NEURO: neurochecks q2h, VS q1h, PRN pain meds with PCA pump, ERAS protocol, sedation with precedex to RASS of 0 to -1  standing Xrays on stepdown  3 HMV & 2 JPs - monitor output, keep for now  MAP augmentation  REHAB:  physical therapy evaluation and management    EARLY MOB:  HOB up     PULM:  room air, incentive spirometry  CARDIO:  MAP >90, titrate neosynephrine to SBP goal, TTE; midodrine 10mg PO q8h   ENDO:  Blood sugar goals 140-180 mg/dL, off insulin sliding scale  GI:  bowel regimen  DIET: regular diet  RENAL:  IVL  HEM/ONC: hb drop this morning - check FOBT  VTE Prophylaxis: SCDs, SQL  ID: afebrile, leukocytosis  Social: will update family    Active issues:  What's keeping patient in the ICU? pressors, sedation gtt  What is this patient's dispo plan? stepdown tomorrow if off drips    ATTENDING ATTESTATION:  I was physically present for the key portions of the evaluation and management (E/M) service provided.  I agree with the above history, physical and plan, which I have reviewed and edited where appropriate.    Patient at high risk for neurological deterioration or death due to:  ICU delirium, aspiration PNA, DVT / PE.  Critical care time:  I have personally provided 45 minutes of critical care time, excluding time spent on separate procedures.      Plan discussed with RN, house staff. 68y/F with  pseudoarthrosis, s/p C2 to pelvis fusion, (12/05/2023, Dr. Luz)  Hypertension dyslipidemia Diabetes Mellitus controlled  LBP, arthritis  h/o stroke with R residuals, seizure disorder, anxiety and depression, MS  h/o CTS  GERD, chronic constipation, external hemorrhoids, pancreatic cyst  urinary incontinence  eczema  COPD     PLAN:   NEURO: neurochecks q1h, VS q1h, PRN pain meds with PCA pump, d/c ERAS protocol, sedation with precedex to RASS of 0 to -1  standing Xrays on stepdown  3 HMV & 2 JPs - monitor output, keep for now  MAP augmentation  REHAB:  physical therapy evaluation and management    EARLY MOB:  HOB up     PULM:  full vent support, 40% 450 12 +5, CPAP at 6 a.m.; extubation tomorrow  CARDIO:  MAP >90, titrate levophed to SBP goal, midodrine 10mg PO q8h   ENDO:  Blood sugar goals 140-180 mg/dL, off insulin sliding scale  GI:  bowel regimen  DIET: NPO for now  RENAL:  IVF while NPO  HEM/ONC: hb stable  VTE Prophylaxis: SCDs, no DVT chemoprophylaxis for now as patient is high risk for bleed (fresh post-op)  ID: afebrile, leukocytosis; ancef x3 doses  Social: will update family    Active issues:  What's keeping patient in the ICU? pressors, intubated  What is this patient's dispo plan? stepdown tomorrow if off drips    ATTENDING ATTESTATION:  I was physically present for the key portions of the evaluation and management (E/M) service provided.  I agree with the above history, physical and plan, which I have reviewed and edited where appropriate.    Patient at high risk for neurological deterioration or death due to:  ICU delirium, aspiration PNA, DVT / PE.  Critical care time:  I have personally provided 45 minutes of critical care time, excluding time spent on separate procedures.      Plan discussed with RN, house staff. 68y/F with  pseudoarthrosis, s/p C2 to pelvis fusion, (12/05/2023, Dr. Luz)  Hypertension dyslipidemia Diabetes Mellitus controlled  LBP, arthritis  h/o stroke with R residuals, seizure disorder, anxiety and depression, MS  h/o CTS  GERD, chronic constipation, external hemorrhoids, pancreatic cyst  urinary incontinence  eczema  COPD     PLAN:   NEURO: neurochecks q1h, VS q1h, PRN pain meds with PCA pump, d/c ERAS protocol, sedation with precedex to RASS of 0 to -1  standing Xrays on stepdown  1 HMV & 1 JPs - monitor output, keep for now  MAP augmentation  REHAB:  physical therapy evaluation and management    EARLY MOB:  HOB up     PULM:  full vent support, 40% 450 12 +5, CPAP at 6 a.m.; extubation tomorrow  CARDIO:  MAP >90, titrate levophed to SBP goal   ENDO:  Blood sugar goals 140-180 mg/dL, off insulin sliding scale  GI:  bowel regimen  DIET: NPO for now  RENAL:  IVF while NPO  HEM/ONC: hb stable  VTE Prophylaxis: SCDs, no DVT chemoprophylaxis for now as patient is high risk for bleed (fresh post-op)  ID: afebrile, leukocytosis; ancef x3 doses  Social: will update family    Active issues:  What's keeping patient in the ICU? pressors, intubated  What is this patient's dispo plan? stepdown tomorrow if off drips    ATTENDING ATTESTATION:  I was physically present for the key portions of the evaluation and management (E/M) service provided.  I agree with the above history, physical and plan, which I have reviewed and edited where appropriate.    Patient at high risk for neurological deterioration or death due to:  ICU delirium, aspiration PNA, DVT / PE.  Critical care time:  I have personally provided 45 minutes of critical care time, excluding time spent on separate procedures.      Plan discussed with RN, house staff.

## 2023-12-10 NOTE — BRIEF OPERATIVE NOTE - OPERATION/FINDINGS
T9- L2 decompression, confirmed on ultrasound
Revision of C2-pelvis fusion, addition of rods, plastics closure.

## 2023-12-10 NOTE — PROGRESS NOTE ADULT - SUBJECTIVE AND OBJECTIVE BOX
NEUROSURGERY POST OP NOTE:    POD# 0 S/P    S:       T(C): 37.1 (12-10-23 @ 08:35), Max: 37.1 (12-10-23 @ 08:35)  HR: 100 (12-10-23 @ 08:35) (88 - 111)  BP: 121/73 (12-10-23 @ 08:35) (112/71 - 147/87)  RR: 18 (12-10-23 @ 08:35) (16 - 19)  SpO2: 96% (12-10-23 @ 08:35) (96% - 100%)      12-09-23 @ 07:01  -  12-10-23 @ 07:00  --------------------------------------------------------  IN: 1070 mL / OUT: 2500 mL / NET: -1430 mL    albuterol    90 MICROgram(s) HFA Inhaler 2 Puff(s) Inhalation every 6 hours PRN  benzocaine 20% Spray 1 Spray(s) Mucosal three times a day PRN  bisacodyl 5 milliGRAM(s) Oral at bedtime  chlorhexidine 2% Cloths 1 Application(s) Topical every 12 hours  diazepam    Tablet 5 milliGRAM(s) Oral every 8 hours  DULoxetine 60 milliGRAM(s) Oral daily  gabapentin 800 milliGRAM(s) Oral three times a day  HYDROmorphone  Injectable 0.5 milliGRAM(s) IV Push every 3 hours PRN  influenza  Vaccine (HIGH DOSE) 0.7 milliLiter(s) IntraMuscular once  midodrine 10 milliGRAM(s) Oral every 8 hours  Morphine 1 milliGRAM / mL 17.9 mL 17.9 mL IntraThecal Continuous Pump  multivitamin 1 Tablet(s) Oral daily  naloxone Injectable 0.1 milliGRAM(s) IV Push every 3 minutes PRN  ondansetron Injectable 4 milliGRAM(s) IV Push every 6 hours PRN  oxyCODONE    IR 15 milliGRAM(s) Oral every 4 hours PRN  oxyCODONE    IR 30 milliGRAM(s) Oral every 4 hours PRN  polyethylene glycol 3350 17 Gram(s) Oral two times a day  senna 2 Tablet(s) Oral at bedtime  simvastatin 40 milliGRAM(s) Oral at bedtime  sodium chloride 0.9% lock flush 10 milliLiter(s) IV Push every 1 hour PRN  sodium chloride 0.9%. 1000 milliLiter(s) IV Continuous <Continuous>    RADIOLOGY:   < from: MR Thoracic Spine No Cont (12.07.23 @ 17:41) >  FINDINGS:    Patient is again status post posterior fusion revision and multilevel   laminectomy with hardware better evaluated on recent CT thoracic spine.   There is improved alignment at T12-L1    Significant susceptibility artifact from hardware limits evaluation of  the spinal canal and marrow signal is distorted. (Midthoracic canal   appears patent. Spinal canal at the T11-L1 level appears narrowed,   although not well imaged on the axial images due to artifact. The   thoracic spinal cord is not adequately imaged.    A YIFAN drain noted along the dorsal incision site; no large or organized   collection at this time.      IMPRESSION:    Status post revision thoracolumbar fusion as better detailed on CT.    MRI provides little diagnostic information with largedegree of metallic   susceptibility artifact precluding view of the thoracic spinal canal.    --- End of Report ---      < end of copied text >    Exam:  GEN: laying in bed, NAD  NEURO: AAOx1 to self however patient not cooperating with the rest of the orientation exam, FC  CNII-XII intact. PERRL, EOM, face symmetric  Motor: RUE/LUE 5/5, RLE/LLE 0/5, No sensation below T11, no sensation on rectal tone exam, mildly decreased rectal tone at rest with slight increase with squeeze, no discernable response to bulbocavernosus reflex  CV: RRR +S1/S2  PULM: CTAB  GI: Abd soft, NT/ND  EXT: ext warm, dry, nontender  WOUND/DRAINS: cervical-pelvis incision c/d/i    WOUND/DRAINS:    DEVICES: SCDs     Assessment:   67 yo female with Hx HTN, HLD, DM, CVA x 3 (last in 2016 with residual R hand weakness), GAMALIEL not using CPAP, Emphysema, ex-25 pack year smoker now restarted, chronic constipation on Movantik, chronic urinary retention (SC at home), b/l CTS s/p release, s/p Intrathecal Morphine pump for pain, with chronic neck and back pain worsening for years s/p multiple spinal surgeries including laminectomies and fusion of cervical, thoracic and lumbar spine, s/p T3-L1 revision of prior fusion (with  on 8/17/22). Xray 11/16/23 showing broken rods/displacement. Now s/p C2-S2 instrumentation and fusion (12/5). S/p       Plan:   NEUROSURGERY POST OP NOTE:    POD# 0 S/P T9-L2 decompression     S: Pt intubated and sedated on propofol gtt, unable to communicate concerns.     T(C): 37.1 (12-10-23 @ 08:35), Max: 37.1 (12-10-23 @ 08:35)  HR: 100 (12-10-23 @ 08:35) (88 - 111)  BP: 121/73 (12-10-23 @ 08:35) (112/71 - 147/87)  RR: 18 (12-10-23 @ 08:35) (16 - 19)  SpO2: 96% (12-10-23 @ 08:35) (96% - 100%)    12-09-23 @ 07:01  -  12-10-23 @ 07:00  --------------------------------------------------------  IN: 1070 mL / OUT: 2500 mL / NET: -1430 mL    albuterol    90 MICROgram(s) HFA Inhaler 2 Puff(s) Inhalation every 6 hours PRN  benzocaine 20% Spray 1 Spray(s) Mucosal three times a day PRN  bisacodyl 5 milliGRAM(s) Oral at bedtime  chlorhexidine 2% Cloths 1 Application(s) Topical every 12 hours  diazepam    Tablet 5 milliGRAM(s) Oral every 8 hours  DULoxetine 60 milliGRAM(s) Oral daily  gabapentin 800 milliGRAM(s) Oral three times a day  HYDROmorphone  Injectable 0.5 milliGRAM(s) IV Push every 3 hours PRN  influenza  Vaccine (HIGH DOSE) 0.7 milliLiter(s) IntraMuscular once  midodrine 10 milliGRAM(s) Oral every 8 hours  Morphine 1 milliGRAM / mL 17.9 mL 17.9 mL IntraThecal Continuous Pump  multivitamin 1 Tablet(s) Oral daily  naloxone Injectable 0.1 milliGRAM(s) IV Push every 3 minutes PRN  ondansetron Injectable 4 milliGRAM(s) IV Push every 6 hours PRN  oxyCODONE    IR 15 milliGRAM(s) Oral every 4 hours PRN  oxyCODONE    IR 30 milliGRAM(s) Oral every 4 hours PRN  polyethylene glycol 3350 17 Gram(s) Oral two times a day  senna 2 Tablet(s) Oral at bedtime  simvastatin 40 milliGRAM(s) Oral at bedtime  sodium chloride 0.9% lock flush 10 milliLiter(s) IV Push every 1 hour PRN  sodium chloride 0.9%. 1000 milliLiter(s) IV Continuous <Continuous>    RADIOLOGY:   < from: MR Thoracic Spine No Cont (12.07.23 @ 17:41) >  FINDINGS:    Patient is again status post posterior fusion revision and multilevel   laminectomy with hardware better evaluated on recent CT thoracic spine.   There is improved alignment at T12-L1    Significant susceptibility artifact from hardware limits evaluation of  the spinal canal and marrow signal is distorted. (Midthoracic canal   appears patent. Spinal canal at the T11-L1 level appears narrowed,   although not well imaged on the axial images due to artifact. The   thoracic spinal cord is not adequately imaged.    A YIFAN drain noted along the dorsal incision site; no large or organized   collection at this time.      IMPRESSION:    Status post revision thoracolumbar fusion as better detailed on CT.    MRI provides little diagnostic information with largedegree of metallic   susceptibility artifact precluding view of the thoracic spinal canal.    --- End of Report ---      < end of copied text >    Exam:  General: Pt is sitting up comfortably in bed, in NAD, on RA  HEENT: CN II-XII grossly intact, PERRL 3mm, EOMI B/L, face symmetric, tongue midline, neck FROM  Cardiovascular: RRR, normal S1 and S2   Respiratory: non-labored breathing, symmetric chest rise, + ET tube  GI: abd soft, NTND   Neuro: A&O x 0, intubated, follows commands, opens eyes spontaneously   Strength RUE 4/5 throughout, LUE 5/5 throughout  BL LE 0/5 to noxious stimuli  Unable to gauge proper sensation assessment 2/2 intubation   Vascular: Distal pulses 2+ x4, no calf edema or erythema  Wounds: Posterior spine wound C/D/I    DEVICES: SCDs     Assessment:   69 yo female with Hx HTN, HLD, DM, CVA x 3 (last in 2016 with residual R hand weakness), GAMALIEL not using CPAP, Emphysema, ex-25 pack year smoker now restarted, chronic constipation on Movantik, chronic urinary retention (SC at home), b/l CTS s/p release, s/p Intrathecal Morphine pump for pain, with chronic neck and back pain worsening for years s/p multiple spinal surgeries including laminectomies and fusion of cervical, thoracic and lumbar spine, s/p T3-L1 revision of prior fusion (with  on 8/17/22). Xray 11/16/23 showing broken rods/displacement. Now s/p C2-S2 instrumentation and fusion (12/5). S/p T9-L2 decompression w/ durotomy with repair (12/10/23).     Plan:  Plan:  Neuro:  - Neuro/vitals q1   - pain control: modified ERAS, IT morphine pump, oxycodone 15/30 prn, valium, home gabapentin 800 TID  - Hold home cymbalta 60mg daily   - HMV x 1, YIFAN x 1, per plastics  - Sedation: precedex gtt, propofol gtt  - MRI thoracic complete  - CT thoracic spine 12/6: postop changes  - CT myelogram done 12/9    Cardio:  - MAP >90  - holding home metoprolol while on pressors   - C/w home simvastatin     Pulm:  - Intubated from OR   - hx GAMALIEL, no CPAP at home    GI:  - NPO, +NGT   - Bowel regimen    Renal:  - IVF while NPO  - PRN straight cath    Endo:  - ISS dc'd  - a1c: 7    Heme:  - SCDs DVT ppx  - 500 cell saver intraop 12/5, 1u PRBC intraop 12/10  - Trend H/H    ID:  - afebrile  - post op ancef    Dispo:  - full code, tele, dispo pending PT/OT    D/w Dr. Luz, Dr. Bennett NEUROSURGERY POST OP NOTE:    POD# 0 S/P T9-L2 decompression     S: Pt intubated and sedated on propofol gtt, unable to communicate concerns.     T(C): 37.1 (12-10-23 @ 08:35), Max: 37.1 (12-10-23 @ 08:35)  HR: 100 (12-10-23 @ 08:35) (88 - 111)  BP: 121/73 (12-10-23 @ 08:35) (112/71 - 147/87)  RR: 18 (12-10-23 @ 08:35) (16 - 19)  SpO2: 96% (12-10-23 @ 08:35) (96% - 100%)    12-09-23 @ 07:01  -  12-10-23 @ 07:00  --------------------------------------------------------  IN: 1070 mL / OUT: 2500 mL / NET: -1430 mL    albuterol    90 MICROgram(s) HFA Inhaler 2 Puff(s) Inhalation every 6 hours PRN  benzocaine 20% Spray 1 Spray(s) Mucosal three times a day PRN  bisacodyl 5 milliGRAM(s) Oral at bedtime  chlorhexidine 2% Cloths 1 Application(s) Topical every 12 hours  diazepam    Tablet 5 milliGRAM(s) Oral every 8 hours  DULoxetine 60 milliGRAM(s) Oral daily  gabapentin 800 milliGRAM(s) Oral three times a day  HYDROmorphone  Injectable 0.5 milliGRAM(s) IV Push every 3 hours PRN  influenza  Vaccine (HIGH DOSE) 0.7 milliLiter(s) IntraMuscular once  midodrine 10 milliGRAM(s) Oral every 8 hours  Morphine 1 milliGRAM / mL 17.9 mL 17.9 mL IntraThecal Continuous Pump  multivitamin 1 Tablet(s) Oral daily  naloxone Injectable 0.1 milliGRAM(s) IV Push every 3 minutes PRN  ondansetron Injectable 4 milliGRAM(s) IV Push every 6 hours PRN  oxyCODONE    IR 15 milliGRAM(s) Oral every 4 hours PRN  oxyCODONE    IR 30 milliGRAM(s) Oral every 4 hours PRN  polyethylene glycol 3350 17 Gram(s) Oral two times a day  senna 2 Tablet(s) Oral at bedtime  simvastatin 40 milliGRAM(s) Oral at bedtime  sodium chloride 0.9% lock flush 10 milliLiter(s) IV Push every 1 hour PRN  sodium chloride 0.9%. 1000 milliLiter(s) IV Continuous <Continuous>    RADIOLOGY:   < from: MR Thoracic Spine No Cont (12.07.23 @ 17:41) >  FINDINGS:    Patient is again status post posterior fusion revision and multilevel   laminectomy with hardware better evaluated on recent CT thoracic spine.   There is improved alignment at T12-L1    Significant susceptibility artifact from hardware limits evaluation of  the spinal canal and marrow signal is distorted. (Midthoracic canal   appears patent. Spinal canal at the T11-L1 level appears narrowed,   although not well imaged on the axial images due to artifact. The   thoracic spinal cord is not adequately imaged.    A YIFAN drain noted along the dorsal incision site; no large or organized   collection at this time.      IMPRESSION:    Status post revision thoracolumbar fusion as better detailed on CT.    MRI provides little diagnostic information with largedegree of metallic   susceptibility artifact precluding view of the thoracic spinal canal.    --- End of Report ---      < end of copied text >    Exam:  General: Pt is sitting up comfortably in bed, in NAD, on RA  HEENT: CN II-XII grossly intact, PERRL 3mm, EOMI B/L, face symmetric, tongue midline, neck FROM  Cardiovascular: RRR, normal S1 and S2   Respiratory: non-labored breathing, symmetric chest rise, + ET tube  GI: abd soft, NTND   Neuro: A&O x 0, intubated, follows commands, opens eyes spontaneously   Strength RUE 4/5 throughout, LUE 5/5 throughout  BL LE 0/5 to noxious stimuli  Unable to gauge proper sensation assessment 2/2 intubation   Vascular: Distal pulses 2+ x4, no calf edema or erythema  Wounds: Posterior spine wound C/D/I    DEVICES: SCDs     Assessment:   67 yo female with Hx HTN, HLD, DM, CVA x 3 (last in 2016 with residual R hand weakness), GAMALIEL not using CPAP, Emphysema, ex-25 pack year smoker now restarted, chronic constipation on Movantik, chronic urinary retention (SC at home), b/l CTS s/p release, s/p Intrathecal Morphine pump for pain, with chronic neck and back pain worsening for years s/p multiple spinal surgeries including laminectomies and fusion of cervical, thoracic and lumbar spine, s/p T3-L1 revision of prior fusion (with  on 8/17/22). Xray 11/16/23 showing broken rods/displacement. Now s/p C2-S2 instrumentation and fusion (12/5). S/p T9-L2 decompression w/ durotomy with repair (12/10/23).     Plan:  Plan:  Neuro:  - Neuro/vitals q1   - pain control: modified ERAS, IT morphine pump, oxycodone 15/30 prn, valium, home gabapentin 800 TID  - Hold home cymbalta 60mg daily   - HMV x 1, YIFAN x 1, per plastics  - Sedation: precedex gtt, propofol gtt  - MRI thoracic complete  - CT thoracic spine 12/6: postop changes  - CT myelogram done 12/9    Cardio:  - MAP >90  - holding home metoprolol while on pressors   - C/w home simvastatin     Pulm:  - Intubated from OR   - hx GAMALIEL, no CPAP at home    GI:  - NPO, +NGT   - Bowel regimen    Renal:  - IVF while NPO  - PRN straight cath    Endo:  - ISS dc'd  - a1c: 7    Heme:  - SCDs DVT ppx  - 500 cell saver intraop 12/5, 1u PRBC intraop 12/10  - Trend H/H    ID:  - afebrile  - post op ancef    Dispo:  - full code, tele, dispo pending PT/OT    D/w Dr. Luz, Dr. Bennett

## 2023-12-10 NOTE — PROGRESS NOTE ADULT - SUBJECTIVE AND OBJECTIVE BOX
HPI:  66 y/o female with PMHx HTN, HLD, CVA x 3 (last was 2016 with right hand weakness residual), GAMALIEL not using CPAP, Emphysema, former 25 pack year smoker restarted this week of surgery, Chronic Constipation on Movantik, Urinary Incontinence chronically self straight cath at home, chronic UTI, Breast Implant Rupture with Chronic Leak repair 10/2023, B/L CTS s/p release, s/p Intrathecal Morphine pump for pain, with chronic neck and back pain worsening for years s/p multiple spinal surgeries including laminectomies and fusion of cervical, thoracic and lumbar spine initially done in 2009 and recently in 2019 and 2020 at [facility] by Dr. Trinidad, s/p T3-L1 revision of prior fusion (with Dr. Luz on 8/17/22). Outpatient Xray 11/16/2023 showing broken rods/displacement. Presents for elective C2-S2 instrumentation and fusion. Pt endorses weakness of b/l LE and burning pain in b/l lower extremities radiating to both feet,  (04 Dec 2023 15:44)    OVERNIGHT EVENTS: POD5, KAMRAN, OR today for exploration of fusion, possible decompression T9-L2, revision instrumentation    Hospital course:   12/4: Admitted for spinal fusion d/t damaged hardware.   12/5: Neuro stable. POD0 C2-S2 instrumentation/fusion (intraop b/l LE motor loss). on parminder for MAP>90  12/6: POD1. KAMRAN o/n neuro stable. remains intubated. Extubated 6am. Precedex gtt prn. Remains on parminder gtt for MAP goal >90. Valium made prn. CT thoracic spine bc MRI unavailable. Attempted NGT, unsuccessful (resistance @ 35).   12/7: POD2 Given IV dilaudid 0.5mg for pain. Neuro exam stable. SQL started tongiht. Increased precedex to 1.0. Responds well to valium. Consulting psych given paranoia ?homicidal statements. Passed bedside dyspphagia and tolerating PO meds. MRI complete, f/u read.   12/8: POD3 Pt reporting incisional pain, given dilaudid 0.5mg IV x2. Pt appearing anxious, given xanax 1mg. Neuro exam unchanged. Pain regimen increased to oxy 15/30 mg, pending further recs. Given 1 L bolus for tachycardia. Trops negative. 2 HMV drains removed.   12/9: POD4, KAMRAN, CT myelogram today showing block at T12-L1, OR tomorrow  12/10: POD5, KAMRAN, OR today for exploration of fusion, possible decompression T9-L2, revision instrumentation    Vital Signs Last 24 Hrs  T(C): 36.7 (10 Dec 2023 00:24), Max: 37 (09 Dec 2023 17:18)  T(F): 98 (10 Dec 2023 00:24), Max: 98.6 (09 Dec 2023 17:18)  HR: 110 (10 Dec 2023 00:24) (88 - 111)  BP: 133/71 (10 Dec 2023 00:24) (123/92 - 149/67)  BP(mean): --  RR: 19 (10 Dec 2023 00:24) (16 - 19)  SpO2: 100% (10 Dec 2023 00:24) (98% - 100%)    Parameters below as of 10 Dec 2023 00:24  Patient On (Oxygen Delivery Method): room air        I&O's Summary    08 Dec 2023 07:01  -  09 Dec 2023 07:00  --------------------------------------------------------  IN: 1133.4 mL / OUT: 1262.5 mL / NET: -129.1 mL    09 Dec 2023 07:01  -  10 Dec 2023 02:10  --------------------------------------------------------  IN: 1070 mL / OUT: 2448 mL / NET: -1378 mL        PHYSICAL EXAM:  GEN: laying in bed, NAD  NEURO: AAOx1 to self however patient not cooperating with the rest of the orientation exam, FC  CNII-XII intact. PERRL, EOM, face symmetric  Motor: RUE/LUE 5/5, RLE/LLE 0/5, No sensation below T11, no sensation on rectal tone exam, mildly decreased rectal tone at rest with slight increase with squeeze, no discernable response to bulbocavernosus reflex  CV: RRR +S1/S2  PULM: CTAB  GI: Abd soft, NT/ND  EXT: ext warm, dry, nontender  WOUND/DRAINS: cervical-pelvis incision c/d/i    TUBES/LINES:  [ ] HMV x1 (12/5- )  [ ] YIFAN x2 (12/5- )    DIET:  [] NPO  [x] Mechanical  [] Tube feeds    LABS:                        8.2    8.43  )-----------( 177      ( 09 Dec 2023 09:15 )             25.5     12-09    140  |  107  |  6<L>  ----------------------------<  126<H>  4.2   |  26  |  0.47<L>    Ca    8.6      09 Dec 2023 09:15  Phos  3.2     12-08  Mg     1.6     12-08    TPro  5.4<L>  /  Alb  3.0<L>  /  TBili  0.4  /  DBili  x   /  AST  16  /  ALT  13  /  AlkPhos  73  12-09    PT/INR - ( 09 Dec 2023 09:15 )   PT: 10.5 sec;   INR: 0.92          PTT - ( 09 Dec 2023 09:15 )  PTT:19.8 sec  Urinalysis Basic - ( 09 Dec 2023 09:15 )    Color: x / Appearance: x / SG: x / pH: x  Gluc: 126 mg/dL / Ketone: x  / Bili: x / Urobili: x   Blood: x / Protein: x / Nitrite: x   Leuk Esterase: x / RBC: x / WBC x   Sq Epi: x / Non Sq Epi: x / Bacteria: x          CAPILLARY BLOOD GLUCOSE          Drug Levels: [] N/A    CSF Analysis: [] N/A      Allergies    No Known Allergies    Intolerances        Home Medications:  albuterol 90 mcg/inh inhalation aerosol: 2 puff(s) inhaled every 6 hours, As Needed (04 Dec 2023 17:40)  aspirin 81 mg oral delayed release tablet: 1 tab(s) orally once a day (04 Dec 2023 17:40)  DULoxetine 60 mg oral delayed release capsule: 1 cap(s) orally once a day (04 Dec 2023 17:40)  gabapentin 800 mg oral tablet: 1 tab(s) orally 3 times a day (04 Dec 2023 18:06)  MetFORMIN (Eqv-Glumetza) 500 mg oral tablet, extended release: 1 tab(s) orally 2 times a day (04 Dec 2023 18:05)  metoprolol tartrate 50 mg oral tablet: 1.5 tab(s) orally 2 times a day (04 Dec 2023 17:42)  Movantik 25 mg oral tablet: 1 tab(s) orally once a day (in the morning) (04 Dec 2023 17:40)  simvastatin 40 mg oral tablet: 1 tab(s) orally once a day (at bedtime) (04 Dec 2023 17:40)  tiZANidine 4 mg oral tablet: 2 tab(s) orally 2 times a day as needed for - (04 Dec 2023 17:39)  Vitamin D3 50 mcg (2000 intl units) oral tablet: 1 tab(s) orally once a day (04 Dec 2023 17:40)      MEDICATIONS:  MEDICATIONS  (STANDING):  bisacodyl 5 milliGRAM(s) Oral at bedtime  diazepam    Tablet 5 milliGRAM(s) Oral every 8 hours  DULoxetine 60 milliGRAM(s) Oral daily  gabapentin 800 milliGRAM(s) Oral three times a day  influenza  Vaccine (HIGH DOSE) 0.7 milliLiter(s) IntraMuscular once  midodrine 10 milliGRAM(s) Oral every 8 hours  Morphine 1 milliGRAM / mL 17.9 mL 17.9 mL IntraThecal Continuous Pump  multivitamin 1 Tablet(s) Oral daily  polyethylene glycol 3350 17 Gram(s) Oral two times a day  senna 2 Tablet(s) Oral at bedtime  simvastatin 40 milliGRAM(s) Oral at bedtime  sodium chloride 0.9%. 1000 milliLiter(s) (75 mL/Hr) IV Continuous <Continuous>    MEDICATIONS  (PRN):  albuterol    90 MICROgram(s) HFA Inhaler 2 Puff(s) Inhalation every 6 hours PRN Shortness of Breath  benzocaine 20% Spray 1 Spray(s) Mucosal three times a day PRN Sore throat  HYDROmorphone  Injectable 0.5 milliGRAM(s) IV Push every 3 hours PRN Breakthrough Pain  naloxone Injectable 0.1 milliGRAM(s) IV Push every 3 minutes PRN For ANY of the following changes in patient status:  A. RR LESS THAN 10 breaths per minute, B. Oxygen saturation LESS THAN 90%, C. Sedation score of 6  ondansetron Injectable 4 milliGRAM(s) IV Push every 6 hours PRN Nausea  oxyCODONE    IR 15 milliGRAM(s) Oral every 4 hours PRN Moderate Pain (4 - 6)  oxyCODONE    IR 30 milliGRAM(s) Oral every 4 hours PRN Severe Pain (7 - 10)  sodium chloride 0.9% lock flush 10 milliLiter(s) IV Push every 1 hour PRN Pre/post blood products, medications, blood draw, and to maintain line patency      CULTURES:      RADIOLOGY & ADDITIONAL TESTS:      ASSESSMENT:  69 yo female with Hx HTN, HLD, DM, CVA x 3 (last in 2016 with residual R hand weakness), GAMALIEL not using CPAP, Emphysema, ex-25 pack year smoker now restarted, chronic constipation on Movantik, chronic urinary retention (SC at home), b/l CTS s/p release, s/p Intrathecal Morphine pump for pain, with chronic neck and back pain worsening for years s/p multiple spinal surgeries including laminectomies and fusion of cervical, thoracic and lumbar spine, s/p T3-L1 revision of prior fusion (with  on 8/17/22). Xray 11/16/23 showing broken rods/displacement. Now s/p C2-S2 instrumentation and fusion (12/5).     Plan:  Neuro:  - Neuro/vitals q4   - pain control: modified ERAS, IT morphine pump, oxycodone 15/30 prn, valium, home gabapentin 800 TID  - C/w home cymbalta  - HMVx1, JPx2 both per plastics  - Sedation: precedex gtt dc'd  - MRI thoracic complete  - CT thoracic spine 12/6: postop changes  - CT myelogram done 12/9     Cardio:  - Normotensive  - midodrine 10mg q8h  - holding home metoprolol while on pressors   - C/w home simvastatin     Pulm:  - extubated 12/6  - hx GAMALIEL, no CPAP at home    GI:  - NPO after midnight  - Bowel regimen    Renal:  - IVF while NPO  - PRN straight cath    Endo:  - ISS dc'd  - a1c: 7    Heme:  - SCDs DVT ppx, SQL  - 500 cell saver intraop    ID:  - afebrile  - post op ancef    Dispo:  - full code, tele, dispo pending PT/OT    D/w Dr. Luz, Dr. Sanders    DVT PROPHYLAXIS:  [x] Venodynes                                [x] Heparin/Lovenox    Assessment: present when checked     [] GCS   E   V   M     Heart Failure: [] Acute, [] acute on chronic, [] chronic   Heart Failure: [] Diastolic (HFpEF), [] Systolic (HRrEF), [] Combined (HFpEF and HFrEF), [] RHF, [] Pulm HTN, [] Other     [] DARRYL, [] ATN, [] AIN, [] other   [] CKD1, [] CKD2, [] CKD3, [] CKD4, [] CKD5, [] ESRD     Encephalopathy: [] Metabolic, [] Hepatic, [] Toxic, [] Neurological, [] Other     Abnormal Nutritional Status: [] malnutrition (see nutrition note), []underweight: BMI <19, [] morbid obesity: BMI >40, [] Cachexia     [] Sepsis   [] Hypovolemic shock, [] Cardiogenic shock, [] Hemorrhagic shock, [] Neurogenic shock   [] Acute respiratory failure   [] Cerebral edema, [] Brain compression / herniation   [] Functional quadriplegia   [] Acute blood loss anemia    HPI:  68 y/o female with PMHx HTN, HLD, CVA x 3 (last was 2016 with right hand weakness residual), GAMALIEL not using CPAP, Emphysema, former 25 pack year smoker restarted this week of surgery, Chronic Constipation on Movantik, Urinary Incontinence chronically self straight cath at home, chronic UTI, Breast Implant Rupture with Chronic Leak repair 10/2023, B/L CTS s/p release, s/p Intrathecal Morphine pump for pain, with chronic neck and back pain worsening for years s/p multiple spinal surgeries including laminectomies and fusion of cervical, thoracic and lumbar spine initially done in 2009 and recently in 2019 and 2020 at [facility] by Dr. Trinidad, s/p T3-L1 revision of prior fusion (with Dr. Luz on 8/17/22). Outpatient Xray 11/16/2023 showing broken rods/displacement. Presents for elective C2-S2 instrumentation and fusion. Pt endorses weakness of b/l LE and burning pain in b/l lower extremities radiating to both feet,  (04 Dec 2023 15:44)    OVERNIGHT EVENTS: POD5, KAMRAN, OR today for exploration of fusion, possible decompression T9-L2, revision instrumentation    Hospital course:   12/4: Admitted for spinal fusion d/t damaged hardware.   12/5: Neuro stable. POD0 C2-S2 instrumentation/fusion (intraop b/l LE motor loss). on parminder for MAP>90  12/6: POD1. KAMRAN o/n neuro stable. remains intubated. Extubated 6am. Precedex gtt prn. Remains on parminder gtt for MAP goal >90. Valium made prn. CT thoracic spine bc MRI unavailable. Attempted NGT, unsuccessful (resistance @ 35).   12/7: POD2 Given IV dilaudid 0.5mg for pain. Neuro exam stable. SQL started tongiht. Increased precedex to 1.0. Responds well to valium. Consulting psych given paranoia ?homicidal statements. Passed bedside dyspphagia and tolerating PO meds. MRI complete, f/u read.   12/8: POD3 Pt reporting incisional pain, given dilaudid 0.5mg IV x2. Pt appearing anxious, given xanax 1mg. Neuro exam unchanged. Pain regimen increased to oxy 15/30 mg, pending further recs. Given 1 L bolus for tachycardia. Trops negative. 2 HMV drains removed.   12/9: POD4, KAMRAN, CT myelogram today showing block at T12-L1, OR tomorrow  12/10: POD5, KAMRAN, OR today for exploration of fusion, possible decompression T9-L2, revision instrumentation    Vital Signs Last 24 Hrs  T(C): 36.7 (10 Dec 2023 00:24), Max: 37 (09 Dec 2023 17:18)  T(F): 98 (10 Dec 2023 00:24), Max: 98.6 (09 Dec 2023 17:18)  HR: 110 (10 Dec 2023 00:24) (88 - 111)  BP: 133/71 (10 Dec 2023 00:24) (123/92 - 149/67)  BP(mean): --  RR: 19 (10 Dec 2023 00:24) (16 - 19)  SpO2: 100% (10 Dec 2023 00:24) (98% - 100%)    Parameters below as of 10 Dec 2023 00:24  Patient On (Oxygen Delivery Method): room air        I&O's Summary    08 Dec 2023 07:01  -  09 Dec 2023 07:00  --------------------------------------------------------  IN: 1133.4 mL / OUT: 1262.5 mL / NET: -129.1 mL    09 Dec 2023 07:01  -  10 Dec 2023 02:10  --------------------------------------------------------  IN: 1070 mL / OUT: 2448 mL / NET: -1378 mL        PHYSICAL EXAM:  GEN: laying in bed, NAD  NEURO: AAOx1 to self however patient not cooperating with the rest of the orientation exam, FC  CNII-XII intact. PERRL, EOM, face symmetric  Motor: RUE/LUE 5/5, RLE/LLE 0/5, No sensation below T11, no sensation on rectal tone exam, mildly decreased rectal tone at rest with slight increase with squeeze, no discernable response to bulbocavernosus reflex  CV: RRR +S1/S2  PULM: CTAB  GI: Abd soft, NT/ND  EXT: ext warm, dry, nontender  WOUND/DRAINS: cervical-pelvis incision c/d/i    TUBES/LINES:  [ ] HMV x1 (12/5- )  [ ] YIFAN x2 (12/5- )    DIET:  [] NPO  [x] Mechanical  [] Tube feeds    LABS:                        8.2    8.43  )-----------( 177      ( 09 Dec 2023 09:15 )             25.5     12-09    140  |  107  |  6<L>  ----------------------------<  126<H>  4.2   |  26  |  0.47<L>    Ca    8.6      09 Dec 2023 09:15  Phos  3.2     12-08  Mg     1.6     12-08    TPro  5.4<L>  /  Alb  3.0<L>  /  TBili  0.4  /  DBili  x   /  AST  16  /  ALT  13  /  AlkPhos  73  12-09    PT/INR - ( 09 Dec 2023 09:15 )   PT: 10.5 sec;   INR: 0.92          PTT - ( 09 Dec 2023 09:15 )  PTT:19.8 sec  Urinalysis Basic - ( 09 Dec 2023 09:15 )    Color: x / Appearance: x / SG: x / pH: x  Gluc: 126 mg/dL / Ketone: x  / Bili: x / Urobili: x   Blood: x / Protein: x / Nitrite: x   Leuk Esterase: x / RBC: x / WBC x   Sq Epi: x / Non Sq Epi: x / Bacteria: x          CAPILLARY BLOOD GLUCOSE          Drug Levels: [] N/A    CSF Analysis: [] N/A      Allergies    No Known Allergies    Intolerances        Home Medications:  albuterol 90 mcg/inh inhalation aerosol: 2 puff(s) inhaled every 6 hours, As Needed (04 Dec 2023 17:40)  aspirin 81 mg oral delayed release tablet: 1 tab(s) orally once a day (04 Dec 2023 17:40)  DULoxetine 60 mg oral delayed release capsule: 1 cap(s) orally once a day (04 Dec 2023 17:40)  gabapentin 800 mg oral tablet: 1 tab(s) orally 3 times a day (04 Dec 2023 18:06)  MetFORMIN (Eqv-Glumetza) 500 mg oral tablet, extended release: 1 tab(s) orally 2 times a day (04 Dec 2023 18:05)  metoprolol tartrate 50 mg oral tablet: 1.5 tab(s) orally 2 times a day (04 Dec 2023 17:42)  Movantik 25 mg oral tablet: 1 tab(s) orally once a day (in the morning) (04 Dec 2023 17:40)  simvastatin 40 mg oral tablet: 1 tab(s) orally once a day (at bedtime) (04 Dec 2023 17:40)  tiZANidine 4 mg oral tablet: 2 tab(s) orally 2 times a day as needed for - (04 Dec 2023 17:39)  Vitamin D3 50 mcg (2000 intl units) oral tablet: 1 tab(s) orally once a day (04 Dec 2023 17:40)      MEDICATIONS:  MEDICATIONS  (STANDING):  bisacodyl 5 milliGRAM(s) Oral at bedtime  diazepam    Tablet 5 milliGRAM(s) Oral every 8 hours  DULoxetine 60 milliGRAM(s) Oral daily  gabapentin 800 milliGRAM(s) Oral three times a day  influenza  Vaccine (HIGH DOSE) 0.7 milliLiter(s) IntraMuscular once  midodrine 10 milliGRAM(s) Oral every 8 hours  Morphine 1 milliGRAM / mL 17.9 mL 17.9 mL IntraThecal Continuous Pump  multivitamin 1 Tablet(s) Oral daily  polyethylene glycol 3350 17 Gram(s) Oral two times a day  senna 2 Tablet(s) Oral at bedtime  simvastatin 40 milliGRAM(s) Oral at bedtime  sodium chloride 0.9%. 1000 milliLiter(s) (75 mL/Hr) IV Continuous <Continuous>    MEDICATIONS  (PRN):  albuterol    90 MICROgram(s) HFA Inhaler 2 Puff(s) Inhalation every 6 hours PRN Shortness of Breath  benzocaine 20% Spray 1 Spray(s) Mucosal three times a day PRN Sore throat  HYDROmorphone  Injectable 0.5 milliGRAM(s) IV Push every 3 hours PRN Breakthrough Pain  naloxone Injectable 0.1 milliGRAM(s) IV Push every 3 minutes PRN For ANY of the following changes in patient status:  A. RR LESS THAN 10 breaths per minute, B. Oxygen saturation LESS THAN 90%, C. Sedation score of 6  ondansetron Injectable 4 milliGRAM(s) IV Push every 6 hours PRN Nausea  oxyCODONE    IR 15 milliGRAM(s) Oral every 4 hours PRN Moderate Pain (4 - 6)  oxyCODONE    IR 30 milliGRAM(s) Oral every 4 hours PRN Severe Pain (7 - 10)  sodium chloride 0.9% lock flush 10 milliLiter(s) IV Push every 1 hour PRN Pre/post blood products, medications, blood draw, and to maintain line patency      CULTURES:      RADIOLOGY & ADDITIONAL TESTS:      ASSESSMENT:  69 yo female with Hx HTN, HLD, DM, CVA x 3 (last in 2016 with residual R hand weakness), GAMALIEL not using CPAP, Emphysema, ex-25 pack year smoker now restarted, chronic constipation on Movantik, chronic urinary retention (SC at home), b/l CTS s/p release, s/p Intrathecal Morphine pump for pain, with chronic neck and back pain worsening for years s/p multiple spinal surgeries including laminectomies and fusion of cervical, thoracic and lumbar spine, s/p T3-L1 revision of prior fusion (with  on 8/17/22). Xray 11/16/23 showing broken rods/displacement. Now s/p C2-S2 instrumentation and fusion (12/5).     Plan:  Neuro:  - Neuro/vitals q4   - pain control: modified ERAS, IT morphine pump, oxycodone 15/30 prn, valium, home gabapentin 800 TID  - C/w home cymbalta  - HMVx1, JPx2 both per plastics  - Sedation: precedex gtt dc'd  - MRI thoracic complete  - CT thoracic spine 12/6: postop changes  - CT myelogram done 12/9     Cardio:  - Normotensive  - midodrine 10mg q8h  - holding home metoprolol while on pressors   - C/w home simvastatin     Pulm:  - extubated 12/6  - hx GAMALIEL, no CPAP at home    GI:  - NPO after midnight  - Bowel regimen    Renal:  - IVF while NPO  - PRN straight cath    Endo:  - ISS dc'd  - a1c: 7    Heme:  - SCDs DVT ppx, SQL  - 500 cell saver intraop    ID:  - afebrile  - post op ancef    Dispo:  - full code, tele, dispo pending PT/OT    D/w Dr. Luz, Dr. Sanders    DVT PROPHYLAXIS:  [x] Venodynes                                [x] Heparin/Lovenox    Assessment: present when checked     [] GCS   E   V   M     Heart Failure: [] Acute, [] acute on chronic, [] chronic   Heart Failure: [] Diastolic (HFpEF), [] Systolic (HRrEF), [] Combined (HFpEF and HFrEF), [] RHF, [] Pulm HTN, [] Other     [] DARRYL, [] ATN, [] AIN, [] other   [] CKD1, [] CKD2, [] CKD3, [] CKD4, [] CKD5, [] ESRD     Encephalopathy: [] Metabolic, [] Hepatic, [] Toxic, [] Neurological, [] Other     Abnormal Nutritional Status: [] malnutrition (see nutrition note), []underweight: BMI <19, [] morbid obesity: BMI >40, [] Cachexia     [] Sepsis   [] Hypovolemic shock, [] Cardiogenic shock, [] Hemorrhagic shock, [] Neurogenic shock   [] Acute respiratory failure   [] Cerebral edema, [] Brain compression / herniation   [] Functional quadriplegia   [] Acute blood loss anemia    HPI:  66 y/o female with PMHx HTN, HLD, CVA x 3 (last was 2016 with right hand weakness residual), GAMALIEL not using CPAP, Emphysema, former 25 pack year smoker restarted this week of surgery, Chronic Constipation on Movantik, Urinary Incontinence chronically self straight cath at home, chronic UTI, Breast Implant Rupture with Chronic Leak repair 10/2023, B/L CTS s/p release, s/p Intrathecal Morphine pump for pain, with chronic neck and back pain worsening for years s/p multiple spinal surgeries including laminectomies and fusion of cervical, thoracic and lumbar spine initially done in 2009 and recently in 2019 and 2020 at [facility] by Dr. Trinidad, s/p T3-L1 revision of prior fusion (with Dr. Luz on 8/17/22). Outpatient Xray 11/16/2023 showing broken rods/displacement. Presents for elective C2-S2 instrumentation and fusion. Pt endorses weakness of b/l LE and burning pain in b/l lower extremities radiating to both feet,  (04 Dec 2023 15:44)    OVERNIGHT EVENTS: POD5, KAMRAN, OR today for exploration of fusion, possible decompression T9-L2, revision instrumentation    Hospital course:   12/4: Admitted for spinal fusion d/t damaged hardware.   12/5: Neuro stable. POD0 C2-S2 instrumentation/fusion (intraop b/l LE motor loss). on parminder for MAP>90  12/6: POD1. KAMRAN o/n neuro stable. remains intubated. Extubated 6am. Precedex gtt prn. Remains on parminder gtt for MAP goal >90. Valium made prn. CT thoracic spine bc MRI unavailable. Attempted NGT, unsuccessful (resistance @ 35).   12/7: POD2 Given IV dilaudid 0.5mg for pain. Neuro exam stable. SQL started tongiht. Increased precedex to 1.0. Responds well to valium. Consulting psych given paranoia ?homicidal statements. Passed bedside dyspphagia and tolerating PO meds. MRI complete, f/u read.   12/8: POD3 Pt reporting incisional pain, given dilaudid 0.5mg IV x2. Pt appearing anxious, given xanax 1mg. Neuro exam unchanged. Pain regimen increased to oxy 15/30 mg, pending further recs. Given 1 L bolus for tachycardia. Trops negative. 2 HMV drains removed.   12/9: POD4, KAMRAN, CT myelogram today showing block at T12-L1, OR tomorrow  12/10: POD5, KAMRAN, OR today for exploration of fusion, possible decompression T9-L2, revision instrumentation    Vital Signs Last 24 Hrs  T(C): 36.7 (10 Dec 2023 00:24), Max: 37 (09 Dec 2023 17:18)  T(F): 98 (10 Dec 2023 00:24), Max: 98.6 (09 Dec 2023 17:18)  HR: 110 (10 Dec 2023 00:24) (88 - 111)  BP: 133/71 (10 Dec 2023 00:24) (123/92 - 149/67)  BP(mean): --  RR: 19 (10 Dec 2023 00:24) (16 - 19)  SpO2: 100% (10 Dec 2023 00:24) (98% - 100%)    Parameters below as of 10 Dec 2023 00:24  Patient On (Oxygen Delivery Method): room air        I&O's Summary    08 Dec 2023 07:01  -  09 Dec 2023 07:00  --------------------------------------------------------  IN: 1133.4 mL / OUT: 1262.5 mL / NET: -129.1 mL    09 Dec 2023 07:01  -  10 Dec 2023 02:10  --------------------------------------------------------  IN: 1070 mL / OUT: 2448 mL / NET: -1378 mL        PHYSICAL EXAM:  GEN: laying in bed, NAD  NEURO: AAOx1 to self however patient not cooperating with the rest of the orientation exam, FC  CNII-XII intact. PERRL, EOM, face symmetric  Motor: RUE/LUE 5/5, RLE/LLE 0/5, No sensation below T11, no sensation on rectal tone exam, mildly decreased rectal tone at rest with slight increase with squeeze, no discernable response to bulbocavernosus reflex  CV: RRR +S1/S2  PULM: CTAB  GI: Abd soft, NT/ND  EXT: ext warm, dry, nontender  WOUND/DRAINS: cervical-pelvis incision c/d/i    TUBES/LINES:  [ ] HMV x1 (12/5- )  [ ] YIFAN x2 (12/5- )    DIET:  [] NPO  [x] Mechanical  [] Tube feeds    LABS:                        8.2    8.43  )-----------( 177      ( 09 Dec 2023 09:15 )             25.5     12-09    140  |  107  |  6<L>  ----------------------------<  126<H>  4.2   |  26  |  0.47<L>    Ca    8.6      09 Dec 2023 09:15  Phos  3.2     12-08  Mg     1.6     12-08    TPro  5.4<L>  /  Alb  3.0<L>  /  TBili  0.4  /  DBili  x   /  AST  16  /  ALT  13  /  AlkPhos  73  12-09    PT/INR - ( 09 Dec 2023 09:15 )   PT: 10.5 sec;   INR: 0.92          PTT - ( 09 Dec 2023 09:15 )  PTT:19.8 sec  Urinalysis Basic - ( 09 Dec 2023 09:15 )    Color: x / Appearance: x / SG: x / pH: x  Gluc: 126 mg/dL / Ketone: x  / Bili: x / Urobili: x   Blood: x / Protein: x / Nitrite: x   Leuk Esterase: x / RBC: x / WBC x   Sq Epi: x / Non Sq Epi: x / Bacteria: x          CAPILLARY BLOOD GLUCOSE          Drug Levels: [] N/A    CSF Analysis: [] N/A      Allergies    No Known Allergies    Intolerances        Home Medications:  albuterol 90 mcg/inh inhalation aerosol: 2 puff(s) inhaled every 6 hours, As Needed (04 Dec 2023 17:40)  aspirin 81 mg oral delayed release tablet: 1 tab(s) orally once a day (04 Dec 2023 17:40)  DULoxetine 60 mg oral delayed release capsule: 1 cap(s) orally once a day (04 Dec 2023 17:40)  gabapentin 800 mg oral tablet: 1 tab(s) orally 3 times a day (04 Dec 2023 18:06)  MetFORMIN (Eqv-Glumetza) 500 mg oral tablet, extended release: 1 tab(s) orally 2 times a day (04 Dec 2023 18:05)  metoprolol tartrate 50 mg oral tablet: 1.5 tab(s) orally 2 times a day (04 Dec 2023 17:42)  Movantik 25 mg oral tablet: 1 tab(s) orally once a day (in the morning) (04 Dec 2023 17:40)  simvastatin 40 mg oral tablet: 1 tab(s) orally once a day (at bedtime) (04 Dec 2023 17:40)  tiZANidine 4 mg oral tablet: 2 tab(s) orally 2 times a day as needed for - (04 Dec 2023 17:39)  Vitamin D3 50 mcg (2000 intl units) oral tablet: 1 tab(s) orally once a day (04 Dec 2023 17:40)      MEDICATIONS:  MEDICATIONS  (STANDING):  bisacodyl 5 milliGRAM(s) Oral at bedtime  diazepam    Tablet 5 milliGRAM(s) Oral every 8 hours  DULoxetine 60 milliGRAM(s) Oral daily  gabapentin 800 milliGRAM(s) Oral three times a day  influenza  Vaccine (HIGH DOSE) 0.7 milliLiter(s) IntraMuscular once  midodrine 10 milliGRAM(s) Oral every 8 hours  Morphine 1 milliGRAM / mL 17.9 mL 17.9 mL IntraThecal Continuous Pump  multivitamin 1 Tablet(s) Oral daily  polyethylene glycol 3350 17 Gram(s) Oral two times a day  senna 2 Tablet(s) Oral at bedtime  simvastatin 40 milliGRAM(s) Oral at bedtime  sodium chloride 0.9%. 1000 milliLiter(s) (75 mL/Hr) IV Continuous <Continuous>    MEDICATIONS  (PRN):  albuterol    90 MICROgram(s) HFA Inhaler 2 Puff(s) Inhalation every 6 hours PRN Shortness of Breath  benzocaine 20% Spray 1 Spray(s) Mucosal three times a day PRN Sore throat  HYDROmorphone  Injectable 0.5 milliGRAM(s) IV Push every 3 hours PRN Breakthrough Pain  naloxone Injectable 0.1 milliGRAM(s) IV Push every 3 minutes PRN For ANY of the following changes in patient status:  A. RR LESS THAN 10 breaths per minute, B. Oxygen saturation LESS THAN 90%, C. Sedation score of 6  ondansetron Injectable 4 milliGRAM(s) IV Push every 6 hours PRN Nausea  oxyCODONE    IR 15 milliGRAM(s) Oral every 4 hours PRN Moderate Pain (4 - 6)  oxyCODONE    IR 30 milliGRAM(s) Oral every 4 hours PRN Severe Pain (7 - 10)  sodium chloride 0.9% lock flush 10 milliLiter(s) IV Push every 1 hour PRN Pre/post blood products, medications, blood draw, and to maintain line patency      CULTURES:      RADIOLOGY & ADDITIONAL TESTS:      ASSESSMENT:  69 yo female with Hx HTN, HLD, DM, CVA x 3 (last in 2016 with residual R hand weakness), GAMALIEL not using CPAP, Emphysema, ex-25 pack year smoker now restarted, chronic constipation on Movantik, chronic urinary retention (SC at home), b/l CTS s/p release, s/p Intrathecal Morphine pump for pain, with chronic neck and back pain worsening for years s/p multiple spinal surgeries including laminectomies and fusion of cervical, thoracic and lumbar spine, s/p T3-L1 revision of prior fusion (with  on 8/17/22). Xray 11/16/23 showing broken rods/displacement. Now s/p C2-S2 instrumentation and fusion (12/5).     Plan:  Neuro:  - Neuro/vitals q4   - pain control: modified ERAS, IT morphine pump, oxycodone 15/30 prn, valium, home gabapentin 800 TID  - C/w home cymbalta  - HMVx1, JPx2 both per plastics  - Sedation: precedex gtt dc'd  - MRI thoracic complete  - CT thoracic spine 12/6: postop changes  - CT myelogram done 12/9     Cardio:  - Normotensive  - midodrine 10mg q8h  - holding home metoprolol while on pressors   - C/w home simvastatin     Pulm:  - extubated 12/6  - hx GAMALIEL, no CPAP at home    GI:  - NPO after midnight  - Bowel regimen    Renal:  - IVF while NPO  - PRN straight cath    Endo:  - ISS dc'd  - a1c: 7    Heme:  - SCDs DVT ppx, SQL  - 500 cell saver intraop    ID:  - afebrile  - post op ancef    Dispo:  - full code, tele, dispo pending PT/OT    D/w Dr. Luz, Dr. Sanders    DVT PROPHYLAXIS:  [x] Venodynes                                [x] Heparin/Lovenox    Assessment: present when checked     [] GCS   E   V   M     Heart Failure: [] Acute, [] acute on chronic, [] chronic   Heart Failure: [] Diastolic (HFpEF), [] Systolic (HRrEF), [] Combined (HFpEF and HFrEF), [] RHF, [] Pulm HTN, [] Other     [] DARRYL, [] ATN, [] AIN, [] other   [] CKD1, [] CKD2, [] CKD3, [] CKD4, [] CKD5, [] ESRD       Encephalopathy: [] Metabolic, [] Hepatic, [] Toxic, [] Neurological, [] Other     Abnormal Nutritional Status: [] malnutrition (see nutrition note), []underweight: BMI <19, [] morbid obesity: BMI >40, [] Cachexia     [] Sepsis   [] Hypovolemic shock, [] Cardiogenic shock, [] Hemorrhagic shock, [] Neurogenic shock   [] Acute respiratory failure   [] Cerebral edema, [] Brain compression / herniation   [] Functional quadriplegia   [] Acute blood loss anemia       Pregnancy test? (serum hcg for any female < 57 y/o) (within 48hrs): [ ] Negative Result  [ ] Positive Result  [x ] N/A : male or female > 57 y/o      Have there been 2 T&S during this admission?  _x Y  _ N   Is there an active T&S within 72hrs?  _x Y  _ N      Last dose of antiplatelet/anticoagulation drug? ASA 11/27    3M nasal swab ordered?  _x Y  _ N    Cranial surgery: Order written for hair to be shampooed night before surgery and morning before surgery? _ Y  _x N  Chlorhexidine Wipes ordered for neck down?  _x Y  _ N  (twice a day if 1 day before surgery, daily for 3 days if 3 days prior, daily if in ICU)    Implanted Devices (pacemaker, drug pump...etc):   _x Y  _ N           If YES --> [employer] consulted to interrogate device: _x  Y  _ N           If YES --> [employer] called to let them know patient going for surgery:                             [ ] device needs to be turned off                               [ ] magnet needs to be placed for surgery                              [ ] nothing to do per EP, may proceed with cautery use in OR                                       CXR:  EKG: Non-ischemic 12/4  Echo 9/23  CONCLUSIONS:  1. Left ventricular systolic function is normal with an ejection fraction of 58 % by Burroughs's method of disks.  2. There is mild (grade 1) left ventricular diastolic dysfunction.  3. Right ventricular cavity is normal in size, normal wall thickness and normal systolic function.  4. The aortic valve is tricuspid with normal structure without stenosis with normal systolic excursion.  5. Structurally normal mitral valve with normal leaflet excursion.  6. No pericardial effusion seen.    Medical Clearances: Dr. Turcios    Assessment/Plan:    - OR today for exploration of fusion, possible decompression T9-L2, revision instrumentation  - NPO after midnight (except meds)  - IVF at midnight  - Hold all AC  - PRBCs on hold for OR   - 3M nasal swab, chlorhexidine wipes - ordered   - F/u labs, coags, T&S  - Preop imaging studies done  - Consent in chart   - Medically optimized for OR, per Dr. turcios   HPI:  66 y/o female with PMHx HTN, HLD, CVA x 3 (last was 2016 with right hand weakness residual), GAMALIEL not using CPAP, Emphysema, former 25 pack year smoker restarted this week of surgery, Chronic Constipation on Movantik, Urinary Incontinence chronically self straight cath at home, chronic UTI, Breast Implant Rupture with Chronic Leak repair 10/2023, B/L CTS s/p release, s/p Intrathecal Morphine pump for pain, with chronic neck and back pain worsening for years s/p multiple spinal surgeries including laminectomies and fusion of cervical, thoracic and lumbar spine initially done in 2009 and recently in 2019 and 2020 at [facility] by Dr. Trinidad, s/p T3-L1 revision of prior fusion (with Dr. Luz on 8/17/22). Outpatient Xray 11/16/2023 showing broken rods/displacement. Presents for elective C2-S2 instrumentation and fusion. Pt endorses weakness of b/l LE and burning pain in b/l lower extremities radiating to both feet,  (04 Dec 2023 15:44)    OVERNIGHT EVENTS: POD5, KAMRAN, OR today for exploration of fusion, possible decompression T9-L2, revision instrumentation    Hospital course:   12/4: Admitted for spinal fusion d/t damaged hardware.   12/5: Neuro stable. POD0 C2-S2 instrumentation/fusion (intraop b/l LE motor loss). on parminder for MAP>90  12/6: POD1. KAMRAN o/n neuro stable. remains intubated. Extubated 6am. Precedex gtt prn. Remains on parminder gtt for MAP goal >90. Valium made prn. CT thoracic spine bc MRI unavailable. Attempted NGT, unsuccessful (resistance @ 35).   12/7: POD2 Given IV dilaudid 0.5mg for pain. Neuro exam stable. SQL started tongiht. Increased precedex to 1.0. Responds well to valium. Consulting psych given paranoia ?homicidal statements. Passed bedside dyspphagia and tolerating PO meds. MRI complete, f/u read.   12/8: POD3 Pt reporting incisional pain, given dilaudid 0.5mg IV x2. Pt appearing anxious, given xanax 1mg. Neuro exam unchanged. Pain regimen increased to oxy 15/30 mg, pending further recs. Given 1 L bolus for tachycardia. Trops negative. 2 HMV drains removed.   12/9: POD4, KAMRAN, CT myelogram today showing block at T12-L1, OR tomorrow  12/10: POD5, KAMRAN, OR today for exploration of fusion, possible decompression T9-L2, revision instrumentation    Vital Signs Last 24 Hrs  T(C): 36.7 (10 Dec 2023 00:24), Max: 37 (09 Dec 2023 17:18)  T(F): 98 (10 Dec 2023 00:24), Max: 98.6 (09 Dec 2023 17:18)  HR: 110 (10 Dec 2023 00:24) (88 - 111)  BP: 133/71 (10 Dec 2023 00:24) (123/92 - 149/67)  BP(mean): --  RR: 19 (10 Dec 2023 00:24) (16 - 19)  SpO2: 100% (10 Dec 2023 00:24) (98% - 100%)    Parameters below as of 10 Dec 2023 00:24  Patient On (Oxygen Delivery Method): room air        I&O's Summary    08 Dec 2023 07:01  -  09 Dec 2023 07:00  --------------------------------------------------------  IN: 1133.4 mL / OUT: 1262.5 mL / NET: -129.1 mL    09 Dec 2023 07:01  -  10 Dec 2023 02:10  --------------------------------------------------------  IN: 1070 mL / OUT: 2448 mL / NET: -1378 mL        PHYSICAL EXAM:  GEN: laying in bed, NAD  NEURO: AAOx1 to self however patient not cooperating with the rest of the orientation exam, FC  CNII-XII intact. PERRL, EOM, face symmetric  Motor: RUE/LUE 5/5, RLE/LLE 0/5, No sensation below T11, no sensation on rectal tone exam, mildly decreased rectal tone at rest with slight increase with squeeze, no discernable response to bulbocavernosus reflex  CV: RRR +S1/S2  PULM: CTAB  GI: Abd soft, NT/ND  EXT: ext warm, dry, nontender  WOUND/DRAINS: cervical-pelvis incision c/d/i    TUBES/LINES:  [ ] HMV x1 (12/5- )  [ ] YIFAN x2 (12/5- )    DIET:  [] NPO  [x] Mechanical  [] Tube feeds    LABS:                        8.2    8.43  )-----------( 177      ( 09 Dec 2023 09:15 )             25.5     12-09    140  |  107  |  6<L>  ----------------------------<  126<H>  4.2   |  26  |  0.47<L>    Ca    8.6      09 Dec 2023 09:15  Phos  3.2     12-08  Mg     1.6     12-08    TPro  5.4<L>  /  Alb  3.0<L>  /  TBili  0.4  /  DBili  x   /  AST  16  /  ALT  13  /  AlkPhos  73  12-09    PT/INR - ( 09 Dec 2023 09:15 )   PT: 10.5 sec;   INR: 0.92          PTT - ( 09 Dec 2023 09:15 )  PTT:19.8 sec  Urinalysis Basic - ( 09 Dec 2023 09:15 )    Color: x / Appearance: x / SG: x / pH: x  Gluc: 126 mg/dL / Ketone: x  / Bili: x / Urobili: x   Blood: x / Protein: x / Nitrite: x   Leuk Esterase: x / RBC: x / WBC x   Sq Epi: x / Non Sq Epi: x / Bacteria: x          CAPILLARY BLOOD GLUCOSE          Drug Levels: [] N/A    CSF Analysis: [] N/A      Allergies    No Known Allergies    Intolerances        Home Medications:  albuterol 90 mcg/inh inhalation aerosol: 2 puff(s) inhaled every 6 hours, As Needed (04 Dec 2023 17:40)  aspirin 81 mg oral delayed release tablet: 1 tab(s) orally once a day (04 Dec 2023 17:40)  DULoxetine 60 mg oral delayed release capsule: 1 cap(s) orally once a day (04 Dec 2023 17:40)  gabapentin 800 mg oral tablet: 1 tab(s) orally 3 times a day (04 Dec 2023 18:06)  MetFORMIN (Eqv-Glumetza) 500 mg oral tablet, extended release: 1 tab(s) orally 2 times a day (04 Dec 2023 18:05)  metoprolol tartrate 50 mg oral tablet: 1.5 tab(s) orally 2 times a day (04 Dec 2023 17:42)  Movantik 25 mg oral tablet: 1 tab(s) orally once a day (in the morning) (04 Dec 2023 17:40)  simvastatin 40 mg oral tablet: 1 tab(s) orally once a day (at bedtime) (04 Dec 2023 17:40)  tiZANidine 4 mg oral tablet: 2 tab(s) orally 2 times a day as needed for - (04 Dec 2023 17:39)  Vitamin D3 50 mcg (2000 intl units) oral tablet: 1 tab(s) orally once a day (04 Dec 2023 17:40)      MEDICATIONS:  MEDICATIONS  (STANDING):  bisacodyl 5 milliGRAM(s) Oral at bedtime  diazepam    Tablet 5 milliGRAM(s) Oral every 8 hours  DULoxetine 60 milliGRAM(s) Oral daily  gabapentin 800 milliGRAM(s) Oral three times a day  influenza  Vaccine (HIGH DOSE) 0.7 milliLiter(s) IntraMuscular once  midodrine 10 milliGRAM(s) Oral every 8 hours  Morphine 1 milliGRAM / mL 17.9 mL 17.9 mL IntraThecal Continuous Pump  multivitamin 1 Tablet(s) Oral daily  polyethylene glycol 3350 17 Gram(s) Oral two times a day  senna 2 Tablet(s) Oral at bedtime  simvastatin 40 milliGRAM(s) Oral at bedtime  sodium chloride 0.9%. 1000 milliLiter(s) (75 mL/Hr) IV Continuous <Continuous>    MEDICATIONS  (PRN):  albuterol    90 MICROgram(s) HFA Inhaler 2 Puff(s) Inhalation every 6 hours PRN Shortness of Breath  benzocaine 20% Spray 1 Spray(s) Mucosal three times a day PRN Sore throat  HYDROmorphone  Injectable 0.5 milliGRAM(s) IV Push every 3 hours PRN Breakthrough Pain  naloxone Injectable 0.1 milliGRAM(s) IV Push every 3 minutes PRN For ANY of the following changes in patient status:  A. RR LESS THAN 10 breaths per minute, B. Oxygen saturation LESS THAN 90%, C. Sedation score of 6  ondansetron Injectable 4 milliGRAM(s) IV Push every 6 hours PRN Nausea  oxyCODONE    IR 15 milliGRAM(s) Oral every 4 hours PRN Moderate Pain (4 - 6)  oxyCODONE    IR 30 milliGRAM(s) Oral every 4 hours PRN Severe Pain (7 - 10)  sodium chloride 0.9% lock flush 10 milliLiter(s) IV Push every 1 hour PRN Pre/post blood products, medications, blood draw, and to maintain line patency      CULTURES:      RADIOLOGY & ADDITIONAL TESTS:      ASSESSMENT:  69 yo female with Hx HTN, HLD, DM, CVA x 3 (last in 2016 with residual R hand weakness), GAMALIEL not using CPAP, Emphysema, ex-25 pack year smoker now restarted, chronic constipation on Movantik, chronic urinary retention (SC at home), b/l CTS s/p release, s/p Intrathecal Morphine pump for pain, with chronic neck and back pain worsening for years s/p multiple spinal surgeries including laminectomies and fusion of cervical, thoracic and lumbar spine, s/p T3-L1 revision of prior fusion (with  on 8/17/22). Xray 11/16/23 showing broken rods/displacement. Now s/p C2-S2 instrumentation and fusion (12/5).     Plan:  Neuro:  - Neuro/vitals q4   - pain control: modified ERAS, IT morphine pump, oxycodone 15/30 prn, valium, home gabapentin 800 TID  - C/w home cymbalta  - HMVx1, JPx2 both per plastics  - Sedation: precedex gtt dc'd  - MRI thoracic complete  - CT thoracic spine 12/6: postop changes  - CT myelogram done 12/9     Cardio:  - Normotensive  - midodrine 10mg q8h  - holding home metoprolol while on pressors   - C/w home simvastatin     Pulm:  - extubated 12/6  - hx GAMALIEL, no CPAP at home    GI:  - NPO after midnight  - Bowel regimen    Renal:  - IVF while NPO  - PRN straight cath    Endo:  - ISS dc'd  - a1c: 7    Heme:  - SCDs DVT ppx, SQL  - 500 cell saver intraop    ID:  - afebrile  - post op ancef    Dispo:  - full code, tele, dispo pending PT/OT    D/w Dr. Luz, Dr. Sanders    DVT PROPHYLAXIS:  [x] Venodynes                                [x] Heparin/Lovenox    Assessment: present when checked     [] GCS   E   V   M     Heart Failure: [] Acute, [] acute on chronic, [] chronic   Heart Failure: [] Diastolic (HFpEF), [] Systolic (HRrEF), [] Combined (HFpEF and HFrEF), [] RHF, [] Pulm HTN, [] Other     [] DARRYL, [] ATN, [] AIN, [] other   [] CKD1, [] CKD2, [] CKD3, [] CKD4, [] CKD5, [] ESRD       Encephalopathy: [] Metabolic, [] Hepatic, [] Toxic, [] Neurological, [] Other     Abnormal Nutritional Status: [] malnutrition (see nutrition note), []underweight: BMI <19, [] morbid obesity: BMI >40, [] Cachexia     [] Sepsis   [] Hypovolemic shock, [] Cardiogenic shock, [] Hemorrhagic shock, [] Neurogenic shock   [] Acute respiratory failure   [] Cerebral edema, [] Brain compression / herniation   [] Functional quadriplegia   [] Acute blood loss anemia       Pregnancy test? (serum hcg for any female < 55 y/o) (within 48hrs): [ ] Negative Result  [ ] Positive Result  [x ] N/A : male or female > 55 y/o      Have there been 2 T&S during this admission?  _x Y  _ N   Is there an active T&S within 72hrs?  _x Y  _ N      Last dose of antiplatelet/anticoagulation drug? ASA 11/27    3M nasal swab ordered?  _x Y  _ N    Cranial surgery: Order written for hair to be shampooed night before surgery and morning before surgery? _ Y  _x N  Chlorhexidine Wipes ordered for neck down?  _x Y  _ N  (twice a day if 1 day before surgery, daily for 3 days if 3 days prior, daily if in ICU)    Implanted Devices (pacemaker, drug pump...etc):   _x Y  _ N           If YES --> [employer] consulted to interrogate device: _x  Y  _ N           If YES --> [employer] called to let them know patient going for surgery:                             [ ] device needs to be turned off                               [ ] magnet needs to be placed for surgery                              [ ] nothing to do per EP, may proceed with cautery use in OR                                       CXR:  EKG: Non-ischemic 12/4  Echo 9/23  CONCLUSIONS:  1. Left ventricular systolic function is normal with an ejection fraction of 58 % by Burroughs's method of disks.  2. There is mild (grade 1) left ventricular diastolic dysfunction.  3. Right ventricular cavity is normal in size, normal wall thickness and normal systolic function.  4. The aortic valve is tricuspid with normal structure without stenosis with normal systolic excursion.  5. Structurally normal mitral valve with normal leaflet excursion.  6. No pericardial effusion seen.    Medical Clearances: Dr. Turcios    Assessment/Plan:    - OR today for exploration of fusion, possible decompression T9-L2, revision instrumentation  - NPO after midnight (except meds)  - IVF at midnight  - Hold all AC  - PRBCs on hold for OR   - 3M nasal swab, chlorhexidine wipes - ordered   - F/u labs, coags, T&S  - Preop imaging studies done  - Consent in chart   - Medically optimized for OR, per Dr. turcios

## 2023-12-10 NOTE — PROGRESS NOTE ADULT - SUBJECTIVE AND OBJECTIVE BOX
NSCU Progress Note    Assessment/Hospital Course:    68y/F  HTN, HLD, CVA x 3 (last was 2016 with right hand weakness residual), GAMALIEL not using CPAP, Emphysema, former 25 pack year smoker restarted this week of surgery, Chronic Constipation on Movantik, Urinary Incontinence chronically self straight cath at home, chronic UTI, Breast Implant Rupture with Chronic Leak repair 10/2023, B/L CTS s/p release, s/p Intrathecal Morphine pump for pain, with chronic neck and back pain worsening for years s/p multiple spinal surgeries including laminectomies and fusion of cervical, thoracic and lumbar spine initially done in 2009 and recently in 2019 and 2020 at [facility] by Dr. Trinidad, s/p T3-L1 revision of prior fusion (with Dr. Luz on 8/17/22). Outpatient Xray 11/16/2023 showing broken rods/displacement. Presents for elective C2-S2 instrumentation and fusion. Pt endorses weakness of b/l LE and burning pain in b/l lower extremities radiating to both feet,  (04 Dec 2023 15:44)      24 Hour Events/Subjective:  - POD0 s/p T9- L2 decompression, confirmed on ultrasound      REVIEW OF SYSTEMS:  - negative except as above    VITALS:   - Reviewed      IMAGING/DATA:   - Reviewed          PHYSICAL EXAM:    General: intubagted  CVS: RR  Pulm: CTAB  GI: Soft, NTND  Extremities: No LE Edema  Neuro: EOS, seth, BUE 5/5 (RUE effort 4+ bi/tri), BLE 0/5 NSCU Progress Note    Assessment/Hospital Course:    68y/F  HTN, HLD, CVA x 3 (last was 2016 with right hand weakness residual), GAMALIEL not using CPAP, Emphysema, former 25 pack year smoker restarted this week of surgery, Chronic Constipation on Movantik, Urinary Incontinence chronically self straight cath at home, chronic UTI, Breast Implant Rupture with Chronic Leak repair 10/2023, B/L CTS s/p release, s/p Intrathecal Morphine pump for pain, with chronic neck and back pain worsening for years s/p multiple spinal surgeries including laminectomies and fusion of cervical, thoracic and lumbar spine initially done in 2009 and recently in 2019 and 2020 at [facility] by Dr. Trinidad, s/p T3-L1 revision of prior fusion (with Dr. Luz on 8/17/22). Outpatient Xray 11/16/2023 showing broken rods/displacement. Presents for elective C2-S2 instrumentation and fusion. Pt endorses weakness of b/l LE and burning pain in b/l lower extremities radiating to both feet,  (04 Dec 2023 15:44)      24 Hour Events/Subjective:  - POD0 s/p T9- L2 decompression, confirmed on ultrasound      REVIEW OF SYSTEMS:  - negative except as above    VITALS:   - Reviewed      IMAGING/DATA:   - Reviewed          PHYSICAL EXAM:    General: intubagted  CVS: RR  Pulm: CTAB  GI: Soft, NTND  Extremities: No LE Edema  Neuro: EOS, seth, BUE 5/5 (RUE effort 4+ bi/tri), BLE 0/5

## 2023-12-10 NOTE — PRE-ANESTHESIA EVALUATION ADULT - NSPROPOSEDPROCEDFT_GEN_ALL_CORE
Exploration of spine fusion, possible decompression T9-L2
C2-S2 revision instrumented fusion, replacement fracture rods

## 2023-12-11 LAB
ANION GAP SERPL CALC-SCNC: 6 MMOL/L — SIGNIFICANT CHANGE UP (ref 5–17)
ANION GAP SERPL CALC-SCNC: 6 MMOL/L — SIGNIFICANT CHANGE UP (ref 5–17)
BUN SERPL-MCNC: 8 MG/DL — SIGNIFICANT CHANGE UP (ref 7–23)
BUN SERPL-MCNC: 8 MG/DL — SIGNIFICANT CHANGE UP (ref 7–23)
CALCIUM SERPL-MCNC: 8.3 MG/DL — LOW (ref 8.4–10.5)
CALCIUM SERPL-MCNC: 8.3 MG/DL — LOW (ref 8.4–10.5)
CHLORIDE SERPL-SCNC: 111 MMOL/L — HIGH (ref 96–108)
CHLORIDE SERPL-SCNC: 111 MMOL/L — HIGH (ref 96–108)
CO2 SERPL-SCNC: 24 MMOL/L — SIGNIFICANT CHANGE UP (ref 22–31)
CO2 SERPL-SCNC: 24 MMOL/L — SIGNIFICANT CHANGE UP (ref 22–31)
CREAT SERPL-MCNC: 0.41 MG/DL — LOW (ref 0.5–1.3)
CREAT SERPL-MCNC: 0.41 MG/DL — LOW (ref 0.5–1.3)
EGFR: 106 ML/MIN/1.73M2 — SIGNIFICANT CHANGE UP
EGFR: 106 ML/MIN/1.73M2 — SIGNIFICANT CHANGE UP
GLUCOSE BLDC GLUCOMTR-MCNC: 105 MG/DL — HIGH (ref 70–99)
GLUCOSE BLDC GLUCOMTR-MCNC: 105 MG/DL — HIGH (ref 70–99)
GLUCOSE BLDC GLUCOMTR-MCNC: 144 MG/DL — HIGH (ref 70–99)
GLUCOSE BLDC GLUCOMTR-MCNC: 144 MG/DL — HIGH (ref 70–99)
GLUCOSE BLDC GLUCOMTR-MCNC: 179 MG/DL — HIGH (ref 70–99)
GLUCOSE BLDC GLUCOMTR-MCNC: 179 MG/DL — HIGH (ref 70–99)
GLUCOSE SERPL-MCNC: 164 MG/DL — HIGH (ref 70–99)
GLUCOSE SERPL-MCNC: 164 MG/DL — HIGH (ref 70–99)
HCT VFR BLD CALC: 26.3 % — LOW (ref 34.5–45)
HCT VFR BLD CALC: 26.3 % — LOW (ref 34.5–45)
HGB BLD-MCNC: 8.9 G/DL — LOW (ref 11.5–15.5)
HGB BLD-MCNC: 8.9 G/DL — LOW (ref 11.5–15.5)
MAGNESIUM SERPL-MCNC: 1.9 MG/DL — SIGNIFICANT CHANGE UP (ref 1.6–2.6)
MAGNESIUM SERPL-MCNC: 1.9 MG/DL — SIGNIFICANT CHANGE UP (ref 1.6–2.6)
MCHC RBC-ENTMCNC: 28.3 PG — SIGNIFICANT CHANGE UP (ref 27–34)
MCHC RBC-ENTMCNC: 28.3 PG — SIGNIFICANT CHANGE UP (ref 27–34)
MCHC RBC-ENTMCNC: 33.8 GM/DL — SIGNIFICANT CHANGE UP (ref 32–36)
MCHC RBC-ENTMCNC: 33.8 GM/DL — SIGNIFICANT CHANGE UP (ref 32–36)
MCV RBC AUTO: 83.5 FL — SIGNIFICANT CHANGE UP (ref 80–100)
MCV RBC AUTO: 83.5 FL — SIGNIFICANT CHANGE UP (ref 80–100)
NRBC # BLD: 0 /100 WBCS — SIGNIFICANT CHANGE UP (ref 0–0)
NRBC # BLD: 0 /100 WBCS — SIGNIFICANT CHANGE UP (ref 0–0)
PHOSPHATE SERPL-MCNC: 3.1 MG/DL — SIGNIFICANT CHANGE UP (ref 2.5–4.5)
PHOSPHATE SERPL-MCNC: 3.1 MG/DL — SIGNIFICANT CHANGE UP (ref 2.5–4.5)
PLATELET # BLD AUTO: 182 K/UL — SIGNIFICANT CHANGE UP (ref 150–400)
PLATELET # BLD AUTO: 182 K/UL — SIGNIFICANT CHANGE UP (ref 150–400)
POTASSIUM SERPL-MCNC: 4.3 MMOL/L — SIGNIFICANT CHANGE UP (ref 3.5–5.3)
POTASSIUM SERPL-MCNC: 4.3 MMOL/L — SIGNIFICANT CHANGE UP (ref 3.5–5.3)
POTASSIUM SERPL-SCNC: 4.3 MMOL/L — SIGNIFICANT CHANGE UP (ref 3.5–5.3)
POTASSIUM SERPL-SCNC: 4.3 MMOL/L — SIGNIFICANT CHANGE UP (ref 3.5–5.3)
RBC # BLD: 3.15 M/UL — LOW (ref 3.8–5.2)
RBC # BLD: 3.15 M/UL — LOW (ref 3.8–5.2)
RBC # FLD: 14.8 % — HIGH (ref 10.3–14.5)
RBC # FLD: 14.8 % — HIGH (ref 10.3–14.5)
SODIUM SERPL-SCNC: 141 MMOL/L — SIGNIFICANT CHANGE UP (ref 135–145)
SODIUM SERPL-SCNC: 141 MMOL/L — SIGNIFICANT CHANGE UP (ref 135–145)
WBC # BLD: 8.15 K/UL — SIGNIFICANT CHANGE UP (ref 3.8–10.5)
WBC # BLD: 8.15 K/UL — SIGNIFICANT CHANGE UP (ref 3.8–10.5)
WBC # FLD AUTO: 8.15 K/UL — SIGNIFICANT CHANGE UP (ref 3.8–10.5)
WBC # FLD AUTO: 8.15 K/UL — SIGNIFICANT CHANGE UP (ref 3.8–10.5)

## 2023-12-11 PROCEDURE — 36569 INSJ PICC 5 YR+ W/O IMAGING: CPT

## 2023-12-11 PROCEDURE — 76937 US GUIDE VASCULAR ACCESS: CPT | Mod: 26,59

## 2023-12-11 PROCEDURE — 99291 CRITICAL CARE FIRST HOUR: CPT

## 2023-12-11 PROCEDURE — 71045 X-RAY EXAM CHEST 1 VIEW: CPT | Mod: 26

## 2023-12-11 RX ORDER — QUETIAPINE FUMARATE 200 MG/1
12.5 TABLET, FILM COATED ORAL DAILY
Refills: 0 | Status: DISCONTINUED | OUTPATIENT
Start: 2023-12-11 | End: 2023-12-11

## 2023-12-11 RX ORDER — PHENYLEPHRINE HYDROCHLORIDE 10 MG/ML
0.1 INJECTION INTRAVENOUS
Qty: 40 | Refills: 0 | Status: DISCONTINUED | OUTPATIENT
Start: 2023-12-11 | End: 2023-12-12

## 2023-12-11 RX ORDER — SODIUM CHLORIDE 9 MG/ML
500 INJECTION INTRAMUSCULAR; INTRAVENOUS; SUBCUTANEOUS ONCE
Refills: 0 | Status: COMPLETED | OUTPATIENT
Start: 2023-12-11 | End: 2023-12-11

## 2023-12-11 RX ORDER — HYDROMORPHONE HYDROCHLORIDE 2 MG/ML
0.5 INJECTION INTRAMUSCULAR; INTRAVENOUS; SUBCUTANEOUS ONCE
Refills: 0 | Status: DISCONTINUED | OUTPATIENT
Start: 2023-12-11 | End: 2023-12-11

## 2023-12-11 RX ORDER — CHLORHEXIDINE GLUCONATE 213 G/1000ML
1 SOLUTION TOPICAL
Refills: 0 | Status: DISCONTINUED | OUTPATIENT
Start: 2023-12-11 | End: 2023-12-24

## 2023-12-11 RX ORDER — ENOXAPARIN SODIUM 100 MG/ML
40 INJECTION SUBCUTANEOUS
Refills: 0 | Status: DISCONTINUED | OUTPATIENT
Start: 2023-12-11 | End: 2023-12-19

## 2023-12-11 RX ORDER — DIAZEPAM 5 MG
5 TABLET ORAL EVERY 8 HOURS
Refills: 0 | Status: DISCONTINUED | OUTPATIENT
Start: 2023-12-11 | End: 2023-12-18

## 2023-12-11 RX ORDER — SODIUM CHLORIDE 9 MG/ML
10 INJECTION INTRAMUSCULAR; INTRAVENOUS; SUBCUTANEOUS
Refills: 0 | Status: DISCONTINUED | OUTPATIENT
Start: 2023-12-11 | End: 2024-01-05

## 2023-12-11 RX ORDER — ACETAMINOPHEN 500 MG
1000 TABLET ORAL ONCE
Refills: 0 | Status: COMPLETED | OUTPATIENT
Start: 2023-12-11 | End: 2023-12-11

## 2023-12-11 RX ORDER — DULOXETINE HYDROCHLORIDE 30 MG/1
60 CAPSULE, DELAYED RELEASE ORAL DAILY
Refills: 0 | Status: DISCONTINUED | OUTPATIENT
Start: 2023-12-11 | End: 2024-01-05

## 2023-12-11 RX ORDER — GABAPENTIN 400 MG/1
800 CAPSULE ORAL EVERY 8 HOURS
Refills: 0 | Status: DISCONTINUED | OUTPATIENT
Start: 2023-12-11 | End: 2024-01-05

## 2023-12-11 RX ORDER — MAGNESIUM SULFATE 500 MG/ML
1 VIAL (ML) INJECTION ONCE
Refills: 0 | Status: COMPLETED | OUTPATIENT
Start: 2023-12-11 | End: 2023-12-11

## 2023-12-11 RX ADMIN — HYDROMORPHONE HYDROCHLORIDE 0.5 MILLIGRAM(S): 2 INJECTION INTRAMUSCULAR; INTRAVENOUS; SUBCUTANEOUS at 08:30

## 2023-12-11 RX ADMIN — DULOXETINE HYDROCHLORIDE 60 MILLIGRAM(S): 30 CAPSULE, DELAYED RELEASE ORAL at 21:38

## 2023-12-11 RX ADMIN — Medication 100 MILLIGRAM(S): at 05:57

## 2023-12-11 RX ADMIN — HYDROMORPHONE HYDROCHLORIDE 0.5 MILLIGRAM(S): 2 INJECTION INTRAMUSCULAR; INTRAVENOUS; SUBCUTANEOUS at 13:00

## 2023-12-11 RX ADMIN — GABAPENTIN 800 MILLIGRAM(S): 400 CAPSULE ORAL at 17:11

## 2023-12-11 RX ADMIN — Medication 100 GRAM(S): at 09:40

## 2023-12-11 RX ADMIN — HYDROMORPHONE HYDROCHLORIDE 0.5 MILLIGRAM(S): 2 INJECTION INTRAMUSCULAR; INTRAVENOUS; SUBCUTANEOUS at 22:29

## 2023-12-11 RX ADMIN — PHENYLEPHRINE HYDROCHLORIDE 3.18 MICROGRAM(S)/KG/MIN: 10 INJECTION INTRAVENOUS at 21:48

## 2023-12-11 RX ADMIN — SENNA PLUS 2 TABLET(S): 8.6 TABLET ORAL at 21:36

## 2023-12-11 RX ADMIN — OXYCODONE HYDROCHLORIDE 30 MILLIGRAM(S): 5 TABLET ORAL at 10:23

## 2023-12-11 RX ADMIN — Medication 5 MILLIGRAM(S): at 10:23

## 2023-12-11 RX ADMIN — HYDROMORPHONE HYDROCHLORIDE 0.5 MILLIGRAM(S): 2 INJECTION INTRAMUSCULAR; INTRAVENOUS; SUBCUTANEOUS at 19:21

## 2023-12-11 RX ADMIN — HYDROMORPHONE HYDROCHLORIDE 0.5 MILLIGRAM(S): 2 INJECTION INTRAMUSCULAR; INTRAVENOUS; SUBCUTANEOUS at 18:57

## 2023-12-11 RX ADMIN — Medication 2: at 22:16

## 2023-12-11 RX ADMIN — HYDROMORPHONE HYDROCHLORIDE 0.5 MILLIGRAM(S): 2 INJECTION INTRAMUSCULAR; INTRAVENOUS; SUBCUTANEOUS at 15:58

## 2023-12-11 RX ADMIN — GABAPENTIN 800 MILLIGRAM(S): 400 CAPSULE ORAL at 10:23

## 2023-12-11 RX ADMIN — CHLORHEXIDINE GLUCONATE 1 APPLICATION(S): 213 SOLUTION TOPICAL at 19:21

## 2023-12-11 RX ADMIN — HYDROMORPHONE HYDROCHLORIDE 0.5 MILLIGRAM(S): 2 INJECTION INTRAMUSCULAR; INTRAVENOUS; SUBCUTANEOUS at 23:00

## 2023-12-11 RX ADMIN — CHLORHEXIDINE GLUCONATE 15 MILLILITER(S): 213 SOLUTION TOPICAL at 05:57

## 2023-12-11 RX ADMIN — OXYCODONE HYDROCHLORIDE 30 MILLIGRAM(S): 5 TABLET ORAL at 17:11

## 2023-12-11 RX ADMIN — OXYCODONE HYDROCHLORIDE 30 MILLIGRAM(S): 5 TABLET ORAL at 17:48

## 2023-12-11 RX ADMIN — OXYCODONE HYDROCHLORIDE 30 MILLIGRAM(S): 5 TABLET ORAL at 10:57

## 2023-12-11 RX ADMIN — HYDROMORPHONE HYDROCHLORIDE 0.5 MILLIGRAM(S): 2 INJECTION INTRAMUSCULAR; INTRAVENOUS; SUBCUTANEOUS at 09:00

## 2023-12-11 RX ADMIN — HYDROMORPHONE HYDROCHLORIDE 0.5 MILLIGRAM(S): 2 INJECTION INTRAMUSCULAR; INTRAVENOUS; SUBCUTANEOUS at 12:30

## 2023-12-11 RX ADMIN — Medication 1 TABLET(S): at 10:30

## 2023-12-11 RX ADMIN — ENOXAPARIN SODIUM 40 MILLIGRAM(S): 100 INJECTION SUBCUTANEOUS at 21:36

## 2023-12-11 RX ADMIN — Medication 5 MILLIGRAM(S): at 17:11

## 2023-12-11 RX ADMIN — SIMVASTATIN 40 MILLIGRAM(S): 20 TABLET, FILM COATED ORAL at 21:36

## 2023-12-11 RX ADMIN — HYDROMORPHONE HYDROCHLORIDE 0.5 MILLIGRAM(S): 2 INJECTION INTRAMUSCULAR; INTRAVENOUS; SUBCUTANEOUS at 15:40

## 2023-12-11 RX ADMIN — SODIUM CHLORIDE 1000 MILLILITER(S): 9 INJECTION INTRAMUSCULAR; INTRAVENOUS; SUBCUTANEOUS at 21:39

## 2023-12-11 RX ADMIN — Medication 5 MILLIGRAM(S): at 21:36

## 2023-12-11 RX ADMIN — PANTOPRAZOLE SODIUM 40 MILLIGRAM(S): 20 TABLET, DELAYED RELEASE ORAL at 10:30

## 2023-12-11 NOTE — PROGRESS NOTE ADULT - SUBJECTIVE AND OBJECTIVE BOX
HPI:  66 y/o female with PMHx HTN, HLD, CVA x 3 (last was 2016 with right hand weakness residual), GAMALIEL not using CPAP, Emphysema, former 25 pack year smoker restarted this week of surgery, Chronic Constipation on Movantik, Urinary Incontinence chronically self straight cath at home, chronic UTI, Breast Implant Rupture with Chronic Leak repair 10/2023, B/L CTS s/p release, s/p Intrathecal Morphine pump for pain, with chronic neck and back pain worsening for years s/p multiple spinal surgeries including laminectomies and fusion of cervical, thoracic and lumbar spine initially done in 2009 and recently in 2019 and 2020 at [facility] by Dr. Trinidad, s/p T3-L1 revision of prior fusion (with Dr. Luz on 8/17/22). Outpatient Xray 11/16/2023 showing broken rods/displacement. Presents for elective C2-S2 instrumentation and fusion. Pt endorses weakness of b/l LE and burning pain in b/l lower extremities radiating to both feet,  (04 Dec 2023 15:44)    OVERNIGHT EVENTS: KAMRAN, remains sedated on precedex while intubated.     Hospital Course:   12/4: Admitted for spinal fusion d/t damaged hardware.   12/5: Neuro stable. POD0 C2-S2 instrumentation/fusion (intraop b/l LE motor loss). on parminder for MAP>90  12/6: POD1. KAMRAN o/n neuro stable. remains intubated. Extubated 6am. Precedex gtt prn. Remains on parminder gtt for MAP goal >90. Valium made prn. CT thoracic spine bc MRI unavailable. Attempted NGT, unsuccessful (resistance @ 35).   12/7: POD2 Given IV dilaudid 0.5mg for pain. Neuro exam stable. SQL started tongiht. Increased precedex to 1.0. Responds well to valium. Consulting psych given paranoia ?homicidal statements. Passed bedside dyspphagia and tolerating PO meds. MRI complete, f/u read.   12/8: POD3 Pt reporting incisional pain, given dilaudid 0.5mg IV x2. Pt appearing anxious, given xanax 1mg. Neuro exam unchanged. Pain regimen increased to oxy 15/30 mg, pending further recs. Given 1 L bolus for tachycardia. Trops negative. 2 HMV drains removed.   12/9: POD4, KAMRAN, CT myelogram today showing block at T12-L1, OR tomorrow  12/10: POD5, KAMRAN, OR today for exploration of fusion, possible decompression T9-L2, revision instrumentation  POD 0 T9-L2 decompression. Pt returned from OR intubated. 50mcg fentanyl IVP x 2 for pain control and HTN. Magnesium repleted. Precedex gtt started for sedation. Decreased FiO2 to 40%. 1L bolus for soft MAP. Dc'd propofol and started levo gtt for MAP >90.   12/11: POD 6, POD 1. Remains intubated and sedated on precedex.     Vital Signs Last 24 Hrs  T(C): 36 (10 Dec 2023 22:52), Max: 37.1 (10 Dec 2023 08:35)  T(F): 96.8 (10 Dec 2023 22:52), Max: 98.8 (10 Dec 2023 08:35)  HR: 70 (10 Dec 2023 20:34) (65 - 100)  BP: 105/67 (10 Dec 2023 20:00) (96/52 - 190/115)  BP(mean): 80 (10 Dec 2023 20:00) (69 - 146)  RR: 13 (10 Dec 2023 20:34) (12 - 19)  SpO2: 100% (10 Dec 2023 20:34) (96% - 100%)    Parameters below as of 10 Dec 2023 20:34  Patient On (Oxygen Delivery Method): ventilator    O2 Concentration (%): 40    I&O's Summary    09 Dec 2023 07:01  -  10 Dec 2023 07:00  --------------------------------------------------------  IN: 1070 mL / OUT: 2500 mL / NET: -1430 mL    10 Dec 2023 07:01  -  11 Dec 2023 00:27  --------------------------------------------------------  IN: 1620.1 mL / OUT: 2790 mL / NET: -1169.9 mL        PHYSICAL EXAM:  General: patient seen laying supine in bed in NAD on AC/VC  Neuro: AAOx3, FC, OE to minor stimuli, CNII-XI grossly intact, face symmetric, no pronator drift,    strength 5/5 b/l UE and b/l LE 0/5, T11 sensory level   HEENT: PERRL, EOMI  Neck: supple  Cardiac: RRR, S1S2  Pulmonary: chest rise symmetric  Abdomen: soft, nontender, nondistended  Ext: perfusing well  Skin: warm, dry  Wound: Posterior incision c/d/i +HMV, YIFAN in place to suction    LABS:                        9.5    11.25 )-----------( 179      ( 10 Dec 2023 14:03 )             28.1     12-10    140  |  106  |  6<L>  ----------------------------<  146<H>  4.2   |  25  |  0.44<L>    Ca    8.6      10 Dec 2023 14:03  Phos  3.6     12-10  Mg     1.6     12-10    TPro  5.4<L>  /  Alb  3.0<L>  /  TBili  0.4  /  DBili  x   /  AST  16  /  ALT  13  /  AlkPhos  73  12-09    PT/INR - ( 09 Dec 2023 09:15 )   PT: 10.5 sec;   INR: 0.92          PTT - ( 09 Dec 2023 09:15 )  PTT:19.8 sec  Urinalysis Basic - ( 10 Dec 2023 14:03 )    Color: x / Appearance: x / SG: x / pH: x  Gluc: 146 mg/dL / Ketone: x  / Bili: x / Urobili: x   Blood: x / Protein: x / Nitrite: x   Leuk Esterase: x / RBC: x / WBC x   Sq Epi: x / Non Sq Epi: x / Bacteria: x          CAPILLARY BLOOD GLUCOSE      POCT Blood Glucose.: 191 mg/dL (10 Dec 2023 22:17)  POCT Blood Glucose.: 206 mg/dL (10 Dec 2023 16:37)      Drug Levels: [] N/A    CSF Analysis: [] N/A      Allergies    No Known Allergies    Intolerances      MEDICATIONS:  Antibiotics:  ceFAZolin   IVPB 2000 milliGRAM(s) IV Intermittent every 8 hours    Neuro:  dexMEDEtomidine Infusion 0.2 MICROgram(s)/kG/Hr IV Continuous <Continuous>  HYDROmorphone  Injectable 0.5 milliGRAM(s) IV Push every 3 hours PRN  ondansetron Injectable 4 milliGRAM(s) IV Push every 6 hours PRN  oxyCODONE    IR 15 milliGRAM(s) Oral every 4 hours PRN  oxyCODONE    IR 30 milliGRAM(s) Oral every 4 hours PRN    Anticoagulation:    OTHER:  benzocaine 20% Spray 1 Spray(s) Mucosal three times a day PRN  bisacodyl 5 milliGRAM(s) Oral at bedtime  chlorhexidine 0.12% Liquid 15 milliLiter(s) Oral Mucosa every 12 hours  influenza  Vaccine (HIGH DOSE) 0.7 milliLiter(s) IntraMuscular once  insulin lispro (ADMELOG) corrective regimen sliding scale   SubCutaneous Before meals and at bedtime  naloxone Injectable 0.1 milliGRAM(s) IV Push every 3 minutes PRN  norepinephrine Infusion 0.05 MICROgram(s)/kG/Min IV Continuous <Continuous>  pantoprazole  Injectable 40 milliGRAM(s) IV Push daily  polyethylene glycol 3350 17 Gram(s) Oral two times a day  senna 2 Tablet(s) Oral at bedtime  simvastatin 40 milliGRAM(s) Oral at bedtime    IVF:  multivitamin 1 Tablet(s) Oral daily  sodium chloride 0.9%. 1000 milliLiter(s) IV Continuous <Continuous>      ASSESSMENT:  69 yo female with Hx HTN, HLD, DM, CVA x 3 (last in 2016 with residual R hand weakness), GAMALIEL not using CPAP, Emphysema, ex-25 pack year smoker now restarted, chronic constipation on Movantik, chronic urinary retention (SC at home), b/l CTS s/p release, s/p Intrathecal Morphine pump for pain, with chronic neck and back pain worsening for years s/p multiple spinal surgeries including laminectomies and fusion of cervical, thoracic and lumbar spine, s/p T3-L1 revision of prior fusion (with  on 8/17/22). Xray 11/16/23 showing broken rods/displacement. Now s/p C2-S2 instrumentation and fusion (12/5). S/p T9-L2 decompression w/ durotomy with repair (12/10/23).     S12.9XXA    Handoff    MEWS Score    HTN (hypertension)    Back pain    Hypertension    SS (spinal stenosis)    High cholesterol    Stroke    H/O carpal tunnel syndrome    H/O carpal tunnel syndrome    Chronic GERD    History of chronic constipation    Seizure    Urinary incontinence    Pre-diabetes    Acute UTI    Anxiety    Anxiety and depression    Arthritis    Eczema    External hemorrhoids    H/O kyphosis    Multiple sclerosis    Pancreas cyst    Scoliosis    Wheelchair dependent    Cervical pseudoarthrosis    Right shoulder pain    Cervical spondylosis    Chronic headaches    Chronic LUQ pain    Chronic UTI    Degenerative joint disease    H/O low back pain    Lumbosacral spondylosis    COPD (chronic obstructive pulmonary disease)    Back pain    Back pain    Spinal cord injury, thoracic (T7-T12)    Back pain    Spinal cord injury of thoracic region without bone injury    Delirium    Revision of posterolateral fusion of lumbar spine    Revision of fusion of thoracic spine    Decompression, spinal cord, thoracic, posterolateral approach    Cervical disc disease    H/O Spinal surgery    H/O breast surgery    S/P cervical discectomy    Previous back surgery    H/O shoulder surgery    H/O total shoulder replacement, right    SysAdmin_VstLnk        PLAN:  Neuro:  - Neuro/vitals q1   - pain control: modified ERAS, IT morphine pump, oxycodone 15/30 prn, valium and home gabapentin 800 TID held while on sedation  - Hold home cymbalta 60mg daily   - HMV x 1, YIFAN x 1, per plastics  - Sedation: precedex gtt   - MRI thoracic complete  - CT thoracic spine 12/6: postop changes  - CT myelogram done 12/9    Cardio:  - MAP >90, levo prn   - holding home metoprolol while on pressors   - C/w home simvastatin     Pulm:  - Intubated from /40/12/5  - hx GAMALIEL, no CPAP at home    GI:  - NPO, +NGT   - Bowel regimen    Renal:  - IVF while NPO  - PRN straight cath    Endo:  - ISS dc'd  - a1c: 7    Heme:  - SCDs DVT ppx  - 500 cell saver intraop 12/5, 1u PRBC intraop 12/10  - Trend H/H    ID:  - afebrile  - post op ancef    Dispo:  - full code, tele, dispo pending PT/OT    D/w Dr. Luz, Dr. Sanders    Assessment:  Present when checked    []  GCS  E   V  M     Heart Failure: []Acute, [] acute on chronic , []chronic  Heart Failure:  [] Diastolic (HFpEF), [] Systolic (HFrEF), []Combined (HFpEF and HFrEF), [] RHF, [] Pulm HTN, [] Other    [] DARRYL, [] ATN, [] AIN, [] other  [] CKD1, [] CKD2, [] CKD 3, [] CKD 4, [] CKD 5, []ESRD    Encephalopathy: [] Metabolic, [] Hepatic, [] toxic, [] Neurological, [] Other    Abnormal Nurtitional Status: [] malnurtition (see nutrition note), [ ]underweight: BMI < 19, [] morbid obesity: BMI >40, [] Cachexia    [] Sepsis  [] hypovolemic shock,[] cardiogenic shock, [] hemorrhagic shock, [] neuogenic shock  [] Acute Respiratory Failure  []Cerebral edema, [] Brain compression/ herniation,   [] Functional quadriplegia  [] Acute blood loss anemia   HPI:  66 y/o female with PMHx HTN, HLD, CVA x 3 (last was 2016 with right hand weakness residual), GAMALIEL not using CPAP, Emphysema, former 25 pack year smoker restarted this week of surgery, Chronic Constipation on Movantik, Urinary Incontinence chronically self straight cath at home, chronic UTI, Breast Implant Rupture with Chronic Leak repair 10/2023, B/L CTS s/p release, s/p Intrathecal Morphine pump for pain, with chronic neck and back pain worsening for years s/p multiple spinal surgeries including laminectomies and fusion of cervical, thoracic and lumbar spine initially done in 2009 and recently in 2019 and 2020 at [facility] by Dr. Trinidad, s/p T3-L1 revision of prior fusion (with Dr. Luz on 8/17/22). Outpatient Xray 11/16/2023 showing broken rods/displacement. Presents for elective C2-S2 instrumentation and fusion. Pt endorses weakness of b/l LE and burning pain in b/l lower extremities radiating to both feet,  (04 Dec 2023 15:44)    OVERNIGHT EVENTS: KAMRAN, remains sedated on precedex while intubated.     Hospital Course:   12/4: Admitted for spinal fusion d/t damaged hardware.   12/5: Neuro stable. POD0 C2-S2 instrumentation/fusion (intraop b/l LE motor loss). on parminder for MAP>90  12/6: POD1. KAMRAN o/n neuro stable. remains intubated. Extubated 6am. Precedex gtt prn. Remains on parminder gtt for MAP goal >90. Valium made prn. CT thoracic spine bc MRI unavailable. Attempted NGT, unsuccessful (resistance @ 35).   12/7: POD2 Given IV dilaudid 0.5mg for pain. Neuro exam stable. SQL started tongiht. Increased precedex to 1.0. Responds well to valium. Consulting psych given paranoia ?homicidal statements. Passed bedside dyspphagia and tolerating PO meds. MRI complete, f/u read.   12/8: POD3 Pt reporting incisional pain, given dilaudid 0.5mg IV x2. Pt appearing anxious, given xanax 1mg. Neuro exam unchanged. Pain regimen increased to oxy 15/30 mg, pending further recs. Given 1 L bolus for tachycardia. Trops negative. 2 HMV drains removed.   12/9: POD4, KAMRAN, CT myelogram today showing block at T12-L1, OR tomorrow  12/10: POD5, KAMRAN, OR today for exploration of fusion, possible decompression T9-L2, revision instrumentation  POD 0 T9-L2 decompression. Pt returned from OR intubated. 50mcg fentanyl IVP x 2 for pain control and HTN. Magnesium repleted. Precedex gtt started for sedation. Decreased FiO2 to 40%. 1L bolus for soft MAP. Dc'd propofol and started levo gtt for MAP >90.   12/11: POD 6, POD 1. Remains intubated and sedated on precedex.     Vital Signs Last 24 Hrs  T(C): 36 (10 Dec 2023 22:52), Max: 37.1 (10 Dec 2023 08:35)  T(F): 96.8 (10 Dec 2023 22:52), Max: 98.8 (10 Dec 2023 08:35)  HR: 70 (10 Dec 2023 20:34) (65 - 100)  BP: 105/67 (10 Dec 2023 20:00) (96/52 - 190/115)  BP(mean): 80 (10 Dec 2023 20:00) (69 - 146)  RR: 13 (10 Dec 2023 20:34) (12 - 19)  SpO2: 100% (10 Dec 2023 20:34) (96% - 100%)    Parameters below as of 10 Dec 2023 20:34  Patient On (Oxygen Delivery Method): ventilator    O2 Concentration (%): 40    I&O's Summary    09 Dec 2023 07:01  -  10 Dec 2023 07:00  --------------------------------------------------------  IN: 1070 mL / OUT: 2500 mL / NET: -1430 mL    10 Dec 2023 07:01  -  11 Dec 2023 00:27  --------------------------------------------------------  IN: 1620.1 mL / OUT: 2790 mL / NET: -1169.9 mL        PHYSICAL EXAM:  General: patient seen laying supine in bed in NAD on AC/VC  Neuro: AAOx3, FC, OE to minor stimuli, CNII-XI grossly intact, face symmetric, no pronator drift,    strength 5/5 b/l UE and b/l LE 0/5, T11 sensory level   HEENT: PERRL, EOMI  Neck: supple  Cardiac: RRR, S1S2  Pulmonary: chest rise symmetric  Abdomen: soft, nontender, nondistended  Ext: perfusing well  Skin: warm, dry  Wound: Posterior incision c/d/i +HMV, YIFAN in place to suction    LABS:                        9.5    11.25 )-----------( 179      ( 10 Dec 2023 14:03 )             28.1     12-10    140  |  106  |  6<L>  ----------------------------<  146<H>  4.2   |  25  |  0.44<L>    Ca    8.6      10 Dec 2023 14:03  Phos  3.6     12-10  Mg     1.6     12-10    TPro  5.4<L>  /  Alb  3.0<L>  /  TBili  0.4  /  DBili  x   /  AST  16  /  ALT  13  /  AlkPhos  73  12-09    PT/INR - ( 09 Dec 2023 09:15 )   PT: 10.5 sec;   INR: 0.92          PTT - ( 09 Dec 2023 09:15 )  PTT:19.8 sec  Urinalysis Basic - ( 10 Dec 2023 14:03 )    Color: x / Appearance: x / SG: x / pH: x  Gluc: 146 mg/dL / Ketone: x  / Bili: x / Urobili: x   Blood: x / Protein: x / Nitrite: x   Leuk Esterase: x / RBC: x / WBC x   Sq Epi: x / Non Sq Epi: x / Bacteria: x          CAPILLARY BLOOD GLUCOSE      POCT Blood Glucose.: 191 mg/dL (10 Dec 2023 22:17)  POCT Blood Glucose.: 206 mg/dL (10 Dec 2023 16:37)      Drug Levels: [] N/A    CSF Analysis: [] N/A      Allergies    No Known Allergies    Intolerances      MEDICATIONS:  Antibiotics:  ceFAZolin   IVPB 2000 milliGRAM(s) IV Intermittent every 8 hours    Neuro:  dexMEDEtomidine Infusion 0.2 MICROgram(s)/kG/Hr IV Continuous <Continuous>  HYDROmorphone  Injectable 0.5 milliGRAM(s) IV Push every 3 hours PRN  ondansetron Injectable 4 milliGRAM(s) IV Push every 6 hours PRN  oxyCODONE    IR 15 milliGRAM(s) Oral every 4 hours PRN  oxyCODONE    IR 30 milliGRAM(s) Oral every 4 hours PRN    Anticoagulation:    OTHER:  benzocaine 20% Spray 1 Spray(s) Mucosal three times a day PRN  bisacodyl 5 milliGRAM(s) Oral at bedtime  chlorhexidine 0.12% Liquid 15 milliLiter(s) Oral Mucosa every 12 hours  influenza  Vaccine (HIGH DOSE) 0.7 milliLiter(s) IntraMuscular once  insulin lispro (ADMELOG) corrective regimen sliding scale   SubCutaneous Before meals and at bedtime  naloxone Injectable 0.1 milliGRAM(s) IV Push every 3 minutes PRN  norepinephrine Infusion 0.05 MICROgram(s)/kG/Min IV Continuous <Continuous>  pantoprazole  Injectable 40 milliGRAM(s) IV Push daily  polyethylene glycol 3350 17 Gram(s) Oral two times a day  senna 2 Tablet(s) Oral at bedtime  simvastatin 40 milliGRAM(s) Oral at bedtime    IVF:  multivitamin 1 Tablet(s) Oral daily  sodium chloride 0.9%. 1000 milliLiter(s) IV Continuous <Continuous>      ASSESSMENT:  69 yo female with Hx HTN, HLD, DM, CVA x 3 (last in 2016 with residual R hand weakness), GAMALIEL not using CPAP, Emphysema, ex-25 pack year smoker now restarted, chronic constipation on Movantik, chronic urinary retention (SC at home), b/l CTS s/p release, s/p Intrathecal Morphine pump for pain, with chronic neck and back pain worsening for years s/p multiple spinal surgeries including laminectomies and fusion of cervical, thoracic and lumbar spine, s/p T3-L1 revision of prior fusion (with  on 8/17/22). Xray 11/16/23 showing broken rods/displacement. Now s/p C2-S2 instrumentation and fusion (12/5). S/p T9-L2 decompression w/ durotomy with repair (12/10/23).     S12.9XXA    Handoff    MEWS Score    HTN (hypertension)    Back pain    Hypertension    SS (spinal stenosis)    High cholesterol    Stroke    H/O carpal tunnel syndrome    H/O carpal tunnel syndrome    Chronic GERD    History of chronic constipation    Seizure    Urinary incontinence    Pre-diabetes    Acute UTI    Anxiety    Anxiety and depression    Arthritis    Eczema    External hemorrhoids    H/O kyphosis    Multiple sclerosis    Pancreas cyst    Scoliosis    Wheelchair dependent    Cervical pseudoarthrosis    Right shoulder pain    Cervical spondylosis    Chronic headaches    Chronic LUQ pain    Chronic UTI    Degenerative joint disease    H/O low back pain    Lumbosacral spondylosis    COPD (chronic obstructive pulmonary disease)    Back pain    Back pain    Spinal cord injury, thoracic (T7-T12)    Back pain    Spinal cord injury of thoracic region without bone injury    Delirium    Revision of posterolateral fusion of lumbar spine    Revision of fusion of thoracic spine    Decompression, spinal cord, thoracic, posterolateral approach    Cervical disc disease    H/O Spinal surgery    H/O breast surgery    S/P cervical discectomy    Previous back surgery    H/O shoulder surgery    H/O total shoulder replacement, right    SysAdmin_VstLnk        PLAN:  Neuro:  - Neuro/vitals q1   - pain control: modified ERAS, IT morphine pump, oxycodone 15/30 prn, valium and home gabapentin 800 TID held while on sedation  - Hold home cymbalta 60mg daily   - HMV x 1, YIFAN x 1, per plastics  - Sedation: precedex gtt   - MRI thoracic complete  - CT thoracic spine 12/6: postop changes  - CT myelogram done 12/9    Cardio:  - MAP >90, levo prn   - holding home metoprolol while on pressors   - C/w home simvastatin     Pulm:  - Intubated from /40/12/5  - hx GAMALIEL, no CPAP at home    GI:  - NPO, +NGT   - Bowel regimen    Renal:  - IVF while NPO  - PRN straight cath    Endo:  - ISS dc'd  - a1c: 7    Heme:  - SCDs DVT ppx  - 500 cell saver intraop 12/5, 1u PRBC intraop 12/10  - Trend H/H    ID:  - afebrile  - post op ancef    Dispo:  - full code, tele, dispo pending PT/OT    D/w Dr. Luz, Dr. Sanders    Assessment:  Present when checked    []  GCS  E   V  M     Heart Failure: []Acute, [] acute on chronic , []chronic  Heart Failure:  [] Diastolic (HFpEF), [] Systolic (HFrEF), []Combined (HFpEF and HFrEF), [] RHF, [] Pulm HTN, [] Other    [] DARRYL, [] ATN, [] AIN, [] other  [] CKD1, [] CKD2, [] CKD 3, [] CKD 4, [] CKD 5, []ESRD    Encephalopathy: [] Metabolic, [] Hepatic, [] toxic, [] Neurological, [] Other    Abnormal Nurtitional Status: [] malnurtition (see nutrition note), [ ]underweight: BMI < 19, [] morbid obesity: BMI >40, [] Cachexia    [] Sepsis  [] hypovolemic shock,[] cardiogenic shock, [] hemorrhagic shock, [] neuogenic shock  [] Acute Respiratory Failure  []Cerebral edema, [] Brain compression/ herniation,   [] Functional quadriplegia  [] Acute blood loss anemia

## 2023-12-11 NOTE — PROGRESS NOTE ADULT - SUBJECTIVE AND OBJECTIVE BOX
PM EVENTS:     - Extubated earlier this day  - No acute overnight events as of documentation time of this note.    ROS: negative except as mentioned above.    T(C): 37.2 (12-11-23 @ 22:43), Max: 37.3 (12-11-23 @ 00:35)  HR: 114 (12-11-23 @ 22:00) (63 - 119)  BP: 112/64 (12-11-23 @ 22:00) (103/51 - 151/69)  RR: 18 (12-11-23 @ 22:00) (14 - 20)  SpO2: 99% (12-11-23 @ 22:00) (88% - 100%)    Mode: AC/ CMV (Assist Control/ Continuous Mandatory Ventilation)  RR (machine): 12  TV (machine): 450  FiO2: 40  PEEP: 5  ITime: 1  MAP: 10  PIP: 25      I&O's Summary    10 Dec 2023 07:01  -  11 Dec 2023 07:00  --------------------------------------------------------  IN: 2841.5 mL / OUT: 3970 mL / NET: -1128.5 mL    11 Dec 2023 07:01  -  11 Dec 2023 23:51  --------------------------------------------------------  IN: 340 mL / OUT: 980 mL / NET: -640 mL        EXAM:     Hiren Coma Scale: 15    General: normocephalic, atraumatic, laying in bed, in no distress  Neuro     MS: A/Ox3, cooperative, normal attention, no neglect, comprehension intact, speech with preserved fluency, naming, and repetition    CN: PERRL, VF FTC, EOMI and no ptosis bilaterally, sensation intact to crude touch V1-V3, face symmetric, hearing grossly intact    Mot: bulk normal, tone normal, power 5/5 in bilateral upper extremities, (+)0/5 in bilateral lower extremities    Sens: (+)decreased sensation to cold temperature and crude at approximately T12 sensory level extending inferiorly    Reflexes: 0 in bilateral lower extremities, toes are mute  Chest: nonlabored respirations, no adventitious lung sounds bilaterally, heart regular rate/rhythm, present S1/S2, no murmurs or rubs  Abdomen: nondistended, soft and nontender without peritoneal signs, normoactive bowel sounds  Extremities: no clubbing, well-perfused, no edema      ABG - ( 10 Dec 2023 17:12 )  pH, Arterial: 7.42  pH, Blood: x     /  pCO2: 36    /  pO2: 122   / HCO3: 23    / Base Excess: -0.7  /  SaO2: 99.0                                    8.9    8.15  )-----------( 182      ( 11 Dec 2023 05:30 )             26.3     12-11    141  |  111<H>  |  8   ----------------------------<  164<H>  4.3   |  24  |  0.41<L>    Ca    8.3<L>      11 Dec 2023 05:30  Phos  3.1     12-11  Mg     1.9     12-11        MEDICATIONS  (STANDING):  bisacodyl 5 milliGRAM(s) Oral at bedtime  chlorhexidine 2% Cloths 1 Application(s) Topical <User Schedule>  diazepam    Tablet 5 milliGRAM(s) Oral every 8 hours  DULoxetine 60 milliGRAM(s) Oral daily  enoxaparin Injectable 40 milliGRAM(s) SubCutaneous <User Schedule>  gabapentin 800 milliGRAM(s) Oral every 8 hours  influenza  Vaccine (HIGH DOSE) 0.7 milliLiter(s) IntraMuscular once  insulin lispro (ADMELOG) corrective regimen sliding scale   SubCutaneous Before meals and at bedtime  Morphine 1 milliGRAM / mL 17.9 mL,Morphine 1 milliGRAM / mL 17.9 mL 17.9 mL IntraThecal Continuous Pump  multivitamin 1 Tablet(s) Oral daily  pantoprazole  Injectable 40 milliGRAM(s) IV Push daily  phenylephrine    Infusion 0.1 MICROgram(s)/kG/Min (3.18 mL/Hr) IV Continuous <Continuous>  polyethylene glycol 3350 17 Gram(s) Oral two times a day  senna 2 Tablet(s) Oral at bedtime  simvastatin 40 milliGRAM(s) Oral at bedtime    MEDICATIONS  (PRN):  benzocaine 20% Spray 1 Spray(s) Mucosal three times a day PRN Sore throat  HYDROmorphone  Injectable 0.5 milliGRAM(s) IV Push every 3 hours PRN Breakthrough Pain  naloxone Injectable 0.1 milliGRAM(s) IV Push every 3 minutes PRN For ANY of the following changes in patient status:  A. RR LESS THAN 10 breaths per minute, B. Oxygen saturation LESS THAN 90%, C. Sedation score of 6  ondansetron Injectable 4 milliGRAM(s) IV Push every 6 hours PRN Nausea  oxyCODONE    IR 15 milliGRAM(s) Oral every 4 hours PRN Moderate Pain (4 - 6)  oxyCODONE    IR 30 milliGRAM(s) Oral every 4 hours PRN Severe Pain (7 - 10)  sodium chloride 0.9% lock flush 10 milliLiter(s) IV Push every 1 hour PRN Pre/post blood products, medications, blood draw, and to maintain line patency      Please see the day's note documented by Dr. Mccauley for detailed ongoing assessment and plan.      Neuro checks q2h  MAP > 90  JPD/HMV  Pain control  Extubated 12/11  JANNETTE Castillo today  Swallow evaluation  Euglycemia  SQL

## 2023-12-11 NOTE — PROGRESS NOTE ADULT - ASSESSMENT
Neuro:  - Neuro/vitals q1   - pain control: modified ERAS, IT morphine pump, oxycodone 15/30 prn, valium and home gabapentin 800 TID held while on sedation  - Hold home cymbalta 60mg daily   - HMV x 1, YIFAN x 1, per plastics  - Sedation: precedex gtt   - MRI thoracic complete  - CT thoracic spine 12/6: postop changes  - CT myelogram done 12/9    Cardio:  - MAP >90, levo prn   - holding home metoprolol while on pressors   - C/w home simvastatin     Pulm:  - Intubated from /40/12/5  - hx GAMALIEL, no CPAP at home    GI:  - NPO, +NGT   - Bowel regimen    Renal:  - IVF while NPO  - PRN straight cath    Endo:  - ISS dc'd  - a1c: 7    Heme:  - SCDs DVT ppx  - 500 cell saver intraop 12/5, 1u PRBC intraop 12/10  - Trend H/H    ID:  - afebrile  - post op ancef    Dispo:  - full code, tele, dispo pending PT/OT      Pain: Current Pain medications include Gabapentin 800mg TID,  Oxycodone 15-30 Q4 PRN  (took 1x 30mg since off sedation), Dilaudid 0.5mg IV Q3hrs PRN (took 1x since off sedation), Valium 5mg Q8hrs, duloxetine 60mg oral daily.  No plan to change pain medication at this time and will continue with current regiment as pain is controlled and she appears comfortable in bed. Will continue to follow while patient is admitted as inpatient.     - Home pain regiment: On iSTOP no prescription controlled substances were noted    - Bowel regimen: Continue current regiment with no changes at this time.   - Nausea ppx: Zofran standing  - Functional Goals: Pt will get OOB with PT today. Pt will resume previous level of activity without impairment from surgery.   - Additional Consults: None recommended.   - Additional Labs/Imaging:  None recommended.     - Follow up, Discharge Planning: Patient states that at baseline she was independent at home without a caregiver and only used a wheelchair to ambulate outside the house. Discharge pending medical stability and acute management   - Pain Management follow up plan: will continue to follow while patient is inpatient.

## 2023-12-11 NOTE — CHART NOTE - NSCHARTNOTEFT_GEN_A_CORE
Admitting Diagnosis:   Patient is a 68y old  Female who presents with a chief complaint of S12.9XXA      PAST MEDICAL & SURGICAL HISTORY:  HTN (hypertension)  SS (spinal stenosis)  High cholesterol  Stroke x3  H/O carpal tunnel syndrome Bilateral  Chronic GERD  History of chronic constipation  Urinary incontinence self cath as needed  Pre-diabetes  Anxiety and depression  Eczema  H/O kyphosis  Pancreas cyst  Scoliosis  Wheelchair dependent  Cervical pseudoarthrosis and lumbar spine  Cervical spondylosis  Chronic headaches  Degenerative joint disease left shoulder  Lumbosacral spondylosis  COPD (chronic obstructive pulmonary disease)  H/O Spinal surgery lumbar x 30  H/O breast surgery left breast implant 1980's  S/P cervical discectomy x4  H/O total shoulder replacement, right    CURRENT NUTRITION ORDER:   - Consistent Carbohydrate Diet    - NKFA     PO INTAKE:  % [ xx ]       50-75% [  ]       25-50% [  ]       <25% [  ]       N/A [  ]    - patient consumed 100% of three yogurts for breakfast    GI ISSUES:  denies nausea/vomiting; abdomen WDL; last bowel movement documented 12/8     PAIN:  denies pain or discomfort at the time of visit      SKIN INTEGRITY: Filippo scale score 17; trace 1+ dependent edema; Intact except for surgical incisions midline/back       LABS:  12-11    141  |  111<H>  |  8   ----------------------------<  164<H>  4.3   |  24  |  0.41<L>    Ca    8.3<L>      11 Dec 2023 05:30  Phos  3.1     12-11  Mg     1.9     12-11      CAPILLARY BLOOD GLUCOSE  POCT Blood Glucose.: 144 mg/dL (11 Dec 2023 11:18)  POCT Blood Glucose.: 191 mg/dL (10 Dec 2023 22:17)  POCT Blood Glucose.: 206 mg/dL (10 Dec 2023 16:37)    MEDICATIONS  (STANDING):  bisacodyl 5 milliGRAM(s) Oral at bedtime  diazepam    Tablet 5 milliGRAM(s) Oral every 8 hours  DULoxetine 60 milliGRAM(s) Oral daily  gabapentin 800 milliGRAM(s) Oral every 8 hours  influenza  Vaccine (HIGH DOSE) 0.7 milliLiter(s) IntraMuscular once  insulin lispro (ADMELOG) corrective regimen sliding scale   SubCutaneous Before meals and at bedtime  Morphine 1 milliGRAM / mL 17.9 mL,Morphine 1 milliGRAM / mL 17.9 mL 17.9 mL IntraThecal Continuous Pump  multivitamin 1 Tablet(s) Oral daily  pantoprazole  Injectable 40 milliGRAM(s) IV Push daily  polyethylene glycol 3350 17 Gram(s) Oral two times a day  QUEtiapine 12.5 milliGRAM(s) Oral daily  senna 2 Tablet(s) Oral at bedtime  simvastatin 40 milliGRAM(s) Oral at bedtime    MEDICATIONS  (PRN):  benzocaine 20% Spray 1 Spray(s) Mucosal three times a day PRN Sore throat  HYDROmorphone  Injectable 0.5 milliGRAM(s) IV Push every 3 hours PRN Breakthrough Pain  naloxone Injectable 0.1 milliGRAM(s) IV Push every 3 minutes PRN For ANY of the following changes in patient status:  A. RR LESS THAN 10 breaths per minute, B. Oxygen saturation LESS THAN 90%, C. Sedation score of 6  ondansetron Injectable 4 milliGRAM(s) IV Push every 6 hours PRN Nausea  oxyCODONE    IR 30 milliGRAM(s) Oral every 4 hours PRN Severe Pain (7 - 10)  oxyCODONE    IR 15 milliGRAM(s) Oral every 4 hours PRN Moderate Pain (4 - 6)        ANTHROPOMETRICS:   Height (inches):  67   Weight (pounds):  186.7 (12/10)   BMI (kg/m^2):  29.2   IBW (pounds):  135 +/- 10%   %IBW:  138.3 %     WEIGHT CHANGE:   12/10     186.7 pounds  12/05     186.7 pounds  Pt weight stable during admission    ESTIMATED NUTRIENT NEEDS:   Calories:  (25-30 kcal/kg) 5312-8963 kcal   Protein:  (1.4-1.6 g/kg) 75-85 g   Fluids:  1 mL/kcal or at team’s discretion   Ideal body weight (IBW) being used as Pt is critically ill and >=100% of ideal body weight.     SUBJECTIVE:   67 yo female with Hx HTN, HLD, DM, CVA x 3 (last in 2016 with residual R hand weakness), GAMALIEL not using CPAP, Emphysema, ex-25 pack year smoker now restarted, chronic constipation on Movantik, chronic urinary retention (SC at home), b/l CTS s/p release, s/p Intrathecal Morphine pump for pain, with chronic neck and back pain worsening for years s/p multiple spinal surgeries including laminectomies and fusion of cervical, thoracic and lumbar spine, s/p T3-L1 revision of prior fusion (with  on 8/17/22). Xray 11/16/23 showing broken rods/displacement. Now s/p C2-S2 instrumentation and fusion (12/5). S/p T9-L2 decompression w/ durotomy with repair (12/10/23).      Discussed Pt with team during IDT rounds. Chart, meds, and labs reviewed. Tmax 37.3 C. MAPs  mm Hg. Meds significant for insulin sliding scale, multivitamin, polyethylene glycol, senna. Labs significant for H/H 8.9/26.3L; POCT 144-206H; glucose 164H; HgbA1c 7.0H; albumin 3.0L; creatinine 0.41L. Met with patient on [facility]. Nutrition interview abbreviated secondary to patient’s emotional state (aggravated). Patient reports an allergy to seafood (crab) with sx: itching; reports she continues to eat them twice weekly but takes antihistamines.     PREVIOUS NUTRITION DIAGNOSIS: Inadequate energy intake related to critical illness resulting in lethargy/sedation as evidenced by NPO status, meeting 0% of estimated needs    Active [  ]       Resolved [ xx ]     If resolved, new PES: Increased protein/energy needs related to physiological causes increasing nutrient needs as evidenced by known increased metabolic demands for healing status post procedure    GOAL: Consistently meet >75% of nutrition needs via most appropriate route for nutrition     RECOMMENDATIONS:   - Recommend continue current diet order (Consistent Carbohydrate Diet)   - Defer consistency to team/S+S   - Allergy section updated to include shellfish by this provider  - Monitor PO intake/appetite, diet tolerance, GI distress, labs (electrolytes, CMP)   - Requesting to trend weights (weekly) as able   - Honor Pt food preferences as able   - Pain and bowel regimen as per team     EDUCATION:  deferred    RISK LEVEL:  High [ xx ]       Moderate [  ]       Low [  ]     ANNI Solano, MS, RD, CDN [id number] Admitting Diagnosis:   Patient is a 68y old  Female who presents with a chief complaint of S12.9XXA      PAST MEDICAL & SURGICAL HISTORY:  HTN (hypertension)  SS (spinal stenosis)  High cholesterol  Stroke x3  H/O carpal tunnel syndrome Bilateral  Chronic GERD  History of chronic constipation  Urinary incontinence self cath as needed  Pre-diabetes  Anxiety and depression  Eczema  H/O kyphosis  Pancreas cyst  Scoliosis  Wheelchair dependent  Cervical pseudoarthrosis and lumbar spine  Cervical spondylosis  Chronic headaches  Degenerative joint disease left shoulder  Lumbosacral spondylosis  COPD (chronic obstructive pulmonary disease)  H/O Spinal surgery lumbar x 30  H/O breast surgery left breast implant 1980's  S/P cervical discectomy x4  H/O total shoulder replacement, right    CURRENT NUTRITION ORDER:   - Consistent Carbohydrate Diet    - NKFA     PO INTAKE:  % [ xx ]       50-75% [  ]       25-50% [  ]       <25% [  ]       N/A [  ]    - patient consumed 100% of three yogurts for breakfast    GI ISSUES:  denies nausea/vomiting; abdomen WDL; last bowel movement documented 12/8     PAIN:  denies pain or discomfort at the time of visit      SKIN INTEGRITY: Filippo scale score 17; trace 1+ dependent edema; Intact except for surgical incisions midline/back       LABS:  12-11    141  |  111<H>  |  8   ----------------------------<  164<H>  4.3   |  24  |  0.41<L>    Ca    8.3<L>      11 Dec 2023 05:30  Phos  3.1     12-11  Mg     1.9     12-11      CAPILLARY BLOOD GLUCOSE  POCT Blood Glucose.: 144 mg/dL (11 Dec 2023 11:18)  POCT Blood Glucose.: 191 mg/dL (10 Dec 2023 22:17)  POCT Blood Glucose.: 206 mg/dL (10 Dec 2023 16:37)    MEDICATIONS  (STANDING):  bisacodyl 5 milliGRAM(s) Oral at bedtime  diazepam    Tablet 5 milliGRAM(s) Oral every 8 hours  DULoxetine 60 milliGRAM(s) Oral daily  gabapentin 800 milliGRAM(s) Oral every 8 hours  influenza  Vaccine (HIGH DOSE) 0.7 milliLiter(s) IntraMuscular once  insulin lispro (ADMELOG) corrective regimen sliding scale   SubCutaneous Before meals and at bedtime  Morphine 1 milliGRAM / mL 17.9 mL,Morphine 1 milliGRAM / mL 17.9 mL 17.9 mL IntraThecal Continuous Pump  multivitamin 1 Tablet(s) Oral daily  pantoprazole  Injectable 40 milliGRAM(s) IV Push daily  polyethylene glycol 3350 17 Gram(s) Oral two times a day  QUEtiapine 12.5 milliGRAM(s) Oral daily  senna 2 Tablet(s) Oral at bedtime  simvastatin 40 milliGRAM(s) Oral at bedtime    MEDICATIONS  (PRN):  benzocaine 20% Spray 1 Spray(s) Mucosal three times a day PRN Sore throat  HYDROmorphone  Injectable 0.5 milliGRAM(s) IV Push every 3 hours PRN Breakthrough Pain  naloxone Injectable 0.1 milliGRAM(s) IV Push every 3 minutes PRN For ANY of the following changes in patient status:  A. RR LESS THAN 10 breaths per minute, B. Oxygen saturation LESS THAN 90%, C. Sedation score of 6  ondansetron Injectable 4 milliGRAM(s) IV Push every 6 hours PRN Nausea  oxyCODONE    IR 30 milliGRAM(s) Oral every 4 hours PRN Severe Pain (7 - 10)  oxyCODONE    IR 15 milliGRAM(s) Oral every 4 hours PRN Moderate Pain (4 - 6)        ANTHROPOMETRICS:   Height (inches):  67   Weight (pounds):  186.7 (12/10)   BMI (kg/m^2):  29.2   IBW (pounds):  135 +/- 10%   %IBW:  138.3 %     WEIGHT CHANGE:   12/10     186.7 pounds  12/05     186.7 pounds  Pt weight stable during admission    ESTIMATED NUTRIENT NEEDS:   Calories:  (25-30 kcal/kg) 6159-5936 kcal   Protein:  (1.4-1.6 g/kg) 75-85 g   Fluids:  1 mL/kcal or at team’s discretion   Ideal body weight (IBW) being used as Pt is critically ill and >=100% of ideal body weight.     SUBJECTIVE:   69 yo female with Hx HTN, HLD, DM, CVA x 3 (last in 2016 with residual R hand weakness), GAMALIEL not using CPAP, Emphysema, ex-25 pack year smoker now restarted, chronic constipation on Movantik, chronic urinary retention (SC at home), b/l CTS s/p release, s/p Intrathecal Morphine pump for pain, with chronic neck and back pain worsening for years s/p multiple spinal surgeries including laminectomies and fusion of cervical, thoracic and lumbar spine, s/p T3-L1 revision of prior fusion (with  on 8/17/22). Xray 11/16/23 showing broken rods/displacement. Now s/p C2-S2 instrumentation and fusion (12/5). S/p T9-L2 decompression w/ durotomy with repair (12/10/23).      Discussed Pt with team during IDT rounds. Chart, meds, and labs reviewed. Tmax 37.3 C. MAPs  mm Hg. Meds significant for insulin sliding scale, multivitamin, polyethylene glycol, senna. Labs significant for H/H 8.9/26.3L; POCT 144-206H; glucose 164H; HgbA1c 7.0H; albumin 3.0L; creatinine 0.41L. Met with patient on [facility]. Nutrition interview abbreviated secondary to patient’s emotional state (aggravated). Patient reports an allergy to seafood (crab) with sx: itching; reports she continues to eat them twice weekly but takes antihistamines.     PREVIOUS NUTRITION DIAGNOSIS: Inadequate energy intake related to critical illness resulting in lethargy/sedation as evidenced by NPO status, meeting 0% of estimated needs    Active [  ]       Resolved [ xx ]     If resolved, new PES: Increased protein/energy needs related to physiological causes increasing nutrient needs as evidenced by known increased metabolic demands for healing status post procedure    GOAL: Consistently meet >75% of nutrition needs via most appropriate route for nutrition     RECOMMENDATIONS:   - Recommend continue current diet order (Consistent Carbohydrate Diet)   - Defer consistency to team/S+S   - Allergy section updated to include shellfish by this provider  - Monitor PO intake/appetite, diet tolerance, GI distress, labs (electrolytes, CMP)   - Requesting to trend weights (weekly) as able   - Honor Pt food preferences as able   - Pain and bowel regimen as per team     EDUCATION:  deferred    RISK LEVEL:  High [ xx ]       Moderate [  ]       Low [  ]     ANNI Solano, MS, RD, CDN [id number] Admitting Diagnosis:   Patient is a 68y old  Female who presents with a chief complaint of S12.9XXA      PAST MEDICAL & SURGICAL HISTORY:  HTN (hypertension)  SS (spinal stenosis)  High cholesterol  Stroke x3  H/O carpal tunnel syndrome Bilateral  Chronic GERD  History of chronic constipation  Urinary incontinence self cath as needed  Pre-diabetes  Anxiety and depression  Eczema  H/O kyphosis  Pancreas cyst  Scoliosis  Wheelchair dependent  Cervical pseudoarthrosis and lumbar spine  Cervical spondylosis  Chronic headaches  Degenerative joint disease left shoulder  Lumbosacral spondylosis  COPD (chronic obstructive pulmonary disease)  H/O Spinal surgery lumbar x 30  H/O breast surgery left breast implant 1980's  S/P cervical discectomy x4  H/O total shoulder replacement, right    CURRENT NUTRITION ORDER:   - Consistent Carbohydrate Diet    - NKFA     PO INTAKE:  % [ xx ]       50-75% [  ]       25-50% [  ]       <25% [  ]       N/A [  ]    - patient consumed 100% of three yogurts for breakfast    GI ISSUES:  denies nausea/vomiting; abdomen WDL; last bowel movement documented 12/8     PAIN:  denies pain or discomfort at the time of visit      SKIN INTEGRITY: Filippo scale score 17; trace 1+ dependent edema; Intact except for surgical incisions midline/back       LABS:  12-11    141  |  111<H>  |  8   ----------------------------<  164<H>  4.3   |  24  |  0.41<L>    Ca    8.3<L>      11 Dec 2023 05:30  Phos  3.1     12-11  Mg     1.9     12-11      CAPILLARY BLOOD GLUCOSE  POCT Blood Glucose.: 144 mg/dL (11 Dec 2023 11:18)  POCT Blood Glucose.: 191 mg/dL (10 Dec 2023 22:17)  POCT Blood Glucose.: 206 mg/dL (10 Dec 2023 16:37)    MEDICATIONS  (STANDING):  bisacodyl 5 milliGRAM(s) Oral at bedtime  diazepam    Tablet 5 milliGRAM(s) Oral every 8 hours  DULoxetine 60 milliGRAM(s) Oral daily  gabapentin 800 milliGRAM(s) Oral every 8 hours  influenza  Vaccine (HIGH DOSE) 0.7 milliLiter(s) IntraMuscular once  insulin lispro (ADMELOG) corrective regimen sliding scale   SubCutaneous Before meals and at bedtime  Morphine 1 milliGRAM / mL 17.9 mL,Morphine 1 milliGRAM / mL 17.9 mL 17.9 mL IntraThecal Continuous Pump  multivitamin 1 Tablet(s) Oral daily  pantoprazole  Injectable 40 milliGRAM(s) IV Push daily  polyethylene glycol 3350 17 Gram(s) Oral two times a day  QUEtiapine 12.5 milliGRAM(s) Oral daily  senna 2 Tablet(s) Oral at bedtime  simvastatin 40 milliGRAM(s) Oral at bedtime    MEDICATIONS  (PRN):  benzocaine 20% Spray 1 Spray(s) Mucosal three times a day PRN Sore throat  HYDROmorphone  Injectable 0.5 milliGRAM(s) IV Push every 3 hours PRN Breakthrough Pain  naloxone Injectable 0.1 milliGRAM(s) IV Push every 3 minutes PRN For ANY of the following changes in patient status:  A. RR LESS THAN 10 breaths per minute, B. Oxygen saturation LESS THAN 90%, C. Sedation score of 6  ondansetron Injectable 4 milliGRAM(s) IV Push every 6 hours PRN Nausea  oxyCODONE    IR 30 milliGRAM(s) Oral every 4 hours PRN Severe Pain (7 - 10)  oxyCODONE    IR 15 milliGRAM(s) Oral every 4 hours PRN Moderate Pain (4 - 6)        ANTHROPOMETRICS:   Height (inches):  67   Weight (pounds):  186.7 (12/10)   BMI (kg/m^2):  29.2   IBW (pounds):  135 +/- 10%   %IBW:  138.3 %     WEIGHT CHANGE:   12/10     186.7 pounds  12/05     186.7 pounds  Pt weight stable during admission    ESTIMATED NUTRIENT NEEDS:   Calories:  (25-30 kcal/kg) 0338-5388 kcal   Protein:  (1.4-1.6 g/kg) 75-85 g   Fluids:  1 mL/kcal or at team’s discretion   Ideal body weight (IBW) being used as Pt is critically ill and >=100% of ideal body weight.     SUBJECTIVE:   67 yo female with Hx HTN, HLD, DM, CVA x 3 (last in 2016 with residual R hand weakness), GAMALIEL not using CPAP, Emphysema, ex-25 pack year smoker now restarted, chronic constipation on Movantik, chronic urinary retention (SC at home), b/l CTS s/p release, s/p Intrathecal Morphine pump for pain, with chronic neck and back pain worsening for years s/p multiple spinal surgeries including laminectomies and fusion of cervical, thoracic and lumbar spine, s/p T3-L1 revision of prior fusion (with  on 8/17/22). Xray 11/16/23 showing broken rods/displacement. Now s/p C2-S2 instrumentation and fusion (12/5). S/p T9-L2 decompression w/ durotomy with repair (12/10/23).      Discussed Pt with team during IDT rounds. Chart, meds, and labs reviewed. Tmax 37.3 C. MAPs  mm Hg. Meds significant for insulin sliding scale, multivitamin, polyethylene glycol, senna. Labs significant for H/H 8.9/26.3L; POCT 144-206H; glucose 164H; HgbA1c 7.0H; albumin 3.0L; creatinine 0.41L. Met with patient on [facility]. Nutrition interview abbreviated secondary to patient’s emotional state (aggravated). Patient reports an allergy to crab with sx: itching; reports she continues to eat them twice weekly but takes antihistamines.     PREVIOUS NUTRITION DIAGNOSIS: Inadequate energy intake related to critical illness resulting in lethargy/sedation as evidenced by NPO status, meeting 0% of estimated needs    Active [  ]       Resolved [ xx ]     If resolved, new PES: Increased protein/energy needs related to physiological causes increasing nutrient needs as evidenced by known increased metabolic demands for healing status post procedure    GOAL: Consistently meet >75% of nutrition needs via most appropriate route for nutrition     RECOMMENDATIONS:   - Recommend continue current diet order (Consistent Carbohydrate Diet)   - Defer consistency to team/S+S   - Allergy section updated to include crab by this provider  - Monitor PO intake/appetite, diet tolerance, GI distress, labs (electrolytes, CMP)   - Requesting to trend weights (weekly) as able   - Honor Pt food preferences as able   - Pain and bowel regimen as per team     EDUCATION:  deferred    RISK LEVEL:  High [ xx ]       Moderate [  ]       Low [  ]     ANNI Solano, MS, RD, CDN [id number] Admitting Diagnosis:   Patient is a 68y old  Female who presents with a chief complaint of S12.9XXA      PAST MEDICAL & SURGICAL HISTORY:  HTN (hypertension)  SS (spinal stenosis)  High cholesterol  Stroke x3  H/O carpal tunnel syndrome Bilateral  Chronic GERD  History of chronic constipation  Urinary incontinence self cath as needed  Pre-diabetes  Anxiety and depression  Eczema  H/O kyphosis  Pancreas cyst  Scoliosis  Wheelchair dependent  Cervical pseudoarthrosis and lumbar spine  Cervical spondylosis  Chronic headaches  Degenerative joint disease left shoulder  Lumbosacral spondylosis  COPD (chronic obstructive pulmonary disease)  H/O Spinal surgery lumbar x 30  H/O breast surgery left breast implant 1980's  S/P cervical discectomy x4  H/O total shoulder replacement, right    CURRENT NUTRITION ORDER:   - Consistent Carbohydrate Diet    - NKFA     PO INTAKE:  % [ xx ]       50-75% [  ]       25-50% [  ]       <25% [  ]       N/A [  ]    - patient consumed 100% of three yogurts for breakfast    GI ISSUES:  denies nausea/vomiting; abdomen WDL; last bowel movement documented 12/8     PAIN:  denies pain or discomfort at the time of visit      SKIN INTEGRITY: Filippo scale score 17; trace 1+ dependent edema; Intact except for surgical incisions midline/back       LABS:  12-11    141  |  111<H>  |  8   ----------------------------<  164<H>  4.3   |  24  |  0.41<L>    Ca    8.3<L>      11 Dec 2023 05:30  Phos  3.1     12-11  Mg     1.9     12-11      CAPILLARY BLOOD GLUCOSE  POCT Blood Glucose.: 144 mg/dL (11 Dec 2023 11:18)  POCT Blood Glucose.: 191 mg/dL (10 Dec 2023 22:17)  POCT Blood Glucose.: 206 mg/dL (10 Dec 2023 16:37)    MEDICATIONS  (STANDING):  bisacodyl 5 milliGRAM(s) Oral at bedtime  diazepam    Tablet 5 milliGRAM(s) Oral every 8 hours  DULoxetine 60 milliGRAM(s) Oral daily  gabapentin 800 milliGRAM(s) Oral every 8 hours  influenza  Vaccine (HIGH DOSE) 0.7 milliLiter(s) IntraMuscular once  insulin lispro (ADMELOG) corrective regimen sliding scale   SubCutaneous Before meals and at bedtime  Morphine 1 milliGRAM / mL 17.9 mL,Morphine 1 milliGRAM / mL 17.9 mL 17.9 mL IntraThecal Continuous Pump  multivitamin 1 Tablet(s) Oral daily  pantoprazole  Injectable 40 milliGRAM(s) IV Push daily  polyethylene glycol 3350 17 Gram(s) Oral two times a day  QUEtiapine 12.5 milliGRAM(s) Oral daily  senna 2 Tablet(s) Oral at bedtime  simvastatin 40 milliGRAM(s) Oral at bedtime    MEDICATIONS  (PRN):  benzocaine 20% Spray 1 Spray(s) Mucosal three times a day PRN Sore throat  HYDROmorphone  Injectable 0.5 milliGRAM(s) IV Push every 3 hours PRN Breakthrough Pain  naloxone Injectable 0.1 milliGRAM(s) IV Push every 3 minutes PRN For ANY of the following changes in patient status:  A. RR LESS THAN 10 breaths per minute, B. Oxygen saturation LESS THAN 90%, C. Sedation score of 6  ondansetron Injectable 4 milliGRAM(s) IV Push every 6 hours PRN Nausea  oxyCODONE    IR 30 milliGRAM(s) Oral every 4 hours PRN Severe Pain (7 - 10)  oxyCODONE    IR 15 milliGRAM(s) Oral every 4 hours PRN Moderate Pain (4 - 6)        ANTHROPOMETRICS:   Height (inches):  67   Weight (pounds):  186.7 (12/10)   BMI (kg/m^2):  29.2   IBW (pounds):  135 +/- 10%   %IBW:  138.3 %     WEIGHT CHANGE:   12/10     186.7 pounds  12/05     186.7 pounds  Pt weight stable during admission    ESTIMATED NUTRIENT NEEDS:   Calories:  (25-30 kcal/kg) 6163-7967 kcal   Protein:  (1.4-1.6 g/kg) 75-85 g   Fluids:  1 mL/kcal or at team’s discretion   Ideal body weight (IBW) being used as Pt is critically ill and >=100% of ideal body weight.     SUBJECTIVE:   67 yo female with Hx HTN, HLD, DM, CVA x 3 (last in 2016 with residual R hand weakness), GAMALIEL not using CPAP, Emphysema, ex-25 pack year smoker now restarted, chronic constipation on Movantik, chronic urinary retention (SC at home), b/l CTS s/p release, s/p Intrathecal Morphine pump for pain, with chronic neck and back pain worsening for years s/p multiple spinal surgeries including laminectomies and fusion of cervical, thoracic and lumbar spine, s/p T3-L1 revision of prior fusion (with  on 8/17/22). Xray 11/16/23 showing broken rods/displacement. Now s/p C2-S2 instrumentation and fusion (12/5). S/p T9-L2 decompression w/ durotomy with repair (12/10/23).      Discussed Pt with team during IDT rounds. Chart, meds, and labs reviewed. Tmax 37.3 C. MAPs  mm Hg. Meds significant for insulin sliding scale, multivitamin, polyethylene glycol, senna. Labs significant for H/H 8.9/26.3L; POCT 144-206H; glucose 164H; HgbA1c 7.0H; albumin 3.0L; creatinine 0.41L. Met with patient on [facility] [place]. Nutrition interview abbreviated secondary to patient’s emotional state (aggravated). Patient reports an allergy to crab with sx: itching; reports she continues to eat them twice weekly but takes antihistamines.     PREVIOUS NUTRITION DIAGNOSIS: Inadequate energy intake related to critical illness resulting in lethargy/sedation as evidenced by NPO status, meeting 0% of estimated needs    Active [  ]       Resolved [ xx ]     If resolved, new PES: Increased protein/energy needs related to physiological causes increasing nutrient needs as evidenced by known increased metabolic demands for healing status post procedure    GOAL: Consistently meet >75% of nutrition needs via most appropriate route for nutrition     RECOMMENDATIONS:   - Recommend continue current diet order (Consistent Carbohydrate Diet)   - Defer consistency to team/S+S   - Allergy section updated to include crab by this provider  - Monitor PO intake/appetite, diet tolerance, GI distress, labs (electrolytes, CMP)   - Requesting to trend weights (weekly) as able   - Honor Pt food preferences as able   - Pain and bowel regimen as per team     EDUCATION:  deferred    RISK LEVEL:  High [ xx ]       Moderate [  ]       Low [  ]     ANNI Solano, MS, RD, CDN [id number] Admitting Diagnosis:   Patient is a 68y old  Female who presents with a chief complaint of S12.9XXA      PAST MEDICAL & SURGICAL HISTORY:  HTN (hypertension)  SS (spinal stenosis)  High cholesterol  Stroke x3  H/O carpal tunnel syndrome Bilateral  Chronic GERD  History of chronic constipation  Urinary incontinence self cath as needed  Pre-diabetes  Anxiety and depression  Eczema  H/O kyphosis  Pancreas cyst  Scoliosis  Wheelchair dependent  Cervical pseudoarthrosis and lumbar spine  Cervical spondylosis  Chronic headaches  Degenerative joint disease left shoulder  Lumbosacral spondylosis  COPD (chronic obstructive pulmonary disease)  H/O Spinal surgery lumbar x 30  H/O breast surgery left breast implant 1980's  S/P cervical discectomy x4  H/O total shoulder replacement, right    CURRENT NUTRITION ORDER:   - Consistent Carbohydrate Diet    - NKFA     PO INTAKE:  % [ xx ]       50-75% [  ]       25-50% [  ]       <25% [  ]       N/A [  ]    - patient consumed 100% of three yogurts for breakfast    GI ISSUES:  denies nausea/vomiting; abdomen WDL; last bowel movement documented 12/8     PAIN:  reports pain/discomfort at the time of visit      SKIN INTEGRITY: Filippo scale score 17; trace 1+ dependent edema; Intact except for surgical incisions midline/back       LABS:  12-11    141  |  111<H>  |  8   ----------------------------<  164<H>  4.3   |  24  |  0.41<L>    Ca    8.3<L>      11 Dec 2023 05:30  Phos  3.1     12-11  Mg     1.9     12-11      CAPILLARY BLOOD GLUCOSE  POCT Blood Glucose.: 144 mg/dL (11 Dec 2023 11:18)  POCT Blood Glucose.: 191 mg/dL (10 Dec 2023 22:17)  POCT Blood Glucose.: 206 mg/dL (10 Dec 2023 16:37)    MEDICATIONS  (STANDING):  bisacodyl 5 milliGRAM(s) Oral at bedtime  diazepam    Tablet 5 milliGRAM(s) Oral every 8 hours  DULoxetine 60 milliGRAM(s) Oral daily  gabapentin 800 milliGRAM(s) Oral every 8 hours  influenza  Vaccine (HIGH DOSE) 0.7 milliLiter(s) IntraMuscular once  insulin lispro (ADMELOG) corrective regimen sliding scale   SubCutaneous Before meals and at bedtime  Morphine 1 milliGRAM / mL 17.9 mL,Morphine 1 milliGRAM / mL 17.9 mL 17.9 mL IntraThecal Continuous Pump  multivitamin 1 Tablet(s) Oral daily  pantoprazole  Injectable 40 milliGRAM(s) IV Push daily  polyethylene glycol 3350 17 Gram(s) Oral two times a day  QUEtiapine 12.5 milliGRAM(s) Oral daily  senna 2 Tablet(s) Oral at bedtime  simvastatin 40 milliGRAM(s) Oral at bedtime    MEDICATIONS  (PRN):  benzocaine 20% Spray 1 Spray(s) Mucosal three times a day PRN Sore throat  HYDROmorphone  Injectable 0.5 milliGRAM(s) IV Push every 3 hours PRN Breakthrough Pain  naloxone Injectable 0.1 milliGRAM(s) IV Push every 3 minutes PRN For ANY of the following changes in patient status:  A. RR LESS THAN 10 breaths per minute, B. Oxygen saturation LESS THAN 90%, C. Sedation score of 6  ondansetron Injectable 4 milliGRAM(s) IV Push every 6 hours PRN Nausea  oxyCODONE    IR 30 milliGRAM(s) Oral every 4 hours PRN Severe Pain (7 - 10)  oxyCODONE    IR 15 milliGRAM(s) Oral every 4 hours PRN Moderate Pain (4 - 6)        ANTHROPOMETRICS:   Height (inches):  67   Weight (pounds):  186.7 (12/10)   BMI (kg/m^2):  29.2   IBW (pounds):  135 +/- 10%   %IBW:  138.3 %     WEIGHT CHANGE:   12/10     186.7 pounds  12/05     186.7 pounds  Pt weight stable during admission    ESTIMATED NUTRIENT NEEDS:   Calories:  (25-30 kcal/kg) 9479-3840 kcal   Protein:  (1.4-1.6 g/kg) 75-85 g   Fluids:  1 mL/kcal or at team’s discretion   Ideal body weight (IBW) being used as Pt is critically ill and >=100% of ideal body weight.     SUBJECTIVE:   67 yo female with Hx HTN, HLD, DM, CVA x 3 (last in 2016 with residual R hand weakness), GAMALIEL not using CPAP, Emphysema, ex-25 pack year smoker now restarted, chronic constipation on Movantik, chronic urinary retention (SC at home), b/l CTS s/p release, s/p Intrathecal Morphine pump for pain, with chronic neck and back pain worsening for years s/p multiple spinal surgeries including laminectomies and fusion of cervical, thoracic and lumbar spine, s/p T3-L1 revision of prior fusion (with  on 8/17/22). Xray 11/16/23 showing broken rods/displacement. Now s/p C2-S2 instrumentation and fusion (12/5). S/p T9-L2 decompression w/ durotomy with repair (12/10/23).      Discussed Pt with team during IDT rounds. Chart, meds, and labs reviewed. Tmax 37.3 C. MAPs  mm Hg. Meds significant for insulin sliding scale, multivitamin, polyethylene glycol, senna. Labs significant for H/H 8.9/26.3L; POCT 144-206H; glucose 164H; HgbA1c 7.0H; albumin 3.0L; creatinine 0.41L. Met with patient on 8 [place]. Nutrition interview abbreviated secondary to patient’s emotional state (aggravated). Patient reports an allergy to crab with sx: itching; reports she continues to eat them twice weekly but takes antihistamines.     PREVIOUS NUTRITION DIAGNOSIS: Inadequate energy intake related to critical illness resulting in lethargy/sedation as evidenced by NPO status, meeting 0% of estimated needs    Active [  ]       Resolved [ xx ]     If resolved, new PES: Increased protein/energy needs related to physiological causes increasing nutrient needs as evidenced by known increased metabolic demands for healing status post procedure    GOAL: Consistently meet >75% of nutrition needs via most appropriate route for nutrition     RECOMMENDATIONS:   - Recommend continue current diet order (Consistent Carbohydrate Diet)   - Defer consistency to team/S+S   - Allergy section updated to include crab by this provider  - Monitor PO intake/appetite, diet tolerance, GI distress, labs (electrolytes, CMP)   - Requesting to trend weights (weekly) as able   - Honor Pt food preferences as able   - Pain and bowel regimen as per team     EDUCATION:  deferred    RISK LEVEL:  High [ xx ]       Moderate [  ]       Low [  ]     ANNI Solano, MS, RD, CDN [id number] Admitting Diagnosis:   Patient is a 68y old  Female who presents with a chief complaint of S12.9XXA      PAST MEDICAL & SURGICAL HISTORY:  HTN (hypertension)  SS (spinal stenosis)  High cholesterol  Stroke x3  H/O carpal tunnel syndrome Bilateral  Chronic GERD  History of chronic constipation  Urinary incontinence self cath as needed  Pre-diabetes  Anxiety and depression  Eczema  H/O kyphosis  Pancreas cyst  Scoliosis  Wheelchair dependent  Cervical pseudoarthrosis and lumbar spine  Cervical spondylosis  Chronic headaches  Degenerative joint disease left shoulder  Lumbosacral spondylosis  COPD (chronic obstructive pulmonary disease)  H/O Spinal surgery lumbar x 30  H/O breast surgery left breast implant 1980's  S/P cervical discectomy x4  H/O total shoulder replacement, right    CURRENT NUTRITION ORDER:   - Consistent Carbohydrate Diet    - NKFA     PO INTAKE:  % [ xx ]       50-75% [  ]       25-50% [  ]       <25% [  ]       N/A [  ]    - patient consumed 100% of three yogurts for breakfast    GI ISSUES:  denies nausea/vomiting; abdomen WDL; last bowel movement documented 12/8     PAIN:  reports pain/discomfort at the time of visit      SKIN INTEGRITY: Filippo scale score 17; trace 1+ dependent edema; Intact except for surgical incisions midline/back       LABS:  12-11    141  |  111<H>  |  8   ----------------------------<  164<H>  4.3   |  24  |  0.41<L>    Ca    8.3<L>      11 Dec 2023 05:30  Phos  3.1     12-11  Mg     1.9     12-11      CAPILLARY BLOOD GLUCOSE  POCT Blood Glucose.: 144 mg/dL (11 Dec 2023 11:18)  POCT Blood Glucose.: 191 mg/dL (10 Dec 2023 22:17)  POCT Blood Glucose.: 206 mg/dL (10 Dec 2023 16:37)    MEDICATIONS  (STANDING):  bisacodyl 5 milliGRAM(s) Oral at bedtime  diazepam    Tablet 5 milliGRAM(s) Oral every 8 hours  DULoxetine 60 milliGRAM(s) Oral daily  gabapentin 800 milliGRAM(s) Oral every 8 hours  influenza  Vaccine (HIGH DOSE) 0.7 milliLiter(s) IntraMuscular once  insulin lispro (ADMELOG) corrective regimen sliding scale   SubCutaneous Before meals and at bedtime  Morphine 1 milliGRAM / mL 17.9 mL,Morphine 1 milliGRAM / mL 17.9 mL 17.9 mL IntraThecal Continuous Pump  multivitamin 1 Tablet(s) Oral daily  pantoprazole  Injectable 40 milliGRAM(s) IV Push daily  polyethylene glycol 3350 17 Gram(s) Oral two times a day  QUEtiapine 12.5 milliGRAM(s) Oral daily  senna 2 Tablet(s) Oral at bedtime  simvastatin 40 milliGRAM(s) Oral at bedtime    MEDICATIONS  (PRN):  benzocaine 20% Spray 1 Spray(s) Mucosal three times a day PRN Sore throat  HYDROmorphone  Injectable 0.5 milliGRAM(s) IV Push every 3 hours PRN Breakthrough Pain  naloxone Injectable 0.1 milliGRAM(s) IV Push every 3 minutes PRN For ANY of the following changes in patient status:  A. RR LESS THAN 10 breaths per minute, B. Oxygen saturation LESS THAN 90%, C. Sedation score of 6  ondansetron Injectable 4 milliGRAM(s) IV Push every 6 hours PRN Nausea  oxyCODONE    IR 30 milliGRAM(s) Oral every 4 hours PRN Severe Pain (7 - 10)  oxyCODONE    IR 15 milliGRAM(s) Oral every 4 hours PRN Moderate Pain (4 - 6)        ANTHROPOMETRICS:   Height (inches):  67   Weight (pounds):  186.7 (12/10)   BMI (kg/m^2):  29.2   IBW (pounds):  135 +/- 10%   %IBW:  138.3 %     WEIGHT CHANGE:   12/10     186.7 pounds  12/05     186.7 pounds  Pt weight stable during admission    ESTIMATED NUTRIENT NEEDS:   Calories:  (25-30 kcal/kg) 5865-0984 kcal   Protein:  (1.4-1.6 g/kg) 75-85 g   Fluids:  1 mL/kcal or at team’s discretion   Ideal body weight (IBW) being used as Pt is critically ill and >=100% of ideal body weight.     SUBJECTIVE:   69 yo female with Hx HTN, HLD, DM, CVA x 3 (last in 2016 with residual R hand weakness), GAMALIEL not using CPAP, Emphysema, ex-25 pack year smoker now restarted, chronic constipation on Movantik, chronic urinary retention (SC at home), b/l CTS s/p release, s/p Intrathecal Morphine pump for pain, with chronic neck and back pain worsening for years s/p multiple spinal surgeries including laminectomies and fusion of cervical, thoracic and lumbar spine, s/p T3-L1 revision of prior fusion (with  on 8/17/22). Xray 11/16/23 showing broken rods/displacement. Now s/p C2-S2 instrumentation and fusion (12/5). S/p T9-L2 decompression w/ durotomy with repair (12/10/23).      Discussed Pt with team during IDT rounds. Chart, meds, and labs reviewed. Tmax 37.3 C. MAPs  mm Hg. Meds significant for insulin sliding scale, multivitamin, polyethylene glycol, senna. Labs significant for H/H 8.9/26.3L; POCT 144-206H; glucose 164H; HgbA1c 7.0H; albumin 3.0L; creatinine 0.41L. Met with patient on [facility] [place]. Nutrition interview abbreviated secondary to patient’s emotional state (aggravated). Patient reports an allergy to crab with sx: itching; reports she continues to eat them twice weekly but takes antihistamines.     PREVIOUS NUTRITION DIAGNOSIS: Inadequate energy intake related to critical illness resulting in lethargy/sedation as evidenced by NPO status, meeting 0% of estimated needs    Active [  ]       Resolved [ xx ]     If resolved, new PES: Increased protein/energy needs related to physiological causes increasing nutrient needs as evidenced by known increased metabolic demands for healing status post procedure    GOAL: Consistently meet >75% of nutrition needs via most appropriate route for nutrition     RECOMMENDATIONS:   - Recommend continue current diet order (Consistent Carbohydrate Diet)   - Defer consistency to team/S+S   - Allergy section updated to include crab by this provider  - Monitor PO intake/appetite, diet tolerance, GI distress, labs (electrolytes, CMP)   - Requesting to trend weights (weekly) as able   - Honor Pt food preferences as able   - Pain and bowel regimen as per team     EDUCATION:  deferred    RISK LEVEL:  High [ xx ]       Moderate [  ]       Low [  ]     ANNI Solano, MS, RD, CDN [id number]

## 2023-12-11 NOTE — PROGRESS NOTE ADULT - SUBJECTIVE AND OBJECTIVE BOX
========================  NSICU ATTENDING PROGRESS NOTE  ========================    HPI:  66 y/o female with PMHx HTN, HLD, CVA x 3 (last was 2016 with right hand weakness residual), GAMALIEL not using CPAP, Emphysema, former 25 pack year smoker restarted this week of surgery, Chronic Constipation on Movantik, Urinary Incontinence chronically self straight cath at home, chronic UTI, Breast Implant Rupture with Chronic Leak repair 10/2023, B/L CTS s/p release, s/p Intrathecal Morphine pump for pain, with chronic neck and back pain worsening for years s/p multiple spinal surgeries including laminectomies and fusion of cervical, thoracic and lumbar spine initially done in 2009 and recently in 2019 and 2020 at [facility] by Dr. Trinidad, s/p T3-L1 revision of prior fusion (with Dr. Luz on 8/17/22). Outpatient Xray 11/16/2023 showing broken rods/displacement. Presents for elective C2-S2 instrumentation and fusion. Pt endorses weakness of b/l LE and burning pain in b/l lower extremities radiating to both feet,  (04 Dec 2023 15:44)    On admission, the patient was:  GCS:  Lozano-Haines:  modified Arango:  ICH score:  NIHSS:    24 Hour Events/Subjective:  POD0 s/p T9- L2 decompression. Kept intubated.      *** HIGH RISK OF DVT PRESENT ON ADMISSION ***    ICU Vital Signs Last 24 Hrs  T(C): 36.4 (11 Dec 2023 05:21), Max: 37.3 (11 Dec 2023 00:35)  T(F): 97.5 (11 Dec 2023 05:21), Max: 99.2 (11 Dec 2023 00:35)  HR: 70 (11 Dec 2023 07:00) (63 - 100)  BP: 149/76 (11 Dec 2023 07:00) (96/52 - 190/115)  BP(mean): 107 (11 Dec 2023 07:00) (69 - 146)  ABP: 140/80 (11 Dec 2023 07:00) (97/56 - 216/110)  ABP(mean): 105 (11 Dec 2023 07:00) (71 - 155)  RR: 17 (11 Dec 2023 07:00) (12 - 20)  SpO2: 100% (11 Dec 2023 07:00) (96% - 100%)      12-10-23 @ 07:01  -  12-11-23 @ 07:00  --------------------------------------------------------  IN: 2841.5 mL / OUT: 3970 mL / NET: -1128.5 mL      Mode: AC/ CMV (Assist Control/ Continuous Mandatory Ventilation), RR (machine): 12, TV (machine): 450, FiO2: 40, PEEP: 5, ITime: 1, MAP: 10, PIP: 25    EXAMINATION:  General: Awake, restless  HEENT:  MMM, ETT  Neuro: Awake, restless, following commands and oriented by nodding appropriately, BUE 5/5 (RUE effort 4+ bi/tri), BLE 0/5 **  Sensation: Level at T11  Cards: S1/S2, RRR  Respiratory: Clear, no wheeze  Abdomen: Soft, non- tender  Extremities: No edema  Skin: Warm/dry      MEDICATIONS:   benzocaine 20% Spray 1 Spray(s) Mucosal three times a day PRN  bisacodyl 5 milliGRAM(s) Oral at bedtime  chlorhexidine 0.12% Liquid 15 milliLiter(s) Oral Mucosa every 12 hours  dexMEDEtomidine Infusion 0.2 MICROgram(s)/kG/Hr (4.24 mL/Hr) IV Continuous <Continuous>  HYDROmorphone  Injectable 0.5 milliGRAM(s) IV Push every 3 hours PRN  influenza  Vaccine (HIGH DOSE) 0.7 milliLiter(s) IntraMuscular once  insulin lispro (ADMELOG) corrective regimen sliding scale   SubCutaneous Before meals and at bedtime  magnesium sulfate  IVPB 1 Gram(s) IV Intermittent once  multivitamin 1 Tablet(s) Oral daily  naloxone Injectable 0.1 milliGRAM(s) IV Push every 3 minutes PRN  ondansetron Injectable 4 milliGRAM(s) IV Push every 6 hours PRN  oxyCODONE    IR 15 milliGRAM(s) Oral every 4 hours PRN  oxyCODONE    IR 30 milliGRAM(s) Oral every 4 hours PRN  pantoprazole  Injectable 40 milliGRAM(s) IV Push daily  polyethylene glycol 3350 17 Gram(s) Oral two times a day  senna 2 Tablet(s) Oral at bedtime  simvastatin 40 milliGRAM(s) Oral at bedtime  sodium chloride 0.9%. 1000 milliLiter(s) (85 mL/Hr) IV Continuous <Continuous>      LABS:                      8.9    8.15  )-----------( 182      ( 11 Dec 2023 05:30 )             26.3     12-11    141  |  111<H>  |  8   ----------------------------<  164<H>  4.3   |  24  |  0.41<L>    Ca    8.3<L>      11 Dec 2023 05:30  Phos  3.1     12-11  Mg     1.9     12-11    TPro  5.4<L>  /  Alb  3.0<L>  /  TBili  0.4  /  DBili  x   /  AST  16  /  ALT  13  /  AlkPhos  73  12-09    LIVER FUNCTIONS - ( 09 Dec 2023 09:15 )  Alb: 3.0 g/dL / Pro: 5.4 g/dL / ALK PHOS: 73 U/L / ALT: 13 U/L / AST: 16 U/L / GGT: x           ABG - ( 10 Dec 2023 17:12 )  pH, Arterial: 7.42  pH, Blood: x     /  pCO2: 36    /  pO2: 122   / HCO3: 23    / Base Excess: -0.7  /  SaO2: 99.0                   ========================  NSICU ATTENDING PROGRESS NOTE  ========================    HPI:  68 y/o female with PMHx HTN, HLD, CVA x 3 (last was 2016 with right hand weakness residual), GAMALIEL not using CPAP, Emphysema, former 25 pack year smoker restarted this week of surgery, Chronic Constipation on Movantik, Urinary Incontinence chronically self straight cath at home, chronic UTI, Breast Implant Rupture with Chronic Leak repair 10/2023, B/L CTS s/p release, s/p Intrathecal Morphine pump for pain, with chronic neck and back pain worsening for years s/p multiple spinal surgeries including laminectomies and fusion of cervical, thoracic and lumbar spine initially done in 2009 and recently in 2019 and 2020 at [facility] by Dr. Trinidad, s/p T3-L1 revision of prior fusion (with Dr. Luz on 8/17/22). Outpatient Xray 11/16/2023 showing broken rods/displacement. Presents for elective C2-S2 instrumentation and fusion. Pt endorses weakness of b/l LE and burning pain in b/l lower extremities radiating to both feet,  (04 Dec 2023 15:44)    On admission, the patient was:  GCS:  Lozano-Haines:  modified Arango:  ICH score:  NIHSS:    24 Hour Events/Subjective:  POD0 s/p T9- L2 decompression. Kept intubated.      *** HIGH RISK OF DVT PRESENT ON ADMISSION ***    ICU Vital Signs Last 24 Hrs  T(C): 36.4 (11 Dec 2023 05:21), Max: 37.3 (11 Dec 2023 00:35)  T(F): 97.5 (11 Dec 2023 05:21), Max: 99.2 (11 Dec 2023 00:35)  HR: 70 (11 Dec 2023 07:00) (63 - 100)  BP: 149/76 (11 Dec 2023 07:00) (96/52 - 190/115)  BP(mean): 107 (11 Dec 2023 07:00) (69 - 146)  ABP: 140/80 (11 Dec 2023 07:00) (97/56 - 216/110)  ABP(mean): 105 (11 Dec 2023 07:00) (71 - 155)  RR: 17 (11 Dec 2023 07:00) (12 - 20)  SpO2: 100% (11 Dec 2023 07:00) (96% - 100%)      12-10-23 @ 07:01  -  12-11-23 @ 07:00  --------------------------------------------------------  IN: 2841.5 mL / OUT: 3970 mL / NET: -1128.5 mL      Mode: AC/ CMV (Assist Control/ Continuous Mandatory Ventilation), RR (machine): 12, TV (machine): 450, FiO2: 40, PEEP: 5, ITime: 1, MAP: 10, PIP: 25    EXAMINATION:  General: Awake, restless  HEENT:  MMM, ETT  Neuro: Awake, restless, following commands and oriented by nodding appropriately, BUE 5/5 (RUE effort 4+ bi/tri), BLE 0/5 **  Sensation: Level at T11  Cards: S1/S2, RRR  Respiratory: Clear, no wheeze  Abdomen: Soft, non- tender  Extremities: No edema  Skin: Warm/dry      MEDICATIONS:   benzocaine 20% Spray 1 Spray(s) Mucosal three times a day PRN  bisacodyl 5 milliGRAM(s) Oral at bedtime  chlorhexidine 0.12% Liquid 15 milliLiter(s) Oral Mucosa every 12 hours  dexMEDEtomidine Infusion 0.2 MICROgram(s)/kG/Hr (4.24 mL/Hr) IV Continuous <Continuous>  HYDROmorphone  Injectable 0.5 milliGRAM(s) IV Push every 3 hours PRN  influenza  Vaccine (HIGH DOSE) 0.7 milliLiter(s) IntraMuscular once  insulin lispro (ADMELOG) corrective regimen sliding scale   SubCutaneous Before meals and at bedtime  magnesium sulfate  IVPB 1 Gram(s) IV Intermittent once  multivitamin 1 Tablet(s) Oral daily  naloxone Injectable 0.1 milliGRAM(s) IV Push every 3 minutes PRN  ondansetron Injectable 4 milliGRAM(s) IV Push every 6 hours PRN  oxyCODONE    IR 15 milliGRAM(s) Oral every 4 hours PRN  oxyCODONE    IR 30 milliGRAM(s) Oral every 4 hours PRN  pantoprazole  Injectable 40 milliGRAM(s) IV Push daily  polyethylene glycol 3350 17 Gram(s) Oral two times a day  senna 2 Tablet(s) Oral at bedtime  simvastatin 40 milliGRAM(s) Oral at bedtime  sodium chloride 0.9%. 1000 milliLiter(s) (85 mL/Hr) IV Continuous <Continuous>      LABS:                      8.9    8.15  )-----------( 182      ( 11 Dec 2023 05:30 )             26.3     12-11    141  |  111<H>  |  8   ----------------------------<  164<H>  4.3   |  24  |  0.41<L>    Ca    8.3<L>      11 Dec 2023 05:30  Phos  3.1     12-11  Mg     1.9     12-11    TPro  5.4<L>  /  Alb  3.0<L>  /  TBili  0.4  /  DBili  x   /  AST  16  /  ALT  13  /  AlkPhos  73  12-09    LIVER FUNCTIONS - ( 09 Dec 2023 09:15 )  Alb: 3.0 g/dL / Pro: 5.4 g/dL / ALK PHOS: 73 U/L / ALT: 13 U/L / AST: 16 U/L / GGT: x           ABG - ( 10 Dec 2023 17:12 )  pH, Arterial: 7.42  pH, Blood: x     /  pCO2: 36    /  pO2: 122   / HCO3: 23    / Base Excess: -0.7  /  SaO2: 99.0                   ========================  NSICU ATTENDING PROGRESS NOTE  ========================    HPI:  66 y/o female with PMHx HTN, HLD, CVA x 3 (last was 2016 with right hand weakness residual), GAMALIEL not using CPAP, Emphysema, former 25 pack year smoker restarted this week of surgery, Chronic Constipation on Movantik, Urinary Incontinence chronically self straight cath at home, chronic UTI, Breast Implant Rupture with Chronic Leak repair 10/2023, B/L CTS s/p release, s/p Intrathecal Morphine pump for pain, with chronic neck and back pain worsening for years s/p multiple spinal surgeries including laminectomies and fusion of cervical, thoracic and lumbar spine initially done in 2009 and recently in 2019 and 2020 at [facility] by Dr. Trinidad, s/p T3-L1 revision of prior fusion (with Dr. Luz on 8/17/22). Outpatient Xray 11/16/2023 showing broken rods/displacement. Presents for elective C2-S2 instrumentation and fusion. Pt endorses weakness of b/l LE and burning pain in b/l lower extremities radiating to both feet,  (04 Dec 2023 15:44)    On admission, the patient was:  GCS:  Lozano-Haines:  modified Arango:  ICH score:  NIHSS:    24 Hour Events/Subjective:  POD0 s/p T9- L2 decompression. Kept intubated.      *** HIGH RISK OF DVT PRESENT ON ADMISSION ***    ICU Vital Signs Last 24 Hrs  T(C): 36.4 (11 Dec 2023 05:21), Max: 37.3 (11 Dec 2023 00:35)  T(F): 97.5 (11 Dec 2023 05:21), Max: 99.2 (11 Dec 2023 00:35)  HR: 70 (11 Dec 2023 07:00) (63 - 100)  BP: 149/76 (11 Dec 2023 07:00) (96/52 - 190/115)  BP(mean): 107 (11 Dec 2023 07:00) (69 - 146)  ABP: 140/80 (11 Dec 2023 07:00) (97/56 - 216/110)  ABP(mean): 105 (11 Dec 2023 07:00) (71 - 155)  RR: 17 (11 Dec 2023 07:00) (12 - 20)  SpO2: 100% (11 Dec 2023 07:00) (96% - 100%)      12-10-23 @ 07:01  -  12-11-23 @ 07:00  --------------------------------------------------------  IN: 2841.5 mL / OUT: 3970 mL / NET: -1128.5 mL      Mode: AC/ CMV (Assist Control/ Continuous Mandatory Ventilation), RR (machine): 12, TV (machine): 450, FiO2: 40, PEEP: 5, ITime: 1, MAP: 10, PIP: 25    EXAMINATION:  General: Awake, restless  HEENT:  MMM, ETT  Neuro: Awake, restless, following commands and oriented by nodding appropriately, BUE 5/5 (RUE effort 4+ bi/tri), BLE 0/5   Sensation: Level at T11  Cards: S1/S2, RRR  Respiratory: Clear, no wheeze  Abdomen: Soft, non- tender  Extremities: No edema  Skin: Warm/dry      MEDICATIONS:   benzocaine 20% Spray 1 Spray(s) Mucosal three times a day PRN  bisacodyl 5 milliGRAM(s) Oral at bedtime  chlorhexidine 0.12% Liquid 15 milliLiter(s) Oral Mucosa every 12 hours  dexMEDEtomidine Infusion 0.2 MICROgram(s)/kG/Hr (4.24 mL/Hr) IV Continuous <Continuous>  HYDROmorphone  Injectable 0.5 milliGRAM(s) IV Push every 3 hours PRN  influenza  Vaccine (HIGH DOSE) 0.7 milliLiter(s) IntraMuscular once  insulin lispro (ADMELOG) corrective regimen sliding scale   SubCutaneous Before meals and at bedtime  magnesium sulfate  IVPB 1 Gram(s) IV Intermittent once  multivitamin 1 Tablet(s) Oral daily  naloxone Injectable 0.1 milliGRAM(s) IV Push every 3 minutes PRN  ondansetron Injectable 4 milliGRAM(s) IV Push every 6 hours PRN  oxyCODONE    IR 15 milliGRAM(s) Oral every 4 hours PRN  oxyCODONE    IR 30 milliGRAM(s) Oral every 4 hours PRN  pantoprazole  Injectable 40 milliGRAM(s) IV Push daily  polyethylene glycol 3350 17 Gram(s) Oral two times a day  senna 2 Tablet(s) Oral at bedtime  simvastatin 40 milliGRAM(s) Oral at bedtime  sodium chloride 0.9%. 1000 milliLiter(s) (85 mL/Hr) IV Continuous <Continuous>      LABS:                      8.9    8.15  )-----------( 182      ( 11 Dec 2023 05:30 )             26.3     12-11    141  |  111<H>  |  8   ----------------------------<  164<H>  4.3   |  24  |  0.41<L>    Ca    8.3<L>      11 Dec 2023 05:30  Phos  3.1     12-11  Mg     1.9     12-11    TPro  5.4<L>  /  Alb  3.0<L>  /  TBili  0.4  /  DBili  x   /  AST  16  /  ALT  13  /  AlkPhos  73  12-09    LIVER FUNCTIONS - ( 09 Dec 2023 09:15 )  Alb: 3.0 g/dL / Pro: 5.4 g/dL / ALK PHOS: 73 U/L / ALT: 13 U/L / AST: 16 U/L / GGT: x           ABG - ( 10 Dec 2023 17:12 )  pH, Arterial: 7.42  pH, Blood: x     /  pCO2: 36    /  pO2: 122   / HCO3: 23    / Base Excess: -0.7  /  SaO2: 99.0                   ========================  NSICU ATTENDING PROGRESS NOTE  ========================    HPI:  66 y/o female with PMHx HTN, HLD, CVA x 3 (last was 2016 with right hand weakness residual), GAMALIEL not using CPAP, Emphysema, former 25 pack year smoker restarted this week of surgery, Chronic Constipation on Movantik, Urinary Incontinence chronically self straight cath at home, chronic UTI, Breast Implant Rupture with Chronic Leak repair 10/2023, B/L CTS s/p release, s/p Intrathecal Morphine pump for pain, with chronic neck and back pain worsening for years s/p multiple spinal surgeries including laminectomies and fusion of cervical, thoracic and lumbar spine initially done in 2009 and recently in 2019 and 2020 at [facility] by Dr. Trinidad, s/p T3-L1 revision of prior fusion (with Dr. Luz on 8/17/22). Outpatient Xray 11/16/2023 showing broken rods/displacement. Presents for elective C2-S2 instrumentation and fusion. Pt endorses weakness of b/l LE and burning pain in b/l lower extremities radiating to both feet,  (04 Dec 2023 15:44)    On admission, the patient was:  GCS:  Lozano-Haines:  modified Arango:  ICH score:  NIHSS:    24 Hour Events/Subjective:  POD0 s/p T9- L2 decompression. Kept intubated.      *** HIGH RISK OF DVT PRESENT ON ADMISSION ***    ICU Vital Signs Last 24 Hrs  T(C): 36.4 (11 Dec 2023 05:21), Max: 37.3 (11 Dec 2023 00:35)  T(F): 97.5 (11 Dec 2023 05:21), Max: 99.2 (11 Dec 2023 00:35)  HR: 70 (11 Dec 2023 07:00) (63 - 100)  BP: 149/76 (11 Dec 2023 07:00) (96/52 - 190/115)  BP(mean): 107 (11 Dec 2023 07:00) (69 - 146)  ABP: 140/80 (11 Dec 2023 07:00) (97/56 - 216/110)  ABP(mean): 105 (11 Dec 2023 07:00) (71 - 155)  RR: 17 (11 Dec 2023 07:00) (12 - 20)  SpO2: 100% (11 Dec 2023 07:00) (96% - 100%)      12-10-23 @ 07:01  -  12-11-23 @ 07:00  --------------------------------------------------------  IN: 2841.5 mL / OUT: 3970 mL / NET: -1128.5 mL      Mode: AC/ CMV (Assist Control/ Continuous Mandatory Ventilation), RR (machine): 12, TV (machine): 450, FiO2: 40, PEEP: 5, ITime: 1, MAP: 10, PIP: 25    EXAMINATION:  General: Awake, restless  HEENT:  MMM, ETT  Neuro: Awake, restless, following commands and oriented by nodding appropriately, BUE 5/5 (RUE effort 4+ bi/tri), BLE 0/5   Sensation: Level at T11  Cards: S1/S2, RRR  Respiratory: Clear, no wheeze  Abdomen: Soft, non- tender  Extremities: No edema  Skin: Warm/dry      MEDICATIONS:   benzocaine 20% Spray 1 Spray(s) Mucosal three times a day PRN  bisacodyl 5 milliGRAM(s) Oral at bedtime  chlorhexidine 0.12% Liquid 15 milliLiter(s) Oral Mucosa every 12 hours  dexMEDEtomidine Infusion 0.2 MICROgram(s)/kG/Hr (4.24 mL/Hr) IV Continuous <Continuous>  HYDROmorphone  Injectable 0.5 milliGRAM(s) IV Push every 3 hours PRN  influenza  Vaccine (HIGH DOSE) 0.7 milliLiter(s) IntraMuscular once  insulin lispro (ADMELOG) corrective regimen sliding scale   SubCutaneous Before meals and at bedtime  magnesium sulfate  IVPB 1 Gram(s) IV Intermittent once  multivitamin 1 Tablet(s) Oral daily  naloxone Injectable 0.1 milliGRAM(s) IV Push every 3 minutes PRN  ondansetron Injectable 4 milliGRAM(s) IV Push every 6 hours PRN  oxyCODONE    IR 15 milliGRAM(s) Oral every 4 hours PRN  oxyCODONE    IR 30 milliGRAM(s) Oral every 4 hours PRN  pantoprazole  Injectable 40 milliGRAM(s) IV Push daily  polyethylene glycol 3350 17 Gram(s) Oral two times a day  senna 2 Tablet(s) Oral at bedtime  simvastatin 40 milliGRAM(s) Oral at bedtime  sodium chloride 0.9%. 1000 milliLiter(s) (85 mL/Hr) IV Continuous <Continuous>      LABS:                      8.9    8.15  )-----------( 182      ( 11 Dec 2023 05:30 )             26.3     12-11    141  |  111<H>  |  8   ----------------------------<  164<H>  4.3   |  24  |  0.41<L>    Ca    8.3<L>      11 Dec 2023 05:30  Phos  3.1     12-11  Mg     1.9     12-11    TPro  5.4<L>  /  Alb  3.0<L>  /  TBili  0.4  /  DBili  x   /  AST  16  /  ALT  13  /  AlkPhos  73  12-09    LIVER FUNCTIONS - ( 09 Dec 2023 09:15 )  Alb: 3.0 g/dL / Pro: 5.4 g/dL / ALK PHOS: 73 U/L / ALT: 13 U/L / AST: 16 U/L / GGT: x           ABG - ( 10 Dec 2023 17:12 )  pH, Arterial: 7.42  pH, Blood: x     /  pCO2: 36    /  pO2: 122   / HCO3: 23    / Base Excess: -0.7  /  SaO2: 99.0

## 2023-12-11 NOTE — PROGRESS NOTE ADULT - SUBJECTIVE AND OBJECTIVE BOX
NEUROSURGERY PAIN MANAGEMENT CONSULT NOTE    Chief Complaint:    HPI:  66 y/o female with PMHx HTN, HLD, CVA x 3 (last was 2016 with right hand weakness residual), GAMALIEL not using CPAP, Emphysema, former 25 pack year smoker restarted this week of surgery, Chronic Constipation on Movantik, Urinary Incontinence chronically self straight cath at home, chronic UTI, Breast Implant Rupture with Chronic Leak repair 10/2023, B/L CTS s/p release, s/p Intrathecal Morphine pump for pain, with chronic neck and back pain worsening for years s/p multiple spinal surgeries including laminectomies and fusion of cervical, thoracic and lumbar spine initially done in 2009 and recently in 2019 and 2020 at [facility] by Dr. Trinidad, s/p T3-L1 revision of prior fusion (with Dr. Luz on 8/17/22). Outpatient Xray 11/16/2023 showing broken rods/displacement. Presents for elective C2-S2 instrumentation and fusion. Pt endorses weakness of b/l LE and burning pain in b/l lower extremities radiating to both feet,  (04 Dec 2023 15:44)    OVERNIGHT EVENTS: Patient seen this morning and resting comfortably when entered the room. She states pain is currently a 9/10 but unable to clearly describe nature of pain or localize medication. She currently has ITP functioning and also has scheduled + PRN pain medication. She states that pain is only controlled when simultaneously receiving IV Dilaudid, gabapentin, valium and oxycodone. I reiterated to her the dangers of taking all these medications at the same time and why we will not be doing so. She also stated she has an allergy to Tylenol but unclear. When seen later in the afternoon.     Hospital Course:   12/4: Admitted for spinal fusion d/t damaged hardware.   12/5: Neuro stable. POD0 C2-S2 instrumentation/fusion (intraop b/l LE motor loss). on parminder for MAP>90  12/6: POD1. KAMRAN o/n neuro stable. remains intubated. Extubated 6am. Precedex gtt prn. Remains on parminder gtt for MAP goal >90. Valium made prn. CT thoracic spine bc MRI unavailable. Attempted NGT, unsuccessful (resistance @ 35).   12/7: POD2 Given IV dilaudid 0.5mg for pain. Neuro exam stable. SQL started tongiht. Increased precedex to 1.0. Responds well to valium. Consulting psych given paranoia ?homicidal statements. Passed bedside dyspphagia and tolerating PO meds. MRI complete, f/u read.   12/8: POD3 Pt reporting incisional pain, given dilaudid 0.5mg IV x2. Pt appearing anxious, given xanax 1mg. Neuro exam unchanged. Pain regimen increased to oxy 15/30 mg, pending further recs. Given 1 L bolus for tachycardia. Trops negative. 2 HMV drains removed.   12/9: POD4, KAMRAN, CT myelogram today showing block at T12-L1, OR tomorrow  12/10: POD5, KAMRAN, OR today for exploration of fusion, possible decompression T9-L2, revision instrumentation  POD 0 T9-L2 decompression. Pt returned from OR intubated. 50mcg fentanyl IVP x 2 for pain control and HTN. Magnesium repleted. Precedex gtt started for sedation. Decreased FiO2 to 40%. 1L bolus for soft MAP. Dc'd propofol and started levo gtt for MAP >90.   12/11: POD 6, POD 1. Extubated and off of sedation.      PAST MEDICAL & SURGICAL HISTORY:  HTN (hypertension)      SS (spinal stenosis)      High cholesterol      Stroke  x3      H/O carpal tunnel syndrome  Bilateral      Chronic GERD      History of chronic constipation      Urinary incontinence  self cath as needed      Pre-diabetes      Anxiety and depression      Eczema      H/O kyphosis      Pancreas cyst      Scoliosis      Wheelchair dependent      Cervical pseudoarthrosis  and lumbar spine      Cervical spondylosis      Chronic headaches      Degenerative joint disease  left shoulder      Lumbosacral spondylosis      COPD (chronic obstructive pulmonary disease)      H/O Spinal surgery  lumbar x 30      H/O breast surgery  left breast implant 1980's      S/P cervical discectomy  x4      H/O total shoulder replacement, right          FAMILY HISTORY:      SOCIAL HISTORY:  [ ] Denies Smoking, Alcohol, or Drug Use    HOME MEDICATIONS:   Please refer to initial HNP    PAIN HOME MEDICATIONS:    Allergies    Crab (Pruritus)  Drug Allergies Not Recorded    Intolerances        PAIN MEDICATIONS:  diazepam    Tablet 5 milliGRAM(s) Oral every 8 hours  DULoxetine 60 milliGRAM(s) Oral daily  gabapentin 800 milliGRAM(s) Oral every 8 hours  HYDROmorphone  Injectable 0.5 milliGRAM(s) IV Push every 3 hours PRN  ondansetron Injectable 4 milliGRAM(s) IV Push every 6 hours PRN  oxyCODONE    IR 15 milliGRAM(s) Oral every 4 hours PRN  oxyCODONE    IR 30 milliGRAM(s) Oral every 4 hours PRN  QUEtiapine 12.5 milliGRAM(s) Oral daily    Heme:    Antibiotics:    Cardiovascular:    GI:  bisacodyl 5 milliGRAM(s) Oral at bedtime  pantoprazole  Injectable 40 milliGRAM(s) IV Push daily  polyethylene glycol 3350 17 Gram(s) Oral two times a day  senna 2 Tablet(s) Oral at bedtime    Endocrine:  insulin lispro (ADMELOG) corrective regimen sliding scale   SubCutaneous Before meals and at bedtime  simvastatin 40 milliGRAM(s) Oral at bedtime    All Other Medications:  benzocaine 20% Spray 1 Spray(s) Mucosal three times a day PRN  influenza  Vaccine (HIGH DOSE) 0.7 milliLiter(s) IntraMuscular once  Morphine 1 milliGRAM / mL 17.9 mL,Morphine 1 milliGRAM / mL 17.9 mL 17.9 mL IntraThecal Continuous Pump  multivitamin 1 Tablet(s) Oral daily  naloxone Injectable 0.1 milliGRAM(s) IV Push every 3 minutes PRN      Vital Signs Last 24 Hrs  T(C): 36.4 (11 Dec 2023 14:17), Max: 37.3 (11 Dec 2023 00:35)  T(F): 97.6 (11 Dec 2023 14:17), Max: 99.2 (11 Dec 2023 00:35)  HR: 98 (11 Dec 2023 15:00) (63 - 104)  BP: 119/61 (11 Dec 2023 15:00) (96/52 - 151/69)  BP(mean): 83 (11 Dec 2023 15:00) (69 - 107)  RR: 18 (11 Dec 2023 15:00) (12 - 20)  SpO2: 99% (11 Dec 2023 15:00) (88% - 100%)    Parameters below as of 11 Dec 2023 15:00  Patient On (Oxygen Delivery Method): room air        LABS:                        8.9    8.15  )-----------( 182      ( 11 Dec 2023 05:30 )             26.3     12-11    141  |  111<H>  |  8   ----------------------------<  164<H>  4.3   |  24  |  0.41<L>    Ca    8.3<L>      11 Dec 2023 05:30  Phos  3.1     12-11  Mg     1.9     12-11        Urinalysis Basic - ( 11 Dec 2023 05:30 )    Color: x / Appearance: x / SG: x / pH: x  Gluc: 164 mg/dL / Ketone: x  / Bili: x / Urobili: x   Blood: x / Protein: x / Nitrite: x   Leuk Esterase: x / RBC: x / WBC x   Sq Epi: x / Non Sq Epi: x / Bacteria: x        RADIOLOGY:    Drug Screen:        REVIEW OF SYSTEMS:  CONSTITUTIONAL: No fever or fatigue O/N.   EYES: No eye pain, visual disturbances  ENMT:  No difficulty hearing. No throat pain  NECK: No pain or stiffness  RESPIRATORY: No cough, wheezing; No shortness of breath  CARDIOVASCULAR: No chest pain, palpitations.   GASTROINTESTINAL: Pt reports ___ passing gas. No bowel movements. No abdominal or epigastric pain. No nausea, vomiting. GENITOURINARY: No dysuria, frequency, or incontinence  NEUROLOGICAL: No headaches, loss of strength, numbness, or tremors. No dizzinesss or lightheadedness with pain medications.   MUSCULOSKELETAL: Incisional back pain. No joint pain or swelling; No muscle, or extremity pain      FUNCTIONAL ASSESSMENT:  PAIN SCORE AT REST:         SCALE USED: (1-10 VNRS)  PAIN SCORE WITH ACTIVITY:         SCALE USED: (1-10 VNRS)    PAIN ASSESSMENT:    PHYSICAL EXAM  GENERAL: Laying in bed, NAD  Neuro: CN II-XII PERRRL, EOMI  Cranial nerves grossly intact  Motor exam:  Sensation intact to LT in UE/LE in 3 dermatomes  CHEST/LUNG: Clear to auscultation bilaterally; No rales, rhonchi, wheezing, or rubs  HEART: Regular rate and rhythm; No murmurs, rubs, or gallops  ABDOMEN: Soft, Nontender, Nondistended; Bowel sounds present  EXTREMITIES:  2+ Peripheral Pulses, No clubbing, cyanosis, or edema  SKIN: No rashes or lesions      ASSESSMENT:       PLAN:   - Pain:    Since yesterday 6am, pt has required:     PCA Setting:   - Opioids:   - Initial Bolus:   - Initial Demand:   - Lockout:   - Continuous Rate:   - 4 hour limit:     - Transition to . LA not required. Pt receiving adequete coverage. First step. For rescue dosing, will order .      - Home pain regiment:    - Bowel regimen: Senna    - Nausea ppx: Zofran standing  - Functional Goals: Pt will get OOB with PT today. Pt will resume previous level of activity without impairment from surgery.   - Additional Consults: None recommended.   - Additional Labs/Imaging:  None recommended.     - Follow up, Discharge Planning:     Patient is set for discharge to:     Discharge is pending:   - Pain Management follow up plan:    NEUROSURGERY PAIN MANAGEMENT CONSULT NOTE    Chief Complaint:    HPI:  66 y/o female with PMHx HTN, HLD, CVA x 3 (last was 2016 with right hand weakness residual), GAMALIEL not using CPAP, Emphysema, former 25 pack year smoker restarted this week of surgery, Chronic Constipation on Movantik, Urinary Incontinence chronically self straight cath at home, chronic UTI, Breast Implant Rupture with Chronic Leak repair 10/2023, B/L CTS s/p release, s/p Intrathecal Morphine pump for pain, with chronic neck and back pain worsening for years s/p multiple spinal surgeries including laminectomies and fusion of cervical, thoracic and lumbar spine initially done in 2009 and recently in 2019 and 2020 at [facility] by Dr. Trinidad, s/p T3-L1 revision of prior fusion (with Dr. Luz on 8/17/22). Outpatient Xray 11/16/2023 showing broken rods/displacement. Presents for elective C2-S2 instrumentation and fusion. Pt endorses weakness of b/l LE and burning pain in b/l lower extremities radiating to both feet,  (04 Dec 2023 15:44)    OVERNIGHT EVENTS: Patient seen this morning and resting comfortably when entered the room. She states pain is currently a 9/10 but unable to clearly describe nature of pain or localize medication. She currently has ITP functioning and also has scheduled + PRN pain medication. She states that pain is only controlled when simultaneously receiving IV Dilaudid, gabapentin, valium and oxycodone. I reiterated to her the dangers of taking all these medications at the same time and why we will not be doing so. She also stated she has an allergy to Tylenol but unclear. When seen later in the afternoon.     Hospital Course:   12/4: Admitted for spinal fusion d/t damaged hardware.   12/5: Neuro stable. POD0 C2-S2 instrumentation/fusion (intraop b/l LE motor loss). on parminder for MAP>90  12/6: POD1. KAMRAN o/n neuro stable. remains intubated. Extubated 6am. Precedex gtt prn. Remains on parminder gtt for MAP goal >90. Valium made prn. CT thoracic spine bc MRI unavailable. Attempted NGT, unsuccessful (resistance @ 35).   12/7: POD2 Given IV dilaudid 0.5mg for pain. Neuro exam stable. SQL started tongiht. Increased precedex to 1.0. Responds well to valium. Consulting psych given paranoia ?homicidal statements. Passed bedside dyspphagia and tolerating PO meds. MRI complete, f/u read.   12/8: POD3 Pt reporting incisional pain, given dilaudid 0.5mg IV x2. Pt appearing anxious, given xanax 1mg. Neuro exam unchanged. Pain regimen increased to oxy 15/30 mg, pending further recs. Given 1 L bolus for tachycardia. Trops negative. 2 HMV drains removed.   12/9: POD4, KAMRAN, CT myelogram today showing block at T12-L1, OR tomorrow  12/10: POD5, KAMRAN, OR today for exploration of fusion, possible decompression T9-L2, revision instrumentation  POD 0 T9-L2 decompression. Pt returned from OR intubated. 50mcg fentanyl IVP x 2 for pain control and HTN. Magnesium repleted. Precedex gtt started for sedation. Decreased FiO2 to 40%. 1L bolus for soft MAP. Dc'd propofol and started levo gtt for MAP >90.   12/11: POD 6, POD 1. Extubated and off of sedation.      PAST MEDICAL & SURGICAL HISTORY:  HTN (hypertension)      SS (spinal stenosis)      High cholesterol      Stroke  x3      H/O carpal tunnel syndrome  Bilateral      Chronic GERD      History of chronic constipation      Urinary incontinence  self cath as needed      Pre-diabetes      Anxiety and depression      Eczema      H/O kyphosis      Pancreas cyst      Scoliosis      Wheelchair dependent      Cervical pseudoarthrosis  and lumbar spine      Cervical spondylosis      Chronic headaches      Degenerative joint disease  left shoulder      Lumbosacral spondylosis      COPD (chronic obstructive pulmonary disease)      H/O Spinal surgery  lumbar x 30      H/O breast surgery  left breast implant 1980's      S/P cervical discectomy  x4      H/O total shoulder replacement, right          FAMILY HISTORY:      SOCIAL HISTORY:  [ ] Denies Smoking, Alcohol, or Drug Use    HOME MEDICATIONS:   Please refer to initial HNP    PAIN HOME MEDICATIONS:    Allergies    Crab (Pruritus)  Drug Allergies Not Recorded    Intolerances        PAIN MEDICATIONS:  diazepam    Tablet 5 milliGRAM(s) Oral every 8 hours  DULoxetine 60 milliGRAM(s) Oral daily  gabapentin 800 milliGRAM(s) Oral every 8 hours  HYDROmorphone  Injectable 0.5 milliGRAM(s) IV Push every 3 hours PRN  ondansetron Injectable 4 milliGRAM(s) IV Push every 6 hours PRN  oxyCODONE    IR 15 milliGRAM(s) Oral every 4 hours PRN  oxyCODONE    IR 30 milliGRAM(s) Oral every 4 hours PRN  QUEtiapine 12.5 milliGRAM(s) Oral daily    Heme:    Antibiotics:    Cardiovascular:    GI:  bisacodyl 5 milliGRAM(s) Oral at bedtime  pantoprazole  Injectable 40 milliGRAM(s) IV Push daily  polyethylene glycol 3350 17 Gram(s) Oral two times a day  senna 2 Tablet(s) Oral at bedtime    Endocrine:  insulin lispro (ADMELOG) corrective regimen sliding scale   SubCutaneous Before meals and at bedtime  simvastatin 40 milliGRAM(s) Oral at bedtime    All Other Medications:  benzocaine 20% Spray 1 Spray(s) Mucosal three times a day PRN  influenza  Vaccine (HIGH DOSE) 0.7 milliLiter(s) IntraMuscular once  Morphine 1 milliGRAM / mL 17.9 mL,Morphine 1 milliGRAM / mL 17.9 mL 17.9 mL IntraThecal Continuous Pump  multivitamin 1 Tablet(s) Oral daily  naloxone Injectable 0.1 milliGRAM(s) IV Push every 3 minutes PRN      Vital Signs Last 24 Hrs  T(C): 36.4 (11 Dec 2023 14:17), Max: 37.3 (11 Dec 2023 00:35)  T(F): 97.6 (11 Dec 2023 14:17), Max: 99.2 (11 Dec 2023 00:35)  HR: 98 (11 Dec 2023 15:00) (63 - 104)  BP: 119/61 (11 Dec 2023 15:00) (96/52 - 151/69)  BP(mean): 83 (11 Dec 2023 15:00) (69 - 107)  RR: 18 (11 Dec 2023 15:00) (12 - 20)  SpO2: 99% (11 Dec 2023 15:00) (88% - 100%)    Parameters below as of 11 Dec 2023 15:00  Patient On (Oxygen Delivery Method): room air        LABS:                        8.9    8.15  )-----------( 182      ( 11 Dec 2023 05:30 )             26.3     12-11    141  |  111<H>  |  8   ----------------------------<  164<H>  4.3   |  24  |  0.41<L>    Ca    8.3<L>      11 Dec 2023 05:30  Phos  3.1     12-11  Mg     1.9     12-11        Urinalysis Basic - ( 11 Dec 2023 05:30 )    Color: x / Appearance: x / SG: x / pH: x  Gluc: 164 mg/dL / Ketone: x  / Bili: x / Urobili: x   Blood: x / Protein: x / Nitrite: x   Leuk Esterase: x / RBC: x / WBC x   Sq Epi: x / Non Sq Epi: x / Bacteria: x        RADIOLOGY:    Drug Screen:        REVIEW OF SYSTEMS:  CONSTITUTIONAL: No fever or fatigue O/N.   EYES: No eye pain, visual disturbances  ENMT:  No difficulty hearing. No throat pain  NECK: No pain or stiffness  RESPIRATORY: No cough, wheezing; No shortness of breath  CARDIOVASCULAR: No chest pain, palpitations.   GASTROINTESTINAL: Pt reports ___ passing gas. No bowel movements. No abdominal or epigastric pain. No nausea, vomiting. GENITOURINARY: No dysuria, frequency, or incontinence  NEUROLOGICAL: No headaches, loss of strength, numbness, or tremors. No dizzinesss or lightheadedness with pain medications.   MUSCULOSKELETAL: Incisional back pain. No joint pain or swelling; No muscle, or extremity pain      FUNCTIONAL ASSESSMENT:  PAIN SCORE AT REST:         SCALE USED: (1-10 VNRS)  PAIN SCORE WITH ACTIVITY:         SCALE USED: (1-10 VNRS)    PAIN ASSESSMENT:    PHYSICAL EXAM  GENERAL: Laying in bed, NAD  Neuro: CN II-XII PERRRL, EOMI  Cranial nerves grossly intact  Motor exam:  Sensation intact to LT in UE/LE in 3 dermatomes  CHEST/LUNG: Clear to auscultation bilaterally; No rales, rhonchi, wheezing, or rubs  HEART: Regular rate and rhythm; No murmurs, rubs, or gallops  ABDOMEN: Soft, Nontender, Nondistended; Bowel sounds present  EXTREMITIES:  2+ Peripheral Pulses, No clubbing, cyanosis, or edema  SKIN: No rashes or lesions      ASSESSMENT:       PLAN:   - Pain:    Since yesterday 6am, pt has required:     PCA Setting:   - Opioids:   - Initial Bolus:   - Initial Demand:   - Lockout:   - Continuous Rate:   - 4 hour limit:     - Transition to . LA not required. Pt receiving adequete coverage. First step. For rescue dosing, will order .      - Home pain regiment:    - Bowel regimen: Senna    - Nausea ppx: Zofran standing  - Functional Goals: Pt will get OOB with PT today. Pt will resume previous level of activity without impairment from surgery.   - Additional Consults: None recommended.   - Additional Labs/Imaging:  None recommended.     - Follow up, Discharge Planning:     Patient is set for discharge to:     Discharge is pending:   - Pain Management follow up plan:    NEUROSURGERY PAIN MANAGEMENT CONSULT NOTE    Chief Complaint:    HPI:  68 y/o female with PMHx HTN, HLD, CVA x 3 (last was 2016 with right hand weakness residual), GAMALIEL not using CPAP, Emphysema, former 25 pack year smoker restarted this week of surgery, Chronic Constipation on Movantik, Urinary Incontinence chronically self straight cath at home, chronic UTI, Breast Implant Rupture with Chronic Leak repair 10/2023, B/L CTS s/p release, s/p Intrathecal Morphine pump for pain, with chronic neck and back pain worsening for years s/p multiple spinal surgeries including laminectomies and fusion of cervical, thoracic and lumbar spine initially done in 2009 and recently in 2019 and 2020 at [facility] by Dr. Trinidad, s/p T3-L1 revision of prior fusion (with Dr. Luz on 8/17/22). Outpatient Xray 11/16/2023 showing broken rods/displacement. Presents for elective C2-S2 instrumentation and fusion. Pt endorses weakness of b/l LE and burning pain in b/l lower extremities radiating to both feet,  (04 Dec 2023 15:44)    OVERNIGHT EVENTS: Patient seen this morning and resting comfortably when entered the room. She states pain is currently a 9/10 but unable to clearly describe nature of pain or localize medication. She currently has ITP functioning and also has scheduled + PRN pain medication. She states that pain is only controlled when simultaneously receiving IV Dilaudid, gabapentin, valium and oxycodone. I reiterated to her the dangers of taking all these medications at the same time and why we will not be doing so. She also stated she has an allergy to Tylenol but unclear. When seen later in the afternoon she was comfortably in bed.     Hospital Course:   12/4: Admitted for spinal fusion d/t damaged hardware.   12/5: Neuro stable. POD0 C2-S2 instrumentation/fusion (intraop b/l LE motor loss). on parminder for MAP>90  12/6: POD1. KAMRAN o/n neuro stable. remains intubated. Extubated 6am. Precedex gtt prn. Remains on parminder gtt for MAP goal >90. Valium made prn. CT thoracic spine bc MRI unavailable. Attempted NGT, unsuccessful (resistance @ 35).   12/7: POD2 Given IV dilaudid 0.5mg for pain. Neuro exam stable. SQL started tongiht. Increased precedex to 1.0. Responds well to valium. Consulting psych given paranoia ?homicidal statements. Passed bedside dyspphagia and tolerating PO meds. MRI complete, f/u read.   12/8: POD3 Pt reporting incisional pain, given dilaudid 0.5mg IV x2. Pt appearing anxious, given xanax 1mg. Neuro exam unchanged. Pain regimen increased to oxy 15/30 mg, pending further recs. Given 1 L bolus for tachycardia. Trops negative. 2 HMV drains removed.   12/9: POD4, KAMRAN, CT myelogram today showing block at T12-L1, OR tomorrow  12/10: POD5, KAMRAN, OR today for exploration of fusion, possible decompression T9-L2, revision instrumentation  POD 0 T9-L2 decompression. Pt returned from OR intubated. 50mcg fentanyl IVP x 2 for pain control and HTN. Magnesium repleted. Precedex gtt started for sedation. Decreased FiO2 to 40%. 1L bolus for soft MAP. Dc'd propofol and started levo gtt for MAP >90.   12/11: POD 6, POD 1. Extubated and off of sedation.      PAST MEDICAL & SURGICAL HISTORY:  HTN (hypertension)      SS (spinal stenosis)      High cholesterol      Stroke  x3      H/O carpal tunnel syndrome  Bilateral      Chronic GERD      History of chronic constipation      Urinary incontinence  self cath as needed      Pre-diabetes      Anxiety and depression      Eczema      H/O kyphosis      Pancreas cyst      Scoliosis      Wheelchair dependent      Cervical pseudoarthrosis  and lumbar spine      Cervical spondylosis      Chronic headaches      Degenerative joint disease  left shoulder      Lumbosacral spondylosis      COPD (chronic obstructive pulmonary disease)      H/O Spinal surgery  lumbar x 30      H/O breast surgery  left breast implant 1980's      S/P cervical discectomy  x4      H/O total shoulder replacement, right          FAMILY HISTORY:      SOCIAL HISTORY:  [ ] Denies Smoking, Alcohol, or Drug Use    HOME MEDICATIONS:   Please refer to initial HNP    PAIN HOME MEDICATIONS:    Allergies    Crab (Pruritus)  Drug Allergies Not Recorded    Intolerances        PAIN MEDICATIONS:  diazepam    Tablet 5 milliGRAM(s) Oral every 8 hours  DULoxetine 60 milliGRAM(s) Oral daily  gabapentin 800 milliGRAM(s) Oral every 8 hours  HYDROmorphone  Injectable 0.5 milliGRAM(s) IV Push every 3 hours PRN  ondansetron Injectable 4 milliGRAM(s) IV Push every 6 hours PRN  oxyCODONE    IR 15 milliGRAM(s) Oral every 4 hours PRN  oxyCODONE    IR 30 milliGRAM(s) Oral every 4 hours PRN  QUEtiapine 12.5 milliGRAM(s) Oral daily    Heme:    Antibiotics:    Cardiovascular:    GI:  bisacodyl 5 milliGRAM(s) Oral at bedtime  pantoprazole  Injectable 40 milliGRAM(s) IV Push daily  polyethylene glycol 3350 17 Gram(s) Oral two times a day  senna 2 Tablet(s) Oral at bedtime    Endocrine:  insulin lispro (ADMELOG) corrective regimen sliding scale   SubCutaneous Before meals and at bedtime  simvastatin 40 milliGRAM(s) Oral at bedtime    All Other Medications:  benzocaine 20% Spray 1 Spray(s) Mucosal three times a day PRN  influenza  Vaccine (HIGH DOSE) 0.7 milliLiter(s) IntraMuscular once  Morphine 1 milliGRAM / mL 17.9 mL,Morphine 1 milliGRAM / mL 17.9 mL 17.9 mL IntraThecal Continuous Pump  multivitamin 1 Tablet(s) Oral daily  naloxone Injectable 0.1 milliGRAM(s) IV Push every 3 minutes PRN      Vital Signs Last 24 Hrs  T(C): 36.4 (11 Dec 2023 14:17), Max: 37.3 (11 Dec 2023 00:35)  T(F): 97.6 (11 Dec 2023 14:17), Max: 99.2 (11 Dec 2023 00:35)  HR: 98 (11 Dec 2023 15:00) (63 - 104)  BP: 119/61 (11 Dec 2023 15:00) (96/52 - 151/69)  BP(mean): 83 (11 Dec 2023 15:00) (69 - 107)  RR: 18 (11 Dec 2023 15:00) (12 - 20)  SpO2: 99% (11 Dec 2023 15:00) (88% - 100%)    Parameters below as of 11 Dec 2023 15:00  Patient On (Oxygen Delivery Method): room air        LABS:                        8.9    8.15  )-----------( 182      ( 11 Dec 2023 05:30 )             26.3     12-11    141  |  111<H>  |  8   ----------------------------<  164<H>  4.3   |  24  |  0.41<L>    Ca    8.3<L>      11 Dec 2023 05:30  Phos  3.1     12-11  Mg     1.9     12-11        Urinalysis Basic - ( 11 Dec 2023 05:30 )    Color: x / Appearance: x / SG: x / pH: x  Gluc: 164 mg/dL / Ketone: x  / Bili: x / Urobili: x   Blood: x / Protein: x / Nitrite: x   Leuk Esterase: x / RBC: x / WBC x   Sq Epi: x / Non Sq Epi: x / Bacteria: x        RADIOLOGY:    Drug Screen:      REVIEW OF SYSTEMS:  CONSTITUTIONAL: No fever   EYES: No visual disturbances  ENMT:  No difficulty hearing.  NECK: pain and stiffness  RESPIRATORY: No shortness of breath  CARDIOVASCULAR: No chest pain  GASTROINTESTINAL:  No nausea, vomiting.  NEUROLOGICAL: weakness in BLE  MUSCULOSKELETAL: Incisional back pain.        PHYSICAL EXAM - limited exam  GENERAL: Laying in bed, NAD  Neuro: EOMI  Cranial nerves grossly intact  Motor exam: on exam patient was unable to move BLE upon request  Sensation to light touch in BLE no present when I asked patient  CHEST/LUNG: on room air  ABDOMEN:  Nondistended     NEUROSURGERY PAIN MANAGEMENT CONSULT NOTE    Chief Complaint:    HPI:  68 y/o female with PMHx HTN, HLD, CVA x 3 (last was 2016 with right hand weakness residual), GAMALIEL not using CPAP, Emphysema, former 25 pack year smoker restarted this week of surgery, Chronic Constipation on Movantik, Urinary Incontinence chronically self straight cath at home, chronic UTI, Breast Implant Rupture with Chronic Leak repair 10/2023, B/L CTS s/p release, s/p Intrathecal Morphine pump for pain, with chronic neck and back pain worsening for years s/p multiple spinal surgeries including laminectomies and fusion of cervical, thoracic and lumbar spine initially done in 2009 and recently in 2019 and 2020 at [facility] by Dr. Trinidad, s/p T3-L1 revision of prior fusion (with Dr. Luz on 8/17/22). Outpatient Xray 11/16/2023 showing broken rods/displacement. Presents for elective C2-S2 instrumentation and fusion. Pt endorses weakness of b/l LE and burning pain in b/l lower extremities radiating to both feet,  (04 Dec 2023 15:44)    OVERNIGHT EVENTS: Patient seen this morning and resting comfortably when entered the room. She states pain is currently a 9/10 but unable to clearly describe nature of pain or localize medication. She currently has ITP functioning and also has scheduled + PRN pain medication. She states that pain is only controlled when simultaneously receiving IV Dilaudid, gabapentin, valium and oxycodone. I reiterated to her the dangers of taking all these medications at the same time and why we will not be doing so. She also stated she has an allergy to Tylenol but unclear. When seen later in the afternoon she was comfortably in bed.     Hospital Course:   12/4: Admitted for spinal fusion d/t damaged hardware.   12/5: Neuro stable. POD0 C2-S2 instrumentation/fusion (intraop b/l LE motor loss). on parminder for MAP>90  12/6: POD1. KAMRAN o/n neuro stable. remains intubated. Extubated 6am. Precedex gtt prn. Remains on parminder gtt for MAP goal >90. Valium made prn. CT thoracic spine bc MRI unavailable. Attempted NGT, unsuccessful (resistance @ 35).   12/7: POD2 Given IV dilaudid 0.5mg for pain. Neuro exam stable. SQL started tongiht. Increased precedex to 1.0. Responds well to valium. Consulting psych given paranoia ?homicidal statements. Passed bedside dyspphagia and tolerating PO meds. MRI complete, f/u read.   12/8: POD3 Pt reporting incisional pain, given dilaudid 0.5mg IV x2. Pt appearing anxious, given xanax 1mg. Neuro exam unchanged. Pain regimen increased to oxy 15/30 mg, pending further recs. Given 1 L bolus for tachycardia. Trops negative. 2 HMV drains removed.   12/9: POD4, KAMRAN, CT myelogram today showing block at T12-L1, OR tomorrow  12/10: POD5, KAMRAN, OR today for exploration of fusion, possible decompression T9-L2, revision instrumentation  POD 0 T9-L2 decompression. Pt returned from OR intubated. 50mcg fentanyl IVP x 2 for pain control and HTN. Magnesium repleted. Precedex gtt started for sedation. Decreased FiO2 to 40%. 1L bolus for soft MAP. Dc'd propofol and started levo gtt for MAP >90.   12/11: POD 6, POD 1. Extubated and off of sedation.      PAST MEDICAL & SURGICAL HISTORY:  HTN (hypertension)      SS (spinal stenosis)      High cholesterol      Stroke  x3      H/O carpal tunnel syndrome  Bilateral      Chronic GERD      History of chronic constipation      Urinary incontinence  self cath as needed      Pre-diabetes      Anxiety and depression      Eczema      H/O kyphosis      Pancreas cyst      Scoliosis      Wheelchair dependent      Cervical pseudoarthrosis  and lumbar spine      Cervical spondylosis      Chronic headaches      Degenerative joint disease  left shoulder      Lumbosacral spondylosis      COPD (chronic obstructive pulmonary disease)      H/O Spinal surgery  lumbar x 30      H/O breast surgery  left breast implant 1980's      S/P cervical discectomy  x4      H/O total shoulder replacement, right          FAMILY HISTORY:      SOCIAL HISTORY:  [ ] Denies Smoking, Alcohol, or Drug Use    HOME MEDICATIONS:   Please refer to initial HNP    PAIN HOME MEDICATIONS:    Allergies    Crab (Pruritus)  Drug Allergies Not Recorded    Intolerances        PAIN MEDICATIONS:  diazepam    Tablet 5 milliGRAM(s) Oral every 8 hours  DULoxetine 60 milliGRAM(s) Oral daily  gabapentin 800 milliGRAM(s) Oral every 8 hours  HYDROmorphone  Injectable 0.5 milliGRAM(s) IV Push every 3 hours PRN  ondansetron Injectable 4 milliGRAM(s) IV Push every 6 hours PRN  oxyCODONE    IR 15 milliGRAM(s) Oral every 4 hours PRN  oxyCODONE    IR 30 milliGRAM(s) Oral every 4 hours PRN  QUEtiapine 12.5 milliGRAM(s) Oral daily    Heme:    Antibiotics:    Cardiovascular:    GI:  bisacodyl 5 milliGRAM(s) Oral at bedtime  pantoprazole  Injectable 40 milliGRAM(s) IV Push daily  polyethylene glycol 3350 17 Gram(s) Oral two times a day  senna 2 Tablet(s) Oral at bedtime    Endocrine:  insulin lispro (ADMELOG) corrective regimen sliding scale   SubCutaneous Before meals and at bedtime  simvastatin 40 milliGRAM(s) Oral at bedtime    All Other Medications:  benzocaine 20% Spray 1 Spray(s) Mucosal three times a day PRN  influenza  Vaccine (HIGH DOSE) 0.7 milliLiter(s) IntraMuscular once  Morphine 1 milliGRAM / mL 17.9 mL,Morphine 1 milliGRAM / mL 17.9 mL 17.9 mL IntraThecal Continuous Pump  multivitamin 1 Tablet(s) Oral daily  naloxone Injectable 0.1 milliGRAM(s) IV Push every 3 minutes PRN      Vital Signs Last 24 Hrs  T(C): 36.4 (11 Dec 2023 14:17), Max: 37.3 (11 Dec 2023 00:35)  T(F): 97.6 (11 Dec 2023 14:17), Max: 99.2 (11 Dec 2023 00:35)  HR: 98 (11 Dec 2023 15:00) (63 - 104)  BP: 119/61 (11 Dec 2023 15:00) (96/52 - 151/69)  BP(mean): 83 (11 Dec 2023 15:00) (69 - 107)  RR: 18 (11 Dec 2023 15:00) (12 - 20)  SpO2: 99% (11 Dec 2023 15:00) (88% - 100%)    Parameters below as of 11 Dec 2023 15:00  Patient On (Oxygen Delivery Method): room air        LABS:                        8.9    8.15  )-----------( 182      ( 11 Dec 2023 05:30 )             26.3     12-11    141  |  111<H>  |  8   ----------------------------<  164<H>  4.3   |  24  |  0.41<L>    Ca    8.3<L>      11 Dec 2023 05:30  Phos  3.1     12-11  Mg     1.9     12-11        Urinalysis Basic - ( 11 Dec 2023 05:30 )    Color: x / Appearance: x / SG: x / pH: x  Gluc: 164 mg/dL / Ketone: x  / Bili: x / Urobili: x   Blood: x / Protein: x / Nitrite: x   Leuk Esterase: x / RBC: x / WBC x   Sq Epi: x / Non Sq Epi: x / Bacteria: x        RADIOLOGY:    Drug Screen:      REVIEW OF SYSTEMS:  CONSTITUTIONAL: No fever   EYES: No visual disturbances  ENMT:  No difficulty hearing.  NECK: pain and stiffness  RESPIRATORY: No shortness of breath  CARDIOVASCULAR: No chest pain  GASTROINTESTINAL:  No nausea, vomiting.  NEUROLOGICAL: weakness in BLE  MUSCULOSKELETAL: Incisional back pain.        PHYSICAL EXAM - limited exam  GENERAL: Laying in bed, NAD  Neuro: EOMI  Cranial nerves grossly intact  Motor exam: on exam patient was unable to move BLE upon request  Sensation to light touch in BLE no present when I asked patient  CHEST/LUNG: on room air  ABDOMEN:  Nondistended

## 2023-12-11 NOTE — PROGRESS NOTE ADULT - ASSESSMENT
ASSESSMENT/PLAN:  POD ** S/P  C2 to pelvis fusion. Intraoperative lost monitoring (re-gained at MAP>120, though later refractory to MAPs>140)  POD**s/p T9- L2 decompression  Durotomy repair    NEURO:  Neurochecks Q1h  MAP goals per NSGY  Pain management following  ERAS protocol once extubated  Drains: YIFAN and HMV. Monitor output  Monitor for CSF leak  Sedation: RASS 0 to - 1. On precedex  Activity: [] mobilize as tolerated [] Bedrest [] PT [] OT [] PMNR    CVS:  MAP>90; automapping  Off midodrine  TLC paced intra-op 12/10    PULM:  Remained intubated  VAP bundle  CPAP as tolerated; plan to extubate 12/11  ABG,CXR    RENAL:  Fluids while NPO-> LR  Monitor Is/Os  Castillo catheter placed **  Trial of void    GI:  Diet: NPO for extubation 12/11  PPI while intubated  Bowel regimen: miralax, senna  LBM:    ENDO:   FS goal 140-180mg/dL    HEME/ONC:  SCDs. Chemoppx: hold d/t fresh post op  EBL:  Monitor H/H    MISC:    SOCIAL/FAMILY:  [] awaiting [] updated at bedside [] family meeting    CODE STATUS:  [] Full Code [] DNR [] DNI [] Palliative/Comfort Care    DISPOSITION:  [] ICU [] Stroke Unit [] Floor [] EMU [] RCU [] PCU    [] Patient is at high risk of neurologic deterioration/death due to:          ASSESSMENT/PLAN:  POD 6 S/P  C2 to pelvis fusion. Intraoperative lost monitoring (re-gained at MAP>120, though later refractory to MAPs>140)  POD 1 s/p T9- L2 decompression  Durotomy repair    NEURO:  Neurochecks Q2h  MAP goals per NSGY  Pain management following  ERAS protocol   Drains: YIFAN and HMV. Monitor output  Continue cymbalta, gabapentin, valium  Sedation: None  Activity: [] mobilize as tolerated [x] Bedrest [] PT [] OT [] PMNR    CVS:  MAP>90; automapping  Off midodrine  TLC placed intra-op 12/10; will need midline for difficult access    PULM:  Extubated  Aggressive pulm toilet    RENAL:  Fluids while NPO-> change to LR if hyperchloremic acidosis  Monitor Is/Os  Castillo catheter placed; trial of void 12/11  Replete lytes    GI:  Diet: NPO s/p extubation 12/11  PPI per home regimen; DC if not taking  Bowel regimen: miralax, senna, dulcolax  LBM: 12/8. Monitor. If no movement by 12/11, augment regimen    ENDO:   FS goal 140-180mg/dL    HEME/ONC:  SCDs. Chemoppx: hold d/t fresh post op; consider start 12/11pm if OK with NSGY  EBL: 200cc  Monitor H/H    MISC:    SOCIAL/FAMILY:  [x] awaiting [] updated at bedside [] family meeting    CODE STATUS:  [x] Full Code [] DNR [] DNI [] Palliative/Comfort Care    DISPOSITION:  [x] ICU [] Stroke Unit [] Floor [] EMU [] RCU [] PCU

## 2023-12-12 LAB
ANION GAP SERPL CALC-SCNC: 7 MMOL/L — SIGNIFICANT CHANGE UP (ref 5–17)
ANION GAP SERPL CALC-SCNC: 7 MMOL/L — SIGNIFICANT CHANGE UP (ref 5–17)
BUN SERPL-MCNC: 13 MG/DL — SIGNIFICANT CHANGE UP (ref 7–23)
BUN SERPL-MCNC: 13 MG/DL — SIGNIFICANT CHANGE UP (ref 7–23)
CALCIUM SERPL-MCNC: 8 MG/DL — LOW (ref 8.4–10.5)
CALCIUM SERPL-MCNC: 8 MG/DL — LOW (ref 8.4–10.5)
CHLORIDE SERPL-SCNC: 109 MMOL/L — HIGH (ref 96–108)
CHLORIDE SERPL-SCNC: 109 MMOL/L — HIGH (ref 96–108)
CO2 SERPL-SCNC: 23 MMOL/L — SIGNIFICANT CHANGE UP (ref 22–31)
CO2 SERPL-SCNC: 23 MMOL/L — SIGNIFICANT CHANGE UP (ref 22–31)
CREAT SERPL-MCNC: 0.51 MG/DL — SIGNIFICANT CHANGE UP (ref 0.5–1.3)
CREAT SERPL-MCNC: 0.51 MG/DL — SIGNIFICANT CHANGE UP (ref 0.5–1.3)
EGFR: 101 ML/MIN/1.73M2 — SIGNIFICANT CHANGE UP
EGFR: 101 ML/MIN/1.73M2 — SIGNIFICANT CHANGE UP
GLUCOSE BLDC GLUCOMTR-MCNC: 124 MG/DL — HIGH (ref 70–99)
GLUCOSE BLDC GLUCOMTR-MCNC: 124 MG/DL — HIGH (ref 70–99)
GLUCOSE BLDC GLUCOMTR-MCNC: 137 MG/DL — HIGH (ref 70–99)
GLUCOSE BLDC GLUCOMTR-MCNC: 137 MG/DL — HIGH (ref 70–99)
GLUCOSE BLDC GLUCOMTR-MCNC: 176 MG/DL — HIGH (ref 70–99)
GLUCOSE BLDC GLUCOMTR-MCNC: 176 MG/DL — HIGH (ref 70–99)
GLUCOSE SERPL-MCNC: 121 MG/DL — HIGH (ref 70–99)
GLUCOSE SERPL-MCNC: 121 MG/DL — HIGH (ref 70–99)
HCT VFR BLD CALC: 23.8 % — LOW (ref 34.5–45)
HCT VFR BLD CALC: 23.8 % — LOW (ref 34.5–45)
HCT VFR BLD CALC: 25.7 % — LOW (ref 34.5–45)
HCT VFR BLD CALC: 25.7 % — LOW (ref 34.5–45)
HCT VFR BLD CALC: 28.6 % — LOW (ref 34.5–45)
HCT VFR BLD CALC: 28.6 % — LOW (ref 34.5–45)
HGB BLD-MCNC: 7.6 G/DL — LOW (ref 11.5–15.5)
HGB BLD-MCNC: 7.6 G/DL — LOW (ref 11.5–15.5)
HGB BLD-MCNC: 8.3 G/DL — LOW (ref 11.5–15.5)
HGB BLD-MCNC: 8.3 G/DL — LOW (ref 11.5–15.5)
HGB BLD-MCNC: 9.4 G/DL — LOW (ref 11.5–15.5)
HGB BLD-MCNC: 9.4 G/DL — LOW (ref 11.5–15.5)
LACTATE SERPL-SCNC: 1.1 MMOL/L — SIGNIFICANT CHANGE UP (ref 0.5–2)
LACTATE SERPL-SCNC: 1.1 MMOL/L — SIGNIFICANT CHANGE UP (ref 0.5–2)
MAGNESIUM SERPL-MCNC: 1.6 MG/DL — SIGNIFICANT CHANGE UP (ref 1.6–2.6)
MAGNESIUM SERPL-MCNC: 1.6 MG/DL — SIGNIFICANT CHANGE UP (ref 1.6–2.6)
MCHC RBC-ENTMCNC: 27.6 PG — SIGNIFICANT CHANGE UP (ref 27–34)
MCHC RBC-ENTMCNC: 27.6 PG — SIGNIFICANT CHANGE UP (ref 27–34)
MCHC RBC-ENTMCNC: 27.7 PG — SIGNIFICANT CHANGE UP (ref 27–34)
MCHC RBC-ENTMCNC: 27.7 PG — SIGNIFICANT CHANGE UP (ref 27–34)
MCHC RBC-ENTMCNC: 27.9 PG — SIGNIFICANT CHANGE UP (ref 27–34)
MCHC RBC-ENTMCNC: 27.9 PG — SIGNIFICANT CHANGE UP (ref 27–34)
MCHC RBC-ENTMCNC: 31.9 GM/DL — LOW (ref 32–36)
MCHC RBC-ENTMCNC: 31.9 GM/DL — LOW (ref 32–36)
MCHC RBC-ENTMCNC: 32.3 GM/DL — SIGNIFICANT CHANGE UP (ref 32–36)
MCHC RBC-ENTMCNC: 32.3 GM/DL — SIGNIFICANT CHANGE UP (ref 32–36)
MCHC RBC-ENTMCNC: 32.9 GM/DL — SIGNIFICANT CHANGE UP (ref 32–36)
MCHC RBC-ENTMCNC: 32.9 GM/DL — SIGNIFICANT CHANGE UP (ref 32–36)
MCV RBC AUTO: 83.9 FL — SIGNIFICANT CHANGE UP (ref 80–100)
MCV RBC AUTO: 83.9 FL — SIGNIFICANT CHANGE UP (ref 80–100)
MCV RBC AUTO: 86.5 FL — SIGNIFICANT CHANGE UP (ref 80–100)
MCV RBC AUTO: 86.5 FL — SIGNIFICANT CHANGE UP (ref 80–100)
MCV RBC AUTO: 86.9 FL — SIGNIFICANT CHANGE UP (ref 80–100)
MCV RBC AUTO: 86.9 FL — SIGNIFICANT CHANGE UP (ref 80–100)
NRBC # BLD: 0 /100 WBCS — SIGNIFICANT CHANGE UP (ref 0–0)
PHOSPHATE SERPL-MCNC: 3.3 MG/DL — SIGNIFICANT CHANGE UP (ref 2.5–4.5)
PHOSPHATE SERPL-MCNC: 3.3 MG/DL — SIGNIFICANT CHANGE UP (ref 2.5–4.5)
PLATELET # BLD AUTO: 218 K/UL — SIGNIFICANT CHANGE UP (ref 150–400)
PLATELET # BLD AUTO: 218 K/UL — SIGNIFICANT CHANGE UP (ref 150–400)
PLATELET # BLD AUTO: 225 K/UL — SIGNIFICANT CHANGE UP (ref 150–400)
PLATELET # BLD AUTO: 225 K/UL — SIGNIFICANT CHANGE UP (ref 150–400)
PLATELET # BLD AUTO: 244 K/UL — SIGNIFICANT CHANGE UP (ref 150–400)
PLATELET # BLD AUTO: 244 K/UL — SIGNIFICANT CHANGE UP (ref 150–400)
POTASSIUM SERPL-MCNC: 3.8 MMOL/L — SIGNIFICANT CHANGE UP (ref 3.5–5.3)
POTASSIUM SERPL-MCNC: 3.8 MMOL/L — SIGNIFICANT CHANGE UP (ref 3.5–5.3)
POTASSIUM SERPL-SCNC: 3.8 MMOL/L — SIGNIFICANT CHANGE UP (ref 3.5–5.3)
POTASSIUM SERPL-SCNC: 3.8 MMOL/L — SIGNIFICANT CHANGE UP (ref 3.5–5.3)
RBC # BLD: 2.74 M/UL — LOW (ref 3.8–5.2)
RBC # BLD: 2.74 M/UL — LOW (ref 3.8–5.2)
RBC # BLD: 2.97 M/UL — LOW (ref 3.8–5.2)
RBC # BLD: 2.97 M/UL — LOW (ref 3.8–5.2)
RBC # BLD: 3.41 M/UL — LOW (ref 3.8–5.2)
RBC # BLD: 3.41 M/UL — LOW (ref 3.8–5.2)
RBC # FLD: 15.3 % — HIGH (ref 10.3–14.5)
RBC # FLD: 15.3 % — HIGH (ref 10.3–14.5)
RBC # FLD: 15.5 % — HIGH (ref 10.3–14.5)
RBC # FLD: 15.5 % — HIGH (ref 10.3–14.5)
RBC # FLD: 15.9 % — HIGH (ref 10.3–14.5)
RBC # FLD: 15.9 % — HIGH (ref 10.3–14.5)
SODIUM SERPL-SCNC: 139 MMOL/L — SIGNIFICANT CHANGE UP (ref 135–145)
SODIUM SERPL-SCNC: 139 MMOL/L — SIGNIFICANT CHANGE UP (ref 135–145)
WBC # BLD: 10.13 K/UL — SIGNIFICANT CHANGE UP (ref 3.8–10.5)
WBC # BLD: 10.13 K/UL — SIGNIFICANT CHANGE UP (ref 3.8–10.5)
WBC # BLD: 10.39 K/UL — SIGNIFICANT CHANGE UP (ref 3.8–10.5)
WBC # BLD: 10.39 K/UL — SIGNIFICANT CHANGE UP (ref 3.8–10.5)
WBC # BLD: 11.23 K/UL — HIGH (ref 3.8–10.5)
WBC # BLD: 11.23 K/UL — HIGH (ref 3.8–10.5)
WBC # FLD AUTO: 10.13 K/UL — SIGNIFICANT CHANGE UP (ref 3.8–10.5)
WBC # FLD AUTO: 10.13 K/UL — SIGNIFICANT CHANGE UP (ref 3.8–10.5)
WBC # FLD AUTO: 10.39 K/UL — SIGNIFICANT CHANGE UP (ref 3.8–10.5)
WBC # FLD AUTO: 10.39 K/UL — SIGNIFICANT CHANGE UP (ref 3.8–10.5)
WBC # FLD AUTO: 11.23 K/UL — HIGH (ref 3.8–10.5)
WBC # FLD AUTO: 11.23 K/UL — HIGH (ref 3.8–10.5)

## 2023-12-12 PROCEDURE — 71045 X-RAY EXAM CHEST 1 VIEW: CPT | Mod: 26

## 2023-12-12 PROCEDURE — 99291 CRITICAL CARE FIRST HOUR: CPT | Mod: 24

## 2023-12-12 RX ORDER — MIDODRINE HYDROCHLORIDE 2.5 MG/1
15 TABLET ORAL EVERY 8 HOURS
Refills: 0 | Status: DISCONTINUED | OUTPATIENT
Start: 2023-12-12 | End: 2023-12-31

## 2023-12-12 RX ORDER — MAGNESIUM SULFATE 500 MG/ML
2 VIAL (ML) INJECTION
Refills: 0 | Status: COMPLETED | OUTPATIENT
Start: 2023-12-12 | End: 2023-12-12

## 2023-12-12 RX ORDER — MIDODRINE HYDROCHLORIDE 2.5 MG/1
10 TABLET ORAL EVERY 8 HOURS
Refills: 0 | Status: DISCONTINUED | OUTPATIENT
Start: 2023-12-12 | End: 2023-12-12

## 2023-12-12 RX ORDER — ALBUMIN HUMAN 25 %
250 VIAL (ML) INTRAVENOUS ONCE
Refills: 0 | Status: DISCONTINUED | OUTPATIENT
Start: 2023-12-12 | End: 2023-12-12

## 2023-12-12 RX ORDER — HYDROCORTISONE 1 %
1 OINTMENT (GRAM) TOPICAL
Refills: 0 | Status: DISCONTINUED | OUTPATIENT
Start: 2023-12-12 | End: 2023-12-31

## 2023-12-12 RX ORDER — PANTOPRAZOLE SODIUM 20 MG/1
40 TABLET, DELAYED RELEASE ORAL
Refills: 0 | Status: DISCONTINUED | OUTPATIENT
Start: 2023-12-12 | End: 2024-01-05

## 2023-12-12 RX ORDER — HYDROMORPHONE HYDROCHLORIDE 2 MG/ML
0.5 INJECTION INTRAMUSCULAR; INTRAVENOUS; SUBCUTANEOUS ONCE
Refills: 0 | Status: DISCONTINUED | OUTPATIENT
Start: 2023-12-12 | End: 2023-12-12

## 2023-12-12 RX ORDER — POTASSIUM CHLORIDE 20 MEQ
20 PACKET (EA) ORAL ONCE
Refills: 0 | Status: COMPLETED | OUTPATIENT
Start: 2023-12-12 | End: 2023-12-12

## 2023-12-12 RX ORDER — DOXAZOSIN MESYLATE 4 MG
4 TABLET ORAL AT BEDTIME
Refills: 0 | Status: DISCONTINUED | OUTPATIENT
Start: 2023-12-12 | End: 2023-12-12

## 2023-12-12 RX ORDER — PHENYLEPHRINE HYDROCHLORIDE 10 MG/ML
2.5 INJECTION INTRAVENOUS
Qty: 40 | Refills: 0 | Status: DISCONTINUED | OUTPATIENT
Start: 2023-12-12 | End: 2023-12-13

## 2023-12-12 RX ORDER — LACTULOSE 10 G/15ML
20 SOLUTION ORAL ONCE
Refills: 0 | Status: COMPLETED | OUTPATIENT
Start: 2023-12-12 | End: 2023-12-12

## 2023-12-12 RX ORDER — ALBUMIN HUMAN 25 %
250 VIAL (ML) INTRAVENOUS ONCE
Refills: 0 | Status: COMPLETED | OUTPATIENT
Start: 2023-12-12 | End: 2023-12-12

## 2023-12-12 RX ORDER — MIDODRINE HYDROCHLORIDE 2.5 MG/1
5 TABLET ORAL ONCE
Refills: 0 | Status: COMPLETED | OUTPATIENT
Start: 2023-12-12 | End: 2023-12-12

## 2023-12-12 RX ADMIN — DULOXETINE HYDROCHLORIDE 60 MILLIGRAM(S): 30 CAPSULE, DELAYED RELEASE ORAL at 21:39

## 2023-12-12 RX ADMIN — OXYCODONE HYDROCHLORIDE 30 MILLIGRAM(S): 5 TABLET ORAL at 11:21

## 2023-12-12 RX ADMIN — HYDROMORPHONE HYDROCHLORIDE 0.5 MILLIGRAM(S): 2 INJECTION INTRAMUSCULAR; INTRAVENOUS; SUBCUTANEOUS at 00:30

## 2023-12-12 RX ADMIN — OXYCODONE HYDROCHLORIDE 30 MILLIGRAM(S): 5 TABLET ORAL at 02:10

## 2023-12-12 RX ADMIN — LACTULOSE 20 GRAM(S): 10 SOLUTION ORAL at 09:23

## 2023-12-12 RX ADMIN — Medication 20 MILLIEQUIVALENT(S): at 06:47

## 2023-12-12 RX ADMIN — MIDODRINE HYDROCHLORIDE 10 MILLIGRAM(S): 2.5 TABLET ORAL at 17:15

## 2023-12-12 RX ADMIN — HYDROMORPHONE HYDROCHLORIDE 0.5 MILLIGRAM(S): 2 INJECTION INTRAMUSCULAR; INTRAVENOUS; SUBCUTANEOUS at 10:36

## 2023-12-12 RX ADMIN — SIMVASTATIN 40 MILLIGRAM(S): 20 TABLET, FILM COATED ORAL at 21:38

## 2023-12-12 RX ADMIN — Medication 5 MILLIGRAM(S): at 21:39

## 2023-12-12 RX ADMIN — ENOXAPARIN SODIUM 40 MILLIGRAM(S): 100 INJECTION SUBCUTANEOUS at 21:37

## 2023-12-12 RX ADMIN — GABAPENTIN 800 MILLIGRAM(S): 400 CAPSULE ORAL at 17:16

## 2023-12-12 RX ADMIN — HYDROMORPHONE HYDROCHLORIDE 0.5 MILLIGRAM(S): 2 INJECTION INTRAMUSCULAR; INTRAVENOUS; SUBCUTANEOUS at 06:50

## 2023-12-12 RX ADMIN — HYDROMORPHONE HYDROCHLORIDE 0.5 MILLIGRAM(S): 2 INJECTION INTRAMUSCULAR; INTRAVENOUS; SUBCUTANEOUS at 06:35

## 2023-12-12 RX ADMIN — HYDROMORPHONE HYDROCHLORIDE 0.5 MILLIGRAM(S): 2 INJECTION INTRAMUSCULAR; INTRAVENOUS; SUBCUTANEOUS at 16:10

## 2023-12-12 RX ADMIN — Medication 125 MILLILITER(S): at 09:56

## 2023-12-12 RX ADMIN — Medication 5 MILLIGRAM(S): at 11:20

## 2023-12-12 RX ADMIN — HYDROMORPHONE HYDROCHLORIDE 0.5 MILLIGRAM(S): 2 INJECTION INTRAMUSCULAR; INTRAVENOUS; SUBCUTANEOUS at 03:34

## 2023-12-12 RX ADMIN — Medication 2: at 12:10

## 2023-12-12 RX ADMIN — Medication 5 MILLIGRAM(S): at 17:16

## 2023-12-12 RX ADMIN — POLYETHYLENE GLYCOL 3350 17 GRAM(S): 17 POWDER, FOR SOLUTION ORAL at 17:16

## 2023-12-12 RX ADMIN — SENNA PLUS 2 TABLET(S): 8.6 TABLET ORAL at 21:37

## 2023-12-12 RX ADMIN — Medication 25 GRAM(S): at 09:56

## 2023-12-12 RX ADMIN — Medication 1 TABLET(S): at 11:20

## 2023-12-12 RX ADMIN — Medication 25 GRAM(S): at 06:46

## 2023-12-12 RX ADMIN — OXYCODONE HYDROCHLORIDE 30 MILLIGRAM(S): 5 TABLET ORAL at 02:45

## 2023-12-12 RX ADMIN — HYDROMORPHONE HYDROCHLORIDE 0.5 MILLIGRAM(S): 2 INJECTION INTRAMUSCULAR; INTRAVENOUS; SUBCUTANEOUS at 00:17

## 2023-12-12 RX ADMIN — OXYCODONE HYDROCHLORIDE 15 MILLIGRAM(S): 5 TABLET ORAL at 05:24

## 2023-12-12 RX ADMIN — GABAPENTIN 800 MILLIGRAM(S): 400 CAPSULE ORAL at 11:20

## 2023-12-12 RX ADMIN — GABAPENTIN 800 MILLIGRAM(S): 400 CAPSULE ORAL at 02:31

## 2023-12-12 RX ADMIN — HYDROMORPHONE HYDROCHLORIDE 0.5 MILLIGRAM(S): 2 INJECTION INTRAMUSCULAR; INTRAVENOUS; SUBCUTANEOUS at 15:42

## 2023-12-12 RX ADMIN — OXYCODONE HYDROCHLORIDE 30 MILLIGRAM(S): 5 TABLET ORAL at 12:15

## 2023-12-12 RX ADMIN — POLYETHYLENE GLYCOL 3350 17 GRAM(S): 17 POWDER, FOR SOLUTION ORAL at 05:24

## 2023-12-12 RX ADMIN — OXYCODONE HYDROCHLORIDE 15 MILLIGRAM(S): 5 TABLET ORAL at 06:00

## 2023-12-12 RX ADMIN — Medication 4 MILLIGRAM(S): at 02:04

## 2023-12-12 RX ADMIN — HYDROMORPHONE HYDROCHLORIDE 0.5 MILLIGRAM(S): 2 INJECTION INTRAMUSCULAR; INTRAVENOUS; SUBCUTANEOUS at 03:50

## 2023-12-12 RX ADMIN — HYDROMORPHONE HYDROCHLORIDE 0.5 MILLIGRAM(S): 2 INJECTION INTRAMUSCULAR; INTRAVENOUS; SUBCUTANEOUS at 22:00

## 2023-12-12 RX ADMIN — Medication 5 MILLIGRAM(S): at 02:04

## 2023-12-12 RX ADMIN — HYDROMORPHONE HYDROCHLORIDE 0.5 MILLIGRAM(S): 2 INJECTION INTRAMUSCULAR; INTRAVENOUS; SUBCUTANEOUS at 11:30

## 2023-12-12 RX ADMIN — PANTOPRAZOLE SODIUM 40 MILLIGRAM(S): 20 TABLET, DELAYED RELEASE ORAL at 11:20

## 2023-12-12 RX ADMIN — HYDROMORPHONE HYDROCHLORIDE 0.5 MILLIGRAM(S): 2 INJECTION INTRAMUSCULAR; INTRAVENOUS; SUBCUTANEOUS at 21:39

## 2023-12-12 RX ADMIN — CHLORHEXIDINE GLUCONATE 1 APPLICATION(S): 213 SOLUTION TOPICAL at 05:24

## 2023-12-12 RX ADMIN — MIDODRINE HYDROCHLORIDE 10 MILLIGRAM(S): 2.5 TABLET ORAL at 09:02

## 2023-12-12 NOTE — PHYSICAL THERAPY INITIAL EVALUATION ADULT - ADDITIONAL COMMENTS
Pt reports living in apartment, alone, with elevator access. Reports scooting from bed into motorized scooter. Reports having tub shower with shower chair.

## 2023-12-12 NOTE — PHYSICAL THERAPY INITIAL EVALUATION ADULT - GENERAL OBSERVATIONS, REHAB EVAL
PT IE completed. Chart reviewed. Pt received semi-supine, NAD, +A line, +IV, +back dressing C/D/I, +YIFAN, +hemovac, tele. REDDY Cain cleared pt for PT.

## 2023-12-12 NOTE — PROGRESS NOTE ADULT - SUBJECTIVE AND OBJECTIVE BOX
NEUROSURGERY PAIN MANAGEMENT CONSULT NOTE    Chief Complaint:    HPI:  66 y/o female with PMHx HTN, HLD, CVA x 3 (last was 2016 with right hand weakness residual), GAMALIEL not using CPAP, Emphysema, former 25 pack year smoker restarted this week of surgery, Chronic Constipation on Movantik, Urinary Incontinence chronically self straight cath at home, chronic UTI, Breast Implant Rupture with Chronic Leak repair 10/2023, B/L CTS s/p release, s/p Intrathecal Morphine pump for pain, with chronic neck and back pain worsening for years s/p multiple spinal surgeries including laminectomies and fusion of cervical, thoracic and lumbar spine initially done in 2009 and recently in 2019 and 2020 at [facility] by Dr. Trinidad, s/p T3-L1 revision of prior fusion (with Dr. Luz on 8/17/22). Outpatient Xray 11/16/2023 showing broken rods/displacement. Presents for elective C2-S2 instrumentation and fusion. Pt endorses weakness of b/l LE and burning pain in b/l lower extremities radiating to both feet,  (04 Dec 2023 15:44)    OVERNIGHT EVENTS: Patient seen this morning and resting comfortably when entered the room. She was eating her breakfast and appeared to be in no distress/ discomfort. When asked about her pain, she states she's doing fine today and pain is well controlled. She was noted to have trace movement in right EHL but gross sensation in BLE was not present (unable to accurately confirm when feet/ ankles are touched with pen). Denies any nausea or vomiting and continuing to eat well. No plan to adjust pain medications today.     Hospital Course:   12/4: Admitted for spinal fusion d/t damaged hardware.   12/5: Neuro stable. POD0 C2-S2 instrumentation/fusion (intraop b/l LE motor loss). on parminder for MAP>90  12/6: POD1. KAMRAN o/n neuro stable. remains intubated. Extubated 6am. Precedex gtt prn. Remains on parminder gtt for MAP goal >90. Valium made prn. CT thoracic spine bc MRI unavailable. Attempted NGT, unsuccessful (resistance @ 35).   12/7: POD2 Given IV dilaudid 0.5mg for pain. Neuro exam stable. SQL started tongiht. Increased precedex to 1.0. Responds well to valium. Consulting psych given paranoia ?homicidal statements. Passed bedside dyspphagia and tolerating PO meds. MRI complete, f/u read.   12/8: POD3 Pt reporting incisional pain, given dilaudid 0.5mg IV x2. Pt appearing anxious, given xanax 1mg. Neuro exam unchanged. Pain regimen increased to oxy 15/30 mg, pending further recs. Given 1 L bolus for tachycardia. Trops negative. 2 HMV drains removed.   12/9: POD4, KAMRAN, CT myelogram today showing block at T12-L1, OR tomorrow  12/10: POD5, KAMRAN, OR today for exploration of fusion, possible decompression T9-L2, revision instrumentation  POD 0 T9-L2 decompression. Pt returned from OR intubated. 50mcg fentanyl IVP x 2 for pain control and HTN. Magnesium repleted. Precedex gtt started for sedation. Decreased FiO2 to 40%. 1L bolus for soft MAP. Dc'd propofol and started levo gtt for MAP >90.   12/11: POD 6, POD 1. Extubated, satting well on RA. Resumed home valium and gabapentin. Castillo d/c'd, pending TOV. Seroquel started for agitation, R IJ removed, LUE double midline placed. Started SQL.   12/12: POD7, POD2, given 500 cc bolus and started on phenylephrine for MAP goal >90.       PAST MEDICAL & SURGICAL HISTORY:  HTN (hypertension)      SS (spinal stenosis)      High cholesterol      Stroke  x3      H/O carpal tunnel syndrome  Bilateral      Chronic GERD      History of chronic constipation      Urinary incontinence  self cath as needed      Pre-diabetes      Anxiety and depression      Eczema      H/O kyphosis      Pancreas cyst      Scoliosis      Wheelchair dependent      Cervical pseudoarthrosis  and lumbar spine      Cervical spondylosis      Chronic headaches      Degenerative joint disease  left shoulder      Lumbosacral spondylosis      COPD (chronic obstructive pulmonary disease)      H/O Spinal surgery  lumbar x 30      H/O breast surgery  left breast implant 1980's      S/P cervical discectomy  x4      H/O total shoulder replacement, right          FAMILY HISTORY:      SOCIAL HISTORY:  [ ] Denies Smoking, Alcohol, or Drug Use    HOME MEDICATIONS:   Please refer to initial HNP    PAIN HOME MEDICATIONS:    Allergies    Crab (Pruritus)  Drug Allergies Not Recorded    Intolerances      MEDICATIONS  (STANDING):  bisacodyl 5 milliGRAM(s) Oral at bedtime  chlorhexidine 2% Cloths 1 Application(s) Topical <User Schedule>  diazepam    Tablet 5 milliGRAM(s) Oral every 8 hours  DULoxetine 60 milliGRAM(s) Oral daily  enoxaparin Injectable 40 milliGRAM(s) SubCutaneous <User Schedule>  gabapentin 800 milliGRAM(s) Oral every 8 hours  influenza  Vaccine (HIGH DOSE) 0.7 milliLiter(s) IntraMuscular once  insulin lispro (ADMELOG) corrective regimen sliding scale   SubCutaneous Before meals and at bedtime  midodrine 10 milliGRAM(s) Oral every 8 hours  Morphine 1 milliGRAM / mL 17.9 mL,Morphine 1 milliGRAM / mL 17.9 mL 17.9 mL IntraThecal Continuous Pump  multivitamin 1 Tablet(s) Oral daily  pantoprazole    Tablet 40 milliGRAM(s) Oral before breakfast  phenylephrine    Infusion 2.5 MICROgram(s)/kG/Min (79.4 mL/Hr) IV Continuous <Continuous>  polyethylene glycol 3350 17 Gram(s) Oral two times a day  senna 2 Tablet(s) Oral at bedtime  simvastatin 40 milliGRAM(s) Oral at bedtime    MEDICATIONS  (PRN):  benzocaine 20% Spray 1 Spray(s) Mucosal three times a day PRN Sore throat  HYDROmorphone  Injectable 0.5 milliGRAM(s) IV Push every 3 hours PRN Breakthrough Pain  naloxone Injectable 0.1 milliGRAM(s) IV Push every 3 minutes PRN For ANY of the following changes in patient status:  A. RR LESS THAN 10 breaths per minute, B. Oxygen saturation LESS THAN 90%, C. Sedation score of 6  ondansetron Injectable 4 milliGRAM(s) IV Push every 6 hours PRN Nausea  oxyCODONE    IR 15 milliGRAM(s) Oral every 4 hours PRN Moderate Pain (4 - 6)  oxyCODONE    IR 30 milliGRAM(s) Oral every 4 hours PRN Severe Pain (7 - 10)  sodium chloride 0.9% lock flush 10 milliLiter(s) IV Push every 1 hour PRN Pre/post blood products, medications, blood draw, and to maintain line patency        Vital Signs Last 24 Hrs  T(C): 36.6 (12-12-23 @ 09:02), Max: 37.2 (12-11-23 @ 22:43)  T(F): 97.8 (12-12-23 @ 09:02), Max: 99 (12-11-23 @ 22:43)  HR: 100 (12-12-23 @ 10:00) (73 - 133)  BP: 124/62 (12-12-23 @ 10:00) (96/52 - 151/69)  ABP: 98/72 (12-12-23 @ 10:00) (98/72 - 98/72)  ABP(mean): 84 (12-12-23 @ 10:00) (84 - 84)  RR: 18 (12-12-23 @ 10:00) (16 - 20)  SpO2: 95% (12-12-23 @ 10:00) (88% - 100%)    LABS:  CBC Full  -  ( 12 Dec 2023 05:23 )  WBC Count : 10.13 K/uL  RBC Count : 2.97 M/uL  Hemoglobin : 8.3 g/dL  Hematocrit : 25.7 %  Platelet Count - Automated : 244 K/uL  Mean Cell Volume : 86.5 fl  Mean Cell Hemoglobin : 27.9 pg  Mean Cell Hemoglobin Concentration : 32.3 gm/dL  Auto Neutrophil # : x  Auto Lymphocyte # : x  Auto Monocyte # : x  Auto Eosinophil # : x  Auto Basophil # : x  Auto Neutrophil % : x  Auto Lymphocyte % : x  Auto Monocyte % : x  Auto Eosinophil % : x  Auto Basophil % : x    12-12    139  |  109<H>  |  13  ----------------------------<  121<H>  3.8   |  23  |  0.51    Ca    8.0<L>      12 Dec 2023 05:23  Phos  3.3     12-12  Mg     1.6     12-12          RADIOLOGY:    Drug Screen:      REVIEW OF SYSTEMS:  CONSTITUTIONAL: No fever   EYES: No visual disturbances  ENMT:  No difficulty hearing.  NECK: pain and stiffness  RESPIRATORY: No shortness of breath  CARDIOVASCULAR: No chest pain  GASTROINTESTINAL:  No nausea, vomiting.  NEUROLOGICAL: weakness in BLE  MUSCULOSKELETAL: Incisional back pain (improving)        PHYSICAL EXAM - limited exam  GENERAL: Laying in bed, NAD  Neuro: EOMI  Cranial nerves grossly intact  Motor exam: on exam patient was unable to move BLE upon request  Sensation to light touch in BLE no present when I asked patient  CHEST/LUNG: on room air  ABDOMEN:  Nondistended     NEUROSURGERY PAIN MANAGEMENT CONSULT NOTE    Chief Complaint:    HPI:  66 y/o female with PMHx HTN, HLD, CVA x 3 (last was 2016 with right hand weakness residual), GAMALIEL not using CPAP, Emphysema, former 25 pack year smoker restarted this week of surgery, Chronic Constipation on Movantik, Urinary Incontinence chronically self straight cath at home, chronic UTI, Breast Implant Rupture with Chronic Leak repair 10/2023, B/L CTS s/p release, s/p Intrathecal Morphine pump for pain, with chronic neck and back pain worsening for years s/p multiple spinal surgeries including laminectomies and fusion of cervical, thoracic and lumbar spine initially done in 2009 and recently in 2019 and 2020 at [facility] by Dr. Trinidad, s/p T3-L1 revision of prior fusion (with Dr. Luz on 8/17/22). Outpatient Xray 11/16/2023 showing broken rods/displacement. Presents for elective C2-S2 instrumentation and fusion. Pt endorses weakness of b/l LE and burning pain in b/l lower extremities radiating to both feet,  (04 Dec 2023 15:44)    OVERNIGHT EVENTS: Patient seen this morning and resting comfortably when entered the room. She was eating her breakfast and appeared to be in no distress/ discomfort. When asked about her pain, she states she's doing fine today and pain is well controlled. She was noted to have trace movement in right EHL but gross sensation in BLE was not present (unable to accurately confirm when feet/ ankles are touched with pen). Denies any nausea or vomiting and continuing to eat well. No plan to adjust pain medications today.     Hospital Course:   12/4: Admitted for spinal fusion d/t damaged hardware.   12/5: Neuro stable. POD0 C2-S2 instrumentation/fusion (intraop b/l LE motor loss). on parminder for MAP>90  12/6: POD1. KAMRAN o/n neuro stable. remains intubated. Extubated 6am. Precedex gtt prn. Remains on parminder gtt for MAP goal >90. Valium made prn. CT thoracic spine bc MRI unavailable. Attempted NGT, unsuccessful (resistance @ 35).   12/7: POD2 Given IV dilaudid 0.5mg for pain. Neuro exam stable. SQL started tongiht. Increased precedex to 1.0. Responds well to valium. Consulting psych given paranoia ?homicidal statements. Passed bedside dyspphagia and tolerating PO meds. MRI complete, f/u read.   12/8: POD3 Pt reporting incisional pain, given dilaudid 0.5mg IV x2. Pt appearing anxious, given xanax 1mg. Neuro exam unchanged. Pain regimen increased to oxy 15/30 mg, pending further recs. Given 1 L bolus for tachycardia. Trops negative. 2 HMV drains removed.   12/9: POD4, KAMRAN, CT myelogram today showing block at T12-L1, OR tomorrow  12/10: POD5, KAMRAN, OR today for exploration of fusion, possible decompression T9-L2, revision instrumentation  POD 0 T9-L2 decompression. Pt returned from OR intubated. 50mcg fentanyl IVP x 2 for pain control and HTN. Magnesium repleted. Precedex gtt started for sedation. Decreased FiO2 to 40%. 1L bolus for soft MAP. Dc'd propofol and started levo gtt for MAP >90.   12/11: POD 6, POD 1. Extubated, satting well on RA. Resumed home valium and gabapentin. Castillo d/c'd, pending TOV. Seroquel started for agitation, R IJ removed, LUE double midline placed. Started SQL.   12/12: POD7, POD2, given 500 cc bolus and started on phenylephrine for MAP goal >90.       PAST MEDICAL & SURGICAL HISTORY:  HTN (hypertension)      SS (spinal stenosis)      High cholesterol      Stroke  x3      H/O carpal tunnel syndrome  Bilateral      Chronic GERD      History of chronic constipation      Urinary incontinence  self cath as needed      Pre-diabetes      Anxiety and depression      Eczema      H/O kyphosis      Pancreas cyst      Scoliosis      Wheelchair dependent      Cervical pseudoarthrosis  and lumbar spine      Cervical spondylosis      Chronic headaches      Degenerative joint disease  left shoulder      Lumbosacral spondylosis      COPD (chronic obstructive pulmonary disease)      H/O Spinal surgery  lumbar x 30      H/O breast surgery  left breast implant 1980's      S/P cervical discectomy  x4      H/O total shoulder replacement, right          FAMILY HISTORY:      SOCIAL HISTORY:  [ ] Denies Smoking, Alcohol, or Drug Use    HOME MEDICATIONS:   Please refer to initial HNP    PAIN HOME MEDICATIONS:    Allergies    Crab (Pruritus)  Drug Allergies Not Recorded    Intolerances      MEDICATIONS  (STANDING):  bisacodyl 5 milliGRAM(s) Oral at bedtime  chlorhexidine 2% Cloths 1 Application(s) Topical <User Schedule>  diazepam    Tablet 5 milliGRAM(s) Oral every 8 hours  DULoxetine 60 milliGRAM(s) Oral daily  enoxaparin Injectable 40 milliGRAM(s) SubCutaneous <User Schedule>  gabapentin 800 milliGRAM(s) Oral every 8 hours  influenza  Vaccine (HIGH DOSE) 0.7 milliLiter(s) IntraMuscular once  insulin lispro (ADMELOG) corrective regimen sliding scale   SubCutaneous Before meals and at bedtime  midodrine 10 milliGRAM(s) Oral every 8 hours  Morphine 1 milliGRAM / mL 17.9 mL,Morphine 1 milliGRAM / mL 17.9 mL 17.9 mL IntraThecal Continuous Pump  multivitamin 1 Tablet(s) Oral daily  pantoprazole    Tablet 40 milliGRAM(s) Oral before breakfast  phenylephrine    Infusion 2.5 MICROgram(s)/kG/Min (79.4 mL/Hr) IV Continuous <Continuous>  polyethylene glycol 3350 17 Gram(s) Oral two times a day  senna 2 Tablet(s) Oral at bedtime  simvastatin 40 milliGRAM(s) Oral at bedtime    MEDICATIONS  (PRN):  benzocaine 20% Spray 1 Spray(s) Mucosal three times a day PRN Sore throat  HYDROmorphone  Injectable 0.5 milliGRAM(s) IV Push every 3 hours PRN Breakthrough Pain  naloxone Injectable 0.1 milliGRAM(s) IV Push every 3 minutes PRN For ANY of the following changes in patient status:  A. RR LESS THAN 10 breaths per minute, B. Oxygen saturation LESS THAN 90%, C. Sedation score of 6  ondansetron Injectable 4 milliGRAM(s) IV Push every 6 hours PRN Nausea  oxyCODONE    IR 15 milliGRAM(s) Oral every 4 hours PRN Moderate Pain (4 - 6)  oxyCODONE    IR 30 milliGRAM(s) Oral every 4 hours PRN Severe Pain (7 - 10)  sodium chloride 0.9% lock flush 10 milliLiter(s) IV Push every 1 hour PRN Pre/post blood products, medications, blood draw, and to maintain line patency        Vital Signs Last 24 Hrs  T(C): 36.6 (12-12-23 @ 09:02), Max: 37.2 (12-11-23 @ 22:43)  T(F): 97.8 (12-12-23 @ 09:02), Max: 99 (12-11-23 @ 22:43)  HR: 100 (12-12-23 @ 10:00) (73 - 133)  BP: 124/62 (12-12-23 @ 10:00) (96/52 - 151/69)  ABP: 98/72 (12-12-23 @ 10:00) (98/72 - 98/72)  ABP(mean): 84 (12-12-23 @ 10:00) (84 - 84)  RR: 18 (12-12-23 @ 10:00) (16 - 20)  SpO2: 95% (12-12-23 @ 10:00) (88% - 100%)    LABS:  CBC Full  -  ( 12 Dec 2023 05:23 )  WBC Count : 10.13 K/uL  RBC Count : 2.97 M/uL  Hemoglobin : 8.3 g/dL  Hematocrit : 25.7 %  Platelet Count - Automated : 244 K/uL  Mean Cell Volume : 86.5 fl  Mean Cell Hemoglobin : 27.9 pg  Mean Cell Hemoglobin Concentration : 32.3 gm/dL  Auto Neutrophil # : x  Auto Lymphocyte # : x  Auto Monocyte # : x  Auto Eosinophil # : x  Auto Basophil # : x  Auto Neutrophil % : x  Auto Lymphocyte % : x  Auto Monocyte % : x  Auto Eosinophil % : x  Auto Basophil % : x    12-12    139  |  109<H>  |  13  ----------------------------<  121<H>  3.8   |  23  |  0.51    Ca    8.0<L>      12 Dec 2023 05:23  Phos  3.3     12-12  Mg     1.6     12-12          RADIOLOGY:    Drug Screen:      REVIEW OF SYSTEMS:  CONSTITUTIONAL: No fever   EYES: No visual disturbances  ENMT:  No difficulty hearing.  NECK: pain and stiffness  RESPIRATORY: No shortness of breath  CARDIOVASCULAR: No chest pain  GASTROINTESTINAL:  No nausea, vomiting.  NEUROLOGICAL: weakness in BLE  MUSCULOSKELETAL: Incisional back pain (improving)        PHYSICAL EXAM - limited exam  GENERAL: Laying in bed, NAD  Neuro: EOMI  Cranial nerves grossly intact  Motor exam: on exam patient was unable to move BLE upon request  Sensation to light touch in BLE no present when I asked patient  CHEST/LUNG: on room air  ABDOMEN:  Nondistended

## 2023-12-12 NOTE — OCCUPATIONAL THERAPY INITIAL EVALUATION ADULT - ADDITIONAL COMMENTS
Pt is a questionable historian. Pt reports she lives in an apartment alone. Pt reports she completes ADLs independently, able to lateral transfer from bed>motorized wheelchair independently. Pt reports the last time she stood was when she was catching a cab to come to [facility]. Pt reports she has a bathtub shower with shower chair but it's "not safe". Pt reports she has a raised toilet seat but its broken. Pt is a questionable historian. Pt reports she lives in an apartment alone. Pt reports she completes ADLs independently, able to lateral transfer from bed>motorized wheelchair independently. Pt reports the last time she stood was when she was catching a cab to come to [facility]. Pt reports she has a bathtub shower with shower chair but it's "not safe". Pt reports she has a raised toilet seat but its broken.

## 2023-12-12 NOTE — OCCUPATIONAL THERAPY INITIAL EVALUATION ADULT - MD ORDER
Now s/p C2-S2 instrumentation and fusion (12/5). S/p T9-L2 decompression w/ durotomy with repair (12/10/23).

## 2023-12-12 NOTE — OCCUPATIONAL THERAPY INITIAL EVALUATION ADULT - PERTINENT HX OF CURRENT PROBLEM, REHAB EVAL
69 yo female with Hx HTN, HLD, DM, CVA x 3 (last in 2016 with residual R hand weakness), GAMALIEL not using CPAP, Emphysema, ex-25 pack year smoker now restarted, chronic constipation on Movantik, chronic urinary retention (SC at home), b/l CTS s/p release, s/p Intrathecal Morphine pump for pain, with chronic neck and back pain worsening for years s/p multiple spinal surgeries including laminectomies and fusion of cervical, thoracic and lumbar spine, s/p T3-L1 revision of prior fusion (with  on 8/17/22). Xray 11/16/23 showing broken rods/displacement.

## 2023-12-12 NOTE — OCCUPATIONAL THERAPY INITIAL EVALUATION ADULT - RANGE OF MOTION EXAMINATION, UPPER EXTREMITY
except b/l shoulder flexion grossly 0-80 degrees./bilateral UE Active ROM was WFL  (within functional limits)/bilateral UE Passive ROM was WFL  (within functional limits)

## 2023-12-12 NOTE — OCCUPATIONAL THERAPY INITIAL EVALUATION ADULT - PHYSICAL ASSIST/NONPHYSICAL ASSIST:DRESS LOWER BODY, OT EVAL
Chief Complaint   Patient presents with    Complete Physical     Reviewed record in preparation for visit and have obtained necessary documentation. Identified pt with two pt identifiers(name and ). Health Maintenance Due   Topic    Flu Vaccine (1)         Chief Complaint   Patient presents with    Complete Physical        Wt Readings from Last 3 Encounters:   21 135 lb 6.4 oz (61.4 kg)   20 133 lb (60.3 kg)   19 131 lb 12.8 oz (59.8 kg)     Temp Readings from Last 3 Encounters:   21 97.6 °F (36.4 °C) (Temporal)   19 98.7 °F (37.1 °C) (Oral)   18 97.7 °F (36.5 °C)     BP Readings from Last 3 Encounters:   21 90/60   20 112/74   19 90/60     Pulse Readings from Last 3 Encounters:   21 64   19 75   18 62           Learning Assessment:  :     No flowsheet data found. Depression Screening:  :     3 most recent PHQ Screens 2021   Little interest or pleasure in doing things Not at all   Feeling down, depressed, irritable, or hopeless Not at all   Total Score PHQ 2 0       Fall Risk Assessment:  :     No flowsheet data found. Abuse Screening:  :     No flowsheet data found. Coordination of Care Questionnaire:  :     1) Have you been to an emergency room, urgent care clinic since your last visit? no   Hospitalized since your last visit? no             2) Have you seen or consulted any other health care providers outside of [address] [place] [address] since your last visit? no  (Include any pap smears or colon screenings in this section.)    3) Do you have an Advance Directive on file? no    4) Are you interested in receiving information on Advance Directives? NO      Patient is accompanied by self I have received verbal consent from Keith Reeves to discuss any/all medical information while they are present in the room. Reviewed record  In preparation for visit and have obtained necessary documentation. verbal cues/nonverbal cues (demo/gestures)/1 person assist

## 2023-12-12 NOTE — OCCUPATIONAL THERAPY INITIAL EVALUATION ADULT - MANUAL MUSCLE TESTING RESULTS, REHAB EVAL
BUE shoulder flexion 3-/5, R elbow flexion 4-/5, L elbow flexion 3+/5, R  strength 4-/5, L  strength 3+/5. BLE 0/5 throughout.

## 2023-12-12 NOTE — PROGRESS NOTE ADULT - SUBJECTIVE AND OBJECTIVE BOX
HPI:  68 y/o female with PMHx HTN, HLD, CVA x 3 (last was 2016 with right hand weakness residual), GAMALIEL not using CPAP, Emphysema, former 25 pack year smoker restarted this week of surgery, Chronic Constipation on Movantik, Urinary Incontinence chronically self straight cath at home, chronic UTI, Breast Implant Rupture with Chronic Leak repair 10/2023, B/L CTS s/p release, s/p Intrathecal Morphine pump for pain, with chronic neck and back pain worsening for years s/p multiple spinal surgeries including laminectomies and fusion of cervical, thoracic and lumbar spine initially done in 2009 and recently in 2019 and 2020 at [facility] by Dr. Trinidad, s/p T3-L1 revision of prior fusion (with Dr. Luz on 8/17/22). Outpatient Xray 11/16/2023 showing broken rods/displacement. Presents for elective C2-S2 instrumentation and fusion. Pt endorses weakness of b/l LE and burning pain in b/l lower extremities radiating to both feet,  (04 Dec 2023 15:44)    OVERNIGHT EVENTS: kamran, neuro stable.     Hospital Course:   12/4: Admitted for spinal fusion d/t damaged hardware.   12/5: Neuro stable. POD0 C2-S2 instrumentation/fusion (intraop b/l LE motor loss). on parminder for MAP>90  12/6: POD1. KAMRAN o/n neuro stable. remains intubated. Extubated 6am. Precedex gtt prn. Remains on parminder gtt for MAP goal >90. Valium made prn. CT thoracic spine bc MRI unavailable. Attempted NGT, unsuccessful (resistance @ 35).   12/7: POD2 Given IV dilaudid 0.5mg for pain. Neuro exam stable. SQL started tongiht. Increased precedex to 1.0. Responds well to valium. Consulting psych given paranoia ?homicidal statements. Passed bedside dysphagia and tolerating PO meds. MRI complete, f/u read.   12/8: POD3 Pt reporting incisional pain, given dilaudid 0.5mg IV x2. Pt appearing anxious, given xanax 1mg. Neuro exam unchanged. Pain regimen increased to oxy 15/30 mg, pending further recs. Given 1 L bolus for tachycardia. Trops negative. 2 HMV drains removed.   12/9: POD4, KAMRAN, CT myelogram today showing block at T12-L1, OR tomorrow  12/10: POD5, KAMRAN, OR today for exploration of fusion, possible decompression T9-L2, revision instrumentation  POD 0 T9-L2 decompression. Pt returned from OR intubated. 50mcg fentanyl IVP x 2 for pain control and HTN. Magnesium repleted. Precedex gtt started for sedation. Decreased FiO2 to 40%. 1L bolus for soft MAP. Dc'd propofol and started levo gtt for MAP >90.   12/11: POD 6, POD 1. Extubated, satting well on RA. Resumed home valium and gabapentin. Castillo d/c'd, pending TOV. Seroquel started for agitation, R IJ removed, LUE double midline placed. Started SQL.   12/12: POD7, POD2, given 500 cc bolus and started on phenylephrine for MAP goal >90.     Vital Signs Last 24 Hrs  T(C): 37.2 (11 Dec 2023 22:43), Max: 37.3 (11 Dec 2023 00:35)  T(F): 99 (11 Dec 2023 22:43), Max: 99.2 (11 Dec 2023 00:35)  HR: 114 (11 Dec 2023 22:00) (65 - 119)  BP: 112/64 (11 Dec 2023 22:00) (103/51 - 151/69)  BP(mean): 84 (11 Dec 2023 22:00) (72 - 107)  RR: 18 (11 Dec 2023 22:00) (14 - 20)  SpO2: 99% (11 Dec 2023 22:00) (88% - 100%)    Parameters below as of 11 Dec 2023 22:00  Patient On (Oxygen Delivery Method): room air        I&O's Summary    10 Dec 2023 07:01  -  11 Dec 2023 07:00  --------------------------------------------------------  IN: 2841.5 mL / OUT: 3970 mL / NET: -1128.5 mL    11 Dec 2023 07:01  -  12 Dec 2023 00:27  --------------------------------------------------------  IN: 340 mL / OUT: 980 mL / NET: -640 mL        PHYSICAL EXAM:  General: patient seen laying supine in bed in NAD on AC/VC  Neuro: AAOx3, FC, OE to minor stimuli, CNII-XI grossly intact, face symmetric, no pronator drift,    strength 5/5 b/l UE and b/l LE 0/5, T12 sensory level, 0 reflexes in b/l LE   HEENT: PERRL, EOMI  Neck: supple  Cardiac: RRR, S1S2  Pulmonary: chest rise symmetric  Abdomen: soft, nontender, nondistended  Ext: perfusing well  Skin: warm, dry  Wound: Posterior incision c/d/i +HMV, YIFAN in place to suction        LABS:                        8.9    8.15  )-----------( 182      ( 11 Dec 2023 05:30 )             26.3     12-11    141  |  111<H>  |  8   ----------------------------<  164<H>  4.3   |  24  |  0.41<L>    Ca    8.3<L>      11 Dec 2023 05:30  Phos  3.1     12-11  Mg     1.9     12-11        Urinalysis Basic - ( 11 Dec 2023 05:30 )    Color: x / Appearance: x / SG: x / pH: x  Gluc: 164 mg/dL / Ketone: x  / Bili: x / Urobili: x   Blood: x / Protein: x / Nitrite: x   Leuk Esterase: x / RBC: x / WBC x   Sq Epi: x / Non Sq Epi: x / Bacteria: x          CAPILLARY BLOOD GLUCOSE      POCT Blood Glucose.: 179 mg/dL (11 Dec 2023 22:11)  POCT Blood Glucose.: 105 mg/dL (11 Dec 2023 16:25)  POCT Blood Glucose.: 144 mg/dL (11 Dec 2023 11:18)      Drug Levels: [] N/A    CSF Analysis: [] N/A      Allergies    Crab (Pruritus)  No Known Drug Allergies    Intolerances      MEDICATIONS:  Antibiotics:    Neuro:  diazepam    Tablet 5 milliGRAM(s) Oral every 8 hours  DULoxetine 60 milliGRAM(s) Oral daily  gabapentin 800 milliGRAM(s) Oral every 8 hours  HYDROmorphone  Injectable 0.5 milliGRAM(s) IV Push every 3 hours PRN  ondansetron Injectable 4 milliGRAM(s) IV Push every 6 hours PRN  oxyCODONE    IR 30 milliGRAM(s) Oral every 4 hours PRN  oxyCODONE    IR 15 milliGRAM(s) Oral every 4 hours PRN    Anticoagulation:  enoxaparin Injectable 40 milliGRAM(s) SubCutaneous <User Schedule>    OTHER:  benzocaine 20% Spray 1 Spray(s) Mucosal three times a day PRN  bisacodyl 5 milliGRAM(s) Oral at bedtime  chlorhexidine 2% Cloths 1 Application(s) Topical <User Schedule>  influenza  Vaccine (HIGH DOSE) 0.7 milliLiter(s) IntraMuscular once  insulin lispro (ADMELOG) corrective regimen sliding scale   SubCutaneous Before meals and at bedtime  Morphine 1 milliGRAM / mL 17.9 mL,Morphine 1 milliGRAM / mL 17.9 mL 17.9 mL IntraThecal Continuous Pump  naloxone Injectable 0.1 milliGRAM(s) IV Push every 3 minutes PRN  pantoprazole  Injectable 40 milliGRAM(s) IV Push daily  phenylephrine    Infusion 0.1 MICROgram(s)/kG/Min IV Continuous <Continuous>  polyethylene glycol 3350 17 Gram(s) Oral two times a day  senna 2 Tablet(s) Oral at bedtime  simvastatin 40 milliGRAM(s) Oral at bedtime    IVF:  multivitamin 1 Tablet(s) Oral daily      ASSESSMENT:  69 yo female with Hx HTN, HLD, DM, CVA x 3 (last in 2016 with residual R hand weakness), GAMALIEL not using CPAP, Emphysema, ex-25 pack year smoker now restarted, chronic constipation on Movantik, chronic urinary retention (SC at home), b/l CTS s/p release, s/p Intrathecal Morphine pump for pain, with chronic neck and back pain worsening for years s/p multiple spinal surgeries including laminectomies and fusion of cervical, thoracic and lumbar spine, s/p T3-L1 revision of prior fusion (with  on 8/17/22). Xray 11/16/23 showing broken rods/displacement. Now s/p C2-S2 instrumentation and fusion (12/5). S/p T9-L2 decompression w/ durotomy with repair (12/10/23).       S12.9XXA    Handoff    MEWS Score    HTN (hypertension)    Back pain    Hypertension    SS (spinal stenosis)    High cholesterol    Stroke    H/O carpal tunnel syndrome    H/O carpal tunnel syndrome    Chronic GERD    History of chronic constipation    Seizure    Urinary incontinence    Pre-diabetes    Acute UTI    Anxiety    Anxiety and depression    Arthritis    Eczema    External hemorrhoids    H/O kyphosis    Multiple sclerosis    Pancreas cyst    Scoliosis    Wheelchair dependent    Cervical pseudoarthrosis    Right shoulder pain    Cervical spondylosis    Chronic headaches    Chronic LUQ pain    Chronic UTI    Degenerative joint disease    H/O low back pain    Lumbosacral spondylosis    COPD (chronic obstructive pulmonary disease)    Back pain    Back pain    Spinal cord injury, thoracic (T7-T12)    Back pain    Spinal cord injury of thoracic region without bone injury    Delirium    Revision of posterolateral fusion of lumbar spine    Revision of fusion of thoracic spine    Decompression, spinal cord, thoracic, posterolateral approach    Cervical disc disease    H/O Spinal surgery    H/O breast surgery    S/P cervical discectomy    Previous back surgery    H/O shoulder surgery    H/O total shoulder replacement, right    [email or web address]        PLAN:  Neuro:  - Neuro/vitals q2  - pain control: modified ERAS, IT morphine pump, oxycodone 15/30 prn, valium and home gabapentin 800 TID resumed  - Continue home cymbalta 60mg daily   - HMV x 1, YIFAN x 1, per plastics  - MRI thoracic complete  - CT thoracic spine 12/6: postop changes  - CT myelogram done 12/9  - holistic therapy consult     Cardio:  - MAP >90  - holding home metoprolol while on pressors   - C/w home simvastatin     Pulm:  - Extubated to room air  - hx GAMALIEL, no CPAP at home    GI:  - CCD   - Bowel regimen  - GERD: continue home protonix    Renal:  - PRN straight cath  - failed TOV, SC prn    Endo:  - ISS  - a1c: 7    Heme:  - SCDs DVT ppx, SQL  - 500 cell saver intraop 12/5, 1u PRBC intraop 12/10    ID:  - afebrile  - post op ancef    Dispo:  - full code, tele, dispo pending PT/OT    D/w Dr. Luz, Dr. Sanders    Assessment:  Present when checked    []  GCS  E   V  M     Heart Failure: []Acute, [] acute on chronic , []chronic  Heart Failure:  [] Diastolic (HFpEF), [] Systolic (HFrEF), []Combined (HFpEF and HFrEF), [] RHF, [] Pulm HTN, [] Other    [] DARRYL, [] ATN, [] AIN, [] other  [] CKD1, [] CKD2, [] CKD 3, [] CKD 4, [] CKD 5, []ESRD    Encephalopathy: [] Metabolic, [] Hepatic, [] toxic, [] Neurological, [] Other    Abnormal Nurtitional Status: [] malnurtition (see nutrition note), [ ]underweight: BMI < 19, [] morbid obesity: BMI >40, [] Cachexia    [] Sepsis  [] hypovolemic shock,[] cardiogenic shock, [] hemorrhagic shock, [] neuogenic shock  [] Acute Respiratory Failure  []Cerebral edema, [] Brain compression/ herniation,   [] Functional quadriplegia  [] Acute blood loss anemia   HPI:  68 y/o female with PMHx HTN, HLD, CVA x 3 (last was 2016 with right hand weakness residual), GAMALIEL not using CPAP, Emphysema, former 25 pack year smoker restarted this week of surgery, Chronic Constipation on Movantik, Urinary Incontinence chronically self straight cath at home, chronic UTI, Breast Implant Rupture with Chronic Leak repair 10/2023, B/L CTS s/p release, s/p Intrathecal Morphine pump for pain, with chronic neck and back pain worsening for years s/p multiple spinal surgeries including laminectomies and fusion of cervical, thoracic and lumbar spine initially done in 2009 and recently in 2019 and 2020 at [facility] by Dr. Trinidad, s/p T3-L1 revision of prior fusion (with Dr. Luz on 8/17/22). Outpatient Xray 11/16/2023 showing broken rods/displacement. Presents for elective C2-S2 instrumentation and fusion. Pt endorses weakness of b/l LE and burning pain in b/l lower extremities radiating to both feet,  (04 Dec 2023 15:44)    OVERNIGHT EVENTS: kamran, neuro stable.     Hospital Course:   12/4: Admitted for spinal fusion d/t damaged hardware.   12/5: Neuro stable. POD0 C2-S2 instrumentation/fusion (intraop b/l LE motor loss). on parminder for MAP>90  12/6: POD1. KAMRAN o/n neuro stable. remains intubated. Extubated 6am. Precedex gtt prn. Remains on parminder gtt for MAP goal >90. Valium made prn. CT thoracic spine bc MRI unavailable. Attempted NGT, unsuccessful (resistance @ 35).   12/7: POD2 Given IV dilaudid 0.5mg for pain. Neuro exam stable. SQL started tongiht. Increased precedex to 1.0. Responds well to valium. Consulting psych given paranoia ?homicidal statements. Passed bedside dysphagia and tolerating PO meds. MRI complete, f/u read.   12/8: POD3 Pt reporting incisional pain, given dilaudid 0.5mg IV x2. Pt appearing anxious, given xanax 1mg. Neuro exam unchanged. Pain regimen increased to oxy 15/30 mg, pending further recs. Given 1 L bolus for tachycardia. Trops negative. 2 HMV drains removed.   12/9: POD4, KAMRAN, CT myelogram today showing block at T12-L1, OR tomorrow  12/10: POD5, KAMRAN, OR today for exploration of fusion, possible decompression T9-L2, revision instrumentation  POD 0 T9-L2 decompression. Pt returned from OR intubated. 50mcg fentanyl IVP x 2 for pain control and HTN. Magnesium repleted. Precedex gtt started for sedation. Decreased FiO2 to 40%. 1L bolus for soft MAP. Dc'd propofol and started levo gtt for MAP >90.   12/11: POD 6, POD 1. Extubated, satting well on RA. Resumed home valium and gabapentin. Castillo d/c'd, pending TOV. Seroquel started for agitation, R IJ removed, LUE double midline placed. Started SQL.   12/12: POD7, POD2, given 500 cc bolus and started on phenylephrine for MAP goal >90.     Vital Signs Last 24 Hrs  T(C): 37.2 (11 Dec 2023 22:43), Max: 37.3 (11 Dec 2023 00:35)  T(F): 99 (11 Dec 2023 22:43), Max: 99.2 (11 Dec 2023 00:35)  HR: 114 (11 Dec 2023 22:00) (65 - 119)  BP: 112/64 (11 Dec 2023 22:00) (103/51 - 151/69)  BP(mean): 84 (11 Dec 2023 22:00) (72 - 107)  RR: 18 (11 Dec 2023 22:00) (14 - 20)  SpO2: 99% (11 Dec 2023 22:00) (88% - 100%)    Parameters below as of 11 Dec 2023 22:00  Patient On (Oxygen Delivery Method): room air        I&O's Summary    10 Dec 2023 07:01  -  11 Dec 2023 07:00  --------------------------------------------------------  IN: 2841.5 mL / OUT: 3970 mL / NET: -1128.5 mL    11 Dec 2023 07:01  -  12 Dec 2023 00:27  --------------------------------------------------------  IN: 340 mL / OUT: 980 mL / NET: -640 mL        PHYSICAL EXAM:  General: patient seen laying supine in bed in NAD on AC/VC  Neuro: AAOx3, FC, OE to minor stimuli, CNII-XI grossly intact, face symmetric, no pronator drift,    strength 5/5 b/l UE and b/l LE 0/5, T12 sensory level, 0 reflexes in b/l LE   HEENT: PERRL, EOMI  Neck: supple  Cardiac: RRR, S1S2  Pulmonary: chest rise symmetric  Abdomen: soft, nontender, nondistended  Ext: perfusing well  Skin: warm, dry  Wound: Posterior incision c/d/i +HMV, YIFAN in place to suction        LABS:                        8.9    8.15  )-----------( 182      ( 11 Dec 2023 05:30 )             26.3     12-11    141  |  111<H>  |  8   ----------------------------<  164<H>  4.3   |  24  |  0.41<L>    Ca    8.3<L>      11 Dec 2023 05:30  Phos  3.1     12-11  Mg     1.9     12-11        Urinalysis Basic - ( 11 Dec 2023 05:30 )    Color: x / Appearance: x / SG: x / pH: x  Gluc: 164 mg/dL / Ketone: x  / Bili: x / Urobili: x   Blood: x / Protein: x / Nitrite: x   Leuk Esterase: x / RBC: x / WBC x   Sq Epi: x / Non Sq Epi: x / Bacteria: x          CAPILLARY BLOOD GLUCOSE      POCT Blood Glucose.: 179 mg/dL (11 Dec 2023 22:11)  POCT Blood Glucose.: 105 mg/dL (11 Dec 2023 16:25)  POCT Blood Glucose.: 144 mg/dL (11 Dec 2023 11:18)      Drug Levels: [] N/A    CSF Analysis: [] N/A      Allergies    Crab (Pruritus)  No Known Drug Allergies    Intolerances      MEDICATIONS:  Antibiotics:    Neuro:  diazepam    Tablet 5 milliGRAM(s) Oral every 8 hours  DULoxetine 60 milliGRAM(s) Oral daily  gabapentin 800 milliGRAM(s) Oral every 8 hours  HYDROmorphone  Injectable 0.5 milliGRAM(s) IV Push every 3 hours PRN  ondansetron Injectable 4 milliGRAM(s) IV Push every 6 hours PRN  oxyCODONE    IR 30 milliGRAM(s) Oral every 4 hours PRN  oxyCODONE    IR 15 milliGRAM(s) Oral every 4 hours PRN    Anticoagulation:  enoxaparin Injectable 40 milliGRAM(s) SubCutaneous <User Schedule>    OTHER:  benzocaine 20% Spray 1 Spray(s) Mucosal three times a day PRN  bisacodyl 5 milliGRAM(s) Oral at bedtime  chlorhexidine 2% Cloths 1 Application(s) Topical <User Schedule>  influenza  Vaccine (HIGH DOSE) 0.7 milliLiter(s) IntraMuscular once  insulin lispro (ADMELOG) corrective regimen sliding scale   SubCutaneous Before meals and at bedtime  Morphine 1 milliGRAM / mL 17.9 mL,Morphine 1 milliGRAM / mL 17.9 mL 17.9 mL IntraThecal Continuous Pump  naloxone Injectable 0.1 milliGRAM(s) IV Push every 3 minutes PRN  pantoprazole  Injectable 40 milliGRAM(s) IV Push daily  phenylephrine    Infusion 0.1 MICROgram(s)/kG/Min IV Continuous <Continuous>  polyethylene glycol 3350 17 Gram(s) Oral two times a day  senna 2 Tablet(s) Oral at bedtime  simvastatin 40 milliGRAM(s) Oral at bedtime    IVF:  multivitamin 1 Tablet(s) Oral daily      ASSESSMENT:  69 yo female with Hx HTN, HLD, DM, CVA x 3 (last in 2016 with residual R hand weakness), GAMALIEL not using CPAP, Emphysema, ex-25 pack year smoker now restarted, chronic constipation on Movantik, chronic urinary retention (SC at home), b/l CTS s/p release, s/p Intrathecal Morphine pump for pain, with chronic neck and back pain worsening for years s/p multiple spinal surgeries including laminectomies and fusion of cervical, thoracic and lumbar spine, s/p T3-L1 revision of prior fusion (with  on 8/17/22). Xray 11/16/23 showing broken rods/displacement. Now s/p C2-S2 instrumentation and fusion (12/5). S/p T9-L2 decompression w/ durotomy with repair (12/10/23).       S12.9XXA    Handoff    MEWS Score    HTN (hypertension)    Back pain    Hypertension    SS (spinal stenosis)    High cholesterol    Stroke    H/O carpal tunnel syndrome    H/O carpal tunnel syndrome    Chronic GERD    History of chronic constipation    Seizure    Urinary incontinence    Pre-diabetes    Acute UTI    Anxiety    Anxiety and depression    Arthritis    Eczema    External hemorrhoids    H/O kyphosis    Multiple sclerosis    Pancreas cyst    Scoliosis    Wheelchair dependent    Cervical pseudoarthrosis    Right shoulder pain    Cervical spondylosis    Chronic headaches    Chronic LUQ pain    Chronic UTI    Degenerative joint disease    H/O low back pain    Lumbosacral spondylosis    COPD (chronic obstructive pulmonary disease)    Back pain    Back pain    Spinal cord injury, thoracic (T7-T12)    Back pain    Spinal cord injury of thoracic region without bone injury    Delirium    Revision of posterolateral fusion of lumbar spine    Revision of fusion of thoracic spine    Decompression, spinal cord, thoracic, posterolateral approach    Cervical disc disease    H/O Spinal surgery    H/O breast surgery    S/P cervical discectomy    Previous back surgery    H/O shoulder surgery    H/O total shoulder replacement, right    SysAdmin_VstLnk        PLAN:  Neuro:  - Neuro/vitals q2  - pain control: modified ERAS, IT morphine pump, oxycodone 15/30 prn, valium and home gabapentin 800 TID resumed  - Continue home cymbalta 60mg daily   - HMV x 1, YIFAN x 1, per plastics  - MRI thoracic complete  - CT thoracic spine 12/6: postop changes  - CT myelogram done 12/9  - holistic therapy consult     Cardio:  - MAP >90  - holding home metoprolol while on pressors   - C/w home simvastatin     Pulm:  - Extubated to room air  - hx GAMALIEL, no CPAP at home    GI:  - CCD   - Bowel regimen  - GERD: continue home protonix    Renal:  - PRN straight cath  - failed TOV, SC prn    Endo:  - ISS  - a1c: 7    Heme:  - SCDs DVT ppx, SQL  - 500 cell saver intraop 12/5, 1u PRBC intraop 12/10    ID:  - afebrile  - post op ancef    Dispo:  - full code, tele, dispo pending PT/OT    D/w Dr. Luz, Dr. Sanders    Assessment:  Present when checked    []  GCS  E   V  M     Heart Failure: []Acute, [] acute on chronic , []chronic  Heart Failure:  [] Diastolic (HFpEF), [] Systolic (HFrEF), []Combined (HFpEF and HFrEF), [] RHF, [] Pulm HTN, [] Other    [] DARRYL, [] ATN, [] AIN, [] other  [] CKD1, [] CKD2, [] CKD 3, [] CKD 4, [] CKD 5, []ESRD    Encephalopathy: [] Metabolic, [] Hepatic, [] toxic, [] Neurological, [] Other    Abnormal Nurtitional Status: [] malnurtition (see nutrition note), [ ]underweight: BMI < 19, [] morbid obesity: BMI >40, [] Cachexia    [] Sepsis  [] hypovolemic shock,[] cardiogenic shock, [] hemorrhagic shock, [] neuogenic shock  [] Acute Respiratory Failure  []Cerebral edema, [] Brain compression/ herniation,   [] Functional quadriplegia  [] Acute blood loss anemia

## 2023-12-12 NOTE — OCCUPATIONAL THERAPY INITIAL EVALUATION ADULT - GENERAL OBSERVATIONS, REHAB EVAL
OT IE completed. Pt's REDDY Cain cleared pt for OT. Pt received semisupine in bed, +tele, +L radial Krupa, +b/l SCDs, +spinal YIFAN drain, +spinal hemovac, NAD, agreeable to OT. PT Nabil present. Pt tolerated session fairly. Pt left as found, all lines in tact, needs in reach. REDDY Cain aware. OT IE completed. Pt's REDDY Cain cleared pt for OT. Pt received semisupine in bed, +tele, +L radial Krupa, +b/l SCDs, +spinal YIFAN drain, +spinal hemovac, NAD, agreeable to OT. PT Nabil present. Pt tolerated session fairly. Pt left as found, all lines in tact, needs in reach. REDDY Cian aware.

## 2023-12-12 NOTE — PROGRESS NOTE ADULT - ASSESSMENT
PLAN:  Neuro:  - Neuro/vitals q2  - pain control: modified ERAS, IT morphine pump, oxycodone 15/30 prn, valium and home gabapentin 800 TID resumed  - Continue home cymbalta 60mg daily   - HMV x 1, YIFAN x 1, per plastics  - MRI thoracic complete  - CT thoracic spine 12/6: postop changes  - CT myelogram done 12/9  - holistic therapy consult     Cardio:  - MAP >90 (started on phenylephrine drip)   - holding home metoprolol while on pressors   - C/w home simvastatin     Pulm:  - Extubated to room air  - hx GAMALIEL, no CPAP at home    GI:  - CCD   - Bowel regimen  - GERD: continue home protonix    Renal:  - PRN straight cath  - failed TOV, SC prn    Endo:  - ISS  - a1c: 7    Heme:  - SCDs DVT ppx, SQL  - 500 cell saver intraop 12/5, 1u PRBC intraop 12/10    ID:  - afebrile  - post op ancef    Dispo:  - full code, tele, dispo pending PT/OT      Pain: Current Pain medications include ITP infusion, Gabapentin 800mg TID,  Oxycodone 15-30 Q4 PRN  (took 3x 30mg in 24 hrs), Dilaudid 0.5mg IV Q3hrs PRN (took 4x in last 24 hours), Valium 5mg Q8hrs, duloxetine 60mg oral daily.  No plan to change pain medication at this time and will continue with current regiment as pain is controlled and she appears comfortable in bed. Will continue to follow while patient is admitted as inpatient.     - Home pain regiment: On iSTOP no prescription controlled substances were noted    - Bowel regimen: Continue current regiment with no changes at this time.   - Nausea ppx: Zofran standing  - Functional Goals: Pt will get OOB with PT today. Pt will resume previous level of activity without impairment from surgery.   - Additional Consults: None recommended.   - Additional Labs/Imaging:  None recommended.     - Follow up, Discharge Planning: Patient states that at baseline she was independent at home without a caregiver and only used a wheelchair to ambulate outside the house. Discharge pending medical stability and acute management   - Pain Management follow up plan: will continue to follow while patient is inpatient.

## 2023-12-12 NOTE — PROGRESS NOTE ADULT - ASSESSMENT
ASSESSMENT/PLAN:  POD 7 S/P  C2 to pelvis fusion. Intraoperative lost monitoring (re-gained at MAP>120, though later refractory to MAPs>140)  POD 2 s/p T9- L2 decompression  Durotomy repair    NEURO:  Neurochecks Q2h  MAP goals per NSGY  Pain management following  ERAS protocol   Drains: YIFAN and HMV. Monitor output  Continue cymbalta, gabapentin, valium  Sedation: None  Activity: [] mobilize as tolerated [x] Bedrest [] PT [] OT [] PMNR    CVS:  MAP>90; on neosynephrine  Start midodrine  Daily EKG, trop    PULM:  Extubated  Aggressive pulm toilet    RENAL:  IVL. Monitor Is/Os  Replete lytes    GI:  Diet: As tolerated  PPI per home regimen  Bowel regimen: miralax, senna, dulcolax  LBM: 12/8. Monitor. If no movement by 12/11, augment regimen    ENDO:   FS goal 140-180mg/dL    HEME/ONC:  SCDs. Chemoppx: lovenox  EBL: 200cc  Monitor H/H    MISC:    SOCIAL/FAMILY:  [x] awaiting [] updated at bedside [] family meeting    CODE STATUS:  [x] Full Code [] DNR [] DNI [] Palliative/Comfort Care    DISPOSITION:  [x] ICU [] Stroke Unit [] Floor [] EMU [] RCU [] PCU           ASSESSMENT/PLAN:  POD 7 S/P  C2 to pelvis fusion. Intraoperative lost monitoring (re-gained at MAP>120, though later refractory to MAPs>140)  POD 2 s/p T9- L2 decompression  Durotomy repair    NEURO:  Neurochecks Q2h  MAP goals per NSGY >90  Pain management following  ERAS protocol   Drains: YIFAN and HMV. Monitor outputs  Continue cymbalta, gabapentin, valium  Activity: [] mobilize as tolerated [x] Bedrest [] PT [] OT [] PMNR    CVS:  MAP>90; on neosynephrine  Start midodrine, albumin  Continue statin  [place]  POCUS    PULM: GAMALIEL  Extubated  Aggressive pulm toilet    RENAL: Urine retention  IVL. Monitor Is/Os  Replete lytes  Castillo DCed. Straight cath prn    GI:  Diet: As tolerated  PPI per home regimen  Bowel regimen: miralax, senna, dulcolax  LBM: 12/8. Monitor. If no movement by 12/12, augment regimen; add lactate    ENDO:   FS goal 140-180mg/dL  A1c: 7.0    HEME/ONC:  SCDs. Chemoppx: lovenox  EBL: 200cc  Monitor H/H; currently 8.3 (from 9.5). Monitor     MISC:    SOCIAL/FAMILY:  [x] awaiting [] updated at bedside [] family meeting    CODE STATUS:  [x] Full Code [] DNR [] DNI [] Palliative/Comfort Care    DISPOSITION:  [x] ICU [] Stroke Unit [] Floor [] EMU [] RCU [] PCU           ASSESSMENT/PLAN:  POD 7 S/P  C2 to pelvis fusion. Intraoperative lost monitoring (re-gained at MAP>120, though later refractory to MAPs>140)  POD 2 s/p T9- L2 decompression  Durotomy repair    NEURO:  Neurochecks Q2h  MAP goals per NSGY >90  Pain management following  ERAS protocol   Drains: YIFAN and HMV. Monitor outputs  Continue cymbalta, gabapentin, valium  Activity: [] mobilize as tolerated [x] Bedrest [] PT [] OT [] PMNR    CVS:  MAP>90; on neosynephrine  Start midodrine, albumin  Continue statin  [place]  POCUS    PULM: GAMALIEL  Extubated  Aggressive pulm toilet    RENAL: Urine retention  IVL. Monitor Is/Os  Replete lytes  Castillo DCed. Straight cath prn    GI:  Diet: As tolerated  PPI per home regimen  Bowel regimen: miralax, senna, dulcolax  LBM: 12/8. Monitor. If no movement by 12/12, augment regimen; add lactate    ENDO:   FS goal 140-180mg/dL  A1c: 7.0    HEME/ONC:  SCDs. Chemoppx: lovenox  EBL: 200cc  Monitor H/H; currently 8.3 (from 9.5). Monitor     MISC:    SOCIAL/FAMILY:  [x] awaiting [] updated at bedside [] family meeting    CODE STATUS:  [x] Full Code [] DNR [] DNI [] Palliative/Comfort Care    DISPOSITION:  [x] ICU [] Stroke Unit [] Floor [] EMU [] RCU [] PCU

## 2023-12-12 NOTE — PHYSICAL THERAPY INITIAL EVALUATION ADULT - DID THE PATIENT HAVE SURGERY?
C2-S2 instrumentation and fusion (12/5). S/p T9-L2 decompression w/ durotomy with repair (12/10/23)./yes

## 2023-12-12 NOTE — PROGRESS NOTE ADULT - SUBJECTIVE AND OBJECTIVE BOX
PM EVENTS:   - No acute overnight events as of documentation time of this note.    ROS: negative except as mentioned above.    EXAM:     Hiren Coma Scale: 15    General: normocephalic, atraumatic, laying in bed, in no distress  Neuro     MS: A/Ox3, cooperative, normal attention, no neglect, comprehension intact, speech with preserved fluency, naming, and repetition    CN: PERRL, VF FTC, EOMI and no ptosis bilaterally, sensation intact to crude touch V1-V3, face symmetric, hearing grossly intact    Mot: bulk normal, tone normal, power 5/5 in bilateral upper extremities, (+)0/5 in bilateral lower extremities    Sens: (+)decreased sensation to cold temperature and crude at approximately T12 sensory level extending inferiorly    Reflexes: 0 in bilateral lower extremities, toes are mute  Chest: nonlabored respirations, no adventitious lung sounds bilaterally, heart regular rate/rhythm, present S1/S2, no murmurs or rubs  Abdomen: nondistended, soft and nontender without peritoneal signs, normoactive bowel sounds  Extremities: no clubbing, well-perfused, no edema    Vital Signs Last 24 Hrs  T(C): 36.9 (12 Dec 2023 18:02), Max: 37.2 (11 Dec 2023 22:43)  T(F): 98.4 (12 Dec 2023 18:02), Max: 99 (11 Dec 2023 22:43)  HR: 100 (12 Dec 2023 17:00) (75 - 133)  BP: 109/58 (12 Dec 2023 17:00) (96/52 - 137/65)  BP(mean): 78 (12 Dec 2023 17:00) (69 - 94)  RR: 18 (12 Dec 2023 17:00) (16 - 20)  SpO2: 98% (12 Dec 2023 17:00) (92% - 99%)    Parameters below as of 12 Dec 2023 17:00  Patient On (Oxygen Delivery Method): room air      I&O's Detail    11 Dec 2023 07:01  -  12 Dec 2023 07:00  --------------------------------------------------------  IN:    IV PiggyBack: 50 mL    Oral Fluid: 400 mL    Phenylephrine: 330.3 mL    sodium chloride 0.9%: 340 mL    Sodium Chloride 0.9% Bolus: 500 mL  Total IN: 1620.3 mL    OUT:    Accordian (mL): 220 mL    Bulb (mL): 140 mL    Ureteral Catheter (mL): 740 mL  Total OUT: 1100 mL    Total NET: 520.3 mL      12 Dec 2023 07:01  -  12 Dec 2023 18:47  --------------------------------------------------------  IN:    IV PiggyBack: 350 mL    Phenylephrine: 79.4 mL    Phenylephrine: 554.8 mL    PRBCs (Packed Red Blood Cells): 350 mL  Total IN: 1334.2 mL    OUT:    Accordian (mL): 10 mL    Bulb (mL): 5 mL    Voided (mL): 1200 mL  Total OUT: 1215 mL    Total NET: 119.2 mL                                9.4    10.39 )-----------( 225      ( 12 Dec 2023 17:31 )             28.6     12-12    139  |  109<H>  |  13  ----------------------------<  121<H>  3.8   |  23  |  0.51    Ca    8.0<L>      12 Dec 2023 05:23  Phos  3.3     12-12  Mg     1.6     12-12        MEDICATIONS  (STANDING):  bisacodyl 5 milliGRAM(s) Oral at bedtime  chlorhexidine 2% Cloths 1 Application(s) Topical <User Schedule>  diazepam    Tablet 5 milliGRAM(s) Oral every 8 hours  DULoxetine 60 milliGRAM(s) Oral daily  enoxaparin Injectable 40 milliGRAM(s) SubCutaneous <User Schedule>  gabapentin 800 milliGRAM(s) Oral every 8 hours  influenza  Vaccine (HIGH DOSE) 0.7 milliLiter(s) IntraMuscular once  insulin lispro (ADMELOG) corrective regimen sliding scale   SubCutaneous Before meals and at bedtime  midodrine 10 milliGRAM(s) Oral every 8 hours  Morphine 1 milliGRAM / mL 17.9 mL,Morphine 1 milliGRAM / mL 17.9 mL 17.9 mL IntraThecal Continuous Pump  multivitamin 1 Tablet(s) Oral daily  pantoprazole    Tablet 40 milliGRAM(s) Oral before breakfast  phenylephrine    Infusion 2.5 MICROgram(s)/kG/Min (79.4 mL/Hr) IV Continuous <Continuous>  polyethylene glycol 3350 17 Gram(s) Oral two times a day  senna 2 Tablet(s) Oral at bedtime  simvastatin 40 milliGRAM(s) Oral at bedtime    MEDICATIONS  (PRN):  benzocaine 20% Spray 1 Spray(s) Mucosal three times a day PRN Sore throat  hydrocortisone 1% Cream 1 Application(s) Topical two times a day PRN Itching  HYDROmorphone  Injectable 0.5 milliGRAM(s) IV Push every 3 hours PRN Breakthrough Pain  naloxone Injectable 0.1 milliGRAM(s) IV Push every 3 minutes PRN For ANY of the following changes in patient status:  A. RR LESS THAN 10 breaths per minute, B. Oxygen saturation LESS THAN 90%, C. Sedation score of 6  ondansetron Injectable 4 milliGRAM(s) IV Push every 6 hours PRN Nausea  oxyCODONE    IR 30 milliGRAM(s) Oral every 4 hours PRN Severe Pain (7 - 10)  oxyCODONE    IR 15 milliGRAM(s) Oral every 4 hours PRN Moderate Pain (4 - 6)  sodium chloride 0.9% lock flush 10 milliLiter(s) IV Push every 1 hour PRN Pre/post blood products, medications, blood draw, and to maintain line patency        Please see the day's note documented by Dr. Mccauley for detailed ongoing assessment and plan.      Neuro checks q2h  MAP > 90  JPD/HMV  Pain control  Extubated 12/11  Adat  Euglycemia  SQL PM EVENTS:   - No acute overnight events as of documentation time of this note.    ROS: negative except as mentioned above.    EXAM:     Hiren Coma Scale: 15    General: normocephalic, atraumatic, laying in bed, refusing examination  Neuro     MS: A/Ox3, poor cooperation, normal attention, no neglect, comprehension intact, speech with preserved fluency, naming, and repetition    CN: PERRL,EOMI and no ptosis bilaterally, face symmetric, hearing grossly intact    Mot: bulk normal, tone normal, power antigravity in bilateral upper extremities, (+)0/5 in bilateral lower extremities    Vital Signs Last 24 Hrs  T(C): 36.9 (12 Dec 2023 18:02), Max: 37.2 (11 Dec 2023 22:43)  T(F): 98.4 (12 Dec 2023 18:02), Max: 99 (11 Dec 2023 22:43)  HR: 100 (12 Dec 2023 17:00) (75 - 133)  BP: 109/58 (12 Dec 2023 17:00) (96/52 - 137/65)  BP(mean): 78 (12 Dec 2023 17:00) (69 - 94)  RR: 18 (12 Dec 2023 17:00) (16 - 20)  SpO2: 98% (12 Dec 2023 17:00) (92% - 99%)    Parameters below as of 12 Dec 2023 17:00  Patient On (Oxygen Delivery Method): room air      I&O's Detail    11 Dec 2023 07:01  -  12 Dec 2023 07:00  --------------------------------------------------------  IN:    IV PiggyBack: 50 mL    Oral Fluid: 400 mL    Phenylephrine: 330.3 mL    sodium chloride 0.9%: 340 mL    Sodium Chloride 0.9% Bolus: 500 mL  Total IN: 1620.3 mL    OUT:    Accordian (mL): 220 mL    Bulb (mL): 140 mL    Ureteral Catheter (mL): 740 mL  Total OUT: 1100 mL    Total NET: 520.3 mL      12 Dec 2023 07:01  -  12 Dec 2023 18:47  --------------------------------------------------------  IN:    IV PiggyBack: 350 mL    Phenylephrine: 79.4 mL    Phenylephrine: 554.8 mL    PRBCs (Packed Red Blood Cells): 350 mL  Total IN: 1334.2 mL    OUT:    Accordian (mL): 10 mL    Bulb (mL): 5 mL    Voided (mL): 1200 mL  Total OUT: 1215 mL    Total NET: 119.2 mL                                9.4    10.39 )-----------( 225      ( 12 Dec 2023 17:31 )             28.6     12-12    139  |  109<H>  |  13  ----------------------------<  121<H>  3.8   |  23  |  0.51    Ca    8.0<L>      12 Dec 2023 05:23  Phos  3.3     12-12  Mg     1.6     12-12        MEDICATIONS  (STANDING):  bisacodyl 5 milliGRAM(s) Oral at bedtime  chlorhexidine 2% Cloths 1 Application(s) Topical <User Schedule>  diazepam    Tablet 5 milliGRAM(s) Oral every 8 hours  DULoxetine 60 milliGRAM(s) Oral daily  enoxaparin Injectable 40 milliGRAM(s) SubCutaneous <User Schedule>  gabapentin 800 milliGRAM(s) Oral every 8 hours  influenza  Vaccine (HIGH DOSE) 0.7 milliLiter(s) IntraMuscular once  insulin lispro (ADMELOG) corrective regimen sliding scale   SubCutaneous Before meals and at bedtime  midodrine 10 milliGRAM(s) Oral every 8 hours  Morphine 1 milliGRAM / mL 17.9 mL,Morphine 1 milliGRAM / mL 17.9 mL 17.9 mL IntraThecal Continuous Pump  multivitamin 1 Tablet(s) Oral daily  pantoprazole    Tablet 40 milliGRAM(s) Oral before breakfast  phenylephrine    Infusion 2.5 MICROgram(s)/kG/Min (79.4 mL/Hr) IV Continuous <Continuous>  polyethylene glycol 3350 17 Gram(s) Oral two times a day  senna 2 Tablet(s) Oral at bedtime  simvastatin 40 milliGRAM(s) Oral at bedtime    MEDICATIONS  (PRN):  benzocaine 20% Spray 1 Spray(s) Mucosal three times a day PRN Sore throat  hydrocortisone 1% Cream 1 Application(s) Topical two times a day PRN Itching  HYDROmorphone  Injectable 0.5 milliGRAM(s) IV Push every 3 hours PRN Breakthrough Pain  naloxone Injectable 0.1 milliGRAM(s) IV Push every 3 minutes PRN For ANY of the following changes in patient status:  A. RR LESS THAN 10 breaths per minute, B. Oxygen saturation LESS THAN 90%, C. Sedation score of 6  ondansetron Injectable 4 milliGRAM(s) IV Push every 6 hours PRN Nausea  oxyCODONE    IR 30 milliGRAM(s) Oral every 4 hours PRN Severe Pain (7 - 10)  oxyCODONE    IR 15 milliGRAM(s) Oral every 4 hours PRN Moderate Pain (4 - 6)  sodium chloride 0.9% lock flush 10 milliLiter(s) IV Push every 1 hour PRN Pre/post blood products, medications, blood draw, and to maintain line patency        Please see the day's note documented by Dr. Mccauley for detailed ongoing assessment and plan.      Neuro checks q2h  MAP > 90  JPD/HMV  Pain control  Extubated 12/11  Adat  Euglycemia  SQL

## 2023-12-12 NOTE — PHYSICAL THERAPY INITIAL EVALUATION ADULT - PERTINENT HX OF CURRENT PROBLEM, REHAB EVAL
69 yo female with Hx HTN, HLD, DM, CVA x 3 (last in 2016 with residual R hand weakness), GAMALIEL not using CPAP, Emphysema, ex-25 pack year smoker now restarted, chronic constipation on Movantik, chronic urinary retention (SC at home), b/l CTS s/p release, s/p Intrathecal Morphine pump for pain, with chronic neck and back pain worsening for years s/p multiple spinal surgeries including laminectomies and fusion of cervical, thoracic and lumbar spine, s/p T3-L1 revision of prior fusion (with  on 8/17/22). Xray 11/16/23 showing broken rods/displacement. Now s/p C2-S2 instrumentation and fusion (12/5). S/p T9-L2 decompression w/ durotomy with repair (12/10/23). 67 yo female with Hx HTN, HLD, DM, CVA x 3 (last in 2016 with residual R hand weakness), GAMALIEL not using CPAP, Emphysema, ex-25 pack year smoker now restarted, chronic constipation on Movantik, chronic urinary retention (SC at home), b/l CTS s/p release, s/p Intrathecal Morphine pump for pain, with chronic neck and back pain worsening for years s/p multiple spinal surgeries including laminectomies and fusion of cervical, thoracic and lumbar spine, s/p T3-L1 revision of prior fusion (with  on 8/17/22). Xray 11/16/23 showing broken rods/displacement. Now s/p C2-S2 instrumentation and fusion (12/5). S/p T9-L2 decompression w/ durotomy with repair (12/10/23).

## 2023-12-12 NOTE — PROGRESS NOTE ADULT - SUBJECTIVE AND OBJECTIVE BOX
========================  NSICU ATTENDING PROGRESS NOTE  ========================    HPI:  68 y/o female with PMHx HTN, HLD, CVA x 3 (last was 2016 with right hand weakness residual), GAMALIEL not using CPAP, Emphysema, former 25 pack year smoker restarted this week of surgery, Chronic Constipation on Movantik, Urinary Incontinence chronically self straight cath at home, chronic UTI, Breast Implant Rupture with Chronic Leak repair 10/2023, B/L CTS s/p release, s/p Intrathecal Morphine pump for pain, with chronic neck and back pain worsening for years s/p multiple spinal surgeries including laminectomies and fusion of cervical, thoracic and lumbar spine initially done in 2009 and recently in 2019 and 2020 at [facility] by Dr. Trinidad, s/p T3-L1 revision of prior fusion (with Dr. Luz on 8/17/22). Outpatient Xray 11/16/2023 showing broken rods/displacement. Presents for elective C2-S2 instrumentation and fusion. Pt endorses weakness of b/l LE and burning pain in b/l lower extremities radiating to both feet,  (04 Dec 2023 15:44)    On admission, the patient was:  GCS:  Lozano-Haines:  modified Arango:  ICH score:  NIHSS:      *** HIGH RISK OF DVT PRESENT ON ADMISSION ***    HOSP COURSE:  12/4: Admitted for spinal fusion d/t damaged hardware.   12/5: Neuro stable. POD0 C2-S2 instrumentation/fusion (intraop b/l LE motor loss). On parminder for MAP>90  12/6: POD 1. Overnight neuro stable. Remains intubated. Extubated 6am. Precedex gtt prn. Remains on parminder gtt for MAP goal >90. Valium made prn. CT thoracic spine bc MRI unavailable. Attempted NGT, unsuccessful (resistance @ 35).   12/7: POD 2 Given IV dilaudid 0.5mg for pain. Neuro exam stable. Consulted psych given paranoia ?homicidal statements.    12/9: POD 4, CT myelogram today showing block at T12-L1  12/10: POD 5, OR today for exploration of fusion, possible decompression T9-L2, revision instrumentation  POD 0 T9-L2 decompression. Pt returned from OR intubated. Precedex gtt started for sedation. Decreased FiO2 to 40%. 1L bolus for soft MAP. Dc'd propofol and started levo gtt for MAP >90.   12/11: POD 6, POD 1. Extubated, satting well on RA.   12/12: POD 7, POD2, given 500 cc bolus and started on phenylephrine for MAP goal >90.        ICU Vital Signs Last 24 Hrs  T(C): 36.9 (12 Dec 2023 05:19), Max: 37.2 (11 Dec 2023 22:43)  T(F): 98.4 (12 Dec 2023 05:19), Max: 99 (11 Dec 2023 22:43)  HR: 99 (12 Dec 2023 04:00) (67 - 133)  BP: 112/58 (12 Dec 2023 04:00) (103/51 - 151/69)  BP(mean): 79 (12 Dec 2023 04:00) (72 - 107)  ABP: 257/257 (11 Dec 2023 09:00) (140/80 - 257/257)  ABP(mean): 105 (11 Dec 2023 07:00) (105 - 105)  RR: 20 (12 Dec 2023 04:00) (17 - 20)  SpO2: 94% (12 Dec 2023 04:00) (88% - 100%)      12-10-23 @ 07:01  -  12-11-23 @ 07:00  --------------------------------------------------------  IN: 2841.5 mL / OUT: 3970 mL / NET: -1128.5 mL    12-11-23 @ 07:01  -  12-12-23 @ 06:48  --------------------------------------------------------  IN: 1260.7 mL / OUT: 1100 mL / NET: 160.7 mL      EXAMINATION:  General: Awake, restless  HEENT:  MMM  Neuro: Awake, restless, following commands and oriented by nodding appropriately, BUE 5/5 (RUE effort 4+ bi/tri), BLE 0/5   Sensation: Level at T11  Cards: S1/S2, RRR  Respiratory: Clear, no wheeze  Abdomen: Soft, non- tender  Extremities: No edema  Skin: Warm/dry      MEDICATIONS:   benzocaine 20% Spray 1 Spray(s) Mucosal three times a day PRN  bisacodyl 5 milliGRAM(s) Oral at bedtime  chlorhexidine 2% Cloths 1 Application(s) Topical <User Schedule>  diazepam    Tablet 5 milliGRAM(s) Oral every 8 hours  doxazosin 4 milliGRAM(s) Oral at bedtime  DULoxetine 60 milliGRAM(s) Oral daily  enoxaparin Injectable 40 milliGRAM(s) SubCutaneous <User Schedule>  gabapentin 800 milliGRAM(s) Oral every 8 hours  HYDROmorphone  Injectable 0.5 milliGRAM(s) IV Push every 3 hours PRN  influenza  Vaccine (HIGH DOSE) 0.7 milliLiter(s) IntraMuscular once  insulin lispro (ADMELOG) corrective regimen sliding scale   SubCutaneous Before meals and at bedtime  magnesium sulfate  IVPB 2 Gram(s) IV Intermittent every 1 hour  Morphine 1 milliGRAM / mL 17.9 mL,Morphine 1 milliGRAM / mL 17.9 mL 17.9 mL IntraThecal Continuous Pump  multivitamin 1 Tablet(s) Oral daily  naloxone Injectable 0.1 milliGRAM(s) IV Push every 3 minutes PRN  ondansetron Injectable 4 milliGRAM(s) IV Push every 6 hours PRN  oxyCODONE    IR 15 milliGRAM(s) Oral every 4 hours PRN  oxyCODONE    IR 30 milliGRAM(s) Oral every 4 hours PRN  pantoprazole  Injectable 40 milliGRAM(s) IV Push daily  phenylephrine    Infusion 0.1 MICROgram(s)/kG/Min (3.18 mL/Hr) IV Continuous <Continuous>  polyethylene glycol 3350 17 Gram(s) Oral two times a day  senna 2 Tablet(s) Oral at bedtime  simvastatin 40 milliGRAM(s) Oral at bedtime  sodium chloride 0.9% lock flush 10 milliLiter(s) IV Push every 1 hour PRN      LABS:                     8.3    10.13 )-----------( 244      ( 12 Dec 2023 05:23 )             25.7     12-12    139  |  109<H>  |  13  ----------------------------<  121<H>  3.8   |  23  |  0.51    Ca    8.0<L>      12 Dec 2023 05:23  Phos  3.3     12-12  Mg     1.6     12-12        ABG - ( 10 Dec 2023 17:12 )  pH, Arterial: 7.42  pH, Blood: x     /  pCO2: 36    /  pO2: 122   / HCO3: 23    / Base Excess: -0.7  /  SaO2: 99.0                           ========================  NSICU ATTENDING PROGRESS NOTE  ========================    HPI:  68 y/o female with PMHx HTN, HLD, CVA x 3 (last was 2016 with right hand weakness residual), GAMALIEL not using CPAP, Emphysema, former 25 pack year smoker restarted this week of surgery, Chronic Constipation on Movantik, Urinary Incontinence chronically self straight cath at home, chronic UTI, Breast Implant Rupture with Chronic Leak repair 10/2023, B/L CTS s/p release, s/p Intrathecal Morphine pump for pain, with chronic neck and back pain worsening for years s/p multiple spinal surgeries including laminectomies and fusion of cervical, thoracic and lumbar spine initially done in 2009 and recently in 2019 and 2020 at [facility] by Dr. Trinidad, s/p T3-L1 revision of prior fusion (with Dr. Luz on 8/17/22). Outpatient Xray 11/16/2023 showing broken rods/displacement. Presents for elective C2-S2 instrumentation and fusion. Pt endorses weakness of b/l LE and burning pain in b/l lower extremities radiating to both feet,  (04 Dec 2023 15:44)    On admission, the patient was:  GCS:  Lozano-Haines:  modified Arango:  ICH score:  NIHSS:      *** HIGH RISK OF DVT PRESENT ON ADMISSION ***    HOSP COURSE:  12/4: Admitted for spinal fusion d/t damaged hardware.   12/5: Neuro stable. POD0 C2-S2 instrumentation/fusion (intraop b/l LE motor loss). On parminder for MAP>90  12/6: POD 1. Overnight neuro stable. Remains intubated. Extubated 6am. Precedex gtt prn. Remains on parminder gtt for MAP goal >90. Valium made prn. CT thoracic spine bc MRI unavailable. Attempted NGT, unsuccessful (resistance @ 35).   12/7: POD 2 Given IV dilaudid 0.5mg for pain. Neuro exam stable. Consulted psych given paranoia ?homicidal statements.    12/9: POD 4, CT myelogram today showing block at T12-L1  12/10: POD 5, OR today for exploration of fusion, possible decompression T9-L2, revision instrumentation  POD 0 T9-L2 decompression. Pt returned from OR intubated. Precedex gtt started for sedation. Decreased FiO2 to 40%. 1L bolus for soft MAP. Dc'd propofol and started levo gtt for MAP >90.   12/11: POD 6, POD 1. Extubated, satting well on RA.   12/12: POD 7, POD2, given 500 cc bolus and started on phenylephrine for MAP goal >90.        ICU Vital Signs Last 24 Hrs  T(C): 36.9 (12 Dec 2023 05:19), Max: 37.2 (11 Dec 2023 22:43)  T(F): 98.4 (12 Dec 2023 05:19), Max: 99 (11 Dec 2023 22:43)  HR: 99 (12 Dec 2023 04:00) (67 - 133)  BP: 112/58 (12 Dec 2023 04:00) (103/51 - 151/69)  BP(mean): 79 (12 Dec 2023 04:00) (72 - 107)  ABP: 257/257 (11 Dec 2023 09:00) (140/80 - 257/257)  ABP(mean): 105 (11 Dec 2023 07:00) (105 - 105)  RR: 20 (12 Dec 2023 04:00) (17 - 20)  SpO2: 94% (12 Dec 2023 04:00) (88% - 100%)      12-10-23 @ 07:01  -  12-11-23 @ 07:00  --------------------------------------------------------  IN: 2841.5 mL / OUT: 3970 mL / NET: -1128.5 mL    12-11-23 @ 07:01  -  12-12-23 @ 06:48  --------------------------------------------------------  IN: 1260.7 mL / OUT: 1100 mL / NET: 160.7 mL      EXAMINATION:  General: Awake, restless  HEENT:  MMM  Neuro: Awake, restless, following commands and oriented by nodding appropriately, BUE 5/5 (RUE effort 4+ bi/tri), BLE 0/5   Sensation: Level at T11  Cards: S1/S2, RRR  Respiratory: Clear, no wheeze  Abdomen: Soft, non- tender  Extremities: No edema  Skin: Warm/dry      MEDICATIONS:   benzocaine 20% Spray 1 Spray(s) Mucosal three times a day PRN  bisacodyl 5 milliGRAM(s) Oral at bedtime  chlorhexidine 2% Cloths 1 Application(s) Topical <User Schedule>  diazepam    Tablet 5 milliGRAM(s) Oral every 8 hours  doxazosin 4 milliGRAM(s) Oral at bedtime  DULoxetine 60 milliGRAM(s) Oral daily  enoxaparin Injectable 40 milliGRAM(s) SubCutaneous <User Schedule>  gabapentin 800 milliGRAM(s) Oral every 8 hours  HYDROmorphone  Injectable 0.5 milliGRAM(s) IV Push every 3 hours PRN  influenza  Vaccine (HIGH DOSE) 0.7 milliLiter(s) IntraMuscular once  insulin lispro (ADMELOG) corrective regimen sliding scale   SubCutaneous Before meals and at bedtime  magnesium sulfate  IVPB 2 Gram(s) IV Intermittent every 1 hour  Morphine 1 milliGRAM / mL 17.9 mL,Morphine 1 milliGRAM / mL 17.9 mL 17.9 mL IntraThecal Continuous Pump  multivitamin 1 Tablet(s) Oral daily  naloxone Injectable 0.1 milliGRAM(s) IV Push every 3 minutes PRN  ondansetron Injectable 4 milliGRAM(s) IV Push every 6 hours PRN  oxyCODONE    IR 15 milliGRAM(s) Oral every 4 hours PRN  oxyCODONE    IR 30 milliGRAM(s) Oral every 4 hours PRN  pantoprazole  Injectable 40 milliGRAM(s) IV Push daily  phenylephrine    Infusion 0.1 MICROgram(s)/kG/Min (3.18 mL/Hr) IV Continuous <Continuous>  polyethylene glycol 3350 17 Gram(s) Oral two times a day  senna 2 Tablet(s) Oral at bedtime  simvastatin 40 milliGRAM(s) Oral at bedtime  sodium chloride 0.9% lock flush 10 milliLiter(s) IV Push every 1 hour PRN      LABS:                     8.3    10.13 )-----------( 244      ( 12 Dec 2023 05:23 )             25.7     12-12    139  |  109<H>  |  13  ----------------------------<  121<H>  3.8   |  23  |  0.51    Ca    8.0<L>      12 Dec 2023 05:23  Phos  3.3     12-12  Mg     1.6     12-12        ABG - ( 10 Dec 2023 17:12 )  pH, Arterial: 7.42  pH, Blood: x     /  pCO2: 36    /  pO2: 122   / HCO3: 23    / Base Excess: -0.7  /  SaO2: 99.0                           ========================  NSICU ATTENDING PROGRESS NOTE  ========================    HPI:  68 y/o female with PMH of HTN, HLD, CVA x 3 (last was 2016 with right hand weakness residual), GAMALIEL not using CPAP, Emphysema, former 25 pack year smoker restarted this week of surgery, Chronic Constipation on Movantik, Urinary Incontinence chronically self straight cath at home, chronic UTI, Breast Implant Rupture with Chronic Leak repair 10/2023, B/L CTS s/p release, s/p Intrathecal Morphine pump for pain, with chronic neck and back pain worsening for years s/p multiple spinal surgeries including laminectomies and fusion of cervical, thoracic and lumbar spine initially done in 2009 and recently in 2019 and 2020 at [facility] by Dr. Trinidad, s/p T3-L1 revision of prior fusion (with Dr. Luz on 8/17/22). Outpatient Xray 11/16/2023 showing broken rods/displacement. Presents for elective C2-S2 instrumentation and fusion. Pt endorses weakness of b/l LE and burning pain in b/l lower extremities radiating to both feet,  (04 Dec 2023 15:44)    On admission, the patient was:  GCS: 15  Hunt-Haines:  modified Arango:  ICH score:  NIHSS:      *** HIGH RISK OF DVT PRESENT ON ADMISSION ***    HOSP COURSE:  12/4: Admitted for spinal fusion d/t damaged hardware.   12/5: Neuro stable. POD0 C2-S2 instrumentation/fusion (intraop b/l LE motor loss). On parminder for MAP>90  12/6: POD 1. Overnight neuro stable. Remains intubated. Extubated 6am. Precedex gtt prn. Remains on parminder gtt for MAP goal >90. Valium made prn. CT thoracic spine as MRI unavailable.   12/7: POD 2 Given IV dilaudid 0.5mg for pain. Neuro exam stable. Consulted psych given paranoia ?homicidal statements.    12/9: POD 4, CT myelogram today showing block at T12-L1  12/10: POD 5, OR today for exploration of fusion, possible decompression T9-L2, revision instrumentation  POD 0 T9-L2 decompression. Pt returned from OR intubated. Precedex gtt started for sedation. Decreased FiO2 to 40%. 1L bolus for soft MAP. Dc'd propofol and started levo gtt for MAP >90.   12/11: POD 6, POD 1. Extubated, satting well on RA.   12/12: POD 7, POD2, given 500 cc bolus and started on phenylephrine for MAP goal >90.        ICU Vital Signs Last 24 Hrs  T(C): 36.9 (12 Dec 2023 05:19), Max: 37.2 (11 Dec 2023 22:43)  T(F): 98.4 (12 Dec 2023 05:19), Max: 99 (11 Dec 2023 22:43)  HR: 99 (12 Dec 2023 04:00) (67 - 133)  BP: 112/58 (12 Dec 2023 04:00) (103/51 - 151/69)  BP(mean): 79 (12 Dec 2023 04:00) (72 - 107)  ABP: 257/257 (11 Dec 2023 09:00) (140/80 - 257/257)  ABP(mean): 105 (11 Dec 2023 07:00) (105 - 105)  RR: 20 (12 Dec 2023 04:00) (17 - 20)  SpO2: 94% (12 Dec 2023 04:00) (88% - 100%)      12-10-23 @ 07:01  -  12-11-23 @ 07:00  --------------------------------------------------------  IN: 2841.5 mL / OUT: 3970 mL / NET: -1128.5 mL    12-11-23 @ 07:01  -  12-12-23 @ 06:48  --------------------------------------------------------  IN: 1260.7 mL / OUT: 1100 mL / NET: 160.7 mL      EXAMINATION:  General: Calm  HEENT:  MMM  Neuro: Awake, alert, oriented x 3, following commands, BUE 5/5 (RUE effort 4+ bi/tri), BLE 0/5   Sensation: Level at T11  Cards: S1/S2, RRR  Respiratory: Clear, no wheeze  Abdomen: Soft, non- tender  Extremities: No edema  Skin: Warm/dry      MEDICATIONS:   benzocaine 20% Spray 1 Spray(s) Mucosal three times a day PRN  bisacodyl 5 milliGRAM(s) Oral at bedtime  chlorhexidine 2% Cloths 1 Application(s) Topical <User Schedule>  diazepam    Tablet 5 milliGRAM(s) Oral every 8 hours  doxazosin 4 milliGRAM(s) Oral at bedtime  DULoxetine 60 milliGRAM(s) Oral daily  enoxaparin Injectable 40 milliGRAM(s) SubCutaneous <User Schedule>  gabapentin 800 milliGRAM(s) Oral every 8 hours  HYDROmorphone  Injectable 0.5 milliGRAM(s) IV Push every 3 hours PRN  influenza  Vaccine (HIGH DOSE) 0.7 milliLiter(s) IntraMuscular once  insulin lispro (ADMELOG) corrective regimen sliding scale   SubCutaneous Before meals and at bedtime  magnesium sulfate  IVPB 2 Gram(s) IV Intermittent every 1 hour  Morphine 1 milliGRAM / mL 17.9 mL,Morphine 1 milliGRAM / mL 17.9 mL 17.9 mL IntraThecal Continuous Pump  multivitamin 1 Tablet(s) Oral daily  naloxone Injectable 0.1 milliGRAM(s) IV Push every 3 minutes PRN  ondansetron Injectable 4 milliGRAM(s) IV Push every 6 hours PRN  oxyCODONE    IR 15 milliGRAM(s) Oral every 4 hours PRN  oxyCODONE    IR 30 milliGRAM(s) Oral every 4 hours PRN  pantoprazole  Injectable 40 milliGRAM(s) IV Push daily  phenylephrine    Infusion 0.1 MICROgram(s)/kG/Min (3.18 mL/Hr) IV Continuous <Continuous>  polyethylene glycol 3350 17 Gram(s) Oral two times a day  senna 2 Tablet(s) Oral at bedtime  simvastatin 40 milliGRAM(s) Oral at bedtime  sodium chloride 0.9% lock flush 10 milliLiter(s) IV Push every 1 hour PRN      LABS:                     8.3    10.13 )-----------( 244      ( 12 Dec 2023 05:23 )             25.7     12-12    139  |  109<H>  |  13  ----------------------------<  121<H>  3.8   |  23  |  0.51    Ca    8.0<L>      12 Dec 2023 05:23  Phos  3.3     12-12  Mg     1.6     12-12        ABG - ( 10 Dec 2023 17:12 )  pH, Arterial: 7.42  pH, Blood: x     /  pCO2: 36    /  pO2: 122   / HCO3: 23    / Base Excess: -0.7  /  SaO2: 99.0                           ========================  NSICU ATTENDING PROGRESS NOTE  ========================    HPI:  68 y/o female with PMH of HTN, HLD, CVA x 3 (last was 2016 with right hand weakness residual), GAMALIEL not using CPAP, Emphysema, former 25 pack year smoker restarted this week of surgery, Chronic Constipation on Movantik, Urinary Incontinence chronically self straight cath at home, chronic UTI, Breast Implant Rupture with Chronic Leak repair 10/2023, B/L CTS s/p release, s/p Intrathecal Morphine pump for pain, with chronic neck and back pain worsening for years s/p multiple spinal surgeries including laminectomies and fusion of cervical, thoracic and lumbar spine initially done in 2009 and recently in 2019 and 2020 at [facility] by Dr. Trinidad, s/p T3-L1 revision of prior fusion (with Dr. Luz on 8/17/22). Outpatient Xray 11/16/2023 showing broken rods/displacement. Presents for elective C2-S2 instrumentation and fusion. Pt endorses weakness of b/l LE and burning pain in b/l lower extremities radiating to both feet,  (04 Dec 2023 15:44)    On admission, the patient was:  GCS: 15  Hunt-Haines:  modified Arango:  ICH score:  NIHSS:      *** HIGH RISK OF DVT PRESENT ON ADMISSION ***    HOSP COURSE:  12/4: Admitted for spinal fusion d/t damaged hardware.   12/5: Neuro stable. POD0 C2-S2 instrumentation/fusion (intraop b/l LE motor loss). On parminder for MAP>90  12/6: POD 1. Overnight neuro stable. Remains intubated. Extubated 6am. Precedex gtt prn. Remains on parminder gtt for MAP goal >90. Valium made prn. CT thoracic spine as MRI unavailable.   12/7: POD 2 Given IV dilaudid 0.5mg for pain. Neuro exam stable. Consulted psych given paranoia ?homicidal statements.    12/9: POD 4, CT myelogram today showing block at T12-L1  12/10: POD 5, OR today for exploration of fusion, possible decompression T9-L2, revision instrumentation  POD 0 T9-L2 decompression. Pt returned from OR intubated. Precedex gtt started for sedation. Decreased FiO2 to 40%. 1L bolus for soft MAP. Dc'd propofol and started levo gtt for MAP >90.   12/11: POD 6, POD 1. Extubated, satting well on RA.   12/12: POD 7, POD2, given 500 cc bolus and started on phenylephrine for MAP goal >90.        ICU Vital Signs Last 24 Hrs  T(C): 36.9 (12 Dec 2023 05:19), Max: 37.2 (11 Dec 2023 22:43)  T(F): 98.4 (12 Dec 2023 05:19), Max: 99 (11 Dec 2023 22:43)  HR: 99 (12 Dec 2023 04:00) (67 - 133)  BP: 112/58 (12 Dec 2023 04:00) (103/51 - 151/69)  BP(mean): 79 (12 Dec 2023 04:00) (72 - 107)  ABP: 257/257 (11 Dec 2023 09:00) (140/80 - 257/257)  ABP(mean): 105 (11 Dec 2023 07:00) (105 - 105)  RR: 20 (12 Dec 2023 04:00) (17 - 20)  SpO2: 94% (12 Dec 2023 04:00) (88% - 100%)      12-10-23 @ 07:01  -  12-11-23 @ 07:00  --------------------------------------------------------  IN: 2841.5 mL / OUT: 3970 mL / NET: -1128.5 mL    12-11-23 @ 07:01  -  12-12-23 @ 06:48  --------------------------------------------------------  IN: 1260.7 mL / OUT: 1100 mL / NET: 160.7 mL      EXAMINATION:  General: Calm  HEENT:  MMM  Neuro: Awake, alert, oriented x 3, following commands, BUE 5/5 (RUE effort 4+ bi/tri), BLE 0/5   Sensation: Level at T11  Cards: S1/S2, RRR  Respiratory: Clear, no wheeze  Abdomen: Soft, non- tender  Extremities: No edema  Skin: Warm/dry      MEDICATIONS:   benzocaine 20% Spray 1 Spray(s) Mucosal three times a day PRN  bisacodyl 5 milliGRAM(s) Oral at bedtime  chlorhexidine 2% Cloths 1 Application(s) Topical <User Schedule>  diazepam    Tablet 5 milliGRAM(s) Oral every 8 hours  doxazosin 4 milliGRAM(s) Oral at bedtime  DULoxetine 60 milliGRAM(s) Oral daily  enoxaparin Injectable 40 milliGRAM(s) SubCutaneous <User Schedule>  gabapentin 800 milliGRAM(s) Oral every 8 hours  HYDROmorphone  Injectable 0.5 milliGRAM(s) IV Push every 3 hours PRN  influenza  Vaccine (HIGH DOSE) 0.7 milliLiter(s) IntraMuscular once  insulin lispro (ADMELOG) corrective regimen sliding scale   SubCutaneous Before meals and at bedtime  magnesium sulfate  IVPB 2 Gram(s) IV Intermittent every 1 hour  Morphine 1 milliGRAM / mL 17.9 mL,Morphine 1 milliGRAM / mL 17.9 mL 17.9 mL IntraThecal Continuous Pump  multivitamin 1 Tablet(s) Oral daily  naloxone Injectable 0.1 milliGRAM(s) IV Push every 3 minutes PRN  ondansetron Injectable 4 milliGRAM(s) IV Push every 6 hours PRN  oxyCODONE    IR 15 milliGRAM(s) Oral every 4 hours PRN  oxyCODONE    IR 30 milliGRAM(s) Oral every 4 hours PRN  pantoprazole  Injectable 40 milliGRAM(s) IV Push daily  phenylephrine    Infusion 0.1 MICROgram(s)/kG/Min (3.18 mL/Hr) IV Continuous <Continuous>  polyethylene glycol 3350 17 Gram(s) Oral two times a day  senna 2 Tablet(s) Oral at bedtime  simvastatin 40 milliGRAM(s) Oral at bedtime  sodium chloride 0.9% lock flush 10 milliLiter(s) IV Push every 1 hour PRN      LABS:                     8.3    10.13 )-----------( 244      ( 12 Dec 2023 05:23 )             25.7     12-12    139  |  109<H>  |  13  ----------------------------<  121<H>  3.8   |  23  |  0.51    Ca    8.0<L>      12 Dec 2023 05:23  Phos  3.3     12-12  Mg     1.6     12-12        ABG - ( 10 Dec 2023 17:12 )  pH, Arterial: 7.42  pH, Blood: x     /  pCO2: 36    /  pO2: 122   / HCO3: 23    / Base Excess: -0.7  /  SaO2: 99.0

## 2023-12-12 NOTE — OCCUPATIONAL THERAPY INITIAL EVALUATION ADULT - DIAGNOSIS, OT EVAL
Pt presents with loss of motors and sensation in BLEs, impaired functional endurance, and decreased postural control impacting their ability to independently complete ADLs, functional transfers, and functional mobility.

## 2023-12-12 NOTE — PHYSICAL THERAPY INITIAL EVALUATION ADULT - MANUAL MUSCLE TESTING RESULTS, REHAB EVAL
R shoulder flexion 3-/5, R elbow flexion/extension 4/5, L shoulder flexion 3-/5, L elbow flexion/extension 4-/5; BLE 0/5 for all major pivots

## 2023-12-13 LAB
ANION GAP SERPL CALC-SCNC: 7 MMOL/L — SIGNIFICANT CHANGE UP (ref 5–17)
ANION GAP SERPL CALC-SCNC: 7 MMOL/L — SIGNIFICANT CHANGE UP (ref 5–17)
BLD GP AB SCN SERPL QL: NEGATIVE — SIGNIFICANT CHANGE UP
BLD GP AB SCN SERPL QL: NEGATIVE — SIGNIFICANT CHANGE UP
BUN SERPL-MCNC: 9 MG/DL — SIGNIFICANT CHANGE UP (ref 7–23)
BUN SERPL-MCNC: 9 MG/DL — SIGNIFICANT CHANGE UP (ref 7–23)
CALCIUM SERPL-MCNC: 8.4 MG/DL — SIGNIFICANT CHANGE UP (ref 8.4–10.5)
CALCIUM SERPL-MCNC: 8.4 MG/DL — SIGNIFICANT CHANGE UP (ref 8.4–10.5)
CHLORIDE SERPL-SCNC: 105 MMOL/L — SIGNIFICANT CHANGE UP (ref 96–108)
CHLORIDE SERPL-SCNC: 105 MMOL/L — SIGNIFICANT CHANGE UP (ref 96–108)
CO2 SERPL-SCNC: 26 MMOL/L — SIGNIFICANT CHANGE UP (ref 22–31)
CO2 SERPL-SCNC: 26 MMOL/L — SIGNIFICANT CHANGE UP (ref 22–31)
CREAT SERPL-MCNC: 0.47 MG/DL — LOW (ref 0.5–1.3)
CREAT SERPL-MCNC: 0.47 MG/DL — LOW (ref 0.5–1.3)
EGFR: 103 ML/MIN/1.73M2 — SIGNIFICANT CHANGE UP
EGFR: 103 ML/MIN/1.73M2 — SIGNIFICANT CHANGE UP
GLUCOSE BLDC GLUCOMTR-MCNC: 145 MG/DL — HIGH (ref 70–99)
GLUCOSE BLDC GLUCOMTR-MCNC: 145 MG/DL — HIGH (ref 70–99)
GLUCOSE BLDC GLUCOMTR-MCNC: 150 MG/DL — HIGH (ref 70–99)
GLUCOSE BLDC GLUCOMTR-MCNC: 150 MG/DL — HIGH (ref 70–99)
GLUCOSE BLDC GLUCOMTR-MCNC: 206 MG/DL — HIGH (ref 70–99)
GLUCOSE BLDC GLUCOMTR-MCNC: 206 MG/DL — HIGH (ref 70–99)
GLUCOSE SERPL-MCNC: 118 MG/DL — HIGH (ref 70–99)
GLUCOSE SERPL-MCNC: 118 MG/DL — HIGH (ref 70–99)
HCT VFR BLD CALC: 30.5 % — LOW (ref 34.5–45)
HCT VFR BLD CALC: 30.5 % — LOW (ref 34.5–45)
HGB BLD-MCNC: 10 G/DL — LOW (ref 11.5–15.5)
HGB BLD-MCNC: 10 G/DL — LOW (ref 11.5–15.5)
MAGNESIUM SERPL-MCNC: 1.6 MG/DL — SIGNIFICANT CHANGE UP (ref 1.6–2.6)
MAGNESIUM SERPL-MCNC: 1.6 MG/DL — SIGNIFICANT CHANGE UP (ref 1.6–2.6)
MCHC RBC-ENTMCNC: 27.7 PG — SIGNIFICANT CHANGE UP (ref 27–34)
MCHC RBC-ENTMCNC: 27.7 PG — SIGNIFICANT CHANGE UP (ref 27–34)
MCHC RBC-ENTMCNC: 32.8 GM/DL — SIGNIFICANT CHANGE UP (ref 32–36)
MCHC RBC-ENTMCNC: 32.8 GM/DL — SIGNIFICANT CHANGE UP (ref 32–36)
MCV RBC AUTO: 84.5 FL — SIGNIFICANT CHANGE UP (ref 80–100)
MCV RBC AUTO: 84.5 FL — SIGNIFICANT CHANGE UP (ref 80–100)
NRBC # BLD: 0 /100 WBCS — SIGNIFICANT CHANGE UP (ref 0–0)
NRBC # BLD: 0 /100 WBCS — SIGNIFICANT CHANGE UP (ref 0–0)
PHOSPHATE SERPL-MCNC: 3.2 MG/DL — SIGNIFICANT CHANGE UP (ref 2.5–4.5)
PHOSPHATE SERPL-MCNC: 3.2 MG/DL — SIGNIFICANT CHANGE UP (ref 2.5–4.5)
PLATELET # BLD AUTO: 224 K/UL — SIGNIFICANT CHANGE UP (ref 150–400)
PLATELET # BLD AUTO: 224 K/UL — SIGNIFICANT CHANGE UP (ref 150–400)
POTASSIUM SERPL-MCNC: 4.2 MMOL/L — SIGNIFICANT CHANGE UP (ref 3.5–5.3)
POTASSIUM SERPL-MCNC: 4.2 MMOL/L — SIGNIFICANT CHANGE UP (ref 3.5–5.3)
POTASSIUM SERPL-SCNC: 4.2 MMOL/L — SIGNIFICANT CHANGE UP (ref 3.5–5.3)
POTASSIUM SERPL-SCNC: 4.2 MMOL/L — SIGNIFICANT CHANGE UP (ref 3.5–5.3)
RBC # BLD: 3.61 M/UL — LOW (ref 3.8–5.2)
RBC # BLD: 3.61 M/UL — LOW (ref 3.8–5.2)
RBC # FLD: 16 % — HIGH (ref 10.3–14.5)
RBC # FLD: 16 % — HIGH (ref 10.3–14.5)
RH IG SCN BLD-IMP: POSITIVE — SIGNIFICANT CHANGE UP
RH IG SCN BLD-IMP: POSITIVE — SIGNIFICANT CHANGE UP
SODIUM SERPL-SCNC: 138 MMOL/L — SIGNIFICANT CHANGE UP (ref 135–145)
SODIUM SERPL-SCNC: 138 MMOL/L — SIGNIFICANT CHANGE UP (ref 135–145)
WBC # BLD: 10.5 K/UL — SIGNIFICANT CHANGE UP (ref 3.8–10.5)
WBC # BLD: 10.5 K/UL — SIGNIFICANT CHANGE UP (ref 3.8–10.5)
WBC # FLD AUTO: 10.5 K/UL — SIGNIFICANT CHANGE UP (ref 3.8–10.5)
WBC # FLD AUTO: 10.5 K/UL — SIGNIFICANT CHANGE UP (ref 3.8–10.5)

## 2023-12-13 PROCEDURE — 99291 CRITICAL CARE FIRST HOUR: CPT

## 2023-12-13 PROCEDURE — 74176 CT ABD & PELVIS W/O CONTRAST: CPT | Mod: 26

## 2023-12-13 RX ORDER — LACTULOSE 10 G/15ML
25 SOLUTION ORAL DAILY
Refills: 0 | Status: DISCONTINUED | OUTPATIENT
Start: 2023-12-13 | End: 2023-12-13

## 2023-12-13 RX ORDER — PHENYLEPHRINE HYDROCHLORIDE 10 MG/ML
2.5 INJECTION INTRAVENOUS
Qty: 40 | Refills: 0 | Status: DISCONTINUED | OUTPATIENT
Start: 2023-12-13 | End: 2023-12-14

## 2023-12-13 RX ORDER — MAGNESIUM SULFATE 500 MG/ML
2 VIAL (ML) INJECTION ONCE
Refills: 0 | Status: COMPLETED | OUTPATIENT
Start: 2023-12-13 | End: 2023-12-13

## 2023-12-13 RX ORDER — DIPHENHYDRAMINE HCL 50 MG
25 CAPSULE ORAL EVERY 6 HOURS
Refills: 0 | Status: DISCONTINUED | OUTPATIENT
Start: 2023-12-13 | End: 2023-12-13

## 2023-12-13 RX ORDER — ALBUMIN HUMAN 25 %
250 VIAL (ML) INTRAVENOUS ONCE
Refills: 0 | Status: COMPLETED | OUTPATIENT
Start: 2023-12-13 | End: 2023-12-13

## 2023-12-13 RX ORDER — MIDODRINE HYDROCHLORIDE 2.5 MG/1
15 TABLET ORAL ONCE
Refills: 0 | Status: COMPLETED | OUTPATIENT
Start: 2023-12-13 | End: 2023-12-13

## 2023-12-13 RX ORDER — NALOXEGOL OXALATE 12.5 MG/1
25 TABLET, FILM COATED ORAL DAILY
Refills: 0 | Status: DISCONTINUED | OUTPATIENT
Start: 2023-12-13 | End: 2024-01-05

## 2023-12-13 RX ADMIN — SENNA PLUS 2 TABLET(S): 8.6 TABLET ORAL at 21:35

## 2023-12-13 RX ADMIN — GABAPENTIN 800 MILLIGRAM(S): 400 CAPSULE ORAL at 01:59

## 2023-12-13 RX ADMIN — SIMVASTATIN 40 MILLIGRAM(S): 20 TABLET, FILM COATED ORAL at 21:35

## 2023-12-13 RX ADMIN — Medication 125 MILLILITER(S): at 19:35

## 2023-12-13 RX ADMIN — MIDODRINE HYDROCHLORIDE 15 MILLIGRAM(S): 2.5 TABLET ORAL at 10:42

## 2023-12-13 RX ADMIN — OXYCODONE HYDROCHLORIDE 30 MILLIGRAM(S): 5 TABLET ORAL at 10:03

## 2023-12-13 RX ADMIN — Medication 5 MILLIGRAM(S): at 19:16

## 2023-12-13 RX ADMIN — Medication 25 GRAM(S): at 07:03

## 2023-12-13 RX ADMIN — DULOXETINE HYDROCHLORIDE 60 MILLIGRAM(S): 30 CAPSULE, DELAYED RELEASE ORAL at 14:11

## 2023-12-13 RX ADMIN — Medication 5 MILLIGRAM(S): at 01:59

## 2023-12-13 RX ADMIN — Medication 5 MILLIGRAM(S): at 10:04

## 2023-12-13 RX ADMIN — Medication 5 MILLIGRAM(S): at 21:35

## 2023-12-13 RX ADMIN — HYDROMORPHONE HYDROCHLORIDE 0.5 MILLIGRAM(S): 2 INJECTION INTRAMUSCULAR; INTRAVENOUS; SUBCUTANEOUS at 21:40

## 2023-12-13 RX ADMIN — GABAPENTIN 800 MILLIGRAM(S): 400 CAPSULE ORAL at 10:04

## 2023-12-13 RX ADMIN — HYDROMORPHONE HYDROCHLORIDE 0.5 MILLIGRAM(S): 2 INJECTION INTRAMUSCULAR; INTRAVENOUS; SUBCUTANEOUS at 14:48

## 2023-12-13 RX ADMIN — MIDODRINE HYDROCHLORIDE 15 MILLIGRAM(S): 2.5 TABLET ORAL at 21:35

## 2023-12-13 RX ADMIN — PHENYLEPHRINE HYDROCHLORIDE 79.4 MICROGRAM(S)/KG/MIN: 10 INJECTION INTRAVENOUS at 01:19

## 2023-12-13 RX ADMIN — Medication 25 MILLIGRAM(S): at 14:42

## 2023-12-13 RX ADMIN — GABAPENTIN 800 MILLIGRAM(S): 400 CAPSULE ORAL at 19:16

## 2023-12-13 RX ADMIN — ENOXAPARIN SODIUM 40 MILLIGRAM(S): 100 INJECTION SUBCUTANEOUS at 21:35

## 2023-12-13 RX ADMIN — HYDROMORPHONE HYDROCHLORIDE 0.5 MILLIGRAM(S): 2 INJECTION INTRAMUSCULAR; INTRAVENOUS; SUBCUTANEOUS at 22:00

## 2023-12-13 RX ADMIN — Medication 4: at 17:50

## 2023-12-13 RX ADMIN — POLYETHYLENE GLYCOL 3350 17 GRAM(S): 17 POWDER, FOR SOLUTION ORAL at 19:17

## 2023-12-13 RX ADMIN — NALOXEGOL OXALATE 25 MILLIGRAM(S): 12.5 TABLET, FILM COATED ORAL at 14:11

## 2023-12-13 RX ADMIN — Medication 1 TABLET(S): at 10:04

## 2023-12-13 RX ADMIN — OXYCODONE HYDROCHLORIDE 15 MILLIGRAM(S): 5 TABLET ORAL at 01:59

## 2023-12-13 RX ADMIN — OXYCODONE HYDROCHLORIDE 30 MILLIGRAM(S): 5 TABLET ORAL at 10:45

## 2023-12-13 RX ADMIN — OXYCODONE HYDROCHLORIDE 15 MILLIGRAM(S): 5 TABLET ORAL at 02:30

## 2023-12-13 RX ADMIN — HYDROMORPHONE HYDROCHLORIDE 0.5 MILLIGRAM(S): 2 INJECTION INTRAMUSCULAR; INTRAVENOUS; SUBCUTANEOUS at 14:12

## 2023-12-13 RX ADMIN — MIDODRINE HYDROCHLORIDE 15 MILLIGRAM(S): 2.5 TABLET ORAL at 14:11

## 2023-12-13 RX ADMIN — CHLORHEXIDINE GLUCONATE 1 APPLICATION(S): 213 SOLUTION TOPICAL at 06:06

## 2023-12-13 NOTE — PROGRESS NOTE ADULT - SUBJECTIVE AND OBJECTIVE BOX
NEUROSURGERY PAIN MANAGEMENT CONSULT NOTE    Chief Complaint:    HPI:  68 y/o female with PMHx HTN, HLD, CVA x 3 (last was 2016 with right hand weakness residual), GAMALIEL not using CPAP, Emphysema, former 25 pack year smoker restarted this week of surgery, Chronic Constipation on Movantik, Urinary Incontinence chronically self straight cath at home, chronic UTI, Breast Implant Rupture with Chronic Leak repair 10/2023, B/L CTS s/p release, s/p Intrathecal Morphine pump for pain, with chronic neck and back pain worsening for years s/p multiple spinal surgeries including laminectomies and fusion of cervical, thoracic and lumbar spine initially done in 2009 and recently in 2019 and 2020 at [facility] by Dr. Trinidad, s/p T3-L1 revision of prior fusion (with Dr. Luz on 8/17/22). Outpatient Xray 11/16/2023 showing broken rods/displacement. Presents for elective C2-S2 instrumentation and fusion. Pt endorses weakness of b/l LE and burning pain in b/l lower extremities radiating to both feet,  (04 Dec 2023 15:44)    OVERNIGHT EVENTS: Patient seen this morning and resting comfortably when entered the room. She was eating her breakfast and appeared to be in no distress/ discomfort. When asked about her pain, she states she's doing fine today and pain is well controlled. She was noted to have trace movement in right EHL but gross sensation in BLE was not present (unable to accurately confirm when feet/ ankles are touched with pen). Denies any nausea or vomiting and continuing to eat well. No plan to adjust pain medications today.     Hospital Course:   12/4: Admitted for spinal fusion d/t damaged hardware.   12/5: Neuro stable. POD0 C2-S2 instrumentation/fusion (intraop b/l LE motor loss). on parminder for MAP>90  12/6: POD1. KAMRAN o/n neuro stable. remains intubated. Extubated 6am. Precedex gtt prn. Remains on parminder gtt for MAP goal >90. Valium made prn. CT thoracic spine bc MRI unavailable. Attempted NGT, unsuccessful (resistance @ 35).   12/7: POD2 Given IV dilaudid 0.5mg for pain. Neuro exam stable. SQL started tongiht. Increased precedex to 1.0. Responds well to valium. Consulting psych given paranoia ?homicidal statements. Passed bedside dyspphagia and tolerating PO meds. MRI complete, f/u read.   12/8: POD3 Pt reporting incisional pain, given dilaudid 0.5mg IV x2. Pt appearing anxious, given xanax 1mg. Neuro exam unchanged. Pain regimen increased to oxy 15/30 mg, pending further recs. Given 1 L bolus for tachycardia. Trops negative. 2 HMV drains removed.   12/9: POD4, KAMRAN, CT myelogram today showing block at T12-L1, OR tomorrow  12/10: POD5, KAMRAN, OR today for exploration of fusion, possible decompression T9-L2, revision instrumentation  POD 0 T9-L2 decompression. Pt returned from OR intubated. 50mcg fentanyl IVP x 2 for pain control and HTN. Magnesium repleted. Precedex gtt started for sedation. Decreased FiO2 to 40%. 1L bolus for soft MAP. Dc'd propofol and started levo gtt for MAP >90.   12/11: POD 6, POD 1. Extubated, satting well on RA. Resumed home valium and gabapentin. Castillo d/c'd, pending TOV. Seroquel started for agitation, R IJ removed, LUE double midline placed. Started SQL.   12/12: POD7, POD2, given 500 cc bolus and started on phenylephrine for MAP goal >90. Started midodrine 10mg q8h. Cardura dc'd d/t hypotension. Given 250cc of albumin for increasing pressor requirements. Given lactulose, pending BM. Hgb 7.6<8.3, given 1u PRBC. Protected sleep time. Urology consulted for possible suprapubic cath, recommend self intermittent cath is optimal.   12/13: POD8, POD3 Midodrine increased to 15q8. Pt refused to take the extra 5mg midodrine ordered. Neuro exam stable.      PAST MEDICAL & SURGICAL HISTORY:  HTN (hypertension)      SS (spinal stenosis)      High cholesterol      Stroke  x3      H/O carpal tunnel syndrome  Bilateral      Chronic GERD      History of chronic constipation      Urinary incontinence  self cath as needed      Pre-diabetes      Anxiety and depression      Eczema      H/O kyphosis      Pancreas cyst      Scoliosis      Wheelchair dependent      Cervical pseudoarthrosis  and lumbar spine      Cervical spondylosis      Chronic headaches      Degenerative joint disease  left shoulder      Lumbosacral spondylosis      COPD (chronic obstructive pulmonary disease)      H/O Spinal surgery  lumbar x 30      H/O breast surgery  left breast implant 1980's      S/P cervical discectomy  x4      H/O total shoulder replacement, right          FAMILY HISTORY:      SOCIAL HISTORY:  [ ] Denies Smoking, Alcohol, or Drug Use    HOME MEDICATIONS:   Please refer to initial HNP    PAIN HOME MEDICATIONS:    Allergies    Crab (Pruritus)  Drug Allergies Not Recorded    Intolerances    MEDICATIONS  (STANDING):  bisacodyl 5 milliGRAM(s) Oral at bedtime  chlorhexidine 2% Cloths 1 Application(s) Topical <User Schedule>  diazepam    Tablet 5 milliGRAM(s) Oral every 8 hours  DULoxetine 60 milliGRAM(s) Oral daily  enoxaparin Injectable 40 milliGRAM(s) SubCutaneous <User Schedule>  gabapentin 800 milliGRAM(s) Oral every 8 hours  influenza  Vaccine (HIGH DOSE) 0.7 milliLiter(s) IntraMuscular once  insulin lispro (ADMELOG) corrective regimen sliding scale   SubCutaneous Before meals and at bedtime  midodrine 15 milliGRAM(s) Oral every 8 hours  Morphine 1 milliGRAM / mL 17.9 mL,Morphine 1 milliGRAM / mL 17.9 mL 17.9 mL IntraThecal Continuous Pump  multivitamin 1 Tablet(s) Oral daily  naloxegol 25 milliGRAM(s) Oral daily  pantoprazole    Tablet 40 milliGRAM(s) Oral before breakfast  phenylephrine    Infusion 2.5 MICROgram(s)/kG/Min (79.4 mL/Hr) IV Continuous <Continuous>  polyethylene glycol 3350 17 Gram(s) Oral two times a day  senna 2 Tablet(s) Oral at bedtime  simvastatin 40 milliGRAM(s) Oral at bedtime    MEDICATIONS  (PRN):  benzocaine 20% Spray 1 Spray(s) Mucosal three times a day PRN Sore throat  diphenhydrAMINE 25 milliGRAM(s) Oral every 6 hours PRN Rash and/or Itching  hydrocortisone 1% Cream 1 Application(s) Topical two times a day PRN Itching  HYDROmorphone  Injectable 0.5 milliGRAM(s) IV Push every 3 hours PRN Breakthrough Pain  naloxone Injectable 0.1 milliGRAM(s) IV Push every 3 minutes PRN For ANY of the following changes in patient status:  A. RR LESS THAN 10 breaths per minute, B. Oxygen saturation LESS THAN 90%, C. Sedation score of 6  ondansetron Injectable 4 milliGRAM(s) IV Push every 6 hours PRN Nausea  oxyCODONE    IR 15 milliGRAM(s) Oral every 4 hours PRN Moderate Pain (4 - 6)  oxyCODONE    IR 30 milliGRAM(s) Oral every 4 hours PRN Severe Pain (7 - 10)  sodium chloride 0.9% lock flush 10 milliLiter(s) IV Push every 1 hour PRN Pre/post blood products, medications, blood draw, and to maintain line patency      Vital Signs Last 24 Hrs  T(C): 37.1 (12-13-23 @ 14:37), Max: 37.1 (12-13-23 @ 05:35)  T(F): 98.7 (12-13-23 @ 14:37), Max: 98.7 (12-13-23 @ 05:35)  HR: 90 (12-13-23 @ 16:00) (75 - 130)  BP: 138/82 (12-13-23 @ 13:00) (93/60 - 160/78)  ABP: 93/76 (12-13-23 @ 16:00) (78/65 - 151/87)  ABP(mean): 85 (12-13-23 @ 16:00) (72 - 111)  RR: 18 (12-13-23 @ 15:00) (16 - 20)  SpO2: 99% (12-13-23 @ 16:00) (93% - 100%)    LABS:                        10.0   10.50 )-----------( 224      ( 13 Dec 2023 04:52 )             30.5   12-13    138  |  105  |  9   ----------------------------<  118<H>  4.2   |  26  |  0.47<L>    Ca    8.4      13 Dec 2023 04:52  Phos  3.2     12-13  Mg     1.6     12-13          RADIOLOGY:    Drug Screen:      REVIEW OF SYSTEMS:  CONSTITUTIONAL: No fever   EYES: No visual disturbances  ENMT:  No difficulty hearing.  NECK: pain and stiffness  RESPIRATORY: No shortness of breath  CARDIOVASCULAR: No chest pain  GASTROINTESTINAL:  No nausea, vomiting.  NEUROLOGICAL: weakness in BLE with numbness in BLE  MUSCULOSKELETAL: Incisional back pain (improving)        PHYSICAL EXAM   GENERAL: Laying in bed, NAD, appears fatigued  Neuro: EOMI  Cranial nerves grossly intact  Motor exam: on exam patient was unable to move BLE upon request - no movement was noted in AD, EHL, plantarflexion, KE or KF  Sensation to light touch in BLE no present    CHEST/LUNG: on room air  ABDOMEN:  Nondistended     NEUROSURGERY PAIN MANAGEMENT CONSULT NOTE    Chief Complaint:    HPI:  68 y/o female with PMHx HTN, HLD, CVA x 3 (last was 2016 with right hand weakness residual), GAMALIEL not using CPAP, Emphysema, former 25 pack year smoker restarted this week of surgery, Chronic Constipation on Movantik, Urinary Incontinence chronically self straight cath at home, chronic UTI, Breast Implant Rupture with Chronic Leak repair 10/2023, B/L CTS s/p release, s/p Intrathecal Morphine pump for pain, with chronic neck and back pain worsening for years s/p multiple spinal surgeries including laminectomies and fusion of cervical, thoracic and lumbar spine initially done in 2009 and recently in 2019 and 2020 at [facility] by Dr. Trinidad, s/p T3-L1 revision of prior fusion (with Dr. Luz on 8/17/22). Outpatient Xray 11/16/2023 showing broken rods/displacement. Presents for elective C2-S2 instrumentation and fusion. Pt endorses weakness of b/l LE and burning pain in b/l lower extremities radiating to both feet,  (04 Dec 2023 15:44)    OVERNIGHT EVENTS: Patient seen this morning and resting comfortably when entered the room. She was eating her breakfast and appeared to be in no distress/ discomfort. When asked about her pain, she states she's doing fine today and pain is well controlled. She was noted to have trace movement in right EHL but gross sensation in BLE was not present (unable to accurately confirm when feet/ ankles are touched with pen). Denies any nausea or vomiting and continuing to eat well. No plan to adjust pain medications today.     Hospital Course:   12/4: Admitted for spinal fusion d/t damaged hardware.   12/5: Neuro stable. POD0 C2-S2 instrumentation/fusion (intraop b/l LE motor loss). on parminder for MAP>90  12/6: POD1. KAMRAN o/n neuro stable. remains intubated. Extubated 6am. Precedex gtt prn. Remains on parminder gtt for MAP goal >90. Valium made prn. CT thoracic spine bc MRI unavailable. Attempted NGT, unsuccessful (resistance @ 35).   12/7: POD2 Given IV dilaudid 0.5mg for pain. Neuro exam stable. SQL started tongiht. Increased precedex to 1.0. Responds well to valium. Consulting psych given paranoia ?homicidal statements. Passed bedside dyspphagia and tolerating PO meds. MRI complete, f/u read.   12/8: POD3 Pt reporting incisional pain, given dilaudid 0.5mg IV x2. Pt appearing anxious, given xanax 1mg. Neuro exam unchanged. Pain regimen increased to oxy 15/30 mg, pending further recs. Given 1 L bolus for tachycardia. Trops negative. 2 HMV drains removed.   12/9: POD4, KAMRAN, CT myelogram today showing block at T12-L1, OR tomorrow  12/10: POD5, KAMRAN, OR today for exploration of fusion, possible decompression T9-L2, revision instrumentation  POD 0 T9-L2 decompression. Pt returned from OR intubated. 50mcg fentanyl IVP x 2 for pain control and HTN. Magnesium repleted. Precedex gtt started for sedation. Decreased FiO2 to 40%. 1L bolus for soft MAP. Dc'd propofol and started levo gtt for MAP >90.   12/11: POD 6, POD 1. Extubated, satting well on RA. Resumed home valium and gabapentin. Castillo d/c'd, pending TOV. Seroquel started for agitation, R IJ removed, LUE double midline placed. Started SQL.   12/12: POD7, POD2, given 500 cc bolus and started on phenylephrine for MAP goal >90. Started midodrine 10mg q8h. Cardura dc'd d/t hypotension. Given 250cc of albumin for increasing pressor requirements. Given lactulose, pending BM. Hgb 7.6<8.3, given 1u PRBC. Protected sleep time. Urology consulted for possible suprapubic cath, recommend self intermittent cath is optimal.   12/13: POD8, POD3 Midodrine increased to 15q8. Pt refused to take the extra 5mg midodrine ordered. Neuro exam stable.      PAST MEDICAL & SURGICAL HISTORY:  HTN (hypertension)      SS (spinal stenosis)      High cholesterol      Stroke  x3      H/O carpal tunnel syndrome  Bilateral      Chronic GERD      History of chronic constipation      Urinary incontinence  self cath as needed      Pre-diabetes      Anxiety and depression      Eczema      H/O kyphosis      Pancreas cyst      Scoliosis      Wheelchair dependent      Cervical pseudoarthrosis  and lumbar spine      Cervical spondylosis      Chronic headaches      Degenerative joint disease  left shoulder      Lumbosacral spondylosis      COPD (chronic obstructive pulmonary disease)      H/O Spinal surgery  lumbar x 30      H/O breast surgery  left breast implant 1980's      S/P cervical discectomy  x4      H/O total shoulder replacement, right          FAMILY HISTORY:      SOCIAL HISTORY:  [ ] Denies Smoking, Alcohol, or Drug Use    HOME MEDICATIONS:   Please refer to initial HNP    PAIN HOME MEDICATIONS:    Allergies    Crab (Pruritus)  Drug Allergies Not Recorded    Intolerances    MEDICATIONS  (STANDING):  bisacodyl 5 milliGRAM(s) Oral at bedtime  chlorhexidine 2% Cloths 1 Application(s) Topical <User Schedule>  diazepam    Tablet 5 milliGRAM(s) Oral every 8 hours  DULoxetine 60 milliGRAM(s) Oral daily  enoxaparin Injectable 40 milliGRAM(s) SubCutaneous <User Schedule>  gabapentin 800 milliGRAM(s) Oral every 8 hours  influenza  Vaccine (HIGH DOSE) 0.7 milliLiter(s) IntraMuscular once  insulin lispro (ADMELOG) corrective regimen sliding scale   SubCutaneous Before meals and at bedtime  midodrine 15 milliGRAM(s) Oral every 8 hours  Morphine 1 milliGRAM / mL 17.9 mL,Morphine 1 milliGRAM / mL 17.9 mL 17.9 mL IntraThecal Continuous Pump  multivitamin 1 Tablet(s) Oral daily  naloxegol 25 milliGRAM(s) Oral daily  pantoprazole    Tablet 40 milliGRAM(s) Oral before breakfast  phenylephrine    Infusion 2.5 MICROgram(s)/kG/Min (79.4 mL/Hr) IV Continuous <Continuous>  polyethylene glycol 3350 17 Gram(s) Oral two times a day  senna 2 Tablet(s) Oral at bedtime  simvastatin 40 milliGRAM(s) Oral at bedtime    MEDICATIONS  (PRN):  benzocaine 20% Spray 1 Spray(s) Mucosal three times a day PRN Sore throat  diphenhydrAMINE 25 milliGRAM(s) Oral every 6 hours PRN Rash and/or Itching  hydrocortisone 1% Cream 1 Application(s) Topical two times a day PRN Itching  HYDROmorphone  Injectable 0.5 milliGRAM(s) IV Push every 3 hours PRN Breakthrough Pain  naloxone Injectable 0.1 milliGRAM(s) IV Push every 3 minutes PRN For ANY of the following changes in patient status:  A. RR LESS THAN 10 breaths per minute, B. Oxygen saturation LESS THAN 90%, C. Sedation score of 6  ondansetron Injectable 4 milliGRAM(s) IV Push every 6 hours PRN Nausea  oxyCODONE    IR 15 milliGRAM(s) Oral every 4 hours PRN Moderate Pain (4 - 6)  oxyCODONE    IR 30 milliGRAM(s) Oral every 4 hours PRN Severe Pain (7 - 10)  sodium chloride 0.9% lock flush 10 milliLiter(s) IV Push every 1 hour PRN Pre/post blood products, medications, blood draw, and to maintain line patency      Vital Signs Last 24 Hrs  T(C): 37.1 (12-13-23 @ 14:37), Max: 37.1 (12-13-23 @ 05:35)  T(F): 98.7 (12-13-23 @ 14:37), Max: 98.7 (12-13-23 @ 05:35)  HR: 90 (12-13-23 @ 16:00) (75 - 130)  BP: 138/82 (12-13-23 @ 13:00) (93/60 - 160/78)  ABP: 93/76 (12-13-23 @ 16:00) (78/65 - 151/87)  ABP(mean): 85 (12-13-23 @ 16:00) (72 - 111)  RR: 18 (12-13-23 @ 15:00) (16 - 20)  SpO2: 99% (12-13-23 @ 16:00) (93% - 100%)    LABS:                        10.0   10.50 )-----------( 224      ( 13 Dec 2023 04:52 )             30.5   12-13    138  |  105  |  9   ----------------------------<  118<H>  4.2   |  26  |  0.47<L>    Ca    8.4      13 Dec 2023 04:52  Phos  3.2     12-13  Mg     1.6     12-13          RADIOLOGY:    Drug Screen:      REVIEW OF SYSTEMS:  CONSTITUTIONAL: No fever   EYES: No visual disturbances  ENMT:  No difficulty hearing.  NECK: pain and stiffness  RESPIRATORY: No shortness of breath  CARDIOVASCULAR: No chest pain  GASTROINTESTINAL:  No nausea, vomiting.  NEUROLOGICAL: weakness in BLE with numbness in BLE  MUSCULOSKELETAL: Incisional back pain (improving)        PHYSICAL EXAM   GENERAL: Laying in bed, NAD, appears fatigued  Neuro: EOMI  Cranial nerves grossly intact  Motor exam: on exam patient was unable to move BLE upon request - no movement was noted in AD, EHL, plantarflexion, KE or KF  Sensation to light touch in BLE no present    CHEST/LUNG: on room air  ABDOMEN:  Nondistended

## 2023-12-13 NOTE — PROGRESS NOTE ADULT - SUBJECTIVE AND OBJECTIVE BOX
PM EVENTS:   - No acute overnight events as of documentation time of this note.    ROS: negative except as mentioned above.    EXAM:     Hiren Coma Scale: 15    General: normocephalic, atraumatic, laying in bed, refusing examination  Neuro     MS: A/Ox3, poor cooperation, normal attention, no neglect, comprehension intact, speech with preserved fluency, naming, and repetition    CN: PERRL,EOMI and no ptosis bilaterally, face symmetric, hearing grossly intact    Mot: bulk normal, tone normal, power antigravity in bilateral upper extremities, (+)0/5 in bilateral lower extremities    Vital Signs Last 24 Hrs  T(C): 37 (13 Dec 2023 18:09), Max: 37.1 (13 Dec 2023 05:35)  T(F): 98.6 (13 Dec 2023 18:09), Max: 98.7 (13 Dec 2023 05:35)  HR: 98 (13 Dec 2023 20:00) (75 - 130)  BP: 151/80 (13 Dec 2023 19:00) (93/60 - 160/78)  BP(mean): 108 (13 Dec 2023 19:00) (70 - 115)  RR: 18 (13 Dec 2023 20:00) (16 - 20)  SpO2: 97% (13 Dec 2023 20:00) (93% - 100%)    Parameters below as of 13 Dec 2023 20:00  Patient On (Oxygen Delivery Method): room air      I&O's Detail    12 Dec 2023 07:01  -  13 Dec 2023 07:00  --------------------------------------------------------  IN:    IV PiggyBack: 375 mL    Oral Fluid: 200 mL    Phenylephrine: 79.4 mL    Phenylephrine: 909.9 mL    PRBCs (Packed Red Blood Cells): 350 mL  Total IN: 1914.3 mL    OUT:    Accordian (mL): 5 mL    Bulb (mL): 30 mL    Intermittent Catheterization - Urethral (mL): 2400 mL    Voided (mL): 1200 mL  Total OUT: 3635 mL    Total NET: -1720.7 mL      13 Dec 2023 07:01  -  13 Dec 2023 20:31  --------------------------------------------------------  IN:    IV PiggyBack: 275 mL    Phenylephrine: 19 mL  Total IN: 294 mL    OUT:    Intermittent Catheterization - Urethral (mL): 2000 mL  Total OUT: 2000 mL    Total NET: -1706 mL                                10.0   10.50 )-----------( 224      ( 13 Dec 2023 04:52 )             30.5     12-13    138  |  105  |  9   ----------------------------<  118<H>  4.2   |  26  |  0.47<L>    Ca    8.4      13 Dec 2023 04:52  Phos  3.2     12-13  Mg     1.6     12-13        MEDICATIONS  (STANDING):  bisacodyl 5 milliGRAM(s) Oral at bedtime  chlorhexidine 2% Cloths 1 Application(s) Topical <User Schedule>  diazepam    Tablet 5 milliGRAM(s) Oral every 8 hours  DULoxetine 60 milliGRAM(s) Oral daily  enoxaparin Injectable 40 milliGRAM(s) SubCutaneous <User Schedule>  gabapentin 800 milliGRAM(s) Oral every 8 hours  influenza  Vaccine (HIGH DOSE) 0.7 milliLiter(s) IntraMuscular once  insulin lispro (ADMELOG) corrective regimen sliding scale   SubCutaneous Before meals and at bedtime  midodrine 15 milliGRAM(s) Oral every 8 hours  Morphine 1 milliGRAM / mL 17.9 mL,Morphine 1 milliGRAM / mL 17.9 mL 17.9 mL IntraThecal Continuous Pump  multivitamin 1 Tablet(s) Oral daily  naloxegol 25 milliGRAM(s) Oral daily  pantoprazole    Tablet 40 milliGRAM(s) Oral before breakfast  phenylephrine    Infusion 2.5 MICROgram(s)/kG/Min (79.4 mL/Hr) IV Continuous <Continuous>  polyethylene glycol 3350 17 Gram(s) Oral two times a day  saline laxative (FLEET) Rectal Enema 1 Enema Rectal once  senna 2 Tablet(s) Oral at bedtime  simvastatin 40 milliGRAM(s) Oral at bedtime    MEDICATIONS  (PRN):  benzocaine 20% Spray 1 Spray(s) Mucosal three times a day PRN Sore throat  hydrocortisone 1% Cream 1 Application(s) Topical two times a day PRN Itching  HYDROmorphone  Injectable 0.5 milliGRAM(s) IV Push every 3 hours PRN Breakthrough Pain  naloxone Injectable 0.1 milliGRAM(s) IV Push every 3 minutes PRN For ANY of the following changes in patient status:  A. RR LESS THAN 10 breaths per minute, B. Oxygen saturation LESS THAN 90%, C. Sedation score of 6  ondansetron Injectable 4 milliGRAM(s) IV Push every 6 hours PRN Nausea  oxyCODONE    IR 30 milliGRAM(s) Oral every 4 hours PRN Severe Pain (7 - 10)  oxyCODONE    IR 15 milliGRAM(s) Oral every 4 hours PRN Moderate Pain (4 - 6)  sodium chloride 0.9% lock flush 10 milliLiter(s) IV Push every 1 hour PRN Pre/post blood products, medications, blood draw, and to maintain line patency      Please see the day's note documented by Dr. Mccauley for detailed ongoing assessment and plan.      Neuro checks q2h  MAP > 90  JPD/HMV  Pain control  Extubated 12/11  Adat  Euglycemia  SQL

## 2023-12-13 NOTE — PROGRESS NOTE ADULT - ASSESSMENT
PLAN:  Neuro:  - Neuro/vitals q2  - pain control: modified ERAS, IT morphine pump, oxycodone 15/30 prn, valium and home gabapentin 800 TID resumed  - Continue home cymbalta 60mg daily   - HMV x 1, YIFAN x 1, per plastics  - CT thoracic spine 12/6: postop changes  - holistic therapy consult   - protected sleep time 10PM-4AM     Cardio:  - MAP >90 for 72 hours   - midodrine 15q8, parminder gtt   - s/p albumin 250 cc for increasing pressor needs 12/12  - holding home metoprolol while on pressors   - C/w home simvastatin     Pulm:  - Extubated to room air  - hx GAMALIEL, no CPAP at home    GI:  - CCD   - Bowel regimen  - lactulose 12/12, last BM 12/8  - GERD: continue home protonix    Renal:  - PRN straight cath  - failed TOV, SC prn    Endo:  - ISS  - a1c: 7    Heme:  - SCDs DVT ppx, SQL  - 500 cell saver intraop 12/5, 1u PRBC intraop 12/10, 1u PRBC 12/12  - trend H/H, downtrending    ID:  - afebrile  - post op ancef    Dispo:  - full code, ICU status, pending AR      Pain: 67 year old female patient admitted for elective C2-S2 instrumentation and fusion. Current Pain medications include ITP infusion, Gabapentin 800mg TID,  Oxycodone 15-30 Q4 PRN , Dilaudid 0.5mg IV Q3hrs PRN, Valium 5mg Q8hrs, duloxetine 60mg oral daily. Currently patient appears comfortable at bedside and when discussing pain (despite complain about 9/10 back pain). She continues to fixate on receiving all the pain medications at the same time, as this is what she did prior to admission (this helped make her somnolent at times). Consider hypotensive episodes, and her requiring pressure support (phenylephrine) we will not be increasing/ adjusting any of her PRN pain medications or scheduling them to be all given at the same time as the patient request. Will continue to follow while patient is admitted as inpatient.     - Home pain regiment: On iSTOP no prescription controlled substances were noted (she continues to state she was taking 30mg oxycodone several times a day prior to admission).      - Bowel regimen: Continue current regiment with no changes at this time.   - Nausea ppx: Zofran standing  - Functional Goals: Pt will get OOB with PT today. Pt will resume previous level of activity without impairment from surgery.   - Additional Consults: None recommended.   - Additional Labs/Imaging:  None recommended.     - Follow up, Discharge Planning: Patient states that at baseline she was independent at home without a caregiver and only used a wheelchair to ambulate outside the house. Discharge pending medical stability and acute management   - Pain Management follow up plan: will continue to follow while patient is inpatient.

## 2023-12-13 NOTE — PROGRESS NOTE ADULT - SUBJECTIVE AND OBJECTIVE BOX
HPI:  66 y/o female with PMHx HTN, HLD, CVA x 3 (last was 2016 with right hand weakness residual), GAMALIEL not using CPAP, Emphysema, former 25 pack year smoker restarted this week of surgery, Chronic Constipation on Movantik, Urinary Incontinence chronically self straight cath at home, chronic UTI, Breast Implant Rupture with Chronic Leak repair 10/2023, B/L CTS s/p release, s/p Intrathecal Morphine pump for pain, with chronic neck and back pain worsening for years s/p multiple spinal surgeries including laminectomies and fusion of cervical, thoracic and lumbar spine initially done in 2009 and recently in 2019 and 2020 at [facility] by Dr. Trinidad, s/p T3-L1 revision of prior fusion (with Dr. Luz on 8/17/22). Outpatient Xray 11/16/2023 showing broken rods/displacement. Presents for elective C2-S2 instrumentation and fusion. Pt endorses weakness of b/l LE and burning pain in b/l lower extremities radiating to both feet,  (04 Dec 2023 15:44)    Hospital Course:  12/4: Admitted for spinal fusion d/t damaged hardware.   12/5: Neuro stable. POD0 C2-S2 instrumentation/fusion (intraop b/l LE motor loss). on parminder for MAP>90  12/6: POD1. KAMRAN o/n neuro stable. remains intubated. Extubated 6am. Precedex gtt prn. Remains on parminder gtt for MAP goal >90. Valium made prn. CT thoracic spine bc MRI unavailable. Attempted NGT, unsuccessful (resistance @ 35).   12/7: POD2 Given IV dilaudid 0.5mg for pain. Neuro exam stable. SQL started tongiht. Increased precedex to 1.0. Responds well to valium. Consulting psych given paranoia ?homicidal statements. Passed bedside dysphagia and tolerating PO meds. MRI complete, f/u read.   12/8: POD3 Pt reporting incisional pain, given dilaudid 0.5mg IV x2. Pt appearing anxious, given xanax 1mg. Neuro exam unchanged. Pain regimen increased to oxy 15/30 mg, pending further recs. Given 1 L bolus for tachycardia. Trops negative. 2 HMV drains removed.   12/9: POD4, KAMRAN, CT myelogram today showing block at T12-L1, OR tomorrow  12/10: POD5, KAMRAN, OR today for exploration of fusion, possible decompression T9-L2, revision instrumentation  POD 0 T9-L2 decompression. Pt returned from OR intubated. 50mcg fentanyl IVP x 2 for pain control and HTN. Magnesium repleted. Precedex gtt started for sedation. Decreased FiO2 to 40%. 1L bolus for soft MAP. Dc'd propofol and started levo gtt for MAP >90.   12/11: POD 6, POD 1. Extubated, satting well on RA. Resumed home valium and gabapentin. Castillo d/c'd, pending TOV. Seroquel started for agitation, R IJ removed, LUE double midline placed. Started SQL.   12/12: POD7, POD2, given 500 cc bolus and started on phenylephrine for MAP goal >90. Started midodrine 10mg q8h. Cardura dc'd d/t hypotension. Given 250cc of albumin for increasing pressor requirements. Given lactulose, pending BM. Hgb 7.6<8.3, given 1u PRBC. Protected sleep time. Urology consulted for possible suprapubic cath, recommend self intermittent cath is optimal.   12/13: POD8, POD3 Midodrine increased to 15q8. Pt refused to take the extra 5mg midodrine ordered. Neuro exam stable.    Vital Signs Last 24 Hrs  T(C): 36.8 (13 Dec 2023 00:53), Max: 36.9 (12 Dec 2023 05:19)  T(F): 98.2 (13 Dec 2023 00:53), Max: 98.4 (12 Dec 2023 05:19)  HR: 83 (13 Dec 2023 01:00) (75 - 105)  BP: 156/86 (13 Dec 2023 01:00) (96/52 - 160/78)  BP(mean): 115 (13 Dec 2023 01:00) (69 - 115)  RR: 18 (13 Dec 2023 01:00) (16 - 20)  SpO2: 95% (13 Dec 2023 01:00) (92% - 98%)    Parameters below as of 13 Dec 2023 01:00  Patient On (Oxygen Delivery Method): room air        I&O's Summary    11 Dec 2023 07:01  -  12 Dec 2023 07:00  --------------------------------------------------------  IN: 1620.3 mL / OUT: 1100 mL / NET: 520.3 mL    12 Dec 2023 07:01  -  13 Dec 2023 02:08  --------------------------------------------------------  IN: 1697.1 mL / OUT: 2315 mL / NET: -617.9 mL      PHYSICAL EXAM:  General: patient seen laying supine in bed in NAD  Neuro: AAOx3, FC, speech clear and fluent   CNII-XI grossly intact, face symmetric  Motor: Strength 5/5 b/l UE and b/l LE 0/5, T12 sensory level  HEENT: PERRL, EOMI  Neck: supple  Cardiac: RRR, S1S2  Pulmonary: chest rise symmetric  Abdomen: soft, nontender, nondistended  Ext: perfusing well  Skin: warm, dry  Wound: Posterior incision c/d/i +HMV, YIFAN in place to suction    TUBES/LINES:  [] Castillo  [] Lumbar Drain  [] Wound Drains  [] Others    DIET:  [] NPO  [x] Mechanical  [] Tube feeds    LABS:                        9.4    10.39 )-----------( 225      ( 12 Dec 2023 17:31 )             28.6     12-12    139  |  109<H>  |  13  ----------------------------<  121<H>  3.8   |  23  |  0.51    Ca    8.0<L>      12 Dec 2023 05:23  Phos  3.3     12-12  Mg     1.6     12-12        Urinalysis Basic - ( 12 Dec 2023 05:23 )    Color: x / Appearance: x / SG: x / pH: x  Gluc: 121 mg/dL / Ketone: x  / Bili: x / Urobili: x   Blood: x / Protein: x / Nitrite: x   Leuk Esterase: x / RBC: x / WBC x   Sq Epi: x / Non Sq Epi: x / Bacteria: x          CAPILLARY BLOOD GLUCOSE      POCT Blood Glucose.: 124 mg/dL (12 Dec 2023 17:07)  POCT Blood Glucose.: 176 mg/dL (12 Dec 2023 11:48)  POCT Blood Glucose.: 137 mg/dL (12 Dec 2023 06:40)      Drug Levels: [] N/A    CSF Analysis: [] N/A      Allergies    Crab (Pruritus)  No Known Drug Allergies    Intolerances      MEDICATIONS:  Antibiotics:    Neuro:  diazepam    Tablet 5 milliGRAM(s) Oral every 8 hours  DULoxetine 60 milliGRAM(s) Oral daily  gabapentin 800 milliGRAM(s) Oral every 8 hours  HYDROmorphone  Injectable 0.5 milliGRAM(s) IV Push every 3 hours PRN  ondansetron Injectable 4 milliGRAM(s) IV Push every 6 hours PRN  oxyCODONE    IR 15 milliGRAM(s) Oral every 4 hours PRN  oxyCODONE    IR 30 milliGRAM(s) Oral every 4 hours PRN    Anticoagulation:  enoxaparin Injectable 40 milliGRAM(s) SubCutaneous <User Schedule>    OTHER:  benzocaine 20% Spray 1 Spray(s) Mucosal three times a day PRN  bisacodyl 5 milliGRAM(s) Oral at bedtime  chlorhexidine 2% Cloths 1 Application(s) Topical <User Schedule>  hydrocortisone 1% Cream 1 Application(s) Topical two times a day PRN  influenza  Vaccine (HIGH DOSE) 0.7 milliLiter(s) IntraMuscular once  insulin lispro (ADMELOG) corrective regimen sliding scale   SubCutaneous Before meals and at bedtime  midodrine 15 milliGRAM(s) Oral every 8 hours  Morphine 1 milliGRAM / mL 17.9 mL,Morphine 1 milliGRAM / mL 17.9 mL 17.9 mL IntraThecal Continuous Pump  naloxone Injectable 0.1 milliGRAM(s) IV Push every 3 minutes PRN  pantoprazole    Tablet 40 milliGRAM(s) Oral before breakfast  phenylephrine    Infusion 2.5 MICROgram(s)/kG/Min IV Continuous <Continuous>  polyethylene glycol 3350 17 Gram(s) Oral two times a day  senna 2 Tablet(s) Oral at bedtime  simvastatin 40 milliGRAM(s) Oral at bedtime    IVF:  multivitamin 1 Tablet(s) Oral daily    CULTURES:    RADIOLOGY & ADDITIONAL TESTS:      ASSESSMENT:  67 yo female with Hx HTN, HLD, DM, CVA x 3 (last in 2016 with residual R hand weakness), GAMALIEL not using CPAP, Emphysema, ex-25 pack year smoker now restarted, chronic constipation on Movantik, chronic urinary retention (SC at home), b/l CTS s/p release, s/p Intrathecal Morphine pump for pain, with chronic neck and back pain worsening for years s/p multiple spinal surgeries including laminectomies and fusion of cervical, thoracic and lumbar spine, s/p T3-L1 revision of prior fusion (with  on 8/17/22). Xray 11/16/23 showing broken rods/displacement. Now s/p C2-S2 instrumentation and fusion (12/5). S/p T9-L2 decompression w/ durotomy with repair (12/10/23).       S12.9XXA    Handoff    MEWS Score    HTN (hypertension)    Back pain    Hypertension    SS (spinal stenosis)    High cholesterol    Stroke    H/O carpal tunnel syndrome    H/O carpal tunnel syndrome    Chronic GERD    History of chronic constipation    Seizure    Urinary incontinence    Pre-diabetes    Acute UTI    Anxiety    Anxiety and depression    Arthritis    Eczema    External hemorrhoids    H/O kyphosis    Multiple sclerosis    Pancreas cyst    Scoliosis    Wheelchair dependent    Cervical pseudoarthrosis    Right shoulder pain    Cervical spondylosis    Chronic headaches    Chronic LUQ pain    Chronic UTI    Degenerative joint disease    H/O low back pain    Lumbosacral spondylosis    COPD (chronic obstructive pulmonary disease)    Back pain    Back pain    Spinal cord injury, thoracic (T7-T12)    Back pain    Spinal cord injury of thoracic region without bone injury    Delirium    Revision of posterolateral fusion of lumbar spine    Revision of fusion of thoracic spine    Decompression, spinal cord, thoracic, posterolateral approach    Cervical disc disease    H/O Spinal surgery    H/O breast surgery    S/P cervical discectomy    Previous back surgery    H/O shoulder surgery    H/O total shoulder replacement, right    [email or web address]      PLAN:  Neuro:  - Neuro/vitals q2  - pain control: modified ERAS, IT morphine pump, oxycodone 15/30 prn, valium and home gabapentin 800 TID resumed  - Continue home cymbalta 60mg daily   - HMV x 1, YIFAN x 1, per plastics  - CT thoracic spine 12/6: postop changes  - holistic therapy consult   - protected sleep time 10PM-4AM     Cardio:  - MAP >90 for 72 hours   - midodrine 15q8, parminder gtt   - s/p albumin 250 cc for increasing pressor needs 12/12  - holding home metoprolol while on pressors   - C/w home simvastatin     Pulm:  - Extubated to room air  - hx GAMALIEL, no CPAP at home    GI:  - CCD   - Bowel regimen  - lactulose 12/12, last BM 12/8  - GERD: continue home protonix    Renal:  - PRN straight cath  - failed TOV, SC prn    Endo:  - ISS  - a1c: 7    Heme:  - SCDs DVT ppx, SQL  - 500 cell saver intraop 12/5, 1u PRBC intraop 12/10, 1u PRBC 12/12  - trend H/H, downtrending    ID:  - afebrile  - post op ancef    Dispo:  - full code, ICU status, pending AR    D/w Dr. Luz, Dr. Ojeda HPI:  68 y/o female with PMHx HTN, HLD, CVA x 3 (last was 2016 with right hand weakness residual), GAMALIEL not using CPAP, Emphysema, former 25 pack year smoker restarted this week of surgery, Chronic Constipation on Movantik, Urinary Incontinence chronically self straight cath at home, chronic UTI, Breast Implant Rupture with Chronic Leak repair 10/2023, B/L CTS s/p release, s/p Intrathecal Morphine pump for pain, with chronic neck and back pain worsening for years s/p multiple spinal surgeries including laminectomies and fusion of cervical, thoracic and lumbar spine initially done in 2009 and recently in 2019 and 2020 at [facility] by Dr. Trinidad, s/p T3-L1 revision of prior fusion (with Dr. Luz on 8/17/22). Outpatient Xray 11/16/2023 showing broken rods/displacement. Presents for elective C2-S2 instrumentation and fusion. Pt endorses weakness of b/l LE and burning pain in b/l lower extremities radiating to both feet,  (04 Dec 2023 15:44)    Hospital Course:  12/4: Admitted for spinal fusion d/t damaged hardware.   12/5: Neuro stable. POD0 C2-S2 instrumentation/fusion (intraop b/l LE motor loss). on parminder for MAP>90  12/6: POD1. KAMRAN o/n neuro stable. remains intubated. Extubated 6am. Precedex gtt prn. Remains on parminder gtt for MAP goal >90. Valium made prn. CT thoracic spine bc MRI unavailable. Attempted NGT, unsuccessful (resistance @ 35).   12/7: POD2 Given IV dilaudid 0.5mg for pain. Neuro exam stable. SQL started tongiht. Increased precedex to 1.0. Responds well to valium. Consulting psych given paranoia ?homicidal statements. Passed bedside dysphagia and tolerating PO meds. MRI complete, f/u read.   12/8: POD3 Pt reporting incisional pain, given dilaudid 0.5mg IV x2. Pt appearing anxious, given xanax 1mg. Neuro exam unchanged. Pain regimen increased to oxy 15/30 mg, pending further recs. Given 1 L bolus for tachycardia. Trops negative. 2 HMV drains removed.   12/9: POD4, KAMRAN, CT myelogram today showing block at T12-L1, OR tomorrow  12/10: POD5, KAMRAN, OR today for exploration of fusion, possible decompression T9-L2, revision instrumentation  POD 0 T9-L2 decompression. Pt returned from OR intubated. 50mcg fentanyl IVP x 2 for pain control and HTN. Magnesium repleted. Precedex gtt started for sedation. Decreased FiO2 to 40%. 1L bolus for soft MAP. Dc'd propofol and started levo gtt for MAP >90.   12/11: POD 6, POD 1. Extubated, satting well on RA. Resumed home valium and gabapentin. Castillo d/c'd, pending TOV. Seroquel started for agitation, R IJ removed, LUE double midline placed. Started SQL.   12/12: POD7, POD2, given 500 cc bolus and started on phenylephrine for MAP goal >90. Started midodrine 10mg q8h. Cardura dc'd d/t hypotension. Given 250cc of albumin for increasing pressor requirements. Given lactulose, pending BM. Hgb 7.6<8.3, given 1u PRBC. Protected sleep time. Urology consulted for possible suprapubic cath, recommend self intermittent cath is optimal.   12/13: POD8, POD3 Midodrine increased to 15q8. Pt refused to take the extra 5mg midodrine ordered. Neuro exam stable.    Vital Signs Last 24 Hrs  T(C): 36.8 (13 Dec 2023 00:53), Max: 36.9 (12 Dec 2023 05:19)  T(F): 98.2 (13 Dec 2023 00:53), Max: 98.4 (12 Dec 2023 05:19)  HR: 83 (13 Dec 2023 01:00) (75 - 105)  BP: 156/86 (13 Dec 2023 01:00) (96/52 - 160/78)  BP(mean): 115 (13 Dec 2023 01:00) (69 - 115)  RR: 18 (13 Dec 2023 01:00) (16 - 20)  SpO2: 95% (13 Dec 2023 01:00) (92% - 98%)    Parameters below as of 13 Dec 2023 01:00  Patient On (Oxygen Delivery Method): room air        I&O's Summary    11 Dec 2023 07:01  -  12 Dec 2023 07:00  --------------------------------------------------------  IN: 1620.3 mL / OUT: 1100 mL / NET: 520.3 mL    12 Dec 2023 07:01  -  13 Dec 2023 02:08  --------------------------------------------------------  IN: 1697.1 mL / OUT: 2315 mL / NET: -617.9 mL      PHYSICAL EXAM:  General: patient seen laying supine in bed in NAD  Neuro: AAOx3, FC, speech clear and fluent   CNII-XI grossly intact, face symmetric  Motor: Strength 5/5 b/l UE and b/l LE 0/5, T12 sensory level  HEENT: PERRL, EOMI  Neck: supple  Cardiac: RRR, S1S2  Pulmonary: chest rise symmetric  Abdomen: soft, nontender, nondistended  Ext: perfusing well  Skin: warm, dry  Wound: Posterior incision c/d/i +HMV, YIFAN in place to suction    TUBES/LINES:  [] Castillo  [] Lumbar Drain  [] Wound Drains  [] Others    DIET:  [] NPO  [x] Mechanical  [] Tube feeds    LABS:                        9.4    10.39 )-----------( 225      ( 12 Dec 2023 17:31 )             28.6     12-12    139  |  109<H>  |  13  ----------------------------<  121<H>  3.8   |  23  |  0.51    Ca    8.0<L>      12 Dec 2023 05:23  Phos  3.3     12-12  Mg     1.6     12-12        Urinalysis Basic - ( 12 Dec 2023 05:23 )    Color: x / Appearance: x / SG: x / pH: x  Gluc: 121 mg/dL / Ketone: x  / Bili: x / Urobili: x   Blood: x / Protein: x / Nitrite: x   Leuk Esterase: x / RBC: x / WBC x   Sq Epi: x / Non Sq Epi: x / Bacteria: x          CAPILLARY BLOOD GLUCOSE      POCT Blood Glucose.: 124 mg/dL (12 Dec 2023 17:07)  POCT Blood Glucose.: 176 mg/dL (12 Dec 2023 11:48)  POCT Blood Glucose.: 137 mg/dL (12 Dec 2023 06:40)      Drug Levels: [] N/A    CSF Analysis: [] N/A      Allergies    Crab (Pruritus)  No Known Drug Allergies    Intolerances      MEDICATIONS:  Antibiotics:    Neuro:  diazepam    Tablet 5 milliGRAM(s) Oral every 8 hours  DULoxetine 60 milliGRAM(s) Oral daily  gabapentin 800 milliGRAM(s) Oral every 8 hours  HYDROmorphone  Injectable 0.5 milliGRAM(s) IV Push every 3 hours PRN  ondansetron Injectable 4 milliGRAM(s) IV Push every 6 hours PRN  oxyCODONE    IR 15 milliGRAM(s) Oral every 4 hours PRN  oxyCODONE    IR 30 milliGRAM(s) Oral every 4 hours PRN    Anticoagulation:  enoxaparin Injectable 40 milliGRAM(s) SubCutaneous <User Schedule>    OTHER:  benzocaine 20% Spray 1 Spray(s) Mucosal three times a day PRN  bisacodyl 5 milliGRAM(s) Oral at bedtime  chlorhexidine 2% Cloths 1 Application(s) Topical <User Schedule>  hydrocortisone 1% Cream 1 Application(s) Topical two times a day PRN  influenza  Vaccine (HIGH DOSE) 0.7 milliLiter(s) IntraMuscular once  insulin lispro (ADMELOG) corrective regimen sliding scale   SubCutaneous Before meals and at bedtime  midodrine 15 milliGRAM(s) Oral every 8 hours  Morphine 1 milliGRAM / mL 17.9 mL,Morphine 1 milliGRAM / mL 17.9 mL 17.9 mL IntraThecal Continuous Pump  naloxone Injectable 0.1 milliGRAM(s) IV Push every 3 minutes PRN  pantoprazole    Tablet 40 milliGRAM(s) Oral before breakfast  phenylephrine    Infusion 2.5 MICROgram(s)/kG/Min IV Continuous <Continuous>  polyethylene glycol 3350 17 Gram(s) Oral two times a day  senna 2 Tablet(s) Oral at bedtime  simvastatin 40 milliGRAM(s) Oral at bedtime    IVF:  multivitamin 1 Tablet(s) Oral daily    CULTURES:    RADIOLOGY & ADDITIONAL TESTS:      ASSESSMENT:  67 yo female with Hx HTN, HLD, DM, CVA x 3 (last in 2016 with residual R hand weakness), GAMALIEL not using CPAP, Emphysema, ex-25 pack year smoker now restarted, chronic constipation on Movantik, chronic urinary retention (SC at home), b/l CTS s/p release, s/p Intrathecal Morphine pump for pain, with chronic neck and back pain worsening for years s/p multiple spinal surgeries including laminectomies and fusion of cervical, thoracic and lumbar spine, s/p T3-L1 revision of prior fusion (with  on 8/17/22). Xray 11/16/23 showing broken rods/displacement. Now s/p C2-S2 instrumentation and fusion (12/5). S/p T9-L2 decompression w/ durotomy with repair (12/10/23).       S12.9XXA    Handoff    MEWS Score    HTN (hypertension)    Back pain    Hypertension    SS (spinal stenosis)    High cholesterol    Stroke    H/O carpal tunnel syndrome    H/O carpal tunnel syndrome    Chronic GERD    History of chronic constipation    Seizure    Urinary incontinence    Pre-diabetes    Acute UTI    Anxiety    Anxiety and depression    Arthritis    Eczema    External hemorrhoids    H/O kyphosis    Multiple sclerosis    Pancreas cyst    Scoliosis    Wheelchair dependent    Cervical pseudoarthrosis    Right shoulder pain    Cervical spondylosis    Chronic headaches    Chronic LUQ pain    Chronic UTI    Degenerative joint disease    H/O low back pain    Lumbosacral spondylosis    COPD (chronic obstructive pulmonary disease)    Back pain    Back pain    Spinal cord injury, thoracic (T7-T12)    Back pain    Spinal cord injury of thoracic region without bone injury    Delirium    Revision of posterolateral fusion of lumbar spine    Revision of fusion of thoracic spine    Decompression, spinal cord, thoracic, posterolateral approach    Cervical disc disease    H/O Spinal surgery    H/O breast surgery    S/P cervical discectomy    Previous back surgery    H/O shoulder surgery    H/O total shoulder replacement, right    SysAdmin_VstLnk      PLAN:  Neuro:  - Neuro/vitals q2  - pain control: modified ERAS, IT morphine pump, oxycodone 15/30 prn, valium and home gabapentin 800 TID resumed  - Continue home cymbalta 60mg daily   - HMV x 1, YIFAN x 1, per plastics  - CT thoracic spine 12/6: postop changes  - holistic therapy consult   - protected sleep time 10PM-4AM     Cardio:  - MAP >90 for 72 hours   - midodrine 15q8, parminder gtt   - s/p albumin 250 cc for increasing pressor needs 12/12  - holding home metoprolol while on pressors   - C/w home simvastatin     Pulm:  - Extubated to room air  - hx GAMALIEL, no CPAP at home    GI:  - CCD   - Bowel regimen  - lactulose 12/12, last BM 12/8  - GERD: continue home protonix    Renal:  - PRN straight cath  - failed TOV, SC prn    Endo:  - ISS  - a1c: 7    Heme:  - SCDs DVT ppx, SQL  - 500 cell saver intraop 12/5, 1u PRBC intraop 12/10, 1u PRBC 12/12  - trend H/H, downtrending    ID:  - afebrile  - post op ancef    Dispo:  - full code, ICU status, pending AR    D/w Dr. Luz, Dr. Ojeda

## 2023-12-13 NOTE — PROGRESS NOTE ADULT - ASSESSMENT
ASSESSMENT/PLAN:  POD 8 S/P  C2 to pelvis fusion. Intraoperative lost monitoring (re-gained at MAP>120, though later refractory to MAPs>140)  POD 3 s/p T9- L2 decompression  Durotomy repair    NEURO:  Neurochecks Q2h  MAP goals per NSGY >90  Pain management following  ERAS protocol   Drains: YIFAN and HMV. Monitor outputs  Continue cymbalta, gabapentin, valium  Activity: [] mobilize as tolerated [x] Bedrest [] PT [] OT [] PMNR    CVS:  MAP>90; on neosynephrine. Duration per NSGY (~72hours)  Titrate midodrine. S/P albumin.  Continue statin    PULM: GAMALIEL  Extubated  Aggressive pulm toilet    RENAL: Urine retention  IVL. Monitor Is/Os  Replete lytes  Straight cath prn    GI:  Diet: As tolerated  PPI per home regimen  Bowel regimen: Miralax, senna, dulcolax  LBM: 12/8. Monitor.     ENDO:   FS goal 140-180mg/dL  A1c: 7.0    HEME/ONC: Anemia  SCDs. Chemoppx: lovenox  EBL: 200cc; monitor drain output  Hb 7.6->10    MISC:    SOCIAL/FAMILY:  [x] awaiting [] updated at bedside [] family meeting    CODE STATUS:  [x] Full Code [] DNR [] DNI [] Palliative/Comfort Care    DISPOSITION:  [] ICU [] Stroke Unit [] Floor [] EMU [] RCU [] PCU

## 2023-12-13 NOTE — PROCEDURE NOTE - GENERAL PROCEDURE DETAILS
first hemovac dressing was removed, area was cleansed with chlorhexidine, anchoring suture was cut, drain was removed, tip of catheter was visualized, one suture was placed. YIFAN drain- dressing was removed, area was cleansed with chlorhexidine, drain was removed and tip of catheter was visualized, area around was expressed, guaze and Tegaderm was applied

## 2023-12-13 NOTE — PROGRESS NOTE ADULT - SUBJECTIVE AND OBJECTIVE BOX
========================  NSICU ATTENDING PROGRESS NOTE  ========================    HPI:  66 y/o female with PMH of HTN, HLD, CVA x 3 (last was 2016 with right hand weakness residual), GAMALIEL not using CPAP, Emphysema, former 25 pack year smoker restarted this week of surgery, Chronic Constipation on Movantik, Urinary Incontinence chronically self straight cath at home, chronic UTI, Breast Implant Rupture with Chronic Leak repair 10/2023, B/L CTS s/p release, s/p Intrathecal Morphine pump for pain, with chronic neck and back pain worsening for years s/p multiple spinal surgeries including laminectomies and fusion of cervical, thoracic and lumbar spine initially done in 2009 and recently in 2019 and 2020 at [facility] by Dr. Trinidad, s/p T3-L1 revision of prior fusion (with Dr. Luz on 8/17/22). Outpatient Xray 11/16/2023 showing broken rods/displacement. Presents for elective C2-S2 instrumentation and fusion. Pt endorses weakness of b/l LE and burning pain in b/l lower extremities radiating to both feet,  (04 Dec 2023 15:44)    On admission, the patient was:  GCS: 15  Hunt-Haines:  modified Arango:  ICH score:  NIHSS:      *** HIGH RISK OF DVT PRESENT ON ADMISSION ***    HOSP COURSE:  12/4: Admitted for spinal fusion d/t damaged hardware.   12/5: Neuro stable. POD0 C2-S2 instrumentation/fusion (intraop b/l LE motor loss). On parminder for MAP>90  12/6: POD 1. Overnight neuro stable. Remains intubated. Extubated 6am. Precedex gtt prn. Remains on parminder gtt for MAP goal >90. Valium made prn. CT thoracic spine as MRI unavailable.   12/7: POD 2. Given IV dilaudid 0.5mg for pain. Neuro exam stable. Consulted psych given paranoia ?homicidal statements.    12/9: POD 4. CT myelogram today showing block at T12-L1  12/10: POD 5. OR today for exploration of fusion, possible decompression T9-L2, revision instrumentation  POD 0 T9-L2 decompression. Pt returned from OR intubated. Precedex gtt started for sedation. Decreased FiO2 to 40%. 1L bolus for soft MAP. Dc'd propofol and started levo gtt for MAP >90.   12/11: POD 6, POD 1. Extubated, satting well on RA.   12/12: POD 7 POD2. Given 500 cc bolus and started on phenylephrine for MAP goal >90.    12/13: POD 8; POD 3. Transfused 1 PRBCs.       ICU Vital Signs Last 24 Hrs  T(C): 37.1 (13 Dec 2023 05:35), Max: 37.1 (13 Dec 2023 05:35)  T(F): 98.7 (13 Dec 2023 05:35), Max: 98.7 (13 Dec 2023 05:35)  HR: 96 (13 Dec 2023 06:00) (75 - 105)  BP: 134/85 (13 Dec 2023 06:00) (108/80 - 160/78)  BP(mean): 103 (13 Dec 2023 06:00) (76 - 115)  ABP: 151/87 (13 Dec 2023 06:00) (91/74 - 151/87)  ABP(mean): 111 (13 Dec 2023 06:00) (79 - 111)  RR: 18 (13 Dec 2023 06:00) (17 - 20)  SpO2: 94% (13 Dec 2023 06:00) (92% - 98%)      12-12-23 @ 07:01  -  12-13-23 @ 07:00  --------------------------------------------------------  IN: 1879.8 mL / OUT: 3635 mL / NET: -1755.2 mL      EXAMINATION:  General: Calm  HEENT:  MMM  Neuro: Awake, alert, oriented x 3, following commands, BUE 5/5 (RUE effort 4+ bi/tri), BLE 0/5   Sensation: Level at T11  Cards: S1/S2, RRR  Respiratory: Clear, no wheeze  Abdomen: Soft, non- tender  Extremities: No edema  Skin: Warm/dry      MEDICATIONS:   benzocaine 20% Spray 1 Spray(s) Mucosal three times a day PRN  bisacodyl 5 milliGRAM(s) Oral at bedtime  chlorhexidine 2% Cloths 1 Application(s) Topical <User Schedule>  diazepam    Tablet 5 milliGRAM(s) Oral every 8 hours  DULoxetine 60 milliGRAM(s) Oral daily  enoxaparin Injectable 40 milliGRAM(s) SubCutaneous <User Schedule>  gabapentin 800 milliGRAM(s) Oral every 8 hours  hydrocortisone 1% Cream 1 Application(s) Topical two times a day PRN  HYDROmorphone  Injectable 0.5 milliGRAM(s) IV Push every 3 hours PRN  influenza  Vaccine (HIGH DOSE) 0.7 milliLiter(s) IntraMuscular once  insulin lispro (ADMELOG) corrective regimen sliding scale   SubCutaneous Before meals and at bedtime  midodrine 15 milliGRAM(s) Oral every 8 hours  Morphine 1 milliGRAM / mL 17.9 mL,Morphine 1 milliGRAM / mL 17.9 mL 17.9 mL IntraThecal Continuous Pump  multivitamin 1 Tablet(s) Oral daily  naloxone Injectable 0.1 milliGRAM(s) IV Push every 3 minutes PRN  ondansetron Injectable 4 milliGRAM(s) IV Push every 6 hours PRN  oxyCODONE    IR 30 milliGRAM(s) Oral every 4 hours PRN  oxyCODONE    IR 15 milliGRAM(s) Oral every 4 hours PRN  pantoprazole    Tablet 40 milliGRAM(s) Oral before breakfast  phenylephrine    Infusion 2.5 MICROgram(s)/kG/Min (79.4 mL/Hr) IV Continuous <Continuous>  polyethylene glycol 3350 17 Gram(s) Oral two times a day  senna 2 Tablet(s) Oral at bedtime  simvastatin 40 milliGRAM(s) Oral at bedtime  sodium chloride 0.9% lock flush 10 milliLiter(s) IV Push every 1 hour PRN      LABS:                         10.0   10.50 )-----------( 224      ( 13 Dec 2023 04:52 )             30.5     12-13    138  |  105  |  9   ----------------------------<  118<H>  4.2   |  26  |  0.47<L>    Ca    8.4      13 Dec 2023 04:52  Phos  3.2     12-13  Mg     1.6     12-13                                   ========================  NSICU ATTENDING PROGRESS NOTE  ========================    HPI:  68 y/o female with PMH of HTN, HLD, CVA x 3 (last was 2016 with right hand weakness residual), GAMALIEL not using CPAP, Emphysema, former 25 pack year smoker restarted this week of surgery, Chronic Constipation on Movantik, Urinary Incontinence chronically self straight cath at home, chronic UTI, Breast Implant Rupture with Chronic Leak repair 10/2023, B/L CTS s/p release, s/p Intrathecal Morphine pump for pain, with chronic neck and back pain worsening for years s/p multiple spinal surgeries including laminectomies and fusion of cervical, thoracic and lumbar spine initially done in 2009 and recently in 2019 and 2020 at [facility] by Dr. Trinidad, s/p T3-L1 revision of prior fusion (with Dr. Luz on 8/17/22). Outpatient Xray 11/16/2023 showing broken rods/displacement. Presents for elective C2-S2 instrumentation and fusion. Pt endorses weakness of b/l LE and burning pain in b/l lower extremities radiating to both feet,  (04 Dec 2023 15:44)    On admission, the patient was:  GCS: 15  Hunt-Haines:  modified Arango:  ICH score:  NIHSS:      *** HIGH RISK OF DVT PRESENT ON ADMISSION ***    HOSP COURSE:  12/4: Admitted for spinal fusion d/t damaged hardware.   12/5: Neuro stable. POD0 C2-S2 instrumentation/fusion (intraop b/l LE motor loss). On parminder for MAP>90  12/6: POD 1. Overnight neuro stable. Remains intubated. Extubated 6am. Precedex gtt prn. Remains on parminder gtt for MAP goal >90. Valium made prn. CT thoracic spine as MRI unavailable.   12/7: POD 2. Given IV dilaudid 0.5mg for pain. Neuro exam stable. Consulted psych given paranoia ?homicidal statements.    12/9: POD 4. CT myelogram today showing block at T12-L1  12/10: POD 5. OR today for exploration of fusion, possible decompression T9-L2, revision instrumentation  POD 0 T9-L2 decompression. Pt returned from OR intubated. Precedex gtt started for sedation. Decreased FiO2 to 40%. 1L bolus for soft MAP. Dc'd propofol and started levo gtt for MAP >90.   12/11: POD 6, POD 1. Extubated, satting well on RA.   12/12: POD 7 POD2. Given 500 cc bolus and started on phenylephrine for MAP goal >90.    12/13: POD 8; POD 3. Transfused 1 PRBCs.       ICU Vital Signs Last 24 Hrs  T(C): 37.1 (13 Dec 2023 05:35), Max: 37.1 (13 Dec 2023 05:35)  T(F): 98.7 (13 Dec 2023 05:35), Max: 98.7 (13 Dec 2023 05:35)  HR: 96 (13 Dec 2023 06:00) (75 - 105)  BP: 134/85 (13 Dec 2023 06:00) (108/80 - 160/78)  BP(mean): 103 (13 Dec 2023 06:00) (76 - 115)  ABP: 151/87 (13 Dec 2023 06:00) (91/74 - 151/87)  ABP(mean): 111 (13 Dec 2023 06:00) (79 - 111)  RR: 18 (13 Dec 2023 06:00) (17 - 20)  SpO2: 94% (13 Dec 2023 06:00) (92% - 98%)      12-12-23 @ 07:01  -  12-13-23 @ 07:00  --------------------------------------------------------  IN: 1879.8 mL / OUT: 3635 mL / NET: -1755.2 mL      EXAMINATION:  General: Calm  HEENT:  MMM  Neuro: Awake, alert, oriented x 3, following commands, BUE 5/5 (RUE effort 4+ bi/tri), BLE 0/5   Sensation: Level at T11  Cards: S1/S2, RRR  Respiratory: Clear, no wheeze  Abdomen: Soft, non- tender  Extremities: No edema  Skin: Warm/dry      MEDICATIONS:   benzocaine 20% Spray 1 Spray(s) Mucosal three times a day PRN  bisacodyl 5 milliGRAM(s) Oral at bedtime  chlorhexidine 2% Cloths 1 Application(s) Topical <User Schedule>  diazepam    Tablet 5 milliGRAM(s) Oral every 8 hours  DULoxetine 60 milliGRAM(s) Oral daily  enoxaparin Injectable 40 milliGRAM(s) SubCutaneous <User Schedule>  gabapentin 800 milliGRAM(s) Oral every 8 hours  hydrocortisone 1% Cream 1 Application(s) Topical two times a day PRN  HYDROmorphone  Injectable 0.5 milliGRAM(s) IV Push every 3 hours PRN  influenza  Vaccine (HIGH DOSE) 0.7 milliLiter(s) IntraMuscular once  insulin lispro (ADMELOG) corrective regimen sliding scale   SubCutaneous Before meals and at bedtime  midodrine 15 milliGRAM(s) Oral every 8 hours  Morphine 1 milliGRAM / mL 17.9 mL,Morphine 1 milliGRAM / mL 17.9 mL 17.9 mL IntraThecal Continuous Pump  multivitamin 1 Tablet(s) Oral daily  naloxone Injectable 0.1 milliGRAM(s) IV Push every 3 minutes PRN  ondansetron Injectable 4 milliGRAM(s) IV Push every 6 hours PRN  oxyCODONE    IR 30 milliGRAM(s) Oral every 4 hours PRN  oxyCODONE    IR 15 milliGRAM(s) Oral every 4 hours PRN  pantoprazole    Tablet 40 milliGRAM(s) Oral before breakfast  phenylephrine    Infusion 2.5 MICROgram(s)/kG/Min (79.4 mL/Hr) IV Continuous <Continuous>  polyethylene glycol 3350 17 Gram(s) Oral two times a day  senna 2 Tablet(s) Oral at bedtime  simvastatin 40 milliGRAM(s) Oral at bedtime  sodium chloride 0.9% lock flush 10 milliLiter(s) IV Push every 1 hour PRN      LABS:                         10.0   10.50 )-----------( 224      ( 13 Dec 2023 04:52 )             30.5     12-13    138  |  105  |  9   ----------------------------<  118<H>  4.2   |  26  |  0.47<L>    Ca    8.4      13 Dec 2023 04:52  Phos  3.2     12-13  Mg     1.6     12-13

## 2023-12-13 NOTE — CHART NOTE - NSCHARTNOTEFT_GEN_A_CORE
POD8, POD3 Midodrine increased to 15q8. Pt refused to take the extra 5mg midodrin ordered. Neuro exam stable. hgb 10 from 9.4 after 1u pRBCs, Pt refused AM meds, given in late morning, CTAP ordered for L sided abd pain, restarted home movantik and ordered fleet enema for constipation. YIFAN and GRETA dc'franklyn.

## 2023-12-14 LAB
ALBUMIN SERPL ELPH-MCNC: 2.1 G/DL — LOW (ref 3.3–5)
ALBUMIN SERPL ELPH-MCNC: 2.1 G/DL — LOW (ref 3.3–5)
ALBUMIN SERPL ELPH-MCNC: 3 G/DL — LOW (ref 3.3–5)
ALBUMIN SERPL ELPH-MCNC: 3 G/DL — LOW (ref 3.3–5)
ANION GAP SERPL CALC-SCNC: 2 MMOL/L — LOW (ref 5–17)
ANION GAP SERPL CALC-SCNC: 2 MMOL/L — LOW (ref 5–17)
ANION GAP SERPL CALC-SCNC: 7 MMOL/L — SIGNIFICANT CHANGE UP (ref 5–17)
ANION GAP SERPL CALC-SCNC: 7 MMOL/L — SIGNIFICANT CHANGE UP (ref 5–17)
ANION GAP SERPL CALC-SCNC: 9 MMOL/L — SIGNIFICANT CHANGE UP (ref 5–17)
ANION GAP SERPL CALC-SCNC: 9 MMOL/L — SIGNIFICANT CHANGE UP (ref 5–17)
BUN SERPL-MCNC: 7 MG/DL — SIGNIFICANT CHANGE UP (ref 7–23)
BUN SERPL-MCNC: 7 MG/DL — SIGNIFICANT CHANGE UP (ref 7–23)
BUN SERPL-MCNC: 8 MG/DL — SIGNIFICANT CHANGE UP (ref 7–23)
BUN SERPL-MCNC: 8 MG/DL — SIGNIFICANT CHANGE UP (ref 7–23)
BUN SERPL-MCNC: 9 MG/DL — SIGNIFICANT CHANGE UP (ref 7–23)
BUN SERPL-MCNC: 9 MG/DL — SIGNIFICANT CHANGE UP (ref 7–23)
CALCIUM SERPL-MCNC: 6.3 MG/DL — CRITICAL LOW (ref 8.4–10.5)
CALCIUM SERPL-MCNC: 6.3 MG/DL — CRITICAL LOW (ref 8.4–10.5)
CALCIUM SERPL-MCNC: 8.6 MG/DL — SIGNIFICANT CHANGE UP (ref 8.4–10.5)
CALCIUM SERPL-MCNC: 8.6 MG/DL — SIGNIFICANT CHANGE UP (ref 8.4–10.5)
CALCIUM SERPL-MCNC: 8.9 MG/DL — SIGNIFICANT CHANGE UP (ref 8.4–10.5)
CALCIUM SERPL-MCNC: 8.9 MG/DL — SIGNIFICANT CHANGE UP (ref 8.4–10.5)
CHLORIDE SERPL-SCNC: 103 MMOL/L — SIGNIFICANT CHANGE UP (ref 96–108)
CHLORIDE SERPL-SCNC: 114 MMOL/L — HIGH (ref 96–108)
CHLORIDE SERPL-SCNC: 114 MMOL/L — HIGH (ref 96–108)
CO2 SERPL-SCNC: 20 MMOL/L — LOW (ref 22–31)
CO2 SERPL-SCNC: 20 MMOL/L — LOW (ref 22–31)
CO2 SERPL-SCNC: 27 MMOL/L — SIGNIFICANT CHANGE UP (ref 22–31)
CO2 SERPL-SCNC: 27 MMOL/L — SIGNIFICANT CHANGE UP (ref 22–31)
CO2 SERPL-SCNC: 28 MMOL/L — SIGNIFICANT CHANGE UP (ref 22–31)
CO2 SERPL-SCNC: 28 MMOL/L — SIGNIFICANT CHANGE UP (ref 22–31)
CREAT SERPL-MCNC: 0.34 MG/DL — LOW (ref 0.5–1.3)
CREAT SERPL-MCNC: 0.34 MG/DL — LOW (ref 0.5–1.3)
CREAT SERPL-MCNC: 0.5 MG/DL — SIGNIFICANT CHANGE UP (ref 0.5–1.3)
CREAT SERPL-MCNC: 0.5 MG/DL — SIGNIFICANT CHANGE UP (ref 0.5–1.3)
CREAT SERPL-MCNC: 0.53 MG/DL — SIGNIFICANT CHANGE UP (ref 0.5–1.3)
CREAT SERPL-MCNC: 0.53 MG/DL — SIGNIFICANT CHANGE UP (ref 0.5–1.3)
EGFR: 100 ML/MIN/1.73M2 — SIGNIFICANT CHANGE UP
EGFR: 100 ML/MIN/1.73M2 — SIGNIFICANT CHANGE UP
EGFR: 101 ML/MIN/1.73M2 — SIGNIFICANT CHANGE UP
EGFR: 101 ML/MIN/1.73M2 — SIGNIFICANT CHANGE UP
EGFR: 111 ML/MIN/1.73M2 — SIGNIFICANT CHANGE UP
EGFR: 111 ML/MIN/1.73M2 — SIGNIFICANT CHANGE UP
GAS PNL BLDA: SIGNIFICANT CHANGE UP
GAS PNL BLDA: SIGNIFICANT CHANGE UP
GLUCOSE BLDC GLUCOMTR-MCNC: 128 MG/DL — HIGH (ref 70–99)
GLUCOSE BLDC GLUCOMTR-MCNC: 128 MG/DL — HIGH (ref 70–99)
GLUCOSE BLDC GLUCOMTR-MCNC: 131 MG/DL — HIGH (ref 70–99)
GLUCOSE BLDC GLUCOMTR-MCNC: 131 MG/DL — HIGH (ref 70–99)
GLUCOSE BLDC GLUCOMTR-MCNC: 146 MG/DL — HIGH (ref 70–99)
GLUCOSE BLDC GLUCOMTR-MCNC: 146 MG/DL — HIGH (ref 70–99)
GLUCOSE BLDC GLUCOMTR-MCNC: 202 MG/DL — HIGH (ref 70–99)
GLUCOSE BLDC GLUCOMTR-MCNC: 202 MG/DL — HIGH (ref 70–99)
GLUCOSE SERPL-MCNC: 100 MG/DL — HIGH (ref 70–99)
GLUCOSE SERPL-MCNC: 100 MG/DL — HIGH (ref 70–99)
GLUCOSE SERPL-MCNC: 120 MG/DL — HIGH (ref 70–99)
GLUCOSE SERPL-MCNC: 120 MG/DL — HIGH (ref 70–99)
GLUCOSE SERPL-MCNC: 146 MG/DL — HIGH (ref 70–99)
GLUCOSE SERPL-MCNC: 146 MG/DL — HIGH (ref 70–99)
HCT VFR BLD CALC: 31.9 % — LOW (ref 34.5–45)
HCT VFR BLD CALC: 31.9 % — LOW (ref 34.5–45)
HGB BLD-MCNC: 10.3 G/DL — LOW (ref 11.5–15.5)
HGB BLD-MCNC: 10.3 G/DL — LOW (ref 11.5–15.5)
MAGNESIUM SERPL-MCNC: 1.3 MG/DL — LOW (ref 1.6–2.6)
MAGNESIUM SERPL-MCNC: 1.3 MG/DL — LOW (ref 1.6–2.6)
MAGNESIUM SERPL-MCNC: 2.4 MG/DL — SIGNIFICANT CHANGE UP (ref 1.6–2.6)
MAGNESIUM SERPL-MCNC: 2.4 MG/DL — SIGNIFICANT CHANGE UP (ref 1.6–2.6)
MCHC RBC-ENTMCNC: 27.5 PG — SIGNIFICANT CHANGE UP (ref 27–34)
MCHC RBC-ENTMCNC: 27.5 PG — SIGNIFICANT CHANGE UP (ref 27–34)
MCHC RBC-ENTMCNC: 32.3 GM/DL — SIGNIFICANT CHANGE UP (ref 32–36)
MCHC RBC-ENTMCNC: 32.3 GM/DL — SIGNIFICANT CHANGE UP (ref 32–36)
MCV RBC AUTO: 85.1 FL — SIGNIFICANT CHANGE UP (ref 80–100)
MCV RBC AUTO: 85.1 FL — SIGNIFICANT CHANGE UP (ref 80–100)
NRBC # BLD: 0 /100 WBCS — SIGNIFICANT CHANGE UP (ref 0–0)
NRBC # BLD: 0 /100 WBCS — SIGNIFICANT CHANGE UP (ref 0–0)
OSMOLALITY UR: 316 MOSM/KG — SIGNIFICANT CHANGE UP (ref 300–900)
OSMOLALITY UR: 316 MOSM/KG — SIGNIFICANT CHANGE UP (ref 300–900)
PHOSPHATE SERPL-MCNC: 2.1 MG/DL — LOW (ref 2.5–4.5)
PHOSPHATE SERPL-MCNC: 2.1 MG/DL — LOW (ref 2.5–4.5)
PHOSPHATE SERPL-MCNC: 2.5 MG/DL — SIGNIFICANT CHANGE UP (ref 2.5–4.5)
PHOSPHATE SERPL-MCNC: 2.5 MG/DL — SIGNIFICANT CHANGE UP (ref 2.5–4.5)
PLATELET # BLD AUTO: 238 K/UL — SIGNIFICANT CHANGE UP (ref 150–400)
PLATELET # BLD AUTO: 238 K/UL — SIGNIFICANT CHANGE UP (ref 150–400)
POTASSIUM SERPL-MCNC: 3.1 MMOL/L — LOW (ref 3.5–5.3)
POTASSIUM SERPL-MCNC: 3.1 MMOL/L — LOW (ref 3.5–5.3)
POTASSIUM SERPL-MCNC: 4.6 MMOL/L — SIGNIFICANT CHANGE UP (ref 3.5–5.3)
POTASSIUM SERPL-MCNC: 4.6 MMOL/L — SIGNIFICANT CHANGE UP (ref 3.5–5.3)
POTASSIUM SERPL-MCNC: 4.8 MMOL/L — SIGNIFICANT CHANGE UP (ref 3.5–5.3)
POTASSIUM SERPL-MCNC: 4.8 MMOL/L — SIGNIFICANT CHANGE UP (ref 3.5–5.3)
POTASSIUM SERPL-SCNC: 3.1 MMOL/L — LOW (ref 3.5–5.3)
POTASSIUM SERPL-SCNC: 3.1 MMOL/L — LOW (ref 3.5–5.3)
POTASSIUM SERPL-SCNC: 4.6 MMOL/L — SIGNIFICANT CHANGE UP (ref 3.5–5.3)
POTASSIUM SERPL-SCNC: 4.6 MMOL/L — SIGNIFICANT CHANGE UP (ref 3.5–5.3)
POTASSIUM SERPL-SCNC: 4.8 MMOL/L — SIGNIFICANT CHANGE UP (ref 3.5–5.3)
POTASSIUM SERPL-SCNC: 4.8 MMOL/L — SIGNIFICANT CHANGE UP (ref 3.5–5.3)
RBC # BLD: 3.75 M/UL — LOW (ref 3.8–5.2)
RBC # BLD: 3.75 M/UL — LOW (ref 3.8–5.2)
RBC # FLD: 15.8 % — HIGH (ref 10.3–14.5)
RBC # FLD: 15.8 % — HIGH (ref 10.3–14.5)
SARS-COV-2 RNA SPEC QL NAA+PROBE: SIGNIFICANT CHANGE UP
SARS-COV-2 RNA SPEC QL NAA+PROBE: SIGNIFICANT CHANGE UP
SODIUM SERPL-SCNC: 133 MMOL/L — LOW (ref 135–145)
SODIUM SERPL-SCNC: 133 MMOL/L — LOW (ref 135–145)
SODIUM SERPL-SCNC: 139 MMOL/L — SIGNIFICANT CHANGE UP (ref 135–145)
SODIUM SERPL-SCNC: 139 MMOL/L — SIGNIFICANT CHANGE UP (ref 135–145)
SODIUM SERPL-SCNC: 141 MMOL/L — SIGNIFICANT CHANGE UP (ref 135–145)
SODIUM SERPL-SCNC: 141 MMOL/L — SIGNIFICANT CHANGE UP (ref 135–145)
WBC # BLD: 9.35 K/UL — SIGNIFICANT CHANGE UP (ref 3.8–10.5)
WBC # BLD: 9.35 K/UL — SIGNIFICANT CHANGE UP (ref 3.8–10.5)
WBC # FLD AUTO: 9.35 K/UL — SIGNIFICANT CHANGE UP (ref 3.8–10.5)
WBC # FLD AUTO: 9.35 K/UL — SIGNIFICANT CHANGE UP (ref 3.8–10.5)

## 2023-12-14 PROCEDURE — 99232 SBSQ HOSP IP/OBS MODERATE 35: CPT

## 2023-12-14 PROCEDURE — 99024 POSTOP FOLLOW-UP VISIT: CPT

## 2023-12-14 RX ORDER — SODIUM CHLORIDE 9 MG/ML
1000 INJECTION, SOLUTION INTRAVENOUS ONCE
Refills: 0 | Status: COMPLETED | OUTPATIENT
Start: 2023-12-14 | End: 2023-12-14

## 2023-12-14 RX ORDER — OXYCODONE HYDROCHLORIDE 5 MG/1
30 TABLET ORAL EVERY 4 HOURS
Refills: 0 | Status: DISCONTINUED | OUTPATIENT
Start: 2023-12-14 | End: 2023-12-21

## 2023-12-14 RX ORDER — POTASSIUM CHLORIDE 20 MEQ
40 PACKET (EA) ORAL
Refills: 0 | Status: COMPLETED | OUTPATIENT
Start: 2023-12-14 | End: 2023-12-14

## 2023-12-14 RX ORDER — DIPHENHYDRAMINE HCL 50 MG
25 CAPSULE ORAL ONCE
Refills: 0 | Status: COMPLETED | OUTPATIENT
Start: 2023-12-14 | End: 2023-12-14

## 2023-12-14 RX ORDER — POTASSIUM PHOSPHATE, MONOBASIC POTASSIUM PHOSPHATE, DIBASIC 236; 224 MG/ML; MG/ML
30 INJECTION, SOLUTION INTRAVENOUS ONCE
Refills: 0 | Status: DISCONTINUED | OUTPATIENT
Start: 2023-12-14 | End: 2023-12-14

## 2023-12-14 RX ORDER — MAGNESIUM SULFATE 500 MG/ML
2 VIAL (ML) INJECTION
Refills: 0 | Status: COMPLETED | OUTPATIENT
Start: 2023-12-14 | End: 2023-12-14

## 2023-12-14 RX ORDER — HYDROMORPHONE HYDROCHLORIDE 2 MG/ML
0.5 INJECTION INTRAMUSCULAR; INTRAVENOUS; SUBCUTANEOUS
Refills: 0 | Status: DISCONTINUED | OUTPATIENT
Start: 2023-12-14 | End: 2023-12-18

## 2023-12-14 RX ORDER — OXYCODONE HYDROCHLORIDE 5 MG/1
15 TABLET ORAL EVERY 4 HOURS
Refills: 0 | Status: DISCONTINUED | OUTPATIENT
Start: 2023-12-14 | End: 2023-12-19

## 2023-12-14 RX ADMIN — CHLORHEXIDINE GLUCONATE 1 APPLICATION(S): 213 SOLUTION TOPICAL at 06:19

## 2023-12-14 RX ADMIN — Medication 40 MILLIEQUIVALENT(S): at 11:11

## 2023-12-14 RX ADMIN — OXYCODONE HYDROCHLORIDE 15 MILLIGRAM(S): 5 TABLET ORAL at 10:45

## 2023-12-14 RX ADMIN — OXYCODONE HYDROCHLORIDE 15 MILLIGRAM(S): 5 TABLET ORAL at 06:50

## 2023-12-14 RX ADMIN — SIMVASTATIN 40 MILLIGRAM(S): 20 TABLET, FILM COATED ORAL at 22:19

## 2023-12-14 RX ADMIN — OXYCODONE HYDROCHLORIDE 15 MILLIGRAM(S): 5 TABLET ORAL at 06:18

## 2023-12-14 RX ADMIN — Medication 1 TABLET(S): at 11:11

## 2023-12-14 RX ADMIN — Medication 25 GRAM(S): at 07:21

## 2023-12-14 RX ADMIN — MIDODRINE HYDROCHLORIDE 15 MILLIGRAM(S): 2.5 TABLET ORAL at 16:38

## 2023-12-14 RX ADMIN — OXYCODONE HYDROCHLORIDE 15 MILLIGRAM(S): 5 TABLET ORAL at 10:12

## 2023-12-14 RX ADMIN — SENNA PLUS 2 TABLET(S): 8.6 TABLET ORAL at 22:19

## 2023-12-14 RX ADMIN — ENOXAPARIN SODIUM 40 MILLIGRAM(S): 100 INJECTION SUBCUTANEOUS at 22:19

## 2023-12-14 RX ADMIN — Medication 62.5 MILLIMOLE(S): at 16:40

## 2023-12-14 RX ADMIN — PANTOPRAZOLE SODIUM 40 MILLIGRAM(S): 20 TABLET, DELAYED RELEASE ORAL at 06:18

## 2023-12-14 RX ADMIN — Medication 5 MILLIGRAM(S): at 18:24

## 2023-12-14 RX ADMIN — DULOXETINE HYDROCHLORIDE 60 MILLIGRAM(S): 30 CAPSULE, DELAYED RELEASE ORAL at 11:11

## 2023-12-14 RX ADMIN — GABAPENTIN 800 MILLIGRAM(S): 400 CAPSULE ORAL at 10:13

## 2023-12-14 RX ADMIN — POLYETHYLENE GLYCOL 3350 17 GRAM(S): 17 POWDER, FOR SOLUTION ORAL at 18:25

## 2023-12-14 RX ADMIN — Medication 5 MILLIGRAM(S): at 22:19

## 2023-12-14 RX ADMIN — Medication 40 MILLIEQUIVALENT(S): at 10:12

## 2023-12-14 RX ADMIN — Medication 25 GRAM(S): at 10:50

## 2023-12-14 RX ADMIN — MIDODRINE HYDROCHLORIDE 15 MILLIGRAM(S): 2.5 TABLET ORAL at 22:56

## 2023-12-14 RX ADMIN — SODIUM CHLORIDE 1000 MILLILITER(S): 9 INJECTION, SOLUTION INTRAVENOUS at 10:55

## 2023-12-14 RX ADMIN — Medication 5 MILLIGRAM(S): at 10:13

## 2023-12-14 RX ADMIN — OXYCODONE HYDROCHLORIDE 15 MILLIGRAM(S): 5 TABLET ORAL at 02:30

## 2023-12-14 RX ADMIN — OXYCODONE HYDROCHLORIDE 15 MILLIGRAM(S): 5 TABLET ORAL at 01:56

## 2023-12-14 RX ADMIN — GABAPENTIN 800 MILLIGRAM(S): 400 CAPSULE ORAL at 18:24

## 2023-12-14 RX ADMIN — POLYETHYLENE GLYCOL 3350 17 GRAM(S): 17 POWDER, FOR SOLUTION ORAL at 06:18

## 2023-12-14 RX ADMIN — Medication 40 MILLIEQUIVALENT(S): at 07:21

## 2023-12-14 RX ADMIN — NALOXEGOL OXALATE 25 MILLIGRAM(S): 12.5 TABLET, FILM COATED ORAL at 11:11

## 2023-12-14 RX ADMIN — OXYCODONE HYDROCHLORIDE 15 MILLIGRAM(S): 5 TABLET ORAL at 22:18

## 2023-12-14 RX ADMIN — Medication 5 MILLIGRAM(S): at 01:56

## 2023-12-14 RX ADMIN — OXYCODONE HYDROCHLORIDE 15 MILLIGRAM(S): 5 TABLET ORAL at 23:14

## 2023-12-14 RX ADMIN — Medication 25 MILLIGRAM(S): at 11:11

## 2023-12-14 RX ADMIN — GABAPENTIN 800 MILLIGRAM(S): 400 CAPSULE ORAL at 01:56

## 2023-12-14 RX ADMIN — MIDODRINE HYDROCHLORIDE 15 MILLIGRAM(S): 2.5 TABLET ORAL at 06:18

## 2023-12-14 RX ADMIN — OXYCODONE HYDROCHLORIDE 15 MILLIGRAM(S): 5 TABLET ORAL at 18:33

## 2023-12-14 NOTE — PROGRESS NOTE ADULT - SUBJECTIVE AND OBJECTIVE BOX
S/Overnight events:  KAMRAN overnight, neuro stable.       Hospital Course:   12/4: Admitted for spinal fusion d/t damaged hardware.   12/5: Neuro stable. POD0 C2-S2 instrumentation/fusion (intraop b/l LE motor loss). on parminder for MAP>90  12/6: POD1. KAMRAN o/n neuro stable. remains intubated. Extubated 6am. Precedex gtt prn. Remains on parminder gtt for MAP goal >90. Valium made prn. CT thoracic spine bc MRI unavailable. Attempted NGT, unsuccessful (resistance @ 35).   12/7: POD2 Given IV dilaudid 0.5mg for pain. Neuro exam stable. SQL started tongiht. Increased precedex to 1.0. Responds well to valium. Consulting psych given paranoia ?homicidal statements. Passed bedside dysphagia and tolerating PO meds. MRI complete, f/u read.   12/8: POD3 Pt reporting incisional pain, given dilaudid 0.5mg IV x2. Pt appearing anxious, given xanax 1mg. Neuro exam unchanged. Pain regimen increased to oxy 15/30 mg, pending further recs. Given 1 L bolus for tachycardia. Trops negative. 2 HMV drains removed.   12/9: POD4, KAMRAN, CT myelogram today showing block at T12-L1, OR tomorrow  12/10: POD5, KAMRAN, OR today for exploration of fusion, possible decompression T9-L2, revision instrumentation  POD 0 T9-L2 decompression. Pt returned from OR intubated. 50mcg fentanyl IVP x 2 for pain control and HTN. Magnesium repleted. Precedex gtt started for sedation. Decreased FiO2 to 40%. 1L bolus for soft MAP. Dc'd propofol and started levo gtt for MAP >90.   12/11: POD 6, POD 1. Extubated, satting well on RA. Resumed home valium and gabapentin. Castillo d/c'd, pending TOV. Seroquel started for agitation, R IJ removed, LUE double midline placed. Started SQL.   12/12: POD7, POD2, given 500 cc bolus and started on phenylephrine for MAP goal >90. Started midodrine 10mg q8h. Cardura dc'd d/t hypotension. Given 250cc of albumin for increasing pressor requirements. Given lactulose, pending BM. Hgb 7.6<8.3, given 1u PRBC. Protected sleep time. Urology consulted for possible suprapubic cath, recommend self intermittent cath is optimal.   12/13: POD8, POD3 Midodrine increased to 15q8. Pt refused to take the extra 5mg midodrin ordered. Neuro exam stable. hgb 10 from 9.4 after 1u pRBCs, Pt refused AM meds, given in late morning, CTAP ordered for L sided abd pain, fleet enema, restarted home movantik and ordere fleet enema for constipation. YIFAN and HMV dc'd. 250cc albumin x1  12/14: POD9, POD3, KAMRAN overnight, neuro stable.     Vital Signs Last 24 Hrs  T(C): 36.7 (14 Dec 2023 05:23), Max: 37.1 (13 Dec 2023 05:35)  T(F): 98 (14 Dec 2023 05:23), Max: 98.7 (13 Dec 2023 05:35)  HR: 96 (14 Dec 2023 04:00) (85 - 130)  BP: 126/64 (14 Dec 2023 04:00) (93/60 - 151/80)  BP(mean): 90 (14 Dec 2023 04:00) (70 - 108)  RR: 18 (14 Dec 2023 04:00) (16 - 20)  SpO2: 98% (14 Dec 2023 04:00) (94% - 100%)    Parameters below as of 14 Dec 2023 04:00  Patient On (Oxygen Delivery Method): room air        I&O's Detail    12 Dec 2023 07:01  -  13 Dec 2023 07:00  --------------------------------------------------------  IN:    IV PiggyBack: 375 mL    Oral Fluid: 200 mL    Phenylephrine: 79.4 mL    Phenylephrine: 909.9 mL    PRBCs (Packed Red Blood Cells): 350 mL  Total IN: 1914.3 mL    OUT:    Accordian (mL): 5 mL    Bulb (mL): 30 mL    Intermittent Catheterization - Urethral (mL): 2400 mL    Voided (mL): 1200 mL  Total OUT: 3635 mL    Total NET: -1720.7 mL      13 Dec 2023 07:01  -  14 Dec 2023 05:29  --------------------------------------------------------  IN:    IV PiggyBack: 275 mL    Phenylephrine: 19 mL  Total IN: 294 mL    OUT:    Intermittent Catheterization - Urethral (mL): 3125 mL    Phenylephrine: 0 mL  Total OUT: 3125 mL    Total NET: -2831 mL        I&O's Summary    12 Dec 2023 07:01  -  13 Dec 2023 07:00  --------------------------------------------------------  IN: 1914.3 mL / OUT: 3635 mL / NET: -1720.7 mL    13 Dec 2023 07:01  -  14 Dec 2023 05:29  --------------------------------------------------------  IN: 294 mL / OUT: 3125 mL / NET: -2831 mL        PHYSICAL EXAM:  Constitutional: awake, alert, sitting up in bed. NAD.   Respiratory: non-labored breathing. Normal chest rise.   Cardiovascular: Regular rate and rhythm  Gastrointestinal:  Soft, nontender, nondistended.  .  Vascular: Extremities warm, no ulcers, no discoloration of skin.   Neurological: Gen: AA&O x 3, conversant, appropriate.      CN II-XII grossly intact.    Motor: RUE/LUE 5/5, RLE/LLE 0/5     Sens: Decreased sensation below T11    Extremities: distal pulses 2+ x 4 DPPT symmetric throughout.     Hoffmans negative bilaterally.  Plantar downgoing bilaterally.  No clonus.      No pronator drift, no dysmetria.  Wound/incision:   Incision/Wound:    DEVICE/DRAIN DRESSING:    TUBES/LINES:  [] CVC  [] A-line  [] Lumbar Drain  [] Ventriculostomy  [] Other    DIET:  [] NPO  [x] Mechanical  [] Tube feeds    LABS:                        10.0   10.50 )-----------( 224      ( 13 Dec 2023 04:52 )             30.5     12-13    138  |  105  |  9   ----------------------------<  118<H>  4.2   |  26  |  0.47<L>    Ca    8.4      13 Dec 2023 04:52  Phos  3.2     12-13  Mg     1.6     12-13        Urinalysis Basic - ( 13 Dec 2023 04:52 )    Color: x / Appearance: x / SG: x / pH: x  Gluc: 118 mg/dL / Ketone: x  / Bili: x / Urobili: x   Blood: x / Protein: x / Nitrite: x   Leuk Esterase: x / RBC: x / WBC x   Sq Epi: x / Non Sq Epi: x / Bacteria: x          CAPILLARY BLOOD GLUCOSE      POCT Blood Glucose.: 150 mg/dL (13 Dec 2023 21:38)  POCT Blood Glucose.: 206 mg/dL (13 Dec 2023 17:20)  POCT Blood Glucose.: 145 mg/dL (13 Dec 2023 11:57)      Drug Levels: [] N/A    CSF Analysis: [] N/A      Allergies    Crab (Pruritus)  No Known Drug Allergies    Intolerances      MEDICATIONS:  Antibiotics:    Neuro:  diazepam    Tablet 5 milliGRAM(s) Oral every 8 hours  DULoxetine 60 milliGRAM(s) Oral daily  gabapentin 800 milliGRAM(s) Oral every 8 hours  HYDROmorphone  Injectable 0.5 milliGRAM(s) IV Push every 3 hours PRN  ondansetron Injectable 4 milliGRAM(s) IV Push every 6 hours PRN  oxyCODONE    IR 15 milliGRAM(s) Oral every 4 hours PRN  oxyCODONE    IR 30 milliGRAM(s) Oral every 4 hours PRN    Anticoagulation:  enoxaparin Injectable 40 milliGRAM(s) SubCutaneous <User Schedule>    OTHER:  benzocaine 20% Spray 1 Spray(s) Mucosal three times a day PRN  bisacodyl 5 milliGRAM(s) Oral at bedtime  chlorhexidine 2% Cloths 1 Application(s) Topical <User Schedule>  hydrocortisone 1% Cream 1 Application(s) Topical two times a day PRN  influenza  Vaccine (HIGH DOSE) 0.7 milliLiter(s) IntraMuscular once  insulin lispro (ADMELOG) corrective regimen sliding scale   SubCutaneous Before meals and at bedtime  midodrine 15 milliGRAM(s) Oral every 8 hours  Morphine 1 milliGRAM / mL 17.9 mL,Morphine 1 milliGRAM / mL 17.9 mL 17.9 mL IntraThecal Continuous Pump  naloxegol 25 milliGRAM(s) Oral daily  naloxone Injectable 0.1 milliGRAM(s) IV Push every 3 minutes PRN  pantoprazole    Tablet 40 milliGRAM(s) Oral before breakfast  phenylephrine    Infusion 2.5 MICROgram(s)/kG/Min IV Continuous <Continuous>  polyethylene glycol 3350 17 Gram(s) Oral two times a day  saline laxative (FLEET) Rectal Enema 1 Enema Rectal once  senna 2 Tablet(s) Oral at bedtime  simvastatin 40 milliGRAM(s) Oral at bedtime    IVF:  multivitamin 1 Tablet(s) Oral daily    CULTURES:    RADIOLOGY & ADDITIONAL TESTS:      ASSESSMENT:  67 yo female with Hx HTN, HLD, DM, CVA x 3 (last in 2016 with residual R hand weakness), GAMALIEL not using CPAP, Emphysema, ex-25 pack year smoker now restarted, chronic constipation on Movantik, chronic urinary retention (SC at home), b/l CTS s/p release, s/p Intrathecal Morphine pump for pain, with chronic neck and back pain worsening for years s/p multiple spinal surgeries including laminectomies and fusion of cervical, thoracic and lumbar spine, s/p T3-L1 revision of prior fusion (with  on 8/17/22). Xray 11/16/23 showing broken rods/displacement. Now s/p C2-S2 instrumentation and fusion (12/5). S/p T9-L2 decompression w/ durotomy with repair (12/10/23).       S12.9XXA    Handoff    MEWS Score    HTN (hypertension)    Back pain    Hypertension    SS (spinal stenosis)    High cholesterol    Stroke    H/O carpal tunnel syndrome    H/O carpal tunnel syndrome    Chronic GERD    History of chronic constipation    Seizure    Urinary incontinence    Pre-diabetes    Acute UTI    Anxiety    Anxiety and depression    Arthritis    Eczema    External hemorrhoids    H/O kyphosis    Multiple sclerosis    Pancreas cyst    Scoliosis    Wheelchair dependent    Cervical pseudoarthrosis    Right shoulder pain    Cervical spondylosis    Chronic headaches    Chronic LUQ pain    Chronic UTI    Degenerative joint disease    H/O low back pain    Lumbosacral spondylosis    COPD (chronic obstructive pulmonary disease)    Back pain    Back pain    Spinal cord injury, thoracic (T7-T12)    Back pain    Spinal cord injury of thoracic region without bone injury    Delirium    Revision of posterolateral fusion of lumbar spine    Revision of fusion of thoracic spine    Decompression, spinal cord, thoracic, posterolateral approach    Cervical disc disease    H/O Spinal surgery    H/O breast surgery    S/P cervical discectomy    Previous back surgery    H/O shoulder surgery    H/O total shoulder replacement, right    [email or web address]      Plan:  Neuro:  - Neuro/vitals q2  - pain control: modified ERAS, IT morphine pump, oxycodone 15/30 prn, valium and home gabapentin 800 TID resumed  - Continue home cymbalta 60mg daily   - HMV x 1, YIFAN x 1 removed 12/13   - CT thoracic spine 12/6: postop changes  - holistic therapy consult   - protected sleep time 10PM-4AM     Cardio:  - MAP 80-90 for 72 hours   - midodrine 15q8, parminder gtt   - s/p albumin 250 cc for increasing pressor needs 12/12  - holding home metoprolol while on pressors   - C/w home simvastatin     Pulm:  - Extubated to room air  - hx GAMALIEL, no CPAP at home    GI:  - CCD   - Bowel regimen, home movantik 25mg, enema  - CT A/P = impacted stool   - lactulose 12/12, last BM 12/8  - GERD: continue home protonix    Renal:  - PRN straight cath  - failed TOV, SC prn    Endo:  - ISS  - a1c: 7    Heme:  - SCDs DVT ppx, SQL  - 500 cell saver intraop 12/5, 1u PRBC intraop 12/10, 1u PRBC 12/12  - trend H/H, downtrending    ID:  - afebrile    Dispo:  - full code, ICU status, pending AR    D/w Dr. Luz, Dr. Ojeda    []  GCS  E   V  M     Heart Failure: []Acute, [] acute on chronic , []chronic  Heart Failure:  [] Diastolic (HFpEF), [] Systolic (HFrEF), []Combined (HFpEF and HFrEF), [] RHF, [] Pulm HTN, [] Other    [] DARRYL, [] ATN, [] AIN, [] other  [] CKD1, [] CKD2, [] CKD 3, [] CKD 4, [] CKD 5, []ESRD    Encephalopathy: [] Metabolic, [] Hepatic, [] toxic, [] Neurological, [] Other    Abnormal Nurtitional Status: [] malnurtition (see nutrition note), [ ]underweight: BMI < 19, [] morbid obesity: BMI >40, [] Cachexia    [] Sepsis  [] hypovolemic shock,[] cardiogenic shock, [] hemorrhagic shock, [] neuogenic shock  [] Acute Respiratory Failure  []Cerebral edema, [] Brain compression/ herniation,   [] Functional quadriplegia  [] Acute blood loss anemia   S/Overnight events:  KAMRAN overnight, neuro stable.       Hospital Course:   12/4: Admitted for spinal fusion d/t damaged hardware.   12/5: Neuro stable. POD0 C2-S2 instrumentation/fusion (intraop b/l LE motor loss). on parminder for MAP>90  12/6: POD1. KAMRAN o/n neuro stable. remains intubated. Extubated 6am. Precedex gtt prn. Remains on parminder gtt for MAP goal >90. Valium made prn. CT thoracic spine bc MRI unavailable. Attempted NGT, unsuccessful (resistance @ 35).   12/7: POD2 Given IV dilaudid 0.5mg for pain. Neuro exam stable. SQL started tongiht. Increased precedex to 1.0. Responds well to valium. Consulting psych given paranoia ?homicidal statements. Passed bedside dysphagia and tolerating PO meds. MRI complete, f/u read.   12/8: POD3 Pt reporting incisional pain, given dilaudid 0.5mg IV x2. Pt appearing anxious, given xanax 1mg. Neuro exam unchanged. Pain regimen increased to oxy 15/30 mg, pending further recs. Given 1 L bolus for tachycardia. Trops negative. 2 HMV drains removed.   12/9: POD4, KAMRAN, CT myelogram today showing block at T12-L1, OR tomorrow  12/10: POD5, KAMRAN, OR today for exploration of fusion, possible decompression T9-L2, revision instrumentation  POD 0 T9-L2 decompression. Pt returned from OR intubated. 50mcg fentanyl IVP x 2 for pain control and HTN. Magnesium repleted. Precedex gtt started for sedation. Decreased FiO2 to 40%. 1L bolus for soft MAP. Dc'd propofol and started levo gtt for MAP >90.   12/11: POD 6, POD 1. Extubated, satting well on RA. Resumed home valium and gabapentin. Castillo d/c'd, pending TOV. Seroquel started for agitation, R IJ removed, LUE double midline placed. Started SQL.   12/12: POD7, POD2, given 500 cc bolus and started on phenylephrine for MAP goal >90. Started midodrine 10mg q8h. Cardura dc'd d/t hypotension. Given 250cc of albumin for increasing pressor requirements. Given lactulose, pending BM. Hgb 7.6<8.3, given 1u PRBC. Protected sleep time. Urology consulted for possible suprapubic cath, recommend self intermittent cath is optimal.   12/13: POD8, POD3 Midodrine increased to 15q8. Pt refused to take the extra 5mg midodrin ordered. Neuro exam stable. hgb 10 from 9.4 after 1u pRBCs, Pt refused AM meds, given in late morning, CTAP ordered for L sided abd pain, fleet enema, restarted home movantik and ordere fleet enema for constipation. YIFAN and HMV dc'd. 250cc albumin x1  12/14: POD9, POD3, KAMRAN overnight, neuro stable.     Vital Signs Last 24 Hrs  T(C): 36.7 (14 Dec 2023 05:23), Max: 37.1 (13 Dec 2023 05:35)  T(F): 98 (14 Dec 2023 05:23), Max: 98.7 (13 Dec 2023 05:35)  HR: 96 (14 Dec 2023 04:00) (85 - 130)  BP: 126/64 (14 Dec 2023 04:00) (93/60 - 151/80)  BP(mean): 90 (14 Dec 2023 04:00) (70 - 108)  RR: 18 (14 Dec 2023 04:00) (16 - 20)  SpO2: 98% (14 Dec 2023 04:00) (94% - 100%)    Parameters below as of 14 Dec 2023 04:00  Patient On (Oxygen Delivery Method): room air        I&O's Detail    12 Dec 2023 07:01  -  13 Dec 2023 07:00  --------------------------------------------------------  IN:    IV PiggyBack: 375 mL    Oral Fluid: 200 mL    Phenylephrine: 79.4 mL    Phenylephrine: 909.9 mL    PRBCs (Packed Red Blood Cells): 350 mL  Total IN: 1914.3 mL    OUT:    Accordian (mL): 5 mL    Bulb (mL): 30 mL    Intermittent Catheterization - Urethral (mL): 2400 mL    Voided (mL): 1200 mL  Total OUT: 3635 mL    Total NET: -1720.7 mL      13 Dec 2023 07:01  -  14 Dec 2023 05:29  --------------------------------------------------------  IN:    IV PiggyBack: 275 mL    Phenylephrine: 19 mL  Total IN: 294 mL    OUT:    Intermittent Catheterization - Urethral (mL): 3125 mL    Phenylephrine: 0 mL  Total OUT: 3125 mL    Total NET: -2831 mL        I&O's Summary    12 Dec 2023 07:01  -  13 Dec 2023 07:00  --------------------------------------------------------  IN: 1914.3 mL / OUT: 3635 mL / NET: -1720.7 mL    13 Dec 2023 07:01  -  14 Dec 2023 05:29  --------------------------------------------------------  IN: 294 mL / OUT: 3125 mL / NET: -2831 mL        PHYSICAL EXAM:  Constitutional: awake, alert, sitting up in bed. NAD.   Respiratory: non-labored breathing. Normal chest rise.   Cardiovascular: Regular rate and rhythm  Gastrointestinal:  Soft, nontender, nondistended.  .  Vascular: Extremities warm, no ulcers, no discoloration of skin.   Neurological: Gen: AA&O x 3, conversant, appropriate.      CN II-XII grossly intact.    Motor: RUE/LUE 5/5, RLE/LLE 0/5     Sens: Decreased sensation below T11    Extremities: distal pulses 2+ x 4 DPPT symmetric throughout.     Hoffmans negative bilaterally.  Plantar downgoing bilaterally.  No clonus.      No pronator drift, no dysmetria.  Wound/incision:   Incision/Wound:    DEVICE/DRAIN DRESSING:    TUBES/LINES:  [] CVC  [] A-line  [] Lumbar Drain  [] Ventriculostomy  [] Other    DIET:  [] NPO  [x] Mechanical  [] Tube feeds    LABS:                        10.0   10.50 )-----------( 224      ( 13 Dec 2023 04:52 )             30.5     12-13    138  |  105  |  9   ----------------------------<  118<H>  4.2   |  26  |  0.47<L>    Ca    8.4      13 Dec 2023 04:52  Phos  3.2     12-13  Mg     1.6     12-13        Urinalysis Basic - ( 13 Dec 2023 04:52 )    Color: x / Appearance: x / SG: x / pH: x  Gluc: 118 mg/dL / Ketone: x  / Bili: x / Urobili: x   Blood: x / Protein: x / Nitrite: x   Leuk Esterase: x / RBC: x / WBC x   Sq Epi: x / Non Sq Epi: x / Bacteria: x          CAPILLARY BLOOD GLUCOSE      POCT Blood Glucose.: 150 mg/dL (13 Dec 2023 21:38)  POCT Blood Glucose.: 206 mg/dL (13 Dec 2023 17:20)  POCT Blood Glucose.: 145 mg/dL (13 Dec 2023 11:57)      Drug Levels: [] N/A    CSF Analysis: [] N/A      Allergies    Crab (Pruritus)  No Known Drug Allergies    Intolerances      MEDICATIONS:  Antibiotics:    Neuro:  diazepam    Tablet 5 milliGRAM(s) Oral every 8 hours  DULoxetine 60 milliGRAM(s) Oral daily  gabapentin 800 milliGRAM(s) Oral every 8 hours  HYDROmorphone  Injectable 0.5 milliGRAM(s) IV Push every 3 hours PRN  ondansetron Injectable 4 milliGRAM(s) IV Push every 6 hours PRN  oxyCODONE    IR 15 milliGRAM(s) Oral every 4 hours PRN  oxyCODONE    IR 30 milliGRAM(s) Oral every 4 hours PRN    Anticoagulation:  enoxaparin Injectable 40 milliGRAM(s) SubCutaneous <User Schedule>    OTHER:  benzocaine 20% Spray 1 Spray(s) Mucosal three times a day PRN  bisacodyl 5 milliGRAM(s) Oral at bedtime  chlorhexidine 2% Cloths 1 Application(s) Topical <User Schedule>  hydrocortisone 1% Cream 1 Application(s) Topical two times a day PRN  influenza  Vaccine (HIGH DOSE) 0.7 milliLiter(s) IntraMuscular once  insulin lispro (ADMELOG) corrective regimen sliding scale   SubCutaneous Before meals and at bedtime  midodrine 15 milliGRAM(s) Oral every 8 hours  Morphine 1 milliGRAM / mL 17.9 mL,Morphine 1 milliGRAM / mL 17.9 mL 17.9 mL IntraThecal Continuous Pump  naloxegol 25 milliGRAM(s) Oral daily  naloxone Injectable 0.1 milliGRAM(s) IV Push every 3 minutes PRN  pantoprazole    Tablet 40 milliGRAM(s) Oral before breakfast  phenylephrine    Infusion 2.5 MICROgram(s)/kG/Min IV Continuous <Continuous>  polyethylene glycol 3350 17 Gram(s) Oral two times a day  saline laxative (FLEET) Rectal Enema 1 Enema Rectal once  senna 2 Tablet(s) Oral at bedtime  simvastatin 40 milliGRAM(s) Oral at bedtime    IVF:  multivitamin 1 Tablet(s) Oral daily    CULTURES:    RADIOLOGY & ADDITIONAL TESTS:      ASSESSMENT:  69 yo female with Hx HTN, HLD, DM, CVA x 3 (last in 2016 with residual R hand weakness), GAMALIEL not using CPAP, Emphysema, ex-25 pack year smoker now restarted, chronic constipation on Movantik, chronic urinary retention (SC at home), b/l CTS s/p release, s/p Intrathecal Morphine pump for pain, with chronic neck and back pain worsening for years s/p multiple spinal surgeries including laminectomies and fusion of cervical, thoracic and lumbar spine, s/p T3-L1 revision of prior fusion (with  on 8/17/22). Xray 11/16/23 showing broken rods/displacement. Now s/p C2-S2 instrumentation and fusion (12/5). S/p T9-L2 decompression w/ durotomy with repair (12/10/23).       S12.9XXA    Handoff    MEWS Score    HTN (hypertension)    Back pain    Hypertension    SS (spinal stenosis)    High cholesterol    Stroke    H/O carpal tunnel syndrome    H/O carpal tunnel syndrome    Chronic GERD    History of chronic constipation    Seizure    Urinary incontinence    Pre-diabetes    Acute UTI    Anxiety    Anxiety and depression    Arthritis    Eczema    External hemorrhoids    H/O kyphosis    Multiple sclerosis    Pancreas cyst    Scoliosis    Wheelchair dependent    Cervical pseudoarthrosis    Right shoulder pain    Cervical spondylosis    Chronic headaches    Chronic LUQ pain    Chronic UTI    Degenerative joint disease    H/O low back pain    Lumbosacral spondylosis    COPD (chronic obstructive pulmonary disease)    Back pain    Back pain    Spinal cord injury, thoracic (T7-T12)    Back pain    Spinal cord injury of thoracic region without bone injury    Delirium    Revision of posterolateral fusion of lumbar spine    Revision of fusion of thoracic spine    Decompression, spinal cord, thoracic, posterolateral approach    Cervical disc disease    H/O Spinal surgery    H/O breast surgery    S/P cervical discectomy    Previous back surgery    H/O shoulder surgery    H/O total shoulder replacement, right    SysAdmin_VstLnk      Plan:  Neuro:  - Neuro/vitals q2  - pain control: modified ERAS, IT morphine pump, oxycodone 15/30 prn, valium and home gabapentin 800 TID resumed  - Continue home cymbalta 60mg daily   - HMV x 1, YIFAN x 1 removed 12/13   - CT thoracic spine 12/6: postop changes  - holistic therapy consult   - protected sleep time 10PM-4AM     Cardio:  - MAP 80-90 for 72 hours   - midodrine 15q8, parminder gtt   - s/p albumin 250 cc for increasing pressor needs 12/12  - holding home metoprolol while on pressors   - C/w home simvastatin     Pulm:  - Extubated to room air  - hx GAMALIEL, no CPAP at home    GI:  - CCD   - Bowel regimen, home movantik 25mg, enema  - CT A/P = impacted stool   - lactulose 12/12, last BM 12/8  - GERD: continue home protonix    Renal:  - PRN straight cath  - failed TOV, SC prn    Endo:  - ISS  - a1c: 7    Heme:  - SCDs DVT ppx, SQL  - 500 cell saver intraop 12/5, 1u PRBC intraop 12/10, 1u PRBC 12/12  - trend H/H, downtrending    ID:  - afebrile    Dispo:  - full code, ICU status, pending AR    D/w Dr. Luz, Dr. Ojeda    []  GCS  E   V  M     Heart Failure: []Acute, [] acute on chronic , []chronic  Heart Failure:  [] Diastolic (HFpEF), [] Systolic (HFrEF), []Combined (HFpEF and HFrEF), [] RHF, [] Pulm HTN, [] Other    [] DARRYL, [] ATN, [] AIN, [] other  [] CKD1, [] CKD2, [] CKD 3, [] CKD 4, [] CKD 5, []ESRD    Encephalopathy: [] Metabolic, [] Hepatic, [] toxic, [] Neurological, [] Other    Abnormal Nurtitional Status: [] malnurtition (see nutrition note), [ ]underweight: BMI < 19, [] morbid obesity: BMI >40, [] Cachexia    [] Sepsis  [] hypovolemic shock,[] cardiogenic shock, [] hemorrhagic shock, [] neuogenic shock  [] Acute Respiratory Failure  []Cerebral edema, [] Brain compression/ herniation,   [] Functional quadriplegia  [] Acute blood loss anemia

## 2023-12-14 NOTE — PROGRESS NOTE ADULT - ASSESSMENT
ASSESSMENT/PLAN:  POD 9 S/P  C2 to pelvis fusion. Intraoperative lost monitoring (re-gained at MAP>120, though later refractory to MAPs>140)  POD 4 s/p T9- L2 decompression  Durotomy repair    NEURO:  Neurochecks Q2h  MAP goals per NSGY >80  Pain management following  ERAS protocol   Drains:   Continue cymbalta, gabapentin, valium  Activity: [] mobilize as tolerated [x] Bedrest [] PT [] OT [] PMNR    CVS:  MAP>80; on neosynephrine. Duration per NSGY (~72hours)  Titrate midodrine and pressors. S/P albumin.  Continue statin    PULM: GAMALIEL  Aggressive pulm toilet  Incentive spirometry    RENAL: Urine retention, multiple electrolyte abnormalities  IVL. Monitor Is/Os  Replete lytes  Straight cath prn  CT abd negative for renal calculi  Replete lytes. Repeat BMP    GI: Significant constipation  Bowel regimen: Miralax, senna, dulcolax, movantiq, suppository, enema  LBM: 12/8. Aggressive bowel regimen  CT abdomen shows heavy stool burden  Diet: As tolerated  PPI per home regimen    ENDO:   FS goal 140-180mg/dL  A1c: 7.0    HEME/ONC: Anemia  SCDs. Chemoppx: lovenox  EBL: 200cc; monitor drain output  Hb 7.6->10.3 S/P 1u PRBC    MISC:    SOCIAL/FAMILY:  [x] awaiting [] updated at bedside [] family meeting    CODE STATUS:  [x] Full Code [] DNR [] DNI [] Palliative/Comfort Care    DISPOSITION:  [] ICU [] Stroke Unit [] Floor [] EMU [] RCU [] PCU           ASSESSMENT/PLAN:  POD 9 S/P  C2 to pelvis fusion. Intraoperative lost monitoring (re-gained at MAP>120, though later refractory to MAPs>140)  POD 4 s/p T9- L2 decompression  Durotomy repair    NEURO:  Neurochecks Q4h  Pain management following  ERAS protocol   Continue cymbalta, gabapentin, valium  Activity: [] mobilize as tolerated [] Bedrest Aggressive [x] PT [x] OT [] PMNR    CVS:  MAP> 70.   Wean off pressors  Titrate midodrine prn; currently on 15mg Q8  Continue statin    PULM: GAMALIEL  Aggressive pulm toilet  Incentive spirometry    RENAL: Urine retention, multiple electrolyte abnormalities- possible lab error  IVL. Monitor Is/Os. Straight cath prn  CT abd negative for renal calculi  Replete lytes. Repeat BMP stat    GI: Significant constipation; CT abdomen shows heavy stool burden  Bowel regimen: Miralax, senna, dulcolax, movantiq, suppository, enema  Aggressive bowel regimen. Resolved. LBM 12/13 and 12/14  Diet: As tolerated  PPI per home regimen    ENDO:   FS goal 140-180mg/dL  A1c: 7.0    HEME/ONC: Anemia  SCDs. Chemoppx: lovenox  Hb 7.6->10.3 S/P 1u PRBC. Monitor    MISC:    SOCIAL/FAMILY:  [x] awaiting [] updated at bedside [] family meeting    CODE STATUS:  [x] Full Code [] DNR [] DNI [] Palliative/Comfort Care    DISPOSITION:  [] ICU [] Stroke Unit [] Floor [] EMU [] RCU [] PCU    Not critically ill    ICU stepdown Checklist:    Completed: 12-14 @ 13:15    [X] hemodynamically stable – VS WNL and stable x 24hours, UO adequate  [n/a ] if  previously on HDA - off pressors x 24h with stable neuro exam    [X] no new symptoms x 24h (i.e. new fever, new-onset nausea/vomiting)  [X] stable labs: (i.e. WBC not rising, sodium not dropping)  [X] patient not at high risk for aspiration, if high risk then:                  [ ] should have definitive plans for trach/PEG (alternative option is to discharge from ICU to facilty)                  [ ] stepdown to bed close to nurse’s station  [n/a] low suctioning requirements (i.e. q4h or less)  [X] sign-off from primary RN*  [X] drains do not require ICU level of care  [X] if patient previously agitated or with behavioral issues – controlled   [X] pain controlled

## 2023-12-14 NOTE — PROGRESS NOTE ADULT - SUBJECTIVE AND OBJECTIVE BOX
NEUROSURGERY PAIN MANAGEMENT CONSULT NOTE    Chief Complaint:    HPI:  68 y/o female with PMHx HTN, HLD, CVA x 3 (last was 2016 with right hand weakness residual), GAMALIEL not using CPAP, Emphysema, former 25 pack year smoker restarted this week of surgery, Chronic Constipation on Movantik, Urinary Incontinence chronically self straight cath at home, chronic UTI, Breast Implant Rupture with Chronic Leak repair 10/2023, B/L CTS s/p release, s/p Intrathecal Morphine pump for pain, with chronic neck and back pain worsening for years s/p multiple spinal surgeries including laminectomies and fusion of cervical, thoracic and lumbar spine initially done in 2009 and recently in 2019 and 2020 at [facility] by Dr. Trinidad, s/p T3-L1 revision of prior fusion (with Dr. Luz on 8/17/22). Outpatient Xray 11/16/2023 showing broken rods/displacement. Presents for elective C2-S2 instrumentation and fusion. Pt endorses weakness of b/l LE and burning pain in b/l lower extremities radiating to both feet,  (04 Dec 2023 15:44)    OVERNIGHT EVENTS: Patient seen this morning and resting comfortably when entered the room. She was talking to family member on the phone when I entered. She stated that overall the pain continues to improve and she's also sleeping better than previously. Reviewing her MAR shows that she's been utilizing her oral 30mg oxycodone less frequently and replacing it with 15mg oxycodone. She's also been asking for the breakthrough IV Dilaudid less frequently in the last 24 hours. With this trend and patient's subjective overnight history, her pain appears to be improving and no changes planned at this time.     Hospital Course:   12/4: Admitted for spinal fusion d/t damaged hardware.   12/5: Neuro stable. POD0 C2-S2 instrumentation/fusion (intraop b/l LE motor loss). on parminder for MAP>90  12/6: POD1. KAMRAN o/n neuro stable. remains intubated. Extubated 6am. Precedex gtt prn. Remains on parminder gtt for MAP goal >90. Valium made prn. CT thoracic spine bc MRI unavailable. Attempted NGT, unsuccessful (resistance @ 35).   12/7: POD2 Given IV dilaudid 0.5mg for pain. Neuro exam stable. SQL started tongiht. Increased precedex to 1.0. Responds well to valium. Consulting psych given paranoia ?homicidal statements. Passed bedside dyspphagia and tolerating PO meds. MRI complete, f/u read.   12/8: POD3 Pt reporting incisional pain, given dilaudid 0.5mg IV x2. Pt appearing anxious, given xanax 1mg. Neuro exam unchanged. Pain regimen increased to oxy 15/30 mg, pending further recs. Given 1 L bolus for tachycardia. Trops negative. 2 HMV drains removed.   12/9: POD4, KAMRAN, CT myelogram today showing block at T12-L1, OR tomorrow  12/10: POD5, KAMRAN, OR today for exploration of fusion, possible decompression T9-L2, revision instrumentation  POD 0 T9-L2 decompression. Pt returned from OR intubated. 50mcg fentanyl IVP x 2 for pain control and HTN. Magnesium repleted. Precedex gtt started for sedation. Decreased FiO2 to 40%. 1L bolus for soft MAP. Dc'd propofol and started levo gtt for MAP >90.   12/11: POD 6, POD 1. Extubated, satting well on RA. Resumed home valium and gabapentin. Castillo d/c'd, pending TOV. Seroquel started for agitation, R IJ removed, LUE double midline placed. Started SQL.   12/12: POD7, POD2, given 500 cc bolus and started on phenylephrine for MAP goal >90. Started midodrine 10mg q8h. Cardura dc'd d/t hypotension. Given 250cc of albumin for increasing pressor requirements. Given lactulose, pending BM. Hgb 7.6<8.3, given 1u PRBC. Protected sleep time. Urology consulted for possible suprapubic cath, recommend self intermittent cath is optimal.   12/13: POD8, POD3 Midodrine increased to 15q8. Pt refused to take the extra 5mg midodrin ordered. Neuro exam stable. hgb 10 from 9.4 after 1u pRBCs, Pt refused AM meds, given in late morning, CTAP ordered for L sided abd pain, fleet enema, restarted home movantik and ordere fleet enema for constipation. YIFAN and HMV dc'd. 250cc albumin x1  12/14: POD9, POD3, KAMRAN overnight, neuro stable.       PAST MEDICAL & SURGICAL HISTORY:  HTN (hypertension)      SS (spinal stenosis)      High cholesterol      Stroke  x3      H/O carpal tunnel syndrome  Bilateral      Chronic GERD      History of chronic constipation      Urinary incontinence  self cath as needed      Pre-diabetes      Anxiety and depression      Eczema      H/O kyphosis      Pancreas cyst      Scoliosis      Wheelchair dependent      Cervical pseudoarthrosis  and lumbar spine      Cervical spondylosis      Chronic headaches      Degenerative joint disease  left shoulder      Lumbosacral spondylosis      COPD (chronic obstructive pulmonary disease)      H/O Spinal surgery  lumbar x 30      H/O breast surgery  left breast implant 1980's      S/P cervical discectomy  x4      H/O total shoulder replacement, right          FAMILY HISTORY:      SOCIAL HISTORY:  [ ] Denies Smoking, Alcohol, or Drug Use    HOME MEDICATIONS:   Please refer to initial HNP    PAIN HOME MEDICATIONS:    Allergies    Crab (Pruritus)  Drug Allergies Not Recorded    Intolerances  MEDICATIONS  (STANDING):  bisacodyl 5 milliGRAM(s) Oral at bedtime  chlorhexidine 2% Cloths 1 Application(s) Topical <User Schedule>  diazepam    Tablet 5 milliGRAM(s) Oral every 8 hours  DULoxetine 60 milliGRAM(s) Oral daily  enoxaparin Injectable 40 milliGRAM(s) SubCutaneous <User Schedule>  gabapentin 800 milliGRAM(s) Oral every 8 hours  influenza  Vaccine (HIGH DOSE) 0.7 milliLiter(s) IntraMuscular once  insulin lispro (ADMELOG) corrective regimen sliding scale   SubCutaneous Before meals and at bedtime  midodrine 15 milliGRAM(s) Oral every 8 hours  Morphine 1 milliGRAM / mL 17.9 mL,Morphine 1 milliGRAM / mL 17.9 mL,Morphine 1 milliGRAM / mL 17.9 mL 17.9 mL IntraThecal Continuous Pump  multivitamin 1 Tablet(s) Oral daily  naloxegol 25 milliGRAM(s) Oral daily  pantoprazole    Tablet 40 milliGRAM(s) Oral before breakfast  polyethylene glycol 3350 17 Gram(s) Oral two times a day  saline laxative (FLEET) Rectal Enema 1 Enema Rectal once  senna 2 Tablet(s) Oral at bedtime  simvastatin 40 milliGRAM(s) Oral at bedtime  sodium phosphate 15 milliMole(s)/250 mL IVPB 15 milliMole(s) IV Intermittent once    MEDICATIONS  (PRN):  benzocaine 20% Spray 1 Spray(s) Mucosal three times a day PRN Sore throat  hydrocortisone 1% Cream 1 Application(s) Topical two times a day PRN Itching  HYDROmorphone  Injectable 0.5 milliGRAM(s) IV Push every 3 hours PRN Breakthrough Pain  naloxone Injectable 0.1 milliGRAM(s) IV Push every 3 minutes PRN For ANY of the following changes in patient status:  A. RR LESS THAN 10 breaths per minute, B. Oxygen saturation LESS THAN 90%, C. Sedation score of 6  ondansetron Injectable 4 milliGRAM(s) IV Push every 6 hours PRN Nausea  oxyCODONE    IR 15 milliGRAM(s) Oral every 4 hours PRN Moderate Pain (4 - 6)  oxyCODONE    IR 30 milliGRAM(s) Oral every 4 hours PRN Severe Pain (7 - 10)  sodium chloride 0.9% lock flush 10 milliLiter(s) IV Push every 1 hour PRN Pre/post blood products, medications, blood draw, and to maintain line patency      Vital Signs Last 24 Hrs  T(C): 37 (12-14-23 @ 09:02), Max: 37.1 (12-13-23 @ 14:37)  T(F): 98.6 (12-14-23 @ 09:02), Max: 98.7 (12-13-23 @ 14:37)  HR: 107 (12-14-23 @ 11:00) (85 - 114)  BP: 98/59 (12-14-23 @ 11:00) (98/59 - 151/80)  ABP: 90/69 (12-14-23 @ 10:00) (90/69 - 141/68)  ABP(mean): 78 (12-14-23 @ 10:00) (77 - 100)  RR: 18 (12-14-23 @ 11:00) (16 - 20)  SpO2: 95% (12-14-23 @ 11:00) (94% - 99%)      LABS:                        10.3   9.35  )-----------( 238      ( 14 Dec 2023 05:27 )             31.9   12-14    133<L>  |  103  |  9   ----------------------------<  120<H>  4.6   |  28  |  0.53    Ca    8.6      14 Dec 2023 09:57  Phos  2.5     12-14  Mg     2.4     12-14    TPro  x   /  Alb  3.0<L>  /  TBili  x   /  DBili  x   /  AST  x   /  ALT  x   /  AlkPhos  x   12-14        RADIOLOGY:    Drug Screen:      REVIEW OF SYSTEMS:  CONSTITUTIONAL: No fever   EYES: No visual disturbances  ENMT:  No difficulty hearing.  NECK: pain and stiffness  RESPIRATORY: No shortness of breath  CARDIOVASCULAR: No chest pain  GASTROINTESTINAL:  No nausea, vomiting.  NEUROLOGICAL: weakness in BLE with numbness in BLE  MUSCULOSKELETAL: Incisional back pain (improving)        PHYSICAL EXAM   GENERAL: Laying in bed, NAD, appears fatigued  Neuro: EOMI  Cranial nerves grossly intact  Motor exam: on exam patient was unable to move BLE upon request - no movement was noted in AD, EHL, plantarflexion, KE or KF  Sensation to light touch in BLE not present    CHEST/LUNG: on room air  ABDOMEN:  Nondistended     NEUROSURGERY PAIN MANAGEMENT CONSULT NOTE    Chief Complaint:    HPI:  66 y/o female with PMHx HTN, HLD, CVA x 3 (last was 2016 with right hand weakness residual), GAMALIEL not using CPAP, Emphysema, former 25 pack year smoker restarted this week of surgery, Chronic Constipation on Movantik, Urinary Incontinence chronically self straight cath at home, chronic UTI, Breast Implant Rupture with Chronic Leak repair 10/2023, B/L CTS s/p release, s/p Intrathecal Morphine pump for pain, with chronic neck and back pain worsening for years s/p multiple spinal surgeries including laminectomies and fusion of cervical, thoracic and lumbar spine initially done in 2009 and recently in 2019 and 2020 at [facility] by Dr. Trinidad, s/p T3-L1 revision of prior fusion (with Dr. Luz on 8/17/22). Outpatient Xray 11/16/2023 showing broken rods/displacement. Presents for elective C2-S2 instrumentation and fusion. Pt endorses weakness of b/l LE and burning pain in b/l lower extremities radiating to both feet,  (04 Dec 2023 15:44)    OVERNIGHT EVENTS: Patient seen this morning and resting comfortably when entered the room. She was talking to family member on the phone when I entered. She stated that overall the pain continues to improve and she's also sleeping better than previously. Reviewing her MAR shows that she's been utilizing her oral 30mg oxycodone less frequently and replacing it with 15mg oxycodone. She's also been asking for the breakthrough IV Dilaudid less frequently in the last 24 hours. With this trend and patient's subjective overnight history, her pain appears to be improving and no changes planned at this time.     Hospital Course:   12/4: Admitted for spinal fusion d/t damaged hardware.   12/5: Neuro stable. POD0 C2-S2 instrumentation/fusion (intraop b/l LE motor loss). on parminder for MAP>90  12/6: POD1. KAMRAN o/n neuro stable. remains intubated. Extubated 6am. Precedex gtt prn. Remains on parminder gtt for MAP goal >90. Valium made prn. CT thoracic spine bc MRI unavailable. Attempted NGT, unsuccessful (resistance @ 35).   12/7: POD2 Given IV dilaudid 0.5mg for pain. Neuro exam stable. SQL started tongiht. Increased precedex to 1.0. Responds well to valium. Consulting psych given paranoia ?homicidal statements. Passed bedside dyspphagia and tolerating PO meds. MRI complete, f/u read.   12/8: POD3 Pt reporting incisional pain, given dilaudid 0.5mg IV x2. Pt appearing anxious, given xanax 1mg. Neuro exam unchanged. Pain regimen increased to oxy 15/30 mg, pending further recs. Given 1 L bolus for tachycardia. Trops negative. 2 HMV drains removed.   12/9: POD4, KAMRAN, CT myelogram today showing block at T12-L1, OR tomorrow  12/10: POD5, KAMRAN, OR today for exploration of fusion, possible decompression T9-L2, revision instrumentation  POD 0 T9-L2 decompression. Pt returned from OR intubated. 50mcg fentanyl IVP x 2 for pain control and HTN. Magnesium repleted. Precedex gtt started for sedation. Decreased FiO2 to 40%. 1L bolus for soft MAP. Dc'd propofol and started levo gtt for MAP >90.   12/11: POD 6, POD 1. Extubated, satting well on RA. Resumed home valium and gabapentin. Castillo d/c'd, pending TOV. Seroquel started for agitation, R IJ removed, LUE double midline placed. Started SQL.   12/12: POD7, POD2, given 500 cc bolus and started on phenylephrine for MAP goal >90. Started midodrine 10mg q8h. Cardura dc'd d/t hypotension. Given 250cc of albumin for increasing pressor requirements. Given lactulose, pending BM. Hgb 7.6<8.3, given 1u PRBC. Protected sleep time. Urology consulted for possible suprapubic cath, recommend self intermittent cath is optimal.   12/13: POD8, POD3 Midodrine increased to 15q8. Pt refused to take the extra 5mg midodrin ordered. Neuro exam stable. hgb 10 from 9.4 after 1u pRBCs, Pt refused AM meds, given in late morning, CTAP ordered for L sided abd pain, fleet enema, restarted home movantik and ordere fleet enema for constipation. YIFAN and HMV dc'd. 250cc albumin x1  12/14: POD9, POD3, KAMRAN overnight, neuro stable.       PAST MEDICAL & SURGICAL HISTORY:  HTN (hypertension)      SS (spinal stenosis)      High cholesterol      Stroke  x3      H/O carpal tunnel syndrome  Bilateral      Chronic GERD      History of chronic constipation      Urinary incontinence  self cath as needed      Pre-diabetes      Anxiety and depression      Eczema      H/O kyphosis      Pancreas cyst      Scoliosis      Wheelchair dependent      Cervical pseudoarthrosis  and lumbar spine      Cervical spondylosis      Chronic headaches      Degenerative joint disease  left shoulder      Lumbosacral spondylosis      COPD (chronic obstructive pulmonary disease)      H/O Spinal surgery  lumbar x 30      H/O breast surgery  left breast implant 1980's      S/P cervical discectomy  x4      H/O total shoulder replacement, right          FAMILY HISTORY:      SOCIAL HISTORY:  [ ] Denies Smoking, Alcohol, or Drug Use    HOME MEDICATIONS:   Please refer to initial HNP    PAIN HOME MEDICATIONS:    Allergies    Crab (Pruritus)  Drug Allergies Not Recorded    Intolerances  MEDICATIONS  (STANDING):  bisacodyl 5 milliGRAM(s) Oral at bedtime  chlorhexidine 2% Cloths 1 Application(s) Topical <User Schedule>  diazepam    Tablet 5 milliGRAM(s) Oral every 8 hours  DULoxetine 60 milliGRAM(s) Oral daily  enoxaparin Injectable 40 milliGRAM(s) SubCutaneous <User Schedule>  gabapentin 800 milliGRAM(s) Oral every 8 hours  influenza  Vaccine (HIGH DOSE) 0.7 milliLiter(s) IntraMuscular once  insulin lispro (ADMELOG) corrective regimen sliding scale   SubCutaneous Before meals and at bedtime  midodrine 15 milliGRAM(s) Oral every 8 hours  Morphine 1 milliGRAM / mL 17.9 mL,Morphine 1 milliGRAM / mL 17.9 mL,Morphine 1 milliGRAM / mL 17.9 mL 17.9 mL IntraThecal Continuous Pump  multivitamin 1 Tablet(s) Oral daily  naloxegol 25 milliGRAM(s) Oral daily  pantoprazole    Tablet 40 milliGRAM(s) Oral before breakfast  polyethylene glycol 3350 17 Gram(s) Oral two times a day  saline laxative (FLEET) Rectal Enema 1 Enema Rectal once  senna 2 Tablet(s) Oral at bedtime  simvastatin 40 milliGRAM(s) Oral at bedtime  sodium phosphate 15 milliMole(s)/250 mL IVPB 15 milliMole(s) IV Intermittent once    MEDICATIONS  (PRN):  benzocaine 20% Spray 1 Spray(s) Mucosal three times a day PRN Sore throat  hydrocortisone 1% Cream 1 Application(s) Topical two times a day PRN Itching  HYDROmorphone  Injectable 0.5 milliGRAM(s) IV Push every 3 hours PRN Breakthrough Pain  naloxone Injectable 0.1 milliGRAM(s) IV Push every 3 minutes PRN For ANY of the following changes in patient status:  A. RR LESS THAN 10 breaths per minute, B. Oxygen saturation LESS THAN 90%, C. Sedation score of 6  ondansetron Injectable 4 milliGRAM(s) IV Push every 6 hours PRN Nausea  oxyCODONE    IR 15 milliGRAM(s) Oral every 4 hours PRN Moderate Pain (4 - 6)  oxyCODONE    IR 30 milliGRAM(s) Oral every 4 hours PRN Severe Pain (7 - 10)  sodium chloride 0.9% lock flush 10 milliLiter(s) IV Push every 1 hour PRN Pre/post blood products, medications, blood draw, and to maintain line patency      Vital Signs Last 24 Hrs  T(C): 37 (12-14-23 @ 09:02), Max: 37.1 (12-13-23 @ 14:37)  T(F): 98.6 (12-14-23 @ 09:02), Max: 98.7 (12-13-23 @ 14:37)  HR: 107 (12-14-23 @ 11:00) (85 - 114)  BP: 98/59 (12-14-23 @ 11:00) (98/59 - 151/80)  ABP: 90/69 (12-14-23 @ 10:00) (90/69 - 141/68)  ABP(mean): 78 (12-14-23 @ 10:00) (77 - 100)  RR: 18 (12-14-23 @ 11:00) (16 - 20)  SpO2: 95% (12-14-23 @ 11:00) (94% - 99%)      LABS:                        10.3   9.35  )-----------( 238      ( 14 Dec 2023 05:27 )             31.9   12-14    133<L>  |  103  |  9   ----------------------------<  120<H>  4.6   |  28  |  0.53    Ca    8.6      14 Dec 2023 09:57  Phos  2.5     12-14  Mg     2.4     12-14    TPro  x   /  Alb  3.0<L>  /  TBili  x   /  DBili  x   /  AST  x   /  ALT  x   /  AlkPhos  x   12-14        RADIOLOGY:    Drug Screen:      REVIEW OF SYSTEMS:  CONSTITUTIONAL: No fever   EYES: No visual disturbances  ENMT:  No difficulty hearing.  NECK: pain and stiffness  RESPIRATORY: No shortness of breath  CARDIOVASCULAR: No chest pain  GASTROINTESTINAL:  No nausea, vomiting.  NEUROLOGICAL: weakness in BLE with numbness in BLE  MUSCULOSKELETAL: Incisional back pain (improving)        PHYSICAL EXAM   GENERAL: Laying in bed, NAD, appears fatigued  Neuro: EOMI  Cranial nerves grossly intact  Motor exam: on exam patient was unable to move BLE upon request - no movement was noted in AD, EHL, plantarflexion, KE or KF  Sensation to light touch in BLE not present    CHEST/LUNG: on room air  ABDOMEN:  Nondistended

## 2023-12-14 NOTE — PROGRESS NOTE ADULT - ASSESSMENT
Plan:  Neuro:  - Neuro/vitals q2  - pain control: modified ERAS, IT morphine pump, oxycodone 15/30 prn, valium and home gabapentin 800 TID resumed  - Continue home cymbalta 60mg daily   - HMV x 1, YIFAN x 1 removed 12/13   - CT thoracic spine 12/6: postop changes  - holistic therapy consult   - protected sleep time 10PM-4AM     Cardio:  - MAP 80-90 for 72 hours   - midodrine 15q8, parminder gtt   - s/p albumin 250 cc for increasing pressor needs 12/12  - holding home metoprolol while on pressors   - C/w home simvastatin     Pulm:  - Extubated to room air  - hx GAMALIEL, no CPAP at home    GI:  - CCD   - Bowel regimen, home movantik 25mg, enema  - CT A/P = impacted stool   - lactulose 12/12, last BM 12/8  - GERD: continue home protonix    Renal:  - PRN straight cath  - failed TOV, SC prn    Endo:  - ISS  - a1c: 7    Heme:  - SCDs DVT ppx, SQL  - 500 cell saver intraop 12/5, 1u PRBC intraop 12/10, 1u PRBC 12/12  - trend H/H, downtrending    ID:  - afebrile    Dispo:  - full code, ICU status, pending AR      Pain: 67 year old female patient admitted for elective C2-S2 instrumentation and fusion. Current Pain medications include ITP infusion, Gabapentin 800mg TID,  Oxycodone 15-30 Q4 PRN , Dilaudid 0.5mg IV Q3hrs PRN, Valium 5mg Q8hrs, duloxetine 60mg oral daily. Currently patient appears comfortable at bedside and review of MAR shows decrease use of current pain medications. We will consider stopping IV pain medications tomorrow/ over the weekend if this trend of decrease use continues. At this time no changes. Will continue to follow while patient is admitted as inpatient.     - Home pain regiment: On iSTOP no prescription controlled substances were noted (she continues to state she was taking 30mg oxycodone several times a day prior to admission).      - Bowel regimen: Continue current regiment with no changes at this time.   - Nausea ppx: Zofran standing  - Functional Goals: Pt will get OOB with PT today. Pt will resume previous level of activity without impairment from surgery.   - Additional Consults: None recommended.   - Additional Labs/Imaging:  None recommended.     - Follow up, Discharge Planning: Patient states that at baseline she was independent at home without a caregiver and only used a wheelchair to ambulate outside the house. Discharge pending medical stability and acute management   - Pain Management follow up plan: will continue to follow while patient is inpatient.

## 2023-12-14 NOTE — PROGRESS NOTE ADULT - SUBJECTIVE AND OBJECTIVE BOX
========================  NSICU ATTENDING PROGRESS NOTE  ========================    HPI:  66 y/o female with PMH of HTN, HLD, CVA x 3 (last was 2016 with right hand weakness residual), GAMALIEL not using CPAP, Emphysema, former 25 pack year smoker restarted this week of surgery, Chronic Constipation on Movantik, Urinary Incontinence chronically self straight cath at home, chronic UTI, Breast Implant Rupture with Chronic Leak repair 10/2023, B/L CTS s/p release, s/p Intrathecal Morphine pump for pain, with chronic neck and back pain worsening for years s/p multiple spinal surgeries including laminectomies and fusion of cervical, thoracic and lumbar spine initially done in 2009 and recently in 2019 and 2020 at [facility] by Dr. Trinidad, s/p T3-L1 revision of prior fusion (with Dr. Luz on 8/17/22). Outpatient Xray 11/16/2023 showing broken rods/displacement. Presents for elective C2-S2 instrumentation and fusion. Pt endorses weakness of b/l LE and burning pain in b/l lower extremities radiating to both feet,  (04 Dec 2023 15:44)    On admission, the patient was:  GCS: 15  Hunt-Haines:  modified Arango:  ICH score:  NIHSS:      *** HIGH RISK OF DVT PRESENT ON ADMISSION ***    HOSP COURSE:  12/4: Admitted for spinal fusion d/t damaged hardware.   12/5: Neuro stable. POD0 C2-S2 instrumentation/fusion (intraop b/l LE motor loss). On parminder for MAP>90  12/6: POD 1. Overnight neuro stable. Remains intubated. Extubated 6am. Precedex gtt prn. Remains on parminder gtt for MAP goal >90. Valium made prn. CT thoracic spine as MRI unavailable.   12/7: POD 2. Given IV dilaudid 0.5mg for pain. Neuro exam stable. Consulted psych given paranoia ?homicidal statements.    12/9: POD 4. CT myelogram today showing block at T12-L1  12/10: POD 5. OR today for exploration of fusion, possible decompression T9-L2, revision instrumentation  POD 0 T9-L2 decompression. Pt returned from OR intubated. Precedex gtt started for sedation. Decreased FiO2 to 40%. 1L bolus for soft MAP. Dc'd propofol and started levo gtt for MAP >90.   12/11: POD 6, POD 1. Extubated.  12/12: POD 7 POD2. Given 500 cc bolus and started on phenylephrine for MAP goal >90.    12/13: POD 8; POD 3. Transfused 1 PRBCs.  Weaning pressors. CT abd re abd pain  12/14: POD 9      ICU Vital Signs Last 24 Hrs  T(C): 36.7 (14 Dec 2023 05:23), Max: 37.1 (13 Dec 2023 14:37)  T(F): 98 (14 Dec 2023 05:23), Max: 98.7 (13 Dec 2023 14:37)  HR: 98 (14 Dec 2023 06:00) (85 - 130)  BP: 122/66 (14 Dec 2023 06:00) (93/60 - 151/80)  BP(mean): 89 (14 Dec 2023 06:00) (70 - 108)  ABP: 135/73 (14 Dec 2023 06:00) (78/65 - 141/68)  ABP(mean): 96 (14 Dec 2023 06:00) (72 - 100)  RR: 16 (14 Dec 2023 06:00) (16 - 20)  SpO2: 97% (14 Dec 2023 06:00) (95% - 100%)      12-13-23 @ 07:01  -  12-14-23 @ 07:00  --------------------------------------------------------  IN: 294 mL / OUT: 3125 mL / NET: -2831 mL      EXAMINATION:  General: Calm  HEENT:  MMM  Neuro: Awake, alert, oriented x 3, following commands, BUE 5/5 (RUE effort 4+ bi/tri), BLE 0/5   Sensation: Level at T11  Cards: S1/S2, RRR  Respiratory: Clear, no wheeze  Abdomen: Soft, non- tender  Extremities: No edema  Skin: Warm/dry      MEDICATIONS:   benzocaine 20% Spray 1 Spray(s) Mucosal three times a day PRN  bisacodyl 5 milliGRAM(s) Oral at bedtime  chlorhexidine 2% Cloths 1 Application(s) Topical <User Schedule>  diazepam    Tablet 5 milliGRAM(s) Oral every 8 hours  DULoxetine 60 milliGRAM(s) Oral daily  enoxaparin Injectable 40 milliGRAM(s) SubCutaneous <User Schedule>  gabapentin 800 milliGRAM(s) Oral every 8 hours  hydrocortisone 1% Cream 1 Application(s) Topical two times a day PRN  HYDROmorphone  Injectable 0.5 milliGRAM(s) IV Push every 3 hours PRN  influenza  Vaccine (HIGH DOSE) 0.7 milliLiter(s) IntraMuscular once  insulin lispro (ADMELOG) corrective regimen sliding scale   SubCutaneous Before meals and at bedtime  magnesium sulfate  IVPB 2 Gram(s) IV Intermittent every 2 hours  midodrine 15 milliGRAM(s) Oral every 8 hours  Morphine 1 milliGRAM / mL 17.9 mL,Morphine 1 milliGRAM / mL 17.9 mL 17.9 mL IntraThecal Continuous Pump  multivitamin 1 Tablet(s) Oral daily  naloxegol 25 milliGRAM(s) Oral daily  naloxone Injectable 0.1 milliGRAM(s) IV Push every 3 minutes PRN  ondansetron Injectable 4 milliGRAM(s) IV Push every 6 hours PRN  oxyCODONE    IR 15 milliGRAM(s) Oral every 4 hours PRN  oxyCODONE    IR 30 milliGRAM(s) Oral every 4 hours PRN  pantoprazole    Tablet 40 milliGRAM(s) Oral before breakfast  phenylephrine    Infusion 2.5 MICROgram(s)/kG/Min (79.4 mL/Hr) IV Continuous <Continuous>  polyethylene glycol 3350 17 Gram(s) Oral two times a day  potassium chloride   Powder 40 milliEquivalent(s) Oral every 2 hours  potassium phosphate IVPB 30 milliMole(s) IV Intermittent once  saline laxative (FLEET) Rectal Enema 1 Enema Rectal once  senna 2 Tablet(s) Oral at bedtime  simvastatin 40 milliGRAM(s) Oral at bedtime  sodium chloride 0.9% lock flush 10 milliLiter(s) IV Push every 1 hour PRN    LABS:                      10.3   9.35  )-----------( 238      ( 14 Dec 2023 05:27 )             31.9     12-14    141  |  114<H>  |  7   ----------------------------<  100<H>  3.1<L>   |  20<L>  |  0.34<L>    Ca    6.3<LL>      14 Dec 2023 05:27  Phos  2.1     12-14  Mg     1.3     12-14    TPro  x   /  Alb  2.1<L>  /  TBili  x   /  DBili  x   /  AST  x   /  ALT  x   /  AlkPhos  x   12-14    LIVER FUNCTIONS - ( 14 Dec 2023 05:27 )  Alb: 2.1 g/dL / Pro: x     / ALK PHOS: x     / ALT: x     / AST: x     / GGT: x             CT abdomen  IMPRESSION:  1.  No renal, ureteral, or bladder stones. No hydronephrosis.  2.  Small focus of air in the superior bladder lumen, probably due to   recent catheterization. Correlate for cystitis.  3.   A spinal lead fragment appears in the soft tissues of the midline   low back at the level of L2. Surgical drain at the left mid back and lead   versus catheter in the right mid back. Correlate with surgical history.  4.  Heavy stool burden. Correlate for constipation.                           ========================  NSICU ATTENDING PROGRESS NOTE  ========================    HPI:  68 y/o female with PMH of HTN, HLD, CVA x 3 (last was 2016 with right hand weakness residual), GAMALIEL not using CPAP, Emphysema, former 25 pack year smoker restarted this week of surgery, Chronic Constipation on Movantik, Urinary Incontinence chronically self straight cath at home, chronic UTI, Breast Implant Rupture with Chronic Leak repair 10/2023, B/L CTS s/p release, s/p Intrathecal Morphine pump for pain, with chronic neck and back pain worsening for years s/p multiple spinal surgeries including laminectomies and fusion of cervical, thoracic and lumbar spine initially done in 2009 and recently in 2019 and 2020 at [facility] by Dr. Trinidad, s/p T3-L1 revision of prior fusion (with Dr. Luz on 8/17/22). Outpatient Xray 11/16/2023 showing broken rods/displacement. Presents for elective C2-S2 instrumentation and fusion. Pt endorses weakness of b/l LE and burning pain in b/l lower extremities radiating to both feet,  (04 Dec 2023 15:44)    On admission, the patient was:  GCS: 15  Hunt-Haines:  modified Arango:  ICH score:  NIHSS:      *** HIGH RISK OF DVT PRESENT ON ADMISSION ***    HOSP COURSE:  12/4: Admitted for spinal fusion d/t damaged hardware.   12/5: Neuro stable. POD0 C2-S2 instrumentation/fusion (intraop b/l LE motor loss). On parminder for MAP>90  12/6: POD 1. Overnight neuro stable. Remains intubated. Extubated 6am. Precedex gtt prn. Remains on parminder gtt for MAP goal >90. Valium made prn. CT thoracic spine as MRI unavailable.   12/7: POD 2. Given IV dilaudid 0.5mg for pain. Neuro exam stable. Consulted psych given paranoia ?homicidal statements.    12/9: POD 4. CT myelogram today showing block at T12-L1  12/10: POD 5. OR today for exploration of fusion, possible decompression T9-L2, revision instrumentation  POD 0 T9-L2 decompression. Pt returned from OR intubated. Precedex gtt started for sedation. Decreased FiO2 to 40%. 1L bolus for soft MAP. Dc'd propofol and started levo gtt for MAP >90.   12/11: POD 6, POD 1. Extubated.  12/12: POD 7 POD2. Given 500 cc bolus and started on phenylephrine for MAP goal >90.    12/13: POD 8; POD 3. Transfused 1 PRBCs.  Weaning pressors. CT abd re abd pain  12/14: POD 9      ICU Vital Signs Last 24 Hrs  T(C): 36.7 (14 Dec 2023 05:23), Max: 37.1 (13 Dec 2023 14:37)  T(F): 98 (14 Dec 2023 05:23), Max: 98.7 (13 Dec 2023 14:37)  HR: 98 (14 Dec 2023 06:00) (85 - 130)  BP: 122/66 (14 Dec 2023 06:00) (93/60 - 151/80)  BP(mean): 89 (14 Dec 2023 06:00) (70 - 108)  ABP: 135/73 (14 Dec 2023 06:00) (78/65 - 141/68)  ABP(mean): 96 (14 Dec 2023 06:00) (72 - 100)  RR: 16 (14 Dec 2023 06:00) (16 - 20)  SpO2: 97% (14 Dec 2023 06:00) (95% - 100%)      12-13-23 @ 07:01  -  12-14-23 @ 07:00  --------------------------------------------------------  IN: 294 mL / OUT: 3125 mL / NET: -2831 mL      EXAMINATION:  General: Calm  HEENT:  MMM  Neuro: Awake, alert, oriented x 3, following commands, BUE 5/5 (RUE effort 4+ bi/tri), BLE 0/5   Sensation: Level at T11  Cards: S1/S2, RRR  Respiratory: Clear, no wheeze  Abdomen: Soft, non- tender  Extremities: No edema  Skin: Warm/dry      MEDICATIONS:   benzocaine 20% Spray 1 Spray(s) Mucosal three times a day PRN  bisacodyl 5 milliGRAM(s) Oral at bedtime  chlorhexidine 2% Cloths 1 Application(s) Topical <User Schedule>  diazepam    Tablet 5 milliGRAM(s) Oral every 8 hours  DULoxetine 60 milliGRAM(s) Oral daily  enoxaparin Injectable 40 milliGRAM(s) SubCutaneous <User Schedule>  gabapentin 800 milliGRAM(s) Oral every 8 hours  hydrocortisone 1% Cream 1 Application(s) Topical two times a day PRN  HYDROmorphone  Injectable 0.5 milliGRAM(s) IV Push every 3 hours PRN  influenza  Vaccine (HIGH DOSE) 0.7 milliLiter(s) IntraMuscular once  insulin lispro (ADMELOG) corrective regimen sliding scale   SubCutaneous Before meals and at bedtime  magnesium sulfate  IVPB 2 Gram(s) IV Intermittent every 2 hours  midodrine 15 milliGRAM(s) Oral every 8 hours  Morphine 1 milliGRAM / mL 17.9 mL,Morphine 1 milliGRAM / mL 17.9 mL 17.9 mL IntraThecal Continuous Pump  multivitamin 1 Tablet(s) Oral daily  naloxegol 25 milliGRAM(s) Oral daily  naloxone Injectable 0.1 milliGRAM(s) IV Push every 3 minutes PRN  ondansetron Injectable 4 milliGRAM(s) IV Push every 6 hours PRN  oxyCODONE    IR 15 milliGRAM(s) Oral every 4 hours PRN  oxyCODONE    IR 30 milliGRAM(s) Oral every 4 hours PRN  pantoprazole    Tablet 40 milliGRAM(s) Oral before breakfast  phenylephrine    Infusion 2.5 MICROgram(s)/kG/Min (79.4 mL/Hr) IV Continuous <Continuous>  polyethylene glycol 3350 17 Gram(s) Oral two times a day  potassium chloride   Powder 40 milliEquivalent(s) Oral every 2 hours  potassium phosphate IVPB 30 milliMole(s) IV Intermittent once  saline laxative (FLEET) Rectal Enema 1 Enema Rectal once  senna 2 Tablet(s) Oral at bedtime  simvastatin 40 milliGRAM(s) Oral at bedtime  sodium chloride 0.9% lock flush 10 milliLiter(s) IV Push every 1 hour PRN    LABS:                      10.3   9.35  )-----------( 238      ( 14 Dec 2023 05:27 )             31.9     12-14    141  |  114<H>  |  7   ----------------------------<  100<H>  3.1<L>   |  20<L>  |  0.34<L>    Ca    6.3<LL>      14 Dec 2023 05:27  Phos  2.1     12-14  Mg     1.3     12-14    TPro  x   /  Alb  2.1<L>  /  TBili  x   /  DBili  x   /  AST  x   /  ALT  x   /  AlkPhos  x   12-14    LIVER FUNCTIONS - ( 14 Dec 2023 05:27 )  Alb: 2.1 g/dL / Pro: x     / ALK PHOS: x     / ALT: x     / AST: x     / GGT: x             CT abdomen  IMPRESSION:  1.  No renal, ureteral, or bladder stones. No hydronephrosis.  2.  Small focus of air in the superior bladder lumen, probably due to   recent catheterization. Correlate for cystitis.  3.   A spinal lead fragment appears in the soft tissues of the midline   low back at the level of L2. Surgical drain at the left mid back and lead   versus catheter in the right mid back. Correlate with surgical history.  4.  Heavy stool burden. Correlate for constipation.                           ========================  NSICU ATTENDING PROGRESS NOTE  ========================    HPI:  66 y/o female with PMH of HTN, HLD, CVA x 3 (last was 2016 with right hand weakness residual), GAMALIEL not using CPAP, Emphysema, former 25 pack year smoker restarted this week of surgery, Chronic Constipation on Movantik, Urinary Incontinence chronically self straight cath at home, chronic UTI, Breast Implant Rupture with Chronic Leak repair 10/2023, B/L CTS s/p release, s/p Intrathecal Morphine pump for pain, with chronic neck and back pain worsening for years s/p multiple spinal surgeries including laminectomies and fusion of cervical, thoracic and lumbar spine initially done in 2009 and recently in 2019 and 2020 at [facility] by Dr. Trinidad, s/p T3-L1 revision of prior fusion (with Dr. Luz on 8/17/22). Outpatient Xray 11/16/2023 showing broken rods/displacement. Presents for elective C2-S2 instrumentation and fusion. Pt endorses weakness of b/l LE and burning pain in b/l lower extremities radiating to both feet,  (04 Dec 2023 15:44)    On admission, the patient was:  GCS: 15  Hunt-Haines:  modified Arango:  ICH score:  NIHSS:      *** HIGH RISK OF DVT PRESENT ON ADMISSION ***    HOSP COURSE:  12/4: Admitted for spinal fusion d/t damaged hardware.   12/5: Neuro stable. POD0 C2-S2 instrumentation/fusion (intraop b/l LE motor loss). On parminder for MAP>90  12/6: POD 1. Overnight neuro stable. Remains intubated. Extubated 6am. Precedex gtt prn. Remains on parminder gtt for MAP goal >90. Valium made prn. CT thoracic spine as MRI unavailable.   12/7: POD 2. Given IV dilaudid 0.5mg for pain. Neuro exam stable. Consulted psych given paranoia ?homicidal statements.    12/9: POD 4. CT myelogram today showing block at T12-L1  12/10: POD 5. OR today for exploration of fusion, possible decompression T9-L2, revision instrumentation  POD 0 T9-L2 decompression. Pt returned from OR intubated. Precedex gtt started for sedation. Decreased FiO2 to 40%. 1L bolus for soft MAP. Dc'd propofol and started levo gtt for MAP >90.   12/11: POD 6, POD 1. Extubated.  12/12: POD 7 POD2. Given 500 cc bolus and started on phenylephrine for MAP goal >90.    12/13: POD 8; POD 3. Transfused 1 PRBCs.  Weaning pressors. CT abd re abd pain and concern about kidney stones  12/14: POD 9; POD 4. Weaned MAP goals.       ICU Vital Signs Last 24 Hrs  T(C): 36.7 (14 Dec 2023 05:23), Max: 37.1 (13 Dec 2023 14:37)  T(F): 98 (14 Dec 2023 05:23), Max: 98.7 (13 Dec 2023 14:37)  HR: 98 (14 Dec 2023 06:00) (85 - 130)  BP: 122/66 (14 Dec 2023 06:00) (93/60 - 151/80)  BP(mean): 89 (14 Dec 2023 06:00) (70 - 108)  ABP: 135/73 (14 Dec 2023 06:00) (78/65 - 141/68)  ABP(mean): 96 (14 Dec 2023 06:00) (72 - 100)  RR: 16 (14 Dec 2023 06:00) (16 - 20)  SpO2: 97% (14 Dec 2023 06:00) (95% - 100%)      12-13-23 @ 07:01  -  12-14-23 @ 07:00  --------------------------------------------------------  IN: 294 mL / OUT: 3125 mL / NET: -2831 mL      EXAMINATION:  General: Calm  HEENT:  MMM  Neuro: Awake, alert, oriented x 3, following commands, BUE 5/5 (RUE effort 4+ bi/tri), BLE 0/5   Sensation: Level at T11 though some intermittent sensation at the left medial knee at times  Cards: S1/S2, RRR  Respiratory: Clear, no wheeze  Abdomen: Soft, non- tender  Extremities: No edema  Skin: Warm/dry      MEDICATIONS:   benzocaine 20% Spray 1 Spray(s) Mucosal three times a day PRN  bisacodyl 5 milliGRAM(s) Oral at bedtime  chlorhexidine 2% Cloths 1 Application(s) Topical <User Schedule>  diazepam    Tablet 5 milliGRAM(s) Oral every 8 hours  DULoxetine 60 milliGRAM(s) Oral daily  enoxaparin Injectable 40 milliGRAM(s) SubCutaneous <User Schedule>  gabapentin 800 milliGRAM(s) Oral every 8 hours  hydrocortisone 1% Cream 1 Application(s) Topical two times a day PRN  HYDROmorphone  Injectable 0.5 milliGRAM(s) IV Push every 3 hours PRN  influenza  Vaccine (HIGH DOSE) 0.7 milliLiter(s) IntraMuscular once  insulin lispro (ADMELOG) corrective regimen sliding scale   SubCutaneous Before meals and at bedtime  magnesium sulfate  IVPB 2 Gram(s) IV Intermittent every 2 hours  midodrine 15 milliGRAM(s) Oral every 8 hours  Morphine 1 milliGRAM / mL 17.9 mL,Morphine 1 milliGRAM / mL 17.9 mL 17.9 mL IntraThecal Continuous Pump  multivitamin 1 Tablet(s) Oral daily  naloxegol 25 milliGRAM(s) Oral daily  naloxone Injectable 0.1 milliGRAM(s) IV Push every 3 minutes PRN  ondansetron Injectable 4 milliGRAM(s) IV Push every 6 hours PRN  oxyCODONE    IR 15 milliGRAM(s) Oral every 4 hours PRN  oxyCODONE    IR 30 milliGRAM(s) Oral every 4 hours PRN  pantoprazole    Tablet 40 milliGRAM(s) Oral before breakfast  phenylephrine    Infusion 2.5 MICROgram(s)/kG/Min (79.4 mL/Hr) IV Continuous <Continuous>  polyethylene glycol 3350 17 Gram(s) Oral two times a day  potassium chloride   Powder 40 milliEquivalent(s) Oral every 2 hours  potassium phosphate IVPB 30 milliMole(s) IV Intermittent once  saline laxative (FLEET) Rectal Enema 1 Enema Rectal once  senna 2 Tablet(s) Oral at bedtime  simvastatin 40 milliGRAM(s) Oral at bedtime  sodium chloride 0.9% lock flush 10 milliLiter(s) IV Push every 1 hour PRN    LABS:                      10.3   9.35  )-----------( 238      ( 14 Dec 2023 05:27 )             31.9     12-14    141  |  114<H>  |  7   ----------------------------<  100<H>  3.1<L>   |  20<L>  |  0.34<L>    Ca    6.3<LL>      14 Dec 2023 05:27  Phos  2.1     12-14  Mg     1.3     12-14    TPro  x   /  Alb  2.1<L>  /  TBili  x   /  DBili  x   /  AST  x   /  ALT  x   /  AlkPhos  x   12-14    LIVER FUNCTIONS - ( 14 Dec 2023 05:27 )  Alb: 2.1 g/dL / Pro: x     / ALK PHOS: x     / ALT: x     / AST: x     / GGT: x             CT abdomen  IMPRESSION:  1.  No renal, ureteral, or bladder stones. No hydronephrosis.  2.  Small focus of air in the superior bladder lumen, probably due to   recent catheterization. Correlate for cystitis.  3.   A spinal lead fragment appears in the soft tissues of the midline   low back at the level of L2. Surgical drain at the left mid back and lead   versus catheter in the right mid back. Correlate with surgical history.  4.  Heavy stool burden. Correlate for constipation.                           ========================  NSICU ATTENDING PROGRESS NOTE  ========================    HPI:  68 y/o female with PMH of HTN, HLD, CVA x 3 (last was 2016 with right hand weakness residual), GAMALIEL not using CPAP, Emphysema, former 25 pack year smoker restarted this week of surgery, Chronic Constipation on Movantik, Urinary Incontinence chronically self straight cath at home, chronic UTI, Breast Implant Rupture with Chronic Leak repair 10/2023, B/L CTS s/p release, s/p Intrathecal Morphine pump for pain, with chronic neck and back pain worsening for years s/p multiple spinal surgeries including laminectomies and fusion of cervical, thoracic and lumbar spine initially done in 2009 and recently in 2019 and 2020 at [facility] by Dr. Trinidad, s/p T3-L1 revision of prior fusion (with Dr. Luz on 8/17/22). Outpatient Xray 11/16/2023 showing broken rods/displacement. Presents for elective C2-S2 instrumentation and fusion. Pt endorses weakness of b/l LE and burning pain in b/l lower extremities radiating to both feet,  (04 Dec 2023 15:44)    On admission, the patient was:  GCS: 15  Hunt-Haines:  modified Arango:  ICH score:  NIHSS:      *** HIGH RISK OF DVT PRESENT ON ADMISSION ***    HOSP COURSE:  12/4: Admitted for spinal fusion d/t damaged hardware.   12/5: Neuro stable. POD0 C2-S2 instrumentation/fusion (intraop b/l LE motor loss). On parminder for MAP>90  12/6: POD 1. Overnight neuro stable. Remains intubated. Extubated 6am. Precedex gtt prn. Remains on parminder gtt for MAP goal >90. Valium made prn. CT thoracic spine as MRI unavailable.   12/7: POD 2. Given IV dilaudid 0.5mg for pain. Neuro exam stable. Consulted psych given paranoia ?homicidal statements.    12/9: POD 4. CT myelogram today showing block at T12-L1  12/10: POD 5. OR today for exploration of fusion, possible decompression T9-L2, revision instrumentation  POD 0 T9-L2 decompression. Pt returned from OR intubated. Precedex gtt started for sedation. Decreased FiO2 to 40%. 1L bolus for soft MAP. Dc'd propofol and started levo gtt for MAP >90.   12/11: POD 6, POD 1. Extubated.  12/12: POD 7 POD2. Given 500 cc bolus and started on phenylephrine for MAP goal >90.    12/13: POD 8; POD 3. Transfused 1 PRBCs.  Weaning pressors. CT abd re abd pain and concern about kidney stones  12/14: POD 9; POD 4. Weaned MAP goals.       ICU Vital Signs Last 24 Hrs  T(C): 36.7 (14 Dec 2023 05:23), Max: 37.1 (13 Dec 2023 14:37)  T(F): 98 (14 Dec 2023 05:23), Max: 98.7 (13 Dec 2023 14:37)  HR: 98 (14 Dec 2023 06:00) (85 - 130)  BP: 122/66 (14 Dec 2023 06:00) (93/60 - 151/80)  BP(mean): 89 (14 Dec 2023 06:00) (70 - 108)  ABP: 135/73 (14 Dec 2023 06:00) (78/65 - 141/68)  ABP(mean): 96 (14 Dec 2023 06:00) (72 - 100)  RR: 16 (14 Dec 2023 06:00) (16 - 20)  SpO2: 97% (14 Dec 2023 06:00) (95% - 100%)      12-13-23 @ 07:01  -  12-14-23 @ 07:00  --------------------------------------------------------  IN: 294 mL / OUT: 3125 mL / NET: -2831 mL      EXAMINATION:  General: Calm  HEENT:  MMM  Neuro: Awake, alert, oriented x 3, following commands, BUE 5/5 (RUE effort 4+ bi/tri), BLE 0/5   Sensation: Level at T11 though some intermittent sensation at the left medial knee at times  Cards: S1/S2, RRR  Respiratory: Clear, no wheeze  Abdomen: Soft, non- tender  Extremities: No edema  Skin: Warm/dry      MEDICATIONS:   benzocaine 20% Spray 1 Spray(s) Mucosal three times a day PRN  bisacodyl 5 milliGRAM(s) Oral at bedtime  chlorhexidine 2% Cloths 1 Application(s) Topical <User Schedule>  diazepam    Tablet 5 milliGRAM(s) Oral every 8 hours  DULoxetine 60 milliGRAM(s) Oral daily  enoxaparin Injectable 40 milliGRAM(s) SubCutaneous <User Schedule>  gabapentin 800 milliGRAM(s) Oral every 8 hours  hydrocortisone 1% Cream 1 Application(s) Topical two times a day PRN  HYDROmorphone  Injectable 0.5 milliGRAM(s) IV Push every 3 hours PRN  influenza  Vaccine (HIGH DOSE) 0.7 milliLiter(s) IntraMuscular once  insulin lispro (ADMELOG) corrective regimen sliding scale   SubCutaneous Before meals and at bedtime  magnesium sulfate  IVPB 2 Gram(s) IV Intermittent every 2 hours  midodrine 15 milliGRAM(s) Oral every 8 hours  Morphine 1 milliGRAM / mL 17.9 mL,Morphine 1 milliGRAM / mL 17.9 mL 17.9 mL IntraThecal Continuous Pump  multivitamin 1 Tablet(s) Oral daily  naloxegol 25 milliGRAM(s) Oral daily  naloxone Injectable 0.1 milliGRAM(s) IV Push every 3 minutes PRN  ondansetron Injectable 4 milliGRAM(s) IV Push every 6 hours PRN  oxyCODONE    IR 15 milliGRAM(s) Oral every 4 hours PRN  oxyCODONE    IR 30 milliGRAM(s) Oral every 4 hours PRN  pantoprazole    Tablet 40 milliGRAM(s) Oral before breakfast  phenylephrine    Infusion 2.5 MICROgram(s)/kG/Min (79.4 mL/Hr) IV Continuous <Continuous>  polyethylene glycol 3350 17 Gram(s) Oral two times a day  potassium chloride   Powder 40 milliEquivalent(s) Oral every 2 hours  potassium phosphate IVPB 30 milliMole(s) IV Intermittent once  saline laxative (FLEET) Rectal Enema 1 Enema Rectal once  senna 2 Tablet(s) Oral at bedtime  simvastatin 40 milliGRAM(s) Oral at bedtime  sodium chloride 0.9% lock flush 10 milliLiter(s) IV Push every 1 hour PRN    LABS:                      10.3   9.35  )-----------( 238      ( 14 Dec 2023 05:27 )             31.9     12-14    141  |  114<H>  |  7   ----------------------------<  100<H>  3.1<L>   |  20<L>  |  0.34<L>    Ca    6.3<LL>      14 Dec 2023 05:27  Phos  2.1     12-14  Mg     1.3     12-14    TPro  x   /  Alb  2.1<L>  /  TBili  x   /  DBili  x   /  AST  x   /  ALT  x   /  AlkPhos  x   12-14    LIVER FUNCTIONS - ( 14 Dec 2023 05:27 )  Alb: 2.1 g/dL / Pro: x     / ALK PHOS: x     / ALT: x     / AST: x     / GGT: x             CT abdomen  IMPRESSION:  1.  No renal, ureteral, or bladder stones. No hydronephrosis.  2.  Small focus of air in the superior bladder lumen, probably due to   recent catheterization. Correlate for cystitis.  3.   A spinal lead fragment appears in the soft tissues of the midline   low back at the level of L2. Surgical drain at the left mid back and lead   versus catheter in the right mid back. Correlate with surgical history.  4.  Heavy stool burden. Correlate for constipation.

## 2023-12-15 ENCOUNTER — APPOINTMENT (OUTPATIENT)
Dept: ENDOCRINOLOGY | Facility: CLINIC | Age: 68
End: 2023-12-15

## 2023-12-15 LAB
GLUCOSE BLDC GLUCOMTR-MCNC: 129 MG/DL — HIGH (ref 70–99)
GLUCOSE BLDC GLUCOMTR-MCNC: 129 MG/DL — HIGH (ref 70–99)
GLUCOSE BLDC GLUCOMTR-MCNC: 149 MG/DL — HIGH (ref 70–99)
GLUCOSE BLDC GLUCOMTR-MCNC: 149 MG/DL — HIGH (ref 70–99)
GLUCOSE BLDC GLUCOMTR-MCNC: 155 MG/DL — HIGH (ref 70–99)
GLUCOSE BLDC GLUCOMTR-MCNC: 155 MG/DL — HIGH (ref 70–99)
GLUCOSE BLDC GLUCOMTR-MCNC: 85 MG/DL — SIGNIFICANT CHANGE UP (ref 70–99)
GLUCOSE BLDC GLUCOMTR-MCNC: 85 MG/DL — SIGNIFICANT CHANGE UP (ref 70–99)

## 2023-12-15 PROCEDURE — 99024 POSTOP FOLLOW-UP VISIT: CPT

## 2023-12-15 PROCEDURE — 99233 SBSQ HOSP IP/OBS HIGH 50: CPT

## 2023-12-15 RX ADMIN — OXYCODONE HYDROCHLORIDE 30 MILLIGRAM(S): 5 TABLET ORAL at 11:50

## 2023-12-15 RX ADMIN — Medication 2: at 22:40

## 2023-12-15 RX ADMIN — GABAPENTIN 800 MILLIGRAM(S): 400 CAPSULE ORAL at 09:31

## 2023-12-15 RX ADMIN — Medication 1 TABLET(S): at 11:51

## 2023-12-15 RX ADMIN — MIDODRINE HYDROCHLORIDE 15 MILLIGRAM(S): 2.5 TABLET ORAL at 14:50

## 2023-12-15 RX ADMIN — SIMVASTATIN 40 MILLIGRAM(S): 20 TABLET, FILM COATED ORAL at 22:40

## 2023-12-15 RX ADMIN — MIDODRINE HYDROCHLORIDE 15 MILLIGRAM(S): 2.5 TABLET ORAL at 22:40

## 2023-12-15 RX ADMIN — Medication 5 MILLIGRAM(S): at 17:41

## 2023-12-15 RX ADMIN — GABAPENTIN 800 MILLIGRAM(S): 400 CAPSULE ORAL at 01:09

## 2023-12-15 RX ADMIN — OXYCODONE HYDROCHLORIDE 30 MILLIGRAM(S): 5 TABLET ORAL at 12:50

## 2023-12-15 RX ADMIN — NALOXEGOL OXALATE 25 MILLIGRAM(S): 12.5 TABLET, FILM COATED ORAL at 11:51

## 2023-12-15 RX ADMIN — GABAPENTIN 800 MILLIGRAM(S): 400 CAPSULE ORAL at 17:41

## 2023-12-15 RX ADMIN — POLYETHYLENE GLYCOL 3350 17 GRAM(S): 17 POWDER, FOR SOLUTION ORAL at 17:41

## 2023-12-15 RX ADMIN — Medication 5 MILLIGRAM(S): at 09:31

## 2023-12-15 RX ADMIN — HYDROMORPHONE HYDROCHLORIDE 0.5 MILLIGRAM(S): 2 INJECTION INTRAMUSCULAR; INTRAVENOUS; SUBCUTANEOUS at 17:45

## 2023-12-15 RX ADMIN — Medication 5 MILLIGRAM(S): at 01:09

## 2023-12-15 RX ADMIN — CHLORHEXIDINE GLUCONATE 1 APPLICATION(S): 213 SOLUTION TOPICAL at 07:17

## 2023-12-15 RX ADMIN — ENOXAPARIN SODIUM 40 MILLIGRAM(S): 100 INJECTION SUBCUTANEOUS at 22:39

## 2023-12-15 RX ADMIN — PANTOPRAZOLE SODIUM 40 MILLIGRAM(S): 20 TABLET, DELAYED RELEASE ORAL at 06:38

## 2023-12-15 RX ADMIN — MIDODRINE HYDROCHLORIDE 15 MILLIGRAM(S): 2.5 TABLET ORAL at 06:38

## 2023-12-15 RX ADMIN — DULOXETINE HYDROCHLORIDE 60 MILLIGRAM(S): 30 CAPSULE, DELAYED RELEASE ORAL at 11:50

## 2023-12-15 RX ADMIN — HYDROMORPHONE HYDROCHLORIDE 0.5 MILLIGRAM(S): 2 INJECTION INTRAMUSCULAR; INTRAVENOUS; SUBCUTANEOUS at 17:41

## 2023-12-15 NOTE — PROGRESS NOTE ADULT - SUBJECTIVE AND OBJECTIVE BOX
O/N Events: KAMRAN  Subjective/ROS: Agitated regarding her room. States "Either you move my room, put me in the [place], or discharge me today." Denies HA, CP, SOB, n/v, changes in bowel/urinary habits.  12pt ROS otherwise negative.    VITALS  Vital Signs Last 24 Hrs  T(C): 37.1 (15 Dec 2023 05:12), Max: 37.1 (15 Dec 2023 05:12)  T(F): 98.8 (15 Dec 2023 05:12), Max: 98.8 (15 Dec 2023 05:12)  HR: 128 (15 Dec 2023 09:47) (89 - 128)  BP: 113/79 (15 Dec 2023 09:47) (100/57 - 128/87)  BP(mean): 71 (14 Dec 2023 12:00) (71 - 71)  RR: 17 (15 Dec 2023 09:47) (16 - 18)  SpO2: 95% (15 Dec 2023 05:12) (95% - 98%)    Parameters below as of 15 Dec 2023 05:12  Patient On (Oxygen Delivery Method): room air        I&O's Summary    14 Dec 2023 07:01  -  15 Dec 2023 07:00  --------------------------------------------------------  IN: 25 mL / OUT: 2175 mL / NET: -2150 mL    15 Dec 2023 07:01  -  15 Dec 2023 11:45  --------------------------------------------------------  IN: 0 mL / OUT: 775 mL / NET: -775 mL        CAPILLARY BLOOD GLUCOSE      POCT Blood Glucose.: 85 mg/dL (15 Dec 2023 06:39)  POCT Blood Glucose.: 131 mg/dL (14 Dec 2023 22:20)  POCT Blood Glucose.: 202 mg/dL (14 Dec 2023 17:27)      PHYSICAL EXAM  General: A&Ox3; NAD  Head: NC/AT; PERRL; EOMI; anicteric sclera  Neck: Supple; no JVD  Respiratory: CTA B/L; no wheezes/crackles/rales auscultated w/ good air movement  Cardiovascular: Regular rhythm/rate; S1/S2; no gallops or murmurs auscultated  Gastrointestinal: Soft; NTND w/out rebound tenderness or guarding; bowel sounds normal  Extremities: WWP; no edema or cyanosis; radial/pedal pulses palpable  Neurological:  CNII-XII grossly intact; paralysis in b/l lower extremities   Skin: No rashes noted  Vasc: +2 DP/PT pulses b/l   Psych: Appropriate Affect    MEDICATIONS  (STANDING):  bisacodyl 5 milliGRAM(s) Oral at bedtime  chlorhexidine 2% Cloths 1 Application(s) Topical <User Schedule>  diazepam    Tablet 5 milliGRAM(s) Oral every 8 hours  DULoxetine 60 milliGRAM(s) Oral daily  enoxaparin Injectable 40 milliGRAM(s) SubCutaneous <User Schedule>  gabapentin 800 milliGRAM(s) Oral every 8 hours  influenza  Vaccine (HIGH DOSE) 0.7 milliLiter(s) IntraMuscular once  insulin lispro (ADMELOG) corrective regimen sliding scale   SubCutaneous Before meals and at bedtime  midodrine 15 milliGRAM(s) Oral every 8 hours  Morphine 1 milliGRAM / mL 17.9 mL,Morphine 1 milliGRAM / mL 17.9 mL,Morphine 1 milliGRAM / mL 17.9 mL 17.9 mL IntraThecal Continuous Pump  multivitamin 1 Tablet(s) Oral daily  naloxegol 25 milliGRAM(s) Oral daily  pantoprazole    Tablet 40 milliGRAM(s) Oral before breakfast  polyethylene glycol 3350 17 Gram(s) Oral two times a day  saline laxative (FLEET) Rectal Enema 1 Enema Rectal once  senna 2 Tablet(s) Oral at bedtime  simvastatin 40 milliGRAM(s) Oral at bedtime    MEDICATIONS  (PRN):  benzocaine 20% Spray 1 Spray(s) Mucosal three times a day PRN Sore throat  hydrocortisone 1% Cream 1 Application(s) Topical two times a day PRN Itching  HYDROmorphone  Injectable 0.5 milliGRAM(s) IV Push every 3 hours PRN Breakthrough Pain  naloxone Injectable 0.1 milliGRAM(s) IV Push every 3 minutes PRN For ANY of the following changes in patient status:  A. RR LESS THAN 10 breaths per minute, B. Oxygen saturation LESS THAN 90%, C. Sedation score of 6  ondansetron Injectable 4 milliGRAM(s) IV Push every 6 hours PRN Nausea  oxyCODONE    IR 15 milliGRAM(s) Oral every 4 hours PRN Moderate Pain (4 - 6)  oxyCODONE    IR 30 milliGRAM(s) Oral every 4 hours PRN Severe Pain (7 - 10)  sodium chloride 0.9% lock flush 10 milliLiter(s) IV Push every 1 hour PRN Pre/post blood products, medications, blood draw, and to maintain line patency      Crab (Pruritus)  No Known Drug Allergies      LABS                        10.3   9.35  )-----------( 238      ( 14 Dec 2023 05:27 )             31.9     12-14    139  |  103  |  8   ----------------------------<  146<H>  4.8   |  27  |  0.50    Ca    8.9      14 Dec 2023 19:16  Phos  2.5     12-14  Mg     2.4     12-14    TPro  x   /  Alb  3.0<L>  /  TBili  x   /  DBili  x   /  AST  x   /  ALT  x   /  AlkPhos  x   12-14      Urinalysis Basic - ( 14 Dec 2023 19:16 )    Color: x / Appearance: x / SG: x / pH: x  Gluc: 146 mg/dL / Ketone: x  / Bili: x / Urobili: x   Blood: x / Protein: x / Nitrite: x   Leuk Esterase: x / RBC: x / WBC x   Sq Epi: x / Non Sq Epi: x / Bacteria: x            IMAGING/EKG/ETC: reviewed

## 2023-12-15 NOTE — PROGRESS NOTE ADULT - ASSESSMENT
67 yo female with Hx HTN, HLD, DM, CVA x 3 (last in 2016 with residual R hand weakness), GAMALIEL not using CPAP, Emphysema, ex-25 pack year smoker now restarted, chronic constipation on Movantik, chronic urinary retention (SC at home), b/l CTS s/p release, s/p Intrathecal Morphine pump for pain, with chronic neck and back pain worsening for years s/p multiple spinal surgeries including laminectomies and fusion of cervical, thoracic and lumbar spine, s/p T3-L1 revision of prior fusion (with  on 8/17/22). Xray 11/16/23 showing broken rods/displacement. Now s/p C2-S2 instrumentation and fusion (12/5). S/p T9-L2 decompression w/ durotomy with repair (12/10/23). KAREEM grade A.     #Neck Pain  #Multiple Spine Surgeries  #Chronic Back Pain  -s/p fusion  -OT  -PAin Control  -Bowel Regimen   -Pending Rehab placement    #Chronic Constipation   - likely due to chronic opioid use  - c/w bowel regimen, Movantik if on formulary    #HTN  - c/w home Lopressor 75mg BID    #CAD  #CVA  - resume ASA post operatively when safe to do so  - c/w home atorvastatin  - awaiting results of recent long term outpatient cardiac monitoring to monitor for occult Afib to determine if a role for full dose AVC, follows with Dr Jeter    #DM  - f/u A1c, on Metformin 500mg BID at home  - ISS while inpatient, will evaluate for basal bolus needs    #tachycardia   -Uptrending Hgb  -Patient agitated during exam  -DDx: Anxiety vs infection vs vol loss vs PE/Clot  -ECG12 to assess rhythm if still tachycardic  -does not appear in active pain  -Given increasing Hgb there is some concern for hemoconcentration would trial 500cc bolus, if HR does not respond would look to perform CTPE to eval for poss clot     69 yo female with Hx HTN, HLD, DM, CVA x 3 (last in 2016 with residual R hand weakness), GAMALIEL not using CPAP, Emphysema, ex-25 pack year smoker now restarted, chronic constipation on Movantik, chronic urinary retention (SC at home), b/l CTS s/p release, s/p Intrathecal Morphine pump for pain, with chronic neck and back pain worsening for years s/p multiple spinal surgeries including laminectomies and fusion of cervical, thoracic and lumbar spine, s/p T3-L1 revision of prior fusion (with  on 8/17/22). Xray 11/16/23 showing broken rods/displacement. Now s/p C2-S2 instrumentation and fusion (12/5). S/p T9-L2 decompression w/ durotomy with repair (12/10/23). KAREEM grade A.     #Neck Pain  #Multiple Spine Surgeries  #Chronic Back Pain  -s/p fusion  -OT  -PAin Control  -Bowel Regimen   -Pending Rehab placement    #Chronic Constipation   - likely due to chronic opioid use  - c/w bowel regimen, Movantik if on formulary    #HTN  - c/w home Lopressor 75mg BID    #CAD  #CVA  - resume ASA post operatively when safe to do so  - c/w home atorvastatin  - awaiting results of recent long term outpatient cardiac monitoring to monitor for occult Afib to determine if a role for full dose AVC, follows with Dr Jeter    #DM  - f/u A1c, on Metformin 500mg BID at home  - ISS while inpatient, will evaluate for basal bolus needs    #tachycardia   -Uptrending Hgb  -Patient agitated during exam  -DDx: Anxiety vs infection vs vol loss vs PE/Clot  -ECG12 to assess rhythm if still tachycardic  -does not appear in active pain  -Given increasing Hgb there is some concern for hemoconcentration would trial 500cc bolus, if HR does not respond would look to perform CTPE to eval for poss clot

## 2023-12-15 NOTE — PROGRESS NOTE ADULT - ASSESSMENT
Plan:  Neuro:  - Neuro/vitals q4  - pain control: modified ERAS, IT morphine pump, oxycodone 15/30 prn, valium and home gabapentin 800 TID resumed  - Continue home cymbalta 60mg daily   - CT thoracic spine 12/6: postop changes  - protected sleep time 10PM-4AM     Cardio:  - MAP > 65   - midodrine 15q8   - C/w home simvastatin     Pulm:  - RA   - hx GAMALIEL, no CPAP at home    GI:  - CCD   - Bowel regimen, home movantik 25 mg   - CT A/P = impacted stool   - last BM 12/14  - GERD: continue home protonix    Renal:  - SC prn    Endo:  - ISS, a1c: 7    Heme:  - SCDs DVT ppx, SQL  - 500 cell saver intraop 12/5, 1u PRBC intraop 12/10, 1u PRBC 12/12    ID:  - afebrile    Dispo:  - SDU status, full code, pending AR      Pain: 67 year old female patient admitted for elective C2-S2 instrumentation and fusion. Current Pain medications include ITP infusion, Gabapentin 800mg TID,  Oxycodone 15-30 Q4 PRN , Dilaudid 0.5mg IV Q3hrs PRN, Valium 5mg Q8hrs, duloxetine 60mg oral daily. Currently patient appears comfortable at bedside and review of MAR shows decrease use of current pain medications. We will consider stopping IV pain medications tomorrow/ over the weekend if this trend of decrease use continues. At this time no changes. Will continue to follow while patient is admitted as inpatient.     - Home pain regiment: On iSTOP no prescription controlled substances were noted (she continues to state she was taking 30mg oxycodone several times a day prior to admission).      - Bowel regimen: Continue current regiment with no changes at this time.   - Nausea ppx: Zofran standing  - Functional Goals: Pt will get OOB with PT today. Pt will resume previous level of activity without impairment from surgery.   - Additional Consults: None recommended.   - Additional Labs/Imaging:  None recommended.     - Follow up, Discharge Planning: Patient states that at baseline she was independent at home without a caregiver and only used a wheelchair to ambulate outside the house. Discharge pending medical stability and acute management   - Pain Management follow up plan: will continue to follow while patient is inpatient.

## 2023-12-15 NOTE — CHART NOTE - NSCHARTNOTEFT_GEN_A_CORE
Admitting Diagnosis:   Patient is a 68y old  Female who presents with a chief complaint of Myelopathy (13 Dec 2023 20:30)  PAST MEDICAL & SURGICAL HISTORY:  HTN (hypertension)  SS (spinal stenosis)  High cholesterol  Stroke  x3  H/O carpal tunnel syndrome  Bilateral  Chronic GERD  History of chronic constipation  Urinary incontinence  self cath as needed  Pre-diabetes  Anxiety and depression  Eczema  H/O kyphosis  Pancreas cyst  Scoliosis  Wheelchair dependent  Cervical pseudoarthrosis  and lumbar spine  Cervical spondylosis  Chronic headaches  Degenerative joint disease  left shoulder  Lumbosacral spondylosis  COPD (chronic obstructive pulmonary disease)  H/O Spinal surgery  lumbar x 30  H/O breast surgery  left breast implant 1980's  S/P cervical discectomy  x4  H/O total shoulder replacement, right    Current Nutrition Order: consistent carbohydrate diet with evening snack      PO Intake: Good (%) [   ]  Fair (50-75%) [ x ] Poor (<25%) [   ]    GI Issues: no noted nausea or emesis, no noted diarrhea or constipation; no noted distention; most recent BM on 12/15/23, loose stool noted, fecal incontinence noted    Pain: noted with severe (8) level of back pain    Skin Integrity: noted with posterior neck surgical incisions; no pressure ulcers noted; generalized edema 1+ noted; Filippo: 15    Labs:   12-14    139  |  103  |  8   ----------------------------<  146<H>  4.8   |  27  |  0.50    Ca    8.9      14 Dec 2023 19:16  Phos  2.5     12-14  Mg     2.4     12-14    TPro  x   /  Alb  3.0<L>  /  TBili  x   /  DBili  x   /  AST  x   /  ALT  x   /  AlkPhos  x   12-14    CAPILLARY BLOOD GLUCOSE    POCT Blood Glucose.: 129 mg/dL (15 Dec 2023 11:54)  POCT Blood Glucose.: 85 mg/dL (15 Dec 2023 06:39)  POCT Blood Glucose.: 131 mg/dL (14 Dec 2023 22:20)  POCT Blood Glucose.: 202 mg/dL (14 Dec 2023 17:27)    Medications:  MEDICATIONS  (STANDING):  bisacodyl 5 milliGRAM(s) Oral at bedtime  chlorhexidine 2% Cloths 1 Application(s) Topical <User Schedule>  diazepam    Tablet 5 milliGRAM(s) Oral every 8 hours  DULoxetine 60 milliGRAM(s) Oral daily  enoxaparin Injectable 40 milliGRAM(s) SubCutaneous <User Schedule>  gabapentin 800 milliGRAM(s) Oral every 8 hours  influenza  Vaccine (HIGH DOSE) 0.7 milliLiter(s) IntraMuscular once  insulin lispro (ADMELOG) corrective regimen sliding scale   SubCutaneous Before meals and at bedtime  midodrine 15 milliGRAM(s) Oral every 8 hours  Morphine 1 milliGRAM / mL 17.9 mL,Morphine 1 milliGRAM / mL 17.9 mL,Morphine 1 milliGRAM / mL 17.9 mL 17.9 mL IntraThecal Continuous Pump  multivitamin 1 Tablet(s) Oral daily  naloxegol 25 milliGRAM(s) Oral daily  pantoprazole    Tablet 40 milliGRAM(s) Oral before breakfast  polyethylene glycol 3350 17 Gram(s) Oral two times a day  saline laxative (FLEET) Rectal Enema 1 Enema Rectal once  senna 2 Tablet(s) Oral at bedtime  simvastatin 40 milliGRAM(s) Oral at bedtime    MEDICATIONS  (PRN):  benzocaine 20% Spray 1 Spray(s) Mucosal three times a day PRN Sore throat  hydrocortisone 1% Cream 1 Application(s) Topical two times a day PRN Itching  HYDROmorphone  Injectable 0.5 milliGRAM(s) IV Push every 3 hours PRN Breakthrough Pain  naloxone Injectable 0.1 milliGRAM(s) IV Push every 3 minutes PRN For ANY of the following changes in patient status:  A. RR LESS THAN 10 breaths per minute, B. Oxygen saturation LESS THAN 90%, C. Sedation score of 6  ondansetron Injectable 4 milliGRAM(s) IV Push every 6 hours PRN Nausea  oxyCODONE    IR 15 milliGRAM(s) Oral every 4 hours PRN Moderate Pain (4 - 6)  oxyCODONE    IR 30 milliGRAM(s) Oral every 4 hours PRN Severe Pain (7 - 10)  sodium chloride 0.9% lock flush 10 milliLiter(s) IV Push every 1 hour PRN Pre/post blood products, medications, blood draw, and to maintain line patency    Dosing Anthropometrics:   Height (inches):  67   Weight (pounds):  186.7 (12/10)   BMI (kg/m^2):  29.2   IBW (pounds):  135 +/- 10%   %IBW:  138.3 %     WEIGHT CHANGE:   12/10     186.7 pounds  12/05     186.7 pounds  Pt weight stable during admission    ESTIMATED NUTRIENT NEEDS:   Calories:  (25-30 kcal/kg) 9797-2008 kcal   Protein:  (1.4-1.6 g/kg) 75-85 g   Fluids:  1 mL/kcal or at team’s discretion   Ideal body weight (IBW) being used as Pt is critically ill and >=100% of ideal body weight.     SUBJECTIVE:   67 yo female with Hx HTN, HLD, DM, CVA x 3 (last in 2016 with residual R hand weakness), GAMALIEL not using CPAP, Emphysema, ex-25 pack year smoker now restarted, chronic constipation on Movantik, chronic urinary retention (SC at home), b/l CTS s/p release, s/p Intrathecal Morphine pump for pain, with chronic neck and back pain worsening for years s/p multiple spinal surgeries including laminectomies and fusion of cervical, thoracic and lumbar spine, s/p T3-L1 revision of prior fusion (with  on 8/17/22). Xray 11/16/23 showing broken rods/displacement. Now s/p C2-S2 instrumentation and fusion (12/5). S/p T9-L2 decompression w/ durotomy with repair (12/10/23).      RD spoke to pt at bedside for nutrition follow up evaluation. Medications significant for insulin, multivitamin, polyethylene glycol, senna, zofran, bisacodyl. Labs significant for elevated POCT glucose (105-206 range), sodium was low, now within normal range, elevated serum Glucose (146, 120, 100, 118, 121). Pt endorsed no noted nausea or emesis, no noted diarrhea or constipation; no noted distention; most recent BM on 12/15/23, loose stool noted, fecal incontinence noted. Noted with severe (8) level of back pain. Noted with posterior neck surgical incisions; no pressure ulcers noted; generalized edema 1+ noted. Pt noted she has fair PO intakes, ~50-60% overall. RD encouraged pt to increase PO intakes as able of nutrient-dense foods. RD reviewed pt's diet with pt, pt stated she was confused about her diet originally; all questions answered. Pt appeared confused about her consistent carbohydrate with snack diet, RD explained her diet and its relation to her elevated blood sugars (pt has prediabetes per medical team; RD spoke to medical team, they are to follow up with regards to explaining her diagnosis). RD reviewed fiber, protein, fluids intakes with pt, as well as carbohydrate and sugar intakes, medical management of blood sugars. Pt appeared somewhat receptive, verbalized understanding. Pt would likely benefit from additional education. RD to remain available. Additional nutrition recommendations below.     PREVIOUS NUTRITION DIAGNOSIS: Inadequate energy intake related to critical illness resulting in lethargy/sedation as evidenced by NPO status, meeting 0% of estimated needs    Active [  ]       Resolved [ xx ]     If resolved, new PES: Increased protein/energy needs related to physiological causes increasing nutrient needs as evidenced by known increased metabolic demands for healing status post procedure    GOAL: Consistently meet >75% of nutrition needs via most appropriate route for nutrition     NUTRITION RECOMMENDATIONS:   - Recommend continue current diet order (Consistent Carbohydrate Diet)   - Defer consistency to team/speech and swallow team  - Allergy section updated to include crab by this provider  - Monitor PO intake/appetite, diet tolerance, GI distress, labs (electrolytes, CMP)   - Requesting to trend weights (weekly) as able  - Honor Pt food preferences as able   - Pain and bowel regimen as per team     EDUCATION:  RD encouraged pt to increase PO intakes as able of nutrient-dense foods. RD reviewed pt's diet with pt, pt stated she was confused about her diet originally; all questions answered. Pt appeared confused about her consistent carbohydrate with snack diet, RD explained her diet and its relation to her elevated blood sugars (pt has prediabetes per medical team; RD spoke to medical team, they are to follow up with regards to explaining her diagnosis). RD reviewed fiber, protein, fluids intakes with pt, as well as carbohydrate and sugar intakes, medical management of blood sugars. Pt appeared somewhat receptive, verbalized understanding. Pt would likely benefit from additional education.    RISK LEVEL:  High [  ]       Moderate [ x ]       Low [  ] Admitting Diagnosis:   Patient is a 68y old  Female who presents with a chief complaint of Myelopathy (13 Dec 2023 20:30)  PAST MEDICAL & SURGICAL HISTORY:  HTN (hypertension)  SS (spinal stenosis)  High cholesterol  Stroke  x3  H/O carpal tunnel syndrome  Bilateral  Chronic GERD  History of chronic constipation  Urinary incontinence  self cath as needed  Pre-diabetes  Anxiety and depression  Eczema  H/O kyphosis  Pancreas cyst  Scoliosis  Wheelchair dependent  Cervical pseudoarthrosis  and lumbar spine  Cervical spondylosis  Chronic headaches  Degenerative joint disease  left shoulder  Lumbosacral spondylosis  COPD (chronic obstructive pulmonary disease)  H/O Spinal surgery  lumbar x 30  H/O breast surgery  left breast implant 1980's  S/P cervical discectomy  x4  H/O total shoulder replacement, right    Current Nutrition Order: consistent carbohydrate diet with evening snack      PO Intake: Good (%) [   ]  Fair (50-75%) [ x ] Poor (<25%) [   ]    GI Issues: no noted nausea or emesis, no noted diarrhea or constipation; no noted distention; most recent BM on 12/15/23, loose stool noted, fecal incontinence noted    Pain: noted with severe (8) level of back pain    Skin Integrity: noted with posterior neck surgical incisions; no pressure ulcers noted; generalized edema 1+ noted; Filippo: 15    Labs:   12-14    139  |  103  |  8   ----------------------------<  146<H>  4.8   |  27  |  0.50    Ca    8.9      14 Dec 2023 19:16  Phos  2.5     12-14  Mg     2.4     12-14    TPro  x   /  Alb  3.0<L>  /  TBili  x   /  DBili  x   /  AST  x   /  ALT  x   /  AlkPhos  x   12-14    CAPILLARY BLOOD GLUCOSE    POCT Blood Glucose.: 129 mg/dL (15 Dec 2023 11:54)  POCT Blood Glucose.: 85 mg/dL (15 Dec 2023 06:39)  POCT Blood Glucose.: 131 mg/dL (14 Dec 2023 22:20)  POCT Blood Glucose.: 202 mg/dL (14 Dec 2023 17:27)    Medications:  MEDICATIONS  (STANDING):  bisacodyl 5 milliGRAM(s) Oral at bedtime  chlorhexidine 2% Cloths 1 Application(s) Topical <User Schedule>  diazepam    Tablet 5 milliGRAM(s) Oral every 8 hours  DULoxetine 60 milliGRAM(s) Oral daily  enoxaparin Injectable 40 milliGRAM(s) SubCutaneous <User Schedule>  gabapentin 800 milliGRAM(s) Oral every 8 hours  influenza  Vaccine (HIGH DOSE) 0.7 milliLiter(s) IntraMuscular once  insulin lispro (ADMELOG) corrective regimen sliding scale   SubCutaneous Before meals and at bedtime  midodrine 15 milliGRAM(s) Oral every 8 hours  Morphine 1 milliGRAM / mL 17.9 mL,Morphine 1 milliGRAM / mL 17.9 mL,Morphine 1 milliGRAM / mL 17.9 mL 17.9 mL IntraThecal Continuous Pump  multivitamin 1 Tablet(s) Oral daily  naloxegol 25 milliGRAM(s) Oral daily  pantoprazole    Tablet 40 milliGRAM(s) Oral before breakfast  polyethylene glycol 3350 17 Gram(s) Oral two times a day  saline laxative (FLEET) Rectal Enema 1 Enema Rectal once  senna 2 Tablet(s) Oral at bedtime  simvastatin 40 milliGRAM(s) Oral at bedtime    MEDICATIONS  (PRN):  benzocaine 20% Spray 1 Spray(s) Mucosal three times a day PRN Sore throat  hydrocortisone 1% Cream 1 Application(s) Topical two times a day PRN Itching  HYDROmorphone  Injectable 0.5 milliGRAM(s) IV Push every 3 hours PRN Breakthrough Pain  naloxone Injectable 0.1 milliGRAM(s) IV Push every 3 minutes PRN For ANY of the following changes in patient status:  A. RR LESS THAN 10 breaths per minute, B. Oxygen saturation LESS THAN 90%, C. Sedation score of 6  ondansetron Injectable 4 milliGRAM(s) IV Push every 6 hours PRN Nausea  oxyCODONE    IR 15 milliGRAM(s) Oral every 4 hours PRN Moderate Pain (4 - 6)  oxyCODONE    IR 30 milliGRAM(s) Oral every 4 hours PRN Severe Pain (7 - 10)  sodium chloride 0.9% lock flush 10 milliLiter(s) IV Push every 1 hour PRN Pre/post blood products, medications, blood draw, and to maintain line patency    Dosing Anthropometrics:   Height (inches):  67   Weight (pounds):  186.7 (12/10)   BMI (kg/m^2):  29.2   IBW (pounds):  135 +/- 10%   %IBW:  138.3 %     WEIGHT CHANGE:   12/10     186.7 pounds  12/05     186.7 pounds  Pt weight stable during admission    ESTIMATED NUTRIENT NEEDS:   Calories:  (25-30 kcal/kg) 0928-3179 kcal   Protein:  (1.4-1.6 g/kg) 75-85 g   Fluids:  1 mL/kcal or at team’s discretion   Ideal body weight (IBW) being used as Pt is critically ill and >=100% of ideal body weight.     SUBJECTIVE:   67 yo female with Hx HTN, HLD, DM, CVA x 3 (last in 2016 with residual R hand weakness), GAMALIEL not using CPAP, Emphysema, ex-25 pack year smoker now restarted, chronic constipation on Movantik, chronic urinary retention (SC at home), b/l CTS s/p release, s/p Intrathecal Morphine pump for pain, with chronic neck and back pain worsening for years s/p multiple spinal surgeries including laminectomies and fusion of cervical, thoracic and lumbar spine, s/p T3-L1 revision of prior fusion (with  on 8/17/22). Xray 11/16/23 showing broken rods/displacement. Now s/p C2-S2 instrumentation and fusion (12/5). S/p T9-L2 decompression w/ durotomy with repair (12/10/23).      RD spoke to pt at bedside for nutrition follow up evaluation. Medications significant for insulin, multivitamin, polyethylene glycol, senna, zofran, bisacodyl. Labs significant for elevated POCT glucose (105-206 range), sodium was low, now within normal range, elevated serum Glucose (146, 120, 100, 118, 121). Pt endorsed no noted nausea or emesis, no noted diarrhea or constipation; no noted distention; most recent BM on 12/15/23, loose stool noted, fecal incontinence noted. Noted with severe (8) level of back pain. Noted with posterior neck surgical incisions; no pressure ulcers noted; generalized edema 1+ noted. Pt noted she has fair PO intakes, ~50-60% overall. RD encouraged pt to increase PO intakes as able of nutrient-dense foods. RD reviewed pt's diet with pt, pt stated she was confused about her diet originally; all questions answered. Pt appeared confused about her consistent carbohydrate with snack diet, RD explained her diet and its relation to her elevated blood sugars (pt has prediabetes per medical team; RD spoke to medical team, they are to follow up with regards to explaining her diagnosis). RD reviewed fiber, protein, fluids intakes with pt, as well as carbohydrate and sugar intakes, medical management of blood sugars. Pt appeared somewhat receptive, verbalized understanding. Pt would likely benefit from additional education. RD to remain available. Additional nutrition recommendations below.     PREVIOUS NUTRITION DIAGNOSIS: Inadequate energy intake related to critical illness resulting in lethargy/sedation as evidenced by NPO status, meeting 0% of estimated needs    Active [  ]       Resolved [ xx ]     If resolved, new PES: Increased protein/energy needs related to physiological causes increasing nutrient needs as evidenced by known increased metabolic demands for healing status post procedure    GOAL: Consistently meet >75% of nutrition needs via most appropriate route for nutrition     NUTRITION RECOMMENDATIONS:   - Recommend continue current diet order (Consistent Carbohydrate Diet)   - Defer consistency to team/speech and swallow team  - Allergy section updated to include crab by this provider  - Monitor PO intake/appetite, diet tolerance, GI distress, labs (electrolytes, CMP)   - Requesting to trend weights (weekly) as able  - Honor Pt food preferences as able   - Pain and bowel regimen as per team     EDUCATION:  RD encouraged pt to increase PO intakes as able of nutrient-dense foods. RD reviewed pt's diet with pt, pt stated she was confused about her diet originally; all questions answered. Pt appeared confused about her consistent carbohydrate with snack diet, RD explained her diet and its relation to her elevated blood sugars (pt has prediabetes per medical team; RD spoke to medical team, they are to follow up with regards to explaining her diagnosis). RD reviewed fiber, protein, fluids intakes with pt, as well as carbohydrate and sugar intakes, medical management of blood sugars. Pt appeared somewhat receptive, verbalized understanding. Pt would likely benefit from additional education.    RISK LEVEL:  High [  ]       Moderate [ x ]       Low [  ]

## 2023-12-15 NOTE — PROGRESS NOTE ADULT - SUBJECTIVE AND OBJECTIVE BOX
HPI:  68 y/o female with PMHx HTN, HLD, CVA x 3 (last was 2016 with right hand weakness residual), GAMALIEL not using CPAP, Emphysema, former 25 pack year smoker restarted this week of surgery, Chronic Constipation on Movantik, Urinary Incontinence chronically self straight cath at home, chronic UTI, Breast Implant Rupture with Chronic Leak repair 10/2023, B/L CTS s/p release, s/p Intrathecal Morphine pump for pain, with chronic neck and back pain worsening for years s/p multiple spinal surgeries including laminectomies and fusion of cervical, thoracic and lumbar spine initially done in 2009 and recently in 2019 and 2020 at [facility] by Dr. Trinidad, s/p T3-L1 revision of prior fusion (with Dr. Luz on 8/17/22). Outpatient Xray 11/16/2023 showing broken rods/displacement. Presents for elective C2-S2 instrumentation and fusion. Pt endorses weakness of b/l LE and burning pain in b/l lower extremities radiating to both feet,  (04 Dec 2023 15:44)      Subjective:  Patient denies any acute complaints.    Hospital course:  12/4: Admitted for spinal fusion d/t damaged hardware.   12/5: Neuro stable. POD0 C2-S2 instrumentation/fusion (intraop b/l LE motor loss). on parminder for MAP>90  12/6: POD1. KAMRAN o/n neuro stable. remains intubated. Extubated 6am. Precedex gtt prn. Remains on parminder gtt for MAP goal >90. Valium made prn. CT thoracic spine bc MRI unavailable. Attempted NGT, unsuccessful (resistance @ 35).   12/7: POD2 Given IV dilaudid 0.5mg for pain. Neuro exam stable. SQL started tongiht. Increased precedex to 1.0. Responds well to valium. Consulting psych given paranoia ?homicidal statements. Passed bedside dysphagia and tolerating PO meds. MRI complete, f/u read.   12/8: POD3 Pt reporting incisional pain, given dilaudid 0.5mg IV x2. Pt appearing anxious, given xanax 1mg. Neuro exam unchanged. Pain regimen increased to oxy 15/30 mg, pending further recs. Given 1 L bolus for tachycardia. Trops negative. 2 HMV drains removed.   12/9: POD4, KAMRAN, CT myelogram today showing block at T12-L1, OR tomorrow  12/10: POD5, KAMRAN, OR today for exploration of fusion, possible decompression T9-L2, revision instrumentation  POD 0 T9-L2 decompression. Pt returned from OR intubated. 50mcg fentanyl IVP x 2 for pain control and HTN. Magnesium repleted. Precedex gtt started for sedation. Decreased FiO2 to 40%. 1L bolus for soft MAP. Dc'd propofol and started levo gtt for MAP >90.   12/11: POD 6, POD 1. Extubated, satting well on RA. Resumed home valium and gabapentin. Castillo d/c'd, pending TOV. Seroquel started for agitation, R IJ removed, LUE double midline placed. Started SQL.   12/12: POD7, POD2, given 500 cc bolus and started on phenylephrine for MAP goal >90. Started midodrine 10mg q8h. Cardura dc'd d/t hypotension. Given 250cc of albumin for increasing pressor requirements. Given lactulose, pending BM. Hgb 7.6<8.3, given 1u PRBC. Protected sleep time. Urology consulted for possible suprapubic cath, recommend self intermittent cath is optimal.   12/13: POD8, POD3 Midodrine increased to 15q8. Pt refused to take the extra 5mg midodrin ordered. Neuro exam stable. hgb 10 from 9.4 after 1u pRBCs, Pt refused AM meds, given in late morning, CTAP ordered for L sided abd pain, fleet enema, restarted home movantik and ordere fleet enema for constipation. YIFAN and HMV dc'd. 250cc albumin x1  12/14: POD9, POD4, KAMRAN overnight, neuro stable. MAP goal liberalized to > 65. Stepdown status.   12/15: POD10, POD5, KAMRAN overnight  Vital Signs Last 24 Hrs  T(C): 36.5 (15 Dec 2023 01:05), Max: 37 (14 Dec 2023 09:02)  T(F): 97.7 (15 Dec 2023 01:05), Max: 98.6 (14 Dec 2023 09:02)  HR: 89 (15 Dec 2023 01:05) (89 - 121)  BP: 125/85 (15 Dec 2023 01:05) (98/59 - 133/71)  BP(mean): 71 (14 Dec 2023 12:00) (71 - 98)  RR: 18 (15 Dec 2023 01:05) (16 - 20)  SpO2: 98% (15 Dec 2023 01:05) (94% - 99%)    Parameters below as of 15 Dec 2023 01:05  Patient On (Oxygen Delivery Method): room air        I&O's Summary    13 Dec 2023 07:01  -  14 Dec 2023 07:00  --------------------------------------------------------  IN: 519 mL / OUT: 3725 mL / NET: -3206 mL    14 Dec 2023 07:01  -  15 Dec 2023 01:08  --------------------------------------------------------  IN: 25 mL / OUT: 2175 mL / NET: -2150 mL        PHYSICAL EXAM:  General: patient seen laying supine in bed in NAD  Neuro: AAOx3, follows commands, opens eyes spontaneously, speech clear and fluent, face symmetric,  strength 5/5 b/l upper extremities and 0/5 lower extremities, decreased sensation below T11  HEENT: PERRL  Neck: supple  Cardiac: RRR, S1S2  Pulmonary: chest rise symmetric  Abdomen: soft, nontender, nondistended  Ext: perfusing well  Skin: warm, dry  Wound: thoracolumbar incision aquacel dressing c/d/i              DIET:  [] NPO  [x] Mechanical  [] Tube feeds    LABS:                        10.3   9.35  )-----------( 238      ( 14 Dec 2023 05:27 )             31.9     12-14    139  |  103  |  8   ----------------------------<  146<H>  4.8   |  27  |  0.50    Ca    8.9      14 Dec 2023 19:16  Phos  2.5     12-14  Mg     2.4     12-14    TPro  x   /  Alb  3.0<L>  /  TBili  x   /  DBili  x   /  AST  x   /  ALT  x   /  AlkPhos  x   12-14      Urinalysis Basic - ( 14 Dec 2023 19:16 )    Color: x / Appearance: x / SG: x / pH: x  Gluc: 146 mg/dL / Ketone: x  / Bili: x / Urobili: x   Blood: x / Protein: x / Nitrite: x   Leuk Esterase: x / RBC: x / WBC x   Sq Epi: x / Non Sq Epi: x / Bacteria: x          CAPILLARY BLOOD GLUCOSE      POCT Blood Glucose.: 131 mg/dL (14 Dec 2023 22:20)  POCT Blood Glucose.: 202 mg/dL (14 Dec 2023 17:27)  POCT Blood Glucose.: 128 mg/dL (14 Dec 2023 10:53)  POCT Blood Glucose.: 146 mg/dL (14 Dec 2023 06:17)      Drug Levels: [] N/A    CSF Analysis: [] N/A      Allergies    Crab (Pruritus)  No Known Drug Allergies    Intolerances      MEDICATIONS:  Antibiotics:    Neuro:  diazepam    Tablet 5 milliGRAM(s) Oral every 8 hours  DULoxetine 60 milliGRAM(s) Oral daily  gabapentin 800 milliGRAM(s) Oral every 8 hours  HYDROmorphone  Injectable 0.5 milliGRAM(s) IV Push every 3 hours PRN  ondansetron Injectable 4 milliGRAM(s) IV Push every 6 hours PRN  oxyCODONE    IR 15 milliGRAM(s) Oral every 4 hours PRN  oxyCODONE    IR 30 milliGRAM(s) Oral every 4 hours PRN    Anticoagulation:  enoxaparin Injectable 40 milliGRAM(s) SubCutaneous <User Schedule>    OTHER:  benzocaine 20% Spray 1 Spray(s) Mucosal three times a day PRN  bisacodyl 5 milliGRAM(s) Oral at bedtime  chlorhexidine 2% Cloths 1 Application(s) Topical <User Schedule>  hydrocortisone 1% Cream 1 Application(s) Topical two times a day PRN  influenza  Vaccine (HIGH DOSE) 0.7 milliLiter(s) IntraMuscular once  insulin lispro (ADMELOG) corrective regimen sliding scale   SubCutaneous Before meals and at bedtime  midodrine 15 milliGRAM(s) Oral every 8 hours  Morphine 1 milliGRAM / mL 17.9 mL,Morphine 1 milliGRAM / mL 17.9 mL,Morphine 1 milliGRAM / mL 17.9 mL 17.9 mL IntraThecal Continuous Pump  naloxegol 25 milliGRAM(s) Oral daily  naloxone Injectable 0.1 milliGRAM(s) IV Push every 3 minutes PRN  pantoprazole    Tablet 40 milliGRAM(s) Oral before breakfast  polyethylene glycol 3350 17 Gram(s) Oral two times a day  saline laxative (FLEET) Rectal Enema 1 Enema Rectal once  senna 2 Tablet(s) Oral at bedtime  simvastatin 40 milliGRAM(s) Oral at bedtime    IVF:  multivitamin 1 Tablet(s) Oral daily    CULTURES:    RADIOLOGY & ADDITIONAL TESTS:    S12.9XXA    Handoff    MEWS Score    HTN (hypertension)    Back pain    Hypertension    SS (spinal stenosis)    High cholesterol    Stroke    H/O carpal tunnel syndrome    H/O carpal tunnel syndrome    Chronic GERD    History of chronic constipation    Seizure    Urinary incontinence    Pre-diabetes    Acute UTI    Anxiety    Anxiety and depression    Arthritis    Eczema    External hemorrhoids    H/O kyphosis    Multiple sclerosis    Pancreas cyst    Scoliosis    Wheelchair dependent    Cervical pseudoarthrosis    Right shoulder pain    Cervical spondylosis    Chronic headaches    Chronic LUQ pain    Chronic UTI    Degenerative joint disease    H/O low back pain    Lumbosacral spondylosis    COPD (chronic obstructive pulmonary disease)    Back pain    Back pain    Spinal cord injury, thoracic (T7-T12)    Back pain    Spinal cord injury of thoracic region without bone injury    Delirium    Revision of posterolateral fusion of lumbar spine    Revision of fusion of thoracic spine    Decompression, spinal cord, thoracic, posterolateral approach    Cervical disc disease    H/O Spinal surgery    H/O breast surgery    S/P cervical discectomy    Previous back surgery    H/O shoulder surgery    H/O total shoulder replacement, right    SysAdmin_VstLnk        ASSESSMENT:  69 yo female with Hx HTN, HLD, DM, CVA x 3 (last in 2016 with residual R hand weakness), GAMALIEL not using CPAP, Emphysema, ex-25 pack year smoker now restarted, chronic constipation on Movantik, chronic urinary retention (SC at home), b/l CTS s/p release, s/p Intrathecal Morphine pump for pain, with chronic neck and back pain worsening for years s/p multiple spinal surgeries including laminectomies and fusion of cervical, thoracic and lumbar spine, s/p T3-L1 revision of prior fusion (with  on 8/17/22). Xray 11/16/23 showing broken rods/displacement. Now s/p C2-S2 instrumentation and fusion (12/5). S/p T9-L2 decompression w/ durotomy with repair (12/10/23). KAREEM grade A.     Plan:  Neuro:  - Neuro/vitals q4  - pain control: modified ERAS, IT morphine pump, oxycodone 15/30 prn, valium and home gabapentin 800 TID resumed  - Continue home cymbalta 60mg daily   - CT thoracic spine 12/6: postop changes  - protected sleep time 10PM-4AM     Cardio:  - MAP > 65   - midodrine 15q8   - C/w home simvastatin     Pulm:  - RA   - hx GAMALIEL, no CPAP at home    GI:  - CCD   - Bowel regimen, home movantik 25 mg   - CT A/P = impacted stool   - last BM 12/14  - GERD: continue home protonix    Renal:  - SC prn    Endo:  - ISS, a1c: 7    Heme:  - SCDs DVT ppx, SQL  - 500 cell saver intraop 12/5, 1u PRBC intraop 12/10, 1u PRBC 12/12    ID:  - afebrile    Dispo:  - SDU status, full code, pending AR    D/w Dr. Luz     Assessment:  Present when checked    []  GCS  E   V  M     Heart Failure: []Acute, [] acute on chronic , []chronic  Heart Failure:  [] Diastolic (HFpEF), [] Systolic (HFrEF), []Combined (HFpEF and HFrEF), [] RHF, [] Pulm HTN, [] Other    [] DARRYL, [] ATN, [] AIN, [] other  [] CKD1, [] CKD2, [] CKD 3, [] CKD 4, [] CKD 5, []ESRD    Encephalopathy: [] Metabolic, [] Hepatic, [] toxic, [] Neurological, [] Other    Abnormal Nurtitional Status: [] malnurtition (see nutrition note), [ ]underweight: BMI < 19, [] morbid obesity: BMI >40, [] Cachexia    [] Sepsis  [] hypovolemic shock,[] cardiogenic shock, [] hemorrhagic shock, [] neuogenic shock  [] Acute Respiratory Failure  []Cerebral edema, [] Brain compression/ herniation,   [] Functional quadriplegia  [] Acute blood loss anemia   HPI:  68 y/o female with PMHx HTN, HLD, CVA x 3 (last was 2016 with right hand weakness residual), GAMALIEL not using CPAP, Emphysema, former 25 pack year smoker restarted this week of surgery, Chronic Constipation on Movantik, Urinary Incontinence chronically self straight cath at home, chronic UTI, Breast Implant Rupture with Chronic Leak repair 10/2023, B/L CTS s/p release, s/p Intrathecal Morphine pump for pain, with chronic neck and back pain worsening for years s/p multiple spinal surgeries including laminectomies and fusion of cervical, thoracic and lumbar spine initially done in 2009 and recently in 2019 and 2020 at [facility] by Dr. Trinidad, s/p T3-L1 revision of prior fusion (with Dr. Luz on 8/17/22). Outpatient Xray 11/16/2023 showing broken rods/displacement. Presents for elective C2-S2 instrumentation and fusion. Pt endorses weakness of b/l LE and burning pain in b/l lower extremities radiating to both feet,  (04 Dec 2023 15:44)      Subjective:  Patient denies any acute complaints.    Hospital course:  12/4: Admitted for spinal fusion d/t damaged hardware.   12/5: Neuro stable. POD0 C2-S2 instrumentation/fusion (intraop b/l LE motor loss). on parminder for MAP>90  12/6: POD1. KAMRAN o/n neuro stable. remains intubated. Extubated 6am. Precedex gtt prn. Remains on parminder gtt for MAP goal >90. Valium made prn. CT thoracic spine bc MRI unavailable. Attempted NGT, unsuccessful (resistance @ 35).   12/7: POD2 Given IV dilaudid 0.5mg for pain. Neuro exam stable. SQL started tongiht. Increased precedex to 1.0. Responds well to valium. Consulting psych given paranoia ?homicidal statements. Passed bedside dysphagia and tolerating PO meds. MRI complete, f/u read.   12/8: POD3 Pt reporting incisional pain, given dilaudid 0.5mg IV x2. Pt appearing anxious, given xanax 1mg. Neuro exam unchanged. Pain regimen increased to oxy 15/30 mg, pending further recs. Given 1 L bolus for tachycardia. Trops negative. 2 HMV drains removed.   12/9: POD4, KAMRAN, CT myelogram today showing block at T12-L1, OR tomorrow  12/10: POD5, KAMRAN, OR today for exploration of fusion, possible decompression T9-L2, revision instrumentation  POD 0 T9-L2 decompression. Pt returned from OR intubated. 50mcg fentanyl IVP x 2 for pain control and HTN. Magnesium repleted. Precedex gtt started for sedation. Decreased FiO2 to 40%. 1L bolus for soft MAP. Dc'd propofol and started levo gtt for MAP >90.   12/11: POD 6, POD 1. Extubated, satting well on RA. Resumed home valium and gabapentin. Castillo d/c'd, pending TOV. Seroquel started for agitation, R IJ removed, LUE double midline placed. Started SQL.   12/12: POD7, POD2, given 500 cc bolus and started on phenylephrine for MAP goal >90. Started midodrine 10mg q8h. Cardura dc'd d/t hypotension. Given 250cc of albumin for increasing pressor requirements. Given lactulose, pending BM. Hgb 7.6<8.3, given 1u PRBC. Protected sleep time. Urology consulted for possible suprapubic cath, recommend self intermittent cath is optimal.   12/13: POD8, POD3 Midodrine increased to 15q8. Pt refused to take the extra 5mg midodrin ordered. Neuro exam stable. hgb 10 from 9.4 after 1u pRBCs, Pt refused AM meds, given in late morning, CTAP ordered for L sided abd pain, fleet enema, restarted home movantik and ordere fleet enema for constipation. YIFAN and HMV dc'd. 250cc albumin x1  12/14: POD9, POD4, KAMRAN overnight, neuro stable. MAP goal liberalized to > 65. Stepdown status.   12/15: POD10, POD5, KAMRAN overnight  Vital Signs Last 24 Hrs  T(C): 36.5 (15 Dec 2023 01:05), Max: 37 (14 Dec 2023 09:02)  T(F): 97.7 (15 Dec 2023 01:05), Max: 98.6 (14 Dec 2023 09:02)  HR: 89 (15 Dec 2023 01:05) (89 - 121)  BP: 125/85 (15 Dec 2023 01:05) (98/59 - 133/71)  BP(mean): 71 (14 Dec 2023 12:00) (71 - 98)  RR: 18 (15 Dec 2023 01:05) (16 - 20)  SpO2: 98% (15 Dec 2023 01:05) (94% - 99%)    Parameters below as of 15 Dec 2023 01:05  Patient On (Oxygen Delivery Method): room air        I&O's Summary    13 Dec 2023 07:01  -  14 Dec 2023 07:00  --------------------------------------------------------  IN: 519 mL / OUT: 3725 mL / NET: -3206 mL    14 Dec 2023 07:01  -  15 Dec 2023 01:08  --------------------------------------------------------  IN: 25 mL / OUT: 2175 mL / NET: -2150 mL        PHYSICAL EXAM:  General: patient seen laying supine in bed in NAD  Neuro: AAOx3, follows commands, opens eyes spontaneously, speech clear and fluent, face symmetric,  strength 5/5 b/l upper extremities and 0/5 lower extremities, decreased sensation below T11  HEENT: PERRL  Neck: supple  Cardiac: RRR, S1S2  Pulmonary: chest rise symmetric  Abdomen: soft, nontender, nondistended  Ext: perfusing well  Skin: warm, dry  Wound: thoracolumbar incision aquacel dressing c/d/i              DIET:  [] NPO  [x] Mechanical  [] Tube feeds    LABS:                        10.3   9.35  )-----------( 238      ( 14 Dec 2023 05:27 )             31.9     12-14    139  |  103  |  8   ----------------------------<  146<H>  4.8   |  27  |  0.50    Ca    8.9      14 Dec 2023 19:16  Phos  2.5     12-14  Mg     2.4     12-14    TPro  x   /  Alb  3.0<L>  /  TBili  x   /  DBili  x   /  AST  x   /  ALT  x   /  AlkPhos  x   12-14      Urinalysis Basic - ( 14 Dec 2023 19:16 )    Color: x / Appearance: x / SG: x / pH: x  Gluc: 146 mg/dL / Ketone: x  / Bili: x / Urobili: x   Blood: x / Protein: x / Nitrite: x   Leuk Esterase: x / RBC: x / WBC x   Sq Epi: x / Non Sq Epi: x / Bacteria: x          CAPILLARY BLOOD GLUCOSE      POCT Blood Glucose.: 131 mg/dL (14 Dec 2023 22:20)  POCT Blood Glucose.: 202 mg/dL (14 Dec 2023 17:27)  POCT Blood Glucose.: 128 mg/dL (14 Dec 2023 10:53)  POCT Blood Glucose.: 146 mg/dL (14 Dec 2023 06:17)      Drug Levels: [] N/A    CSF Analysis: [] N/A      Allergies    Crab (Pruritus)  No Known Drug Allergies    Intolerances      MEDICATIONS:  Antibiotics:    Neuro:  diazepam    Tablet 5 milliGRAM(s) Oral every 8 hours  DULoxetine 60 milliGRAM(s) Oral daily  gabapentin 800 milliGRAM(s) Oral every 8 hours  HYDROmorphone  Injectable 0.5 milliGRAM(s) IV Push every 3 hours PRN  ondansetron Injectable 4 milliGRAM(s) IV Push every 6 hours PRN  oxyCODONE    IR 15 milliGRAM(s) Oral every 4 hours PRN  oxyCODONE    IR 30 milliGRAM(s) Oral every 4 hours PRN    Anticoagulation:  enoxaparin Injectable 40 milliGRAM(s) SubCutaneous <User Schedule>    OTHER:  benzocaine 20% Spray 1 Spray(s) Mucosal three times a day PRN  bisacodyl 5 milliGRAM(s) Oral at bedtime  chlorhexidine 2% Cloths 1 Application(s) Topical <User Schedule>  hydrocortisone 1% Cream 1 Application(s) Topical two times a day PRN  influenza  Vaccine (HIGH DOSE) 0.7 milliLiter(s) IntraMuscular once  insulin lispro (ADMELOG) corrective regimen sliding scale   SubCutaneous Before meals and at bedtime  midodrine 15 milliGRAM(s) Oral every 8 hours  Morphine 1 milliGRAM / mL 17.9 mL,Morphine 1 milliGRAM / mL 17.9 mL,Morphine 1 milliGRAM / mL 17.9 mL 17.9 mL IntraThecal Continuous Pump  naloxegol 25 milliGRAM(s) Oral daily  naloxone Injectable 0.1 milliGRAM(s) IV Push every 3 minutes PRN  pantoprazole    Tablet 40 milliGRAM(s) Oral before breakfast  polyethylene glycol 3350 17 Gram(s) Oral two times a day  saline laxative (FLEET) Rectal Enema 1 Enema Rectal once  senna 2 Tablet(s) Oral at bedtime  simvastatin 40 milliGRAM(s) Oral at bedtime    IVF:  multivitamin 1 Tablet(s) Oral daily    CULTURES:    RADIOLOGY & ADDITIONAL TESTS:    S12.9XXA    Handoff    MEWS Score    HTN (hypertension)    Back pain    Hypertension    SS (spinal stenosis)    High cholesterol    Stroke    H/O carpal tunnel syndrome    H/O carpal tunnel syndrome    Chronic GERD    History of chronic constipation    Seizure    Urinary incontinence    Pre-diabetes    Acute UTI    Anxiety    Anxiety and depression    Arthritis    Eczema    External hemorrhoids    H/O kyphosis    Multiple sclerosis    Pancreas cyst    Scoliosis    Wheelchair dependent    Cervical pseudoarthrosis    Right shoulder pain    Cervical spondylosis    Chronic headaches    Chronic LUQ pain    Chronic UTI    Degenerative joint disease    H/O low back pain    Lumbosacral spondylosis    COPD (chronic obstructive pulmonary disease)    Back pain    Back pain    Spinal cord injury, thoracic (T7-T12)    Back pain    Spinal cord injury of thoracic region without bone injury    Delirium    Revision of posterolateral fusion of lumbar spine    Revision of fusion of thoracic spine    Decompression, spinal cord, thoracic, posterolateral approach    Cervical disc disease    H/O Spinal surgery    H/O breast surgery    S/P cervical discectomy    Previous back surgery    H/O shoulder surgery    H/O total shoulder replacement, right    SysAdmin_VstLnk        ASSESSMENT:  67 yo female with Hx HTN, HLD, DM, CVA x 3 (last in 2016 with residual R hand weakness), GAMALIEL not using CPAP, Emphysema, ex-25 pack year smoker now restarted, chronic constipation on Movantik, chronic urinary retention (SC at home), b/l CTS s/p release, s/p Intrathecal Morphine pump for pain, with chronic neck and back pain worsening for years s/p multiple spinal surgeries including laminectomies and fusion of cervical, thoracic and lumbar spine, s/p T3-L1 revision of prior fusion (with  on 8/17/22). Xray 11/16/23 showing broken rods/displacement. Now s/p C2-S2 instrumentation and fusion (12/5). S/p T9-L2 decompression w/ durotomy with repair (12/10/23). KAREEM grade A.     Plan:  Neuro:  - Neuro/vitals q4  - pain control: modified ERAS, IT morphine pump, oxycodone 15/30 prn, valium and home gabapentin 800 TID resumed  - Continue home cymbalta 60mg daily   - CT thoracic spine 12/6: postop changes  - protected sleep time 10PM-4AM     Cardio:  - MAP > 65   - midodrine 15q8   - C/w home simvastatin     Pulm:  - RA   - hx GAMALIEL, no CPAP at home    GI:  - CCD   - Bowel regimen, home movantik 25 mg   - CT A/P = impacted stool   - last BM 12/14  - GERD: continue home protonix    Renal:  - SC prn    Endo:  - ISS, a1c: 7    Heme:  - SCDs DVT ppx, SQL  - 500 cell saver intraop 12/5, 1u PRBC intraop 12/10, 1u PRBC 12/12    ID:  - afebrile    Dispo:  - SDU status, full code, pending AR    D/w Dr. Luz     Assessment:  Present when checked    []  GCS  E   V  M     Heart Failure: []Acute, [] acute on chronic , []chronic  Heart Failure:  [] Diastolic (HFpEF), [] Systolic (HFrEF), []Combined (HFpEF and HFrEF), [] RHF, [] Pulm HTN, [] Other    [] DARRYL, [] ATN, [] AIN, [] other  [] CKD1, [] CKD2, [] CKD 3, [] CKD 4, [] CKD 5, []ESRD    Encephalopathy: [] Metabolic, [] Hepatic, [] toxic, [] Neurological, [] Other    Abnormal Nurtitional Status: [] malnurtition (see nutrition note), [ ]underweight: BMI < 19, [] morbid obesity: BMI >40, [] Cachexia    [] Sepsis  [] hypovolemic shock,[] cardiogenic shock, [] hemorrhagic shock, [] neuogenic shock  [] Acute Respiratory Failure  []Cerebral edema, [] Brain compression/ herniation,   [] Functional quadriplegia  [] Acute blood loss anemia

## 2023-12-15 NOTE — PROGRESS NOTE ADULT - SUBJECTIVE AND OBJECTIVE BOX
NEUROSURGERY PAIN MANAGEMENT CONSULT NOTE    Chief Complaint:    HPI:  68 y/o female with PMHx HTN, HLD, CVA x 3 (last was 2016 with right hand weakness residual), GAMALIEL not using CPAP, Emphysema, former 25 pack year smoker restarted this week of surgery, Chronic Constipation on Movantik, Urinary Incontinence chronically self straight cath at home, chronic UTI, Breast Implant Rupture with Chronic Leak repair 10/2023, B/L CTS s/p release, s/p Intrathecal Morphine pump for pain, with chronic neck and back pain worsening for years s/p multiple spinal surgeries including laminectomies and fusion of cervical, thoracic and lumbar spine initially done in 2009 and recently in 2019 and 2020 at [facility] by Dr. Trinidad, s/p T3-L1 revision of prior fusion (with Dr. Luz on 8/17/22). Outpatient Xray 11/16/2023 showing broken rods/displacement. Presents for elective C2-S2 instrumentation and fusion. Pt endorses weakness of b/l LE and burning pain in b/l lower extremities radiating to both feet,  (04 Dec 2023 15:44)    OVERNIGHT EVENTS: Patient seen this morning and resting comfortably when entered the room. Her pain continues to be well controlled on current regiment of medications. She hasn't required the use of any IV Dilaudid in the last 24 hours and also has only been using the 15mg Oxycodone (3x in the last 24 hours - not utilizing the 30mg oxycodone at all in last 24 hours). She denies any other concerns or complaints this morning and is agreeable to continue the current pain regiment.      Hospital Course:   12/4: Admitted for spinal fusion d/t damaged hardware.   12/5: Neuro stable. POD0 C2-S2 instrumentation/fusion (intraop b/l LE motor loss). on parminder for MAP>90  12/6: POD1. KAMRAN o/n neuro stable. remains intubated. Extubated 6am. Precedex gtt prn. Remains on parminder gtt for MAP goal >90. Valium made prn. CT thoracic spine bc MRI unavailable. Attempted NGT, unsuccessful (resistance @ 35).   12/7: POD2 Given IV dilaudid 0.5mg for pain. Neuro exam stable. SQL started tongiht. Increased precedex to 1.0. Responds well to valium. Consulting psych given paranoia ?homicidal statements. Passed bedside dyspphagia and tolerating PO meds. MRI complete, f/u read.   12/8: POD3 Pt reporting incisional pain, given dilaudid 0.5mg IV x2. Pt appearing anxious, given xanax 1mg. Neuro exam unchanged. Pain regimen increased to oxy 15/30 mg, pending further recs. Given 1 L bolus for tachycardia. Trops negative. 2 HMV drains removed.   12/9: POD4, KAMRAN, CT myelogram today showing block at T12-L1, OR tomorrow  12/10: POD5, KAMRAN, OR today for exploration of fusion, possible decompression T9-L2, revision instrumentation  POD 0 T9-L2 decompression. Pt returned from OR intubated. 50mcg fentanyl IVP x 2 for pain control and HTN. Magnesium repleted. Precedex gtt started for sedation. Decreased FiO2 to 40%. 1L bolus for soft MAP. Dc'd propofol and started levo gtt for MAP >90.   12/11: POD 6, POD 1. Extubated, satting well on RA. Resumed home valium and gabapentin. Castillo d/c'd, pending TOV. Seroquel started for agitation, R IJ removed, LUE double midline placed. Started SQL.   12/12: POD7, POD2, given 500 cc bolus and started on phenylephrine for MAP goal >90. Started midodrine 10mg q8h. Cardura dc'd d/t hypotension. Given 250cc of albumin for increasing pressor requirements. Given lactulose, pending BM. Hgb 7.6<8.3, given 1u PRBC. Protected sleep time. Urology consulted for possible suprapubic cath, recommend self intermittent cath is optimal.   12/13: POD8, POD3 Midodrine increased to 15q8. Pt refused to take the extra 5mg midodrin ordered. Neuro exam stable. hgb 10 from 9.4 after 1u pRBCs, Pt refused AM meds, given in late morning, CTAP ordered for L sided abd pain, fleet enema, restarted home movantik and ordere fleet enema for constipation. YIFAN and HMV dc'd. 250cc albumin x1  12/14: POD9, POD4, KAMRAN overnight, neuro stable. MAP goal liberalized to > 65. Stepdown status.   12/15: POD10, POD5, KAMRAN overnight      PAST MEDICAL & SURGICAL HISTORY:  HTN (hypertension)      SS (spinal stenosis)      High cholesterol      Stroke  x3      H/O carpal tunnel syndrome  Bilateral      Chronic GERD      History of chronic constipation      Urinary incontinence  self cath as needed      Pre-diabetes      Anxiety and depression      Eczema      H/O kyphosis      Pancreas cyst      Scoliosis      Wheelchair dependent      Cervical pseudoarthrosis  and lumbar spine      Cervical spondylosis      Chronic headaches      Degenerative joint disease  left shoulder      Lumbosacral spondylosis      COPD (chronic obstructive pulmonary disease)      H/O Spinal surgery  lumbar x 30      H/O breast surgery  left breast implant 1980's      S/P cervical discectomy  x4      H/O total shoulder replacement, right          FAMILY HISTORY:      SOCIAL HISTORY:  [ ] Denies Smoking, Alcohol, or Drug Use    HOME MEDICATIONS:   Please refer to initial HNP    PAIN HOME MEDICATIONS:    Allergies    Crab (Pruritus)  Drug Allergies Not Recorded    Intolerances  MEDICATIONS  (STANDING):  bisacodyl 5 milliGRAM(s) Oral at bedtime  chlorhexidine 2% Cloths 1 Application(s) Topical <User Schedule>  diazepam    Tablet 5 milliGRAM(s) Oral every 8 hours  DULoxetine 60 milliGRAM(s) Oral daily  enoxaparin Injectable 40 milliGRAM(s) SubCutaneous <User Schedule>  gabapentin 800 milliGRAM(s) Oral every 8 hours  influenza  Vaccine (HIGH DOSE) 0.7 milliLiter(s) IntraMuscular once  insulin lispro (ADMELOG) corrective regimen sliding scale   SubCutaneous Before meals and at bedtime  midodrine 15 milliGRAM(s) Oral every 8 hours  Morphine 1 milliGRAM / mL 17.9 mL,Morphine 1 milliGRAM / mL 17.9 mL,Morphine 1 milliGRAM / mL 17.9 mL 17.9 mL IntraThecal Continuous Pump  multivitamin 1 Tablet(s) Oral daily  naloxegol 25 milliGRAM(s) Oral daily  pantoprazole    Tablet 40 milliGRAM(s) Oral before breakfast  polyethylene glycol 3350 17 Gram(s) Oral two times a day  saline laxative (FLEET) Rectal Enema 1 Enema Rectal once  senna 2 Tablet(s) Oral at bedtime  simvastatin 40 milliGRAM(s) Oral at bedtime    MEDICATIONS  (PRN):  benzocaine 20% Spray 1 Spray(s) Mucosal three times a day PRN Sore throat  hydrocortisone 1% Cream 1 Application(s) Topical two times a day PRN Itching  HYDROmorphone  Injectable 0.5 milliGRAM(s) IV Push every 3 hours PRN Breakthrough Pain  naloxone Injectable 0.1 milliGRAM(s) IV Push every 3 minutes PRN For ANY of the following changes in patient status:  A. RR LESS THAN 10 breaths per minute, B. Oxygen saturation LESS THAN 90%, C. Sedation score of 6  ondansetron Injectable 4 milliGRAM(s) IV Push every 6 hours PRN Nausea  oxyCODONE    IR 15 milliGRAM(s) Oral every 4 hours PRN Moderate Pain (4 - 6)  oxyCODONE    IR 30 milliGRAM(s) Oral every 4 hours PRN Severe Pain (7 - 10)  sodium chloride 0.9% lock flush 10 milliLiter(s) IV Push every 1 hour PRN Pre/post blood products, medications, blood draw, and to maintain line patency      Vital Signs Last 24 Hrs  T(C): 37.1 (12-15-23 @ 05:12), Max: 37.1 (12-15-23 @ 05:12)  T(F): 98.8 (12-15-23 @ 05:12), Max: 98.8 (12-15-23 @ 05:12)  HR: 128 (12-15-23 @ 09:47) (89 - 128)  BP: 113/79 (12-15-23 @ 09:47) (103/76 - 128/87)  ABP: --  ABP(mean): --  RR: 17 (12-15-23 @ 09:47) (16 - 18)  SpO2: 95% (12-15-23 @ 05:12) (95% - 98%)      LABS:                                       10.3   9.35  )-----------( 238      ( 14 Dec 2023 05:27 )             31.9     12-14    139  |  103  |  8   ----------------------------<  146<H>  4.8   |  27  |  0.50    Ca    8.9      14 Dec 2023 19:16  Phos  2.5     12-14  Mg     2.4     12-14    TPro  x   /  Alb  3.0<L>  /  TBili  x   /  DBili  x   /  AST  x   /  ALT  x   /  AlkPhos  x   12-14        RADIOLOGY:    Drug Screen:      REVIEW OF SYSTEMS:  CONSTITUTIONAL: No fever   EYES: No visual disturbances  ENMT:  No difficulty hearing.  NECK: pain and stiffness  RESPIRATORY: No shortness of breath  CARDIOVASCULAR: No chest pain  GASTROINTESTINAL:  No nausea, vomiting.  NEUROLOGICAL: weakness in BLE with numbness in BLE  MUSCULOSKELETAL: Incisional back pain (improving)        PHYSICAL EXAM   GENERAL: Laying in bed, NAD, appears fatigued  Neuro: EOMI  Cranial nerves grossly intact  Motor exam: on exam patient was unable to move BLE upon request - no movement was noted in AD, EHL, plantarflexion, KE or KF  Sensation to light touch in BLE not present    CHEST/LUNG: on room air  ABDOMEN:  Nondistended     NEUROSURGERY PAIN MANAGEMENT CONSULT NOTE    Chief Complaint:    HPI:  66 y/o female with PMHx HTN, HLD, CVA x 3 (last was 2016 with right hand weakness residual), GAMALIEL not using CPAP, Emphysema, former 25 pack year smoker restarted this week of surgery, Chronic Constipation on Movantik, Urinary Incontinence chronically self straight cath at home, chronic UTI, Breast Implant Rupture with Chronic Leak repair 10/2023, B/L CTS s/p release, s/p Intrathecal Morphine pump for pain, with chronic neck and back pain worsening for years s/p multiple spinal surgeries including laminectomies and fusion of cervical, thoracic and lumbar spine initially done in 2009 and recently in 2019 and 2020 at [facility] by Dr. Trinidad, s/p T3-L1 revision of prior fusion (with Dr. Luz on 8/17/22). Outpatient Xray 11/16/2023 showing broken rods/displacement. Presents for elective C2-S2 instrumentation and fusion. Pt endorses weakness of b/l LE and burning pain in b/l lower extremities radiating to both feet,  (04 Dec 2023 15:44)    OVERNIGHT EVENTS: Patient seen this morning and resting comfortably when entered the room. Her pain continues to be well controlled on current regiment of medications. She hasn't required the use of any IV Dilaudid in the last 24 hours and also has only been using the 15mg Oxycodone (3x in the last 24 hours - not utilizing the 30mg oxycodone at all in last 24 hours). She denies any other concerns or complaints this morning and is agreeable to continue the current pain regiment.      Hospital Course:   12/4: Admitted for spinal fusion d/t damaged hardware.   12/5: Neuro stable. POD0 C2-S2 instrumentation/fusion (intraop b/l LE motor loss). on parminder for MAP>90  12/6: POD1. KAMRAN o/n neuro stable. remains intubated. Extubated 6am. Precedex gtt prn. Remains on parminder gtt for MAP goal >90. Valium made prn. CT thoracic spine bc MRI unavailable. Attempted NGT, unsuccessful (resistance @ 35).   12/7: POD2 Given IV dilaudid 0.5mg for pain. Neuro exam stable. SQL started tongiht. Increased precedex to 1.0. Responds well to valium. Consulting psych given paranoia ?homicidal statements. Passed bedside dyspphagia and tolerating PO meds. MRI complete, f/u read.   12/8: POD3 Pt reporting incisional pain, given dilaudid 0.5mg IV x2. Pt appearing anxious, given xanax 1mg. Neuro exam unchanged. Pain regimen increased to oxy 15/30 mg, pending further recs. Given 1 L bolus for tachycardia. Trops negative. 2 HMV drains removed.   12/9: POD4, KAMRAN, CT myelogram today showing block at T12-L1, OR tomorrow  12/10: POD5, KAMRAN, OR today for exploration of fusion, possible decompression T9-L2, revision instrumentation  POD 0 T9-L2 decompression. Pt returned from OR intubated. 50mcg fentanyl IVP x 2 for pain control and HTN. Magnesium repleted. Precedex gtt started for sedation. Decreased FiO2 to 40%. 1L bolus for soft MAP. Dc'd propofol and started levo gtt for MAP >90.   12/11: POD 6, POD 1. Extubated, satting well on RA. Resumed home valium and gabapentin. Castillo d/c'd, pending TOV. Seroquel started for agitation, R IJ removed, LUE double midline placed. Started SQL.   12/12: POD7, POD2, given 500 cc bolus and started on phenylephrine for MAP goal >90. Started midodrine 10mg q8h. Cardura dc'd d/t hypotension. Given 250cc of albumin for increasing pressor requirements. Given lactulose, pending BM. Hgb 7.6<8.3, given 1u PRBC. Protected sleep time. Urology consulted for possible suprapubic cath, recommend self intermittent cath is optimal.   12/13: POD8, POD3 Midodrine increased to 15q8. Pt refused to take the extra 5mg midodrin ordered. Neuro exam stable. hgb 10 from 9.4 after 1u pRBCs, Pt refused AM meds, given in late morning, CTAP ordered for L sided abd pain, fleet enema, restarted home movantik and ordere fleet enema for constipation. YIFAN and HMV dc'd. 250cc albumin x1  12/14: POD9, POD4, KAMRAN overnight, neuro stable. MAP goal liberalized to > 65. Stepdown status.   12/15: POD10, POD5, KAMRAN overnight      PAST MEDICAL & SURGICAL HISTORY:  HTN (hypertension)      SS (spinal stenosis)      High cholesterol      Stroke  x3      H/O carpal tunnel syndrome  Bilateral      Chronic GERD      History of chronic constipation      Urinary incontinence  self cath as needed      Pre-diabetes      Anxiety and depression      Eczema      H/O kyphosis      Pancreas cyst      Scoliosis      Wheelchair dependent      Cervical pseudoarthrosis  and lumbar spine      Cervical spondylosis      Chronic headaches      Degenerative joint disease  left shoulder      Lumbosacral spondylosis      COPD (chronic obstructive pulmonary disease)      H/O Spinal surgery  lumbar x 30      H/O breast surgery  left breast implant 1980's      S/P cervical discectomy  x4      H/O total shoulder replacement, right          FAMILY HISTORY:      SOCIAL HISTORY:  [ ] Denies Smoking, Alcohol, or Drug Use    HOME MEDICATIONS:   Please refer to initial HNP    PAIN HOME MEDICATIONS:    Allergies    Crab (Pruritus)  Drug Allergies Not Recorded    Intolerances  MEDICATIONS  (STANDING):  bisacodyl 5 milliGRAM(s) Oral at bedtime  chlorhexidine 2% Cloths 1 Application(s) Topical <User Schedule>  diazepam    Tablet 5 milliGRAM(s) Oral every 8 hours  DULoxetine 60 milliGRAM(s) Oral daily  enoxaparin Injectable 40 milliGRAM(s) SubCutaneous <User Schedule>  gabapentin 800 milliGRAM(s) Oral every 8 hours  influenza  Vaccine (HIGH DOSE) 0.7 milliLiter(s) IntraMuscular once  insulin lispro (ADMELOG) corrective regimen sliding scale   SubCutaneous Before meals and at bedtime  midodrine 15 milliGRAM(s) Oral every 8 hours  Morphine 1 milliGRAM / mL 17.9 mL,Morphine 1 milliGRAM / mL 17.9 mL,Morphine 1 milliGRAM / mL 17.9 mL 17.9 mL IntraThecal Continuous Pump  multivitamin 1 Tablet(s) Oral daily  naloxegol 25 milliGRAM(s) Oral daily  pantoprazole    Tablet 40 milliGRAM(s) Oral before breakfast  polyethylene glycol 3350 17 Gram(s) Oral two times a day  saline laxative (FLEET) Rectal Enema 1 Enema Rectal once  senna 2 Tablet(s) Oral at bedtime  simvastatin 40 milliGRAM(s) Oral at bedtime    MEDICATIONS  (PRN):  benzocaine 20% Spray 1 Spray(s) Mucosal three times a day PRN Sore throat  hydrocortisone 1% Cream 1 Application(s) Topical two times a day PRN Itching  HYDROmorphone  Injectable 0.5 milliGRAM(s) IV Push every 3 hours PRN Breakthrough Pain  naloxone Injectable 0.1 milliGRAM(s) IV Push every 3 minutes PRN For ANY of the following changes in patient status:  A. RR LESS THAN 10 breaths per minute, B. Oxygen saturation LESS THAN 90%, C. Sedation score of 6  ondansetron Injectable 4 milliGRAM(s) IV Push every 6 hours PRN Nausea  oxyCODONE    IR 15 milliGRAM(s) Oral every 4 hours PRN Moderate Pain (4 - 6)  oxyCODONE    IR 30 milliGRAM(s) Oral every 4 hours PRN Severe Pain (7 - 10)  sodium chloride 0.9% lock flush 10 milliLiter(s) IV Push every 1 hour PRN Pre/post blood products, medications, blood draw, and to maintain line patency      Vital Signs Last 24 Hrs  T(C): 37.1 (12-15-23 @ 05:12), Max: 37.1 (12-15-23 @ 05:12)  T(F): 98.8 (12-15-23 @ 05:12), Max: 98.8 (12-15-23 @ 05:12)  HR: 128 (12-15-23 @ 09:47) (89 - 128)  BP: 113/79 (12-15-23 @ 09:47) (103/76 - 128/87)  ABP: --  ABP(mean): --  RR: 17 (12-15-23 @ 09:47) (16 - 18)  SpO2: 95% (12-15-23 @ 05:12) (95% - 98%)      LABS:                                       10.3   9.35  )-----------( 238      ( 14 Dec 2023 05:27 )             31.9     12-14    139  |  103  |  8   ----------------------------<  146<H>  4.8   |  27  |  0.50    Ca    8.9      14 Dec 2023 19:16  Phos  2.5     12-14  Mg     2.4     12-14    TPro  x   /  Alb  3.0<L>  /  TBili  x   /  DBili  x   /  AST  x   /  ALT  x   /  AlkPhos  x   12-14        RADIOLOGY:    Drug Screen:      REVIEW OF SYSTEMS:  CONSTITUTIONAL: No fever   EYES: No visual disturbances  ENMT:  No difficulty hearing.  NECK: pain and stiffness  RESPIRATORY: No shortness of breath  CARDIOVASCULAR: No chest pain  GASTROINTESTINAL:  No nausea, vomiting.  NEUROLOGICAL: weakness in BLE with numbness in BLE  MUSCULOSKELETAL: Incisional back pain (improving)        PHYSICAL EXAM   GENERAL: Laying in bed, NAD, appears fatigued  Neuro: EOMI  Cranial nerves grossly intact  Motor exam: on exam patient was unable to move BLE upon request - no movement was noted in AD, EHL, plantarflexion, KE or KF  Sensation to light touch in BLE not present    CHEST/LUNG: on room air  ABDOMEN:  Nondistended

## 2023-12-16 LAB
ANION GAP SERPL CALC-SCNC: 13 MMOL/L — SIGNIFICANT CHANGE UP (ref 5–17)
ANION GAP SERPL CALC-SCNC: 13 MMOL/L — SIGNIFICANT CHANGE UP (ref 5–17)
BUN SERPL-MCNC: 9 MG/DL — SIGNIFICANT CHANGE UP (ref 7–23)
BUN SERPL-MCNC: 9 MG/DL — SIGNIFICANT CHANGE UP (ref 7–23)
CALCIUM SERPL-MCNC: 9.7 MG/DL — SIGNIFICANT CHANGE UP (ref 8.4–10.5)
CALCIUM SERPL-MCNC: 9.7 MG/DL — SIGNIFICANT CHANGE UP (ref 8.4–10.5)
CHLORIDE SERPL-SCNC: 104 MMOL/L — SIGNIFICANT CHANGE UP (ref 96–108)
CHLORIDE SERPL-SCNC: 104 MMOL/L — SIGNIFICANT CHANGE UP (ref 96–108)
CO2 SERPL-SCNC: 22 MMOL/L — SIGNIFICANT CHANGE UP (ref 22–31)
CO2 SERPL-SCNC: 22 MMOL/L — SIGNIFICANT CHANGE UP (ref 22–31)
CREAT SERPL-MCNC: 0.48 MG/DL — LOW (ref 0.5–1.3)
CREAT SERPL-MCNC: 0.48 MG/DL — LOW (ref 0.5–1.3)
EGFR: 102 ML/MIN/1.73M2 — SIGNIFICANT CHANGE UP
EGFR: 102 ML/MIN/1.73M2 — SIGNIFICANT CHANGE UP
GLUCOSE BLDC GLUCOMTR-MCNC: 113 MG/DL — HIGH (ref 70–99)
GLUCOSE BLDC GLUCOMTR-MCNC: 113 MG/DL — HIGH (ref 70–99)
GLUCOSE BLDC GLUCOMTR-MCNC: 128 MG/DL — HIGH (ref 70–99)
GLUCOSE BLDC GLUCOMTR-MCNC: 128 MG/DL — HIGH (ref 70–99)
GLUCOSE BLDC GLUCOMTR-MCNC: 149 MG/DL — HIGH (ref 70–99)
GLUCOSE BLDC GLUCOMTR-MCNC: 149 MG/DL — HIGH (ref 70–99)
GLUCOSE BLDC GLUCOMTR-MCNC: 163 MG/DL — HIGH (ref 70–99)
GLUCOSE BLDC GLUCOMTR-MCNC: 163 MG/DL — HIGH (ref 70–99)
GLUCOSE BLDC GLUCOMTR-MCNC: 172 MG/DL — HIGH (ref 70–99)
GLUCOSE BLDC GLUCOMTR-MCNC: 172 MG/DL — HIGH (ref 70–99)
GLUCOSE BLDC GLUCOMTR-MCNC: 99 MG/DL — SIGNIFICANT CHANGE UP (ref 70–99)
GLUCOSE BLDC GLUCOMTR-MCNC: 99 MG/DL — SIGNIFICANT CHANGE UP (ref 70–99)
GLUCOSE SERPL-MCNC: 107 MG/DL — HIGH (ref 70–99)
GLUCOSE SERPL-MCNC: 107 MG/DL — HIGH (ref 70–99)
HCT VFR BLD CALC: 42.3 % — SIGNIFICANT CHANGE UP (ref 34.5–45)
HCT VFR BLD CALC: 42.3 % — SIGNIFICANT CHANGE UP (ref 34.5–45)
HGB BLD-MCNC: 13.3 G/DL — SIGNIFICANT CHANGE UP (ref 11.5–15.5)
HGB BLD-MCNC: 13.3 G/DL — SIGNIFICANT CHANGE UP (ref 11.5–15.5)
MAGNESIUM SERPL-MCNC: 2 MG/DL — SIGNIFICANT CHANGE UP (ref 1.6–2.6)
MAGNESIUM SERPL-MCNC: 2 MG/DL — SIGNIFICANT CHANGE UP (ref 1.6–2.6)
MCHC RBC-ENTMCNC: 27.4 PG — SIGNIFICANT CHANGE UP (ref 27–34)
MCHC RBC-ENTMCNC: 27.4 PG — SIGNIFICANT CHANGE UP (ref 27–34)
MCHC RBC-ENTMCNC: 31.4 GM/DL — LOW (ref 32–36)
MCHC RBC-ENTMCNC: 31.4 GM/DL — LOW (ref 32–36)
MCV RBC AUTO: 87.2 FL — SIGNIFICANT CHANGE UP (ref 80–100)
MCV RBC AUTO: 87.2 FL — SIGNIFICANT CHANGE UP (ref 80–100)
NRBC # BLD: 0 /100 WBCS — SIGNIFICANT CHANGE UP (ref 0–0)
NRBC # BLD: 0 /100 WBCS — SIGNIFICANT CHANGE UP (ref 0–0)
NT-PROBNP SERPL-SCNC: <36 PG/ML — SIGNIFICANT CHANGE UP (ref 0–300)
NT-PROBNP SERPL-SCNC: <36 PG/ML — SIGNIFICANT CHANGE UP (ref 0–300)
PHOSPHATE SERPL-MCNC: 3.4 MG/DL — SIGNIFICANT CHANGE UP (ref 2.5–4.5)
PHOSPHATE SERPL-MCNC: 3.4 MG/DL — SIGNIFICANT CHANGE UP (ref 2.5–4.5)
PLATELET # BLD AUTO: 269 K/UL — SIGNIFICANT CHANGE UP (ref 150–400)
PLATELET # BLD AUTO: 269 K/UL — SIGNIFICANT CHANGE UP (ref 150–400)
POTASSIUM SERPL-MCNC: 4.7 MMOL/L — SIGNIFICANT CHANGE UP (ref 3.5–5.3)
POTASSIUM SERPL-MCNC: 4.7 MMOL/L — SIGNIFICANT CHANGE UP (ref 3.5–5.3)
POTASSIUM SERPL-SCNC: 4.7 MMOL/L — SIGNIFICANT CHANGE UP (ref 3.5–5.3)
POTASSIUM SERPL-SCNC: 4.7 MMOL/L — SIGNIFICANT CHANGE UP (ref 3.5–5.3)
RBC # BLD: 4.85 M/UL — SIGNIFICANT CHANGE UP (ref 3.8–5.2)
RBC # BLD: 4.85 M/UL — SIGNIFICANT CHANGE UP (ref 3.8–5.2)
RBC # FLD: 15.9 % — HIGH (ref 10.3–14.5)
RBC # FLD: 15.9 % — HIGH (ref 10.3–14.5)
SODIUM SERPL-SCNC: 139 MMOL/L — SIGNIFICANT CHANGE UP (ref 135–145)
SODIUM SERPL-SCNC: 139 MMOL/L — SIGNIFICANT CHANGE UP (ref 135–145)
WBC # BLD: 14.9 K/UL — HIGH (ref 3.8–10.5)
WBC # BLD: 14.9 K/UL — HIGH (ref 3.8–10.5)
WBC # FLD AUTO: 14.9 K/UL — HIGH (ref 3.8–10.5)
WBC # FLD AUTO: 14.9 K/UL — HIGH (ref 3.8–10.5)

## 2023-12-16 PROCEDURE — 71045 X-RAY EXAM CHEST 1 VIEW: CPT | Mod: 26

## 2023-12-16 PROCEDURE — 99024 POSTOP FOLLOW-UP VISIT: CPT

## 2023-12-16 PROCEDURE — 99232 SBSQ HOSP IP/OBS MODERATE 35: CPT

## 2023-12-16 RX ORDER — SODIUM CHLORIDE 9 MG/ML
500 INJECTION INTRAMUSCULAR; INTRAVENOUS; SUBCUTANEOUS ONCE
Refills: 0 | Status: COMPLETED | OUTPATIENT
Start: 2023-12-16 | End: 2023-12-16

## 2023-12-16 RX ORDER — SODIUM CHLORIDE 9 MG/ML
1000 INJECTION INTRAMUSCULAR; INTRAVENOUS; SUBCUTANEOUS ONCE
Refills: 0 | Status: COMPLETED | OUTPATIENT
Start: 2023-12-16 | End: 2023-12-16

## 2023-12-16 RX ADMIN — GABAPENTIN 800 MILLIGRAM(S): 400 CAPSULE ORAL at 01:13

## 2023-12-16 RX ADMIN — OXYCODONE HYDROCHLORIDE 30 MILLIGRAM(S): 5 TABLET ORAL at 12:06

## 2023-12-16 RX ADMIN — Medication 5 MILLIGRAM(S): at 01:13

## 2023-12-16 RX ADMIN — Medication 5 MILLIGRAM(S): at 09:30

## 2023-12-16 RX ADMIN — MIDODRINE HYDROCHLORIDE 15 MILLIGRAM(S): 2.5 TABLET ORAL at 07:03

## 2023-12-16 RX ADMIN — NALOXEGOL OXALATE 25 MILLIGRAM(S): 12.5 TABLET, FILM COATED ORAL at 13:11

## 2023-12-16 RX ADMIN — ENOXAPARIN SODIUM 40 MILLIGRAM(S): 100 INJECTION SUBCUTANEOUS at 21:54

## 2023-12-16 RX ADMIN — POLYETHYLENE GLYCOL 3350 17 GRAM(S): 17 POWDER, FOR SOLUTION ORAL at 17:16

## 2023-12-16 RX ADMIN — HYDROMORPHONE HYDROCHLORIDE 0.5 MILLIGRAM(S): 2 INJECTION INTRAMUSCULAR; INTRAVENOUS; SUBCUTANEOUS at 13:49

## 2023-12-16 RX ADMIN — Medication 5 MILLIGRAM(S): at 21:54

## 2023-12-16 RX ADMIN — SODIUM CHLORIDE 1000 MILLILITER(S): 9 INJECTION INTRAMUSCULAR; INTRAVENOUS; SUBCUTANEOUS at 16:06

## 2023-12-16 RX ADMIN — Medication 2: at 22:44

## 2023-12-16 RX ADMIN — OXYCODONE HYDROCHLORIDE 30 MILLIGRAM(S): 5 TABLET ORAL at 16:20

## 2023-12-16 RX ADMIN — MIDODRINE HYDROCHLORIDE 15 MILLIGRAM(S): 2.5 TABLET ORAL at 13:49

## 2023-12-16 RX ADMIN — SENNA PLUS 2 TABLET(S): 8.6 TABLET ORAL at 21:54

## 2023-12-16 RX ADMIN — Medication 5 MILLIGRAM(S): at 17:15

## 2023-12-16 RX ADMIN — DULOXETINE HYDROCHLORIDE 60 MILLIGRAM(S): 30 CAPSULE, DELAYED RELEASE ORAL at 12:06

## 2023-12-16 RX ADMIN — CHLORHEXIDINE GLUCONATE 1 APPLICATION(S): 213 SOLUTION TOPICAL at 06:40

## 2023-12-16 RX ADMIN — OXYCODONE HYDROCHLORIDE 30 MILLIGRAM(S): 5 TABLET ORAL at 17:15

## 2023-12-16 RX ADMIN — HYDROMORPHONE HYDROCHLORIDE 0.5 MILLIGRAM(S): 2 INJECTION INTRAMUSCULAR; INTRAVENOUS; SUBCUTANEOUS at 21:55

## 2023-12-16 RX ADMIN — SIMVASTATIN 40 MILLIGRAM(S): 20 TABLET, FILM COATED ORAL at 21:54

## 2023-12-16 RX ADMIN — OXYCODONE HYDROCHLORIDE 30 MILLIGRAM(S): 5 TABLET ORAL at 13:00

## 2023-12-16 RX ADMIN — GABAPENTIN 800 MILLIGRAM(S): 400 CAPSULE ORAL at 17:15

## 2023-12-16 RX ADMIN — GABAPENTIN 800 MILLIGRAM(S): 400 CAPSULE ORAL at 09:40

## 2023-12-16 RX ADMIN — MIDODRINE HYDROCHLORIDE 15 MILLIGRAM(S): 2.5 TABLET ORAL at 21:54

## 2023-12-16 RX ADMIN — Medication 1 TABLET(S): at 12:06

## 2023-12-16 RX ADMIN — SODIUM CHLORIDE 1000 MILLILITER(S): 9 INJECTION INTRAMUSCULAR; INTRAVENOUS; SUBCUTANEOUS at 03:50

## 2023-12-16 RX ADMIN — HYDROMORPHONE HYDROCHLORIDE 0.5 MILLIGRAM(S): 2 INJECTION INTRAMUSCULAR; INTRAVENOUS; SUBCUTANEOUS at 14:40

## 2023-12-16 RX ADMIN — PANTOPRAZOLE SODIUM 40 MILLIGRAM(S): 20 TABLET, DELAYED RELEASE ORAL at 07:03

## 2023-12-16 RX ADMIN — HYDROMORPHONE HYDROCHLORIDE 0.5 MILLIGRAM(S): 2 INJECTION INTRAMUSCULAR; INTRAVENOUS; SUBCUTANEOUS at 22:10

## 2023-12-16 NOTE — PROGRESS NOTE ADULT - ASSESSMENT
69 yo female with Hx HTN, HLD, DM, CVA x 3 (last in 2016 with residual R hand weakness), GAMALIEL not using CPAP, Emphysema, ex-25 pack year smoker now restarted, chronic constipation on Movantik, chronic urinary retention (SC at home), b/l CTS s/p release, s/p Intrathecal Morphine pump for pain, with chronic neck and back pain worsening for years s/p multiple spinal surgeries including laminectomies and fusion of cervical, thoracic and lumbar spine, s/p T3-L1 revision of prior fusion (with  on 8/17/22). Xray 11/16/23 showing broken rods/displacement. Now s/p C2-S2 instrumentation and fusion (12/5). S/p T9-L2 decompression w/ durotomy with repair (12/10/23). KAREEM grade A.     #Neck Pain  #Multiple Spine Surgeries  #Chronic Back Pain  -s/p fusion  -OT  -PAin Control  -Bowel Regimen   -Pending Rehab placement    #Chronic Constipation   - likely due to chronic opioid use  - c/w bowel regimen, Movantik if on formulary    #HTN  - c/w home Lopressor 75mg BID    #CAD  #CVA  - resume ASA post operatively when safe to do so  - c/w home atorvastatin  - awaiting results of recent long term outpatient cardiac monitoring to monitor for occult Afib to determine if a role for full dose AVC, follows with Dr Jeter    #DM  - f/u A1c, on Metformin 500mg BID at home  - ISS while inpatient, will evaluate for basal bolus needs    #tachycardia   Improved with IVF, suspect hypovolemia   - Can trial another small IVF bolus today   - CXR without any pulmonary edema 67 yo female with Hx HTN, HLD, DM, CVA x 3 (last in 2016 with residual R hand weakness), GAMALIEL not using CPAP, Emphysema, ex-25 pack year smoker now restarted, chronic constipation on Movantik, chronic urinary retention (SC at home), b/l CTS s/p release, s/p Intrathecal Morphine pump for pain, with chronic neck and back pain worsening for years s/p multiple spinal surgeries including laminectomies and fusion of cervical, thoracic and lumbar spine, s/p T3-L1 revision of prior fusion (with  on 8/17/22). Xray 11/16/23 showing broken rods/displacement. Now s/p C2-S2 instrumentation and fusion (12/5). S/p T9-L2 decompression w/ durotomy with repair (12/10/23). KAREEM grade A.     #Neck Pain  #Multiple Spine Surgeries  #Chronic Back Pain  -s/p fusion  -OT  -PAin Control  -Bowel Regimen   -Pending Rehab placement    #Chronic Constipation   - likely due to chronic opioid use  - c/w bowel regimen, Movantik if on formulary    #HTN  - c/w home Lopressor 75mg BID    #CAD  #CVA  - resume ASA post operatively when safe to do so  - c/w home atorvastatin  - awaiting results of recent long term outpatient cardiac monitoring to monitor for occult Afib to determine if a role for full dose AVC, follows with Dr Jeter    #DM  - f/u A1c, on Metformin 500mg BID at home  - ISS while inpatient, will evaluate for basal bolus needs    #tachycardia   Improved with IVF, suspect hypovolemia   - Can trial another small IVF bolus today   - CXR without any pulmonary edema

## 2023-12-16 NOTE — PROGRESS NOTE ADULT - SUBJECTIVE AND OBJECTIVE BOX
HPI:  66 y/o female with PMHx HTN, HLD, CVA x 3 (last was 2016 with right hand weakness residual), GAMALIEL not using CPAP, Emphysema, former 25 pack year smoker restarted this week of surgery, Chronic Constipation on Movantik, Urinary Incontinence chronically self straight cath at home, chronic UTI, Breast Implant Rupture with Chronic Leak repair 10/2023, B/L CTS s/p release, s/p Intrathecal Morphine pump for pain, with chronic neck and back pain worsening for years s/p multiple spinal surgeries including laminectomies and fusion of cervical, thoracic and lumbar spine initially done in 2009 and recently in 2019 and 2020 at [facility] by Dr. Trinidad, s/p T3-L1 revision of prior fusion (with Dr. Luz on 8/17/22). Outpatient Xray 11/16/2023 showing broken rods/displacement. Presents for elective C2-S2 instrumentation and fusion. Pt endorses weakness of b/l LE and burning pain in b/l lower extremities radiating to both feet,  (04 Dec 2023 15:44)      Subjective:  Pain controlled. Patient denies any acute complaints.    Hospital course:  12/4: Admitted for spinal fusion d/t damaged hardware.   12/5: Neuro stable. POD0 C2-S2 instrumentation/fusion (intraop b/l LE motor loss). on parmidner for MAP>90  12/6: POD1. KAMRAN o/n neuro stable. remains intubated. Extubated 6am. Precedex gtt prn. Remains on parminder gtt for MAP goal >90. Valium made prn. CT thoracic spine bc MRI unavailable. Attempted NGT, unsuccessful (resistance @ 35).   12/7: POD2 Given IV dilaudid 0.5mg for pain. Neuro exam stable. SQL started tongiht. Increased precedex to 1.0. Responds well to valium. Consulting psych given paranoia ?homicidal statements. Passed bedside dysphagia and tolerating PO meds. MRI complete, f/u read.   12/8: POD3 Pt reporting incisional pain, given dilaudid 0.5mg IV x2. Pt appearing anxious, given xanax 1mg. Neuro exam unchanged. Pain regimen increased to oxy 15/30 mg, pending further recs. Given 1 L bolus for tachycardia. Trops negative. 2 HMV drains removed.   12/9: POD4, KAMRAN, CT myelogram today showing block at T12-L1, OR tomorrow  12/10: POD5, KAMRAN, OR today for exploration of fusion, possible decompression T9-L2, revision instrumentation  POD 0 T9-L2 decompression. Pt returned from OR intubated. 50mcg fentanyl IVP x 2 for pain control and HTN. Magnesium repleted. Precedex gtt started for sedation. Decreased FiO2 to 40%. 1L bolus for soft MAP. Dc'd propofol and started levo gtt for MAP >90.   12/11: POD 6, POD 1. Extubated, satting well on RA. Resumed home valium and gabapentin. Castillo d/c'd, pending TOV. Seroquel started for agitation, R IJ removed, LUE double midline placed. Started SQL.   12/12: POD7, POD2, given 500 cc bolus and started on phenylephrine for MAP goal >90. Started midodrine 10mg q8h. Cardura dc'd d/t hypotension. Given 250cc of albumin for increasing pressor requirements. Given lactulose, pending BM. Hgb 7.6<8.3, given 1u PRBC. Protected sleep time. Urology consulted for possible suprapubic cath, recommend self intermittent cath is optimal.   12/13: POD8, POD3 Midodrine increased to 15q8. Pt refused to take the extra 5mg midodrin ordered. Neuro exam stable. hgb 10 from 9.4 after 1u pRBCs, Pt refused AM meds, given in late morning, CTAP ordered for L sided abd pain, fleet enema, restarted home movantik and ordere fleet enema for constipation. YIFAN and HMV dc'd. 250cc albumin x1  12/14: POD9, POD4, KAMRAN overnight, neuro stable. MAP goal liberalized to > 65. Stepdown status.   12/15: POD10, POD5, KAMRAN overnight  12/16: POD11, POD6, KAMRAN overnight, neuro stable    Vital Signs Last 24 Hrs  T(C): 37.1 (15 Dec 2023 21:00), Max: 37.4 (15 Dec 2023 17:12)  T(F): 98.7 (15 Dec 2023 21:00), Max: 99.4 (15 Dec 2023 17:12)  HR: 103 (15 Dec 2023 21:00) (89 - 128)  BP: 123/81 (15 Dec 2023 21:00) (104/63 - 128/87)  BP(mean): 87 (15 Dec 2023 13:32) (87 - 87)  RR: 16 (15 Dec 2023 21:00) (16 - 19)  SpO2: 97% (15 Dec 2023 21:00) (95% - 98%)    Parameters below as of 15 Dec 2023 21:00  Patient On (Oxygen Delivery Method): room air        I&O's Summary    14 Dec 2023 07:01  -  15 Dec 2023 07:00  --------------------------------------------------------  IN: 25 mL / OUT: 2175 mL / NET: -2150 mL    15 Dec 2023 07:01  -  16 Dec 2023 00:45  --------------------------------------------------------  IN: 0 mL / OUT: 1575 mL / NET: -1575 mL        PHYSICAL EXAM:  General: patient seen laying supine in bed in NAD  Neuro: AAOx3, follows commands, opens eyes spontaneously, speech clear and fluent, face symmetric,  strength 5/5 b/l upper extremities and 0/5 lower extremities, decreased sensation below T11  HEENT: PERRL  Neck: supple  Cardiac: RRR, S1S2  Pulmonary: chest rise symmetric  Abdomen: soft, nontender, nondistended  Ext: perfusing well  Skin: warm, dry  Wound: thoracolumbar incision aquacel dressing c/d/i            DIET:  [] NPO  [x] Mechanical  [] Tube feeds    LABS:                        10.3   9.35  )-----------( 238      ( 14 Dec 2023 05:27 )             31.9     12-14    139  |  103  |  8   ----------------------------<  146<H>  4.8   |  27  |  0.50    Ca    8.9      14 Dec 2023 19:16  Phos  2.5     12-14  Mg     2.4     12-14    TPro  x   /  Alb  3.0<L>  /  TBili  x   /  DBili  x   /  AST  x   /  ALT  x   /  AlkPhos  x   12-14      Urinalysis Basic - ( 14 Dec 2023 19:16 )    Color: x / Appearance: x / SG: x / pH: x  Gluc: 146 mg/dL / Ketone: x  / Bili: x / Urobili: x   Blood: x / Protein: x / Nitrite: x   Leuk Esterase: x / RBC: x / WBC x   Sq Epi: x / Non Sq Epi: x / Bacteria: x          CAPILLARY BLOOD GLUCOSE      POCT Blood Glucose.: 128 mg/dL (16 Dec 2023 00:04)  POCT Blood Glucose.: 155 mg/dL (15 Dec 2023 22:28)  POCT Blood Glucose.: 149 mg/dL (15 Dec 2023 17:30)  POCT Blood Glucose.: 129 mg/dL (15 Dec 2023 11:54)  POCT Blood Glucose.: 85 mg/dL (15 Dec 2023 06:39)      Drug Levels: [] N/A    CSF Analysis: [] N/A      Allergies    Crab (Pruritus)  No Known Drug Allergies    Intolerances      MEDICATIONS:  Antibiotics:    Neuro:  diazepam    Tablet 5 milliGRAM(s) Oral every 8 hours  DULoxetine 60 milliGRAM(s) Oral daily  gabapentin 800 milliGRAM(s) Oral every 8 hours  HYDROmorphone  Injectable 0.5 milliGRAM(s) IV Push every 3 hours PRN  ondansetron Injectable 4 milliGRAM(s) IV Push every 6 hours PRN  oxyCODONE    IR 15 milliGRAM(s) Oral every 4 hours PRN  oxyCODONE    IR 30 milliGRAM(s) Oral every 4 hours PRN    Anticoagulation:  enoxaparin Injectable 40 milliGRAM(s) SubCutaneous <User Schedule>    OTHER:  benzocaine 20% Spray 1 Spray(s) Mucosal three times a day PRN  bisacodyl 5 milliGRAM(s) Oral at bedtime  chlorhexidine 2% Cloths 1 Application(s) Topical <User Schedule>  hydrocortisone 1% Cream 1 Application(s) Topical two times a day PRN  influenza  Vaccine (HIGH DOSE) 0.7 milliLiter(s) IntraMuscular once  insulin lispro (ADMELOG) corrective regimen sliding scale   SubCutaneous Before meals and at bedtime  midodrine 15 milliGRAM(s) Oral every 8 hours  Morphine 1 milliGRAM / mL 17.9 mL,Morphine 1 milliGRAM / mL 17.9 mL,Morphine 1 milliGRAM / mL 17.9 mL 17.9 mL IntraThecal Continuous Pump  naloxegol 25 milliGRAM(s) Oral daily  naloxone Injectable 0.1 milliGRAM(s) IV Push every 3 minutes PRN  pantoprazole    Tablet 40 milliGRAM(s) Oral before breakfast  polyethylene glycol 3350 17 Gram(s) Oral two times a day  senna 2 Tablet(s) Oral at bedtime  simvastatin 40 milliGRAM(s) Oral at bedtime    IVF:  multivitamin 1 Tablet(s) Oral daily    CULTURES:    RADIOLOGY & ADDITIONAL TESTS:    S12.9XXA    Handoff    MEWS Score    HTN (hypertension)    Back pain    Hypertension    SS (spinal stenosis)    High cholesterol    Stroke    H/O carpal tunnel syndrome    H/O carpal tunnel syndrome    Chronic GERD    History of chronic constipation    Seizure    Urinary incontinence    Pre-diabetes    Acute UTI    Anxiety    Anxiety and depression    Arthritis    Eczema    External hemorrhoids    H/O kyphosis    Multiple sclerosis    Pancreas cyst    Scoliosis    Wheelchair dependent    Cervical pseudoarthrosis    Right shoulder pain    Cervical spondylosis    Chronic headaches    Chronic LUQ pain    Chronic UTI    Degenerative joint disease    H/O low back pain    Lumbosacral spondylosis    COPD (chronic obstructive pulmonary disease)    Back pain    Back pain    Spinal cord injury, thoracic (T7-T12)    Back pain    Spinal cord injury of thoracic region without bone injury    Delirium    Revision of posterolateral fusion of lumbar spine    Revision of fusion of thoracic spine    Decompression, spinal cord, thoracic, posterolateral approach    Cervical disc disease    H/O Spinal surgery    H/O breast surgery    S/P cervical discectomy    Previous back surgery    H/O shoulder surgery    H/O total shoulder replacement, right    SysAdmin_[email or web address]        ASSESSMENT:  69 yo female with Hx HTN, HLD, DM, CVA x 3 (last in 2016 with residual R hand weakness), GAMALIEL not using CPAP, Emphysema, ex-25 pack year smoker now restarted, chronic constipation on Movantik, chronic urinary retention (SC at home), b/l CTS s/p release, s/p Intrathecal Morphine pump for pain, with chronic neck and back pain worsening for years s/p multiple spinal surgeries including laminectomies and fusion of cervical, thoracic and lumbar spine, s/p T3-L1 revision of prior fusion (with  on 8/17/22). Xray 11/16/23 showing broken rods/displacement. Now s/p C2-S2 instrumentation and fusion (12/5). S/p T9-L2 decompression w/ durotomy with repair (12/10/23). KAREEM grade A.     Plan:  Neuro:  - Neuro/vitals q4  - pain control: modified ERAS, IT morphine pump, oxycodone 15/30 prn, valium and home gabapentin 800 TID resumed  - Continue home cymbalta 60mg daily   - CT thoracic spine 12/6: postop changes  - protected sleep time 10PM-4AM     Cardio:  - MAP > 65   - midodrine 15q8   - C/w home simvastatin     Pulm:  - RA   - hx GAMALIEL, no CPAP at home    GI:  - CCD   - Bowel regimen, home movantik 25 mg   - CT A/P = impacted stool   - last BM 12/14  - GERD: continue home protonix    Renal:  - SC q6    Endo:  - ISS, a1c: 7    Heme:  - SCDs DVT ppx, SQL  - 500 cell saver intraop 12/5, 1u PRBC intraop 12/10, 1u PRBC 12/12    ID:  - afebrile    Dispo:  - SDU status, full code, pending AR    D/w Dr. Luz     Assessment:  Present when checked    []  GCS  E   V  M     Heart Failure: []Acute, [] acute on chronic , []chronic  Heart Failure:  [] Diastolic (HFpEF), [] Systolic (HFrEF), []Combined (HFpEF and HFrEF), [] RHF, [] Pulm HTN, [] Other    [] DARRYL, [] ATN, [] AIN, [] other  [] CKD1, [] CKD2, [] CKD 3, [] CKD 4, [] CKD 5, []ESRD    Encephalopathy: [] Metabolic, [] Hepatic, [] toxic, [] Neurological, [] Other    Abnormal Nurtitional Status: [] malnurtition (see nutrition note), [ ]underweight: BMI < 19, [] morbid obesity: BMI >40, [] Cachexia    [] Sepsis  [] hypovolemic shock,[] cardiogenic shock, [] hemorrhagic shock, [] neuogenic shock  [] Acute Respiratory Failure  []Cerebral edema, [] Brain compression/ herniation,   [] Functional quadriplegia  [] Acute blood loss anemia   HPI:  66 y/o female with PMHx HTN, HLD, CVA x 3 (last was 2016 with right hand weakness residual), GAMALIEL not using CPAP, Emphysema, former 25 pack year smoker restarted this week of surgery, Chronic Constipation on Movantik, Urinary Incontinence chronically self straight cath at home, chronic UTI, Breast Implant Rupture with Chronic Leak repair 10/2023, B/L CTS s/p release, s/p Intrathecal Morphine pump for pain, with chronic neck and back pain worsening for years s/p multiple spinal surgeries including laminectomies and fusion of cervical, thoracic and lumbar spine initially done in 2009 and recently in 2019 and 2020 at [facility] by Dr. Trinidad, s/p T3-L1 revision of prior fusion (with Dr. Luz on 8/17/22). Outpatient Xray 11/16/2023 showing broken rods/displacement. Presents for elective C2-S2 instrumentation and fusion. Pt endorses weakness of b/l LE and burning pain in b/l lower extremities radiating to both feet,  (04 Dec 2023 15:44)      Subjective:  Pain controlled. Patient denies any acute complaints.    Hospital course:  12/4: Admitted for spinal fusion d/t damaged hardware.   12/5: Neuro stable. POD0 C2-S2 instrumentation/fusion (intraop b/l LE motor loss). on parminder for MAP>90  12/6: POD1. KAMRAN o/n neuro stable. remains intubated. Extubated 6am. Precedex gtt prn. Remains on parminder gtt for MAP goal >90. Valium made prn. CT thoracic spine bc MRI unavailable. Attempted NGT, unsuccessful (resistance @ 35).   12/7: POD2 Given IV dilaudid 0.5mg for pain. Neuro exam stable. SQL started tongiht. Increased precedex to 1.0. Responds well to valium. Consulting psych given paranoia ?homicidal statements. Passed bedside dysphagia and tolerating PO meds. MRI complete, f/u read.   12/8: POD3 Pt reporting incisional pain, given dilaudid 0.5mg IV x2. Pt appearing anxious, given xanax 1mg. Neuro exam unchanged. Pain regimen increased to oxy 15/30 mg, pending further recs. Given 1 L bolus for tachycardia. Trops negative. 2 HMV drains removed.   12/9: POD4, KAMRAN, CT myelogram today showing block at T12-L1, OR tomorrow  12/10: POD5, KAMRAN, OR today for exploration of fusion, possible decompression T9-L2, revision instrumentation  POD 0 T9-L2 decompression. Pt returned from OR intubated. 50mcg fentanyl IVP x 2 for pain control and HTN. Magnesium repleted. Precedex gtt started for sedation. Decreased FiO2 to 40%. 1L bolus for soft MAP. Dc'd propofol and started levo gtt for MAP >90.   12/11: POD 6, POD 1. Extubated, satting well on RA. Resumed home valium and gabapentin. Castillo d/c'd, pending TOV. Seroquel started for agitation, R IJ removed, LUE double midline placed. Started SQL.   12/12: POD7, POD2, given 500 cc bolus and started on phenylephrine for MAP goal >90. Started midodrine 10mg q8h. Cardura dc'd d/t hypotension. Given 250cc of albumin for increasing pressor requirements. Given lactulose, pending BM. Hgb 7.6<8.3, given 1u PRBC. Protected sleep time. Urology consulted for possible suprapubic cath, recommend self intermittent cath is optimal.   12/13: POD8, POD3 Midodrine increased to 15q8. Pt refused to take the extra 5mg midodrin ordered. Neuro exam stable. hgb 10 from 9.4 after 1u pRBCs, Pt refused AM meds, given in late morning, CTAP ordered for L sided abd pain, fleet enema, restarted home movantik and ordere fleet enema for constipation. YIFAN and HMV dc'd. 250cc albumin x1  12/14: POD9, POD4, KAMRAN overnight, neuro stable. MAP goal liberalized to > 65. Stepdown status.   12/15: POD10, POD5, KAMRAN overnight  12/16: POD11, POD6, KAMRAN overnight, neuro stable    Vital Signs Last 24 Hrs  T(C): 37.1 (15 Dec 2023 21:00), Max: 37.4 (15 Dec 2023 17:12)  T(F): 98.7 (15 Dec 2023 21:00), Max: 99.4 (15 Dec 2023 17:12)  HR: 103 (15 Dec 2023 21:00) (89 - 128)  BP: 123/81 (15 Dec 2023 21:00) (104/63 - 128/87)  BP(mean): 87 (15 Dec 2023 13:32) (87 - 87)  RR: 16 (15 Dec 2023 21:00) (16 - 19)  SpO2: 97% (15 Dec 2023 21:00) (95% - 98%)    Parameters below as of 15 Dec 2023 21:00  Patient On (Oxygen Delivery Method): room air        I&O's Summary    14 Dec 2023 07:01  -  15 Dec 2023 07:00  --------------------------------------------------------  IN: 25 mL / OUT: 2175 mL / NET: -2150 mL    15 Dec 2023 07:01  -  16 Dec 2023 00:45  --------------------------------------------------------  IN: 0 mL / OUT: 1575 mL / NET: -1575 mL        PHYSICAL EXAM:  General: patient seen laying supine in bed in NAD  Neuro: AAOx3, follows commands, opens eyes spontaneously, speech clear and fluent, face symmetric,  strength 5/5 b/l upper extremities and 0/5 lower extremities, decreased sensation below T11  HEENT: PERRL  Neck: supple  Cardiac: RRR, S1S2  Pulmonary: chest rise symmetric  Abdomen: soft, nontender, nondistended  Ext: perfusing well  Skin: warm, dry  Wound: thoracolumbar incision aquacel dressing c/d/i            DIET:  [] NPO  [x] Mechanical  [] Tube feeds    LABS:                        10.3   9.35  )-----------( 238      ( 14 Dec 2023 05:27 )             31.9     12-14    139  |  103  |  8   ----------------------------<  146<H>  4.8   |  27  |  0.50    Ca    8.9      14 Dec 2023 19:16  Phos  2.5     12-14  Mg     2.4     12-14    TPro  x   /  Alb  3.0<L>  /  TBili  x   /  DBili  x   /  AST  x   /  ALT  x   /  AlkPhos  x   12-14      Urinalysis Basic - ( 14 Dec 2023 19:16 )    Color: x / Appearance: x / SG: x / pH: x  Gluc: 146 mg/dL / Ketone: x  / Bili: x / Urobili: x   Blood: x / Protein: x / Nitrite: x   Leuk Esterase: x / RBC: x / WBC x   Sq Epi: x / Non Sq Epi: x / Bacteria: x          CAPILLARY BLOOD GLUCOSE      POCT Blood Glucose.: 128 mg/dL (16 Dec 2023 00:04)  POCT Blood Glucose.: 155 mg/dL (15 Dec 2023 22:28)  POCT Blood Glucose.: 149 mg/dL (15 Dec 2023 17:30)  POCT Blood Glucose.: 129 mg/dL (15 Dec 2023 11:54)  POCT Blood Glucose.: 85 mg/dL (15 Dec 2023 06:39)      Drug Levels: [] N/A    CSF Analysis: [] N/A      Allergies    Crab (Pruritus)  No Known Drug Allergies    Intolerances      MEDICATIONS:  Antibiotics:    Neuro:  diazepam    Tablet 5 milliGRAM(s) Oral every 8 hours  DULoxetine 60 milliGRAM(s) Oral daily  gabapentin 800 milliGRAM(s) Oral every 8 hours  HYDROmorphone  Injectable 0.5 milliGRAM(s) IV Push every 3 hours PRN  ondansetron Injectable 4 milliGRAM(s) IV Push every 6 hours PRN  oxyCODONE    IR 15 milliGRAM(s) Oral every 4 hours PRN  oxyCODONE    IR 30 milliGRAM(s) Oral every 4 hours PRN    Anticoagulation:  enoxaparin Injectable 40 milliGRAM(s) SubCutaneous <User Schedule>    OTHER:  benzocaine 20% Spray 1 Spray(s) Mucosal three times a day PRN  bisacodyl 5 milliGRAM(s) Oral at bedtime  chlorhexidine 2% Cloths 1 Application(s) Topical <User Schedule>  hydrocortisone 1% Cream 1 Application(s) Topical two times a day PRN  influenza  Vaccine (HIGH DOSE) 0.7 milliLiter(s) IntraMuscular once  insulin lispro (ADMELOG) corrective regimen sliding scale   SubCutaneous Before meals and at bedtime  midodrine 15 milliGRAM(s) Oral every 8 hours  Morphine 1 milliGRAM / mL 17.9 mL,Morphine 1 milliGRAM / mL 17.9 mL,Morphine 1 milliGRAM / mL 17.9 mL 17.9 mL IntraThecal Continuous Pump  naloxegol 25 milliGRAM(s) Oral daily  naloxone Injectable 0.1 milliGRAM(s) IV Push every 3 minutes PRN  pantoprazole    Tablet 40 milliGRAM(s) Oral before breakfast  polyethylene glycol 3350 17 Gram(s) Oral two times a day  senna 2 Tablet(s) Oral at bedtime  simvastatin 40 milliGRAM(s) Oral at bedtime    IVF:  multivitamin 1 Tablet(s) Oral daily    CULTURES:    RADIOLOGY & ADDITIONAL TESTS:    S12.9XXA    Handoff    MEWS Score    HTN (hypertension)    Back pain    Hypertension    SS (spinal stenosis)    High cholesterol    Stroke    H/O carpal tunnel syndrome    H/O carpal tunnel syndrome    Chronic GERD    History of chronic constipation    Seizure    Urinary incontinence    Pre-diabetes    Acute UTI    Anxiety    Anxiety and depression    Arthritis    Eczema    External hemorrhoids    H/O kyphosis    Multiple sclerosis    Pancreas cyst    Scoliosis    Wheelchair dependent    Cervical pseudoarthrosis    Right shoulder pain    Cervical spondylosis    Chronic headaches    Chronic LUQ pain    Chronic UTI    Degenerative joint disease    H/O low back pain    Lumbosacral spondylosis    COPD (chronic obstructive pulmonary disease)    Back pain    Back pain    Spinal cord injury, thoracic (T7-T12)    Back pain    Spinal cord injury of thoracic region without bone injury    Delirium    Revision of posterolateral fusion of lumbar spine    Revision of fusion of thoracic spine    Decompression, spinal cord, thoracic, posterolateral approach    Cervical disc disease    H/O Spinal surgery    H/O breast surgery    S/P cervical discectomy    Previous back surgery    H/O shoulder surgery    H/O total shoulder replacement, right    SysAdmin_VstLnk        ASSESSMENT:  69 yo female with Hx HTN, HLD, DM, CVA x 3 (last in 2016 with residual R hand weakness), GAMALIEL not using CPAP, Emphysema, ex-25 pack year smoker now restarted, chronic constipation on Movantik, chronic urinary retention (SC at home), b/l CTS s/p release, s/p Intrathecal Morphine pump for pain, with chronic neck and back pain worsening for years s/p multiple spinal surgeries including laminectomies and fusion of cervical, thoracic and lumbar spine, s/p T3-L1 revision of prior fusion (with  on 8/17/22). Xray 11/16/23 showing broken rods/displacement. Now s/p C2-S2 instrumentation and fusion (12/5). S/p T9-L2 decompression w/ durotomy with repair (12/10/23). KAREEM grade A.     Plan:  Neuro:  - Neuro/vitals q4  - pain control: modified ERAS, IT morphine pump, oxycodone 15/30 prn, valium and home gabapentin 800 TID resumed  - Continue home cymbalta 60mg daily   - CT thoracic spine 12/6: postop changes  - protected sleep time 10PM-4AM     Cardio:  - MAP > 65   - midodrine 15q8   - C/w home simvastatin     Pulm:  - RA   - hx GAMALIEL, no CPAP at home    GI:  - CCD   - Bowel regimen, home movantik 25 mg   - CT A/P = impacted stool   - last BM 12/14  - GERD: continue home protonix    Renal:  - SC q6    Endo:  - ISS, a1c: 7    Heme:  - SCDs DVT ppx, SQL  - 500 cell saver intraop 12/5, 1u PRBC intraop 12/10, 1u PRBC 12/12    ID:  - afebrile    Dispo:  - SDU status, full code, pending AR    D/w Dr. Luz     Assessment:  Present when checked    []  GCS  E   V  M     Heart Failure: []Acute, [] acute on chronic , []chronic  Heart Failure:  [] Diastolic (HFpEF), [] Systolic (HFrEF), []Combined (HFpEF and HFrEF), [] RHF, [] Pulm HTN, [] Other    [] DARRYL, [] ATN, [] AIN, [] other  [] CKD1, [] CKD2, [] CKD 3, [] CKD 4, [] CKD 5, []ESRD    Encephalopathy: [] Metabolic, [] Hepatic, [] toxic, [] Neurological, [] Other    Abnormal Nurtitional Status: [] malnurtition (see nutrition note), [ ]underweight: BMI < 19, [] morbid obesity: BMI >40, [] Cachexia    [] Sepsis  [] hypovolemic shock,[] cardiogenic shock, [] hemorrhagic shock, [] neuogenic shock  [] Acute Respiratory Failure  []Cerebral edema, [] Brain compression/ herniation,   [] Functional quadriplegia  [] Acute blood loss anemia

## 2023-12-16 NOTE — PROGRESS NOTE ADULT - SUBJECTIVE AND OBJECTIVE BOX
INTERVAL EVENTS: patient with some mild SOB    PAST MEDICAL & SURGICAL HISTORY:  HTN (hypertension)    Back pain    Hypertension    SS (spinal stenosis)    High cholesterol    Stroke  x3    H/O carpal tunnel syndrome    H/O carpal tunnel syndrome  Bilateral    Chronic GERD    History of chronic constipation    Seizure    Urinary incontinence  self cath as needed    Pre-diabetes    Acute UTI    Anxiety    Anxiety and depression    Arthritis  Left shoulder    Eczema    External hemorrhoids    H/O kyphosis    Multiple sclerosis    Pancreas cyst    Scoliosis    Wheelchair dependent    Cervical pseudoarthrosis  and lumbar spine    Right shoulder pain    Cervical spondylosis    Chronic headaches    Chronic LUQ pain    Chronic UTI    Degenerative joint disease  left shoulder    H/O low back pain    Lumbosacral spondylosis    COPD (chronic obstructive pulmonary disease)    Cervical disc disease  cervical sx    H/O Spinal surgery  lumbar x 30    H/O breast surgery  left breast implant 1980's    S/P cervical discectomy  x4    Previous back surgery    H/O shoulder surgery    H/O total shoulder replacement, right        MEDICATIONS  (STANDING):  bisacodyl 5 milliGRAM(s) Oral at bedtime  chlorhexidine 2% Cloths 1 Application(s) Topical <User Schedule>  diazepam    Tablet 5 milliGRAM(s) Oral every 8 hours  DULoxetine 60 milliGRAM(s) Oral daily  enoxaparin Injectable 40 milliGRAM(s) SubCutaneous <User Schedule>  gabapentin 800 milliGRAM(s) Oral every 8 hours  influenza  Vaccine (HIGH DOSE) 0.7 milliLiter(s) IntraMuscular once  insulin lispro (ADMELOG) corrective regimen sliding scale   SubCutaneous Before meals and at bedtime  midodrine 15 milliGRAM(s) Oral every 8 hours  Morphine 1 milliGRAM / mL 17.9 mL,Morphine 1 milliGRAM / mL 17.9 mL,Morphine 1 milliGRAM / mL 17.9 mL 17.9 mL IntraThecal Continuous Pump  multivitamin 1 Tablet(s) Oral daily  naloxegol 25 milliGRAM(s) Oral daily  pantoprazole    Tablet 40 milliGRAM(s) Oral before breakfast  polyethylene glycol 3350 17 Gram(s) Oral two times a day  senna 2 Tablet(s) Oral at bedtime  simvastatin 40 milliGRAM(s) Oral at bedtime    MEDICATIONS  (PRN):  benzocaine 20% Spray 1 Spray(s) Mucosal three times a day PRN Sore throat  hydrocortisone 1% Cream 1 Application(s) Topical two times a day PRN Itching  HYDROmorphone  Injectable 0.5 milliGRAM(s) IV Push every 3 hours PRN Breakthrough Pain  naloxone Injectable 0.1 milliGRAM(s) IV Push every 3 minutes PRN For ANY of the following changes in patient status:  A. RR LESS THAN 10 breaths per minute, B. Oxygen saturation LESS THAN 90%, C. Sedation score of 6  ondansetron Injectable 4 milliGRAM(s) IV Push every 6 hours PRN Nausea  oxyCODONE    IR 15 milliGRAM(s) Oral every 4 hours PRN Moderate Pain (4 - 6)  oxyCODONE    IR 30 milliGRAM(s) Oral every 4 hours PRN Severe Pain (7 - 10)  sodium chloride 0.9% lock flush 10 milliLiter(s) IV Push every 1 hour PRN Pre/post blood products, medications, blood draw, and to maintain line patency    T(F): 98.3 (12-16-23 @ 12:05), Max: 99.4 (12-15-23 @ 17:12)  HR: 119 (12-16-23 @ 12:05) (94 - 124)  BP: 131/89 (12-16-23 @ 12:05) (104/63 - 136/92)  BP(mean): --  ABP: --  ABP(mean): --  RR: 19 (12-16-23 @ 12:05) (16 - 19)  SpO2: 96% (12-16-23 @ 12:05) (94% - 98%)    I/O Detail 24H    15 Dec 2023 07:01  -  16 Dec 2023 07:00  --------------------------------------------------------  IN:    Sodium Chloride 0.9% Bolus: 500 mL  Total IN: 500 mL    OUT:    Intermittent Catheterization - Urethral (mL): 2675 mL  Total OUT: 2675 mL    Total NET: -2175 mL      16 Dec 2023 07:01  -  16 Dec 2023 13:46  --------------------------------------------------------  IN:  Total IN: 0 mL    OUT:    Intermittent Catheterization - Urethral (mL): 775 mL  Total OUT: 775 mL    Total NET: -775 mL          PHYSICAL EXAM:  General: A&Ox3; NAD  Head: NC/AT; PERRL; EOMI; anicteric sclera  Neck: Supple; no JVD  Respiratory: CTA B/L; no wheezes/crackles/rales auscultated w/ good air movement  Cardiovascular: Regular rhythm/rate; S1/S2; no gallops or murmurs auscultated  Gastrointestinal: Soft; NTND w/out rebound tenderness or guarding; bowel sounds normal  Extremities: WWP; no edema or cyanosis; radial/pedal pulses palpable  Neurological:  CNII-XII grossly intact; paralysis in b/l lower extremities   Skin: No rashes noted  Vasc: +2 DP/PT pulses b/l   Psych: Appropriate Affect    LABS:  CBC 12-16-23 @ 08:45                        13.3   14.90 )-----------( 269                   42.3       Hgb trend: 13.3 <-- , 10.3 <--   WBC trend: 14.90 <-- , 9.35 <--       CMP 12-16-23 @ 08:45    139  |  104  |  9   ----------------------------<  107<H>  4.7   |  22  |  0.48<L>    Ca    9.7      12-16-23 @ 08:45  Phos  3.4     12-16  Mg     2.0     12-16        Serum Cr trend: 0.48 <-- , 0.50 <-- , 0.53 <-- , 0.34 <--         Cardiac Markers           STUDIES:

## 2023-12-17 LAB
ANION GAP SERPL CALC-SCNC: 11 MMOL/L — SIGNIFICANT CHANGE UP (ref 5–17)
ANION GAP SERPL CALC-SCNC: 11 MMOL/L — SIGNIFICANT CHANGE UP (ref 5–17)
BUN SERPL-MCNC: 8 MG/DL — SIGNIFICANT CHANGE UP (ref 7–23)
BUN SERPL-MCNC: 8 MG/DL — SIGNIFICANT CHANGE UP (ref 7–23)
CALCIUM SERPL-MCNC: 9.3 MG/DL — SIGNIFICANT CHANGE UP (ref 8.4–10.5)
CALCIUM SERPL-MCNC: 9.3 MG/DL — SIGNIFICANT CHANGE UP (ref 8.4–10.5)
CHLORIDE SERPL-SCNC: 101 MMOL/L — SIGNIFICANT CHANGE UP (ref 96–108)
CHLORIDE SERPL-SCNC: 101 MMOL/L — SIGNIFICANT CHANGE UP (ref 96–108)
CO2 SERPL-SCNC: 24 MMOL/L — SIGNIFICANT CHANGE UP (ref 22–31)
CO2 SERPL-SCNC: 24 MMOL/L — SIGNIFICANT CHANGE UP (ref 22–31)
CREAT SERPL-MCNC: 0.45 MG/DL — LOW (ref 0.5–1.3)
CREAT SERPL-MCNC: 0.45 MG/DL — LOW (ref 0.5–1.3)
EGFR: 104 ML/MIN/1.73M2 — SIGNIFICANT CHANGE UP
EGFR: 104 ML/MIN/1.73M2 — SIGNIFICANT CHANGE UP
GLUCOSE BLDC GLUCOMTR-MCNC: 108 MG/DL — HIGH (ref 70–99)
GLUCOSE BLDC GLUCOMTR-MCNC: 108 MG/DL — HIGH (ref 70–99)
GLUCOSE BLDC GLUCOMTR-MCNC: 114 MG/DL — HIGH (ref 70–99)
GLUCOSE BLDC GLUCOMTR-MCNC: 114 MG/DL — HIGH (ref 70–99)
GLUCOSE BLDC GLUCOMTR-MCNC: 127 MG/DL — HIGH (ref 70–99)
GLUCOSE BLDC GLUCOMTR-MCNC: 127 MG/DL — HIGH (ref 70–99)
GLUCOSE BLDC GLUCOMTR-MCNC: 142 MG/DL — HIGH (ref 70–99)
GLUCOSE BLDC GLUCOMTR-MCNC: 142 MG/DL — HIGH (ref 70–99)
GLUCOSE SERPL-MCNC: 106 MG/DL — HIGH (ref 70–99)
GLUCOSE SERPL-MCNC: 106 MG/DL — HIGH (ref 70–99)
HCT VFR BLD CALC: 34.6 % — SIGNIFICANT CHANGE UP (ref 34.5–45)
HCT VFR BLD CALC: 34.6 % — SIGNIFICANT CHANGE UP (ref 34.5–45)
HGB BLD-MCNC: 10.8 G/DL — LOW (ref 11.5–15.5)
HGB BLD-MCNC: 10.8 G/DL — LOW (ref 11.5–15.5)
MAGNESIUM SERPL-MCNC: 1.9 MG/DL — SIGNIFICANT CHANGE UP (ref 1.6–2.6)
MAGNESIUM SERPL-MCNC: 1.9 MG/DL — SIGNIFICANT CHANGE UP (ref 1.6–2.6)
MCHC RBC-ENTMCNC: 27.4 PG — SIGNIFICANT CHANGE UP (ref 27–34)
MCHC RBC-ENTMCNC: 27.4 PG — SIGNIFICANT CHANGE UP (ref 27–34)
MCHC RBC-ENTMCNC: 31.2 GM/DL — LOW (ref 32–36)
MCHC RBC-ENTMCNC: 31.2 GM/DL — LOW (ref 32–36)
MCV RBC AUTO: 87.8 FL — SIGNIFICANT CHANGE UP (ref 80–100)
MCV RBC AUTO: 87.8 FL — SIGNIFICANT CHANGE UP (ref 80–100)
NRBC # BLD: 0 /100 WBCS — SIGNIFICANT CHANGE UP (ref 0–0)
NRBC # BLD: 0 /100 WBCS — SIGNIFICANT CHANGE UP (ref 0–0)
PHOSPHATE SERPL-MCNC: 3.1 MG/DL — SIGNIFICANT CHANGE UP (ref 2.5–4.5)
PHOSPHATE SERPL-MCNC: 3.1 MG/DL — SIGNIFICANT CHANGE UP (ref 2.5–4.5)
PLATELET # BLD AUTO: 245 K/UL — SIGNIFICANT CHANGE UP (ref 150–400)
PLATELET # BLD AUTO: 245 K/UL — SIGNIFICANT CHANGE UP (ref 150–400)
POTASSIUM SERPL-MCNC: 5.2 MMOL/L — SIGNIFICANT CHANGE UP (ref 3.5–5.3)
POTASSIUM SERPL-MCNC: 5.2 MMOL/L — SIGNIFICANT CHANGE UP (ref 3.5–5.3)
POTASSIUM SERPL-SCNC: 5.2 MMOL/L — SIGNIFICANT CHANGE UP (ref 3.5–5.3)
POTASSIUM SERPL-SCNC: 5.2 MMOL/L — SIGNIFICANT CHANGE UP (ref 3.5–5.3)
RBC # BLD: 3.94 M/UL — SIGNIFICANT CHANGE UP (ref 3.8–5.2)
RBC # BLD: 3.94 M/UL — SIGNIFICANT CHANGE UP (ref 3.8–5.2)
RBC # FLD: 15.5 % — HIGH (ref 10.3–14.5)
RBC # FLD: 15.5 % — HIGH (ref 10.3–14.5)
SODIUM SERPL-SCNC: 136 MMOL/L — SIGNIFICANT CHANGE UP (ref 135–145)
SODIUM SERPL-SCNC: 136 MMOL/L — SIGNIFICANT CHANGE UP (ref 135–145)
WBC # BLD: 9.8 K/UL — SIGNIFICANT CHANGE UP (ref 3.8–10.5)
WBC # BLD: 9.8 K/UL — SIGNIFICANT CHANGE UP (ref 3.8–10.5)
WBC # FLD AUTO: 9.8 K/UL — SIGNIFICANT CHANGE UP (ref 3.8–10.5)
WBC # FLD AUTO: 9.8 K/UL — SIGNIFICANT CHANGE UP (ref 3.8–10.5)

## 2023-12-17 PROCEDURE — 99232 SBSQ HOSP IP/OBS MODERATE 35: CPT

## 2023-12-17 RX ORDER — MAGNESIUM OXIDE 400 MG ORAL TABLET 241.3 MG
400 TABLET ORAL ONCE
Refills: 0 | Status: COMPLETED | OUTPATIENT
Start: 2023-12-17 | End: 2023-12-17

## 2023-12-17 RX ADMIN — OXYCODONE HYDROCHLORIDE 30 MILLIGRAM(S): 5 TABLET ORAL at 05:05

## 2023-12-17 RX ADMIN — NALOXEGOL OXALATE 25 MILLIGRAM(S): 12.5 TABLET, FILM COATED ORAL at 12:42

## 2023-12-17 RX ADMIN — Medication 5 MILLIGRAM(S): at 18:15

## 2023-12-17 RX ADMIN — OXYCODONE HYDROCHLORIDE 30 MILLIGRAM(S): 5 TABLET ORAL at 19:58

## 2023-12-17 RX ADMIN — MAGNESIUM OXIDE 400 MG ORAL TABLET 400 MILLIGRAM(S): 241.3 TABLET ORAL at 10:38

## 2023-12-17 RX ADMIN — GABAPENTIN 800 MILLIGRAM(S): 400 CAPSULE ORAL at 18:15

## 2023-12-17 RX ADMIN — OXYCODONE HYDROCHLORIDE 30 MILLIGRAM(S): 5 TABLET ORAL at 06:00

## 2023-12-17 RX ADMIN — POLYETHYLENE GLYCOL 3350 17 GRAM(S): 17 POWDER, FOR SOLUTION ORAL at 18:14

## 2023-12-17 RX ADMIN — DULOXETINE HYDROCHLORIDE 60 MILLIGRAM(S): 30 CAPSULE, DELAYED RELEASE ORAL at 12:42

## 2023-12-17 RX ADMIN — SIMVASTATIN 40 MILLIGRAM(S): 20 TABLET, FILM COATED ORAL at 21:21

## 2023-12-17 RX ADMIN — GABAPENTIN 800 MILLIGRAM(S): 400 CAPSULE ORAL at 00:46

## 2023-12-17 RX ADMIN — CHLORHEXIDINE GLUCONATE 1 APPLICATION(S): 213 SOLUTION TOPICAL at 07:26

## 2023-12-17 RX ADMIN — Medication 1 TABLET(S): at 12:42

## 2023-12-17 RX ADMIN — Medication 5 MILLIGRAM(S): at 00:45

## 2023-12-17 RX ADMIN — GABAPENTIN 800 MILLIGRAM(S): 400 CAPSULE ORAL at 09:23

## 2023-12-17 RX ADMIN — MIDODRINE HYDROCHLORIDE 15 MILLIGRAM(S): 2.5 TABLET ORAL at 05:06

## 2023-12-17 RX ADMIN — MIDODRINE HYDROCHLORIDE 15 MILLIGRAM(S): 2.5 TABLET ORAL at 14:10

## 2023-12-17 RX ADMIN — ENOXAPARIN SODIUM 40 MILLIGRAM(S): 100 INJECTION SUBCUTANEOUS at 21:21

## 2023-12-17 RX ADMIN — Medication 5 MILLIGRAM(S): at 09:23

## 2023-12-17 RX ADMIN — MIDODRINE HYDROCHLORIDE 15 MILLIGRAM(S): 2.5 TABLET ORAL at 21:20

## 2023-12-17 RX ADMIN — PANTOPRAZOLE SODIUM 40 MILLIGRAM(S): 20 TABLET, DELAYED RELEASE ORAL at 05:05

## 2023-12-17 RX ADMIN — OXYCODONE HYDROCHLORIDE 30 MILLIGRAM(S): 5 TABLET ORAL at 20:56

## 2023-12-17 RX ADMIN — POLYETHYLENE GLYCOL 3350 17 GRAM(S): 17 POWDER, FOR SOLUTION ORAL at 05:05

## 2023-12-17 NOTE — PROGRESS NOTE ADULT - SUBJECTIVE AND OBJECTIVE BOX
HPI:  68 y/o female with PMHx HTN, HLD, CVA x 3 (last was 2016 with right hand weakness residual), GAMALIEL not using CPAP, Emphysema, former 25 pack year smoker restarted this week of surgery, Chronic Constipation on Movantik, Urinary Incontinence chronically self straight cath at home, chronic UTI, Breast Implant Rupture with Chronic Leak repair 10/2023, B/L CTS s/p release, s/p Intrathecal Morphine pump for pain, with chronic neck and back pain worsening for years s/p multiple spinal surgeries including laminectomies and fusion of cervical, thoracic and lumbar spine initially done in 2009 and recently in 2019 and 2020 at [facility] by Dr. Trinidad, s/p T3-L1 revision of prior fusion (with Dr. Luz on 8/17/22). Outpatient Xray 11/16/2023 showing broken rods/displacement. Presents for elective C2-S2 instrumentation and fusion. Pt endorses weakness of b/l LE and burning pain in b/l lower extremities radiating to both feet,  (04 Dec 2023 15:44)    HOSPITAL COURSE:  12/4: Admitted for spinal fusion d/t damaged hardware.   12/5: Neuro stable. POD0 C2-S2 instrumentation/fusion (intraop b/l LE motor loss). on parminder for MAP>90  12/6: POD1. KAMRAN o/n neuro stable. remains intubated. Extubated 6am. Precedex gtt prn. Remains on parminder gtt for MAP goal >90. Valium made prn. CT thoracic spine bc MRI unavailable. Attempted NGT, unsuccessful (resistance @ 35).   12/7: POD2 Given IV dilaudid 0.5mg for pain. Neuro exam stable. SQL started tongiht. Increased precedex to 1.0. Responds well to valium. Consulting psych given paranoia ?homicidal statements. Passed bedside dysphagia and tolerating PO meds. MRI complete, f/u read.   12/8: POD3 Pt reporting incisional pain, given dilaudid 0.5mg IV x2. Pt appearing anxious, given xanax 1mg. Neuro exam unchanged. Pain regimen increased to oxy 15/30 mg, pending further recs. Given 1 L bolus for tachycardia. Trops negative. 2 HMV drains removed.   12/9: POD4, KAMRAN, CT myelogram today showing block at T12-L1, OR tomorrow  12/10: POD5, KAMRAN, OR today for exploration of fusion, possible decompression T9-L2, revision instrumentation  POD 0 T9-L2 decompression. Pt returned from OR intubated. 50mcg fentanyl IVP x 2 for pain control and HTN. Magnesium repleted. Precedex gtt started for sedation. Decreased FiO2 to 40%. 1L bolus for soft MAP. Dc'd propofol and started levo gtt for MAP >90.   12/11: POD 6, POD 1. Extubated, satting well on RA. Resumed home valium and gabapentin. Castillo d/c'd, pending TOV. Seroquel started for agitation, R IJ removed, LUE double midline placed. Started SQL.   12/12: POD7, POD2, given 500 cc bolus and started on phenylephrine for MAP goal >90. Started midodrine 10mg q8h. Cardura dc'd d/t hypotension. Given 250cc of albumin for increasing pressor requirements. Given lactulose, pending BM. Hgb 7.6<8.3, given 1u PRBC. Protected sleep time. Urology consulted for possible suprapubic cath, recommend self intermittent cath is optimal.   12/13: POD8, POD3 Midodrine increased to 15q8. Pt refused to take the extra 5mg midodrin ordered. Neuro exam stable. hgb 10 from 9.4 after 1u pRBCs, Pt refused AM meds, given in late morning, CTAP ordered for L sided abd pain, fleet enema, restarted home movantik and ordere fleet enema for constipation. YIFAN and HMV dc'd. 250cc albumin x1  12/14: POD9, POD4, KAMRAN overnight, neuro stable. MAP goal liberalized to > 65. Stepdown status.   12/15: POD10, POD5, KAMRAN overnight  12/16: POD11, POD6, KAMRAN overnight, neuro stable, 500 cc bolus NS given for tachycardia. Aquacell changed. CXR, pro-bnp negative for pulmonary edema. Given additional 500cc bolus.    OVERNIGHT EVENTS: POD 12. POD 7. KAMRAN overnight, neuro stable.      Vital Signs Last 24 Hrs  T(C): 36.7 (16 Dec 2023 23:48), Max: 36.9 (16 Dec 2023 01:10)  T(F): 98 (16 Dec 2023 23:48), Max: 98.5 (16 Dec 2023 09:07)  HR: 100 (16 Dec 2023 23:48) (94 - 119)  BP: 111/78 (16 Dec 2023 23:48) (111/78 - 138/87)  BP(mean): --  RR: 18 (16 Dec 2023 23:48) (16 - 19)  SpO2: 96% (16 Dec 2023 23:48) (94% - 97%)    Parameters below as of 16 Dec 2023 23:48  Patient On (Oxygen Delivery Method): room air    I&O's Summary    15 Dec 2023 07:01  -  16 Dec 2023 07:00  --------------------------------------------------------  IN: 500 mL / OUT: 2675 mL / NET: -2175 mL    16 Dec 2023 07:01  -  17 Dec 2023 00:36  --------------------------------------------------------  IN: 0 mL / OUT: 1750 mL / NET: -1750 mL    PHYSICAL EXAM:  General: Pt is sitting up comfortably in bed, in NAD, on RA  HEENT: CN II-XII grossly intact, PERRL 3mm, EOMI B/L, face symmetric  Cardiovascular: RRR, normal S1 and S2   Respiratory: non-labored breathing, symmetric chest rise  GI: abd soft, NTND   Neuro: A&O x 3, follows commands, opens eyes spontaneously   Strength BL UE 5/5  BL LE 0/5 to noxious stimuli  T11 sensory deficit   Vascular: Distal pulses 2+ x4, no calf edema or erythema  Wounds: Posterior spine wound C/D/I    DIET:  [] NPO  [X] Mechanical  [] Tube feeds    LABS:                        13.3   14.90 )-----------( 269      ( 16 Dec 2023 08:45 )             42.3     12-16    139  |  104  |  9   ----------------------------<  107<H>  4.7   |  22  |  0.48<L>    Ca    9.7      16 Dec 2023 08:45  Phos  3.4     12-16  Mg     2.0     12-16    Urinalysis Basic - ( 16 Dec 2023 08:45 )    Color: x / Appearance: x / SG: x / pH: x  Gluc: 107 mg/dL / Ketone: x  / Bili: x / Urobili: x   Blood: x / Protein: x / Nitrite: x   Leuk Esterase: x / RBC: x / WBC x   Sq Epi: x / Non Sq Epi: x / Bacteria: x    CAPILLARY BLOOD GLUCOSE    POCT Blood Glucose.: 172 mg/dL (16 Dec 2023 22:36)  POCT Blood Glucose.: 113 mg/dL (16 Dec 2023 17:35)  POCT Blood Glucose.: 149 mg/dL (16 Dec 2023 13:03)  POCT Blood Glucose.: 163 mg/dL (16 Dec 2023 11:56)  POCT Blood Glucose.: 99 mg/dL (16 Dec 2023 07:04)    Allergies    Crab (Pruritus)  No Known Drug Allergies    Intolerances    MEDICATIONS:  Antibiotics:    Neuro:  diazepam    Tablet 5 milliGRAM(s) Oral every 8 hours  DULoxetine 60 milliGRAM(s) Oral daily  gabapentin 800 milliGRAM(s) Oral every 8 hours  HYDROmorphone  Injectable 0.5 milliGRAM(s) IV Push every 3 hours PRN  ondansetron Injectable 4 milliGRAM(s) IV Push every 6 hours PRN  oxyCODONE    IR 15 milliGRAM(s) Oral every 4 hours PRN  oxyCODONE    IR 30 milliGRAM(s) Oral every 4 hours PRN    Anticoagulation:  enoxaparin Injectable 40 milliGRAM(s) SubCutaneous <User Schedule>    OTHER:  benzocaine 20% Spray 1 Spray(s) Mucosal three times a day PRN  bisacodyl 5 milliGRAM(s) Oral at bedtime  chlorhexidine 2% Cloths 1 Application(s) Topical <User Schedule>  hydrocortisone 1% Cream 1 Application(s) Topical two times a day PRN  influenza  Vaccine (HIGH DOSE) 0.7 milliLiter(s) IntraMuscular once  insulin lispro (ADMELOG) corrective regimen sliding scale   SubCutaneous Before meals and at bedtime  midodrine 15 milliGRAM(s) Oral every 8 hours  Morphine 1 milliGRAM / mL 17.9 mL,Morphine 1 milliGRAM / mL 17.9 mL,Morphine 1 milliGRAM / mL 17.9 mL 17.9 mL IntraThecal Continuous Pump  naloxegol 25 milliGRAM(s) Oral daily  naloxone Injectable 0.1 milliGRAM(s) IV Push every 3 minutes PRN  pantoprazole    Tablet 40 milliGRAM(s) Oral before breakfast  polyethylene glycol 3350 17 Gram(s) Oral two times a day  senna 2 Tablet(s) Oral at bedtime  simvastatin 40 milliGRAM(s) Oral at bedtime    IVF:  multivitamin 1 Tablet(s) Oral daily    ASSESSMENT:  67 yo female with Hx HTN, HLD, DM, CVA x 3 (last in 2016 with residual R hand weakness), GAMALIEL not using CPAP, Emphysema, ex-25 pack year smoker now restarted, chronic constipation on Movantik, chronic urinary retention (SC at home), b/l CTS s/p release, s/p Intrathecal Morphine pump for pain, with chronic neck and back pain worsening for years s/p multiple spinal surgeries including laminectomies and fusion of cervical, thoracic and lumbar spine, s/p T3-L1 revision of prior fusion (with  on 8/17/22). Xray 11/16/23 showing broken rods/displacement. Now s/p C2-S2 instrumentation and fusion (12/5). S/p T9-L2 decompression w/ durotomy with repair (12/10/23). KAREEM grade A.   S12.9XXA    Handoff    MEWS Score    HTN (hypertension)    Back pain    Hypertension    SS (spinal stenosis)    High cholesterol    Stroke    H/O carpal tunnel syndrome    H/O carpal tunnel syndrome    Chronic GERD    History of chronic constipation    Seizure    Urinary incontinence    Pre-diabetes    Acute UTI    Anxiety    Anxiety and depression    Arthritis    Eczema    External hemorrhoids    H/O kyphosis    Multiple sclerosis    Pancreas cyst    Scoliosis    Wheelchair dependent    Cervical pseudoarthrosis    Right shoulder pain    Cervical spondylosis    Chronic headaches    Chronic LUQ pain    Chronic UTI    Degenerative joint disease    H/O low back pain    Lumbosacral spondylosis    COPD (chronic obstructive pulmonary disease)    Back pain    Back pain    Spinal cord injury, thoracic (T7-T12)    Back pain    Spinal cord injury of thoracic region without bone injury    Delirium    Revision of posterolateral fusion of lumbar spine    Revision of fusion of thoracic spine    Decompression, spinal cord, thoracic, posterolateral approach    Cervical disc disease    H/O Spinal surgery    H/O breast surgery    S/P cervical discectomy    Previous back surgery    H/O shoulder surgery    H/O total shoulder replacement, right    [email or web address]    PLAN:  Plan:  Neuro:  - Neuro/vitals q4  - pain control: modified ERAS, IT morphine pump, oxycodone 15/30 prn, valium and home gabapentin 800 TID resumed  - Continue home cymbalta 60mg daily   - CT thoracic spine 12/6: postop changes  - protected sleep time 10PM-4AM     Cardio:  - MAP > 65   - midodrine 15q8   - C/w home simvastatin     Pulm:  - RA   - hx GAMALIEL, no CPAP at home    GI:  - CCD   - Bowel regimen, home movantik 25 mg   - CT A/P = impacted stool   - last BM 12/14  - GERD: continue home protonix    Renal:  - SC q6    Endo:  - ISS, a1c: 7    Heme:  - SCDs DVT ppx, SQL  - 500 cell saver intraop 12/5, 1u PRBC intraop 12/10, 1u PRBC 12/12    ID:  - afebrile    Dispo:  - SDU status, full code, pending AR    D/w Dr. Luz  HPI:  66 y/o female with PMHx HTN, HLD, CVA x 3 (last was 2016 with right hand weakness residual), GAMALIEL not using CPAP, Emphysema, former 25 pack year smoker restarted this week of surgery, Chronic Constipation on Movantik, Urinary Incontinence chronically self straight cath at home, chronic UTI, Breast Implant Rupture with Chronic Leak repair 10/2023, B/L CTS s/p release, s/p Intrathecal Morphine pump for pain, with chronic neck and back pain worsening for years s/p multiple spinal surgeries including laminectomies and fusion of cervical, thoracic and lumbar spine initially done in 2009 and recently in 2019 and 2020 at [facility] by Dr. Trinidad, s/p T3-L1 revision of prior fusion (with Dr. Luz on 8/17/22). Outpatient Xray 11/16/2023 showing broken rods/displacement. Presents for elective C2-S2 instrumentation and fusion. Pt endorses weakness of b/l LE and burning pain in b/l lower extremities radiating to both feet,  (04 Dec 2023 15:44)    HOSPITAL COURSE:  12/4: Admitted for spinal fusion d/t damaged hardware.   12/5: Neuro stable. POD0 C2-S2 instrumentation/fusion (intraop b/l LE motor loss). on parminder for MAP>90  12/6: POD1. KAMRAN o/n neuro stable. remains intubated. Extubated 6am. Precedex gtt prn. Remains on parminder gtt for MAP goal >90. Valium made prn. CT thoracic spine bc MRI unavailable. Attempted NGT, unsuccessful (resistance @ 35).   12/7: POD2 Given IV dilaudid 0.5mg for pain. Neuro exam stable. SQL started tongiht. Increased precedex to 1.0. Responds well to valium. Consulting psych given paranoia ?homicidal statements. Passed bedside dysphagia and tolerating PO meds. MRI complete, f/u read.   12/8: POD3 Pt reporting incisional pain, given dilaudid 0.5mg IV x2. Pt appearing anxious, given xanax 1mg. Neuro exam unchanged. Pain regimen increased to oxy 15/30 mg, pending further recs. Given 1 L bolus for tachycardia. Trops negative. 2 HMV drains removed.   12/9: POD4, KAMRAN, CT myelogram today showing block at T12-L1, OR tomorrow  12/10: POD5, KAMRAN, OR today for exploration of fusion, possible decompression T9-L2, revision instrumentation  POD 0 T9-L2 decompression. Pt returned from OR intubated. 50mcg fentanyl IVP x 2 for pain control and HTN. Magnesium repleted. Precedex gtt started for sedation. Decreased FiO2 to 40%. 1L bolus for soft MAP. Dc'd propofol and started levo gtt for MAP >90.   12/11: POD 6, POD 1. Extubated, satting well on RA. Resumed home valium and gabapentin. Castillo d/c'd, pending TOV. Seroquel started for agitation, R IJ removed, LUE double midline placed. Started SQL.   12/12: POD7, POD2, given 500 cc bolus and started on phenylephrine for MAP goal >90. Started midodrine 10mg q8h. Cardura dc'd d/t hypotension. Given 250cc of albumin for increasing pressor requirements. Given lactulose, pending BM. Hgb 7.6<8.3, given 1u PRBC. Protected sleep time. Urology consulted for possible suprapubic cath, recommend self intermittent cath is optimal.   12/13: POD8, POD3 Midodrine increased to 15q8. Pt refused to take the extra 5mg midodrin ordered. Neuro exam stable. hgb 10 from 9.4 after 1u pRBCs, Pt refused AM meds, given in late morning, CTAP ordered for L sided abd pain, fleet enema, restarted home movantik and ordere fleet enema for constipation. YIFAN and HMV dc'd. 250cc albumin x1  12/14: POD9, POD4, KAMRAN overnight, neuro stable. MAP goal liberalized to > 65. Stepdown status.   12/15: POD10, POD5, KAMRAN overnight  12/16: POD11, POD6, KAMRAN overnight, neuro stable, 500 cc bolus NS given for tachycardia. Aquacell changed. CXR, pro-bnp negative for pulmonary edema. Given additional 500cc bolus.    OVERNIGHT EVENTS: POD 12. POD 7. KAMRAN overnight, neuro stable.      Vital Signs Last 24 Hrs  T(C): 36.7 (16 Dec 2023 23:48), Max: 36.9 (16 Dec 2023 01:10)  T(F): 98 (16 Dec 2023 23:48), Max: 98.5 (16 Dec 2023 09:07)  HR: 100 (16 Dec 2023 23:48) (94 - 119)  BP: 111/78 (16 Dec 2023 23:48) (111/78 - 138/87)  BP(mean): --  RR: 18 (16 Dec 2023 23:48) (16 - 19)  SpO2: 96% (16 Dec 2023 23:48) (94% - 97%)    Parameters below as of 16 Dec 2023 23:48  Patient On (Oxygen Delivery Method): room air    I&O's Summary    15 Dec 2023 07:01  -  16 Dec 2023 07:00  --------------------------------------------------------  IN: 500 mL / OUT: 2675 mL / NET: -2175 mL    16 Dec 2023 07:01  -  17 Dec 2023 00:36  --------------------------------------------------------  IN: 0 mL / OUT: 1750 mL / NET: -1750 mL    PHYSICAL EXAM:  General: Pt is sitting up comfortably in bed, in NAD, on RA  HEENT: CN II-XII grossly intact, PERRL 3mm, EOMI B/L, face symmetric  Cardiovascular: RRR, normal S1 and S2   Respiratory: non-labored breathing, symmetric chest rise  GI: abd soft, NTND   Neuro: A&O x 3, follows commands, opens eyes spontaneously   Strength BL UE 5/5  BL LE 0/5 to noxious stimuli  T11 sensory deficit   Vascular: Distal pulses 2+ x4, no calf edema or erythema  Wounds: Posterior spine wound C/D/I    DIET:  [] NPO  [X] Mechanical  [] Tube feeds    LABS:                        13.3   14.90 )-----------( 269      ( 16 Dec 2023 08:45 )             42.3     12-16    139  |  104  |  9   ----------------------------<  107<H>  4.7   |  22  |  0.48<L>    Ca    9.7      16 Dec 2023 08:45  Phos  3.4     12-16  Mg     2.0     12-16    Urinalysis Basic - ( 16 Dec 2023 08:45 )    Color: x / Appearance: x / SG: x / pH: x  Gluc: 107 mg/dL / Ketone: x  / Bili: x / Urobili: x   Blood: x / Protein: x / Nitrite: x   Leuk Esterase: x / RBC: x / WBC x   Sq Epi: x / Non Sq Epi: x / Bacteria: x    CAPILLARY BLOOD GLUCOSE    POCT Blood Glucose.: 172 mg/dL (16 Dec 2023 22:36)  POCT Blood Glucose.: 113 mg/dL (16 Dec 2023 17:35)  POCT Blood Glucose.: 149 mg/dL (16 Dec 2023 13:03)  POCT Blood Glucose.: 163 mg/dL (16 Dec 2023 11:56)  POCT Blood Glucose.: 99 mg/dL (16 Dec 2023 07:04)    Allergies    Crab (Pruritus)  No Known Drug Allergies    Intolerances    MEDICATIONS:  Antibiotics:    Neuro:  diazepam    Tablet 5 milliGRAM(s) Oral every 8 hours  DULoxetine 60 milliGRAM(s) Oral daily  gabapentin 800 milliGRAM(s) Oral every 8 hours  HYDROmorphone  Injectable 0.5 milliGRAM(s) IV Push every 3 hours PRN  ondansetron Injectable 4 milliGRAM(s) IV Push every 6 hours PRN  oxyCODONE    IR 15 milliGRAM(s) Oral every 4 hours PRN  oxyCODONE    IR 30 milliGRAM(s) Oral every 4 hours PRN    Anticoagulation:  enoxaparin Injectable 40 milliGRAM(s) SubCutaneous <User Schedule>    OTHER:  benzocaine 20% Spray 1 Spray(s) Mucosal three times a day PRN  bisacodyl 5 milliGRAM(s) Oral at bedtime  chlorhexidine 2% Cloths 1 Application(s) Topical <User Schedule>  hydrocortisone 1% Cream 1 Application(s) Topical two times a day PRN  influenza  Vaccine (HIGH DOSE) 0.7 milliLiter(s) IntraMuscular once  insulin lispro (ADMELOG) corrective regimen sliding scale   SubCutaneous Before meals and at bedtime  midodrine 15 milliGRAM(s) Oral every 8 hours  Morphine 1 milliGRAM / mL 17.9 mL,Morphine 1 milliGRAM / mL 17.9 mL,Morphine 1 milliGRAM / mL 17.9 mL 17.9 mL IntraThecal Continuous Pump  naloxegol 25 milliGRAM(s) Oral daily  naloxone Injectable 0.1 milliGRAM(s) IV Push every 3 minutes PRN  pantoprazole    Tablet 40 milliGRAM(s) Oral before breakfast  polyethylene glycol 3350 17 Gram(s) Oral two times a day  senna 2 Tablet(s) Oral at bedtime  simvastatin 40 milliGRAM(s) Oral at bedtime    IVF:  multivitamin 1 Tablet(s) Oral daily    ASSESSMENT:  67 yo female with Hx HTN, HLD, DM, CVA x 3 (last in 2016 with residual R hand weakness), GAMALIEL not using CPAP, Emphysema, ex-25 pack year smoker now restarted, chronic constipation on Movantik, chronic urinary retention (SC at home), b/l CTS s/p release, s/p Intrathecal Morphine pump for pain, with chronic neck and back pain worsening for years s/p multiple spinal surgeries including laminectomies and fusion of cervical, thoracic and lumbar spine, s/p T3-L1 revision of prior fusion (with  on 8/17/22). Xray 11/16/23 showing broken rods/displacement. Now s/p C2-S2 instrumentation and fusion (12/5). S/p T9-L2 decompression w/ durotomy with repair (12/10/23). KAREEM grade A.   S12.9XXA    Handoff    MEWS Score    HTN (hypertension)    Back pain    Hypertension    SS (spinal stenosis)    High cholesterol    Stroke    H/O carpal tunnel syndrome    H/O carpal tunnel syndrome    Chronic GERD    History of chronic constipation    Seizure    Urinary incontinence    Pre-diabetes    Acute UTI    Anxiety    Anxiety and depression    Arthritis    Eczema    External hemorrhoids    H/O kyphosis    Multiple sclerosis    Pancreas cyst    Scoliosis    Wheelchair dependent    Cervical pseudoarthrosis    Right shoulder pain    Cervical spondylosis    Chronic headaches    Chronic LUQ pain    Chronic UTI    Degenerative joint disease    H/O low back pain    Lumbosacral spondylosis    COPD (chronic obstructive pulmonary disease)    Back pain    Back pain    Spinal cord injury, thoracic (T7-T12)    Back pain    Spinal cord injury of thoracic region without bone injury    Delirium    Revision of posterolateral fusion of lumbar spine    Revision of fusion of thoracic spine    Decompression, spinal cord, thoracic, posterolateral approach    Cervical disc disease    H/O Spinal surgery    H/O breast surgery    S/P cervical discectomy    Previous back surgery    H/O shoulder surgery    H/O total shoulder replacement, right    [email or web address]    PLAN:  Plan:  Neuro:  - Neuro/vitals q4  - pain control: modified ERAS, IT morphine pump, oxycodone 15/30 prn, valium and home gabapentin 800 TID resumed  - Continue home cymbalta 60mg daily   - CT thoracic spine 12/6: postop changes  - protected sleep time 10PM-4AM     Cardio:  - MAP > 65   - midodrine 15q8   - C/w home simvastatin     Pulm:  - RA   - hx GAMALIEL, no CPAP at home    GI:  - CCD   - Bowel regimen, home movantik 25 mg   - CT A/P = impacted stool   - last BM 12/14  - GERD: continue home protonix    Renal:  - SC q6    Endo:  - ISS, a1c: 7    Heme:  - SCDs DVT ppx, SQL  - 500 cell saver intraop 12/5, 1u PRBC intraop 12/10, 1u PRBC 12/12    ID:  - afebrile    Dispo:  - SDU status, full code, pending AR    D/w Dr. Luz

## 2023-12-17 NOTE — PROGRESS NOTE ADULT - SUBJECTIVE AND OBJECTIVE BOX
INTERVAL EVENTS: no acute events, tachycardia improved     PAST MEDICAL & SURGICAL HISTORY:  HTN (hypertension)    Back pain    Hypertension    SS (spinal stenosis)    High cholesterol    Stroke  x3    H/O carpal tunnel syndrome    H/O carpal tunnel syndrome  Bilateral    Chronic GERD    History of chronic constipation    Seizure    Urinary incontinence  self cath as needed    Pre-diabetes    Acute UTI    Anxiety    Anxiety and depression    Arthritis  Left shoulder    Eczema    External hemorrhoids    H/O kyphosis    Multiple sclerosis    Pancreas cyst    Scoliosis    Wheelchair dependent    Cervical pseudoarthrosis  and lumbar spine    Right shoulder pain    Cervical spondylosis    Chronic headaches    Chronic LUQ pain    Chronic UTI    Degenerative joint disease  left shoulder    H/O low back pain    Lumbosacral spondylosis    COPD (chronic obstructive pulmonary disease)    Cervical disc disease  cervical sx    H/O Spinal surgery  lumbar x 30    H/O breast surgery  left breast implant 1980's    S/P cervical discectomy  x4    Previous back surgery    H/O shoulder surgery    H/O total shoulder replacement, right        MEDICATIONS  (STANDING):  bisacodyl 5 milliGRAM(s) Oral at bedtime  chlorhexidine 2% Cloths 1 Application(s) Topical <User Schedule>  diazepam    Tablet 5 milliGRAM(s) Oral every 8 hours  DULoxetine 60 milliGRAM(s) Oral daily  enoxaparin Injectable 40 milliGRAM(s) SubCutaneous <User Schedule>  gabapentin 800 milliGRAM(s) Oral every 8 hours  influenza  Vaccine (HIGH DOSE) 0.7 milliLiter(s) IntraMuscular once  insulin lispro (ADMELOG) corrective regimen sliding scale   SubCutaneous Before meals and at bedtime  midodrine 15 milliGRAM(s) Oral every 8 hours  Morphine 1 milliGRAM / mL 17.9 mL,Morphine 1 milliGRAM / mL 17.9 mL,Morphine 1 milliGRAM / mL 17.9 mL 17.9 mL IntraThecal Continuous Pump  multivitamin 1 Tablet(s) Oral daily  naloxegol 25 milliGRAM(s) Oral daily  pantoprazole    Tablet 40 milliGRAM(s) Oral before breakfast  polyethylene glycol 3350 17 Gram(s) Oral two times a day  senna 2 Tablet(s) Oral at bedtime  simvastatin 40 milliGRAM(s) Oral at bedtime    MEDICATIONS  (PRN):  benzocaine 20% Spray 1 Spray(s) Mucosal three times a day PRN Sore throat  hydrocortisone 1% Cream 1 Application(s) Topical two times a day PRN Itching  HYDROmorphone  Injectable 0.5 milliGRAM(s) IV Push every 3 hours PRN Breakthrough Pain  naloxone Injectable 0.1 milliGRAM(s) IV Push every 3 minutes PRN For ANY of the following changes in patient status:  A. RR LESS THAN 10 breaths per minute, B. Oxygen saturation LESS THAN 90%, C. Sedation score of 6  ondansetron Injectable 4 milliGRAM(s) IV Push every 6 hours PRN Nausea  oxyCODONE    IR 15 milliGRAM(s) Oral every 4 hours PRN Moderate Pain (4 - 6)  oxyCODONE    IR 30 milliGRAM(s) Oral every 4 hours PRN Severe Pain (7 - 10)  sodium chloride 0.9% lock flush 10 milliLiter(s) IV Push every 1 hour PRN Pre/post blood products, medications, blood draw, and to maintain line patency    T(F): 98 (12-17-23 @ 09:16), Max: 98.3 (12-16-23 @ 20:25)  HR: 114 (12-17-23 @ 09:16) (100 - 114)  BP: 109/72 (12-17-23 @ 09:16) (109/72 - 138/87)  BP(mean): 84 (12-17-23 @ 09:16) (84 - 84)  ABP: --  ABP(mean): --  RR: 18 (12-17-23 @ 09:16) (18 - 19)  SpO2: 95% (12-17-23 @ 09:16) (95% - 97%)    I/O Detail 24H    16 Dec 2023 07:01  -  17 Dec 2023 07:00  --------------------------------------------------------  IN:  Total IN: 0 mL    OUT:    Intermittent Catheterization - Urethral (mL): 2500 mL  Total OUT: 2500 mL    Total NET: -2500 mL          PHYSICAL EXAM:  General: A&Ox3; NAD  Head: NC/AT; PERRL; EOMI; anicteric sclera  Neck: Supple; no JVD  Respiratory: CTA B/L; no wheezes/crackles/rales auscultated w/ good air movement  Cardiovascular: Regular rhythm/rate; S1/S2; no gallops or murmurs auscultated  Gastrointestinal: Soft; NTND w/out rebound tenderness or guarding; bowel sounds normal  Extremities: WWP; no edema or cyanosis; radial/pedal pulses palpable  Neurological:  CNII-XII grossly intact; paralysis in b/l lower extremities   Skin: No rashes noted  Vasc: +2 DP/PT pulses b/l   Psych: Appropriate Affect    LABS:  CBC 12-17-23 @ 07:11                        10.8   9.80  )-----------( 245                   34.6       Hgb trend: 10.8 <-- , 13.3 <--   WBC trend: 9.80 <-- , 14.90 <--       CMP 12-17-23 @ 07:11    136  |  101  |  8   ----------------------------<  106<H>  5.2   |  24  |  0.45<L>    Ca    9.3      12-17-23 @ 07:11  Phos  3.1     12-17  Mg     1.9     12-17        Serum Cr trend: 0.45 <-- , 0.48 <-- , 0.50 <--         Cardiac Markers           STUDIES:

## 2023-12-17 NOTE — PROGRESS NOTE ADULT - ASSESSMENT
67 yo female with Hx HTN, HLD, DM, CVA x 3 (last in 2016 with residual R hand weakness), GAMALIEL not using CPAP, Emphysema, ex-25 pack year smoker now restarted, chronic constipation on Movantik, chronic urinary retention (SC at home), b/l CTS s/p release, s/p Intrathecal Morphine pump for pain, with chronic neck and back pain worsening for years s/p multiple spinal surgeries including laminectomies and fusion of cervical, thoracic and lumbar spine, s/p T3-L1 revision of prior fusion (with  on 8/17/22). Xray 11/16/23 showing broken rods/displacement. Now s/p C2-S2 instrumentation and fusion (12/5). S/p T9-L2 decompression w/ durotomy with repair (12/10/23). KAREEM grade A.     #Neck Pain  #Multiple Spine Surgeries  #Chronic Back Pain  -s/p fusion  -OT  -PAin Control  -Bowel Regimen   -Pending Rehab placement    #Chronic Constipation   - likely due to chronic opioid use  - c/w bowel regimen, Movantik if on formulary    #HTN  - c/w home Lopressor 75mg BID    #CAD  #CVA  - resume ASA post operatively when safe to do so  - c/w home atorvastatin  - awaiting results of recent long term outpatient cardiac monitoring to monitor for occult Afib to determine if a role for full dose AVC, follows with Dr Jeter    #DM  - f/u A1c, on Metformin 500mg BID at home  - ISS while inpatient, will evaluate for basal bolus needs    #tachycardia   Improved with IVF, suspect hypovolemia.   - Can continue to monitor for now. If further episodes of HR >120, would obtain CTPE.

## 2023-12-18 ENCOUNTER — TRANSCRIPTION ENCOUNTER (OUTPATIENT)
Age: 68
End: 2023-12-18

## 2023-12-18 LAB
ANION GAP SERPL CALC-SCNC: 9 MMOL/L — SIGNIFICANT CHANGE UP (ref 5–17)
ANION GAP SERPL CALC-SCNC: 9 MMOL/L — SIGNIFICANT CHANGE UP (ref 5–17)
BUN SERPL-MCNC: 7 MG/DL — SIGNIFICANT CHANGE UP (ref 7–23)
BUN SERPL-MCNC: 7 MG/DL — SIGNIFICANT CHANGE UP (ref 7–23)
CALCIUM SERPL-MCNC: 9.2 MG/DL — SIGNIFICANT CHANGE UP (ref 8.4–10.5)
CALCIUM SERPL-MCNC: 9.2 MG/DL — SIGNIFICANT CHANGE UP (ref 8.4–10.5)
CHLORIDE SERPL-SCNC: 101 MMOL/L — SIGNIFICANT CHANGE UP (ref 96–108)
CHLORIDE SERPL-SCNC: 101 MMOL/L — SIGNIFICANT CHANGE UP (ref 96–108)
CO2 SERPL-SCNC: 26 MMOL/L — SIGNIFICANT CHANGE UP (ref 22–31)
CO2 SERPL-SCNC: 26 MMOL/L — SIGNIFICANT CHANGE UP (ref 22–31)
CREAT SERPL-MCNC: 0.46 MG/DL — LOW (ref 0.5–1.3)
CREAT SERPL-MCNC: 0.46 MG/DL — LOW (ref 0.5–1.3)
EGFR: 104 ML/MIN/1.73M2 — SIGNIFICANT CHANGE UP
EGFR: 104 ML/MIN/1.73M2 — SIGNIFICANT CHANGE UP
GLUCOSE BLDC GLUCOMTR-MCNC: 118 MG/DL — HIGH (ref 70–99)
GLUCOSE BLDC GLUCOMTR-MCNC: 118 MG/DL — HIGH (ref 70–99)
GLUCOSE BLDC GLUCOMTR-MCNC: 131 MG/DL — HIGH (ref 70–99)
GLUCOSE BLDC GLUCOMTR-MCNC: 131 MG/DL — HIGH (ref 70–99)
GLUCOSE BLDC GLUCOMTR-MCNC: 133 MG/DL — HIGH (ref 70–99)
GLUCOSE BLDC GLUCOMTR-MCNC: 133 MG/DL — HIGH (ref 70–99)
GLUCOSE BLDC GLUCOMTR-MCNC: 136 MG/DL — HIGH (ref 70–99)
GLUCOSE BLDC GLUCOMTR-MCNC: 136 MG/DL — HIGH (ref 70–99)
GLUCOSE SERPL-MCNC: 106 MG/DL — HIGH (ref 70–99)
GLUCOSE SERPL-MCNC: 106 MG/DL — HIGH (ref 70–99)
HCT VFR BLD CALC: 34.1 % — LOW (ref 34.5–45)
HCT VFR BLD CALC: 34.1 % — LOW (ref 34.5–45)
HGB BLD-MCNC: 10.8 G/DL — LOW (ref 11.5–15.5)
HGB BLD-MCNC: 10.8 G/DL — LOW (ref 11.5–15.5)
MAGNESIUM SERPL-MCNC: 1.9 MG/DL — SIGNIFICANT CHANGE UP (ref 1.6–2.6)
MAGNESIUM SERPL-MCNC: 1.9 MG/DL — SIGNIFICANT CHANGE UP (ref 1.6–2.6)
MCHC RBC-ENTMCNC: 27.6 PG — SIGNIFICANT CHANGE UP (ref 27–34)
MCHC RBC-ENTMCNC: 27.6 PG — SIGNIFICANT CHANGE UP (ref 27–34)
MCHC RBC-ENTMCNC: 31.7 GM/DL — LOW (ref 32–36)
MCHC RBC-ENTMCNC: 31.7 GM/DL — LOW (ref 32–36)
MCV RBC AUTO: 87.2 FL — SIGNIFICANT CHANGE UP (ref 80–100)
MCV RBC AUTO: 87.2 FL — SIGNIFICANT CHANGE UP (ref 80–100)
NRBC # BLD: 0 /100 WBCS — SIGNIFICANT CHANGE UP (ref 0–0)
NRBC # BLD: 0 /100 WBCS — SIGNIFICANT CHANGE UP (ref 0–0)
PHOSPHATE SERPL-MCNC: 3.2 MG/DL — SIGNIFICANT CHANGE UP (ref 2.5–4.5)
PHOSPHATE SERPL-MCNC: 3.2 MG/DL — SIGNIFICANT CHANGE UP (ref 2.5–4.5)
PLATELET # BLD AUTO: 294 K/UL — SIGNIFICANT CHANGE UP (ref 150–400)
PLATELET # BLD AUTO: 294 K/UL — SIGNIFICANT CHANGE UP (ref 150–400)
POTASSIUM SERPL-MCNC: 4.2 MMOL/L — SIGNIFICANT CHANGE UP (ref 3.5–5.3)
POTASSIUM SERPL-MCNC: 4.2 MMOL/L — SIGNIFICANT CHANGE UP (ref 3.5–5.3)
POTASSIUM SERPL-SCNC: 4.2 MMOL/L — SIGNIFICANT CHANGE UP (ref 3.5–5.3)
POTASSIUM SERPL-SCNC: 4.2 MMOL/L — SIGNIFICANT CHANGE UP (ref 3.5–5.3)
RBC # BLD: 3.91 M/UL — SIGNIFICANT CHANGE UP (ref 3.8–5.2)
RBC # BLD: 3.91 M/UL — SIGNIFICANT CHANGE UP (ref 3.8–5.2)
RBC # FLD: 15 % — HIGH (ref 10.3–14.5)
RBC # FLD: 15 % — HIGH (ref 10.3–14.5)
SODIUM SERPL-SCNC: 136 MMOL/L — SIGNIFICANT CHANGE UP (ref 135–145)
SODIUM SERPL-SCNC: 136 MMOL/L — SIGNIFICANT CHANGE UP (ref 135–145)
WBC # BLD: 9.69 K/UL — SIGNIFICANT CHANGE UP (ref 3.8–10.5)
WBC # BLD: 9.69 K/UL — SIGNIFICANT CHANGE UP (ref 3.8–10.5)
WBC # FLD AUTO: 9.69 K/UL — SIGNIFICANT CHANGE UP (ref 3.8–10.5)
WBC # FLD AUTO: 9.69 K/UL — SIGNIFICANT CHANGE UP (ref 3.8–10.5)

## 2023-12-18 PROCEDURE — 99232 SBSQ HOSP IP/OBS MODERATE 35: CPT

## 2023-12-18 RX ORDER — MAGNESIUM SULFATE 500 MG/ML
1 VIAL (ML) INJECTION ONCE
Refills: 0 | Status: COMPLETED | OUTPATIENT
Start: 2023-12-18 | End: 2023-12-18

## 2023-12-18 RX ADMIN — OXYCODONE HYDROCHLORIDE 30 MILLIGRAM(S): 5 TABLET ORAL at 12:24

## 2023-12-18 RX ADMIN — Medication 5 MILLIGRAM(S): at 09:01

## 2023-12-18 RX ADMIN — SENNA PLUS 2 TABLET(S): 8.6 TABLET ORAL at 21:44

## 2023-12-18 RX ADMIN — OXYCODONE HYDROCHLORIDE 30 MILLIGRAM(S): 5 TABLET ORAL at 13:24

## 2023-12-18 RX ADMIN — GABAPENTIN 800 MILLIGRAM(S): 400 CAPSULE ORAL at 17:47

## 2023-12-18 RX ADMIN — MIDODRINE HYDROCHLORIDE 15 MILLIGRAM(S): 2.5 TABLET ORAL at 06:24

## 2023-12-18 RX ADMIN — Medication 1 TABLET(S): at 12:24

## 2023-12-18 RX ADMIN — Medication 5 MILLIGRAM(S): at 21:44

## 2023-12-18 RX ADMIN — DULOXETINE HYDROCHLORIDE 60 MILLIGRAM(S): 30 CAPSULE, DELAYED RELEASE ORAL at 12:24

## 2023-12-18 RX ADMIN — Medication 100 GRAM(S): at 09:12

## 2023-12-18 RX ADMIN — NALOXEGOL OXALATE 25 MILLIGRAM(S): 12.5 TABLET, FILM COATED ORAL at 12:24

## 2023-12-18 RX ADMIN — SIMVASTATIN 40 MILLIGRAM(S): 20 TABLET, FILM COATED ORAL at 21:44

## 2023-12-18 RX ADMIN — ENOXAPARIN SODIUM 40 MILLIGRAM(S): 100 INJECTION SUBCUTANEOUS at 21:44

## 2023-12-18 RX ADMIN — OXYCODONE HYDROCHLORIDE 30 MILLIGRAM(S): 5 TABLET ORAL at 01:10

## 2023-12-18 RX ADMIN — GABAPENTIN 800 MILLIGRAM(S): 400 CAPSULE ORAL at 02:21

## 2023-12-18 RX ADMIN — GABAPENTIN 800 MILLIGRAM(S): 400 CAPSULE ORAL at 09:01

## 2023-12-18 RX ADMIN — OXYCODONE HYDROCHLORIDE 30 MILLIGRAM(S): 5 TABLET ORAL at 06:24

## 2023-12-18 RX ADMIN — Medication 5 MILLIGRAM(S): at 02:21

## 2023-12-18 RX ADMIN — OXYCODONE HYDROCHLORIDE 30 MILLIGRAM(S): 5 TABLET ORAL at 00:19

## 2023-12-18 RX ADMIN — OXYCODONE HYDROCHLORIDE 30 MILLIGRAM(S): 5 TABLET ORAL at 07:05

## 2023-12-18 RX ADMIN — PANTOPRAZOLE SODIUM 40 MILLIGRAM(S): 20 TABLET, DELAYED RELEASE ORAL at 06:24

## 2023-12-18 RX ADMIN — CHLORHEXIDINE GLUCONATE 1 APPLICATION(S): 213 SOLUTION TOPICAL at 06:25

## 2023-12-18 NOTE — DISCHARGE NOTE PROVIDER - CARE PROVIDERS DIRECT ADDRESSES
,[email or web address],DirectAddress_Unknown,DirectAddress_Unknown ,[email or web address],DirectAddress_Unknown,DirectAddress_Unknown

## 2023-12-18 NOTE — DISCHARGE NOTE PROVIDER - PROVIDER TOKENS
PROVIDER:[TOKEN:[[id number]]],PROVIDER:[TOKEN:[[id number]]],PROVIDER:[TOKEN:[[id number]]] PROVIDER:[TOKEN:[[id number]]],PROVIDER:[TOKEN:[[id number]]],PROVIDER:[TOKEN:[[id number]]]

## 2023-12-18 NOTE — DISCHARGE NOTE PROVIDER - NSDCFUADDINST_GEN_ALL_CORE_FT
Neurosurgery follow up appointment date/time:  - follow up in the office for a wound check   - please call the office to confirm appointment: [phone]    Wound Care:  - shower / sponge bath daily and let soapy water run down your back  - gently clean incision with soapy water  - pat dry incision with a clean towel after showering  - leave incision uncovered, open to air   - no picking at incision    Devices/Drains/Lines:    Activity:  - fatigue is common after surgery, rest if you feel tired   - no bending, lifting, twisting or heavy lifting   - walking is recommended, ambulate as tolerated  - you may shower when you get home, keep your incision dry  - no soaking in a tub/pool/hot tub   - no driving within 24 hours of anesthesia or while taking prescription pain medications   - keep hydrated, drink plenty of water     Inpatient consults:  - Plastic surgery  - Pain management     Please also follow up with your primary care doctor.     Pain Expectations:  - pain after surgery is expected  - please take pain meds as prescribed     Medications:  - changes to home meds (ex. AED's)?  - new meds?  - pain meds?  - when can antiplatelets or anticoagulants be restarted?  - were adverse affects of meds discussed with patients?   - pain medications can cause constipation, you should eat a high fiber diet and may take a stool softener while on pain meds   - Avoid taking Advil (ibuprofen), Motrin (naproxen), or Aspirin for pain as they can cause bleeding     Call the office or come to ED if:  - wound has drainage or bleeding, increased redness or pain at incision site, neurological change, fever (>101), chills, night sweats, syncope, nausea/vomiting, chest pain, shortness of breath      Playback:  - see [facility] for a copy of your discharge paperwork     WITHIN 24 HOURS OF DISCHARGE, PLEASE CONTACT NEURO PA  WITH ANY QUESTIONS OR CONCERNS: [phone]   OTHERWISE, PLEASE CALL THE OFFICE WITH ANY QUESTIONS OR CONCERNS: [phone] Neurosurgery follow up appointment date/time:  - follow up in the office for a wound check   - please call the office to confirm appointment: [phone]    Wound Care:  - shower / sponge bath daily and let soapy water run down your back  - gently clean incision with soapy water  - pat dry incision with a clean towel after showering  - leave incision uncovered, open to air   - no picking at incision    Devices/Drains/Lines:    Activity:  - fatigue is common after surgery, rest if you feel tired   - no bending, lifting, twisting or heavy lifting   - walking is recommended, ambulate as tolerated  - you may shower when you get home, keep your incision dry  - no soaking in a tub/pool/hot tub   - no driving within 24 hours of anesthesia or while taking prescription pain medications   - keep hydrated, drink plenty of water     Inpatient consults:  - Plastic surgery  - Pain management     Please also follow up with your primary care doctor.     Pain Expectations:  - pain after surgery is expected  - please take pain meds as prescribed     Medications:  - changes to home meds (ex. AED's)?  - new meds?  - pain meds?  - when can antiplatelets or anticoagulants be restarted?  - were adverse affects of meds discussed with patients?   - pain medications can cause constipation, you should eat a high fiber diet and may take a stool softener while on pain meds   - Avoid taking Advil (ibuprofen), Motrin (naproxen), or Aspirin for pain as they can cause bleeding     Call the office or come to ED if:  - wound has drainage or bleeding, increased redness or pain at incision site, neurological change, fever (>101), chills, night sweats, syncope, nausea/vomiting, chest pain, shortness of breath      Playback:  - see [facility] for a copy of your discharge paperwork     WITHIN 24 HOURS OF DISCHARGE, PLEASE CONTACT NEURO PA  WITH ANY QUESTIONS OR CONCERNS: [phone]   OTHERWISE, PLEASE CALL THE OFFICE WITH ANY QUESTIONS OR CONCERNS: [phone] Neurosurgery follow up appointment date/time:  - follow up in the office for a wound check   - please call the office to confirm appointment: [phone]    Wound Care:  - shower / sponge bath daily and let soapy water run down your back  - gently clean incision with soapy water  - pat dry incision with a clean towel after showering  - dressing changes every 3 days, last changed on 12/19  - no picking at incision    Devices/Drains/Lines:  - straight catheterization as needed     Activity:  - fatigue is common after surgery, rest if you feel tired   - no bending, lifting, twisting or heavy lifting   - walking is recommended, ambulate as tolerated  - you may shower when you get home, keep your incision dry  - no soaking in a tub/pool/hot tub   - no driving within 24 hours of anesthesia or while taking prescription pain medications   - keep hydrated, drink plenty of water     Inpatient consults:  - Plastic surgery: Follow up with Dr. Conley outpatient   - Pain management: Follow up with Dr. Mclaughlin if needed      Please also follow up with your primary care doctor.     Pain Expectations:  - pain after surgery is expected  - please take pain meds as prescribed     Medications:  - continue home meds as prescribed: Albuterol as needed, Aspirin, Duloxetine, Metformin, Protonix, Simvastatin  - new meds:  Eliquis for PE (can cause bruising/bleeding)   Midodrine for hypotension (wean off as tolerated, can cause urinary issues)   - pain meds:   Morphine pump in place  Valium 5mg every 8 hours as needed for muscle spasm   Gabapentin 800mg every 8 hours for nerve pain   Oxycodone 15mg or 30mg every 4 hours as needed for moderate or severe pain   - adverse affects of meds discussed with patient  - pain medications can cause constipation, you should eat a high fiber diet and may take a stool softener while on pain meds   - Avoid taking Advil (ibuprofen), Motrin (naproxen), or Aspirin for pain as they can cause bleeding     Call the office or come to ED if:  - wound has drainage or bleeding, increased redness or pain at incision site, neurological change, fever (>101), chills, night sweats, syncope, nausea/vomiting, chest pain, shortness of breath      Playback:  - see [facility] for a copy of your discharge paperwork     WITHIN 24 HOURS OF DISCHARGE, PLEASE CONTACT NEURO PA  WITH ANY QUESTIONS OR CONCERNS: [phone]   OTHERWISE, PLEASE CALL THE OFFICE WITH ANY QUESTIONS OR CONCERNS: [phone] Neurosurgery follow up appointment date/time:  - follow up in the office for a wound check   - please call the office to confirm appointment: [phone]    Wound Care:  - shower / sponge bath daily and let soapy water run down your back  - gently clean incision with soapy water  - pat dry incision with a clean towel after showering  - dressing changes every 3 days, last changed on 12/19  - no picking at incision    Devices/Drains/Lines:  - straight catheterization as needed     Activity:  - fatigue is common after surgery, rest if you feel tired   - no bending, lifting, twisting or heavy lifting   - walking is recommended, ambulate as tolerated  - you may shower when you get home, keep your incision dry  - no soaking in a tub/pool/hot tub   - no driving within 24 hours of anesthesia or while taking prescription pain medications   - keep hydrated, drink plenty of water     Inpatient consults:  - Plastic surgery: Follow up with Dr. Conley outpatient   - Pain management: Follow up with Dr. Mclaughlin if needed      Please also follow up with your primary care doctor.     Pain Expectations:  - pain after surgery is expected  - please take pain meds as prescribed     Medications:  - continue home meds as prescribed: Albuterol as needed, Aspirin, Duloxetine, Metformin, Protonix, Simvastatin  - new meds:  Eliquis for PE (can cause bruising/bleeding)   Midodrine for hypotension (wean off as tolerated, can cause urinary issues)   - pain meds:   Morphine pump in place  Valium 5mg every 8 hours as needed for muscle spasm   Gabapentin 800mg every 8 hours for nerve pain   Oxycodone 15mg or 30mg every 4 hours as needed for moderate or severe pain   - adverse affects of meds discussed with patient  - pain medications can cause constipation, you should eat a high fiber diet and may take a stool softener while on pain meds   - Avoid taking Advil (ibuprofen), Motrin (naproxen), or Aspirin for pain as they can cause bleeding     Call the office or come to ED if:  - wound has drainage or bleeding, increased redness or pain at incision site, neurological change, fever (>101), chills, night sweats, syncope, nausea/vomiting, chest pain, shortness of breath      Playback:  - see [facility] for a copy of your discharge paperwork     WITHIN 24 HOURS OF DISCHARGE, PLEASE CONTACT NEURO PA  WITH ANY QUESTIONS OR CONCERNS: [phone]   OTHERWISE, PLEASE CALL THE OFFICE WITH ANY QUESTIONS OR CONCERNS: [phone] Please call Dr. Luz's office for a follow up appointment: [phone]  - follow up in the office for a wound check     Wound Care:  - shower / sponge bath daily and let soapy water run down your back  - gently clean incision with soapy water  - pat dry incision with a clean towel after showering  - dressing changes every 3 days, last changed on 1/5/23.  - no picking at incision    Devices/Drains/Lines:  - straight catheterization as needed. bladder scan every 6 hours.    Activity:  - fatigue is common after surgery, rest if you feel tired   - no bending, lifting, twisting or heavy lifting   - walking is recommended, ambulate as tolerated  - you may shower, keep your incision dry  - no soaking in a tub/pool/hot tub   - no driving while taking prescription pain medications   - keep hydrated, drink plenty of water     Inpatient consults:  - Plastic surgery: Follow up with Dr. Conley outpatient   - Pain management: Follow up with Dr. Mclaughlin if needed      Please also follow up with your primary care doctor.     Pain Expectations:  - pain after surgery is expected  - please take pain meds as prescribed     Medications:  - continue home meds as prescribed: Albuterol as needed, Aspirin, Duloxetine, Metformin, Protonix, Simvastatin  - new meds:  Eliquis for PE (can cause bruising/bleeding)   Midodrine for hypotension (wean off as tolerated, can cause urinary issues)   - pain meds:   Intrathecal Morphine pump in place.  Celebrex 200mg daily.  Valium 5mg every 8 hours as needed for muscle spasm   Gabapentin 800mg every 8 hours for nerve pain   Oxycodone 10mg or 15mg every 4 hours as needed for moderate or severe pain   - adverse affects of meds discussed with patient  - lidocaine 4% patch daily to left shoulder.   - naloxegol 25mg daily for constipation.  - pain medications can cause constipation, you should eat a high fiber diet and may take a stool softener while on pain meds   - Avoid taking Advil (ibuprofen), Motrin (naproxen), or Aspirin for pain as they can cause bleeding    Your next expected intrathecal morphine pump refill is for 1/29/23.   Morphine pump MRI compatible; rep: [phone]    Call the office or come to ED if:  - wound has drainage or bleeding, increased redness or pain at incision site, neurological change, fever (>101), chills, night sweats, syncope, nausea/vomiting, chest pain, shortness of breath      Playback:  - see [facility] for a copy of your discharge paperwork     WITHIN 24 HOURS OF DISCHARGE, PLEASE CONTACT NEURO PA  WITH ANY QUESTIONS OR CONCERNS: [phone]   OTHERWISE, PLEASE CALL THE OFFICE WITH ANY QUESTIONS OR CONCERNS: [phone] Please call Dr. Luz's office for a follow up appointment: [phone]  - follow up in the office for a wound check     Wound Care:  - shower / sponge bath daily and let soapy water run down your back  - gently clean incision with soapy water  - pat dry incision with a clean towel after showering  - dressing changes every 3 days, last changed on 1/5/23.  - no picking at incision    Devices/Drains/Lines:  - straight catheterization as needed. bladder scan every 6 hours.    Activity:  - fatigue is common after surgery, rest if you feel tired   - no bending, lifting, twisting or heavy lifting   - walking is recommended, ambulate as tolerated  - you may shower, keep your incision dry  - no soaking in a tub/pool/hot tub   - no driving while taking prescription pain medications   - keep hydrated, drink plenty of water     Inpatient consults:  - Plastic surgery: Follow up with Dr. Conley outpatient   - Pain management: Follow up with Dr. Mclaughlin if needed      Please also follow up with your primary care doctor.     Pain Expectations:  - pain after surgery is expected  - please take pain meds as prescribed     Medications:  - continue home meds as prescribed: Albuterol as needed, Aspirin, Duloxetine, Metformin, Protonix, Simvastatin  - new meds:  Eliquis for PE (can cause bruising/bleeding)   Midodrine for hypotension (wean off as tolerated, can cause urinary issues)   - pain meds:   Intrathecal Morphine pump in place.  Celebrex 200mg daily.  Valium 5mg every 8 hours as needed for muscle spasm   Gabapentin 800mg every 8 hours for nerve pain   Oxycodone 10mg or 15mg every 4 hours as needed for moderate or severe pain   - adverse affects of meds discussed with patient  - lidocaine 4% patch daily to left shoulder.   - naloxegol 25mg daily for constipation.  - pain medications can cause constipation, you should eat a high fiber diet and may take a stool softener while on pain meds   - Avoid taking Advil (ibuprofen), Motrin (naproxen), or Aspirin for pain as they can cause bleeding    Your next expected intrathecal morphine pump refill is for 1/29/23.   Morphine pump MRI compatible; rep: [phone]    Call the office or come to ED if:  - wound has drainage or bleeding, increased redness or pain at incision site, neurological change, fever (>101), chills, night sweats, syncope, nausea/vomiting, chest pain, shortness of breath      Playback:  - see [facility] for a copy of your discharge paperwork     WITHIN 24 HOURS OF DISCHARGE, PLEASE CONTACT NEURO PA  WITH ANY QUESTIONS OR CONCERNS: [phone]   OTHERWISE, PLEASE CALL THE OFFICE WITH ANY QUESTIONS OR CONCERNS: [phone] Please call Dr. Luz's office for a follow up appointment: [phone]  - follow up in the office for a wound check     Wound Care:  - shower / sponge bath daily and let soapy water run down your back  - gently clean incision with soapy water  - pat dry incision with a clean towel after showering  - dressing changes every 3 days, last changed on 1/5/23.  - no picking at incision    Devices/Drains/Lines:  Castillo was placed 1/4 for urinary retention. Please follow up at the Urology clinic at [address] [place] [address], call [phone] for an appointment.    Activity:  - fatigue is common after surgery, rest if you feel tired   - no bending, lifting, twisting or heavy lifting   - walking is recommended, ambulate as tolerated  - you may shower, keep your incision dry  - no soaking in a tub/pool/hot tub   - no driving while taking prescription pain medications   - keep hydrated, drink plenty of water     Inpatient consults:  - Plastic surgery: Follow up with Dr. Conley outpatient   - Pain management: Follow up with Dr. Mclaughlin if needed      Please also follow up with your primary care doctor.     Pain Expectations:  - pain after surgery is expected  - please take pain meds as prescribed     Medications:  - continue home meds as prescribed: Albuterol as needed, Aspirin, Duloxetine, Metformin, Protonix, Simvastatin  - new meds:  Eliquis for PE (can cause bruising/bleeding)   Midodrine for hypotension (wean off as tolerated, can cause urinary issues)   - pain meds:   Intrathecal Morphine pump in place.  Celebrex 200mg daily.  Valium 5mg every 8 hours as needed for muscle spasm   Gabapentin 800mg every 8 hours for nerve pain   Oxycodone 10mg or 15mg every 4 hours as needed for moderate or severe pain   - adverse affects of meds discussed with patient  - lidocaine 4% patch daily to left shoulder.   - naloxegol 25mg daily for constipation.  - pain medications can cause constipation, you should eat a high fiber diet and may take a stool softener while on pain meds   - Avoid taking Advil (ibuprofen), Motrin (naproxen), or Aspirin for pain as they can cause bleeding    Your next expected intrathecal morphine pump refill is for 1/29/23.   Morphine pump MRI compatible; rep: [phone]    Call the office or come to ED if:  - wound has drainage or bleeding, increased redness or pain at incision site, neurological change, fever (>101), chills, night sweats, syncope, nausea/vomiting, chest pain, shortness of breath      Playback:  - see [facility] for a copy of your discharge paperwork     WITHIN 24 HOURS OF DISCHARGE, PLEASE CONTACT NEURO PA  WITH ANY QUESTIONS OR CONCERNS: [phone]   OTHERWISE, PLEASE CALL THE OFFICE WITH ANY QUESTIONS OR CONCERNS: [phone] Please call Dr. Luz's office for a follow up appointment: [phone]  - follow up in the office for a wound check     Wound Care:  - shower / sponge bath daily and let soapy water run down your back  - gently clean incision with soapy water  - pat dry incision with a clean towel after showering  - dressing changes every 3 days, last changed on 1/5/23.  - no picking at incision    Devices/Drains/Lines:  Castillo was placed 1/4 for urinary retention. Please follow up at the Urology clinic at [address] [place] [address], call [phone] for an appointment.    Activity:  - fatigue is common after surgery, rest if you feel tired   - no bending, lifting, twisting or heavy lifting   - walking is recommended, ambulate as tolerated  - you may shower, keep your incision dry  - no soaking in a tub/pool/hot tub   - no driving while taking prescription pain medications   - keep hydrated, drink plenty of water     Inpatient consults:  - Plastic surgery: Follow up with Dr. Conley outpatient   - Pain management: Follow up with Dr. Mclaughlin if needed      Please also follow up with your primary care doctor.     Pain Expectations:  - pain after surgery is expected  - please take pain meds as prescribed     Medications:  - continue home meds as prescribed: Albuterol as needed, Aspirin, Duloxetine, Metformin, Protonix, Simvastatin  - new meds:  Eliquis for PE (can cause bruising/bleeding)   Midodrine for hypotension (wean off as tolerated, can cause urinary issues)   - pain meds:   Intrathecal Morphine pump in place.  Celebrex 200mg daily.  Valium 5mg every 8 hours as needed for muscle spasm   Gabapentin 800mg every 8 hours for nerve pain   Oxycodone 10mg or 15mg every 4 hours as needed for moderate or severe pain   - adverse affects of meds discussed with patient  - lidocaine 4% patch daily to left shoulder.   - naloxegol 25mg daily for constipation.  - pain medications can cause constipation, you should eat a high fiber diet and may take a stool softener while on pain meds   - Avoid taking Advil (ibuprofen), Motrin (naproxen), or Aspirin for pain as they can cause bleeding    Your next expected intrathecal morphine pump refill is for 1/29/23.   Morphine pump MRI compatible; rep: [phone]    Call the office or come to ED if:  - wound has drainage or bleeding, increased redness or pain at incision site, neurological change, fever (>101), chills, night sweats, syncope, nausea/vomiting, chest pain, shortness of breath      Playback:  - see [facility] for a copy of your discharge paperwork     WITHIN 24 HOURS OF DISCHARGE, PLEASE CONTACT NEURO PA  WITH ANY QUESTIONS OR CONCERNS: [phone]   OTHERWISE, PLEASE CALL THE OFFICE WITH ANY QUESTIONS OR CONCERNS: [phone] Please call Dr. Luz's office for a follow up appointment: [phone]  - follow up in the office for a wound check     Wound Care:  - shower / sponge bath daily and let soapy water run down your back  - gently clean incision with soapy water  - pat dry incision with a clean towel after showering  - dressing changes every 3 days, last changed on 1/5/23.  - no picking at incision    Devices/Drains/Lines:  Castillo was placed 1/4 for urinary retention. Please follow up at the Urology clinic at [address] [place] [address], call [phone] for an appointment.    Activity:  - fatigue is common after surgery, rest if you feel tired   - no bending, lifting, twisting or heavy lifting   - walking is recommended, ambulate as tolerated  - you may shower, keep your incision dry  - no soaking in a tub/pool/hot tub   - no driving while taking prescription pain medications   - keep hydrated, drink plenty of water     Inpatient consults:  - Plastic surgery: Follow up with Dr. Conley outpatient   - Pain management: Follow up with Dr. Mclaughlin if needed      Please also follow up with your primary care doctor.     Pain Expectations:  - pain after surgery is expected  - please take pain meds as prescribed     Medications:  - continue home meds as prescribed: Albuterol as needed, Aspirin, Duloxetine, Metformin, Protonix, Simvastatin  - new meds:  Eliquis for PE (can cause bruising/bleeding)   Midodrine for hypotension (wean off as tolerated, can cause urinary issues)   Continue Aspirin 81mg for stroke prevention.  - pain meds:   Intrathecal Morphine pump in place.  Celebrex 200mg daily.  Valium 5mg every 8 hours as needed for muscle spasm   Gabapentin 800mg every 8 hours for nerve pain   Oxycodone 10mg or 15mg every 4 hours as needed for moderate or severe pain   - adverse affects of meds discussed with patient  - lidocaine 4% patch daily to left shoulder.   - naloxegol 25mg daily for constipation.  - pain medications can cause constipation, you should eat a high fiber diet and may take a stool softener while on pain meds   - Avoid taking Advil (ibuprofen), Motrin (naproxen) for pain as they can cause bleeding    Your next expected intrathecal morphine pump refill is for 1/29/23.   Morphine pump MRI compatible; rep: [phone]    Call the office or come to ED if:  - wound has drainage or bleeding, increased redness or pain at incision site, neurological change, fever (>101), chills, night sweats, syncope, nausea/vomiting, chest pain, shortness of breath      Playback:  - see [facility] for a copy of your discharge paperwork     WITHIN 24 HOURS OF DISCHARGE, PLEASE CONTACT NEURO PA  WITH ANY QUESTIONS OR CONCERNS: [phone]   OTHERWISE, PLEASE CALL THE OFFICE WITH ANY QUESTIONS OR CONCERNS: [phone] Please call Dr. Luz's office for a follow up appointment: [phone]  - follow up in the office for a wound check     Wound Care:  - shower / sponge bath daily and let soapy water run down your back  - gently clean incision with soapy water  - pat dry incision with a clean towel after showering  - dressing changes every 3 days, last changed on 1/5/23.  - no picking at incision    Devices/Drains/Lines:  Castillo was placed 1/4 for urinary retention. Please follow up at the Urology clinic at [address] [place] [address], call [phone] for an appointment.    Activity:  - fatigue is common after surgery, rest if you feel tired   - no bending, lifting, twisting or heavy lifting   - walking is recommended, ambulate as tolerated  - you may shower, keep your incision dry  - no soaking in a tub/pool/hot tub   - no driving while taking prescription pain medications   - keep hydrated, drink plenty of water     Inpatient consults:  - Plastic surgery: Follow up with Dr. Conley outpatient   - Pain management: Follow up with Dr. Mclaughlin if needed      Please also follow up with your primary care doctor.     Pain Expectations:  - pain after surgery is expected  - please take pain meds as prescribed     Medications:  - continue home meds as prescribed: Albuterol as needed, Aspirin, Duloxetine, Metformin, Protonix, Simvastatin  - new meds:  Eliquis for PE (can cause bruising/bleeding)   Midodrine for hypotension (wean off as tolerated, can cause urinary issues)   Continue Aspirin 81mg for stroke prevention.  - pain meds:   Intrathecal Morphine pump in place.  Celebrex 200mg daily.  Valium 5mg every 8 hours as needed for muscle spasm   Gabapentin 800mg every 8 hours for nerve pain   Oxycodone 10mg or 15mg every 4 hours as needed for moderate or severe pain   - adverse affects of meds discussed with patient  - lidocaine 4% patch daily to left shoulder.   - naloxegol 25mg daily for constipation.  - pain medications can cause constipation, you should eat a high fiber diet and may take a stool softener while on pain meds   - Avoid taking Advil (ibuprofen), Motrin (naproxen) for pain as they can cause bleeding    Your next expected intrathecal morphine pump refill is for 1/29/23.   Morphine pump MRI compatible; rep: [phone]    Call the office or come to ED if:  - wound has drainage or bleeding, increased redness or pain at incision site, neurological change, fever (>101), chills, night sweats, syncope, nausea/vomiting, chest pain, shortness of breath      Playback:  - see [facility] health for a copy of your discharge paperwork     WITHIN 24 HOURS OF DISCHARGE, PLEASE CONTACT [facility]  WITH ANY QUESTIONS OR CONCERNS: [phone]   OTHERWISE, PLEASE CALL THE OFFICE WITH ANY QUESTIONS OR CONCERNS: [phone]

## 2023-12-18 NOTE — DISCHARGE NOTE PROVIDER - NSDCMRMEDTOKEN_GEN_ALL_CORE_FT
albuterol 90 mcg/inh inhalation aerosol: 2 puff(s) inhaled every 6 hours, As Needed  aspirin 81 mg oral delayed release tablet: 1 tab(s) orally once a day  DULoxetine 60 mg oral delayed release capsule: 1 cap(s) orally once a day  gabapentin 800 mg oral tablet: 1 tab(s) orally 3 times a day  MetFORMIN (Eqv-Glumetza) 500 mg oral tablet, extended release: 1 tab(s) orally 2 times a day  metoprolol tartrate 50 mg oral tablet: 1.5 tab(s) orally 2 times a day  Movantik 25 mg oral tablet: 1 tab(s) orally once a day (in the morning)  simvastatin 40 mg oral tablet: 1 tab(s) orally once a day (at bedtime)  tiZANidine 4 mg oral tablet: 2 tab(s) orally 2 times a day as needed for -  Vitamin D3 50 mcg (2000 intl units) oral tablet: 1 tab(s) orally once a day   albuterol 90 mcg/inh inhalation aerosol: 2 puff(s) inhaled every 6 hours, As Needed  apixaban 5 mg oral tablet: 2 tab(s) orally every 12 hours 10mg every 12 hours 12/19 - 12/26 then switch to 5mg every 12 hours  aspirin 81 mg oral delayed release tablet: 1 tab(s) orally once a day  bisacodyl 5 mg oral delayed release tablet: 1 tab(s) orally once a day (at bedtime)  diazePAM 5 mg oral tablet: 1 tab(s) orally every 8 hours as needed for  muscle spasm  DULoxetine 60 mg oral delayed release capsule: 1 cap(s) orally once a day  gabapentin 800 mg oral tablet: 1 tab(s) orally 3 times a day  MetFORMIN (Eqv-Glumetza) 500 mg oral tablet, extended release: 1 tab(s) orally 2 times a day  midodrine 5 mg oral tablet: 3 tab(s) orally every 8 hours wean off as tolerated  Movantik 25 mg oral tablet: 1 tab(s) orally once a day (in the morning)  oxyCODONE 15 mg oral tablet: 1 tab(s) orally every 4 hours As needed Moderate Pain (4 - 6)  oxyCODONE 30 mg oral tablet: 1 tab(s) orally every 4 hours As needed Severe Pain (7 - 10)  pantoprazole 40 mg oral delayed release tablet: 1 tab(s) orally once a day (before a meal)  polyethylene glycol 3350 oral powder for reconstitution: 17 gram(s) orally 2 times a day  senna leaf extract oral tablet: 2 tab(s) orally once a day (at bedtime)  simvastatin 40 mg oral tablet: 1 tab(s) orally once a day (at bedtime)  Vitamin D3 50 mcg (2000 intl units) oral tablet: 1 tab(s) orally once a day   acetaminophen 500 mg oral tablet: 2 tab(s) orally every 8 hours As needed Temp greater or equal to 38.5C (101.3F), Mild Pain (1 - 3)  albuterol 90 mcg/inh inhalation aerosol: 2 puff(s) inhaled every 6 hours, As Needed  apixaban 5 mg oral tablet: 1 tab(s) orally every 12 hours  bisacodyl 5 mg oral delayed release tablet: 1 tab(s) orally once a day (at bedtime)  diazePAM 5 mg oral tablet: 1 tab(s) orally every 8 hours as needed for  muscle spasm  DULoxetine 60 mg oral delayed release capsule: 1 cap(s) orally once a day  gabapentin 800 mg oral tablet: 1 tab(s) orally 3 times a day  intrathecal morphine pump: 20 milligram(s) intrathecal, 1mg/1mL intrathecal, continuous pump. daily dose 0.5283 mg/day (0.022mg/hr) , next expected refill date 1/29/24  lidocaine 4% topical film: Apply topically to affected area once a day to left shoulder  MetFORMIN (Eqv-Glumetza) 500 mg oral tablet, extended release: 1 tab(s) orally 2 times a day  midodrine 5 mg oral tablet: 1 tab(s) orally every 12 hours 6am, 6pm, hold for SBP&gt;100  Movantik 25 mg oral tablet: 1 tab(s) orally once a day (in the morning)  oxyCODONE 15 mg oral tablet: 1 tab(s) orally every 4 hours As needed Moderate Pain (4 - 6)  oxyCODONE 30 mg oral tablet: 1 tab(s) orally every 4 hours As needed Severe Pain (7 - 10)  pantoprazole 40 mg oral delayed release tablet: 1 tab(s) orally once a day (before a meal)  polyethylene glycol 3350 oral powder for reconstitution: 17 gram(s) orally 2 times a day  senna leaf extract oral tablet: 2 tab(s) orally once a day (at bedtime)  simvastatin 40 mg oral tablet: 1 tab(s) orally once a day (at bedtime)  Vitamin D3 50 mcg (2000 intl units) oral tablet: 1 tab(s) orally once a day   acetaminophen 500 mg oral tablet: 2 tab(s) orally every 8 hours As needed Temp greater or equal to 38.5C (101.3F), Mild Pain (1 - 3)  albuterol 90 mcg/inh inhalation aerosol: 2 puff(s) inhaled every 6 hours, As Needed  apixaban 5 mg oral tablet: 1 tab(s) orally every 12 hours  aspirin 81 mg oral delayed release tablet: 1 tab(s) orally once a day  bisacodyl 5 mg oral delayed release tablet: 1 tab(s) orally once a day (at bedtime)  diazePAM 5 mg oral tablet: 1 tab(s) orally every 8 hours as needed for  muscle spasm  DULoxetine 60 mg oral delayed release capsule: 1 cap(s) orally once a day  gabapentin 800 mg oral tablet: 1 tab(s) orally 3 times a day  Intrathecal Morphine pump: 20 milligram(s) intrathecal, 1mg/1mL intrathecal, continuous pump. daily dose 0.5283 mg/day (0.022mg/hr) , next expected refill date 1/29/24.  lidocaine 4% topical film: Apply topically to affected area once a day to left shoulder  MetFORMIN (Eqv-Glumetza) 500 mg oral tablet, extended release: 1 tab(s) orally 2 times a day  midodrine 5 mg oral tablet: 1 tab(s) orally every 12 hours 6am, 6pm, hold for SBP&gt;100  Movantik 25 mg oral tablet: 1 tab(s) orally once a day (in the morning)  oxyCODONE 15 mg oral tablet: 1 tab(s) orally every 4 hours As needed Moderate Pain (4 - 6)  oxyCODONE 30 mg oral tablet: 1 tab(s) orally every 4 hours As needed Severe Pain (7 - 10)  pantoprazole 40 mg oral delayed release tablet: 1 tab(s) orally once a day (before a meal)  polyethylene glycol 3350 oral powder for reconstitution: 17 gram(s) orally 2 times a day  senna leaf extract oral tablet: 2 tab(s) orally once a day (at bedtime)  simvastatin 40 mg oral tablet: 1 tab(s) orally once a day (at bedtime)  Vitamin D3 50 mcg (2000 intl units) oral tablet: 1 tab(s) orally once a day

## 2023-12-18 NOTE — PROGRESS NOTE ADULT - SUBJECTIVE AND OBJECTIVE BOX
HPI:  68 y/o female with PMHx HTN, HLD, CVA x 3 (last was 2016 with right hand weakness residual), GAMALIEL not using CPAP, Emphysema, former 25 pack year smoker restarted this week of surgery, Chronic Constipation on Movantik, Urinary Incontinence chronically self straight cath at home, chronic UTI, Breast Implant Rupture with Chronic Leak repair 10/2023, B/L CTS s/p release, s/p Intrathecal Morphine pump for pain, with chronic neck and back pain worsening for years s/p multiple spinal surgeries including laminectomies and fusion of cervical, thoracic and lumbar spine initially done in 2009 and recently in 2019 and 2020 at [facility] by Dr. Trinidad, s/p T3-L1 revision of prior fusion (with Dr. Luz on 8/17/22). Outpatient Xray 11/16/2023 showing broken rods/displacement. Presents for elective C2-S2 instrumentation and fusion. Pt endorses weakness of b/l LE and burning pain in b/l lower extremities radiating to both feet,  (04 Dec 2023 15:44)    INTERVAL EVENTS:  KAMRAN.     HOSPITAL COURSE:  12/4: Admitted for spinal fusion d/t damaged hardware.   12/5: Neuro stable. POD0 C2-S2 instrumentation/fusion (intraop b/l LE motor loss). on parminder for MAP>90  12/6: POD1. KAMRAN o/n neuro stable. remains intubated. Extubated 6am. Precedex gtt prn. Remains on parminder gtt for MAP goal >90. Valium made prn. CT thoracic spine bc MRI unavailable. Attempted NGT, unsuccessful (resistance @ 35).   12/7: POD2 Given IV dilaudid 0.5mg for pain. Neuro exam stable. SQL started tongiht. Increased precedex to 1.0. Responds well to valium. Consulting psych given paranoia ?homicidal statements. Passed bedside dysphagia and tolerating PO meds. MRI complete, f/u read.   12/8: POD3 Pt reporting incisional pain, given dilaudid 0.5mg IV x2. Pt appearing anxious, given xanax 1mg. Neuro exam unchanged. Pain regimen increased to oxy 15/30 mg, pending further recs. Given 1 L bolus for tachycardia. Trops negative. 2 HMV drains removed.   12/9: POD4, KAMRAN, CT myelogram today showing block at T12-L1, OR tomorrow  12/10: POD5, KAMRAN, OR today for exploration of fusion, possible decompression T9-L2, revision instrumentation  POD 0 T9-L2 decompression. Pt returned from OR intubated. 50mcg fentanyl IVP x 2 for pain control and HTN. Magnesium repleted. Precedex gtt started for sedation. Decreased FiO2 to 40%. 1L bolus for soft MAP. Dc'd propofol and started levo gtt for MAP >90.   12/11: POD 6, POD 1. Extubated, satting well on RA. Resumed home valium and gabapentin. Castillo d/c'd, pending TOV. Seroquel started for agitation, R IJ removed, LUE double midline placed. Started SQL.   12/12: POD7, POD2, given 500 cc bolus and started on phenylephrine for MAP goal >90. Started midodrine 10mg q8h. Cardura dc'd d/t hypotension. Given 250cc of albumin for increasing pressor requirements. Given lactulose, pending BM. Hgb 7.6<8.3, given 1u PRBC. Protected sleep time. Urology consulted for possible suprapubic cath, recommend self intermittent cath is optimal.   12/13: POD8, POD3 Midodrine increased to 15q8. Pt refused to take the extra 5mg midodrin ordered. Neuro exam stable. hgb 10 from 9.4 after 1u pRBCs, Pt refused AM meds, given in late morning, CTAP ordered for L sided abd pain, fleet enema, restarted home movantik and ordere fleet enema for constipation. YIFAN and HMV dc'd. 250cc albumin x1  12/14: POD9, POD4, KAMRAN overnight, neuro stable. MAP goal liberalized to > 65. Stepdown status.   12/15: POD10, POD5, KAMRAN overnight  12/16: POD11, POD6, KAMRAN overnight, neuro stable, 500 cc bolus NS given for tachycardia. Aquacell changed. CXR, pro-bnp negative for pulmonary edema. Given additional 500cc bolus.  12/17: POD 12. POD 7. KAMRAN overnight, neuro stable.   12/18: POD 13/8.     Vital Signs Last 24 Hrs  T(C): 36.9 (18 Dec 2023 00:15), Max: 37.3 (17 Dec 2023 16:44)  T(F): 98.5 (18 Dec 2023 00:15), Max: 99.1 (17 Dec 2023 16:44)  HR: 92 (18 Dec 2023 00:15) (92 - 122)  BP: 131/74 (18 Dec 2023 00:15) (109/72 - 140/73)  BP(mean): 84 (17 Dec 2023 09:16) (84 - 84)  RR: 18 (18 Dec 2023 00:15) (17 - 19)  SpO2: 98% (18 Dec 2023 00:15) (95% - 99%)    Parameters below as of 18 Dec 2023 00:15  Patient On (Oxygen Delivery Method): room air        I&O's Summary    16 Dec 2023 07:01  -  17 Dec 2023 07:00  --------------------------------------------------------  IN: 0 mL / OUT: 2500 mL / NET: -2500 mL    17 Dec 2023 07:01  -  18 Dec 2023 00:53  --------------------------------------------------------  IN: 0 mL / OUT: 1100 mL / NET: -1100 mL        PHYSICAL EXAM:  General: Pt is sitting up comfortably in bed, in NAD, on RA  HEENT: CN II-XII grossly intact, PERRL 3mm, EOMI B/L, face symmetric  Cardiovascular: RRR, normal S1 and S2   Respiratory: non-labored breathing, symmetric chest rise  GI: abd soft, NTND   Neuro: A&O x 3, follows commands, opens eyes spontaneously   Strength BL UE 5/5  BL LE 0/5 to noxious stimuli  T11 sensory deficit   Vascular: Distal pulses 2+ x4, no calf edema or erythema  Wounds: Posterior spine wound C/D/I with dressing in place      LABS:                        10.8   9.80  )-----------( 245      ( 17 Dec 2023 07:11 )             34.6     12-17    136  |  101  |  8   ----------------------------<  106<H>  5.2   |  24  |  0.45<L>    Ca    9.3      17 Dec 2023 07:11  Phos  3.1     12-17  Mg     1.9     12-17        Urinalysis Basic - ( 17 Dec 2023 07:11 )    Color: x / Appearance: x / SG: x / pH: x  Gluc: 106 mg/dL / Ketone: x  / Bili: x / Urobili: x   Blood: x / Protein: x / Nitrite: x   Leuk Esterase: x / RBC: x / WBC x   Sq Epi: x / Non Sq Epi: x / Bacteria: x          CAPILLARY BLOOD GLUCOSE      POCT Blood Glucose.: 142 mg/dL (17 Dec 2023 22:08)  POCT Blood Glucose.: 127 mg/dL (17 Dec 2023 17:04)  POCT Blood Glucose.: 114 mg/dL (17 Dec 2023 12:01)  POCT Blood Glucose.: 108 mg/dL (17 Dec 2023 07:01)      Drug Levels: [] N/A    CSF Analysis: [] N/A      Allergies    Crab (Pruritus)  No Known Drug Allergies    Intolerances      MEDICATIONS:  Antibiotics:    Neuro:  diazepam    Tablet 5 milliGRAM(s) Oral every 8 hours  DULoxetine 60 milliGRAM(s) Oral daily  gabapentin 800 milliGRAM(s) Oral every 8 hours  HYDROmorphone  Injectable 0.5 milliGRAM(s) IV Push every 3 hours PRN  ondansetron Injectable 4 milliGRAM(s) IV Push every 6 hours PRN  oxyCODONE    IR 15 milliGRAM(s) Oral every 4 hours PRN  oxyCODONE    IR 30 milliGRAM(s) Oral every 4 hours PRN    Anticoagulation:  enoxaparin Injectable 40 milliGRAM(s) SubCutaneous <User Schedule>    OTHER:  benzocaine 20% Spray 1 Spray(s) Mucosal three times a day PRN  bisacodyl 5 milliGRAM(s) Oral at bedtime  chlorhexidine 2% Cloths 1 Application(s) Topical <User Schedule>  hydrocortisone 1% Cream 1 Application(s) Topical two times a day PRN  influenza  Vaccine (HIGH DOSE) 0.7 milliLiter(s) IntraMuscular once  insulin lispro (ADMELOG) corrective regimen sliding scale   SubCutaneous Before meals and at bedtime  midodrine 15 milliGRAM(s) Oral every 8 hours  Morphine 1 milliGRAM / mL 17.9 mL,Morphine 1 milliGRAM / mL 17.9 mL,Morphine 1 milliGRAM / mL 17.9 mL 17.9 mL IntraThecal Continuous Pump  naloxegol 25 milliGRAM(s) Oral daily  naloxone Injectable 0.1 milliGRAM(s) IV Push every 3 minutes PRN  pantoprazole    Tablet 40 milliGRAM(s) Oral before breakfast  polyethylene glycol 3350 17 Gram(s) Oral two times a day  senna 2 Tablet(s) Oral at bedtime  simvastatin 40 milliGRAM(s) Oral at bedtime    IVF:  multivitamin 1 Tablet(s) Oral daily    CULTURES:    RADIOLOGY & ADDITIONAL TESTS:      ASSESSMENT:  69 yo female with Hx HTN, HLD, DM, CVA x 3 (last in 2016 with residual R hand weakness), GAMALIEL not using CPAP, Emphysema, ex-25 pack year smoker now restarted, chronic constipation on Movantik, chronic urinary retention (SC at home), b/l CTS s/p release, s/p Intrathecal Morphine pump for pain, with chronic neck and back pain worsening for years s/p multiple spinal surgeries including laminectomies and fusion of cervical, thoracic and lumbar spine, s/p T3-L1 revision of prior fusion (with  on 8/17/22). Xray 11/16/23 showing broken rods/displacement. Now s/p C2-S2 instrumentation and fusion (12/5). S/p T9-L2 decompression w/ durotomy with repair (12/10/23). KAREEM grade A.     Plan:  Neuro:  - Neuro/vitals q4  - pain control: modified ERAS, IT morphine pump, oxycodone 15/30 prn, valium and home gabapentin 800 TID resumed  - Continue home cymbalta 60mg daily   - CT thoracic spine 12/6: postop changes  - protected sleep time 10PM-4AM     Cardio:  - MAP > 65   - midodrine 15q8   - C/w home simvastatin     Pulm:  - RA   - hx GAMALIEL, no CPAP at home    GI:  - CCD   - Bowel regimen, home movantik 25 mg   - CT A/P = impacted stool   - last BM 12/14  - GERD: continue home protonix    Renal:  - SC q6    Endo:  - ISS, a1c: 7    Heme:  - SCDs DVT ppx, SQL  - 500 cell saver intraop 12/5, 1u PRBC intraop 12/10, 1u PRBC 12/12    ID:  - afebrile    Dispo:  - SDU status, full code, pending AR    D/w Dr. Luz  HPI:  66 y/o female with PMHx HTN, HLD, CVA x 3 (last was 2016 with right hand weakness residual), GAMALIEL not using CPAP, Emphysema, former 25 pack year smoker restarted this week of surgery, Chronic Constipation on Movantik, Urinary Incontinence chronically self straight cath at home, chronic UTI, Breast Implant Rupture with Chronic Leak repair 10/2023, B/L CTS s/p release, s/p Intrathecal Morphine pump for pain, with chronic neck and back pain worsening for years s/p multiple spinal surgeries including laminectomies and fusion of cervical, thoracic and lumbar spine initially done in 2009 and recently in 2019 and 2020 at [facility] by Dr. Trinidad, s/p T3-L1 revision of prior fusion (with Dr. Luz on 8/17/22). Outpatient Xray 11/16/2023 showing broken rods/displacement. Presents for elective C2-S2 instrumentation and fusion. Pt endorses weakness of b/l LE and burning pain in b/l lower extremities radiating to both feet,  (04 Dec 2023 15:44)    INTERVAL EVENTS:  KAMRAN.     HOSPITAL COURSE:  12/4: Admitted for spinal fusion d/t damaged hardware.   12/5: Neuro stable. POD0 C2-S2 instrumentation/fusion (intraop b/l LE motor loss). on parminder for MAP>90  12/6: POD1. KAMRAN o/n neuro stable. remains intubated. Extubated 6am. Precedex gtt prn. Remains on parminder gtt for MAP goal >90. Valium made prn. CT thoracic spine bc MRI unavailable. Attempted NGT, unsuccessful (resistance @ 35).   12/7: POD2 Given IV dilaudid 0.5mg for pain. Neuro exam stable. SQL started tongiht. Increased precedex to 1.0. Responds well to valium. Consulting psych given paranoia ?homicidal statements. Passed bedside dysphagia and tolerating PO meds. MRI complete, f/u read.   12/8: POD3 Pt reporting incisional pain, given dilaudid 0.5mg IV x2. Pt appearing anxious, given xanax 1mg. Neuro exam unchanged. Pain regimen increased to oxy 15/30 mg, pending further recs. Given 1 L bolus for tachycardia. Trops negative. 2 HMV drains removed.   12/9: POD4, KAMRAN, CT myelogram today showing block at T12-L1, OR tomorrow  12/10: POD5, KAMRAN, OR today for exploration of fusion, possible decompression T9-L2, revision instrumentation  POD 0 T9-L2 decompression. Pt returned from OR intubated. 50mcg fentanyl IVP x 2 for pain control and HTN. Magnesium repleted. Precedex gtt started for sedation. Decreased FiO2 to 40%. 1L bolus for soft MAP. Dc'd propofol and started levo gtt for MAP >90.   12/11: POD 6, POD 1. Extubated, satting well on RA. Resumed home valium and gabapentin. Castillo d/c'd, pending TOV. Seroquel started for agitation, R IJ removed, LUE double midline placed. Started SQL.   12/12: POD7, POD2, given 500 cc bolus and started on phenylephrine for MAP goal >90. Started midodrine 10mg q8h. Cardura dc'd d/t hypotension. Given 250cc of albumin for increasing pressor requirements. Given lactulose, pending BM. Hgb 7.6<8.3, given 1u PRBC. Protected sleep time. Urology consulted for possible suprapubic cath, recommend self intermittent cath is optimal.   12/13: POD8, POD3 Midodrine increased to 15q8. Pt refused to take the extra 5mg midodrin ordered. Neuro exam stable. hgb 10 from 9.4 after 1u pRBCs, Pt refused AM meds, given in late morning, CTAP ordered for L sided abd pain, fleet enema, restarted home movantik and ordere fleet enema for constipation. YIFAN and HMV dc'd. 250cc albumin x1  12/14: POD9, POD4, KAMRAN overnight, neuro stable. MAP goal liberalized to > 65. Stepdown status.   12/15: POD10, POD5, KAMRAN overnight  12/16: POD11, POD6, KAMRAN overnight, neuro stable, 500 cc bolus NS given for tachycardia. Aquacell changed. CXR, pro-bnp negative for pulmonary edema. Given additional 500cc bolus.  12/17: POD 12. POD 7. KAMRAN overnight, neuro stable.   12/18: POD 13/8.     Vital Signs Last 24 Hrs  T(C): 36.9 (18 Dec 2023 00:15), Max: 37.3 (17 Dec 2023 16:44)  T(F): 98.5 (18 Dec 2023 00:15), Max: 99.1 (17 Dec 2023 16:44)  HR: 92 (18 Dec 2023 00:15) (92 - 122)  BP: 131/74 (18 Dec 2023 00:15) (109/72 - 140/73)  BP(mean): 84 (17 Dec 2023 09:16) (84 - 84)  RR: 18 (18 Dec 2023 00:15) (17 - 19)  SpO2: 98% (18 Dec 2023 00:15) (95% - 99%)    Parameters below as of 18 Dec 2023 00:15  Patient On (Oxygen Delivery Method): room air        I&O's Summary    16 Dec 2023 07:01  -  17 Dec 2023 07:00  --------------------------------------------------------  IN: 0 mL / OUT: 2500 mL / NET: -2500 mL    17 Dec 2023 07:01  -  18 Dec 2023 00:53  --------------------------------------------------------  IN: 0 mL / OUT: 1100 mL / NET: -1100 mL        PHYSICAL EXAM:  General: Pt is sitting up comfortably in bed, in NAD, on RA  HEENT: CN II-XII grossly intact, PERRL 3mm, EOMI B/L, face symmetric  Cardiovascular: RRR, normal S1 and S2   Respiratory: non-labored breathing, symmetric chest rise  GI: abd soft, NTND   Neuro: A&O x 3, follows commands, opens eyes spontaneously   Strength BL UE 5/5  BL LE 0/5 to noxious stimuli  T11 sensory deficit   Vascular: Distal pulses 2+ x4, no calf edema or erythema  Wounds: Posterior spine wound C/D/I with dressing in place      LABS:                        10.8   9.80  )-----------( 245      ( 17 Dec 2023 07:11 )             34.6     12-17    136  |  101  |  8   ----------------------------<  106<H>  5.2   |  24  |  0.45<L>    Ca    9.3      17 Dec 2023 07:11  Phos  3.1     12-17  Mg     1.9     12-17        Urinalysis Basic - ( 17 Dec 2023 07:11 )    Color: x / Appearance: x / SG: x / pH: x  Gluc: 106 mg/dL / Ketone: x  / Bili: x / Urobili: x   Blood: x / Protein: x / Nitrite: x   Leuk Esterase: x / RBC: x / WBC x   Sq Epi: x / Non Sq Epi: x / Bacteria: x          CAPILLARY BLOOD GLUCOSE      POCT Blood Glucose.: 142 mg/dL (17 Dec 2023 22:08)  POCT Blood Glucose.: 127 mg/dL (17 Dec 2023 17:04)  POCT Blood Glucose.: 114 mg/dL (17 Dec 2023 12:01)  POCT Blood Glucose.: 108 mg/dL (17 Dec 2023 07:01)      Drug Levels: [] N/A    CSF Analysis: [] N/A      Allergies    Crab (Pruritus)  No Known Drug Allergies    Intolerances      MEDICATIONS:  Antibiotics:    Neuro:  diazepam    Tablet 5 milliGRAM(s) Oral every 8 hours  DULoxetine 60 milliGRAM(s) Oral daily  gabapentin 800 milliGRAM(s) Oral every 8 hours  HYDROmorphone  Injectable 0.5 milliGRAM(s) IV Push every 3 hours PRN  ondansetron Injectable 4 milliGRAM(s) IV Push every 6 hours PRN  oxyCODONE    IR 15 milliGRAM(s) Oral every 4 hours PRN  oxyCODONE    IR 30 milliGRAM(s) Oral every 4 hours PRN    Anticoagulation:  enoxaparin Injectable 40 milliGRAM(s) SubCutaneous <User Schedule>    OTHER:  benzocaine 20% Spray 1 Spray(s) Mucosal three times a day PRN  bisacodyl 5 milliGRAM(s) Oral at bedtime  chlorhexidine 2% Cloths 1 Application(s) Topical <User Schedule>  hydrocortisone 1% Cream 1 Application(s) Topical two times a day PRN  influenza  Vaccine (HIGH DOSE) 0.7 milliLiter(s) IntraMuscular once  insulin lispro (ADMELOG) corrective regimen sliding scale   SubCutaneous Before meals and at bedtime  midodrine 15 milliGRAM(s) Oral every 8 hours  Morphine 1 milliGRAM / mL 17.9 mL,Morphine 1 milliGRAM / mL 17.9 mL,Morphine 1 milliGRAM / mL 17.9 mL 17.9 mL IntraThecal Continuous Pump  naloxegol 25 milliGRAM(s) Oral daily  naloxone Injectable 0.1 milliGRAM(s) IV Push every 3 minutes PRN  pantoprazole    Tablet 40 milliGRAM(s) Oral before breakfast  polyethylene glycol 3350 17 Gram(s) Oral two times a day  senna 2 Tablet(s) Oral at bedtime  simvastatin 40 milliGRAM(s) Oral at bedtime    IVF:  multivitamin 1 Tablet(s) Oral daily    CULTURES:    RADIOLOGY & ADDITIONAL TESTS:      ASSESSMENT:  69 yo female with Hx HTN, HLD, DM, CVA x 3 (last in 2016 with residual R hand weakness), GAMALIEL not using CPAP, Emphysema, ex-25 pack year smoker now restarted, chronic constipation on Movantik, chronic urinary retention (SC at home), b/l CTS s/p release, s/p Intrathecal Morphine pump for pain, with chronic neck and back pain worsening for years s/p multiple spinal surgeries including laminectomies and fusion of cervical, thoracic and lumbar spine, s/p T3-L1 revision of prior fusion (with  on 8/17/22). Xray 11/16/23 showing broken rods/displacement. Now s/p C2-S2 instrumentation and fusion (12/5). S/p T9-L2 decompression w/ durotomy with repair (12/10/23). KAREEM grade A.     Plan:  Neuro:  - Neuro/vitals q4  - pain control: modified ERAS, IT morphine pump, oxycodone 15/30 prn, valium and home gabapentin 800 TID resumed  - Continue home cymbalta 60mg daily   - CT thoracic spine 12/6: postop changes  - protected sleep time 10PM-4AM     Cardio:  - MAP > 65   - midodrine 15q8   - C/w home simvastatin     Pulm:  - RA   - hx GAMALIEL, no CPAP at home    GI:  - CCD   - Bowel regimen, home movantik 25 mg   - CT A/P = impacted stool   - last BM 12/14  - GERD: continue home protonix    Renal:  - SC q6    Endo:  - ISS, a1c: 7    Heme:  - SCDs DVT ppx, SQL  - 500 cell saver intraop 12/5, 1u PRBC intraop 12/10, 1u PRBC 12/12    ID:  - afebrile    Dispo:  - SDU status, full code, pending AR    D/w Dr. Luz

## 2023-12-18 NOTE — DISCHARGE NOTE PROVIDER - NSDCCPCAREPLAN_GEN_ALL_CORE_FT
PRINCIPAL DISCHARGE DIAGNOSIS  Diagnosis: Thoracic back pain  Assessment and Plan of Treatment: patient with eliezer failure, pseudoarthrosis  s/p exploration of fusion C2-S1, removal of instrumentation and fractured rods, sacroiliac instrumentation, removal and replacement of instrumentation at T1-3, T10-L1 on 12/5  patient with spinal cord injury after spinal surgery, failed back surgery syndrome   s/p exploration of spinal fusion, laminectomy at T9-L1, repeat spinal fusion at T9-L1 on 12/10        PRINCIPAL DISCHARGE DIAGNOSIS  Diagnosis: Thoracic back pain  Assessment and Plan of Treatment: patient with eliezer failure, pseudoarthrosis  s/p exploration of fusion C2-S1, removal of instrumentation and fractured rods, sacroiliac instrumentation, removal and replacement of instrumentation at T1-3, T10-L1 on 12/5  patient with spinal cord injury after spinal surgery, failed back surgery syndrome   s/p exploration of spinal fusion, laminectomy at T9-L1, repeat spinal fusion at T9-L1 on 12/10         SECONDARY DISCHARGE DIAGNOSES  Diagnosis: HTN (hypertension)  Assessment and Plan of Treatment: continune to hold home Metoprolol while weaning off of midodrine    Diagnosis: HLD (hyperlipidemia)  Assessment and Plan of Treatment: continue home Simvastatin    Diagnosis: CVA (cerebrovascular accident)  Assessment and Plan of Treatment: continue home Aspirin    Diagnosis: GAMALIEL (obstructive sleep apnea)  Assessment and Plan of Treatment:     Diagnosis: H/O emphysema  Assessment and Plan of Treatment:     Diagnosis: Smoking history  Assessment and Plan of Treatment: discussed importance of smoking cessation    Diagnosis: Chronic constipation  Assessment and Plan of Treatment: continue home Movantik, bowel regimen    Diagnosis: Urinary incontinence  Assessment and Plan of Treatment: straight catheterization a sneeded    Diagnosis: Pulmonary embolism  Assessment and Plan of Treatment: continue Eliquis 10mg BID through 12/26 then switch to 5mg BID    Diagnosis: Diabetes  Assessment and Plan of Treatment: continue home Metformin    Diagnosis: GERD (gastroesophageal reflux disease)  Assessment and Plan of Treatment: continue home Protonix     PRINCIPAL DISCHARGE DIAGNOSIS  Diagnosis: Thoracic back pain  Assessment and Plan of Treatment: patient with eliezer failure, pseudoarthrosis  s/p exploration of fusion C2-S1, removal of instrumentation and fractured rods, sacroiliac instrumentation, removal and replacement of instrumentation at T1-3, T10-L1 on 12/5  patient with spinal cord injury after spinal surgery, failed back surgery syndrome   s/p exploration of spinal fusion, laminectomy at T9-L1, repeat spinal fusion at T9-L1 on 12/10         SECONDARY DISCHARGE DIAGNOSES  Diagnosis: HTN (hypertension)  Assessment and Plan of Treatment: continune to hold home Metoprolol while weaning off of midodrine    Diagnosis: HLD (hyperlipidemia)  Assessment and Plan of Treatment: continue home Simvastatin    Diagnosis: CVA (cerebrovascular accident)  Assessment and Plan of Treatment: continue home Aspirin    Diagnosis: GAMALIEL (obstructive sleep apnea)  Assessment and Plan of Treatment:     Diagnosis: H/O emphysema  Assessment and Plan of Treatment:     Diagnosis: Smoking history  Assessment and Plan of Treatment: discussed importance of smoking cessation    Diagnosis: Chronic constipation  Assessment and Plan of Treatment: continue home Movantik, bowel regimen    Diagnosis: Urinary incontinence  Assessment and Plan of Treatment: jiménez placed 1/4    Diagnosis: Pulmonary embolism  Assessment and Plan of Treatment: continue Eliquis 10mg BID through 12/26 then switch to 5mg BID    Diagnosis: Diabetes  Assessment and Plan of Treatment: continue home Metformin    Diagnosis: GERD (gastroesophageal reflux disease)  Assessment and Plan of Treatment: continue home Protonix

## 2023-12-18 NOTE — DISCHARGE NOTE PROVIDER - NSDCCPTREATMENT_GEN_ALL_CORE_FT
PRINCIPAL PROCEDURE  Procedure: Revision of fusion of thoracic spine  Findings and Treatment:       SECONDARY PROCEDURE  Procedure: Decompression, spinal cord, thoracic, posterolateral approach  Findings and Treatment:     Procedure: Revision of posterolateral fusion of lumbar spine  Findings and Treatment:

## 2023-12-18 NOTE — DISCHARGE NOTE PROVIDER - HOSPITAL COURSE
HPI:  66 y/o female with PMHx HTN, HLD, CVA x 3 (last was 2016 with right hand weakness residual), GAMALIEL not using CPAP, Emphysema, former 25 pack year smoker restarted this week of surgery, Chronic Constipation on Movantik, Urinary Incontinence chronically self straight cath at home, chronic UTI, Breast Implant Rupture with Chronic Leak repair 10/2023, B/L CTS s/p release, s/p Intrathecal Morphine pump for pain, with chronic neck and back pain worsening for years s/p multiple spinal surgeries including laminectomies and fusion of cervical, thoracic and lumbar spine initially done in 2009 and recently in 2019 and 2020 at [facility] by Dr. Trinidad, s/p T3-L1 revision of prior fusion (with Dr. Luz on 8/17/22). Outpatient Xray 11/16/2023 showing broken rods/displacement. Presents for elective C2-S2 instrumentation and fusion. Pt endorses weakness of b/l LE and burning pain in b/l lower extremities radiating to both feet    Hospital Course:  12/4: Admitted for spinal fusion d/t damaged hardware.   12/5: Neuro stable. POD0 C2-S2 instrumentation/fusion (intraop b/l LE motor loss). on parminder for MAP>90  12/6: POD1. KAMRAN o/n neuro stable. remains intubated. Extubated 6am. Precedex gtt prn. Remains on parminder gtt for MAP goal >90. Valium made prn. CT thoracic spine bc MRI unavailable. Attempted NGT, unsuccessful (resistance @ 35).   12/7: POD2 Given IV dilaudid 0.5mg for pain. Neuro exam stable. SQL started tongiht. Increased precedex to 1.0. Responds well to valium. Consulting psych given paranoia ?homicidal statements. Passed bedside dysphagia and tolerating PO meds. MRI complete, f/u read.   12/8: POD3 Pt reporting incisional pain, given dilaudid 0.5mg IV x2. Pt appearing anxious, given xanax 1mg. Neuro exam unchanged. Pain regimen increased to oxy 15/30 mg, pending further recs. Given 1 L bolus for tachycardia. Trops negative. 2 HMV drains removed.   12/9: POD4, KAMRAN, CT myelogram today showing block at T12-L1, OR tomorrow  12/10: POD5, KAMRAN, OR today for exploration of fusion, possible decompression T9-L2, revision instrumentation  POD 0 T9-L2 decompression. Pt returned from OR intubated. 50mcg fentanyl IVP x 2 for pain control and HTN. Magnesium repleted. Precedex gtt started for sedation. Decreased FiO2 to 40%. 1L bolus for soft MAP. Dc'd propofol and started levo gtt for MAP >90.   12/11: POD 6, POD 1. Extubated, satting well on RA. Resumed home valium and gabapentin. Castillo d/c'd, pending TOV. Seroquel started for agitation, R IJ removed, LUE double midline placed. Started SQL.   12/12: POD7, POD2, given 500 cc bolus and started on phenylephrine for MAP goal >90. Started midodrine 10mg q8h. Cardura dc'd d/t hypotension. Given 250cc of albumin for increasing pressor requirements. Given lactulose, pending BM. Hgb 7.6<8.3, given 1u PRBC. Protected sleep time. Urology consulted for possible suprapubic cath, recommend self intermittent cath is optimal.   12/13: POD8, POD3 Midodrine increased to 15q8. Pt refused to take the extra 5mg midodrin ordered. Neuro exam stable. hgb 10 from 9.4 after 1u pRBCs, Pt refused AM meds, given in late morning, CTAP ordered for L sided abd pain, fleet enema, restarted home movantik and ordere fleet enema for constipation. YIFAN and HMV dc'd. 250cc albumin x1  12/14: POD9, POD4, KAMRAN overnight, neuro stable. MAP goal liberalized to > 65. Stepdown status.   12/15: POD10, POD5, KAMRAN overnight  12/16: POD11, POD6, KAMRAN overnight, neuro stable, 500 cc bolus NS given for tachycardia. Aquacell changed. CXR, pro-bnp negative for pulmonary edema. Given additional 500cc bolus.  12/17: POD 12. POD 7. KAMRAN overnight, neuro stable.   12/18: POD 13/8.       Patient evaluated by PT/OT who recommended:  Patient is going home? rehab? hospice? Facility Name:     Hospital course c/b:     Exam on day of discharge:    Checklist:   - Obtained follow up appointment from NP  - Reviewed final recommendations of inpatient consults  - review discharge planning on provider handoff  - post op imaging completed  - Neurologically stable for discharge  - Vitals stable for discharge   - Afebrile for discharge  - WBC is stable  - Sodium level is normal  - Pain is adequately controlled  - Pt has PICC/walker/brace/collar   - LACE score (10 or > needs PCP apt)   - stroke patient? Discharge NIHSS score   HPI:  66 y/o female with PMHx HTN, HLD, CVA x 3 (last was 2016 with right hand weakness residual), GAMALIEL not using CPAP, Emphysema, former 25 pack year smoker restarted this week of surgery, Chronic Constipation on Movantik, Urinary Incontinence chronically self straight cath at home, chronic UTI, Breast Implant Rupture with Chronic Leak repair 10/2023, B/L CTS s/p release, s/p Intrathecal Morphine pump for pain, with chronic neck and back pain worsening for years s/p multiple spinal surgeries including laminectomies and fusion of cervical, thoracic and lumbar spine initially done in 2009 and recently in 2019 and 2020 at [facility] by Dr. Trinidad, s/p T3-L1 revision of prior fusion (with Dr. Luz on 8/17/22). Outpatient Xray 11/16/2023 showing broken rods/displacement. Presents for elective C2-S2 instrumentation and fusion. Pt endorses weakness of b/l LE and burning pain in b/l lower extremities radiating to both feet    Hospital Course:  12/4: Admitted for spinal fusion d/t damaged hardware.   12/5: Neuro stable. POD0 C2-S2 instrumentation/fusion (intraop b/l LE motor loss). on parminder for MAP>90  12/6: POD1. KAMRAN o/n neuro stable. remains intubated. Extubated 6am. Precedex gtt prn. Remains on parminder gtt for MAP goal >90. Valium made prn. CT thoracic spine bc MRI unavailable. Attempted NGT, unsuccessful (resistance @ 35).   12/7: POD2 Given IV dilaudid 0.5mg for pain. Neuro exam stable. SQL started tongiht. Increased precedex to 1.0. Responds well to valium. Consulting psych given paranoia ?homicidal statements. Passed bedside dysphagia and tolerating PO meds. MRI complete, f/u read.   12/8: POD3 Pt reporting incisional pain, given dilaudid 0.5mg IV x2. Pt appearing anxious, given xanax 1mg. Neuro exam unchanged. Pain regimen increased to oxy 15/30 mg, pending further recs. Given 1 L bolus for tachycardia. Trops negative. 2 HMV drains removed.   12/9: POD4, KAMRAN, CT myelogram today showing block at T12-L1, OR tomorrow  12/10: POD5, KAMRAN, OR today for exploration of fusion, possible decompression T9-L2, revision instrumentation  POD 0 T9-L2 decompression. Pt returned from OR intubated. 50mcg fentanyl IVP x 2 for pain control and HTN. Magnesium repleted. Precedex gtt started for sedation. Decreased FiO2 to 40%. 1L bolus for soft MAP. Dc'd propofol and started levo gtt for MAP >90.   12/11: POD 6, POD 1. Extubated, satting well on RA. Resumed home valium and gabapentin. Castillo d/c'd, pending TOV. Seroquel started for agitation, R IJ removed, LUE double midline placed. Started SQL.   12/12: POD7, POD2, given 500 cc bolus and started on phenylephrine for MAP goal >90. Started midodrine 10mg q8h. Cardura dc'd d/t hypotension. Given 250cc of albumin for increasing pressor requirements. Given lactulose, pending BM. Hgb 7.6<8.3, given 1u PRBC. Protected sleep time. Urology consulted for possible suprapubic cath, recommend self intermittent cath is optimal.   12/13: POD8, POD3 Midodrine increased to 15q8. Pt refused to take the extra 5mg midodrin ordered. Neuro exam stable. hgb 10 from 9.4 after 1u pRBCs, Pt refused AM meds, given in late morning, CTAP ordered for L sided abd pain, fleet enema, restarted home movantik and ordere fleet enema for constipation. YIFAN and HMV dc'd. 250cc albumin x1  12/14: POD9, POD4, KAMRAN overnight, neuro stable. MAP goal liberalized to > 65. Stepdown status.   12/15: POD10, POD5, KAMRAN overnight  12/16: POD11, POD6, KAMRAN overnight, neuro stable, 500 cc bolus NS given for tachycardia. Aquacell changed. CXR, pro-bnp negative for pulmonary edema. Given additional 500cc bolus.  12/17: POD 12. POD 7. KAMRAN overnight, neuro stable.   12/18: POD 13/8.       Patient evaluated by PT/OT who recommended:  Patient is going home? rehab? hospice? Facility Name:     Hospital course c/b:     Exam on day of discharge:    Checklist:   - Obtained follow up appointment from NP  - Reviewed final recommendations of inpatient consults  - review discharge planning on provider handoff  - post op imaging completed  - Neurologically stable for discharge  - Vitals stable for discharge   - Afebrile for discharge  - WBC is stable  - Sodium level is normal  - Pain is adequately controlled  - Pt has PICC/walker/brace/collar   - LACE score (10 or > needs PCP apt)   - stroke patient? Discharge NIHSS score   HPI:  66 y/o female with PMHx HTN, HLD, CVA x 3 (last was 2016 with right hand weakness residual), GAMALIEL not using CPAP, Emphysema, former 25 pack year smoker restarted this week of surgery, Chronic Constipation on Movantik, Urinary Incontinence chronically self straight cath at home, chronic UTI, Breast Implant Rupture with Chronic Leak repair 10/2023, B/L CTS s/p release, s/p Intrathecal Morphine pump for pain, with chronic neck and back pain worsening for years s/p multiple spinal surgeries including laminectomies and fusion of cervical, thoracic and lumbar spine initially done in 2009 and recently in 2019 and 2020 at [facility] by Dr. Trinidad, s/p T3-L1 revision of prior fusion (with Dr. Luz on 8/17/22). Outpatient Xray 11/16/2023 showing broken rods/displacement. Presents for elective C2-S2 instrumentation and fusion. Pt endorses weakness of b/l LE and burning pain in b/l lower extremities radiating to both feet    Hospital Course:  12/4: Admitted for spinal fusion d/t damaged hardware.   12/5: Neuro stable. POD0 C2-S2 instrumentation/fusion (intraop b/l LE motor loss). on parminder for MAP>90  12/6: POD1. KAMRAN o/n neuro stable. remains intubated. Extubated 6am. Precedex gtt prn. Remains on parminder gtt for MAP goal >90. Valium made prn. CT thoracic spine bc MRI unavailable. Attempted NGT, unsuccessful (resistance @ 35).   12/7: POD2 Given IV dilaudid 0.5mg for pain. Neuro exam stable. SQL started tongiht. Increased precedex to 1.0. Responds well to valium. Consulting psych given paranoia ?homicidal statements. Passed bedside dysphagia and tolerating PO meds. MRI complete, f/u read.   12/8: POD3 Pt reporting incisional pain, given dilaudid 0.5mg IV x2. Pt appearing anxious, given xanax 1mg. Neuro exam unchanged. Pain regimen increased to oxy 15/30 mg, pending further recs. Given 1 L bolus for tachycardia. Trops negative. 2 HMV drains removed.   12/9: POD4, KAMRAN, CT myelogram today showing block at T12-L1, OR tomorrow  12/10: POD5, KAMRAN, OR today for exploration of fusion, possible decompression T9-L2, revision instrumentation  POD 0 T9-L2 decompression. Pt returned from OR intubated. 50mcg fentanyl IVP x 2 for pain control and HTN. Magnesium repleted. Precedex gtt started for sedation. Decreased FiO2 to 40%. 1L bolus for soft MAP. Dc'd propofol and started levo gtt for MAP >90.   12/11: POD 6, POD 1. Extubated, satting well on RA. Resumed home valium and gabapentin. Castillo d/c'd, pending TOV. Seroquel started for agitation, R IJ removed, LUE double midline placed. Started SQL.   12/12: POD7, POD2, given 500 cc bolus and started on phenylephrine for MAP goal >90. Started midodrine 10mg q8h. Cardura dc'd d/t hypotension. Given 250cc of albumin for increasing pressor requirements. Given lactulose, pending BM. Hgb 7.6<8.3, given 1u PRBC. Protected sleep time. Urology consulted for possible suprapubic cath, recommend self intermittent cath is optimal.   12/13: POD8, POD3 Midodrine increased to 15q8. Pt refused to take the extra 5mg midodrin ordered. Neuro exam stable. hgb 10 from 9.4 after 1u pRBCs, Pt refused AM meds, given in late morning, CTAP ordered for L sided abd pain, fleet enema, restarted home movantik and ordere fleet enema for constipation. YIFAN and HMV dc'd. 250cc albumin x1  12/14: POD9, POD4, KAMRAN overnight, neuro stable. MAP goal liberalized to > 65. Stepdown status.   12/15: POD10, POD5, KAMRAN overnight  12/16: POD11, POD6, KAMRAN overnight, neuro stable, 500 cc bolus NS given for tachycardia. Aquacell changed. CXR, pro-bnp negative for pulmonary edema. Given additional 500cc bolus.  12/17: POD 12. POD 7. KAMRAN overnight, neuro stable.   12/19: KAMRAN. Persistent tachycardia, r/o PE. CTPE protocol ordered. Refused all afternoon meds except oxy 30. Per Dr. Cowart (rad) CT chest w segmental and subsegmental PE in R upper lobe. Dopplers negative for DVT, CT PE shows segmental and subsegmental PE in R upper lobe. Per Dr. Augustine and Dr. Luz, Will start eliquis pending pain management approval. Per Dr. Mclaughlin (pain mgmt) can start eliquis 10mg BIDx 7 days. Dc'd lovenox.  12/20: POD 10 T9-L2 Decompression. Hemodynamically stable.       Patient evaluated by PT/OT who recommended: Acute Rehab   Patient is going to [facility] course c/b: lower extremity plegia (continue PT/OT), pain (well controlled now on current regiment), PE on work up for tachycardia (started on Eliquis), urinary retention (continue straight cath as needed),     Exam on day of discharge:  General: NAD, AAOx3  HEENT: PERRL. EOMI. VF intact.  Neck: From, nontender.  Neuro: CN II-XII intact, follows commands, speech clear. Strength BL UE 5/5  BL LE 0/5 to noxious stimuli with T11 sensory deficit.  Vascular: Distal pulses 2+ x 4, no calf edema or erythema  Wounds: Posterior midline spine incision C/D/I with dressing in place    Patient is neuro stable, vitals stable, stable tachycardia, medically ready for discharge      HPI:  66 y/o female with PMHx HTN, HLD, CVA x 3 (last was 2016 with right hand weakness residual), GAMALIEL not using CPAP, Emphysema, former 25 pack year smoker restarted this week of surgery, Chronic Constipation on Movantik, Urinary Incontinence chronically self straight cath at home, chronic UTI, Breast Implant Rupture with Chronic Leak repair 10/2023, B/L CTS s/p release, s/p Intrathecal Morphine pump for pain, with chronic neck and back pain worsening for years s/p multiple spinal surgeries including laminectomies and fusion of cervical, thoracic and lumbar spine initially done in 2009 and recently in 2019 and 2020 at [facility] by Dr. Trinidad, s/p T3-L1 revision of prior fusion (with Dr. Luz on 8/17/22). Outpatient Xray 11/16/2023 showing broken rods/displacement. Presents for elective C2-S2 instrumentation and fusion. Pt endorses weakness of b/l LE and burning pain in b/l lower extremities radiating to both feet    Hospital Course:  12/4: Admitted for spinal fusion d/t damaged hardware.   12/5: Neuro stable. POD0 C2-S2 instrumentation/fusion (intraop b/l LE motor loss). on parminder for MAP>90  12/6: POD1. KAMRAN o/n neuro stable. remains intubated. Extubated 6am. Precedex gtt prn. Remains on parminder gtt for MAP goal >90. Valium made prn. CT thoracic spine bc MRI unavailable. Attempted NGT, unsuccessful (resistance @ 35).   12/7: POD2 Given IV dilaudid 0.5mg for pain. Neuro exam stable. SQL started tongiht. Increased precedex to 1.0. Responds well to valium. Consulting psych given paranoia ?homicidal statements. Passed bedside dysphagia and tolerating PO meds. MRI complete, f/u read.   12/8: POD3 Pt reporting incisional pain, given dilaudid 0.5mg IV x2. Pt appearing anxious, given xanax 1mg. Neuro exam unchanged. Pain regimen increased to oxy 15/30 mg, pending further recs. Given 1 L bolus for tachycardia. Trops negative. 2 HMV drains removed.   12/9: POD4, KAMRAN, CT myelogram today showing block at T12-L1, OR tomorrow  12/10: POD5, KAMRAN, OR today for exploration of fusion, possible decompression T9-L2, revision instrumentation  POD 0 T9-L2 decompression. Pt returned from OR intubated. 50mcg fentanyl IVP x 2 for pain control and HTN. Magnesium repleted. Precedex gtt started for sedation. Decreased FiO2 to 40%. 1L bolus for soft MAP. Dc'd propofol and started levo gtt for MAP >90.   12/11: POD 6, POD 1. Extubated, satting well on RA. Resumed home valium and gabapentin. Castillo d/c'd, pending TOV. Seroquel started for agitation, R IJ removed, LUE double midline placed. Started SQL.   12/12: POD7, POD2, given 500 cc bolus and started on phenylephrine for MAP goal >90. Started midodrine 10mg q8h. Cardura dc'd d/t hypotension. Given 250cc of albumin for increasing pressor requirements. Given lactulose, pending BM. Hgb 7.6<8.3, given 1u PRBC. Protected sleep time. Urology consulted for possible suprapubic cath, recommend self intermittent cath is optimal.   12/13: POD8, POD3 Midodrine increased to 15q8. Pt refused to take the extra 5mg midodrin ordered. Neuro exam stable. hgb 10 from 9.4 after 1u pRBCs, Pt refused AM meds, given in late morning, CTAP ordered for L sided abd pain, fleet enema, restarted home movantik and ordere fleet enema for constipation. YIFAN and HMV dc'd. 250cc albumin x1  12/14: POD9, POD4, KAMRAN overnight, neuro stable. MAP goal liberalized to > 65. Stepdown status.   12/15: POD10, POD5, KAMRAN overnight  12/16: POD11, POD6, KAMRAN overnight, neuro stable, 500 cc bolus NS given for tachycardia. Aquacell changed. CXR, pro-bnp negative for pulmonary edema. Given additional 500cc bolus.  12/17: POD 12. POD 7. KAMRAN overnight, neuro stable.   12/19: KAMRAN. Persistent tachycardia, r/o PE. CTPE protocol ordered. Refused all afternoon meds except oxy 30. Per Dr. Cowart (rad) CT chest w segmental and subsegmental PE in R upper lobe. Dopplers negative for DVT, CT PE shows segmental and subsegmental PE in R upper lobe. Per Dr. Augustine and Dr. Luz, Will start eliquis pending pain management approval. Per Dr. Mclaughlin (pain mgmt) can start eliquis 10mg BIDx 7 days. Dc'd lovenox.  12/20: POD 10 T9-L2 Decompression. Hemodynamically stable.       Patient evaluated by PT/OT who recommended: Acute Rehab   Patient is going to [facility] course c/b: lower extremity plegia (continue PT/OT), pain (well controlled now on current regiment), PE on work up for tachycardia (started on Eliquis), urinary retention (continue straight cath as needed),     Exam on day of discharge:  General: NAD, AAOx3  HEENT: PERRL. EOMI. VF intact.  Neck: From, nontender.  Neuro: CN II-XII intact, follows commands, speech clear. Strength BL UE 5/5  BL LE 0/5 to noxious stimuli with T11 sensory deficit.  Vascular: Distal pulses 2+ x 4, no calf edema or erythema  Wounds: Posterior midline spine incision C/D/I with dressing in place    Patient is neuro stable, vitals stable, stable tachycardia, medically ready for discharge      HPI:  68 y/o female with PMHx HTN, HLD, CVA x 3 (last was 2016 with right hand weakness residual), GAMALIEL not using CPAP, Emphysema, former 25 pack year smoker restarted this week of surgery, Chronic Constipation on Movantik, Urinary Incontinence chronically self straight cath at home, chronic UTI, Breast Implant Rupture with Chronic Leak repair 10/2023, B/L CTS s/p release, s/p Intrathecal Morphine pump for pain, with chronic neck and back pain worsening for years s/p multiple spinal surgeries including laminectomies and fusion of cervical, thoracic and lumbar spine initially done in 2009 and recently in 2019 and 2020 at [facility] by Dr. Trinidad, s/p T3-L1 revision of prior fusion (with Dr. Luz on 8/17/22). Outpatient Xray 11/16/2023 showing broken rods/displacement. Presents for elective C2-S2 instrumentation and fusion. Pt endorses weakness of b/l LE and burning pain in b/l lower extremities radiating to both feet    Hospital Course:  12/4: Admitted for spinal fusion d/t damaged hardware.   12/5: Neuro stable. POD0 C2-S2 instrumentation/fusion (intraop b/l LE motor loss). on parminder for MAP>90  12/6: POD1. KAMRAN o/n neuro stable. remains intubated. Extubated 6am. Precedex gtt prn. Remains on parminder gtt for MAP goal >90. Valium made prn. CT thoracic spine bc MRI unavailable. Attempted NGT, unsuccessful (resistance @ 35).   12/7: POD2 Given IV dilaudid 0.5mg for pain. Neuro exam stable. SQL started tongiht. Increased precedex to 1.0. Responds well to valium. Consulting psych given paranoia ?homicidal statements. Passed bedside dysphagia and tolerating PO meds. MRI complete, f/u read.   12/8: POD3 Pt reporting incisional pain, given dilaudid 0.5mg IV x2. Pt appearing anxious, given xanax 1mg. Neuro exam unchanged. Pain regimen increased to oxy 15/30 mg, pending further recs. Given 1 L bolus for tachycardia. Trops negative. 2 HMV drains removed.   12/9: POD4, KAMRAN, CT myelogram today showing block at T12-L1, OR tomorrow  12/10: POD5, KAMRAN, OR today for exploration of fusion, possible decompression T9-L2, revision instrumentation  POD 0 T9-L2 decompression. Pt returned from OR intubated. 50mcg fentanyl IVP x 2 for pain control and HTN. Magnesium repleted. Precedex gtt started for sedation. Decreased FiO2 to 40%. 1L bolus for soft MAP. Dc'd propofol and started levo gtt for MAP >90.   12/11: POD 6, POD 1. Extubated, satting well on RA. Resumed home valium and gabapentin. Jiménez d/c'd, pending TOV. Seroquel started for agitation, R IJ removed, LUE double midline placed. Started SQL.   12/12: POD7, POD2, given 500 cc bolus and started on phenylephrine for MAP goal >90. Started midodrine 10mg q8h. Cardura dc'd d/t hypotension. Given 250cc of albumin for increasing pressor requirements. Given lactulose, pending BM. Hgb 7.6<8.3, given 1u PRBC. Protected sleep time. Urology consulted for possible suprapubic cath, recommend self intermittent cath is optimal.   12/13: POD8, POD3 Midodrine increased to 15q8. Pt refused to take the extra 5mg midodrin ordered. Neuro exam stable. hgb 10 from 9.4 after 1u pRBCs, Pt refused AM meds, given in late morning, CTAP ordered for L sided abd pain, fleet enema, restarted home movantik and ordere fleet enema for constipation. YIFAN and HMV dc'd. 250cc albumin x1  12/14: POD9, POD4, KAMRAN overnight, neuro stable. MAP goal liberalized to > 65. Stepdown status.   12/15: POD10, POD5, KAMRAN overnight  12/16: POD11, POD6, KAMRAN overnight, neuro stable, 500 cc bolus NS given for tachycardia. Aquacell changed. CXR, pro-bnp negative for pulmonary edema. Given additional 500cc bolus.  12/17: POD 12. POD 7. KAMRAN overnight, neuro stable.   12/19: KAMRAN. Persistent tachycardia, r/o PE. CTPE protocol ordered. Refused all afternoon meds except oxy 30. Per Dr. Cowart (rad) CT chest w segmental and subsegmental PE in R upper lobe. Dopplers negative for DVT, CT PE shows segmental and subsegmental PE in R upper lobe. Per Dr. Augustine and Dr. Luz, Will start eliquis pending pain management approval. Per Dr. Mclaughlin (pain mgmt) can start eliquis 10mg BIDx 7 days. Dc'd lovenox.  12/20: POD 10 T9-L2 Decompression. Hemodynamically stable.   12/21: POD 11 T9-L2 Decompression. neruo/hemodynamically stable, pending rehab.  12/22: POD 17/12, pending rehab. Aquacel dressing changed.   12/23: POD 18/13, pending rehab.   12/24: POD 19/14, pending rehab.   12/25: POD 20/15, pending rehab.   12/26: POD 21/16, pending rehab. Pain pump refilled.   12/27: POD 22/17. pending rehab.   12/28: POD 23/18.   12/29: POD 24/19. KAMRAN o/n. No AR beds, T[facility]  12/30: POD 25/20. Pending [facility]   12/31: POD 26/21. Pending T[facility]WO. Midrodrine weaned to 10q8h. UA positive for UTI, f/u culture, started on ceftriaxone x 3 days.   1/1: POD 27/22. KAMRAN overnight. Neuro stable. Wean midodrine to 5q8.   1/2: POD 28/23. KAMRAN overnight, neuro stable. L shoulder xray completed for pain, negative. Medipore dressing changed.   1/3: POD 29/24. KAMRAN overnight, neuro stable. Given 1L bolus for soft pressures.   1/4: POD30/25, KAMRAN ovn. Placed jiménez for neurogenic bladder.  TTE with EF 65%, no WMA, mild PA hypertension.   1/5: POD 31/26. KAMRAN o/n.     Patient evaluated by PT/OT who recommended: subacute rehab  Patient is going to [facility] and [facility].     Hospital course c/b: lower extremity plegia (continue PT/OT), pain (well controlled now on current regiment), PE on work up for tachycardia (started on Eliquis), urinary retention (jiménez placed 1/4)    Exam on day of discharge:  General: NAD, AAOx3  HEENT: PERRL. EOMI. VF intact.  Neck: From, nontender.  Neuro: CN II-XII intact, follows commands, speech clear. Strength BL UE 5/5  BL LE 0/5 to noxious stimuli with T11 sensory deficit.  Vascular: Distal pulses 2+ x 4, no calf edema or erythema  Wounds: Posterior midline spine incision C/D/I with dressing in place    Patient is neuro stable, vitals stable, stable tachycardia, medically ready for discharge      HPI:  68 y/o female with PMHx HTN, HLD, CVA x 3 (last was 2016 with right hand weakness residual), GAMALIEL not using CPAP, Emphysema, former 25 pack year smoker restarted this week of surgery, Chronic Constipation on Movantik, Urinary Incontinence chronically self straight cath at home, chronic UTI, Breast Implant Rupture with Chronic Leak repair 10/2023, B/L CTS s/p release, s/p Intrathecal Morphine pump for pain, with chronic neck and back pain worsening for years s/p multiple spinal surgeries including laminectomies and fusion of cervical, thoracic and lumbar spine initially done in 2009 and recently in 2019 and 2020 at [facility] by Dr. Trinidad, s/p T3-L1 revision of prior fusion (with Dr. Luz on 8/17/22). Outpatient Xray 11/16/2023 showing broken rods/displacement. Presents for elective C2-S2 instrumentation and fusion. Pt endorses weakness of b/l LE and burning pain in b/l lower extremities radiating to both feet    Hospital Course:  12/4: Admitted for spinal fusion d/t damaged hardware.   12/5: Neuro stable. POD0 C2-S2 instrumentation/fusion (intraop b/l LE motor loss). on parminder for MAP>90  12/6: POD1. KAMRAN o/n neuro stable. remains intubated. Extubated 6am. Precedex gtt prn. Remains on parminder gtt for MAP goal >90. Valium made prn. CT thoracic spine bc MRI unavailable. Attempted NGT, unsuccessful (resistance @ 35).   12/7: POD2 Given IV dilaudid 0.5mg for pain. Neuro exam stable. SQL started tongiht. Increased precedex to 1.0. Responds well to valium. Consulting psych given paranoia ?homicidal statements. Passed bedside dysphagia and tolerating PO meds. MRI complete, f/u read.   12/8: POD3 Pt reporting incisional pain, given dilaudid 0.5mg IV x2. Pt appearing anxious, given xanax 1mg. Neuro exam unchanged. Pain regimen increased to oxy 15/30 mg, pending further recs. Given 1 L bolus for tachycardia. Trops negative. 2 HMV drains removed.   12/9: POD4, KARMAN, CT myelogram today showing block at T12-L1, OR tomorrow  12/10: POD5, KAMRAN, OR today for exploration of fusion, possible decompression T9-L2, revision instrumentation  POD 0 T9-L2 decompression. Pt returned from OR intubated. 50mcg fentanyl IVP x 2 for pain control and HTN. Magnesium repleted. Precedex gtt started for sedation. Decreased FiO2 to 40%. 1L bolus for soft MAP. Dc'd propofol and started levo gtt for MAP >90.   12/11: POD 6, POD 1. Extubated, satting well on RA. Resumed home valium and gabapentin. Jiménez d/c'd, pending TOV. Seroquel started for agitation, R IJ removed, LUE double midline placed. Started SQL.   12/12: POD7, POD2, given 500 cc bolus and started on phenylephrine for MAP goal >90. Started midodrine 10mg q8h. Cardura dc'd d/t hypotension. Given 250cc of albumin for increasing pressor requirements. Given lactulose, pending BM. Hgb 7.6<8.3, given 1u PRBC. Protected sleep time. Urology consulted for possible suprapubic cath, recommend self intermittent cath is optimal.   12/13: POD8, POD3 Midodrine increased to 15q8. Pt refused to take the extra 5mg midodrin ordered. Neuro exam stable. hgb 10 from 9.4 after 1u pRBCs, Pt refused AM meds, given in late morning, CTAP ordered for L sided abd pain, fleet enema, restarted home movantik and ordere fleet enema for constipation. YIFAN and HMV dc'd. 250cc albumin x1  12/14: POD9, POD4, KAMRAN overnight, neuro stable. MAP goal liberalized to > 65. Stepdown status.   12/15: POD10, POD5, KAMRAN overnight  12/16: POD11, POD6, KAMRAN overnight, neuro stable, 500 cc bolus NS given for tachycardia. Aquacell changed. CXR, pro-bnp negative for pulmonary edema. Given additional 500cc bolus.  12/17: POD 12. POD 7. KAMRAN overnight, neuro stable.   12/19: KAMRAN. Persistent tachycardia, r/o PE. CTPE protocol ordered. Refused all afternoon meds except oxy 30. Per Dr. Cowart (rad) CT chest w segmental and subsegmental PE in R upper lobe. Dopplers negative for DVT, CT PE shows segmental and subsegmental PE in R upper lobe. Per Dr. Augustine and Dr. Luz, Will start eliquis pending pain management approval. Per Dr. Mclaughlin (pain mgmt) can start eliquis 10mg BIDx 7 days. Dc'd lovenox.  12/20: POD 10 T9-L2 Decompression. Hemodynamically stable.   12/21: POD 11 T9-L2 Decompression. neruo/hemodynamically stable, pending rehab.  12/22: POD 17/12, pending rehab. Aquacel dressing changed.   12/23: POD 18/13, pending rehab.   12/24: POD 19/14, pending rehab.   12/25: POD 20/15, pending rehab.   12/26: POD 21/16, pending rehab. Pain pump refilled.   12/27: POD 22/17. pending rehab.   12/28: POD 23/18.   12/29: POD 24/19. KAMRAN o/n. No AR beds, TAIWO  12/30: POD 25/20. Pending TAIWO   12/31: POD 26/21. Pending TAIWO. Midrodrine weaned to 10q8h. UA positive for UTI, f/u culture, started on ceftriaxone x 3 days.   1/1: POD 27/22. KAMRAN overnight. Neuro stable. Wean midodrine to 5q8.   1/2: POD 28/23. KAMRAN overnight, neuro stable. L shoulder xray completed for pain, negative. Medipore dressing changed.   1/3: POD 29/24. KAMRAN overnight, neuro stable. Given 1L bolus for soft pressures.   1/4: POD30/25, KAMRAN ovn. Placed jiménez for neurogenic bladder.  TTE with EF 65%, no WMA, mild PA hypertension.   1/5: POD 31/26. KAMRAN o/n.     Patient evaluated by PT/OT who recommended: subacute rehab  Patient is going to [facility].     Hospital course c/b: lower extremity plegia (continue PT/OT), pain (well controlled now on current regiment), PE on work up for tachycardia (started on Eliquis), urinary retention (jiménez placed 1/4)    Exam on day of discharge:  General: NAD, AAOx3  HEENT: PERRL. EOMI. VF intact.  Neck: From, nontender.  Neuro: CN II-XII intact, follows commands, speech clear. Strength BL UE 5/5  BL LE 0/5 to noxious stimuli with T11 sensory deficit.  Vascular: Distal pulses 2+ x 4, no calf edema or erythema  Wounds: Posterior midline spine incision C/D/I with dressing in place    Patient is neuro stable, vitals stable, stable tachycardia, medically ready for discharge

## 2023-12-18 NOTE — PROGRESS NOTE ADULT - NS ATTEST RISK PROBLEM GEN_ALL_CORE FT
Presence of two chronic medical issues (chronic constipation and hypertension) and discussion of plan with the neurosurgery team.

## 2023-12-18 NOTE — PROGRESS NOTE ADULT - SUBJECTIVE AND OBJECTIVE BOX
Feeling okay this morning. Finally got to speak with her relatives in [place] on video chat, she was happy about this.  Denies any new symptoms.  She was explaining that no one every told her the results of her outpatient cardiac monitor.   No overnight events reported.     Remaining ROS negative       PHYSICAL EXAM:    General: no acute distress, sitting up in bed  Head: NC/AT; anicteric sclera  Respiratory: CTA B/L; no wheeze, crackles, rales or rhonchi  Cardiovascular: RRR, normal S1/S2; no mrg  Gastrointestinal: Soft; NTND, no guarding, normoactive bowel sounds  Extremities: WWP; no edema  Neurological:  speech is fluent, no acute deficits noted, paralysis in b/l lower extremities   Skin: No obvious rashes or lesions present  Psych: Appropriate mood and affect     VITAL SIGNS:  Vital Signs Last 24 Hrs  T(C): 36.9 (18 Dec 2023 13:26), Max: 37.3 (17 Dec 2023 16:44)  T(F): 98.5 (18 Dec 2023 13:26), Max: 99.1 (17 Dec 2023 16:44)  HR: 116 (18 Dec 2023 13:26) (92 - 122)  BP: 144/78 (18 Dec 2023 13:26) (115/66 - 144/78)  BP(mean): --  RR: 18 (18 Dec 2023 13:26) (17 - 18)  SpO2: 98% (18 Dec 2023 13:26) (96% - 99%)    Parameters below as of 18 Dec 2023 13:26  Patient On (Oxygen Delivery Method): room air    MEDICATIONS:  MEDICATIONS  (STANDING):  bisacodyl 5 milliGRAM(s) Oral at bedtime  chlorhexidine 2% Cloths 1 Application(s) Topical <User Schedule>  diazepam    Tablet 5 milliGRAM(s) Oral every 8 hours  DULoxetine 60 milliGRAM(s) Oral daily  enoxaparin Injectable 40 milliGRAM(s) SubCutaneous <User Schedule>  gabapentin 800 milliGRAM(s) Oral every 8 hours  influenza  Vaccine (HIGH DOSE) 0.7 milliLiter(s) IntraMuscular once  insulin lispro (ADMELOG) corrective regimen sliding scale   SubCutaneous Before meals and at bedtime  midodrine 15 milliGRAM(s) Oral every 8 hours  Morphine 1 milliGRAM / mL 17.9 mL,Morphine 1 milliGRAM / mL 17.9 mL,Morphine 1 milliGRAM / mL 17.9 mL 17.9 mL IntraThecal Continuous Pump  multivitamin 1 Tablet(s) Oral daily  naloxegol 25 milliGRAM(s) Oral daily  pantoprazole    Tablet 40 milliGRAM(s) Oral before breakfast  polyethylene glycol 3350 17 Gram(s) Oral two times a day  senna 2 Tablet(s) Oral at bedtime  simvastatin 40 milliGRAM(s) Oral at bedtime    MEDICATIONS  (PRN):  benzocaine 20% Spray 1 Spray(s) Mucosal three times a day PRN Sore throat  hydrocortisone 1% Cream 1 Application(s) Topical two times a day PRN Itching  naloxone Injectable 0.1 milliGRAM(s) IV Push every 3 minutes PRN For ANY of the following changes in patient status:  A. RR LESS THAN 10 breaths per minute, B. Oxygen saturation LESS THAN 90%, C. Sedation score of 6  ondansetron Injectable 4 milliGRAM(s) IV Push every 6 hours PRN Nausea  oxyCODONE    IR 15 milliGRAM(s) Oral every 4 hours PRN Moderate Pain (4 - 6)  oxyCODONE    IR 30 milliGRAM(s) Oral every 4 hours PRN Severe Pain (7 - 10)  sodium chloride 0.9% lock flush 10 milliLiter(s) IV Push every 1 hour PRN Pre/post blood products, medications, blood draw, and to maintain line patency      ALLERGIES:  Allergies    Crab (Pruritus)  No Known Drug Allergies    Intolerances        LABS:                        10.8   9.69  )-----------( 294      ( 18 Dec 2023 06:45 )             34.1     12-18    136  |  101  |  7   ----------------------------<  106<H>  4.2   |  26  |  0.46<L>    Ca    9.2      18 Dec 2023 06:45  Phos  3.2     12-18  Mg     1.9     12-18        Urinalysis Basic - ( 18 Dec 2023 06:45 )    Color: x / Appearance: x / SG: x / pH: x  Gluc: 106 mg/dL / Ketone: x  / Bili: x / Urobili: x   Blood: x / Protein: x / Nitrite: x   Leuk Esterase: x / RBC: x / WBC x   Sq Epi: x / Non Sq Epi: x / Bacteria: x      CAPILLARY BLOOD GLUCOSE      POCT Blood Glucose.: 131 mg/dL (18 Dec 2023 12:24)      RADIOLOGY & ADDITIONAL TESTS: Reviewed.

## 2023-12-18 NOTE — DISCHARGE NOTE PROVIDER - CARE PROVIDER_API CALL
Estiven Luz  Neurosurgery  [address] [address]  Phone: [phone]  Fax: [phone]  Follow Up Time:     Logan Conley  Plastic Surgery  [address] [facility], [address]  [place] [address]  Phone: [phone]  Fax: [phone]  Follow Up Time:     Gurwinder Mclaughlin [facility]  Pain Medicine  [address] [place] [address], [address]  [place] [address]  Phone: [phone]  Fax: [phone]  Follow Up Time:    Estiven Luz  Neurosurgery  [address] [address]  Phone: [phone]  Fax: [phone]  Follow Up Time:     Logan Conley  Plastic Surgery  [address] [facility], [address]  [place] [address]  Phone: [phone]  Fax: [phone]  Follow Up Time:     Guwrinder Mclaughlin [facility]  Pain Medicine  [address] [place] [address], [address]  [place] [address]  Phone: [phone]  Fax: [phone]  Follow Up Time:

## 2023-12-18 NOTE — PROGRESS NOTE ADULT - ASSESSMENT
68F with PMH of HTN, HLD, DM2, CVA x 3 (last in 2016 with residual R hand weakness), GAMALIEL not using CPAP, Emphysema, ex-25 pack year smoker now restarted, chronic constipation on Movantik, chronic urinary retention (SC at home), b/l CTS s/p release, s/p Intrathecal Morphine pump for pain, with chronic neck and back pain worsening for years s/p multiple spinal surgeries including laminectomies and fusion of cervical, thoracic and lumbar spine, s/p T3-L1 revision of prior fusion (with  on 8/17/22). Xray 11/16/23 showing broken rods/displacement. Now s/p C2-S2 instrumentation and fusion (12/5). S/p T9-L2 decompression w/ durotomy with repair (12/10/23). KAREEM grade A.     #Neck Pain  #Multiple Spine Surgeries  #Chronic Back Pain  -s/p fusion  -OT  -Pain Control per primary team  -Bowel Regimen   -Pending Rehab placement    #Chronic Constipation   - likely due to chronic opioid use  - c/w bowel regimen  - continue movantik    #HTN  -  Lopressor 75mg BID    #CAD  #CVA  - resume ASA post operatively when safe to do so  - continue atorvastatin  - awaiting results of recent long term outpatient cardiac monitoring to monitor for occult Afib to determine if a role for full dose AVC, follows with Dr Jeter    #DM  - ISS while inpatient    #tachycardia   Improved with IVF, suspect hypovolemia.   - Can continue to monitor for now. If further episodes of HR >120, would obtain CTPE.

## 2023-12-18 NOTE — DISCHARGE NOTE PROVIDER - NSDCFUADDAPPT_GEN_ALL_CORE_FT
Please follow up with Dr. Luz, call the office to make/confirm/make changes to your appointment at [phone]    Please follow up with Dr. Conley from Plastic Surgery    Please follow up with Dr. Mclaughlin if needed for pain management    Please follow up with your primary care doctor  Please follow up with Dr. Luz, call the office to make/confirm/make changes to your appointment at [phone]    Please follow up with Dr. Conley from Plastic Surgery    Please follow up with Dr. Mclaughlin if needed for pain management    Please follow up with your primary care doctor  Please follow up with Dr. Luz, call the office to make/confirm/make changes to your appointment at [phone]    Please follow up with Dr. Conley from Plastic Surgery. He can see you in office on 1/17 or 1/31. Please call his office at [phone] to let him know which day you would like to schedule for.     Please follow up with Dr. Mclaughlin if needed for pain management    Please follow up with your primary care doctor  Please follow up with Dr. Luz, call the office to make/confirm/make changes to your appointment at [phone]    Please follow up with Dr. Conley from Plastic Surgery. He can see you in office on 1/17 or 1/31. Please call his office at [phone] to let him know which day you would like to schedule for.     Please follow up with Dr. Mclaughlin if needed for pain management    Please follow up with your primary care doctor  Please follow up with Dr. Luz, call the office to make/confirm/make changes to your appointment at [phone]    Please follow up with Dr. Conley from Plastic Surgery. He can see you in office on 1/17 or 1/31. Please call his office at [phone] to let him know which day you would like to schedule for.     Please follow up with Dr. Mclaughlin if needed for pain management    Castillo was placed 1/4 for urinary retention. Please follow up at the Urology clinic at [address] [place] [address], call [phone] for an appointment.  Please follow up with your primary care doctor  Please follow up with Dr. Luz, call the office to make/confirm/make changes to your appointment at [phone]    Please follow up with Dr. Conley from Plastic Surgery. He can see you in office on 1/17 or 1/31. Please call his office at [phone] to let him know which day you would like to schedule for.     Please follow up with Dr. Mclaughlin if needed for pain management    Castillo was placed 1/4 for urinary retention. Please follow up at the Urology clinic at [address] [place] [address], call [phone] for an appointment.  Please follow up with your primary care doctor

## 2023-12-19 LAB
GLUCOSE BLDC GLUCOMTR-MCNC: 151 MG/DL — HIGH (ref 70–99)
GLUCOSE BLDC GLUCOMTR-MCNC: 151 MG/DL — HIGH (ref 70–99)
GLUCOSE BLDC GLUCOMTR-MCNC: 152 MG/DL — HIGH (ref 70–99)
GLUCOSE BLDC GLUCOMTR-MCNC: 152 MG/DL — HIGH (ref 70–99)
GLUCOSE BLDC GLUCOMTR-MCNC: 95 MG/DL — SIGNIFICANT CHANGE UP (ref 70–99)
GLUCOSE BLDC GLUCOMTR-MCNC: 95 MG/DL — SIGNIFICANT CHANGE UP (ref 70–99)
GLUCOSE BLDC GLUCOMTR-MCNC: 97 MG/DL — SIGNIFICANT CHANGE UP (ref 70–99)
GLUCOSE BLDC GLUCOMTR-MCNC: 97 MG/DL — SIGNIFICANT CHANGE UP (ref 70–99)

## 2023-12-19 PROCEDURE — 99233 SBSQ HOSP IP/OBS HIGH 50: CPT

## 2023-12-19 PROCEDURE — 71275 CT ANGIOGRAPHY CHEST: CPT | Mod: 26

## 2023-12-19 PROCEDURE — 93970 EXTREMITY STUDY: CPT | Mod: 26,1L

## 2023-12-19 RX ORDER — DIAZEPAM 5 MG
5 TABLET ORAL EVERY 8 HOURS
Refills: 0 | Status: DISCONTINUED | OUTPATIENT
Start: 2023-12-19 | End: 2023-12-26

## 2023-12-19 RX ORDER — APIXABAN 2.5 MG/1
10 TABLET, FILM COATED ORAL EVERY 12 HOURS
Refills: 0 | Status: DISCONTINUED | OUTPATIENT
Start: 2023-12-19 | End: 2023-12-19

## 2023-12-19 RX ORDER — APIXABAN 2.5 MG/1
10 TABLET, FILM COATED ORAL EVERY 12 HOURS
Refills: 0 | Status: DISCONTINUED | OUTPATIENT
Start: 2023-12-19 | End: 2023-12-25

## 2023-12-19 RX ORDER — ENOXAPARIN SODIUM 100 MG/ML
40 INJECTION SUBCUTANEOUS EVERY 24 HOURS
Refills: 0 | Status: DISCONTINUED | OUTPATIENT
Start: 2023-12-19 | End: 2023-12-19

## 2023-12-19 RX ORDER — HYDROMORPHONE HYDROCHLORIDE 2 MG/ML
0.5 INJECTION INTRAMUSCULAR; INTRAVENOUS; SUBCUTANEOUS ONCE
Refills: 0 | Status: DISCONTINUED | OUTPATIENT
Start: 2023-12-19 | End: 2023-12-19

## 2023-12-19 RX ADMIN — OXYCODONE HYDROCHLORIDE 30 MILLIGRAM(S): 5 TABLET ORAL at 07:45

## 2023-12-19 RX ADMIN — OXYCODONE HYDROCHLORIDE 30 MILLIGRAM(S): 5 TABLET ORAL at 19:06

## 2023-12-19 RX ADMIN — CHLORHEXIDINE GLUCONATE 1 APPLICATION(S): 213 SOLUTION TOPICAL at 06:08

## 2023-12-19 RX ADMIN — Medication 2: at 19:07

## 2023-12-19 RX ADMIN — Medication 5 MILLIGRAM(S): at 22:10

## 2023-12-19 RX ADMIN — Medication 5 MILLIGRAM(S): at 09:17

## 2023-12-19 RX ADMIN — OXYCODONE HYDROCHLORIDE 30 MILLIGRAM(S): 5 TABLET ORAL at 18:06

## 2023-12-19 RX ADMIN — OXYCODONE HYDROCHLORIDE 15 MILLIGRAM(S): 5 TABLET ORAL at 03:20

## 2023-12-19 RX ADMIN — PANTOPRAZOLE SODIUM 40 MILLIGRAM(S): 20 TABLET, DELAYED RELEASE ORAL at 06:46

## 2023-12-19 RX ADMIN — Medication 2: at 22:40

## 2023-12-19 RX ADMIN — GABAPENTIN 800 MILLIGRAM(S): 400 CAPSULE ORAL at 02:17

## 2023-12-19 RX ADMIN — SENNA PLUS 2 TABLET(S): 8.6 TABLET ORAL at 22:11

## 2023-12-19 RX ADMIN — OXYCODONE HYDROCHLORIDE 30 MILLIGRAM(S): 5 TABLET ORAL at 06:45

## 2023-12-19 RX ADMIN — SIMVASTATIN 40 MILLIGRAM(S): 20 TABLET, FILM COATED ORAL at 22:10

## 2023-12-19 RX ADMIN — APIXABAN 10 MILLIGRAM(S): 2.5 TABLET, FILM COATED ORAL at 22:10

## 2023-12-19 RX ADMIN — Medication 5 MILLIGRAM(S): at 18:07

## 2023-12-19 RX ADMIN — OXYCODONE HYDROCHLORIDE 30 MILLIGRAM(S): 5 TABLET ORAL at 11:35

## 2023-12-19 RX ADMIN — OXYCODONE HYDROCHLORIDE 15 MILLIGRAM(S): 5 TABLET ORAL at 02:32

## 2023-12-19 RX ADMIN — OXYCODONE HYDROCHLORIDE 30 MILLIGRAM(S): 5 TABLET ORAL at 12:35

## 2023-12-19 RX ADMIN — GABAPENTIN 800 MILLIGRAM(S): 400 CAPSULE ORAL at 18:07

## 2023-12-19 NOTE — PROGRESS NOTE ADULT - ASSESSMENT
68F with PMH of HTN, HLD, DM2, CVA x 3 (last in 2016 with residual R hand weakness), GAMALIEL not using CPAP, Emphysema, ex-25 pack year smoker now restarted, chronic constipation on Movantik, chronic urinary retention (SC at home), b/l CTS s/p release, s/p Intrathecal Morphine pump for pain, with chronic neck and back pain worsening for years s/p multiple spinal surgeries including laminectomies and fusion of cervical, thoracic and lumbar spine, s/p T3-L1 revision of prior fusion (with  on 8/17/22). Xray 11/16/23 showing broken rods/displacement. Now s/p C2-S2 instrumentation and fusion (12/5). S/p T9-L2 decompression w/ durotomy with repair (12/10/23). KAREEM grade A.     #Neck Pain  #Multiple Spine Surgeries  #Chronic Back Pain  -s/p fusion  -OT  -Pain Control per primary team  -Bowel Regimen   -Pending Rehab placement    #Chronic Constipation   - likely due to chronic opioid use  - c/w bowel regimen  - continue movantik    #HTN  -  Lopressor 75mg BID    #CAD  #CVA  - resume ASA post operatively when safe to do so  - continue atorvastatin  - awaiting results of recent long term outpatient cardiac monitoring to monitor for occult Afib to determine if a role for full dose AVC, follows with Dr Jeter    #DM  - ISS while inpatient    #tachycardia   -CTPE performed given persistent tachycardia, shown to have subsegmental and segmental PE. Would start Eliquis if cleared from NSG

## 2023-12-19 NOTE — PROGRESS NOTE ADULT - SUBJECTIVE AND OBJECTIVE BOX
HPI:  68 y/o female with PMHx HTN, HLD, CVA x 3 (last was 2016 with right hand weakness residual), GAMALIEL not using CPAP, Emphysema, former 25 pack year smoker restarted this week of surgery, Chronic Constipation on Movantik, Urinary Incontinence chronically self straight cath at home, chronic UTI, Breast Implant Rupture with Chronic Leak repair 10/2023, B/L CTS s/p release, s/p Intrathecal Morphine pump for pain, with chronic neck and back pain worsening for years s/p multiple spinal surgeries including laminectomies and fusion of cervical, thoracic and lumbar spine initially done in 2009 and recently in 2019 and 2020 at [facility] by Dr. Trinidad, s/p T3-L1 revision of prior fusion (with Dr. Luz on 8/17/22). Outpatient Xray 11/16/2023 showing broken rods/displacement. Presents for elective C2-S2 instrumentation and fusion. Pt endorses weakness of b/l LE and burning pain in b/l lower extremities radiating to both feet,  (04 Dec 2023 15:44)    OVERNIGHT EVENTS: Patient reports concerns over her tachycardia. Pain gradually improving but present. No changes in sensation or strength to legs.    Hospital Course:  12/4: Admitted for spinal fusion d/t damaged hardware.   12/5: Neuro stable. POD0 C2-S2 instrumentation/fusion (intraop b/l LE motor loss). on parminder for MAP>90  12/6: POD1. KAMRAN o/n neuro stable. remains intubated. Extubated 6am. Precedex gtt prn. Remains on parminder gtt for MAP goal >90. Valium made prn. CT thoracic spine bc MRI unavailable. Attempted NGT, unsuccessful (resistance @ 35).   12/7: POD2 Given IV dilaudid 0.5mg for pain. Neuro exam stable. SQL started tongiht. Increased precedex to 1.0. Responds well to valium. Consulting psych given paranoia ?homicidal statements. Passed bedside dysphagia and tolerating PO meds. MRI complete, f/u read.   12/8: POD3 Pt reporting incisional pain, given dilaudid 0.5mg IV x2. Pt appearing anxious, given xanax 1mg. Neuro exam unchanged. Pain regimen increased to oxy 15/30 mg, pending further recs. Given 1 L bolus for tachycardia. Trops negative. 2 HMV drains removed.   12/9: POD4, KAMRAN, CT myelogram today showing block at T12-L1, OR tomorrow  12/10: POD5, KAMRAN, OR today for exploration of fusion, possible decompression T9-L2, revision instrumentation  POD 0 T9-L2 decompression. Pt returned from OR intubated. 50mcg fentanyl IVP x 2 for pain control and HTN. Magnesium repleted. Precedex gtt started for sedation. Decreased FiO2 to 40%. 1L bolus for soft MAP. Dc'd propofol and started levo gtt for MAP >90.   12/11: POD 6, POD 1. Extubated, satting well on RA. Resumed home valium and gabapentin. Castillo d/c'd, pending TOV. Seroquel started for agitation, R IJ removed, LUE double midline placed. Started SQL.   12/12: POD7, POD2, given 500 cc bolus and started on phenylephrine for MAP goal >90. Started midodrine 10mg q8h. Cardura dc'd d/t hypotension. Given 250cc of albumin for increasing pressor requirements. Given lactulose, pending BM. Hgb 7.6<8.3, given 1u PRBC. Protected sleep time. Urology consulted for possible suprapubic cath, recommend self intermittent cath is optimal.   12/13: POD8, POD3 Midodrine increased to 15q8. Pt refused to take the extra 5mg midodrin ordered. Neuro exam stable. hgb 10 from 9.4 after 1u pRBCs, Pt refused AM meds, given in late morning, CTAP ordered for L sided abd pain, fleet enema, restarted home movantik and ordere fleet enema for constipation. YIFAN and HMV dc'd. 250cc albumin x1  12/14: POD9, POD4, KAMRAN overnight, neuro stable. MAP goal liberalized to > 65. Stepdown status.   12/15: POD10, POD5, KAMRAN overnight  12/16: POD11, POD6, KAMRAN overnight, neuro stable, 500 cc bolus NS given for tachycardia. Aquacell changed. CXR, pro-bnp negative for pulmonary edema. Given additional 500cc bolus.  12/17: POD 12. POD 7. KAMRAN overnight, neuro stable.   12/18: POD 13/8. Incontinent x2 today. Persistent tachycardia.    Vital Signs Last 24 Hrs  T(C): 36.7 (19 Dec 2023 00:20), Max: 36.9 (18 Dec 2023 13:26)  T(F): 98 (19 Dec 2023 00:20), Max: 98.5 (18 Dec 2023 13:26)  HR: 115 (19 Dec 2023 00:20) (96 - 116)  BP: 111/60 (19 Dec 2023 00:20) (105/72 - 144/78)  BP(mean): --  RR: 18 (19 Dec 2023 00:20) (16 - 19)  SpO2: 96% (19 Dec 2023 00:20) (96% - 100%)    Parameters below as of 19 Dec 2023 00:20  Patient On (Oxygen Delivery Method): room air        I&O's Summary    17 Dec 2023 07:01  -  18 Dec 2023 07:00  --------------------------------------------------------  IN: 0 mL / OUT: 1500 mL / NET: -1500 mL    18 Dec 2023 07:01  -  19 Dec 2023 02:02  --------------------------------------------------------  IN: 240 mL / OUT: 1500 mL / NET: -1260 mL        PHYSICAL EXAM:  General: NAD, AAOx3  HEENT: PERRL. EOMI. VF intact.  Neck: From, nontender.  CN II-XII grossly intact, PERRL 3mm, EOMI B/L, face symmetric  Cardiovascular: RRR, normal S1 and S2   Respiratory: non-labored breathing, symmetric chest rise  GI: abd soft, NTND   Neuro: CN II-XII intact, follows commands, speech clear. Strength BL UE 5/5  BL LE 0/5 to noxious stimuli with T11 sensory deficit.  Vascular: Distal pulses 2+ x4, no calf edema or erythema  Wounds: Posterior spine wound C/D/I with dressing in place      TUBES/LINES:  [] Castillo  [x] Wound Drains  [] Others      DIET:  [] NPO  [x] Mechanical  [] Tube feeds    LABS:                        10.8   9.69  )-----------( 294      ( 18 Dec 2023 06:45 )             34.1     12-18    136  |  101  |  7   ----------------------------<  106<H>  4.2   |  26  |  0.46<L>    Ca    9.2      18 Dec 2023 06:45  Phos  3.2     12-18  Mg     1.9     12-18        Urinalysis Basic - ( 18 Dec 2023 06:45 )    Color: x / Appearance: x / SG: x / pH: x  Gluc: 106 mg/dL / Ketone: x  / Bili: x / Urobili: x   Blood: x / Protein: x / Nitrite: x   Leuk Esterase: x / RBC: x / WBC x   Sq Epi: x / Non Sq Epi: x / Bacteria: x          CAPILLARY BLOOD GLUCOSE      POCT Blood Glucose.: 133 mg/dL (18 Dec 2023 22:21)  POCT Blood Glucose.: 136 mg/dL (18 Dec 2023 17:56)  POCT Blood Glucose.: 131 mg/dL (18 Dec 2023 12:24)  POCT Blood Glucose.: 118 mg/dL (18 Dec 2023 07:14)      Drug Levels: [] N/A    CSF Analysis: [] N/A      Allergies    Crab (Pruritus)  No Known Drug Allergies    Intolerances      MEDICATIONS:  Antibiotics:    Neuro:  DULoxetine 60 milliGRAM(s) Oral daily  gabapentin 800 milliGRAM(s) Oral every 8 hours  ondansetron Injectable 4 milliGRAM(s) IV Push every 6 hours PRN  oxyCODONE    IR 15 milliGRAM(s) Oral every 4 hours PRN  oxyCODONE    IR 30 milliGRAM(s) Oral every 4 hours PRN    Anticoagulation:  enoxaparin Injectable 40 milliGRAM(s) SubCutaneous <User Schedule>    OTHER:  benzocaine 20% Spray 1 Spray(s) Mucosal three times a day PRN  bisacodyl 5 milliGRAM(s) Oral at bedtime  chlorhexidine 2% Cloths 1 Application(s) Topical <User Schedule>  hydrocortisone 1% Cream 1 Application(s) Topical two times a day PRN  influenza  Vaccine (HIGH DOSE) 0.7 milliLiter(s) IntraMuscular once  insulin lispro (ADMELOG) corrective regimen sliding scale   SubCutaneous Before meals and at bedtime  midodrine 15 milliGRAM(s) Oral every 8 hours  Morphine 1 milliGRAM / mL 17.9 mL,Morphine 1 milliGRAM / mL 17.9 mL,Morphine 1 milliGRAM / mL 17.9 mL 17.9 mL IntraThecal Continuous Pump  naloxegol 25 milliGRAM(s) Oral daily  naloxone Injectable 0.1 milliGRAM(s) IV Push every 3 minutes PRN  pantoprazole    Tablet 40 milliGRAM(s) Oral before breakfast  polyethylene glycol 3350 17 Gram(s) Oral two times a day  senna 2 Tablet(s) Oral at bedtime  simvastatin 40 milliGRAM(s) Oral at bedtime    IVF:  multivitamin 1 Tablet(s) Oral daily    CULTURES:    RADIOLOGY & ADDITIONAL TESTS:      ASSESSMENT:  68y Female s/p    S12.9XXA    Handoff    MEWS Score    HTN (hypertension)    Back pain    Hypertension    SS (spinal stenosis)    High cholesterol    Stroke    H/O carpal tunnel syndrome    H/O carpal tunnel syndrome    Chronic GERD    History of chronic constipation    Seizure    Urinary incontinence    Pre-diabetes    Acute UTI    Anxiety    Anxiety and depression    Arthritis    Eczema    External hemorrhoids    H/O kyphosis    Multiple sclerosis    Pancreas cyst    Scoliosis    Wheelchair dependent    Cervical pseudoarthrosis    Right shoulder pain    Cervical spondylosis    Chronic headaches    Chronic LUQ pain    Chronic UTI    Degenerative joint disease    H/O low back pain    Lumbosacral spondylosis    COPD (chronic obstructive pulmonary disease)    Back pain    Back pain    Spinal cord injury, thoracic (T7-T12)    Back pain    Spinal cord injury of thoracic region without bone injury    Delirium    Revision of fusion of thoracic spine    Thoracic back pain    Revision of posterolateral fusion of lumbar spine    Revision of fusion of thoracic spine    Decompression, spinal cord, thoracic, posterolateral approach    Cervical disc disease    H/O Spinal surgery    H/O breast surgery    S/P cervical discectomy    Previous back surgery    H/O shoulder surgery    H/O total shoulder replacement, right    Room Service Assist    Revision of posterolateral fusion of lumbar spine    Decompression, spinal cord, thoracic, posterolateral approach    [email or web address]        PLAN:  NEURO:    CARDIOVASCULAR:    PULMONARY:    RENAL:    GI:    HEME:    ID:    ENDO:    DVT PROPHYLAXIS:  [] Venodynes                                [] Heparin/Lovenox    DISPOSITION:    Assessment:  Present when checked    []  GCS  E   V  M     Heart Failure: []Acute, [] acute on chronic , []chronic  Heart Failure:  [] Diastolic (HFpEF), [] Systolic (HFrEF), []Combined (HFpEF and HFrEF), [] RHF, [] Pulm HTN, [] Other    [] DARRYL, [] ATN, [] AIN, [] other  [] CKD1, [] CKD2, [] CKD 3, [] CKD 4, [] CKD 5, []ESRD    Encephalopathy: [] Metabolic, [] Hepatic, [] toxic, [] Neurological, [] Other    Abnormal Nurtitional Status: [] malnurtition (see nutrition note), [ ]underweight: BMI < 19, [] morbid obesity: BMI >40, [] Cachexia    [] Sepsis  [] hypovolemic shock,[] cardiogenic shock, [] hemorrhagic shock, [] neuogenic shock  [] Acute Respiratory Failure  []Cerebral edema, [] Brain compression/ herniation,   [] Functional quadriplegia  [] Acute blood loss anemia   HPI:  66 y/o female with PMHx HTN, HLD, CVA x 3 (last was 2016 with right hand weakness residual), GAMALIEL not using CPAP, Emphysema, former 25 pack year smoker restarted this week of surgery, Chronic Constipation on Movantik, Urinary Incontinence chronically self straight cath at home, chronic UTI, Breast Implant Rupture with Chronic Leak repair 10/2023, B/L CTS s/p release, s/p Intrathecal Morphine pump for pain, with chronic neck and back pain worsening for years s/p multiple spinal surgeries including laminectomies and fusion of cervical, thoracic and lumbar spine initially done in 2009 and recently in 2019 and 2020 at [facility] by Dr. Trinidad, s/p T3-L1 revision of prior fusion (with Dr. Luz on 8/17/22). Outpatient Xray 11/16/2023 showing broken rods/displacement. Presents for elective C2-S2 instrumentation and fusion. Pt endorses weakness of b/l LE and burning pain in b/l lower extremities radiating to both feet,  (04 Dec 2023 15:44)    OVERNIGHT EVENTS: Patient reports concerns over her tachycardia. Pain gradually improving but present. No changes in sensation or strength to legs.    Hospital Course:  12/4: Admitted for spinal fusion d/t damaged hardware.   12/5: Neuro stable. POD0 C2-S2 instrumentation/fusion (intraop b/l LE motor loss). on parminder for MAP>90  12/6: POD1. KAMRAN o/n neuro stable. remains intubated. Extubated 6am. Precedex gtt prn. Remains on parminder gtt for MAP goal >90. Valium made prn. CT thoracic spine bc MRI unavailable. Attempted NGT, unsuccessful (resistance @ 35).   12/7: POD2 Given IV dilaudid 0.5mg for pain. Neuro exam stable. SQL started tongiht. Increased precedex to 1.0. Responds well to valium. Consulting psych given paranoia ?homicidal statements. Passed bedside dysphagia and tolerating PO meds. MRI complete, f/u read.   12/8: POD3 Pt reporting incisional pain, given dilaudid 0.5mg IV x2. Pt appearing anxious, given xanax 1mg. Neuro exam unchanged. Pain regimen increased to oxy 15/30 mg, pending further recs. Given 1 L bolus for tachycardia. Trops negative. 2 HMV drains removed.   12/9: POD4, KAMRAN, CT myelogram today showing block at T12-L1, OR tomorrow  12/10: POD5, KAMRAN, OR today for exploration of fusion, possible decompression T9-L2, revision instrumentation  POD 0 T9-L2 decompression. Pt returned from OR intubated. 50mcg fentanyl IVP x 2 for pain control and HTN. Magnesium repleted. Precedex gtt started for sedation. Decreased FiO2 to 40%. 1L bolus for soft MAP. Dc'd propofol and started levo gtt for MAP >90.   12/11: POD 6, POD 1. Extubated, satting well on RA. Resumed home valium and gabapentin. Castillo d/c'd, pending TOV. Seroquel started for agitation, R IJ removed, LUE double midline placed. Started SQL.   12/12: POD7, POD2, given 500 cc bolus and started on phenylephrine for MAP goal >90. Started midodrine 10mg q8h. Cardura dc'd d/t hypotension. Given 250cc of albumin for increasing pressor requirements. Given lactulose, pending BM. Hgb 7.6<8.3, given 1u PRBC. Protected sleep time. Urology consulted for possible suprapubic cath, recommend self intermittent cath is optimal.   12/13: POD8, POD3 Midodrine increased to 15q8. Pt refused to take the extra 5mg midodrin ordered. Neuro exam stable. hgb 10 from 9.4 after 1u pRBCs, Pt refused AM meds, given in late morning, CTAP ordered for L sided abd pain, fleet enema, restarted home movantik and ordere fleet enema for constipation. YIFAN and HMV dc'd. 250cc albumin x1  12/14: POD9, POD4, KAMRAN overnight, neuro stable. MAP goal liberalized to > 65. Stepdown status.   12/15: POD10, POD5, KAMRAN overnight  12/16: POD11, POD6, KAMRAN overnight, neuro stable, 500 cc bolus NS given for tachycardia. Aquacell changed. CXR, pro-bnp negative for pulmonary edema. Given additional 500cc bolus.  12/17: POD 12. POD 7. KAMRAN overnight, neuro stable.   12/18: POD 13/8. Incontinent x2 today. Persistent tachycardia.    Vital Signs Last 24 Hrs  T(C): 36.7 (19 Dec 2023 00:20), Max: 36.9 (18 Dec 2023 13:26)  T(F): 98 (19 Dec 2023 00:20), Max: 98.5 (18 Dec 2023 13:26)  HR: 115 (19 Dec 2023 00:20) (96 - 116)  BP: 111/60 (19 Dec 2023 00:20) (105/72 - 144/78)  BP(mean): --  RR: 18 (19 Dec 2023 00:20) (16 - 19)  SpO2: 96% (19 Dec 2023 00:20) (96% - 100%)    Parameters below as of 19 Dec 2023 00:20  Patient On (Oxygen Delivery Method): room air        I&O's Summary    17 Dec 2023 07:01  -  18 Dec 2023 07:00  --------------------------------------------------------  IN: 0 mL / OUT: 1500 mL / NET: -1500 mL    18 Dec 2023 07:01  -  19 Dec 2023 02:02  --------------------------------------------------------  IN: 240 mL / OUT: 1500 mL / NET: -1260 mL        PHYSICAL EXAM:  General: NAD, AAOx3  HEENT: PERRL. EOMI. VF intact.  Neck: From, nontender.  CN II-XII grossly intact, PERRL 3mm, EOMI B/L, face symmetric  Cardiovascular: RRR, normal S1 and S2   Respiratory: non-labored breathing, symmetric chest rise  GI: abd soft, NTND   Neuro: CN II-XII intact, follows commands, speech clear. Strength BL UE 5/5  BL LE 0/5 to noxious stimuli with T11 sensory deficit.  Vascular: Distal pulses 2+ x4, no calf edema or erythema  Wounds: Posterior spine wound C/D/I with dressing in place      TUBES/LINES:  [] Castillo  [x] Wound Drains  [] Others      DIET:  [] NPO  [x] Mechanical  [] Tube feeds    LABS:                        10.8   9.69  )-----------( 294      ( 18 Dec 2023 06:45 )             34.1     12-18    136  |  101  |  7   ----------------------------<  106<H>  4.2   |  26  |  0.46<L>    Ca    9.2      18 Dec 2023 06:45  Phos  3.2     12-18  Mg     1.9     12-18        Urinalysis Basic - ( 18 Dec 2023 06:45 )    Color: x / Appearance: x / SG: x / pH: x  Gluc: 106 mg/dL / Ketone: x  / Bili: x / Urobili: x   Blood: x / Protein: x / Nitrite: x   Leuk Esterase: x / RBC: x / WBC x   Sq Epi: x / Non Sq Epi: x / Bacteria: x          CAPILLARY BLOOD GLUCOSE      POCT Blood Glucose.: 133 mg/dL (18 Dec 2023 22:21)  POCT Blood Glucose.: 136 mg/dL (18 Dec 2023 17:56)  POCT Blood Glucose.: 131 mg/dL (18 Dec 2023 12:24)  POCT Blood Glucose.: 118 mg/dL (18 Dec 2023 07:14)      Drug Levels: [] N/A    CSF Analysis: [] N/A      Allergies    Crab (Pruritus)  No Known Drug Allergies    Intolerances      MEDICATIONS:  Antibiotics:    Neuro:  DULoxetine 60 milliGRAM(s) Oral daily  gabapentin 800 milliGRAM(s) Oral every 8 hours  ondansetron Injectable 4 milliGRAM(s) IV Push every 6 hours PRN  oxyCODONE    IR 15 milliGRAM(s) Oral every 4 hours PRN  oxyCODONE    IR 30 milliGRAM(s) Oral every 4 hours PRN    Anticoagulation:  enoxaparin Injectable 40 milliGRAM(s) SubCutaneous <User Schedule>    OTHER:  benzocaine 20% Spray 1 Spray(s) Mucosal three times a day PRN  bisacodyl 5 milliGRAM(s) Oral at bedtime  chlorhexidine 2% Cloths 1 Application(s) Topical <User Schedule>  hydrocortisone 1% Cream 1 Application(s) Topical two times a day PRN  influenza  Vaccine (HIGH DOSE) 0.7 milliLiter(s) IntraMuscular once  insulin lispro (ADMELOG) corrective regimen sliding scale   SubCutaneous Before meals and at bedtime  midodrine 15 milliGRAM(s) Oral every 8 hours  Morphine 1 milliGRAM / mL 17.9 mL,Morphine 1 milliGRAM / mL 17.9 mL,Morphine 1 milliGRAM / mL 17.9 mL 17.9 mL IntraThecal Continuous Pump  naloxegol 25 milliGRAM(s) Oral daily  naloxone Injectable 0.1 milliGRAM(s) IV Push every 3 minutes PRN  pantoprazole    Tablet 40 milliGRAM(s) Oral before breakfast  polyethylene glycol 3350 17 Gram(s) Oral two times a day  senna 2 Tablet(s) Oral at bedtime  simvastatin 40 milliGRAM(s) Oral at bedtime    IVF:  multivitamin 1 Tablet(s) Oral daily    CULTURES:    RADIOLOGY & ADDITIONAL TESTS:      ASSESSMENT:  68y Female s/p    S12.9XXA    Handoff    MEWS Score    HTN (hypertension)    Back pain    Hypertension    SS (spinal stenosis)    High cholesterol    Stroke    H/O carpal tunnel syndrome    H/O carpal tunnel syndrome    Chronic GERD    History of chronic constipation    Seizure    Urinary incontinence    Pre-diabetes    Acute UTI    Anxiety    Anxiety and depression    Arthritis    Eczema    External hemorrhoids    H/O kyphosis    Multiple sclerosis    Pancreas cyst    Scoliosis    Wheelchair dependent    Cervical pseudoarthrosis    Right shoulder pain    Cervical spondylosis    Chronic headaches    Chronic LUQ pain    Chronic UTI    Degenerative joint disease    H/O low back pain    Lumbosacral spondylosis    COPD (chronic obstructive pulmonary disease)    Back pain    Back pain    Spinal cord injury, thoracic (T7-T12)    Back pain    Spinal cord injury of thoracic region without bone injury    Delirium    Revision of fusion of thoracic spine    Thoracic back pain    Revision of posterolateral fusion of lumbar spine    Revision of fusion of thoracic spine    Decompression, spinal cord, thoracic, posterolateral approach    Cervical disc disease    H/O Spinal surgery    H/O breast surgery    S/P cervical discectomy    Previous back surgery    H/O shoulder surgery    H/O total shoulder replacement, right    Room Service Assist    Revision of posterolateral fusion of lumbar spine    Decompression, spinal cord, thoracic, posterolateral approach    [email or web address]        PLAN:  NEURO:    CARDIOVASCULAR:    PULMONARY:    RENAL:    GI:    HEME:    ID:    ENDO:    DVT PROPHYLAXIS:  [] Venodynes                                [] Heparin/Lovenox    DISPOSITION:    Assessment:  Present when checked    []  GCS  E   V  M     Heart Failure: []Acute, [] acute on chronic , []chronic  Heart Failure:  [] Diastolic (HFpEF), [] Systolic (HFrEF), []Combined (HFpEF and HFrEF), [] RHF, [] Pulm HTN, [] Other    [] DARRYL, [] ATN, [] AIN, [] other  [] CKD1, [] CKD2, [] CKD 3, [] CKD 4, [] CKD 5, []ESRD    Encephalopathy: [] Metabolic, [] Hepatic, [] toxic, [] Neurological, [] Other    Abnormal Nurtitional Status: [] malnurtition (see nutrition note), [ ]underweight: BMI < 19, [] morbid obesity: BMI >40, [] Cachexia    [] Sepsis  [] hypovolemic shock,[] cardiogenic shock, [] hemorrhagic shock, [] neuogenic shock  [] Acute Respiratory Failure  []Cerebral edema, [] Brain compression/ herniation,   [] Functional quadriplegia  [] Acute blood loss anemia   HPI:  68 y/o female with PMHx HTN, HLD, CVA x 3 (last was 2016 with right hand weakness residual), GAMALIEL not using CPAP, Emphysema, former 25 pack year smoker restarted this week of surgery, Chronic Constipation on Movantik, Urinary Incontinence chronically self straight cath at home, chronic UTI, Breast Implant Rupture with Chronic Leak repair 10/2023, B/L CTS s/p release, s/p Intrathecal Morphine pump for pain, with chronic neck and back pain worsening for years s/p multiple spinal surgeries including laminectomies and fusion of cervical, thoracic and lumbar spine initially done in 2009 and recently in 2019 and 2020 at [facility] by Dr. Trinidad, s/p T3-L1 revision of prior fusion (with Dr. Luz on 8/17/22). Outpatient Xray 11/16/2023 showing broken rods/displacement. Presents for elective C2-S2 instrumentation and fusion. Pt endorses weakness of b/l LE and burning pain in b/l lower extremities radiating to both feet,  (04 Dec 2023 15:44)    OVERNIGHT EVENTS: Patient reports concerns over her tachycardia. Pain gradually improving but present. No changes in sensation or strength to legs.    Hospital Course:  12/4: Admitted for spinal fusion d/t damaged hardware.   12/5: Neuro stable. POD0 C2-S2 instrumentation/fusion (intraop b/l LE motor loss). on parminder for MAP>90  12/6: POD1. KAMRAN o/n neuro stable. remains intubated. Extubated 6am. Precedex gtt prn. Remains on parminder gtt for MAP goal >90. Valium made prn. CT thoracic spine bc MRI unavailable. Attempted NGT, unsuccessful (resistance @ 35).   12/7: POD2 Given IV dilaudid 0.5mg for pain. Neuro exam stable. SQL started tongiht. Increased precedex to 1.0. Responds well to valium. Consulting psych given paranoia ?homicidal statements. Passed bedside dysphagia and tolerating PO meds. MRI complete, f/u read.   12/8: POD3 Pt reporting incisional pain, given dilaudid 0.5mg IV x2. Pt appearing anxious, given xanax 1mg. Neuro exam unchanged. Pain regimen increased to oxy 15/30 mg, pending further recs. Given 1 L bolus for tachycardia. Trops negative. 2 HMV drains removed.   12/9: POD4, KAMRAN, CT myelogram today showing block at T12-L1, OR tomorrow  12/10: POD5, KAMRAN, OR today for exploration of fusion, possible decompression T9-L2, revision instrumentation  POD 0 T9-L2 decompression. Pt returned from OR intubated. 50mcg fentanyl IVP x 2 for pain control and HTN. Magnesium repleted. Precedex gtt started for sedation. Decreased FiO2 to 40%. 1L bolus for soft MAP. Dc'd propofol and started levo gtt for MAP >90.   12/11: POD 6, POD 1. Extubated, satting well on RA. Resumed home valium and gabapentin. Castillo d/c'd, pending TOV. Seroquel started for agitation, R IJ removed, LUE double midline placed. Started SQL.   12/12: POD7, POD2, given 500 cc bolus and started on phenylephrine for MAP goal >90. Started midodrine 10mg q8h. Cardura dc'd d/t hypotension. Given 250cc of albumin for increasing pressor requirements. Given lactulose, pending BM. Hgb 7.6<8.3, given 1u PRBC. Protected sleep time. Urology consulted for possible suprapubic cath, recommend self intermittent cath is optimal.   12/13: POD8, POD3 Midodrine increased to 15q8. Pt refused to take the extra 5mg midodrin ordered. Neuro exam stable. hgb 10 from 9.4 after 1u pRBCs, Pt refused AM meds, given in late morning, CTAP ordered for L sided abd pain, fleet enema, restarted home movantik and ordere fleet enema for constipation. YIFAN and HMV dc'd. 250cc albumin x1  12/14: POD9, POD4, KAMRAN overnight, neuro stable. MAP goal liberalized to > 65. Stepdown status.   12/15: POD10, POD5, KAMRAN overnight  12/16: POD11, POD6, KAMRAN overnight, neuro stable, 500 cc bolus NS given for tachycardia. Aquacell changed. CXR, pro-bnp negative for pulmonary edema. Given additional 500cc bolus.  12/17: POD 12. POD 7. KAMRAN overnight, neuro stable.   12/18: POD 13/8. Incontinent x2 today. Persistent tachycardia.    Vital Signs Last 24 Hrs  T(C): 36.7 (19 Dec 2023 00:20), Max: 36.9 (18 Dec 2023 13:26)  T(F): 98 (19 Dec 2023 00:20), Max: 98.5 (18 Dec 2023 13:26)  HR: 115 (19 Dec 2023 00:20) (96 - 116)  BP: 111/60 (19 Dec 2023 00:20) (105/72 - 144/78)  BP(mean): --  RR: 18 (19 Dec 2023 00:20) (16 - 19)  SpO2: 96% (19 Dec 2023 00:20) (96% - 100%)    Parameters below as of 19 Dec 2023 00:20  Patient On (Oxygen Delivery Method): room air        I&O's Summary    17 Dec 2023 07:01  -  18 Dec 2023 07:00  --------------------------------------------------------  IN: 0 mL / OUT: 1500 mL / NET: -1500 mL    18 Dec 2023 07:01  -  19 Dec 2023 02:02  --------------------------------------------------------  IN: 240 mL / OUT: 1500 mL / NET: -1260 mL        PHYSICAL EXAM:  General: NAD, AAOx3  HEENT: PERRL. EOMI. VF intact.  Neck: From, nontender.  Cardiovascular: RRR, normal S1 and S2   Respiratory: non-labored breathing, symmetric chest rise  GI: abd soft, NTND, +BS  Neuro: CN II-XII intact, follows commands, speech clear. Strength BL UE 5/5  BL LE 0/5 to noxious stimuli with T11 sensory deficit.  Vascular: Distal pulses 2+ x4, no calf edema or erythema  Wounds: Posterior midline spine incision C/D/I with dressing in place      TUBES/LINES:  [] Castillo  [x] Wound Drains  [] Others      DIET:  [] NPO  [x] Mechanical  [] Tube feeds    LABS:                        10.8   9.69  )-----------( 294      ( 18 Dec 2023 06:45 )             34.1     12-18    136  |  101  |  7   ----------------------------<  106<H>  4.2   |  26  |  0.46<L>    Ca    9.2      18 Dec 2023 06:45  Phos  3.2     12-18  Mg     1.9     12-18        Urinalysis Basic - ( 18 Dec 2023 06:45 )    Color: x / Appearance: x / SG: x / pH: x  Gluc: 106 mg/dL / Ketone: x  / Bili: x / Urobili: x   Blood: x / Protein: x / Nitrite: x   Leuk Esterase: x / RBC: x / WBC x   Sq Epi: x / Non Sq Epi: x / Bacteria: x          CAPILLARY BLOOD GLUCOSE      POCT Blood Glucose.: 133 mg/dL (18 Dec 2023 22:21)  POCT Blood Glucose.: 136 mg/dL (18 Dec 2023 17:56)  POCT Blood Glucose.: 131 mg/dL (18 Dec 2023 12:24)  POCT Blood Glucose.: 118 mg/dL (18 Dec 2023 07:14)      Drug Levels: [] N/A    CSF Analysis: [] N/A      Allergies    Crab (Pruritus)  No Known Drug Allergies    Intolerances      MEDICATIONS:  Antibiotics:    Neuro:  DULoxetine 60 milliGRAM(s) Oral daily  gabapentin 800 milliGRAM(s) Oral every 8 hours  ondansetron Injectable 4 milliGRAM(s) IV Push every 6 hours PRN  oxyCODONE    IR 15 milliGRAM(s) Oral every 4 hours PRN  oxyCODONE    IR 30 milliGRAM(s) Oral every 4 hours PRN    Anticoagulation:  enoxaparin Injectable 40 milliGRAM(s) SubCutaneous <User Schedule>    OTHER:  benzocaine 20% Spray 1 Spray(s) Mucosal three times a day PRN  bisacodyl 5 milliGRAM(s) Oral at bedtime  chlorhexidine 2% Cloths 1 Application(s) Topical <User Schedule>  hydrocortisone 1% Cream 1 Application(s) Topical two times a day PRN  influenza  Vaccine (HIGH DOSE) 0.7 milliLiter(s) IntraMuscular once  insulin lispro (ADMELOG) corrective regimen sliding scale   SubCutaneous Before meals and at bedtime  midodrine 15 milliGRAM(s) Oral every 8 hours  Morphine 1 milliGRAM / mL 17.9 mL,Morphine 1 milliGRAM / mL 17.9 mL,Morphine 1 milliGRAM / mL 17.9 mL 17.9 mL IntraThecal Continuous Pump  naloxegol 25 milliGRAM(s) Oral daily  naloxone Injectable 0.1 milliGRAM(s) IV Push every 3 minutes PRN  pantoprazole    Tablet 40 milliGRAM(s) Oral before breakfast  polyethylene glycol 3350 17 Gram(s) Oral two times a day  senna 2 Tablet(s) Oral at bedtime  simvastatin 40 milliGRAM(s) Oral at bedtime    IVF:  multivitamin 1 Tablet(s) Oral daily        ASSESSMENT:  67 yo female with Hx HTN, HLD, DM, CVA x 3 (last in 2016 with residual R hand weakness), GAMALIEL not using CPAP, Emphysema, ex-25 pack year smoker now restarted, chronic constipation on Movantik, chronic urinary retention (SC at home), b/l CTS s/p release, s/p Intrathecal Morphine pump for pain, with chronic neck and back pain worsening for years s/p multiple spinal surgeries including laminectomies and fusion of cervical, thoracic and lumbar spine, s/p T3-L1 revision of prior fusion (with  on 8/17/22). Xray 11/16/23 showing broken rods/displacement. Now s/p C2-S2 instrumentation and fusion (12/5). S/p T9-L2 decompression w/ durotomy with repair (12/10/23). KAREEM grade A.     Plan:  Neuro:  - Neuro/vitals q4  - pain control: modified ERAS, IT morphine pump, oxycodone 15/30 prn, valium and home gabapentin 800 TID resumed  - Continue home cymbalta 60mg daily   - CT thoracic spine 12/6: postop changes  - protected sleep time 10PM-4AM     Cardio:  - MAP > 65   - midodrine 15q8   - C/w home simvastatin  - Sinus tachycardia, possible due to hypovolemia, will consider PE protocol if any worsening or clinical concern     Pulm:  - RA   - hx GAMALIEL, no CPAP at home    GI:  - CCD   - Bowel regimen, home movantik 25 mg   - CT A/P 12/13 = impacted stool   - last BM 12/17  - GERD: continue home protonix    Renal:  - Straight cath q6,  - Patient not interested in suprapubic catheter placement    Endo:  - ISS, a1c: 7    Heme:  - SCDs DVT ppx, SQL  - 500 cell saver intraop 12/5, 1u PRBC intraop 12/10, 1u PRBC 12/12    ID:  - afebrile    Dispo:  - SDU status, full code, pending AR  - D/w Dr. Luz  HPI:  66 y/o female with PMHx HTN, HLD, CVA x 3 (last was 2016 with right hand weakness residual), GAMALIEL not using CPAP, Emphysema, former 25 pack year smoker restarted this week of surgery, Chronic Constipation on Movantik, Urinary Incontinence chronically self straight cath at home, chronic UTI, Breast Implant Rupture with Chronic Leak repair 10/2023, B/L CTS s/p release, s/p Intrathecal Morphine pump for pain, with chronic neck and back pain worsening for years s/p multiple spinal surgeries including laminectomies and fusion of cervical, thoracic and lumbar spine initially done in 2009 and recently in 2019 and 2020 at [facility] by Dr. Trinidad, s/p T3-L1 revision of prior fusion (with Dr. Luz on 8/17/22). Outpatient Xray 11/16/2023 showing broken rods/displacement. Presents for elective C2-S2 instrumentation and fusion. Pt endorses weakness of b/l LE and burning pain in b/l lower extremities radiating to both feet,  (04 Dec 2023 15:44)    OVERNIGHT EVENTS: Patient reports concerns over her tachycardia. Pain gradually improving but present. No changes in sensation or strength to legs.    Hospital Course:  12/4: Admitted for spinal fusion d/t damaged hardware.   12/5: Neuro stable. POD0 C2-S2 instrumentation/fusion (intraop b/l LE motor loss). on parminder for MAP>90  12/6: POD1. KAMRAN o/n neuro stable. remains intubated. Extubated 6am. Precedex gtt prn. Remains on parminder gtt for MAP goal >90. Valium made prn. CT thoracic spine bc MRI unavailable. Attempted NGT, unsuccessful (resistance @ 35).   12/7: POD2 Given IV dilaudid 0.5mg for pain. Neuro exam stable. SQL started tongiht. Increased precedex to 1.0. Responds well to valium. Consulting psych given paranoia ?homicidal statements. Passed bedside dysphagia and tolerating PO meds. MRI complete, f/u read.   12/8: POD3 Pt reporting incisional pain, given dilaudid 0.5mg IV x2. Pt appearing anxious, given xanax 1mg. Neuro exam unchanged. Pain regimen increased to oxy 15/30 mg, pending further recs. Given 1 L bolus for tachycardia. Trops negative. 2 HMV drains removed.   12/9: POD4, KAMRAN, CT myelogram today showing block at T12-L1, OR tomorrow  12/10: POD5, KAMRAN, OR today for exploration of fusion, possible decompression T9-L2, revision instrumentation  POD 0 T9-L2 decompression. Pt returned from OR intubated. 50mcg fentanyl IVP x 2 for pain control and HTN. Magnesium repleted. Precedex gtt started for sedation. Decreased FiO2 to 40%. 1L bolus for soft MAP. Dc'd propofol and started levo gtt for MAP >90.   12/11: POD 6, POD 1. Extubated, satting well on RA. Resumed home valium and gabapentin. Castillo d/c'd, pending TOV. Seroquel started for agitation, R IJ removed, LUE double midline placed. Started SQL.   12/12: POD7, POD2, given 500 cc bolus and started on phenylephrine for MAP goal >90. Started midodrine 10mg q8h. Cardura dc'd d/t hypotension. Given 250cc of albumin for increasing pressor requirements. Given lactulose, pending BM. Hgb 7.6<8.3, given 1u PRBC. Protected sleep time. Urology consulted for possible suprapubic cath, recommend self intermittent cath is optimal.   12/13: POD8, POD3 Midodrine increased to 15q8. Pt refused to take the extra 5mg midodrin ordered. Neuro exam stable. hgb 10 from 9.4 after 1u pRBCs, Pt refused AM meds, given in late morning, CTAP ordered for L sided abd pain, fleet enema, restarted home movantik and ordere fleet enema for constipation. YIFAN and HMV dc'd. 250cc albumin x1  12/14: POD9, POD4, KAMRAN overnight, neuro stable. MAP goal liberalized to > 65. Stepdown status.   12/15: POD10, POD5, KAMRAN overnight  12/16: POD11, POD6, KAMRAN overnight, neuro stable, 500 cc bolus NS given for tachycardia. Aquacell changed. CXR, pro-bnp negative for pulmonary edema. Given additional 500cc bolus.  12/17: POD 12. POD 7. KAMRAN overnight, neuro stable.   12/18: POD 13/8. Incontinent x2 today. Persistent tachycardia.    Vital Signs Last 24 Hrs  T(C): 36.7 (19 Dec 2023 00:20), Max: 36.9 (18 Dec 2023 13:26)  T(F): 98 (19 Dec 2023 00:20), Max: 98.5 (18 Dec 2023 13:26)  HR: 115 (19 Dec 2023 00:20) (96 - 116)  BP: 111/60 (19 Dec 2023 00:20) (105/72 - 144/78)  BP(mean): --  RR: 18 (19 Dec 2023 00:20) (16 - 19)  SpO2: 96% (19 Dec 2023 00:20) (96% - 100%)    Parameters below as of 19 Dec 2023 00:20  Patient On (Oxygen Delivery Method): room air        I&O's Summary    17 Dec 2023 07:01  -  18 Dec 2023 07:00  --------------------------------------------------------  IN: 0 mL / OUT: 1500 mL / NET: -1500 mL    18 Dec 2023 07:01  -  19 Dec 2023 02:02  --------------------------------------------------------  IN: 240 mL / OUT: 1500 mL / NET: -1260 mL        PHYSICAL EXAM:  General: NAD, AAOx3  HEENT: PERRL. EOMI. VF intact.  Neck: From, nontender.  Cardiovascular: RRR, normal S1 and S2   Respiratory: non-labored breathing, symmetric chest rise  GI: abd soft, NTND, +BS  Neuro: CN II-XII intact, follows commands, speech clear. Strength BL UE 5/5  BL LE 0/5 to noxious stimuli with T11 sensory deficit.  Vascular: Distal pulses 2+ x4, no calf edema or erythema  Wounds: Posterior midline spine incision C/D/I with dressing in place      TUBES/LINES:  [] Castillo  [x] Wound Drains  [] Others      DIET:  [] NPO  [x] Mechanical  [] Tube feeds    LABS:                        10.8   9.69  )-----------( 294      ( 18 Dec 2023 06:45 )             34.1     12-18    136  |  101  |  7   ----------------------------<  106<H>  4.2   |  26  |  0.46<L>    Ca    9.2      18 Dec 2023 06:45  Phos  3.2     12-18  Mg     1.9     12-18        Urinalysis Basic - ( 18 Dec 2023 06:45 )    Color: x / Appearance: x / SG: x / pH: x  Gluc: 106 mg/dL / Ketone: x  / Bili: x / Urobili: x   Blood: x / Protein: x / Nitrite: x   Leuk Esterase: x / RBC: x / WBC x   Sq Epi: x / Non Sq Epi: x / Bacteria: x          CAPILLARY BLOOD GLUCOSE      POCT Blood Glucose.: 133 mg/dL (18 Dec 2023 22:21)  POCT Blood Glucose.: 136 mg/dL (18 Dec 2023 17:56)  POCT Blood Glucose.: 131 mg/dL (18 Dec 2023 12:24)  POCT Blood Glucose.: 118 mg/dL (18 Dec 2023 07:14)      Drug Levels: [] N/A    CSF Analysis: [] N/A      Allergies    Crab (Pruritus)  No Known Drug Allergies    Intolerances      MEDICATIONS:  Antibiotics:    Neuro:  DULoxetine 60 milliGRAM(s) Oral daily  gabapentin 800 milliGRAM(s) Oral every 8 hours  ondansetron Injectable 4 milliGRAM(s) IV Push every 6 hours PRN  oxyCODONE    IR 15 milliGRAM(s) Oral every 4 hours PRN  oxyCODONE    IR 30 milliGRAM(s) Oral every 4 hours PRN    Anticoagulation:  enoxaparin Injectable 40 milliGRAM(s) SubCutaneous <User Schedule>    OTHER:  benzocaine 20% Spray 1 Spray(s) Mucosal three times a day PRN  bisacodyl 5 milliGRAM(s) Oral at bedtime  chlorhexidine 2% Cloths 1 Application(s) Topical <User Schedule>  hydrocortisone 1% Cream 1 Application(s) Topical two times a day PRN  influenza  Vaccine (HIGH DOSE) 0.7 milliLiter(s) IntraMuscular once  insulin lispro (ADMELOG) corrective regimen sliding scale   SubCutaneous Before meals and at bedtime  midodrine 15 milliGRAM(s) Oral every 8 hours  Morphine 1 milliGRAM / mL 17.9 mL,Morphine 1 milliGRAM / mL 17.9 mL,Morphine 1 milliGRAM / mL 17.9 mL 17.9 mL IntraThecal Continuous Pump  naloxegol 25 milliGRAM(s) Oral daily  naloxone Injectable 0.1 milliGRAM(s) IV Push every 3 minutes PRN  pantoprazole    Tablet 40 milliGRAM(s) Oral before breakfast  polyethylene glycol 3350 17 Gram(s) Oral two times a day  senna 2 Tablet(s) Oral at bedtime  simvastatin 40 milliGRAM(s) Oral at bedtime    IVF:  multivitamin 1 Tablet(s) Oral daily        ASSESSMENT:  69 yo female with Hx HTN, HLD, DM, CVA x 3 (last in 2016 with residual R hand weakness), GAMALIEL not using CPAP, Emphysema, ex-25 pack year smoker now restarted, chronic constipation on Movantik, chronic urinary retention (SC at home), b/l CTS s/p release, s/p Intrathecal Morphine pump for pain, with chronic neck and back pain worsening for years s/p multiple spinal surgeries including laminectomies and fusion of cervical, thoracic and lumbar spine, s/p T3-L1 revision of prior fusion (with  on 8/17/22). Xray 11/16/23 showing broken rods/displacement. Now s/p C2-S2 instrumentation and fusion (12/5). S/p T9-L2 decompression w/ durotomy with repair (12/10/23). KAREEM grade A.     Plan:  Neuro:  - Neuro/vitals q4  - pain control: modified ERAS, IT morphine pump, oxycodone 15/30 prn, valium and home gabapentin 800 TID resumed  - Continue home cymbalta 60mg daily   - CT thoracic spine 12/6: postop changes  - protected sleep time 10PM-4AM     Cardio:  - MAP > 65   - midodrine 15q8   - C/w home simvastatin  - Sinus tachycardia, possible due to hypovolemia, will consider PE protocol if any worsening or clinical concern     Pulm:  - RA   - hx GAMALIEL, no CPAP at home    GI:  - CCD   - Bowel regimen, home movantik 25 mg   - CT A/P 12/13 = impacted stool   - last BM 12/17  - GERD: continue home protonix    Renal:  - Straight cath q6,  - Patient not interested in suprapubic catheter placement    Endo:  - ISS, a1c: 7    Heme:  - SCDs DVT ppx, SQL  - 500 cell saver intraop 12/5, 1u PRBC intraop 12/10, 1u PRBC 12/12    ID:  - afebrile    Dispo:  - SDU status, full code, pending AR  - D/w Dr. Luz

## 2023-12-19 NOTE — PROGRESS NOTE ADULT - SUBJECTIVE AND OBJECTIVE BOX
O/N Events:  Subjective/ROS: Denies HA, CP, SOB, n/v, changes in bowel/urinary habits.  12pt ROS otherwise negative.    VITALS  Vital Signs Last 24 Hrs  T(C): 36.9 (19 Dec 2023 09:17), Max: 37 (19 Dec 2023 06:40)  T(F): 98.5 (19 Dec 2023 09:17), Max: 98.6 (19 Dec 2023 06:40)  HR: 110 (19 Dec 2023 09:17) (108 - 115)  BP: 114/62 (19 Dec 2023 09:17) (105/72 - 130/87)  BP(mean): --  RR: 18 (19 Dec 2023 09:17) (16 - 19)  SpO2: 98% (19 Dec 2023 09:17) (96% - 100%)    Parameters below as of 19 Dec 2023 09:17  Patient On (Oxygen Delivery Method): room air        I&O's Summary    18 Dec 2023 07:01  -  19 Dec 2023 07:00  --------------------------------------------------------  IN: 240 mL / OUT: 1500 mL / NET: -1260 mL        CAPILLARY BLOOD GLUCOSE      POCT Blood Glucose.: 95 mg/dL (19 Dec 2023 12:25)  POCT Blood Glucose.: 97 mg/dL (19 Dec 2023 07:22)  POCT Blood Glucose.: 133 mg/dL (18 Dec 2023 22:21)  POCT Blood Glucose.: 136 mg/dL (18 Dec 2023 17:56)      PHYSICAL EXAM  General: no acute distress, sitting up in bed  Head: NC/AT; anicteric sclera  Respiratory: CTA B/L; no wheeze, crackles, rales or rhonchi  Cardiovascular: RRR, normal S1/S2; no mrg  Gastrointestinal: Soft; NTND, no guarding, normoactive bowel sounds  Extremities: WWP; no edema  Neurological:  speech is fluent, no acute deficits noted, paralysis in b/l lower extremities   Skin: No obvious rashes or lesions present  Psych: Appropriate mood and affect       MEDICATIONS  (STANDING):  bisacodyl 5 milliGRAM(s) Oral at bedtime  chlorhexidine 2% Cloths 1 Application(s) Topical <User Schedule>  diazepam    Tablet 5 milliGRAM(s) Oral every 8 hours  DULoxetine 60 milliGRAM(s) Oral daily  enoxaparin Injectable 40 milliGRAM(s) SubCutaneous <User Schedule>  gabapentin 800 milliGRAM(s) Oral every 8 hours  influenza  Vaccine (HIGH DOSE) 0.7 milliLiter(s) IntraMuscular once  insulin lispro (ADMELOG) corrective regimen sliding scale   SubCutaneous Before meals and at bedtime  midodrine 15 milliGRAM(s) Oral every 8 hours  Morphine 1 milliGRAM / mL 17.9 mL,Morphine 1 milliGRAM / mL 17.9 mL,Morphine 1 milliGRAM / mL 17.9 mL 17.9 mL IntraThecal Continuous Pump  multivitamin 1 Tablet(s) Oral daily  naloxegol 25 milliGRAM(s) Oral daily  pantoprazole    Tablet 40 milliGRAM(s) Oral before breakfast  polyethylene glycol 3350 17 Gram(s) Oral two times a day  senna 2 Tablet(s) Oral at bedtime  simvastatin 40 milliGRAM(s) Oral at bedtime    MEDICATIONS  (PRN):  benzocaine 20% Spray 1 Spray(s) Mucosal three times a day PRN Sore throat  hydrocortisone 1% Cream 1 Application(s) Topical two times a day PRN Itching  naloxone Injectable 0.1 milliGRAM(s) IV Push every 3 minutes PRN For ANY of the following changes in patient status:  A. RR LESS THAN 10 breaths per minute, B. Oxygen saturation LESS THAN 90%, C. Sedation score of 6  ondansetron Injectable 4 milliGRAM(s) IV Push every 6 hours PRN Nausea  oxyCODONE    IR 15 milliGRAM(s) Oral every 4 hours PRN Moderate Pain (4 - 6)  oxyCODONE    IR 30 milliGRAM(s) Oral every 4 hours PRN Severe Pain (7 - 10)  sodium chloride 0.9% lock flush 10 milliLiter(s) IV Push every 1 hour PRN Pre/post blood products, medications, blood draw, and to maintain line patency      Crab (Pruritus)  No Known Drug Allergies      LABS                        10.8   9.69  )-----------( 294      ( 18 Dec 2023 06:45 )             34.1     12-18    136  |  101  |  7   ----------------------------<  106<H>  4.2   |  26  |  0.46<L>    Ca    9.2      18 Dec 2023 06:45  Phos  3.2     12-18  Mg     1.9     12-18        Urinalysis Basic - ( 18 Dec 2023 06:45 )    Color: x / Appearance: x / SG: x / pH: x  Gluc: 106 mg/dL / Ketone: x  / Bili: x / Urobili: x   Blood: x / Protein: x / Nitrite: x   Leuk Esterase: x / RBC: x / WBC x   Sq Epi: x / Non Sq Epi: x / Bacteria: x            IMAGING/EKG/ETC: reviewed

## 2023-12-20 LAB
GLUCOSE BLDC GLUCOMTR-MCNC: 109 MG/DL — HIGH (ref 70–99)
GLUCOSE BLDC GLUCOMTR-MCNC: 109 MG/DL — HIGH (ref 70–99)
GLUCOSE BLDC GLUCOMTR-MCNC: 134 MG/DL — HIGH (ref 70–99)
GLUCOSE BLDC GLUCOMTR-MCNC: 134 MG/DL — HIGH (ref 70–99)
GLUCOSE BLDC GLUCOMTR-MCNC: 146 MG/DL — HIGH (ref 70–99)
GLUCOSE BLDC GLUCOMTR-MCNC: 146 MG/DL — HIGH (ref 70–99)
GLUCOSE BLDC GLUCOMTR-MCNC: 96 MG/DL — SIGNIFICANT CHANGE UP (ref 70–99)
GLUCOSE BLDC GLUCOMTR-MCNC: 96 MG/DL — SIGNIFICANT CHANGE UP (ref 70–99)
SARS-COV-2 RNA SPEC QL NAA+PROBE: NEGATIVE — SIGNIFICANT CHANGE UP
SARS-COV-2 RNA SPEC QL NAA+PROBE: NEGATIVE — SIGNIFICANT CHANGE UP

## 2023-12-20 PROCEDURE — 99233 SBSQ HOSP IP/OBS HIGH 50: CPT

## 2023-12-20 RX ORDER — OXYCODONE HYDROCHLORIDE 5 MG/1
1 TABLET ORAL
Qty: 0 | Refills: 0 | DISCHARGE
Start: 2023-12-20

## 2023-12-20 RX ORDER — PANTOPRAZOLE SODIUM 20 MG/1
1 TABLET, DELAYED RELEASE ORAL
Qty: 0 | Refills: 0 | DISCHARGE
Start: 2023-12-20

## 2023-12-20 RX ORDER — APIXABAN 2.5 MG/1
2 TABLET, FILM COATED ORAL
Qty: 0 | Refills: 0 | DISCHARGE
Start: 2023-12-20

## 2023-12-20 RX ORDER — POLYETHYLENE GLYCOL 3350 17 G/17G
17 POWDER, FOR SOLUTION ORAL
Qty: 0 | Refills: 0 | DISCHARGE
Start: 2023-12-20

## 2023-12-20 RX ORDER — TIZANIDINE 4 MG/1
2 TABLET ORAL
Refills: 0 | DISCHARGE

## 2023-12-20 RX ORDER — METOPROLOL TARTRATE 50 MG
1.5 TABLET ORAL
Refills: 0 | DISCHARGE

## 2023-12-20 RX ORDER — DIAZEPAM 5 MG
1 TABLET ORAL
Qty: 0 | Refills: 0 | DISCHARGE
Start: 2023-12-20

## 2023-12-20 RX ORDER — SENNA PLUS 8.6 MG/1
2 TABLET ORAL
Qty: 0 | Refills: 0 | DISCHARGE
Start: 2023-12-20

## 2023-12-20 RX ORDER — MIDODRINE HYDROCHLORIDE 2.5 MG/1
3 TABLET ORAL
Qty: 0 | Refills: 0 | DISCHARGE
Start: 2023-12-20

## 2023-12-20 RX ADMIN — MIDODRINE HYDROCHLORIDE 15 MILLIGRAM(S): 2.5 TABLET ORAL at 18:16

## 2023-12-20 RX ADMIN — OXYCODONE HYDROCHLORIDE 30 MILLIGRAM(S): 5 TABLET ORAL at 02:25

## 2023-12-20 RX ADMIN — APIXABAN 10 MILLIGRAM(S): 2.5 TABLET, FILM COATED ORAL at 10:17

## 2023-12-20 RX ADMIN — GABAPENTIN 800 MILLIGRAM(S): 400 CAPSULE ORAL at 01:17

## 2023-12-20 RX ADMIN — SIMVASTATIN 40 MILLIGRAM(S): 20 TABLET, FILM COATED ORAL at 22:17

## 2023-12-20 RX ADMIN — Medication 5 MILLIGRAM(S): at 07:21

## 2023-12-20 RX ADMIN — OXYCODONE HYDROCHLORIDE 30 MILLIGRAM(S): 5 TABLET ORAL at 16:17

## 2023-12-20 RX ADMIN — Medication 1 TABLET(S): at 12:08

## 2023-12-20 RX ADMIN — MIDODRINE HYDROCHLORIDE 15 MILLIGRAM(S): 2.5 TABLET ORAL at 01:17

## 2023-12-20 RX ADMIN — Medication 5 MILLIGRAM(S): at 22:18

## 2023-12-20 RX ADMIN — Medication 5 MILLIGRAM(S): at 00:12

## 2023-12-20 RX ADMIN — GABAPENTIN 800 MILLIGRAM(S): 400 CAPSULE ORAL at 18:17

## 2023-12-20 RX ADMIN — PANTOPRAZOLE SODIUM 40 MILLIGRAM(S): 20 TABLET, DELAYED RELEASE ORAL at 05:58

## 2023-12-20 RX ADMIN — CHLORHEXIDINE GLUCONATE 1 APPLICATION(S): 213 SOLUTION TOPICAL at 05:58

## 2023-12-20 RX ADMIN — APIXABAN 10 MILLIGRAM(S): 2.5 TABLET, FILM COATED ORAL at 22:18

## 2023-12-20 RX ADMIN — Medication 5 MILLIGRAM(S): at 16:36

## 2023-12-20 RX ADMIN — OXYCODONE HYDROCHLORIDE 30 MILLIGRAM(S): 5 TABLET ORAL at 15:17

## 2023-12-20 RX ADMIN — OXYCODONE HYDROCHLORIDE 30 MILLIGRAM(S): 5 TABLET ORAL at 01:55

## 2023-12-20 RX ADMIN — DULOXETINE HYDROCHLORIDE 60 MILLIGRAM(S): 30 CAPSULE, DELAYED RELEASE ORAL at 12:08

## 2023-12-20 RX ADMIN — MIDODRINE HYDROCHLORIDE 15 MILLIGRAM(S): 2.5 TABLET ORAL at 10:17

## 2023-12-20 RX ADMIN — GABAPENTIN 800 MILLIGRAM(S): 400 CAPSULE ORAL at 10:17

## 2023-12-20 RX ADMIN — SENNA PLUS 2 TABLET(S): 8.6 TABLET ORAL at 22:18

## 2023-12-20 NOTE — PROGRESS NOTE ADULT - SUBJECTIVE AND OBJECTIVE BOX
SUBJECTIVE: Patient denies new pain or symptoms at this time.     HOSPITAL COURSE:  12/4: Admitted for spinal fusion d/t damaged hardware.   12/5: Neuro stable. POD0 C2-S2 instrumentation/fusion (intraop b/l LE motor loss). on parminder for MAP>90  12/6: POD1. KAMRAN o/n neuro stable. remains intubated. Extubated 6am. Precedex gtt prn. Remains on parminder gtt for MAP goal >90. Valium made prn. CT thoracic spine bc MRI unavailable. Attempted NGT, unsuccessful (resistance @ 35).   12/7: POD2 Given IV dilaudid 0.5mg for pain. Neuro exam stable. SQL started tongiht. Increased precedex to 1.0. Responds well to valium. Consulting psych given paranoia ?homicidal statements. Passed bedside dysphagia and tolerating PO meds. MRI complete, f/u read.   12/8: POD3 Pt reporting incisional pain, given dilaudid 0.5mg IV x2. Pt appearing anxious, given xanax 1mg. Neuro exam unchanged. Pain regimen increased to oxy 15/30 mg, pending further recs. Given 1 L bolus for tachycardia. Trops negative. 2 HMV drains removed.   12/9: POD4, KAMRAN, CT myelogram today showing block at T12-L1, OR tomorrow  12/10: POD5, KAMRAN, OR today for exploration of fusion, possible decompression T9-L2, revision instrumentation  POD 0 T9-L2 decompression. Pt returned from OR intubated. 50mcg fentanyl IVP x 2 for pain control and HTN. Magnesium repleted. Precedex gtt started for sedation. Decreased FiO2 to 40%. 1L bolus for soft MAP. Dc'd propofol and started levo gtt for MAP >90.   12/11: POD 6, POD 1. Extubated, satting well on RA. Resumed home valium and gabapentin. Castillo d/c'd, pending TOV. Seroquel started for agitation, R IJ removed, LUE double midline placed. Started SQL.   12/12: POD7, POD2, given 500 cc bolus and started on phenylephrine for MAP goal >90. Started midodrine 10mg q8h. Cardura dc'd d/t hypotension. Given 250cc of albumin for increasing pressor requirements. Given lactulose, pending BM. Hgb 7.6<8.3, given 1u PRBC. Protected sleep time. Urology consulted for possible suprapubic cath, recommend self intermittent cath is optimal.   12/13: POD8, POD3 Midodrine increased to 15q8. Pt refused to take the extra 5mg midodrin ordered. Neuro exam stable. hgb 10 from 9.4 after 1u pRBCs, Pt refused AM meds, given in late morning, CTAP ordered for L sided abd pain, fleet enema, restarted home movantik and ordere fleet enema for constipation. YIFAN and HMV dc'd. 250cc albumin x1  12/14: POD9, POD4, KAMRAN overnight, neuro stable. MAP goal liberalized to > 65. Stepdown status.   12/15: POD10, POD5, KAMRAN overnight  12/16: POD11, POD6, KAMRAN overnight, neuro stable, 500 cc bolus NS given for tachycardia. Aquacell changed. CXR, pro-bnp negative for pulmonary edema. Given additional 500cc bolus.  12/17: POD 12. POD 7. KAMRAN overnight, neuro stable.   12/18: POD 13/8. Incontinent x2 today.   12/19: KAMRAN. Persistent tachycardia, r/o PE. CTPE protocol ordered. Refused all afternoon meds except oxy 30. Per Dr. Cowart (rad) CT chest w segmental and subsegmental PE in R upper lobe. Dopplers negative for DVT, CT PE shows segmental and subsegmental PE in R upper lobe. Per Dr. Augustine and Dr. Luz, Will start eliquis pending pain management approval. Per Dr. Mclaughlin (pain mgmt) can start eliquis 10mg BIDx 7 days. Dc'd lovenox.  12/20: POD 10 T9-L2 Decompression. Hemodynamically stable.     Vital Signs Last 24 Hrs  T(C): 37.1 (20 Dec 2023 00:11), Max: 37.1 (20 Dec 2023 00:11)  T(F): 98.8 (20 Dec 2023 00:11), Max: 98.8 (20 Dec 2023 00:11)  HR: 112 (20 Dec 2023 01:54) (105 - 117)  BP: 121/70 (20 Dec 2023 01:54) (99/59 - 130/87)  BP(mean): --  RR: 16 (20 Dec 2023 01:54) (16 - 19)  SpO2: 96% (20 Dec 2023 00:11) (95% - 98%)    Parameters below as of 20 Dec 2023 00:11  Patient On (Oxygen Delivery Method): room air    I&O's Summary    18 Dec 2023 07:01  -  19 Dec 2023 07:00  --------------------------------------------------------  IN: 240 mL / OUT: 1500 mL / NET: -1260 mL    19 Dec 2023 07:01  -  20 Dec 2023 02:12  --------------------------------------------------------  IN: 0 mL / OUT: 1200 mL / NET: -1200 mL    PHYSICAL EXAM:  General: NAD, AAOx3  HEENT: PERRL. EOMI. VF intact.  Neck: From, nontender.  Cardiovascular: RRR, normal S1 and S2   Respiratory: non-labored breathing, symmetric chest rise  GI: abd soft, NTND, +BS  Neuro: CN II-XII intact, follows commands, speech clear. Strength BL UE 5/5  BL LE 0/5 to noxious stimuli with T11 sensory deficit.  Vascular: Distal pulses 2+ x 4, no calf edema or erythema  Wounds: Posterior midline spine incision C/D/I with dressing in place    LABS:                        10.8   9.69  )-----------( 294      ( 18 Dec 2023 06:45 )             34.1     12-18    136  |  101  |  7   ----------------------------<  106<H>  4.2   |  26  |  0.46<L>    Ca    9.2      18 Dec 2023 06:45  Phos  3.2     12-18  Mg     1.9     12-18        Urinalysis Basic - ( 18 Dec 2023 06:45 )    Color: x / Appearance: x / SG: x / pH: x  Gluc: 106 mg/dL / Ketone: x  / Bili: x / Urobili: x   Blood: x / Protein: x / Nitrite: x   Leuk Esterase: x / RBC: x / WBC x   Sq Epi: x / Non Sq Epi: x / Bacteria: x          CAPILLARY BLOOD GLUCOSE      POCT Blood Glucose.: 151 mg/dL (19 Dec 2023 22:09)  POCT Blood Glucose.: 152 mg/dL (19 Dec 2023 18:18)  POCT Blood Glucose.: 95 mg/dL (19 Dec 2023 12:25)  POCT Blood Glucose.: 97 mg/dL (19 Dec 2023 07:22)      Drug Levels: [] N/A    CSF Analysis: [] N/A      Allergies    Crab (Pruritus)  No Known Drug Allergies    Intolerances      MEDICATIONS:  Antibiotics:    Neuro:  diazepam    Tablet 5 milliGRAM(s) Oral every 8 hours  DULoxetine 60 milliGRAM(s) Oral daily  gabapentin 800 milliGRAM(s) Oral every 8 hours  ondansetron Injectable 4 milliGRAM(s) IV Push every 6 hours PRN  oxyCODONE    IR 15 milliGRAM(s) Oral every 4 hours PRN  oxyCODONE    IR 30 milliGRAM(s) Oral every 4 hours PRN    Anticoagulation:  apixaban 10 milliGRAM(s) Oral every 12 hours    OTHER:  benzocaine 20% Spray 1 Spray(s) Mucosal three times a day PRN  bisacodyl 5 milliGRAM(s) Oral at bedtime  chlorhexidine 2% Cloths 1 Application(s) Topical <User Schedule>  hydrocortisone 1% Cream 1 Application(s) Topical two times a day PRN  influenza  Vaccine (HIGH DOSE) 0.7 milliLiter(s) IntraMuscular once  insulin lispro (ADMELOG) corrective regimen sliding scale   SubCutaneous Before meals and at bedtime  midodrine 15 milliGRAM(s) Oral every 8 hours  Morphine 1 milliGRAM / mL 17.9 mL,Morphine 1 milliGRAM / mL 17.9 mL,Morphine 1 milliGRAM / mL 17.9 mL 17.9 mL IntraThecal Continuous Pump  naloxegol 25 milliGRAM(s) Oral daily  naloxone Injectable 0.1 milliGRAM(s) IV Push every 3 minutes PRN  pantoprazole    Tablet 40 milliGRAM(s) Oral before breakfast  polyethylene glycol 3350 17 Gram(s) Oral two times a day  senna 2 Tablet(s) Oral at bedtime  simvastatin 40 milliGRAM(s) Oral at bedtime    IVF:  multivitamin 1 Tablet(s) Oral daily    CULTURES:    RADIOLOGY & ADDITIONAL TESTS:      ASSESSMENT:  67 yo female with Hx HTN, HLD, DM, CVA x 3 (last in 2016 with residual R hand weakness), GAMALIEL not using CPAP, Emphysema, ex-25 pack year smoker now restarted, chronic constipation on Movantik, chronic urinary retention (SC at home), b/l CTS s/p release, s/p Intrathecal Morphine pump for pain, with chronic neck and back pain worsening for years s/p multiple spinal surgeries including laminectomies and fusion of cervical, thoracic and lumbar spine, s/p T3-L1 revision of prior fusion (with  on 8/17/22). Xray 11/16/23 showing broken rods/displacement. Now s/p C2-S2 instrumentation and fusion (12/5). S/p T9-L2 decompression w/ durotomy with repair (12/10/23). KAREEM grade A.     Plan:  Neuro:  - Neuro/vitals q4  - pain control: modified ERAS, IT morphine pump, oxycodone 15/30 prn, valium and home gabapentin 800 TID resumed  - Continue home cymbalta 60mg daily   - CT thoracic spine 12/6: postop changes    Cardio:  - MAP > 65   - midodrine 15q8   - C/w home simvastatin  - Sinus tachycardia - likely 2/2 PE (12/19)      Pulm:  - RA   - hx GAMALIEL, no CPAP at home  - PE: CTA w segmental and subsegmental PE in R upper lobe - 10 mg eliquis BID (12/19-) x 7 d then 5 mg BID     GI:  - CCD   - Bowel regimen, home movantik 25 mg, last BM 12/17  - CT A/P 12/13 = impacted stool   - GERD: continue home protonix    Renal:  - Straight cath q6  - Patient not interested in suprapubic catheter placement    Endo:  - ISS, a1c: 7    Heme:  - SCDs DVT ppx  - 12/19: LE doppler neg  - Eliquis d/t PE     ID:  - afebrile    Dispo:  - SDU status, full code, pending AR    D/w Dr. Luz  SUBJECTIVE: Patient denies new pain or symptoms at this time.     HOSPITAL COURSE:  12/4: Admitted for spinal fusion d/t damaged hardware.   12/5: Neuro stable. POD0 C2-S2 instrumentation/fusion (intraop b/l LE motor loss). on parminder for MAP>90  12/6: POD1. KAMRAN o/n neuro stable. remains intubated. Extubated 6am. Precedex gtt prn. Remains on parminder gtt for MAP goal >90. Valium made prn. CT thoracic spine bc MRI unavailable. Attempted NGT, unsuccessful (resistance @ 35).   12/7: POD2 Given IV dilaudid 0.5mg for pain. Neuro exam stable. SQL started tongiht. Increased precedex to 1.0. Responds well to valium. Consulting psych given paranoia ?homicidal statements. Passed bedside dysphagia and tolerating PO meds. MRI complete, f/u read.   12/8: POD3 Pt reporting incisional pain, given dilaudid 0.5mg IV x2. Pt appearing anxious, given xanax 1mg. Neuro exam unchanged. Pain regimen increased to oxy 15/30 mg, pending further recs. Given 1 L bolus for tachycardia. Trops negative. 2 HMV drains removed.   12/9: POD4, KAMRAN, CT myelogram today showing block at T12-L1, OR tomorrow  12/10: POD5, KAMRAN, OR today for exploration of fusion, possible decompression T9-L2, revision instrumentation  POD 0 T9-L2 decompression. Pt returned from OR intubated. 50mcg fentanyl IVP x 2 for pain control and HTN. Magnesium repleted. Precedex gtt started for sedation. Decreased FiO2 to 40%. 1L bolus for soft MAP. Dc'd propofol and started levo gtt for MAP >90.   12/11: POD 6, POD 1. Extubated, satting well on RA. Resumed home valium and gabapentin. Castillo d/c'd, pending TOV. Seroquel started for agitation, R IJ removed, LUE double midline placed. Started SQL.   12/12: POD7, POD2, given 500 cc bolus and started on phenylephrine for MAP goal >90. Started midodrine 10mg q8h. Cardura dc'd d/t hypotension. Given 250cc of albumin for increasing pressor requirements. Given lactulose, pending BM. Hgb 7.6<8.3, given 1u PRBC. Protected sleep time. Urology consulted for possible suprapubic cath, recommend self intermittent cath is optimal.   12/13: POD8, POD3 Midodrine increased to 15q8. Pt refused to take the extra 5mg midodrin ordered. Neuro exam stable. hgb 10 from 9.4 after 1u pRBCs, Pt refused AM meds, given in late morning, CTAP ordered for L sided abd pain, fleet enema, restarted home movantik and ordere fleet enema for constipation. YIFAN and HMV dc'd. 250cc albumin x1  12/14: POD9, POD4, KAMRAN overnight, neuro stable. MAP goal liberalized to > 65. Stepdown status.   12/15: POD10, POD5, KAMRAN overnight  12/16: POD11, POD6, KAMRAN overnight, neuro stable, 500 cc bolus NS given for tachycardia. Aquacell changed. CXR, pro-bnp negative for pulmonary edema. Given additional 500cc bolus.  12/17: POD 12. POD 7. KAMRAN overnight, neuro stable.   12/18: POD 13/8. Incontinent x2 today.   12/19: KAMRAN. Persistent tachycardia, r/o PE. CTPE protocol ordered. Refused all afternoon meds except oxy 30. Per Dr. Cowart (rad) CT chest w segmental and subsegmental PE in R upper lobe. Dopplers negative for DVT, CT PE shows segmental and subsegmental PE in R upper lobe. Per Dr. Augustine and Dr. Luz, Will start eliquis pending pain management approval. Per Dr. Mclaughlin (pain mgmt) can start eliquis 10mg BIDx 7 days. Dc'd lovenox.  12/20: POD 10 T9-L2 Decompression. Hemodynamically stable.     Vital Signs Last 24 Hrs  T(C): 37.1 (20 Dec 2023 00:11), Max: 37.1 (20 Dec 2023 00:11)  T(F): 98.8 (20 Dec 2023 00:11), Max: 98.8 (20 Dec 2023 00:11)  HR: 112 (20 Dec 2023 01:54) (105 - 117)  BP: 121/70 (20 Dec 2023 01:54) (99/59 - 130/87)  BP(mean): --  RR: 16 (20 Dec 2023 01:54) (16 - 19)  SpO2: 96% (20 Dec 2023 00:11) (95% - 98%)    Parameters below as of 20 Dec 2023 00:11  Patient On (Oxygen Delivery Method): room air    I&O's Summary    18 Dec 2023 07:01  -  19 Dec 2023 07:00  --------------------------------------------------------  IN: 240 mL / OUT: 1500 mL / NET: -1260 mL    19 Dec 2023 07:01  -  20 Dec 2023 02:12  --------------------------------------------------------  IN: 0 mL / OUT: 1200 mL / NET: -1200 mL    PHYSICAL EXAM:  General: NAD, AAOx3  HEENT: PERRL. EOMI. VF intact.  Neck: From, nontender.  Cardiovascular: RRR, normal S1 and S2   Respiratory: non-labored breathing, symmetric chest rise  GI: abd soft, NTND, +BS  Neuro: CN II-XII intact, follows commands, speech clear. Strength BL UE 5/5  BL LE 0/5 to noxious stimuli with T11 sensory deficit.  Vascular: Distal pulses 2+ x 4, no calf edema or erythema  Wounds: Posterior midline spine incision C/D/I with dressing in place    LABS:                        10.8   9.69  )-----------( 294      ( 18 Dec 2023 06:45 )             34.1     12-18    136  |  101  |  7   ----------------------------<  106<H>  4.2   |  26  |  0.46<L>    Ca    9.2      18 Dec 2023 06:45  Phos  3.2     12-18  Mg     1.9     12-18        Urinalysis Basic - ( 18 Dec 2023 06:45 )    Color: x / Appearance: x / SG: x / pH: x  Gluc: 106 mg/dL / Ketone: x  / Bili: x / Urobili: x   Blood: x / Protein: x / Nitrite: x   Leuk Esterase: x / RBC: x / WBC x   Sq Epi: x / Non Sq Epi: x / Bacteria: x          CAPILLARY BLOOD GLUCOSE      POCT Blood Glucose.: 151 mg/dL (19 Dec 2023 22:09)  POCT Blood Glucose.: 152 mg/dL (19 Dec 2023 18:18)  POCT Blood Glucose.: 95 mg/dL (19 Dec 2023 12:25)  POCT Blood Glucose.: 97 mg/dL (19 Dec 2023 07:22)      Drug Levels: [] N/A    CSF Analysis: [] N/A      Allergies    Crab (Pruritus)  No Known Drug Allergies    Intolerances      MEDICATIONS:  Antibiotics:    Neuro:  diazepam    Tablet 5 milliGRAM(s) Oral every 8 hours  DULoxetine 60 milliGRAM(s) Oral daily  gabapentin 800 milliGRAM(s) Oral every 8 hours  ondansetron Injectable 4 milliGRAM(s) IV Push every 6 hours PRN  oxyCODONE    IR 15 milliGRAM(s) Oral every 4 hours PRN  oxyCODONE    IR 30 milliGRAM(s) Oral every 4 hours PRN    Anticoagulation:  apixaban 10 milliGRAM(s) Oral every 12 hours    OTHER:  benzocaine 20% Spray 1 Spray(s) Mucosal three times a day PRN  bisacodyl 5 milliGRAM(s) Oral at bedtime  chlorhexidine 2% Cloths 1 Application(s) Topical <User Schedule>  hydrocortisone 1% Cream 1 Application(s) Topical two times a day PRN  influenza  Vaccine (HIGH DOSE) 0.7 milliLiter(s) IntraMuscular once  insulin lispro (ADMELOG) corrective regimen sliding scale   SubCutaneous Before meals and at bedtime  midodrine 15 milliGRAM(s) Oral every 8 hours  Morphine 1 milliGRAM / mL 17.9 mL,Morphine 1 milliGRAM / mL 17.9 mL,Morphine 1 milliGRAM / mL 17.9 mL 17.9 mL IntraThecal Continuous Pump  naloxegol 25 milliGRAM(s) Oral daily  naloxone Injectable 0.1 milliGRAM(s) IV Push every 3 minutes PRN  pantoprazole    Tablet 40 milliGRAM(s) Oral before breakfast  polyethylene glycol 3350 17 Gram(s) Oral two times a day  senna 2 Tablet(s) Oral at bedtime  simvastatin 40 milliGRAM(s) Oral at bedtime    IVF:  multivitamin 1 Tablet(s) Oral daily    CULTURES:    RADIOLOGY & ADDITIONAL TESTS:      ASSESSMENT:  69 yo female with Hx HTN, HLD, DM, CVA x 3 (last in 2016 with residual R hand weakness), GAMALIEL not using CPAP, Emphysema, ex-25 pack year smoker now restarted, chronic constipation on Movantik, chronic urinary retention (SC at home), b/l CTS s/p release, s/p Intrathecal Morphine pump for pain, with chronic neck and back pain worsening for years s/p multiple spinal surgeries including laminectomies and fusion of cervical, thoracic and lumbar spine, s/p T3-L1 revision of prior fusion (with  on 8/17/22). Xray 11/16/23 showing broken rods/displacement. Now s/p C2-S2 instrumentation and fusion (12/5). S/p T9-L2 decompression w/ durotomy with repair (12/10/23). KAREEM grade A.     Plan:  Neuro:  - Neuro/vitals q4  - pain control: modified ERAS, IT morphine pump, oxycodone 15/30 prn, valium and home gabapentin 800 TID resumed  - Continue home cymbalta 60mg daily   - CT thoracic spine 12/6: postop changes    Cardio:  - MAP > 65   - midodrine 15q8   - C/w home simvastatin  - Sinus tachycardia - likely 2/2 PE (12/19)      Pulm:  - RA   - hx GAMALIEL, no CPAP at home  - PE: CTA w segmental and subsegmental PE in R upper lobe - 10 mg eliquis BID (12/19-) x 7 d then 5 mg BID     GI:  - CCD   - Bowel regimen, home movantik 25 mg, last BM 12/17  - CT A/P 12/13 = impacted stool   - GERD: continue home protonix    Renal:  - Straight cath q6  - Patient not interested in suprapubic catheter placement    Endo:  - ISS, a1c: 7    Heme:  - SCDs DVT ppx  - 12/19: LE doppler neg  - Eliquis d/t PE     ID:  - afebrile    Dispo:  - SDU status, full code, pending AR    D/w Dr. Luz

## 2023-12-20 NOTE — PROGRESS NOTE ADULT - SUBJECTIVE AND OBJECTIVE BOX
O/N Events: KAMRAN  Subjective/ROS: No complaints. Denies HA, CP, SOB, n/v, changes in bowel/urinary habits.  12pt ROS otherwise negative.    VITALS  Vital Signs Last 24 Hrs  T(C): 37.1 (20 Dec 2023 15:15), Max: 37.1 (20 Dec 2023 00:11)  T(F): 98.8 (20 Dec 2023 15:15), Max: 98.8 (20 Dec 2023 00:11)  HR: 110 (20 Dec 2023 15:15) (98 - 120)  BP: 100/68 (20 Dec 2023 15:15) (99/59 - 121/70)  BP(mean): --  RR: 18 (20 Dec 2023 15:15) (16 - 19)  SpO2: 96% (20 Dec 2023 15:15) (95% - 100%)    Parameters below as of 20 Dec 2023 15:15  Patient On (Oxygen Delivery Method): room air        I&O's Summary    19 Dec 2023 07:01  -  20 Dec 2023 07:00  --------------------------------------------------------  IN: 0 mL / OUT: 2160 mL / NET: -2160 mL    20 Dec 2023 07:01  -  20 Dec 2023 16:11  --------------------------------------------------------  IN: 1050 mL / OUT: 0 mL / NET: 1050 mL        CAPILLARY BLOOD GLUCOSE      POCT Blood Glucose.: 109 mg/dL (20 Dec 2023 12:16)  POCT Blood Glucose.: 96 mg/dL (20 Dec 2023 07:11)  POCT Blood Glucose.: 151 mg/dL (19 Dec 2023 22:09)  POCT Blood Glucose.: 152 mg/dL (19 Dec 2023 18:18)      PHYSICAL EXAM  General: no acute distress, sitting up in bed  Head: NC/AT; anicteric sclera  Respiratory: CTA B/L; no wheeze, crackles, rales or rhonchi  Cardiovascular: RRR, normal S1/S2; no mrg  Gastrointestinal: Soft; NTND, no guarding, normoactive bowel sounds  Extremities: WWP; no edema  Neurological:  speech is fluent, no acute deficits noted, paralysis in b/l lower extremities   Skin: No obvious rashes or lesions present  Psych: Appropriate mood and affect       MEDICATIONS  (STANDING):  apixaban 10 milliGRAM(s) Oral every 12 hours  bisacodyl 5 milliGRAM(s) Oral at bedtime  chlorhexidine 2% Cloths 1 Application(s) Topical <User Schedule>  diazepam    Tablet 5 milliGRAM(s) Oral every 8 hours  DULoxetine 60 milliGRAM(s) Oral daily  gabapentin 800 milliGRAM(s) Oral every 8 hours  influenza  Vaccine (HIGH DOSE) 0.7 milliLiter(s) IntraMuscular once  insulin lispro (ADMELOG) corrective regimen sliding scale   SubCutaneous Before meals and at bedtime  midodrine 15 milliGRAM(s) Oral every 8 hours  Morphine 1mg /1mL 17.9 milliLITERS 17.9 milliLiter(s) IntraThecal Continuous Pump  multivitamin 1 Tablet(s) Oral daily  naloxegol 25 milliGRAM(s) Oral daily  pantoprazole    Tablet 40 milliGRAM(s) Oral before breakfast  polyethylene glycol 3350 17 Gram(s) Oral two times a day  senna 2 Tablet(s) Oral at bedtime  simvastatin 40 milliGRAM(s) Oral at bedtime    MEDICATIONS  (PRN):  benzocaine 20% Spray 1 Spray(s) Mucosal three times a day PRN Sore throat  hydrocortisone 1% Cream 1 Application(s) Topical two times a day PRN Itching  naloxone Injectable 0.1 milliGRAM(s) IV Push every 3 minutes PRN For ANY of the following changes in patient status:  A. RR LESS THAN 10 breaths per minute, B. Oxygen saturation LESS THAN 90%, C. Sedation score of 6  ondansetron Injectable 4 milliGRAM(s) IV Push every 6 hours PRN Nausea  oxyCODONE    IR 15 milliGRAM(s) Oral every 4 hours PRN Moderate Pain (4 - 6)  oxyCODONE    IR 30 milliGRAM(s) Oral every 4 hours PRN Severe Pain (7 - 10)  sodium chloride 0.9% lock flush 10 milliLiter(s) IV Push every 1 hour PRN Pre/post blood products, medications, blood draw, and to maintain line patency      Crab (Pruritus)  No Known Drug Allergies      LABS                    IMAGING/EKG/ETC: reviewed

## 2023-12-20 NOTE — CHART NOTE - NSCHARTNOTEFT_GEN_A_CORE
Admitting Diagnosis:   Patient is a 68y old  Female who presents with a chief complaint of Myelopathy (13 Dec 2023 20:30)  PAST MEDICAL & SURGICAL HISTORY:  HTN (hypertension)  SS (spinal stenosis)  High cholesterol  Stroke  x3  H/O carpal tunnel syndrome  Bilateral  Chronic GERD  History of chronic constipation  Urinary incontinence  self cath as needed  Pre-diabetes  Anxiety and depression  Eczema  H/O kyphosis  Pancreas cyst  Scoliosis  Wheelchair dependent  Cervical pseudoarthrosis  and lumbar spine  Cervical spondylosis  Chronic headaches  Degenerative joint disease  left shoulder  Lumbosacral spondylosis  COPD (chronic obstructive pulmonary disease)  H/O Spinal surgery  lumbar x 30  H/O breast surgery  left breast implant 1980's  S/P cervical discectomy  x4  H/O total shoulder replacement, right    Current Nutrition Order: consistent carbohydrate diet with snacks     PO Intake: Good (%) [   ]  Fair (50-75%) [   ] Poor (<25%) [   ]     GI Issues: no noted nausea or emesis; no noted distention; noted with fecal incontinence; most recent BM on 12/20/23    Pain: noted with level 7-8 pain    Skin Integrity: no noted edema, no pressure ulcers; posterior back surgical incisions; Filippo: 14    Labs:     CAPILLARY BLOOD GLUCOSE    POCT Blood Glucose.: 109 mg/dL (20 Dec 2023 12:16)  POCT Blood Glucose.: 96 mg/dL (20 Dec 2023 07:11)  POCT Blood Glucose.: 151 mg/dL (19 Dec 2023 22:09)  POCT Blood Glucose.: 152 mg/dL (19 Dec 2023 18:18)    Medications:  MEDICATIONS  (STANDING):  apixaban 10 milliGRAM(s) Oral every 12 hours  bisacodyl 5 milliGRAM(s) Oral at bedtime  chlorhexidine 2% Cloths 1 Application(s) Topical <User Schedule>  diazepam    Tablet 5 milliGRAM(s) Oral every 8 hours  DULoxetine 60 milliGRAM(s) Oral daily  gabapentin 800 milliGRAM(s) Oral every 8 hours  influenza  Vaccine (HIGH DOSE) 0.7 milliLiter(s) IntraMuscular once  insulin lispro (ADMELOG) corrective regimen sliding scale   SubCutaneous Before meals and at bedtime  midodrine 15 milliGRAM(s) Oral every 8 hours  Morphine 1mg /1mL 17.9 milliLITERS 17.9 milliLiter(s) IntraThecal Continuous Pump  multivitamin 1 Tablet(s) Oral daily  naloxegol 25 milliGRAM(s) Oral daily  pantoprazole    Tablet 40 milliGRAM(s) Oral before breakfast  polyethylene glycol 3350 17 Gram(s) Oral two times a day  senna 2 Tablet(s) Oral at bedtime  simvastatin 40 milliGRAM(s) Oral at bedtime    MEDICATIONS  (PRN):  benzocaine 20% Spray 1 Spray(s) Mucosal three times a day PRN Sore throat  hydrocortisone 1% Cream 1 Application(s) Topical two times a day PRN Itching  naloxone Injectable 0.1 milliGRAM(s) IV Push every 3 minutes PRN For ANY of the following changes in patient status:  A. RR LESS THAN 10 breaths per minute, B. Oxygen saturation LESS THAN 90%, C. Sedation score of 6  ondansetron Injectable 4 milliGRAM(s) IV Push every 6 hours PRN Nausea  oxyCODONE    IR 15 milliGRAM(s) Oral every 4 hours PRN Moderate Pain (4 - 6)  oxyCODONE    IR 30 milliGRAM(s) Oral every 4 hours PRN Severe Pain (7 - 10)  sodium chloride 0.9% lock flush 10 milliLiter(s) IV Push every 1 hour PRN Pre/post blood products, medications, blood draw, and to maintain line patency    Dosing Anthropometrics:   Height (inches):  67   Weight (pounds):  186.7 (12/10)   BMI (kg/m^2):  29.2   IBW (pounds):  135 +/- 10%   %IBW:  138.3 %     WEIGHT CHANGE:   12/10     186.7 pounds  12/05     186.7 pounds  Pt weight stable during admission    ESTIMATED NUTRIENT NEEDS:   Calories:  (25-30 kcal/kg) 6628-2773 kcal   Protein:  (1.4-1.6 g/kg) 75-85 g   Fluids:  1 mL/kcal or at team’s discretion   Ideal body weight (IBW) being used as Pt is critically ill and >=100% of ideal body weight.     SUBJECTIVE:   69 yo female with Hx HTN, HLD, DM, CVA x 3 (last in 2016 with residual R hand weakness), GAMALIEL not using CPAP, Emphysema, ex-25 pack year smoker now restarted, chronic constipation on Movantik, chronic urinary retention (SC at home), b/l CTS s/p release, s/p Intrathecal Morphine pump for pain, with chronic neck and back pain worsening for years s/p multiple spinal surgeries including laminectomies and fusion of cervical, thoracic and lumbar spine, s/p T3-L1 revision of prior fusion (with  on 8/17/22). Xray 11/16/23 showing broken rods/displacement. Now s/p C2-S2 instrumentation and fusion (12/5). S/p T9-L2 decompression w/ durotomy with repair (12/10/23).      RD spoke to pt at bedside for nutrition follow up evaluation. Medications significant for insulin, multivitamin, polyethylene glycol, senna, zofran, bisacodyl. Labs significant for elevated POCT glucose (113-172 range), creatinine was low (0.46) elevated serum Glucose (106). Pt was receiving care when RD attempted to see her; then when RD re-entered pt's room, she was asleep. Per nursing, no nausea or emesis; no noted distention; most recent BM on 12/20/23, fecal incontinence noted. Noted with severe (7-8) level of back pain. Noted with posterior back surgical incisions; no pressure ulcers noted; no noted edema. Pt noted with fair PO intakes, ~50-75% overall. RD to remain available. Additional nutrition recommendations below.     PREVIOUS NUTRITION DIAGNOSIS: Inadequate energy intake related to critical illness resulting in lethargy/sedation as evidenced by NPO status, meeting 0% of estimated needs    Active [  ]       Resolved [ xx ]     If resolved, new PES: Increased protein/energy needs related to physiological causes increasing nutrient needs as evidenced by known increased metabolic demands for healing status post procedure    GOAL: Consistently meet >75% of nutrition needs via most appropriate route for nutrition     NUTRITION RECOMMENDATIONS:   - Recommend continue current diet order (Consistent Carbohydrate Diet)   - Defer consistency to team/speech and swallow team  - Allergy section updated to include crab by this provider  - Monitor PO intake/appetite, diet tolerance, GI distress, labs (electrolytes, CMP)   - Requesting to trend weights (weekly) as able  - Honor Pt food preferences as able   - Pain and bowel regimen as per team     EDUCATION: deferred at this time. RD to follow up.     RISK LEVEL:  High [  ]       Moderate [ x ]       Low [  ]. Admitting Diagnosis:   Patient is a 68y old  Female who presents with a chief complaint of Myelopathy (13 Dec 2023 20:30)  PAST MEDICAL & SURGICAL HISTORY:  HTN (hypertension)  SS (spinal stenosis)  High cholesterol  Stroke  x3  H/O carpal tunnel syndrome  Bilateral  Chronic GERD  History of chronic constipation  Urinary incontinence  self cath as needed  Pre-diabetes  Anxiety and depression  Eczema  H/O kyphosis  Pancreas cyst  Scoliosis  Wheelchair dependent  Cervical pseudoarthrosis  and lumbar spine  Cervical spondylosis  Chronic headaches  Degenerative joint disease  left shoulder  Lumbosacral spondylosis  COPD (chronic obstructive pulmonary disease)  H/O Spinal surgery  lumbar x 30  H/O breast surgery  left breast implant 1980's  S/P cervical discectomy  x4  H/O total shoulder replacement, right    Current Nutrition Order: consistent carbohydrate diet with snacks     PO Intake: Good (%) [   ]  Fair (50-75%) [   ] Poor (<25%) [   ]     GI Issues: no noted nausea or emesis; no noted distention; noted with fecal incontinence; most recent BM on 12/20/23    Pain: noted with level 7-8 pain    Skin Integrity: no noted edema, no pressure ulcers; posterior back surgical incisions; Filippo: 14    Labs:     CAPILLARY BLOOD GLUCOSE    POCT Blood Glucose.: 109 mg/dL (20 Dec 2023 12:16)  POCT Blood Glucose.: 96 mg/dL (20 Dec 2023 07:11)  POCT Blood Glucose.: 151 mg/dL (19 Dec 2023 22:09)  POCT Blood Glucose.: 152 mg/dL (19 Dec 2023 18:18)    Medications:  MEDICATIONS  (STANDING):  apixaban 10 milliGRAM(s) Oral every 12 hours  bisacodyl 5 milliGRAM(s) Oral at bedtime  chlorhexidine 2% Cloths 1 Application(s) Topical <User Schedule>  diazepam    Tablet 5 milliGRAM(s) Oral every 8 hours  DULoxetine 60 milliGRAM(s) Oral daily  gabapentin 800 milliGRAM(s) Oral every 8 hours  influenza  Vaccine (HIGH DOSE) 0.7 milliLiter(s) IntraMuscular once  insulin lispro (ADMELOG) corrective regimen sliding scale   SubCutaneous Before meals and at bedtime  midodrine 15 milliGRAM(s) Oral every 8 hours  Morphine 1mg /1mL 17.9 milliLITERS 17.9 milliLiter(s) IntraThecal Continuous Pump  multivitamin 1 Tablet(s) Oral daily  naloxegol 25 milliGRAM(s) Oral daily  pantoprazole    Tablet 40 milliGRAM(s) Oral before breakfast  polyethylene glycol 3350 17 Gram(s) Oral two times a day  senna 2 Tablet(s) Oral at bedtime  simvastatin 40 milliGRAM(s) Oral at bedtime    MEDICATIONS  (PRN):  benzocaine 20% Spray 1 Spray(s) Mucosal three times a day PRN Sore throat  hydrocortisone 1% Cream 1 Application(s) Topical two times a day PRN Itching  naloxone Injectable 0.1 milliGRAM(s) IV Push every 3 minutes PRN For ANY of the following changes in patient status:  A. RR LESS THAN 10 breaths per minute, B. Oxygen saturation LESS THAN 90%, C. Sedation score of 6  ondansetron Injectable 4 milliGRAM(s) IV Push every 6 hours PRN Nausea  oxyCODONE    IR 15 milliGRAM(s) Oral every 4 hours PRN Moderate Pain (4 - 6)  oxyCODONE    IR 30 milliGRAM(s) Oral every 4 hours PRN Severe Pain (7 - 10)  sodium chloride 0.9% lock flush 10 milliLiter(s) IV Push every 1 hour PRN Pre/post blood products, medications, blood draw, and to maintain line patency    Dosing Anthropometrics:   Height (inches):  67   Weight (pounds):  186.7 (12/10)   BMI (kg/m^2):  29.2   IBW (pounds):  135 +/- 10%   %IBW:  138.3 %     WEIGHT CHANGE:   12/10     186.7 pounds  12/05     186.7 pounds  Pt weight stable during admission    ESTIMATED NUTRIENT NEEDS:   Calories:  (25-30 kcal/kg) 8020-4076 kcal   Protein:  (1.4-1.6 g/kg) 75-85 g   Fluids:  1 mL/kcal or at team’s discretion   Ideal body weight (IBW) being used as Pt is critically ill and >=100% of ideal body weight.     SUBJECTIVE:   69 yo female with Hx HTN, HLD, DM, CVA x 3 (last in 2016 with residual R hand weakness), GAMALIEL not using CPAP, Emphysema, ex-25 pack year smoker now restarted, chronic constipation on Movantik, chronic urinary retention (SC at home), b/l CTS s/p release, s/p Intrathecal Morphine pump for pain, with chronic neck and back pain worsening for years s/p multiple spinal surgeries including laminectomies and fusion of cervical, thoracic and lumbar spine, s/p T3-L1 revision of prior fusion (with  on 8/17/22). Xray 11/16/23 showing broken rods/displacement. Now s/p C2-S2 instrumentation and fusion (12/5). S/p T9-L2 decompression w/ durotomy with repair (12/10/23).      RD spoke to pt at bedside for nutrition follow up evaluation. Medications significant for insulin, multivitamin, polyethylene glycol, senna, zofran, bisacodyl. Labs significant for elevated POCT glucose (113-172 range), creatinine was low (0.46) elevated serum Glucose (106). Pt was receiving care when RD attempted to see her; then when RD re-entered pt's room, she was asleep. Per nursing, no nausea or emesis; no noted distention; most recent BM on 12/20/23, fecal incontinence noted. Noted with severe (7-8) level of back pain. Noted with posterior back surgical incisions; no pressure ulcers noted; no noted edema. Pt noted with fair PO intakes, ~50-75% overall. RD to remain available. Additional nutrition recommendations below.     PREVIOUS NUTRITION DIAGNOSIS: Inadequate energy intake related to critical illness resulting in lethargy/sedation as evidenced by NPO status, meeting 0% of estimated needs    Active [  ]       Resolved [ xx ]     If resolved, new PES: Increased protein/energy needs related to physiological causes increasing nutrient needs as evidenced by known increased metabolic demands for healing status post procedure    GOAL: Consistently meet >75% of nutrition needs via most appropriate route for nutrition     NUTRITION RECOMMENDATIONS:   - Recommend continue current diet order (Consistent Carbohydrate Diet)   - Defer consistency to team/speech and swallow team  - Allergy section updated to include crab by this provider  - Monitor PO intake/appetite, diet tolerance, GI distress, labs (electrolytes, CMP)   - Requesting to trend weights (weekly) as able  - Honor Pt food preferences as able   - Pain and bowel regimen as per team     EDUCATION: deferred at this time. RD to follow up.     RISK LEVEL:  High [  ]       Moderate [ x ]       Low [  ].

## 2023-12-21 LAB
GLUCOSE BLDC GLUCOMTR-MCNC: 105 MG/DL — HIGH (ref 70–99)
GLUCOSE BLDC GLUCOMTR-MCNC: 105 MG/DL — HIGH (ref 70–99)
GLUCOSE BLDC GLUCOMTR-MCNC: 127 MG/DL — HIGH (ref 70–99)
GLUCOSE BLDC GLUCOMTR-MCNC: 127 MG/DL — HIGH (ref 70–99)
GLUCOSE BLDC GLUCOMTR-MCNC: 175 MG/DL — HIGH (ref 70–99)
GLUCOSE BLDC GLUCOMTR-MCNC: 175 MG/DL — HIGH (ref 70–99)
GLUCOSE BLDC GLUCOMTR-MCNC: 215 MG/DL — HIGH (ref 70–99)
GLUCOSE BLDC GLUCOMTR-MCNC: 215 MG/DL — HIGH (ref 70–99)

## 2023-12-21 PROCEDURE — 99233 SBSQ HOSP IP/OBS HIGH 50: CPT

## 2023-12-21 RX ADMIN — OXYCODONE HYDROCHLORIDE 30 MILLIGRAM(S): 5 TABLET ORAL at 12:44

## 2023-12-21 RX ADMIN — OXYCODONE HYDROCHLORIDE 30 MILLIGRAM(S): 5 TABLET ORAL at 06:45

## 2023-12-21 RX ADMIN — Medication 4: at 21:45

## 2023-12-21 RX ADMIN — OXYCODONE HYDROCHLORIDE 30 MILLIGRAM(S): 5 TABLET ORAL at 19:09

## 2023-12-21 RX ADMIN — DULOXETINE HYDROCHLORIDE 60 MILLIGRAM(S): 30 CAPSULE, DELAYED RELEASE ORAL at 11:38

## 2023-12-21 RX ADMIN — Medication 2: at 07:48

## 2023-12-21 RX ADMIN — MIDODRINE HYDROCHLORIDE 15 MILLIGRAM(S): 2.5 TABLET ORAL at 18:03

## 2023-12-21 RX ADMIN — MIDODRINE HYDROCHLORIDE 15 MILLIGRAM(S): 2.5 TABLET ORAL at 02:18

## 2023-12-21 RX ADMIN — PANTOPRAZOLE SODIUM 40 MILLIGRAM(S): 20 TABLET, DELAYED RELEASE ORAL at 07:24

## 2023-12-21 RX ADMIN — SIMVASTATIN 40 MILLIGRAM(S): 20 TABLET, FILM COATED ORAL at 21:45

## 2023-12-21 RX ADMIN — Medication 1 TABLET(S): at 11:38

## 2023-12-21 RX ADMIN — OXYCODONE HYDROCHLORIDE 30 MILLIGRAM(S): 5 TABLET ORAL at 05:47

## 2023-12-21 RX ADMIN — GABAPENTIN 800 MILLIGRAM(S): 400 CAPSULE ORAL at 09:43

## 2023-12-21 RX ADMIN — OXYCODONE HYDROCHLORIDE 30 MILLIGRAM(S): 5 TABLET ORAL at 20:00

## 2023-12-21 RX ADMIN — MIDODRINE HYDROCHLORIDE 15 MILLIGRAM(S): 2.5 TABLET ORAL at 09:43

## 2023-12-21 RX ADMIN — GABAPENTIN 800 MILLIGRAM(S): 400 CAPSULE ORAL at 02:19

## 2023-12-21 RX ADMIN — GABAPENTIN 800 MILLIGRAM(S): 400 CAPSULE ORAL at 18:02

## 2023-12-21 RX ADMIN — CHLORHEXIDINE GLUCONATE 1 APPLICATION(S): 213 SOLUTION TOPICAL at 05:40

## 2023-12-21 RX ADMIN — OXYCODONE HYDROCHLORIDE 30 MILLIGRAM(S): 5 TABLET ORAL at 11:44

## 2023-12-21 RX ADMIN — APIXABAN 10 MILLIGRAM(S): 2.5 TABLET, FILM COATED ORAL at 21:45

## 2023-12-21 RX ADMIN — APIXABAN 10 MILLIGRAM(S): 2.5 TABLET, FILM COATED ORAL at 09:43

## 2023-12-21 NOTE — PROGRESS NOTE ADULT - SUBJECTIVE AND OBJECTIVE BOX
SUBJECTIVE: Patient reports mild pain but otherwise feels good. Denies new weakness, numbness, tingling, nausea, vomiting, chest pain, palpitations, shortness of breath, vision changes.     HOSPITAL COURSE:  12/4: Admitted for spinal fusion d/t damaged hardware.   12/5: Neuro stable. POD0 C2-S2 instrumentation/fusion (intraop b/l LE motor loss). on parminder for MAP>90  12/6: POD1. KAMRAN o/n neuro stable. remains intubated. Extubated 6am. Precedex gtt prn. Remains on parminder gtt for MAP goal >90. Valium made prn. CT thoracic spine bc MRI unavailable. Attempted NGT, unsuccessful (resistance @ 35).   12/7: POD2 Given IV dilaudid 0.5mg for pain. Neuro exam stable. SQL started tongiht. Increased precedex to 1.0. Responds well to valium. Consulting psych given paranoia ?homicidal statements. Passed bedside dysphagia and tolerating PO meds. MRI complete, f/u read.   12/8: POD3 Pt reporting incisional pain, given dilaudid 0.5mg IV x2. Pt appearing anxious, given xanax 1mg. Neuro exam unchanged. Pain regimen increased to oxy 15/30 mg, pending further recs. Given 1 L bolus for tachycardia. Trops negative. 2 HMV drains removed.   12/9: POD4, KAMRAN, CT myelogram today showing block at T12-L1, OR tomorrow  12/10: POD5, KAMRAN, OR today for exploration of fusion, possible decompression T9-L2, revision instrumentation  POD 0 T9-L2 decompression. Pt returned from OR intubated. 50mcg fentanyl IVP x 2 for pain control and HTN. Magnesium repleted. Precedex gtt started for sedation. Decreased FiO2 to 40%. 1L bolus for soft MAP. Dc'd propofol and started levo gtt for MAP >90.   12/11: POD 6, POD 1. Extubated, satting well on RA. Resumed home valium and gabapentin. Castillo d/c'd, pending TOV. Seroquel started for agitation, R IJ removed, LUE double midline placed. Started SQL.   12/12: POD7, POD2, given 500 cc bolus and started on phenylephrine for MAP goal >90. Started midodrine 10mg q8h. Cardura dc'd d/t hypotension. Given 250cc of albumin for increasing pressor requirements. Given lactulose, pending BM. Hgb 7.6<8.3, given 1u PRBC. Protected sleep time. Urology consulted for possible suprapubic cath, recommend self intermittent cath is optimal.   12/13: POD8, POD3 Midodrine increased to 15q8. Pt refused to take the extra 5mg midodrin ordered. Neuro exam stable. hgb 10 from 9.4 after 1u pRBCs, Pt refused AM meds, given in late morning, CTAP ordered for L sided abd pain, fleet enema, restarted home movantik and ordere fleet enema for constipation. YIFAN and HMV dc'd. 250cc albumin x1  12/14: POD9, POD4, KAMRAN overnight, neuro stable. MAP goal liberalized to > 65. Stepdown status.   12/15: POD10, POD5, KAMRAN overnight  12/16: POD11, POD6, KAMRAN overnight, neuro stable, 500 cc bolus NS given for tachycardia. Aquacell changed. CXR, pro-bnp negative for pulmonary edema. Given additional 500cc bolus.  12/17: POD 12. POD 7. KAMRAN overnight, neuro stable.   12/18: POD 13/8. Incontinent x2 today.   12/19: KAMRAN. Persistent tachycardia, r/o PE. CTPE protocol ordered. Refused all afternoon meds except oxy 30. Per Dr. Cowart (rad) CT chest w segmental and subsegmental PE in R upper lobe. Dopplers negative for DVT, CT PE shows segmental and subsegmental PE in R upper lobe. Per Dr. Augustine and Dr. Luz, Will start eliquis pending pain management approval. Per Dr. Mclaughlin (pain mgmt) can start eliquis 10mg BIDx 7 days. Dc'd lovenox.  12/20: POD 10 T9-L2 Decompression. Hemodynamically stable. Pending rehab. PT today.   12/21: POD 11 T9-L2 Decompression.     Vital Signs Last 24 Hrs  T(C): 36.6 (21 Dec 2023 00:40), Max: 37.1 (20 Dec 2023 15:15)  T(F): 97.8 (21 Dec 2023 00:40), Max: 98.8 (20 Dec 2023 15:15)  HR: 95 (21 Dec 2023 00:40) (85 - 120)  BP: 101/72 (21 Dec 2023 00:40) (100/68 - 121/70)  BP(mean): --  RR: 16 (21 Dec 2023 00:40) (16 - 18)  SpO2: 98% (21 Dec 2023 00:40) (96% - 100%)    Parameters below as of 21 Dec 2023 00:40  Patient On (Oxygen Delivery Method): room air        I&O's Summary    19 Dec 2023 07:01  -  20 Dec 2023 07:00  --------------------------------------------------------  IN: 0 mL / OUT: 2160 mL / NET: -2160 mL    20 Dec 2023 07:01  -  21 Dec 2023 01:30  --------------------------------------------------------  IN: 1250 mL / OUT: 500 mL / NET: 750 mL    PHYSICAL EXAM:  General: NAD, AAOx3  HEENT: PERRL. EOMI. VF intact.  Neck: From, nontender.  Cardiovascular: RRR, normal S1 and S2   Respiratory: non-labored breathing, symmetric chest rise  GI: abd soft, NTND, +BS  Neuro: CN II-XII intact, follows commands, speech clear. Strength BL UE 5/5  BL LE 0/5 to noxious stimuli with T11 sensory deficit.  Vascular: Distal pulses 2+ x 4, no calf edema or erythema  Wounds: Posterior midline spine incision C/D/I with dressing in place    LABS:    CAPILLARY BLOOD GLUCOSE    POCT Blood Glucose.: 146 mg/dL (20 Dec 2023 22:02)  POCT Blood Glucose.: 134 mg/dL (20 Dec 2023 17:27)  POCT Blood Glucose.: 109 mg/dL (20 Dec 2023 12:16)  POCT Blood Glucose.: 96 mg/dL (20 Dec 2023 07:11)    Drug Levels: [] N/A    CSF Analysis: [] N/A      Allergies    Crab (Pruritus)  No Known Drug Allergies    Intolerances      MEDICATIONS:  Antibiotics:    Neuro:  diazepam    Tablet 5 milliGRAM(s) Oral every 8 hours  DULoxetine 60 milliGRAM(s) Oral daily  gabapentin 800 milliGRAM(s) Oral every 8 hours  ondansetron Injectable 4 milliGRAM(s) IV Push every 6 hours PRN  oxyCODONE    IR 15 milliGRAM(s) Oral every 4 hours PRN  oxyCODONE    IR 30 milliGRAM(s) Oral every 4 hours PRN    Anticoagulation:  apixaban 10 milliGRAM(s) Oral every 12 hours    OTHER:  benzocaine 20% Spray 1 Spray(s) Mucosal three times a day PRN  bisacodyl 5 milliGRAM(s) Oral at bedtime  chlorhexidine 2% Cloths 1 Application(s) Topical <User Schedule>  hydrocortisone 1% Cream 1 Application(s) Topical two times a day PRN  influenza  Vaccine (HIGH DOSE) 0.7 milliLiter(s) IntraMuscular once  insulin lispro (ADMELOG) corrective regimen sliding scale   SubCutaneous Before meals and at bedtime  midodrine 15 milliGRAM(s) Oral every 8 hours  Morphine 1mg /1mL 17.9 milliLITERS 17.9 milliLiter(s) IntraThecal Continuous Pump  naloxegol 25 milliGRAM(s) Oral daily  naloxone Injectable 0.1 milliGRAM(s) IV Push every 3 minutes PRN  pantoprazole    Tablet 40 milliGRAM(s) Oral before breakfast  polyethylene glycol 3350 17 Gram(s) Oral two times a day  senna 2 Tablet(s) Oral at bedtime  simvastatin 40 milliGRAM(s) Oral at bedtime    IVF:  multivitamin 1 Tablet(s) Oral daily    CULTURES:    RADIOLOGY & ADDITIONAL TESTS:      ASSESSMENT:  67 yo female with Hx HTN, HLD, DM, CVA x 3 (last in 2016 with residual R hand weakness), GAMALIEL not using CPAP, Emphysema, ex-25 pack year smoker now restarted, chronic constipation on Movantik, chronic urinary retention (SC at home), b/l CTS s/p release, s/p Intrathecal Morphine pump for pain, with chronic neck and back pain worsening for years s/p multiple spinal surgeries including laminectomies and fusion of cervical, thoracic and lumbar spine, s/p T3-L1 revision of prior fusion (with  on 8/17/22). Xray 11/16/23 showing broken rods/displacement. Now s/p C2-S2 instrumentation and fusion (12/5). S/p T9-L2 decompression w/ durotomy with repair (12/10/23). KAREEM grade A.     Plan:  Neuro:  - Neuro/vitals q4  - pain control: modified ERAS, IT morphine pump, oxycodone 15/30 prn, valium and home gabapentin 800 TID resumed  - Continue home cymbalta 60mg daily   - CT thoracic spine 12/6: postop changes  - protected sleep time 10PM-4AM     Cardio:  - MAP > 65   - midodrine 15q8   - C/w home simvastatin  - Sinus tachycardia - likely 2/2 PE (12/19)      Pulm:  - RA   - hx GAMALIEL, no CPAP at home  - PE protocol, 12/19: DVT doppler neg, CTA w segmental and subsegmental PE in R upper lobe  - 10 mg eliquis BID for R upper lobe PE (12/19-) x 7 d then 5 mg BID     GI:  - CCD   - Bowel regimen, home movantik 25 mg, last BM 12/17  - CT A/P 12/13 = impacted stool   - GERD: continue home protonix    Renal:  - Straight cath q6  - Patient not interested in suprapubic catheter placement    Endo:  - ISS, a1c: 7    Heme:  - SCDs DVT ppx  - Eliquis d/t PE   - 500 cell saver intraop 12/5, 1u PRBC intraop 12/10, 1u PRBC 12/12    ID:  - afebrile    Dispo:  - SDU status, full code, pending AR    D/w Dr. Luz  SUBJECTIVE: Patient reports mild pain but otherwise feels good. Denies new weakness, numbness, tingling, nausea, vomiting, chest pain, palpitations, shortness of breath, vision changes.     HOSPITAL COURSE:  12/4: Admitted for spinal fusion d/t damaged hardware.   12/5: Neuro stable. POD0 C2-S2 instrumentation/fusion (intraop b/l LE motor loss). on parminder for MAP>90  12/6: POD1. KAMRAN o/n neuro stable. remains intubated. Extubated 6am. Precedex gtt prn. Remains on parminder gtt for MAP goal >90. Valium made prn. CT thoracic spine bc MRI unavailable. Attempted NGT, unsuccessful (resistance @ 35).   12/7: POD2 Given IV dilaudid 0.5mg for pain. Neuro exam stable. SQL started tongiht. Increased precedex to 1.0. Responds well to valium. Consulting psych given paranoia ?homicidal statements. Passed bedside dysphagia and tolerating PO meds. MRI complete, f/u read.   12/8: POD3 Pt reporting incisional pain, given dilaudid 0.5mg IV x2. Pt appearing anxious, given xanax 1mg. Neuro exam unchanged. Pain regimen increased to oxy 15/30 mg, pending further recs. Given 1 L bolus for tachycardia. Trops negative. 2 HMV drains removed.   12/9: POD4, KAMRAN, CT myelogram today showing block at T12-L1, OR tomorrow  12/10: POD5, KAMRAN, OR today for exploration of fusion, possible decompression T9-L2, revision instrumentation  POD 0 T9-L2 decompression. Pt returned from OR intubated. 50mcg fentanyl IVP x 2 for pain control and HTN. Magnesium repleted. Precedex gtt started for sedation. Decreased FiO2 to 40%. 1L bolus for soft MAP. Dc'd propofol and started levo gtt for MAP >90.   12/11: POD 6, POD 1. Extubated, satting well on RA. Resumed home valium and gabapentin. Castillo d/c'd, pending TOV. Seroquel started for agitation, R IJ removed, LUE double midline placed. Started SQL.   12/12: POD7, POD2, given 500 cc bolus and started on phenylephrine for MAP goal >90. Started midodrine 10mg q8h. Cardura dc'd d/t hypotension. Given 250cc of albumin for increasing pressor requirements. Given lactulose, pending BM. Hgb 7.6<8.3, given 1u PRBC. Protected sleep time. Urology consulted for possible suprapubic cath, recommend self intermittent cath is optimal.   12/13: POD8, POD3 Midodrine increased to 15q8. Pt refused to take the extra 5mg midodrin ordered. Neuro exam stable. hgb 10 from 9.4 after 1u pRBCs, Pt refused AM meds, given in late morning, CTAP ordered for L sided abd pain, fleet enema, restarted home movantik and ordere fleet enema for constipation. YIFAN and HMV dc'd. 250cc albumin x1  12/14: POD9, POD4, KAMRAN overnight, neuro stable. MAP goal liberalized to > 65. Stepdown status.   12/15: POD10, POD5, KAMRAN overnight  12/16: POD11, POD6, KAMRAN overnight, neuro stable, 500 cc bolus NS given for tachycardia. Aquacell changed. CXR, pro-bnp negative for pulmonary edema. Given additional 500cc bolus.  12/17: POD 12. POD 7. KAMRAN overnight, neuro stable.   12/18: POD 13/8. Incontinent x2 today.   12/19: KAMRAN. Persistent tachycardia, r/o PE. CTPE protocol ordered. Refused all afternoon meds except oxy 30. Per Dr. Cowart (rad) CT chest w segmental and subsegmental PE in R upper lobe. Dopplers negative for DVT, CT PE shows segmental and subsegmental PE in R upper lobe. Per Dr. Augustine and Dr. Luz, Will start eliquis pending pain management approval. Per Dr. Mclaughlin (pain mgmt) can start eliquis 10mg BIDx 7 days. Dc'd lovenox.  12/20: POD 10 T9-L2 Decompression. Hemodynamically stable. Pending rehab. PT today.   12/21: POD 11 T9-L2 Decompression.     Vital Signs Last 24 Hrs  T(C): 36.6 (21 Dec 2023 00:40), Max: 37.1 (20 Dec 2023 15:15)  T(F): 97.8 (21 Dec 2023 00:40), Max: 98.8 (20 Dec 2023 15:15)  HR: 95 (21 Dec 2023 00:40) (85 - 120)  BP: 101/72 (21 Dec 2023 00:40) (100/68 - 121/70)  BP(mean): --  RR: 16 (21 Dec 2023 00:40) (16 - 18)  SpO2: 98% (21 Dec 2023 00:40) (96% - 100%)    Parameters below as of 21 Dec 2023 00:40  Patient On (Oxygen Delivery Method): room air        I&O's Summary    19 Dec 2023 07:01  -  20 Dec 2023 07:00  --------------------------------------------------------  IN: 0 mL / OUT: 2160 mL / NET: -2160 mL    20 Dec 2023 07:01  -  21 Dec 2023 01:30  --------------------------------------------------------  IN: 1250 mL / OUT: 500 mL / NET: 750 mL    PHYSICAL EXAM:  General: NAD, AAOx3  HEENT: PERRL. EOMI. VF intact.  Neck: From, nontender.  Cardiovascular: RRR, normal S1 and S2   Respiratory: non-labored breathing, symmetric chest rise  GI: abd soft, NTND, +BS  Neuro: CN II-XII intact, follows commands, speech clear. Strength BL UE 5/5  BL LE 0/5 to noxious stimuli with T11 sensory deficit.  Vascular: Distal pulses 2+ x 4, no calf edema or erythema  Wounds: Posterior midline spine incision C/D/I with dressing in place    LABS:    CAPILLARY BLOOD GLUCOSE    POCT Blood Glucose.: 146 mg/dL (20 Dec 2023 22:02)  POCT Blood Glucose.: 134 mg/dL (20 Dec 2023 17:27)  POCT Blood Glucose.: 109 mg/dL (20 Dec 2023 12:16)  POCT Blood Glucose.: 96 mg/dL (20 Dec 2023 07:11)    Drug Levels: [] N/A    CSF Analysis: [] N/A      Allergies    Crab (Pruritus)  No Known Drug Allergies    Intolerances      MEDICATIONS:  Antibiotics:    Neuro:  diazepam    Tablet 5 milliGRAM(s) Oral every 8 hours  DULoxetine 60 milliGRAM(s) Oral daily  gabapentin 800 milliGRAM(s) Oral every 8 hours  ondansetron Injectable 4 milliGRAM(s) IV Push every 6 hours PRN  oxyCODONE    IR 15 milliGRAM(s) Oral every 4 hours PRN  oxyCODONE    IR 30 milliGRAM(s) Oral every 4 hours PRN    Anticoagulation:  apixaban 10 milliGRAM(s) Oral every 12 hours    OTHER:  benzocaine 20% Spray 1 Spray(s) Mucosal three times a day PRN  bisacodyl 5 milliGRAM(s) Oral at bedtime  chlorhexidine 2% Cloths 1 Application(s) Topical <User Schedule>  hydrocortisone 1% Cream 1 Application(s) Topical two times a day PRN  influenza  Vaccine (HIGH DOSE) 0.7 milliLiter(s) IntraMuscular once  insulin lispro (ADMELOG) corrective regimen sliding scale   SubCutaneous Before meals and at bedtime  midodrine 15 milliGRAM(s) Oral every 8 hours  Morphine 1mg /1mL 17.9 milliLITERS 17.9 milliLiter(s) IntraThecal Continuous Pump  naloxegol 25 milliGRAM(s) Oral daily  naloxone Injectable 0.1 milliGRAM(s) IV Push every 3 minutes PRN  pantoprazole    Tablet 40 milliGRAM(s) Oral before breakfast  polyethylene glycol 3350 17 Gram(s) Oral two times a day  senna 2 Tablet(s) Oral at bedtime  simvastatin 40 milliGRAM(s) Oral at bedtime    IVF:  multivitamin 1 Tablet(s) Oral daily    CULTURES:    RADIOLOGY & ADDITIONAL TESTS:      ASSESSMENT:  69 yo female with Hx HTN, HLD, DM, CVA x 3 (last in 2016 with residual R hand weakness), GAMALIEL not using CPAP, Emphysema, ex-25 pack year smoker now restarted, chronic constipation on Movantik, chronic urinary retention (SC at home), b/l CTS s/p release, s/p Intrathecal Morphine pump for pain, with chronic neck and back pain worsening for years s/p multiple spinal surgeries including laminectomies and fusion of cervical, thoracic and lumbar spine, s/p T3-L1 revision of prior fusion (with  on 8/17/22). Xray 11/16/23 showing broken rods/displacement. Now s/p C2-S2 instrumentation and fusion (12/5). S/p T9-L2 decompression w/ durotomy with repair (12/10/23). KAREEM grade A.     Plan:  Neuro:  - Neuro/vitals q4  - pain control: modified ERAS, IT morphine pump, oxycodone 15/30 prn, valium and home gabapentin 800 TID resumed  - Continue home cymbalta 60mg daily   - CT thoracic spine 12/6: postop changes  - protected sleep time 10PM-4AM     Cardio:  - MAP > 65   - midodrine 15q8   - C/w home simvastatin  - Sinus tachycardia - likely 2/2 PE (12/19)      Pulm:  - RA   - hx GAMALIEL, no CPAP at home  - PE protocol, 12/19: DVT doppler neg, CTA w segmental and subsegmental PE in R upper lobe  - 10 mg eliquis BID for R upper lobe PE (12/19-) x 7 d then 5 mg BID     GI:  - CCD   - Bowel regimen, home movantik 25 mg, last BM 12/17  - CT A/P 12/13 = impacted stool   - GERD: continue home protonix    Renal:  - Straight cath q6  - Patient not interested in suprapubic catheter placement    Endo:  - ISS, a1c: 7    Heme:  - SCDs DVT ppx  - Eliquis d/t PE   - 500 cell saver intraop 12/5, 1u PRBC intraop 12/10, 1u PRBC 12/12    ID:  - afebrile    Dispo:  - SDU status, full code, pending AR    D/w Dr. Luz

## 2023-12-21 NOTE — PROGRESS NOTE ADULT - SUBJECTIVE AND OBJECTIVE BOX
O/N Events: KAMRAN  Subjective/ROS: No complaints. Denies HA, CP, SOB, n/v, changes in bowel/urinary habits.  12pt ROS otherwise negative.    VITALS  Vital Signs Last 24 Hrs  T(C): 36.8 (21 Dec 2023 11:39), Max: 36.8 (21 Dec 2023 11:39)  T(F): 98.2 (21 Dec 2023 11:39), Max: 98.2 (21 Dec 2023 11:39)  HR: 92 (21 Dec 2023 11:39) (85 - 106)  BP: 121/69 (21 Dec 2023 11:39) (101/65 - 121/69)  BP(mean): --  RR: 18 (21 Dec 2023 11:39) (16 - 18)  SpO2: 99% (21 Dec 2023 11:39) (96% - 99%)    Parameters below as of 21 Dec 2023 11:39  Patient On (Oxygen Delivery Method): room air        I&O's Summary    20 Dec 2023 07:01  -  21 Dec 2023 07:00  --------------------------------------------------------  IN: 1250 mL / OUT: 1700 mL / NET: -450 mL    21 Dec 2023 07:01  -  21 Dec 2023 15:40  --------------------------------------------------------  IN: 820 mL / OUT: 0 mL / NET: 820 mL        CAPILLARY BLOOD GLUCOSE      POCT Blood Glucose.: 105 mg/dL (21 Dec 2023 12:07)  POCT Blood Glucose.: 175 mg/dL (21 Dec 2023 07:21)  POCT Blood Glucose.: 146 mg/dL (20 Dec 2023 22:02)  POCT Blood Glucose.: 134 mg/dL (20 Dec 2023 17:27)      PHYSICAL EXAM  General: no acute distress, sitting up in bed  Head: NC/AT; anicteric sclera  Respiratory: CTA B/L; no wheeze, crackles, rales or rhonchi  Cardiovascular: RRR, normal S1/S2; no mrg  Gastrointestinal: Soft; NTND, no guarding, normoactive bowel sounds  Extremities: WWP; no edema  Neurological:  speech is fluent, no acute deficits noted, paralysis in b/l lower extremities   Skin: No obvious rashes or lesions present  Psych: Appropriate mood and affect     MEDICATIONS  (STANDING):  apixaban 10 milliGRAM(s) Oral every 12 hours  bisacodyl 5 milliGRAM(s) Oral at bedtime  chlorhexidine 2% Cloths 1 Application(s) Topical <User Schedule>  diazepam    Tablet 5 milliGRAM(s) Oral every 8 hours  DULoxetine 60 milliGRAM(s) Oral daily  gabapentin 800 milliGRAM(s) Oral every 8 hours  influenza  Vaccine (HIGH DOSE) 0.7 milliLiter(s) IntraMuscular once  insulin lispro (ADMELOG) corrective regimen sliding scale   SubCutaneous Before meals and at bedtime  midodrine 15 milliGRAM(s) Oral every 8 hours  Morphine 1mg /1mL 17.9 milliLITERS 17.9 milliLiter(s) IntraThecal Continuous Pump  multivitamin 1 Tablet(s) Oral daily  naloxegol 25 milliGRAM(s) Oral daily  pantoprazole    Tablet 40 milliGRAM(s) Oral before breakfast  polyethylene glycol 3350 17 Gram(s) Oral two times a day  senna 2 Tablet(s) Oral at bedtime  simvastatin 40 milliGRAM(s) Oral at bedtime    MEDICATIONS  (PRN):  benzocaine 20% Spray 1 Spray(s) Mucosal three times a day PRN Sore throat  hydrocortisone 1% Cream 1 Application(s) Topical two times a day PRN Itching  naloxone Injectable 0.1 milliGRAM(s) IV Push every 3 minutes PRN For ANY of the following changes in patient status:  A. RR LESS THAN 10 breaths per minute, B. Oxygen saturation LESS THAN 90%, C. Sedation score of 6  ondansetron Injectable 4 milliGRAM(s) IV Push every 6 hours PRN Nausea  oxyCODONE    IR 15 milliGRAM(s) Oral every 4 hours PRN Moderate Pain (4 - 6)  oxyCODONE    IR 30 milliGRAM(s) Oral every 4 hours PRN Severe Pain (7 - 10)  sodium chloride 0.9% lock flush 10 milliLiter(s) IV Push every 1 hour PRN Pre/post blood products, medications, blood draw, and to maintain line patency      Crab (Pruritus)  No Known Drug Allergies      LABS        IMAGING/EKG/ETC: reviewed

## 2023-12-22 LAB
GLUCOSE BLDC GLUCOMTR-MCNC: 103 MG/DL — HIGH (ref 70–99)
GLUCOSE BLDC GLUCOMTR-MCNC: 103 MG/DL — HIGH (ref 70–99)
GLUCOSE BLDC GLUCOMTR-MCNC: 131 MG/DL — HIGH (ref 70–99)
GLUCOSE BLDC GLUCOMTR-MCNC: 131 MG/DL — HIGH (ref 70–99)

## 2023-12-22 PROCEDURE — 99232 SBSQ HOSP IP/OBS MODERATE 35: CPT

## 2023-12-22 RX ORDER — OXYCODONE HYDROCHLORIDE 5 MG/1
10 TABLET ORAL EVERY 4 HOURS
Refills: 0 | Status: DISCONTINUED | OUTPATIENT
Start: 2023-12-22 | End: 2023-12-29

## 2023-12-22 RX ORDER — OXYCODONE HYDROCHLORIDE 5 MG/1
15 TABLET ORAL EVERY 4 HOURS
Refills: 0 | Status: DISCONTINUED | OUTPATIENT
Start: 2023-12-22 | End: 2023-12-29

## 2023-12-22 RX ORDER — ACETAMINOPHEN 500 MG
1000 TABLET ORAL EVERY 8 HOURS
Refills: 0 | Status: DISCONTINUED | OUTPATIENT
Start: 2023-12-22 | End: 2024-01-05

## 2023-12-22 RX ADMIN — POLYETHYLENE GLYCOL 3350 17 GRAM(S): 17 POWDER, FOR SOLUTION ORAL at 17:10

## 2023-12-22 RX ADMIN — GABAPENTIN 800 MILLIGRAM(S): 400 CAPSULE ORAL at 02:29

## 2023-12-22 RX ADMIN — OXYCODONE HYDROCHLORIDE 15 MILLIGRAM(S): 5 TABLET ORAL at 22:03

## 2023-12-22 RX ADMIN — MIDODRINE HYDROCHLORIDE 15 MILLIGRAM(S): 2.5 TABLET ORAL at 02:29

## 2023-12-22 RX ADMIN — OXYCODONE HYDROCHLORIDE 15 MILLIGRAM(S): 5 TABLET ORAL at 10:26

## 2023-12-22 RX ADMIN — DULOXETINE HYDROCHLORIDE 60 MILLIGRAM(S): 30 CAPSULE, DELAYED RELEASE ORAL at 12:46

## 2023-12-22 RX ADMIN — PANTOPRAZOLE SODIUM 40 MILLIGRAM(S): 20 TABLET, DELAYED RELEASE ORAL at 06:31

## 2023-12-22 RX ADMIN — APIXABAN 10 MILLIGRAM(S): 2.5 TABLET, FILM COATED ORAL at 21:13

## 2023-12-22 RX ADMIN — Medication 5 MILLIGRAM(S): at 21:13

## 2023-12-22 RX ADMIN — OXYCODONE HYDROCHLORIDE 15 MILLIGRAM(S): 5 TABLET ORAL at 21:13

## 2023-12-22 RX ADMIN — SIMVASTATIN 40 MILLIGRAM(S): 20 TABLET, FILM COATED ORAL at 21:13

## 2023-12-22 RX ADMIN — NALOXEGOL OXALATE 25 MILLIGRAM(S): 12.5 TABLET, FILM COATED ORAL at 12:46

## 2023-12-22 RX ADMIN — Medication 1 TABLET(S): at 12:46

## 2023-12-22 RX ADMIN — APIXABAN 10 MILLIGRAM(S): 2.5 TABLET, FILM COATED ORAL at 09:28

## 2023-12-22 RX ADMIN — OXYCODONE HYDROCHLORIDE 15 MILLIGRAM(S): 5 TABLET ORAL at 18:11

## 2023-12-22 RX ADMIN — OXYCODONE HYDROCHLORIDE 15 MILLIGRAM(S): 5 TABLET ORAL at 09:26

## 2023-12-22 RX ADMIN — Medication 5 MILLIGRAM(S): at 07:19

## 2023-12-22 RX ADMIN — MIDODRINE HYDROCHLORIDE 15 MILLIGRAM(S): 2.5 TABLET ORAL at 09:28

## 2023-12-22 RX ADMIN — Medication 5 MILLIGRAM(S): at 16:17

## 2023-12-22 RX ADMIN — SENNA PLUS 2 TABLET(S): 8.6 TABLET ORAL at 21:13

## 2023-12-22 RX ADMIN — MIDODRINE HYDROCHLORIDE 15 MILLIGRAM(S): 2.5 TABLET ORAL at 17:11

## 2023-12-22 RX ADMIN — GABAPENTIN 800 MILLIGRAM(S): 400 CAPSULE ORAL at 09:29

## 2023-12-22 RX ADMIN — OXYCODONE HYDROCHLORIDE 15 MILLIGRAM(S): 5 TABLET ORAL at 17:11

## 2023-12-22 RX ADMIN — CHLORHEXIDINE GLUCONATE 1 APPLICATION(S): 213 SOLUTION TOPICAL at 06:31

## 2023-12-22 RX ADMIN — GABAPENTIN 800 MILLIGRAM(S): 400 CAPSULE ORAL at 17:11

## 2023-12-22 NOTE — PROGRESS NOTE ADULT - ASSESSMENT
68F with PMH of HTN, HLD, DM2, CVA x 3 (last in 2016 with residual R hand weakness), GAMALIEL not using CPAP, Emphysema, ex-25 pack year smoker now restarted, chronic constipation on Movantik, chronic urinary retention (SC at home), b/l CTS s/p release, s/p Intrathecal Morphine pump for pain, with chronic neck and back pain worsening for years s/p multiple spinal surgeries including laminectomies and fusion of cervical, thoracic and lumbar spine, s/p T3-L1 revision of prior fusion (with  on 8/17/22). Xray 11/16/23 showing broken rods/displacement. Now s/p C2-S2 instrumentation and fusion (12/5). S/p T9-L2 decompression w/ durotomy with repair (12/10/23). KAREEM grade A.     #Neck Pain  #Multiple Spine Surgeries  #Chronic Back Pain  -s/p fusion  -OT  -Pain Control per primary team  -Bowel Regimen   -Pending Rehab placement    #Chronic Constipation   - likely due to chronic opioid use  - c/w bowel regimen  - continue movantik    #HTN  -  Lopressor 75mg BID    #CAD  #CVA  - resume ASA post operatively when safe to do so  - continue atorvastatin  - awaiting results of recent long term outpatient cardiac monitoring to monitor for occult Afib to determine if a role for full dose AVC, follows with Dr Jeter    #DM  - ISS while inpatient    #tachycardia   -CTPE performed given persistent tachycardia, shown to have subsegmental and segmental PE. Would start Eliquis if cleared from NSG 68F with PMH of HTN, HLD, DM2, CVA x 3 (last in 2016 with residual R hand weakness), GAMALIEL not using CPAP, Emphysema, ex-25 pack year smoker now restarted, chronic constipation on Movantik, chronic urinary retention (SC at home), b/l CTS s/p release, s/p Intrathecal Morphine pump for pain, with chronic neck and back pain worsening for years s/p multiple spinal surgeries including laminectomies and fusion of cervical, thoracic and lumbar spine, s/p T3-L1 revision of prior fusion (with  on 8/17/22). Xray 11/16/23 showing broken rods/displacement. Now s/p C2-S2 instrumentation and fusion (12/5). S/p T9-L2 decompression w/ durotomy with repair (12/10/23). KAEREM grade A.     #Neck Pain  #Multiple Spine Surgeries  #Chronic Back Pain  -s/p fusion  -OT  -Pain Control per primary team  -Bowel Regimen   -Pending Rehab placement    #Chronic Constipation   - likely due to chronic opioid use  - c/w bowel regimen  - continue movantik    #HTN  -  Lopressor 75mg BID    #CAD  #CVA  - resume ASA post operatively when safe to do so  - continue atorvastatin  - awaiting results of recent long term outpatient cardiac monitoring to monitor for occult Afib to determine if a role for full dose AVC, follows with Dr Jeter    #DM  - ISS while inpatient    #tachycardia   -CTPE performed given persistent tachycardia, shown to have subsegmental and segmental PE. Would start Eliquis if cleared from NSG

## 2023-12-22 NOTE — PROGRESS NOTE ADULT - SUBJECTIVE AND OBJECTIVE BOX
O/N Events: KAMRAN  Subjective/ROS: No complaints. Denies HA, CP, SOB, n/v, changes in bowel/urinary habits.  12pt ROS otherwise negative.    VITALS  Vital Signs Last 24 Hrs  T(C): 36.7 (22 Dec 2023 12:26), Max: 37.2 (22 Dec 2023 02:25)  T(F): 98 (22 Dec 2023 12:26), Max: 98.9 (22 Dec 2023 02:25)  HR: 93 (22 Dec 2023 12:26) (90 - 102)  BP: 119/71 (22 Dec 2023 12:26) (110/68 - 119/79)  BP(mean): 90 (22 Dec 2023 12:26) (90 - 90)  RR: 18 (22 Dec 2023 12:26) (16 - 18)  SpO2: 98% (22 Dec 2023 12:26) (96% - 100%)    Parameters below as of 22 Dec 2023 12:26  Patient On (Oxygen Delivery Method): room air        I&O's Summary    21 Dec 2023 07:01  -  22 Dec 2023 07:00  --------------------------------------------------------  IN: 1410 mL / OUT: 1600 mL / NET: -190 mL        CAPILLARY BLOOD GLUCOSE      POCT Blood Glucose.: 131 mg/dL (22 Dec 2023 11:55)  POCT Blood Glucose.: 103 mg/dL (22 Dec 2023 06:51)  POCT Blood Glucose.: 215 mg/dL (21 Dec 2023 21:29)  POCT Blood Glucose.: 127 mg/dL (21 Dec 2023 17:19)      PHYSICAL EXAM  General: no acute distress, sitting up in bed  Head: NC/AT; anicteric sclera  Respiratory: CTA B/L; no wheeze, crackles, rales or rhonchi  Cardiovascular: RRR, normal S1/S2; no mrg  Gastrointestinal: Soft; NTND, no guarding, normoactive bowel sounds  Extremities: WWP; no edema  Neurological:  speech is fluent, no acute deficits noted, paralysis in b/l lower extremities   Skin: No obvious rashes or lesions present  Psych: Appropriate mood and affect       MEDICATIONS  (STANDING):  apixaban 10 milliGRAM(s) Oral every 12 hours  bisacodyl 5 milliGRAM(s) Oral at bedtime  chlorhexidine 2% Cloths 1 Application(s) Topical <User Schedule>  diazepam    Tablet 5 milliGRAM(s) Oral every 8 hours  DULoxetine 60 milliGRAM(s) Oral daily  gabapentin 800 milliGRAM(s) Oral every 8 hours  influenza  Vaccine (HIGH DOSE) 0.7 milliLiter(s) IntraMuscular once  insulin lispro (ADMELOG) corrective regimen sliding scale   SubCutaneous Before meals and at bedtime  midodrine 15 milliGRAM(s) Oral every 8 hours  Morphine 1mg /1mL 17.9 milliLITERS 17.9 milliLiter(s) IntraThecal Continuous Pump  multivitamin 1 Tablet(s) Oral daily  naloxegol 25 milliGRAM(s) Oral daily  pantoprazole    Tablet 40 milliGRAM(s) Oral before breakfast  polyethylene glycol 3350 17 Gram(s) Oral two times a day  senna 2 Tablet(s) Oral at bedtime  simvastatin 40 milliGRAM(s) Oral at bedtime    MEDICATIONS  (PRN):  benzocaine 20% Spray 1 Spray(s) Mucosal three times a day PRN Sore throat  hydrocortisone 1% Cream 1 Application(s) Topical two times a day PRN Itching  naloxone Injectable 0.1 milliGRAM(s) IV Push every 3 minutes PRN For ANY of the following changes in patient status:  A. RR LESS THAN 10 breaths per minute, B. Oxygen saturation LESS THAN 90%, C. Sedation score of 6  ondansetron Injectable 4 milliGRAM(s) IV Push every 6 hours PRN Nausea  oxyCODONE    IR 10 milliGRAM(s) Oral every 4 hours PRN Moderate Pain (4 - 6)  oxyCODONE    IR 15 milliGRAM(s) Oral every 4 hours PRN Severe Pain (7 - 10)  sodium chloride 0.9% lock flush 10 milliLiter(s) IV Push every 1 hour PRN Pre/post blood products, medications, blood draw, and to maintain line patency      Crab (Pruritus)  No Known Drug Allergies      LABS                    IMAGING/EKG/ETC: reviewed

## 2023-12-22 NOTE — PROGRESS NOTE ADULT - SUBJECTIVE AND OBJECTIVE BOX
HPI:  66 y/o female with PMHx HTN, HLD, CVA x 3 (last was 2016 with right hand weakness residual), GAMALIEL not using CPAP, Emphysema, former 25 pack year smoker restarted this week of surgery, Chronic Constipation on Movantik, Urinary Incontinence chronically self straight cath at home, chronic UTI, Breast Implant Rupture with Chronic Leak repair 10/2023, B/L CTS s/p release, s/p Intrathecal Morphine pump for pain, with chronic neck and back pain worsening for years s/p multiple spinal surgeries including laminectomies and fusion of cervical, thoracic and lumbar spine initially done in 2009 and recently in 2019 and 2020 at [facility] by Dr. Trinidad, s/p T3-L1 revision of prior fusion (with Dr. Luz on 8/17/22). Outpatient Xray 11/16/2023 showing broken rods/displacement. Presents for elective C2-S2 instrumentation and fusion. Pt endorses weakness of b/l LE and burning pain in b/l lower extremities radiating to both feet,  (04 Dec 2023 15:44)    OVERNIGHT EVENTS: kamran, valium held overnight due to drowsiness.     Hospital Course:   12/4: Admitted for spinal fusion d/t damaged hardware.   12/5: Neuro stable. POD0 C2-S2 instrumentation/fusion (intraop b/l LE motor loss). on parminder for MAP>90  12/6: POD1. KAMRAN o/n neuro stable. remains intubated. Extubated 6am. Precedex gtt prn. Remains on parminder gtt for MAP goal >90. Valium made prn. CT thoracic spine bc MRI unavailable. Attempted NGT, unsuccessful (resistance @ 35).   12/7: POD2 Given IV dilaudid 0.5mg for pain. Neuro exam stable. SQL started tongiht. Increased precedex to 1.0. Responds well to valium. Consulting psych given paranoia ?homicidal statements. Passed bedside dysphagia and tolerating PO meds. MRI complete, f/u read.   12/8: POD3 Pt reporting incisional pain, given dilaudid 0.5mg IV x2. Pt appearing anxious, given xanax 1mg. Neuro exam unchanged. Pain regimen increased to oxy 15/30 mg, pending further recs. Given 1 L bolus for tachycardia. Trops negative. 2 HMV drains removed.   12/9: POD4, KAMRAN, CT myelogram today showing block at T12-L1, OR tomorrow  12/10: POD5, KAMRAN, OR today for exploration of fusion, possible decompression T9-L2, revision instrumentation  POD 0 T9-L2 decompression. Pt returned from OR intubated. 50mcg fentanyl IVP x 2 for pain control and HTN. Magnesium repleted. Precedex gtt started for sedation. Decreased FiO2 to 40%. 1L bolus for soft MAP. Dc'd propofol and started levo gtt for MAP >90.   12/11: POD 6, POD 1. Extubated, satting well on RA. Resumed home valium and gabapentin. Castillo d/c'd, pending TOV. Seroquel started for agitation, R IJ removed, LUE double midline placed. Started SQL.   12/12: POD7, POD2, given 500 cc bolus and started on phenylephrine for MAP goal >90. Started midodrine 10mg q8h. Cardura dc'd d/t hypotension. Given 250cc of albumin for increasing pressor requirements. Given lactulose, pending BM. Hgb 7.6<8.3, given 1u PRBC. Protected sleep time. Urology consulted for possible suprapubic cath, recommend self intermittent cath is optimal.   12/13: POD8, POD3 Midodrine increased to 15q8. Pt refused to take the extra 5mg midodrin ordered. Neuro exam stable. hgb 10 from 9.4 after 1u pRBCs, Pt refused AM meds, given in late morning, CTAP ordered for L sided abd pain, fleet enema, restarted home movantik and ordere fleet enema for constipation. YIFAN and HMV dc'd. 250cc albumin x1  12/14: POD9, POD4, KAMRAN overnight, neuro stable. MAP goal liberalized to > 65. Stepdown status.   12/15: POD10, POD5, KAMRAN overnight  12/16: POD11, POD6, KAMRAN overnight, neuro stable, 500 cc bolus NS given for tachycardia. Aquacell changed. CXR, pro-bnp negative for pulmonary edema. Given additional 500cc bolus.  12/17: POD 12. POD 7. KAMRAN overnight, neuro stable.   12/18: POD 13/8. Incontinent x2 today.   12/19: KAMRAN. Persistent tachycardia, r/o PE. CTPE protocol ordered. Refused all afternoon meds except oxy 30. Per Dr. Cowart (rad) CT chest w segmental and subsegmental PE in R upper lobe. Dopplers negative for DVT, CT PE shows segmental and subsegmental PE in R upper lobe. Per Dr. Augustine and Dr. Luz, Will start eliquis pending pain management approval. Per Dr. Mclaughlin (pain mgmt) can start eliquis 10mg BIDx 7 days. Dc'd lovenox.  12/20: POD 10 T9-L2 Decompression. Hemodynamically stable. Pending rehab. PT today.   12/21: POD 11 T9-L2 Decompression. neruo/hemodynamically stable, pending rehab.  12/22: POD 12, pending rehab.     Vital Signs Last 24 Hrs  T(C): 37.1 (21 Dec 2023 23:08), Max: 37.1 (21 Dec 2023 23:08)  T(F): 98.8 (21 Dec 2023 23:08), Max: 98.8 (21 Dec 2023 23:08)  HR: 92 (21 Dec 2023 23:08) (92 - 102)  BP: 110/68 (21 Dec 2023 23:08) (110/68 - 121/69)  BP(mean): --  RR: 16 (21 Dec 2023 23:08) (16 - 18)  SpO2: 96% (21 Dec 2023 23:08) (96% - 100%)    Parameters below as of 21 Dec 2023 23:08  Patient On (Oxygen Delivery Method): room air        I&O's Summary    20 Dec 2023 07:01  -  21 Dec 2023 07:00  --------------------------------------------------------  IN: 1250 mL / OUT: 1700 mL / NET: -450 mL    21 Dec 2023 07:01  -  22 Dec 2023 01:32  --------------------------------------------------------  IN: 1410 mL / OUT: 1600 mL / NET: -190 mL        PHYSICAL EXAM:  General: NAD, AAOx3  HEENT: PERRL. EOMI. VF intact.  Neck: From, nontender.  Cardiovascular: RRR, normal S1 and S2   Respiratory: non-labored breathing, symmetric chest rise  GI: abd soft, NTND, +BS  Neuro: CN II-XII intact, follows commands, speech clear. Strength BL UE 5/5  BL LE 0/5 to noxious stimuli with T11 sensory deficit.  Vascular: Distal pulses 2+ x 4, no calf edema or erythema  Wounds: Posterior midline spine incision C/D/I with dressing in place      LABS:        CAPILLARY BLOOD GLUCOSE      POCT Blood Glucose.: 215 mg/dL (21 Dec 2023 21:29)  POCT Blood Glucose.: 127 mg/dL (21 Dec 2023 17:19)  POCT Blood Glucose.: 105 mg/dL (21 Dec 2023 12:07)  POCT Blood Glucose.: 175 mg/dL (21 Dec 2023 07:21)      Drug Levels: [] N/A    CSF Analysis: [] N/A      Allergies    Crab (Pruritus)  No Known Drug Allergies    Intolerances      MEDICATIONS:  Antibiotics:    Neuro:  diazepam    Tablet 5 milliGRAM(s) Oral every 8 hours  DULoxetine 60 milliGRAM(s) Oral daily  gabapentin 800 milliGRAM(s) Oral every 8 hours  ondansetron Injectable 4 milliGRAM(s) IV Push every 6 hours PRN  oxyCODONE    IR 15 milliGRAM(s) Oral every 4 hours PRN  oxyCODONE    IR 30 milliGRAM(s) Oral every 4 hours PRN    Anticoagulation:  apixaban 10 milliGRAM(s) Oral every 12 hours    OTHER:  benzocaine 20% Spray 1 Spray(s) Mucosal three times a day PRN  bisacodyl 5 milliGRAM(s) Oral at bedtime  chlorhexidine 2% Cloths 1 Application(s) Topical <User Schedule>  hydrocortisone 1% Cream 1 Application(s) Topical two times a day PRN  influenza  Vaccine (HIGH DOSE) 0.7 milliLiter(s) IntraMuscular once  insulin lispro (ADMELOG) corrective regimen sliding scale   SubCutaneous Before meals and at bedtime  midodrine 15 milliGRAM(s) Oral every 8 hours  Morphine 1mg /1mL 17.9 milliLITERS 17.9 milliLiter(s) IntraThecal Continuous Pump  naloxegol 25 milliGRAM(s) Oral daily  naloxone Injectable 0.1 milliGRAM(s) IV Push every 3 minutes PRN  pantoprazole    Tablet 40 milliGRAM(s) Oral before breakfast  polyethylene glycol 3350 17 Gram(s) Oral two times a day  senna 2 Tablet(s) Oral at bedtime  simvastatin 40 milliGRAM(s) Oral at bedtime    IVF:  multivitamin 1 Tablet(s) Oral daily      ASSESSMENT:  69 yo female with Hx HTN, HLD, DM, CVA x 3 (last in 2016 with residual R hand weakness), GAMALIEL not using CPAP, Emphysema, ex-25 pack year smoker now restarted, chronic constipation on Movantik, chronic urinary retention (SC at home), b/l CTS s/p release, s/p Intrathecal Morphine pump for pain, with chronic neck and back pain worsening for years s/p multiple spinal surgeries including laminectomies and fusion of cervical, thoracic and lumbar spine, s/p T3-L1 revision of prior fusion (with  on 8/17/22). Xray 11/16/23 showing broken rods/displacement. Now s/p C2-S2 instrumentation and fusion (12/5). S/p T9-L2 decompression w/ durotomy with repair (12/10/23). KAREEM grade A.     Plan:  Neuro:  - Neuro/vitals q4  - pain control: modified ERAS, IT morphine pump, oxycodone 15/30 prn, valium and home gabapentin 800 TID resumed  - Continue home cymbalta 60mg daily   - CT thoracic spine 12/6: postop changes  - protected sleep time 10PM-4AM     Cardio:  - MAP > 65   - midodrine 15q8   - C/w home simvastatin  - Sinus tachycardia - likely 2/2 PE (12/19)      Pulm:  - RA   - hx GAMALIEL, no CPAP at home  - PE protocol, 12/19: DVT doppler neg, CTA w segmental and subsegmental PE in R upper lobe  - 10 mg eliquis BID for R upper lobe PE (12/19-) x 7 d then 5 mg BID     GI:  - CCD   - Bowel regimen, home movantik 25 mg, last BM 12/17  - CT A/P 12/13 = impacted stool   - GERD: continue home protonix    Renal:  - Straight cath q6  - Patient not interested in suprapubic catheter placement    Endo:  - ISS, a1c: 7    Heme:  - SCDs DVT ppx  - Eliquis d/t PE   - 500 cell saver intraop 12/5, 1u PRBC intraop 12/10, 1u PRBC 12/12    ID:  - afebrile    Dispo:  - SDU status, full code, pending AR    D/w Dr. Luz        HPI:  68 y/o female with PMHx HTN, HLD, CVA x 3 (last was 2016 with right hand weakness residual), GAMALIEL not using CPAP, Emphysema, former 25 pack year smoker restarted this week of surgery, Chronic Constipation on Movantik, Urinary Incontinence chronically self straight cath at home, chronic UTI, Breast Implant Rupture with Chronic Leak repair 10/2023, B/L CTS s/p release, s/p Intrathecal Morphine pump for pain, with chronic neck and back pain worsening for years s/p multiple spinal surgeries including laminectomies and fusion of cervical, thoracic and lumbar spine initially done in 2009 and recently in 2019 and 2020 at [facility] by Dr. Trinidad, s/p T3-L1 revision of prior fusion (with Dr. Luz on 8/17/22). Outpatient Xray 11/16/2023 showing broken rods/displacement. Presents for elective C2-S2 instrumentation and fusion. Pt endorses weakness of b/l LE and burning pain in b/l lower extremities radiating to both feet,  (04 Dec 2023 15:44)    OVERNIGHT EVENTS: kamran, valium held overnight due to drowsiness.     Hospital Course:   12/4: Admitted for spinal fusion d/t damaged hardware.   12/5: Neuro stable. POD0 C2-S2 instrumentation/fusion (intraop b/l LE motor loss). on parminder for MAP>90  12/6: POD1. KAMRAN o/n neuro stable. remains intubated. Extubated 6am. Precedex gtt prn. Remains on parminder gtt for MAP goal >90. Valium made prn. CT thoracic spine bc MRI unavailable. Attempted NGT, unsuccessful (resistance @ 35).   12/7: POD2 Given IV dilaudid 0.5mg for pain. Neuro exam stable. SQL started tongiht. Increased precedex to 1.0. Responds well to valium. Consulting psych given paranoia ?homicidal statements. Passed bedside dysphagia and tolerating PO meds. MRI complete, f/u read.   12/8: POD3 Pt reporting incisional pain, given dilaudid 0.5mg IV x2. Pt appearing anxious, given xanax 1mg. Neuro exam unchanged. Pain regimen increased to oxy 15/30 mg, pending further recs. Given 1 L bolus for tachycardia. Trops negative. 2 HMV drains removed.   12/9: POD4, KAMRAN, CT myelogram today showing block at T12-L1, OR tomorrow  12/10: POD5, KAMRAN, OR today for exploration of fusion, possible decompression T9-L2, revision instrumentation  POD 0 T9-L2 decompression. Pt returned from OR intubated. 50mcg fentanyl IVP x 2 for pain control and HTN. Magnesium repleted. Precedex gtt started for sedation. Decreased FiO2 to 40%. 1L bolus for soft MAP. Dc'd propofol and started levo gtt for MAP >90.   12/11: POD 6, POD 1. Extubated, satting well on RA. Resumed home valium and gabapentin. Castillo d/c'd, pending TOV. Seroquel started for agitation, R IJ removed, LUE double midline placed. Started SQL.   12/12: POD7, POD2, given 500 cc bolus and started on phenylephrine for MAP goal >90. Started midodrine 10mg q8h. Cardura dc'd d/t hypotension. Given 250cc of albumin for increasing pressor requirements. Given lactulose, pending BM. Hgb 7.6<8.3, given 1u PRBC. Protected sleep time. Urology consulted for possible suprapubic cath, recommend self intermittent cath is optimal.   12/13: POD8, POD3 Midodrine increased to 15q8. Pt refused to take the extra 5mg midodrin ordered. Neuro exam stable. hgb 10 from 9.4 after 1u pRBCs, Pt refused AM meds, given in late morning, CTAP ordered for L sided abd pain, fleet enema, restarted home movantik and ordere fleet enema for constipation. YIFAN and HMV dc'd. 250cc albumin x1  12/14: POD9, POD4, KAMRAN overnight, neuro stable. MAP goal liberalized to > 65. Stepdown status.   12/15: POD10, POD5, KAMRAN overnight  12/16: POD11, POD6, KAMRAN overnight, neuro stable, 500 cc bolus NS given for tachycardia. Aquacell changed. CXR, pro-bnp negative for pulmonary edema. Given additional 500cc bolus.  12/17: POD 12. POD 7. KAMRAN overnight, neuro stable.   12/18: POD 13/8. Incontinent x2 today.   12/19: KAMRAN. Persistent tachycardia, r/o PE. CTPE protocol ordered. Refused all afternoon meds except oxy 30. Per Dr. Cowart (rad) CT chest w segmental and subsegmental PE in R upper lobe. Dopplers negative for DVT, CT PE shows segmental and subsegmental PE in R upper lobe. Per Dr. Augustine and Dr. Luz, Will start eliquis pending pain management approval. Per Dr. Mclaughlin (pain mgmt) can start eliquis 10mg BIDx 7 days. Dc'd lovenox.  12/20: POD 10 T9-L2 Decompression. Hemodynamically stable. Pending rehab. PT today.   12/21: POD 11 T9-L2 Decompression. neruo/hemodynamically stable, pending rehab.  12/22: POD 12, pending rehab.     Vital Signs Last 24 Hrs  T(C): 37.1 (21 Dec 2023 23:08), Max: 37.1 (21 Dec 2023 23:08)  T(F): 98.8 (21 Dec 2023 23:08), Max: 98.8 (21 Dec 2023 23:08)  HR: 92 (21 Dec 2023 23:08) (92 - 102)  BP: 110/68 (21 Dec 2023 23:08) (110/68 - 121/69)  BP(mean): --  RR: 16 (21 Dec 2023 23:08) (16 - 18)  SpO2: 96% (21 Dec 2023 23:08) (96% - 100%)    Parameters below as of 21 Dec 2023 23:08  Patient On (Oxygen Delivery Method): room air        I&O's Summary    20 Dec 2023 07:01  -  21 Dec 2023 07:00  --------------------------------------------------------  IN: 1250 mL / OUT: 1700 mL / NET: -450 mL    21 Dec 2023 07:01  -  22 Dec 2023 01:32  --------------------------------------------------------  IN: 1410 mL / OUT: 1600 mL / NET: -190 mL        PHYSICAL EXAM:  General: NAD, AAOx3  HEENT: PERRL. EOMI. VF intact.  Neck: From, nontender.  Cardiovascular: RRR, normal S1 and S2   Respiratory: non-labored breathing, symmetric chest rise  GI: abd soft, NTND, +BS  Neuro: CN II-XII intact, follows commands, speech clear. Strength BL UE 5/5  BL LE 0/5 to noxious stimuli with T11 sensory deficit.  Vascular: Distal pulses 2+ x 4, no calf edema or erythema  Wounds: Posterior midline spine incision C/D/I with dressing in place      LABS:        CAPILLARY BLOOD GLUCOSE      POCT Blood Glucose.: 215 mg/dL (21 Dec 2023 21:29)  POCT Blood Glucose.: 127 mg/dL (21 Dec 2023 17:19)  POCT Blood Glucose.: 105 mg/dL (21 Dec 2023 12:07)  POCT Blood Glucose.: 175 mg/dL (21 Dec 2023 07:21)      Drug Levels: [] N/A    CSF Analysis: [] N/A      Allergies    Crab (Pruritus)  No Known Drug Allergies    Intolerances      MEDICATIONS:  Antibiotics:    Neuro:  diazepam    Tablet 5 milliGRAM(s) Oral every 8 hours  DULoxetine 60 milliGRAM(s) Oral daily  gabapentin 800 milliGRAM(s) Oral every 8 hours  ondansetron Injectable 4 milliGRAM(s) IV Push every 6 hours PRN  oxyCODONE    IR 15 milliGRAM(s) Oral every 4 hours PRN  oxyCODONE    IR 30 milliGRAM(s) Oral every 4 hours PRN    Anticoagulation:  apixaban 10 milliGRAM(s) Oral every 12 hours    OTHER:  benzocaine 20% Spray 1 Spray(s) Mucosal three times a day PRN  bisacodyl 5 milliGRAM(s) Oral at bedtime  chlorhexidine 2% Cloths 1 Application(s) Topical <User Schedule>  hydrocortisone 1% Cream 1 Application(s) Topical two times a day PRN  influenza  Vaccine (HIGH DOSE) 0.7 milliLiter(s) IntraMuscular once  insulin lispro (ADMELOG) corrective regimen sliding scale   SubCutaneous Before meals and at bedtime  midodrine 15 milliGRAM(s) Oral every 8 hours  Morphine 1mg /1mL 17.9 milliLITERS 17.9 milliLiter(s) IntraThecal Continuous Pump  naloxegol 25 milliGRAM(s) Oral daily  naloxone Injectable 0.1 milliGRAM(s) IV Push every 3 minutes PRN  pantoprazole    Tablet 40 milliGRAM(s) Oral before breakfast  polyethylene glycol 3350 17 Gram(s) Oral two times a day  senna 2 Tablet(s) Oral at bedtime  simvastatin 40 milliGRAM(s) Oral at bedtime    IVF:  multivitamin 1 Tablet(s) Oral daily      ASSESSMENT:  67 yo female with Hx HTN, HLD, DM, CVA x 3 (last in 2016 with residual R hand weakness), GAMALIEL not using CPAP, Emphysema, ex-25 pack year smoker now restarted, chronic constipation on Movantik, chronic urinary retention (SC at home), b/l CTS s/p release, s/p Intrathecal Morphine pump for pain, with chronic neck and back pain worsening for years s/p multiple spinal surgeries including laminectomies and fusion of cervical, thoracic and lumbar spine, s/p T3-L1 revision of prior fusion (with  on 8/17/22). Xray 11/16/23 showing broken rods/displacement. Now s/p C2-S2 instrumentation and fusion (12/5). S/p T9-L2 decompression w/ durotomy with repair (12/10/23). KAREEM grade A.     Plan:  Neuro:  - Neuro/vitals q4  - pain control: modified ERAS, IT morphine pump, oxycodone 15/30 prn, valium and home gabapentin 800 TID resumed  - Continue home cymbalta 60mg daily   - CT thoracic spine 12/6: postop changes  - protected sleep time 10PM-4AM     Cardio:  - MAP > 65   - midodrine 15q8   - C/w home simvastatin  - Sinus tachycardia - likely 2/2 PE (12/19)      Pulm:  - RA   - hx GAMALIEL, no CPAP at home  - PE protocol, 12/19: DVT doppler neg, CTA w segmental and subsegmental PE in R upper lobe  - 10 mg eliquis BID for R upper lobe PE (12/19-) x 7 d then 5 mg BID     GI:  - CCD   - Bowel regimen, home movantik 25 mg, last BM 12/17  - CT A/P 12/13 = impacted stool   - GERD: continue home protonix    Renal:  - Straight cath q6  - Patient not interested in suprapubic catheter placement    Endo:  - ISS, a1c: 7    Heme:  - SCDs DVT ppx  - Eliquis d/t PE   - 500 cell saver intraop 12/5, 1u PRBC intraop 12/10, 1u PRBC 12/12    ID:  - afebrile    Dispo:  - SDU status, full code, pending AR    D/w Dr. Luz

## 2023-12-23 PROCEDURE — 99024 POSTOP FOLLOW-UP VISIT: CPT

## 2023-12-23 PROCEDURE — 99233 SBSQ HOSP IP/OBS HIGH 50: CPT

## 2023-12-23 RX ADMIN — OXYCODONE HYDROCHLORIDE 15 MILLIGRAM(S): 5 TABLET ORAL at 13:55

## 2023-12-23 RX ADMIN — MIDODRINE HYDROCHLORIDE 15 MILLIGRAM(S): 2.5 TABLET ORAL at 18:36

## 2023-12-23 RX ADMIN — NALOXEGOL OXALATE 25 MILLIGRAM(S): 12.5 TABLET, FILM COATED ORAL at 12:58

## 2023-12-23 RX ADMIN — SENNA PLUS 2 TABLET(S): 8.6 TABLET ORAL at 21:21

## 2023-12-23 RX ADMIN — MIDODRINE HYDROCHLORIDE 15 MILLIGRAM(S): 2.5 TABLET ORAL at 02:55

## 2023-12-23 RX ADMIN — Medication 1 TABLET(S): at 12:58

## 2023-12-23 RX ADMIN — SIMVASTATIN 40 MILLIGRAM(S): 20 TABLET, FILM COATED ORAL at 21:21

## 2023-12-23 RX ADMIN — GABAPENTIN 800 MILLIGRAM(S): 400 CAPSULE ORAL at 18:36

## 2023-12-23 RX ADMIN — OXYCODONE HYDROCHLORIDE 15 MILLIGRAM(S): 5 TABLET ORAL at 18:35

## 2023-12-23 RX ADMIN — OXYCODONE HYDROCHLORIDE 15 MILLIGRAM(S): 5 TABLET ORAL at 17:46

## 2023-12-23 RX ADMIN — CHLORHEXIDINE GLUCONATE 1 APPLICATION(S): 213 SOLUTION TOPICAL at 07:44

## 2023-12-23 RX ADMIN — DULOXETINE HYDROCHLORIDE 60 MILLIGRAM(S): 30 CAPSULE, DELAYED RELEASE ORAL at 12:58

## 2023-12-23 RX ADMIN — GABAPENTIN 800 MILLIGRAM(S): 400 CAPSULE ORAL at 09:02

## 2023-12-23 RX ADMIN — POLYETHYLENE GLYCOL 3350 17 GRAM(S): 17 POWDER, FOR SOLUTION ORAL at 18:36

## 2023-12-23 RX ADMIN — Medication 5 MILLIGRAM(S): at 21:21

## 2023-12-23 RX ADMIN — Medication 5 MILLIGRAM(S): at 07:44

## 2023-12-23 RX ADMIN — OXYCODONE HYDROCHLORIDE 15 MILLIGRAM(S): 5 TABLET ORAL at 09:02

## 2023-12-23 RX ADMIN — OXYCODONE HYDROCHLORIDE 15 MILLIGRAM(S): 5 TABLET ORAL at 04:22

## 2023-12-23 RX ADMIN — APIXABAN 10 MILLIGRAM(S): 2.5 TABLET, FILM COATED ORAL at 21:21

## 2023-12-23 RX ADMIN — GABAPENTIN 800 MILLIGRAM(S): 400 CAPSULE ORAL at 02:55

## 2023-12-23 RX ADMIN — POLYETHYLENE GLYCOL 3350 17 GRAM(S): 17 POWDER, FOR SOLUTION ORAL at 06:37

## 2023-12-23 RX ADMIN — Medication 5 MILLIGRAM(S): at 23:42

## 2023-12-23 RX ADMIN — Medication 5 MILLIGRAM(S): at 00:11

## 2023-12-23 RX ADMIN — OXYCODONE HYDROCHLORIDE 15 MILLIGRAM(S): 5 TABLET ORAL at 09:48

## 2023-12-23 RX ADMIN — OXYCODONE HYDROCHLORIDE 15 MILLIGRAM(S): 5 TABLET ORAL at 05:00

## 2023-12-23 RX ADMIN — PANTOPRAZOLE SODIUM 40 MILLIGRAM(S): 20 TABLET, DELAYED RELEASE ORAL at 06:38

## 2023-12-23 RX ADMIN — Medication 5 MILLIGRAM(S): at 18:36

## 2023-12-23 RX ADMIN — OXYCODONE HYDROCHLORIDE 15 MILLIGRAM(S): 5 TABLET ORAL at 13:04

## 2023-12-23 RX ADMIN — APIXABAN 10 MILLIGRAM(S): 2.5 TABLET, FILM COATED ORAL at 09:02

## 2023-12-23 RX ADMIN — MIDODRINE HYDROCHLORIDE 15 MILLIGRAM(S): 2.5 TABLET ORAL at 09:02

## 2023-12-23 NOTE — PROGRESS NOTE ADULT - SUBJECTIVE AND OBJECTIVE BOX
HPI:  66 y/o female with PMHx HTN, HLD, CVA x 3 (last was 2016 with right hand weakness residual), GAMALIEL not using CPAP, Emphysema, former 25 pack year smoker restarted this week of surgery, Chronic Constipation on Movantik, Urinary Incontinence chronically self straight cath at home, chronic UTI, Breast Implant Rupture with Chronic Leak repair 10/2023, B/L CTS s/p release, s/p Intrathecal Morphine pump for pain, with chronic neck and back pain worsening for years s/p multiple spinal surgeries including laminectomies and fusion of cervical, thoracic and lumbar spine initially done in 2009 and recently in 2019 and 2020 at [facility] by Dr. Trinidad, s/p T3-L1 revision of prior fusion (with Dr. Luz on 8/17/22). Outpatient Xray 11/16/2023 showing broken rods/displacement. Presents for elective C2-S2 instrumentation and fusion. Pt endorses weakness of b/l LE and burning pain in b/l lower extremities radiating to both feet,  (04 Dec 2023 15:44)    OVERNIGHT EVENTS: KAMRAN overnight, neuro stable    Hospital Course:   12/4: Admitted for spinal fusion d/t damaged hardware.   12/5: Neuro stable. POD0 C2-S2 instrumentation/fusion (intraop b/l LE motor loss). on parminder for MAP>90  12/6: POD1. KAMRAN o/n neuro stable. remains intubated. Extubated 6am. Precedex gtt prn. Remains on parminder gtt for MAP goal >90. Valium made prn. CT thoracic spine bc MRI unavailable. Attempted NGT, unsuccessful (resistance @ 35).   12/7: POD2 Given IV dilaudid 0.5mg for pain. Neuro exam stable. SQL started tongiht. Increased precedex to 1.0. Responds well to valium. Consulting psych given paranoia ?homicidal statements. Passed bedside dysphagia and tolerating PO meds. MRI complete, f/u read.   12/8: POD3 Pt reporting incisional pain, given dilaudid 0.5mg IV x2. Pt appearing anxious, given xanax 1mg. Neuro exam unchanged. Pain regimen increased to oxy 15/30 mg, pending further recs. Given 1 L bolus for tachycardia. Trops negative. 2 HMV drains removed.   12/9: POD4, KAMRAN, CT myelogram today showing block at T12-L1, OR tomorrow  12/10: POD5, KAMRAN, OR today for exploration of fusion, possible decompression T9-L2, revision instrumentation  POD 0 T9-L2 decompression. Pt returned from OR intubated. 50mcg fentanyl IVP x 2 for pain control and HTN. Magnesium repleted. Precedex gtt started for sedation. Decreased FiO2 to 40%. 1L bolus for soft MAP. Dc'd propofol and started levo gtt for MAP >90.   12/11: POD 6, POD 1. Extubated, satting well on RA. Resumed home valium and gabapentin. Castillo d/c'd, pending TOV. Seroquel started for agitation, R IJ removed, LUE double midline placed. Started SQL.   12/12: POD7, POD2, given 500 cc bolus and started on phenylephrine for MAP goal >90. Started midodrine 10mg q8h. Cardura dc'd d/t hypotension. Given 250cc of albumin for increasing pressor requirements. Given lactulose, pending BM. Hgb 7.6<8.3, given 1u PRBC. Protected sleep time. Urology consulted for possible suprapubic cath, recommend self intermittent cath is optimal.   12/13: POD8, POD3 Midodrine increased to 15q8. Pt refused to take the extra 5mg midodrin ordered. Neuro exam stable. hgb 10 from 9.4 after 1u pRBCs, Pt refused AM meds, given in late morning, CTAP ordered for L sided abd pain, fleet enema, restarted home movantik and ordere fleet enema for constipation. YIFAN and HMV dc'd. 250cc albumin x1  12/14: POD9, POD4, KAMRAN overnight, neuro stable. MAP goal liberalized to > 65. Stepdown status.   12/15: POD10, POD5, KAMRAN overnight  12/16: POD11, POD6, KAMRAN overnight, neuro stable, 500 cc bolus NS given for tachycardia. Aquacell changed. CXR, pro-bnp negative for pulmonary edema. Given additional 500cc bolus.  12/17: POD 12. POD 7. KAMRAN overnight, neuro stable.   12/18: POD 13/8. Incontinent x2 today.   12/19: KAMRAN. Persistent tachycardia, r/o PE. CTPE protocol ordered. Refused all afternoon meds except oxy 30. Per Dr. Cowart (rad) CT chest w segmental and subsegmental PE in R upper lobe. Dopplers negative for DVT, CT PE shows segmental and subsegmental PE in R upper lobe. Per Dr. Augustine and Dr. Luz, Will start eliquis pending pain management approval. Per Dr. Mclaughlin (pain mgmt) can start eliquis 10mg BIDx 7 days. Dc'd lovenox.  12/20: POD 10 T9-L2 Decompression. Hemodynamically stable. Pending rehab. PT today.   12/21: POD 11 T9-L2 Decompression. neruo/hemodynamically stable, pending rehab.  12/22: POD 17/12, pending rehab. Aquacel dressing changed.   12/23: POD 18/13, pending rehab.     Vital Signs Last 24 Hrs  T(C): 37.1 (23 Dec 2023 00:08), Max: 37.3 (22 Dec 2023 21:12)  T(F): 98.7 (23 Dec 2023 00:08), Max: 99.2 (22 Dec 2023 21:12)  HR: 90 (23 Dec 2023 00:08) (90 - 105)  BP: 122/74 (23 Dec 2023 00:08) (111/71 - 126/77)  BP(mean): 81 (22 Dec 2023 16:50) (81 - 90)  RR: 18 (23 Dec 2023 00:08) (16 - 19)  SpO2: 97% (23 Dec 2023 00:08) (95% - 99%)    Parameters below as of 23 Dec 2023 00:08  Patient On (Oxygen Delivery Method): room air        I&O's Summary    21 Dec 2023 07:01  -  22 Dec 2023 07:00  --------------------------------------------------------  IN: 1410 mL / OUT: 1600 mL / NET: -190 mL    22 Dec 2023 07:01  -  23 Dec 2023 05:49  --------------------------------------------------------  IN: 510 mL / OUT: 1725 mL / NET: -1215 mL        PHYSICAL EXAM:  Constitutional: awake, alert, sitting up in bed. NAD.   Respiratory: non-labored breathing. Normal chest rise.   Cardiovascular: Regular rate and rhythm  Gastrointestinal:  Soft, nontender, nondistended.  .  Vascular: Extremities warm, no ulcers, no discoloration of skin.   Neurological: Gen: AA&O x 3, conversant, appropriate.      CN II-XII grossly intact.    Motor: RUE/LUE 5/5, RLE/LLE 0/5     Sens: Decreased sensation below T11    Extremities: warm and well perfused   Wound/incision: thoracolumbar incision c/d/i         TUBES/LINES:  [] Castillo  [] Lumbar Drain  [] Wound Drains  [] Others      DIET:  [] NPO  [x] Mechanical  [] Tube feeds    LABS:                  CAPILLARY BLOOD GLUCOSE      POCT Blood Glucose.: 131 mg/dL (22 Dec 2023 11:55)  POCT Blood Glucose.: 103 mg/dL (22 Dec 2023 06:51)      Drug Levels: [] N/A    CSF Analysis: [] N/A      Allergies    Crab (Pruritus)  No Known Drug Allergies    Intolerances      MEDICATIONS:  Antibiotics:    Neuro:  acetaminophen     Tablet .. 1000 milliGRAM(s) Oral every 8 hours PRN  diazepam    Tablet 5 milliGRAM(s) Oral every 8 hours  DULoxetine 60 milliGRAM(s) Oral daily  gabapentin 800 milliGRAM(s) Oral every 8 hours  ondansetron Injectable 4 milliGRAM(s) IV Push every 6 hours PRN  oxyCODONE    IR 10 milliGRAM(s) Oral every 4 hours PRN  oxyCODONE    IR 15 milliGRAM(s) Oral every 4 hours PRN    Anticoagulation:  apixaban 10 milliGRAM(s) Oral every 12 hours    OTHER:  benzocaine 20% Spray 1 Spray(s) Mucosal three times a day PRN  bisacodyl 5 milliGRAM(s) Oral at bedtime  chlorhexidine 2% Cloths 1 Application(s) Topical <User Schedule>  hydrocortisone 1% Cream 1 Application(s) Topical two times a day PRN  influenza  Vaccine (HIGH DOSE) 0.7 milliLiter(s) IntraMuscular once  midodrine 15 milliGRAM(s) Oral every 8 hours  Morphine 1mg /1mL 17.9 milliLITERS 17.9 milliLiter(s) IntraThecal Continuous Pump  naloxegol 25 milliGRAM(s) Oral daily  naloxone Injectable 0.1 milliGRAM(s) IV Push every 3 minutes PRN  pantoprazole    Tablet 40 milliGRAM(s) Oral before breakfast  polyethylene glycol 3350 17 Gram(s) Oral two times a day  senna 2 Tablet(s) Oral at bedtime  simvastatin 40 milliGRAM(s) Oral at bedtime    IVF:  multivitamin 1 Tablet(s) Oral daily    CULTURES:    RADIOLOGY & ADDITIONAL TESTS:      ASSESSMENT:  69 yo female with Hx HTN, HLD, DM, CVA x 3 (last in 2016 with residual R hand weakness), GAMALIEL not using CPAP, Emphysema, ex-25 pack year smoker now restarted, chronic constipation on Movantik, chronic urinary retention (SC at home), b/l CTS s/p release, s/p Intrathecal Morphine pump for pain, with chronic neck and back pain worsening for years s/p multiple spinal surgeries including laminectomies and fusion of cervical, thoracic and lumbar spine, s/p T3-L1 revision of prior fusion (with  on 8/17/22). Xray 11/16/23 showing broken rods/displacement. Now s/p C2-S2 instrumentation and fusion (12/5). S/p T9-L2 decompression w/ durotomy with repair (12/10/23). KAREEM grade A.       S12.9XXA    Handoff    MEWS Score    HTN (hypertension)    Back pain    Hypertension    SS (spinal stenosis)    High cholesterol    Stroke    H/O carpal tunnel syndrome    H/O carpal tunnel syndrome    Chronic GERD    History of chronic constipation    Seizure    Urinary incontinence    Pre-diabetes    Acute UTI    Anxiety    Anxiety and depression    Arthritis    Eczema    External hemorrhoids    H/O kyphosis    Multiple sclerosis    Pancreas cyst    Scoliosis    Wheelchair dependent    Cervical pseudoarthrosis    Right shoulder pain    Cervical spondylosis    Chronic headaches    Chronic LUQ pain    Chronic UTI    Degenerative joint disease    H/O low back pain    Lumbosacral spondylosis    COPD (chronic obstructive pulmonary disease)    Back pain    Back pain    Spinal cord injury, thoracic (T7-T12)    Back pain    Spinal cord injury of thoracic region without bone injury    Delirium    Revision of fusion of thoracic spine    Thoracic back pain    Revision of posterolateral fusion of lumbar spine    Revision of fusion of thoracic spine    Decompression, spinal cord, thoracic, posterolateral approach    Cervical disc disease    H/O Spinal surgery    H/O breast surgery    S/P cervical discectomy    Previous back surgery    H/O shoulder surgery    H/O total shoulder replacement, right    Room Service Assist    Revision of posterolateral fusion of lumbar spine    Decompression, spinal cord, thoracic, posterolateral approach    HTN (hypertension)    HLD (hyperlipidemia)    CVA (cerebrovascular accident)    GAMALIEL (obstructive sleep apnea)    H/O emphysema    Smoking history    Chronic constipation    Urinary incontinence    Pulmonary embolism    Diabetes    GERD (gastroesophageal reflux disease)    SysAdmin_VstLnk          Plan:  Neuro:  - Neuro/vitals q4  - pain control: modified ERAS, IT morphine pump, oxycodone 10/15 prn, valium and home gabapentin 800 TID resumed  - Continue home cymbalta 60mg daily   - CT thoracic spine 12/6: postop changes  - protected sleep time 10PM-4AM     Cardio:  - MAP > 65   - midodrine 15q8   - C/w home simvastatin  - Sinus tachycardia - likely 2/2 PE (12/19)      Pulm:  - RA   - hx GAMALIEL, no CPAP at home  - PE protocol, 12/19: DVT doppler neg, CTA w segmental and subsegmental PE in R upper lobe  - 10 mg eliquis BID for R upper lobe PE (12/19-) x 7 d then 5 mg BID     GI:  - CCD   - Bowel regimen, home movantik 25 mg, last BM 12/17  - CT A/P 12/13 = impacted stool   - GERD: continue home protonix    Renal:  - Straight cath q6  - Patient not interested in suprapubic catheter placement    Endo:  - a1c: 7    Heme:  - SCDs DVT ppx  - Eliquis d/t PE   - 500 cell saver intraop 12/5, 1u PRBC intraop 12/10, 1u PRBC 12/12    ID:  - afebrile    Dispo:  - SDU status, full code, pending AR    D/w Dr. Luz     Encephalopathy: [] Metabolic, [] Hepatic, [] toxic, [] Neurological, [] Other    Abnormal Nurtitional Status: [] malnurtition (see nutrition note), [ ]underweight: BMI < 19, [] morbid obesity: BMI >40, [] Cachexia    [] Sepsis  [] hypovolemic shock,[] cardiogenic shock, [] hemorrhagic shock, [] neuogenic shock  [] Acute Respiratory Failure  []Cerebral edema, [] Brain compression/ herniation,   [] Functional quadriplegia  [] Acute blood loss anemia   HPI:  66 y/o female with PMHx HTN, HLD, CVA x 3 (last was 2016 with right hand weakness residual), GAMALIEL not using CPAP, Emphysema, former 25 pack year smoker restarted this week of surgery, Chronic Constipation on Movantik, Urinary Incontinence chronically self straight cath at home, chronic UTI, Breast Implant Rupture with Chronic Leak repair 10/2023, B/L CTS s/p release, s/p Intrathecal Morphine pump for pain, with chronic neck and back pain worsening for years s/p multiple spinal surgeries including laminectomies and fusion of cervical, thoracic and lumbar spine initially done in 2009 and recently in 2019 and 2020 at [facility] by Dr. Trinidad, s/p T3-L1 revision of prior fusion (with Dr. Luz on 8/17/22). Outpatient Xray 11/16/2023 showing broken rods/displacement. Presents for elective C2-S2 instrumentation and fusion. Pt endorses weakness of b/l LE and burning pain in b/l lower extremities radiating to both feet,  (04 Dec 2023 15:44)    OVERNIGHT EVENTS: KAMRAN overnight, neuro stable    Hospital Course:   12/4: Admitted for spinal fusion d/t damaged hardware.   12/5: Neuro stable. POD0 C2-S2 instrumentation/fusion (intraop b/l LE motor loss). on parminder for MAP>90  12/6: POD1. KAMRAN o/n neuro stable. remains intubated. Extubated 6am. Precedex gtt prn. Remains on parminder gtt for MAP goal >90. Valium made prn. CT thoracic spine bc MRI unavailable. Attempted NGT, unsuccessful (resistance @ 35).   12/7: POD2 Given IV dilaudid 0.5mg for pain. Neuro exam stable. SQL started tongiht. Increased precedex to 1.0. Responds well to valium. Consulting psych given paranoia ?homicidal statements. Passed bedside dysphagia and tolerating PO meds. MRI complete, f/u read.   12/8: POD3 Pt reporting incisional pain, given dilaudid 0.5mg IV x2. Pt appearing anxious, given xanax 1mg. Neuro exam unchanged. Pain regimen increased to oxy 15/30 mg, pending further recs. Given 1 L bolus for tachycardia. Trops negative. 2 HMV drains removed.   12/9: POD4, KAMRAN, CT myelogram today showing block at T12-L1, OR tomorrow  12/10: POD5, KAMRAN, OR today for exploration of fusion, possible decompression T9-L2, revision instrumentation  POD 0 T9-L2 decompression. Pt returned from OR intubated. 50mcg fentanyl IVP x 2 for pain control and HTN. Magnesium repleted. Precedex gtt started for sedation. Decreased FiO2 to 40%. 1L bolus for soft MAP. Dc'd propofol and started levo gtt for MAP >90.   12/11: POD 6, POD 1. Extubated, satting well on RA. Resumed home valium and gabapentin. Castillo d/c'd, pending TOV. Seroquel started for agitation, R IJ removed, LUE double midline placed. Started SQL.   12/12: POD7, POD2, given 500 cc bolus and started on phenylephrine for MAP goal >90. Started midodrine 10mg q8h. Cardura dc'd d/t hypotension. Given 250cc of albumin for increasing pressor requirements. Given lactulose, pending BM. Hgb 7.6<8.3, given 1u PRBC. Protected sleep time. Urology consulted for possible suprapubic cath, recommend self intermittent cath is optimal.   12/13: POD8, POD3 Midodrine increased to 15q8. Pt refused to take the extra 5mg midodrin ordered. Neuro exam stable. hgb 10 from 9.4 after 1u pRBCs, Pt refused AM meds, given in late morning, CTAP ordered for L sided abd pain, fleet enema, restarted home movantik and ordere fleet enema for constipation. YIFAN and HMV dc'd. 250cc albumin x1  12/14: POD9, POD4, KAMRAN overnight, neuro stable. MAP goal liberalized to > 65. Stepdown status.   12/15: POD10, POD5, KAMRAN overnight  12/16: POD11, POD6, KAMRAN overnight, neuro stable, 500 cc bolus NS given for tachycardia. Aquacell changed. CXR, pro-bnp negative for pulmonary edema. Given additional 500cc bolus.  12/17: POD 12. POD 7. KAMRAN overnight, neuro stable.   12/18: POD 13/8. Incontinent x2 today.   12/19: KAMRAN. Persistent tachycardia, r/o PE. CTPE protocol ordered. Refused all afternoon meds except oxy 30. Per Dr. Cowart (rad) CT chest w segmental and subsegmental PE in R upper lobe. Dopplers negative for DVT, CT PE shows segmental and subsegmental PE in R upper lobe. Per Dr. Augustine and Dr. Luz, Will start eliquis pending pain management approval. Per Dr. Mclaughlin (pain mgmt) can start eliquis 10mg BIDx 7 days. Dc'd lovenox.  12/20: POD 10 T9-L2 Decompression. Hemodynamically stable. Pending rehab. PT today.   12/21: POD 11 T9-L2 Decompression. neruo/hemodynamically stable, pending rehab.  12/22: POD 17/12, pending rehab. Aquacel dressing changed.   12/23: POD 18/13, pending rehab.     Vital Signs Last 24 Hrs  T(C): 37.1 (23 Dec 2023 00:08), Max: 37.3 (22 Dec 2023 21:12)  T(F): 98.7 (23 Dec 2023 00:08), Max: 99.2 (22 Dec 2023 21:12)  HR: 90 (23 Dec 2023 00:08) (90 - 105)  BP: 122/74 (23 Dec 2023 00:08) (111/71 - 126/77)  BP(mean): 81 (22 Dec 2023 16:50) (81 - 90)  RR: 18 (23 Dec 2023 00:08) (16 - 19)  SpO2: 97% (23 Dec 2023 00:08) (95% - 99%)    Parameters below as of 23 Dec 2023 00:08  Patient On (Oxygen Delivery Method): room air        I&O's Summary    21 Dec 2023 07:01  -  22 Dec 2023 07:00  --------------------------------------------------------  IN: 1410 mL / OUT: 1600 mL / NET: -190 mL    22 Dec 2023 07:01  -  23 Dec 2023 05:49  --------------------------------------------------------  IN: 510 mL / OUT: 1725 mL / NET: -1215 mL        PHYSICAL EXAM:  Constitutional: awake, alert, sitting up in bed. NAD.   Respiratory: non-labored breathing. Normal chest rise.   Cardiovascular: Regular rate and rhythm  Gastrointestinal:  Soft, nontender, nondistended.  .  Vascular: Extremities warm, no ulcers, no discoloration of skin.   Neurological: Gen: AA&O x 3, conversant, appropriate.      CN II-XII grossly intact.    Motor: RUE/LUE 5/5, RLE/LLE 0/5     Sens: Decreased sensation below T11    Extremities: warm and well perfused   Wound/incision: thoracolumbar incision c/d/i         TUBES/LINES:  [] Castillo  [] Lumbar Drain  [] Wound Drains  [] Others      DIET:  [] NPO  [x] Mechanical  [] Tube feeds    LABS:                  CAPILLARY BLOOD GLUCOSE      POCT Blood Glucose.: 131 mg/dL (22 Dec 2023 11:55)  POCT Blood Glucose.: 103 mg/dL (22 Dec 2023 06:51)      Drug Levels: [] N/A    CSF Analysis: [] N/A      Allergies    Crab (Pruritus)  No Known Drug Allergies    Intolerances      MEDICATIONS:  Antibiotics:    Neuro:  acetaminophen     Tablet .. 1000 milliGRAM(s) Oral every 8 hours PRN  diazepam    Tablet 5 milliGRAM(s) Oral every 8 hours  DULoxetine 60 milliGRAM(s) Oral daily  gabapentin 800 milliGRAM(s) Oral every 8 hours  ondansetron Injectable 4 milliGRAM(s) IV Push every 6 hours PRN  oxyCODONE    IR 10 milliGRAM(s) Oral every 4 hours PRN  oxyCODONE    IR 15 milliGRAM(s) Oral every 4 hours PRN    Anticoagulation:  apixaban 10 milliGRAM(s) Oral every 12 hours    OTHER:  benzocaine 20% Spray 1 Spray(s) Mucosal three times a day PRN  bisacodyl 5 milliGRAM(s) Oral at bedtime  chlorhexidine 2% Cloths 1 Application(s) Topical <User Schedule>  hydrocortisone 1% Cream 1 Application(s) Topical two times a day PRN  influenza  Vaccine (HIGH DOSE) 0.7 milliLiter(s) IntraMuscular once  midodrine 15 milliGRAM(s) Oral every 8 hours  Morphine 1mg /1mL 17.9 milliLITERS 17.9 milliLiter(s) IntraThecal Continuous Pump  naloxegol 25 milliGRAM(s) Oral daily  naloxone Injectable 0.1 milliGRAM(s) IV Push every 3 minutes PRN  pantoprazole    Tablet 40 milliGRAM(s) Oral before breakfast  polyethylene glycol 3350 17 Gram(s) Oral two times a day  senna 2 Tablet(s) Oral at bedtime  simvastatin 40 milliGRAM(s) Oral at bedtime    IVF:  multivitamin 1 Tablet(s) Oral daily    CULTURES:    RADIOLOGY & ADDITIONAL TESTS:      ASSESSMENT:  67 yo female with Hx HTN, HLD, DM, CVA x 3 (last in 2016 with residual R hand weakness), GAMALIEL not using CPAP, Emphysema, ex-25 pack year smoker now restarted, chronic constipation on Movantik, chronic urinary retention (SC at home), b/l CTS s/p release, s/p Intrathecal Morphine pump for pain, with chronic neck and back pain worsening for years s/p multiple spinal surgeries including laminectomies and fusion of cervical, thoracic and lumbar spine, s/p T3-L1 revision of prior fusion (with  on 8/17/22). Xray 11/16/23 showing broken rods/displacement. Now s/p C2-S2 instrumentation and fusion (12/5). S/p T9-L2 decompression w/ durotomy with repair (12/10/23). KAREEM grade A.       S12.9XXA    Handoff    MEWS Score    HTN (hypertension)    Back pain    Hypertension    SS (spinal stenosis)    High cholesterol    Stroke    H/O carpal tunnel syndrome    H/O carpal tunnel syndrome    Chronic GERD    History of chronic constipation    Seizure    Urinary incontinence    Pre-diabetes    Acute UTI    Anxiety    Anxiety and depression    Arthritis    Eczema    External hemorrhoids    H/O kyphosis    Multiple sclerosis    Pancreas cyst    Scoliosis    Wheelchair dependent    Cervical pseudoarthrosis    Right shoulder pain    Cervical spondylosis    Chronic headaches    Chronic LUQ pain    Chronic UTI    Degenerative joint disease    H/O low back pain    Lumbosacral spondylosis    COPD (chronic obstructive pulmonary disease)    Back pain    Back pain    Spinal cord injury, thoracic (T7-T12)    Back pain    Spinal cord injury of thoracic region without bone injury    Delirium    Revision of fusion of thoracic spine    Thoracic back pain    Revision of posterolateral fusion of lumbar spine    Revision of fusion of thoracic spine    Decompression, spinal cord, thoracic, posterolateral approach    Cervical disc disease    H/O Spinal surgery    H/O breast surgery    S/P cervical discectomy    Previous back surgery    H/O shoulder surgery    H/O total shoulder replacement, right    Room Service Assist    Revision of posterolateral fusion of lumbar spine    Decompression, spinal cord, thoracic, posterolateral approach    HTN (hypertension)    HLD (hyperlipidemia)    CVA (cerebrovascular accident)    GAMALIEL (obstructive sleep apnea)    H/O emphysema    Smoking history    Chronic constipation    Urinary incontinence    Pulmonary embolism    Diabetes    GERD (gastroesophageal reflux disease)    SysAdmin_VstLnk          Plan:  Neuro:  - Neuro/vitals q4  - pain control: modified ERAS, IT morphine pump, oxycodone 10/15 prn, valium and home gabapentin 800 TID resumed  - Continue home cymbalta 60mg daily   - CT thoracic spine 12/6: postop changes  - protected sleep time 10PM-4AM     Cardio:  - MAP > 65   - midodrine 15q8   - C/w home simvastatin  - Sinus tachycardia - likely 2/2 PE (12/19)      Pulm:  - RA   - hx GAMALIEL, no CPAP at home  - PE protocol, 12/19: DVT doppler neg, CTA w segmental and subsegmental PE in R upper lobe  - 10 mg eliquis BID for R upper lobe PE (12/19-) x 7 d then 5 mg BID     GI:  - CCD   - Bowel regimen, home movantik 25 mg, last BM 12/17  - CT A/P 12/13 = impacted stool   - GERD: continue home protonix    Renal:  - Straight cath q6  - Patient not interested in suprapubic catheter placement    Endo:  - a1c: 7    Heme:  - SCDs DVT ppx  - Eliquis d/t PE   - 500 cell saver intraop 12/5, 1u PRBC intraop 12/10, 1u PRBC 12/12    ID:  - afebrile    Dispo:  - SDU status, full code, pending AR    D/w Dr. Luz     Encephalopathy: [] Metabolic, [] Hepatic, [] toxic, [] Neurological, [] Other    Abnormal Nurtitional Status: [] malnurtition (see nutrition note), [ ]underweight: BMI < 19, [] morbid obesity: BMI >40, [] Cachexia    [] Sepsis  [] hypovolemic shock,[] cardiogenic shock, [] hemorrhagic shock, [] neuogenic shock  [] Acute Respiratory Failure  []Cerebral edema, [] Brain compression/ herniation,   [] Functional quadriplegia  [] Acute blood loss anemia

## 2023-12-23 NOTE — PROGRESS NOTE ADULT - ASSESSMENT
68F with PMH of HTN, HLD, DM2, CVA x 3 (last in 2016 with residual R hand weakness), GAMALIEL not using CPAP, Emphysema, ex-25 pack year smoker now restarted, chronic constipation on Movantik, chronic urinary retention (SC at home), b/l CTS s/p release, s/p Intrathecal Morphine pump for pain, with chronic neck and back pain worsening for years s/p multiple spinal surgeries including laminectomies and fusion of cervical, thoracic and lumbar spine, s/p T3-L1 revision of prior fusion (with  on 8/17/22). Xray 11/16/23 showing broken rods/displacement. Now s/p C2-S2 instrumentation and fusion (12/5). S/p T9-L2 decompression w/ durotomy with repair (12/10/23).     #Neck Pain  #Multiple Spine Surgeries  #Chronic Back Pain  -s/p fusion  -OT  -Pain Control per primary team  -Bowel Regimen   -Pending Rehab placement    #Chronic Constipation   - likely due to chronic opioid use  - c/w bowel regimen  - continue movantik    #HTN  -  Lopressor 75mg BID    #CAD  #CVA  - resume ASA post operatively when safe to do so  - continue atorvastatin  - awaiting results of recent long term outpatient cardiac monitoring to monitor for occult Afib to determine if a role for full dose AVC, follows with Dr Jeter    #DM  - ISS while inpatient    #Pulmonary embolism   -CTPE performed given persistent tachycardia, shown to have subsegmental and segmental PE  - c/w Eliquis     Dispo: TBD

## 2023-12-23 NOTE — PROGRESS NOTE ADULT - SUBJECTIVE AND OBJECTIVE BOX
INTERVAL EVENTS: No new events     PAST MEDICAL & SURGICAL HISTORY:  HTN (hypertension)    Back pain    Hypertension    SS (spinal stenosis)    High cholesterol    Stroke  x3    H/O carpal tunnel syndrome    H/O carpal tunnel syndrome  Bilateral    Chronic GERD    History of chronic constipation    Seizure    Urinary incontinence  self cath as needed    Pre-diabetes    Acute UTI    Anxiety    Anxiety and depression    Arthritis  Left shoulder    Eczema    External hemorrhoids    H/O kyphosis    Multiple sclerosis    Pancreas cyst    Scoliosis    Wheelchair dependent    Cervical pseudoarthrosis  and lumbar spine    Right shoulder pain    Cervical spondylosis    Chronic headaches    Chronic LUQ pain    Chronic UTI    Degenerative joint disease  left shoulder    H/O low back pain    Lumbosacral spondylosis    COPD (chronic obstructive pulmonary disease)    Cervical disc disease  cervical sx    H/O Spinal surgery  lumbar x 30    H/O breast surgery  left breast implant 1980's    S/P cervical discectomy  x4    Previous back surgery    H/O shoulder surgery    H/O total shoulder replacement, right        MEDICATIONS  (STANDING):  apixaban 10 milliGRAM(s) Oral every 12 hours  bisacodyl 5 milliGRAM(s) Oral at bedtime  chlorhexidine 2% Cloths 1 Application(s) Topical <User Schedule>  diazepam    Tablet 5 milliGRAM(s) Oral every 8 hours  DULoxetine 60 milliGRAM(s) Oral daily  gabapentin 800 milliGRAM(s) Oral every 8 hours  influenza  Vaccine (HIGH DOSE) 0.7 milliLiter(s) IntraMuscular once  midodrine 15 milliGRAM(s) Oral every 8 hours  Morphine 1mg /1mL 17.9 milliLITERS 17.9 milliLiter(s) IntraThecal Continuous Pump  multivitamin 1 Tablet(s) Oral daily  naloxegol 25 milliGRAM(s) Oral daily  pantoprazole    Tablet 40 milliGRAM(s) Oral before breakfast  polyethylene glycol 3350 17 Gram(s) Oral two times a day  senna 2 Tablet(s) Oral at bedtime  simvastatin 40 milliGRAM(s) Oral at bedtime    MEDICATIONS  (PRN):  acetaminophen     Tablet .. 1000 milliGRAM(s) Oral every 8 hours PRN Temp greater or equal to 38.5C (101.3F), Mild Pain (1 - 3)  benzocaine 20% Spray 1 Spray(s) Mucosal three times a day PRN Sore throat  hydrocortisone 1% Cream 1 Application(s) Topical two times a day PRN Itching  naloxone Injectable 0.1 milliGRAM(s) IV Push every 3 minutes PRN For ANY of the following changes in patient status:  A. RR LESS THAN 10 breaths per minute, B. Oxygen saturation LESS THAN 90%, C. Sedation score of 6  ondansetron Injectable 4 milliGRAM(s) IV Push every 6 hours PRN Nausea  oxyCODONE    IR 10 milliGRAM(s) Oral every 4 hours PRN Moderate Pain (4 - 6)  oxyCODONE    IR 15 milliGRAM(s) Oral every 4 hours PRN Severe Pain (7 - 10)  sodium chloride 0.9% lock flush 10 milliLiter(s) IV Push every 1 hour PRN Pre/post blood products, medications, blood draw, and to maintain line patency    Vital Signs Last 24 Hrs  T(C): 37.2 (23 Dec 2023 08:54), Max: 37.3 (22 Dec 2023 21:12)  T(F): 99 (23 Dec 2023 08:54), Max: 99.2 (22 Dec 2023 21:12)  HR: 92 (23 Dec 2023 08:54) (80 - 105)  BP: 115/77 (23 Dec 2023 08:54) (115/77 - 139/80)  BP(mean): 81 (22 Dec 2023 16:50) (81 - 90)  RR: 18 (23 Dec 2023 08:54) (18 - 19)  SpO2: 100% (23 Dec 2023 08:54) (95% - 100%)    Parameters below as of 23 Dec 2023 08:54  Patient On (Oxygen Delivery Method): room air        PHYSICAL EXAM:  GEN: Awake, alert. NAD.   HEENT: NCAT, PERRL, EOMI. Mucosa moist. No JVD.  RESP: CTA b/l  CV: RRR. Normal S1/S2. No m/r/g.  ABD: Soft. NT/ND. BS+  EXT: Warm. No edema, clubbing, or cyanosis.     LABS:                  I&O's Summary    22 Dec 2023 07:01  -  23 Dec 2023 07:00  --------------------------------------------------------  IN: 510 mL / OUT: 2200 mL / NET: -1690 mL      RADIOLOGY & ADDITIONAL STUDIES:  TELEMETRY:  EKG:

## 2023-12-24 PROCEDURE — 99233 SBSQ HOSP IP/OBS HIGH 50: CPT

## 2023-12-24 PROCEDURE — 99024 POSTOP FOLLOW-UP VISIT: CPT

## 2023-12-24 RX ADMIN — GABAPENTIN 800 MILLIGRAM(S): 400 CAPSULE ORAL at 02:13

## 2023-12-24 RX ADMIN — OXYCODONE HYDROCHLORIDE 15 MILLIGRAM(S): 5 TABLET ORAL at 18:15

## 2023-12-24 RX ADMIN — DULOXETINE HYDROCHLORIDE 60 MILLIGRAM(S): 30 CAPSULE, DELAYED RELEASE ORAL at 11:51

## 2023-12-24 RX ADMIN — OXYCODONE HYDROCHLORIDE 15 MILLIGRAM(S): 5 TABLET ORAL at 17:34

## 2023-12-24 RX ADMIN — OXYCODONE HYDROCHLORIDE 15 MILLIGRAM(S): 5 TABLET ORAL at 11:50

## 2023-12-24 RX ADMIN — APIXABAN 10 MILLIGRAM(S): 2.5 TABLET, FILM COATED ORAL at 21:58

## 2023-12-24 RX ADMIN — GABAPENTIN 800 MILLIGRAM(S): 400 CAPSULE ORAL at 17:33

## 2023-12-24 RX ADMIN — OXYCODONE HYDROCHLORIDE 15 MILLIGRAM(S): 5 TABLET ORAL at 12:30

## 2023-12-24 RX ADMIN — PANTOPRAZOLE SODIUM 40 MILLIGRAM(S): 20 TABLET, DELAYED RELEASE ORAL at 05:59

## 2023-12-24 RX ADMIN — MIDODRINE HYDROCHLORIDE 15 MILLIGRAM(S): 2.5 TABLET ORAL at 17:33

## 2023-12-24 RX ADMIN — APIXABAN 10 MILLIGRAM(S): 2.5 TABLET, FILM COATED ORAL at 11:54

## 2023-12-24 RX ADMIN — OXYCODONE HYDROCHLORIDE 15 MILLIGRAM(S): 5 TABLET ORAL at 23:11

## 2023-12-24 RX ADMIN — Medication 5 MILLIGRAM(S): at 07:26

## 2023-12-24 RX ADMIN — Medication 5 MILLIGRAM(S): at 23:48

## 2023-12-24 RX ADMIN — SIMVASTATIN 40 MILLIGRAM(S): 20 TABLET, FILM COATED ORAL at 21:58

## 2023-12-24 RX ADMIN — OXYCODONE HYDROCHLORIDE 15 MILLIGRAM(S): 5 TABLET ORAL at 05:59

## 2023-12-24 RX ADMIN — Medication 5 MILLIGRAM(S): at 16:17

## 2023-12-24 RX ADMIN — NALOXEGOL OXALATE 25 MILLIGRAM(S): 12.5 TABLET, FILM COATED ORAL at 17:34

## 2023-12-24 RX ADMIN — Medication 1 TABLET(S): at 11:52

## 2023-12-24 RX ADMIN — OXYCODONE HYDROCHLORIDE 15 MILLIGRAM(S): 5 TABLET ORAL at 06:50

## 2023-12-24 RX ADMIN — MIDODRINE HYDROCHLORIDE 15 MILLIGRAM(S): 2.5 TABLET ORAL at 11:52

## 2023-12-24 RX ADMIN — OXYCODONE HYDROCHLORIDE 15 MILLIGRAM(S): 5 TABLET ORAL at 22:02

## 2023-12-24 RX ADMIN — GABAPENTIN 800 MILLIGRAM(S): 400 CAPSULE ORAL at 11:51

## 2023-12-24 RX ADMIN — MIDODRINE HYDROCHLORIDE 15 MILLIGRAM(S): 2.5 TABLET ORAL at 02:37

## 2023-12-24 NOTE — PROGRESS NOTE ADULT - SUBJECTIVE AND OBJECTIVE BOX
INTERVAL EVENTS: No acute events     PAST MEDICAL & SURGICAL HISTORY:  HTN (hypertension)    Back pain    Hypertension    SS (spinal stenosis)    High cholesterol    Stroke  x3    H/O carpal tunnel syndrome    H/O carpal tunnel syndrome  Bilateral    Chronic GERD    History of chronic constipation    Seizure    Urinary incontinence  self cath as needed    Pre-diabetes    Acute UTI    Anxiety    Anxiety and depression    Arthritis  Left shoulder    Eczema    External hemorrhoids    H/O kyphosis    Multiple sclerosis    Pancreas cyst    Scoliosis    Wheelchair dependent    Cervical pseudoarthrosis  and lumbar spine    Right shoulder pain    Cervical spondylosis    Chronic headaches    Chronic LUQ pain    Chronic UTI    Degenerative joint disease  left shoulder    H/O low back pain    Lumbosacral spondylosis    COPD (chronic obstructive pulmonary disease)    Cervical disc disease  cervical sx    H/O Spinal surgery  lumbar x 30    H/O breast surgery  left breast implant 1980's    S/P cervical discectomy  x4    Previous back surgery    H/O shoulder surgery    H/O total shoulder replacement, right        MEDICATIONS  (STANDING):  apixaban 10 milliGRAM(s) Oral every 12 hours  bisacodyl 5 milliGRAM(s) Oral at bedtime  diazepam    Tablet 5 milliGRAM(s) Oral every 8 hours  DULoxetine 60 milliGRAM(s) Oral daily  gabapentin 800 milliGRAM(s) Oral every 8 hours  influenza  Vaccine (HIGH DOSE) 0.7 milliLiter(s) IntraMuscular once  midodrine 15 milliGRAM(s) Oral every 8 hours  Morphine 1mg /1mL 17.9 milliLITERS 17.9 milliLiter(s) IntraThecal Continuous Pump  multivitamin 1 Tablet(s) Oral daily  naloxegol 25 milliGRAM(s) Oral daily  pantoprazole    Tablet 40 milliGRAM(s) Oral before breakfast  polyethylene glycol 3350 17 Gram(s) Oral two times a day  senna 2 Tablet(s) Oral at bedtime  simvastatin 40 milliGRAM(s) Oral at bedtime    MEDICATIONS  (PRN):  acetaminophen     Tablet .. 1000 milliGRAM(s) Oral every 8 hours PRN Temp greater or equal to 38.5C (101.3F), Mild Pain (1 - 3)  benzocaine 20% Spray 1 Spray(s) Mucosal three times a day PRN Sore throat  hydrocortisone 1% Cream 1 Application(s) Topical two times a day PRN Itching  naloxone Injectable 0.1 milliGRAM(s) IV Push every 3 minutes PRN For ANY of the following changes in patient status:  A. RR LESS THAN 10 breaths per minute, B. Oxygen saturation LESS THAN 90%, C. Sedation score of 6  ondansetron Injectable 4 milliGRAM(s) IV Push every 6 hours PRN Nausea  oxyCODONE    IR 10 milliGRAM(s) Oral every 4 hours PRN Moderate Pain (4 - 6)  oxyCODONE    IR 15 milliGRAM(s) Oral every 4 hours PRN Severe Pain (7 - 10)  sodium chloride 0.9% lock flush 10 milliLiter(s) IV Push every 1 hour PRN Pre/post blood products, medications, blood draw, and to maintain line patency    Vital Signs Last 24 Hrs  T(C): 36.3 (24 Dec 2023 10:00), Max: 36.9 (23 Dec 2023 20:37)  T(F): 97.4 (24 Dec 2023 10:00), Max: 98.4 (23 Dec 2023 20:37)  HR: 70 (24 Dec 2023 10:00) (70 - 99)  BP: 102/97 (24 Dec 2023 10:00) (102/97 - 134/76)  BP(mean): --  RR: 18 (24 Dec 2023 10:00) (17 - 19)  SpO2: 95% (24 Dec 2023 10:00) (95% - 99%)    Parameters below as of 24 Dec 2023 10:00  Patient On (Oxygen Delivery Method): room air        PHYSICAL EXAM:  GEN: Awake, alert. NAD.   HEENT: NCAT, PERRL, EOMI. Mucosa moist. No JVD.  RESP: CTA b/l  CV: RRR. Normal S1/S2. No m/r/g.  ABD: Soft. NT/ND. BS+  EXT: Warm. No edema, clubbing, or cyanosis.       LABS:                  I&O's Summary    23 Dec 2023 07:01  -  24 Dec 2023 07:00  --------------------------------------------------------  IN: 0 mL / OUT: 1800 mL / NET: -1800 mL      RADIOLOGY & ADDITIONAL STUDIES:  TELEMETRY:  EKG:

## 2023-12-24 NOTE — PROGRESS NOTE ADULT - SUBJECTIVE AND OBJECTIVE BOX
HPI:  68 y/o female with PMHx HTN, HLD, CVA x 3 (last was 2016 with right hand weakness residual), GAMALIEL not using CPAP, Emphysema, former 25 pack year smoker restarted this week of surgery, Chronic Constipation on Movantik, Urinary Incontinence chronically self straight cath at home, chronic UTI, Breast Implant Rupture with Chronic Leak repair 10/2023, B/L CTS s/p release, s/p Intrathecal Morphine pump for pain, with chronic neck and back pain worsening for years s/p multiple spinal surgeries including laminectomies and fusion of cervical, thoracic and lumbar spine initially done in 2009 and recently in 2019 and 2020 at [facility] by Dr. Trinidad, s/p T3-L1 revision of prior fusion (with Dr. Luz on 8/17/22). Outpatient Xray 11/16/2023 showing broken rods/displacement. Presents for elective C2-S2 instrumentation and fusion. Pt endorses weakness of b/l LE and burning pain in b/l lower extremities radiating to both feet,  (04 Dec 2023 15:44)    OVERNIGHT EVENTS: KAMRAN overnight, neuro stable.     Hospital Course:   12/4: Admitted for spinal fusion d/t damaged hardware.   12/5: Neuro stable. POD0 C2-S2 instrumentation/fusion (intraop b/l LE motor loss). on parminder for MAP>90  12/6: POD1. KAMRAN o/n neuro stable. remains intubated. Extubated 6am. Precedex gtt prn. Remains on parminder gtt for MAP goal >90. Valium made prn. CT thoracic spine bc MRI unavailable. Attempted NGT, unsuccessful (resistance @ 35).   12/7: POD2 Given IV dilaudid 0.5mg for pain. Neuro exam stable. SQL started tongiht. Increased precedex to 1.0. Responds well to valium. Consulting psych given paranoia ?homicidal statements. Passed bedside dysphagia and tolerating PO meds. MRI complete, f/u read.   12/8: POD3 Pt reporting incisional pain, given dilaudid 0.5mg IV x2. Pt appearing anxious, given xanax 1mg. Neuro exam unchanged. Pain regimen increased to oxy 15/30 mg, pending further recs. Given 1 L bolus for tachycardia. Trops negative. 2 HMV drains removed.   12/9: POD4, KAMRAN, CT myelogram today showing block at T12-L1, OR tomorrow  12/10: POD5, KAMRAN, OR today for exploration of fusion, possible decompression T9-L2, revision instrumentation  POD 0 T9-L2 decompression. Pt returned from OR intubated. 50mcg fentanyl IVP x 2 for pain control and HTN. Magnesium repleted. Precedex gtt started for sedation. Decreased FiO2 to 40%. 1L bolus for soft MAP. Dc'd propofol and started levo gtt for MAP >90.   12/11: POD 6, POD 1. Extubated, satting well on RA. Resumed home valium and gabapentin. Castillo d/c'd, pending TOV. Seroquel started for agitation, R IJ removed, LUE double midline placed. Started SQL.   12/12: POD7, POD2, given 500 cc bolus and started on phenylephrine for MAP goal >90. Started midodrine 10mg q8h. Cardura dc'd d/t hypotension. Given 250cc of albumin for increasing pressor requirements. Given lactulose, pending BM. Hgb 7.6<8.3, given 1u PRBC. Protected sleep time. Urology consulted for possible suprapubic cath, recommend self intermittent cath is optimal.   12/13: POD8, POD3 Midodrine increased to 15q8. Pt refused to take the extra 5mg midodrin ordered. Neuro exam stable. hgb 10 from 9.4 after 1u pRBCs, Pt refused AM meds, given in late morning, CTAP ordered for L sided abd pain, fleet enema, restarted home movantik and ordere fleet enema for constipation. YIFAN and HMV dc'd. 250cc albumin x1  12/14: POD9, POD4, KAMRAN overnight, neuro stable. MAP goal liberalized to > 65. Stepdown status.   12/15: POD10, POD5, KAMRAN overnight  12/16: POD11, POD6, KAMRAN overnight, neuro stable, 500 cc bolus NS given for tachycardia. Aquacell changed. CXR, pro-bnp negative for pulmonary edema. Given additional 500cc bolus.  12/17: POD 12. POD 7. KAMRAN overnight, neuro stable.   12/18: POD 13/8. Incontinent x2 today.   12/19: KAMRAN. Persistent tachycardia, r/o PE. CTPE protocol ordered. Refused all afternoon meds except oxy 30. Per Dr. Cowart (rad) CT chest w segmental and subsegmental PE in R upper lobe. Dopplers negative for DVT, CT PE shows segmental and subsegmental PE in R upper lobe. Per Dr. Augustine and Dr. Luz, Will start eliquis pending pain management approval. Per Dr. Mclaughlin (pain mgmt) can start eliquis 10mg BIDx 7 days. Dc'd lovenox.  12/20: POD 10 T9-L2 Decompression. Hemodynamically stable. Pending rehab. PT today.   12/21: POD 11 T9-L2 Decompression. neruo/hemodynamically stable, pending rehab.  12/22: POD 17/12, pending rehab. Aquacel dressing changed.   12/23: POD 18/13, pending rehab.   12/24: POD 19/14, pending rehab.     Vital Signs Last 24 Hrs  T(C): 36.4 (23 Dec 2023 23:42), Max: 37.2 (23 Dec 2023 04:20)  T(F): 97.5 (23 Dec 2023 23:42), Max: 99 (23 Dec 2023 04:20)  HR: 73 (23 Dec 2023 23:42) (73 - 99)  BP: 128/86 (23 Dec 2023 23:42) (115/77 - 139/80)  BP(mean): --  RR: 18 (23 Dec 2023 23:42) (17 - 19)  SpO2: 96% (23 Dec 2023 23:42) (95% - 100%)    Parameters below as of 23 Dec 2023 23:42  Patient On (Oxygen Delivery Method): room air        I&O's Summary    22 Dec 2023 07:01  -  23 Dec 2023 07:00  --------------------------------------------------------  IN: 510 mL / OUT: 2200 mL / NET: -1690 mL    23 Dec 2023 07:01  -  24 Dec 2023 01:12  --------------------------------------------------------  IN: 0 mL / OUT: 1050 mL / NET: -1050 mL        PHYSICAL EXAM:  Constitutional: awake, alert, sitting up in bed. NAD.   Respiratory: non-labored breathing. Normal chest rise.   Cardiovascular: Regular rate and rhythm  Gastrointestinal:  Soft, nontender, nondistended.  .  Vascular: Extremities warm, no ulcers, no discoloration of skin.   Neurological: Gen: AA&O x 3, conversant, appropriate.      CN II-XII grossly intact.    Motor: RUE/LUE 5/5, RLE/LLE 0/5     Sens: Decreased sensation below T11    Extremities: warm and well perfused   Wound/incision: thoracolumbar incision c/d/i         TUBES/LINES:  [] Castillo  [] Lumbar Drain  [] Wound Drains  [] Others      DIET:  [] NPO  [x] Mechanical  [] Tube feeds      LABS:                  CAPILLARY BLOOD GLUCOSE          Drug Levels: [] N/A    CSF Analysis: [] N/A      Allergies    Crab (Pruritus)  No Known Drug Allergies    Intolerances      MEDICATIONS:  Antibiotics:    Neuro:  acetaminophen     Tablet .. 1000 milliGRAM(s) Oral every 8 hours PRN  diazepam    Tablet 5 milliGRAM(s) Oral every 8 hours  DULoxetine 60 milliGRAM(s) Oral daily  gabapentin 800 milliGRAM(s) Oral every 8 hours  ondansetron Injectable 4 milliGRAM(s) IV Push every 6 hours PRN  oxyCODONE    IR 10 milliGRAM(s) Oral every 4 hours PRN  oxyCODONE    IR 15 milliGRAM(s) Oral every 4 hours PRN    Anticoagulation:  apixaban 10 milliGRAM(s) Oral every 12 hours    OTHER:  benzocaine 20% Spray 1 Spray(s) Mucosal three times a day PRN  bisacodyl 5 milliGRAM(s) Oral at bedtime  chlorhexidine 2% Cloths 1 Application(s) Topical <User Schedule>  hydrocortisone 1% Cream 1 Application(s) Topical two times a day PRN  influenza  Vaccine (HIGH DOSE) 0.7 milliLiter(s) IntraMuscular once  midodrine 15 milliGRAM(s) Oral every 8 hours  Morphine 1mg /1mL 17.9 milliLITERS 17.9 milliLiter(s) IntraThecal Continuous Pump  naloxegol 25 milliGRAM(s) Oral daily  naloxone Injectable 0.1 milliGRAM(s) IV Push every 3 minutes PRN  pantoprazole    Tablet 40 milliGRAM(s) Oral before breakfast  polyethylene glycol 3350 17 Gram(s) Oral two times a day  senna 2 Tablet(s) Oral at bedtime  simvastatin 40 milliGRAM(s) Oral at bedtime    IVF:  multivitamin 1 Tablet(s) Oral daily    CULTURES:    RADIOLOGY & ADDITIONAL TESTS:      ASSESSMENT:  69 yo female with Hx HTN, HLD, DM, CVA x 3 (last in 2016 with residual R hand weakness), GAMALIEL not using CPAP, Emphysema, ex-25 pack year smoker now restarted, chronic constipation on Movantik, chronic urinary retention (SC at home), b/l CTS s/p release, s/p Intrathecal Morphine pump for pain, with chronic neck and back pain worsening for years s/p multiple spinal surgeries including laminectomies and fusion of cervical, thoracic and lumbar spine, s/p T3-L1 revision of prior fusion (with  on 8/17/22). Xray 11/16/23 showing broken rods/displacement. Now s/p C2-S2 instrumentation and fusion (12/5). S/p T9-L2 decompression w/ durotomy with repair (12/10/23). KAREEM grade A.     S12.9XXA    Handoff    MEWS Score    HTN (hypertension)    Back pain    Hypertension    SS (spinal stenosis)    High cholesterol    Stroke    H/O carpal tunnel syndrome    H/O carpal tunnel syndrome    Chronic GERD    History of chronic constipation    Seizure    Urinary incontinence    Pre-diabetes    Acute UTI    Anxiety    Anxiety and depression    Arthritis    Eczema    External hemorrhoids    H/O kyphosis    Multiple sclerosis    Pancreas cyst    Scoliosis    Wheelchair dependent    Cervical pseudoarthrosis    Right shoulder pain    Cervical spondylosis    Chronic headaches    Chronic LUQ pain    Chronic UTI    Degenerative joint disease    H/O low back pain    Lumbosacral spondylosis    COPD (chronic obstructive pulmonary disease)    Back pain    Back pain    Spinal cord injury, thoracic (T7-T12)    Back pain    Spinal cord injury of thoracic region without bone injury    Delirium    Revision of fusion of thoracic spine    Thoracic back pain    Revision of posterolateral fusion of lumbar spine    Revision of fusion of thoracic spine    Decompression, spinal cord, thoracic, posterolateral approach    Cervical disc disease    H/O Spinal surgery    H/O breast surgery    S/P cervical discectomy    Previous back surgery    H/O shoulder surgery    H/O total shoulder replacement, right    Room Service Assist    Revision of posterolateral fusion of lumbar spine    Decompression, spinal cord, thoracic, posterolateral approach    HTN (hypertension)    HLD (hyperlipidemia)    CVA (cerebrovascular accident)    GAMALIEL (obstructive sleep apnea)    H/O emphysema    Smoking history    Chronic constipation    Urinary incontinence    Pulmonary embolism    Diabetes    GERD (gastroesophageal reflux disease)    SysAdmin_VstLnk          Plan:  Neuro:  - Neuro/vitals q4  - pain control: modified ERAS, IT morphine pump, oxycodone 10/15 prn, valium and home gabapentin 800 TID resumed  - Continue home cymbalta 60mg daily   - CT thoracic spine 12/6: postop changes  - protected sleep time 10PM-4AM     Cardio:  - MAP > 65   - midodrine 15q8   - C/w home simvastatin  - Sinus tachycardia - likely 2/2 PE (12/19)      Pulm:  - RA   - hx GAMALIEL, no CPAP at home  - PE protocol, 12/19: DVT doppler neg, CTA w segmental and subsegmental PE in R upper lobe  - 10 mg eliquis BID for R upper lobe PE (12/19-) x 7 d then 5 mg BID     GI:  - CCD   - Bowel regimen, home movantik 25 mg, last BM 12/17  - CT A/P 12/13 = impacted stool   - GERD: continue home protonix    Renal:  - Straight cath q6  - Patient not interested in suprapubic catheter placement    Endo:  - a1c: 7    Heme:  - SCDs DVT ppx  - Eliquis d/t PE   - 500 cell saver intraop 12/5, 1u PRBC intraop 12/10, 1u PRBC 12/12    ID:  - afebrile    Dispo:  - SDU status, full code, pending AR    D/w Dr. Luz     []  GCS  E   V  M     Heart Failure: []Acute, [] acute on chronic , []chronic  Heart Failure:  [] Diastolic (HFpEF), [] Systolic (HFrEF), []Combined (HFpEF and HFrEF), [] RHF, [] Pulm HTN, [] Other    [] DARRYL, [] ATN, [] AIN, [] other  [] CKD1, [] CKD2, [] CKD 3, [] CKD 4, [] CKD 5, []ESRD    Encephalopathy: [] Metabolic, [] Hepatic, [] toxic, [] Neurological, [] Other    Abnormal Nurtitional Status: [] malnurtition (see nutrition note), [ ]underweight: BMI < 19, [] morbid obesity: BMI >40, [] Cachexia    [] Sepsis  [] hypovolemic shock,[] cardiogenic shock, [] hemorrhagic shock, [] neuogenic shock  [] Acute Respiratory Failure  []Cerebral edema, [] Brain compression/ herniation,   [] Functional quadriplegia  [] Acute blood loss anemia   HPI:  68 y/o female with PMHx HTN, HLD, CVA x 3 (last was 2016 with right hand weakness residual), GAMALIEL not using CPAP, Emphysema, former 25 pack year smoker restarted this week of surgery, Chronic Constipation on Movantik, Urinary Incontinence chronically self straight cath at home, chronic UTI, Breast Implant Rupture with Chronic Leak repair 10/2023, B/L CTS s/p release, s/p Intrathecal Morphine pump for pain, with chronic neck and back pain worsening for years s/p multiple spinal surgeries including laminectomies and fusion of cervical, thoracic and lumbar spine initially done in 2009 and recently in 2019 and 2020 at [facility] by Dr. Trinidad, s/p T3-L1 revision of prior fusion (with Dr. Luz on 8/17/22). Outpatient Xray 11/16/2023 showing broken rods/displacement. Presents for elective C2-S2 instrumentation and fusion. Pt endorses weakness of b/l LE and burning pain in b/l lower extremities radiating to both feet,  (04 Dec 2023 15:44)    OVERNIGHT EVENTS: KAMRAN overnight, neuro stable.     Hospital Course:   12/4: Admitted for spinal fusion d/t damaged hardware.   12/5: Neuro stable. POD0 C2-S2 instrumentation/fusion (intraop b/l LE motor loss). on parminder for MAP>90  12/6: POD1. KAMRAN o/n neuro stable. remains intubated. Extubated 6am. Precedex gtt prn. Remains on parminder gtt for MAP goal >90. Valium made prn. CT thoracic spine bc MRI unavailable. Attempted NGT, unsuccessful (resistance @ 35).   12/7: POD2 Given IV dilaudid 0.5mg for pain. Neuro exam stable. SQL started tongiht. Increased precedex to 1.0. Responds well to valium. Consulting psych given paranoia ?homicidal statements. Passed bedside dysphagia and tolerating PO meds. MRI complete, f/u read.   12/8: POD3 Pt reporting incisional pain, given dilaudid 0.5mg IV x2. Pt appearing anxious, given xanax 1mg. Neuro exam unchanged. Pain regimen increased to oxy 15/30 mg, pending further recs. Given 1 L bolus for tachycardia. Trops negative. 2 HMV drains removed.   12/9: POD4, KAMRAN, CT myelogram today showing block at T12-L1, OR tomorrow  12/10: POD5, KAMRAN, OR today for exploration of fusion, possible decompression T9-L2, revision instrumentation  POD 0 T9-L2 decompression. Pt returned from OR intubated. 50mcg fentanyl IVP x 2 for pain control and HTN. Magnesium repleted. Precedex gtt started for sedation. Decreased FiO2 to 40%. 1L bolus for soft MAP. Dc'd propofol and started levo gtt for MAP >90.   12/11: POD 6, POD 1. Extubated, satting well on RA. Resumed home valium and gabapentin. Castillo d/c'd, pending TOV. Seroquel started for agitation, R IJ removed, LUE double midline placed. Started SQL.   12/12: POD7, POD2, given 500 cc bolus and started on phenylephrine for MAP goal >90. Started midodrine 10mg q8h. Cardura dc'd d/t hypotension. Given 250cc of albumin for increasing pressor requirements. Given lactulose, pending BM. Hgb 7.6<8.3, given 1u PRBC. Protected sleep time. Urology consulted for possible suprapubic cath, recommend self intermittent cath is optimal.   12/13: POD8, POD3 Midodrine increased to 15q8. Pt refused to take the extra 5mg midodrin ordered. Neuro exam stable. hgb 10 from 9.4 after 1u pRBCs, Pt refused AM meds, given in late morning, CTAP ordered for L sided abd pain, fleet enema, restarted home movantik and ordere fleet enema for constipation. YIFAN and HMV dc'd. 250cc albumin x1  12/14: POD9, POD4, KAMRAN overnight, neuro stable. MAP goal liberalized to > 65. Stepdown status.   12/15: POD10, POD5, KAMRAN overnight  12/16: POD11, POD6, KAMRAN overnight, neuro stable, 500 cc bolus NS given for tachycardia. Aquacell changed. CXR, pro-bnp negative for pulmonary edema. Given additional 500cc bolus.  12/17: POD 12. POD 7. KAMRAN overnight, neuro stable.   12/18: POD 13/8. Incontinent x2 today.   12/19: KAMRAN. Persistent tachycardia, r/o PE. CTPE protocol ordered. Refused all afternoon meds except oxy 30. Per Dr. Cowart (rad) CT chest w segmental and subsegmental PE in R upper lobe. Dopplers negative for DVT, CT PE shows segmental and subsegmental PE in R upper lobe. Per Dr. Augustine and Dr. Luz, Will start eliquis pending pain management approval. Per Dr. Mclaughlin (pain mgmt) can start eliquis 10mg BIDx 7 days. Dc'd lovenox.  12/20: POD 10 T9-L2 Decompression. Hemodynamically stable. Pending rehab. PT today.   12/21: POD 11 T9-L2 Decompression. neruo/hemodynamically stable, pending rehab.  12/22: POD 17/12, pending rehab. Aquacel dressing changed.   12/23: POD 18/13, pending rehab.   12/24: POD 19/14, pending rehab.     Vital Signs Last 24 Hrs  T(C): 36.4 (23 Dec 2023 23:42), Max: 37.2 (23 Dec 2023 04:20)  T(F): 97.5 (23 Dec 2023 23:42), Max: 99 (23 Dec 2023 04:20)  HR: 73 (23 Dec 2023 23:42) (73 - 99)  BP: 128/86 (23 Dec 2023 23:42) (115/77 - 139/80)  BP(mean): --  RR: 18 (23 Dec 2023 23:42) (17 - 19)  SpO2: 96% (23 Dec 2023 23:42) (95% - 100%)    Parameters below as of 23 Dec 2023 23:42  Patient On (Oxygen Delivery Method): room air        I&O's Summary    22 Dec 2023 07:01  -  23 Dec 2023 07:00  --------------------------------------------------------  IN: 510 mL / OUT: 2200 mL / NET: -1690 mL    23 Dec 2023 07:01  -  24 Dec 2023 01:12  --------------------------------------------------------  IN: 0 mL / OUT: 1050 mL / NET: -1050 mL        PHYSICAL EXAM:  Constitutional: awake, alert, sitting up in bed. NAD.   Respiratory: non-labored breathing. Normal chest rise.   Cardiovascular: Regular rate and rhythm  Gastrointestinal:  Soft, nontender, nondistended.  .  Vascular: Extremities warm, no ulcers, no discoloration of skin.   Neurological: Gen: AA&O x 3, conversant, appropriate.      CN II-XII grossly intact.    Motor: RUE/LUE 5/5, RLE/LLE 0/5     Sens: Decreased sensation below T11    Extremities: warm and well perfused   Wound/incision: thoracolumbar incision c/d/i         TUBES/LINES:  [] Castillo  [] Lumbar Drain  [] Wound Drains  [] Others      DIET:  [] NPO  [x] Mechanical  [] Tube feeds      LABS:                  CAPILLARY BLOOD GLUCOSE          Drug Levels: [] N/A    CSF Analysis: [] N/A      Allergies    Crab (Pruritus)  No Known Drug Allergies    Intolerances      MEDICATIONS:  Antibiotics:    Neuro:  acetaminophen     Tablet .. 1000 milliGRAM(s) Oral every 8 hours PRN  diazepam    Tablet 5 milliGRAM(s) Oral every 8 hours  DULoxetine 60 milliGRAM(s) Oral daily  gabapentin 800 milliGRAM(s) Oral every 8 hours  ondansetron Injectable 4 milliGRAM(s) IV Push every 6 hours PRN  oxyCODONE    IR 10 milliGRAM(s) Oral every 4 hours PRN  oxyCODONE    IR 15 milliGRAM(s) Oral every 4 hours PRN    Anticoagulation:  apixaban 10 milliGRAM(s) Oral every 12 hours    OTHER:  benzocaine 20% Spray 1 Spray(s) Mucosal three times a day PRN  bisacodyl 5 milliGRAM(s) Oral at bedtime  chlorhexidine 2% Cloths 1 Application(s) Topical <User Schedule>  hydrocortisone 1% Cream 1 Application(s) Topical two times a day PRN  influenza  Vaccine (HIGH DOSE) 0.7 milliLiter(s) IntraMuscular once  midodrine 15 milliGRAM(s) Oral every 8 hours  Morphine 1mg /1mL 17.9 milliLITERS 17.9 milliLiter(s) IntraThecal Continuous Pump  naloxegol 25 milliGRAM(s) Oral daily  naloxone Injectable 0.1 milliGRAM(s) IV Push every 3 minutes PRN  pantoprazole    Tablet 40 milliGRAM(s) Oral before breakfast  polyethylene glycol 3350 17 Gram(s) Oral two times a day  senna 2 Tablet(s) Oral at bedtime  simvastatin 40 milliGRAM(s) Oral at bedtime    IVF:  multivitamin 1 Tablet(s) Oral daily    CULTURES:    RADIOLOGY & ADDITIONAL TESTS:      ASSESSMENT:  69 yo female with Hx HTN, HLD, DM, CVA x 3 (last in 2016 with residual R hand weakness), GAMALIEL not using CPAP, Emphysema, ex-25 pack year smoker now restarted, chronic constipation on Movantik, chronic urinary retention (SC at home), b/l CTS s/p release, s/p Intrathecal Morphine pump for pain, with chronic neck and back pain worsening for years s/p multiple spinal surgeries including laminectomies and fusion of cervical, thoracic and lumbar spine, s/p T3-L1 revision of prior fusion (with  on 8/17/22). Xray 11/16/23 showing broken rods/displacement. Now s/p C2-S2 instrumentation and fusion (12/5). S/p T9-L2 decompression w/ durotomy with repair (12/10/23). KAREEM grade A.     S12.9XXA    Handoff    MEWS Score    HTN (hypertension)    Back pain    Hypertension    SS (spinal stenosis)    High cholesterol    Stroke    H/O carpal tunnel syndrome    H/O carpal tunnel syndrome    Chronic GERD    History of chronic constipation    Seizure    Urinary incontinence    Pre-diabetes    Acute UTI    Anxiety    Anxiety and depression    Arthritis    Eczema    External hemorrhoids    H/O kyphosis    Multiple sclerosis    Pancreas cyst    Scoliosis    Wheelchair dependent    Cervical pseudoarthrosis    Right shoulder pain    Cervical spondylosis    Chronic headaches    Chronic LUQ pain    Chronic UTI    Degenerative joint disease    H/O low back pain    Lumbosacral spondylosis    COPD (chronic obstructive pulmonary disease)    Back pain    Back pain    Spinal cord injury, thoracic (T7-T12)    Back pain    Spinal cord injury of thoracic region without bone injury    Delirium    Revision of fusion of thoracic spine    Thoracic back pain    Revision of posterolateral fusion of lumbar spine    Revision of fusion of thoracic spine    Decompression, spinal cord, thoracic, posterolateral approach    Cervical disc disease    H/O Spinal surgery    H/O breast surgery    S/P cervical discectomy    Previous back surgery    H/O shoulder surgery    H/O total shoulder replacement, right    Room Service Assist    Revision of posterolateral fusion of lumbar spine    Decompression, spinal cord, thoracic, posterolateral approach    HTN (hypertension)    HLD (hyperlipidemia)    CVA (cerebrovascular accident)    GAMALIEL (obstructive sleep apnea)    H/O emphysema    Smoking history    Chronic constipation    Urinary incontinence    Pulmonary embolism    Diabetes    GERD (gastroesophageal reflux disease)    SysAdmin_VstLnk          Plan:  Neuro:  - Neuro/vitals q4  - pain control: modified ERAS, IT morphine pump, oxycodone 10/15 prn, valium and home gabapentin 800 TID resumed  - Continue home cymbalta 60mg daily   - CT thoracic spine 12/6: postop changes  - protected sleep time 10PM-4AM     Cardio:  - MAP > 65   - midodrine 15q8   - C/w home simvastatin  - Sinus tachycardia - likely 2/2 PE (12/19)      Pulm:  - RA   - hx GAMALIEL, no CPAP at home  - PE protocol, 12/19: DVT doppler neg, CTA w segmental and subsegmental PE in R upper lobe  - 10 mg eliquis BID for R upper lobe PE (12/19-) x 7 d then 5 mg BID     GI:  - CCD   - Bowel regimen, home movantik 25 mg, last BM 12/17  - CT A/P 12/13 = impacted stool   - GERD: continue home protonix    Renal:  - Straight cath q6  - Patient not interested in suprapubic catheter placement    Endo:  - a1c: 7    Heme:  - SCDs DVT ppx  - Eliquis d/t PE   - 500 cell saver intraop 12/5, 1u PRBC intraop 12/10, 1u PRBC 12/12    ID:  - afebrile    Dispo:  - SDU status, full code, pending AR    D/w Dr. Luz     []  GCS  E   V  M     Heart Failure: []Acute, [] acute on chronic , []chronic  Heart Failure:  [] Diastolic (HFpEF), [] Systolic (HFrEF), []Combined (HFpEF and HFrEF), [] RHF, [] Pulm HTN, [] Other    [] DARRYL, [] ATN, [] AIN, [] other  [] CKD1, [] CKD2, [] CKD 3, [] CKD 4, [] CKD 5, []ESRD    Encephalopathy: [] Metabolic, [] Hepatic, [] toxic, [] Neurological, [] Other    Abnormal Nurtitional Status: [] malnurtition (see nutrition note), [ ]underweight: BMI < 19, [] morbid obesity: BMI >40, [] Cachexia    [] Sepsis  [] hypovolemic shock,[] cardiogenic shock, [] hemorrhagic shock, [] neuogenic shock  [] Acute Respiratory Failure  []Cerebral edema, [] Brain compression/ herniation,   [] Functional quadriplegia  [] Acute blood loss anemia

## 2023-12-25 PROCEDURE — 99233 SBSQ HOSP IP/OBS HIGH 50: CPT

## 2023-12-25 RX ORDER — APIXABAN 2.5 MG/1
5 TABLET, FILM COATED ORAL EVERY 12 HOURS
Refills: 0 | Status: DISCONTINUED | OUTPATIENT
Start: 2023-12-26 | End: 2024-01-05

## 2023-12-25 RX ORDER — APIXABAN 2.5 MG/1
10 TABLET, FILM COATED ORAL
Refills: 0 | Status: COMPLETED | OUTPATIENT
Start: 2023-12-25 | End: 2023-12-25

## 2023-12-25 RX ORDER — DIPHENHYDRAMINE HCL 50 MG
25 CAPSULE ORAL ONCE
Refills: 0 | Status: COMPLETED | OUTPATIENT
Start: 2023-12-25 | End: 2023-12-25

## 2023-12-25 RX ADMIN — GABAPENTIN 800 MILLIGRAM(S): 400 CAPSULE ORAL at 02:01

## 2023-12-25 RX ADMIN — OXYCODONE HYDROCHLORIDE 15 MILLIGRAM(S): 5 TABLET ORAL at 20:14

## 2023-12-25 RX ADMIN — GABAPENTIN 800 MILLIGRAM(S): 400 CAPSULE ORAL at 10:30

## 2023-12-25 RX ADMIN — APIXABAN 10 MILLIGRAM(S): 2.5 TABLET, FILM COATED ORAL at 21:06

## 2023-12-25 RX ADMIN — Medication 1 TABLET(S): at 12:39

## 2023-12-25 RX ADMIN — MIDODRINE HYDROCHLORIDE 15 MILLIGRAM(S): 2.5 TABLET ORAL at 02:01

## 2023-12-25 RX ADMIN — PANTOPRAZOLE SODIUM 40 MILLIGRAM(S): 20 TABLET, DELAYED RELEASE ORAL at 07:13

## 2023-12-25 RX ADMIN — APIXABAN 10 MILLIGRAM(S): 2.5 TABLET, FILM COATED ORAL at 10:30

## 2023-12-25 RX ADMIN — NALOXEGOL OXALATE 25 MILLIGRAM(S): 12.5 TABLET, FILM COATED ORAL at 12:39

## 2023-12-25 RX ADMIN — OXYCODONE HYDROCHLORIDE 15 MILLIGRAM(S): 5 TABLET ORAL at 10:31

## 2023-12-25 RX ADMIN — MIDODRINE HYDROCHLORIDE 15 MILLIGRAM(S): 2.5 TABLET ORAL at 18:40

## 2023-12-25 RX ADMIN — Medication 5 MILLIGRAM(S): at 15:27

## 2023-12-25 RX ADMIN — Medication 5 MILLIGRAM(S): at 07:13

## 2023-12-25 RX ADMIN — DULOXETINE HYDROCHLORIDE 60 MILLIGRAM(S): 30 CAPSULE, DELAYED RELEASE ORAL at 12:39

## 2023-12-25 RX ADMIN — Medication 25 MILLIGRAM(S): at 18:40

## 2023-12-25 RX ADMIN — OXYCODONE HYDROCHLORIDE 15 MILLIGRAM(S): 5 TABLET ORAL at 11:01

## 2023-12-25 RX ADMIN — GABAPENTIN 800 MILLIGRAM(S): 400 CAPSULE ORAL at 18:40

## 2023-12-25 RX ADMIN — OXYCODONE HYDROCHLORIDE 15 MILLIGRAM(S): 5 TABLET ORAL at 15:27

## 2023-12-25 RX ADMIN — MIDODRINE HYDROCHLORIDE 15 MILLIGRAM(S): 2.5 TABLET ORAL at 10:39

## 2023-12-25 RX ADMIN — OXYCODONE HYDROCHLORIDE 15 MILLIGRAM(S): 5 TABLET ORAL at 15:57

## 2023-12-25 RX ADMIN — Medication 5 MILLIGRAM(S): at 23:06

## 2023-12-25 RX ADMIN — SIMVASTATIN 40 MILLIGRAM(S): 20 TABLET, FILM COATED ORAL at 21:06

## 2023-12-25 RX ADMIN — OXYCODONE HYDROCHLORIDE 15 MILLIGRAM(S): 5 TABLET ORAL at 21:09

## 2023-12-25 NOTE — PROGRESS NOTE ADULT - SUBJECTIVE AND OBJECTIVE BOX
HPI:  66 y/o female with PMHx HTN, HLD, CVA x 3 (last was 2016 with right hand weakness residual), GAMALIEL not using CPAP, Emphysema, former 25 pack year smoker restarted this week of surgery, Chronic Constipation on Movantik, Urinary Incontinence chronically self straight cath at home, chronic UTI, Breast Implant Rupture with Chronic Leak repair 10/2023, B/L CTS s/p release, s/p Intrathecal Morphine pump for pain, with chronic neck and back pain worsening for years s/p multiple spinal surgeries including laminectomies and fusion of cervical, thoracic and lumbar spine initially done in 2009 and recently in 2019 and 2020 at [facility] by Dr. Trinidad, s/p T3-L1 revision of prior fusion (with Dr. Luz on 8/17/22). Outpatient Xray 11/16/2023 showing broken rods/displacement. Presents for elective C2-S2 instrumentation and fusion. Pt endorses weakness of b/l LE and burning pain in b/l lower extremities radiating to both feet,  (04 Dec 2023 15:44)    OVERNIGHT EVENTS: KAMRAN overnight, neuro stable.     Hospital Course:   12/4: Admitted for spinal fusion d/t damaged hardware.   12/5: Neuro stable. POD0 C2-S2 instrumentation/fusion (intraop b/l LE motor loss). on parminder for MAP>90  12/6: POD1. KAMRAN o/n neuro stable. remains intubated. Extubated 6am. Precedex gtt prn. Remains on parminder gtt for MAP goal >90. Valium made prn. CT thoracic spine bc MRI unavailable. Attempted NGT, unsuccessful (resistance @ 35).   12/7: POD2 Given IV dilaudid 0.5mg for pain. Neuro exam stable. SQL started tongiht. Increased precedex to 1.0. Responds well to valium. Consulting psych given paranoia ?homicidal statements. Passed bedside dysphagia and tolerating PO meds. MRI complete, f/u read.   12/8: POD3 Pt reporting incisional pain, given dilaudid 0.5mg IV x2. Pt appearing anxious, given xanax 1mg. Neuro exam unchanged. Pain regimen increased to oxy 15/30 mg, pending further recs. Given 1 L bolus for tachycardia. Trops negative. 2 HMV drains removed.   12/9: POD4, KAMRAN, CT myelogram today showing block at T12-L1, OR tomorrow  12/10: POD5, KAMRAN, OR today for exploration of fusion, possible decompression T9-L2, revision instrumentation  POD 0 T9-L2 decompression. Pt returned from OR intubated. 50mcg fentanyl IVP x 2 for pain control and HTN. Magnesium repleted. Precedex gtt started for sedation. Decreased FiO2 to 40%. 1L bolus for soft MAP. Dc'd propofol and started levo gtt for MAP >90.   12/11: POD 6, POD 1. Extubated, satting well on RA. Resumed home valium and gabapentin. Castillo d/c'd, pending TOV. Seroquel started for agitation, R IJ removed, LUE double midline placed. Started SQL.   12/12: POD7, POD2, given 500 cc bolus and started on phenylephrine for MAP goal >90. Started midodrine 10mg q8h. Cardura dc'd d/t hypotension. Given 250cc of albumin for increasing pressor requirements. Given lactulose, pending BM. Hgb 7.6<8.3, given 1u PRBC. Protected sleep time. Urology consulted for possible suprapubic cath, recommend self intermittent cath is optimal.   12/13: POD8, POD3 Midodrine increased to 15q8. Pt refused to take the extra 5mg midodrin ordered. Neuro exam stable. hgb 10 from 9.4 after 1u pRBCs, Pt refused AM meds, given in late morning, CTAP ordered for L sided abd pain, fleet enema, restarted home movantik and ordere fleet enema for constipation. YIFAN and HMV dc'd. 250cc albumin x1  12/14: POD9, POD4, KAMRAN overnight, neuro stable. MAP goal liberalized to > 65. Stepdown status.   12/15: POD10, POD5, KAMRAN overnight  12/16: POD11, POD6, KAMRAN overnight, neuro stable, 500 cc bolus NS given for tachycardia. Aquacell changed. CXR, pro-bnp negative for pulmonary edema. Given additional 500cc bolus.  12/17: POD 12. POD 7. KAMRAN overnight, neuro stable.   12/18: POD 13/8. Incontinent x2 today.   12/19: KAMRAN. Persistent tachycardia, r/o PE. CTPE protocol ordered. Refused all afternoon meds except oxy 30. Per Dr. Cowart (rad) CT chest w segmental and subsegmental PE in R upper lobe. Dopplers negative for DVT, CT PE shows segmental and subsegmental PE in R upper lobe. Per Dr. Augustine and Dr. Luz, Will start eliquis pending pain management approval. Per Dr. Mclaughlin (pain mgmt) can start eliquis 10mg BIDx 7 days. Dc'd lovenox.  12/20: POD 10 T9-L2 Decompression. Hemodynamically stable. Pending rehab. PT today.   12/21: POD 11 T9-L2 Decompression. neruo/hemodynamically stable, pending rehab.  12/22: POD 17/12, pending rehab. Aquacel dressing changed.   12/23: POD 18/13, pending rehab.   12/24: POD 19/14, pending rehab.   12/25: POD 20/15, pending rehab.     Vital Signs Last 24 Hrs  T(C): 36.4 (24 Dec 2023 23:43), Max: 36.7 (24 Dec 2023 05:50)  T(F): 97.6 (24 Dec 2023 23:43), Max: 98.1 (24 Dec 2023 20:29)  HR: 83 (24 Dec 2023 23:43) (70 - 101)  BP: 113/81 (24 Dec 2023 23:43) (102/71 - 134/76)  BP(mean): --  RR: 18 (24 Dec 2023 23:43) (17 - 19)  SpO2: 95% (24 Dec 2023 23:43) (95% - 96%)    Parameters below as of 24 Dec 2023 23:43  Patient On (Oxygen Delivery Method): room air        I&O's Summary    23 Dec 2023 07:01  -  24 Dec 2023 07:00  --------------------------------------------------------  IN: 0 mL / OUT: 1800 mL / NET: -1800 mL    24 Dec 2023 07:01  -  25 Dec 2023 02:05  --------------------------------------------------------  IN: 0 mL / OUT: 1100 mL / NET: -1100 mL      PHYSICAL EXAM:  Constitutional: awake, alert, sitting up in bed. NAD.   Respiratory: non-labored breathing. Normal chest rise.   Cardiovascular: Regular rate and rhythm  Gastrointestinal:  Soft, nontender, nondistended.  .  Vascular: Extremities warm, no ulcers, no discoloration of skin.   Neurological: Gen: AA&O x 3, conversant, appropriate.      CN II-XII grossly intact.    Motor: RUE/LUE 5/5, RLE/LLE 0/5     Sens: Decreased sensation below T11    Extremities: warm and well perfused   Wound/incision: thoracolumbar incision c/d/i         TUBES/LINES:  [] Castillo  [] Lumbar Drain  [] Wound Drains  [] Others      DIET:  [] NPO  [x] Mechanical  [] Tube feeds      LABS:                  CAPILLARY BLOOD GLUCOSE          Drug Levels: [] N/A    CSF Analysis: [] N/A      Allergies    Crab (Pruritus)  No Known Drug Allergies    Intolerances      MEDICATIONS:  Antibiotics:    Neuro:  acetaminophen     Tablet .. 1000 milliGRAM(s) Oral every 8 hours PRN  diazepam    Tablet 5 milliGRAM(s) Oral every 8 hours  DULoxetine 60 milliGRAM(s) Oral daily  gabapentin 800 milliGRAM(s) Oral every 8 hours  ondansetron Injectable 4 milliGRAM(s) IV Push every 6 hours PRN  oxyCODONE    IR 10 milliGRAM(s) Oral every 4 hours PRN  oxyCODONE    IR 15 milliGRAM(s) Oral every 4 hours PRN    Anticoagulation:  apixaban 10 milliGRAM(s) Oral every 12 hours    OTHER:  benzocaine 20% Spray 1 Spray(s) Mucosal three times a day PRN  bisacodyl 5 milliGRAM(s) Oral at bedtime  hydrocortisone 1% Cream 1 Application(s) Topical two times a day PRN  influenza  Vaccine (HIGH DOSE) 0.7 milliLiter(s) IntraMuscular once  midodrine 15 milliGRAM(s) Oral every 8 hours  Morphine 1mg /1mL 17.9 milliLITERS 17.9 milliLiter(s) IntraThecal Continuous Pump  naloxegol 25 milliGRAM(s) Oral daily  naloxone Injectable 0.1 milliGRAM(s) IV Push every 3 minutes PRN  pantoprazole    Tablet 40 milliGRAM(s) Oral before breakfast  polyethylene glycol 3350 17 Gram(s) Oral two times a day  senna 2 Tablet(s) Oral at bedtime  simvastatin 40 milliGRAM(s) Oral at bedtime    IVF:  multivitamin 1 Tablet(s) Oral daily    CULTURES:    RADIOLOGY & ADDITIONAL TESTS:      ASSESSMENT:  69 yo female with Hx HTN, HLD, DM, CVA x 3 (last in 2016 with residual R hand weakness), GAMALIEL not using CPAP, Emphysema, ex-25 pack year smoker now restarted, chronic constipation on Movantik, chronic urinary retention (SC at home), b/l CTS s/p release, s/p Intrathecal Morphine pump for pain, with chronic neck and back pain worsening for years s/p multiple spinal surgeries including laminectomies and fusion of cervical, thoracic and lumbar spine, s/p T3-L1 revision of prior fusion (with  on 8/17/22). Xray 11/16/23 showing broken rods/displacement. Now s/p C2-S2 instrumentation and fusion (12/5). S/p T9-L2 decompression w/ durotomy with repair (12/10/23). KAREEM grade A.     S12.9XXA    Handoff    MEWS Score    HTN (hypertension)    Back pain    Hypertension    SS (spinal stenosis)    High cholesterol    Stroke    H/O carpal tunnel syndrome    H/O carpal tunnel syndrome    Chronic GERD    History of chronic constipation    Seizure    Urinary incontinence    Pre-diabetes    Acute UTI    Anxiety    Anxiety and depression    Arthritis    Eczema    External hemorrhoids    H/O kyphosis    Multiple sclerosis    Pancreas cyst    Scoliosis    Wheelchair dependent    Cervical pseudoarthrosis    Right shoulder pain    Cervical spondylosis    Chronic headaches    Chronic LUQ pain    Chronic UTI    Degenerative joint disease    H/O low back pain    Lumbosacral spondylosis    COPD (chronic obstructive pulmonary disease)    Back pain    Back pain    Spinal cord injury, thoracic (T7-T12)    Back pain    Spinal cord injury of thoracic region without bone injury    Delirium    Revision of fusion of thoracic spine    Thoracic back pain    Revision of posterolateral fusion of lumbar spine    Revision of fusion of thoracic spine    Decompression, spinal cord, thoracic, posterolateral approach    Cervical disc disease    H/O Spinal surgery    H/O breast surgery    S/P cervical discectomy    Previous back surgery    H/O shoulder surgery    H/O total shoulder replacement, right    Room Service Assist    Revision of posterolateral fusion of lumbar spine    Decompression, spinal cord, thoracic, posterolateral approach    HTN (hypertension)    HLD (hyperlipidemia)    CVA (cerebrovascular accident)    GAMALIEL (obstructive sleep apnea)    H/O emphysema    Smoking history    Chronic constipation    Urinary incontinence    Pulmonary embolism    Diabetes    GERD (gastroesophageal reflux disease)    SysAdmin_VstLnk        Plan:  Neuro:  - Neuro/vitals q4  - pain control: modified ERAS, IT morphine pump, oxycodone 10/15 prn, valium and home gabapentin 800 TID resumed  - Continue home cymbalta 60mg daily   - CT thoracic spine 12/6: postop changes  - protected sleep time 10PM-4AM     Cardio:  - MAP > 65   - midodrine 15q8   - C/w home simvastatin  - Sinus tachycardia - likely 2/2 PE (12/19)      Pulm:  - RA   - hx GAMALIEL, no CPAP at home  - PE protocol, 12/19: DVT doppler neg, CTA w segmental and subsegmental PE in R upper lobe  - 10 mg eliquis BID for R upper lobe PE (12/19-) x 7 d then 5 mg BID     GI:  - CCD   - Bowel regimen, home movantik 25 mg, last BM 12/17  - CT A/P 12/13 = impacted stool   - GERD: continue home protonix    Renal:  - Straight cath q6  - Patient not interested in suprapubic catheter placement    Endo:  - a1c: 7    Heme:  - SCDs DVT ppx  - Eliquis d/t PE   - 500 cell saver intraop 12/5, 1u PRBC intraop 12/10, 1u PRBC 12/12    ID:  - afebrile    Dispo:  - SDU status, full code, pending AR    D/w Dr. Luz     Assessment:  Present when checked    []  GCS  E   V  M     Heart Failure: []Acute, [] acute on chronic , []chronic  Heart Failure:  [] Diastolic (HFpEF), [] Systolic (HFrEF), []Combined (HFpEF and HFrEF), [] RHF, [] Pulm HTN, [] Other    [] DARRYL, [] ATN, [] AIN, [] other  [] CKD1, [] CKD2, [] CKD 3, [] CKD 4, [] CKD 5, []ESRD    Encephalopathy: [] Metabolic, [] Hepatic, [] toxic, [] Neurological, [] Other    Abnormal Nurtitional Status: [] malnurtition (see nutrition note), [ ]underweight: BMI < 19, [] morbid obesity: BMI >40, [] Cachexia    [] Sepsis  [] hypovolemic shock,[] cardiogenic shock, [] hemorrhagic shock, [] neuogenic shock  [] Acute Respiratory Failure  []Cerebral edema, [] Brain compression/ herniation,   [] Functional quadriplegia  [] Acute blood loss anemia   HPI:  68 y/o female with PMHx HTN, HLD, CVA x 3 (last was 2016 with right hand weakness residual), GAMALIEL not using CPAP, Emphysema, former 25 pack year smoker restarted this week of surgery, Chronic Constipation on Movantik, Urinary Incontinence chronically self straight cath at home, chronic UTI, Breast Implant Rupture with Chronic Leak repair 10/2023, B/L CTS s/p release, s/p Intrathecal Morphine pump for pain, with chronic neck and back pain worsening for years s/p multiple spinal surgeries including laminectomies and fusion of cervical, thoracic and lumbar spine initially done in 2009 and recently in 2019 and 2020 at [facility] by Dr. Trinidad, s/p T3-L1 revision of prior fusion (with Dr. Luz on 8/17/22). Outpatient Xray 11/16/2023 showing broken rods/displacement. Presents for elective C2-S2 instrumentation and fusion. Pt endorses weakness of b/l LE and burning pain in b/l lower extremities radiating to both feet,  (04 Dec 2023 15:44)    OVERNIGHT EVENTS: KAMRAN overnight, neuro stable.     Hospital Course:   12/4: Admitted for spinal fusion d/t damaged hardware.   12/5: Neuro stable. POD0 C2-S2 instrumentation/fusion (intraop b/l LE motor loss). on parminder for MAP>90  12/6: POD1. KAMRAN o/n neuro stable. remains intubated. Extubated 6am. Precedex gtt prn. Remains on parminder gtt for MAP goal >90. Valium made prn. CT thoracic spine bc MRI unavailable. Attempted NGT, unsuccessful (resistance @ 35).   12/7: POD2 Given IV dilaudid 0.5mg for pain. Neuro exam stable. SQL started tongiht. Increased precedex to 1.0. Responds well to valium. Consulting psych given paranoia ?homicidal statements. Passed bedside dysphagia and tolerating PO meds. MRI complete, f/u read.   12/8: POD3 Pt reporting incisional pain, given dilaudid 0.5mg IV x2. Pt appearing anxious, given xanax 1mg. Neuro exam unchanged. Pain regimen increased to oxy 15/30 mg, pending further recs. Given 1 L bolus for tachycardia. Trops negative. 2 HMV drains removed.   12/9: POD4, KAMRAN, CT myelogram today showing block at T12-L1, OR tomorrow  12/10: POD5, KAMRAN, OR today for exploration of fusion, possible decompression T9-L2, revision instrumentation  POD 0 T9-L2 decompression. Pt returned from OR intubated. 50mcg fentanyl IVP x 2 for pain control and HTN. Magnesium repleted. Precedex gtt started for sedation. Decreased FiO2 to 40%. 1L bolus for soft MAP. Dc'd propofol and started levo gtt for MAP >90.   12/11: POD 6, POD 1. Extubated, satting well on RA. Resumed home valium and gabapentin. Castillo d/c'd, pending TOV. Seroquel started for agitation, R IJ removed, LUE double midline placed. Started SQL.   12/12: POD7, POD2, given 500 cc bolus and started on phenylephrine for MAP goal >90. Started midodrine 10mg q8h. Cardura dc'd d/t hypotension. Given 250cc of albumin for increasing pressor requirements. Given lactulose, pending BM. Hgb 7.6<8.3, given 1u PRBC. Protected sleep time. Urology consulted for possible suprapubic cath, recommend self intermittent cath is optimal.   12/13: POD8, POD3 Midodrine increased to 15q8. Pt refused to take the extra 5mg midodrin ordered. Neuro exam stable. hgb 10 from 9.4 after 1u pRBCs, Pt refused AM meds, given in late morning, CTAP ordered for L sided abd pain, fleet enema, restarted home movantik and ordere fleet enema for constipation. YIFAN and HMV dc'd. 250cc albumin x1  12/14: POD9, POD4, KAMRAN overnight, neuro stable. MAP goal liberalized to > 65. Stepdown status.   12/15: POD10, POD5, KAMRAN overnight  12/16: POD11, POD6, KAMRAN overnight, neuro stable, 500 cc bolus NS given for tachycardia. Aquacell changed. CXR, pro-bnp negative for pulmonary edema. Given additional 500cc bolus.  12/17: POD 12. POD 7. KAMRAN overnight, neuro stable.   12/18: POD 13/8. Incontinent x2 today.   12/19: KAMRAN. Persistent tachycardia, r/o PE. CTPE protocol ordered. Refused all afternoon meds except oxy 30. Per Dr. Cowart (rad) CT chest w segmental and subsegmental PE in R upper lobe. Dopplers negative for DVT, CT PE shows segmental and subsegmental PE in R upper lobe. Per Dr. Augustine and Dr. Luz, Will start eliquis pending pain management approval. Per Dr. Mclaughlin (pain mgmt) can start eliquis 10mg BIDx 7 days. Dc'd lovenox.  12/20: POD 10 T9-L2 Decompression. Hemodynamically stable. Pending rehab. PT today.   12/21: POD 11 T9-L2 Decompression. neruo/hemodynamically stable, pending rehab.  12/22: POD 17/12, pending rehab. Aquacel dressing changed.   12/23: POD 18/13, pending rehab.   12/24: POD 19/14, pending rehab.   12/25: POD 20/15, pending rehab.     Vital Signs Last 24 Hrs  T(C): 36.4 (24 Dec 2023 23:43), Max: 36.7 (24 Dec 2023 05:50)  T(F): 97.6 (24 Dec 2023 23:43), Max: 98.1 (24 Dec 2023 20:29)  HR: 83 (24 Dec 2023 23:43) (70 - 101)  BP: 113/81 (24 Dec 2023 23:43) (102/71 - 134/76)  BP(mean): --  RR: 18 (24 Dec 2023 23:43) (17 - 19)  SpO2: 95% (24 Dec 2023 23:43) (95% - 96%)    Parameters below as of 24 Dec 2023 23:43  Patient On (Oxygen Delivery Method): room air        I&O's Summary    23 Dec 2023 07:01  -  24 Dec 2023 07:00  --------------------------------------------------------  IN: 0 mL / OUT: 1800 mL / NET: -1800 mL    24 Dec 2023 07:01  -  25 Dec 2023 02:05  --------------------------------------------------------  IN: 0 mL / OUT: 1100 mL / NET: -1100 mL      PHYSICAL EXAM:  Constitutional: awake, alert, sitting up in bed. NAD.   Respiratory: non-labored breathing. Normal chest rise.   Cardiovascular: Regular rate and rhythm  Gastrointestinal:  Soft, nontender, nondistended.  .  Vascular: Extremities warm, no ulcers, no discoloration of skin.   Neurological: Gen: AA&O x 3, conversant, appropriate.      CN II-XII grossly intact.    Motor: RUE/LUE 5/5, RLE/LLE 0/5     Sens: Decreased sensation below T11    Extremities: warm and well perfused   Wound/incision: thoracolumbar incision c/d/i         TUBES/LINES:  [] Castillo  [] Lumbar Drain  [] Wound Drains  [] Others      DIET:  [] NPO  [x] Mechanical  [] Tube feeds      LABS:                  CAPILLARY BLOOD GLUCOSE          Drug Levels: [] N/A    CSF Analysis: [] N/A      Allergies    Crab (Pruritus)  No Known Drug Allergies    Intolerances      MEDICATIONS:  Antibiotics:    Neuro:  acetaminophen     Tablet .. 1000 milliGRAM(s) Oral every 8 hours PRN  diazepam    Tablet 5 milliGRAM(s) Oral every 8 hours  DULoxetine 60 milliGRAM(s) Oral daily  gabapentin 800 milliGRAM(s) Oral every 8 hours  ondansetron Injectable 4 milliGRAM(s) IV Push every 6 hours PRN  oxyCODONE    IR 10 milliGRAM(s) Oral every 4 hours PRN  oxyCODONE    IR 15 milliGRAM(s) Oral every 4 hours PRN    Anticoagulation:  apixaban 10 milliGRAM(s) Oral every 12 hours    OTHER:  benzocaine 20% Spray 1 Spray(s) Mucosal three times a day PRN  bisacodyl 5 milliGRAM(s) Oral at bedtime  hydrocortisone 1% Cream 1 Application(s) Topical two times a day PRN  influenza  Vaccine (HIGH DOSE) 0.7 milliLiter(s) IntraMuscular once  midodrine 15 milliGRAM(s) Oral every 8 hours  Morphine 1mg /1mL 17.9 milliLITERS 17.9 milliLiter(s) IntraThecal Continuous Pump  naloxegol 25 milliGRAM(s) Oral daily  naloxone Injectable 0.1 milliGRAM(s) IV Push every 3 minutes PRN  pantoprazole    Tablet 40 milliGRAM(s) Oral before breakfast  polyethylene glycol 3350 17 Gram(s) Oral two times a day  senna 2 Tablet(s) Oral at bedtime  simvastatin 40 milliGRAM(s) Oral at bedtime    IVF:  multivitamin 1 Tablet(s) Oral daily    CULTURES:    RADIOLOGY & ADDITIONAL TESTS:      ASSESSMENT:  69 yo female with Hx HTN, HLD, DM, CVA x 3 (last in 2016 with residual R hand weakness), GAMALIEL not using CPAP, Emphysema, ex-25 pack year smoker now restarted, chronic constipation on Movantik, chronic urinary retention (SC at home), b/l CTS s/p release, s/p Intrathecal Morphine pump for pain, with chronic neck and back pain worsening for years s/p multiple spinal surgeries including laminectomies and fusion of cervical, thoracic and lumbar spine, s/p T3-L1 revision of prior fusion (with  on 8/17/22). Xray 11/16/23 showing broken rods/displacement. Now s/p C2-S2 instrumentation and fusion (12/5). S/p T9-L2 decompression w/ durotomy with repair (12/10/23). KAREEM grade A.     S12.9XXA    Handoff    MEWS Score    HTN (hypertension)    Back pain    Hypertension    SS (spinal stenosis)    High cholesterol    Stroke    H/O carpal tunnel syndrome    H/O carpal tunnel syndrome    Chronic GERD    History of chronic constipation    Seizure    Urinary incontinence    Pre-diabetes    Acute UTI    Anxiety    Anxiety and depression    Arthritis    Eczema    External hemorrhoids    H/O kyphosis    Multiple sclerosis    Pancreas cyst    Scoliosis    Wheelchair dependent    Cervical pseudoarthrosis    Right shoulder pain    Cervical spondylosis    Chronic headaches    Chronic LUQ pain    Chronic UTI    Degenerative joint disease    H/O low back pain    Lumbosacral spondylosis    COPD (chronic obstructive pulmonary disease)    Back pain    Back pain    Spinal cord injury, thoracic (T7-T12)    Back pain    Spinal cord injury of thoracic region without bone injury    Delirium    Revision of fusion of thoracic spine    Thoracic back pain    Revision of posterolateral fusion of lumbar spine    Revision of fusion of thoracic spine    Decompression, spinal cord, thoracic, posterolateral approach    Cervical disc disease    H/O Spinal surgery    H/O breast surgery    S/P cervical discectomy    Previous back surgery    H/O shoulder surgery    H/O total shoulder replacement, right    Room Service Assist    Revision of posterolateral fusion of lumbar spine    Decompression, spinal cord, thoracic, posterolateral approach    HTN (hypertension)    HLD (hyperlipidemia)    CVA (cerebrovascular accident)    GAMALIEL (obstructive sleep apnea)    H/O emphysema    Smoking history    Chronic constipation    Urinary incontinence    Pulmonary embolism    Diabetes    GERD (gastroesophageal reflux disease)    SysAdmin_VstLnk        Plan:  Neuro:  - Neuro/vitals q4  - pain control: modified ERAS, IT morphine pump, oxycodone 10/15 prn, valium and home gabapentin 800 TID resumed  - Continue home cymbalta 60mg daily   - CT thoracic spine 12/6: postop changes  - protected sleep time 10PM-4AM     Cardio:  - MAP > 65   - midodrine 15q8   - C/w home simvastatin  - Sinus tachycardia - likely 2/2 PE (12/19)      Pulm:  - RA   - hx GAMALIEL, no CPAP at home  - PE protocol, 12/19: DVT doppler neg, CTA w segmental and subsegmental PE in R upper lobe  - 10 mg eliquis BID for R upper lobe PE (12/19-) x 7 d then 5 mg BID     GI:  - CCD   - Bowel regimen, home movantik 25 mg, last BM 12/17  - CT A/P 12/13 = impacted stool   - GERD: continue home protonix    Renal:  - Straight cath q6  - Patient not interested in suprapubic catheter placement    Endo:  - a1c: 7    Heme:  - SCDs DVT ppx  - Eliquis d/t PE   - 500 cell saver intraop 12/5, 1u PRBC intraop 12/10, 1u PRBC 12/12    ID:  - afebrile    Dispo:  - SDU status, full code, pending AR    D/w Dr. Luz     Assessment:  Present when checked    []  GCS  E   V  M     Heart Failure: []Acute, [] acute on chronic , []chronic  Heart Failure:  [] Diastolic (HFpEF), [] Systolic (HFrEF), []Combined (HFpEF and HFrEF), [] RHF, [] Pulm HTN, [] Other    [] DARRYL, [] ATN, [] AIN, [] other  [] CKD1, [] CKD2, [] CKD 3, [] CKD 4, [] CKD 5, []ESRD    Encephalopathy: [] Metabolic, [] Hepatic, [] toxic, [] Neurological, [] Other    Abnormal Nurtitional Status: [] malnurtition (see nutrition note), [ ]underweight: BMI < 19, [] morbid obesity: BMI >40, [] Cachexia    [] Sepsis  [] hypovolemic shock,[] cardiogenic shock, [] hemorrhagic shock, [] neuogenic shock  [] Acute Respiratory Failure  []Cerebral edema, [] Brain compression/ herniation,   [] Functional quadriplegia  [] Acute blood loss anemia

## 2023-12-25 NOTE — PROGRESS NOTE ADULT - SUBJECTIVE AND OBJECTIVE BOX
INTERVAL EVENTS: KAMRAN    PAST MEDICAL & SURGICAL HISTORY:  HTN (hypertension)    Back pain    Hypertension    SS (spinal stenosis)    High cholesterol    Stroke  x3    H/O carpal tunnel syndrome    H/O carpal tunnel syndrome  Bilateral    Chronic GERD    History of chronic constipation    Seizure    Urinary incontinence  self cath as needed    Pre-diabetes    Acute UTI    Anxiety    Anxiety and depression    Arthritis  Left shoulder    Eczema    External hemorrhoids    H/O kyphosis    Multiple sclerosis    Pancreas cyst    Scoliosis    Wheelchair dependent    Cervical pseudoarthrosis  and lumbar spine    Right shoulder pain    Cervical spondylosis    Chronic headaches    Chronic LUQ pain    Chronic UTI    Degenerative joint disease  left shoulder    H/O low back pain    Lumbosacral spondylosis    COPD (chronic obstructive pulmonary disease)    Cervical disc disease  cervical sx    H/O Spinal surgery  lumbar x 30    H/O breast surgery  left breast implant 1980's    S/P cervical discectomy  x4    Previous back surgery    H/O shoulder surgery    H/O total shoulder replacement, right        MEDICATIONS  (STANDING):  apixaban 10 milliGRAM(s) Oral every 12 hours  bisacodyl 5 milliGRAM(s) Oral at bedtime  diazepam    Tablet 5 milliGRAM(s) Oral every 8 hours  DULoxetine 60 milliGRAM(s) Oral daily  gabapentin 800 milliGRAM(s) Oral every 8 hours  influenza  Vaccine (HIGH DOSE) 0.7 milliLiter(s) IntraMuscular once  midodrine 15 milliGRAM(s) Oral every 8 hours  Morphine 1mg /1mL 17.9 milliLITERS 17.9 milliLiter(s) IntraThecal Continuous Pump  multivitamin 1 Tablet(s) Oral daily  naloxegol 25 milliGRAM(s) Oral daily  pantoprazole    Tablet 40 milliGRAM(s) Oral before breakfast  polyethylene glycol 3350 17 Gram(s) Oral two times a day  senna 2 Tablet(s) Oral at bedtime  simvastatin 40 milliGRAM(s) Oral at bedtime    MEDICATIONS  (PRN):  acetaminophen     Tablet .. 1000 milliGRAM(s) Oral every 8 hours PRN Temp greater or equal to 38.5C (101.3F), Mild Pain (1 - 3)  benzocaine 20% Spray 1 Spray(s) Mucosal three times a day PRN Sore throat  hydrocortisone 1% Cream 1 Application(s) Topical two times a day PRN Itching  naloxone Injectable 0.1 milliGRAM(s) IV Push every 3 minutes PRN For ANY of the following changes in patient status:  A. RR LESS THAN 10 breaths per minute, B. Oxygen saturation LESS THAN 90%, C. Sedation score of 6  ondansetron Injectable 4 milliGRAM(s) IV Push every 6 hours PRN Nausea  oxyCODONE    IR 10 milliGRAM(s) Oral every 4 hours PRN Moderate Pain (4 - 6)  oxyCODONE    IR 15 milliGRAM(s) Oral every 4 hours PRN Severe Pain (7 - 10)  sodium chloride 0.9% lock flush 10 milliLiter(s) IV Push every 1 hour PRN Pre/post blood products, medications, blood draw, and to maintain line patency    Vital Signs Last 24 Hrs  T(C): 36.5 (25 Dec 2023 05:25), Max: 36.7 (24 Dec 2023 13:18)  T(F): 97.7 (25 Dec 2023 05:25), Max: 98.1 (24 Dec 2023 20:29)  HR: 94 (25 Dec 2023 05:25) (83 - 101)  BP: 120/76 (25 Dec 2023 05:25) (102/71 - 120/76)  BP(mean): --  RR: 18 (25 Dec 2023 05:25) (17 - 18)  SpO2: 93% (25 Dec 2023 05:25) (93% - 96%)    Parameters below as of 25 Dec 2023 05:25  Patient On (Oxygen Delivery Method): room air        PHYSICAL EXAM:  GEN: Awake, alert. NAD.   HEENT: NCAT, PERRL, EOMI. Mucosa moist. No JVD.  RESP: CTA b/l  CV: RRR. Normal S1/S2. No m/r/g.  ABD: Soft. NT/ND. BS+  EXT: Warm. No edema, clubbing, or cyanosis.     LABS:                  I&O's Summary    24 Dec 2023 07:01  -  25 Dec 2023 07:00  --------------------------------------------------------  IN: 0 mL / OUT: 1100 mL / NET: -1100 mL    25 Dec 2023 07:01  -  25 Dec 2023 10:32  --------------------------------------------------------  IN: 0 mL / OUT: 450 mL / NET: -450 mL      RADIOLOGY & ADDITIONAL STUDIES:  TELEMETRY:  EKG:

## 2023-12-26 PROCEDURE — 99024 POSTOP FOLLOW-UP VISIT: CPT

## 2023-12-26 PROCEDURE — 99232 SBSQ HOSP IP/OBS MODERATE 35: CPT

## 2023-12-26 RX ORDER — DIPHENHYDRAMINE HCL 50 MG
25 CAPSULE ORAL EVERY 12 HOURS
Refills: 0 | Status: DISCONTINUED | OUTPATIENT
Start: 2023-12-26 | End: 2024-01-05

## 2023-12-26 RX ORDER — DIAZEPAM 5 MG
5 TABLET ORAL EVERY 8 HOURS
Refills: 0 | Status: DISCONTINUED | OUTPATIENT
Start: 2023-12-26 | End: 2024-01-02

## 2023-12-26 RX ADMIN — GABAPENTIN 800 MILLIGRAM(S): 400 CAPSULE ORAL at 09:11

## 2023-12-26 RX ADMIN — SENNA PLUS 2 TABLET(S): 8.6 TABLET ORAL at 21:35

## 2023-12-26 RX ADMIN — Medication 1 TABLET(S): at 11:44

## 2023-12-26 RX ADMIN — OXYCODONE HYDROCHLORIDE 15 MILLIGRAM(S): 5 TABLET ORAL at 00:44

## 2023-12-26 RX ADMIN — OXYCODONE HYDROCHLORIDE 15 MILLIGRAM(S): 5 TABLET ORAL at 01:39

## 2023-12-26 RX ADMIN — OXYCODONE HYDROCHLORIDE 15 MILLIGRAM(S): 5 TABLET ORAL at 10:16

## 2023-12-26 RX ADMIN — SIMVASTATIN 40 MILLIGRAM(S): 20 TABLET, FILM COATED ORAL at 21:35

## 2023-12-26 RX ADMIN — DULOXETINE HYDROCHLORIDE 60 MILLIGRAM(S): 30 CAPSULE, DELAYED RELEASE ORAL at 11:44

## 2023-12-26 RX ADMIN — NALOXEGOL OXALATE 25 MILLIGRAM(S): 12.5 TABLET, FILM COATED ORAL at 11:44

## 2023-12-26 RX ADMIN — OXYCODONE HYDROCHLORIDE 15 MILLIGRAM(S): 5 TABLET ORAL at 15:42

## 2023-12-26 RX ADMIN — APIXABAN 5 MILLIGRAM(S): 2.5 TABLET, FILM COATED ORAL at 05:22

## 2023-12-26 RX ADMIN — Medication 5 MILLIGRAM(S): at 19:03

## 2023-12-26 RX ADMIN — APIXABAN 5 MILLIGRAM(S): 2.5 TABLET, FILM COATED ORAL at 18:41

## 2023-12-26 RX ADMIN — Medication 5 MILLIGRAM(S): at 11:44

## 2023-12-26 RX ADMIN — Medication 5 MILLIGRAM(S): at 21:35

## 2023-12-26 RX ADMIN — MIDODRINE HYDROCHLORIDE 15 MILLIGRAM(S): 2.5 TABLET ORAL at 19:03

## 2023-12-26 RX ADMIN — OXYCODONE HYDROCHLORIDE 15 MILLIGRAM(S): 5 TABLET ORAL at 16:42

## 2023-12-26 RX ADMIN — MIDODRINE HYDROCHLORIDE 15 MILLIGRAM(S): 2.5 TABLET ORAL at 09:11

## 2023-12-26 RX ADMIN — OXYCODONE HYDROCHLORIDE 15 MILLIGRAM(S): 5 TABLET ORAL at 09:16

## 2023-12-26 RX ADMIN — MIDODRINE HYDROCHLORIDE 15 MILLIGRAM(S): 2.5 TABLET ORAL at 01:36

## 2023-12-26 RX ADMIN — GABAPENTIN 800 MILLIGRAM(S): 400 CAPSULE ORAL at 01:36

## 2023-12-26 RX ADMIN — PANTOPRAZOLE SODIUM 40 MILLIGRAM(S): 20 TABLET, DELAYED RELEASE ORAL at 05:22

## 2023-12-26 RX ADMIN — GABAPENTIN 800 MILLIGRAM(S): 400 CAPSULE ORAL at 18:41

## 2023-12-26 NOTE — CHART NOTE - NSCHARTNOTEFT_GEN_A_CORE
Admitting Diagnosis:   Patient is a 68y old  Female who presents with a chief complaint of Myelopathy (13 Dec 2023 20:30)  PAST MEDICAL & SURGICAL HISTORY:  HTN (hypertension)  SS (spinal stenosis)  High cholesterol  Stroke  x3  H/O carpal tunnel syndrome  Bilateral  Chronic GERD  History of chronic constipation  Urinary incontinence  self cath as needed  Pre-diabetes  Anxiety and depression  Eczema  H/O kyphosis  Pancreas cyst  Scoliosis  Wheelchair dependent  Cervical pseudoarthrosis  and lumbar spine  Cervical spondylosis  Chronic headaches  Degenerative joint disease  left shoulder  Lumbosacral spondylosis  COPD (chronic obstructive pulmonary disease)  H/O Spinal surgery  lumbar x 30  H/O breast surgery  left breast implant 1980's  S/P cervical discectomy  x4  H/O total shoulder replacement, right    Current Nutrition Order: consistent carbohydrate diet with a snack     PO Intake: Good (%) [   ]  Fair (50-75%) [ x ] Poor (<25%) [   ]    GI Issues: no noted nausea or vomiting; distention noted; fecal incontinence noted; most recent BM on 12/25/23    Pain: noted with severe pain (level 9) to head    Skin Integrity: surgical incisions noted to back; no pressure ulcers; no edema; Filippo: 14    Labs:   CAPILLARY BLOOD GLUCOSE    Medications:  MEDICATIONS  (STANDING):  apixaban 5 milliGRAM(s) Oral every 12 hours  bisacodyl 5 milliGRAM(s) Oral at bedtime  diazepam    Tablet 5 milliGRAM(s) Oral every 8 hours  DULoxetine 60 milliGRAM(s) Oral daily  gabapentin 800 milliGRAM(s) Oral every 8 hours  influenza  Vaccine (HIGH DOSE) 0.7 milliLiter(s) IntraMuscular once  midodrine 15 milliGRAM(s) Oral every 8 hours  multivitamin 1 Tablet(s) Oral daily  naloxegol 25 milliGRAM(s) Oral daily  pantoprazole    Tablet 40 milliGRAM(s) Oral before breakfast  polyethylene glycol 3350 17 Gram(s) Oral two times a day  senna 2 Tablet(s) Oral at bedtime  simvastatin 40 milliGRAM(s) Oral at bedtime    MEDICATIONS  (PRN):  acetaminophen     Tablet .. 1000 milliGRAM(s) Oral every 8 hours PRN Temp greater or equal to 38.5C (101.3F), Mild Pain (1 - 3)  benzocaine 20% Spray 1 Spray(s) Mucosal three times a day PRN Sore throat  diphenhydrAMINE 25 milliGRAM(s) Oral every 12 hours PRN Rash and/or Itching  hydrocortisone 1% Cream 1 Application(s) Topical two times a day PRN Itching  naloxone Injectable 0.1 milliGRAM(s) IV Push every 3 minutes PRN For ANY of the following changes in patient status:  A. RR LESS THAN 10 breaths per minute, B. Oxygen saturation LESS THAN 90%, C. Sedation score of 6  ondansetron Injectable 4 milliGRAM(s) IV Push every 6 hours PRN Nausea  oxyCODONE    IR 10 milliGRAM(s) Oral every 4 hours PRN Moderate Pain (4 - 6)  oxyCODONE    IR 15 milliGRAM(s) Oral every 4 hours PRN Severe Pain (7 - 10)  sodium chloride 0.9% lock flush 10 milliLiter(s) IV Push every 1 hour PRN Pre/post blood products, medications, blood draw, and to maintain line patency    Dosing anthropometrics:   Height (inches):  67   Weight (pounds):  186.7 (12/10)   BMI (kg/m^2):  29.2   IBW (pounds):  135 +/- 10%   %IBW:  138.3 %     WEIGHT CHANGE:   12/10     186.7 pounds  12/05     186.7 pounds  Pt weight stable during admission    ESTIMATED NUTRIENT NEEDS:   Calories:  (25-30 kcal/kg) 4016-8545 kcal   Protein:  (1.4-1.6 g/kg) 75-85 g   Fluids:  1 mL/kcal or at team’s discretion   Ideal body weight (IBW) being used as Pt is critically ill and >=100% of ideal body weight.     SUBJECTIVE:   69 yo female with Hx HTN, HLD, DM, CVA x 3 (last in 2016 with residual R hand weakness), GAMALIEL not using CPAP, Emphysema, ex-25 pack year smoker now restarted, chronic constipation on Movantik, chronic urinary retention (SC at home), b/l CTS s/p release, s/p Intrathecal Morphine pump for pain, with chronic neck and back pain worsening for years s/p multiple spinal surgeries including laminectomies and fusion of cervical, thoracic and lumbar spine, s/p T3-L1 revision of prior fusion (with  on 8/17/22). Xray 11/16/23 showing broken rods/displacement. Now s/p C2-S2 instrumentation and fusion (12/5). S/p T9-L2 decompression w/ durotomy with repair (12/10/23).      RD attempted 2x to speak to pt at bedside for nutrition follow up evaluation. Pt was receiving care; RD obtained information from nursing, medical team, electronic medical records; RD to follow up. Medications significant for insulin, multivitamin, polyethylene glycol, senna, zofran, bisacodyl. Labs: creatinine was low (0.46) elevated POCT Glucose (103-215). Pt was receiving care when RD attempted to see her; then when RD re-entered pt's room, she was asleep. Per nursing, no nausea or emesis; noted with distention; most recent BM on 12/25/23, fecal incontinence noted. Noted with severe (9) level of pain. Noted with posterior back surgical incisions; no pressure ulcers noted; no noted edema. Pt noted with fair PO intakes, ~50-75% overall. RD to remain available. Additional nutrition recommendations below.    PREVIOUS NUTRITION DIAGNOSIS: Increased protein/energy needs related to physiological causes increasing nutrient needs as evidenced by known increased metabolic demands for healing status post procedure    active: [ x ]  resolved: [   ]    GOAL: Consistently meet >75% of nutrition needs via most appropriate route for nutrition     NUTRITION RECOMMENDATIONS:   - Recommend continue current diet order (Consistent Carbohydrate Diet)  **consider oral nutrition supplements if pt's PO intakes decrease consistently to <50%, supplements and nourishments per pt preference**  - Defer consistency to team/speech and swallow team  - Monitor PO intake/appetite, diet tolerance, GI distress, labs (electrolytes, CMP)   - Requesting to trend weights (weekly) as able  - Honor Pt food preferences as able   - Pain and bowel regimen as per team     EDUCATION: deferred at this time. RD to follow up.     RISK LEVEL:  High [  ]       Moderate [ x ]       Low [  ]. Admitting Diagnosis:   Patient is a 68y old  Female who presents with a chief complaint of Myelopathy (13 Dec 2023 20:30)  PAST MEDICAL & SURGICAL HISTORY:  HTN (hypertension)  SS (spinal stenosis)  High cholesterol  Stroke  x3  H/O carpal tunnel syndrome  Bilateral  Chronic GERD  History of chronic constipation  Urinary incontinence  self cath as needed  Pre-diabetes  Anxiety and depression  Eczema  H/O kyphosis  Pancreas cyst  Scoliosis  Wheelchair dependent  Cervical pseudoarthrosis  and lumbar spine  Cervical spondylosis  Chronic headaches  Degenerative joint disease  left shoulder  Lumbosacral spondylosis  COPD (chronic obstructive pulmonary disease)  H/O Spinal surgery  lumbar x 30  H/O breast surgery  left breast implant 1980's  S/P cervical discectomy  x4  H/O total shoulder replacement, right    Current Nutrition Order: consistent carbohydrate diet with a snack     PO Intake: Good (%) [   ]  Fair (50-75%) [ x ] Poor (<25%) [   ]    GI Issues: no noted nausea or vomiting; distention noted; fecal incontinence noted; most recent BM on 12/25/23    Pain: noted with severe pain (level 9) to head    Skin Integrity: surgical incisions noted to back; no pressure ulcers; no edema; Filippo: 14    Labs:   CAPILLARY BLOOD GLUCOSE    Medications:  MEDICATIONS  (STANDING):  apixaban 5 milliGRAM(s) Oral every 12 hours  bisacodyl 5 milliGRAM(s) Oral at bedtime  diazepam    Tablet 5 milliGRAM(s) Oral every 8 hours  DULoxetine 60 milliGRAM(s) Oral daily  gabapentin 800 milliGRAM(s) Oral every 8 hours  influenza  Vaccine (HIGH DOSE) 0.7 milliLiter(s) IntraMuscular once  midodrine 15 milliGRAM(s) Oral every 8 hours  multivitamin 1 Tablet(s) Oral daily  naloxegol 25 milliGRAM(s) Oral daily  pantoprazole    Tablet 40 milliGRAM(s) Oral before breakfast  polyethylene glycol 3350 17 Gram(s) Oral two times a day  senna 2 Tablet(s) Oral at bedtime  simvastatin 40 milliGRAM(s) Oral at bedtime    MEDICATIONS  (PRN):  acetaminophen     Tablet .. 1000 milliGRAM(s) Oral every 8 hours PRN Temp greater or equal to 38.5C (101.3F), Mild Pain (1 - 3)  benzocaine 20% Spray 1 Spray(s) Mucosal three times a day PRN Sore throat  diphenhydrAMINE 25 milliGRAM(s) Oral every 12 hours PRN Rash and/or Itching  hydrocortisone 1% Cream 1 Application(s) Topical two times a day PRN Itching  naloxone Injectable 0.1 milliGRAM(s) IV Push every 3 minutes PRN For ANY of the following changes in patient status:  A. RR LESS THAN 10 breaths per minute, B. Oxygen saturation LESS THAN 90%, C. Sedation score of 6  ondansetron Injectable 4 milliGRAM(s) IV Push every 6 hours PRN Nausea  oxyCODONE    IR 10 milliGRAM(s) Oral every 4 hours PRN Moderate Pain (4 - 6)  oxyCODONE    IR 15 milliGRAM(s) Oral every 4 hours PRN Severe Pain (7 - 10)  sodium chloride 0.9% lock flush 10 milliLiter(s) IV Push every 1 hour PRN Pre/post blood products, medications, blood draw, and to maintain line patency    Dosing anthropometrics:   Height (inches):  67   Weight (pounds):  186.7 (12/10)   BMI (kg/m^2):  29.2   IBW (pounds):  135 +/- 10%   %IBW:  138.3 %     WEIGHT CHANGE:   12/10     186.7 pounds  12/05     186.7 pounds  Pt weight stable during admission    ESTIMATED NUTRIENT NEEDS:   Calories:  (25-30 kcal/kg) 7869-4215 kcal   Protein:  (1.4-1.6 g/kg) 75-85 g   Fluids:  1 mL/kcal or at team’s discretion   Ideal body weight (IBW) being used as Pt is critically ill and >=100% of ideal body weight.     SUBJECTIVE:   67 yo female with Hx HTN, HLD, DM, CVA x 3 (last in 2016 with residual R hand weakness), GAMALIEL not using CPAP, Emphysema, ex-25 pack year smoker now restarted, chronic constipation on Movantik, chronic urinary retention (SC at home), b/l CTS s/p release, s/p Intrathecal Morphine pump for pain, with chronic neck and back pain worsening for years s/p multiple spinal surgeries including laminectomies and fusion of cervical, thoracic and lumbar spine, s/p T3-L1 revision of prior fusion (with  on 8/17/22). Xray 11/16/23 showing broken rods/displacement. Now s/p C2-S2 instrumentation and fusion (12/5). S/p T9-L2 decompression w/ durotomy with repair (12/10/23).      RD attempted 2x to speak to pt at bedside for nutrition follow up evaluation. Pt was receiving care; RD obtained information from nursing, medical team, electronic medical records; RD to follow up. Medications significant for insulin, multivitamin, polyethylene glycol, senna, zofran, bisacodyl. Labs: creatinine was low (0.46) elevated POCT Glucose (103-215). Pt was receiving care when RD attempted to see her; then when RD re-entered pt's room, she was asleep. Per nursing, no nausea or emesis; noted with distention; most recent BM on 12/25/23, fecal incontinence noted. Noted with severe (9) level of pain. Noted with posterior back surgical incisions; no pressure ulcers noted; no noted edema. Pt noted with fair PO intakes, ~50-75% overall. RD to remain available. Additional nutrition recommendations below.    PREVIOUS NUTRITION DIAGNOSIS: Increased protein/energy needs related to physiological causes increasing nutrient needs as evidenced by known increased metabolic demands for healing status post procedure    active: [ x ]  resolved: [   ]    GOAL: Consistently meet >75% of nutrition needs via most appropriate route for nutrition     NUTRITION RECOMMENDATIONS:   - Recommend continue current diet order (Consistent Carbohydrate Diet)  **consider oral nutrition supplements if pt's PO intakes decrease consistently to <50%, supplements and nourishments per pt preference**  - Defer consistency to team/speech and swallow team  - Monitor PO intake/appetite, diet tolerance, GI distress, labs (electrolytes, CMP)   - Requesting to trend weights (weekly) as able  - Honor Pt food preferences as able   - Pain and bowel regimen as per team     EDUCATION: deferred at this time. RD to follow up.     RISK LEVEL:  High [  ]       Moderate [ x ]       Low [  ].

## 2023-12-26 NOTE — PROGRESS NOTE ADULT - ASSESSMENT
68F with PMH of HTN, HLD, DM2, CVA x 3 (last in 2016 with residual R hand weakness), GAMALIEL not using CPAP, Emphysema, ex-25 pack year smoker now restarted, chronic constipation on Movantik, chronic urinary retention (SC at home), b/l CTS s/p release, s/p Intrathecal Morphine pump for pain, with chronic neck and back pain worsening for years s/p multiple spinal surgeries including laminectomies and fusion of cervical, thoracic and lumbar spine, s/p T3-L1 revision of prior fusion (with  on 8/17/22). Xray 11/16/23 showing broken rods/displacement. Now s/p C2-S2 instrumentation and fusion (12/5). S/p T9-L2 decompression w/ durotomy with repair (12/10/23).     #Neck Pain  #Multiple Spine Surgeries  #Chronic Back Pain  -s/p fusion  -OT  -Pain Control per primary team  -Bowel Regimen   -Pending Rehab placement    #Chronic Constipation   - likely due to chronic opioid use  - c/w bowel regimen  - continue movantik    #HTN  -  Lopressor 75mg BID    #CAD  #CVA  - resume ASA post operatively when safe to do so  - continue atorvastatin  - awaiting results of recent long term outpatient cardiac monitoring to monitor for occult Afib to determine if a role for full dose AVC, follows with Dr Jeter    #DM  - ISS while inpatient    #Pulmonary embolism   -CTPE performed given persistent tachycardia, shown to have subsegmental and segmental PE  - c/w Eliquis     # Paraplegia     Dispo:  Medically Ready for [facility] , bed not available to [facility]       68F with PMH of HTN, HLD, DM2, CVA x 3 (last in 2016 with residual R hand weakness), GAMALIEL not using CPAP, Emphysema, ex-25 pack year smoker now restarted, chronic constipation on Movantik, chronic urinary retention (SC at home), b/l CTS s/p release, s/p Intrathecal Morphine pump for pain, with chronic neck and back pain worsening for years s/p multiple spinal surgeries including laminectomies and fusion of cervical, thoracic and lumbar spine, s/p T3-L1 revision of prior fusion (with  on 8/17/22). Xray 11/16/23 showing broken rods/displacement. Now s/p C2-S2 instrumentation and fusion (12/5). S/p T9-L2 decompression w/ durotomy with repair (12/10/23).     #Neck Pain  #Multiple Spine Surgeries  #Chronic Back Pain  -s/p fusion  -OT  -Pain Control per primary team  -Bowel Regimen   -Pending Rehab placement    #Chronic Constipation   - likely due to chronic opioid use  - c/w bowel regimen  - continue movantik    #HTN  -  Lopressor 75mg BID    #CAD  #CVA  - resume ASA post operatively when safe to do so  - continue atorvastatin  - awaiting results of recent long term outpatient cardiac monitoring to monitor for occult Afib to determine if a role for full dose AVC, follows with Dr Jeter    #DM  - ISS while inpatient    #Pulmonary embolism   -CTPE performed given persistent tachycardia, shown to have subsegmental and segmental PE  - c/w Eliquis     # Paraplegia     Dispo:  Medically Ready for [facility] , bed not available to [facility]

## 2023-12-26 NOTE — PROGRESS NOTE ADULT - SUBJECTIVE AND OBJECTIVE BOX
HPI:  68 y/o female with PMHx HTN, HLD, CVA x 3 (last was 2016 with right hand weakness residual), GAMALIEL not using CPAP, Emphysema, former 25 pack year smoker restarted this week of surgery, Chronic Constipation on Movantik, Urinary Incontinence chronically self straight cath at home, chronic UTI, Breast Implant Rupture with Chronic Leak repair 10/2023, B/L CTS s/p release, s/p Intrathecal Morphine pump for pain, with chronic neck and back pain worsening for years s/p multiple spinal surgeries including laminectomies and fusion of cervical, thoracic and lumbar spine initially done in 2009 and recently in 2019 and 2020 at [facility] by Dr. Trinidad, s/p T3-L1 revision of prior fusion (with Dr. Luz on 8/17/22). Outpatient Xray 11/16/2023 showing broken rods/displacement. Presents for elective C2-S2 instrumentation and fusion. Pt endorses weakness of b/l LE and burning pain in b/l lower extremities radiating to both feet,  (04 Dec 2023 15:44)    OVERNIGHT EVENTS: KAMRAN overnight, neuro stable    Hospital Course:   12/4: Admitted for spinal fusion d/t damaged hardware.   12/5: Neuro stable. POD0 C2-S2 instrumentation/fusion (intraop b/l LE motor loss). on parminder for MAP>90  12/6: POD1. KAMRAN o/n neuro stable. remains intubated. Extubated 6am. Precedex gtt prn. Remains on parminder gtt for MAP goal >90. Valium made prn. CT thoracic spine bc MRI unavailable. Attempted NGT, unsuccessful (resistance @ 35).   12/7: POD2 Given IV dilaudid 0.5mg for pain. Neuro exam stable. SQL started tongiht. Increased precedex to 1.0. Responds well to valium. Consulting psych given paranoia ?homicidal statements. Passed bedside dysphagia and tolerating PO meds. MRI complete, f/u read.   12/8: POD3 Pt reporting incisional pain, given dilaudid 0.5mg IV x2. Pt appearing anxious, given xanax 1mg. Neuro exam unchanged. Pain regimen increased to oxy 15/30 mg, pending further recs. Given 1 L bolus for tachycardia. Trops negative. 2 HMV drains removed.   12/9: POD4, KAMRAN, CT myelogram today showing block at T12-L1, OR tomorrow  12/10: POD5, KAMRAN, OR today for exploration of fusion, possible decompression T9-L2, revision instrumentation  POD 0 T9-L2 decompression. Pt returned from OR intubated. 50mcg fentanyl IVP x 2 for pain control and HTN. Magnesium repleted. Precedex gtt started for sedation. Decreased FiO2 to 40%. 1L bolus for soft MAP. Dc'd propofol and started levo gtt for MAP >90.   12/11: POD 6, POD 1. Extubated, satting well on RA. Resumed home valium and gabapentin. Castillo d/c'd, pending TOV. Seroquel started for agitation, R IJ removed, LUE double midline placed. Started SQL.   12/12: POD7, POD2, given 500 cc bolus and started on phenylephrine for MAP goal >90. Started midodrine 10mg q8h. Cardura dc'd d/t hypotension. Given 250cc of albumin for increasing pressor requirements. Given lactulose, pending BM. Hgb 7.6<8.3, given 1u PRBC. Protected sleep time. Urology consulted for possible suprapubic cath, recommend self intermittent cath is optimal.   12/13: POD8, POD3 Midodrine increased to 15q8. Pt refused to take the extra 5mg midodrin ordered. Neuro exam stable. hgb 10 from 9.4 after 1u pRBCs, Pt refused AM meds, given in late morning, CTAP ordered for L sided abd pain, fleet enema, restarted home movantik and ordere fleet enema for constipation. YIFAN and HMV dc'd. 250cc albumin x1  12/14: POD9, POD4, KAMRAN overnight, neuro stable. MAP goal liberalized to > 65. Stepdown status.   12/15: POD10, POD5, KAMRAN overnight  12/16: POD11, POD6, KAMRAN overnight, neuro stable, 500 cc bolus NS given for tachycardia. Aquacell changed. CXR, pro-bnp negative for pulmonary edema. Given additional 500cc bolus.  12/17: POD 12. POD 7. KAMRAN overnight, neuro stable.   12/18: POD 13/8. Incontinent x2 today.   12/19: KAMRAN. Persistent tachycardia, r/o PE. CTPE protocol ordered. Refused all afternoon meds except oxy 30. Per Dr. Cowart (rad) CT chest w segmental and subsegmental PE in R upper lobe. Dopplers negative for DVT, CT PE shows segmental and subsegmental PE in R upper lobe. Per Dr. Augustine and Dr. Luz, Will start eliquis pending pain management approval. Per Dr. Mclaughlin (pain mgmt) can start eliquis 10mg BIDx 7 days. Dc'd lovenox.  12/20: POD 10 T9-L2 Decompression. Hemodynamically stable. Pending rehab. PT today.   12/21: POD 11 T9-L2 Decompression. neruo/hemodynamically stable, pending rehab.  12/22: POD 17/12, pending rehab. Aquacel dressing changed.   12/23: POD 18/13, pending rehab.   12/24: POD 19/14, pending rehab.   12/25: POD 20/15, pending rehab.   12/26: POD 21/16, pending rehab.     Vital Signs Last 24 Hrs  T(C): 36.4 (25 Dec 2023 23:05), Max: 36.6 (25 Dec 2023 20:11)  T(F): 97.6 (25 Dec 2023 23:05), Max: 97.9 (25 Dec 2023 20:11)  HR: 88 (26 Dec 2023 00:44) (85 - 98)  BP: 119/71 (26 Dec 2023 00:44) (114/75 - 138/83)  BP(mean): --  RR: 18 (25 Dec 2023 23:05) (15 - 18)  SpO2: 96% (25 Dec 2023 23:05) (93% - 100%)    Parameters below as of 25 Dec 2023 23:05  Patient On (Oxygen Delivery Method): room air        I&O's Summary    24 Dec 2023 07:01  -  25 Dec 2023 07:00  --------------------------------------------------------  IN: 0 mL / OUT: 1100 mL / NET: -1100 mL    25 Dec 2023 07:01  -  26 Dec 2023 02:55  --------------------------------------------------------  IN: 0 mL / OUT: 1500 mL / NET: -1500 mL            LABS:    PHYSICAL EXAM:  Constitutional: awake, alert, sitting up in bed. NAD.   Respiratory: non-labored breathing. Normal chest rise.   Cardiovascular: Regular rate and rhythm  Gastrointestinal:  Soft, nontender, nondistended.  .  Vascular: Extremities warm, no ulcers, no discoloration of skin.   Neurological: Gen: AA&O x 3, conversant, appropriate.      CN II-XII grossly intact.    Motor: RUE/LUE 5/5, RLE/LLE 0/5     Sens: Decreased sensation below T11    Extremities: warm and well perfused   Wound/incision: thoracolumbar incision c/d/i       TUBES/LINES:  [] Castillo  [] Lumbar Drain  [] Wound Drains  [] Others      DIET:  [] NPO  [x] Mechanical  [] Tube feeds                CAPILLARY BLOOD GLUCOSE          Drug Levels: [] N/A    CSF Analysis: [] N/A      Allergies    Crab (Pruritus)  No Known Drug Allergies    Intolerances      MEDICATIONS:  Antibiotics:    Neuro:  acetaminophen     Tablet .. 1000 milliGRAM(s) Oral every 8 hours PRN  diazepam    Tablet 5 milliGRAM(s) Oral every 8 hours  DULoxetine 60 milliGRAM(s) Oral daily  gabapentin 800 milliGRAM(s) Oral every 8 hours  ondansetron Injectable 4 milliGRAM(s) IV Push every 6 hours PRN  oxyCODONE    IR 10 milliGRAM(s) Oral every 4 hours PRN  oxyCODONE    IR 15 milliGRAM(s) Oral every 4 hours PRN    Anticoagulation:  apixaban 5 milliGRAM(s) Oral every 12 hours    OTHER:  benzocaine 20% Spray 1 Spray(s) Mucosal three times a day PRN  bisacodyl 5 milliGRAM(s) Oral at bedtime  hydrocortisone 1% Cream 1 Application(s) Topical two times a day PRN  influenza  Vaccine (HIGH DOSE) 0.7 milliLiter(s) IntraMuscular once  midodrine 15 milliGRAM(s) Oral every 8 hours  Morphine 1mg /1mL 17.9 milliLITERS 17.9 milliLiter(s) IntraThecal Continuous Pump  naloxegol 25 milliGRAM(s) Oral daily  naloxone Injectable 0.1 milliGRAM(s) IV Push every 3 minutes PRN  pantoprazole    Tablet 40 milliGRAM(s) Oral before breakfast  polyethylene glycol 3350 17 Gram(s) Oral two times a day  senna 2 Tablet(s) Oral at bedtime  simvastatin 40 milliGRAM(s) Oral at bedtime    IVF:  multivitamin 1 Tablet(s) Oral daily    CULTURES:    RADIOLOGY & ADDITIONAL TESTS:      ASSESSMENT:  67 yo female with Hx HTN, HLD, DM, CVA x 3 (last in 2016 with residual R hand weakness), GAMALIEL not using CPAP, Emphysema, ex-25 pack year smoker now restarted, chronic constipation on Movantik, chronic urinary retention (SC at home), b/l CTS s/p release, s/p Intrathecal Morphine pump for pain, with chronic neck and back pain worsening for years s/p multiple spinal surgeries including laminectomies and fusion of cervical, thoracic and lumbar spine, s/p T3-L1 revision of prior fusion (with  on 8/17/22). Xray 11/16/23 showing broken rods/displacement. Now s/p C2-S2 instrumentation and fusion (12/5). S/p T9-L2 decompression w/ durotomy with repair (12/10/23). KAREEM grade A.       S12.9XXA    Handoff    MEWS Score    HTN (hypertension)    Back pain    Hypertension    SS (spinal stenosis)    High cholesterol    Stroke    H/O carpal tunnel syndrome    H/O carpal tunnel syndrome    Chronic GERD    History of chronic constipation    Seizure    Urinary incontinence    Pre-diabetes    Acute UTI    Anxiety    Anxiety and depression    Arthritis    Eczema    External hemorrhoids    H/O kyphosis    Multiple sclerosis    Pancreas cyst    Scoliosis    Wheelchair dependent    Cervical pseudoarthrosis    Right shoulder pain    Cervical spondylosis    Chronic headaches    Chronic LUQ pain    Chronic UTI    Degenerative joint disease    H/O low back pain    Lumbosacral spondylosis    COPD (chronic obstructive pulmonary disease)    Back pain    Back pain    Spinal cord injury, thoracic (T7-T12)    Back pain    Spinal cord injury of thoracic region without bone injury    Delirium    Revision of fusion of thoracic spine    Thoracic back pain    Revision of posterolateral fusion of lumbar spine    Revision of fusion of thoracic spine    Decompression, spinal cord, thoracic, posterolateral approach    Cervical disc disease    H/O Spinal surgery    H/O breast surgery    S/P cervical discectomy    Previous back surgery    H/O shoulder surgery    H/O total shoulder replacement, right    Room Service Assist    Revision of posterolateral fusion of lumbar spine    Decompression, spinal cord, thoracic, posterolateral approach    HTN (hypertension)    HLD (hyperlipidemia)    CVA (cerebrovascular accident)    GAMALIEL (obstructive sleep apnea)    H/O emphysema    Smoking history    Chronic constipation    Urinary incontinence    Pulmonary embolism    Diabetes    GERD (gastroesophageal reflux disease)    SysAdmin_VstLnk          Plan:  Neuro:  - Neuro/vitals q4  - pain control: modified ERAS, IT morphine pump, oxycodone 10/15 prn, valium and home gabapentin 800 TID resumed  - Continue home cymbalta 60mg daily   - CT thoracic spine 12/6: postop changes  - protected sleep time 10PM-4AM     Cardio:  - MAP > 65   - midodrine 15q8   - C/w home simvastatin  - Sinus tachycardia - likely 2/2 PE (12/19)      Pulm:  - RA   - hx GAMALIEL, no CPAP at home  - PE protocol, 12/19: DVT doppler neg, CTA w segmental and subsegmental PE in R upper lobe  - 10 mg eliquis BID for R upper lobe PE (12/19-) x 7 d then 5 mg BID     GI:  - CCD   - Bowel regimen, home movantik 25 mg, last BM 12/17  - CT A/P 12/13 = impacted stool   - GERD: continue home protonix    Renal:  - Straight cath q6  - Patient not interested in suprapubic catheter placement    Endo:  - a1c: 7    Heme:  - SCDs DVT ppx  - Eliquis d/t PE   - 500 cell saver intraop 12/5, 1u PRBC intraop 12/10, 1u PRBC 12/12    ID:  - afebrile    Dispo:  - SDU status, full code, pending AR    D/w Dr. Luz    HPI:  68 y/o female with PMHx HTN, HLD, CVA x 3 (last was 2016 with right hand weakness residual), GAMALIEL not using CPAP, Emphysema, former 25 pack year smoker restarted this week of surgery, Chronic Constipation on Movantik, Urinary Incontinence chronically self straight cath at home, chronic UTI, Breast Implant Rupture with Chronic Leak repair 10/2023, B/L CTS s/p release, s/p Intrathecal Morphine pump for pain, with chronic neck and back pain worsening for years s/p multiple spinal surgeries including laminectomies and fusion of cervical, thoracic and lumbar spine initially done in 2009 and recently in 2019 and 2020 at [facility] by Dr. Trinidad, s/p T3-L1 revision of prior fusion (with Dr. Luz on 8/17/22). Outpatient Xray 11/16/2023 showing broken rods/displacement. Presents for elective C2-S2 instrumentation and fusion. Pt endorses weakness of b/l LE and burning pain in b/l lower extremities radiating to both feet,  (04 Dec 2023 15:44)    OVERNIGHT EVENTS: KAMRAN overnight, neuro stable    Hospital Course:   12/4: Admitted for spinal fusion d/t damaged hardware.   12/5: Neuro stable. POD0 C2-S2 instrumentation/fusion (intraop b/l LE motor loss). on parminder for MAP>90  12/6: POD1. KAMRAN o/n neuro stable. remains intubated. Extubated 6am. Precedex gtt prn. Remains on parminder gtt for MAP goal >90. Valium made prn. CT thoracic spine bc MRI unavailable. Attempted NGT, unsuccessful (resistance @ 35).   12/7: POD2 Given IV dilaudid 0.5mg for pain. Neuro exam stable. SQL started tongiht. Increased precedex to 1.0. Responds well to valium. Consulting psych given paranoia ?homicidal statements. Passed bedside dysphagia and tolerating PO meds. MRI complete, f/u read.   12/8: POD3 Pt reporting incisional pain, given dilaudid 0.5mg IV x2. Pt appearing anxious, given xanax 1mg. Neuro exam unchanged. Pain regimen increased to oxy 15/30 mg, pending further recs. Given 1 L bolus for tachycardia. Trops negative. 2 HMV drains removed.   12/9: POD4, KAMRAN, CT myelogram today showing block at T12-L1, OR tomorrow  12/10: POD5, KAMRAN, OR today for exploration of fusion, possible decompression T9-L2, revision instrumentation  POD 0 T9-L2 decompression. Pt returned from OR intubated. 50mcg fentanyl IVP x 2 for pain control and HTN. Magnesium repleted. Precedex gtt started for sedation. Decreased FiO2 to 40%. 1L bolus for soft MAP. Dc'd propofol and started levo gtt for MAP >90.   12/11: POD 6, POD 1. Extubated, satting well on RA. Resumed home valium and gabapentin. Castillo d/c'd, pending TOV. Seroquel started for agitation, R IJ removed, LUE double midline placed. Started SQL.   12/12: POD7, POD2, given 500 cc bolus and started on phenylephrine for MAP goal >90. Started midodrine 10mg q8h. Cardura dc'd d/t hypotension. Given 250cc of albumin for increasing pressor requirements. Given lactulose, pending BM. Hgb 7.6<8.3, given 1u PRBC. Protected sleep time. Urology consulted for possible suprapubic cath, recommend self intermittent cath is optimal.   12/13: POD8, POD3 Midodrine increased to 15q8. Pt refused to take the extra 5mg midodrin ordered. Neuro exam stable. hgb 10 from 9.4 after 1u pRBCs, Pt refused AM meds, given in late morning, CTAP ordered for L sided abd pain, fleet enema, restarted home movantik and ordere fleet enema for constipation. YIFAN and HMV dc'd. 250cc albumin x1  12/14: POD9, POD4, KAMRAN overnight, neuro stable. MAP goal liberalized to > 65. Stepdown status.   12/15: POD10, POD5, KAMRAN overnight  12/16: POD11, POD6, KAMRAN overnight, neuro stable, 500 cc bolus NS given for tachycardia. Aquacell changed. CXR, pro-bnp negative for pulmonary edema. Given additional 500cc bolus.  12/17: POD 12. POD 7. KAMRAN overnight, neuro stable.   12/18: POD 13/8. Incontinent x2 today.   12/19: KAMRAN. Persistent tachycardia, r/o PE. CTPE protocol ordered. Refused all afternoon meds except oxy 30. Per Dr. Cowart (rad) CT chest w segmental and subsegmental PE in R upper lobe. Dopplers negative for DVT, CT PE shows segmental and subsegmental PE in R upper lobe. Per Dr. Augustine and Dr. Luz, Will start eliquis pending pain management approval. Per Dr. Mclaughlin (pain mgmt) can start eliquis 10mg BIDx 7 days. Dc'd lovenox.  12/20: POD 10 T9-L2 Decompression. Hemodynamically stable. Pending rehab. PT today.   12/21: POD 11 T9-L2 Decompression. neruo/hemodynamically stable, pending rehab.  12/22: POD 17/12, pending rehab. Aquacel dressing changed.   12/23: POD 18/13, pending rehab.   12/24: POD 19/14, pending rehab.   12/25: POD 20/15, pending rehab.   12/26: POD 21/16, pending rehab.     Vital Signs Last 24 Hrs  T(C): 36.4 (25 Dec 2023 23:05), Max: 36.6 (25 Dec 2023 20:11)  T(F): 97.6 (25 Dec 2023 23:05), Max: 97.9 (25 Dec 2023 20:11)  HR: 88 (26 Dec 2023 00:44) (85 - 98)  BP: 119/71 (26 Dec 2023 00:44) (114/75 - 138/83)  BP(mean): --  RR: 18 (25 Dec 2023 23:05) (15 - 18)  SpO2: 96% (25 Dec 2023 23:05) (93% - 100%)    Parameters below as of 25 Dec 2023 23:05  Patient On (Oxygen Delivery Method): room air        I&O's Summary    24 Dec 2023 07:01  -  25 Dec 2023 07:00  --------------------------------------------------------  IN: 0 mL / OUT: 1100 mL / NET: -1100 mL    25 Dec 2023 07:01  -  26 Dec 2023 02:55  --------------------------------------------------------  IN: 0 mL / OUT: 1500 mL / NET: -1500 mL            LABS:    PHYSICAL EXAM:  Constitutional: awake, alert, sitting up in bed. NAD.   Respiratory: non-labored breathing. Normal chest rise.   Cardiovascular: Regular rate and rhythm  Gastrointestinal:  Soft, nontender, nondistended.  .  Vascular: Extremities warm, no ulcers, no discoloration of skin.   Neurological: Gen: AA&O x 3, conversant, appropriate.      CN II-XII grossly intact.    Motor: RUE/LUE 5/5, RLE/LLE 0/5     Sens: Decreased sensation below T11    Extremities: warm and well perfused   Wound/incision: thoracolumbar incision c/d/i       TUBES/LINES:  [] Castillo  [] Lumbar Drain  [] Wound Drains  [] Others      DIET:  [] NPO  [x] Mechanical  [] Tube feeds                CAPILLARY BLOOD GLUCOSE          Drug Levels: [] N/A    CSF Analysis: [] N/A      Allergies    Crab (Pruritus)  No Known Drug Allergies    Intolerances      MEDICATIONS:  Antibiotics:    Neuro:  acetaminophen     Tablet .. 1000 milliGRAM(s) Oral every 8 hours PRN  diazepam    Tablet 5 milliGRAM(s) Oral every 8 hours  DULoxetine 60 milliGRAM(s) Oral daily  gabapentin 800 milliGRAM(s) Oral every 8 hours  ondansetron Injectable 4 milliGRAM(s) IV Push every 6 hours PRN  oxyCODONE    IR 10 milliGRAM(s) Oral every 4 hours PRN  oxyCODONE    IR 15 milliGRAM(s) Oral every 4 hours PRN    Anticoagulation:  apixaban 5 milliGRAM(s) Oral every 12 hours    OTHER:  benzocaine 20% Spray 1 Spray(s) Mucosal three times a day PRN  bisacodyl 5 milliGRAM(s) Oral at bedtime  hydrocortisone 1% Cream 1 Application(s) Topical two times a day PRN  influenza  Vaccine (HIGH DOSE) 0.7 milliLiter(s) IntraMuscular once  midodrine 15 milliGRAM(s) Oral every 8 hours  Morphine 1mg /1mL 17.9 milliLITERS 17.9 milliLiter(s) IntraThecal Continuous Pump  naloxegol 25 milliGRAM(s) Oral daily  naloxone Injectable 0.1 milliGRAM(s) IV Push every 3 minutes PRN  pantoprazole    Tablet 40 milliGRAM(s) Oral before breakfast  polyethylene glycol 3350 17 Gram(s) Oral two times a day  senna 2 Tablet(s) Oral at bedtime  simvastatin 40 milliGRAM(s) Oral at bedtime    IVF:  multivitamin 1 Tablet(s) Oral daily    CULTURES:    RADIOLOGY & ADDITIONAL TESTS:      ASSESSMENT:  69 yo female with Hx HTN, HLD, DM, CVA x 3 (last in 2016 with residual R hand weakness), GAMALIEL not using CPAP, Emphysema, ex-25 pack year smoker now restarted, chronic constipation on Movantik, chronic urinary retention (SC at home), b/l CTS s/p release, s/p Intrathecal Morphine pump for pain, with chronic neck and back pain worsening for years s/p multiple spinal surgeries including laminectomies and fusion of cervical, thoracic and lumbar spine, s/p T3-L1 revision of prior fusion (with  on 8/17/22). Xray 11/16/23 showing broken rods/displacement. Now s/p C2-S2 instrumentation and fusion (12/5). S/p T9-L2 decompression w/ durotomy with repair (12/10/23). KAREEM grade A.       S12.9XXA    Handoff    MEWS Score    HTN (hypertension)    Back pain    Hypertension    SS (spinal stenosis)    High cholesterol    Stroke    H/O carpal tunnel syndrome    H/O carpal tunnel syndrome    Chronic GERD    History of chronic constipation    Seizure    Urinary incontinence    Pre-diabetes    Acute UTI    Anxiety    Anxiety and depression    Arthritis    Eczema    External hemorrhoids    H/O kyphosis    Multiple sclerosis    Pancreas cyst    Scoliosis    Wheelchair dependent    Cervical pseudoarthrosis    Right shoulder pain    Cervical spondylosis    Chronic headaches    Chronic LUQ pain    Chronic UTI    Degenerative joint disease    H/O low back pain    Lumbosacral spondylosis    COPD (chronic obstructive pulmonary disease)    Back pain    Back pain    Spinal cord injury, thoracic (T7-T12)    Back pain    Spinal cord injury of thoracic region without bone injury    Delirium    Revision of fusion of thoracic spine    Thoracic back pain    Revision of posterolateral fusion of lumbar spine    Revision of fusion of thoracic spine    Decompression, spinal cord, thoracic, posterolateral approach    Cervical disc disease    H/O Spinal surgery    H/O breast surgery    S/P cervical discectomy    Previous back surgery    H/O shoulder surgery    H/O total shoulder replacement, right    Room Service Assist    Revision of posterolateral fusion of lumbar spine    Decompression, spinal cord, thoracic, posterolateral approach    HTN (hypertension)    HLD (hyperlipidemia)    CVA (cerebrovascular accident)    GAMALIEL (obstructive sleep apnea)    H/O emphysema    Smoking history    Chronic constipation    Urinary incontinence    Pulmonary embolism    Diabetes    GERD (gastroesophageal reflux disease)    SysAdmin_VstLnk          Plan:  Neuro:  - Neuro/vitals q4  - pain control: modified ERAS, IT morphine pump, oxycodone 10/15 prn, valium and home gabapentin 800 TID resumed  - Continue home cymbalta 60mg daily   - CT thoracic spine 12/6: postop changes  - protected sleep time 10PM-4AM     Cardio:  - MAP > 65   - midodrine 15q8   - C/w home simvastatin  - Sinus tachycardia - likely 2/2 PE (12/19)      Pulm:  - RA   - hx GAMALIEL, no CPAP at home  - PE protocol, 12/19: DVT doppler neg, CTA w segmental and subsegmental PE in R upper lobe  - 10 mg eliquis BID for R upper lobe PE (12/19-) x 7 d then 5 mg BID     GI:  - CCD   - Bowel regimen, home movantik 25 mg, last BM 12/17  - CT A/P 12/13 = impacted stool   - GERD: continue home protonix    Renal:  - Straight cath q6  - Patient not interested in suprapubic catheter placement    Endo:  - a1c: 7    Heme:  - SCDs DVT ppx  - Eliquis d/t PE   - 500 cell saver intraop 12/5, 1u PRBC intraop 12/10, 1u PRBC 12/12    ID:  - afebrile    Dispo:  - SDU status, full code, pending AR    D/w Dr. Luz

## 2023-12-26 NOTE — CHART NOTE - NSCHARTNOTEFT_GEN_A_CORE
Patient's Morphine pump was refilled at bedside today with the assistance of the [employer] representative. 20mL of Morphine 1.0 concentration was refilled via the right abdominal Morphine pump reservoir without incident. Discussed with Dr. Ott and Dr. Mclaughlin.

## 2023-12-26 NOTE — PROGRESS NOTE ADULT - SUBJECTIVE AND OBJECTIVE BOX
Patient is a 68y old  Female who presents with a chief complaint of Myelopathy (13 Dec 2023 20:30)        SUBJECTIVE:  Patient was seen and examined at bedside.    Overnight Events :  No new complaints today        Review of systems: 12 point Review of systems negative unless otherwise documented elsewhere in note.     Diet, Consistent Carbohydrate w/Evening Snack (12-11-23 @ 09:57) [Active]      MEDICATIONS:  MEDICATIONS  (STANDING):  apixaban 5 milliGRAM(s) Oral every 12 hours  bisacodyl 5 milliGRAM(s) Oral at bedtime  diazepam    Tablet 5 milliGRAM(s) Oral every 8 hours  DULoxetine 60 milliGRAM(s) Oral daily  Duramorph 1 mg/ 1mL 20 milliLiter(s) 20 milliLiter(s) IntraThecal Continuous Pump  gabapentin 800 milliGRAM(s) Oral every 8 hours  influenza  Vaccine (HIGH DOSE) 0.7 milliLiter(s) IntraMuscular once  midodrine 15 milliGRAM(s) Oral every 8 hours  multivitamin 1 Tablet(s) Oral daily  naloxegol 25 milliGRAM(s) Oral daily  pantoprazole    Tablet 40 milliGRAM(s) Oral before breakfast  polyethylene glycol 3350 17 Gram(s) Oral two times a day  senna 2 Tablet(s) Oral at bedtime  simvastatin 40 milliGRAM(s) Oral at bedtime    MEDICATIONS  (PRN):  acetaminophen     Tablet .. 1000 milliGRAM(s) Oral every 8 hours PRN Temp greater or equal to 38.5C (101.3F), Mild Pain (1 - 3)  benzocaine 20% Spray 1 Spray(s) Mucosal three times a day PRN Sore throat  diphenhydrAMINE 25 milliGRAM(s) Oral every 12 hours PRN Rash and/or Itching  hydrocortisone 1% Cream 1 Application(s) Topical two times a day PRN Itching  naloxone Injectable 0.1 milliGRAM(s) IV Push every 3 minutes PRN For ANY of the following changes in patient status:  A. RR LESS THAN 10 breaths per minute, B. Oxygen saturation LESS THAN 90%, C. Sedation score of 6  ondansetron Injectable 4 milliGRAM(s) IV Push every 6 hours PRN Nausea  oxyCODONE    IR 10 milliGRAM(s) Oral every 4 hours PRN Moderate Pain (4 - 6)  oxyCODONE    IR 15 milliGRAM(s) Oral every 4 hours PRN Severe Pain (7 - 10)  sodium chloride 0.9% lock flush 10 milliLiter(s) IV Push every 1 hour PRN Pre/post blood products, medications, blood draw, and to maintain line patency      Allergies    Crab (Pruritus)  No Known Drug Allergies    Intolerances        OBJECTIVE:  Vital Signs Last 24 Hrs  T(C): 36.6 (26 Dec 2023 08:00), Max: 36.6 (25 Dec 2023 20:11)  T(F): 97.9 (26 Dec 2023 08:00), Max: 97.9 (25 Dec 2023 20:11)  HR: 78 (26 Dec 2023 11:40) (78 - 98)  BP: 130/58 (26 Dec 2023 11:40) (114/75 - 138/83)  BP(mean): --  RR: 18 (26 Dec 2023 08:00) (15 - 18)  SpO2: 99% (26 Dec 2023 08:00) (96% - 100%)    Parameters below as of 26 Dec 2023 08:00  Patient On (Oxygen Delivery Method): room air      I&O's Summary    25 Dec 2023 07:01  -  26 Dec 2023 07:00  --------------------------------------------------------  IN: 0 mL / OUT: 1500 mL / NET: -1500 mL    26 Dec 2023 07:01  -  26 Dec 2023 14:42  --------------------------------------------------------  IN: 0 mL / OUT: 1100 mL / NET: -1100 mL        PHYSICAL EXAM:  Gen: Resting in bed at time of exam, not in distress   HEENT: moist mucosa, no lesions   Neck: supple, trachea at midline  CV: RRR, +S1/S2  Pulm: no wheezing , no crackles  no increase in work of breathing  Abd: soft, NTND  Skin: warm and dry, no new rashes   Neuro: AOx3, Lower Ext motor strength 0/5   Psych: affect and behavior appropriate, pleasant at time of interview    LABS:              CAPILLARY BLOOD GLUCOSE            MICRODATA:      RADIOLOGY/OTHER STUDIES:

## 2023-12-27 PROCEDURE — 99232 SBSQ HOSP IP/OBS MODERATE 35: CPT

## 2023-12-27 PROCEDURE — 99222 1ST HOSP IP/OBS MODERATE 55: CPT | Mod: GC

## 2023-12-27 RX ORDER — CHLORHEXIDINE GLUCONATE 213 G/1000ML
1 SOLUTION TOPICAL
Refills: 0 | Status: DISCONTINUED | OUTPATIENT
Start: 2023-12-27 | End: 2024-01-05

## 2023-12-27 RX ADMIN — APIXABAN 5 MILLIGRAM(S): 2.5 TABLET, FILM COATED ORAL at 05:34

## 2023-12-27 RX ADMIN — APIXABAN 5 MILLIGRAM(S): 2.5 TABLET, FILM COATED ORAL at 17:50

## 2023-12-27 RX ADMIN — OXYCODONE HYDROCHLORIDE 15 MILLIGRAM(S): 5 TABLET ORAL at 11:45

## 2023-12-27 RX ADMIN — OXYCODONE HYDROCHLORIDE 15 MILLIGRAM(S): 5 TABLET ORAL at 06:30

## 2023-12-27 RX ADMIN — OXYCODONE HYDROCHLORIDE 15 MILLIGRAM(S): 5 TABLET ORAL at 17:50

## 2023-12-27 RX ADMIN — OXYCODONE HYDROCHLORIDE 15 MILLIGRAM(S): 5 TABLET ORAL at 12:15

## 2023-12-27 RX ADMIN — CHLORHEXIDINE GLUCONATE 1 APPLICATION(S): 213 SOLUTION TOPICAL at 11:55

## 2023-12-27 RX ADMIN — Medication 25 MILLIGRAM(S): at 08:34

## 2023-12-27 RX ADMIN — SIMVASTATIN 40 MILLIGRAM(S): 20 TABLET, FILM COATED ORAL at 21:17

## 2023-12-27 RX ADMIN — GABAPENTIN 800 MILLIGRAM(S): 400 CAPSULE ORAL at 02:15

## 2023-12-27 RX ADMIN — GABAPENTIN 800 MILLIGRAM(S): 400 CAPSULE ORAL at 17:50

## 2023-12-27 RX ADMIN — MIDODRINE HYDROCHLORIDE 15 MILLIGRAM(S): 2.5 TABLET ORAL at 17:50

## 2023-12-27 RX ADMIN — Medication 5 MILLIGRAM(S): at 21:17

## 2023-12-27 RX ADMIN — Medication 5 MILLIGRAM(S): at 06:40

## 2023-12-27 RX ADMIN — DULOXETINE HYDROCHLORIDE 60 MILLIGRAM(S): 30 CAPSULE, DELAYED RELEASE ORAL at 11:46

## 2023-12-27 RX ADMIN — PANTOPRAZOLE SODIUM 40 MILLIGRAM(S): 20 TABLET, DELAYED RELEASE ORAL at 05:34

## 2023-12-27 RX ADMIN — MIDODRINE HYDROCHLORIDE 15 MILLIGRAM(S): 2.5 TABLET ORAL at 11:52

## 2023-12-27 RX ADMIN — GABAPENTIN 800 MILLIGRAM(S): 400 CAPSULE ORAL at 11:46

## 2023-12-27 RX ADMIN — OXYCODONE HYDROCHLORIDE 15 MILLIGRAM(S): 5 TABLET ORAL at 18:20

## 2023-12-27 RX ADMIN — Medication 1 TABLET(S): at 11:51

## 2023-12-27 RX ADMIN — MIDODRINE HYDROCHLORIDE 15 MILLIGRAM(S): 2.5 TABLET ORAL at 02:15

## 2023-12-27 RX ADMIN — Medication 5 MILLIGRAM(S): at 14:16

## 2023-12-27 RX ADMIN — OXYCODONE HYDROCHLORIDE 15 MILLIGRAM(S): 5 TABLET ORAL at 05:34

## 2023-12-27 NOTE — PROGRESS NOTE ADULT - SUBJECTIVE AND OBJECTIVE BOX
Patient is a 68y old  Female who presents with a chief complaint of Myelopathy (13 Dec 2023 20:30)        SUBJECTIVE:  Patient was seen and examined at bedside.    Overnight Events :  No new complaints today        Review of systems: 12 point Review of systems negative unless otherwise documented elsewhere in note.     Diet, Consistent Carbohydrate w/Evening Snack (12-11-23 @ 09:57) [Active]      MEDICATIONS:  MEDICATIONS  (STANDING):  apixaban 5 milliGRAM(s) Oral every 12 hours  bisacodyl 5 milliGRAM(s) Oral at bedtime  diazepam    Tablet 5 milliGRAM(s) Oral every 8 hours  DULoxetine 60 milliGRAM(s) Oral daily  Duramorph 1 mg/ 1mL 20 milliLiter(s) 20 milliLiter(s) IntraThecal Continuous Pump  gabapentin 800 milliGRAM(s) Oral every 8 hours  influenza  Vaccine (HIGH DOSE) 0.7 milliLiter(s) IntraMuscular once  midodrine 15 milliGRAM(s) Oral every 8 hours  multivitamin 1 Tablet(s) Oral daily  naloxegol 25 milliGRAM(s) Oral daily  pantoprazole    Tablet 40 milliGRAM(s) Oral before breakfast  polyethylene glycol 3350 17 Gram(s) Oral two times a day  senna 2 Tablet(s) Oral at bedtime  simvastatin 40 milliGRAM(s) Oral at bedtime    MEDICATIONS  (PRN):  acetaminophen     Tablet .. 1000 milliGRAM(s) Oral every 8 hours PRN Temp greater or equal to 38.5C (101.3F), Mild Pain (1 - 3)  benzocaine 20% Spray 1 Spray(s) Mucosal three times a day PRN Sore throat  diphenhydrAMINE 25 milliGRAM(s) Oral every 12 hours PRN Rash and/or Itching  hydrocortisone 1% Cream 1 Application(s) Topical two times a day PRN Itching  naloxone Injectable 0.1 milliGRAM(s) IV Push every 3 minutes PRN For ANY of the following changes in patient status:  A. RR LESS THAN 10 breaths per minute, B. Oxygen saturation LESS THAN 90%, C. Sedation score of 6  ondansetron Injectable 4 milliGRAM(s) IV Push every 6 hours PRN Nausea  oxyCODONE    IR 10 milliGRAM(s) Oral every 4 hours PRN Moderate Pain (4 - 6)  oxyCODONE    IR 15 milliGRAM(s) Oral every 4 hours PRN Severe Pain (7 - 10)  sodium chloride 0.9% lock flush 10 milliLiter(s) IV Push every 1 hour PRN Pre/post blood products, medications, blood draw, and to maintain line patency      Allergies    Crab (Pruritus)  No Known Drug Allergies    Intolerances        OBJECTIVE:  Vital Signs Last 24 Hrs  T(C): 36.5 (27 Dec 2023 09:18), Max: 36.7 (26 Dec 2023 16:30)  T(F): 97.7 (27 Dec 2023 09:18), Max: 98.1 (26 Dec 2023 21:25)  HR: 95 (27 Dec 2023 09:18) (81 - 102)  BP: 144/78 (27 Dec 2023 09:18) (101/61 - 144/78)  BP(mean): --  RR: 18 (27 Dec 2023 09:18) (16 - 18)  SpO2: 98% (27 Dec 2023 09:18) (95% - 99%)    Parameters below as of 27 Dec 2023 09:18  Patient On (Oxygen Delivery Method): room air            PHYSICAL EXAM:  Gen: Resting in bed at time of exam, not in distress   HEENT: moist mucosa, no lesions   Neck: supple, trachea at midline  CV: RRR, +S1/S2  Pulm: no wheezing , no crackles  no increase in work of breathing  Abd: soft, NTND  Skin: warm and dry, no new rashes   Neuro: AOx3, Lower Ext motor strength 0/5   Psych: affect and behavior appropriate, pleasant at time of interview    LABS:              CAPILLARY BLOOD GLUCOSE            MICRODATA:      RADIOLOGY/OTHER STUDIES:

## 2023-12-27 NOTE — PROVIDER CONTACT NOTE (OTHER) - SITUATION
Patient mostly in SR lowest is upper 90s with ST as high as low 130s
Pt experiencing urinary retention. Pt unable to void and  BS showed 423 ml of urine.
Pt refused all morning medications and finger stick
Pt refusing to participate in neuro exam, refusing medications and finger stick

## 2023-12-27 NOTE — CONSULT NOTE ADULT - ASSESSMENT
69 YO female with history of CVAs (last 2016 with residual right hand weakness), chronic constipation, chronic pain s/p IT morphine pump, chronic urinary retention (self IC), s/p multiple spinal surgeries including laminectomies and fusion of cervical, thoracic and lumbar spine, s/p T3-L1 revision of prior fusion, admitted for revision of hardware failure s/p C2-S2 instrumentation and fusion (12/5). S/p T9-L2 decompression w/ durotomy with repair (12/10/23) with functional/ADL impairments including paraplegia KAREEM grade A.     PROBLEMS / IMPAIRMENTS:  Spine hardware failure s/p revision, C2-S2 fusion, decompression with durotomy   Complete Paraplegia   Neurogenic bladder with urinary retention - requires SC at home  Chronic constipation with likely neurogenic bowel with incontinence- on Movantik at home  Chronic neck/back pain - s/p IT morphine pump  Low BP- on midodrine  PEs - on eliquis    PLAN / RECOMMENDATIONS:     Rehab / Mobilization:   - Initial therapy assessment: [X] PT  [X] OT   - Continue skilled therapy while admitted to prevent secondary complications of immobility focus on transfer training, bed mobility, progressive ambulation, and equipment evaluation.   - Educated patient and family members on post-acute rehabilitation. Discussed anticipated rehab course.  - Optimize lower extremity positioning while in bed  - Encourage OOB throughout the day with staff or OOB in chair with meals   - Discussed with nursing to reposition q2 turning to prevent skin breakdown given limited mobility   - Therapy precautions: spine, BP monitoring    Low BP:  - Encouraged HOB elevation to at least 30-40 deg to prevent orthostasis   - Continue midodrine per primary team    Bracing/Splinting:   - Recommend CAIR Multipodus boots for positioning and contracture prevention    Pain Management:   - Currently well controlled on current regimen  - s/p IT morhpine pump. Interrogated by [employer]    GI/ Bowel:  - CT Abdomen with high stool burden. Could be having overflow incontinence  - Recommend optimizing bowel regimen for AM evacuation to prevent incontinence during the day:    Continue Movantik and Senna at bedtime    Continue Miralax BID    Recommend AM suppository - Need to check rectal vault for stool, may require manual disimpaction prior to suppository    / Bladder:  - Avoid anticholinergic medications  - Continue SC q6hrs   - Follow up with urology outpatient. May need urodynamics.   - Can consider Flomax 0.4mg nightly if BP stable    Disposition:  Plan for [facility] vs [facility].           67 YO female with history of CVAs (last 2016 with residual right hand weakness), chronic constipation, chronic pain s/p IT morphine pump, chronic urinary retention (self IC), s/p multiple spinal surgeries including laminectomies and fusion of cervical, thoracic and lumbar spine, s/p T3-L1 revision of prior fusion, admitted for revision of hardware failure s/p C2-S2 instrumentation and fusion (12/5). S/p T9-L2 decompression w/ durotomy with repair (12/10/23) with functional/ADL impairments including paraplegia KAREEM grade A.     PROBLEMS / IMPAIRMENTS:  Spine hardware failure s/p revision, C2-S2 fusion, decompression with durotomy   Complete Paraplegia   Neurogenic bladder with urinary retention - requires SC at home  Chronic constipation with likely neurogenic bowel with incontinence- on Movantik at home  Chronic neck/back pain - s/p IT morphine pump  Low BP- on midodrine  PEs - on eliquis    PLAN / RECOMMENDATIONS:     Rehab / Mobilization:   - Initial therapy assessment: [X] PT  [X] OT   - Continue skilled therapy while admitted to prevent secondary complications of immobility focus on transfer training, bed mobility, progressive ambulation, and equipment evaluation.   - Educated patient and family members on post-acute rehabilitation. Discussed anticipated rehab course.  - Optimize lower extremity positioning while in bed  - Encourage OOB throughout the day with staff or OOB in chair with meals   - Discussed with nursing to reposition q2 turning to prevent skin breakdown given limited mobility   - Therapy precautions: spine, BP monitoring    Low BP:  - Encouraged HOB elevation to at least 30-40 deg to prevent orthostasis   - Continue midodrine per primary team    Bracing/Splinting:   - Recommend CAIR Multipodus boots for positioning and contracture prevention    Pain Management:   - Currently well controlled on current regimen  - s/p IT morhpine pump. Interrogated by [employer]    GI/ Bowel:  - CT Abdomen with high stool burden. Could be having overflow incontinence  - Recommend optimizing bowel regimen for AM evacuation to prevent incontinence during the day:    Continue Movantik and Senna at bedtime    Continue Miralax BID    Recommend AM suppository - Need to check rectal vault for stool, may require manual disimpaction prior to suppository    / Bladder:  - Avoid anticholinergic medications  - Continue SC q6hrs   - Follow up with urology outpatient. May need urodynamics.   - Can consider Flomax 0.4mg nightly if BP stable    Disposition:  Plan for [facility] vs [facility].

## 2023-12-27 NOTE — CONSULT NOTE ADULT - SUBJECTIVE AND OBJECTIVE BOX
HPI:  68 y/o female with PMHx HTN, HLD, CVA x 3 (last was 2016 with right hand weakness residual), GAMALIEL not using CPAP, Emphysema, former 25 pack year smoker restarted this week of surgery, Chronic Constipation on Movantik, Urinary Incontinence chronically self straight cath at home, chronic UTI, Breast Implant Rupture with Chronic Leak repair 10/2023, B/L CTS s/p release, s/p Intrathecal Morphine pump for pain, with chronic neck and back pain worsening for years s/p multiple spinal surgeries including laminectomies and fusion of cervical, thoracic and lumbar spine initially done in 2009 and recently in 2019 and 2020 at [facility] by Dr. Trinidad, s/p T3-L1 revision of prior fusion (with Dr. Luz on 8/17/22). Outpatient Xray 11/16/2023 showing broken rods/displacement. Presents for elective C2-S2 instrumentation and fusion. Pt endorses weakness of b/l LE and burning pain in b/l lower extremities radiating to both feet,  (04 Dec 2023 15:44)    -----------------------------------------------------------------------  INTERVAL HOSPITAL COURSE:  Patient underwent C2-S2 instrumentation and fusion (12/5). S/p T9-L2 decompression w/ durotomy with repair (12/10/23).  CT thoracic spine 12/6 demonstrated postop changes  PE protocol, 12/19 showed DVT doppler neg, CTA w segmental and subsegmental PE in R upper lobe, on Eliquis     -----------------------------------------------------------------------  SUBJECTIVE / REVIEW OF SYSTEMS  Constitutional - No fever,  +fatigue  Neurological - stable paraplegia. Chronic right hand weakness  Pain - well controlled with current regimen  Bowel - Chronic constipation and incontinence  Bladder - chronic urinary retention requiring SC; Urology consulted for possible suprapubic cath, recommend self intermittent cath is optimal.   -----------------------------------------------------------------------  FUNCTIONAL HISTORY  At home patient states she was mostly confined to a motorized scooter and wheelchair but able to take steps and stand up to do dishes    CURRENT FUNCTIONAL STATUS    -----------------------------------------------------------------------  PAST MEDICAL & SURGICAL HISTORY  HTN (hypertension)  Back pain  Hypertension  SS (spinal stenosis)  High cholesterol  Stroke  H/O carpal tunnel syndrome  H/O carpal tunnel syndrome  Chronic GERD  History of chronic constipation  Seizure  Urinary incontinence  Pre-diabetes  Acute UTI  Anxiety  Anxiety and depression  Arthritis  Eczema  External hemorrhoids  H/O kyphosis  Multiple sclerosis  Pancreas cyst  Scoliosis  Wheelchair dependent  Cervical pseudoarthrosis  Right shoulder pain  Cervical spondylosis  Chronic headaches  Chronic LUQ pain  Chronic UTI  Degenerative joint disease  H/O low back pain  Lumbosacral spondylosis  COPD (chronic obstructive pulmonary disease)  Cervical disc disease  H/O Spinal surgery  H/O breast surgery  S/P cervical discectomy  Previous back surgery  H/O shoulder surgery  H/O total shoulder replacement, right    FAMILY HISTORY     SOCIAL HISTORY  Smoking - Denied  EtOH - Denied   Drugs - Denied  -----------------------------------------------------------------------  VITALS  T(C): 36.5 (12-27-23 @ 09:18), Max: 36.7 (12-26-23 @ 16:30)  HR: 95 (12-27-23 @ 09:18) (81 - 102)  BP: 144/78 (12-27-23 @ 09:18) (101/61 - 144/78)  RR: 18 (12-27-23 @ 09:18) (16 - 18)  SpO2: 98% (12-27-23 @ 09:18) (95% - 99%)  Wt(kg): --    PHYSICAL EXAM        -----------------------------------------------------------------------  RECENT LABS            -----------------------------------------------------------------------  IMAGING:    -----------------------------------------------------------------------  ALLERGIES  Crab (Pruritus)  No Known Drug Allergies      MEDICATIONS   acetaminophen     Tablet .. 1000 milliGRAM(s) Oral every 8 hours PRN  apixaban 5 milliGRAM(s) Oral every 12 hours  benzocaine 20% Spray 1 Spray(s) Mucosal three times a day PRN  bisacodyl 5 milliGRAM(s) Oral at bedtime  chlorhexidine 2% Cloths 1 Application(s) Topical <User Schedule>  diazepam    Tablet 5 milliGRAM(s) Oral every 8 hours  diphenhydrAMINE 25 milliGRAM(s) Oral every 12 hours PRN  DULoxetine 60 milliGRAM(s) Oral daily  gabapentin 800 milliGRAM(s) Oral every 8 hours  hydrocortisone 1% Cream 1 Application(s) Topical two times a day PRN  influenza  Vaccine (HIGH DOSE) 0.7 milliLiter(s) IntraMuscular once  midodrine 15 milliGRAM(s) Oral every 8 hours  Morphine 1 milliGRAM / 1 mL 20 milliLITERS 20 milliLiter(s) IntraThecal Continuous Pump  multivitamin 1 Tablet(s) Oral daily  naloxegol 25 milliGRAM(s) Oral daily  naloxone Injectable 0.1 milliGRAM(s) IV Push every 3 minutes PRN  ondansetron Injectable 4 milliGRAM(s) IV Push every 6 hours PRN  oxyCODONE    IR 10 milliGRAM(s) Oral every 4 hours PRN  oxyCODONE    IR 15 milliGRAM(s) Oral every 4 hours PRN  pantoprazole    Tablet 40 milliGRAM(s) Oral before breakfast  polyethylene glycol 3350 17 Gram(s) Oral two times a day  senna 2 Tablet(s) Oral at bedtime  simvastatin 40 milliGRAM(s) Oral at bedtime  sodium chloride 0.9% lock flush 10 milliLiter(s) IV Push every 1 hour PRN    -----------------------------------------------------------------------   HPI:  66 y/o female with PMHx HTN, HLD, CVA x 3 (last was 2016 with right hand weakness residual), GAMALIEL not using CPAP, Emphysema, former 25 pack year smoker restarted this week of surgery, Chronic Constipation on Movantik, Urinary Incontinence chronically self straight cath at home, chronic UTI, Breast Implant Rupture with Chronic Leak repair 10/2023, B/L CTS s/p release, s/p Intrathecal Morphine pump for pain, with chronic neck and back pain worsening for years s/p multiple spinal surgeries including laminectomies and fusion of cervical, thoracic and lumbar spine initially done in 2009 and recently in 2019 and 2020 at [facility] by Dr. Trinidad, s/p T3-L1 revision of prior fusion (with Dr. Luz on 8/17/22). Outpatient Xray 11/16/2023 showing broken rods/displacement. Presents for elective C2-S2 instrumentation and fusion. Pt endorses weakness of b/l LE and burning pain in b/l lower extremities radiating to both feet,  (04 Dec 2023 15:44)    -----------------------------------------------------------------------  INTERVAL HOSPITAL COURSE:  Patient underwent C2-S2 instrumentation and fusion (12/5). S/p T9-L2 decompression w/ durotomy with repair (12/10/23).  CT thoracic spine 12/6 demonstrated postop changes  PE protocol, 12/19 showed DVT doppler neg, CTA w segmental and subsegmental PE in R upper lobe, on Eliquis     -----------------------------------------------------------------------  SUBJECTIVE / REVIEW OF SYSTEMS  Constitutional - No fever,  +fatigue  Neurological - stable paraplegia. Chronic right hand weakness  Pain - well controlled with current regimen  Bowel - Chronic constipation and incontinence  Bladder - chronic urinary retention requiring SC; Urology consulted for possible suprapubic cath, recommend self intermittent cath is optimal.   -----------------------------------------------------------------------  FUNCTIONAL HISTORY  At home patient states she was mostly confined to a motorized scooter and wheelchair but able to take steps and stand up to do dishes    CURRENT FUNCTIONAL STATUS    -----------------------------------------------------------------------  PAST MEDICAL & SURGICAL HISTORY  HTN (hypertension)  Back pain  Hypertension  SS (spinal stenosis)  High cholesterol  Stroke  H/O carpal tunnel syndrome  H/O carpal tunnel syndrome  Chronic GERD  History of chronic constipation  Seizure  Urinary incontinence  Pre-diabetes  Acute UTI  Anxiety  Anxiety and depression  Arthritis  Eczema  External hemorrhoids  H/O kyphosis  Multiple sclerosis  Pancreas cyst  Scoliosis  Wheelchair dependent  Cervical pseudoarthrosis  Right shoulder pain  Cervical spondylosis  Chronic headaches  Chronic LUQ pain  Chronic UTI  Degenerative joint disease  H/O low back pain  Lumbosacral spondylosis  COPD (chronic obstructive pulmonary disease)  Cervical disc disease  H/O Spinal surgery  H/O breast surgery  S/P cervical discectomy  Previous back surgery  H/O shoulder surgery  H/O total shoulder replacement, right    FAMILY HISTORY     SOCIAL HISTORY  Smoking - Denied  EtOH - Denied   Drugs - Denied  -----------------------------------------------------------------------  VITALS  T(C): 36.5 (12-27-23 @ 09:18), Max: 36.7 (12-26-23 @ 16:30)  HR: 95 (12-27-23 @ 09:18) (81 - 102)  BP: 144/78 (12-27-23 @ 09:18) (101/61 - 144/78)  RR: 18 (12-27-23 @ 09:18) (16 - 18)  SpO2: 98% (12-27-23 @ 09:18) (95% - 99%)  Wt(kg): --    PHYSICAL EXAM        -----------------------------------------------------------------------  RECENT LABS            -----------------------------------------------------------------------  IMAGING:    -----------------------------------------------------------------------  ALLERGIES  Crab (Pruritus)  No Known Drug Allergies      MEDICATIONS   acetaminophen     Tablet .. 1000 milliGRAM(s) Oral every 8 hours PRN  apixaban 5 milliGRAM(s) Oral every 12 hours  benzocaine 20% Spray 1 Spray(s) Mucosal three times a day PRN  bisacodyl 5 milliGRAM(s) Oral at bedtime  chlorhexidine 2% Cloths 1 Application(s) Topical <User Schedule>  diazepam    Tablet 5 milliGRAM(s) Oral every 8 hours  diphenhydrAMINE 25 milliGRAM(s) Oral every 12 hours PRN  DULoxetine 60 milliGRAM(s) Oral daily  gabapentin 800 milliGRAM(s) Oral every 8 hours  hydrocortisone 1% Cream 1 Application(s) Topical two times a day PRN  influenza  Vaccine (HIGH DOSE) 0.7 milliLiter(s) IntraMuscular once  midodrine 15 milliGRAM(s) Oral every 8 hours  Morphine 1 milliGRAM / 1 mL 20 milliLITERS 20 milliLiter(s) IntraThecal Continuous Pump  multivitamin 1 Tablet(s) Oral daily  naloxegol 25 milliGRAM(s) Oral daily  naloxone Injectable 0.1 milliGRAM(s) IV Push every 3 minutes PRN  ondansetron Injectable 4 milliGRAM(s) IV Push every 6 hours PRN  oxyCODONE    IR 10 milliGRAM(s) Oral every 4 hours PRN  oxyCODONE    IR 15 milliGRAM(s) Oral every 4 hours PRN  pantoprazole    Tablet 40 milliGRAM(s) Oral before breakfast  polyethylene glycol 3350 17 Gram(s) Oral two times a day  senna 2 Tablet(s) Oral at bedtime  simvastatin 40 milliGRAM(s) Oral at bedtime  sodium chloride 0.9% lock flush 10 milliLiter(s) IV Push every 1 hour PRN    -----------------------------------------------------------------------   HPI:  68 y/o female with PMHx HTN, HLD, CVA x 3 (last was 2016 with right hand weakness residual), GAMALIEL not using CPAP, Emphysema, former 25 pack year smoker restarted this week of surgery, Chronic Constipation on Movantik, Urinary Incontinence chronically self straight cath at home, chronic UTI, Breast Implant Rupture with Chronic Leak repair 10/2023, B/L CTS s/p release, s/p Intrathecal Morphine pump for pain, with chronic neck and back pain worsening for years s/p multiple spinal surgeries including laminectomies and fusion of cervical, thoracic and lumbar spine initially done in 2009 and recently in 2019 and 2020 at [facility] by Dr. Trinidad, s/p T3-L1 revision of prior fusion (with Dr. Luz on 8/17/22). Outpatient Xray 11/16/2023 showing broken rods/displacement. Presents for elective C2-S2 instrumentation and fusion. Pt endorses weakness of b/l LE and burning pain in b/l lower extremities radiating to both feet,  (04 Dec 2023 15:44)    -----------------------------------------------------------------------  INTERVAL HOSPITAL COURSE:  Patient underwent C2-S2 instrumentation and fusion (12/5). S/p T9-L2 decompression w/ durotomy with repair (12/10/23).  CT thoracic spine 12/6 demonstrated postop changes  PE protocol, 12/19 showed DVT doppler neg, CTA w segmental and subsegmental PE in R upper lobe, on Eliquis     -----------------------------------------------------------------------  SUBJECTIVE / REVIEW OF SYSTEMS  Constitutional - No fever,  +fatigue  Neurological - stable paraplegia. Chronic right hand weakness  Pain - well controlled with current regimen  Bowel - Chronic constipation and incontinence  Bladder - chronic urinary retention requiring SC; Urology consulted for possible suprapubic cath, recommend self intermittent cath is optimal.   -----------------------------------------------------------------------  FUNCTIONAL HISTORY  At home patient states she was mostly confined to a motorized scooter and wheelchair but able to take steps and stand up to do dishes    CURRENT FUNCTIONAL STATUS  PT 12/26:  Bed Mobility  Bed Mobility Training Rolling/Turning: moderate assist (50% patient effort);  verbal cues;  1 person assist  Bed Mobility Training Scooting: moderate assist (50% patient effort);  verbal cues;  2 person assist  Bed Mobility Training Sit-to-Supine: moderate assist (50% patient effort);  verbal cues;  2 person assist  Bed Mobility Training Supine-to-Sit: moderate assist (50% patient effort);  verbal cues;  2 person assist  Bed Mobility Training Limitations: abnormal muscle tone;  decreased strength;  impaired balance;  impaired postural control    Therapeutic Exercise  Therapeutic Exercise Detail: Patient tolerated sitting EOB ~15 min with Ryan x 1. Performed static balance with BUE on bed ~20 second bouts with close supervision. Push to upright from propped on elbow position with Ryan x 1. Lateral scooting with head/hips relationship with modA x 2.       12/26 OT:  Upper Body Dressing Training  Upper Body Dressing Training Assistance: moderate assist (50% patient effort);  1 person assist;  verbal cues;  pull up R sleeve of gown while sitting EOB;  decreased strength;  impaired balance;  decreased flexibility;  impaired postural control    -----------------------------------------------------------------------  PAST MEDICAL & SURGICAL HISTORY  HTN (hypertension)  Back pain  Hypertension  SS (spinal stenosis)  High cholesterol  Stroke  H/O carpal tunnel syndrome  H/O carpal tunnel syndrome  Chronic GERD  History of chronic constipation  Seizure  Urinary incontinence  Pre-diabetes  Acute UTI  Anxiety  Anxiety and depression  Arthritis  Eczema  External hemorrhoids  H/O kyphosis  Multiple sclerosis  Pancreas cyst  Scoliosis  Wheelchair dependent  Cervical pseudoarthrosis  Right shoulder pain  Cervical spondylosis  Chronic headaches  Chronic LUQ pain  Chronic UTI  Degenerative joint disease  H/O low back pain  Lumbosacral spondylosis  COPD (chronic obstructive pulmonary disease)  Cervical disc disease  H/O Spinal surgery  H/O breast surgery  S/P cervical discectomy  Previous back surgery  H/O shoulder surgery  H/O total shoulder replacement, right    FAMILY HISTORY     SOCIAL HISTORY  Smoking - Denied  EtOH - Denied   Drugs - Denied  -----------------------------------------------------------------------  VITALS  T(C): 36.5 (12-27-23 @ 09:18), Max: 36.7 (12-26-23 @ 16:30)  HR: 95 (12-27-23 @ 09:18) (81 - 102)  BP: 144/78 (12-27-23 @ 09:18) (101/61 - 144/78)  RR: 18 (12-27-23 @ 09:18) (16 - 18)  SpO2: 98% (12-27-23 @ 09:18) (95% - 99%)  Wt(kg): --    PHYSICAL EXAM    Constitutional - NAD, Comfortable in bed  HEENT - NCAT  Neck - Supple, No limited ROM  Chest - Breathing comfortably, No Respiratory distress  Cardiovascular - Regular pulse  Abdomen - No visible abdominal distension  Extremities - left hand chronic spastic deformity  Neurologic Exam -                    Cognitive - Awake, Alert, AAO to self, place, date, year, situation; follows commands     Communication - Fluent, + mild dysarthria, repetition intact, attention/concentration intact     Cranial Nerves -  EOMI, No facial asymmetry     Motor - no voluntary movements of bilateral lower extremities. Left hand weakness (chronic)     Sensory - impaired sensation below T10 dermatome throughout.      Reflexes - no clonus     Balance - not assessed   Psychiatric - Mood stable, Affect WNL     -----------------------------------------------------------------------  RECENT LABS    -----------------------------------------------------------------------  IMAGING:  < from: CT Angio Chest PE Protocol w/ IV Cont (12.19.23 @ 13:51) >  IMPRESSION:  Pulmonary thromboemboli in segmental/subsegmental anterior and posterior   branches of the right upper lobe. No evidence of acute right heart strain.    < end of copied text >    < from: CT Abdomen and Pelvis No Cont (12.13.23 @ 13:27) >  IMPRESSION:  1.  No renal, ureteral, or bladder stones. No hydronephrosis.  2.  Small focus of air in the superior bladder lumen, probably due to   recent catheterization. Correlate for cystitis.  3.   A spinal lead fragment appears in the soft tissues of the midline   low back at the level of L2. Surgical drain at the left mid back and lead   versus catheter in the right mid back. Correlate with surgical history.  4.  Heavy stool burden. Correlate for constipation.    < end of copied text >  -----------------------------------------------------------------------  ALLERGIES  Crab (Pruritus)  No Known Drug Allergies      MEDICATIONS   acetaminophen     Tablet .. 1000 milliGRAM(s) Oral every 8 hours PRN  apixaban 5 milliGRAM(s) Oral every 12 hours  benzocaine 20% Spray 1 Spray(s) Mucosal three times a day PRN  bisacodyl 5 milliGRAM(s) Oral at bedtime  chlorhexidine 2% Cloths 1 Application(s) Topical <User Schedule>  diazepam    Tablet 5 milliGRAM(s) Oral every 8 hours  diphenhydrAMINE 25 milliGRAM(s) Oral every 12 hours PRN  DULoxetine 60 milliGRAM(s) Oral daily  gabapentin 800 milliGRAM(s) Oral every 8 hours  hydrocortisone 1% Cream 1 Application(s) Topical two times a day PRN  influenza  Vaccine (HIGH DOSE) 0.7 milliLiter(s) IntraMuscular once  midodrine 15 milliGRAM(s) Oral every 8 hours  Morphine 1 milliGRAM / 1 mL 20 milliLITERS 20 milliLiter(s) IntraThecal Continuous Pump  multivitamin 1 Tablet(s) Oral daily  naloxegol 25 milliGRAM(s) Oral daily  naloxone Injectable 0.1 milliGRAM(s) IV Push every 3 minutes PRN  ondansetron Injectable 4 milliGRAM(s) IV Push every 6 hours PRN  oxyCODONE    IR 10 milliGRAM(s) Oral every 4 hours PRN  oxyCODONE    IR 15 milliGRAM(s) Oral every 4 hours PRN  pantoprazole    Tablet 40 milliGRAM(s) Oral before breakfast  polyethylene glycol 3350 17 Gram(s) Oral two times a day  senna 2 Tablet(s) Oral at bedtime  simvastatin 40 milliGRAM(s) Oral at bedtime  sodium chloride 0.9% lock flush 10 milliLiter(s) IV Push every 1 hour PRN    -----------------------------------------------------------------------   HPI:  66 y/o female with PMHx HTN, HLD, CVA x 3 (last was 2016 with right hand weakness residual), GAMALIEL not using CPAP, Emphysema, former 25 pack year smoker restarted this week of surgery, Chronic Constipation on Movantik, Urinary Incontinence chronically self straight cath at home, chronic UTI, Breast Implant Rupture with Chronic Leak repair 10/2023, B/L CTS s/p release, s/p Intrathecal Morphine pump for pain, with chronic neck and back pain worsening for years s/p multiple spinal surgeries including laminectomies and fusion of cervical, thoracic and lumbar spine initially done in 2009 and recently in 2019 and 2020 at [facility] by Dr. Trinidad, s/p T3-L1 revision of prior fusion (with Dr. Luz on 8/17/22). Outpatient Xray 11/16/2023 showing broken rods/displacement. Presents for elective C2-S2 instrumentation and fusion. Pt endorses weakness of b/l LE and burning pain in b/l lower extremities radiating to both feet,  (04 Dec 2023 15:44)    -----------------------------------------------------------------------  INTERVAL HOSPITAL COURSE:  Patient underwent C2-S2 instrumentation and fusion (12/5). S/p T9-L2 decompression w/ durotomy with repair (12/10/23).  CT thoracic spine 12/6 demonstrated postop changes  PE protocol, 12/19 showed DVT doppler neg, CTA w segmental and subsegmental PE in R upper lobe, on Eliquis     -----------------------------------------------------------------------  SUBJECTIVE / REVIEW OF SYSTEMS  Constitutional - No fever,  +fatigue  Neurological - stable paraplegia. Chronic right hand weakness  Pain - well controlled with current regimen  Bowel - Chronic constipation and incontinence  Bladder - chronic urinary retention requiring SC; Urology consulted for possible suprapubic cath, recommend self intermittent cath is optimal.   -----------------------------------------------------------------------  FUNCTIONAL HISTORY  At home patient states she was mostly confined to a motorized scooter and wheelchair but able to take steps and stand up to do dishes    CURRENT FUNCTIONAL STATUS  PT 12/26:  Bed Mobility  Bed Mobility Training Rolling/Turning: moderate assist (50% patient effort);  verbal cues;  1 person assist  Bed Mobility Training Scooting: moderate assist (50% patient effort);  verbal cues;  2 person assist  Bed Mobility Training Sit-to-Supine: moderate assist (50% patient effort);  verbal cues;  2 person assist  Bed Mobility Training Supine-to-Sit: moderate assist (50% patient effort);  verbal cues;  2 person assist  Bed Mobility Training Limitations: abnormal muscle tone;  decreased strength;  impaired balance;  impaired postural control    Therapeutic Exercise  Therapeutic Exercise Detail: Patient tolerated sitting EOB ~15 min with Ryan x 1. Performed static balance with BUE on bed ~20 second bouts with close supervision. Push to upright from propped on elbow position with Ryan x 1. Lateral scooting with head/hips relationship with modA x 2.       12/26 OT:  Upper Body Dressing Training  Upper Body Dressing Training Assistance: moderate assist (50% patient effort);  1 person assist;  verbal cues;  pull up R sleeve of gown while sitting EOB;  decreased strength;  impaired balance;  decreased flexibility;  impaired postural control    -----------------------------------------------------------------------  PAST MEDICAL & SURGICAL HISTORY  HTN (hypertension)  Back pain  Hypertension  SS (spinal stenosis)  High cholesterol  Stroke  H/O carpal tunnel syndrome  H/O carpal tunnel syndrome  Chronic GERD  History of chronic constipation  Seizure  Urinary incontinence  Pre-diabetes  Acute UTI  Anxiety  Anxiety and depression  Arthritis  Eczema  External hemorrhoids  H/O kyphosis  Multiple sclerosis  Pancreas cyst  Scoliosis  Wheelchair dependent  Cervical pseudoarthrosis  Right shoulder pain  Cervical spondylosis  Chronic headaches  Chronic LUQ pain  Chronic UTI  Degenerative joint disease  H/O low back pain  Lumbosacral spondylosis  COPD (chronic obstructive pulmonary disease)  Cervical disc disease  H/O Spinal surgery  H/O breast surgery  S/P cervical discectomy  Previous back surgery  H/O shoulder surgery  H/O total shoulder replacement, right    FAMILY HISTORY     SOCIAL HISTORY  Smoking - Denied  EtOH - Denied   Drugs - Denied  -----------------------------------------------------------------------  VITALS  T(C): 36.5 (12-27-23 @ 09:18), Max: 36.7 (12-26-23 @ 16:30)  HR: 95 (12-27-23 @ 09:18) (81 - 102)  BP: 144/78 (12-27-23 @ 09:18) (101/61 - 144/78)  RR: 18 (12-27-23 @ 09:18) (16 - 18)  SpO2: 98% (12-27-23 @ 09:18) (95% - 99%)  Wt(kg): --    PHYSICAL EXAM    Constitutional - NAD, Comfortable in bed  HEENT - NCAT  Neck - Supple, No limited ROM  Chest - Breathing comfortably, No Respiratory distress  Cardiovascular - Regular pulse  Abdomen - No visible abdominal distension  Extremities - left hand chronic spastic deformity  Neurologic Exam -                    Cognitive - Awake, Alert, AAO to self, place, date, year, situation; follows commands     Communication - Fluent, + mild dysarthria, repetition intact, attention/concentration intact     Cranial Nerves -  EOMI, No facial asymmetry     Motor - no voluntary movements of bilateral lower extremities. Left hand weakness (chronic)     Sensory - impaired sensation below T10 dermatome throughout.      Reflexes - no clonus     Balance - not assessed   Psychiatric - Mood stable, Affect WNL     -----------------------------------------------------------------------  RECENT LABS    -----------------------------------------------------------------------  IMAGING:  < from: CT Angio Chest PE Protocol w/ IV Cont (12.19.23 @ 13:51) >  IMPRESSION:  Pulmonary thromboemboli in segmental/subsegmental anterior and posterior   branches of the right upper lobe. No evidence of acute right heart strain.    < end of copied text >    < from: CT Abdomen and Pelvis No Cont (12.13.23 @ 13:27) >  IMPRESSION:  1.  No renal, ureteral, or bladder stones. No hydronephrosis.  2.  Small focus of air in the superior bladder lumen, probably due to   recent catheterization. Correlate for cystitis.  3.   A spinal lead fragment appears in the soft tissues of the midline   low back at the level of L2. Surgical drain at the left mid back and lead   versus catheter in the right mid back. Correlate with surgical history.  4.  Heavy stool burden. Correlate for constipation.    < end of copied text >  -----------------------------------------------------------------------  ALLERGIES  Crab (Pruritus)  No Known Drug Allergies      MEDICATIONS   acetaminophen     Tablet .. 1000 milliGRAM(s) Oral every 8 hours PRN  apixaban 5 milliGRAM(s) Oral every 12 hours  benzocaine 20% Spray 1 Spray(s) Mucosal three times a day PRN  bisacodyl 5 milliGRAM(s) Oral at bedtime  chlorhexidine 2% Cloths 1 Application(s) Topical <User Schedule>  diazepam    Tablet 5 milliGRAM(s) Oral every 8 hours  diphenhydrAMINE 25 milliGRAM(s) Oral every 12 hours PRN  DULoxetine 60 milliGRAM(s) Oral daily  gabapentin 800 milliGRAM(s) Oral every 8 hours  hydrocortisone 1% Cream 1 Application(s) Topical two times a day PRN  influenza  Vaccine (HIGH DOSE) 0.7 milliLiter(s) IntraMuscular once  midodrine 15 milliGRAM(s) Oral every 8 hours  Morphine 1 milliGRAM / 1 mL 20 milliLITERS 20 milliLiter(s) IntraThecal Continuous Pump  multivitamin 1 Tablet(s) Oral daily  naloxegol 25 milliGRAM(s) Oral daily  naloxone Injectable 0.1 milliGRAM(s) IV Push every 3 minutes PRN  ondansetron Injectable 4 milliGRAM(s) IV Push every 6 hours PRN  oxyCODONE    IR 10 milliGRAM(s) Oral every 4 hours PRN  oxyCODONE    IR 15 milliGRAM(s) Oral every 4 hours PRN  pantoprazole    Tablet 40 milliGRAM(s) Oral before breakfast  polyethylene glycol 3350 17 Gram(s) Oral two times a day  senna 2 Tablet(s) Oral at bedtime  simvastatin 40 milliGRAM(s) Oral at bedtime  sodium chloride 0.9% lock flush 10 milliLiter(s) IV Push every 1 hour PRN    -----------------------------------------------------------------------

## 2023-12-27 NOTE — CONSULT NOTE ADULT - NSCONSULTADDITIONALINFOA_GEN_ALL_CORE
60 minutes spent on total encounter. The necessity of the time spent during the encounter on this date of service was due to:     Obtaining separately reported history including review of hospital course, relevant imaging, therapy notes, and consultant notes  Performing medically appropriate examination  Counseling and educating patient/ family/caregivers  Care coordination  Communication with primary team  Documenting clinical information

## 2023-12-27 NOTE — PROGRESS NOTE ADULT - SUBJECTIVE AND OBJECTIVE BOX
HPI:  68 y/o female with PMHx HTN, HLD, CVA x 3 (last was 2016 with right hand weakness residual), GAMALIEL not using CPAP, Emphysema, former 25 pack year smoker restarted this week of surgery, Chronic Constipation on Movantik, Urinary Incontinence chronically self straight cath at home, chronic UTI, Breast Implant Rupture with Chronic Leak repair 10/2023, B/L CTS s/p release, s/p Intrathecal Morphine pump for pain, with chronic neck and back pain worsening for years s/p multiple spinal surgeries including laminectomies and fusion of cervical, thoracic and lumbar spine initially done in 2009 and recently in 2019 and 2020 at [facility] by Dr. Trinidad, s/p T3-L1 revision of prior fusion (with Dr. Luz on 8/17/22). Outpatient Xray 11/16/2023 showing broken rods/displacement. Presents for elective C2-S2 instrumentation and fusion. Pt endorses weakness of b/l LE and burning pain in b/l lower extremities radiating to both feet,  (04 Dec 2023 15:44)    OVERNIGHT EVENTS: KAMRAN, neuro stable    Hospital Course:   12/4: Admitted for spinal fusion d/t damaged hardware.   12/5: Neuro stable. POD0 C2-S2 instrumentation/fusion (intraop b/l LE motor loss). on parminder for MAP>90  12/6: POD1. KAMRAN o/n neuro stable. remains intubated. Extubated 6am. Precedex gtt prn. Remains on parminder gtt for MAP goal >90. Valium made prn. CT thoracic spine bc MRI unavailable. Attempted NGT, unsuccessful (resistance @ 35).   12/7: POD2 Given IV dilaudid 0.5mg for pain. Neuro exam stable. SQL started tongiht. Increased precedex to 1.0. Responds well to valium. Consulting psych given paranoia ?homicidal statements. Passed bedside dysphagia and tolerating PO meds. MRI complete, f/u read.   12/8: POD3 Pt reporting incisional pain, given dilaudid 0.5mg IV x2. Pt appearing anxious, given xanax 1mg. Neuro exam unchanged. Pain regimen increased to oxy 15/30 mg, pending further recs. Given 1 L bolus for tachycardia. Trops negative. 2 HMV drains removed.   12/9: POD4, KAMRAN, CT myelogram today showing block at T12-L1, OR tomorrow  12/10: POD5, KAMRAN, OR today for exploration of fusion, possible decompression T9-L2, revision instrumentation  POD 0 T9-L2 decompression. Pt returned from OR intubated. 50mcg fentanyl IVP x 2 for pain control and HTN. Magnesium repleted. Precedex gtt started for sedation. Decreased FiO2 to 40%. 1L bolus for soft MAP. Dc'd propofol and started levo gtt for MAP >90.   12/11: POD 6, POD 1. Extubated, satting well on RA. Resumed home valium and gabapentin. Castillo d/c'd, pending TOV. Seroquel started for agitation, R IJ removed, LUE double midline placed. Started SQL.   12/12: POD7, POD2, given 500 cc bolus and started on phenylephrine for MAP goal >90. Started midodrine 10mg q8h. Cardura dc'd d/t hypotension. Given 250cc of albumin for increasing pressor requirements. Given lactulose, pending BM. Hgb 7.6<8.3, given 1u PRBC. Protected sleep time. Urology consulted for possible suprapubic cath, recommend self intermittent cath is optimal.   12/13: POD8, POD3 Midodrine increased to 15q8. Pt refused to take the extra 5mg midodrin ordered. Neuro exam stable. hgb 10 from 9.4 after 1u pRBCs, Pt refused AM meds, given in late morning, CTAP ordered for L sided abd pain, fleet enema, restarted home movantik and ordere fleet enema for constipation. YIFAN and HMV dc'd. 250cc albumin x1  12/14: POD9, POD4, KAMRAN overnight, neuro stable. MAP goal liberalized to > 65. Stepdown status.   12/15: POD10, POD5, KAMRAN overnight  12/16: POD11, POD6, KAMRAN overnight, neuro stable, 500 cc bolus NS given for tachycardia. Aquacell changed. CXR, pro-bnp negative for pulmonary edema. Given additional 500cc bolus.  12/17: POD 12. POD 7. KAMRAN overnight, neuro stable.   12/18: POD 13/8. Incontinent x2 today.   12/19: KAMRAN. Persistent tachycardia, r/o PE. CTPE protocol ordered. Refused all afternoon meds except oxy 30. Per Dr. Cowart (rad) CT chest w segmental and subsegmental PE in R upper lobe. Dopplers negative for DVT, CT PE shows segmental and subsegmental PE in R upper lobe. Per Dr. Augustine and Dr. Luz, Will start eliquis pending pain management approval. Per Dr. Mclaughlin (pain mgmt) can start eliquis 10mg BIDx 7 days. Dc'd lovenox.  12/20: POD 10 T9-L2 Decompression. Hemodynamically stable. Pending rehab. PT today.   12/21: POD 11 T9-L2 Decompression. neruo/hemodynamically stable, pending rehab.  12/22: POD 17/12, pending rehab. Aquacel dressing changed.   12/23: POD 18/13, pending rehab.   12/24: POD 19/14, pending rehab.   12/25: POD 20/15, pending rehab.   12/26: POD 21/16, pending rehab.   12/27: POD 22/17.     Vital Signs Last 24 Hrs  T(C): 36.6 (27 Dec 2023 00:34), Max: 36.7 (26 Dec 2023 16:30)  T(F): 97.9 (27 Dec 2023 00:34), Max: 98.1 (26 Dec 2023 21:25)  HR: 88 (27 Dec 2023 00:34) (78 - 100)  BP: 127/76 (27 Dec 2023 00:34) (101/61 - 137/77)  BP(mean): --  RR: 16 (27 Dec 2023 00:34) (16 - 18)  SpO2: 95% (27 Dec 2023 00:34) (95% - 99%)    Parameters below as of 27 Dec 2023 00:34  Patient On (Oxygen Delivery Method): room air        I&O's Summary    25 Dec 2023 07:01  -  26 Dec 2023 07:00  --------------------------------------------------------  IN: 0 mL / OUT: 1500 mL / NET: -1500 mL    26 Dec 2023 07:01  -  27 Dec 2023 01:16  --------------------------------------------------------  IN: 0 mL / OUT: 1450 mL / NET: -1450 mL        PHYSICAL EXAM:  General: NAD, AAOx3  HEENT: PERRL. EOMI. VF intact.  Neck: From, nontender.  Cardiovascular: RRR, normal S1 and S2   Respiratory: non-labored breathing, symmetric chest rise  GI: abd soft, NTND, +BS  Neuro: CN II-XII intact, follows commands, speech clear. Strength BL UE 5/5  BL LE 0/5 to noxious stimuli with T11 sensory deficit.  Vascular: Distal pulses 2+ x 4, no calf edema or erythema  Wounds: Posterior midline spine incision C/D/I with dressing in place      LABS:                  CAPILLARY BLOOD GLUCOSE          Drug Levels: [] N/A    CSF Analysis: [] N/A      Allergies    Crab (Pruritus)  No Known Drug Allergies    Intolerances      MEDICATIONS:  Antibiotics:    Neuro:  acetaminophen     Tablet .. 1000 milliGRAM(s) Oral every 8 hours PRN  diazepam    Tablet 5 milliGRAM(s) Oral every 8 hours  diphenhydrAMINE 25 milliGRAM(s) Oral every 12 hours PRN  DULoxetine 60 milliGRAM(s) Oral daily  gabapentin 800 milliGRAM(s) Oral every 8 hours  ondansetron Injectable 4 milliGRAM(s) IV Push every 6 hours PRN  oxyCODONE    IR 10 milliGRAM(s) Oral every 4 hours PRN  oxyCODONE    IR 15 milliGRAM(s) Oral every 4 hours PRN    Anticoagulation:  apixaban 5 milliGRAM(s) Oral every 12 hours    OTHER:  benzocaine 20% Spray 1 Spray(s) Mucosal three times a day PRN  bisacodyl 5 milliGRAM(s) Oral at bedtime  hydrocortisone 1% Cream 1 Application(s) Topical two times a day PRN  influenza  Vaccine (HIGH DOSE) 0.7 milliLiter(s) IntraMuscular once  midodrine 15 milliGRAM(s) Oral every 8 hours  naloxegol 25 milliGRAM(s) Oral daily  naloxone Injectable 0.1 milliGRAM(s) IV Push every 3 minutes PRN  pantoprazole    Tablet 40 milliGRAM(s) Oral before breakfast  polyethylene glycol 3350 17 Gram(s) Oral two times a day  senna 2 Tablet(s) Oral at bedtime  simvastatin 40 milliGRAM(s) Oral at bedtime    IVF:  multivitamin 1 Tablet(s) Oral daily      ASSESSMENT:  67 yo female with Hx HTN, HLD, DM, CVA x 3 (last in 2016 with residual R hand weakness), GAMALIEL not using CPAP, Emphysema, ex-25 pack year smoker now restarted, chronic constipation on Movantik, chronic urinary retention (SC at home), b/l CTS s/p release, s/p Intrathecal Morphine pump for pain, with chronic neck and back pain worsening for years s/p multiple spinal surgeries including laminectomies and fusion of cervical, thoracic and lumbar spine, s/p T3-L1 revision of prior fusion (with  on 8/17/22). Xray 11/16/23 showing broken rods/displacement. Now s/p C2-S2 instrumentation and fusion (12/5). S/p T9-L2 decompression w/ durotomy with repair (12/10/23). KAREEM grade A.       S12.9XXA    Handoff    MEWS Score    HTN (hypertension)    Back pain    Hypertension    SS (spinal stenosis)    High cholesterol    Stroke    H/O carpal tunnel syndrome    H/O carpal tunnel syndrome    Chronic GERD    History of chronic constipation    Seizure    Urinary incontinence    Pre-diabetes    Acute UTI    Anxiety    Anxiety and depression    Arthritis    Eczema    External hemorrhoids    H/O kyphosis    Multiple sclerosis    Pancreas cyst    Scoliosis    Wheelchair dependent    Cervical pseudoarthrosis    Right shoulder pain    Cervical spondylosis    Chronic headaches    Chronic LUQ pain    Chronic UTI    Degenerative joint disease    H/O low back pain    Lumbosacral spondylosis    COPD (chronic obstructive pulmonary disease)    Back pain    Back pain    Spinal cord injury, thoracic (T7-T12)    Back pain    Spinal cord injury of thoracic region without bone injury    Delirium    Revision of fusion of thoracic spine    Thoracic back pain    Revision of posterolateral fusion of lumbar spine    Revision of fusion of thoracic spine    Decompression, spinal cord, thoracic, posterolateral approach    Cervical disc disease    H/O Spinal surgery    H/O breast surgery    S/P cervical discectomy    Previous back surgery    H/O shoulder surgery    H/O total shoulder replacement, right    Room Service Assist    Revision of posterolateral fusion of lumbar spine    Decompression, spinal cord, thoracic, posterolateral approach    HTN (hypertension)    HLD (hyperlipidemia)    CVA (cerebrovascular accident)    GAMALIEL (obstructive sleep apnea)    H/O emphysema    Smoking history    Chronic constipation    Urinary incontinence    Pulmonary embolism    Diabetes    GERD (gastroesophageal reflux disease)    SysAdmin_VstLnk        PLAN:  Neuro:  - Neuro/vitals q4  - pain control: modified ERAS, IT morphine pump, oxycodone 10/15 prn, valium and home gabapentin 800 TID resumed  - Continue home cymbalta 60mg daily   - CT thoracic spine 12/6: postop changes  - protected sleep time 10PM-4AM     Cardio:  - MAP > 65   - midodrine 15q8   - C/w home simvastatin  - Sinus tachycardia - likely 2/2 PE (12/19)      Pulm:  - RA   - hx GAMALIEL, no CPAP at home  - PE protocol, 12/19: DVT doppler neg, CTA w segmental and subsegmental PE in R upper lobe  - 10 mg eliquis BID for R upper lobe PE (12/19-) x 7 d then 5 mg BID     GI:  - CCD   - Bowel regimen, home movantik 25 mg, last BM 12/17  - CT A/P 12/13 = impacted stool   - GERD: continue home protonix    Renal:  - Straight cath q6  - Patient not interested in suprapubic catheter placement    Endo:  - a1c: 7    Heme:  - SCDs DVT ppx  - Eliquis d/t PE   - 500 cell saver intraop 12/5, 1u PRBC intraop 12/10, 1u PRBC 12/12    ID:  - afebrile    Dispo:  - SDU status, full code, pending AR    D/w Dr. Luz     Assessment:  Present when checked    []  GCS  E   V  M     Heart Failure: []Acute, [] acute on chronic , []chronic  Heart Failure:  [] Diastolic (HFpEF), [] Systolic (HFrEF), []Combined (HFpEF and HFrEF), [] RHF, [] Pulm HTN, [] Other    [] DARRYL, [] ATN, [] AIN, [] other  [] CKD1, [] CKD2, [] CKD 3, [] CKD 4, [] CKD 5, []ESRD    Encephalopathy: [] Metabolic, [] Hepatic, [] toxic, [] Neurological, [] Other    Abnormal Nurtitional Status: [] malnurtition (see nutrition note), [ ]underweight: BMI < 19, [] morbid obesity: BMI >40, [] Cachexia    [] Sepsis  [] hypovolemic shock,[] cardiogenic shock, [] hemorrhagic shock, [] neuogenic shock  [] Acute Respiratory Failure  []Cerebral edema, [] Brain compression/ herniation,   [] Functional quadriplegia  [] Acute blood loss anemia   HPI:  66 y/o female with PMHx HTN, HLD, CVA x 3 (last was 2016 with right hand weakness residual), GAMALIEL not using CPAP, Emphysema, former 25 pack year smoker restarted this week of surgery, Chronic Constipation on Movantik, Urinary Incontinence chronically self straight cath at home, chronic UTI, Breast Implant Rupture with Chronic Leak repair 10/2023, B/L CTS s/p release, s/p Intrathecal Morphine pump for pain, with chronic neck and back pain worsening for years s/p multiple spinal surgeries including laminectomies and fusion of cervical, thoracic and lumbar spine initially done in 2009 and recently in 2019 and 2020 at [facility] by Dr. Trinidad, s/p T3-L1 revision of prior fusion (with Dr. Luz on 8/17/22). Outpatient Xray 11/16/2023 showing broken rods/displacement. Presents for elective C2-S2 instrumentation and fusion. Pt endorses weakness of b/l LE and burning pain in b/l lower extremities radiating to both feet,  (04 Dec 2023 15:44)    OVERNIGHT EVENTS: KAMRAN, neuro stable    Hospital Course:   12/4: Admitted for spinal fusion d/t damaged hardware.   12/5: Neuro stable. POD0 C2-S2 instrumentation/fusion (intraop b/l LE motor loss). on parminder for MAP>90  12/6: POD1. KAMRAN o/n neuro stable. remains intubated. Extubated 6am. Precedex gtt prn. Remains on parminder gtt for MAP goal >90. Valium made prn. CT thoracic spine bc MRI unavailable. Attempted NGT, unsuccessful (resistance @ 35).   12/7: POD2 Given IV dilaudid 0.5mg for pain. Neuro exam stable. SQL started tongiht. Increased precedex to 1.0. Responds well to valium. Consulting psych given paranoia ?homicidal statements. Passed bedside dysphagia and tolerating PO meds. MRI complete, f/u read.   12/8: POD3 Pt reporting incisional pain, given dilaudid 0.5mg IV x2. Pt appearing anxious, given xanax 1mg. Neuro exam unchanged. Pain regimen increased to oxy 15/30 mg, pending further recs. Given 1 L bolus for tachycardia. Trops negative. 2 HMV drains removed.   12/9: POD4, KAMRAN, CT myelogram today showing block at T12-L1, OR tomorrow  12/10: POD5, KAMRAN, OR today for exploration of fusion, possible decompression T9-L2, revision instrumentation  POD 0 T9-L2 decompression. Pt returned from OR intubated. 50mcg fentanyl IVP x 2 for pain control and HTN. Magnesium repleted. Precedex gtt started for sedation. Decreased FiO2 to 40%. 1L bolus for soft MAP. Dc'd propofol and started levo gtt for MAP >90.   12/11: POD 6, POD 1. Extubated, satting well on RA. Resumed home valium and gabapentin. Castillo d/c'd, pending TOV. Seroquel started for agitation, R IJ removed, LUE double midline placed. Started SQL.   12/12: POD7, POD2, given 500 cc bolus and started on phenylephrine for MAP goal >90. Started midodrine 10mg q8h. Cardura dc'd d/t hypotension. Given 250cc of albumin for increasing pressor requirements. Given lactulose, pending BM. Hgb 7.6<8.3, given 1u PRBC. Protected sleep time. Urology consulted for possible suprapubic cath, recommend self intermittent cath is optimal.   12/13: POD8, POD3 Midodrine increased to 15q8. Pt refused to take the extra 5mg midodrin ordered. Neuro exam stable. hgb 10 from 9.4 after 1u pRBCs, Pt refused AM meds, given in late morning, CTAP ordered for L sided abd pain, fleet enema, restarted home movantik and ordere fleet enema for constipation. YIFAN and HMV dc'd. 250cc albumin x1  12/14: POD9, POD4, KAMRAN overnight, neuro stable. MAP goal liberalized to > 65. Stepdown status.   12/15: POD10, POD5, KAMRAN overnight  12/16: POD11, POD6, KAMRAN overnight, neuro stable, 500 cc bolus NS given for tachycardia. Aquacell changed. CXR, pro-bnp negative for pulmonary edema. Given additional 500cc bolus.  12/17: POD 12. POD 7. KAMRAN overnight, neuro stable.   12/18: POD 13/8. Incontinent x2 today.   12/19: KAMRAN. Persistent tachycardia, r/o PE. CTPE protocol ordered. Refused all afternoon meds except oxy 30. Per Dr. Cowart (rad) CT chest w segmental and subsegmental PE in R upper lobe. Dopplers negative for DVT, CT PE shows segmental and subsegmental PE in R upper lobe. Per Dr. Augustine and Dr. Luz, Will start eliquis pending pain management approval. Per Dr. Mclaughlin (pain mgmt) can start eliquis 10mg BIDx 7 days. Dc'd lovenox.  12/20: POD 10 T9-L2 Decompression. Hemodynamically stable. Pending rehab. PT today.   12/21: POD 11 T9-L2 Decompression. neruo/hemodynamically stable, pending rehab.  12/22: POD 17/12, pending rehab. Aquacel dressing changed.   12/23: POD 18/13, pending rehab.   12/24: POD 19/14, pending rehab.   12/25: POD 20/15, pending rehab.   12/26: POD 21/16, pending rehab.   12/27: POD 22/17.     Vital Signs Last 24 Hrs  T(C): 36.6 (27 Dec 2023 00:34), Max: 36.7 (26 Dec 2023 16:30)  T(F): 97.9 (27 Dec 2023 00:34), Max: 98.1 (26 Dec 2023 21:25)  HR: 88 (27 Dec 2023 00:34) (78 - 100)  BP: 127/76 (27 Dec 2023 00:34) (101/61 - 137/77)  BP(mean): --  RR: 16 (27 Dec 2023 00:34) (16 - 18)  SpO2: 95% (27 Dec 2023 00:34) (95% - 99%)    Parameters below as of 27 Dec 2023 00:34  Patient On (Oxygen Delivery Method): room air        I&O's Summary    25 Dec 2023 07:01  -  26 Dec 2023 07:00  --------------------------------------------------------  IN: 0 mL / OUT: 1500 mL / NET: -1500 mL    26 Dec 2023 07:01  -  27 Dec 2023 01:16  --------------------------------------------------------  IN: 0 mL / OUT: 1450 mL / NET: -1450 mL        PHYSICAL EXAM:  General: NAD, AAOx3  HEENT: PERRL. EOMI. VF intact.  Neck: From, nontender.  Cardiovascular: RRR, normal S1 and S2   Respiratory: non-labored breathing, symmetric chest rise  GI: abd soft, NTND, +BS  Neuro: CN II-XII intact, follows commands, speech clear. Strength BL UE 5/5  BL LE 0/5 to noxious stimuli with T11 sensory deficit.  Vascular: Distal pulses 2+ x 4, no calf edema or erythema  Wounds: Posterior midline spine incision C/D/I with dressing in place      LABS:                  CAPILLARY BLOOD GLUCOSE          Drug Levels: [] N/A    CSF Analysis: [] N/A      Allergies    Crab (Pruritus)  No Known Drug Allergies    Intolerances      MEDICATIONS:  Antibiotics:    Neuro:  acetaminophen     Tablet .. 1000 milliGRAM(s) Oral every 8 hours PRN  diazepam    Tablet 5 milliGRAM(s) Oral every 8 hours  diphenhydrAMINE 25 milliGRAM(s) Oral every 12 hours PRN  DULoxetine 60 milliGRAM(s) Oral daily  gabapentin 800 milliGRAM(s) Oral every 8 hours  ondansetron Injectable 4 milliGRAM(s) IV Push every 6 hours PRN  oxyCODONE    IR 10 milliGRAM(s) Oral every 4 hours PRN  oxyCODONE    IR 15 milliGRAM(s) Oral every 4 hours PRN    Anticoagulation:  apixaban 5 milliGRAM(s) Oral every 12 hours    OTHER:  benzocaine 20% Spray 1 Spray(s) Mucosal three times a day PRN  bisacodyl 5 milliGRAM(s) Oral at bedtime  hydrocortisone 1% Cream 1 Application(s) Topical two times a day PRN  influenza  Vaccine (HIGH DOSE) 0.7 milliLiter(s) IntraMuscular once  midodrine 15 milliGRAM(s) Oral every 8 hours  naloxegol 25 milliGRAM(s) Oral daily  naloxone Injectable 0.1 milliGRAM(s) IV Push every 3 minutes PRN  pantoprazole    Tablet 40 milliGRAM(s) Oral before breakfast  polyethylene glycol 3350 17 Gram(s) Oral two times a day  senna 2 Tablet(s) Oral at bedtime  simvastatin 40 milliGRAM(s) Oral at bedtime    IVF:  multivitamin 1 Tablet(s) Oral daily      ASSESSMENT:  67 yo female with Hx HTN, HLD, DM, CVA x 3 (last in 2016 with residual R hand weakness), GAMALIEL not using CPAP, Emphysema, ex-25 pack year smoker now restarted, chronic constipation on Movantik, chronic urinary retention (SC at home), b/l CTS s/p release, s/p Intrathecal Morphine pump for pain, with chronic neck and back pain worsening for years s/p multiple spinal surgeries including laminectomies and fusion of cervical, thoracic and lumbar spine, s/p T3-L1 revision of prior fusion (with  on 8/17/22). Xray 11/16/23 showing broken rods/displacement. Now s/p C2-S2 instrumentation and fusion (12/5). S/p T9-L2 decompression w/ durotomy with repair (12/10/23). KAREEM grade A.       S12.9XXA    Handoff    MEWS Score    HTN (hypertension)    Back pain    Hypertension    SS (spinal stenosis)    High cholesterol    Stroke    H/O carpal tunnel syndrome    H/O carpal tunnel syndrome    Chronic GERD    History of chronic constipation    Seizure    Urinary incontinence    Pre-diabetes    Acute UTI    Anxiety    Anxiety and depression    Arthritis    Eczema    External hemorrhoids    H/O kyphosis    Multiple sclerosis    Pancreas cyst    Scoliosis    Wheelchair dependent    Cervical pseudoarthrosis    Right shoulder pain    Cervical spondylosis    Chronic headaches    Chronic LUQ pain    Chronic UTI    Degenerative joint disease    H/O low back pain    Lumbosacral spondylosis    COPD (chronic obstructive pulmonary disease)    Back pain    Back pain    Spinal cord injury, thoracic (T7-T12)    Back pain    Spinal cord injury of thoracic region without bone injury    Delirium    Revision of fusion of thoracic spine    Thoracic back pain    Revision of posterolateral fusion of lumbar spine    Revision of fusion of thoracic spine    Decompression, spinal cord, thoracic, posterolateral approach    Cervical disc disease    H/O Spinal surgery    H/O breast surgery    S/P cervical discectomy    Previous back surgery    H/O shoulder surgery    H/O total shoulder replacement, right    Room Service Assist    Revision of posterolateral fusion of lumbar spine    Decompression, spinal cord, thoracic, posterolateral approach    HTN (hypertension)    HLD (hyperlipidemia)    CVA (cerebrovascular accident)    GAMALIEL (obstructive sleep apnea)    H/O emphysema    Smoking history    Chronic constipation    Urinary incontinence    Pulmonary embolism    Diabetes    GERD (gastroesophageal reflux disease)    SysAdmin_VstLnk        PLAN:  Neuro:  - Neuro/vitals q4  - pain control: modified ERAS, IT morphine pump, oxycodone 10/15 prn, valium and home gabapentin 800 TID resumed  - Continue home cymbalta 60mg daily   - CT thoracic spine 12/6: postop changes  - protected sleep time 10PM-4AM     Cardio:  - MAP > 65   - midodrine 15q8   - C/w home simvastatin  - Sinus tachycardia - likely 2/2 PE (12/19)      Pulm:  - RA   - hx GAMALIEL, no CPAP at home  - PE protocol, 12/19: DVT doppler neg, CTA w segmental and subsegmental PE in R upper lobe  - 10 mg eliquis BID for R upper lobe PE (12/19-) x 7 d then 5 mg BID     GI:  - CCD   - Bowel regimen, home movantik 25 mg, last BM 12/17  - CT A/P 12/13 = impacted stool   - GERD: continue home protonix    Renal:  - Straight cath q6  - Patient not interested in suprapubic catheter placement    Endo:  - a1c: 7    Heme:  - SCDs DVT ppx  - Eliquis d/t PE   - 500 cell saver intraop 12/5, 1u PRBC intraop 12/10, 1u PRBC 12/12    ID:  - afebrile    Dispo:  - SDU status, full code, pending AR    D/w Dr. Luz     Assessment:  Present when checked    []  GCS  E   V  M     Heart Failure: []Acute, [] acute on chronic , []chronic  Heart Failure:  [] Diastolic (HFpEF), [] Systolic (HFrEF), []Combined (HFpEF and HFrEF), [] RHF, [] Pulm HTN, [] Other    [] DARRYL, [] ATN, [] AIN, [] other  [] CKD1, [] CKD2, [] CKD 3, [] CKD 4, [] CKD 5, []ESRD    Encephalopathy: [] Metabolic, [] Hepatic, [] toxic, [] Neurological, [] Other    Abnormal Nurtitional Status: [] malnurtition (see nutrition note), [ ]underweight: BMI < 19, [] morbid obesity: BMI >40, [] Cachexia    [] Sepsis  [] hypovolemic shock,[] cardiogenic shock, [] hemorrhagic shock, [] neuogenic shock  [] Acute Respiratory Failure  []Cerebral edema, [] Brain compression/ herniation,   [] Functional quadriplegia  [] Acute blood loss anemia

## 2023-12-27 NOTE — PROVIDER CONTACT NOTE (OTHER) - REASON
tachycardia
Pt refused all morning medications and finger stick
Pt refusing to participate in neuro exam, refusing medications and finger stick
urinary retention

## 2023-12-27 NOTE — PROGRESS NOTE ADULT - ASSESSMENT
68F with PMH of HTN, HLD, DM2, CVA x 3 (last in 2016 with residual R hand weakness), GAMALIEL not using CPAP, Emphysema, ex-25 pack year smoker now restarted, chronic constipation on Movantik, chronic urinary retention (SC at home), b/l CTS s/p release, s/p Intrathecal Morphine pump for pain, with chronic neck and back pain worsening for years s/p multiple spinal surgeries including laminectomies and fusion of cervical, thoracic and lumbar spine, s/p T3-L1 revision of prior fusion (with  on 8/17/22). Xray 11/16/23 showing broken rods/displacement. Now s/p C2-S2 instrumentation and fusion (12/5). S/p T9-L2 decompression w/ durotomy with repair (12/10/23).     #Neck Pain  #Multiple Spine Surgeries  #Chronic Back Pain  -s/p fusion  -OT  -Pain Control per primary team  -Bowel Regimen   -Pending Rehab placement    #Chronic Constipation   - likely due to chronic opioid use  - c/w bowel regimen  - continue movantik    #HTN  -  Lopressor 75mg BID    #CAD  #CVA  - resume ASA post operatively when safe to do so  - continue atorvastatin  - awaiting results of recent long term outpatient cardiac monitoring to monitor for occult Afib to determine if a role for full dose AVC, follows with Dr Jeter    #DM  - ISS while inpatient    #Pulmonary embolism   -CTPE performed given persistent tachycardia, shown to have subsegmental and segmental PE  - c/w Eliquis     # Paraplegia     Dispo:  Medically Ready for [facility] REhab , bed not available to [facility]       68F with PMH of HTN, HLD, DM2, CVA x 3 (last in 2016 with residual R hand weakness), GAMALIEL not using CPAP, Emphysema, ex-25 pack year smoker now restarted, chronic constipation on Movantik, chronic urinary retention (SC at home), b/l CTS s/p release, s/p Intrathecal Morphine pump for pain, with chronic neck and back pain worsening for years s/p multiple spinal surgeries including laminectomies and fusion of cervical, thoracic and lumbar spine, s/p T3-L1 revision of prior fusion (with  on 8/17/22). Xray 11/16/23 showing broken rods/displacement. Now s/p C2-S2 instrumentation and fusion (12/5). S/p T9-L2 decompression w/ durotomy with repair (12/10/23).     #Neck Pain  #Multiple Spine Surgeries  #Chronic Back Pain  -s/p fusion  -OT  -Pain Control per primary team  -Bowel Regimen   -Pending Rehab placement    #Chronic Constipation   - likely due to chronic opioid use  - c/w bowel regimen  - continue movantik    #HTN  -  Lopressor 75mg BID    #CAD  #CVA  - resume ASA post operatively when safe to do so  - continue atorvastatin  - awaiting results of recent long term outpatient cardiac monitoring to monitor for occult Afib to determine if a role for full dose AVC, follows with Dr Jeter    #DM  - ISS while inpatient    #Pulmonary embolism   -CTPE performed given persistent tachycardia, shown to have subsegmental and segmental PE  - c/w Eliquis     # Paraplegia     Dispo:  Medically Ready for [facility] REhab , bed not available to [facility]

## 2023-12-28 LAB
GLUCOSE BLDC GLUCOMTR-MCNC: 129 MG/DL — HIGH (ref 70–99)
GLUCOSE BLDC GLUCOMTR-MCNC: 129 MG/DL — HIGH (ref 70–99)

## 2023-12-28 PROCEDURE — 99232 SBSQ HOSP IP/OBS MODERATE 35: CPT

## 2023-12-28 RX ADMIN — OXYCODONE HYDROCHLORIDE 15 MILLIGRAM(S): 5 TABLET ORAL at 17:09

## 2023-12-28 RX ADMIN — Medication 5 MILLIGRAM(S): at 14:38

## 2023-12-28 RX ADMIN — Medication 5 MILLIGRAM(S): at 21:39

## 2023-12-28 RX ADMIN — OXYCODONE HYDROCHLORIDE 15 MILLIGRAM(S): 5 TABLET ORAL at 21:39

## 2023-12-28 RX ADMIN — MIDODRINE HYDROCHLORIDE 15 MILLIGRAM(S): 2.5 TABLET ORAL at 18:47

## 2023-12-28 RX ADMIN — SENNA PLUS 2 TABLET(S): 8.6 TABLET ORAL at 21:39

## 2023-12-28 RX ADMIN — SIMVASTATIN 40 MILLIGRAM(S): 20 TABLET, FILM COATED ORAL at 21:39

## 2023-12-28 RX ADMIN — MIDODRINE HYDROCHLORIDE 15 MILLIGRAM(S): 2.5 TABLET ORAL at 02:31

## 2023-12-28 RX ADMIN — OXYCODONE HYDROCHLORIDE 15 MILLIGRAM(S): 5 TABLET ORAL at 11:26

## 2023-12-28 RX ADMIN — OXYCODONE HYDROCHLORIDE 15 MILLIGRAM(S): 5 TABLET ORAL at 22:30

## 2023-12-28 RX ADMIN — PANTOPRAZOLE SODIUM 40 MILLIGRAM(S): 20 TABLET, DELAYED RELEASE ORAL at 06:01

## 2023-12-28 RX ADMIN — GABAPENTIN 800 MILLIGRAM(S): 400 CAPSULE ORAL at 18:46

## 2023-12-28 RX ADMIN — GABAPENTIN 800 MILLIGRAM(S): 400 CAPSULE ORAL at 02:30

## 2023-12-28 RX ADMIN — CHLORHEXIDINE GLUCONATE 1 APPLICATION(S): 213 SOLUTION TOPICAL at 06:01

## 2023-12-28 RX ADMIN — OXYCODONE HYDROCHLORIDE 15 MILLIGRAM(S): 5 TABLET ORAL at 16:09

## 2023-12-28 RX ADMIN — APIXABAN 5 MILLIGRAM(S): 2.5 TABLET, FILM COATED ORAL at 19:51

## 2023-12-28 RX ADMIN — APIXABAN 5 MILLIGRAM(S): 2.5 TABLET, FILM COATED ORAL at 06:01

## 2023-12-28 RX ADMIN — OXYCODONE HYDROCHLORIDE 15 MILLIGRAM(S): 5 TABLET ORAL at 12:15

## 2023-12-28 RX ADMIN — GABAPENTIN 800 MILLIGRAM(S): 400 CAPSULE ORAL at 11:26

## 2023-12-28 RX ADMIN — MIDODRINE HYDROCHLORIDE 15 MILLIGRAM(S): 2.5 TABLET ORAL at 11:28

## 2023-12-28 NOTE — PROGRESS NOTE ADULT - SUBJECTIVE AND OBJECTIVE BOX
HPI:  66 y/o female with PMHx HTN, HLD, CVA x 3 (last was 2016 with right hand weakness residual), GAMALIEL not using CPAP, Emphysema, former 25 pack year smoker restarted this week of surgery, Chronic Constipation on Movantik, Urinary Incontinence chronically self straight cath at home, chronic UTI, Breast Implant Rupture with Chronic Leak repair 10/2023, B/L CTS s/p release, s/p Intrathecal Morphine pump for pain, with chronic neck and back pain worsening for years s/p multiple spinal surgeries including laminectomies and fusion of cervical, thoracic and lumbar spine initially done in 2009 and recently in 2019 and 2020 at [facility] by Dr. Trinidad, s/p T3-L1 revision of prior fusion (with Dr. Luz on 8/17/22). Outpatient Xray 11/16/2023 showing broken rods/displacement. Presents for elective C2-S2 instrumentation and fusion. Pt endorses weakness of b/l LE and burning pain in b/l lower extremities radiating to both feet,  (04 Dec 2023 15:44)    OVERNIGHT EVENTS: KAMRAN, +[facility]    Hospital Course:   12/4: Admitted for spinal fusion d/t damaged hardware.   12/5: Neuro stable. POD0 C2-S2 instrumentation/fusion (intraop b/l LE motor loss). on parminder for MAP>90  12/6: POD1. KAMRAN o/n neuro stable. remains intubated. Extubated 6am. Precedex gtt prn. Remains on parminder gtt for MAP goal >90. Valium made prn. CT thoracic spine bc MRI unavailable. Attempted NGT, unsuccessful (resistance @ 35).   12/7: POD2 Given IV dilaudid 0.5mg for pain. Neuro exam stable. SQL started tongiht. Increased precedex to 1.0. Responds well to valium. Consulting psych given paranoia ?homicidal statements. Passed bedside dysphagia and tolerating PO meds. MRI complete, f/u read.   12/8: POD3 Pt reporting incisional pain, given dilaudid 0.5mg IV x2. Pt appearing anxious, given xanax 1mg. Neuro exam unchanged. Pain regimen increased to oxy 15/30 mg, pending further recs. Given 1 L bolus for tachycardia. Trops negative. 2 HMV drains removed.   12/9: POD4, KAMRAN, CT myelogram today showing block at T12-L1, OR tomorrow  12/10: POD5, KAMRAN, OR today for exploration of fusion, possible decompression T9-L2, revision instrumentation  POD 0 T9-L2 decompression. Pt returned from OR intubated. 50mcg fentanyl IVP x 2 for pain control and HTN. Magnesium repleted. Precedex gtt started for sedation. Decreased FiO2 to 40%. 1L bolus for soft MAP. Dc'd propofol and started levo gtt for MAP >90.   12/11: POD 6, POD 1. Extubated, satting well on RA. Resumed home valium and gabapentin. Castillo d/c'd, pending TOV. Seroquel started for agitation, R IJ removed, LUE double midline placed. Started SQL.   12/12: POD7, POD2, given 500 cc bolus and started on phenylephrine for MAP goal >90. Started midodrine 10mg q8h. Cardura dc'd d/t hypotension. Given 250cc of albumin for increasing pressor requirements. Given lactulose, pending BM. Hgb 7.6<8.3, given 1u PRBC. Protected sleep time. Urology consulted for possible suprapubic cath, recommend self intermittent cath is optimal.   12/13: POD8, POD3 Midodrine increased to 15q8. Pt refused to take the extra 5mg midodrin ordered. Neuro exam stable. hgb 10 from 9.4 after 1u pRBCs, Pt refused AM meds, given in late morning, CTAP ordered for L sided abd pain, fleet enema, restarted home movantik and ordere fleet enema for constipation. YIFAN and HMV dc'd. 250cc albumin x1  12/14: POD9, POD4, KAMRAN overnight, neuro stable. MAP goal liberalized to > 65. Stepdown status.   12/15: POD10, POD5, KAMRAN overnight  12/16: POD11, POD6, KAMRAN overnight, neuro stable, 500 cc bolus NS given for tachycardia. Aquacell changed. CXR, pro-bnp negative for pulmonary edema. Given additional 500cc bolus.  12/17: POD 12. POD 7. KAMRAN overnight, neuro stable.   12/18: POD 13/8. Incontinent x2 today.   12/19: KAMRAN. Persistent tachycardia, r/o PE. CTPE protocol ordered. Refused all afternoon meds except oxy 30. Per Dr. Cowart (rad) CT chest w segmental and subsegmental PE in R upper lobe. Dopplers negative for DVT, CT PE shows segmental and subsegmental PE in R upper lobe. Per Dr. Augustine and Dr. Luz, Will start eliquis pending pain management approval. Per Dr. Mclaughlin (pain mgmt) can start eliquis 10mg BIDx 7 days. Dc'd lovenox.  12/20: POD 10 T9-L2 Decompression. Hemodynamically stable. Pending rehab. PT today.   12/21: POD 11 T9-L2 Decompression. neruo/hemodynamically stable, pending rehab.  12/22: POD 17/12, pending rehab. Aquacel dressing changed.   12/23: POD 18/13, pending rehab.   12/24: POD 19/14, pending rehab.   12/25: POD 20/15, pending rehab.   12/26: POD 21/16, pending rehab.   12/27: POD 22/17. pending rehab.   12/28: POD 23/18.     Vital Signs Last 24 Hrs  T(C): 36.4 (27 Dec 2023 21:18), Max: 36.6 (27 Dec 2023 17:25)  T(F): 97.5 (27 Dec 2023 21:18), Max: 97.8 (27 Dec 2023 17:25)  HR: 96 (27 Dec 2023 21:18) (94 - 113)  BP: 114/71 (27 Dec 2023 21:18) (108/59 - 144/78)  BP(mean): --  RR: 13 (27 Dec 2023 21:18) (13 - 18)  SpO2: 97% (27 Dec 2023 21:18) (96% - 98%)    Parameters below as of 27 Dec 2023 21:18  Patient On (Oxygen Delivery Method): room air        I&O's Summary    26 Dec 2023 07:01  -  27 Dec 2023 07:00  --------------------------------------------------------  IN: 0 mL / OUT: 1450 mL / NET: -1450 mL    27 Dec 2023 07:01  -  28 Dec 2023 01:13  --------------------------------------------------------  IN: 240 mL / OUT: 750 mL / NET: -510 mL        PHYSICAL EXAM:  General: NAD, AAOx3  HEENT: PERRL. EOMI.   Cardiovascular: RRR, normal S1 and S2   Respiratory: non-labored breathing, symmetric chest rise  GI: abd soft, NTND  Neuro: CN II-XII intact, follows commands, speech clear. Strength BL UE 5/5  BL LE 0/5 to noxious stimuli with T11 sensory deficit.  Vascular: Distal pulses 2+ x 4, no calf edema or erythema  Wounds: Posterior midline spine incision C/D/I with aquacell dressing in place      LABS:          CAPILLARY BLOOD GLUCOSE          Drug Levels: [] N/A    CSF Analysis: [] N/A      Allergies    Crab (Pruritus)  No Known Drug Allergies    Intolerances      MEDICATIONS:  Antibiotics:    Neuro:  acetaminophen     Tablet .. 1000 milliGRAM(s) Oral every 8 hours PRN  diazepam    Tablet 5 milliGRAM(s) Oral every 8 hours  diphenhydrAMINE 25 milliGRAM(s) Oral every 12 hours PRN  DULoxetine 60 milliGRAM(s) Oral daily  gabapentin 800 milliGRAM(s) Oral every 8 hours  ondansetron Injectable 4 milliGRAM(s) IV Push every 6 hours PRN  oxyCODONE    IR 10 milliGRAM(s) Oral every 4 hours PRN  oxyCODONE    IR 15 milliGRAM(s) Oral every 4 hours PRN    Anticoagulation:  apixaban 5 milliGRAM(s) Oral every 12 hours    OTHER:  benzocaine 20% Spray 1 Spray(s) Mucosal three times a day PRN  bisacodyl 5 milliGRAM(s) Oral at bedtime  chlorhexidine 2% Cloths 1 Application(s) Topical <User Schedule>  hydrocortisone 1% Cream 1 Application(s) Topical two times a day PRN  influenza  Vaccine (HIGH DOSE) 0.7 milliLiter(s) IntraMuscular once  midodrine 15 milliGRAM(s) Oral every 8 hours  Morphine 1 milliGRAM / 1 mL 20 milliLITERS 20 milliLiter(s) IntraThecal Continuous Pump  naloxegol 25 milliGRAM(s) Oral daily  naloxone Injectable 0.1 milliGRAM(s) IV Push every 3 minutes PRN  pantoprazole    Tablet 40 milliGRAM(s) Oral before breakfast  polyethylene glycol 3350 17 Gram(s) Oral two times a day  senna 2 Tablet(s) Oral at bedtime  simvastatin 40 milliGRAM(s) Oral at bedtime    IVF:  multivitamin 1 Tablet(s) Oral daily    CULTURES:    RADIOLOGY & ADDITIONAL TESTS:      ASSESSMENT:  69 yo female with Hx HTN, HLD, DM, CVA x 3 (last in 2016 with residual R hand weakness), GAMALIEL not using CPAP, Emphysema, ex-25 pack year smoker now restarted, chronic constipation on Movantik, chronic urinary retention (SC at home), b/l CTS s/p release, s/p Intrathecal Morphine pump for pain, with chronic neck and back pain worsening for years s/p multiple spinal surgeries including laminectomies and fusion of cervical, thoracic and lumbar spine, s/p T3-L1 revision of prior fusion (with  on 8/17/22). Xray 11/16/23 showing broken rods/displacement. Now s/p C2-S2 instrumentation and fusion (12/5). S/p T9-L2 decompression w/ durotomy with repair (12/10/23). KAREEM grade A.     S12.9XXA    Handoff    MEWS Score    HTN (hypertension)    Back pain    Hypertension    SS (spinal stenosis)    High cholesterol    Stroke    H/O carpal tunnel syndrome    H/O carpal tunnel syndrome    Chronic GERD    History of chronic constipation    Seizure    Urinary incontinence    Pre-diabetes    Acute UTI    Anxiety    Anxiety and depression    Arthritis    Eczema    External hemorrhoids    H/O kyphosis    Multiple sclerosis    Pancreas cyst    Scoliosis    Wheelchair dependent    Cervical pseudoarthrosis    Right shoulder pain    Cervical spondylosis    Chronic headaches    Chronic LUQ pain    Chronic UTI    Degenerative joint disease    H/O low back pain    Lumbosacral spondylosis    COPD (chronic obstructive pulmonary disease)    Back pain    Back pain    Spinal cord injury, thoracic (T7-T12)    Back pain    Spinal cord injury of thoracic region without bone injury    Delirium    Revision of fusion of thoracic spine    Thoracic back pain    Revision of posterolateral fusion of lumbar spine    Revision of fusion of thoracic spine    Decompression, spinal cord, thoracic, posterolateral approach    Cervical disc disease    H/O Spinal surgery    H/O breast surgery    S/P cervical discectomy    Previous back surgery    H/O shoulder surgery    H/O total shoulder replacement, right    Room Service Assist    Room Service Assist    Revision of posterolateral fusion of lumbar spine    Decompression, spinal cord, thoracic, posterolateral approach    HTN (hypertension)    HLD (hyperlipidemia)    CVA (cerebrovascular accident)    GAMALIEL (obstructive sleep apnea)    H/O emphysema    Smoking history    Chronic constipation    Urinary incontinence    Pulmonary embolism    Diabetes    GERD (gastroesophageal reflux disease)    SysAdmin_VstLnk        PLAN:  Neuro:  - Neuro/vitals q4  - pain control: modified ERAS, IT morphine pump, oxycodone 10/15 prn, valium and home gabapentin 800 TID resumed  - Continue home cymbalta 60mg daily   - CT thoracic spine 12/6: postop changes  - protected sleep time 10PM-4AM     Cardio:  - MAP > 65   - midodrine 15q8   - C/w home simvastatin  - Sinus tachycardia - likely 2/2 PE (12/19)      Pulm:  - RA   - hx GAMALIEL, no CPAP at home  - PE protocol, 12/19: DVT doppler neg, CTA w segmental and subsegmental PE in R upper lobe  - 10 mg eliquis BID for R upper lobe PE (12/19-) x 7 d then 5 mg BID     GI:  - CCD   - Bowel regimen, home movantik 25 mg, last BM 12/17  - CT A/P 12/13 = impacted stool   - GERD: continue home protonix    Renal:  - Straight cath q6  - Patient not interested in suprapubic catheter placement    Endo:  - a1c: 7    Heme:  - SCDs DVT ppx  - Eliquis d/t PE 5 mg BID  - 500 cell saver intraop 12/5, 1u PRBC intraop 12/10, 1u PRBC 12/12    ID:  - afebrile    Dispo:  - SDU status, full code, pending AR    D/w Dr. Luz     Assessment:  Present when checked    []  GCS  E   V  M     Heart Failure: []Acute, [] acute on chronic , []chronic  Heart Failure:  [] Diastolic (HFpEF), [] Systolic (HFrEF), []Combined (HFpEF and HFrEF), [] RHF, [] Pulm HTN, [] Other    [] DARRYL, [] ATN, [] AIN, [] other  [] CKD1, [] CKD2, [] CKD 3, [] CKD 4, [] CKD 5, []ESRD    Encephalopathy: [] Metabolic, [] Hepatic, [] toxic, [] Neurological, [] Other    Abnormal Nurtitional Status: [] malnurtition (see nutrition note), [ ]underweight: BMI < 19, [] morbid obesity: BMI >40, [] Cachexia    [] Sepsis  [] hypovolemic shock,[] cardiogenic shock, [] hemorrhagic shock, [] neuogenic shock  [] Acute Respiratory Failure  []Cerebral edema, [] Brain compression/ herniation,   [] Functional quadriplegia  [] Acute blood loss anemia   HPI:  66 y/o female with PMHx HTN, HLD, CVA x 3 (last was 2016 with right hand weakness residual), GAMALIEL not using CPAP, Emphysema, former 25 pack year smoker restarted this week of surgery, Chronic Constipation on Movantik, Urinary Incontinence chronically self straight cath at home, chronic UTI, Breast Implant Rupture with Chronic Leak repair 10/2023, B/L CTS s/p release, s/p Intrathecal Morphine pump for pain, with chronic neck and back pain worsening for years s/p multiple spinal surgeries including laminectomies and fusion of cervical, thoracic and lumbar spine initially done in 2009 and recently in 2019 and 2020 at [facility] by Dr. Trinidad, s/p T3-L1 revision of prior fusion (with Dr. Luz on 8/17/22). Outpatient Xray 11/16/2023 showing broken rods/displacement. Presents for elective C2-S2 instrumentation and fusion. Pt endorses weakness of b/l LE and burning pain in b/l lower extremities radiating to both feet,  (04 Dec 2023 15:44)    OVERNIGHT EVENTS: KAMRAN, +[facility]    Hospital Course:   12/4: Admitted for spinal fusion d/t damaged hardware.   12/5: Neuro stable. POD0 C2-S2 instrumentation/fusion (intraop b/l LE motor loss). on parminder for MAP>90  12/6: POD1. KAMRAN o/n neuro stable. remains intubated. Extubated 6am. Precedex gtt prn. Remains on parminder gtt for MAP goal >90. Valium made prn. CT thoracic spine bc MRI unavailable. Attempted NGT, unsuccessful (resistance @ 35).   12/7: POD2 Given IV dilaudid 0.5mg for pain. Neuro exam stable. SQL started tongiht. Increased precedex to 1.0. Responds well to valium. Consulting psych given paranoia ?homicidal statements. Passed bedside dysphagia and tolerating PO meds. MRI complete, f/u read.   12/8: POD3 Pt reporting incisional pain, given dilaudid 0.5mg IV x2. Pt appearing anxious, given xanax 1mg. Neuro exam unchanged. Pain regimen increased to oxy 15/30 mg, pending further recs. Given 1 L bolus for tachycardia. Trops negative. 2 HMV drains removed.   12/9: POD4, KAMRAN, CT myelogram today showing block at T12-L1, OR tomorrow  12/10: POD5, KAMRAN, OR today for exploration of fusion, possible decompression T9-L2, revision instrumentation  POD 0 T9-L2 decompression. Pt returned from OR intubated. 50mcg fentanyl IVP x 2 for pain control and HTN. Magnesium repleted. Precedex gtt started for sedation. Decreased FiO2 to 40%. 1L bolus for soft MAP. Dc'd propofol and started levo gtt for MAP >90.   12/11: POD 6, POD 1. Extubated, satting well on RA. Resumed home valium and gabapentin. Castillo d/c'd, pending TOV. Seroquel started for agitation, R IJ removed, LUE double midline placed. Started SQL.   12/12: POD7, POD2, given 500 cc bolus and started on phenylephrine for MAP goal >90. Started midodrine 10mg q8h. Cardura dc'd d/t hypotension. Given 250cc of albumin for increasing pressor requirements. Given lactulose, pending BM. Hgb 7.6<8.3, given 1u PRBC. Protected sleep time. Urology consulted for possible suprapubic cath, recommend self intermittent cath is optimal.   12/13: POD8, POD3 Midodrine increased to 15q8. Pt refused to take the extra 5mg midodrin ordered. Neuro exam stable. hgb 10 from 9.4 after 1u pRBCs, Pt refused AM meds, given in late morning, CTAP ordered for L sided abd pain, fleet enema, restarted home movantik and ordere fleet enema for constipation. YIFAN and HMV dc'd. 250cc albumin x1  12/14: POD9, POD4, KAMRAN overnight, neuro stable. MAP goal liberalized to > 65. Stepdown status.   12/15: POD10, POD5, KAMRAN overnight  12/16: POD11, POD6, KAMRAN overnight, neuro stable, 500 cc bolus NS given for tachycardia. Aquacell changed. CXR, pro-bnp negative for pulmonary edema. Given additional 500cc bolus.  12/17: POD 12. POD 7. KAMRAN overnight, neuro stable.   12/18: POD 13/8. Incontinent x2 today.   12/19: KAMRAN. Persistent tachycardia, r/o PE. CTPE protocol ordered. Refused all afternoon meds except oxy 30. Per Dr. Cowart (rad) CT chest w segmental and subsegmental PE in R upper lobe. Dopplers negative for DVT, CT PE shows segmental and subsegmental PE in R upper lobe. Per Dr. Augustine and Dr. Luz, Will start eliquis pending pain management approval. Per Dr. Mclaughlin (pain mgmt) can start eliquis 10mg BIDx 7 days. Dc'd lovenox.  12/20: POD 10 T9-L2 Decompression. Hemodynamically stable. Pending rehab. PT today.   12/21: POD 11 T9-L2 Decompression. neruo/hemodynamically stable, pending rehab.  12/22: POD 17/12, pending rehab. Aquacel dressing changed.   12/23: POD 18/13, pending rehab.   12/24: POD 19/14, pending rehab.   12/25: POD 20/15, pending rehab.   12/26: POD 21/16, pending rehab.   12/27: POD 22/17. pending rehab.   12/28: POD 23/18.     Vital Signs Last 24 Hrs  T(C): 36.4 (27 Dec 2023 21:18), Max: 36.6 (27 Dec 2023 17:25)  T(F): 97.5 (27 Dec 2023 21:18), Max: 97.8 (27 Dec 2023 17:25)  HR: 96 (27 Dec 2023 21:18) (94 - 113)  BP: 114/71 (27 Dec 2023 21:18) (108/59 - 144/78)  BP(mean): --  RR: 13 (27 Dec 2023 21:18) (13 - 18)  SpO2: 97% (27 Dec 2023 21:18) (96% - 98%)    Parameters below as of 27 Dec 2023 21:18  Patient On (Oxygen Delivery Method): room air        I&O's Summary    26 Dec 2023 07:01  -  27 Dec 2023 07:00  --------------------------------------------------------  IN: 0 mL / OUT: 1450 mL / NET: -1450 mL    27 Dec 2023 07:01  -  28 Dec 2023 01:13  --------------------------------------------------------  IN: 240 mL / OUT: 750 mL / NET: -510 mL        PHYSICAL EXAM:  General: NAD, AAOx3  HEENT: PERRL. EOMI.   Cardiovascular: RRR, normal S1 and S2   Respiratory: non-labored breathing, symmetric chest rise  GI: abd soft, NTND  Neuro: CN II-XII intact, follows commands, speech clear. Strength BL UE 5/5  BL LE 0/5 to noxious stimuli with T11 sensory deficit.  Vascular: Distal pulses 2+ x 4, no calf edema or erythema  Wounds: Posterior midline spine incision C/D/I with aquacell dressing in place      LABS:          CAPILLARY BLOOD GLUCOSE          Drug Levels: [] N/A    CSF Analysis: [] N/A      Allergies    Crab (Pruritus)  No Known Drug Allergies    Intolerances      MEDICATIONS:  Antibiotics:    Neuro:  acetaminophen     Tablet .. 1000 milliGRAM(s) Oral every 8 hours PRN  diazepam    Tablet 5 milliGRAM(s) Oral every 8 hours  diphenhydrAMINE 25 milliGRAM(s) Oral every 12 hours PRN  DULoxetine 60 milliGRAM(s) Oral daily  gabapentin 800 milliGRAM(s) Oral every 8 hours  ondansetron Injectable 4 milliGRAM(s) IV Push every 6 hours PRN  oxyCODONE    IR 10 milliGRAM(s) Oral every 4 hours PRN  oxyCODONE    IR 15 milliGRAM(s) Oral every 4 hours PRN    Anticoagulation:  apixaban 5 milliGRAM(s) Oral every 12 hours    OTHER:  benzocaine 20% Spray 1 Spray(s) Mucosal three times a day PRN  bisacodyl 5 milliGRAM(s) Oral at bedtime  chlorhexidine 2% Cloths 1 Application(s) Topical <User Schedule>  hydrocortisone 1% Cream 1 Application(s) Topical two times a day PRN  influenza  Vaccine (HIGH DOSE) 0.7 milliLiter(s) IntraMuscular once  midodrine 15 milliGRAM(s) Oral every 8 hours  Morphine 1 milliGRAM / 1 mL 20 milliLITERS 20 milliLiter(s) IntraThecal Continuous Pump  naloxegol 25 milliGRAM(s) Oral daily  naloxone Injectable 0.1 milliGRAM(s) IV Push every 3 minutes PRN  pantoprazole    Tablet 40 milliGRAM(s) Oral before breakfast  polyethylene glycol 3350 17 Gram(s) Oral two times a day  senna 2 Tablet(s) Oral at bedtime  simvastatin 40 milliGRAM(s) Oral at bedtime    IVF:  multivitamin 1 Tablet(s) Oral daily    CULTURES:    RADIOLOGY & ADDITIONAL TESTS:      ASSESSMENT:  69 yo female with Hx HTN, HLD, DM, CVA x 3 (last in 2016 with residual R hand weakness), GAMALIEL not using CPAP, Emphysema, ex-25 pack year smoker now restarted, chronic constipation on Movantik, chronic urinary retention (SC at home), b/l CTS s/p release, s/p Intrathecal Morphine pump for pain, with chronic neck and back pain worsening for years s/p multiple spinal surgeries including laminectomies and fusion of cervical, thoracic and lumbar spine, s/p T3-L1 revision of prior fusion (with  on 8/17/22). Xray 11/16/23 showing broken rods/displacement. Now s/p C2-S2 instrumentation and fusion (12/5). S/p T9-L2 decompression w/ durotomy with repair (12/10/23). KAREEM grade A.     S12.9XXA    Handoff    MEWS Score    HTN (hypertension)    Back pain    Hypertension    SS (spinal stenosis)    High cholesterol    Stroke    H/O carpal tunnel syndrome    H/O carpal tunnel syndrome    Chronic GERD    History of chronic constipation    Seizure    Urinary incontinence    Pre-diabetes    Acute UTI    Anxiety    Anxiety and depression    Arthritis    Eczema    External hemorrhoids    H/O kyphosis    Multiple sclerosis    Pancreas cyst    Scoliosis    Wheelchair dependent    Cervical pseudoarthrosis    Right shoulder pain    Cervical spondylosis    Chronic headaches    Chronic LUQ pain    Chronic UTI    Degenerative joint disease    H/O low back pain    Lumbosacral spondylosis    COPD (chronic obstructive pulmonary disease)    Back pain    Back pain    Spinal cord injury, thoracic (T7-T12)    Back pain    Spinal cord injury of thoracic region without bone injury    Delirium    Revision of fusion of thoracic spine    Thoracic back pain    Revision of posterolateral fusion of lumbar spine    Revision of fusion of thoracic spine    Decompression, spinal cord, thoracic, posterolateral approach    Cervical disc disease    H/O Spinal surgery    H/O breast surgery    S/P cervical discectomy    Previous back surgery    H/O shoulder surgery    H/O total shoulder replacement, right    Room Service Assist    Room Service Assist    Revision of posterolateral fusion of lumbar spine    Decompression, spinal cord, thoracic, posterolateral approach    HTN (hypertension)    HLD (hyperlipidemia)    CVA (cerebrovascular accident)    GAMALIEL (obstructive sleep apnea)    H/O emphysema    Smoking history    Chronic constipation    Urinary incontinence    Pulmonary embolism    Diabetes    GERD (gastroesophageal reflux disease)    SysAdmin_VstLnk        PLAN:  Neuro:  - Neuro/vitals q4  - pain control: modified ERAS, IT morphine pump, oxycodone 10/15 prn, valium and home gabapentin 800 TID resumed  - Continue home cymbalta 60mg daily   - CT thoracic spine 12/6: postop changes  - protected sleep time 10PM-4AM     Cardio:  - MAP > 65   - midodrine 15q8   - C/w home simvastatin  - Sinus tachycardia - likely 2/2 PE (12/19)      Pulm:  - RA   - hx GAMALIEL, no CPAP at home  - PE protocol, 12/19: DVT doppler neg, CTA w segmental and subsegmental PE in R upper lobe  - 10 mg eliquis BID for R upper lobe PE (12/19-) x 7 d then 5 mg BID     GI:  - CCD   - Bowel regimen, home movantik 25 mg, last BM 12/17  - CT A/P 12/13 = impacted stool   - GERD: continue home protonix    Renal:  - Straight cath q6  - Patient not interested in suprapubic catheter placement    Endo:  - a1c: 7    Heme:  - SCDs DVT ppx  - Eliquis d/t PE 5 mg BID  - 500 cell saver intraop 12/5, 1u PRBC intraop 12/10, 1u PRBC 12/12    ID:  - afebrile    Dispo:  - SDU status, full code, pending AR    D/w Dr. Luz     Assessment:  Present when checked    []  GCS  E   V  M     Heart Failure: []Acute, [] acute on chronic , []chronic  Heart Failure:  [] Diastolic (HFpEF), [] Systolic (HFrEF), []Combined (HFpEF and HFrEF), [] RHF, [] Pulm HTN, [] Other    [] DARRYL, [] ATN, [] AIN, [] other  [] CKD1, [] CKD2, [] CKD 3, [] CKD 4, [] CKD 5, []ESRD    Encephalopathy: [] Metabolic, [] Hepatic, [] toxic, [] Neurological, [] Other    Abnormal Nurtitional Status: [] malnurtition (see nutrition note), [ ]underweight: BMI < 19, [] morbid obesity: BMI >40, [] Cachexia    [] Sepsis  [] hypovolemic shock,[] cardiogenic shock, [] hemorrhagic shock, [] neuogenic shock  [] Acute Respiratory Failure  []Cerebral edema, [] Brain compression/ herniation,   [] Functional quadriplegia  [] Acute blood loss anemia

## 2023-12-28 NOTE — PROGRESS NOTE ADULT - ASSESSMENT
68F with PMH of HTN, HLD, DM2, CVA x 3 (last in 2016 with residual R hand weakness), GAMALIEL not using CPAP, Emphysema, ex-25 pack year smoker now restarted, chronic constipation on Movantik, chronic urinary retention (SC at home), b/l CTS s/p release, s/p Intrathecal Morphine pump for pain, with chronic neck and back pain worsening for years s/p multiple spinal surgeries including laminectomies and fusion of cervical, thoracic and lumbar spine, s/p T3-L1 revision of prior fusion (with  on 8/17/22). Xray 11/16/23 showing broken rods/displacement. Now s/p C2-S2 instrumentation and fusion (12/5). S/p T9-L2 decompression w/ durotomy with repair (12/10/23).     #Neck Pain  #Multiple Spine Surgeries  #Chronic Back Pain  -s/p fusion  -OT  -Pain Control per primary team  -Bowel Regimen   -Pending Rehab placement    #Chronic Constipation   - likely due to chronic opioid use  - c/w bowel regimen  - continue movantik    #HTN  -  Lopressor 75mg BID    #CAD  #CVA  - resume ASA post operatively when safe to do so  - continue atorvastatin  - awaiting results of recent long term outpatient cardiac monitoring to monitor for occult Afib to determine if a role for full dose AC, follows with Dr Jeter    #DM  - ISS while inpatient    #Pulmonary embolism   -CTPE performed given persistent tachycardia, shown to have subsegmental and segmental PE  - c/w Eliquis     # Paraplegia     Dispo:  Medically Ready for ACute REhab , bed not available to [facility]       68F with PMH of HTN, HLD, DM2, CVA x 3 (last in 2016 with residual R hand weakness), GAMALIEL not using CPAP, Emphysema, ex-25 pack year smoker now restarted, chronic constipation on Movantik, chronic urinary retention (SC at home), b/l CTS s/p release, s/p Intrathecal Morphine pump for pain, with chronic neck and back pain worsening for years s/p multiple spinal surgeries including laminectomies and fusion of cervical, thoracic and lumbar spine, s/p T3-L1 revision of prior fusion (with  on 8/17/22). Xray 11/16/23 showing broken rods/displacement. Now s/p C2-S2 instrumentation and fusion (12/5). S/p T9-L2 decompression w/ durotomy with repair (12/10/23).     #Neck Pain  #Multiple Spine Surgeries  #Chronic Back Pain  -s/p fusion  -OT  -Pain Control per primary team  -Bowel Regimen   -Pending Rehab placement    #Chronic Constipation   - likely due to chronic opioid use  - c/w bowel regimen  - continue movantik    #HTN  -  Lopressor 75mg BID    #CAD  #CVA  - resume ASA post operatively when safe to do so  - continue atorvastatin  - awaiting results of recent long term outpatient cardiac monitoring to monitor for occult Afib to determine if a role for full dose AC, follows with Dr Jeter    #DM  - ISS while inpatient    #Pulmonary embolism   -CTPE performed given persistent tachycardia, shown to have subsegmental and segmental PE  - c/w Eliquis     # Paraplegia     Dispo:  Medically Ready for ACute REhab , bed not available to [facility]       68F with PMH of HTN, HLD, DM2, CVA x 3 (last in 2016 with residual R hand weakness), GAMALIEL not using CPAP, Emphysema, ex-25 pack year smoker now restarted, chronic constipation on Movantik, chronic urinary retention (SC at home), b/l CTS s/p release, s/p Intrathecal Morphine pump for pain, with chronic neck and back pain worsening for years s/p multiple spinal surgeries including laminectomies and fusion of cervical, thoracic and lumbar spine, s/p T3-L1 revision of prior fusion (with  on 8/17/22). Xray 11/16/23 showing broken rods/displacement. Now s/p C2-S2 instrumentation and fusion (12/5). S/p T9-L2 decompression w/ durotomy with repair (12/10/23).     #Neck Pain  #Multiple Spine Surgeries  #Chronic Back Pain  -s/p fusion  -OT  -Pain Control per primary team  -Bowel Regimen   -Pending Rehab placement    #Chronic Constipation   - likely due to chronic opioid use  - c/w bowel regimen  - continue movantik    #HTN  -  Lopressor 75mg BID    #CAD  #CVA  - resume ASA post operatively when safe to do so  - continue atorvastatin  - awaiting results of recent long term outpatient cardiac monitoring to monitor for occult Afib to determine if a role for full dose AC, follows with Dr Jeter    #DM  - ISS while inpatient    #Pulmonary embolism   -CTPE performed given persistent tachycardia, shown to have subsegmental and segmental PE  - c/w Eliquis     # Paraplegia     Dispo:  Medically Ready for Acute Rehab , bed not available to [place] [facility]  , Refusing [facility]       68F with PMH of HTN, HLD, DM2, CVA x 3 (last in 2016 with residual R hand weakness), GAMALIEL not using CPAP, Emphysema, ex-25 pack year smoker now restarted, chronic constipation on Movantik, chronic urinary retention (SC at home), b/l CTS s/p release, s/p Intrathecal Morphine pump for pain, with chronic neck and back pain worsening for years s/p multiple spinal surgeries including laminectomies and fusion of cervical, thoracic and lumbar spine, s/p T3-L1 revision of prior fusion (with  on 8/17/22). Xray 11/16/23 showing broken rods/displacement. Now s/p C2-S2 instrumentation and fusion (12/5). S/p T9-L2 decompression w/ durotomy with repair (12/10/23).     #Neck Pain  #Multiple Spine Surgeries  #Chronic Back Pain  -s/p fusion  -OT  -Pain Control per primary team  -Bowel Regimen   -Pending Rehab placement    #Chronic Constipation   - likely due to chronic opioid use  - c/w bowel regimen  - continue movantik    #HTN  -  Lopressor 75mg BID    #CAD  #CVA  - resume ASA post operatively when safe to do so  - continue atorvastatin  - awaiting results of recent long term outpatient cardiac monitoring to monitor for occult Afib to determine if a role for full dose AC, follows with Dr Jeter    #DM  - ISS while inpatient    #Pulmonary embolism   -CTPE performed given persistent tachycardia, shown to have subsegmental and segmental PE  - c/w Eliquis     # Paraplegia     Dispo:  Medically Ready for Acute Rehab , bed not available to [place] [facility]  , Refusing [facility]

## 2023-12-28 NOTE — PROGRESS NOTE ADULT - SUBJECTIVE AND OBJECTIVE BOX
Patient is a 68y old  Female who presents with a chief complaint of Myelopathy (13 Dec 2023 20:30)        SUBJECTIVE:  Patient was seen and examined at bedside.    Overnight Events :  No new complaints today        Review of systems: 12 point Review of systems negative unless otherwise documented elsewhere in note.     Diet, Consistent Carbohydrate w/Evening Snack (12-11-23 @ 09:57) [Active]      MEDICATIONS:  MEDICATIONS  (STANDING):  apixaban 5 milliGRAM(s) Oral every 12 hours  bisacodyl 5 milliGRAM(s) Oral at bedtime  diazepam    Tablet 5 milliGRAM(s) Oral every 8 hours  DULoxetine 60 milliGRAM(s) Oral daily  Duramorph 1 mg/ 1mL 20 milliLiter(s) 20 milliLiter(s) IntraThecal Continuous Pump  gabapentin 800 milliGRAM(s) Oral every 8 hours  influenza  Vaccine (HIGH DOSE) 0.7 milliLiter(s) IntraMuscular once  midodrine 15 milliGRAM(s) Oral every 8 hours  multivitamin 1 Tablet(s) Oral daily  naloxegol 25 milliGRAM(s) Oral daily  pantoprazole    Tablet 40 milliGRAM(s) Oral before breakfast  polyethylene glycol 3350 17 Gram(s) Oral two times a day  senna 2 Tablet(s) Oral at bedtime  simvastatin 40 milliGRAM(s) Oral at bedtime    MEDICATIONS  (PRN):  acetaminophen     Tablet .. 1000 milliGRAM(s) Oral every 8 hours PRN Temp greater or equal to 38.5C (101.3F), Mild Pain (1 - 3)  benzocaine 20% Spray 1 Spray(s) Mucosal three times a day PRN Sore throat  diphenhydrAMINE 25 milliGRAM(s) Oral every 12 hours PRN Rash and/or Itching  hydrocortisone 1% Cream 1 Application(s) Topical two times a day PRN Itching  naloxone Injectable 0.1 milliGRAM(s) IV Push every 3 minutes PRN For ANY of the following changes in patient status:  A. RR LESS THAN 10 breaths per minute, B. Oxygen saturation LESS THAN 90%, C. Sedation score of 6  ondansetron Injectable 4 milliGRAM(s) IV Push every 6 hours PRN Nausea  oxyCODONE    IR 10 milliGRAM(s) Oral every 4 hours PRN Moderate Pain (4 - 6)  oxyCODONE    IR 15 milliGRAM(s) Oral every 4 hours PRN Severe Pain (7 - 10)  sodium chloride 0.9% lock flush 10 milliLiter(s) IV Push every 1 hour PRN Pre/post blood products, medications, blood draw, and to maintain line patency      Allergies    Crab (Pruritus)  No Known Drug Allergies    Intolerances        OBJECTIVE:  Vital Signs Last 24 Hrs  T(C): 36.4 (28 Dec 2023 08:40), Max: 36.6 (27 Dec 2023 17:25)  T(F): 97.5 (28 Dec 2023 08:40), Max: 97.9 (28 Dec 2023 01:08)  HR: 102 (28 Dec 2023 11:25) (90 - 113)  BP: 119/79 (28 Dec 2023 11:25) (110/68 - 137/75)  BP(mean): --  RR: 20 (28 Dec 2023 11:25) (13 - 21)  SpO2: 100% (28 Dec 2023 11:25) (93% - 100%)    Parameters below as of 28 Dec 2023 11:25  Patient On (Oxygen Delivery Method): room air              PHYSICAL EXAM:  Gen: Resting in bed at time of exam, not in distress   HEENT: moist mucosa, no lesions   Neck: supple, trachea at midline  CV: RRR, +S1/S2  Pulm: no wheezing , no crackles  no increase in work of breathing  Abd: soft, NTND  Skin: warm and dry, no new rashes   Neuro: AOx3, Lower Ext motor strength 0/5   Psych: affect and behavior appropriate, pleasant at time of interview    LABS:              CAPILLARY BLOOD GLUCOSE            MICRODATA:      RADIOLOGY/OTHER STUDIES:                       Patient is a 68y old  Female who presents with a chief complaint of Myelopathy (13 Dec 2023 20:30)        SUBJECTIVE:  Patient was seen and examined at bedside.    Overnight Events :  No new complaints today        Review of systems: 12 point Review of systems negative unless otherwise documented elsewhere in note.     Diet, Consistent Carbohydrate w/Evening Snack (12-11-23 @ 09:57) [Active]      MEDICATIONS:  MEDICATIONS  (STANDING):  apixaban 5 milliGRAM(s) Oral every 12 hours  bisacodyl 5 milliGRAM(s) Oral at bedtime  diazepam    Tablet 5 milliGRAM(s) Oral every 8 hours  DULoxetine 60 milliGRAM(s) Oral daily  Duramorph 1 mg/ 1mL 20 milliLiter(s) 20 milliLiter(s) IntraThecal Continuous Pump  gabapentin 800 milliGRAM(s) Oral every 8 hours  influenza  Vaccine (HIGH DOSE) 0.7 milliLiter(s) IntraMuscular once  midodrine 15 milliGRAM(s) Oral every 8 hours  multivitamin 1 Tablet(s) Oral daily  naloxegol 25 milliGRAM(s) Oral daily  pantoprazole    Tablet 40 milliGRAM(s) Oral before breakfast  polyethylene glycol 3350 17 Gram(s) Oral two times a day  senna 2 Tablet(s) Oral at bedtime  simvastatin 40 milliGRAM(s) Oral at bedtime    MEDICATIONS  (PRN):  acetaminophen     Tablet .. 1000 milliGRAM(s) Oral every 8 hours PRN Temp greater or equal to 38.5C (101.3F), Mild Pain (1 - 3)  benzocaine 20% Spray 1 Spray(s) Mucosal three times a day PRN Sore throat  diphenhydrAMINE 25 milliGRAM(s) Oral every 12 hours PRN Rash and/or Itching  hydrocortisone 1% Cream 1 Application(s) Topical two times a day PRN Itching  naloxone Injectable 0.1 milliGRAM(s) IV Push every 3 minutes PRN For ANY of the following changes in patient status:  A. RR LESS THAN 10 breaths per minute, B. Oxygen saturation LESS THAN 90%, C. Sedation score of 6  ondansetron Injectable 4 milliGRAM(s) IV Push every 6 hours PRN Nausea  oxyCODONE    IR 10 milliGRAM(s) Oral every 4 hours PRN Moderate Pain (4 - 6)  oxyCODONE    IR 15 milliGRAM(s) Oral every 4 hours PRN Severe Pain (7 - 10)  sodium chloride 0.9% lock flush 10 milliLiter(s) IV Push every 1 hour PRN Pre/post blood products, medications, blood draw, and to maintain line patency      Allergies    Crab (Pruritus)  No Known Drug Allergies    Intolerances        OBJECTIVE:  Vital Signs Last 24 Hrs  T(C): 36.6 (29 Dec 2023 06:02), Max: 37.1 (28 Dec 2023 14:34)  T(F): 97.8 (29 Dec 2023 06:02), Max: 98.8 (28 Dec 2023 14:34)  HR: 78 (29 Dec 2023 06:02) (78 - 97)  BP: 119/77 (29 Dec 2023 06:02) (118/73 - 130/72)  BP(mean): --  RR: 19 (29 Dec 2023 06:02) (17 - 19)  SpO2: 96% (29 Dec 2023 06:02) (96% - 99%)    Parameters below as of 29 Dec 2023 06:02  Patient On (Oxygen Delivery Method): room air                  PHYSICAL EXAM:  Gen: Resting in bed at time of exam, not in distress   HEENT: moist mucosa, no lesions   Neck: supple, trachea at midline  CV: RRR, +S1/S2  Pulm: no wheezing , no crackles  no increase in work of breathing  Abd: soft, NTND  Skin: warm and dry, no new rashes   Neuro: AOx3, Lower Ext motor strength 0/5   Psych: affect and behavior appropriate, pleasant at time of interview    LABS:                        12.2   6.24  )-----------( 359      ( 29 Dec 2023 12:12 )             40.4     12-29    137  |  102  |  9   ----------------------------<  103<H>  4.6   |  25  |  0.49<L>    Ca    9.5      29 Dec 2023 12:12  Phos  3.5     12-29  Mg     1.8     12-29                CAPILLARY BLOOD GLUCOSE            MICRODATA:      RADIOLOGY/OTHER STUDIES:

## 2023-12-29 LAB
ANION GAP SERPL CALC-SCNC: 10 MMOL/L — SIGNIFICANT CHANGE UP (ref 5–17)
ANION GAP SERPL CALC-SCNC: 10 MMOL/L — SIGNIFICANT CHANGE UP (ref 5–17)
BUN SERPL-MCNC: 9 MG/DL — SIGNIFICANT CHANGE UP (ref 7–23)
BUN SERPL-MCNC: 9 MG/DL — SIGNIFICANT CHANGE UP (ref 7–23)
CALCIUM SERPL-MCNC: 9.5 MG/DL — SIGNIFICANT CHANGE UP (ref 8.4–10.5)
CALCIUM SERPL-MCNC: 9.5 MG/DL — SIGNIFICANT CHANGE UP (ref 8.4–10.5)
CHLORIDE SERPL-SCNC: 102 MMOL/L — SIGNIFICANT CHANGE UP (ref 96–108)
CHLORIDE SERPL-SCNC: 102 MMOL/L — SIGNIFICANT CHANGE UP (ref 96–108)
CO2 SERPL-SCNC: 25 MMOL/L — SIGNIFICANT CHANGE UP (ref 22–31)
CO2 SERPL-SCNC: 25 MMOL/L — SIGNIFICANT CHANGE UP (ref 22–31)
CREAT SERPL-MCNC: 0.49 MG/DL — LOW (ref 0.5–1.3)
CREAT SERPL-MCNC: 0.49 MG/DL — LOW (ref 0.5–1.3)
EGFR: 102 ML/MIN/1.73M2 — SIGNIFICANT CHANGE UP
EGFR: 102 ML/MIN/1.73M2 — SIGNIFICANT CHANGE UP
GLUCOSE SERPL-MCNC: 103 MG/DL — HIGH (ref 70–99)
GLUCOSE SERPL-MCNC: 103 MG/DL — HIGH (ref 70–99)
HCT VFR BLD CALC: 40.4 % — SIGNIFICANT CHANGE UP (ref 34.5–45)
HCT VFR BLD CALC: 40.4 % — SIGNIFICANT CHANGE UP (ref 34.5–45)
HGB BLD-MCNC: 12.2 G/DL — SIGNIFICANT CHANGE UP (ref 11.5–15.5)
HGB BLD-MCNC: 12.2 G/DL — SIGNIFICANT CHANGE UP (ref 11.5–15.5)
MAGNESIUM SERPL-MCNC: 1.8 MG/DL — SIGNIFICANT CHANGE UP (ref 1.6–2.6)
MAGNESIUM SERPL-MCNC: 1.8 MG/DL — SIGNIFICANT CHANGE UP (ref 1.6–2.6)
MCHC RBC-ENTMCNC: 26.5 PG — LOW (ref 27–34)
MCHC RBC-ENTMCNC: 26.5 PG — LOW (ref 27–34)
MCHC RBC-ENTMCNC: 30.2 GM/DL — LOW (ref 32–36)
MCHC RBC-ENTMCNC: 30.2 GM/DL — LOW (ref 32–36)
MCV RBC AUTO: 87.8 FL — SIGNIFICANT CHANGE UP (ref 80–100)
MCV RBC AUTO: 87.8 FL — SIGNIFICANT CHANGE UP (ref 80–100)
NRBC # BLD: 0 /100 WBCS — SIGNIFICANT CHANGE UP (ref 0–0)
NRBC # BLD: 0 /100 WBCS — SIGNIFICANT CHANGE UP (ref 0–0)
PHOSPHATE SERPL-MCNC: 3.5 MG/DL — SIGNIFICANT CHANGE UP (ref 2.5–4.5)
PHOSPHATE SERPL-MCNC: 3.5 MG/DL — SIGNIFICANT CHANGE UP (ref 2.5–4.5)
PLATELET # BLD AUTO: 359 K/UL — SIGNIFICANT CHANGE UP (ref 150–400)
PLATELET # BLD AUTO: 359 K/UL — SIGNIFICANT CHANGE UP (ref 150–400)
POTASSIUM SERPL-MCNC: 4.6 MMOL/L — SIGNIFICANT CHANGE UP (ref 3.5–5.3)
POTASSIUM SERPL-MCNC: 4.6 MMOL/L — SIGNIFICANT CHANGE UP (ref 3.5–5.3)
POTASSIUM SERPL-SCNC: 4.6 MMOL/L — SIGNIFICANT CHANGE UP (ref 3.5–5.3)
POTASSIUM SERPL-SCNC: 4.6 MMOL/L — SIGNIFICANT CHANGE UP (ref 3.5–5.3)
RBC # BLD: 4.6 M/UL — SIGNIFICANT CHANGE UP (ref 3.8–5.2)
RBC # BLD: 4.6 M/UL — SIGNIFICANT CHANGE UP (ref 3.8–5.2)
RBC # FLD: 15.2 % — HIGH (ref 10.3–14.5)
RBC # FLD: 15.2 % — HIGH (ref 10.3–14.5)
SODIUM SERPL-SCNC: 137 MMOL/L — SIGNIFICANT CHANGE UP (ref 135–145)
SODIUM SERPL-SCNC: 137 MMOL/L — SIGNIFICANT CHANGE UP (ref 135–145)
WBC # BLD: 6.24 K/UL — SIGNIFICANT CHANGE UP (ref 3.8–10.5)
WBC # BLD: 6.24 K/UL — SIGNIFICANT CHANGE UP (ref 3.8–10.5)
WBC # FLD AUTO: 6.24 K/UL — SIGNIFICANT CHANGE UP (ref 3.8–10.5)
WBC # FLD AUTO: 6.24 K/UL — SIGNIFICANT CHANGE UP (ref 3.8–10.5)

## 2023-12-29 PROCEDURE — 99024 POSTOP FOLLOW-UP VISIT: CPT

## 2023-12-29 PROCEDURE — 99232 SBSQ HOSP IP/OBS MODERATE 35: CPT

## 2023-12-29 RX ORDER — BENZOCAINE AND MENTHOL 5; 1 G/100ML; G/100ML
1 LIQUID ORAL
Refills: 0 | Status: DISCONTINUED | OUTPATIENT
Start: 2023-12-29 | End: 2024-01-05

## 2023-12-29 RX ORDER — OXYCODONE HYDROCHLORIDE 5 MG/1
10 TABLET ORAL EVERY 4 HOURS
Refills: 0 | Status: DISCONTINUED | OUTPATIENT
Start: 2023-12-29 | End: 2023-12-29

## 2023-12-29 RX ORDER — OXYCODONE HYDROCHLORIDE 5 MG/1
15 TABLET ORAL EVERY 4 HOURS
Refills: 0 | Status: DISCONTINUED | OUTPATIENT
Start: 2023-12-29 | End: 2024-01-05

## 2023-12-29 RX ORDER — DIPHENHYDRAMINE HCL 50 MG
25 CAPSULE ORAL ONCE
Refills: 0 | Status: COMPLETED | OUTPATIENT
Start: 2023-12-29 | End: 2023-12-29

## 2023-12-29 RX ADMIN — Medication 5 MILLIGRAM(S): at 13:56

## 2023-12-29 RX ADMIN — Medication 25 MILLIGRAM(S): at 19:55

## 2023-12-29 RX ADMIN — MIDODRINE HYDROCHLORIDE 15 MILLIGRAM(S): 2.5 TABLET ORAL at 19:55

## 2023-12-29 RX ADMIN — GABAPENTIN 800 MILLIGRAM(S): 400 CAPSULE ORAL at 01:04

## 2023-12-29 RX ADMIN — OXYCODONE HYDROCHLORIDE 15 MILLIGRAM(S): 5 TABLET ORAL at 16:18

## 2023-12-29 RX ADMIN — DULOXETINE HYDROCHLORIDE 60 MILLIGRAM(S): 30 CAPSULE, DELAYED RELEASE ORAL at 13:57

## 2023-12-29 RX ADMIN — Medication 25 MILLIGRAM(S): at 23:12

## 2023-12-29 RX ADMIN — GABAPENTIN 800 MILLIGRAM(S): 400 CAPSULE ORAL at 19:54

## 2023-12-29 RX ADMIN — CHLORHEXIDINE GLUCONATE 1 APPLICATION(S): 213 SOLUTION TOPICAL at 06:21

## 2023-12-29 RX ADMIN — OXYCODONE HYDROCHLORIDE 15 MILLIGRAM(S): 5 TABLET ORAL at 03:37

## 2023-12-29 RX ADMIN — Medication 1 TABLET(S): at 13:56

## 2023-12-29 RX ADMIN — OXYCODONE HYDROCHLORIDE 15 MILLIGRAM(S): 5 TABLET ORAL at 16:48

## 2023-12-29 RX ADMIN — OXYCODONE HYDROCHLORIDE 15 MILLIGRAM(S): 5 TABLET ORAL at 09:49

## 2023-12-29 RX ADMIN — PANTOPRAZOLE SODIUM 40 MILLIGRAM(S): 20 TABLET, DELAYED RELEASE ORAL at 06:07

## 2023-12-29 RX ADMIN — APIXABAN 5 MILLIGRAM(S): 2.5 TABLET, FILM COATED ORAL at 06:07

## 2023-12-29 RX ADMIN — GABAPENTIN 800 MILLIGRAM(S): 400 CAPSULE ORAL at 09:19

## 2023-12-29 RX ADMIN — MIDODRINE HYDROCHLORIDE 15 MILLIGRAM(S): 2.5 TABLET ORAL at 13:56

## 2023-12-29 RX ADMIN — MIDODRINE HYDROCHLORIDE 15 MILLIGRAM(S): 2.5 TABLET ORAL at 02:51

## 2023-12-29 RX ADMIN — BENZOCAINE AND MENTHOL 1 LOZENGE: 5; 1 LIQUID ORAL at 23:12

## 2023-12-29 RX ADMIN — OXYCODONE HYDROCHLORIDE 15 MILLIGRAM(S): 5 TABLET ORAL at 09:19

## 2023-12-29 RX ADMIN — SIMVASTATIN 40 MILLIGRAM(S): 20 TABLET, FILM COATED ORAL at 22:48

## 2023-12-29 RX ADMIN — Medication 5 MILLIGRAM(S): at 22:48

## 2023-12-29 RX ADMIN — APIXABAN 5 MILLIGRAM(S): 2.5 TABLET, FILM COATED ORAL at 19:55

## 2023-12-29 RX ADMIN — Medication 5 MILLIGRAM(S): at 06:07

## 2023-12-29 RX ADMIN — OXYCODONE HYDROCHLORIDE 15 MILLIGRAM(S): 5 TABLET ORAL at 02:51

## 2023-12-29 NOTE — PROGRESS NOTE ADULT - REASON FOR ADMISSION
myelopathy
Assist with pain management
Myelopathy
Assist with pain management
Assist with pain management
Myelopathy

## 2023-12-29 NOTE — PROGRESS NOTE ADULT - SUBJECTIVE AND OBJECTIVE BOX
Patient is a 68y old  Female who presents with a chief complaint of Myelopathy (13 Dec 2023 20:30)        SUBJECTIVE:  Patient was seen and examined at bedside.    Overnight Events :  No new complaints today        Review of systems: 12 point Review of systems negative unless otherwise documented elsewhere in note.     Diet, Consistent Carbohydrate w/Evening Snack (12-11-23 @ 09:57) [Active]      MEDICATIONS:  MEDICATIONS  (STANDING):  apixaban 5 milliGRAM(s) Oral every 12 hours  bisacodyl 5 milliGRAM(s) Oral at bedtime  diazepam    Tablet 5 milliGRAM(s) Oral every 8 hours  DULoxetine 60 milliGRAM(s) Oral daily  Duramorph 1 mg/ 1mL 20 milliLiter(s) 20 milliLiter(s) IntraThecal Continuous Pump  gabapentin 800 milliGRAM(s) Oral every 8 hours  influenza  Vaccine (HIGH DOSE) 0.7 milliLiter(s) IntraMuscular once  midodrine 15 milliGRAM(s) Oral every 8 hours  multivitamin 1 Tablet(s) Oral daily  naloxegol 25 milliGRAM(s) Oral daily  pantoprazole    Tablet 40 milliGRAM(s) Oral before breakfast  polyethylene glycol 3350 17 Gram(s) Oral two times a day  senna 2 Tablet(s) Oral at bedtime  simvastatin 40 milliGRAM(s) Oral at bedtime    MEDICATIONS  (PRN):  acetaminophen     Tablet .. 1000 milliGRAM(s) Oral every 8 hours PRN Temp greater or equal to 38.5C (101.3F), Mild Pain (1 - 3)  benzocaine 20% Spray 1 Spray(s) Mucosal three times a day PRN Sore throat  diphenhydrAMINE 25 milliGRAM(s) Oral every 12 hours PRN Rash and/or Itching  hydrocortisone 1% Cream 1 Application(s) Topical two times a day PRN Itching  naloxone Injectable 0.1 milliGRAM(s) IV Push every 3 minutes PRN For ANY of the following changes in patient status:  A. RR LESS THAN 10 breaths per minute, B. Oxygen saturation LESS THAN 90%, C. Sedation score of 6  ondansetron Injectable 4 milliGRAM(s) IV Push every 6 hours PRN Nausea  oxyCODONE    IR 10 milliGRAM(s) Oral every 4 hours PRN Moderate Pain (4 - 6)  oxyCODONE    IR 15 milliGRAM(s) Oral every 4 hours PRN Severe Pain (7 - 10)  sodium chloride 0.9% lock flush 10 milliLiter(s) IV Push every 1 hour PRN Pre/post blood products, medications, blood draw, and to maintain line patency      Allergies    Crab (Pruritus)  No Known Drug Allergies    Intolerances        OBJECTIVE:  Vital Signs Last 24 Hrs  T(C): 36.6 (29 Dec 2023 06:02), Max: 37.1 (28 Dec 2023 14:34)  T(F): 97.8 (29 Dec 2023 06:02), Max: 98.8 (28 Dec 2023 14:34)  HR: 78 (29 Dec 2023 06:02) (78 - 97)  BP: 119/77 (29 Dec 2023 06:02) (118/73 - 130/72)  BP(mean): --  RR: 19 (29 Dec 2023 06:02) (17 - 19)  SpO2: 96% (29 Dec 2023 06:02) (96% - 99%)    Parameters below as of 29 Dec 2023 06:02  Patient On (Oxygen Delivery Method): room air                  PHYSICAL EXAM:  Gen: Resting in bed at time of exam, not in distress   HEENT: moist mucosa, no lesions   Neck: supple, trachea at midline  CV: RRR, +S1/S2  Pulm: no wheezing , no crackles  no increase in work of breathing  Abd: soft, NTND  Skin: warm and dry, no new rashes   Neuro: AOx3, Lower Ext motor strength 0/5   Psych: affect and behavior appropriate, pleasant at time of interview    LABS:                        12.2   6.24  )-----------( 359      ( 29 Dec 2023 12:12 )             40.4     12-29    137  |  102  |  9   ----------------------------<  103<H>  4.6   |  25  |  0.49<L>    Ca    9.5      29 Dec 2023 12:12  Phos  3.5     12-29  Mg     1.8     12-29                CAPILLARY BLOOD GLUCOSE            MICRODATA:      RADIOLOGY/OTHER STUDIES:

## 2023-12-29 NOTE — PROGRESS NOTE ADULT - ASSESSMENT
68F with PMH of HTN, HLD, DM2, CVA x 3 (last in 2016 with residual R hand weakness), GAMALIEL not using CPAP, Emphysema, ex-25 pack year smoker now restarted, chronic constipation on Movantik, chronic urinary retention (SC at home), b/l CTS s/p release, s/p Intrathecal Morphine pump for pain, with chronic neck and back pain worsening for years s/p multiple spinal surgeries including laminectomies and fusion of cervical, thoracic and lumbar spine, s/p T3-L1 revision of prior fusion (with  on 8/17/22). Xray 11/16/23 showing broken rods/displacement. Now s/p C2-S2 instrumentation and fusion (12/5). S/p T9-L2 decompression w/ durotomy with repair (12/10/23).     #Neck Pain  #Multiple Spine Surgeries  #Chronic Back Pain  -s/p fusion  -OT  -Pain Control per primary team  -Bowel Regimen   -Pending Rehab placement    #Chronic Constipation   - likely due to chronic opioid use  - c/w bowel regimen  - continue movantik    #HTN  -  Lopressor 75mg BID    #CAD  #CVA  - resume ASA post operatively when safe to do so  - continue atorvastatin  - awaiting results of recent long term outpatient cardiac monitoring to monitor for occult Afib to determine if a role for full dose AC, follows with Dr Jeter    #DM  - ISS while inpatient    #Pulmonary embolism   -CTPE performed given persistent tachycardia, shown to have subsegmental and segmental PE  - c/w Eliquis     # Paraplegia     Dispo:  Medically Ready for Acute Rehab , bed not available to [place] [facility]  , Refusing [facility]       68F with PMH of HTN, HLD, DM2, CVA x 3 (last in 2016 with residual R hand weakness), GAMALIEL not using CPAP, Emphysema, ex-25 pack year smoker now restarted, chronic constipation on Movantik, chronic urinary retention (SC at home), b/l CTS s/p release, s/p Intrathecal Morphine pump for pain, with chronic neck and back pain worsening for years s/p multiple spinal surgeries including laminectomies and fusion of cervical, thoracic and lumbar spine, s/p T3-L1 revision of prior fusion (with  on 8/17/22). Xray 11/16/23 showing broken rods/displacement. Now s/p C2-S2 instrumentation and fusion (12/5). S/p T9-L2 decompression w/ durotomy with repair (12/10/23).     #Neck Pain  #Multiple Spine Surgeries  #Chronic Back Pain  -s/p fusion  -OT  -Pain Control per primary team  -Bowel Regimen   -Pending Rehab placement    #Chronic Constipation   - likely due to chronic opioid use  - c/w bowel regimen  - continue movantik    #HTN  -  Lopressor 75mg BID    #CAD  #CVA  - resume ASA post operatively when safe to do so  - continue atorvastatin  - awaiting results of recent long term outpatient cardiac monitoring to monitor for occult Afib to determine if a role for full dose AC, follows with Dr Jeter    #DM  - ISS while inpatient    #Pulmonary embolism   -CTPE performed given persistent tachycardia, shown to have subsegmental and segmental PE  - c/w Eliquis     # Paraplegia     Dispo:  Medically Ready for Acute Rehab , bed not available to [facility]  , Refusing [facility]

## 2023-12-29 NOTE — PROGRESS NOTE ADULT - SUBJECTIVE AND OBJECTIVE BOX
HPI:  68 y/o female with PMHx HTN, HLD, CVA x 3 (last was 2016 with right hand weakness residual), GAMALIEL not using CPAP, Emphysema, former 25 pack year smoker restarted this week of surgery, Chronic Constipation on Movantik, Urinary Incontinence chronically self straight cath at home, chronic UTI, Breast Implant Rupture with Chronic Leak repair 10/2023, B/L CTS s/p release, s/p Intrathecal Morphine pump for pain, with chronic neck and back pain worsening for years s/p multiple spinal surgeries including laminectomies and fusion of cervical, thoracic and lumbar spine initially done in 2009 and recently in 2019 and 2020 at [facility] by Dr. Trinidad, s/p T3-L1 revision of prior fusion (with Dr. Luz on 8/17/22). Outpatient Xray 11/16/2023 showing broken rods/displacement. Presents for elective C2-S2 instrumentation and fusion. Pt endorses weakness of b/l LE and burning pain in b/l lower extremities radiating to both feet,  (04 Dec 2023 15:44)    OVERNIGHT EVENTS: KAMRAN, neuro stable.    Hospital Course:   12/4: Admitted for spinal fusion d/t damaged hardware.   12/5: Neuro stable. POD0 C2-S2 instrumentation/fusion (intraop b/l LE motor loss). on parminder for MAP>90  12/6: POD1. KAMRAN o/n neuro stable. remains intubated. Extubated 6am. Precedex gtt prn. Remains on parminder gtt for MAP goal >90. Valium made prn. CT thoracic spine bc MRI unavailable. Attempted NGT, unsuccessful (resistance @ 35).   12/7: POD2 Given IV dilaudid 0.5mg for pain. Neuro exam stable. SQL started tongiht. Increased precedex to 1.0. Responds well to valium. Consulting psych given paranoia ?homicidal statements. Passed bedside dysphagia and tolerating PO meds. MRI complete, f/u read.   12/8: POD3 Pt reporting incisional pain, given dilaudid 0.5mg IV x2. Pt appearing anxious, given xanax 1mg. Neuro exam unchanged. Pain regimen increased to oxy 15/30 mg, pending further recs. Given 1 L bolus for tachycardia. Trops negative. 2 HMV drains removed.   12/9: POD4, KAMRAN, CT myelogram today showing block at T12-L1, OR tomorrow  12/10: POD5, KAMRAN, OR today for exploration of fusion, possible decompression T9-L2, revision instrumentation  POD 0 T9-L2 decompression. Pt returned from OR intubated. 50mcg fentanyl IVP x 2 for pain control and HTN. Magnesium repleted. Precedex gtt started for sedation. Decreased FiO2 to 40%. 1L bolus for soft MAP. Dc'd propofol and started levo gtt for MAP >90.   12/11: POD 6, POD 1. Extubated, satting well on RA. Resumed home valium and gabapentin. Castillo d/c'd, pending TOV. Seroquel started for agitation, R IJ removed, LUE double midline placed. Started SQL.   12/12: POD7, POD2, given 500 cc bolus and started on phenylephrine for MAP goal >90. Started midodrine 10mg q8h. Cardura dc'd d/t hypotension. Given 250cc of albumin for increasing pressor requirements. Given lactulose, pending BM. Hgb 7.6<8.3, given 1u PRBC. Protected sleep time. Urology consulted for possible suprapubic cath, recommend self intermittent cath is optimal.   12/13: POD8, POD3 Midodrine increased to 15q8. Pt refused to take the extra 5mg midodrin ordered. Neuro exam stable. hgb 10 from 9.4 after 1u pRBCs, Pt refused AM meds, given in late morning, CTAP ordered for L sided abd pain, fleet enema, restarted home movantik and ordere fleet enema for constipation. YIFAN and HMV dc'd. 250cc albumin x1  12/14: POD9, POD4, KAMRAN overnight, neuro stable. MAP goal liberalized to > 65. Stepdown status.   12/15: POD10, POD5, KAMRAN overnight  12/16: POD11, POD6, KAMRAN overnight, neuro stable, 500 cc bolus NS given for tachycardia. Aquacell changed. CXR, pro-bnp negative for pulmonary edema. Given additional 500cc bolus.  12/17: POD 12. POD 7. KAMRAN overnight, neuro stable.   12/18: POD 13/8. Incontinent x2 today.   12/19: KAMRAN. Persistent tachycardia, r/o PE. CTPE protocol ordered. Refused all afternoon meds except oxy 30. Per Dr. Cowart (rad) CT chest w segmental and subsegmental PE in R upper lobe. Dopplers negative for DVT, CT PE shows segmental and subsegmental PE in R upper lobe. Per Dr. Augustine and Dr. Luz, Will start eliquis pending pain management approval. Per Dr. Mclaughlin (pain mgmt) can start eliquis 10mg BIDx 7 days. Dc'd lovenox.  12/20: POD 10 T9-L2 Decompression. Hemodynamically stable. Pending rehab. PT today.   12/21: POD 11 T9-L2 Decompression. neruo/hemodynamically stable, pending rehab.  12/22: POD 17/12, pending rehab. Aquacel dressing changed.   12/23: POD 18/13, pending rehab.   12/24: POD 19/14, pending rehab.   12/25: POD 20/15, pending rehab.   12/26: POD 21/16, pending rehab.   12/27: POD 22/17. pending rehab.   12/28: POD 23/18.   12/29: POD 24/19. KAMRAN o/n.     Vital Signs Last 24 Hrs  T(C): 36.6 (28 Dec 2023 18:45), Max: 37.1 (28 Dec 2023 14:34)  T(F): 97.9 (28 Dec 2023 18:45), Max: 98.8 (28 Dec 2023 14:34)  HR: 86 (28 Dec 2023 18:45) (84 - 102)  BP: 123/69 (28 Dec 2023 18:45) (113/76 - 137/75)  BP(mean): --  RR: 18 (28 Dec 2023 18:45) (17 - 21)  SpO2: 98% (28 Dec 2023 18:45) (93% - 100%)    Parameters below as of 28 Dec 2023 18:45  Patient On (Oxygen Delivery Method): room air        I&O's Summary    27 Dec 2023 07:01  -  28 Dec 2023 07:00  --------------------------------------------------------  IN: 400 mL / OUT: 1600 mL / NET: -1200 mL    28 Dec 2023 07:01  -  28 Dec 2023 22:52  --------------------------------------------------------  IN: 740 mL / OUT: 850 mL / NET: -110 mL        PHYSICAL EXAM:  General: NAD, AAOx3  HEENT: PERRL. EOMI.   Cardiovascular: RRR, normal S1 and S2   Respiratory: non-labored breathing, symmetric chest rise  GI: abd soft, NTND  Neuro: CN II-XII intact, follows commands, speech clear. Strength BL UE 5/5  BL LE 0/5 to noxious stimuli with T11 sensory deficit.  Vascular: Distal pulses 2+ x 4, no calf edema or erythema  Wounds: Posterior midline spine incision C/D/I with dressing in place      LABS:          CAPILLARY BLOOD GLUCOSE      POCT Blood Glucose.: 129 mg/dL (28 Dec 2023 16:57)      Drug Levels: [] N/A    CSF Analysis: [] N/A      Allergies    Crab (Pruritus)  No Known Drug Allergies    Intolerances      MEDICATIONS:  Antibiotics:    Neuro:  acetaminophen     Tablet .. 1000 milliGRAM(s) Oral every 8 hours PRN  diazepam    Tablet 5 milliGRAM(s) Oral every 8 hours  diphenhydrAMINE 25 milliGRAM(s) Oral every 12 hours PRN  DULoxetine 60 milliGRAM(s) Oral daily  gabapentin 800 milliGRAM(s) Oral every 8 hours  ondansetron Injectable 4 milliGRAM(s) IV Push every 6 hours PRN  oxyCODONE    IR 10 milliGRAM(s) Oral every 4 hours PRN  oxyCODONE    IR 15 milliGRAM(s) Oral every 4 hours PRN    Anticoagulation:  apixaban 5 milliGRAM(s) Oral every 12 hours    OTHER:  benzocaine 20% Spray 1 Spray(s) Mucosal three times a day PRN  bisacodyl 5 milliGRAM(s) Oral at bedtime  chlorhexidine 2% Cloths 1 Application(s) Topical <User Schedule>  hydrocortisone 1% Cream 1 Application(s) Topical two times a day PRN  influenza  Vaccine (HIGH DOSE) 0.7 milliLiter(s) IntraMuscular once  midodrine 15 milliGRAM(s) Oral every 8 hours  Morphine 1 milliGRAM / 1 mL 20 milliLITERS 20 milliLiter(s) IntraThecal Continuous Pump  naloxegol 25 milliGRAM(s) Oral daily  naloxone Injectable 0.1 milliGRAM(s) IV Push every 3 minutes PRN  pantoprazole    Tablet 40 milliGRAM(s) Oral before breakfast  polyethylene glycol 3350 17 Gram(s) Oral two times a day  senna 2 Tablet(s) Oral at bedtime  simvastatin 40 milliGRAM(s) Oral at bedtime    IVF:  multivitamin 1 Tablet(s) Oral daily      ASSESSMENT:  67 yo female with Hx HTN, HLD, DM, CVA x 3 (last in 2016 with residual R hand weakness), GAMALIEL not using CPAP, Emphysema, ex-25 pack year smoker now restarted, chronic constipation on Movantik, chronic urinary retention (SC at home), b/l CTS s/p release, s/p Intrathecal Morphine pump for pain, with chronic neck and back pain worsening for years s/p multiple spinal surgeries including laminectomies and fusion of cervical, thoracic and lumbar spine, s/p T3-L1 revision of prior fusion (with  on 8/17/22). Xray 11/16/23 showing broken rods/displacement. Now s/p C2-S2 instrumentation and fusion (12/5). S/p T9-L2 decompression w/ durotomy with repair (12/10/23). KAREEM grade A.     S12.9XXA    Handoff    MEWS Score    HTN (hypertension)    Back pain    Hypertension    SS (spinal stenosis)    High cholesterol    Stroke    H/O carpal tunnel syndrome    H/O carpal tunnel syndrome    Chronic GERD    History of chronic constipation    Seizure    Urinary incontinence    Pre-diabetes    Acute UTI    Anxiety    Anxiety and depression    Arthritis    Eczema    External hemorrhoids    H/O kyphosis    Multiple sclerosis    Pancreas cyst    Scoliosis    Wheelchair dependent    Cervical pseudoarthrosis    Right shoulder pain    Cervical spondylosis    Chronic headaches    Chronic LUQ pain    Chronic UTI    Degenerative joint disease    H/O low back pain    Lumbosacral spondylosis    COPD (chronic obstructive pulmonary disease)    Back pain    Back pain    Spinal cord injury, thoracic (T7-T12)    Back pain    Spinal cord injury of thoracic region without bone injury    Delirium    Revision of fusion of thoracic spine    Thoracic back pain    Revision of posterolateral fusion of lumbar spine    Revision of fusion of thoracic spine    Decompression, spinal cord, thoracic, posterolateral approach    Cervical disc disease    H/O Spinal surgery    H/O breast surgery    S/P cervical discectomy    Previous back surgery    H/O shoulder surgery    H/O total shoulder replacement, right    Room Service Assist    Room Service Assist    Revision of posterolateral fusion of lumbar spine    Decompression, spinal cord, thoracic, posterolateral approach    HTN (hypertension)    HLD (hyperlipidemia)    CVA (cerebrovascular accident)    GAMALIEL (obstructive sleep apnea)    H/O emphysema    Smoking history    Chronic constipation    Urinary incontinence    Pulmonary embolism    Diabetes    GERD (gastroesophageal reflux disease)    SysAdmin_VstLnk        PLAN:  Neuro:  - Neuro/vitals q4  - pain control: modified ERAS, IT morphine pump, oxycodone 10/15 prn, valium and home gabapentin 800 TID resumed  - Continue home cymbalta 60mg daily   - CT thoracic spine 12/6: postop changes  - protected sleep time 10PM-4AM     Cardio:  - MAP > 65   - midodrine 15q8   - C/w home simvastatin  - Sinus tachycardia - likely 2/2 PE (12/19)      Pulm:  - RA   - hx GAMALIEL, no CPAP at home  - PE protocol, 12/19: DVT doppler neg, CTA w segmental and subsegmental PE in R upper lobe  - 5 mg eliquis BID for R upper lobe PE     GI:  - CCD   - Bowel regimen, home movantik 25 mg, last BM 12/17  - CT A/P 12/13 = impacted stool   - GERD: continue home protonix    Renal:  - Straight cath q6  - Patient not interested in suprapubic catheter placement    Endo:  - a1c: 7    Heme:  - SCDs DVT ppx  - Eliquis d/t PE   - 500 cell saver intraop 12/5, 1u PRBC intraop 12/10, 1u PRBC 12/12    ID:  - afebrile    Dispo:  - SDU status, full code, pending AR    D/w Dr. Luz   Assessment:  Present when checked       HPI:  68 y/o female with PMHx HTN, HLD, CVA x 3 (last was 2016 with right hand weakness residual), GAMALIEL not using CPAP, Emphysema, former 25 pack year smoker restarted this week of surgery, Chronic Constipation on Movantik, Urinary Incontinence chronically self straight cath at home, chronic UTI, Breast Implant Rupture with Chronic Leak repair 10/2023, B/L CTS s/p release, s/p Intrathecal Morphine pump for pain, with chronic neck and back pain worsening for years s/p multiple spinal surgeries including laminectomies and fusion of cervical, thoracic and lumbar spine initially done in 2009 and recently in 2019 and 2020 at [facility] by Dr. Trinidad, s/p T3-L1 revision of prior fusion (with Dr. Luz on 8/17/22). Outpatient Xray 11/16/2023 showing broken rods/displacement. Presents for elective C2-S2 instrumentation and fusion. Pt endorses weakness of b/l LE and burning pain in b/l lower extremities radiating to both feet,  (04 Dec 2023 15:44)    OVERNIGHT EVENTS: KAMRAN, neuro stable.    Hospital Course:   12/4: Admitted for spinal fusion d/t damaged hardware.   12/5: Neuro stable. POD0 C2-S2 instrumentation/fusion (intraop b/l LE motor loss). on parminder for MAP>90  12/6: POD1. KAMRAN o/n neuro stable. remains intubated. Extubated 6am. Precedex gtt prn. Remains on parminder gtt for MAP goal >90. Valium made prn. CT thoracic spine bc MRI unavailable. Attempted NGT, unsuccessful (resistance @ 35).   12/7: POD2 Given IV dilaudid 0.5mg for pain. Neuro exam stable. SQL started tongiht. Increased precedex to 1.0. Responds well to valium. Consulting psych given paranoia ?homicidal statements. Passed bedside dysphagia and tolerating PO meds. MRI complete, f/u read.   12/8: POD3 Pt reporting incisional pain, given dilaudid 0.5mg IV x2. Pt appearing anxious, given xanax 1mg. Neuro exam unchanged. Pain regimen increased to oxy 15/30 mg, pending further recs. Given 1 L bolus for tachycardia. Trops negative. 2 HMV drains removed.   12/9: POD4, KAMRAN, CT myelogram today showing block at T12-L1, OR tomorrow  12/10: POD5, KAMRAN, OR today for exploration of fusion, possible decompression T9-L2, revision instrumentation  POD 0 T9-L2 decompression. Pt returned from OR intubated. 50mcg fentanyl IVP x 2 for pain control and HTN. Magnesium repleted. Precedex gtt started for sedation. Decreased FiO2 to 40%. 1L bolus for soft MAP. Dc'd propofol and started levo gtt for MAP >90.   12/11: POD 6, POD 1. Extubated, satting well on RA. Resumed home valium and gabapentin. Castillo d/c'd, pending TOV. Seroquel started for agitation, R IJ removed, LUE double midline placed. Started SQL.   12/12: POD7, POD2, given 500 cc bolus and started on phenylephrine for MAP goal >90. Started midodrine 10mg q8h. Cardura dc'd d/t hypotension. Given 250cc of albumin for increasing pressor requirements. Given lactulose, pending BM. Hgb 7.6<8.3, given 1u PRBC. Protected sleep time. Urology consulted for possible suprapubic cath, recommend self intermittent cath is optimal.   12/13: POD8, POD3 Midodrine increased to 15q8. Pt refused to take the extra 5mg midodrin ordered. Neuro exam stable. hgb 10 from 9.4 after 1u pRBCs, Pt refused AM meds, given in late morning, CTAP ordered for L sided abd pain, fleet enema, restarted home movantik and ordere fleet enema for constipation. YIFAN and HMV dc'd. 250cc albumin x1  12/14: POD9, POD4, KAMRAN overnight, neuro stable. MAP goal liberalized to > 65. Stepdown status.   12/15: POD10, POD5, KAMRAN overnight  12/16: POD11, POD6, KAMRAN overnight, neuro stable, 500 cc bolus NS given for tachycardia. Aquacell changed. CXR, pro-bnp negative for pulmonary edema. Given additional 500cc bolus.  12/17: POD 12. POD 7. KAMRAN overnight, neuro stable.   12/18: POD 13/8. Incontinent x2 today.   12/19: KAMRAN. Persistent tachycardia, r/o PE. CTPE protocol ordered. Refused all afternoon meds except oxy 30. Per Dr. Cowart (rad) CT chest w segmental and subsegmental PE in R upper lobe. Dopplers negative for DVT, CT PE shows segmental and subsegmental PE in R upper lobe. Per Dr. Augustine and Dr. Luz, Will start eliquis pending pain management approval. Per Dr. Mclaughlin (pain mgmt) can start eliquis 10mg BIDx 7 days. Dc'd lovenox.  12/20: POD 10 T9-L2 Decompression. Hemodynamically stable. Pending rehab. PT today.   12/21: POD 11 T9-L2 Decompression. neruo/hemodynamically stable, pending rehab.  12/22: POD 17/12, pending rehab. Aquacel dressing changed.   12/23: POD 18/13, pending rehab.   12/24: POD 19/14, pending rehab.   12/25: POD 20/15, pending rehab.   12/26: POD 21/16, pending rehab.   12/27: POD 22/17. pending rehab.   12/28: POD 23/18.   12/29: POD 24/19. KAMRAN o/n.     Vital Signs Last 24 Hrs  T(C): 36.6 (28 Dec 2023 18:45), Max: 37.1 (28 Dec 2023 14:34)  T(F): 97.9 (28 Dec 2023 18:45), Max: 98.8 (28 Dec 2023 14:34)  HR: 86 (28 Dec 2023 18:45) (84 - 102)  BP: 123/69 (28 Dec 2023 18:45) (113/76 - 137/75)  BP(mean): --  RR: 18 (28 Dec 2023 18:45) (17 - 21)  SpO2: 98% (28 Dec 2023 18:45) (93% - 100%)    Parameters below as of 28 Dec 2023 18:45  Patient On (Oxygen Delivery Method): room air        I&O's Summary    27 Dec 2023 07:01  -  28 Dec 2023 07:00  --------------------------------------------------------  IN: 400 mL / OUT: 1600 mL / NET: -1200 mL    28 Dec 2023 07:01  -  28 Dec 2023 22:52  --------------------------------------------------------  IN: 740 mL / OUT: 850 mL / NET: -110 mL        PHYSICAL EXAM:  General: NAD, AAOx3  HEENT: PERRL. EOMI.   Cardiovascular: RRR, normal S1 and S2   Respiratory: non-labored breathing, symmetric chest rise  GI: abd soft, NTND  Neuro: CN II-XII intact, follows commands, speech clear. Strength BL UE 5/5  BL LE 0/5 to noxious stimuli with T11 sensory deficit.  Vascular: Distal pulses 2+ x 4, no calf edema or erythema  Wounds: Posterior midline spine incision C/D/I with dressing in place      LABS:          CAPILLARY BLOOD GLUCOSE      POCT Blood Glucose.: 129 mg/dL (28 Dec 2023 16:57)      Drug Levels: [] N/A    CSF Analysis: [] N/A      Allergies    Crab (Pruritus)  No Known Drug Allergies    Intolerances      MEDICATIONS:  Antibiotics:    Neuro:  acetaminophen     Tablet .. 1000 milliGRAM(s) Oral every 8 hours PRN  diazepam    Tablet 5 milliGRAM(s) Oral every 8 hours  diphenhydrAMINE 25 milliGRAM(s) Oral every 12 hours PRN  DULoxetine 60 milliGRAM(s) Oral daily  gabapentin 800 milliGRAM(s) Oral every 8 hours  ondansetron Injectable 4 milliGRAM(s) IV Push every 6 hours PRN  oxyCODONE    IR 10 milliGRAM(s) Oral every 4 hours PRN  oxyCODONE    IR 15 milliGRAM(s) Oral every 4 hours PRN    Anticoagulation:  apixaban 5 milliGRAM(s) Oral every 12 hours    OTHER:  benzocaine 20% Spray 1 Spray(s) Mucosal three times a day PRN  bisacodyl 5 milliGRAM(s) Oral at bedtime  chlorhexidine 2% Cloths 1 Application(s) Topical <User Schedule>  hydrocortisone 1% Cream 1 Application(s) Topical two times a day PRN  influenza  Vaccine (HIGH DOSE) 0.7 milliLiter(s) IntraMuscular once  midodrine 15 milliGRAM(s) Oral every 8 hours  Morphine 1 milliGRAM / 1 mL 20 milliLITERS 20 milliLiter(s) IntraThecal Continuous Pump  naloxegol 25 milliGRAM(s) Oral daily  naloxone Injectable 0.1 milliGRAM(s) IV Push every 3 minutes PRN  pantoprazole    Tablet 40 milliGRAM(s) Oral before breakfast  polyethylene glycol 3350 17 Gram(s) Oral two times a day  senna 2 Tablet(s) Oral at bedtime  simvastatin 40 milliGRAM(s) Oral at bedtime    IVF:  multivitamin 1 Tablet(s) Oral daily      ASSESSMENT:  67 yo female with Hx HTN, HLD, DM, CVA x 3 (last in 2016 with residual R hand weakness), GAMALIEL not using CPAP, Emphysema, ex-25 pack year smoker now restarted, chronic constipation on Movantik, chronic urinary retention (SC at home), b/l CTS s/p release, s/p Intrathecal Morphine pump for pain, with chronic neck and back pain worsening for years s/p multiple spinal surgeries including laminectomies and fusion of cervical, thoracic and lumbar spine, s/p T3-L1 revision of prior fusion (with  on 8/17/22). Xray 11/16/23 showing broken rods/displacement. Now s/p C2-S2 instrumentation and fusion (12/5). S/p T9-L2 decompression w/ durotomy with repair (12/10/23). KAREEM grade A.     S12.9XXA    Handoff    MEWS Score    HTN (hypertension)    Back pain    Hypertension    SS (spinal stenosis)    High cholesterol    Stroke    H/O carpal tunnel syndrome    H/O carpal tunnel syndrome    Chronic GERD    History of chronic constipation    Seizure    Urinary incontinence    Pre-diabetes    Acute UTI    Anxiety    Anxiety and depression    Arthritis    Eczema    External hemorrhoids    H/O kyphosis    Multiple sclerosis    Pancreas cyst    Scoliosis    Wheelchair dependent    Cervical pseudoarthrosis    Right shoulder pain    Cervical spondylosis    Chronic headaches    Chronic LUQ pain    Chronic UTI    Degenerative joint disease    H/O low back pain    Lumbosacral spondylosis    COPD (chronic obstructive pulmonary disease)    Back pain    Back pain    Spinal cord injury, thoracic (T7-T12)    Back pain    Spinal cord injury of thoracic region without bone injury    Delirium    Revision of fusion of thoracic spine    Thoracic back pain    Revision of posterolateral fusion of lumbar spine    Revision of fusion of thoracic spine    Decompression, spinal cord, thoracic, posterolateral approach    Cervical disc disease    H/O Spinal surgery    H/O breast surgery    S/P cervical discectomy    Previous back surgery    H/O shoulder surgery    H/O total shoulder replacement, right    Room Service Assist    Room Service Assist    Revision of posterolateral fusion of lumbar spine    Decompression, spinal cord, thoracic, posterolateral approach    HTN (hypertension)    HLD (hyperlipidemia)    CVA (cerebrovascular accident)    GAMALIEL (obstructive sleep apnea)    H/O emphysema    Smoking history    Chronic constipation    Urinary incontinence    Pulmonary embolism    Diabetes    GERD (gastroesophageal reflux disease)    SysAdmin_VstLnk        PLAN:  Neuro:  - Neuro/vitals q4  - pain control: modified ERAS, IT morphine pump, oxycodone 10/15 prn, valium and home gabapentin 800 TID resumed  - Continue home cymbalta 60mg daily   - CT thoracic spine 12/6: postop changes  - protected sleep time 10PM-4AM     Cardio:  - MAP > 65   - midodrine 15q8   - C/w home simvastatin  - Sinus tachycardia - likely 2/2 PE (12/19)      Pulm:  - RA   - hx GAMALIEL, no CPAP at home  - PE protocol, 12/19: DVT doppler neg, CTA w segmental and subsegmental PE in R upper lobe  - 5 mg eliquis BID for R upper lobe PE     GI:  - CCD   - Bowel regimen, home movantik 25 mg, last BM 12/17  - CT A/P 12/13 = impacted stool   - GERD: continue home protonix    Renal:  - Straight cath q6  - Patient not interested in suprapubic catheter placement    Endo:  - a1c: 7    Heme:  - SCDs DVT ppx  - Eliquis d/t PE   - 500 cell saver intraop 12/5, 1u PRBC intraop 12/10, 1u PRBC 12/12    ID:  - afebrile    Dispo:  - SDU status, full code, pending AR    D/w Dr. Luz   Assessment:  Present when checked

## 2023-12-30 PROCEDURE — 99024 POSTOP FOLLOW-UP VISIT: CPT

## 2023-12-30 PROCEDURE — 99223 1ST HOSP IP/OBS HIGH 75: CPT

## 2023-12-30 RX ORDER — LIDOCAINE 4 G/100G
1 CREAM TOPICAL ONCE
Refills: 0 | Status: COMPLETED | OUTPATIENT
Start: 2023-12-30 | End: 2023-12-30

## 2023-12-30 RX ADMIN — PANTOPRAZOLE SODIUM 40 MILLIGRAM(S): 20 TABLET, DELAYED RELEASE ORAL at 07:19

## 2023-12-30 RX ADMIN — APIXABAN 5 MILLIGRAM(S): 2.5 TABLET, FILM COATED ORAL at 19:50

## 2023-12-30 RX ADMIN — MIDODRINE HYDROCHLORIDE 15 MILLIGRAM(S): 2.5 TABLET ORAL at 02:26

## 2023-12-30 RX ADMIN — GABAPENTIN 800 MILLIGRAM(S): 400 CAPSULE ORAL at 19:50

## 2023-12-30 RX ADMIN — OXYCODONE HYDROCHLORIDE 15 MILLIGRAM(S): 5 TABLET ORAL at 20:34

## 2023-12-30 RX ADMIN — MIDODRINE HYDROCHLORIDE 15 MILLIGRAM(S): 2.5 TABLET ORAL at 22:34

## 2023-12-30 RX ADMIN — CHLORHEXIDINE GLUCONATE 1 APPLICATION(S): 213 SOLUTION TOPICAL at 07:16

## 2023-12-30 RX ADMIN — APIXABAN 5 MILLIGRAM(S): 2.5 TABLET, FILM COATED ORAL at 07:19

## 2023-12-30 RX ADMIN — OXYCODONE HYDROCHLORIDE 15 MILLIGRAM(S): 5 TABLET ORAL at 19:50

## 2023-12-30 RX ADMIN — SIMVASTATIN 40 MILLIGRAM(S): 20 TABLET, FILM COATED ORAL at 21:04

## 2023-12-30 RX ADMIN — LIDOCAINE 1 PATCH: 4 CREAM TOPICAL at 19:17

## 2023-12-30 RX ADMIN — Medication 5 MILLIGRAM(S): at 14:35

## 2023-12-30 RX ADMIN — SENNA PLUS 2 TABLET(S): 8.6 TABLET ORAL at 21:05

## 2023-12-30 RX ADMIN — LIDOCAINE 1 PATCH: 4 CREAM TOPICAL at 14:33

## 2023-12-30 RX ADMIN — Medication 5 MILLIGRAM(S): at 21:04

## 2023-12-30 RX ADMIN — GABAPENTIN 800 MILLIGRAM(S): 400 CAPSULE ORAL at 11:26

## 2023-12-30 RX ADMIN — OXYCODONE HYDROCHLORIDE 15 MILLIGRAM(S): 5 TABLET ORAL at 12:20

## 2023-12-30 RX ADMIN — OXYCODONE HYDROCHLORIDE 15 MILLIGRAM(S): 5 TABLET ORAL at 11:30

## 2023-12-30 RX ADMIN — Medication 1 TABLET(S): at 11:25

## 2023-12-30 RX ADMIN — DULOXETINE HYDROCHLORIDE 60 MILLIGRAM(S): 30 CAPSULE, DELAYED RELEASE ORAL at 11:25

## 2023-12-30 RX ADMIN — Medication 5 MILLIGRAM(S): at 07:18

## 2023-12-30 RX ADMIN — MIDODRINE HYDROCHLORIDE 15 MILLIGRAM(S): 2.5 TABLET ORAL at 11:26

## 2023-12-30 RX ADMIN — GABAPENTIN 800 MILLIGRAM(S): 400 CAPSULE ORAL at 02:25

## 2023-12-30 NOTE — PROGRESS NOTE ADULT - SUBJECTIVE AND OBJECTIVE BOX
Patient was seen and examined at bedside. Case discuss with resident.     OBJECTIVE:  Vital Signs Last 24 Hrs  T(C): 36.5 (30 Dec 2023 11:06), Max: 37 (29 Dec 2023 19:15)  T(F): 97.7 (30 Dec 2023 11:06), Max: 98.6 (29 Dec 2023 19:15)  HR: 105 (30 Dec 2023 11:06) (83 - 105)  BP: 131/83 (30 Dec 2023 11:06) (118/80 - 131/83)  RR: 18 (30 Dec 2023 11:06) (15 - 18)  SpO2: 97% (30 Dec 2023 11:06) (95% - 100%)    PHYSICAL EXAM:  Gen: NAD laying in bed  CV: RRR, +S1/S2, no mumur  Pulm: CTA b/l no wheezing or crackles   Abd: soft, NTND + BS no rebound or guarding   Neuro: CN II-XII intact, follows commands, speech clear. Strength BL UE 5/5  BL LE 0/5      LABS:                        12.2   6.24  )-----------( 359      ( 29 Dec 2023 12:12 )             40.4     12-29    137  |  102  |  9   ----------------------------<  103<H>  4.6   |  25  |  0.49<L>    Ca    9.5      29 Dec 2023 12:12  Phos  3.5     12-29  Mg     1.8     12-29      acetaminophen     Tablet .. 1000 milliGRAM(s) Oral every 8 hours PRN  apixaban 5 milliGRAM(s) Oral every 12 hours  benzocaine 20% Spray 1 Spray(s) Mucosal three times a day PRN  benzocaine/menthol Lozenge 1 Lozenge Oral every 2 hours PRN  bisacodyl 5 milliGRAM(s) Oral at bedtime  chlorhexidine 2% Cloths 1 Application(s) Topical <User Schedule>  diazepam    Tablet 5 milliGRAM(s) Oral every 8 hours  diphenhydrAMINE 25 milliGRAM(s) Oral every 12 hours PRN  DULoxetine 60 milliGRAM(s) Oral daily  gabapentin 800 milliGRAM(s) Oral every 8 hours  hydrocortisone 1% Cream 1 Application(s) Topical two times a day PRN  influenza  Vaccine (HIGH DOSE) 0.7 milliLiter(s) IntraMuscular once  midodrine 15 milliGRAM(s) Oral every 8 hours  Morphine 1 milliGRAM / 1 mL 20 milliLITERS 20 milliLiter(s) IntraThecal Continuous Pump  multivitamin 1 Tablet(s) Oral daily  naloxegol 25 milliGRAM(s) Oral daily  naloxone Injectable 0.1 milliGRAM(s) IV Push every 3 minutes PRN  ondansetron Injectable 4 milliGRAM(s) IV Push every 6 hours PRN  oxyCODONE    IR 10 milliGRAM(s) Oral every 4 hours PRN  oxyCODONE    IR 15 milliGRAM(s) Oral every 4 hours PRN  pantoprazole    Tablet 40 milliGRAM(s) Oral before breakfast  polyethylene glycol 3350 17 Gram(s) Oral two times a day  senna 2 Tablet(s) Oral at bedtime  simvastatin 40 milliGRAM(s) Oral at bedtime  sodium chloride 0.9% lock flush 10 milliLiter(s) IV Push every 1 hour PRN    A/P: 68F with PMH of HTN, HLD, DM2, CVA x 3 (last in 2016 with residual R hand weakness), GAMALIEL not using CPAP, Emphysema, ex-25 pack year smoker now restarted, chronic constipation on Movantik, chronic urinary retention (SC at home), b/l CTS s/p release, s/p Intrathecal Morphine pump for pain, with chronic neck and back pain worsening for years s/p multiple spinal surgeries including laminectomies and fusion of cervical, thoracic and lumbar spine, s/p T3-L1 revision of prior fusion (with  on 8/17/22). Xray 11/16/23 showing broken rods/displacement. Now s/p C2-S2 instrumentation and fusion (12/5). S/p T9-L2 decompression w/ durotomy with repair (12/10/23).     #Neck Pain  #Multiple Spine Surgeries  #Chronic Back Pain  -Pt s/p fusion  - Pain and bowel regime as per primary team     #Chronic Constipation likely due to chronic opioid use  - c/w bowel regimen  - continue movantik    #HTN  -Will continue to monitor BP   -  Lopressor 75mg BID    #CAD  #CVA  - Resume ASA post operatively when deemed safe to do so as per neurosurgery team   - continue atorvastatin  - Awaiting results of recent long term outpatient cardiac monitoring to monitor for occult Afib to determine if a role for full dose AC, follows with Dr Jeter    #DM  -Continue  ISS    #Pulmonary embolism   -CTPE performed given persistent tachycardia, shown to have subsegmental and segmental PE  - c/w Eliquis     # Paraplegia   #Dispo  -Pt is  medically Ready for Acute Rehab , bed not available to [facility] ; Pt is refusing [facility]                   Patient was seen and examined at bedside. Case discuss with resident.     OBJECTIVE:  Vital Signs Last 24 Hrs  T(C): 36.5 (30 Dec 2023 11:06), Max: 37 (29 Dec 2023 19:15)  T(F): 97.7 (30 Dec 2023 11:06), Max: 98.6 (29 Dec 2023 19:15)  HR: 105 (30 Dec 2023 11:06) (83 - 105)  BP: 131/83 (30 Dec 2023 11:06) (118/80 - 131/83)  RR: 18 (30 Dec 2023 11:06) (15 - 18)  SpO2: 97% (30 Dec 2023 11:06) (95% - 100%)    PHYSICAL EXAM:  Gen: NAD laying in bed  CV: RRR, +S1/S2, no mumur  Pulm: CTA b/l no wheezing or crackles   Abd: soft, NTND + BS no rebound or guarding   Neuro: CN II-XII intact, follows commands, speech clear. Strength BL UE 5/5  BL LE 0/5      LABS:                        12.2   6.24  )-----------( 359      ( 29 Dec 2023 12:12 )             40.4     12-29    137  |  102  |  9   ----------------------------<  103<H>  4.6   |  25  |  0.49<L>    Ca    9.5      29 Dec 2023 12:12  Phos  3.5     12-29  Mg     1.8     12-29      acetaminophen     Tablet .. 1000 milliGRAM(s) Oral every 8 hours PRN  apixaban 5 milliGRAM(s) Oral every 12 hours  benzocaine 20% Spray 1 Spray(s) Mucosal three times a day PRN  benzocaine/menthol Lozenge 1 Lozenge Oral every 2 hours PRN  bisacodyl 5 milliGRAM(s) Oral at bedtime  chlorhexidine 2% Cloths 1 Application(s) Topical <User Schedule>  diazepam    Tablet 5 milliGRAM(s) Oral every 8 hours  diphenhydrAMINE 25 milliGRAM(s) Oral every 12 hours PRN  DULoxetine 60 milliGRAM(s) Oral daily  gabapentin 800 milliGRAM(s) Oral every 8 hours  hydrocortisone 1% Cream 1 Application(s) Topical two times a day PRN  influenza  Vaccine (HIGH DOSE) 0.7 milliLiter(s) IntraMuscular once  midodrine 15 milliGRAM(s) Oral every 8 hours  Morphine 1 milliGRAM / 1 mL 20 milliLITERS 20 milliLiter(s) IntraThecal Continuous Pump  multivitamin 1 Tablet(s) Oral daily  naloxegol 25 milliGRAM(s) Oral daily  naloxone Injectable 0.1 milliGRAM(s) IV Push every 3 minutes PRN  ondansetron Injectable 4 milliGRAM(s) IV Push every 6 hours PRN  oxyCODONE    IR 10 milliGRAM(s) Oral every 4 hours PRN  oxyCODONE    IR 15 milliGRAM(s) Oral every 4 hours PRN  pantoprazole    Tablet 40 milliGRAM(s) Oral before breakfast  polyethylene glycol 3350 17 Gram(s) Oral two times a day  senna 2 Tablet(s) Oral at bedtime  simvastatin 40 milliGRAM(s) Oral at bedtime  sodium chloride 0.9% lock flush 10 milliLiter(s) IV Push every 1 hour PRN    A/P: 68F with PMH of HTN, HLD, DM2, CVA x 3 (last in 2016 with residual R hand weakness), GAMALIEL not using CPAP, Emphysema, ex-25 pack year smoker now restarted, chronic constipation on Movantik, chronic urinary retention (SC at home), b/l CTS s/p release, s/p Intrathecal Morphine pump for pain, with chronic neck and back pain worsening for years s/p multiple spinal surgeries including laminectomies and fusion of cervical, thoracic and lumbar spine, s/p T3-L1 revision of prior fusion (with  on 8/17/22). Xray 11/16/23 showing broken rods/displacement. Now s/p C2-S2 instrumentation and fusion (12/5). S/p T9-L2 decompression w/ durotomy with repair (12/10/23).     #Neck Pain  #Multiple Spine Surgeries  #Chronic Back Pain  -Pt s/p fusion  - Pain and bowel regime as per primary team     #Chronic Constipation likely due to chronic opioid use  - c/w bowel regimen  - continue movantik    #HTN  -Will continue to monitor BP   -  Lopressor 75mg BID    #CAD  #CVA  - Resume ASA post operatively when deemed safe to do so as per neurosurgery team   - continue atorvastatin  - Awaiting results of recent long term outpatient cardiac monitoring to monitor for occult Afib  follows with Dr Jeter    #DM  -Continue  ISS    #Pulmonary embolism   -CTPE performed given persistent tachycardia, shown to have subsegmental and segmental PE  - c/w Eliquis     # Paraplegia   #Dispo  -Pt is  medically Ready for Acute Rehab , bed not available to [facility] ; Pt is refusing [facility]                   Patient was seen and examined at bedside. Case discuss with resident.     OBJECTIVE:  Vital Signs Last 24 Hrs  T(C): 36.5 (30 Dec 2023 11:06), Max: 37 (29 Dec 2023 19:15)  T(F): 97.7 (30 Dec 2023 11:06), Max: 98.6 (29 Dec 2023 19:15)  HR: 105 (30 Dec 2023 11:06) (83 - 105)  BP: 131/83 (30 Dec 2023 11:06) (118/80 - 131/83)  RR: 18 (30 Dec 2023 11:06) (15 - 18)  SpO2: 97% (30 Dec 2023 11:06) (95% - 100%)    PHYSICAL EXAM:  Gen: NAD laying in bed  CV: RRR, +S1/S2, no mumur  Pulm: CTA b/l no wheezing or crackles   Abd: soft, NTND + BS no rebound or guarding   Neuro: CN II-XII intact, follows commands, speech clear. Strength BL UE 5/5  BL LE 0/5      LABS:                        12.2   6.24  )-----------( 359      ( 29 Dec 2023 12:12 )             40.4     12-29    137  |  102  |  9   ----------------------------<  103<H>  4.6   |  25  |  0.49<L>    Ca    9.5      29 Dec 2023 12:12  Phos  3.5     12-29  Mg     1.8     12-29      acetaminophen     Tablet .. 1000 milliGRAM(s) Oral every 8 hours PRN  apixaban 5 milliGRAM(s) Oral every 12 hours  benzocaine 20% Spray 1 Spray(s) Mucosal three times a day PRN  benzocaine/menthol Lozenge 1 Lozenge Oral every 2 hours PRN  bisacodyl 5 milliGRAM(s) Oral at bedtime  chlorhexidine 2% Cloths 1 Application(s) Topical <User Schedule>  diazepam    Tablet 5 milliGRAM(s) Oral every 8 hours  diphenhydrAMINE 25 milliGRAM(s) Oral every 12 hours PRN  DULoxetine 60 milliGRAM(s) Oral daily  gabapentin 800 milliGRAM(s) Oral every 8 hours  hydrocortisone 1% Cream 1 Application(s) Topical two times a day PRN  influenza  Vaccine (HIGH DOSE) 0.7 milliLiter(s) IntraMuscular once  midodrine 15 milliGRAM(s) Oral every 8 hours  Morphine 1 milliGRAM / 1 mL 20 milliLITERS 20 milliLiter(s) IntraThecal Continuous Pump  multivitamin 1 Tablet(s) Oral daily  naloxegol 25 milliGRAM(s) Oral daily  naloxone Injectable 0.1 milliGRAM(s) IV Push every 3 minutes PRN  ondansetron Injectable 4 milliGRAM(s) IV Push every 6 hours PRN  oxyCODONE    IR 10 milliGRAM(s) Oral every 4 hours PRN  oxyCODONE    IR 15 milliGRAM(s) Oral every 4 hours PRN  pantoprazole    Tablet 40 milliGRAM(s) Oral before breakfast  polyethylene glycol 3350 17 Gram(s) Oral two times a day  senna 2 Tablet(s) Oral at bedtime  simvastatin 40 milliGRAM(s) Oral at bedtime  sodium chloride 0.9% lock flush 10 milliLiter(s) IV Push every 1 hour PRN    A/P: 68F with PMH of HTN, HLD, DM2, CVA x 3 (last in 2016 with residual R hand weakness), GAMALIEL not using CPAP, Emphysema, ex-25 pack year smoker now restarted, chronic constipation on Movantik, chronic urinary retention (SC at home), b/l CTS s/p release, s/p Intrathecal Morphine pump for pain, with chronic neck and back pain worsening for years s/p multiple spinal surgeries including laminectomies and fusion of cervical, thoracic and lumbar spine, s/p T3-L1 revision of prior fusion (with  on 8/17/22). Xray 11/16/23 showing broken rods/displacement. Now s/p C2-S2 instrumentation and fusion (12/5). S/p T9-L2 decompression w/ durotomy with repair (12/10/23).     #Neck Pain  #Multiple Spine Surgeries  #Chronic Back Pain  -Pt s/p fusion  - Pain and bowel regime as per primary team     #Chronic Constipation likely due to chronic opioid use  - c/w bowel regimen  - continue movantik    #HTN  -Will continue to monitor BP   -  Lopressor 75mg BID    #CAD  #CVA  - Resume ASA post operatively when deemed safe to do so as per neurosurgery team   - continue atorvastatin  - Awaiting results of recent long term outpatient cardiac monitoring to monitor for occult Afib  follows with Dr Jeter    #DM  -Continue  ISS    #Pulmonary embolism   -CTPE performed given persistent tachycardia, shown to have subsegmental and segmental PE  - c/w Eliquis     # Paraplegia   #Dispo  -Pt is  medically Ready for Acute Rehab , bed not available to [facility] ; Pt is refusing [facility]                   Patient was seen and examined at bedside. Case discuss with resident.     OBJECTIVE:  Vital Signs Last 24 Hrs  T(C): 36.5 (30 Dec 2023 11:06), Max: 37 (29 Dec 2023 19:15)  T(F): 97.7 (30 Dec 2023 11:06), Max: 98.6 (29 Dec 2023 19:15)  HR: 105 (30 Dec 2023 11:06) (83 - 105)  BP: 131/83 (30 Dec 2023 11:06) (118/80 - 131/83)  RR: 18 (30 Dec 2023 11:06) (15 - 18)  SpO2: 97% (30 Dec 2023 11:06) (95% - 100%)    PHYSICAL EXAM:  Gen: NAD laying in bed  CV: RRR, +S1/S2, no mumur  Pulm: CTA b/l no wheezing or crackles   Abd: soft, NTND + BS no rebound or guarding   Neuro: CN II-XII intact, follows commands, speech clear. Strength BL UE 5/5  BL LE 0/5      LABS:                        12.2   6.24  )-----------( 359      ( 29 Dec 2023 12:12 )             40.4     12-29    137  |  102  |  9   ----------------------------<  103<H>  4.6   |  25  |  0.49<L>    Ca    9.5      29 Dec 2023 12:12  Phos  3.5     12-29  Mg     1.8     12-29      acetaminophen     Tablet .. 1000 milliGRAM(s) Oral every 8 hours PRN  apixaban 5 milliGRAM(s) Oral every 12 hours  benzocaine 20% Spray 1 Spray(s) Mucosal three times a day PRN  benzocaine/menthol Lozenge 1 Lozenge Oral every 2 hours PRN  bisacodyl 5 milliGRAM(s) Oral at bedtime  chlorhexidine 2% Cloths 1 Application(s) Topical <User Schedule>  diazepam    Tablet 5 milliGRAM(s) Oral every 8 hours  diphenhydrAMINE 25 milliGRAM(s) Oral every 12 hours PRN  DULoxetine 60 milliGRAM(s) Oral daily  gabapentin 800 milliGRAM(s) Oral every 8 hours  hydrocortisone 1% Cream 1 Application(s) Topical two times a day PRN  influenza  Vaccine (HIGH DOSE) 0.7 milliLiter(s) IntraMuscular once  midodrine 15 milliGRAM(s) Oral every 8 hours  Morphine 1 milliGRAM / 1 mL 20 milliLITERS 20 milliLiter(s) IntraThecal Continuous Pump  multivitamin 1 Tablet(s) Oral daily  naloxegol 25 milliGRAM(s) Oral daily  naloxone Injectable 0.1 milliGRAM(s) IV Push every 3 minutes PRN  ondansetron Injectable 4 milliGRAM(s) IV Push every 6 hours PRN  oxyCODONE    IR 10 milliGRAM(s) Oral every 4 hours PRN  oxyCODONE    IR 15 milliGRAM(s) Oral every 4 hours PRN  pantoprazole    Tablet 40 milliGRAM(s) Oral before breakfast  polyethylene glycol 3350 17 Gram(s) Oral two times a day  senna 2 Tablet(s) Oral at bedtime  simvastatin 40 milliGRAM(s) Oral at bedtime  sodium chloride 0.9% lock flush 10 milliLiter(s) IV Push every 1 hour PRN    A/P: 68F with PMH of HTN, HLD, DM2, CVA x 3 (last in 2016 with residual R hand weakness), GAMALIEL not using CPAP, Emphysema, ex-25 pack year smoker now restarted, chronic constipation on Movantik, chronic urinary retention (SC at home), b/l CTS s/p release, s/p Intrathecal Morphine pump for pain, with chronic neck and back pain worsening for years s/p multiple spinal surgeries including laminectomies and fusion of cervical, thoracic and lumbar spine, s/p T3-L1 revision of prior fusion (with  on 8/17/22). Xray 11/16/23 showing broken rods/displacement. Now s/p C2-S2 instrumentation and fusion (12/5). S/p T9-L2 decompression w/ durotomy with repair (12/10/23).     #Neck and shoulder Pain  #Multiple Spine Surgeries  #Chronic Back Pain  -Pt s/p fusion  - Pain and bowel regime as per primary team     #Chronic Constipation likely due to chronic opioid use  - c/w bowel regimen  - continue movantik    #CAD  #CVA  - Resume ASA post operatively when deemed safe to do so as per neurosurgery team   - Continue atorvastatin  - Awaiting results of recent long term outpatient cardiac monitoring to monitor for occult Afib  follows with Dr Jeter    #Pulmonary embolism   -CT PE performed given persistent tachycardia, shown to have subsegmental and segmental PE  - c/w Eliquis     # Paraplegia   #DISPO  -Awaiting Acute Rehab

## 2023-12-30 NOTE — PROGRESS NOTE ADULT - SUBJECTIVE AND OBJECTIVE BOX
HPI:  66 y/o female with PMHx HTN, HLD, CVA x 3 (last was 2016 with right hand weakness residual), GAMALIEL not using CPAP, Emphysema, former 25 pack year smoker restarted this week of surgery, Chronic Constipation on Movantik, Urinary Incontinence chronically self straight cath at home, chronic UTI, Breast Implant Rupture with Chronic Leak repair 10/2023, B/L CTS s/p release, s/p Intrathecal Morphine pump for pain, with chronic neck and back pain worsening for years s/p multiple spinal surgeries including laminectomies and fusion of cervical, thoracic and lumbar spine initially done in 2009 and recently in 2019 and 2020 at [facility] by Dr. Trinidad, s/p T3-L1 revision of prior fusion (with Dr. Luz on 8/17/22). Outpatient Xray 11/16/2023 showing broken rods/displacement. Presents for elective C2-S2 instrumentation and fusion. Pt endorses weakness of b/l LE and burning pain in b/l lower extremities radiating to both feet,  (04 Dec 2023 15:44)    INTERVAL EVENTS:  KAMRAN    HOSPITAL COURSE:  12/4: Admitted for spinal fusion d/t damaged hardware.   12/5: Neuro stable. POD0 C2-S2 instrumentation/fusion (intraop b/l LE motor loss). on parminder for MAP>90  12/6: POD1. KAMRAN o/n neuro stable. remains intubated. Extubated 6am. Precedex gtt prn. Remains on parminder gtt for MAP goal >90. Valium made prn. CT thoracic spine bc MRI unavailable. Attempted NGT, unsuccessful (resistance @ 35).   12/7: POD2 Given IV dilaudid 0.5mg for pain. Neuro exam stable. SQL started tongiht. Increased precedex to 1.0. Responds well to valium. Consulting psych given paranoia ?homicidal statements. Passed bedside dysphagia and tolerating PO meds. MRI complete, f/u read.   12/8: POD3 Pt reporting incisional pain, given dilaudid 0.5mg IV x2. Pt appearing anxious, given xanax 1mg. Neuro exam unchanged. Pain regimen increased to oxy 15/30 mg, pending further recs. Given 1 L bolus for tachycardia. Trops negative. 2 HMV drains removed.   12/9: POD4, KAMRAN, CT myelogram today showing block at T12-L1, OR tomorrow  12/10: POD5, KAMRAN, OR today for exploration of fusion, possible decompression T9-L2, revision instrumentation  POD 0 T9-L2 decompression. Pt returned from OR intubated. 50mcg fentanyl IVP x 2 for pain control and HTN. Magnesium repleted. Precedex gtt started for sedation. Decreased FiO2 to 40%. 1L bolus for soft MAP. Dc'd propofol and started levo gtt for MAP >90.   12/11: POD 6, POD 1. Extubated, satting well on RA. Resumed home valium and gabapentin. Castillo d/c'd, pending TOV. Seroquel started for agitation, R IJ removed, LUE double midline placed. Started SQL.   12/12: POD7, POD2, given 500 cc bolus and started on phenylephrine for MAP goal >90. Started midodrine 10mg q8h. Cardura dc'd d/t hypotension. Given 250cc of albumin for increasing pressor requirements. Given lactulose, pending BM. Hgb 7.6<8.3, given 1u PRBC. Protected sleep time. Urology consulted for possible suprapubic cath, recommend self intermittent cath is optimal.   12/13: POD8, POD3 Midodrine increased to 15q8. Pt refused to take the extra 5mg midodrin ordered. Neuro exam stable. hgb 10 from 9.4 after 1u pRBCs, Pt refused AM meds, given in late morning, CTAP ordered for L sided abd pain, fleet enema, restarted home movantik and ordere fleet enema for constipation. YIFAN and HMV dc'd. 250cc albumin x1  12/14: POD9, POD4, KAMRAN overnight, neuro stable. MAP goal liberalized to > 65. Stepdown status.   12/15: POD10, POD5, KAMARN overnight  12/16: POD11, POD6, KAMRAN overnight, neuro stable, 500 cc bolus NS given for tachycardia. Aquacell changed. CXR, pro-bnp negative for pulmonary edema. Given additional 500cc bolus.  12/17: POD 12. POD 7. KAMRAN overnight, neuro stable.   12/18: POD 13/8. Incontinent x2 today.   12/19: KAMRAN. Persistent tachycardia, r/o PE. CTPE protocol ordered. Refused all afternoon meds except oxy 30. Per Dr. Cowart (rad) CT chest w segmental and subsegmental PE in R upper lobe. Dopplers negative for DVT, CT PE shows segmental and subsegmental PE in R upper lobe. Per Dr. Augustine and Dr. Luz, Will start eliquis pending pain management approval. Per Dr. Mclaughlin (pain mgmt) can start eliquis 10mg BIDx 7 days. Dc'd lovenox.  12/20: POD 10 T9-L2 Decompression. Hemodynamically stable. Pending rehab. PT today.   12/21: POD 11 T9-L2 Decompression. neruo/hemodynamically stable, pending rehab.  12/22: POD 17/12, pending rehab. Aquacel dressing changed.   12/23: POD 18/13, pending rehab.   12/24: POD 19/14, pending rehab.   12/25: POD 20/15, pending rehab.   12/26: POD 21/16, pending rehab.   12/27: POD 22/17. pending rehab.   12/28: POD 23/18.   12/29: POD 24/19. KAMRAN o/n. No AR beds, TAIWO  12/30: POD 25/20. Pending TAIWO       Vital Signs Last 24 Hrs  T(C): 36.4 (29 Dec 2023 20:38), Max: 37 (29 Dec 2023 19:15)  T(F): 97.5 (29 Dec 2023 20:38), Max: 98.6 (29 Dec 2023 19:15)  HR: 86 (29 Dec 2023 20:38) (78 - 102)  BP: 126/84 (29 Dec 2023 20:38) (118/73 - 134/71)  BP(mean): --  RR: 18 (29 Dec 2023 20:38) (15 - 19)  SpO2: 97% (29 Dec 2023 20:38) (96% - 100%)    Parameters below as of 29 Dec 2023 20:38  Patient On (Oxygen Delivery Method): room air        I&O's Summary    28 Dec 2023 07:01  -  29 Dec 2023 07:00  --------------------------------------------------------  IN: 740 mL / OUT: 1325 mL / NET: -585 mL    29 Dec 2023 07:01  -  30 Dec 2023 01:37  --------------------------------------------------------  IN: 0 mL / OUT: 700 mL / NET: -700 mL        PHYSICAL EXAM:  General: NAD, AAOx3  HEENT: PERRL. EOMI.   Cardiovascular: RRR, normal S1 and S2   Respiratory: non-labored breathing, symmetric chest rise  GI: abd soft, NTND  Neuro: CN II-XII intact, follows commands, speech clear. Strength BL UE 5/5  BL LE 0/5 to noxious stimuli with T11 sensory deficit.  Vascular: Distal pulses 2+ x 4, no calf edema or erythema  Wounds: Posterior midline spine incision C/D/I with dressing in place    LABS:                        12.2   6.24  )-----------( 359      ( 29 Dec 2023 12:12 )             40.4     12-29    137  |  102  |  9   ----------------------------<  103<H>  4.6   |  25  |  0.49<L>    Ca    9.5      29 Dec 2023 12:12  Phos  3.5     12-29  Mg     1.8     12-29        Urinalysis Basic - ( 29 Dec 2023 12:12 )    Color: x / Appearance: x / SG: x / pH: x  Gluc: 103 mg/dL / Ketone: x  / Bili: x / Urobili: x   Blood: x / Protein: x / Nitrite: x   Leuk Esterase: x / RBC: x / WBC x   Sq Epi: x / Non Sq Epi: x / Bacteria: x          CAPILLARY BLOOD GLUCOSE          Drug Levels: [] N/A    CSF Analysis: [] N/A      Allergies    Crab (Pruritus)  No Known Drug Allergies    Intolerances      MEDICATIONS:  Antibiotics:    Neuro:  acetaminophen     Tablet .. 1000 milliGRAM(s) Oral every 8 hours PRN  diazepam    Tablet 5 milliGRAM(s) Oral every 8 hours  diphenhydrAMINE 25 milliGRAM(s) Oral every 12 hours PRN  DULoxetine 60 milliGRAM(s) Oral daily  gabapentin 800 milliGRAM(s) Oral every 8 hours  ondansetron Injectable 4 milliGRAM(s) IV Push every 6 hours PRN  oxyCODONE    IR 10 milliGRAM(s) Oral every 4 hours PRN  oxyCODONE    IR 15 milliGRAM(s) Oral every 4 hours PRN    Anticoagulation:  apixaban 5 milliGRAM(s) Oral every 12 hours    OTHER:  benzocaine 20% Spray 1 Spray(s) Mucosal three times a day PRN  benzocaine/menthol Lozenge 1 Lozenge Oral every 2 hours PRN  bisacodyl 5 milliGRAM(s) Oral at bedtime  chlorhexidine 2% Cloths 1 Application(s) Topical <User Schedule>  hydrocortisone 1% Cream 1 Application(s) Topical two times a day PRN  influenza  Vaccine (HIGH DOSE) 0.7 milliLiter(s) IntraMuscular once  midodrine 15 milliGRAM(s) Oral every 8 hours  Morphine 1 milliGRAM / 1 mL 20 milliLITERS 20 milliLiter(s) IntraThecal Continuous Pump  naloxegol 25 milliGRAM(s) Oral daily  naloxone Injectable 0.1 milliGRAM(s) IV Push every 3 minutes PRN  pantoprazole    Tablet 40 milliGRAM(s) Oral before breakfast  polyethylene glycol 3350 17 Gram(s) Oral two times a day  senna 2 Tablet(s) Oral at bedtime  simvastatin 40 milliGRAM(s) Oral at bedtime    IVF:  multivitamin 1 Tablet(s) Oral daily    CULTURES:    RADIOLOGY & ADDITIONAL TESTS:      ASSESSMENT:  69 yo female with Hx HTN, HLD, DM, CVA x 3 (last in 2016 with residual R hand weakness), GAMALIEL not using CPAP, Emphysema, ex-25 pack year smoker now restarted, chronic constipation on Movantik, chronic urinary retention (SC at home), b/l CTS s/p release, s/p Intrathecal Morphine pump for pain, with chronic neck and back pain worsening for years s/p multiple spinal surgeries including laminectomies and fusion of cervical, thoracic and lumbar spine, s/p T3-L1 revision of prior fusion (with  on 8/17/22). Xray 11/16/23 showing broken rods/displacement. Now s/p C2-S2 instrumentation and fusion (12/5). S/p T9-L2 decompression w/ durotomy with repair (12/10/23). KAREEM grade A.     Plan:  Neuro:  - Neuro/vitals q4  - pain control: modified ERAS, IT morphine pump, oxycodone 10/15 prn, valium and home gabapentin 800 TID resumed  - Continue home cymbalta 60mg daily   - CT thoracic spine 12/6: postop changes  - protected sleep time 10PM-4AM     Cardio:  - MAP > 65   - midodrine 15q8   - C/w home simvastatin  - Sinus tachycardia - likely 2/2 PE (12/19)      Pulm:  - RA   - hx GAMALIEL, no CPAP at home  - CTA 12/19 w segmental and subsegmental PE in R upper lobe    GI:  - CCD   - Bowel regimen, home movantik 25 mg, last BM 12/29  - CT A/P 12/13 = impacted stool   - GERD: continue home protonix    Renal:  - Straight cath q6  - Patient not interested in suprapubic catheter placement    Endo:  - A1c: 7    Heme:  - SCDs DVT ppx  - PT tx: Eliquis 5mg BID  - 500 cell saver intraop 12/5, 1u PRBC intraop 12/10, 1u PRBC 12/12    ID:  - afebrile    Dispo:  - SDU status, full code, pending TAIWO    D/w Dr. Luz  HPI:  68 y/o female with PMHx HTN, HLD, CVA x 3 (last was 2016 with right hand weakness residual), GAMALIEL not using CPAP, Emphysema, former 25 pack year smoker restarted this week of surgery, Chronic Constipation on Movantik, Urinary Incontinence chronically self straight cath at home, chronic UTI, Breast Implant Rupture with Chronic Leak repair 10/2023, B/L CTS s/p release, s/p Intrathecal Morphine pump for pain, with chronic neck and back pain worsening for years s/p multiple spinal surgeries including laminectomies and fusion of cervical, thoracic and lumbar spine initially done in 2009 and recently in 2019 and 2020 at [facility] by Dr. Trinidad, s/p T3-L1 revision of prior fusion (with Dr. Luz on 8/17/22). Outpatient Xray 11/16/2023 showing broken rods/displacement. Presents for elective C2-S2 instrumentation and fusion. Pt endorses weakness of b/l LE and burning pain in b/l lower extremities radiating to both feet,  (04 Dec 2023 15:44)    INTERVAL EVENTS:  KAMRAN    HOSPITAL COURSE:  12/4: Admitted for spinal fusion d/t damaged hardware.   12/5: Neuro stable. POD0 C2-S2 instrumentation/fusion (intraop b/l LE motor loss). on parminder for MAP>90  12/6: POD1. KAMRAN o/n neuro stable. remains intubated. Extubated 6am. Precedex gtt prn. Remains on parminder gtt for MAP goal >90. Valium made prn. CT thoracic spine bc MRI unavailable. Attempted NGT, unsuccessful (resistance @ 35).   12/7: POD2 Given IV dilaudid 0.5mg for pain. Neuro exam stable. SQL started tongiht. Increased precedex to 1.0. Responds well to valium. Consulting psych given paranoia ?homicidal statements. Passed bedside dysphagia and tolerating PO meds. MRI complete, f/u read.   12/8: POD3 Pt reporting incisional pain, given dilaudid 0.5mg IV x2. Pt appearing anxious, given xanax 1mg. Neuro exam unchanged. Pain regimen increased to oxy 15/30 mg, pending further recs. Given 1 L bolus for tachycardia. Trops negative. 2 HMV drains removed.   12/9: POD4, KAMRAN, CT myelogram today showing block at T12-L1, OR tomorrow  12/10: POD5, KAMRAN, OR today for exploration of fusion, possible decompression T9-L2, revision instrumentation  POD 0 T9-L2 decompression. Pt returned from OR intubated. 50mcg fentanyl IVP x 2 for pain control and HTN. Magnesium repleted. Precedex gtt started for sedation. Decreased FiO2 to 40%. 1L bolus for soft MAP. Dc'd propofol and started levo gtt for MAP >90.   12/11: POD 6, POD 1. Extubated, satting well on RA. Resumed home valium and gabapentin. Castillo d/c'd, pending TOV. Seroquel started for agitation, R IJ removed, LUE double midline placed. Started SQL.   12/12: POD7, POD2, given 500 cc bolus and started on phenylephrine for MAP goal >90. Started midodrine 10mg q8h. Cardura dc'd d/t hypotension. Given 250cc of albumin for increasing pressor requirements. Given lactulose, pending BM. Hgb 7.6<8.3, given 1u PRBC. Protected sleep time. Urology consulted for possible suprapubic cath, recommend self intermittent cath is optimal.   12/13: POD8, POD3 Midodrine increased to 15q8. Pt refused to take the extra 5mg midodrin ordered. Neuro exam stable. hgb 10 from 9.4 after 1u pRBCs, Pt refused AM meds, given in late morning, CTAP ordered for L sided abd pain, fleet enema, restarted home movantik and ordere fleet enema for constipation. YIFAN and HMV dc'd. 250cc albumin x1  12/14: POD9, POD4, KAMRAN overnight, neuro stable. MAP goal liberalized to > 65. Stepdown status.   12/15: POD10, POD5, KAMRAN overnight  12/16: POD11, POD6, KAMRAN overnight, neuro stable, 500 cc bolus NS given for tachycardia. Aquacell changed. CXR, pro-bnp negative for pulmonary edema. Given additional 500cc bolus.  12/17: POD 12. POD 7. KAMRAN overnight, neuro stable.   12/18: POD 13/8. Incontinent x2 today.   12/19: KAMRAN. Persistent tachycardia, r/o PE. CTPE protocol ordered. Refused all afternoon meds except oxy 30. Per Dr. Cowart (rad) CT chest w segmental and subsegmental PE in R upper lobe. Dopplers negative for DVT, CT PE shows segmental and subsegmental PE in R upper lobe. Per Dr. Augustine and Dr. Luz, Will start eliquis pending pain management approval. Per Dr. Mclaughlin (pain mgmt) can start eliquis 10mg BIDx 7 days. Dc'd lovenox.  12/20: POD 10 T9-L2 Decompression. Hemodynamically stable. Pending rehab. PT today.   12/21: POD 11 T9-L2 Decompression. neruo/hemodynamically stable, pending rehab.  12/22: POD 17/12, pending rehab. Aquacel dressing changed.   12/23: POD 18/13, pending rehab.   12/24: POD 19/14, pending rehab.   12/25: POD 20/15, pending rehab.   12/26: POD 21/16, pending rehab.   12/27: POD 22/17. pending rehab.   12/28: POD 23/18.   12/29: POD 24/19. KAMRAN o/n. No AR beds, TAIWO  12/30: POD 25/20. Pending TAIWO       Vital Signs Last 24 Hrs  T(C): 36.4 (29 Dec 2023 20:38), Max: 37 (29 Dec 2023 19:15)  T(F): 97.5 (29 Dec 2023 20:38), Max: 98.6 (29 Dec 2023 19:15)  HR: 86 (29 Dec 2023 20:38) (78 - 102)  BP: 126/84 (29 Dec 2023 20:38) (118/73 - 134/71)  BP(mean): --  RR: 18 (29 Dec 2023 20:38) (15 - 19)  SpO2: 97% (29 Dec 2023 20:38) (96% - 100%)    Parameters below as of 29 Dec 2023 20:38  Patient On (Oxygen Delivery Method): room air        I&O's Summary    28 Dec 2023 07:01  -  29 Dec 2023 07:00  --------------------------------------------------------  IN: 740 mL / OUT: 1325 mL / NET: -585 mL    29 Dec 2023 07:01  -  30 Dec 2023 01:37  --------------------------------------------------------  IN: 0 mL / OUT: 700 mL / NET: -700 mL        PHYSICAL EXAM:  General: NAD, AAOx3  HEENT: PERRL. EOMI.   Cardiovascular: RRR, normal S1 and S2   Respiratory: non-labored breathing, symmetric chest rise  GI: abd soft, NTND  Neuro: CN II-XII intact, follows commands, speech clear. Strength BL UE 5/5  BL LE 0/5 to noxious stimuli with T11 sensory deficit.  Vascular: Distal pulses 2+ x 4, no calf edema or erythema  Wounds: Posterior midline spine incision C/D/I with dressing in place    LABS:                        12.2   6.24  )-----------( 359      ( 29 Dec 2023 12:12 )             40.4     12-29    137  |  102  |  9   ----------------------------<  103<H>  4.6   |  25  |  0.49<L>    Ca    9.5      29 Dec 2023 12:12  Phos  3.5     12-29  Mg     1.8     12-29        Urinalysis Basic - ( 29 Dec 2023 12:12 )    Color: x / Appearance: x / SG: x / pH: x  Gluc: 103 mg/dL / Ketone: x  / Bili: x / Urobili: x   Blood: x / Protein: x / Nitrite: x   Leuk Esterase: x / RBC: x / WBC x   Sq Epi: x / Non Sq Epi: x / Bacteria: x          CAPILLARY BLOOD GLUCOSE          Drug Levels: [] N/A    CSF Analysis: [] N/A      Allergies    Crab (Pruritus)  No Known Drug Allergies    Intolerances      MEDICATIONS:  Antibiotics:    Neuro:  acetaminophen     Tablet .. 1000 milliGRAM(s) Oral every 8 hours PRN  diazepam    Tablet 5 milliGRAM(s) Oral every 8 hours  diphenhydrAMINE 25 milliGRAM(s) Oral every 12 hours PRN  DULoxetine 60 milliGRAM(s) Oral daily  gabapentin 800 milliGRAM(s) Oral every 8 hours  ondansetron Injectable 4 milliGRAM(s) IV Push every 6 hours PRN  oxyCODONE    IR 10 milliGRAM(s) Oral every 4 hours PRN  oxyCODONE    IR 15 milliGRAM(s) Oral every 4 hours PRN    Anticoagulation:  apixaban 5 milliGRAM(s) Oral every 12 hours    OTHER:  benzocaine 20% Spray 1 Spray(s) Mucosal three times a day PRN  benzocaine/menthol Lozenge 1 Lozenge Oral every 2 hours PRN  bisacodyl 5 milliGRAM(s) Oral at bedtime  chlorhexidine 2% Cloths 1 Application(s) Topical <User Schedule>  hydrocortisone 1% Cream 1 Application(s) Topical two times a day PRN  influenza  Vaccine (HIGH DOSE) 0.7 milliLiter(s) IntraMuscular once  midodrine 15 milliGRAM(s) Oral every 8 hours  Morphine 1 milliGRAM / 1 mL 20 milliLITERS 20 milliLiter(s) IntraThecal Continuous Pump  naloxegol 25 milliGRAM(s) Oral daily  naloxone Injectable 0.1 milliGRAM(s) IV Push every 3 minutes PRN  pantoprazole    Tablet 40 milliGRAM(s) Oral before breakfast  polyethylene glycol 3350 17 Gram(s) Oral two times a day  senna 2 Tablet(s) Oral at bedtime  simvastatin 40 milliGRAM(s) Oral at bedtime    IVF:  multivitamin 1 Tablet(s) Oral daily    CULTURES:    RADIOLOGY & ADDITIONAL TESTS:      ASSESSMENT:  69 yo female with Hx HTN, HLD, DM, CVA x 3 (last in 2016 with residual R hand weakness), GAMALIEL not using CPAP, Emphysema, ex-25 pack year smoker now restarted, chronic constipation on Movantik, chronic urinary retention (SC at home), b/l CTS s/p release, s/p Intrathecal Morphine pump for pain, with chronic neck and back pain worsening for years s/p multiple spinal surgeries including laminectomies and fusion of cervical, thoracic and lumbar spine, s/p T3-L1 revision of prior fusion (with  on 8/17/22). Xray 11/16/23 showing broken rods/displacement. Now s/p C2-S2 instrumentation and fusion (12/5). S/p T9-L2 decompression w/ durotomy with repair (12/10/23). KAREEM grade A.     Plan:  Neuro:  - Neuro/vitals q4  - pain control: modified ERAS, IT morphine pump, oxycodone 10/15 prn, valium and home gabapentin 800 TID resumed  - Continue home cymbalta 60mg daily   - CT thoracic spine 12/6: postop changes  - protected sleep time 10PM-4AM     Cardio:  - MAP > 65   - midodrine 15q8   - C/w home simvastatin  - Sinus tachycardia - likely 2/2 PE (12/19)      Pulm:  - RA   - hx GAMALIEL, no CPAP at home  - CTA 12/19 w segmental and subsegmental PE in R upper lobe    GI:  - CCD   - Bowel regimen, home movantik 25 mg, last BM 12/29  - CT A/P 12/13 = impacted stool   - GERD: continue home protonix    Renal:  - Straight cath q6  - Patient not interested in suprapubic catheter placement    Endo:  - A1c: 7    Heme:  - SCDs DVT ppx  - PT tx: Eliquis 5mg BID  - 500 cell saver intraop 12/5, 1u PRBC intraop 12/10, 1u PRBC 12/12    ID:  - afebrile    Dispo:  - SDU status, full code, pending TAIWO    D/w Dr. Luz

## 2023-12-31 LAB
ANION GAP SERPL CALC-SCNC: 14 MMOL/L — SIGNIFICANT CHANGE UP (ref 5–17)
ANION GAP SERPL CALC-SCNC: 14 MMOL/L — SIGNIFICANT CHANGE UP (ref 5–17)
APPEARANCE UR: ABNORMAL
APPEARANCE UR: ABNORMAL
BACTERIA # UR AUTO: ABNORMAL /HPF
BACTERIA # UR AUTO: ABNORMAL /HPF
BILIRUB UR-MCNC: NEGATIVE — SIGNIFICANT CHANGE UP
BILIRUB UR-MCNC: NEGATIVE — SIGNIFICANT CHANGE UP
BUN SERPL-MCNC: 8 MG/DL — SIGNIFICANT CHANGE UP (ref 7–23)
BUN SERPL-MCNC: 8 MG/DL — SIGNIFICANT CHANGE UP (ref 7–23)
CALCIUM SERPL-MCNC: 9.4 MG/DL — SIGNIFICANT CHANGE UP (ref 8.4–10.5)
CALCIUM SERPL-MCNC: 9.4 MG/DL — SIGNIFICANT CHANGE UP (ref 8.4–10.5)
CHLORIDE SERPL-SCNC: 101 MMOL/L — SIGNIFICANT CHANGE UP (ref 96–108)
CHLORIDE SERPL-SCNC: 101 MMOL/L — SIGNIFICANT CHANGE UP (ref 96–108)
CO2 SERPL-SCNC: 18 MMOL/L — LOW (ref 22–31)
CO2 SERPL-SCNC: 18 MMOL/L — LOW (ref 22–31)
COLOR SPEC: YELLOW — SIGNIFICANT CHANGE UP
COLOR SPEC: YELLOW — SIGNIFICANT CHANGE UP
CREAT SERPL-MCNC: 0.43 MG/DL — LOW (ref 0.5–1.3)
CREAT SERPL-MCNC: 0.43 MG/DL — LOW (ref 0.5–1.3)
DIFF PNL FLD: NEGATIVE — SIGNIFICANT CHANGE UP
DIFF PNL FLD: NEGATIVE — SIGNIFICANT CHANGE UP
EGFR: 105 ML/MIN/1.73M2 — SIGNIFICANT CHANGE UP
EGFR: 105 ML/MIN/1.73M2 — SIGNIFICANT CHANGE UP
GLUCOSE SERPL-MCNC: 166 MG/DL — HIGH (ref 70–99)
GLUCOSE SERPL-MCNC: 166 MG/DL — HIGH (ref 70–99)
GLUCOSE UR QL: NEGATIVE MG/DL — SIGNIFICANT CHANGE UP
GLUCOSE UR QL: NEGATIVE MG/DL — SIGNIFICANT CHANGE UP
HCT VFR BLD CALC: 44.6 % — SIGNIFICANT CHANGE UP (ref 34.5–45)
HCT VFR BLD CALC: 44.6 % — SIGNIFICANT CHANGE UP (ref 34.5–45)
HGB BLD-MCNC: 13.7 G/DL — SIGNIFICANT CHANGE UP (ref 11.5–15.5)
HGB BLD-MCNC: 13.7 G/DL — SIGNIFICANT CHANGE UP (ref 11.5–15.5)
KETONES UR-MCNC: NEGATIVE MG/DL — SIGNIFICANT CHANGE UP
KETONES UR-MCNC: NEGATIVE MG/DL — SIGNIFICANT CHANGE UP
LEUKOCYTE ESTERASE UR-ACNC: ABNORMAL
LEUKOCYTE ESTERASE UR-ACNC: ABNORMAL
MAGNESIUM SERPL-MCNC: 1.9 MG/DL — SIGNIFICANT CHANGE UP (ref 1.6–2.6)
MAGNESIUM SERPL-MCNC: 1.9 MG/DL — SIGNIFICANT CHANGE UP (ref 1.6–2.6)
MCHC RBC-ENTMCNC: 26.3 PG — LOW (ref 27–34)
MCHC RBC-ENTMCNC: 26.3 PG — LOW (ref 27–34)
MCHC RBC-ENTMCNC: 30.7 GM/DL — LOW (ref 32–36)
MCHC RBC-ENTMCNC: 30.7 GM/DL — LOW (ref 32–36)
MCV RBC AUTO: 85.8 FL — SIGNIFICANT CHANGE UP (ref 80–100)
MCV RBC AUTO: 85.8 FL — SIGNIFICANT CHANGE UP (ref 80–100)
NITRITE UR-MCNC: POSITIVE
NITRITE UR-MCNC: POSITIVE
NRBC # BLD: 0 /100 WBCS — SIGNIFICANT CHANGE UP (ref 0–0)
NRBC # BLD: 0 /100 WBCS — SIGNIFICANT CHANGE UP (ref 0–0)
PH UR: 7 — SIGNIFICANT CHANGE UP (ref 5–8)
PH UR: 7 — SIGNIFICANT CHANGE UP (ref 5–8)
PHOSPHATE SERPL-MCNC: 3.6 MG/DL — SIGNIFICANT CHANGE UP (ref 2.5–4.5)
PHOSPHATE SERPL-MCNC: 3.6 MG/DL — SIGNIFICANT CHANGE UP (ref 2.5–4.5)
PLATELET # BLD AUTO: 190 K/UL — SIGNIFICANT CHANGE UP (ref 150–400)
PLATELET # BLD AUTO: 190 K/UL — SIGNIFICANT CHANGE UP (ref 150–400)
POTASSIUM SERPL-MCNC: 5.2 MMOL/L — SIGNIFICANT CHANGE UP (ref 3.5–5.3)
POTASSIUM SERPL-MCNC: 5.2 MMOL/L — SIGNIFICANT CHANGE UP (ref 3.5–5.3)
POTASSIUM SERPL-SCNC: 5.2 MMOL/L — SIGNIFICANT CHANGE UP (ref 3.5–5.3)
POTASSIUM SERPL-SCNC: 5.2 MMOL/L — SIGNIFICANT CHANGE UP (ref 3.5–5.3)
PROT UR-MCNC: SIGNIFICANT CHANGE UP MG/DL
PROT UR-MCNC: SIGNIFICANT CHANGE UP MG/DL
RBC # BLD: 5.2 M/UL — SIGNIFICANT CHANGE UP (ref 3.8–5.2)
RBC # BLD: 5.2 M/UL — SIGNIFICANT CHANGE UP (ref 3.8–5.2)
RBC # FLD: 14.8 % — HIGH (ref 10.3–14.5)
RBC # FLD: 14.8 % — HIGH (ref 10.3–14.5)
RBC CASTS # UR COMP ASSIST: 3 /HPF — SIGNIFICANT CHANGE UP (ref 0–4)
RBC CASTS # UR COMP ASSIST: 3 /HPF — SIGNIFICANT CHANGE UP (ref 0–4)
SODIUM SERPL-SCNC: 133 MMOL/L — LOW (ref 135–145)
SODIUM SERPL-SCNC: 133 MMOL/L — LOW (ref 135–145)
SP GR SPEC: 1.01 — SIGNIFICANT CHANGE UP (ref 1–1.03)
SP GR SPEC: 1.01 — SIGNIFICANT CHANGE UP (ref 1–1.03)
SQUAMOUS # UR AUTO: 3 /HPF — SIGNIFICANT CHANGE UP (ref 0–5)
SQUAMOUS # UR AUTO: 3 /HPF — SIGNIFICANT CHANGE UP (ref 0–5)
UROBILINOGEN FLD QL: 1 MG/DL — SIGNIFICANT CHANGE UP (ref 0.2–1)
UROBILINOGEN FLD QL: 1 MG/DL — SIGNIFICANT CHANGE UP (ref 0.2–1)
WBC # BLD: 10.72 K/UL — HIGH (ref 3.8–10.5)
WBC # BLD: 10.72 K/UL — HIGH (ref 3.8–10.5)
WBC # FLD AUTO: 10.72 K/UL — HIGH (ref 3.8–10.5)
WBC # FLD AUTO: 10.72 K/UL — HIGH (ref 3.8–10.5)
WBC UR QL: 16 /HPF — HIGH (ref 0–5)
WBC UR QL: 16 /HPF — HIGH (ref 0–5)

## 2023-12-31 PROCEDURE — 99233 SBSQ HOSP IP/OBS HIGH 50: CPT

## 2023-12-31 RX ORDER — CEFTRIAXONE 500 MG/1
1000 INJECTION, POWDER, FOR SOLUTION INTRAMUSCULAR; INTRAVENOUS EVERY 24 HOURS
Refills: 0 | Status: DISCONTINUED | OUTPATIENT
Start: 2023-12-31 | End: 2024-01-02

## 2023-12-31 RX ORDER — LIDOCAINE 4 G/100G
1 CREAM TOPICAL DAILY
Refills: 0 | Status: DISCONTINUED | OUTPATIENT
Start: 2023-12-31 | End: 2024-01-05

## 2023-12-31 RX ORDER — MIDODRINE HYDROCHLORIDE 2.5 MG/1
10 TABLET ORAL EVERY 8 HOURS
Refills: 0 | Status: DISCONTINUED | OUTPATIENT
Start: 2023-12-31 | End: 2024-01-01

## 2023-12-31 RX ORDER — OXYCODONE HYDROCHLORIDE 5 MG/1
5 TABLET ORAL ONCE
Refills: 0 | Status: DISCONTINUED | OUTPATIENT
Start: 2023-12-31 | End: 2023-12-31

## 2023-12-31 RX ORDER — MAGNESIUM SULFATE 500 MG/ML
1 VIAL (ML) INJECTION ONCE
Refills: 0 | Status: COMPLETED | OUTPATIENT
Start: 2023-12-31 | End: 2023-12-31

## 2023-12-31 RX ADMIN — GABAPENTIN 800 MILLIGRAM(S): 400 CAPSULE ORAL at 17:22

## 2023-12-31 RX ADMIN — OXYCODONE HYDROCHLORIDE 15 MILLIGRAM(S): 5 TABLET ORAL at 16:09

## 2023-12-31 RX ADMIN — CHLORHEXIDINE GLUCONATE 1 APPLICATION(S): 213 SOLUTION TOPICAL at 06:08

## 2023-12-31 RX ADMIN — MIDODRINE HYDROCHLORIDE 10 MILLIGRAM(S): 2.5 TABLET ORAL at 21:25

## 2023-12-31 RX ADMIN — POLYETHYLENE GLYCOL 3350 17 GRAM(S): 17 POWDER, FOR SOLUTION ORAL at 06:07

## 2023-12-31 RX ADMIN — Medication 5 MILLIGRAM(S): at 21:26

## 2023-12-31 RX ADMIN — OXYCODONE HYDROCHLORIDE 15 MILLIGRAM(S): 5 TABLET ORAL at 15:09

## 2023-12-31 RX ADMIN — DULOXETINE HYDROCHLORIDE 60 MILLIGRAM(S): 30 CAPSULE, DELAYED RELEASE ORAL at 12:13

## 2023-12-31 RX ADMIN — OXYCODONE HYDROCHLORIDE 15 MILLIGRAM(S): 5 TABLET ORAL at 01:38

## 2023-12-31 RX ADMIN — OXYCODONE HYDROCHLORIDE 15 MILLIGRAM(S): 5 TABLET ORAL at 20:27

## 2023-12-31 RX ADMIN — CEFTRIAXONE 100 MILLIGRAM(S): 500 INJECTION, POWDER, FOR SOLUTION INTRAMUSCULAR; INTRAVENOUS at 17:22

## 2023-12-31 RX ADMIN — GABAPENTIN 800 MILLIGRAM(S): 400 CAPSULE ORAL at 02:06

## 2023-12-31 RX ADMIN — OXYCODONE HYDROCHLORIDE 15 MILLIGRAM(S): 5 TABLET ORAL at 07:00

## 2023-12-31 RX ADMIN — NALOXEGOL OXALATE 25 MILLIGRAM(S): 12.5 TABLET, FILM COATED ORAL at 12:13

## 2023-12-31 RX ADMIN — Medication 1 TABLET(S): at 12:13

## 2023-12-31 RX ADMIN — LIDOCAINE 1 PATCH: 4 CREAM TOPICAL at 21:25

## 2023-12-31 RX ADMIN — Medication 5 MILLIGRAM(S): at 15:09

## 2023-12-31 RX ADMIN — LIDOCAINE 1 PATCH: 4 CREAM TOPICAL at 09:59

## 2023-12-31 RX ADMIN — GABAPENTIN 800 MILLIGRAM(S): 400 CAPSULE ORAL at 09:59

## 2023-12-31 RX ADMIN — OXYCODONE HYDROCHLORIDE 5 MILLIGRAM(S): 5 TABLET ORAL at 10:59

## 2023-12-31 RX ADMIN — OXYCODONE HYDROCHLORIDE 15 MILLIGRAM(S): 5 TABLET ORAL at 02:36

## 2023-12-31 RX ADMIN — PANTOPRAZOLE SODIUM 40 MILLIGRAM(S): 20 TABLET, DELAYED RELEASE ORAL at 06:07

## 2023-12-31 RX ADMIN — Medication 100 GRAM(S): at 15:09

## 2023-12-31 RX ADMIN — OXYCODONE HYDROCHLORIDE 15 MILLIGRAM(S): 5 TABLET ORAL at 06:07

## 2023-12-31 RX ADMIN — APIXABAN 5 MILLIGRAM(S): 2.5 TABLET, FILM COATED ORAL at 17:22

## 2023-12-31 RX ADMIN — OXYCODONE HYDROCHLORIDE 5 MILLIGRAM(S): 5 TABLET ORAL at 09:59

## 2023-12-31 RX ADMIN — LIDOCAINE 1 PATCH: 4 CREAM TOPICAL at 02:08

## 2023-12-31 RX ADMIN — APIXABAN 5 MILLIGRAM(S): 2.5 TABLET, FILM COATED ORAL at 06:07

## 2023-12-31 RX ADMIN — OXYCODONE HYDROCHLORIDE 15 MILLIGRAM(S): 5 TABLET ORAL at 19:57

## 2023-12-31 RX ADMIN — LIDOCAINE 1 PATCH: 4 CREAM TOPICAL at 17:41

## 2023-12-31 RX ADMIN — POLYETHYLENE GLYCOL 3350 17 GRAM(S): 17 POWDER, FOR SOLUTION ORAL at 17:22

## 2023-12-31 RX ADMIN — SIMVASTATIN 40 MILLIGRAM(S): 20 TABLET, FILM COATED ORAL at 21:26

## 2023-12-31 RX ADMIN — Medication 5 MILLIGRAM(S): at 07:12

## 2023-12-31 NOTE — PROVIDER CONTACT NOTE (MEDICATION) - ACTION/TREATMENT ORDERED:
ALANA Alcala ordered to hold medication. Order discontinued. Midodrine 10mg po ordered to start at 2pm instead.

## 2023-12-31 NOTE — PROVIDER CONTACT NOTE (OTHER) - DATE AND TIME:
27-Dec-2023 21:00
13-Dec-2023 06:18
13-Dec-2023 23:00
15-Dec-2023 06:55
25-Dec-2023 00:11
16-Dec-2023 03:48
31-Dec-2023 06:00
31-Dec-2023 06:10

## 2023-12-31 NOTE — PROGRESS NOTE ADULT - SUBJECTIVE AND OBJECTIVE BOX
Patient was seen and examined at bedside. Case discuss with Neurosurgery. Pt reports that she is moving her bowels. Pt reports having some burning w/ urination.     OBJECTIVE:  Vital Signs Last 24 Hrs  T(C): 36.7 (31 Dec 2023 08:59), Max: 37.5 (30 Dec 2023 20:33)  T(F): 98.1 (31 Dec 2023 08:59), Max: 99.5 (30 Dec 2023 20:33)  HR: 110 (31 Dec 2023 08:59) (90 - 116)  BP: 120/72 (31 Dec 2023 08:59) (104/71 - 152/85)  RR: 16 (31 Dec 2023 08:59) (13 - 18)  SpO2: 97% (31 Dec 2023 08:59) (94% - 100%)    Parameters below as of 31 Dec 2023 08:59  Patient On (Oxygen Delivery Method): room air    PHYSICAL EXAM:  Gen: NAD laying in bed  CV: RRR, +S1/S2, no mumur  Pulm: CTA b/l no wheezing or crackles   Abd: soft, NTND + BS no rebound or guarding       LABS:                        13.7   10.72 )-----------( 190      ( 31 Dec 2023 07:52 )             44.6     12-31    133<L>  |  101  |  8   ----------------------------<  166<H>  5.2   |  18<L>  |  0.43<L>    Ca    9.4      31 Dec 2023 07:52  Phos  3.6     12-31  Mg     1.9     12-31      acetaminophen     Tablet .. 1000 milliGRAM(s) Oral every 8 hours PRN  apixaban 5 milliGRAM(s) Oral every 12 hours  benzocaine 20% Spray 1 Spray(s) Mucosal three times a day PRN  benzocaine/menthol Lozenge 1 Lozenge Oral every 2 hours PRN  bisacodyl 5 milliGRAM(s) Oral at bedtime  chlorhexidine 2% Cloths 1 Application(s) Topical <User Schedule>  diazepam    Tablet 5 milliGRAM(s) Oral every 8 hours  diphenhydrAMINE 25 milliGRAM(s) Oral every 12 hours PRN  DULoxetine 60 milliGRAM(s) Oral daily  gabapentin 800 milliGRAM(s) Oral every 8 hours  hydrocortisone 1% Cream 1 Application(s) Topical two times a day PRN  influenza  Vaccine (HIGH DOSE) 0.7 milliLiter(s) IntraMuscular once  lidocaine   4% Patch 1 Patch Transdermal daily  magnesium sulfate  IVPB 1 Gram(s) IV Intermittent once  midodrine 10 milliGRAM(s) Oral every 8 hours  Morphine 1 milliGRAM / 1 mL 20 milliLITERS 20 milliLiter(s) IntraThecal Continuous Pump  multivitamin 1 Tablet(s) Oral daily  naloxegol 25 milliGRAM(s) Oral daily  naloxone Injectable 0.1 milliGRAM(s) IV Push every 3 minutes PRN  ondansetron Injectable 4 milliGRAM(s) IV Push every 6 hours PRN  oxyCODONE    IR 10 milliGRAM(s) Oral every 4 hours PRN  oxyCODONE    IR 15 milliGRAM(s) Oral every 4 hours PRN  pantoprazole    Tablet 40 milliGRAM(s) Oral before breakfast  polyethylene glycol 3350 17 Gram(s) Oral two times a day  senna 2 Tablet(s) Oral at bedtime  simvastatin 40 milliGRAM(s) Oral at bedtime  sodium chloride 0.9% lock flush 10 milliLiter(s) IV Push every 1 hour PRN      A/P: 68F with PMH of HTN, HLD, DM2, CVA x 3 (last in 2016 with residual R hand weakness), GAMALIEL not using CPAP, Emphysema, ex-25 pack year smoker now restarted, chronic constipation on Movantik, chronic urinary retention (SC at home), b/l CTS s/p release, s/p Intrathecal Morphine pump for pain, with chronic neck and back pain worsening for years s/p multiple spinal surgeries including laminectomies and fusion of cervical, thoracic and lumbar spine, s/p T3-L1 revision of prior fusion (with  on 8/17/22). Xray 11/16/23 showing broken rods/displacement. Now s/p C2-S2 instrumentation and fusion (12/5). S/p T9-L2 decompression w/ durotomy with repair (12/10/23).     #Neck Pain  #Multiple Spine Surgeries  #Chronic Back Pain  -Pt s/p fusion  - Pain and bowel regime as per primary team     #Chronic Constipation likely due to chronic opioid use (resolved)   - c/w bowel regimen  - continue movantik    #HTN  -Will continue to monitor BP   -  Lopressor 75mg BID    #Dysuria   - Will check U/A w/ reflex cx    #CAD  #CVA  - Resume ASA post operatively when deemed safe to do so as per neurosurgery team   - continue atorvastatin  - Awaiting results of recent long term outpatient cardiac monitoring to monitor for occult Afib     #DM  -Continue  ISS    #Pulmonary embolism  #Tachycardia   -Pt with episodes of tachycardia w/ -116   -CT PE protocol was performed given pt's  persistent tachycardia and the pt was found to have  subsegmental and segmental PE  - If pt continues to further episode then will check EKG and consider uptitrating Lopressor if BP allows   - c/w Eliquis     # Paraplegia   #Dispo  -Pt is  medically Ready for Acute Rehab               Patient was seen and examined at bedside. Case discuss with Neurosurgery. Pt reports that she is moving her bowels. Pt reports having some burning w/ urination.     OBJECTIVE:  Vital Signs Last 24 Hrs  T(C): 36.7 (31 Dec 2023 08:59), Max: 37.5 (30 Dec 2023 20:33)  T(F): 98.1 (31 Dec 2023 08:59), Max: 99.5 (30 Dec 2023 20:33)  HR: 110 (31 Dec 2023 08:59) (90 - 116)  BP: 120/72 (31 Dec 2023 08:59) (104/71 - 152/85)  RR: 16 (31 Dec 2023 08:59) (13 - 18)  SpO2: 97% (31 Dec 2023 08:59) (94% - 100%)    Parameters below as of 31 Dec 2023 08:59  Patient On (Oxygen Delivery Method): room air    PHYSICAL EXAM:  Gen: NAD laying in bed  CV: RRR, +S1/S2, no mumur  Pulm: CTA b/l no wheezing or crackles   Abd: soft, NTND + BS no rebound or guarding       LABS:                        13.7   10.72 )-----------( 190      ( 31 Dec 2023 07:52 )             44.6     12-31    133<L>  |  101  |  8   ----------------------------<  166<H>  5.2   |  18<L>  |  0.43<L>    Ca    9.4      31 Dec 2023 07:52  Phos  3.6     12-31  Mg     1.9     12-31      acetaminophen     Tablet .. 1000 milliGRAM(s) Oral every 8 hours PRN  apixaban 5 milliGRAM(s) Oral every 12 hours  benzocaine 20% Spray 1 Spray(s) Mucosal three times a day PRN  benzocaine/menthol Lozenge 1 Lozenge Oral every 2 hours PRN  bisacodyl 5 milliGRAM(s) Oral at bedtime  chlorhexidine 2% Cloths 1 Application(s) Topical <User Schedule>  diazepam    Tablet 5 milliGRAM(s) Oral every 8 hours  diphenhydrAMINE 25 milliGRAM(s) Oral every 12 hours PRN  DULoxetine 60 milliGRAM(s) Oral daily  gabapentin 800 milliGRAM(s) Oral every 8 hours  hydrocortisone 1% Cream 1 Application(s) Topical two times a day PRN  influenza  Vaccine (HIGH DOSE) 0.7 milliLiter(s) IntraMuscular once  lidocaine   4% Patch 1 Patch Transdermal daily  magnesium sulfate  IVPB 1 Gram(s) IV Intermittent once  midodrine 10 milliGRAM(s) Oral every 8 hours  Morphine 1 milliGRAM / 1 mL 20 milliLITERS 20 milliLiter(s) IntraThecal Continuous Pump  multivitamin 1 Tablet(s) Oral daily  naloxegol 25 milliGRAM(s) Oral daily  naloxone Injectable 0.1 milliGRAM(s) IV Push every 3 minutes PRN  ondansetron Injectable 4 milliGRAM(s) IV Push every 6 hours PRN  oxyCODONE    IR 10 milliGRAM(s) Oral every 4 hours PRN  oxyCODONE    IR 15 milliGRAM(s) Oral every 4 hours PRN  pantoprazole    Tablet 40 milliGRAM(s) Oral before breakfast  polyethylene glycol 3350 17 Gram(s) Oral two times a day  senna 2 Tablet(s) Oral at bedtime  simvastatin 40 milliGRAM(s) Oral at bedtime  sodium chloride 0.9% lock flush 10 milliLiter(s) IV Push every 1 hour PRN      A/P: 68F with PMH of HTN, HLD, DM2, CVA x 3 (last in 2016 with residual R hand weakness), GAMALIEL not using CPAP, Emphysema, ex-25 pack year smoker now restarted, chronic constipation on Movantik, chronic urinary retention (SC at home), b/l CTS s/p release, s/p Intrathecal Morphine pump for pain, with chronic neck and back pain worsening for years s/p multiple spinal surgeries including laminectomies and fusion of cervical, thoracic and lumbar spine, s/p T3-L1 revision of prior fusion (with  on 8/17/22). Xray 11/16/23 showing broken rods/displacement. Now s/p C2-S2 instrumentation and fusion (12/5). S/p T9-L2 decompression w/ durotomy with repair (12/10/23).     #Neck and shoulder Pain  #Multiple Spine Surgeries  #Chronic Back Pain  -Pt s/p fusion  - Pain and bowel regime as per primary team     #Chronic Constipation likely due to chronic opioid use   - c/w bowel regimen  - continue movantik    #Dysuria   - Will check U/A w/ reflex cx    #CAD  #CVA  - Resume ASA post operatively when deemed safe to do so as per neurosurgery team   - continue atorvastatin  - Awaiting results of recent long term outpatient cardiac monitoring to monitor for occult Afib     #Pulmonary embolism  #Tachycardia likely secondary to PE   -Pt with episodes of tachycardia w/ -116   -CT PE protocol was performed given pt's persistent tachycardia and the pt was found to have  subsegmental and segmental PE   - c/w Eliquis     # Paraplegia   #Dispo  -Awaiting  Acute Rehab

## 2023-12-31 NOTE — PROVIDER CONTACT NOTE (OTHER) - ACTION/TREATMENT ORDERED:
0.9 Normal Saline 500ml bolus for 30 minutes one time dose ordered and given.
ALANA Epperson also educated patient, still refusing
Lab draw rescheduled for 10AM.
Straight cath ordered & completed.
ALANA Alcala stated she will speak with the hospitalist.
Provider aware no interventions ordered
ALANA Alcala stated she will endorse to day team.

## 2023-12-31 NOTE — PROGRESS NOTE ADULT - SUBJECTIVE AND OBJECTIVE BOX
HPI:  66 y/o female with PMHx HTN, HLD, CVA x 3 (last was 2016 with right hand weakness residual), GAMALIEL not using CPAP, Emphysema, former 25 pack year smoker restarted this week of surgery, Chronic Constipation on Movantik, Urinary Incontinence chronically self straight cath at home, chronic UTI, Breast Implant Rupture with Chronic Leak repair 10/2023, B/L CTS s/p release, s/p Intrathecal Morphine pump for pain, with chronic neck and back pain worsening for years s/p multiple spinal surgeries including laminectomies and fusion of cervical, thoracic and lumbar spine initially done in 2009 and recently in 2019 and 2020 at [facility] by Dr. Trinidad, s/p T3-L1 revision of prior fusion (with Dr. Luz on 8/17/22). Outpatient Xray 11/16/2023 showing broken rods/displacement. Presents for elective C2-S2 instrumentation and fusion. Pt endorses weakness of b/l LE and burning pain in b/l lower extremities radiating to both feet,  (04 Dec 2023 15:44)    INTERVAL EVENTS:  KAMRAN o/n    HOSPITAL COURSE:  12/4: Admitted for spinal fusion d/t damaged hardware.   12/5: Neuro stable. POD0 C2-S2 instrumentation/fusion (intraop b/l LE motor loss). on parminder for MAP>90  12/6: POD1. KAMRAN o/n neuro stable. remains intubated. Extubated 6am. Precedex gtt prn. Remains on parminder gtt for MAP goal >90. Valium made prn. CT thoracic spine bc MRI unavailable. Attempted NGT, unsuccessful (resistance @ 35).   12/7: POD2 Given IV dilaudid 0.5mg for pain. Neuro exam stable. SQL started tongiht. Increased precedex to 1.0. Responds well to valium. Consulting psych given paranoia ?homicidal statements. Passed bedside dysphagia and tolerating PO meds. MRI complete, f/u read.   12/8: POD3 Pt reporting incisional pain, given dilaudid 0.5mg IV x2. Pt appearing anxious, given xanax 1mg. Neuro exam unchanged. Pain regimen increased to oxy 15/30 mg, pending further recs. Given 1 L bolus for tachycardia. Trops negative. 2 HMV drains removed.   12/9: POD4, KAMRAN, CT myelogram today showing block at T12-L1, OR tomorrow  12/10: POD5, KAMRAN, OR today for exploration of fusion, possible decompression T9-L2, revision instrumentation  POD 0 T9-L2 decompression. Pt returned from OR intubated. 50mcg fentanyl IVP x 2 for pain control and HTN. Magnesium repleted. Precedex gtt started for sedation. Decreased FiO2 to 40%. 1L bolus for soft MAP. Dc'd propofol and started levo gtt for MAP >90.   12/11: POD 6, POD 1. Extubated, satting well on RA. Resumed home valium and gabapentin. Castillo d/c'd, pending TOV. Seroquel started for agitation, R IJ removed, LUE double midline placed. Started SQL.   12/12: POD7, POD2, given 500 cc bolus and started on phenylephrine for MAP goal >90. Started midodrine 10mg q8h. Cardura dc'd d/t hypotension. Given 250cc of albumin for increasing pressor requirements. Given lactulose, pending BM. Hgb 7.6<8.3, given 1u PRBC. Protected sleep time. Urology consulted for possible suprapubic cath, recommend self intermittent cath is optimal.   12/13: POD8, POD3 Midodrine increased to 15q8. Pt refused to take the extra 5mg midodrin ordered. Neuro exam stable. hgb 10 from 9.4 after 1u pRBCs, Pt refused AM meds, given in late morning, CTAP ordered for L sided abd pain, fleet enema, restarted home movantik and ordere fleet enema for constipation. YIFAN and HMV dc'd. 250cc albumin x1  12/14: POD9, POD4, KAMRAN overnight, neuro stable. MAP goal liberalized to > 65. Stepdown status.   12/15: POD10, POD5, KAMRAN overnight  12/16: POD11, POD6, KAMRAN overnight, neuro stable, 500 cc bolus NS given for tachycardia. Aquacell changed. CXR, pro-bnp negative for pulmonary edema. Given additional 500cc bolus.  12/17: POD 12. POD 7. KAMRAN overnight, neuro stable.   12/18: POD 13/8. Incontinent x2 today.   12/19: KAMRAN. Persistent tachycardia, r/o PE. CTPE protocol ordered. Refused all afternoon meds except oxy 30. Per Dr. Cowart (rad) CT chest w segmental and subsegmental PE in R upper lobe. Dopplers negative for DVT, CT PE shows segmental and subsegmental PE in R upper lobe. Per Dr. Augustine and Dr. Luz, Will start eliquis pending pain management approval. Per Dr. Mclaughlin (pain mgmt) can start eliquis 10mg BIDx 7 days. Dc'd lovenox.  12/20: POD 10 T9-L2 Decompression. Hemodynamically stable. Pending rehab. PT today.   12/21: POD 11 T9-L2 Decompression. neruo/hemodynamically stable, pending rehab.  12/22: POD 17/12, pending rehab. Aquacel dressing changed.   12/23: POD 18/13, pending rehab.   12/24: POD 19/14, pending rehab.   12/25: POD 20/15, pending rehab.   12/26: POD 21/16, pending rehab.   12/27: POD 22/17. pending rehab.   12/28: POD 23/18.   12/29: POD 24/19. KAMRAN o/n. No AR beds, [facility]  12/30: POD 25/20. Pending [facility]   12/31: POD 26/21. Pending [facility]     Vital Signs Last 24 Hrs  T(C): 37.3 (31 Dec 2023 00:04), Max: 37.5 (30 Dec 2023 20:33)  T(F): 99.2 (31 Dec 2023 00:04), Max: 99.5 (30 Dec 2023 20:33)  HR: 90 (31 Dec 2023 00:04) (88 - 116)  BP: 127/85 (31 Dec 2023 00:04) (104/71 - 152/85)  BP(mean): --  RR: 15 (31 Dec 2023 00:04) (15 - 18)  SpO2: 97% (31 Dec 2023 00:04) (94% - 100%)    Parameters below as of 31 Dec 2023 00:04  Patient On (Oxygen Delivery Method): room air        I&O's Summary    29 Dec 2023 07:01  -  30 Dec 2023 07:00  --------------------------------------------------------  IN: 0 mL / OUT: 2125 mL / NET: -2125 mL    30 Dec 2023 07:01  -  31 Dec 2023 00:53  --------------------------------------------------------  IN: 540 mL / OUT: 750 mL / NET: -210 mL        PHYSICAL EXAM:  General: NAD, AAOx3  HEENT: PERRL. EOMI.   Cardiovascular: RRR, normal S1 and S2   Respiratory: non-labored breathing, symmetric chest rise  GI: abd soft, NTND  Neuro: CN II-XII intact, follows commands, speech clear. Strength BL UE 5/5  BL LE 0/5 to noxious stimuli with T11 sensory deficit.  Vascular: Distal pulses 2+ x 4, no calf edema or erythema  Wounds: Posterior midline spine incision C/D/I with dressing in place      LABS:                        12.2   6.24  )-----------( 359      ( 29 Dec 2023 12:12 )             40.4     12-29    137  |  102  |  9   ----------------------------<  103<H>  4.6   |  25  |  0.49<L>    Ca    9.5      29 Dec 2023 12:12  Phos  3.5     12-29  Mg     1.8     12-29        Urinalysis Basic - ( 29 Dec 2023 12:12 )    Color: x / Appearance: x / SG: x / pH: x  Gluc: 103 mg/dL / Ketone: x  / Bili: x / Urobili: x   Blood: x / Protein: x / Nitrite: x   Leuk Esterase: x / RBC: x / WBC x   Sq Epi: x / Non Sq Epi: x / Bacteria: x          CAPILLARY BLOOD GLUCOSE          Drug Levels: [] N/A    CSF Analysis: [] N/A      Allergies    Crab (Pruritus)  No Known Drug Allergies    Intolerances      MEDICATIONS:  Antibiotics:    Neuro:  acetaminophen     Tablet .. 1000 milliGRAM(s) Oral every 8 hours PRN  diazepam    Tablet 5 milliGRAM(s) Oral every 8 hours  diphenhydrAMINE 25 milliGRAM(s) Oral every 12 hours PRN  DULoxetine 60 milliGRAM(s) Oral daily  gabapentin 800 milliGRAM(s) Oral every 8 hours  ondansetron Injectable 4 milliGRAM(s) IV Push every 6 hours PRN  oxyCODONE    IR 10 milliGRAM(s) Oral every 4 hours PRN  oxyCODONE    IR 15 milliGRAM(s) Oral every 4 hours PRN    Anticoagulation:  apixaban 5 milliGRAM(s) Oral every 12 hours    OTHER:  benzocaine 20% Spray 1 Spray(s) Mucosal three times a day PRN  benzocaine/menthol Lozenge 1 Lozenge Oral every 2 hours PRN  bisacodyl 5 milliGRAM(s) Oral at bedtime  chlorhexidine 2% Cloths 1 Application(s) Topical <User Schedule>  hydrocortisone 1% Cream 1 Application(s) Topical two times a day PRN  influenza  Vaccine (HIGH DOSE) 0.7 milliLiter(s) IntraMuscular once  midodrine 15 milliGRAM(s) Oral every 8 hours  Morphine 1 milliGRAM / 1 mL 20 milliLITERS 20 milliLiter(s) IntraThecal Continuous Pump  naloxegol 25 milliGRAM(s) Oral daily  naloxone Injectable 0.1 milliGRAM(s) IV Push every 3 minutes PRN  pantoprazole    Tablet 40 milliGRAM(s) Oral before breakfast  polyethylene glycol 3350 17 Gram(s) Oral two times a day  senna 2 Tablet(s) Oral at bedtime  simvastatin 40 milliGRAM(s) Oral at bedtime    IVF:  multivitamin 1 Tablet(s) Oral daily    CULTURES:    RADIOLOGY & ADDITIONAL TESTS:      ASSESSMENT:  67 yo female with Hx HTN, HLD, DM, CVA x 3 (last in 2016 with residual R hand weakness), GAMALIEL not using CPAP, Emphysema, ex-25 pack year smoker now restarted, chronic constipation on Movantik, chronic urinary retention (SC at home), b/l CTS s/p release, s/p Intrathecal Morphine pump for pain, with chronic neck and back pain worsening for years s/p multiple spinal surgeries including laminectomies and fusion of cervical, thoracic and lumbar spine, s/p T3-L1 revision of prior fusion (with  on 8/17/22). Xray 11/16/23 showing broken rods/displacement. Now s/p C2-S2 instrumentation and fusion (12/5). S/p T9-L2 decompression w/ durotomy with repair (12/10/23). KAREEM grade A.     Plan:  Neuro:  - Neuro/vitals q4  - pain control: modified ERAS, IT morphine pump, oxycodone 10/15 prn, valium and home gabapentin 800 TID resumed  - Continue home cymbalta 60mg daily   - CT thoracic spine 12/6: postop changes  - protected sleep time 10PM-4AM     Cardio:  - MAP > 65   - midodrine 15q8   - C/w home simvastatin  - Sinus tachycardia - likely 2/2 PE (12/19)      Pulm:  - RA   - hx GAMALIEL, no CPAP at home  - CTA 12/19 w segmental and subsegmental PE in R upper lobe    GI:  - CCD   - Bowel regimen, home movantik 25 mg, last BM 12/29  - CT A/P 12/13 = impacted stool   - GERD: continue home protonix    Renal:  - Straight cath q6  - Patient not interested in suprapubic catheter placement    Endo:  - A1c: 7    Heme:  - SCDs DVT ppx  - PE tx: Eliquis 5mg BID  - 1u PRBC intraop 12/10, 1u PRBC 12/12    ID:  - afebrile    Dispo:  - SDU status, full code, pending AR vs [facility]    D/w Dr. Luz  HPI:  66 y/o female with PMHx HTN, HLD, CVA x 3 (last was 2016 with right hand weakness residual), GAMALIEL not using CPAP, Emphysema, former 25 pack year smoker restarted this week of surgery, Chronic Constipation on Movantik, Urinary Incontinence chronically self straight cath at home, chronic UTI, Breast Implant Rupture with Chronic Leak repair 10/2023, B/L CTS s/p release, s/p Intrathecal Morphine pump for pain, with chronic neck and back pain worsening for years s/p multiple spinal surgeries including laminectomies and fusion of cervical, thoracic and lumbar spine initially done in 2009 and recently in 2019 and 2020 at [facility] by Dr. Trinidad, s/p T3-L1 revision of prior fusion (with Dr. Luz on 8/17/22). Outpatient Xray 11/16/2023 showing broken rods/displacement. Presents for elective C2-S2 instrumentation and fusion. Pt endorses weakness of b/l LE and burning pain in b/l lower extremities radiating to both feet,  (04 Dec 2023 15:44)    INTERVAL EVENTS:  KAMRAN o/n    HOSPITAL COURSE:  12/4: Admitted for spinal fusion d/t damaged hardware.   12/5: Neuro stable. POD0 C2-S2 instrumentation/fusion (intraop b/l LE motor loss). on parminder for MAP>90  12/6: POD1. KAMRAN o/n neuro stable. remains intubated. Extubated 6am. Precedex gtt prn. Remains on parminder gtt for MAP goal >90. Valium made prn. CT thoracic spine bc MRI unavailable. Attempted NGT, unsuccessful (resistance @ 35).   12/7: POD2 Given IV dilaudid 0.5mg for pain. Neuro exam stable. SQL started tongiht. Increased precedex to 1.0. Responds well to valium. Consulting psych given paranoia ?homicidal statements. Passed bedside dysphagia and tolerating PO meds. MRI complete, f/u read.   12/8: POD3 Pt reporting incisional pain, given dilaudid 0.5mg IV x2. Pt appearing anxious, given xanax 1mg. Neuro exam unchanged. Pain regimen increased to oxy 15/30 mg, pending further recs. Given 1 L bolus for tachycardia. Trops negative. 2 HMV drains removed.   12/9: POD4, KAMRAN, CT myelogram today showing block at T12-L1, OR tomorrow  12/10: POD5, KAMRAN, OR today for exploration of fusion, possible decompression T9-L2, revision instrumentation  POD 0 T9-L2 decompression. Pt returned from OR intubated. 50mcg fentanyl IVP x 2 for pain control and HTN. Magnesium repleted. Precedex gtt started for sedation. Decreased FiO2 to 40%. 1L bolus for soft MAP. Dc'd propofol and started levo gtt for MAP >90.   12/11: POD 6, POD 1. Extubated, satting well on RA. Resumed home valium and gabapentin. Castillo d/c'd, pending TOV. Seroquel started for agitation, R IJ removed, LUE double midline placed. Started SQL.   12/12: POD7, POD2, given 500 cc bolus and started on phenylephrine for MAP goal >90. Started midodrine 10mg q8h. Cardura dc'd d/t hypotension. Given 250cc of albumin for increasing pressor requirements. Given lactulose, pending BM. Hgb 7.6<8.3, given 1u PRBC. Protected sleep time. Urology consulted for possible suprapubic cath, recommend self intermittent cath is optimal.   12/13: POD8, POD3 Midodrine increased to 15q8. Pt refused to take the extra 5mg midodrin ordered. Neuro exam stable. hgb 10 from 9.4 after 1u pRBCs, Pt refused AM meds, given in late morning, CTAP ordered for L sided abd pain, fleet enema, restarted home movantik and ordere fleet enema for constipation. YIFAN and HMV dc'd. 250cc albumin x1  12/14: POD9, POD4, KAMRAN overnight, neuro stable. MAP goal liberalized to > 65. Stepdown status.   12/15: POD10, POD5, KAMRAN overnight  12/16: POD11, POD6, KAMRAN overnight, neuro stable, 500 cc bolus NS given for tachycardia. Aquacell changed. CXR, pro-bnp negative for pulmonary edema. Given additional 500cc bolus.  12/17: POD 12. POD 7. KAMRAN overnight, neuro stable.   12/18: POD 13/8. Incontinent x2 today.   12/19: KAMRAN. Persistent tachycardia, r/o PE. CTPE protocol ordered. Refused all afternoon meds except oxy 30. Per Dr. Cowart (rad) CT chest w segmental and subsegmental PE in R upper lobe. Dopplers negative for DVT, CT PE shows segmental and subsegmental PE in R upper lobe. Per Dr. Augustine and Dr. Luz, Will start eliquis pending pain management approval. Per Dr. Mclaughlin (pain mgmt) can start eliquis 10mg BIDx 7 days. Dc'd lovenox.  12/20: POD 10 T9-L2 Decompression. Hemodynamically stable. Pending rehab. PT today.   12/21: POD 11 T9-L2 Decompression. neruo/hemodynamically stable, pending rehab.  12/22: POD 17/12, pending rehab. Aquacel dressing changed.   12/23: POD 18/13, pending rehab.   12/24: POD 19/14, pending rehab.   12/25: POD 20/15, pending rehab.   12/26: POD 21/16, pending rehab.   12/27: POD 22/17. pending rehab.   12/28: POD 23/18.   12/29: POD 24/19. KAMRAN o/n. No AR beds, [facility]  12/30: POD 25/20. Pending [facility]   12/31: POD 26/21. Pending [facility]     Vital Signs Last 24 Hrs  T(C): 37.3 (31 Dec 2023 00:04), Max: 37.5 (30 Dec 2023 20:33)  T(F): 99.2 (31 Dec 2023 00:04), Max: 99.5 (30 Dec 2023 20:33)  HR: 90 (31 Dec 2023 00:04) (88 - 116)  BP: 127/85 (31 Dec 2023 00:04) (104/71 - 152/85)  BP(mean): --  RR: 15 (31 Dec 2023 00:04) (15 - 18)  SpO2: 97% (31 Dec 2023 00:04) (94% - 100%)    Parameters below as of 31 Dec 2023 00:04  Patient On (Oxygen Delivery Method): room air        I&O's Summary    29 Dec 2023 07:01  -  30 Dec 2023 07:00  --------------------------------------------------------  IN: 0 mL / OUT: 2125 mL / NET: -2125 mL    30 Dec 2023 07:01  -  31 Dec 2023 00:53  --------------------------------------------------------  IN: 540 mL / OUT: 750 mL / NET: -210 mL        PHYSICAL EXAM:  General: NAD, AAOx3  HEENT: PERRL. EOMI.   Cardiovascular: RRR, normal S1 and S2   Respiratory: non-labored breathing, symmetric chest rise  GI: abd soft, NTND  Neuro: CN II-XII intact, follows commands, speech clear. Strength BL UE 5/5  BL LE 0/5 to noxious stimuli with T11 sensory deficit.  Vascular: Distal pulses 2+ x 4, no calf edema or erythema  Wounds: Posterior midline spine incision C/D/I with dressing in place      LABS:                        12.2   6.24  )-----------( 359      ( 29 Dec 2023 12:12 )             40.4     12-29    137  |  102  |  9   ----------------------------<  103<H>  4.6   |  25  |  0.49<L>    Ca    9.5      29 Dec 2023 12:12  Phos  3.5     12-29  Mg     1.8     12-29        Urinalysis Basic - ( 29 Dec 2023 12:12 )    Color: x / Appearance: x / SG: x / pH: x  Gluc: 103 mg/dL / Ketone: x  / Bili: x / Urobili: x   Blood: x / Protein: x / Nitrite: x   Leuk Esterase: x / RBC: x / WBC x   Sq Epi: x / Non Sq Epi: x / Bacteria: x          CAPILLARY BLOOD GLUCOSE          Drug Levels: [] N/A    CSF Analysis: [] N/A      Allergies    Crab (Pruritus)  No Known Drug Allergies    Intolerances      MEDICATIONS:  Antibiotics:    Neuro:  acetaminophen     Tablet .. 1000 milliGRAM(s) Oral every 8 hours PRN  diazepam    Tablet 5 milliGRAM(s) Oral every 8 hours  diphenhydrAMINE 25 milliGRAM(s) Oral every 12 hours PRN  DULoxetine 60 milliGRAM(s) Oral daily  gabapentin 800 milliGRAM(s) Oral every 8 hours  ondansetron Injectable 4 milliGRAM(s) IV Push every 6 hours PRN  oxyCODONE    IR 10 milliGRAM(s) Oral every 4 hours PRN  oxyCODONE    IR 15 milliGRAM(s) Oral every 4 hours PRN    Anticoagulation:  apixaban 5 milliGRAM(s) Oral every 12 hours    OTHER:  benzocaine 20% Spray 1 Spray(s) Mucosal three times a day PRN  benzocaine/menthol Lozenge 1 Lozenge Oral every 2 hours PRN  bisacodyl 5 milliGRAM(s) Oral at bedtime  chlorhexidine 2% Cloths 1 Application(s) Topical <User Schedule>  hydrocortisone 1% Cream 1 Application(s) Topical two times a day PRN  influenza  Vaccine (HIGH DOSE) 0.7 milliLiter(s) IntraMuscular once  midodrine 15 milliGRAM(s) Oral every 8 hours  Morphine 1 milliGRAM / 1 mL 20 milliLITERS 20 milliLiter(s) IntraThecal Continuous Pump  naloxegol 25 milliGRAM(s) Oral daily  naloxone Injectable 0.1 milliGRAM(s) IV Push every 3 minutes PRN  pantoprazole    Tablet 40 milliGRAM(s) Oral before breakfast  polyethylene glycol 3350 17 Gram(s) Oral two times a day  senna 2 Tablet(s) Oral at bedtime  simvastatin 40 milliGRAM(s) Oral at bedtime    IVF:  multivitamin 1 Tablet(s) Oral daily    CULTURES:    RADIOLOGY & ADDITIONAL TESTS:      ASSESSMENT:  67 yo female with Hx HTN, HLD, DM, CVA x 3 (last in 2016 with residual R hand weakness), GAMALIEL not using CPAP, Emphysema, ex-25 pack year smoker now restarted, chronic constipation on Movantik, chronic urinary retention (SC at home), b/l CTS s/p release, s/p Intrathecal Morphine pump for pain, with chronic neck and back pain worsening for years s/p multiple spinal surgeries including laminectomies and fusion of cervical, thoracic and lumbar spine, s/p T3-L1 revision of prior fusion (with  on 8/17/22). Xray 11/16/23 showing broken rods/displacement. Now s/p C2-S2 instrumentation and fusion (12/5). S/p T9-L2 decompression w/ durotomy with repair (12/10/23). KAREEM grade A.     Plan:  Neuro:  - Neuro/vitals q4  - pain control: modified ERAS, IT morphine pump, oxycodone 10/15 prn, valium and home gabapentin 800 TID resumed  - Continue home cymbalta 60mg daily   - CT thoracic spine 12/6: postop changes  - protected sleep time 10PM-4AM     Cardio:  - MAP > 65   - midodrine 15q8   - C/w home simvastatin  - Sinus tachycardia - likely 2/2 PE (12/19)      Pulm:  - RA   - hx GAMALIEL, no CPAP at home  - CTA 12/19 w segmental and subsegmental PE in R upper lobe    GI:  - CCD   - Bowel regimen, home movantik 25 mg, last BM 12/29  - CT A/P 12/13 = impacted stool   - GERD: continue home protonix    Renal:  - Straight cath q6  - Patient not interested in suprapubic catheter placement    Endo:  - A1c: 7    Heme:  - SCDs DVT ppx  - PE tx: Eliquis 5mg BID  - 1u PRBC intraop 12/10, 1u PRBC 12/12    ID:  - afebrile    Dispo:  - SDU status, full code, pending AR vs [facility]    D/w Dr. Luz

## 2023-12-31 NOTE — PROVIDER CONTACT NOTE (OTHER) - ASSESSMENT
Pt complained of "burning" sensation whenever she is straight catheterized and this is a new symptom. Pt stated burning sensation started "4 days ago". Pt stated she informed an RN about it.
Pt complained of left shoulder pain overnight and yesterday during the day. Pt requested for an X-ray of the left shoulder. Pt stated she's had surgery on right shoulder.
Abdomen distended.
Pt requested to have phlebotomist come "later" or after eating breakfast to draw blood. Pt stated "I didn't eat yet. They're going to have trouble getting my blood and I'm going to be poked multiple times."
Pt's HR is sustained in 100 teens to 120. Pt is asleep and snoring. Pt denied chest pain or sob. Pt stated she doesn't want to be "bothered" and "wants to sleep".
Unable to assess full neuro exam, pt stated "leave me alone" after education of importance, Patient is refusing to take mididrone and refused finger stick

## 2024-01-01 LAB
ANION GAP SERPL CALC-SCNC: 11 MMOL/L — SIGNIFICANT CHANGE UP (ref 5–17)
ANION GAP SERPL CALC-SCNC: 11 MMOL/L — SIGNIFICANT CHANGE UP (ref 5–17)
BUN SERPL-MCNC: 8 MG/DL — SIGNIFICANT CHANGE UP (ref 7–23)
BUN SERPL-MCNC: 8 MG/DL — SIGNIFICANT CHANGE UP (ref 7–23)
CALCIUM SERPL-MCNC: 9.7 MG/DL — SIGNIFICANT CHANGE UP (ref 8.4–10.5)
CALCIUM SERPL-MCNC: 9.7 MG/DL — SIGNIFICANT CHANGE UP (ref 8.4–10.5)
CHLORIDE SERPL-SCNC: 99 MMOL/L — SIGNIFICANT CHANGE UP (ref 96–108)
CHLORIDE SERPL-SCNC: 99 MMOL/L — SIGNIFICANT CHANGE UP (ref 96–108)
CO2 SERPL-SCNC: 26 MMOL/L — SIGNIFICANT CHANGE UP (ref 22–31)
CO2 SERPL-SCNC: 26 MMOL/L — SIGNIFICANT CHANGE UP (ref 22–31)
CREAT SERPL-MCNC: 0.45 MG/DL — LOW (ref 0.5–1.3)
CREAT SERPL-MCNC: 0.45 MG/DL — LOW (ref 0.5–1.3)
EGFR: 104 ML/MIN/1.73M2 — SIGNIFICANT CHANGE UP
EGFR: 104 ML/MIN/1.73M2 — SIGNIFICANT CHANGE UP
GLUCOSE SERPL-MCNC: 190 MG/DL — HIGH (ref 70–99)
GLUCOSE SERPL-MCNC: 190 MG/DL — HIGH (ref 70–99)
HCT VFR BLD CALC: 35.7 % — SIGNIFICANT CHANGE UP (ref 34.5–45)
HCT VFR BLD CALC: 35.7 % — SIGNIFICANT CHANGE UP (ref 34.5–45)
HGB BLD-MCNC: 11.1 G/DL — LOW (ref 11.5–15.5)
HGB BLD-MCNC: 11.1 G/DL — LOW (ref 11.5–15.5)
MAGNESIUM SERPL-MCNC: 2 MG/DL — SIGNIFICANT CHANGE UP (ref 1.6–2.6)
MAGNESIUM SERPL-MCNC: 2 MG/DL — SIGNIFICANT CHANGE UP (ref 1.6–2.6)
MCHC RBC-ENTMCNC: 26.8 PG — LOW (ref 27–34)
MCHC RBC-ENTMCNC: 26.8 PG — LOW (ref 27–34)
MCHC RBC-ENTMCNC: 31.1 GM/DL — LOW (ref 32–36)
MCHC RBC-ENTMCNC: 31.1 GM/DL — LOW (ref 32–36)
MCV RBC AUTO: 86.2 FL — SIGNIFICANT CHANGE UP (ref 80–100)
MCV RBC AUTO: 86.2 FL — SIGNIFICANT CHANGE UP (ref 80–100)
NRBC # BLD: 0 /100 WBCS — SIGNIFICANT CHANGE UP (ref 0–0)
NRBC # BLD: 0 /100 WBCS — SIGNIFICANT CHANGE UP (ref 0–0)
PHOSPHATE SERPL-MCNC: 3.6 MG/DL — SIGNIFICANT CHANGE UP (ref 2.5–4.5)
PHOSPHATE SERPL-MCNC: 3.6 MG/DL — SIGNIFICANT CHANGE UP (ref 2.5–4.5)
PLATELET # BLD AUTO: 336 K/UL — SIGNIFICANT CHANGE UP (ref 150–400)
PLATELET # BLD AUTO: 336 K/UL — SIGNIFICANT CHANGE UP (ref 150–400)
POTASSIUM SERPL-MCNC: 4.6 MMOL/L — SIGNIFICANT CHANGE UP (ref 3.5–5.3)
POTASSIUM SERPL-MCNC: 4.6 MMOL/L — SIGNIFICANT CHANGE UP (ref 3.5–5.3)
POTASSIUM SERPL-SCNC: 4.6 MMOL/L — SIGNIFICANT CHANGE UP (ref 3.5–5.3)
POTASSIUM SERPL-SCNC: 4.6 MMOL/L — SIGNIFICANT CHANGE UP (ref 3.5–5.3)
RBC # BLD: 4.14 M/UL — SIGNIFICANT CHANGE UP (ref 3.8–5.2)
RBC # BLD: 4.14 M/UL — SIGNIFICANT CHANGE UP (ref 3.8–5.2)
RBC # FLD: 15 % — HIGH (ref 10.3–14.5)
RBC # FLD: 15 % — HIGH (ref 10.3–14.5)
SODIUM SERPL-SCNC: 136 MMOL/L — SIGNIFICANT CHANGE UP (ref 135–145)
SODIUM SERPL-SCNC: 136 MMOL/L — SIGNIFICANT CHANGE UP (ref 135–145)
WBC # BLD: 7.31 K/UL — SIGNIFICANT CHANGE UP (ref 3.8–10.5)
WBC # BLD: 7.31 K/UL — SIGNIFICANT CHANGE UP (ref 3.8–10.5)
WBC # FLD AUTO: 7.31 K/UL — SIGNIFICANT CHANGE UP (ref 3.8–10.5)
WBC # FLD AUTO: 7.31 K/UL — SIGNIFICANT CHANGE UP (ref 3.8–10.5)

## 2024-01-01 PROCEDURE — 99024 POSTOP FOLLOW-UP VISIT: CPT

## 2024-01-01 PROCEDURE — 99233 SBSQ HOSP IP/OBS HIGH 50: CPT

## 2024-01-01 RX ORDER — MIDODRINE HYDROCHLORIDE 2.5 MG/1
5 TABLET ORAL EVERY 8 HOURS
Refills: 0 | Status: DISCONTINUED | OUTPATIENT
Start: 2024-01-02 | End: 2024-01-05

## 2024-01-01 RX ADMIN — OXYCODONE HYDROCHLORIDE 15 MILLIGRAM(S): 5 TABLET ORAL at 11:43

## 2024-01-01 RX ADMIN — LIDOCAINE 1 PATCH: 4 CREAM TOPICAL at 18:43

## 2024-01-01 RX ADMIN — LIDOCAINE 1 PATCH: 4 CREAM TOPICAL at 11:43

## 2024-01-01 RX ADMIN — Medication 5 MILLIGRAM(S): at 05:09

## 2024-01-01 RX ADMIN — MIDODRINE HYDROCHLORIDE 10 MILLIGRAM(S): 2.5 TABLET ORAL at 16:42

## 2024-01-01 RX ADMIN — APIXABAN 5 MILLIGRAM(S): 2.5 TABLET, FILM COATED ORAL at 18:24

## 2024-01-01 RX ADMIN — GABAPENTIN 800 MILLIGRAM(S): 400 CAPSULE ORAL at 01:34

## 2024-01-01 RX ADMIN — OXYCODONE HYDROCHLORIDE 15 MILLIGRAM(S): 5 TABLET ORAL at 16:42

## 2024-01-01 RX ADMIN — GABAPENTIN 800 MILLIGRAM(S): 400 CAPSULE ORAL at 18:24

## 2024-01-01 RX ADMIN — GABAPENTIN 800 MILLIGRAM(S): 400 CAPSULE ORAL at 09:55

## 2024-01-01 RX ADMIN — OXYCODONE HYDROCHLORIDE 15 MILLIGRAM(S): 5 TABLET ORAL at 05:39

## 2024-01-01 RX ADMIN — SENNA PLUS 2 TABLET(S): 8.6 TABLET ORAL at 21:40

## 2024-01-01 RX ADMIN — MIDODRINE HYDROCHLORIDE 10 MILLIGRAM(S): 2.5 TABLET ORAL at 05:09

## 2024-01-01 RX ADMIN — OXYCODONE HYDROCHLORIDE 15 MILLIGRAM(S): 5 TABLET ORAL at 12:43

## 2024-01-01 RX ADMIN — NALOXEGOL OXALATE 25 MILLIGRAM(S): 12.5 TABLET, FILM COATED ORAL at 11:43

## 2024-01-01 RX ADMIN — Medication 5 MILLIGRAM(S): at 16:42

## 2024-01-01 RX ADMIN — OXYCODONE HYDROCHLORIDE 15 MILLIGRAM(S): 5 TABLET ORAL at 21:17

## 2024-01-01 RX ADMIN — OXYCODONE HYDROCHLORIDE 15 MILLIGRAM(S): 5 TABLET ORAL at 20:47

## 2024-01-01 RX ADMIN — LIDOCAINE 1 PATCH: 4 CREAM TOPICAL at 23:15

## 2024-01-01 RX ADMIN — PANTOPRAZOLE SODIUM 40 MILLIGRAM(S): 20 TABLET, DELAYED RELEASE ORAL at 05:09

## 2024-01-01 RX ADMIN — Medication 1 TABLET(S): at 11:43

## 2024-01-01 RX ADMIN — CHLORHEXIDINE GLUCONATE 1 APPLICATION(S): 213 SOLUTION TOPICAL at 05:12

## 2024-01-01 RX ADMIN — Medication 1000 MILLIGRAM(S): at 07:47

## 2024-01-01 RX ADMIN — SIMVASTATIN 40 MILLIGRAM(S): 20 TABLET, FILM COATED ORAL at 21:40

## 2024-01-01 RX ADMIN — OXYCODONE HYDROCHLORIDE 15 MILLIGRAM(S): 5 TABLET ORAL at 05:09

## 2024-01-01 RX ADMIN — CEFTRIAXONE 100 MILLIGRAM(S): 500 INJECTION, POWDER, FOR SOLUTION INTRAMUSCULAR; INTRAVENOUS at 16:42

## 2024-01-01 RX ADMIN — Medication 5 MILLIGRAM(S): at 21:40

## 2024-01-01 RX ADMIN — OXYCODONE HYDROCHLORIDE 15 MILLIGRAM(S): 5 TABLET ORAL at 17:42

## 2024-01-01 RX ADMIN — DULOXETINE HYDROCHLORIDE 60 MILLIGRAM(S): 30 CAPSULE, DELAYED RELEASE ORAL at 11:43

## 2024-01-01 RX ADMIN — APIXABAN 5 MILLIGRAM(S): 2.5 TABLET, FILM COATED ORAL at 05:09

## 2024-01-01 NOTE — PROGRESS NOTE ADULT - SUBJECTIVE AND OBJECTIVE BOX
Patient was seen and examined at bedside. Case discuss with Neurosurgery team. Pt reports having left sided shoulder pain. Pt also reports that her dysuria is improved this morning. Pt with some abdominal discomfort.     OBJECTIVE:  Vital Signs Last 24 Hrs  T(C): 36.8 (01 Jan 2024 09:54), Max: 37.3 (01 Jan 2024 00:02)  T(F): 98.2 (01 Jan 2024 09:54), Max: 99.2 (01 Jan 2024 00:02)  HR: 86 (01 Jan 2024 09:54) (86 - 106)  BP: 107/66 (01 Jan 2024 09:54) (103/67 - 125/82)  RR: 16 (01 Jan 2024 09:54) (16 - 17)  SpO2: 94% (01 Jan 2024 09:54) (92% - 94%)    Parameters below as of 01 Jan 2024 09:54  Patient On (Oxygen Delivery Method): room air      PHYSICAL EXAM:  Gen: NAD laying in bed  CV: RRR, +S1/S2, no mumur  Pulm: CTA b/l no wheezing or crackles   Abd: firm mild tenderness ND  + BS no rebound or guarding   Neuro: AAOX 3;  Strength BL UE 5/5 and BL LE 0/5    LABS:                        11.1   7.31  )-----------( 336      ( 01 Jan 2024 12:34 )             35.7     01-01    136  |  99  |  8   ----------------------------<  190<H>  4.6   |  26  |  0.45<L>    Ca    9.7      01 Jan 2024 12:34  Phos  3.6     01-01  Mg     2.0     01-01 12/31 Urine cx: greater than 100,00 E. coli     acetaminophen     Tablet .. 1000 milliGRAM(s) Oral every 8 hours PRN  apixaban 5 milliGRAM(s) Oral every 12 hours  benzocaine 20% Spray 1 Spray(s) Mucosal three times a day PRN  benzocaine/menthol Lozenge 1 Lozenge Oral every 2 hours PRN  bisacodyl 5 milliGRAM(s) Oral at bedtime  cefTRIAXone   IVPB 1000 milliGRAM(s) IV Intermittent every 24 hours  chlorhexidine 2% Cloths 1 Application(s) Topical <User Schedule>  diazepam    Tablet 5 milliGRAM(s) Oral every 8 hours  diphenhydrAMINE 25 milliGRAM(s) Oral every 12 hours PRN  DULoxetine 60 milliGRAM(s) Oral daily  gabapentin 800 milliGRAM(s) Oral every 8 hours  influenza  Vaccine (HIGH DOSE) 0.7 milliLiter(s) IntraMuscular once  lidocaine   4% Patch 1 Patch Transdermal daily  midodrine 10 milliGRAM(s) Oral every 8 hours  Morphine 1 milliGRAM / 1 mL 20 milliLITERS 20 milliLiter(s) IntraThecal Continuous Pump  multivitamin 1 Tablet(s) Oral daily  naloxegol 25 milliGRAM(s) Oral daily  naloxone Injectable 0.1 milliGRAM(s) IV Push every 3 minutes PRN  ondansetron Injectable 4 milliGRAM(s) IV Push every 6 hours PRN  oxyCODONE    IR 10 milliGRAM(s) Oral every 4 hours PRN  oxyCODONE    IR 15 milliGRAM(s) Oral every 4 hours PRN  pantoprazole    Tablet 40 milliGRAM(s) Oral before breakfast  polyethylene glycol 3350 17 Gram(s) Oral two times a day  senna 2 Tablet(s) Oral at bedtime  simvastatin 40 milliGRAM(s) Oral at bedtime  sodium chloride 0.9% lock flush 10 milliLiter(s) IV Push every 1 hour PRN    A/P: 68F with PMH of HTN, HLD,CVA x 3 (last in 2016 with residual R hand weakness), GAMALIEL not using CPAP, Emphysema, ex-25 pack year smoker now restarted, chronic constipation on Movantik, chronic urinary retention (SC at home), b/l CTS s/p release, s/p Intrathecal Morphine pump for pain, with chronic neck and back pain worsening for years s/p multiple spinal surgeries including laminectomies and fusion of cervical, thoracic and lumbar spine, s/p T3-L1 revision of prior fusion (with  on 8/17/22). Xray 11/16/23 showing broken rods/displacement. Now s/p C2-S2 instrumentation and fusion (12/5). S/p T9-L2 decompression w/ durotomy with repair (12/10/23).     #Neck and shoulder Pain  #Multiple Spine Surgeries  #Chronic Back Pain  -Pt s/p fusion  - Pain  per primary team     #Abdominal tenderness likely due chronic constipation likely due to chronic opioid use   -Continue serial abdominal exam   - c/w bowel regimen  - continue movantik    #E. coli UTI   -Pt was started on CTX on 12/31  -Will f/u urine cx     #CAD  #CVA  - Resume ASA post operatively when deemed safe to do so as per neurosurgery team   - Continue atorvastatin  - Awaiting results of recent long term outpatient cardiac monitoring to monitor for occult Afib     #Pulmonary embolism  #Tachycardia likely secondary to PE  ( improved)   -CT PE protocol was performed given pt's  persistent tachycardia and the pt was found to have subsegmental and segmental PE  - c/w Eliquis     # Paraplegia   #Dispo  -Awaiting Acute Rehab                   Patient was seen and examined at bedside. Case discuss with Neurosurgery team. Pt reports having left sided shoulder pain. Pt also reports that her dysuria is improved this morning. Pt with some abdominal discomfort.     OBJECTIVE:  Vital Signs Last 24 Hrs  T(C): 36.8 (01 Jan 2024 09:54), Max: 37.3 (01 Jan 2024 00:02)  T(F): 98.2 (01 Jan 2024 09:54), Max: 99.2 (01 Jan 2024 00:02)  HR: 86 (01 Jan 2024 09:54) (86 - 106)  BP: 107/66 (01 Jan 2024 09:54) (103/67 - 125/82)  RR: 16 (01 Jan 2024 09:54) (16 - 17)  SpO2: 94% (01 Jan 2024 09:54) (92% - 94%)    Parameters below as of 01 Jan 2024 09:54  Patient On (Oxygen Delivery Method): room air      PHYSICAL EXAM:  Gen: NAD laying in bed  CV: RRR, +S1/S2, no mumur  Pulm: CTA b/l no wheezing or crackles   Abd: firm mild tenderness ND  + BS no rebound or guarding   Neuro: AAOX 3;  Strength BL UE 5/5 and BL LE 0/5    LABS:                        11.1   7.31  )-----------( 336      ( 01 Jan 2024 12:34 )             35.7     01-01    136  |  99  |  8   ----------------------------<  190<H>  4.6   |  26  |  0.45<L>    Ca    9.7      01 Jan 2024 12:34  Phos  3.6     01-01  Mg     2.0     01-01 12/31 Urine cx: greater than 100,00 E. coli     acetaminophen     Tablet .. 1000 milliGRAM(s) Oral every 8 hours PRN  apixaban 5 milliGRAM(s) Oral every 12 hours  benzocaine 20% Spray 1 Spray(s) Mucosal three times a day PRN  benzocaine/menthol Lozenge 1 Lozenge Oral every 2 hours PRN  bisacodyl 5 milliGRAM(s) Oral at bedtime  cefTRIAXone   IVPB 1000 milliGRAM(s) IV Intermittent every 24 hours  chlorhexidine 2% Cloths 1 Application(s) Topical <User Schedule>  diazepam    Tablet 5 milliGRAM(s) Oral every 8 hours  diphenhydrAMINE 25 milliGRAM(s) Oral every 12 hours PRN  DULoxetine 60 milliGRAM(s) Oral daily  gabapentin 800 milliGRAM(s) Oral every 8 hours  influenza  Vaccine (HIGH DOSE) 0.7 milliLiter(s) IntraMuscular once  lidocaine   4% Patch 1 Patch Transdermal daily  midodrine 10 milliGRAM(s) Oral every 8 hours  Morphine 1 milliGRAM / 1 mL 20 milliLITERS 20 milliLiter(s) IntraThecal Continuous Pump  multivitamin 1 Tablet(s) Oral daily  naloxegol 25 milliGRAM(s) Oral daily  naloxone Injectable 0.1 milliGRAM(s) IV Push every 3 minutes PRN  ondansetron Injectable 4 milliGRAM(s) IV Push every 6 hours PRN  oxyCODONE    IR 10 milliGRAM(s) Oral every 4 hours PRN  oxyCODONE    IR 15 milliGRAM(s) Oral every 4 hours PRN  pantoprazole    Tablet 40 milliGRAM(s) Oral before breakfast  polyethylene glycol 3350 17 Gram(s) Oral two times a day  senna 2 Tablet(s) Oral at bedtime  simvastatin 40 milliGRAM(s) Oral at bedtime  sodium chloride 0.9% lock flush 10 milliLiter(s) IV Push every 1 hour PRN    A/P: 68F with PMH of HTN, HLD,CVA x 3 (last in 2016 with residual R hand weakness), GAMALIEL not using CPAP, Emphysema, ex-25 pack year smoker now restarted, chronic constipation on Movantik, chronic urinary retention (SC at home), b/l CTS s/p release, s/p Intrathecal Morphine pump for pain, with chronic neck and back pain worsening for years s/p multiple spinal surgeries including laminectomies and fusion of cervical, thoracic and lumbar spine, s/p T3-L1 revision of prior fusion (with  on 8/17/22). Xray 11/16/23 showing broken rods/displacement. Now s/p C2-S2 instrumentation and fusion (12/5). S/p T9-L2 decompression w/ durotomy with repair (12/10/23).     #Neck and shoulder Pain  #Multiple Spine Surgeries  #Chronic Back Pain  -Pt s/p fusion  - Pain  per primary team     #Abdominal tenderness likely due chronic constipation likely due to chronic opioid use   -Continue serial abdominal exam   -If pt's abdominal pain worsens then will consider repeat imaging; Pt s/p CT abdomen/pelvis on 12/13 which showed Heavy stool burden.   - Continue  bowel regimen: Senna , Miralax and bisacodyl   - continue movantik    #E. coli UTI   -Pt was started on CTX on 12/31  -Will f/u urine cx     #CAD  #CVA  - Resume ASA post operatively when deemed safe to do so as per neurosurgery team   - Continue atorvastatin  - Awaiting results of recent long term outpatient cardiac monitoring to monitor for occult Afib     #Pulmonary embolism  #Tachycardia likely secondary to PE  ( improved)   -CT PE protocol was performed given pt's  persistent tachycardia and the pt was found to have subsegmental and segmental PE  - c/w Eliquis     # Paraplegia   #DISPO  -Awaiting Acute Rehab

## 2024-01-01 NOTE — PROGRESS NOTE ADULT - SUBJECTIVE AND OBJECTIVE BOX
HPI:  68 y/o female with PMHx HTN, HLD, CVA x 3 (last was  with right hand weakness residual), GAMALIEL not using CPAP, Emphysema, former 25 pack year smoker restarted this week of surgery, Chronic Constipation on Movantik, Urinary Incontinence chronically self straight cath at home, chronic UTI, Breast Implant Rupture with Chronic Leak repair 10/2023, B/L CTS s/p release, s/p Intrathecal Morphine pump for pain, with chronic neck and back pain worsening for years s/p multiple spinal surgeries including laminectomies and fusion of cervical, thoracic and lumbar spine initially done in  and recently in  and  at [facility] by Dr. Trinidad, s/p T3-L1 revision of prior fusion (with Dr. Luz on 22). Outpatient Xray 2023 showing broken rods/displacement. Presents for elective C2-S2 instrumentation and fusion. Pt endorses weakness of b/l LE and burning pain in b/l lower extremities radiating to both feet,  (04 Dec 2023 15:44)    OVERNIGHT EVENTS: POD . KAMRAN overnight. Neuro stable.     Hospital Course:  : Admitted for spinal fusion d/t damaged hardware.   : Neuro stable. POD0 C2-S2 instrumentation/fusion (intraop b/l LE motor loss). on parminder for MAP>90  : POD1. KAMRAN o/n neuro stable. remains intubated. Extubated 6am. Precedex gtt prn. Remains on parminder gtt for MAP goal >90. Valium made prn. CT thoracic spine bc MRI unavailable. Attempted NGT, unsuccessful (resistance @ 35).   : POD2 Given IV dilaudid 0.5mg for pain. Neuro exam stable. SQL started tongiht. Increased precedex to 1.0. Responds well to valium. Consulting psych given paranoia ?homicidal statements. Passed bedside dysphagia and tolerating PO meds. MRI complete, f/u read.   : POD3 Pt reporting incisional pain, given dilaudid 0.5mg IV x2. Pt appearing anxious, given xanax 1mg. Neuro exam unchanged. Pain regimen increased to oxy 15/30 mg, pending further recs. Given 1 L bolus for tachycardia. Trops negative. 2 HMV drains removed.   : POD4, KAMRAN, CT myelogram today showing block at T12-L1, OR tomorrow  12/10: POD5, KAMRAN, OR today for exploration of fusion, possible decompression T9-L2, revision instrumentation  POD 0 T9-L2 decompression. Pt returned from OR intubated. 50mcg fentanyl IVP x 2 for pain control and HTN. Magnesium repleted. Precedex gtt started for sedation. Decreased FiO2 to 40%. 1L bolus for soft MAP. Dc'd propofol and started levo gtt for MAP >90.   : POD 6, POD 1. Extubated, satting well on RA. Resumed home valium and gabapentin. Castillo d/c'd, pending TOV. Seroquel started for agitation, R IJ removed, LUE double midline placed. Started SQL.   : POD7, POD2, given 500 cc bolus and started on phenylephrine for MAP goal >90. Started midodrine 10mg q8h. Cardura dc'd d/t hypotension. Given 250cc of albumin for increasing pressor requirements. Given lactulose, pending BM. Hgb 7.6<8.3, given 1u PRBC. Protected sleep time. Urology consulted for possible suprapubic cath, recommend self intermittent cath is optimal.   : POD8, POD3 Midodrine increased to 15q8. Pt refused to take the extra 5mg midodrin ordered. Neuro exam stable. hgb 10 from 9.4 after 1u pRBCs, Pt refused AM meds, given in late morning, CTAP ordered for L sided abd pain, fleet enema, restarted home movantik and ordere fleet enema for constipation. YIFAN and HMV dc'd. 250cc albumin x1  : POD9, POD4, KAMRAN overnight, neuro stable. MAP goal liberalized to > 65. Stepdown status.   12/15: POD10, POD5, KAMRAN overnight  : POD11, POD6, KAMRAN overnight, neuro stable, 500 cc bolus NS given for tachycardia. Aquacell changed. CXR, pro-bnp negative for pulmonary edema. Given additional 500cc bolus.  : POD 12. POD 7. KAMRAN overnight, neuro stable.   : POD 13/8. Incontinent x2 today.   : KAMRAN. Persistent tachycardia, r/o PE. CTPE protocol ordered. Refused all afternoon meds except oxy 30. Per Dr. Cowart (rad) CT chest w segmental and subsegmental PE in R upper lobe. Dopplers negative for DVT, CT PE shows segmental and subsegmental PE in R upper lobe. Per Dr. Augustine and Dr. Luz, Will start eliquis pending pain management approval. Per Dr. Mclaughlin (pain mgmt) can start eliquis 10mg BIDx 7 days. Dc'd lovenox.  : POD 10 T9-L2 Decompression. Hemodynamically stable. Pending rehab. PT today.   : POD 11 T9-L2 Decompression. neruo/hemodynamically stable, pending rehab.  : POD /, pending rehab. Aquacel dressing changed.   : POD 18/13, pending rehab.   : POD 19/14, pending rehab.   : POD 20/15, pending rehab.   : POD 21/16, pending rehab. Pain pump refilled.   : POD 22/17. pending rehab.   : POD /.   : POD . KAMRAN o/n. No AR beds, TAIWO  : POD 25/. Pending TAIWO   : POD /. Pending TAIWO. Midrodrine weaned to 10q8h. UA positive for UTI, f/u culture, started on ceftriaxone x 3 days.   : POD . KAMRAN overnight. Neuro stable.     Vital Signs Last 24 Hrs  T(C): 37.3 (2024 00:02), Max: 37.3 (2024 00:02)  T(F): 99.2 (2024 00:02), Max: 99.2 (2024 00:02)  HR: 96 (2024 00:02) (96 - 110)  BP: 125/82 (2024 00:02) (103/67 - 140/91)  BP(mean): --  RR: 16 (2024 00:02) (13 - 17)  SpO2: 93% (2024 00:02) (93% - 98%)    Parameters below as of 2024 00:02  Patient On (Oxygen Delivery Method): room air        I&O's Summary    30 Dec 2023 07:01  -  31 Dec 2023 07:00  --------------------------------------------------------  IN: 540 mL / OUT: 1125 mL / NET: -585 mL    31 Dec 2023 07:01  -  2024 00:57  --------------------------------------------------------  IN: 0 mL / OUT: 1480 mL / NET: -1480 mL        PHYSICAL EXAM:  General: NAD, AAOx3  HEENT: PERRL. EOMI.   Cardiovascular: RRR, normal S1 and S2   Respiratory: non-labored breathing, symmetric chest rise  GI: abd soft, NTND  Neuro: CN II-XII intact, follows commands, speech clear. Strength BL UE 5/5  BL LE 0/5 to noxious stimuli with T11 sensory deficit.  Vascular: Distal pulses 2+ x 4, no calf edema or erythema  Wounds: Posterior midline spine incision C/D/I with dressing in place    TUBES/LINES:  [] Castillo  [] Lumbar Drain  [] Wound Drains  [] Others      DIET:  [] NPO  [x] Mechanical  [] Tube feeds    LABS:                        13.7   10.72 )-----------( 190      ( 31 Dec 2023 07:52 )             44.6     12-    133<L>  |  101  |  8   ----------------------------<  166<H>  5.2   |  18<L>  |  0.43<L>    Ca    9.4      31 Dec 2023 07:52  Phos  3.6     12  Mg     1.9             Urinalysis Basic - ( 31 Dec 2023 09:22 )    Color: Yellow / Appearance: Cloudy / S.013 / pH: x  Gluc: x / Ketone: Negative mg/dL  / Bili: Negative / Urobili: 1.0 mg/dL   Blood: x / Protein: Trace mg/dL / Nitrite: Positive   Leuk Esterase: Moderate / RBC: 3 /HPF / WBC 16 /HPF   Sq Epi: x / Non Sq Epi: 3 /HPF / Bacteria: Too Numerous to count /HPF          CAPILLARY BLOOD GLUCOSE          Drug Levels: [] N/A    CSF Analysis: [] N/A      Allergies    Crab (Pruritus)  No Known Drug Allergies    Intolerances      MEDICATIONS:  Antibiotics:  cefTRIAXone   IVPB 1000 milliGRAM(s) IV Intermittent every 24 hours    Neuro:  acetaminophen     Tablet .. 1000 milliGRAM(s) Oral every 8 hours PRN  diazepam    Tablet 5 milliGRAM(s) Oral every 8 hours  diphenhydrAMINE 25 milliGRAM(s) Oral every 12 hours PRN  DULoxetine 60 milliGRAM(s) Oral daily  gabapentin 800 milliGRAM(s) Oral every 8 hours  ondansetron Injectable 4 milliGRAM(s) IV Push every 6 hours PRN  oxyCODONE    IR 15 milliGRAM(s) Oral every 4 hours PRN  oxyCODONE    IR 10 milliGRAM(s) Oral every 4 hours PRN    Anticoagulation:  apixaban 5 milliGRAM(s) Oral every 12 hours    OTHER:  benzocaine 20% Spray 1 Spray(s) Mucosal three times a day PRN  benzocaine/menthol Lozenge 1 Lozenge Oral every 2 hours PRN  bisacodyl 5 milliGRAM(s) Oral at bedtime  chlorhexidine 2% Cloths 1 Application(s) Topical <User Schedule>  influenza  Vaccine (HIGH DOSE) 0.7 milliLiter(s) IntraMuscular once  lidocaine   4% Patch 1 Patch Transdermal daily  midodrine 10 milliGRAM(s) Oral every 8 hours  Morphine 1 milliGRAM / 1 mL 20 milliLITERS 20 milliLiter(s) IntraThecal Continuous Pump  naloxegol 25 milliGRAM(s) Oral daily  naloxone Injectable 0.1 milliGRAM(s) IV Push every 3 minutes PRN  pantoprazole    Tablet 40 milliGRAM(s) Oral before breakfast  polyethylene glycol 3350 17 Gram(s) Oral two times a day  senna 2 Tablet(s) Oral at bedtime  simvastatin 40 milliGRAM(s) Oral at bedtime    IVF:  multivitamin 1 Tablet(s) Oral daily    CULTURES:    RADIOLOGY & ADDITIONAL TESTS:      ASSESSMENT:    67 yo female with Hx HTN, HLD, DM, CVA x 3 (last in 2016 with residual R hand weakness), GAMALIEL not using CPAP, Emphysema, ex-25 pack year smoker now restarted, chronic constipation on Movantik, chronic urinary retention (SC at home), b/l CTS s/p release, s/p Intrathecal Morphine pump for pain, with chronic neck and back pain worsening for years s/p multiple spinal surgeries including laminectomies and fusion of cervical, thoracic and lumbar spine, s/p T3-L1 revision of prior fusion (with  on 22). Xray 23 showing broken rods/displacement. Now s/p C2-S2 instrumentation and fusion (). S/p T9-L2 decompression w/ durotomy with repair (12/10/23). KAREEM grade A.     Plan:  Neuro:  - Neuro/vitals q8  - pain control: modified ERAS, IT morphine pump, oxycodone 10/15 prn, valium and home gabapentin 800 TID resumed  - Continue home cymbalta 60mg daily   - CT thoracic spine : postop changes    Cardio:  - MAP > 65   - midodrine 10mg q8h   - C/w home simvastatin  - Sinus tachycardia - likely 2/2 PE ()      Pulm:  - RA   - hx GAMALIEL, no CPAP at home  - CTA  w segmental and subsegmental PE in R upper lobe    GI:  - CCD   - Bowel regimen, home movantik 25 mg, last BM   - CT A/P  = impacted stool   - GERD: continue home protonix    Renal:  - IVL/Straight cath q6  - Patient not interested in suprapubic catheter placement    Endo:  - A1c: 7    Heme:  - SCDs DVT ppx  - LE dopplers : negative for DVT  - PE tx: Eliquis 5mg BID  - 1u PRBC intraop 12/10, 1u PRBC     ID:  - afebrile  - +UTI  f/u culture, ceftriaxone (-1/3)    Dispo:  - SDU status, full code, pending AR vs [facility]AIWO    D/w Dr. Luz  HPI:  68 y/o female with PMHx HTN, HLD, CVA x 3 (last was  with right hand weakness residual), GAMALIEL not using CPAP, Emphysema, former 25 pack year smoker restarted this week of surgery, Chronic Constipation on Movantik, Urinary Incontinence chronically self straight cath at home, chronic UTI, Breast Implant Rupture with Chronic Leak repair 10/2023, B/L CTS s/p release, s/p Intrathecal Morphine pump for pain, with chronic neck and back pain worsening for years s/p multiple spinal surgeries including laminectomies and fusion of cervical, thoracic and lumbar spine initially done in  and recently in  and  at [facility] by Dr. Trinidad, s/p T3-L1 revision of prior fusion (with Dr. Luz on 22). Outpatient Xray 2023 showing broken rods/displacement. Presents for elective C2-S2 instrumentation and fusion. Pt endorses weakness of b/l LE and burning pain in b/l lower extremities radiating to both feet,  (04 Dec 2023 15:44)    OVERNIGHT EVENTS: POD . KAMRAN overnight. Neuro stable.     Hospital Course:  : Admitted for spinal fusion d/t damaged hardware.   : Neuro stable. POD0 C2-S2 instrumentation/fusion (intraop b/l LE motor loss). on parminder for MAP>90  : POD1. KAMRAN o/n neuro stable. remains intubated. Extubated 6am. Precedex gtt prn. Remains on parminder gtt for MAP goal >90. Valium made prn. CT thoracic spine bc MRI unavailable. Attempted NGT, unsuccessful (resistance @ 35).   : POD2 Given IV dilaudid 0.5mg for pain. Neuro exam stable. SQL started tongiht. Increased precedex to 1.0. Responds well to valium. Consulting psych given paranoia ?homicidal statements. Passed bedside dysphagia and tolerating PO meds. MRI complete, f/u read.   : POD3 Pt reporting incisional pain, given dilaudid 0.5mg IV x2. Pt appearing anxious, given xanax 1mg. Neuro exam unchanged. Pain regimen increased to oxy 15/30 mg, pending further recs. Given 1 L bolus for tachycardia. Trops negative. 2 HMV drains removed.   : POD4, KAMRAN, CT myelogram today showing block at T12-L1, OR tomorrow  12/10: POD5, KAMRAN, OR today for exploration of fusion, possible decompression T9-L2, revision instrumentation  POD 0 T9-L2 decompression. Pt returned from OR intubated. 50mcg fentanyl IVP x 2 for pain control and HTN. Magnesium repleted. Precedex gtt started for sedation. Decreased FiO2 to 40%. 1L bolus for soft MAP. Dc'd propofol and started levo gtt for MAP >90.   : POD 6, POD 1. Extubated, satting well on RA. Resumed home valium and gabapentin. Castillo d/c'd, pending TOV. Seroquel started for agitation, R IJ removed, LUE double midline placed. Started SQL.   : POD7, POD2, given 500 cc bolus and started on phenylephrine for MAP goal >90. Started midodrine 10mg q8h. Cardura dc'd d/t hypotension. Given 250cc of albumin for increasing pressor requirements. Given lactulose, pending BM. Hgb 7.6<8.3, given 1u PRBC. Protected sleep time. Urology consulted for possible suprapubic cath, recommend self intermittent cath is optimal.   : POD8, POD3 Midodrine increased to 15q8. Pt refused to take the extra 5mg midodrin ordered. Neuro exam stable. hgb 10 from 9.4 after 1u pRBCs, Pt refused AM meds, given in late morning, CTAP ordered for L sided abd pain, fleet enema, restarted home movantik and ordere fleet enema for constipation. YIFAN and HMV dc'd. 250cc albumin x1  : POD9, POD4, KAMRAN overnight, neuro stable. MAP goal liberalized to > 65. Stepdown status.   12/15: POD10, POD5, KAMRAN overnight  : POD11, POD6, KAMRAN overnight, neuro stable, 500 cc bolus NS given for tachycardia. Aquacell changed. CXR, pro-bnp negative for pulmonary edema. Given additional 500cc bolus.  : POD 12. POD 7. KAMRAN overnight, neuro stable.   : POD 13/8. Incontinent x2 today.   : KAMRAN. Persistent tachycardia, r/o PE. CTPE protocol ordered. Refused all afternoon meds except oxy 30. Per Dr. Cowart (rad) CT chest w segmental and subsegmental PE in R upper lobe. Dopplers negative for DVT, CT PE shows segmental and subsegmental PE in R upper lobe. Per Dr. Augustine and Dr. Luz, Will start eliquis pending pain management approval. Per Dr. Mclaughlin (pain mgmt) can start eliquis 10mg BIDx 7 days. Dc'd lovenox.  : POD 10 T9-L2 Decompression. Hemodynamically stable. Pending rehab. PT today.   : POD 11 T9-L2 Decompression. neruo/hemodynamically stable, pending rehab.  : POD /, pending rehab. Aquacel dressing changed.   : POD 18/13, pending rehab.   : POD 19/14, pending rehab.   : POD 20/15, pending rehab.   : POD 21/16, pending rehab. Pain pump refilled.   : POD 22/17. pending rehab.   : POD /.   : POD . KAMRAN o/n. No AR beds, TAIWO  : POD 25/. Pending TAIWO   : POD /. Pending TAIWO. Midrodrine weaned to 10q8h. UA positive for UTI, f/u culture, started on ceftriaxone x 3 days.   : POD . KAMRAN overnight. Neuro stable.     Vital Signs Last 24 Hrs  T(C): 37.3 (2024 00:02), Max: 37.3 (2024 00:02)  T(F): 99.2 (2024 00:02), Max: 99.2 (2024 00:02)  HR: 96 (2024 00:02) (96 - 110)  BP: 125/82 (2024 00:02) (103/67 - 140/91)  BP(mean): --  RR: 16 (2024 00:02) (13 - 17)  SpO2: 93% (2024 00:02) (93% - 98%)    Parameters below as of 2024 00:02  Patient On (Oxygen Delivery Method): room air        I&O's Summary    30 Dec 2023 07:01  -  31 Dec 2023 07:00  --------------------------------------------------------  IN: 540 mL / OUT: 1125 mL / NET: -585 mL    31 Dec 2023 07:01  -  2024 00:57  --------------------------------------------------------  IN: 0 mL / OUT: 1480 mL / NET: -1480 mL        PHYSICAL EXAM:  General: NAD, AAOx3  HEENT: PERRL. EOMI.   Cardiovascular: RRR, normal S1 and S2   Respiratory: non-labored breathing, symmetric chest rise  GI: abd soft, NTND  Neuro: CN II-XII intact, follows commands, speech clear. Strength BL UE 5/5  BL LE 0/5 to noxious stimuli with T11 sensory deficit.  Vascular: Distal pulses 2+ x 4, no calf edema or erythema  Wounds: Posterior midline spine incision C/D/I with dressing in place    TUBES/LINES:  [] Castillo  [] Lumbar Drain  [] Wound Drains  [] Others      DIET:  [] NPO  [x] Mechanical  [] Tube feeds    LABS:                        13.7   10.72 )-----------( 190      ( 31 Dec 2023 07:52 )             44.6     12-    133<L>  |  101  |  8   ----------------------------<  166<H>  5.2   |  18<L>  |  0.43<L>    Ca    9.4      31 Dec 2023 07:52  Phos  3.6     12  Mg     1.9             Urinalysis Basic - ( 31 Dec 2023 09:22 )    Color: Yellow / Appearance: Cloudy / S.013 / pH: x  Gluc: x / Ketone: Negative mg/dL  / Bili: Negative / Urobili: 1.0 mg/dL   Blood: x / Protein: Trace mg/dL / Nitrite: Positive   Leuk Esterase: Moderate / RBC: 3 /HPF / WBC 16 /HPF   Sq Epi: x / Non Sq Epi: 3 /HPF / Bacteria: Too Numerous to count /HPF          CAPILLARY BLOOD GLUCOSE          Drug Levels: [] N/A    CSF Analysis: [] N/A      Allergies    Crab (Pruritus)  No Known Drug Allergies    Intolerances      MEDICATIONS:  Antibiotics:  cefTRIAXone   IVPB 1000 milliGRAM(s) IV Intermittent every 24 hours    Neuro:  acetaminophen     Tablet .. 1000 milliGRAM(s) Oral every 8 hours PRN  diazepam    Tablet 5 milliGRAM(s) Oral every 8 hours  diphenhydrAMINE 25 milliGRAM(s) Oral every 12 hours PRN  DULoxetine 60 milliGRAM(s) Oral daily  gabapentin 800 milliGRAM(s) Oral every 8 hours  ondansetron Injectable 4 milliGRAM(s) IV Push every 6 hours PRN  oxyCODONE    IR 15 milliGRAM(s) Oral every 4 hours PRN  oxyCODONE    IR 10 milliGRAM(s) Oral every 4 hours PRN    Anticoagulation:  apixaban 5 milliGRAM(s) Oral every 12 hours    OTHER:  benzocaine 20% Spray 1 Spray(s) Mucosal three times a day PRN  benzocaine/menthol Lozenge 1 Lozenge Oral every 2 hours PRN  bisacodyl 5 milliGRAM(s) Oral at bedtime  chlorhexidine 2% Cloths 1 Application(s) Topical <User Schedule>  influenza  Vaccine (HIGH DOSE) 0.7 milliLiter(s) IntraMuscular once  lidocaine   4% Patch 1 Patch Transdermal daily  midodrine 10 milliGRAM(s) Oral every 8 hours  Morphine 1 milliGRAM / 1 mL 20 milliLITERS 20 milliLiter(s) IntraThecal Continuous Pump  naloxegol 25 milliGRAM(s) Oral daily  naloxone Injectable 0.1 milliGRAM(s) IV Push every 3 minutes PRN  pantoprazole    Tablet 40 milliGRAM(s) Oral before breakfast  polyethylene glycol 3350 17 Gram(s) Oral two times a day  senna 2 Tablet(s) Oral at bedtime  simvastatin 40 milliGRAM(s) Oral at bedtime    IVF:  multivitamin 1 Tablet(s) Oral daily    CULTURES:    RADIOLOGY & ADDITIONAL TESTS:      ASSESSMENT:    69 yo female with Hx HTN, HLD, DM, CVA x 3 (last in 2016 with residual R hand weakness), GAMALIEL not using CPAP, Emphysema, ex-25 pack year smoker now restarted, chronic constipation on Movantik, chronic urinary retention (SC at home), b/l CTS s/p release, s/p Intrathecal Morphine pump for pain, with chronic neck and back pain worsening for years s/p multiple spinal surgeries including laminectomies and fusion of cervical, thoracic and lumbar spine, s/p T3-L1 revision of prior fusion (with  on 22). Xray 23 showing broken rods/displacement. Now s/p C2-S2 instrumentation and fusion (). S/p T9-L2 decompression w/ durotomy with repair (12/10/23). KAREEM grade A.     Plan:  Neuro:  - Neuro/vitals q8  - pain control: modified ERAS, IT morphine pump, oxycodone 10/15 prn, valium and home gabapentin 800 TID resumed  - Continue home cymbalta 60mg daily   - CT thoracic spine : postop changes    Cardio:  - MAP > 65   - midodrine 10mg q8h   - C/w home simvastatin  - Sinus tachycardia - likely 2/2 PE ()      Pulm:  - RA   - hx GAMALIEL, no CPAP at home  - CTA  w segmental and subsegmental PE in R upper lobe    GI:  - CCD   - Bowel regimen, home movantik 25 mg, last BM   - CT A/P  = impacted stool   - GERD: continue home protonix    Renal:  - IVL/Straight cath q6  - Patient not interested in suprapubic catheter placement    Endo:  - A1c: 7    Heme:  - SCDs DVT ppx  - LE dopplers : negative for DVT  - PE tx: Eliquis 5mg BID  - 1u PRBC intraop 12/10, 1u PRBC     ID:  - afebrile  - +UTI  f/u culture, ceftriaxone (-1/3)    Dispo:  - SDU status, full code, pending AR vs [facility]    D/w Dr. Luz

## 2024-01-02 LAB
-  AMPICILLIN/SULBACTAM: SIGNIFICANT CHANGE UP
-  AMPICILLIN/SULBACTAM: SIGNIFICANT CHANGE UP
-  AMPICILLIN: SIGNIFICANT CHANGE UP
-  AMPICILLIN: SIGNIFICANT CHANGE UP
-  CEFAZOLIN: SIGNIFICANT CHANGE UP
-  CEFAZOLIN: SIGNIFICANT CHANGE UP
-  CEFTRIAXONE: SIGNIFICANT CHANGE UP
-  CEFTRIAXONE: SIGNIFICANT CHANGE UP
-  CIPROFLOXACIN: SIGNIFICANT CHANGE UP
-  CIPROFLOXACIN: SIGNIFICANT CHANGE UP
-  ERTAPENEM: SIGNIFICANT CHANGE UP
-  ERTAPENEM: SIGNIFICANT CHANGE UP
-  GENTAMICIN: SIGNIFICANT CHANGE UP
-  GENTAMICIN: SIGNIFICANT CHANGE UP
-  NITROFURANTOIN: SIGNIFICANT CHANGE UP
-  NITROFURANTOIN: SIGNIFICANT CHANGE UP
-  PIPERACILLIN/TAZOBACTAM: SIGNIFICANT CHANGE UP
-  PIPERACILLIN/TAZOBACTAM: SIGNIFICANT CHANGE UP
-  TOBRAMYCIN: SIGNIFICANT CHANGE UP
-  TOBRAMYCIN: SIGNIFICANT CHANGE UP
-  TRIMETHOPRIM/SULFAMETHOXAZOLE: SIGNIFICANT CHANGE UP
-  TRIMETHOPRIM/SULFAMETHOXAZOLE: SIGNIFICANT CHANGE UP
CULTURE RESULTS: ABNORMAL
CULTURE RESULTS: ABNORMAL
METHOD TYPE: SIGNIFICANT CHANGE UP
METHOD TYPE: SIGNIFICANT CHANGE UP
ORGANISM # SPEC MICROSCOPIC CNT: ABNORMAL
ORGANISM # SPEC MICROSCOPIC CNT: ABNORMAL
ORGANISM # SPEC MICROSCOPIC CNT: SIGNIFICANT CHANGE UP
ORGANISM # SPEC MICROSCOPIC CNT: SIGNIFICANT CHANGE UP
SPECIMEN SOURCE: SIGNIFICANT CHANGE UP
SPECIMEN SOURCE: SIGNIFICANT CHANGE UP

## 2024-01-02 PROCEDURE — 73030 X-RAY EXAM OF SHOULDER: CPT | Mod: 26,LT

## 2024-01-02 PROCEDURE — 99231 SBSQ HOSP IP/OBS SF/LOW 25: CPT

## 2024-01-02 PROCEDURE — 99233 SBSQ HOSP IP/OBS HIGH 50: CPT

## 2024-01-02 RX ORDER — DIAZEPAM 5 MG
5 TABLET ORAL EVERY 8 HOURS
Refills: 0 | Status: DISCONTINUED | OUTPATIENT
Start: 2024-01-02 | End: 2024-01-05

## 2024-01-02 RX ORDER — CEFTRIAXONE 500 MG/1
1000 INJECTION, POWDER, FOR SOLUTION INTRAMUSCULAR; INTRAVENOUS EVERY 24 HOURS
Refills: 0 | Status: COMPLETED | OUTPATIENT
Start: 2024-01-02 | End: 2024-01-04

## 2024-01-02 RX ADMIN — MIDODRINE HYDROCHLORIDE 5 MILLIGRAM(S): 2.5 TABLET ORAL at 14:37

## 2024-01-02 RX ADMIN — SENNA PLUS 2 TABLET(S): 8.6 TABLET ORAL at 21:23

## 2024-01-02 RX ADMIN — MIDODRINE HYDROCHLORIDE 5 MILLIGRAM(S): 2.5 TABLET ORAL at 23:43

## 2024-01-02 RX ADMIN — Medication 1000 MILLIGRAM(S): at 03:27

## 2024-01-02 RX ADMIN — APIXABAN 5 MILLIGRAM(S): 2.5 TABLET, FILM COATED ORAL at 06:48

## 2024-01-02 RX ADMIN — GABAPENTIN 800 MILLIGRAM(S): 400 CAPSULE ORAL at 02:08

## 2024-01-02 RX ADMIN — SIMVASTATIN 40 MILLIGRAM(S): 20 TABLET, FILM COATED ORAL at 21:22

## 2024-01-02 RX ADMIN — MIDODRINE HYDROCHLORIDE 5 MILLIGRAM(S): 2.5 TABLET ORAL at 07:32

## 2024-01-02 RX ADMIN — GABAPENTIN 800 MILLIGRAM(S): 400 CAPSULE ORAL at 18:30

## 2024-01-02 RX ADMIN — DULOXETINE HYDROCHLORIDE 60 MILLIGRAM(S): 30 CAPSULE, DELAYED RELEASE ORAL at 12:33

## 2024-01-02 RX ADMIN — NALOXEGOL OXALATE 25 MILLIGRAM(S): 12.5 TABLET, FILM COATED ORAL at 12:33

## 2024-01-02 RX ADMIN — Medication 1000 MILLIGRAM(S): at 03:57

## 2024-01-02 RX ADMIN — LIDOCAINE 1 PATCH: 4 CREAM TOPICAL at 12:33

## 2024-01-02 RX ADMIN — GABAPENTIN 800 MILLIGRAM(S): 400 CAPSULE ORAL at 09:23

## 2024-01-02 RX ADMIN — OXYCODONE HYDROCHLORIDE 15 MILLIGRAM(S): 5 TABLET ORAL at 18:30

## 2024-01-02 RX ADMIN — Medication 5 MILLIGRAM(S): at 21:23

## 2024-01-02 RX ADMIN — Medication 1 TABLET(S): at 12:33

## 2024-01-02 RX ADMIN — APIXABAN 5 MILLIGRAM(S): 2.5 TABLET, FILM COATED ORAL at 18:30

## 2024-01-02 RX ADMIN — LIDOCAINE 1 PATCH: 4 CREAM TOPICAL at 19:11

## 2024-01-02 RX ADMIN — CHLORHEXIDINE GLUCONATE 1 APPLICATION(S): 213 SOLUTION TOPICAL at 06:48

## 2024-01-02 RX ADMIN — PANTOPRAZOLE SODIUM 40 MILLIGRAM(S): 20 TABLET, DELAYED RELEASE ORAL at 06:49

## 2024-01-02 RX ADMIN — OXYCODONE HYDROCHLORIDE 15 MILLIGRAM(S): 5 TABLET ORAL at 19:30

## 2024-01-02 RX ADMIN — OXYCODONE HYDROCHLORIDE 15 MILLIGRAM(S): 5 TABLET ORAL at 03:26

## 2024-01-02 RX ADMIN — MIDODRINE HYDROCHLORIDE 5 MILLIGRAM(S): 2.5 TABLET ORAL at 00:00

## 2024-01-02 RX ADMIN — Medication 5 MILLIGRAM(S): at 06:48

## 2024-01-02 RX ADMIN — CEFTRIAXONE 100 MILLIGRAM(S): 500 INJECTION, POWDER, FOR SOLUTION INTRAMUSCULAR; INTRAVENOUS at 16:33

## 2024-01-02 RX ADMIN — OXYCODONE HYDROCHLORIDE 15 MILLIGRAM(S): 5 TABLET ORAL at 02:26

## 2024-01-02 RX ADMIN — Medication 5 MILLIGRAM(S): at 14:37

## 2024-01-02 NOTE — PROGRESS NOTE ADULT - ASSESSMENT
68 YOF with PMH of HTN, HLD, multiple strokes (residual R hand weakness), GAMALIEL non-adherent with CPAP, COPD, chronic neck and back pain (s/p multiple surgeries) with intrathercal pump and functional paraplegia, chronic constipation admitted for revision of fusion of thoracic and lumbar spine. Course c/b RUL PE, E coli UTI, ?hypotension on midodrine    Chronic neck and back pain c/b paraplegia  Post operative state  - management per NSGY  - s/p OR with Dr. Luz 12/5 for revision C2-S2 instrumentation and fusion  - s/p OR with Dr. Luz 12/10 for T9-L2 decompression with durotomy repair  - pain control per NSGY (morphine intrathecal pump with PRN oxy, gabapentin, diazepam, duloxetine)  - recommend cont aggressive bowel regimen  - early ambulation and OOBTC as tolerated    Chronic constipation  - cont aggresive bowel regimen: senna 2 tabs qhs, miralax BID, bisacodyl 5mg qhs, naloxegel 25mg daily  - if persistent constipation, consider adding suppository or enema  - if develops N/V or signs of obstruction, would hold naloegal and obtain imaging     Complicated E. coli UTI  - dysuria with leukocytosis, UC with E coli  - started on ceftriaxone 12/31, cont for 5-day course (EOT 1/4)  - if discharged to rehab, can switch to PO (cefazolin, cefpodoxime, or TMP-SMX)    Hypotension  - cont midorine taper, reduced to 5mg TID today    Acute PE  - CTA 12/19 with segmental and subsegmental RUL PE, likely provoked in setting of surgery and immobility  - apixaban 5mg BID fot 3-6mo    History of mutliple stroke  - resume aspirin for secondary prevention once clear from surgical standpoint  - cont atorvastatin   - pending results of outpatient cardiac monitoring for eval of occult AFIB    Dispo: acute rehab pending auth    Plan discussed with primary team.

## 2024-01-02 NOTE — PROGRESS NOTE ADULT - SUBJECTIVE AND OBJECTIVE BOX
HPI:  66 y/o female with PMHx HTN, HLD, CVA x 3 (last was 2016 with right hand weakness residual), GAMALIEL not using CPAP, Emphysema, former 25 pack year smoker restarted this week of surgery, Chronic Constipation on Movantik, Urinary Incontinence chronically self straight cath at home, chronic UTI, Breast Implant Rupture with Chronic Leak repair 10/2023, B/L CTS s/p release, s/p Intrathecal Morphine pump for pain, with chronic neck and back pain worsening for years s/p multiple spinal surgeries including laminectomies and fusion of cervical, thoracic and lumbar spine initially done in 2009 and recently in 2019 and 2020 at [facility] by Dr. Trinidad, s/p T3-L1 revision of prior fusion (with Dr. Luz on 8/17/22). Outpatient Xray 11/16/2023 showing broken rods/displacement. Presents for elective C2-S2 instrumentation and fusion. Pt endorses weakness of b/l LE and burning pain in b/l lower extremities radiating to both feet,  (04 Dec 2023 15:44)    HOSPITAL COURSE:  12/4: Admitted for spinal fusion d/t damaged hardware.   12/5: Neuro stable. POD0 C2-S2 instrumentation/fusion (intraop b/l LE motor loss). on parminder for MAP>90  12/6: POD1. KAMRAN o/n neuro stable. remains intubated. Extubated 6am. Precedex gtt prn. Remains on parminder gtt for MAP goal >90. Valium made prn. CT thoracic spine bc MRI unavailable. Attempted NGT, unsuccessful (resistance @ 35).   12/7: POD2 Given IV dilaudid 0.5mg for pain. Neuro exam stable. SQL started tongiht. Increased precedex to 1.0. Responds well to valium. Consulting psych given paranoia ?homicidal statements. Passed bedside dysphagia and tolerating PO meds. MRI complete, f/u read.   12/8: POD3 Pt reporting incisional pain, given dilaudid 0.5mg IV x2. Pt appearing anxious, given xanax 1mg. Neuro exam unchanged. Pain regimen increased to oxy 15/30 mg, pending further recs. Given 1 L bolus for tachycardia. Trops negative. 2 HMV drains removed.   12/9: POD4, KAMRAN, CT myelogram today showing block at T12-L1, OR tomorrow  12/10: POD5, KAMRAN, OR today for exploration of fusion, possible decompression T9-L2, revision instrumentation  POD 0 T9-L2 decompression. Pt returned from OR intubated. 50mcg fentanyl IVP x 2 for pain control and HTN. Magnesium repleted. Precedex gtt started for sedation. Decreased FiO2 to 40%. 1L bolus for soft MAP. Dc'd propofol and started levo gtt for MAP >90.   12/11: POD 6, POD 1. Extubated, satting well on RA. Resumed home valium and gabapentin. Castillo d/c'd, pending TOV. Seroquel started for agitation, R IJ removed, LUE double midline placed. Started SQL.   12/12: POD7, POD2, given 500 cc bolus and started on phenylephrine for MAP goal >90. Started midodrine 10mg q8h. Cardura dc'd d/t hypotension. Given 250cc of albumin for increasing pressor requirements. Given lactulose, pending BM. Hgb 7.6<8.3, given 1u PRBC. Protected sleep time. Urology consulted for possible suprapubic cath, recommend self intermittent cath is optimal.   12/13: POD8, POD3 Midodrine increased to 15q8. Pt refused to take the extra 5mg midodrin ordered. Neuro exam stable. hgb 10 from 9.4 after 1u pRBCs, Pt refused AM meds, given in late morning, CTAP ordered for L sided abd pain, fleet enema, restarted home movantik and ordere fleet enema for constipation. YIFAN and HMV dc'd. 250cc albumin x1  12/14: POD9, POD4, KAMRAN overnight, neuro stable. MAP goal liberalized to > 65. Stepdown status.   12/15: POD10, POD5, KAMRAN overnight  12/16: POD11, POD6, KAMRAN overnight, neuro stable, 500 cc bolus NS given for tachycardia. Aquacell changed. CXR, pro-bnp negative for pulmonary edema. Given additional 500cc bolus.  12/17: POD 12. POD 7. KAMRAN overnight, neuro stable.   12/18: POD 13/8. Incontinent x2 today.   12/19: KAMRAN. Persistent tachycardia, r/o PE. CTPE protocol ordered. Refused all afternoon meds except oxy 30. Per Dr. Cowart (rad) CT chest w segmental and subsegmental PE in R upper lobe. Dopplers negative for DVT, CT PE shows segmental and subsegmental PE in R upper lobe. Per Dr. Augustine and Dr. Luz, Will start eliquis pending pain management approval. Per Dr. Mclaughlin (pain mgmt) can start eliquis 10mg BIDx 7 days. Dc'd lovenox.  12/20: POD 10 T9-L2 Decompression. Hemodynamically stable. Pending rehab. PT today.   12/21: POD 11 T9-L2 Decompression. neruo/hemodynamically stable, pending rehab.  12/22: POD 17/12, pending rehab. Aquacel dressing changed.   12/23: POD 18/13, pending rehab.   12/24: POD 19/14, pending rehab.   12/25: POD 20/15, pending rehab.   12/26: POD 21/16, pending rehab. Pain pump refilled.   12/27: POD 22/17. pending rehab.   12/28: POD 23/18.   12/29: POD 24/19. KAMRAN o/n. No AR beds, TAIWO  12/30: POD 25/20. Pending TAIWO   12/31: POD 26/21. Pending TAIWO. Midrodrine weaned to 10q8h. UA positive for UTI, f/u culture, started on ceftriaxone x 3 days.   1/1: POD 27/22. KAMRAN overnight. Neuro stable. Wean midodrine to 5q8    OVERNIGHT EVENTS: POD 28/23. KAMRAN overnight, neuro stable.     Vital Signs Last 24 Hrs  T(C): 36.7 (01 Jan 2024 20:40), Max: 36.8 (01 Jan 2024 09:54)  T(F): 98 (01 Jan 2024 20:40), Max: 98.2 (01 Jan 2024 09:54)  HR: 98 (01 Jan 2024 20:40) (86 - 103)  BP: 110/77 (01 Jan 2024 20:40) (107/66 - 121/64)  BP(mean): --  RR: 18 (01 Jan 2024 20:40) (16 - 19)  SpO2: 95% (01 Jan 2024 20:40) (92% - 96%)    Parameters below as of 01 Jan 2024 20:40  Patient On (Oxygen Delivery Method): room air    I&O's Summary    31 Dec 2023 07:01  -  01 Jan 2024 07:00  --------------------------------------------------------  IN: 0 mL / OUT: 1980 mL / NET: -1980 mL    01 Jan 2024 07:01  -  02 Jan 2024 00:12  --------------------------------------------------------  IN: 360 mL / OUT: 600 mL / NET: -240 mL    PHYSICAL EXAM:  General: Pt is sitting up comfortably in bed, in NAD, on RA  HEENT: CN II-XII grossly intact, PERRL 3mm, EOMI B/L, face symmetric  Cardiovascular: RRR, normal S1 and S2   Respiratory: non-labored breathing, symmetric chest rise  GI: abd soft, NTND   Neuro: A&O x 3, follows commands  Strength BL UE 5/5  BL LE 0/5 to noxious stimuli  T11 sensory deficit   Vascular: Distal pulses 2+ x4, no calf edema or erythema  Wounds: Posterior spine wound C/D/I    DIET:  [] NPO  [X] Mechanical  [] Tube feeds    LABS:                        11.1   7.31  )-----------( 336      ( 01 Jan 2024 12:34 )             35.7     01-01    136  |  99  |  8   ----------------------------<  190<H>  4.6   |  26  |  0.45<L>    Ca    9.7      01 Jan 2024 12:34  Phos  3.6     01-01  Mg     2.0     01-01    Urinalysis Basic - ( 01 Jan 2024 12:34 )    Color: x / Appearance: x / SG: x / pH: x  Gluc: 190 mg/dL / Ketone: x  / Bili: x / Urobili: x   Blood: x / Protein: x / Nitrite: x   Leuk Esterase: x / RBC: x / WBC x   Sq Epi: x / Non Sq Epi: x / Bacteria: x    Allergies    Crab (Pruritus)  No Known Drug Allergies    Intolerances    MEDICATIONS:  Antibiotics:  cefTRIAXone   IVPB 1000 milliGRAM(s) IV Intermittent every 24 hours    Neuro:  acetaminophen     Tablet .. 1000 milliGRAM(s) Oral every 8 hours PRN  diazepam    Tablet 5 milliGRAM(s) Oral every 8 hours  diphenhydrAMINE 25 milliGRAM(s) Oral every 12 hours PRN  DULoxetine 60 milliGRAM(s) Oral daily  gabapentin 800 milliGRAM(s) Oral every 8 hours  ondansetron Injectable 4 milliGRAM(s) IV Push every 6 hours PRN  oxyCODONE    IR 10 milliGRAM(s) Oral every 4 hours PRN  oxyCODONE    IR 15 milliGRAM(s) Oral every 4 hours PRN    Anticoagulation:  apixaban 5 milliGRAM(s) Oral every 12 hours    OTHER:  benzocaine 20% Spray 1 Spray(s) Mucosal three times a day PRN  benzocaine/menthol Lozenge 1 Lozenge Oral every 2 hours PRN  bisacodyl 5 milliGRAM(s) Oral at bedtime  chlorhexidine 2% Cloths 1 Application(s) Topical <User Schedule>  influenza  Vaccine (HIGH DOSE) 0.7 milliLiter(s) IntraMuscular once  lidocaine   4% Patch 1 Patch Transdermal daily  midodrine 5 milliGRAM(s) Oral every 8 hours  Morphine 1 milliGRAM / 1 mL 20 milliLITERS 20 milliLiter(s) IntraThecal Continuous Pump  naloxegol 25 milliGRAM(s) Oral daily  naloxone Injectable 0.1 milliGRAM(s) IV Push every 3 minutes PRN  pantoprazole    Tablet 40 milliGRAM(s) Oral before breakfast  polyethylene glycol 3350 17 Gram(s) Oral two times a day  senna 2 Tablet(s) Oral at bedtime  simvastatin 40 milliGRAM(s) Oral at bedtime    IVF:  multivitamin 1 Tablet(s) Oral daily    CULTURES:  Culture Results:   >100,000 CFU/ml Escherichia coli  Susceptibility to follow. (12-31 @ 09:22)    ASSESSMENT:  69 yo female with Hx HTN, HLD, DM, CVA x 3 (last in 2016 with residual R hand weakness), GAMALIEL not using CPAP, Emphysema, ex-25 pack year smoker now restarted, chronic constipation on Movantik, chronic urinary retention (SC at home), b/l CTS s/p release, s/p Intrathecal Morphine pump for pain, with chronic neck and back pain worsening for years s/p multiple spinal surgeries including laminectomies and fusion of cervical, thoracic and lumbar spine, s/p T3-L1 revision of prior fusion (with  on 8/17/22). Xray 11/16/23 showing broken rods/displacement. Now s/p C2-S2 instrumentation and fusion (12/5). S/p T9-L2 decompression w/ durotomy with repair (12/10/23). KAREEM grade A.     S12.9XXA    Handoff    MEWS Score    HTN (hypertension)    Back pain    Hypertension    SS (spinal stenosis)    High cholesterol    Stroke    H/O carpal tunnel syndrome    H/O carpal tunnel syndrome    Chronic GERD    History of chronic constipation    Seizure    Urinary incontinence    Pre-diabetes    Acute UTI    Anxiety    Anxiety and depression    Arthritis    Eczema    External hemorrhoids    H/O kyphosis    Multiple sclerosis    Pancreas cyst    Scoliosis    Wheelchair dependent    Cervical pseudoarthrosis    Right shoulder pain    Cervical spondylosis    Chronic headaches    Chronic LUQ pain    Chronic UTI    Degenerative joint disease    H/O low back pain    Lumbosacral spondylosis    COPD (chronic obstructive pulmonary disease)    Back pain    Back pain    Spinal cord injury, thoracic (T7-T12)    Back pain    Spinal cord injury of thoracic region without bone injury    Delirium    Revision of fusion of thoracic spine    Thoracic back pain    Revision of posterolateral fusion of lumbar spine    Revision of fusion of thoracic spine    Decompression, spinal cord, thoracic, posterolateral approach    Cervical disc disease    H/O Spinal surgery    H/O breast surgery    S/P cervical discectomy    Previous back surgery    H/O shoulder surgery    H/O total shoulder replacement, right    Room Service Assist    Room Service Assist    Revision of posterolateral fusion of lumbar spine    Decompression, spinal cord, thoracic, posterolateral approach    HTN (hypertension)    HLD (hyperlipidemia)    CVA (cerebrovascular accident)    GAMALIEL (obstructive sleep apnea)    H/O emphysema    Smoking history    Chronic constipation    Urinary incontinence    Pulmonary embolism    Diabetes    GERD (gastroesophageal reflux disease)    SysAdmin_VstLnk    PLAN:  Neuro:  - Neuro/vitals q8  - pain control: modified ERAS, IT morphine pump, oxycodone 10/15 prn, valium and home gabapentin 800 TID resumed  - Continue home cymbalta 60mg daily   - CT thoracic spine 12/6: postop changes    Cardio:  - MAP > 65   - midodrine 5mg q8h   - C/w home simvastatin  - Sinus tachycardia - likely 2/2 PE (12/19)      Pulm:  - RA   - hx GAMALIEL, no CPAP at home  - CTA 12/19 w segmental and subsegmental PE in R upper lobe    GI:  - CCD   - Bowel regimen, home movantik 25 mg, last BM 12/31  - CT A/P 12/13 = impacted stool   - GERD: continue home protonix    Renal:  - IVL/Straight cath q6  - Patient not interested in suprapubic catheter placement    Endo:  - A1c: 7    Heme:  - SCDs DVT ppx  - LE dopplers 12/19: negative for DVT  - PE tx: Eliquis 5mg BID  - 1u PRBC intraop 12/10, 1u PRBC 12/12    ID:  - afebrile  - +UTI 12/31 +E. coli, ceftriaxone (12/31-1/3)    Dispo:  - SDU status, full code, pending AR vs TAIWO    D/w Dr. Luz  HPI:  66 y/o female with PMHx HTN, HLD, CVA x 3 (last was 2016 with right hand weakness residual), GAMALIEL not using CPAP, Emphysema, former 25 pack year smoker restarted this week of surgery, Chronic Constipation on Movantik, Urinary Incontinence chronically self straight cath at home, chronic UTI, Breast Implant Rupture with Chronic Leak repair 10/2023, B/L CTS s/p release, s/p Intrathecal Morphine pump for pain, with chronic neck and back pain worsening for years s/p multiple spinal surgeries including laminectomies and fusion of cervical, thoracic and lumbar spine initially done in 2009 and recently in 2019 and 2020 at [facility] by Dr. Trinidad, s/p T3-L1 revision of prior fusion (with Dr. Luz on 8/17/22). Outpatient Xray 11/16/2023 showing broken rods/displacement. Presents for elective C2-S2 instrumentation and fusion. Pt endorses weakness of b/l LE and burning pain in b/l lower extremities radiating to both feet,  (04 Dec 2023 15:44)    HOSPITAL COURSE:  12/4: Admitted for spinal fusion d/t damaged hardware.   12/5: Neuro stable. POD0 C2-S2 instrumentation/fusion (intraop b/l LE motor loss). on parminder for MAP>90  12/6: POD1. KAMRAN o/n neuro stable. remains intubated. Extubated 6am. Precedex gtt prn. Remains on parminder gtt for MAP goal >90. Valium made prn. CT thoracic spine bc MRI unavailable. Attempted NGT, unsuccessful (resistance @ 35).   12/7: POD2 Given IV dilaudid 0.5mg for pain. Neuro exam stable. SQL started tongiht. Increased precedex to 1.0. Responds well to valium. Consulting psych given paranoia ?homicidal statements. Passed bedside dysphagia and tolerating PO meds. MRI complete, f/u read.   12/8: POD3 Pt reporting incisional pain, given dilaudid 0.5mg IV x2. Pt appearing anxious, given xanax 1mg. Neuro exam unchanged. Pain regimen increased to oxy 15/30 mg, pending further recs. Given 1 L bolus for tachycardia. Trops negative. 2 HMV drains removed.   12/9: POD4, KAMRAN, CT myelogram today showing block at T12-L1, OR tomorrow  12/10: POD5, KAMRAN, OR today for exploration of fusion, possible decompression T9-L2, revision instrumentation  POD 0 T9-L2 decompression. Pt returned from OR intubated. 50mcg fentanyl IVP x 2 for pain control and HTN. Magnesium repleted. Precedex gtt started for sedation. Decreased FiO2 to 40%. 1L bolus for soft MAP. Dc'd propofol and started levo gtt for MAP >90.   12/11: POD 6, POD 1. Extubated, satting well on RA. Resumed home valium and gabapentin. Castillo d/c'd, pending TOV. Seroquel started for agitation, R IJ removed, LUE double midline placed. Started SQL.   12/12: POD7, POD2, given 500 cc bolus and started on phenylephrine for MAP goal >90. Started midodrine 10mg q8h. Cardura dc'd d/t hypotension. Given 250cc of albumin for increasing pressor requirements. Given lactulose, pending BM. Hgb 7.6<8.3, given 1u PRBC. Protected sleep time. Urology consulted for possible suprapubic cath, recommend self intermittent cath is optimal.   12/13: POD8, POD3 Midodrine increased to 15q8. Pt refused to take the extra 5mg midodrin ordered. Neuro exam stable. hgb 10 from 9.4 after 1u pRBCs, Pt refused AM meds, given in late morning, CTAP ordered for L sided abd pain, fleet enema, restarted home movantik and ordere fleet enema for constipation. YIFAN and HMV dc'd. 250cc albumin x1  12/14: POD9, POD4, KAMRAN overnight, neuro stable. MAP goal liberalized to > 65. Stepdown status.   12/15: POD10, POD5, KAMRAN overnight  12/16: POD11, POD6, KAMRAN overnight, neuro stable, 500 cc bolus NS given for tachycardia. Aquacell changed. CXR, pro-bnp negative for pulmonary edema. Given additional 500cc bolus.  12/17: POD 12. POD 7. KAMRAN overnight, neuro stable.   12/18: POD 13/8. Incontinent x2 today.   12/19: KAMRAN. Persistent tachycardia, r/o PE. CTPE protocol ordered. Refused all afternoon meds except oxy 30. Per Dr. Cowart (rad) CT chest w segmental and subsegmental PE in R upper lobe. Dopplers negative for DVT, CT PE shows segmental and subsegmental PE in R upper lobe. Per Dr. Augustine and Dr. Luz, Will start eliquis pending pain management approval. Per Dr. Mclaughlin (pain mgmt) can start eliquis 10mg BIDx 7 days. Dc'd lovenox.  12/20: POD 10 T9-L2 Decompression. Hemodynamically stable. Pending rehab. PT today.   12/21: POD 11 T9-L2 Decompression. neruo/hemodynamically stable, pending rehab.  12/22: POD 17/12, pending rehab. Aquacel dressing changed.   12/23: POD 18/13, pending rehab.   12/24: POD 19/14, pending rehab.   12/25: POD 20/15, pending rehab.   12/26: POD 21/16, pending rehab. Pain pump refilled.   12/27: POD 22/17. pending rehab.   12/28: POD 23/18.   12/29: POD 24/19. KAMRAN o/n. No AR beds, TAIWO  12/30: POD 25/20. Pending TAIWO   12/31: POD 26/21. Pending TAIWO. Midrodrine weaned to 10q8h. UA positive for UTI, f/u culture, started on ceftriaxone x 3 days.   1/1: POD 27/22. KAMRAN overnight. Neuro stable. Wean midodrine to 5q8    OVERNIGHT EVENTS: POD 28/23. KAMRAN overnight, neuro stable.     Vital Signs Last 24 Hrs  T(C): 36.7 (01 Jan 2024 20:40), Max: 36.8 (01 Jan 2024 09:54)  T(F): 98 (01 Jan 2024 20:40), Max: 98.2 (01 Jan 2024 09:54)  HR: 98 (01 Jan 2024 20:40) (86 - 103)  BP: 110/77 (01 Jan 2024 20:40) (107/66 - 121/64)  BP(mean): --  RR: 18 (01 Jan 2024 20:40) (16 - 19)  SpO2: 95% (01 Jan 2024 20:40) (92% - 96%)    Parameters below as of 01 Jan 2024 20:40  Patient On (Oxygen Delivery Method): room air    I&O's Summary    31 Dec 2023 07:01  -  01 Jan 2024 07:00  --------------------------------------------------------  IN: 0 mL / OUT: 1980 mL / NET: -1980 mL    01 Jan 2024 07:01  -  02 Jan 2024 00:12  --------------------------------------------------------  IN: 360 mL / OUT: 600 mL / NET: -240 mL    PHYSICAL EXAM:  General: Pt is sitting up comfortably in bed, in NAD, on RA  HEENT: CN II-XII grossly intact, PERRL 3mm, EOMI B/L, face symmetric  Cardiovascular: RRR, normal S1 and S2   Respiratory: non-labored breathing, symmetric chest rise  GI: abd soft, NTND   Neuro: A&O x 3, follows commands  Strength BL UE 5/5  BL LE 0/5 to noxious stimuli  T11 sensory deficit   Vascular: Distal pulses 2+ x4, no calf edema or erythema  Wounds: Posterior spine wound C/D/I    DIET:  [] NPO  [X] Mechanical  [] Tube feeds    LABS:                        11.1   7.31  )-----------( 336      ( 01 Jan 2024 12:34 )             35.7     01-01    136  |  99  |  8   ----------------------------<  190<H>  4.6   |  26  |  0.45<L>    Ca    9.7      01 Jan 2024 12:34  Phos  3.6     01-01  Mg     2.0     01-01    Urinalysis Basic - ( 01 Jan 2024 12:34 )    Color: x / Appearance: x / SG: x / pH: x  Gluc: 190 mg/dL / Ketone: x  / Bili: x / Urobili: x   Blood: x / Protein: x / Nitrite: x   Leuk Esterase: x / RBC: x / WBC x   Sq Epi: x / Non Sq Epi: x / Bacteria: x    Allergies    Crab (Pruritus)  No Known Drug Allergies    Intolerances    MEDICATIONS:  Antibiotics:  cefTRIAXone   IVPB 1000 milliGRAM(s) IV Intermittent every 24 hours    Neuro:  acetaminophen     Tablet .. 1000 milliGRAM(s) Oral every 8 hours PRN  diazepam    Tablet 5 milliGRAM(s) Oral every 8 hours  diphenhydrAMINE 25 milliGRAM(s) Oral every 12 hours PRN  DULoxetine 60 milliGRAM(s) Oral daily  gabapentin 800 milliGRAM(s) Oral every 8 hours  ondansetron Injectable 4 milliGRAM(s) IV Push every 6 hours PRN  oxyCODONE    IR 10 milliGRAM(s) Oral every 4 hours PRN  oxyCODONE    IR 15 milliGRAM(s) Oral every 4 hours PRN    Anticoagulation:  apixaban 5 milliGRAM(s) Oral every 12 hours    OTHER:  benzocaine 20% Spray 1 Spray(s) Mucosal three times a day PRN  benzocaine/menthol Lozenge 1 Lozenge Oral every 2 hours PRN  bisacodyl 5 milliGRAM(s) Oral at bedtime  chlorhexidine 2% Cloths 1 Application(s) Topical <User Schedule>  influenza  Vaccine (HIGH DOSE) 0.7 milliLiter(s) IntraMuscular once  lidocaine   4% Patch 1 Patch Transdermal daily  midodrine 5 milliGRAM(s) Oral every 8 hours  Morphine 1 milliGRAM / 1 mL 20 milliLITERS 20 milliLiter(s) IntraThecal Continuous Pump  naloxegol 25 milliGRAM(s) Oral daily  naloxone Injectable 0.1 milliGRAM(s) IV Push every 3 minutes PRN  pantoprazole    Tablet 40 milliGRAM(s) Oral before breakfast  polyethylene glycol 3350 17 Gram(s) Oral two times a day  senna 2 Tablet(s) Oral at bedtime  simvastatin 40 milliGRAM(s) Oral at bedtime    IVF:  multivitamin 1 Tablet(s) Oral daily    CULTURES:  Culture Results:   >100,000 CFU/ml Escherichia coli  Susceptibility to follow. (12-31 @ 09:22)    ASSESSMENT:  69 yo female with Hx HTN, HLD, DM, CVA x 3 (last in 2016 with residual R hand weakness), GAMALIEL not using CPAP, Emphysema, ex-25 pack year smoker now restarted, chronic constipation on Movantik, chronic urinary retention (SC at home), b/l CTS s/p release, s/p Intrathecal Morphine pump for pain, with chronic neck and back pain worsening for years s/p multiple spinal surgeries including laminectomies and fusion of cervical, thoracic and lumbar spine, s/p T3-L1 revision of prior fusion (with  on 8/17/22). Xray 11/16/23 showing broken rods/displacement. Now s/p C2-S2 instrumentation and fusion (12/5). S/p T9-L2 decompression w/ durotomy with repair (12/10/23). KAREEM grade A.     S12.9XXA    Handoff    MEWS Score    HTN (hypertension)    Back pain    Hypertension    SS (spinal stenosis)    High cholesterol    Stroke    H/O carpal tunnel syndrome    H/O carpal tunnel syndrome    Chronic GERD    History of chronic constipation    Seizure    Urinary incontinence    Pre-diabetes    Acute UTI    Anxiety    Anxiety and depression    Arthritis    Eczema    External hemorrhoids    H/O kyphosis    Multiple sclerosis    Pancreas cyst    Scoliosis    Wheelchair dependent    Cervical pseudoarthrosis    Right shoulder pain    Cervical spondylosis    Chronic headaches    Chronic LUQ pain    Chronic UTI    Degenerative joint disease    H/O low back pain    Lumbosacral spondylosis    COPD (chronic obstructive pulmonary disease)    Back pain    Back pain    Spinal cord injury, thoracic (T7-T12)    Back pain    Spinal cord injury of thoracic region without bone injury    Delirium    Revision of fusion of thoracic spine    Thoracic back pain    Revision of posterolateral fusion of lumbar spine    Revision of fusion of thoracic spine    Decompression, spinal cord, thoracic, posterolateral approach    Cervical disc disease    H/O Spinal surgery    H/O breast surgery    S/P cervical discectomy    Previous back surgery    H/O shoulder surgery    H/O total shoulder replacement, right    Room Service Assist    Room Service Assist    Revision of posterolateral fusion of lumbar spine    Decompression, spinal cord, thoracic, posterolateral approach    HTN (hypertension)    HLD (hyperlipidemia)    CVA (cerebrovascular accident)    GAMALIEL (obstructive sleep apnea)    H/O emphysema    Smoking history    Chronic constipation    Urinary incontinence    Pulmonary embolism    Diabetes    GERD (gastroesophageal reflux disease)    SysAdmin_VstLnk    PLAN:  Neuro:  - Neuro/vitals q8  - pain control: modified ERAS, IT morphine pump, oxycodone 10/15 prn, valium and home gabapentin 800 TID resumed  - Continue home cymbalta 60mg daily   - CT thoracic spine 12/6: postop changes    Cardio:  - MAP > 65   - midodrine 5mg q8h   - C/w home simvastatin  - Sinus tachycardia - likely 2/2 PE (12/19)      Pulm:  - RA   - hx GAMALIEL, no CPAP at home  - CTA 12/19 w segmental and subsegmental PE in R upper lobe    GI:  - CCD   - Bowel regimen, home movantik 25 mg, last BM 12/31  - CT A/P 12/13 = impacted stool   - GERD: continue home protonix    Renal:  - IVL/Straight cath q6  - Patient not interested in suprapubic catheter placement    Endo:  - A1c: 7    Heme:  - SCDs DVT ppx  - LE dopplers 12/19: negative for DVT  - PE tx: Eliquis 5mg BID  - 1u PRBC intraop 12/10, 1u PRBC 12/12    ID:  - afebrile  - +UTI 12/31 +E. coli, ceftriaxone (12/31-1/3)    Dispo:  - SDU status, full code, pending AR vs TAIWO    D/w Dr. Luz

## 2024-01-02 NOTE — PROGRESS NOTE ADULT - SUBJECTIVE AND OBJECTIVE BOX
SUBJECTIVE: NAEON. On tele - occasional PVC. Patient c/o persistent L shoulder pain. Also perseverating on obtaining her "keys" and what will happen once she goes to rehab.     MEDICATIONS:  MEDICATIONS  (STANDING):  apixaban 5 milliGRAM(s) Oral every 12 hours  bisacodyl 5 milliGRAM(s) Oral at bedtime  cefTRIAXone   IVPB 1000 milliGRAM(s) IV Intermittent every 24 hours  chlorhexidine 2% Cloths 1 Application(s) Topical <User Schedule>  diazepam    Tablet 5 milliGRAM(s) Oral every 8 hours  DULoxetine 60 milliGRAM(s) Oral daily  gabapentin 800 milliGRAM(s) Oral every 8 hours  influenza  Vaccine (HIGH DOSE) 0.7 milliLiter(s) IntraMuscular once  lidocaine   4% Patch 1 Patch Transdermal daily  midodrine 5 milliGRAM(s) Oral every 8 hours  Morphine 1 milliGRAM / 1 mL 20 milliLITERS 20 milliLiter(s) IntraThecal Continuous Pump  multivitamin 1 Tablet(s) Oral daily  naloxegol 25 milliGRAM(s) Oral daily  pantoprazole    Tablet 40 milliGRAM(s) Oral before breakfast  polyethylene glycol 3350 17 Gram(s) Oral two times a day  senna 2 Tablet(s) Oral at bedtime  simvastatin 40 milliGRAM(s) Oral at bedtime    MEDICATIONS  (PRN):  acetaminophen     Tablet .. 1000 milliGRAM(s) Oral every 8 hours PRN Temp greater or equal to 38.5C (101.3F), Mild Pain (1 - 3)  benzocaine 20% Spray 1 Spray(s) Mucosal three times a day PRN Sore throat  benzocaine/menthol Lozenge 1 Lozenge Oral every 2 hours PRN Sore Throat  diphenhydrAMINE 25 milliGRAM(s) Oral every 12 hours PRN Rash and/or Itching  naloxone Injectable 0.1 milliGRAM(s) IV Push every 3 minutes PRN For ANY of the following changes in patient status:  A. RR LESS THAN 10 breaths per minute, B. Oxygen saturation LESS THAN 90%, C. Sedation score of 6  ondansetron Injectable 4 milliGRAM(s) IV Push every 6 hours PRN Nausea  oxyCODONE    IR 10 milliGRAM(s) Oral every 4 hours PRN Moderate Pain (4 - 6)  oxyCODONE    IR 15 milliGRAM(s) Oral every 4 hours PRN Severe Pain (7 - 10)  sodium chloride 0.9% lock flush 10 milliLiter(s) IV Push every 1 hour PRN Pre/post blood products, medications, blood draw, and to maintain line patency      Allergies    Crab (Pruritus)  No Known Drug Allergies    Intolerances        OBJECTIVE:  Vital Signs Last 24 Hrs  T(C): 36.2 (02 Jan 2024 14:06), Max: 36.9 (02 Jan 2024 00:00)  T(F): 97.2 (02 Jan 2024 14:06), Max: 98.4 (02 Jan 2024 00:00)  HR: 114 (02 Jan 2024 14:06) (86 - 114)  BP: 120/84 (02 Jan 2024 14:06) (102/65 - 120/84)  BP(mean): --  RR: 20 (02 Jan 2024 14:06) (18 - 20)  SpO2: 97% (02 Jan 2024 14:06) (92% - 100%)    Parameters below as of 02 Jan 2024 14:06  Patient On (Oxygen Delivery Method): room air      I&O's Summary    01 Jan 2024 07:01  -  02 Jan 2024 07:00  --------------------------------------------------------  IN: 360 mL / OUT: 1100 mL / NET: -740 mL    02 Jan 2024 07:01  -  02 Jan 2024 14:54  --------------------------------------------------------  IN: 540 mL / OUT: 875 mL / NET: -335 mL        PHYSICAL EXAM:  Gen: appears stated age, resting comfortably, NAD  HEENT: NCAT, MMM, clear OP  Neck: supple  CV: RRR, no m/r/g, peripheral pulses 2+  Pulm: CTAB, no increased work of breathing, no rales/rhonchi  Abd: ND, NT, no rebound or guarding  Skin: warm and dry  Ext: non-tender, no edema  Neuro: AOx3, speaking in full sentences, BLE 0/5  Psych: affect and behavior appropriate, pleasant at time of interview    LABS:                        11.1   7.31  )-----------( 336      ( 01 Jan 2024 12:34 )             35.7     01-01    136  |  99  |  8   ----------------------------<  190<H>  4.6   |  26  |  0.45<L>    Ca    9.7      01 Jan 2024 12:34  Phos  3.6     01-01  Mg     2.0     01-01          CAPILLARY BLOOD GLUCOSE        Urinalysis Basic - ( 01 Jan 2024 12:34 )    Color: x / Appearance: x / SG: x / pH: x  Gluc: 190 mg/dL / Ketone: x  / Bili: x / Urobili: x   Blood: x / Protein: x / Nitrite: x   Leuk Esterase: x / RBC: x / WBC x   Sq Epi: x / Non Sq Epi: x / Bacteria: x        MICRODATA:    Culture - Urine (collected 31 Dec 2023 09:22)  Source: Catheterized None  Final Report (02 Jan 2024 09:36):    >100,000 CFU/ml Escherichia coli  Organism: Escherichia coli (02 Jan 2024 09:36)  Organism: Escherichia coli (02 Jan 2024 09:36)    Urinalysis with Rflx Culture (collected 31 Dec 2023 09:22)        RADIOLOGY/OTHER STUDIES:

## 2024-01-03 LAB
GLUCOSE BLDC GLUCOMTR-MCNC: 177 MG/DL — HIGH (ref 70–99)
GLUCOSE BLDC GLUCOMTR-MCNC: 177 MG/DL — HIGH (ref 70–99)

## 2024-01-03 PROCEDURE — 99232 SBSQ HOSP IP/OBS MODERATE 35: CPT

## 2024-01-03 PROCEDURE — 99231 SBSQ HOSP IP/OBS SF/LOW 25: CPT

## 2024-01-03 RX ORDER — CELECOXIB 200 MG/1
200 CAPSULE ORAL DAILY
Refills: 0 | Status: DISCONTINUED | OUTPATIENT
Start: 2024-01-03 | End: 2024-01-05

## 2024-01-03 RX ORDER — SODIUM CHLORIDE 9 MG/ML
1000 INJECTION INTRAMUSCULAR; INTRAVENOUS; SUBCUTANEOUS ONCE
Refills: 0 | Status: COMPLETED | OUTPATIENT
Start: 2024-01-03 | End: 2024-01-03

## 2024-01-03 RX ADMIN — OXYCODONE HYDROCHLORIDE 15 MILLIGRAM(S): 5 TABLET ORAL at 19:10

## 2024-01-03 RX ADMIN — OXYCODONE HYDROCHLORIDE 15 MILLIGRAM(S): 5 TABLET ORAL at 03:29

## 2024-01-03 RX ADMIN — GABAPENTIN 800 MILLIGRAM(S): 400 CAPSULE ORAL at 18:13

## 2024-01-03 RX ADMIN — LIDOCAINE 1 PATCH: 4 CREAM TOPICAL at 18:53

## 2024-01-03 RX ADMIN — MIDODRINE HYDROCHLORIDE 5 MILLIGRAM(S): 2.5 TABLET ORAL at 06:24

## 2024-01-03 RX ADMIN — APIXABAN 5 MILLIGRAM(S): 2.5 TABLET, FILM COATED ORAL at 18:13

## 2024-01-03 RX ADMIN — MIDODRINE HYDROCHLORIDE 5 MILLIGRAM(S): 2.5 TABLET ORAL at 14:24

## 2024-01-03 RX ADMIN — MIDODRINE HYDROCHLORIDE 5 MILLIGRAM(S): 2.5 TABLET ORAL at 22:17

## 2024-01-03 RX ADMIN — OXYCODONE HYDROCHLORIDE 15 MILLIGRAM(S): 5 TABLET ORAL at 10:30

## 2024-01-03 RX ADMIN — OXYCODONE HYDROCHLORIDE 15 MILLIGRAM(S): 5 TABLET ORAL at 09:30

## 2024-01-03 RX ADMIN — Medication 5 MILLIGRAM(S): at 22:17

## 2024-01-03 RX ADMIN — CEFTRIAXONE 100 MILLIGRAM(S): 500 INJECTION, POWDER, FOR SOLUTION INTRAMUSCULAR; INTRAVENOUS at 16:06

## 2024-01-03 RX ADMIN — SIMVASTATIN 40 MILLIGRAM(S): 20 TABLET, FILM COATED ORAL at 22:17

## 2024-01-03 RX ADMIN — SODIUM CHLORIDE 500 MILLILITER(S): 9 INJECTION INTRAMUSCULAR; INTRAVENOUS; SUBCUTANEOUS at 12:27

## 2024-01-03 RX ADMIN — OXYCODONE HYDROCHLORIDE 15 MILLIGRAM(S): 5 TABLET ORAL at 18:13

## 2024-01-03 RX ADMIN — GABAPENTIN 800 MILLIGRAM(S): 400 CAPSULE ORAL at 09:33

## 2024-01-03 RX ADMIN — CELECOXIB 200 MILLIGRAM(S): 200 CAPSULE ORAL at 12:30

## 2024-01-03 RX ADMIN — DULOXETINE HYDROCHLORIDE 60 MILLIGRAM(S): 30 CAPSULE, DELAYED RELEASE ORAL at 12:31

## 2024-01-03 RX ADMIN — LIDOCAINE 1 PATCH: 4 CREAM TOPICAL at 00:20

## 2024-01-03 RX ADMIN — Medication 1 TABLET(S): at 12:31

## 2024-01-03 RX ADMIN — LIDOCAINE 1 PATCH: 4 CREAM TOPICAL at 12:29

## 2024-01-03 RX ADMIN — OXYCODONE HYDROCHLORIDE 15 MILLIGRAM(S): 5 TABLET ORAL at 04:29

## 2024-01-03 RX ADMIN — GABAPENTIN 800 MILLIGRAM(S): 400 CAPSULE ORAL at 02:33

## 2024-01-03 RX ADMIN — Medication 5 MILLIGRAM(S): at 06:25

## 2024-01-03 RX ADMIN — Medication 5 MILLIGRAM(S): at 22:18

## 2024-01-03 RX ADMIN — CHLORHEXIDINE GLUCONATE 1 APPLICATION(S): 213 SOLUTION TOPICAL at 06:28

## 2024-01-03 RX ADMIN — PANTOPRAZOLE SODIUM 40 MILLIGRAM(S): 20 TABLET, DELAYED RELEASE ORAL at 06:24

## 2024-01-03 RX ADMIN — Medication 5 MILLIGRAM(S): at 13:47

## 2024-01-03 RX ADMIN — APIXABAN 5 MILLIGRAM(S): 2.5 TABLET, FILM COATED ORAL at 06:24

## 2024-01-03 NOTE — PROGRESS NOTE ADULT - ASSESSMENT
68 YOF with PMH of HTN, HLD, multiple strokes (residual R hand weakness), GAMALIEL non-adherent with CPAP, COPD, chronic neck and back pain (s/p multiple surgeries) with intrathecal pump and functional paraplegia, chronic constipation admitted for revision of fusion of thoracic and lumbar spine. Course c/b RUL PE, E coli UTI, ?hypotension on midodrine    Chronic neck and back pain c/b paraplegia  Post operative state  - s/p OR with Dr. Luz 12/5 for revision C2-S2 instrumentation and fusion  - s/p OR with Dr. Luz 12/10 for T9-L2 decompression with durotomy repair  - pain control per NSGY (morphine intrathecal pump with PRN oxy, gabapentin, diazepam, duloxetine)  - recommend cont aggressive bowel regimen  - early ambulation and OOBTC as tolerated    Chronic constipation  - cont aggressive bowel regimen: senna 2 tabs qhs, miralax BID, bisacodyl 5mg qhs, naloxegol 25mg daily  - if persistent constipation, consider adding suppository or enema  - if develops N/V or signs of obstruction, would hold naloxegol and obtain imaging     Complicated E. coli UTI  - dysuria with leukocytosis, UC with E coli  - started on ceftriaxone 12/31, cont for 5-day course (EOT 1/4)  - if discharged to rehab, can switch to PO (cefazolin, cefpodoxime, or TMP-SMX)    Hypotension  - cont midodrine taper, reduced to 5mg TID 1/3  - would not reduce midodrine dose today, recommend 1L IVF bolus over two hours    Acute PE  - CTA 12/19 with segmental and subsegmental RUL PE, likely provoked in setting of surgery and immobility  - apixaban 5mg BID for 3-6mo  - outpatient PCP follow up    History of multiple stroke  - resume aspirin for secondary prevention once clear from surgical standpoint  - cont atorvastatin   - pending results of outpatient cardiac monitoring for eval of occult AFIB    Dispo: acute rehab pending auth    Plan discussed with primary team.

## 2024-01-03 NOTE — PROGRESS NOTE ADULT - SUBJECTIVE AND OBJECTIVE BOX
HPI:  66 y/o female with PMHx HTN, HLD, CVA x 3 (last was 2016 with right hand weakness residual), GAMALIEL not using CPAP, Emphysema, former 25 pack year smoker restarted this week of surgery, Chronic Constipation on Movantik, Urinary Incontinence chronically self straight cath at home, chronic UTI, Breast Implant Rupture with Chronic Leak repair 10/2023, B/L CTS s/p release, s/p Intrathecal Morphine pump for pain, with chronic neck and back pain worsening for years s/p multiple spinal surgeries including laminectomies and fusion of cervical, thoracic and lumbar spine initially done in 2009 and recently in 2019 and 2020 at [facility] by Dr. Trinidad, s/p T3-L1 revision of prior fusion (with Dr. Luz on 8/17/22). Outpatient Xray 11/16/2023 showing broken rods/displacement. Presents for elective C2-S2 instrumentation and fusion. Pt endorses weakness of b/l LE and burning pain in b/l lower extremities radiating to both feet,  (04 Dec 2023 15:44)    HOSPITAL COURSE:  12/4: Admitted for spinal fusion d/t damaged hardware.   12/5: Neuro stable. POD0 C2-S2 instrumentation/fusion (intraop b/l LE motor loss). on parminder for MAP>90  12/6: POD1. KAMRAN o/n neuro stable. remains intubated. Extubated 6am. Precedex gtt prn. Remains on parminder gtt for MAP goal >90. Valium made prn. CT thoracic spine bc MRI unavailable. Attempted NGT, unsuccessful (resistance @ 35).   12/7: POD2 Given IV dilaudid 0.5mg for pain. Neuro exam stable. SQL started tongiht. Increased precedex to 1.0. Responds well to valium. Consulting psych given paranoia ?homicidal statements. Passed bedside dysphagia and tolerating PO meds. MRI complete, f/u read.   12/8: POD3 Pt reporting incisional pain, given dilaudid 0.5mg IV x2. Pt appearing anxious, given xanax 1mg. Neuro exam unchanged. Pain regimen increased to oxy 15/30 mg, pending further recs. Given 1 L bolus for tachycardia. Trops negative. 2 HMV drains removed.   12/9: POD4, KAMRAN, CT myelogram today showing block at T12-L1, OR tomorrow  12/10: POD5, KAMRAN, OR today for exploration of fusion, possible decompression T9-L2, revision instrumentation  POD 0 T9-L2 decompression. Pt returned from OR intubated. 50mcg fentanyl IVP x 2 for pain control and HTN. Magnesium repleted. Precedex gtt started for sedation. Decreased FiO2 to 40%. 1L bolus for soft MAP. Dc'd propofol and started levo gtt for MAP >90.   12/11: POD 6, POD 1. Extubated, satting well on RA. Resumed home valium and gabapentin. Castillo d/c'd, pending TOV. Seroquel started for agitation, R IJ removed, LUE double midline placed. Started SQL.   12/12: POD7, POD2, given 500 cc bolus and started on phenylephrine for MAP goal >90. Started midodrine 10mg q8h. Cardura dc'd d/t hypotension. Given 250cc of albumin for increasing pressor requirements. Given lactulose, pending BM. Hgb 7.6<8.3, given 1u PRBC. Protected sleep time. Urology consulted for possible suprapubic cath, recommend self intermittent cath is optimal.   12/13: POD8, POD3 Midodrine increased to 15q8. Pt refused to take the extra 5mg midodrin ordered. Neuro exam stable. hgb 10 from 9.4 after 1u pRBCs, Pt refused AM meds, given in late morning, CTAP ordered for L sided abd pain, fleet enema, restarted home movantik and ordere fleet enema for constipation. YIFAN and HMV dc'd. 250cc albumin x1  12/14: POD9, POD4, KAMRAN overnight, neuro stable. MAP goal liberalized to > 65. Stepdown status.   12/15: POD10, POD5, KAMRAN overnight  12/16: POD11, POD6, KAMRAN overnight, neuro stable, 500 cc bolus NS given for tachycardia. Aquacell changed. CXR, pro-bnp negative for pulmonary edema. Given additional 500cc bolus.  12/17: POD 12. POD 7. KAMRAN overnight, neuro stable.   12/18: POD 13/8. Incontinent x2 today.   12/19: KAMRAN. Persistent tachycardia, r/o PE. CTPE protocol ordered. Refused all afternoon meds except oxy 30. Per Dr. Cowart (rad) CT chest w segmental and subsegmental PE in R upper lobe. Dopplers negative for DVT, CT PE shows segmental and subsegmental PE in R upper lobe. Per Dr. Augustine and Dr. Luz, Will start eliquis pending pain management approval. Per Dr. Mclaughlin (pain mgmt) can start eliquis 10mg BIDx 7 days. Dc'd lovenox.  12/20: POD 10 T9-L2 Decompression. Hemodynamically stable. Pending rehab. PT today.   12/21: POD 11 T9-L2 Decompression. neruo/hemodynamically stable, pending rehab.  12/22: POD 17/12, pending rehab. Aquacel dressing changed.   12/23: POD 18/13, pending rehab.   12/24: POD 19/14, pending rehab.   12/25: POD 20/15, pending rehab.   12/26: POD 21/16, pending rehab. Pain pump refilled.   12/27: POD 22/17. pending rehab.   12/28: POD 23/18.   12/29: POD 24/19. KAMRAN o/n. No AR beds, TAIWO  12/30: POD 25/20. Pending TAI[facility]   12/31: POD 26/21. Pending TAIWO. Midrodrine weaned to 10q8h. UA positive for UTI, f/u culture, started on ceftriaxone x 3 days.   1/1: POD 27/22. KAMRAN overnight. Neuro stable. Wean midodrine to 5q8.   1/2: POD 28/23. KAMRAN overnight, neuro stable. L shoulder xray completed for pain, negative. Medipore dressing changed.     OVERNIGHT EVENTS: POD 29/24. KAMRAN overnight, neuro stable.     Vital Signs Last 24 Hrs  T(C): 36.6 (02 Jan 2024 23:40), Max: 37 (02 Jan 2024 19:32)  T(F): 97.9 (02 Jan 2024 23:40), Max: 98.6 (02 Jan 2024 19:32)  HR: 101 (02 Jan 2024 23:40) (86 - 114)  BP: 112/74 (02 Jan 2024 23:40) (102/65 - 129/84)  BP(mean): --  RR: 16 (02 Jan 2024 23:40) (16 - 20)  SpO2: 96% (02 Jan 2024 23:40) (92% - 100%)    Parameters below as of 02 Jan 2024 23:40  Patient On (Oxygen Delivery Method): room air    I&O's Summary    01 Jan 2024 07:01  -  02 Jan 2024 07:00  --------------------------------------------------------  IN: 360 mL / OUT: 1100 mL / NET: -740 mL    02 Jan 2024 07:01  -  03 Jan 2024 00:41  --------------------------------------------------------  IN: 790 mL / OUT: 1675 mL / NET: -885 mL    PHYSICAL EXAM:  General: Pt is sitting up comfortably in bed, in NAD, on RA  HEENT: CN II-XII grossly intact, PERRL 3mm, EOMI B/L, face symmetric  Cardiovascular: RRR, normal S1 and S2   Respiratory: non-labored breathing, symmetric chest rise  GI: abd soft, NTND   Neuro: A&O x 3, follows commands  Strength BL UE 5/5  BL LE 0/5 to noxious stimuli  T11 sensory deficit   Vascular: Distal pulses 2+ x4, no calf edema or erythema  Wounds: Posterior spine wound C/D/I    DIET:  [] NPO  [X] Mechanical  [] Tube feeds    LABS:                        11.1   7.31  )-----------( 336      ( 01 Jan 2024 12:34 )             35.7     01-01    136  |  99  |  8   ----------------------------<  190<H>  4.6   |  26  |  0.45<L>    Ca    9.7      01 Jan 2024 12:34  Phos  3.6     01-01  Mg     2.0     01-01    Urinalysis Basic - ( 01 Jan 2024 12:34 )    Color: x / Appearance: x / SG: x / pH: x  Gluc: 190 mg/dL / Ketone: x  / Bili: x / Urobili: x   Blood: x / Protein: x / Nitrite: x   Leuk Esterase: x / RBC: x / WBC x   Sq Epi: x / Non Sq Epi: x / Bacteria: x    Allergies    Crab (Pruritus)  No Known Drug Allergies    Intolerances    MEDICATIONS:  Antibiotics:  cefTRIAXone   IVPB 1000 milliGRAM(s) IV Intermittent every 24 hours    Neuro:  acetaminophen     Tablet .. 1000 milliGRAM(s) Oral every 8 hours PRN  diazepam    Tablet 5 milliGRAM(s) Oral every 8 hours  diphenhydrAMINE 25 milliGRAM(s) Oral every 12 hours PRN  DULoxetine 60 milliGRAM(s) Oral daily  gabapentin 800 milliGRAM(s) Oral every 8 hours  ondansetron Injectable 4 milliGRAM(s) IV Push every 6 hours PRN  oxyCODONE    IR 10 milliGRAM(s) Oral every 4 hours PRN  oxyCODONE    IR 15 milliGRAM(s) Oral every 4 hours PRN    Anticoagulation:  apixaban 5 milliGRAM(s) Oral every 12 hours    OTHER:  benzocaine 20% Spray 1 Spray(s) Mucosal three times a day PRN  benzocaine/menthol Lozenge 1 Lozenge Oral every 2 hours PRN  bisacodyl 5 milliGRAM(s) Oral at bedtime  chlorhexidine 2% Cloths 1 Application(s) Topical <User Schedule>  influenza  Vaccine (HIGH DOSE) 0.7 milliLiter(s) IntraMuscular once  lidocaine   4% Patch 1 Patch Transdermal daily  midodrine 5 milliGRAM(s) Oral every 8 hours  Morphine 1 milliGRAM / 1 mL 20 milliLITERS 20 milliLiter(s) IntraThecal Continuous Pump  naloxegol 25 milliGRAM(s) Oral daily  naloxone Injectable 0.1 milliGRAM(s) IV Push every 3 minutes PRN  pantoprazole    Tablet 40 milliGRAM(s) Oral before breakfast  polyethylene glycol 3350 17 Gram(s) Oral two times a day  senna 2 Tablet(s) Oral at bedtime  simvastatin 40 milliGRAM(s) Oral at bedtime    IVF:  multivitamin 1 Tablet(s) Oral daily    CULTURES:  Culture Results:   >100,000 CFU/ml Escherichia coli (12-31 @ 09:22)    ASSESSMENT:  69 yo female with Hx HTN, HLD, DM, CVA x 3 (last in 2016 with residual R hand weakness), GAMALIEL not using CPAP, Emphysema, ex-25 pack year smoker now restarted, chronic constipation on Movantik, chronic urinary retention (SC at home), b/l CTS s/p release, s/p Intrathecal Morphine pump for pain, with chronic neck and back pain worsening for years s/p multiple spinal surgeries including laminectomies and fusion of cervical, thoracic and lumbar spine, s/p T3-L1 revision of prior fusion (with  on 8/17/22). Xray 11/16/23 showing broken rods/displacement. Now s/p C2-S2 instrumentation and fusion (12/5). S/p T9-L2 decompression w/ durotomy with repair (12/10/23). KAREEM grade A.     S12.9XXA    Handoff    MEWS Score    HTN (hypertension)    Back pain    Hypertension    SS (spinal stenosis)    High cholesterol    Stroke    H/O carpal tunnel syndrome    H/O carpal tunnel syndrome    Chronic GERD    History of chronic constipation    Seizure    Urinary incontinence    Pre-diabetes    Acute UTI    Anxiety    Anxiety and depression    Arthritis    Eczema    External hemorrhoids    H/O kyphosis    Multiple sclerosis    Pancreas cyst    Scoliosis    Wheelchair dependent    Cervical pseudoarthrosis    Right shoulder pain    Cervical spondylosis    Chronic headaches    Chronic LUQ pain    Chronic UTI    Degenerative joint disease    H/O low back pain    Lumbosacral spondylosis    COPD (chronic obstructive pulmonary disease)    Back pain    Back pain    Spinal cord injury, thoracic (T7-T12)    Back pain    Spinal cord injury of thoracic region without bone injury    Delirium    Revision of fusion of thoracic spine    Thoracic back pain    Revision of posterolateral fusion of lumbar spine    Revision of fusion of thoracic spine    Decompression, spinal cord, thoracic, posterolateral approach    Cervical disc disease    H/O Spinal surgery    H/O breast surgery    S/P cervical discectomy    Previous back surgery    H/O shoulder surgery    H/O total shoulder replacement, right    Room Service Assist    Room Service Assist    Revision of posterolateral fusion of lumbar spine    Decompression, spinal cord, thoracic, posterolateral approach    HTN (hypertension)    HLD (hyperlipidemia)    CVA (cerebrovascular accident)    GAMALIEL (obstructive sleep apnea)    H/O emphysema    Smoking history    Chronic constipation    Urinary incontinence    Pulmonary embolism    Diabetes    GERD (gastroesophageal reflux disease)    SysAdmin_VstLnk    PLAN:  Neuro:  - Neuro/vitals q8  - pain control: modified ERAS, IT morphine pump, oxycodone 10/15 prn, valium and home gabapentin 800 TID resumed  - Continue home cymbalta 60mg daily   - CT thoracic spine 12/6: postop changes    Cardio:  - MAP > 65   - midodrine 5mg q8h, taper as tolerated  - C/w home simvastatin  - Sinus tachycardia - likely 2/2 PE (12/19)      Pulm:  - RA   - hx GAMALIEL, no CPAP at home  - CTA 12/19 w segmental and subsegmental PE in R upper lobe    GI:  - CCD   - Bowel regimen, home movantik 25 mg, last BM 1/2  - CT A/P 12/13 = impacted stool   - GERD: continue home protonix    Renal:  - IVL/Straight cath q6  - Patient not interested in suprapubic catheter placement    Endo:  - A1c: 7    Heme:  - SCDs DVT ppx  - LE dopplers 12/19: negative for DVT  - PE tx: Eliquis 5mg BID  - 1u PRBC intraop 12/10, 1u PRBC 12/12    ID:  - afebrile  - +UTI 12/31 +E. coli, ceftriaxone (12/31-1/3)                                                                                                                                                                             Dispo:  - SDU status, full code, pending AR vs TAIWO    D/w Dr. Luz  HPI:  66 y/o female with PMHx HTN, HLD, CVA x 3 (last was 2016 with right hand weakness residual), GAMALIEL not using CPAP, Emphysema, former 25 pack year smoker restarted this week of surgery, Chronic Constipation on Movantik, Urinary Incontinence chronically self straight cath at home, chronic UTI, Breast Implant Rupture with Chronic Leak repair 10/2023, B/L CTS s/p release, s/p Intrathecal Morphine pump for pain, with chronic neck and back pain worsening for years s/p multiple spinal surgeries including laminectomies and fusion of cervical, thoracic and lumbar spine initially done in 2009 and recently in 2019 and 2020 at [facility] by Dr. Trinidad, s/p T3-L1 revision of prior fusion (with Dr. Luz on 8/17/22). Outpatient Xray 11/16/2023 showing broken rods/displacement. Presents for elective C2-S2 instrumentation and fusion. Pt endorses weakness of b/l LE and burning pain in b/l lower extremities radiating to both feet,  (04 Dec 2023 15:44)    HOSPITAL COURSE:  12/4: Admitted for spinal fusion d/t damaged hardware.   12/5: Neuro stable. POD0 C2-S2 instrumentation/fusion (intraop b/l LE motor loss). on parminder for MAP>90  12/6: POD1. KAMRAN o/n neuro stable. remains intubated. Extubated 6am. Precedex gtt prn. Remains on parminder gtt for MAP goal >90. Valium made prn. CT thoracic spine bc MRI unavailable. Attempted NGT, unsuccessful (resistance @ 35).   12/7: POD2 Given IV dilaudid 0.5mg for pain. Neuro exam stable. SQL started tongiht. Increased precedex to 1.0. Responds well to valium. Consulting psych given paranoia ?homicidal statements. Passed bedside dysphagia and tolerating PO meds. MRI complete, f/u read.   12/8: POD3 Pt reporting incisional pain, given dilaudid 0.5mg IV x2. Pt appearing anxious, given xanax 1mg. Neuro exam unchanged. Pain regimen increased to oxy 15/30 mg, pending further recs. Given 1 L bolus for tachycardia. Trops negative. 2 HMV drains removed.   12/9: POD4, KAMRAN, CT myelogram today showing block at T12-L1, OR tomorrow  12/10: POD5, KAMRAN, OR today for exploration of fusion, possible decompression T9-L2, revision instrumentation  POD 0 T9-L2 decompression. Pt returned from OR intubated. 50mcg fentanyl IVP x 2 for pain control and HTN. Magnesium repleted. Precedex gtt started for sedation. Decreased FiO2 to 40%. 1L bolus for soft MAP. Dc'd propofol and started levo gtt for MAP >90.   12/11: POD 6, POD 1. Extubated, satting well on RA. Resumed home valium and gabapentin. Castillo d/c'd, pending TOV. Seroquel started for agitation, R IJ removed, LUE double midline placed. Started SQL.   12/12: POD7, POD2, given 500 cc bolus and started on phenylephrine for MAP goal >90. Started midodrine 10mg q8h. Cardura dc'd d/t hypotension. Given 250cc of albumin for increasing pressor requirements. Given lactulose, pending BM. Hgb 7.6<8.3, given 1u PRBC. Protected sleep time. Urology consulted for possible suprapubic cath, recommend self intermittent cath is optimal.   12/13: POD8, POD3 Midodrine increased to 15q8. Pt refused to take the extra 5mg midodrin ordered. Neuro exam stable. hgb 10 from 9.4 after 1u pRBCs, Pt refused AM meds, given in late morning, CTAP ordered for L sided abd pain, fleet enema, restarted home movantik and ordere fleet enema for constipation. YIFAN and HMV dc'd. 250cc albumin x1  12/14: POD9, POD4, KAMRAN overnight, neuro stable. MAP goal liberalized to > 65. Stepdown status.   12/15: POD10, POD5, KAMRAN overnight  12/16: POD11, POD6, KAMRAN overnight, neuro stable, 500 cc bolus NS given for tachycardia. Aquacell changed. CXR, pro-bnp negative for pulmonary edema. Given additional 500cc bolus.  12/17: POD 12. POD 7. KAMRAN overnight, neuro stable.   12/18: POD 13/8. Incontinent x2 today.   12/19: KAMRAN. Persistent tachycardia, r/o PE. CTPE protocol ordered. Refused all afternoon meds except oxy 30. Per Dr. Cowart (rad) CT chest w segmental and subsegmental PE in R upper lobe. Dopplers negative for DVT, CT PE shows segmental and subsegmental PE in R upper lobe. Per Dr. Augustine and Dr. Luz, Will start eliquis pending pain management approval. Per Dr. Mclaughlin (pain mgmt) can start eliquis 10mg BIDx 7 days. Dc'd lovenox.  12/20: POD 10 T9-L2 Decompression. Hemodynamically stable. Pending rehab. PT today.   12/21: POD 11 T9-L2 Decompression. neruo/hemodynamically stable, pending rehab.  12/22: POD 17/12, pending rehab. Aquacel dressing changed.   12/23: POD 18/13, pending rehab.   12/24: POD 19/14, pending rehab.   12/25: POD 20/15, pending rehab.   12/26: POD 21/16, pending rehab. Pain pump refilled.   12/27: POD 22/17. pending rehab.   12/28: POD 23/18.   12/29: POD 24/19. KAMRAN o/n. No AR beds, TAIWO  12/30: POD 25/20. Pending T[facility]   12/31: POD 26/21. Pending TAIWO. Midrodrine weaned to 10q8h. UA positive for UTI, f/u culture, started on ceftriaxone x 3 days.   1/1: POD 27/22. KAMRAN overnight. Neuro stable. Wean midodrine to 5q8.   1/2: POD 28/23. KAMRAN overnight, neuro stable. L shoulder xray completed for pain, negative. Medipore dressing changed.     OVERNIGHT EVENTS: POD 29/24. KAMRAN overnight, neuro stable.     Vital Signs Last 24 Hrs  T(C): 36.6 (02 Jan 2024 23:40), Max: 37 (02 Jan 2024 19:32)  T(F): 97.9 (02 Jan 2024 23:40), Max: 98.6 (02 Jan 2024 19:32)  HR: 101 (02 Jan 2024 23:40) (86 - 114)  BP: 112/74 (02 Jan 2024 23:40) (102/65 - 129/84)  BP(mean): --  RR: 16 (02 Jan 2024 23:40) (16 - 20)  SpO2: 96% (02 Jan 2024 23:40) (92% - 100%)    Parameters below as of 02 Jan 2024 23:40  Patient On (Oxygen Delivery Method): room air    I&O's Summary    01 Jan 2024 07:01  -  02 Jan 2024 07:00  --------------------------------------------------------  IN: 360 mL / OUT: 1100 mL / NET: -740 mL    02 Jan 2024 07:01  -  03 Jan 2024 00:41  --------------------------------------------------------  IN: 790 mL / OUT: 1675 mL / NET: -885 mL    PHYSICAL EXAM:  General: Pt is sitting up comfortably in bed, in NAD, on RA  HEENT: CN II-XII grossly intact, PERRL 3mm, EOMI B/L, face symmetric  Cardiovascular: RRR, normal S1 and S2   Respiratory: non-labored breathing, symmetric chest rise  GI: abd soft, NTND   Neuro: A&O x 3, follows commands  Strength BL UE 5/5  BL LE 0/5 to noxious stimuli  T11 sensory deficit   Vascular: Distal pulses 2+ x4, no calf edema or erythema  Wounds: Posterior spine wound C/D/I    DIET:  [] NPO  [X] Mechanical  [] Tube feeds    LABS:                        11.1   7.31  )-----------( 336      ( 01 Jan 2024 12:34 )             35.7     01-01    136  |  99  |  8   ----------------------------<  190<H>  4.6   |  26  |  0.45<L>    Ca    9.7      01 Jan 2024 12:34  Phos  3.6     01-01  Mg     2.0     01-01    Urinalysis Basic - ( 01 Jan 2024 12:34 )    Color: x / Appearance: x / SG: x / pH: x  Gluc: 190 mg/dL / Ketone: x  / Bili: x / Urobili: x   Blood: x / Protein: x / Nitrite: x   Leuk Esterase: x / RBC: x / WBC x   Sq Epi: x / Non Sq Epi: x / Bacteria: x    Allergies    Crab (Pruritus)  No Known Drug Allergies    Intolerances    MEDICATIONS:  Antibiotics:  cefTRIAXone   IVPB 1000 milliGRAM(s) IV Intermittent every 24 hours    Neuro:  acetaminophen     Tablet .. 1000 milliGRAM(s) Oral every 8 hours PRN  diazepam    Tablet 5 milliGRAM(s) Oral every 8 hours  diphenhydrAMINE 25 milliGRAM(s) Oral every 12 hours PRN  DULoxetine 60 milliGRAM(s) Oral daily  gabapentin 800 milliGRAM(s) Oral every 8 hours  ondansetron Injectable 4 milliGRAM(s) IV Push every 6 hours PRN  oxyCODONE    IR 10 milliGRAM(s) Oral every 4 hours PRN  oxyCODONE    IR 15 milliGRAM(s) Oral every 4 hours PRN    Anticoagulation:  apixaban 5 milliGRAM(s) Oral every 12 hours    OTHER:  benzocaine 20% Spray 1 Spray(s) Mucosal three times a day PRN  benzocaine/menthol Lozenge 1 Lozenge Oral every 2 hours PRN  bisacodyl 5 milliGRAM(s) Oral at bedtime  chlorhexidine 2% Cloths 1 Application(s) Topical <User Schedule>  influenza  Vaccine (HIGH DOSE) 0.7 milliLiter(s) IntraMuscular once  lidocaine   4% Patch 1 Patch Transdermal daily  midodrine 5 milliGRAM(s) Oral every 8 hours  Morphine 1 milliGRAM / 1 mL 20 milliLITERS 20 milliLiter(s) IntraThecal Continuous Pump  naloxegol 25 milliGRAM(s) Oral daily  naloxone Injectable 0.1 milliGRAM(s) IV Push every 3 minutes PRN  pantoprazole    Tablet 40 milliGRAM(s) Oral before breakfast  polyethylene glycol 3350 17 Gram(s) Oral two times a day  senna 2 Tablet(s) Oral at bedtime  simvastatin 40 milliGRAM(s) Oral at bedtime    IVF:  multivitamin 1 Tablet(s) Oral daily    CULTURES:  Culture Results:   >100,000 CFU/ml Escherichia coli (12-31 @ 09:22)    ASSESSMENT:  67 yo female with Hx HTN, HLD, DM, CVA x 3 (last in 2016 with residual R hand weakness), GAMALIEL not using CPAP, Emphysema, ex-25 pack year smoker now restarted, chronic constipation on Movantik, chronic urinary retention (SC at home), b/l CTS s/p release, s/p Intrathecal Morphine pump for pain, with chronic neck and back pain worsening for years s/p multiple spinal surgeries including laminectomies and fusion of cervical, thoracic and lumbar spine, s/p T3-L1 revision of prior fusion (with  on 8/17/22). Xray 11/16/23 showing broken rods/displacement. Now s/p C2-S2 instrumentation and fusion (12/5). S/p T9-L2 decompression w/ durotomy with repair (12/10/23). KAREEM grade A.     S12.9XXA    Handoff    MEWS Score    HTN (hypertension)    Back pain    Hypertension    SS (spinal stenosis)    High cholesterol    Stroke    H/O carpal tunnel syndrome    H/O carpal tunnel syndrome    Chronic GERD    History of chronic constipation    Seizure    Urinary incontinence    Pre-diabetes    Acute UTI    Anxiety    Anxiety and depression    Arthritis    Eczema    External hemorrhoids    H/O kyphosis    Multiple sclerosis    Pancreas cyst    Scoliosis    Wheelchair dependent    Cervical pseudoarthrosis    Right shoulder pain    Cervical spondylosis    Chronic headaches    Chronic LUQ pain    Chronic UTI    Degenerative joint disease    H/O low back pain    Lumbosacral spondylosis    COPD (chronic obstructive pulmonary disease)    Back pain    Back pain    Spinal cord injury, thoracic (T7-T12)    Back pain    Spinal cord injury of thoracic region without bone injury    Delirium    Revision of fusion of thoracic spine    Thoracic back pain    Revision of posterolateral fusion of lumbar spine    Revision of fusion of thoracic spine    Decompression, spinal cord, thoracic, posterolateral approach    Cervical disc disease    H/O Spinal surgery    H/O breast surgery    S/P cervical discectomy    Previous back surgery    H/O shoulder surgery    H/O total shoulder replacement, right    Room Service Assist    Room Service Assist    Revision of posterolateral fusion of lumbar spine    Decompression, spinal cord, thoracic, posterolateral approach    HTN (hypertension)    HLD (hyperlipidemia)    CVA (cerebrovascular accident)    GAMALIEL (obstructive sleep apnea)    H/O emphysema    Smoking history    Chronic constipation    Urinary incontinence    Pulmonary embolism    Diabetes    GERD (gastroesophageal reflux disease)    SysAdmin_VstLnk    PLAN:  Neuro:  - Neuro/vitals q8  - pain control: modified ERAS, IT morphine pump, oxycodone 10/15 prn, valium and home gabapentin 800 TID resumed  - Continue home cymbalta 60mg daily   - CT thoracic spine 12/6: postop changes    Cardio:  - MAP > 65   - midodrine 5mg q8h, taper as tolerated  - C/w home simvastatin  - Sinus tachycardia - likely 2/2 PE (12/19)      Pulm:  - RA   - hx GAMALIEL, no CPAP at home  - CTA 12/19 w segmental and subsegmental PE in R upper lobe    GI:  - CCD   - Bowel regimen, home movantik 25 mg, last BM 1/2  - CT A/P 12/13 = impacted stool   - GERD: continue home protonix    Renal:  - IVL/Straight cath q6  - Patient not interested in suprapubic catheter placement    Endo:  - A1c: 7    Heme:  - SCDs DVT ppx  - LE dopplers 12/19: negative for DVT  - PE tx: Eliquis 5mg BID  - 1u PRBC intraop 12/10, 1u PRBC 12/12    ID:  - afebrile  - +UTI 12/31 +E. coli, ceftriaxone (12/31-1/3)                                                                                                                                                                             Dispo:  - SDU status, full code, pending AR vs TAIWO    D/w Dr. Luz

## 2024-01-03 NOTE — PROGRESS NOTE ADULT - SUBJECTIVE AND OBJECTIVE BOX
NEUROSURGERY PAIN MANAGEMENT CONSULT NOTE    Chief Complaint: b/l lower extremity pain and weakness     HPI:  66 y/o female with PMHx HTN, HLD, CVA x 3 (last was 2016 with right hand weakness residual), GAMALIEL not using CPAP, Emphysema, former 25 pack year smoker restarted this week of surgery, Chronic Constipation on Movantik, Urinary Incontinence chronically self straight cath at home, chronic UTI, Breast Implant Rupture with Chronic Leak repair 10/2023, B/L CTS s/p release, s/p Intrathecal Morphine pump for pain, with chronic neck and back pain worsening for years s/p multiple spinal surgeries including laminectomies and fusion of cervical, thoracic and lumbar spine initially done in 2009 and recently in 2019 and 2020 at [facility] by Dr. Trinidad, s/p T3-L1 revision of prior fusion (with Dr. Luz on 8/17/22). Outpatient Xray 11/16/2023 showing broken rods/displacement. Presents for elective C2-S2 instrumentation and fusion. Pt endorses weakness of b/l LE and burning pain in b/l lower extremities radiating to both feet,  (04 Dec 2023 15:44)      PAST MEDICAL & SURGICAL HISTORY:  HTN (hypertension)      SS (spinal stenosis)      High cholesterol      Stroke  x3      H/O carpal tunnel syndrome  Bilateral      Chronic GERD      History of chronic constipation      Urinary incontinence  self cath as needed      Pre-diabetes      Anxiety and depression      Eczema      H/O kyphosis      Pancreas cyst      Scoliosis      Wheelchair dependent      Cervical pseudoarthrosis  and lumbar spine      Cervical spondylosis      Chronic headaches      Degenerative joint disease  left shoulder      Lumbosacral spondylosis      COPD (chronic obstructive pulmonary disease)      H/O Spinal surgery  lumbar x 30      H/O breast surgery  left breast implant 1980's      S/P cervical discectomy  x4      H/O total shoulder replacement, right          FAMILY HISTORY:      SOCIAL HISTORY:  [ ] Denies Smoking, Alcohol, or Drug Use    HOME MEDICATIONS:   Please refer to initial HNP    PAIN HOME MEDICATIONS:    Allergies    Crab (Pruritus)  No Known Drug Allergies    Intolerances        PAIN MEDICATIONS:  acetaminophen     Tablet .. 1000 milliGRAM(s) Oral every 8 hours PRN  celecoxib 200 milliGRAM(s) Oral daily  diazepam    Tablet 5 milliGRAM(s) Oral every 8 hours  diphenhydrAMINE 25 milliGRAM(s) Oral every 12 hours PRN  DULoxetine 60 milliGRAM(s) Oral daily  gabapentin 800 milliGRAM(s) Oral every 8 hours  ondansetron Injectable 4 milliGRAM(s) IV Push every 6 hours PRN  oxyCODONE    IR 10 milliGRAM(s) Oral every 4 hours PRN  oxyCODONE    IR 15 milliGRAM(s) Oral every 4 hours PRN    Heme:  apixaban 5 milliGRAM(s) Oral every 12 hours    Antibiotics:  cefTRIAXone   IVPB 1000 milliGRAM(s) IV Intermittent every 24 hours    Cardiovascular:  midodrine 5 milliGRAM(s) Oral every 8 hours    GI:  bisacodyl 5 milliGRAM(s) Oral at bedtime  naloxegol 25 milliGRAM(s) Oral daily  pantoprazole    Tablet 40 milliGRAM(s) Oral before breakfast  polyethylene glycol 3350 17 Gram(s) Oral two times a day  senna 2 Tablet(s) Oral at bedtime    Endocrine:  simvastatin 40 milliGRAM(s) Oral at bedtime    All Other Medications:  benzocaine 20% Spray 1 Spray(s) Mucosal three times a day PRN  benzocaine/menthol Lozenge 1 Lozenge Oral every 2 hours PRN  chlorhexidine 2% Cloths 1 Application(s) Topical <User Schedule>  influenza  Vaccine (HIGH DOSE) 0.7 milliLiter(s) IntraMuscular once  lidocaine   4% Patch 1 Patch Transdermal daily  Morphine 1 milliGRAM / 1 mL 20 milliLITERS 20 milliLiter(s) IntraThecal Continuous Pump  multivitamin 1 Tablet(s) Oral daily  naloxone Injectable 0.1 milliGRAM(s) IV Push every 3 minutes PRN  sodium chloride 0.9% Bolus 1000 milliLiter(s) IV Bolus once  sodium chloride 0.9% lock flush 10 milliLiter(s) IV Push every 1 hour PRN      Vital Signs Last 24 Hrs  T(C): 36.4 (03 Jan 2024 09:25), Max: 37 (02 Jan 2024 19:32)  T(F): 97.6 (03 Jan 2024 09:25), Max: 98.6 (02 Jan 2024 19:32)  HR: 104 (03 Jan 2024 09:25) (92 - 114)  BP: 115/72 (03 Jan 2024 09:25) (94/63 - 129/84)  BP(mean): --  RR: 17 (03 Jan 2024 09:25) (16 - 20)  SpO2: 95% (03 Jan 2024 09:25) (93% - 98%)    Parameters below as of 03 Jan 2024 09:25  Patient On (Oxygen Delivery Method): room air        LABS:                        11.1   7.31  )-----------( 336      ( 01 Jan 2024 12:34 )             35.7     01-01    136  |  99  |  8   ----------------------------<  190<H>  4.6   |  26  |  0.45<L>    Ca    9.7      01 Jan 2024 12:34  Phos  3.6     01-01  Mg     2.0     01-01        Urinalysis Basic - ( 01 Jan 2024 12:34 )    Color: x / Appearance: x / SG: x / pH: x  Gluc: 190 mg/dL / Ketone: x  / Bili: x / Urobili: x   Blood: x / Protein: x / Nitrite: x   Leuk Esterase: x / RBC: x / WBC x   Sq Epi: x / Non Sq Epi: x / Bacteria: x        RADIOLOGY:    Drug Screen:        REVIEW OF SYSTEMS:  CONSTITUTIONAL: No fever or fatigue O/N.   EYES: No eye pain, visual disturbances  ENMT:  No difficulty hearing. No throat pain  NECK: No pain or stiffness  RESPIRATORY: No cough, wheezing; No shortness of breath  CARDIOVASCULAR: No chest pain, palpitations.   GASTROINTESTINAL: Pt reports passing gas. No bowel movements. No abdominal or epigastric pain. No nausea, vomiting. GENITOURINARY: No dysuria, frequency, or incontinence  NEUROLOGICAL: No headaches, loss of strength, numbness, or tremors. No dizzinesss or lightheadedness with pain medications.   MUSCULOSKELETAL: Incisional back pain. No joint pain or swelling; No muscle, or extremity pain    PAIN ASSESSMENT: c/o L shoulder pain     PHYSICAL EXAM  GENERAL: Laying in bed, NAD  Neuro: CN II-XII PERRRL, EOMI  Cranial nerves grossly intact  Motor exam: b/; lowers 0/5  Sensation intact to LT in UE/LE in 3 dermatomes  CHEST/LUNG: Clear to auscultation bilaterally; No rales, rhonchi, wheezing, or rubs  HEART: Regular rate and rhythm; No murmurs, rubs, or gallops  ABDOMEN: Soft, Nontender, Nondistended; Bowel sounds present  EXTREMITIES:  2+ Peripheral Pulses, No clubbing, cyanosis, or edema  SKIN: No rashes or lesions      ASSESSMENT:   69 yo female with Hx HTN, HLD, DM, CVA x 3 (last in 2016 with residual R hand weakness), GAMALIEL not using CPAP, Emphysema, ex-25 pack year smoker now restarted, chronic constipation on Movantik, chronic urinary retention (SC at home), b/l CTS s/p release, s/p Intrathecal Morphine pump for pain, with chronic neck and back pain worsening for years s/p multiple spinal surgeries including laminectomies and fusion of cervical, thoracic and lumbar spine, s/p T3-L1 revision of prior fusion (with  on 8/17/22). Xray 11/16/23 showing broken rods/displacement. Now s/p C2-S2 instrumentation and fusion (12/5). S/p T9-L2 decompression w/ durotomy with repair (12/10/23). KAREEM grade A.     PLAN:   - Pain:  Tylenol 1000mg every 8 hours as needed for mild pain  Gabapentin 800mg every 8 hours  Valium 5mg every 8 hours  Start Celebrex 200mg daily  Oxycodone 10mg or 15mg every 4 hours as needed for moderate or severe pain     - Bowel regimen: Senna    - Nausea ppx: Zofran standing  - Functional Goals: Pt will get OOB with PT today. Pt will resume previous level of activity without impairment from surgery.   - Additional Consults: None recommended.   - Additional Labs/Imaging:  None recommended.   - Follow up, Discharge Planning: pending rehab  - Pain Management follow up plan: will continue to follow    d/w Dr. Mclaughlin   NEUROSURGERY PAIN MANAGEMENT CONSULT NOTE    Chief Complaint: b/l lower extremity pain and weakness     HPI:  68 y/o female with PMHx HTN, HLD, CVA x 3 (last was 2016 with right hand weakness residual), GAMALIEL not using CPAP, Emphysema, former 25 pack year smoker restarted this week of surgery, Chronic Constipation on Movantik, Urinary Incontinence chronically self straight cath at home, chronic UTI, Breast Implant Rupture with Chronic Leak repair 10/2023, B/L CTS s/p release, s/p Intrathecal Morphine pump for pain, with chronic neck and back pain worsening for years s/p multiple spinal surgeries including laminectomies and fusion of cervical, thoracic and lumbar spine initially done in 2009 and recently in 2019 and 2020 at [facility] by Dr. Trinidad, s/p T3-L1 revision of prior fusion (with Dr. Luz on 8/17/22). Outpatient Xray 11/16/2023 showing broken rods/displacement. Presents for elective C2-S2 instrumentation and fusion. Pt endorses weakness of b/l LE and burning pain in b/l lower extremities radiating to both feet,  (04 Dec 2023 15:44)      PAST MEDICAL & SURGICAL HISTORY:  HTN (hypertension)      SS (spinal stenosis)      High cholesterol      Stroke  x3      H/O carpal tunnel syndrome  Bilateral      Chronic GERD      History of chronic constipation      Urinary incontinence  self cath as needed      Pre-diabetes      Anxiety and depression      Eczema      H/O kyphosis      Pancreas cyst      Scoliosis      Wheelchair dependent      Cervical pseudoarthrosis  and lumbar spine      Cervical spondylosis      Chronic headaches      Degenerative joint disease  left shoulder      Lumbosacral spondylosis      COPD (chronic obstructive pulmonary disease)      H/O Spinal surgery  lumbar x 30      H/O breast surgery  left breast implant 1980's      S/P cervical discectomy  x4      H/O total shoulder replacement, right          FAMILY HISTORY:      SOCIAL HISTORY:  [ ] Denies Smoking, Alcohol, or Drug Use    HOME MEDICATIONS:   Please refer to initial HNP    PAIN HOME MEDICATIONS:    Allergies    Crab (Pruritus)  No Known Drug Allergies    Intolerances        PAIN MEDICATIONS:  acetaminophen     Tablet .. 1000 milliGRAM(s) Oral every 8 hours PRN  celecoxib 200 milliGRAM(s) Oral daily  diazepam    Tablet 5 milliGRAM(s) Oral every 8 hours  diphenhydrAMINE 25 milliGRAM(s) Oral every 12 hours PRN  DULoxetine 60 milliGRAM(s) Oral daily  gabapentin 800 milliGRAM(s) Oral every 8 hours  ondansetron Injectable 4 milliGRAM(s) IV Push every 6 hours PRN  oxyCODONE    IR 10 milliGRAM(s) Oral every 4 hours PRN  oxyCODONE    IR 15 milliGRAM(s) Oral every 4 hours PRN    Heme:  apixaban 5 milliGRAM(s) Oral every 12 hours    Antibiotics:  cefTRIAXone   IVPB 1000 milliGRAM(s) IV Intermittent every 24 hours    Cardiovascular:  midodrine 5 milliGRAM(s) Oral every 8 hours    GI:  bisacodyl 5 milliGRAM(s) Oral at bedtime  naloxegol 25 milliGRAM(s) Oral daily  pantoprazole    Tablet 40 milliGRAM(s) Oral before breakfast  polyethylene glycol 3350 17 Gram(s) Oral two times a day  senna 2 Tablet(s) Oral at bedtime    Endocrine:  simvastatin 40 milliGRAM(s) Oral at bedtime    All Other Medications:  benzocaine 20% Spray 1 Spray(s) Mucosal three times a day PRN  benzocaine/menthol Lozenge 1 Lozenge Oral every 2 hours PRN  chlorhexidine 2% Cloths 1 Application(s) Topical <User Schedule>  influenza  Vaccine (HIGH DOSE) 0.7 milliLiter(s) IntraMuscular once  lidocaine   4% Patch 1 Patch Transdermal daily  Morphine 1 milliGRAM / 1 mL 20 milliLITERS 20 milliLiter(s) IntraThecal Continuous Pump  multivitamin 1 Tablet(s) Oral daily  naloxone Injectable 0.1 milliGRAM(s) IV Push every 3 minutes PRN  sodium chloride 0.9% Bolus 1000 milliLiter(s) IV Bolus once  sodium chloride 0.9% lock flush 10 milliLiter(s) IV Push every 1 hour PRN      Vital Signs Last 24 Hrs  T(C): 36.4 (03 Jan 2024 09:25), Max: 37 (02 Jan 2024 19:32)  T(F): 97.6 (03 Jan 2024 09:25), Max: 98.6 (02 Jan 2024 19:32)  HR: 104 (03 Jan 2024 09:25) (92 - 114)  BP: 115/72 (03 Jan 2024 09:25) (94/63 - 129/84)  BP(mean): --  RR: 17 (03 Jan 2024 09:25) (16 - 20)  SpO2: 95% (03 Jan 2024 09:25) (93% - 98%)    Parameters below as of 03 Jan 2024 09:25  Patient On (Oxygen Delivery Method): room air        LABS:                        11.1   7.31  )-----------( 336      ( 01 Jan 2024 12:34 )             35.7     01-01    136  |  99  |  8   ----------------------------<  190<H>  4.6   |  26  |  0.45<L>    Ca    9.7      01 Jan 2024 12:34  Phos  3.6     01-01  Mg     2.0     01-01        Urinalysis Basic - ( 01 Jan 2024 12:34 )    Color: x / Appearance: x / SG: x / pH: x  Gluc: 190 mg/dL / Ketone: x  / Bili: x / Urobili: x   Blood: x / Protein: x / Nitrite: x   Leuk Esterase: x / RBC: x / WBC x   Sq Epi: x / Non Sq Epi: x / Bacteria: x        RADIOLOGY:    Drug Screen:        REVIEW OF SYSTEMS:  CONSTITUTIONAL: No fever or fatigue O/N.   EYES: No eye pain, visual disturbances  ENMT:  No difficulty hearing. No throat pain  NECK: No pain or stiffness  RESPIRATORY: No cough, wheezing; No shortness of breath  CARDIOVASCULAR: No chest pain, palpitations.   GASTROINTESTINAL: Pt reports passing gas. No bowel movements. No abdominal or epigastric pain. No nausea, vomiting. GENITOURINARY: No dysuria, frequency, or incontinence  NEUROLOGICAL: No headaches, loss of strength, numbness, or tremors. No dizzinesss or lightheadedness with pain medications.   MUSCULOSKELETAL: Incisional back pain. No joint pain or swelling; No muscle, or extremity pain    PAIN ASSESSMENT: c/o L shoulder pain     PHYSICAL EXAM  GENERAL: Laying in bed, NAD  Neuro: CN II-XII PERRRL, EOMI  Cranial nerves grossly intact  Motor exam: b/; lowers 0/5  Sensation intact to LT in UE/LE in 3 dermatomes  CHEST/LUNG: Clear to auscultation bilaterally; No rales, rhonchi, wheezing, or rubs  HEART: Regular rate and rhythm; No murmurs, rubs, or gallops  ABDOMEN: Soft, Nontender, Nondistended; Bowel sounds present  EXTREMITIES:  2+ Peripheral Pulses, No clubbing, cyanosis, or edema  SKIN: No rashes or lesions      ASSESSMENT:   69 yo female with Hx HTN, HLD, DM, CVA x 3 (last in 2016 with residual R hand weakness), GAMALIEL not using CPAP, Emphysema, ex-25 pack year smoker now restarted, chronic constipation on Movantik, chronic urinary retention (SC at home), b/l CTS s/p release, s/p Intrathecal Morphine pump for pain, with chronic neck and back pain worsening for years s/p multiple spinal surgeries including laminectomies and fusion of cervical, thoracic and lumbar spine, s/p T3-L1 revision of prior fusion (with  on 8/17/22). Xray 11/16/23 showing broken rods/displacement. Now s/p C2-S2 instrumentation and fusion (12/5). S/p T9-L2 decompression w/ durotomy with repair (12/10/23). KAREEM grade A.     PLAN:   - Pain:  Tylenol 1000mg every 8 hours as needed for mild pain  Gabapentin 800mg every 8 hours  Valium 5mg every 8 hours  Start Celebrex 200mg daily  Oxycodone 10mg or 15mg every 4 hours as needed for moderate or severe pain     - Bowel regimen: Senna    - Nausea ppx: Zofran standing  - Functional Goals: Pt will get OOB with PT today. Pt will resume previous level of activity without impairment from surgery.   - Additional Consults: None recommended.   - Additional Labs/Imaging:  None recommended.   - Follow up, Discharge Planning: pending rehab  - Pain Management follow up plan: will continue to follow    d/w Dr. Mclaughlin
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
## 2024-01-04 PROCEDURE — 99232 SBSQ HOSP IP/OBS MODERATE 35: CPT

## 2024-01-04 PROCEDURE — 93306 TTE W/DOPPLER COMPLETE: CPT | Mod: 26

## 2024-01-04 RX ADMIN — CEFTRIAXONE 100 MILLIGRAM(S): 500 INJECTION, POWDER, FOR SOLUTION INTRAMUSCULAR; INTRAVENOUS at 16:42

## 2024-01-04 RX ADMIN — LIDOCAINE 1 PATCH: 4 CREAM TOPICAL at 11:38

## 2024-01-04 RX ADMIN — OXYCODONE HYDROCHLORIDE 15 MILLIGRAM(S): 5 TABLET ORAL at 19:21

## 2024-01-04 RX ADMIN — LIDOCAINE 1 PATCH: 4 CREAM TOPICAL at 23:55

## 2024-01-04 RX ADMIN — CELECOXIB 200 MILLIGRAM(S): 200 CAPSULE ORAL at 11:38

## 2024-01-04 RX ADMIN — MIDODRINE HYDROCHLORIDE 5 MILLIGRAM(S): 2.5 TABLET ORAL at 06:31

## 2024-01-04 RX ADMIN — MIDODRINE HYDROCHLORIDE 5 MILLIGRAM(S): 2.5 TABLET ORAL at 23:59

## 2024-01-04 RX ADMIN — APIXABAN 5 MILLIGRAM(S): 2.5 TABLET, FILM COATED ORAL at 17:33

## 2024-01-04 RX ADMIN — Medication 5 MILLIGRAM(S): at 16:47

## 2024-01-04 RX ADMIN — CHLORHEXIDINE GLUCONATE 1 APPLICATION(S): 213 SOLUTION TOPICAL at 05:49

## 2024-01-04 RX ADMIN — GABAPENTIN 800 MILLIGRAM(S): 400 CAPSULE ORAL at 11:37

## 2024-01-04 RX ADMIN — SIMVASTATIN 40 MILLIGRAM(S): 20 TABLET, FILM COATED ORAL at 22:12

## 2024-01-04 RX ADMIN — APIXABAN 5 MILLIGRAM(S): 2.5 TABLET, FILM COATED ORAL at 05:48

## 2024-01-04 RX ADMIN — GABAPENTIN 800 MILLIGRAM(S): 400 CAPSULE ORAL at 17:33

## 2024-01-04 RX ADMIN — OXYCODONE HYDROCHLORIDE 15 MILLIGRAM(S): 5 TABLET ORAL at 19:55

## 2024-01-04 RX ADMIN — MIDODRINE HYDROCHLORIDE 5 MILLIGRAM(S): 2.5 TABLET ORAL at 16:48

## 2024-01-04 RX ADMIN — LIDOCAINE 1 PATCH: 4 CREAM TOPICAL at 19:33

## 2024-01-04 RX ADMIN — POLYETHYLENE GLYCOL 3350 17 GRAM(S): 17 POWDER, FOR SOLUTION ORAL at 05:49

## 2024-01-04 RX ADMIN — Medication 5 MILLIGRAM(S): at 05:49

## 2024-01-04 RX ADMIN — PANTOPRAZOLE SODIUM 40 MILLIGRAM(S): 20 TABLET, DELAYED RELEASE ORAL at 06:31

## 2024-01-04 RX ADMIN — OXYCODONE HYDROCHLORIDE 15 MILLIGRAM(S): 5 TABLET ORAL at 12:10

## 2024-01-04 RX ADMIN — Medication 1 TABLET(S): at 11:43

## 2024-01-04 RX ADMIN — Medication 5 MILLIGRAM(S): at 22:11

## 2024-01-04 RX ADMIN — DULOXETINE HYDROCHLORIDE 60 MILLIGRAM(S): 30 CAPSULE, DELAYED RELEASE ORAL at 11:43

## 2024-01-04 RX ADMIN — OXYCODONE HYDROCHLORIDE 15 MILLIGRAM(S): 5 TABLET ORAL at 11:38

## 2024-01-04 NOTE — CHART NOTE - NSCHARTNOTESELECT_GEN_ALL_CORE
Chart Note/Event Note
Event Note
Morphine Pump/Event Note
Nutrition Services
PT/OT at rehab/Event Note

## 2024-01-04 NOTE — PROGRESS NOTE ADULT - ASSESSMENT
68 YOF with PMH of HTN, HLD, multiple strokes (residual R hand weakness), GAMALIEL non-adherent with CPAP, COPD, chronic neck and back pain (s/p multiple surgeries) with intrathecal pump and functional paraplegia, chronic constipation admitted for revision of fusion of thoracic and lumbar spine. Course c/b RUL PE, E coli UTI, ?hypotension on midodrine    Chronic neck and back pain c/b paraplegia  Post operative state  - s/p OR with Dr. Luz 12/5 for revision C2-S2 instrumentation and fusion  - s/p OR with Dr. Luz 12/10 for T9-L2 decompression with durotomy repair  - pain control per NSGY (morphine intrathecal pump with PRN oxy, gabapentin, diazepam, duloxetine)  - recommend cont aggressive bowel regimen  - OOBTC if tolerated    Chronic constipation  - cont aggressive bowel regimen: senna 2 tabs qhs, miralax BID, bisacodyl 5mg qhs, naloxegol 25mg daily  - if persistent constipation, consider adding suppository or enema  - if develops N/V or signs of obstruction, would hold naloxegol and obtain imaging     Complicated E. coli UTI  Chronic urinary retention  - UC with E coli s/p 5-day course of ceftriaxone (EOT 1/4)  - continues to endorse dysuria, can trial pyridium but discomfort may be 2/2 ISC  - prior to admission was doing ISC at home, declined in-dwelling jiménez here  - constipation may also be contributing to retention     Hypotension  - cont midodrine taper, 5mg TID -- consider reducing to 5mg BID tomorrow  - TTE obtained with normal LV and RV function, no gross valvular abnormalities    Acute PE  - CTA 12/19 with segmental and subsegmental RUL PE, likely provoked in setting of surgery and immobility  - TTE w/o evidence of RHS though PASP elevated 39mmgHg  - apixaban 5mg BID for 3-6mo  - outpatient PCP follow up    History of multiple stroke  - resume aspirin for secondary prevention once clear from surgical standpoint  - cont atorvastatin   - pending results of outpatient cardiac monitoring for eval of occult AFIB    Dispo: acute rehab pending auth    Plan discussed with primary team.

## 2024-01-04 NOTE — CHART NOTE - NSCHARTNOTEFT_GEN_A_CORE
Admitting Diagnosis:   Patient is a 68y old  Female who presents with a chief complaint of myelopathy (29 Dec 2023 13:48)      PAST MEDICAL & SURGICAL HISTORY:  HTN (hypertension)      SS (spinal stenosis)      High cholesterol      Stroke  x3      H/O carpal tunnel syndrome  Bilateral      Chronic GERD      History of chronic constipation      Urinary incontinence  self cath as needed      Pre-diabetes      Anxiety and depression      Eczema      H/O kyphosis      Pancreas cyst      Scoliosis      Wheelchair dependent      Cervical pseudoarthrosis  and lumbar spine      Cervical spondylosis      Chronic headaches      Degenerative joint disease  left shoulder      Lumbosacral spondylosis      COPD (chronic obstructive pulmonary disease)      H/O Spinal surgery  lumbar x 30      H/O breast surgery  left breast implant 1980's      S/P cervical discectomy  x4      H/O total shoulder replacement, right    Current Nutrition Order: Consistent carbohydrate diet     PO Intake: Good (%) [   ]  Fair (50-75%) [ x ] Poor (<25%) [   ]    GI Issues: Abdomen rounded, +BS x4, last bowel movement 1/3    Pain: 8/10, L shoulder    Skin Integrity: Warm/Dry/Intact, +1 generalized edema     Labs:     CAPILLARY BLOOD GLUCOSE      POCT Blood Glucose.: 177 mg/dL (03 Jan 2024 14:40)      Medications:  MEDICATIONS  (STANDING):  apixaban 5 milliGRAM(s) Oral every 12 hours  bisacodyl 5 milliGRAM(s) Oral at bedtime  cefTRIAXone   IVPB 1000 milliGRAM(s) IV Intermittent every 24 hours  celecoxib 200 milliGRAM(s) Oral daily  chlorhexidine 2% Cloths 1 Application(s) Topical <User Schedule>  diazepam    Tablet 5 milliGRAM(s) Oral every 8 hours  DULoxetine 60 milliGRAM(s) Oral daily  gabapentin 800 milliGRAM(s) Oral every 8 hours  influenza  Vaccine (HIGH DOSE) 0.7 milliLiter(s) IntraMuscular once  lidocaine   4% Patch 1 Patch Transdermal daily  midodrine 5 milliGRAM(s) Oral every 8 hours  Morphine 1 milliGRAM / 1mL 20 milliLITERS 20 milliGRAM(s) IntraThecal Continuous Pump  multivitamin 1 Tablet(s) Oral daily  naloxegol 25 milliGRAM(s) Oral daily  pantoprazole    Tablet 40 milliGRAM(s) Oral before breakfast  polyethylene glycol 3350 17 Gram(s) Oral two times a day  senna 2 Tablet(s) Oral at bedtime  simvastatin 40 milliGRAM(s) Oral at bedtime    MEDICATIONS  (PRN):  acetaminophen     Tablet .. 1000 milliGRAM(s) Oral every 8 hours PRN Temp greater or equal to 38.5C (101.3F), Mild Pain (1 - 3)  benzocaine 20% Spray 1 Spray(s) Mucosal three times a day PRN Sore throat  benzocaine/menthol Lozenge 1 Lozenge Oral every 2 hours PRN Sore Throat  diphenhydrAMINE 25 milliGRAM(s) Oral every 12 hours PRN Rash and/or Itching  naloxone Injectable 0.1 milliGRAM(s) IV Push every 3 minutes PRN For ANY of the following changes in patient status:  A. RR LESS THAN 10 breaths per minute, B. Oxygen saturation LESS THAN 90%, C. Sedation score of 6  ondansetron Injectable 4 milliGRAM(s) IV Push every 6 hours PRN Nausea  oxyCODONE    IR 10 milliGRAM(s) Oral every 4 hours PRN Moderate Pain (4 - 6)  oxyCODONE    IR 15 milliGRAM(s) Oral every 4 hours PRN Severe Pain (7 - 10)  sodium chloride 0.9% lock flush 10 milliLiter(s) IV Push every 1 hour PRN Pre/post blood products, medications, blood draw, and to maintain line patency    Dosing anthropometrics:   Height (inches):  67   Weight (pounds):  186.7 (12/10)   BMI (kg/m^2):  29.2   IBW (pounds):  135 +/- 10%   %IBW:  138.3 %     WEIGHT CHANGE:   12/10     186.7 pounds  12/05     186.7 pounds  Pt weight stable during admission    ESTIMATED NUTRIENT NEEDS: Needs updated 1/4 to reflect post-op healing using ideal body weight 61.4 kg  Calories:  (25-30 kcal/kg) 0225-8792 kcal   Protein:  (1.25-1.6 g/kg) 77-92 g   Fluids:  1 mL/kcal or at team’s discretion   Ideal body weight (IBW) being used as Pt is critically ill and >=100% of ideal body weight.     SUBJECTIVE:   69 yo female with Hx HTN, HLD, DM, CVA x 3 (last in 2016 with residual R hand weakness), GAMALIEL not using CPAP, Emphysema, ex-25 pack year smoker now restarted, chronic constipation on Movantik, chronic urinary retention (SC at home), b/l CTS s/p release, s/p Intrathecal Morphine pump for pain, with chronic neck and back pain worsening for years s/p multiple spinal surgeries including laminectomies and fusion of cervical, thoracic and lumbar spine, s/p T3-L1 revision of prior fusion (with  on 8/17/22). Xray 11/16/23 showing broken rods/displacement. Now s/p C2-S2 instrumentation and fusion (12/5). S/p T9-L2 decompression w/ durotomy with repair (12/10/23).      Pt assessed at bedside on 9WO. Rx and labs reviewed. Pt received resting in bed, appears emotional and began crying during nutrition follow-up. Pt unresponsive to questioning and became tearful; offered support. Pt reportedly ready for [facility] and continues to await placement. Pt reportedly tolerating diet and having normal BMs. No other reports GI distress or further nutritional concerns at this time. RDN will continue to reassess, intervene, and monitor as appropriate.     PREVIOUS NUTRITION DIAGNOSIS: Increased protein/energy needs related to physiological causes increasing nutrient needs as evidenced by known increased metabolic demands for healing status post procedure    active: [ x ]  resolved: [   ]    GOAL: Consistently meet >75% of nutrition needs via most appropriate route for nutrition     NUTRITION RECOMMENDATIONS:   -Continue current diet   -Encourage good PO intake   -Honor Pt food preferences as able   -Weekly wts  -Monitor chemistry, GI function, and skin integrity    EDUCATION: deferred at this time. RD to follow up.     RISK LEVEL:  High [  ]       Moderate [ x ]       Low [  ]. Admitting Diagnosis:   Patient is a 68y old  Female who presents with a chief complaint of myelopathy (29 Dec 2023 13:48)      PAST MEDICAL & SURGICAL HISTORY:  HTN (hypertension)      SS (spinal stenosis)      High cholesterol      Stroke  x3      H/O carpal tunnel syndrome  Bilateral      Chronic GERD      History of chronic constipation      Urinary incontinence  self cath as needed      Pre-diabetes      Anxiety and depression      Eczema      H/O kyphosis      Pancreas cyst      Scoliosis      Wheelchair dependent      Cervical pseudoarthrosis  and lumbar spine      Cervical spondylosis      Chronic headaches      Degenerative joint disease  left shoulder      Lumbosacral spondylosis      COPD (chronic obstructive pulmonary disease)      H/O Spinal surgery  lumbar x 30      H/O breast surgery  left breast implant 1980's      S/P cervical discectomy  x4      H/O total shoulder replacement, right    Current Nutrition Order: Consistent carbohydrate diet     PO Intake: Good (%) [   ]  Fair (50-75%) [ x ] Poor (<25%) [   ]    GI Issues: Abdomen rounded, +BS x4, last bowel movement 1/3    Pain: 8/10, L shoulder    Skin Integrity: Warm/Dry/Intact, +1 generalized edema     Labs:     CAPILLARY BLOOD GLUCOSE      POCT Blood Glucose.: 177 mg/dL (03 Jan 2024 14:40)      Medications:  MEDICATIONS  (STANDING):  apixaban 5 milliGRAM(s) Oral every 12 hours  bisacodyl 5 milliGRAM(s) Oral at bedtime  cefTRIAXone   IVPB 1000 milliGRAM(s) IV Intermittent every 24 hours  celecoxib 200 milliGRAM(s) Oral daily  chlorhexidine 2% Cloths 1 Application(s) Topical <User Schedule>  diazepam    Tablet 5 milliGRAM(s) Oral every 8 hours  DULoxetine 60 milliGRAM(s) Oral daily  gabapentin 800 milliGRAM(s) Oral every 8 hours  influenza  Vaccine (HIGH DOSE) 0.7 milliLiter(s) IntraMuscular once  lidocaine   4% Patch 1 Patch Transdermal daily  midodrine 5 milliGRAM(s) Oral every 8 hours  Morphine 1 milliGRAM / 1mL 20 milliLITERS 20 milliGRAM(s) IntraThecal Continuous Pump  multivitamin 1 Tablet(s) Oral daily  naloxegol 25 milliGRAM(s) Oral daily  pantoprazole    Tablet 40 milliGRAM(s) Oral before breakfast  polyethylene glycol 3350 17 Gram(s) Oral two times a day  senna 2 Tablet(s) Oral at bedtime  simvastatin 40 milliGRAM(s) Oral at bedtime    MEDICATIONS  (PRN):  acetaminophen     Tablet .. 1000 milliGRAM(s) Oral every 8 hours PRN Temp greater or equal to 38.5C (101.3F), Mild Pain (1 - 3)  benzocaine 20% Spray 1 Spray(s) Mucosal three times a day PRN Sore throat  benzocaine/menthol Lozenge 1 Lozenge Oral every 2 hours PRN Sore Throat  diphenhydrAMINE 25 milliGRAM(s) Oral every 12 hours PRN Rash and/or Itching  naloxone Injectable 0.1 milliGRAM(s) IV Push every 3 minutes PRN For ANY of the following changes in patient status:  A. RR LESS THAN 10 breaths per minute, B. Oxygen saturation LESS THAN 90%, C. Sedation score of 6  ondansetron Injectable 4 milliGRAM(s) IV Push every 6 hours PRN Nausea  oxyCODONE    IR 10 milliGRAM(s) Oral every 4 hours PRN Moderate Pain (4 - 6)  oxyCODONE    IR 15 milliGRAM(s) Oral every 4 hours PRN Severe Pain (7 - 10)  sodium chloride 0.9% lock flush 10 milliLiter(s) IV Push every 1 hour PRN Pre/post blood products, medications, blood draw, and to maintain line patency    Dosing anthropometrics:   Height (inches):  67   Weight (pounds):  186.7 (12/10)   BMI (kg/m^2):  29.2   IBW (pounds):  135 +/- 10%   %IBW:  138.3 %     WEIGHT CHANGE:   12/10     186.7 pounds  12/05     186.7 pounds  Pt weight stable during admission    ESTIMATED NUTRIENT NEEDS: Needs updated 1/4 to reflect post-op healing using ideal body weight 61.4 kg  Calories:  (25-30 kcal/kg) 9250-8527 kcal   Protein:  (1.25-1.6 g/kg) 77-92 g   Fluids:  1 mL/kcal or at team’s discretion   Ideal body weight (IBW) being used as Pt is critically ill and >=100% of ideal body weight.     SUBJECTIVE:   67 yo female with Hx HTN, HLD, DM, CVA x 3 (last in 2016 with residual R hand weakness), GAMALIEL not using CPAP, Emphysema, ex-25 pack year smoker now restarted, chronic constipation on Movantik, chronic urinary retention (SC at home), b/l CTS s/p release, s/p Intrathecal Morphine pump for pain, with chronic neck and back pain worsening for years s/p multiple spinal surgeries including laminectomies and fusion of cervical, thoracic and lumbar spine, s/p T3-L1 revision of prior fusion (with  on 8/17/22). Xray 11/16/23 showing broken rods/displacement. Now s/p C2-S2 instrumentation and fusion (12/5). S/p T9-L2 decompression w/ durotomy with repair (12/10/23).      Pt assessed at bedside on 9WO. Rx and labs reviewed. Pt received resting in bed, appears emotional and began crying during nutrition follow-up. Pt unresponsive to questioning and became tearful; offered support. Pt reportedly ready for [facility] and continues to await placement. Pt reportedly tolerating diet and having normal BMs. No other reports GI distress or further nutritional concerns at this time. RDN will continue to reassess, intervene, and monitor as appropriate.     PREVIOUS NUTRITION DIAGNOSIS: Increased protein/energy needs related to physiological causes increasing nutrient needs as evidenced by known increased metabolic demands for healing status post procedure    active: [ x ]  resolved: [   ]    GOAL: Consistently meet >75% of nutrition needs via most appropriate route for nutrition     NUTRITION RECOMMENDATIONS:   -Continue current diet   -Encourage good PO intake   -Honor Pt food preferences as able   -Weekly wts  -Monitor chemistry, GI function, and skin integrity    EDUCATION: deferred at this time. RD to follow up.     RISK LEVEL:  High [  ]       Moderate [ x ]       Low [  ].

## 2024-01-04 NOTE — PROGRESS NOTE ADULT - SUBJECTIVE AND OBJECTIVE BOX
HPI:  68 y/o female with PMHx HTN, HLD, CVA x 3 (last was 2016 with right hand weakness residual), GAMALIEL not using CPAP, Emphysema, former 25 pack year smoker restarted this week of surgery, Chronic Constipation on Movantik, Urinary Incontinence chronically self straight cath at home, chronic UTI, Breast Implant Rupture with Chronic Leak repair 10/2023, B/L CTS s/p release, s/p Intrathecal Morphine pump for pain, with chronic neck and back pain worsening for years s/p multiple spinal surgeries including laminectomies and fusion of cervical, thoracic and lumbar spine initially done in 2009 and recently in 2019 and 2020 at [facility] by Dr. Trinidad, s/p T3-L1 revision of prior fusion (with Dr. Luz on 8/17/22). Outpatient Xray 11/16/2023 showing broken rods/displacement. Presents for elective C2-S2 instrumentation and fusion. Pt endorses weakness of b/l LE and burning pain in b/l lower extremities radiating to both feet,  (04 Dec 2023 15:44)    INTERVAL EVENTS: POD30/25, KAMRAN ovn    Hospital course:   12/4: Admitted for spinal fusion d/t damaged hardware.   12/5: Neuro stable. POD0 C2-S2 instrumentation/fusion (intraop b/l LE motor loss). on parminder for MAP>90  12/6: POD1. KAMRAN o/n neuro stable. remains intubated. Extubated 6am. Precedex gtt prn. Remains on parminder gtt for MAP goal >90. Valium made prn. CT thoracic spine bc MRI unavailable. Attempted NGT, unsuccessful (resistance @ 35).   12/7: POD2 Given IV dilaudid 0.5mg for pain. Neuro exam stable. SQL started tongiht. Increased precedex to 1.0. Responds well to valium. Consulting psych given paranoia ?homicidal statements. Passed bedside dysphagia and tolerating PO meds. MRI complete, f/u read.   12/8: POD3 Pt reporting incisional pain, given dilaudid 0.5mg IV x2. Pt appearing anxious, given xanax 1mg. Neuro exam unchanged. Pain regimen increased to oxy 15/30 mg, pending further recs. Given 1 L bolus for tachycardia. Trops negative. 2 HMV drains removed.   12/9: POD4, KAMRAN, CT myelogram today showing block at T12-L1, OR tomorrow  12/10: POD5, KAMRAN, OR today for exploration of fusion, possible decompression T9-L2, revision instrumentation  POD 0 T9-L2 decompression. Pt returned from OR intubated. 50mcg fentanyl IVP x 2 for pain control and HTN. Magnesium repleted. Precedex gtt started for sedation. Decreased FiO2 to 40%. 1L bolus for soft MAP. Dc'd propofol and started levo gtt for MAP >90.   12/11: POD 6, POD 1. Extubated, satting well on RA. Resumed home valium and gabapentin. Castillo d/c'd, pending TOV. Seroquel started for agitation, R IJ removed, LUE double midline placed. Started SQL.   12/12: POD7, POD2, given 500 cc bolus and started on phenylephrine for MAP goal >90. Started midodrine 10mg q8h. Cardura dc'd d/t hypotension. Given 250cc of albumin for increasing pressor requirements. Given lactulose, pending BM. Hgb 7.6<8.3, given 1u PRBC. Protected sleep time. Urology consulted for possible suprapubic cath, recommend self intermittent cath is optimal.   12/13: POD8, POD3 Midodrine increased to 15q8. Pt refused to take the extra 5mg midodrin ordered. Neuro exam stable. hgb 10 from 9.4 after 1u pRBCs, Pt refused AM meds, given in late morning, CTAP ordered for L sided abd pain, fleet enema, restarted home movantik and ordere fleet enema for constipation. YIFAN and HMV dc'd. 250cc albumin x1  12/14: POD9, POD4, KAMRAN overnight, neuro stable. MAP goal liberalized to > 65. Stepdown status.   12/15: POD10, POD5, KAMRAN overnight  12/16: POD11, POD6, KAMRAN overnight, neuro stable, 500 cc bolus NS given for tachycardia. Aquacell changed. CXR, pro-bnp negative for pulmonary edema. Given additional 500cc bolus.  12/17: POD 12. POD 7. KAMRAN overnight, neuro stable.   12/18: POD 13/8. Incontinent x2 today.   12/19: KAMRAN. Persistent tachycardia, r/o PE. CTPE protocol ordered. Refused all afternoon meds except oxy 30. Per Dr. Cowart (rad) CT chest w segmental and subsegmental PE in R upper lobe. Dopplers negative for DVT, CT PE shows segmental and subsegmental PE in R upper lobe. Per Dr. Augustine and Dr. Luz, Will start eliquis pending pain management approval. Per Dr. Mclaughlin (pain mgmt) can start eliquis 10mg BIDx 7 days. Dc'd lovenox.  12/20: POD 10 T9-L2 Decompression. Hemodynamically stable. Pending rehab. PT today.   12/21: POD 11 T9-L2 Decompression. neruo/hemodynamically stable, pending rehab.  12/22: POD 17/12, pending rehab. Aquacel dressing changed.   12/23: POD 18/13, pending rehab.   12/24: POD 19/14, pending rehab.   12/25: POD 20/15, pending rehab.   12/26: POD 21/16, pending rehab. Pain pump refilled.   12/27: POD 22/17. pending rehab.   12/28: POD 23/18.   12/29: POD 24/19. KAMRAN o/n. No AR beds, TAIWO  12/30: POD 25/20. Pending TAIWO   12/31: POD 26/21. Pending TAIWO. Midrodrine weaned to 10q8h. UA positive for UTI, f/u culture, started on ceftriaxone x 3 days.   1/1: POD 27/22. KAMRAN overnight. Neuro stable. Wean midodrine to 5q8.   1/2: POD 28/23. KAMRAN overnight, neuro stable. L shoulder xray completed for pain, negative. Medipore dressing changed.   1/3: POD 29/24. KAMRAN overnight, neuro stable. Given 1L bolus for soft pressures.   1/4: POD30/25, KAMRAN ovn    Vital Signs Last 24 Hrs  T(C): 36.7 (03 Jan 2024 21:00), Max: 36.8 (03 Jan 2024 05:01)  T(F): 98 (03 Jan 2024 21:00), Max: 98.2 (03 Jan 2024 05:01)  HR: 88 (03 Jan 2024 21:00) (80 - 104)  BP: 101/60 (03 Jan 2024 21:00) (94/63 - 126/79)  BP(mean): 75 (03 Jan 2024 21:00) (75 - 75)  RR: 18 (03 Jan 2024 21:00) (16 - 20)  SpO2: 96% (03 Jan 2024 21:00) (93% - 98%)    Parameters below as of 03 Jan 2024 21:00  Patient On (Oxygen Delivery Method): room air        I&O's Summary    02 Jan 2024 07:01  -  03 Jan 2024 07:00  --------------------------------------------------------  IN: 790 mL / OUT: 2075 mL / NET: -1285 mL    03 Jan 2024 07:01  -  04 Jan 2024 00:27  --------------------------------------------------------  IN: 2340 mL / OUT: 600 mL / NET: 1740 mL        PHYSICAL EXAM:  General: Pt is sitting up comfortably in bed, in NAD, on RA  HEENT: CN II-XII grossly intact, PERRL 3mm, EOMI B/L, face symmetric  Cardiovascular: RRR, normal S1 and S2   Respiratory: non-labored breathing, symmetric chest rise  GI: abd soft, NTND   Neuro: A&O x 3, follows commands  Strength BL UE 5/5  BL LE 0/5 to noxious stimuli  T11 sensory deficit   Vascular: Distal pulses 2+ x4, no calf edema or erythema  Wounds: Posterior spine wound C/D/I    DIET:  [] NPO  [X] Mechanical  [] Tube feeds    LABS:                  CAPILLARY BLOOD GLUCOSE      POCT Blood Glucose.: 177 mg/dL (03 Jan 2024 14:40)      Drug Levels: [] N/A    CSF Analysis: [] N/A      Allergies    Crab (Pruritus)  No Known Drug Allergies    Intolerances        Home Medications:  albuterol 90 mcg/inh inhalation aerosol: 2 puff(s) inhaled every 6 hours, As Needed (04 Dec 2023 17:40)  apixaban 5 mg oral tablet: 2 tab(s) orally every 12 hours 10mg every 12 hours 12/19 - 12/26 then switch to 5mg every 12 hours (20 Dec 2023 11:24)  aspirin 81 mg oral delayed release tablet: 1 tab(s) orally once a day (04 Dec 2023 17:40)  bisacodyl 5 mg oral delayed release tablet: 1 tab(s) orally once a day (at bedtime) (20 Dec 2023 11:24)  diazePAM 5 mg oral tablet: 1 tab(s) orally every 8 hours as needed for  muscle spasm (20 Dec 2023 11:24)  DULoxetine 60 mg oral delayed release capsule: 1 cap(s) orally once a day (04 Dec 2023 17:40)  gabapentin 800 mg oral tablet: 1 tab(s) orally 3 times a day (04 Dec 2023 18:06)  MetFORMIN (Eqv-Glumetza) 500 mg oral tablet, extended release: 1 tab(s) orally 2 times a day (04 Dec 2023 18:05)  midodrine 5 mg oral tablet: 3 tab(s) orally every 8 hours wean off as tolerated (20 Dec 2023 11:27)  Movantik 25 mg oral tablet: 1 tab(s) orally once a day (in the morning) (04 Dec 2023 17:40)  oxyCODONE 15 mg oral tablet: 1 tab(s) orally every 4 hours As needed Moderate Pain (4 - 6) (20 Dec 2023 11:24)  oxyCODONE 30 mg oral tablet: 1 tab(s) orally every 4 hours As needed Severe Pain (7 - 10) (20 Dec 2023 11:24)  pantoprazole 40 mg oral delayed release tablet: 1 tab(s) orally once a day (before a meal) (20 Dec 2023 11:25)  polyethylene glycol 3350 oral powder for reconstitution: 17 gram(s) orally 2 times a day (20 Dec 2023 11:24)  senna leaf extract oral tablet: 2 tab(s) orally once a day (at bedtime) (20 Dec 2023 11:24)  simvastatin 40 mg oral tablet: 1 tab(s) orally once a day (at bedtime) (04 Dec 2023 17:40)  Vitamin D3 50 mcg (2000 intl units) oral tablet: 1 tab(s) orally once a day (04 Dec 2023 17:40)      MEDICATIONS:  MEDICATIONS  (STANDING):  apixaban 5 milliGRAM(s) Oral every 12 hours  bisacodyl 5 milliGRAM(s) Oral at bedtime  cefTRIAXone   IVPB 1000 milliGRAM(s) IV Intermittent every 24 hours  celecoxib 200 milliGRAM(s) Oral daily  chlorhexidine 2% Cloths 1 Application(s) Topical <User Schedule>  diazepam    Tablet 5 milliGRAM(s) Oral every 8 hours  DULoxetine 60 milliGRAM(s) Oral daily  gabapentin 800 milliGRAM(s) Oral every 8 hours  influenza  Vaccine (HIGH DOSE) 0.7 milliLiter(s) IntraMuscular once  lidocaine   4% Patch 1 Patch Transdermal daily  midodrine 5 milliGRAM(s) Oral every 8 hours  Morphine 1 milliGRAM / 1mL 20 milliLITERS 20 milliGRAM(s) IntraThecal Continuous Pump  multivitamin 1 Tablet(s) Oral daily  naloxegol 25 milliGRAM(s) Oral daily  pantoprazole    Tablet 40 milliGRAM(s) Oral before breakfast  polyethylene glycol 3350 17 Gram(s) Oral two times a day  senna 2 Tablet(s) Oral at bedtime  simvastatin 40 milliGRAM(s) Oral at bedtime    MEDICATIONS  (PRN):  acetaminophen     Tablet .. 1000 milliGRAM(s) Oral every 8 hours PRN Temp greater or equal to 38.5C (101.3F), Mild Pain (1 - 3)  benzocaine 20% Spray 1 Spray(s) Mucosal three times a day PRN Sore throat  benzocaine/menthol Lozenge 1 Lozenge Oral every 2 hours PRN Sore Throat  diphenhydrAMINE 25 milliGRAM(s) Oral every 12 hours PRN Rash and/or Itching  naloxone Injectable 0.1 milliGRAM(s) IV Push every 3 minutes PRN For ANY of the following changes in patient status:  A. RR LESS THAN 10 breaths per minute, B. Oxygen saturation LESS THAN 90%, C. Sedation score of 6  ondansetron Injectable 4 milliGRAM(s) IV Push every 6 hours PRN Nausea  oxyCODONE    IR 10 milliGRAM(s) Oral every 4 hours PRN Moderate Pain (4 - 6)  oxyCODONE    IR 15 milliGRAM(s) Oral every 4 hours PRN Severe Pain (7 - 10)  sodium chloride 0.9% lock flush 10 milliLiter(s) IV Push every 1 hour PRN Pre/post blood products, medications, blood draw, and to maintain line patency      CULTURES:  Culture Results:   >100,000 CFU/ml Escherichia coli (12-31 @ 09:22)      RADIOLOGY & ADDITIONAL TESTS:      ASSESSMENT:  67 yo female with Hx HTN, HLD, DM, CVA x 3 (last in 2016 with residual R hand weakness), GAMALIEL not using CPAP, Emphysema, ex-25 pack year smoker now restarted, chronic constipation on Movantik, chronic urinary retention (SC at home), b/l CTS s/p release, s/p Intrathecal Morphine pump for pain, with chronic neck and back pain worsening for years s/p multiple spinal surgeries including laminectomies and fusion of cervical, thoracic and lumbar spine, s/p T3-L1 revision of prior fusion (with  on 8/17/22). Xray 11/16/23 showing broken rods/displacement. Now s/p C2-S2 instrumentation and fusion (12/5). S/p T9-L2 decompression w/ durotomy with repair (12/10/23). KAREEM grade A.     Plan:  Neuro:  - Neuro/vitals q8  - pain control: modified ERAS, IT morphine pump, oxycodone 10/15 prn, valium and home gabapentin 800 TID resumed  - Continue home cymbalta 60mg daily   - CT thoracic spine 12/6: postop changes    Cardio:  - MAP > 65   - midodrine 5mg q8h, taper as tolerated  - C/w home simvastatin  - Sinus tachycardia - likely 2/2 PE (12/19)      Pulm:  - RA   - hx GAMALIEL, no CPAP at home  - CTA 12/19 w segmental and subsegmental PE in R upper lobe    GI:  - CCD   - Bowel regimen, home movantik 25 mg, last BM 1/2  - CT A/P 12/13 = impacted stool   - GERD: continue home protonix    Renal:  - IVL/Straight cath q6  - Patient not interested in suprapubic catheter placement    Endo:  - A1c: 7.0    Heme:  - SCDs DVT ppx  - LE dopplers 12/19: negative for DVT  - PE tx: Eliquis 5mg BID  - 1u PRBC intraop 12/10, 1u PRBC 12/12    ID:  - afebrile  - +UTI 12/31 +E. coli, ceftriaxone (12/31-1/4)                                                                                                                                                                             Dispo:  - SDU status, full code, pending AR vs TAIWO    D/w Dr. Luz     Assessment: present when checked     [] GCS   E   V   M     Heart Failure: [] Acute, [] acute on chronic, [] chronic   Heart Failure: [] Diastolic (HFpEF), [] Systolic (HRrEF), [] Combined (HFpEF and HFrEF), [] RHF, [] Pulm HTN, [] Other     [] DARRYL, [] ATN, [] AIN, [] other   [] CKD1, [] CKD2, [] CKD3, [] CKD4, [] CKD5, [] ESRD     Encephalopathy: [] Metabolic, [] Hepatic, [] Toxic, [] Neurological, [] Other     Abnormal Nutritional Status: [] malnutrition (see nutrition note), []underweight: BMI <19, [] morbid obesity: BMI >40, [] Cachexia     [] Sepsis   [] Hypovolemic shock, [] Cardiogenic shock, [] Hemorrhagic shock, [] Neurogenic shock   [] Acute respiratory failure   [] Cerebral edema, [] Brain compression / herniation   [] Functional quadriplegia   [] Acute blood loss anemia    HPI:  66 y/o female with PMHx HTN, HLD, CVA x 3 (last was 2016 with right hand weakness residual), GAMALIEL not using CPAP, Emphysema, former 25 pack year smoker restarted this week of surgery, Chronic Constipation on Movantik, Urinary Incontinence chronically self straight cath at home, chronic UTI, Breast Implant Rupture with Chronic Leak repair 10/2023, B/L CTS s/p release, s/p Intrathecal Morphine pump for pain, with chronic neck and back pain worsening for years s/p multiple spinal surgeries including laminectomies and fusion of cervical, thoracic and lumbar spine initially done in 2009 and recently in 2019 and 2020 at [facility] by Dr. Trinidad, s/p T3-L1 revision of prior fusion (with Dr. Luz on 8/17/22). Outpatient Xray 11/16/2023 showing broken rods/displacement. Presents for elective C2-S2 instrumentation and fusion. Pt endorses weakness of b/l LE and burning pain in b/l lower extremities radiating to both feet,  (04 Dec 2023 15:44)    INTERVAL EVENTS: POD30/25, KAMRAN ovn    Hospital course:   12/4: Admitted for spinal fusion d/t damaged hardware.   12/5: Neuro stable. POD0 C2-S2 instrumentation/fusion (intraop b/l LE motor loss). on parminder for MAP>90  12/6: POD1. KAMRAN o/n neuro stable. remains intubated. Extubated 6am. Precedex gtt prn. Remains on parminder gtt for MAP goal >90. Valium made prn. CT thoracic spine bc MRI unavailable. Attempted NGT, unsuccessful (resistance @ 35).   12/7: POD2 Given IV dilaudid 0.5mg for pain. Neuro exam stable. SQL started tongiht. Increased precedex to 1.0. Responds well to valium. Consulting psych given paranoia ?homicidal statements. Passed bedside dysphagia and tolerating PO meds. MRI complete, f/u read.   12/8: POD3 Pt reporting incisional pain, given dilaudid 0.5mg IV x2. Pt appearing anxious, given xanax 1mg. Neuro exam unchanged. Pain regimen increased to oxy 15/30 mg, pending further recs. Given 1 L bolus for tachycardia. Trops negative. 2 HMV drains removed.   12/9: POD4, KAMRAN, CT myelogram today showing block at T12-L1, OR tomorrow  12/10: POD5, KAMRAN, OR today for exploration of fusion, possible decompression T9-L2, revision instrumentation  POD 0 T9-L2 decompression. Pt returned from OR intubated. 50mcg fentanyl IVP x 2 for pain control and HTN. Magnesium repleted. Precedex gtt started for sedation. Decreased FiO2 to 40%. 1L bolus for soft MAP. Dc'd propofol and started levo gtt for MAP >90.   12/11: POD 6, POD 1. Extubated, satting well on RA. Resumed home valium and gabapentin. Castillo d/c'd, pending TOV. Seroquel started for agitation, R IJ removed, LUE double midline placed. Started SQL.   12/12: POD7, POD2, given 500 cc bolus and started on phenylephrine for MAP goal >90. Started midodrine 10mg q8h. Cardura dc'd d/t hypotension. Given 250cc of albumin for increasing pressor requirements. Given lactulose, pending BM. Hgb 7.6<8.3, given 1u PRBC. Protected sleep time. Urology consulted for possible suprapubic cath, recommend self intermittent cath is optimal.   12/13: POD8, POD3 Midodrine increased to 15q8. Pt refused to take the extra 5mg midodrin ordered. Neuro exam stable. hgb 10 from 9.4 after 1u pRBCs, Pt refused AM meds, given in late morning, CTAP ordered for L sided abd pain, fleet enema, restarted home movantik and ordere fleet enema for constipation. YIFAN and HMV dc'd. 250cc albumin x1  12/14: POD9, POD4, KAMRAN overnight, neuro stable. MAP goal liberalized to > 65. Stepdown status.   12/15: POD10, POD5, KAMRAN overnight  12/16: POD11, POD6, KAMRAN overnight, neuro stable, 500 cc bolus NS given for tachycardia. Aquacell changed. CXR, pro-bnp negative for pulmonary edema. Given additional 500cc bolus.  12/17: POD 12. POD 7. KAMRAN overnight, neuro stable.   12/18: POD 13/8. Incontinent x2 today.   12/19: KAMRAN. Persistent tachycardia, r/o PE. CTPE protocol ordered. Refused all afternoon meds except oxy 30. Per Dr. Cowart (rad) CT chest w segmental and subsegmental PE in R upper lobe. Dopplers negative for DVT, CT PE shows segmental and subsegmental PE in R upper lobe. Per Dr. Augustine and Dr. Luz, Will start eliquis pending pain management approval. Per Dr. Mclaughlin (pain mgmt) can start eliquis 10mg BIDx 7 days. Dc'd lovenox.  12/20: POD 10 T9-L2 Decompression. Hemodynamically stable. Pending rehab. PT today.   12/21: POD 11 T9-L2 Decompression. neruo/hemodynamically stable, pending rehab.  12/22: POD 17/12, pending rehab. Aquacel dressing changed.   12/23: POD 18/13, pending rehab.   12/24: POD 19/14, pending rehab.   12/25: POD 20/15, pending rehab.   12/26: POD 21/16, pending rehab. Pain pump refilled.   12/27: POD 22/17. pending rehab.   12/28: POD 23/18.   12/29: POD 24/19. KAMRAN o/n. No AR beds, TAI[facility]  12/30: POD 25/20. Pending T[facility]   12/31: POD 26/21. Pending TAIWO. Midrodrine weaned to 10q8h. UA positive for UTI, f/u culture, started on ceftriaxone x 3 days.   1/1: POD 27/22. KAMRAN overnight. Neuro stable. Wean midodrine to 5q8.   1/2: POD 28/23. KAMRAN overnight, neuro stable. L shoulder xray completed for pain, negative. Medipore dressing changed.   1/3: POD 29/24. KAMRAN overnight, neuro stable. Given 1L bolus for soft pressures.   1/4: POD30/25, KAMRAN ovn    Vital Signs Last 24 Hrs  T(C): 36.7 (03 Jan 2024 21:00), Max: 36.8 (03 Jan 2024 05:01)  T(F): 98 (03 Jan 2024 21:00), Max: 98.2 (03 Jan 2024 05:01)  HR: 88 (03 Jan 2024 21:00) (80 - 104)  BP: 101/60 (03 Jan 2024 21:00) (94/63 - 126/79)  BP(mean): 75 (03 Jan 2024 21:00) (75 - 75)  RR: 18 (03 Jan 2024 21:00) (16 - 20)  SpO2: 96% (03 Jan 2024 21:00) (93% - 98%)    Parameters below as of 03 Jan 2024 21:00  Patient On (Oxygen Delivery Method): room air        I&O's Summary    02 Jan 2024 07:01  -  03 Jan 2024 07:00  --------------------------------------------------------  IN: 790 mL / OUT: 2075 mL / NET: -1285 mL    03 Jan 2024 07:01  -  04 Jan 2024 00:27  --------------------------------------------------------  IN: 2340 mL / OUT: 600 mL / NET: 1740 mL        PHYSICAL EXAM:  General: Pt is sitting up comfortably in bed, in NAD, on RA  HEENT: CN II-XII grossly intact, PERRL 3mm, EOMI B/L, face symmetric  Cardiovascular: RRR, normal S1 and S2   Respiratory: non-labored breathing, symmetric chest rise  GI: abd soft, NTND   Neuro: A&O x 3, follows commands  Strength BL UE 5/5  BL LE 0/5 to noxious stimuli  T11 sensory deficit   Vascular: Distal pulses 2+ x4, no calf edema or erythema  Wounds: Posterior spine wound C/D/I    DIET:  [] NPO  [X] Mechanical  [] Tube feeds    LABS:                  CAPILLARY BLOOD GLUCOSE      POCT Blood Glucose.: 177 mg/dL (03 Jan 2024 14:40)      Drug Levels: [] N/A    CSF Analysis: [] N/A      Allergies    Crab (Pruritus)  No Known Drug Allergies    Intolerances        Home Medications:  albuterol 90 mcg/inh inhalation aerosol: 2 puff(s) inhaled every 6 hours, As Needed (04 Dec 2023 17:40)  apixaban 5 mg oral tablet: 2 tab(s) orally every 12 hours 10mg every 12 hours 12/19 - 12/26 then switch to 5mg every 12 hours (20 Dec 2023 11:24)  aspirin 81 mg oral delayed release tablet: 1 tab(s) orally once a day (04 Dec 2023 17:40)  bisacodyl 5 mg oral delayed release tablet: 1 tab(s) orally once a day (at bedtime) (20 Dec 2023 11:24)  diazePAM 5 mg oral tablet: 1 tab(s) orally every 8 hours as needed for  muscle spasm (20 Dec 2023 11:24)  DULoxetine 60 mg oral delayed release capsule: 1 cap(s) orally once a day (04 Dec 2023 17:40)  gabapentin 800 mg oral tablet: 1 tab(s) orally 3 times a day (04 Dec 2023 18:06)  MetFORMIN (Eqv-Glumetza) 500 mg oral tablet, extended release: 1 tab(s) orally 2 times a day (04 Dec 2023 18:05)  midodrine 5 mg oral tablet: 3 tab(s) orally every 8 hours wean off as tolerated (20 Dec 2023 11:27)  Movantik 25 mg oral tablet: 1 tab(s) orally once a day (in the morning) (04 Dec 2023 17:40)  oxyCODONE 15 mg oral tablet: 1 tab(s) orally every 4 hours As needed Moderate Pain (4 - 6) (20 Dec 2023 11:24)  oxyCODONE 30 mg oral tablet: 1 tab(s) orally every 4 hours As needed Severe Pain (7 - 10) (20 Dec 2023 11:24)  pantoprazole 40 mg oral delayed release tablet: 1 tab(s) orally once a day (before a meal) (20 Dec 2023 11:25)  polyethylene glycol 3350 oral powder for reconstitution: 17 gram(s) orally 2 times a day (20 Dec 2023 11:24)  senna leaf extract oral tablet: 2 tab(s) orally once a day (at bedtime) (20 Dec 2023 11:24)  simvastatin 40 mg oral tablet: 1 tab(s) orally once a day (at bedtime) (04 Dec 2023 17:40)  Vitamin D3 50 mcg (2000 intl units) oral tablet: 1 tab(s) orally once a day (04 Dec 2023 17:40)      MEDICATIONS:  MEDICATIONS  (STANDING):  apixaban 5 milliGRAM(s) Oral every 12 hours  bisacodyl 5 milliGRAM(s) Oral at bedtime  cefTRIAXone   IVPB 1000 milliGRAM(s) IV Intermittent every 24 hours  celecoxib 200 milliGRAM(s) Oral daily  chlorhexidine 2% Cloths 1 Application(s) Topical <User Schedule>  diazepam    Tablet 5 milliGRAM(s) Oral every 8 hours  DULoxetine 60 milliGRAM(s) Oral daily  gabapentin 800 milliGRAM(s) Oral every 8 hours  influenza  Vaccine (HIGH DOSE) 0.7 milliLiter(s) IntraMuscular once  lidocaine   4% Patch 1 Patch Transdermal daily  midodrine 5 milliGRAM(s) Oral every 8 hours  Morphine 1 milliGRAM / 1mL 20 milliLITERS 20 milliGRAM(s) IntraThecal Continuous Pump  multivitamin 1 Tablet(s) Oral daily  naloxegol 25 milliGRAM(s) Oral daily  pantoprazole    Tablet 40 milliGRAM(s) Oral before breakfast  polyethylene glycol 3350 17 Gram(s) Oral two times a day  senna 2 Tablet(s) Oral at bedtime  simvastatin 40 milliGRAM(s) Oral at bedtime    MEDICATIONS  (PRN):  acetaminophen     Tablet .. 1000 milliGRAM(s) Oral every 8 hours PRN Temp greater or equal to 38.5C (101.3F), Mild Pain (1 - 3)  benzocaine 20% Spray 1 Spray(s) Mucosal three times a day PRN Sore throat  benzocaine/menthol Lozenge 1 Lozenge Oral every 2 hours PRN Sore Throat  diphenhydrAMINE 25 milliGRAM(s) Oral every 12 hours PRN Rash and/or Itching  naloxone Injectable 0.1 milliGRAM(s) IV Push every 3 minutes PRN For ANY of the following changes in patient status:  A. RR LESS THAN 10 breaths per minute, B. Oxygen saturation LESS THAN 90%, C. Sedation score of 6  ondansetron Injectable 4 milliGRAM(s) IV Push every 6 hours PRN Nausea  oxyCODONE    IR 10 milliGRAM(s) Oral every 4 hours PRN Moderate Pain (4 - 6)  oxyCODONE    IR 15 milliGRAM(s) Oral every 4 hours PRN Severe Pain (7 - 10)  sodium chloride 0.9% lock flush 10 milliLiter(s) IV Push every 1 hour PRN Pre/post blood products, medications, blood draw, and to maintain line patency      CULTURES:  Culture Results:   >100,000 CFU/ml Escherichia coli (12-31 @ 09:22)      RADIOLOGY & ADDITIONAL TESTS:      ASSESSMENT:  67 yo female with Hx HTN, HLD, DM, CVA x 3 (last in 2016 with residual R hand weakness), GAMALIEL not using CPAP, Emphysema, ex-25 pack year smoker now restarted, chronic constipation on Movantik, chronic urinary retention (SC at home), b/l CTS s/p release, s/p Intrathecal Morphine pump for pain, with chronic neck and back pain worsening for years s/p multiple spinal surgeries including laminectomies and fusion of cervical, thoracic and lumbar spine, s/p T3-L1 revision of prior fusion (with  on 8/17/22). Xray 11/16/23 showing broken rods/displacement. Now s/p C2-S2 instrumentation and fusion (12/5). S/p T9-L2 decompression w/ durotomy with repair (12/10/23). KAREEM grade A.     Plan:  Neuro:  - Neuro/vitals q8  - pain control: modified ERAS, IT morphine pump, oxycodone 10/15 prn, valium and home gabapentin 800 TID resumed  - Continue home cymbalta 60mg daily   - CT thoracic spine 12/6: postop changes    Cardio:  - MAP > 65   - midodrine 5mg q8h, taper as tolerated  - C/w home simvastatin  - Sinus tachycardia - likely 2/2 PE (12/19)      Pulm:  - RA   - hx GAMALIEL, no CPAP at home  - CTA 12/19 w segmental and subsegmental PE in R upper lobe    GI:  - CCD   - Bowel regimen, home movantik 25 mg, last BM 1/2  - CT A/P 12/13 = impacted stool   - GERD: continue home protonix    Renal:  - IVL/Straight cath q6  - Patient not interested in suprapubic catheter placement    Endo:  - A1c: 7.0    Heme:  - SCDs DVT ppx  - LE dopplers 12/19: negative for DVT  - PE tx: Eliquis 5mg BID  - 1u PRBC intraop 12/10, 1u PRBC 12/12    ID:  - afebrile  - +UTI 12/31 +E. coli, ceftriaxone (12/31-1/4)                                                                                                                                                                             Dispo:  - SDU status, full code, pending AR vs TAIWO    D/w Dr. Luz     Assessment: present when checked     [] GCS   E   V   M     Heart Failure: [] Acute, [] acute on chronic, [] chronic   Heart Failure: [] Diastolic (HFpEF), [] Systolic (HRrEF), [] Combined (HFpEF and HFrEF), [] RHF, [] Pulm HTN, [] Other     [] DARRYL, [] ATN, [] AIN, [] other   [] CKD1, [] CKD2, [] CKD3, [] CKD4, [] CKD5, [] ESRD     Encephalopathy: [] Metabolic, [] Hepatic, [] Toxic, [] Neurological, [] Other     Abnormal Nutritional Status: [] malnutrition (see nutrition note), []underweight: BMI <19, [] morbid obesity: BMI >40, [] Cachexia     [] Sepsis   [] Hypovolemic shock, [] Cardiogenic shock, [] Hemorrhagic shock, [] Neurogenic shock   [] Acute respiratory failure   [] Cerebral edema, [] Brain compression / herniation   [] Functional quadriplegia   [] Acute blood loss anemia

## 2024-01-04 NOTE — PROGRESS NOTE ADULT - SUBJECTIVE AND OBJECTIVE BOX
SUBJECTIVE: NAEON. Patient states feels tired this morning. Continues to have some pain but tolerable, states she feels it more in her pelvis today. No other acute complaints.    MEDICATIONS:  MEDICATIONS  (STANDING):  apixaban 5 milliGRAM(s) Oral every 12 hours  bisacodyl 5 milliGRAM(s) Oral at bedtime  cefTRIAXone   IVPB 1000 milliGRAM(s) IV Intermittent every 24 hours  celecoxib 200 milliGRAM(s) Oral daily  chlorhexidine 2% Cloths 1 Application(s) Topical <User Schedule>  diazepam    Tablet 5 milliGRAM(s) Oral every 8 hours  DULoxetine 60 milliGRAM(s) Oral daily  gabapentin 800 milliGRAM(s) Oral every 8 hours  influenza  Vaccine (HIGH DOSE) 0.7 milliLiter(s) IntraMuscular once  lidocaine   4% Patch 1 Patch Transdermal daily  midodrine 5 milliGRAM(s) Oral every 8 hours  Morphine 1 milliGRAM / 1mL 20 milliLITERS 20 milliGRAM(s) IntraThecal Continuous Pump  multivitamin 1 Tablet(s) Oral daily  naloxegol 25 milliGRAM(s) Oral daily  pantoprazole    Tablet 40 milliGRAM(s) Oral before breakfast  polyethylene glycol 3350 17 Gram(s) Oral two times a day  senna 2 Tablet(s) Oral at bedtime  simvastatin 40 milliGRAM(s) Oral at bedtime    MEDICATIONS  (PRN):  acetaminophen     Tablet .. 1000 milliGRAM(s) Oral every 8 hours PRN Temp greater or equal to 38.5C (101.3F), Mild Pain (1 - 3)  benzocaine 20% Spray 1 Spray(s) Mucosal three times a day PRN Sore throat  benzocaine/menthol Lozenge 1 Lozenge Oral every 2 hours PRN Sore Throat  diphenhydrAMINE 25 milliGRAM(s) Oral every 12 hours PRN Rash and/or Itching  naloxone Injectable 0.1 milliGRAM(s) IV Push every 3 minutes PRN For ANY of the following changes in patient status:  A. RR LESS THAN 10 breaths per minute, B. Oxygen saturation LESS THAN 90%, C. Sedation score of 6  ondansetron Injectable 4 milliGRAM(s) IV Push every 6 hours PRN Nausea  oxyCODONE    IR 15 milliGRAM(s) Oral every 4 hours PRN Severe Pain (7 - 10)  oxyCODONE    IR 10 milliGRAM(s) Oral every 4 hours PRN Moderate Pain (4 - 6)  sodium chloride 0.9% lock flush 10 milliLiter(s) IV Push every 1 hour PRN Pre/post blood products, medications, blood draw, and to maintain line patency      Allergies    Crab (Pruritus)  No Known Drug Allergies    Intolerances        OBJECTIVE:  Vital Signs Last 24 Hrs  T(C): 36.3 (04 Jan 2024 12:49), Max: 36.8 (03 Jan 2024 18:03)  T(F): 97.3 (04 Jan 2024 12:49), Max: 98.3 (04 Jan 2024 01:05)  HR: 94 (04 Jan 2024 12:49) (78 - 94)  BP: 109/80 (04 Jan 2024 12:49) (98/51 - 139/90)  BP(mean): 80 (04 Jan 2024 05:00) (75 - 80)  RR: 17 (04 Jan 2024 12:49) (16 - 20)  SpO2: 96% (04 Jan 2024 12:49) (94% - 98%)    Parameters below as of 04 Jan 2024 12:49  Patient On (Oxygen Delivery Method): room air      I&O's Summary    03 Jan 2024 07:01  -  04 Jan 2024 07:00  --------------------------------------------------------  IN: 2700 mL / OUT: 1400 mL / NET: 1300 mL    04 Jan 2024 07:01  -  04 Jan 2024 15:52  --------------------------------------------------------  IN: 0 mL / OUT: 400 mL / NET: -400 mL        PHYSICAL EXAM:  Gen: appears stated age, resting comfortably, NAD  HEENT: NCAT, MMM, clear OP  Neck: supple  CV: RRR, no m/r/g, peripheral pulses 2+  Pulm: CTAB, no increased work of breathing, no rales/rhonchi  Abd: ND, NT, no rebound or guarding  Skin: warm and dry  Ext: non-tender, no edema  Neuro: AOx3, speaking in full sentences, BLE 0/5  Psych: affect and behavior appropriate, pleasant at time of interview    LABS:              CAPILLARY BLOOD GLUCOSE            MICRODATA:      RADIOLOGY/OTHER STUDIES:

## 2024-01-05 ENCOUNTER — TRANSCRIPTION ENCOUNTER (OUTPATIENT)
Age: 69
End: 2024-01-05

## 2024-01-05 VITALS
DIASTOLIC BLOOD PRESSURE: 86 MMHG | HEART RATE: 92 BPM | TEMPERATURE: 98 F | SYSTOLIC BLOOD PRESSURE: 134 MMHG | OXYGEN SATURATION: 98 % | RESPIRATION RATE: 18 BRPM

## 2024-01-05 LAB
SARS-COV-2 RNA SPEC QL NAA+PROBE: SIGNIFICANT CHANGE UP
SARS-COV-2 RNA SPEC QL NAA+PROBE: SIGNIFICANT CHANGE UP

## 2024-01-05 PROCEDURE — C1713: CPT

## 2024-01-05 PROCEDURE — 72020 X-RAY EXAM OF SPINE 1 VIEW: CPT

## 2024-01-05 PROCEDURE — 87086 URINE CULTURE/COLONY COUNT: CPT

## 2024-01-05 PROCEDURE — 83735 ASSAY OF MAGNESIUM: CPT

## 2024-01-05 PROCEDURE — 72132 CT LUMBAR SPINE W/DYE: CPT

## 2024-01-05 PROCEDURE — 83880 ASSAY OF NATRIURETIC PEPTIDE: CPT

## 2024-01-05 PROCEDURE — 83036 HEMOGLOBIN GLYCOSYLATED A1C: CPT

## 2024-01-05 PROCEDURE — 93306 TTE W/DOPPLER COMPLETE: CPT

## 2024-01-05 PROCEDURE — 77003 FLUOROGUIDE FOR SPINE INJECT: CPT

## 2024-01-05 PROCEDURE — 80053 COMPREHEN METABOLIC PANEL: CPT

## 2024-01-05 PROCEDURE — 97161 PT EVAL LOW COMPLEX 20 MIN: CPT

## 2024-01-05 PROCEDURE — 72129 CT CHEST SPINE W/DYE: CPT

## 2024-01-05 PROCEDURE — 97112 NEUROMUSCULAR REEDUCATION: CPT

## 2024-01-05 PROCEDURE — 71275 CT ANGIOGRAPHY CHEST: CPT

## 2024-01-05 PROCEDURE — 99152 MOD SED SAME PHYS/QHP 5/>YRS: CPT

## 2024-01-05 PROCEDURE — 36415 COLL VENOUS BLD VENIPUNCTURE: CPT

## 2024-01-05 PROCEDURE — 86900 BLOOD TYPING SEROLOGIC ABO: CPT

## 2024-01-05 PROCEDURE — 93970 EXTREMITY STUDY: CPT

## 2024-01-05 PROCEDURE — 86901 BLOOD TYPING SEROLOGIC RH(D): CPT

## 2024-01-05 PROCEDURE — 87635 SARS-COV-2 COVID-19 AMP PRB: CPT

## 2024-01-05 PROCEDURE — P9045: CPT

## 2024-01-05 PROCEDURE — C1889: CPT

## 2024-01-05 PROCEDURE — 36430 TRANSFUSION BLD/BLD COMPNT: CPT

## 2024-01-05 PROCEDURE — 82040 ASSAY OF SERUM ALBUMIN: CPT

## 2024-01-05 PROCEDURE — 86923 COMPATIBILITY TEST ELECTRIC: CPT

## 2024-01-05 PROCEDURE — 86850 RBC ANTIBODY SCREEN: CPT

## 2024-01-05 PROCEDURE — 72128 CT CHEST SPINE W/O DYE: CPT

## 2024-01-05 PROCEDURE — 97166 OT EVAL MOD COMPLEX 45 MIN: CPT

## 2024-01-05 PROCEDURE — 82803 BLOOD GASES ANY COMBINATION: CPT

## 2024-01-05 PROCEDURE — 82962 GLUCOSE BLOOD TEST: CPT

## 2024-01-05 PROCEDURE — 73030 X-RAY EXAM OF SHOULDER: CPT

## 2024-01-05 PROCEDURE — 87186 SC STD MICRODIL/AGAR DIL: CPT

## 2024-01-05 PROCEDURE — 83935 ASSAY OF URINE OSMOLALITY: CPT

## 2024-01-05 PROCEDURE — 81001 URINALYSIS AUTO W/SCOPE: CPT

## 2024-01-05 PROCEDURE — 94002 VENT MGMT INPAT INIT DAY: CPT

## 2024-01-05 PROCEDURE — 97530 THERAPEUTIC ACTIVITIES: CPT

## 2024-01-05 PROCEDURE — 82947 ASSAY GLUCOSE BLOOD QUANT: CPT

## 2024-01-05 PROCEDURE — 85027 COMPLETE CBC AUTOMATED: CPT

## 2024-01-05 PROCEDURE — 85018 HEMOGLOBIN: CPT

## 2024-01-05 PROCEDURE — 74176 CT ABD & PELVIS W/O CONTRAST: CPT

## 2024-01-05 PROCEDURE — 97110 THERAPEUTIC EXERCISES: CPT

## 2024-01-05 PROCEDURE — 72146 MRI CHEST SPINE W/O DYE: CPT

## 2024-01-05 PROCEDURE — 83605 ASSAY OF LACTIC ACID: CPT

## 2024-01-05 PROCEDURE — 99232 SBSQ HOSP IP/OBS MODERATE 35: CPT

## 2024-01-05 PROCEDURE — 76000 FLUOROSCOPY <1 HR PHYS/QHP: CPT

## 2024-01-05 PROCEDURE — 71045 X-RAY EXAM CHEST 1 VIEW: CPT

## 2024-01-05 PROCEDURE — 85610 PROTHROMBIN TIME: CPT

## 2024-01-05 PROCEDURE — 82330 ASSAY OF CALCIUM: CPT

## 2024-01-05 PROCEDURE — P9016: CPT

## 2024-01-05 PROCEDURE — 84132 ASSAY OF SERUM POTASSIUM: CPT

## 2024-01-05 PROCEDURE — 84484 ASSAY OF TROPONIN QUANT: CPT

## 2024-01-05 PROCEDURE — 72125 CT NECK SPINE W/O DYE: CPT

## 2024-01-05 PROCEDURE — 99153 MOD SED SAME PHYS/QHP EA: CPT

## 2024-01-05 PROCEDURE — 85730 THROMBOPLASTIN TIME PARTIAL: CPT

## 2024-01-05 PROCEDURE — 84295 ASSAY OF SERUM SODIUM: CPT

## 2024-01-05 PROCEDURE — 99024 POSTOP FOLLOW-UP VISIT: CPT

## 2024-01-05 PROCEDURE — 80048 BASIC METABOLIC PNL TOTAL CA: CPT

## 2024-01-05 PROCEDURE — 72131 CT LUMBAR SPINE W/O DYE: CPT

## 2024-01-05 PROCEDURE — 62284 INJECTION FOR MYELOGRAM: CPT

## 2024-01-05 PROCEDURE — 97535 SELF CARE MNGMENT TRAINING: CPT

## 2024-01-05 PROCEDURE — 84100 ASSAY OF PHOSPHORUS: CPT

## 2024-01-05 RX ORDER — MIDODRINE HYDROCHLORIDE 2.5 MG/1
5 TABLET ORAL
Refills: 0 | Status: DISCONTINUED | OUTPATIENT
Start: 2024-01-05 | End: 2024-01-05

## 2024-01-05 RX ORDER — LIDOCAINE 4 G/100G
1 CREAM TOPICAL
Qty: 0 | Refills: 0 | DISCHARGE
Start: 2024-01-05

## 2024-01-05 RX ORDER — ACETAMINOPHEN 500 MG
2 TABLET ORAL
Qty: 0 | Refills: 0 | DISCHARGE
Start: 2024-01-05

## 2024-01-05 RX ORDER — APIXABAN 2.5 MG/1
1 TABLET, FILM COATED ORAL
Qty: 0 | Refills: 0 | DISCHARGE
Start: 2024-01-05

## 2024-01-05 RX ORDER — ASPIRIN/CALCIUM CARB/MAGNESIUM 324 MG
1 TABLET ORAL
Qty: 0 | Refills: 0 | DISCHARGE

## 2024-01-05 RX ORDER — ASPIRIN/CALCIUM CARB/MAGNESIUM 324 MG
1 TABLET ORAL
Qty: 0 | Refills: 0 | DISCHARGE
Start: 2024-01-05

## 2024-01-05 RX ORDER — MIDODRINE HYDROCHLORIDE 2.5 MG/1
1 TABLET ORAL
Qty: 0 | Refills: 0 | DISCHARGE
Start: 2024-01-05

## 2024-01-05 RX ADMIN — APIXABAN 5 MILLIGRAM(S): 2.5 TABLET, FILM COATED ORAL at 06:46

## 2024-01-05 RX ADMIN — OXYCODONE HYDROCHLORIDE 15 MILLIGRAM(S): 5 TABLET ORAL at 11:58

## 2024-01-05 RX ADMIN — CHLORHEXIDINE GLUCONATE 1 APPLICATION(S): 213 SOLUTION TOPICAL at 12:11

## 2024-01-05 RX ADMIN — LIDOCAINE 1 PATCH: 4 CREAM TOPICAL at 19:42

## 2024-01-05 RX ADMIN — GABAPENTIN 800 MILLIGRAM(S): 400 CAPSULE ORAL at 11:59

## 2024-01-05 RX ADMIN — Medication 5 MILLIGRAM(S): at 14:10

## 2024-01-05 RX ADMIN — CELECOXIB 200 MILLIGRAM(S): 200 CAPSULE ORAL at 12:00

## 2024-01-05 RX ADMIN — MIDODRINE HYDROCHLORIDE 5 MILLIGRAM(S): 2.5 TABLET ORAL at 06:46

## 2024-01-05 RX ADMIN — OXYCODONE HYDROCHLORIDE 15 MILLIGRAM(S): 5 TABLET ORAL at 02:45

## 2024-01-05 RX ADMIN — NALOXEGOL OXALATE 25 MILLIGRAM(S): 12.5 TABLET, FILM COATED ORAL at 11:59

## 2024-01-05 RX ADMIN — APIXABAN 5 MILLIGRAM(S): 2.5 TABLET, FILM COATED ORAL at 18:58

## 2024-01-05 RX ADMIN — GABAPENTIN 800 MILLIGRAM(S): 400 CAPSULE ORAL at 18:59

## 2024-01-05 RX ADMIN — GABAPENTIN 800 MILLIGRAM(S): 400 CAPSULE ORAL at 01:50

## 2024-01-05 RX ADMIN — OXYCODONE HYDROCHLORIDE 15 MILLIGRAM(S): 5 TABLET ORAL at 18:59

## 2024-01-05 RX ADMIN — PANTOPRAZOLE SODIUM 40 MILLIGRAM(S): 20 TABLET, DELAYED RELEASE ORAL at 06:46

## 2024-01-05 RX ADMIN — DULOXETINE HYDROCHLORIDE 60 MILLIGRAM(S): 30 CAPSULE, DELAYED RELEASE ORAL at 11:59

## 2024-01-05 RX ADMIN — Medication 1 TABLET(S): at 12:13

## 2024-01-05 RX ADMIN — OXYCODONE HYDROCHLORIDE 15 MILLIGRAM(S): 5 TABLET ORAL at 01:50

## 2024-01-05 RX ADMIN — LIDOCAINE 1 PATCH: 4 CREAM TOPICAL at 11:58

## 2024-01-05 NOTE — PROGRESS NOTE ADULT - THIS PATIENT HAS THE FOLLOWING CONDITION(S)/DIAGNOSES ON THIS ADMISSION:
Functional Quadriplegia
None
Functional Quadriplegia
None
Functional Quadriplegia
None
Functional Quadriplegia
None

## 2024-01-05 NOTE — PROGRESS NOTE ADULT - SUBJECTIVE AND OBJECTIVE BOX
HPI:  66 y/o female with PMHx HTN, HLD, CVA x 3 (last was 2016 with right hand weakness residual), GAMALIEL not using CPAP, Emphysema, former 25 pack year smoker restarted this week of surgery, Chronic Constipation on Movantik, Urinary Incontinence chronically self straight cath at home, chronic UTI, Breast Implant Rupture with Chronic Leak repair 10/2023, B/L CTS s/p release, s/p Intrathecal Morphine pump for pain, with chronic neck and back pain worsening for years s/p multiple spinal surgeries including laminectomies and fusion of cervical, thoracic and lumbar spine initially done in 2009 and recently in 2019 and 2020 at [facility] by Dr. Trinidad, s/p T3-L1 revision of prior fusion (with Dr. Luz on 8/17/22). Outpatient Xray 11/16/2023 showing broken rods/displacement. Presents for elective C2-S2 instrumentation and fusion. Pt endorses weakness of b/l LE and burning pain in b/l lower extremities radiating to both feet,  (04 Dec 2023 15:44)    OVERNIGHT EVENTS: KAMRAN, stable    Hospital Course:   12/4: Admitted for spinal fusion d/t damaged hardware.   12/5: Neuro stable. POD0 C2-S2 instrumentation/fusion (intraop b/l LE motor loss). on parminder for MAP>90  12/6: POD1. KAMRAN o/n neuro stable. remains intubated. Extubated 6am. Precedex gtt prn. Remains on parminder gtt for MAP goal >90. Valium made prn. CT thoracic spine bc MRI unavailable. Attempted NGT, unsuccessful (resistance @ 35).   12/7: POD2 Given IV dilaudid 0.5mg for pain. Neuro exam stable. SQL started tongiht. Increased precedex to 1.0. Responds well to valium. Consulting psych given paranoia ?homicidal statements. Passed bedside dysphagia and tolerating PO meds. MRI complete, f/u read.   12/8: POD3 Pt reporting incisional pain, given dilaudid 0.5mg IV x2. Pt appearing anxious, given xanax 1mg. Neuro exam unchanged. Pain regimen increased to oxy 15/30 mg, pending further recs. Given 1 L bolus for tachycardia. Trops negative. 2 HMV drains removed.   12/9: POD4, KAMRAN, CT myelogram today showing block at T12-L1, OR tomorrow  12/10: POD5, KAMRAN, OR today for exploration of fusion, possible decompression T9-L2, revision instrumentation  POD 0 T9-L2 decompression. Pt returned from OR intubated. 50mcg fentanyl IVP x 2 for pain control and HTN. Magnesium repleted. Precedex gtt started for sedation. Decreased FiO2 to 40%. 1L bolus for soft MAP. Dc'd propofol and started levo gtt for MAP >90.   12/11: POD 6, POD 1. Extubated, satting well on RA. Resumed home valium and gabapentin. Jiménez d/c'd, pending TOV. Seroquel started for agitation, R IJ removed, LUE double midline placed. Started SQL.   12/12: POD7, POD2, given 500 cc bolus and started on phenylephrine for MAP goal >90. Started midodrine 10mg q8h. Cardura dc'd d/t hypotension. Given 250cc of albumin for increasing pressor requirements. Given lactulose, pending BM. Hgb 7.6<8.3, given 1u PRBC. Protected sleep time. Urology consulted for possible suprapubic cath, recommend self intermittent cath is optimal.   12/13: POD8, POD3 Midodrine increased to 15q8. Pt refused to take the extra 5mg midodrin ordered. Neuro exam stable. hgb 10 from 9.4 after 1u pRBCs, Pt refused AM meds, given in late morning, CTAP ordered for L sided abd pain, fleet enema, restarted home movantik and ordere fleet enema for constipation. YIFAN and HMV dc'd. 250cc albumin x1  12/14: POD9, POD4, KAMRAN overnight, neuro stable. MAP goal liberalized to > 65. Stepdown status.   12/15: POD10, POD5, KAMRAN overnight  12/16: POD11, POD6, KAMRAN overnight, neuro stable, 500 cc bolus NS given for tachycardia. Aquacell changed. CXR, pro-bnp negative for pulmonary edema. Given additional 500cc bolus.  12/17: POD 12. POD 7. KAMRAN overnight, neuro stable.   12/18: POD 13/8. Incontinent x2 today.   12/19: KAMRAN. Persistent tachycardia, r/o PE. CTPE protocol ordered. Refused all afternoon meds except oxy 30. Per Dr. Cowart (rad) CT chest w segmental and subsegmental PE in R upper lobe. Dopplers negative for DVT, CT PE shows segmental and subsegmental PE in R upper lobe. Per Dr. Augustine and Dr. Luz, Will start eliquis pending pain management approval. Per Dr. Mclaughlin (pain mgmt) can start eliquis 10mg BIDx 7 days. Dc'd lovenox.  12/20: POD 10 T9-L2 Decompression. Hemodynamically stable. Pending rehab. PT today.   12/21: POD 11 T9-L2 Decompression. neruo/hemodynamically stable, pending rehab.  12/22: POD 17/12, pending rehab. Aquacel dressing changed.   12/23: POD 18/13, pending rehab.   12/24: POD 19/14, pending rehab.   12/25: POD 20/15, pending rehab.   12/26: POD 21/16, pending rehab. Pain pump refilled.   12/27: POD 22/17. pending rehab.   12/28: POD 23/18.   12/29: POD 24/19. KAMRAN o/n. No AR beds, TAI[facility]  12/30: POD 25/20. Pending [facility]   12/31: POD 26/21. Pending TAIWO. Midrodrine weaned to 10q8h. UA positive for UTI, f/u culture, started on ceftriaxone x 3 days.   1/1: POD 27/22. KAMRAN overnight. Neuro stable. Wean midodrine to 5q8.   1/2: POD 28/23. KAMRAN overnight, neuro stable. L shoulder xray completed for pain, negative. Medipore dressing changed.   1/3: POD 29/24. KAMRAN overnight, neuro stable. Given 1L bolus for soft pressures.   1/4: POD30/25, KAMRAN ovn. Placed jiménez for neurogenic bladder.  TTE with EF 65%, no WMA, mild PA hypertension.   1/5: POD 31/26. KAMRAN o/n.     Vital Signs Last 24 Hrs  T(C): 36.9 (04 Jan 2024 22:08), Max: 36.9 (04 Jan 2024 22:08)  T(F): 98.4 (04 Jan 2024 22:08), Max: 98.4 (04 Jan 2024 22:08)  HR: 98 (04 Jan 2024 22:08) (80 - 98)  BP: 133/72 (04 Jan 2024 22:08) (109/67 - 139/90)  BP(mean): 80 (04 Jan 2024 05:00) (80 - 80)  RR: 20 (04 Jan 2024 22:08) (17 - 20)  SpO2: 94% (04 Jan 2024 22:08) (94% - 98%)    Parameters below as of 04 Jan 2024 22:08  Patient On (Oxygen Delivery Method): room air        I&O's Summary    03 Jan 2024 07:01  -  04 Jan 2024 07:00  --------------------------------------------------------  IN: 2700 mL / OUT: 1400 mL / NET: 1300 mL    04 Jan 2024 07:01  -  05 Jan 2024 01:49  --------------------------------------------------------  IN: 0 mL / OUT: 400 mL / NET: -400 mL        PHYSICAL EXAM:  General: NAD  HEENT: PERRL. EOMI.   Cardiovascular: RRR, normal S1 and S2   Respiratory: non-labored breathing, symmetric chest rise  GI: abd soft, NTND  Neuro: AOx 3, CN II-XII intact, follows commands, speech clear. Strength BL UE 5/5  BL LE 0/5 to noxious stimuli with T11 sensory deficit.  Vascular: Distal pulses 2+ x 4, no calf edema or erythema  Wounds: Posterior midline spine incision C/D/I         LABS:      CAPILLARY BLOOD GLUCOSE      Drug Levels: [] N/A    CSF Analysis: [] N/A      Allergies    Crab (Pruritus)  No Known Drug Allergies    Intolerances      MEDICATIONS:  Antibiotics:    Neuro:  acetaminophen     Tablet .. 1000 milliGRAM(s) Oral every 8 hours PRN  celecoxib 200 milliGRAM(s) Oral daily  diazepam    Tablet 5 milliGRAM(s) Oral every 8 hours  diphenhydrAMINE 25 milliGRAM(s) Oral every 12 hours PRN  DULoxetine 60 milliGRAM(s) Oral daily  gabapentin 800 milliGRAM(s) Oral every 8 hours  ondansetron Injectable 4 milliGRAM(s) IV Push every 6 hours PRN  oxyCODONE    IR 10 milliGRAM(s) Oral every 4 hours PRN  oxyCODONE    IR 15 milliGRAM(s) Oral every 4 hours PRN    Anticoagulation:  apixaban 5 milliGRAM(s) Oral every 12 hours    OTHER:  benzocaine 20% Spray 1 Spray(s) Mucosal three times a day PRN  benzocaine/menthol Lozenge 1 Lozenge Oral every 2 hours PRN  bisacodyl 5 milliGRAM(s) Oral at bedtime  chlorhexidine 2% Cloths 1 Application(s) Topical <User Schedule>  influenza  Vaccine (HIGH DOSE) 0.7 milliLiter(s) IntraMuscular once  lidocaine   4% Patch 1 Patch Transdermal daily  midodrine 5 milliGRAM(s) Oral every 8 hours  Morphine 1 milliGRAM / 1mL 20 milliLITERS 20 milliGRAM(s) IntraThecal Continuous Pump  naloxegol 25 milliGRAM(s) Oral daily  naloxone Injectable 0.1 milliGRAM(s) IV Push every 3 minutes PRN  pantoprazole    Tablet 40 milliGRAM(s) Oral before breakfast  polyethylene glycol 3350 17 Gram(s) Oral two times a day  senna 2 Tablet(s) Oral at bedtime  simvastatin 40 milliGRAM(s) Oral at bedtime    IVF:  multivitamin 1 Tablet(s) Oral daily    CULTURES:  Culture Results:   >100,000 CFU/ml Escherichia coli (12-31 @ 09:22)      ASSESSMENT:  67 yo female with Hx HTN, HLD, DM, CVA x 3 (last in 2016 with residual R hand weakness), GAMALIEL not using CPAP, Emphysema, ex-25 pack year smoker now restarted, chronic constipation on Movantik, chronic urinary retention (SC at home), b/l CTS s/p release, s/p Intrathecal Morphine pump for pain, with chronic neck and back pain worsening for years s/p multiple spinal surgeries including laminectomies and fusion of cervical, thoracic and lumbar spine, s/p T3-L1 revision of prior fusion (with  on 8/17/22). Xray 11/16/23 showing broken rods/displacement. Now s/p C2-S2 instrumentation and fusion (12/5). S/p T9-L2 decompression w/ durotomy with repair (12/10/23). KAREEM grade A.     S12.9XXA    Handoff    MEWS Score    HTN (hypertension)    Back pain    Hypertension    SS (spinal stenosis)    High cholesterol    Stroke    H/O carpal tunnel syndrome    H/O carpal tunnel syndrome    Chronic GERD    History of chronic constipation    Seizure    Urinary incontinence    Pre-diabetes    Acute UTI    Anxiety    Anxiety and depression    Arthritis    Eczema    External hemorrhoids    H/O kyphosis    Multiple sclerosis    Pancreas cyst    Scoliosis    Wheelchair dependent    Cervical pseudoarthrosis    Right shoulder pain    Cervical spondylosis    Chronic headaches    Chronic LUQ pain    Chronic UTI    Degenerative joint disease    H/O low back pain    Lumbosacral spondylosis    COPD (chronic obstructive pulmonary disease)    Back pain    Back pain    Spinal cord injury, thoracic (T7-T12)    Back pain    Spinal cord injury of thoracic region without bone injury    Delirium    Revision of fusion of thoracic spine    Thoracic back pain    Revision of posterolateral fusion of lumbar spine    Revision of fusion of thoracic spine    Decompression, spinal cord, thoracic, posterolateral approach    Cervical disc disease    H/O Spinal surgery    H/O breast surgery    S/P cervical discectomy    Previous back surgery    H/O shoulder surgery    H/O total shoulder replacement, right    Room Service Assist    Room Service Assist    Revision of posterolateral fusion of lumbar spine    Decompression, spinal cord, thoracic, posterolateral approach    HTN (hypertension)    HLD (hyperlipidemia)    CVA (cerebrovascular accident)    GAMALIEL (obstructive sleep apnea)    H/O emphysema    Smoking history    Chronic constipation    Urinary incontinence    Pulmonary embolism    Diabetes    GERD (gastroesophageal reflux disease)    SysAdmin_VstLnk        PLAN:  Neuro:  - Neuro/vitals q8  - pain control: modified ERAS, IT morphine pump, oxycodone 10/15 prn, valium and home gabapentin 800 TID resumed  - Continue home cymbalta 60mg daily   - CT thoracic spine 12/6: postop changes    Cardio:  - MAP > 65   - midodrine 5mg q8h, taper as tolerated  - TTE EF 65%, no WMA, RV normal, mild PA hypertension   - C/w home simvastatin  - Sinus tachycardia - likely 2/2 PE (12/19)      Pulm:  - RA   - hx GAMALIEL, no CPAP at home  - CTA 12/19 w segmental and subsegmental PE in R upper lobe    GI:  - CCD   - Bowel regimen, home movantik 25 mg, last BM 1/2  - CT A/P 12/13 = impacted stool   - GERD: continue home protonix    Renal:  - IVL  - jiménez placed 1/4  - Patient not interested in suprapubic catheter placement    Endo:  - A1c: 7.0    Heme:  - SCDs DVT ppx  - LE dopplers 12/19: negative for DVT  - PE tx: Eliquis 5mg BID  - 1u PRBC intraop 12/10, 1u PRBC 12/12    ID:  - afebrile  - +UTI 12/31 +E. coli, ceftriaxone (12/31-1/4)                                                                                                                                                                             Dispo:  - SDU status, full code, pending AR vs TAIWO    D/w Dr. Luz   Assessment:  Present when checked    []  GCS  E   V  M     Heart Failure: []Acute, [] acute on chronic , []chronic  Heart Failure:  [] Diastolic (HFpEF), [] Systolic (HFrEF), []Combined (HFpEF and HFrEF), [] RHF, [] Pulm HTN, [] Other    [] DARRYL, [] ATN, [] AIN, [] other  [] CKD1, [] CKD2, [] CKD 3, [] CKD 4, [] CKD 5, []ESRD    Encephalopathy: [] Metabolic, [] Hepatic, [] toxic, [] Neurological, [] Other    Abnormal Nurtitional Status: [] malnurtition (see nutrition note), [ ]underweight: BMI < 19, [] morbid obesity: BMI >40, [] Cachexia    [] Sepsis  [] hypovolemic shock,[] cardiogenic shock, [] hemorrhagic shock, [] neuogenic shock  [] Acute Respiratory Failure  []Cerebral edema, [] Brain compression/ herniation,   [] Functional quadriplegia  [] Acute blood loss anemia   HPI:  66 y/o female with PMHx HTN, HLD, CVA x 3 (last was 2016 with right hand weakness residual), GAMALIEL not using CPAP, Emphysema, former 25 pack year smoker restarted this week of surgery, Chronic Constipation on Movantik, Urinary Incontinence chronically self straight cath at home, chronic UTI, Breast Implant Rupture with Chronic Leak repair 10/2023, B/L CTS s/p release, s/p Intrathecal Morphine pump for pain, with chronic neck and back pain worsening for years s/p multiple spinal surgeries including laminectomies and fusion of cervical, thoracic and lumbar spine initially done in 2009 and recently in 2019 and 2020 at [facility] by Dr. Trinidad, s/p T3-L1 revision of prior fusion (with Dr. Luz on 8/17/22). Outpatient Xray 11/16/2023 showing broken rods/displacement. Presents for elective C2-S2 instrumentation and fusion. Pt endorses weakness of b/l LE and burning pain in b/l lower extremities radiating to both feet,  (04 Dec 2023 15:44)    OVERNIGHT EVENTS: KAMRAN, stable    Hospital Course:   12/4: Admitted for spinal fusion d/t damaged hardware.   12/5: Neuro stable. POD0 C2-S2 instrumentation/fusion (intraop b/l LE motor loss). on parminder for MAP>90  12/6: POD1. KAMRAN o/n neuro stable. remains intubated. Extubated 6am. Precedex gtt prn. Remains on parminder gtt for MAP goal >90. Valium made prn. CT thoracic spine bc MRI unavailable. Attempted NGT, unsuccessful (resistance @ 35).   12/7: POD2 Given IV dilaudid 0.5mg for pain. Neuro exam stable. SQL started tongiht. Increased precedex to 1.0. Responds well to valium. Consulting psych given paranoia ?homicidal statements. Passed bedside dysphagia and tolerating PO meds. MRI complete, f/u read.   12/8: POD3 Pt reporting incisional pain, given dilaudid 0.5mg IV x2. Pt appearing anxious, given xanax 1mg. Neuro exam unchanged. Pain regimen increased to oxy 15/30 mg, pending further recs. Given 1 L bolus for tachycardia. Trops negative. 2 HMV drains removed.   12/9: POD4, KAMRAN, CT myelogram today showing block at T12-L1, OR tomorrow  12/10: POD5, KAMRAN, OR today for exploration of fusion, possible decompression T9-L2, revision instrumentation  POD 0 T9-L2 decompression. Pt returned from OR intubated. 50mcg fentanyl IVP x 2 for pain control and HTN. Magnesium repleted. Precedex gtt started for sedation. Decreased FiO2 to 40%. 1L bolus for soft MAP. Dc'd propofol and started levo gtt for MAP >90.   12/11: POD 6, POD 1. Extubated, satting well on RA. Resumed home valium and gabapentin. Jiménez d/c'd, pending TOV. Seroquel started for agitation, R IJ removed, LUE double midline placed. Started SQL.   12/12: POD7, POD2, given 500 cc bolus and started on phenylephrine for MAP goal >90. Started midodrine 10mg q8h. Cardura dc'd d/t hypotension. Given 250cc of albumin for increasing pressor requirements. Given lactulose, pending BM. Hgb 7.6<8.3, given 1u PRBC. Protected sleep time. Urology consulted for possible suprapubic cath, recommend self intermittent cath is optimal.   12/13: POD8, POD3 Midodrine increased to 15q8. Pt refused to take the extra 5mg midodrin ordered. Neuro exam stable. hgb 10 from 9.4 after 1u pRBCs, Pt refused AM meds, given in late morning, CTAP ordered for L sided abd pain, fleet enema, restarted home movantik and ordere fleet enema for constipation. YIFAN and HMV dc'd. 250cc albumin x1  12/14: POD9, POD4, KAMRAN overnight, neuro stable. MAP goal liberalized to > 65. Stepdown status.   12/15: POD10, POD5, KAMRAN overnight  12/16: POD11, POD6, KAMRAN overnight, neuro stable, 500 cc bolus NS given for tachycardia. Aquacell changed. CXR, pro-bnp negative for pulmonary edema. Given additional 500cc bolus.  12/17: POD 12. POD 7. KAMRAN overnight, neuro stable.   12/18: POD 13/8. Incontinent x2 today.   12/19: KAMRAN. Persistent tachycardia, r/o PE. CTPE protocol ordered. Refused all afternoon meds except oxy 30. Per Dr. Cowart (rad) CT chest w segmental and subsegmental PE in R upper lobe. Dopplers negative for DVT, CT PE shows segmental and subsegmental PE in R upper lobe. Per Dr. Augustine and Dr. Luz, Will start eliquis pending pain management approval. Per Dr. Mclaughlin (pain mgmt) can start eliquis 10mg BIDx 7 days. Dc'd lovenox.  12/20: POD 10 T9-L2 Decompression. Hemodynamically stable. Pending rehab. PT today.   12/21: POD 11 T9-L2 Decompression. neruo/hemodynamically stable, pending rehab.  12/22: POD 17/12, pending rehab. Aquacel dressing changed.   12/23: POD 18/13, pending rehab.   12/24: POD 19/14, pending rehab.   12/25: POD 20/15, pending rehab.   12/26: POD 21/16, pending rehab. Pain pump refilled.   12/27: POD 22/17. pending rehab.   12/28: POD 23/18.   12/29: POD 24/19. KAMRAN o/n. No AR beds, TAI[facility]  12/30: POD 25/20. Pending [facility]   12/31: POD 26/21. Pending [facility]. Midrodrine weaned to 10q8h. UA positive for UTI, f/u culture, started on ceftriaxone x 3 days.   1/1: POD 27/22. KAMRAN overnight. Neuro stable. Wean midodrine to 5q8.   1/2: POD 28/23. KAMRAN overnight, neuro stable. L shoulder xray completed for pain, negative. Medipore dressing changed.   1/3: POD 29/24. KAMRAN overnight, neuro stable. Given 1L bolus for soft pressures.   1/4: POD30/25, KAMRAN ovn. Placed jiménez for neurogenic bladder.  TTE with EF 65%, no WMA, mild PA hypertension.   1/5: POD 31/26. KAMRAN o/n.     Vital Signs Last 24 Hrs  T(C): 36.9 (04 Jan 2024 22:08), Max: 36.9 (04 Jan 2024 22:08)  T(F): 98.4 (04 Jan 2024 22:08), Max: 98.4 (04 Jan 2024 22:08)  HR: 98 (04 Jan 2024 22:08) (80 - 98)  BP: 133/72 (04 Jan 2024 22:08) (109/67 - 139/90)  BP(mean): 80 (04 Jan 2024 05:00) (80 - 80)  RR: 20 (04 Jan 2024 22:08) (17 - 20)  SpO2: 94% (04 Jan 2024 22:08) (94% - 98%)    Parameters below as of 04 Jan 2024 22:08  Patient On (Oxygen Delivery Method): room air        I&O's Summary    03 Jan 2024 07:01  -  04 Jan 2024 07:00  --------------------------------------------------------  IN: 2700 mL / OUT: 1400 mL / NET: 1300 mL    04 Jan 2024 07:01  -  05 Jan 2024 01:49  --------------------------------------------------------  IN: 0 mL / OUT: 400 mL / NET: -400 mL        PHYSICAL EXAM:  General: NAD  HEENT: PERRL. EOMI.   Cardiovascular: RRR, normal S1 and S2   Respiratory: non-labored breathing, symmetric chest rise  GI: abd soft, NTND  Neuro: AOx 3, CN II-XII intact, follows commands, speech clear. Strength BL UE 5/5  BL LE 0/5 to noxious stimuli with T11 sensory deficit.  Vascular: Distal pulses 2+ x 4, no calf edema or erythema  Wounds: Posterior midline spine incision C/D/I         LABS:      CAPILLARY BLOOD GLUCOSE      Drug Levels: [] N/A    CSF Analysis: [] N/A      Allergies    Crab (Pruritus)  No Known Drug Allergies    Intolerances      MEDICATIONS:  Antibiotics:    Neuro:  acetaminophen     Tablet .. 1000 milliGRAM(s) Oral every 8 hours PRN  celecoxib 200 milliGRAM(s) Oral daily  diazepam    Tablet 5 milliGRAM(s) Oral every 8 hours  diphenhydrAMINE 25 milliGRAM(s) Oral every 12 hours PRN  DULoxetine 60 milliGRAM(s) Oral daily  gabapentin 800 milliGRAM(s) Oral every 8 hours  ondansetron Injectable 4 milliGRAM(s) IV Push every 6 hours PRN  oxyCODONE    IR 10 milliGRAM(s) Oral every 4 hours PRN  oxyCODONE    IR 15 milliGRAM(s) Oral every 4 hours PRN    Anticoagulation:  apixaban 5 milliGRAM(s) Oral every 12 hours    OTHER:  benzocaine 20% Spray 1 Spray(s) Mucosal three times a day PRN  benzocaine/menthol Lozenge 1 Lozenge Oral every 2 hours PRN  bisacodyl 5 milliGRAM(s) Oral at bedtime  chlorhexidine 2% Cloths 1 Application(s) Topical <User Schedule>  influenza  Vaccine (HIGH DOSE) 0.7 milliLiter(s) IntraMuscular once  lidocaine   4% Patch 1 Patch Transdermal daily  midodrine 5 milliGRAM(s) Oral every 8 hours  Morphine 1 milliGRAM / 1mL 20 milliLITERS 20 milliGRAM(s) IntraThecal Continuous Pump  naloxegol 25 milliGRAM(s) Oral daily  naloxone Injectable 0.1 milliGRAM(s) IV Push every 3 minutes PRN  pantoprazole    Tablet 40 milliGRAM(s) Oral before breakfast  polyethylene glycol 3350 17 Gram(s) Oral two times a day  senna 2 Tablet(s) Oral at bedtime  simvastatin 40 milliGRAM(s) Oral at bedtime    IVF:  multivitamin 1 Tablet(s) Oral daily    CULTURES:  Culture Results:   >100,000 CFU/ml Escherichia coli (12-31 @ 09:22)      ASSESSMENT:  69 yo female with Hx HTN, HLD, DM, CVA x 3 (last in 2016 with residual R hand weakness), GAMALIEL not using CPAP, Emphysema, ex-25 pack year smoker now restarted, chronic constipation on Movantik, chronic urinary retention (SC at home), b/l CTS s/p release, s/p Intrathecal Morphine pump for pain, with chronic neck and back pain worsening for years s/p multiple spinal surgeries including laminectomies and fusion of cervical, thoracic and lumbar spine, s/p T3-L1 revision of prior fusion (with  on 8/17/22). Xray 11/16/23 showing broken rods/displacement. Now s/p C2-S2 instrumentation and fusion (12/5). S/p T9-L2 decompression w/ durotomy with repair (12/10/23). KAREEM grade A.     S12.9XXA    Handoff    MEWS Score    HTN (hypertension)    Back pain    Hypertension    SS (spinal stenosis)    High cholesterol    Stroke    H/O carpal tunnel syndrome    H/O carpal tunnel syndrome    Chronic GERD    History of chronic constipation    Seizure    Urinary incontinence    Pre-diabetes    Acute UTI    Anxiety    Anxiety and depression    Arthritis    Eczema    External hemorrhoids    H/O kyphosis    Multiple sclerosis    Pancreas cyst    Scoliosis    Wheelchair dependent    Cervical pseudoarthrosis    Right shoulder pain    Cervical spondylosis    Chronic headaches    Chronic LUQ pain    Chronic UTI    Degenerative joint disease    H/O low back pain    Lumbosacral spondylosis    COPD (chronic obstructive pulmonary disease)    Back pain    Back pain    Spinal cord injury, thoracic (T7-T12)    Back pain    Spinal cord injury of thoracic region without bone injury    Delirium    Revision of fusion of thoracic spine    Thoracic back pain    Revision of posterolateral fusion of lumbar spine    Revision of fusion of thoracic spine    Decompression, spinal cord, thoracic, posterolateral approach    Cervical disc disease    H/O Spinal surgery    H/O breast surgery    S/P cervical discectomy    Previous back surgery    H/O shoulder surgery    H/O total shoulder replacement, right    Room Service Assist    Room Service Assist    Revision of posterolateral fusion of lumbar spine    Decompression, spinal cord, thoracic, posterolateral approach    HTN (hypertension)    HLD (hyperlipidemia)    CVA (cerebrovascular accident)    GAMALIEL (obstructive sleep apnea)    H/O emphysema    Smoking history    Chronic constipation    Urinary incontinence    Pulmonary embolism    Diabetes    GERD (gastroesophageal reflux disease)    SysAdmin_VstLnk        PLAN:  Neuro:  - Neuro/vitals q8  - pain control: modified ERAS, IT morphine pump, oxycodone 10/15 prn, valium and home gabapentin 800 TID resumed  - Continue home cymbalta 60mg daily   - CT thoracic spine 12/6: postop changes    Cardio:  - MAP > 65   - midodrine 5mg q8h, taper as tolerated  - TTE EF 65%, no WMA, RV normal, mild PA hypertension   - C/w home simvastatin  - Sinus tachycardia - likely 2/2 PE (12/19)      Pulm:  - RA   - hx GAMALIEL, no CPAP at home  - CTA 12/19 w segmental and subsegmental PE in R upper lobe    GI:  - CCD   - Bowel regimen, home movantik 25 mg, last BM 1/2  - CT A/P 12/13 = impacted stool   - GERD: continue home protonix    Renal:  - IVL  - jiménez placed 1/4  - Patient not interested in suprapubic catheter placement    Endo:  - A1c: 7.0    Heme:  - SCDs DVT ppx  - LE dopplers 12/19: negative for DVT  - PE tx: Eliquis 5mg BID  - 1u PRBC intraop 12/10, 1u PRBC 12/12    ID:  - afebrile  - +UTI 12/31 +E. coli, ceftriaxone (12/31-1/4)                                                                                                                                                                             Dispo:  - SDU status, full code, pending AR vs TAIWO    D/w Dr. Luz   Assessment:  Present when checked    []  GCS  E   V  M     Heart Failure: []Acute, [] acute on chronic , []chronic  Heart Failure:  [] Diastolic (HFpEF), [] Systolic (HFrEF), []Combined (HFpEF and HFrEF), [] RHF, [] Pulm HTN, [] Other    [] DARRYL, [] ATN, [] AIN, [] other  [] CKD1, [] CKD2, [] CKD 3, [] CKD 4, [] CKD 5, []ESRD    Encephalopathy: [] Metabolic, [] Hepatic, [] toxic, [] Neurological, [] Other    Abnormal Nurtitional Status: [] malnurtition (see nutrition note), [ ]underweight: BMI < 19, [] morbid obesity: BMI >40, [] Cachexia    [] Sepsis  [] hypovolemic shock,[] cardiogenic shock, [] hemorrhagic shock, [] neuogenic shock  [] Acute Respiratory Failure  []Cerebral edema, [] Brain compression/ herniation,   [] Functional quadriplegia  [] Acute blood loss anemia

## 2024-01-05 NOTE — DISCHARGE NOTE NURSING/CASE MANAGEMENT/SOCIAL WORK - PATIENT PORTAL LINK FT
You can access the FollowMyHealth Patient Portal offered by [facility] by registering at the following website: [email or web address]. By joining [employer]’s FollowMyHealth portal, you will also be able to view your health information using other applications (apps) compatible with our system. You can access the FollowMyHealth Patient Portal offered by [facility] by registering at the following website: [email or web address]. By joining [employer]’s FollowMyHealth portal, you will also be able to view your health information using other applications (apps) compatible with our system.

## 2024-01-05 NOTE — PROGRESS NOTE ADULT - PROVIDER SPECIALTY LIST ADULT
Hospitalist
Internal Medicine
Internal Medicine
Neurosurgery
Pain Medicine
Pain Medicine
Hospitalist
Internal Medicine
NSICU
Neurosurgery
Neurosurgery
Pain Medicine
Hospitalist
NSICU
Neurosurgery
Pain Medicine
Pain Medicine
NSICU
Neurosurgery
Neurosurgery
Hospitalist
NSICU
NSICU
Neurosurgery
Pain Medicine
NSICU
Internal Medicine
NSICU
NSICU

## 2024-01-05 NOTE — DISCHARGE NOTE NURSING/CASE MANAGEMENT/SOCIAL WORK - NSDCPEFALRISK_GEN_ALL_CORE
For information on Fall & Injury Prevention, visit: [email or web address] OR  [email or web address] OR  https://www.cdc.gov/steadi/patient.html For information on Fall & Injury Prevention, visit: [email or web address] OR  [email or web address] OR  https://www.cdc.gov/steadi/patient.html

## 2024-01-05 NOTE — PROGRESS NOTE ADULT - ASSESSMENT
68 YOF with PMH of HTN, HLD, multiple strokes (residual R hand weakness), GAMALIEL non-adherent with CPAP, COPD, chronic neck and back pain (s/p multiple surgeries) with intrathecal pump and functional paraplegia, chronic constipation admitted for revision of fusion of thoracic and lumbar spine. Course c/b RUL PE, E coli UTI, ?hypotension on midodrine    Chronic neck and back pain c/b paraplegia  Post operative state  - s/p OR with Dr. Luz 12/5 for revision C2-S2 instrumentation and fusion  - s/p OR with Dr. Luz 12/10 for T9-L2 decompression with durotomy repair  - pain control per NSGY (morphine intrathecal pump with PRN oxy, gabapentin, diazepam, duloxetine)  - recommend cont aggressive bowel regimen  - OOBTC if tolerated    Chronic constipation  - cont aggressive bowel regimen: senna 2 tabs qhs, miralax BID, bisacodyl 5mg qhs, naloxegol 25mg daily  - if persistent constipation, consider adding suppository or enema  - if develops N/V or signs of obstruction, would hold naloxegol and obtain imaging     Complicated E. coli UTI  Chronic urinary retention  - UC with E coli s/p 5-day course of ceftriaxone (EOT 1/4)  - s/p jiménez placement 1/4 for persistent retention  - constipation may also be contributing to retention     Hypotension  - cont midodrine taper, recommend reducing to 5mg BID with holding parameter (hold for SBP >100)  - TTE obtained with normal LV and RV function, no gross valvular abnormalities    Acute PE  - CTA 12/19 with segmental and subsegmental RUL PE, likely provoked in setting of surgery and immobility  - TTE w/o evidence of RHS though PASP elevated 39mmgHg  - apixaban 5mg BID for 3-6mo  - outpatient PCP follow up    History of multiple stroke  - resume aspirin for secondary prevention once clear from surgical standpoint  - cont atorvastatin   - pending results of outpatient cardiac monitoring for eval of occult AFIB    Dispo: acute rehab pending auth, recommend d/c tele monitoring    Plan discussed with primary team.

## 2024-01-05 NOTE — DISCHARGE NOTE NURSING/CASE MANAGEMENT/SOCIAL WORK - NSDPFAC_GEN_ALL_CORE
[facility]/ [address] [place] [address]/ General Phone: [phone]/ Admissions Ph: [phone]   [facility]/ [address] [place] [address]/ General Phone: [phone]/ Admissions Ph: [phone]

## 2024-01-05 NOTE — DISCHARGE NOTE NURSING/CASE MANAGEMENT/SOCIAL WORK - NSDCPETBCESMAN_GEN_ALL_CORE
If you are a smoker, it is important for your health to stop smoking. Please be aware that second hand smoke is also harmful. Spoke with dad.  Patient started to have severe cramping and dad has taken patient to ER for evaluation.  He will call with any concerns.

## 2024-01-05 NOTE — DISCHARGE NOTE NURSING/CASE MANAGEMENT/SOCIAL WORK - NSDCFUADDAPPT_GEN_ALL_CORE_FT
Please follow up with Dr. Luz, call the office to make/confirm/make changes to your appointment at [phone]    Please follow up with Dr. Conley from Plastic Surgery    Please follow up with Dr. Mclaughlin if needed for pain management    Please follow up with your primary care doctor  Please follow up with Dr. Luz, call the office to make/confirm/make changes to your appointment at [phone]    Please follow up with Dr. Conley from Plastic Surgery    Please follow up with Dr. Mclaughlin if needed for pain management    Please follow up with your primary care doctor

## 2024-01-05 NOTE — PROGRESS NOTE ADULT - SUBJECTIVE AND OBJECTIVE BOX
SUBJECTIVE: NAEON. Reports pain from jiménez - but also states it is painful when they do intermittent straight cath. Discussed with nursing - reportedly had normal BM prior to start of day shift.     MEDICATIONS:  MEDICATIONS  (STANDING):  apixaban 5 milliGRAM(s) Oral every 12 hours  bisacodyl 5 milliGRAM(s) Oral at bedtime  celecoxib 200 milliGRAM(s) Oral daily  chlorhexidine 2% Cloths 1 Application(s) Topical <User Schedule>  diazepam    Tablet 5 milliGRAM(s) Oral every 8 hours  DULoxetine 60 milliGRAM(s) Oral daily  gabapentin 800 milliGRAM(s) Oral every 8 hours  influenza  Vaccine (HIGH DOSE) 0.7 milliLiter(s) IntraMuscular once  lidocaine   4% Patch 1 Patch Transdermal daily  midodrine 5 milliGRAM(s) Oral <User Schedule>  Morphine 1 milliGRAM / 1mL 20 milliLITERS 20 milliGRAM(s) IntraThecal Continuous Pump  multivitamin 1 Tablet(s) Oral daily  naloxegol 25 milliGRAM(s) Oral daily  pantoprazole    Tablet 40 milliGRAM(s) Oral before breakfast  polyethylene glycol 3350 17 Gram(s) Oral two times a day  senna 2 Tablet(s) Oral at bedtime  simvastatin 40 milliGRAM(s) Oral at bedtime    MEDICATIONS  (PRN):  acetaminophen     Tablet .. 1000 milliGRAM(s) Oral every 8 hours PRN Temp greater or equal to 38.5C (101.3F), Mild Pain (1 - 3)  benzocaine 20% Spray 1 Spray(s) Mucosal three times a day PRN Sore throat  benzocaine/menthol Lozenge 1 Lozenge Oral every 2 hours PRN Sore Throat  diphenhydrAMINE 25 milliGRAM(s) Oral every 12 hours PRN Rash and/or Itching  naloxone Injectable 0.1 milliGRAM(s) IV Push every 3 minutes PRN For ANY of the following changes in patient status:  A. RR LESS THAN 10 breaths per minute, B. Oxygen saturation LESS THAN 90%, C. Sedation score of 6  ondansetron Injectable 4 milliGRAM(s) IV Push every 6 hours PRN Nausea  oxyCODONE    IR 10 milliGRAM(s) Oral every 4 hours PRN Moderate Pain (4 - 6)  oxyCODONE    IR 15 milliGRAM(s) Oral every 4 hours PRN Severe Pain (7 - 10)  sodium chloride 0.9% lock flush 10 milliLiter(s) IV Push every 1 hour PRN Pre/post blood products, medications, blood draw, and to maintain line patency      Allergies    Crab (Pruritus)  No Known Drug Allergies    Intolerances        OBJECTIVE:  Vital Signs Last 24 Hrs  T(C): 36.4 (05 Jan 2024 08:12), Max: 36.9 (04 Jan 2024 22:08)  T(F): 97.6 (05 Jan 2024 08:12), Max: 98.4 (04 Jan 2024 22:08)  HR: 89 (05 Jan 2024 08:12) (70 - 98)  BP: 132/82 (05 Jan 2024 08:12) (112/78 - 138/76)  BP(mean): --  RR: 20 (05 Jan 2024 08:12) (18 - 22)  SpO2: 100% (05 Jan 2024 08:12) (92% - 100%)    Parameters below as of 05 Jan 2024 08:12  Patient On (Oxygen Delivery Method): room air      I&O's Summary    04 Jan 2024 07:01  -  05 Jan 2024 07:00  --------------------------------------------------------  IN: 200 mL / OUT: 1200 mL / NET: -1000 mL        PHYSICAL EXAM:  Gen: appears stated age, resting comfortably, NAD  HEENT: NCAT, MMM, clear OP  Neck: supple  CV: RRR, no m/r/g, peripheral pulses 2+  Pulm: CTAB, no increased work of breathing, no rales/rhonchi  Abd: ND, NT, no rebound or guarding  Skin: warm and dry  Ext: non-tender, no edema  Neuro: AOx3, speaking in full sentences, BLE 0/5  Psych: affect and behavior appropriate, pleasant at time of interview    LABS:              CAPILLARY BLOOD GLUCOSE            MICRODATA:      RADIOLOGY/OTHER STUDIES:

## 2024-01-17 DIAGNOSIS — I27.21 SECONDARY PULMONARY ARTERIAL HYPERTENSION: ICD-10-CM

## 2024-01-17 DIAGNOSIS — R00.0 TACHYCARDIA, UNSPECIFIED: ICD-10-CM

## 2024-01-17 DIAGNOSIS — K59.2 NEUROGENIC BOWEL, NOT ELSEWHERE CLASSIFIED: ICD-10-CM

## 2024-01-17 DIAGNOSIS — K21.9 GASTRO-ESOPHAGEAL REFLUX DISEASE WITHOUT ESOPHAGITIS: ICD-10-CM

## 2024-01-17 DIAGNOSIS — I10 ESSENTIAL (PRIMARY) HYPERTENSION: ICD-10-CM

## 2024-01-17 DIAGNOSIS — I26.93 SINGLE SUBSEGMENTAL PULMONARY EMBOLISM WITHOUT ACUTE COR PULMONALE: ICD-10-CM

## 2024-01-17 DIAGNOSIS — N31.8 OTHER NEUROMUSCULAR DYSFUNCTION OF BLADDER: ICD-10-CM

## 2024-01-17 DIAGNOSIS — F32.A DEPRESSION, UNSPECIFIED: ICD-10-CM

## 2024-01-17 DIAGNOSIS — E83.42 HYPOMAGNESEMIA: ICD-10-CM

## 2024-01-17 DIAGNOSIS — R53.2 FUNCTIONAL QUADRIPLEGIA: ICD-10-CM

## 2024-01-17 DIAGNOSIS — Z91.013 ALLERGY TO SEAFOOD: ICD-10-CM

## 2024-01-17 DIAGNOSIS — I69.331 MONOPLEGIA OF UPPER LIMB FOLLOWING CEREBRAL INFARCTION AFFECTING RIGHT DOMINANT SIDE: ICD-10-CM

## 2024-01-17 DIAGNOSIS — Z96.611 PRESENCE OF RIGHT ARTIFICIAL SHOULDER JOINT: ICD-10-CM

## 2024-01-17 DIAGNOSIS — F22 DELUSIONAL DISORDERS: ICD-10-CM

## 2024-01-17 DIAGNOSIS — Z96.89 PRESENCE OF OTHER SPECIFIED FUNCTIONAL IMPLANTS: ICD-10-CM

## 2024-01-17 DIAGNOSIS — G97.82 OTHER POSTPROCEDURAL COMPLICATIONS AND DISORDERS OF NERVOUS SYSTEM: ICD-10-CM

## 2024-01-17 DIAGNOSIS — F17.210 NICOTINE DEPENDENCE, CIGARETTES, UNCOMPLICATED: ICD-10-CM

## 2024-01-17 DIAGNOSIS — G47.33 OBSTRUCTIVE SLEEP APNEA (ADULT) (PEDIATRIC): ICD-10-CM

## 2024-01-17 DIAGNOSIS — M96.0 PSEUDARTHROSIS AFTER FUSION OR ARTHRODESIS: ICD-10-CM

## 2024-01-17 DIAGNOSIS — F05 DELIRIUM DUE TO KNOWN PHYSIOLOGICAL CONDITION: ICD-10-CM

## 2024-01-17 DIAGNOSIS — R33.8 OTHER RETENTION OF URINE: ICD-10-CM

## 2024-01-17 DIAGNOSIS — I26.99 OTHER PULMONARY EMBOLISM WITHOUT ACUTE COR PULMONALE: ICD-10-CM

## 2024-01-17 DIAGNOSIS — F41.9 ANXIETY DISORDER, UNSPECIFIED: ICD-10-CM

## 2024-01-17 DIAGNOSIS — I25.10 ATHEROSCLEROTIC HEART DISEASE OF NATIVE CORONARY ARTERY WITHOUT ANGINA PECTORIS: ICD-10-CM

## 2024-01-17 DIAGNOSIS — Z88.5 ALLERGY STATUS TO NARCOTIC AGENT: ICD-10-CM

## 2024-01-17 DIAGNOSIS — L30.9 DERMATITIS, UNSPECIFIED: ICD-10-CM

## 2024-01-17 DIAGNOSIS — K59.03 DRUG INDUCED CONSTIPATION: ICD-10-CM

## 2024-01-17 DIAGNOSIS — J43.9 EMPHYSEMA, UNSPECIFIED: ICD-10-CM

## 2024-01-17 DIAGNOSIS — Z78.1 PHYSICAL RESTRAINT STATUS: ICD-10-CM

## 2024-01-17 DIAGNOSIS — Z99.3 DEPENDENCE ON WHEELCHAIR: ICD-10-CM

## 2024-01-17 DIAGNOSIS — E78.5 HYPERLIPIDEMIA, UNSPECIFIED: ICD-10-CM

## 2024-01-17 DIAGNOSIS — T40.2X5A ADVERSE EFFECT OF OTHER OPIOIDS, INITIAL ENCOUNTER: ICD-10-CM

## 2024-01-17 DIAGNOSIS — Z79.82 LONG TERM (CURRENT) USE OF ASPIRIN: ICD-10-CM

## 2024-01-17 DIAGNOSIS — Z98.1 ARTHRODESIS STATUS: ICD-10-CM

## 2024-01-17 DIAGNOSIS — Z79.84 LONG TERM (CURRENT) USE OF ORAL HYPOGLYCEMIC DRUGS: ICD-10-CM

## 2024-01-17 DIAGNOSIS — G89.29 OTHER CHRONIC PAIN: ICD-10-CM

## 2024-01-17 DIAGNOSIS — T84.216A BREAKDOWN (MECHANICAL) OF INTERNAL FIXATION DEVICE OF VERTEBRAE, INITIAL ENCOUNTER: ICD-10-CM

## 2024-01-17 DIAGNOSIS — G97.41 ACCIDENTAL PUNCTURE OR LACERATION OF DURA DURING A PROCEDURE: ICD-10-CM

## 2024-01-17 DIAGNOSIS — E11.9 TYPE 2 DIABETES MELLITUS WITHOUT COMPLICATIONS: ICD-10-CM

## 2024-01-17 NOTE — REASON FOR VISIT
[de-identified] : Laminectomy T9-10, T10-11, T11-12, and T12-L1.   3.  Repeat spinal fusion with replacement of bone graft from T9-L1. [de-identified] : 12/10/23

## 2024-01-17 NOTE — HISTORY OF PRESENT ILLNESS
[FreeTextEntry1] : 68yF with hx of failed back surgery syndrome, DMII, CAD, s/p intrathecal morphine pump for pain, GAMALIEL, HTN, HLD. She has been experiencing ongoing postural issues and upper back pain.   11/16/23 Recent X-rays have revealed eliezer fractures and disconnection of the connectors at the T10-11 level and the T12-L1 level, necessitating immediate surgical attention.  12/5/23   Exploration of fusion C2 to S1. Removal of instrumentation and fractured rods from C2  down to the ilium. Removal and replacement of spinal instrumentation at T1, T2, T3, T10, T11, T12, L1. Revision of posterior lateral  fusion T2-3, T3-4, T8-9, T9-10, T10-11, T11-12, T12-L1.  12/10/23 Laminectomy T9-10, T10-11, T11-12, and T12-L1. Repeat spinal fusion with replacement of bone graft from T9-L1. Hospital stay was complicated by PE (pt now on eliquis), urinary retention (pt discharged with jiménez). Pt discharged to [facility] [place] on 1/5/24.  Today 1/18/24

## 2024-01-18 ENCOUNTER — APPOINTMENT (OUTPATIENT)
Dept: SPINE | Facility: CLINIC | Age: 69
End: 2024-01-18
Payer: MEDICARE

## 2024-01-25 ENCOUNTER — RESULT REVIEW (OUTPATIENT)
Age: 69
End: 2024-01-25

## 2024-01-25 ENCOUNTER — APPOINTMENT (OUTPATIENT)
Dept: SPINE | Facility: CLINIC | Age: 69
End: 2024-01-25
Payer: MEDICARE

## 2024-01-25 ENCOUNTER — OUTPATIENT (OUTPATIENT)
Dept: OUTPATIENT SERVICES | Facility: HOSPITAL | Age: 69
LOS: 1 days | End: 2024-01-25
Payer: MEDICARE

## 2024-01-25 VITALS
SYSTOLIC BLOOD PRESSURE: 133 MMHG | DIASTOLIC BLOOD PRESSURE: 91 MMHG | TEMPERATURE: 98.1 F | OXYGEN SATURATION: 98 % | RESPIRATION RATE: 16 BRPM | HEART RATE: 106 BPM

## 2024-01-25 DIAGNOSIS — Z98.890 OTHER SPECIFIED POSTPROCEDURAL STATES: Chronic | ICD-10-CM

## 2024-01-25 DIAGNOSIS — Z96.611 PRESENCE OF RIGHT ARTIFICIAL SHOULDER JOINT: Chronic | ICD-10-CM

## 2024-01-25 DIAGNOSIS — N31.9 NEUROMUSCULAR DYSFUNCTION OF BLADDER, UNSPECIFIED: ICD-10-CM

## 2024-01-25 DIAGNOSIS — K59.2 NEUROGENIC BOWEL, NOT ELSEWHERE CLASSIFIED: ICD-10-CM

## 2024-01-25 PROCEDURE — 72081 X-RAY EXAM ENTIRE SPI 1 VW: CPT

## 2024-01-25 PROCEDURE — 99024 POSTOP FOLLOW-UP VISIT: CPT

## 2024-01-25 PROCEDURE — 72081 X-RAY EXAM ENTIRE SPI 1 VW: CPT | Mod: 26

## 2024-01-25 NOTE — HISTORY OF PRESENT ILLNESS
[FreeTextEntry1] : 68yF with hx of failed back surgery syndrome, R shoulder replacement, DMII, CAD, s/p intrathecal morphine pump for pain, GAMALIEL, HTN, HLD, CVA, former smoker. She has been experiencing ongoing postural issues and upper back pain.  8/17/22 Removal of instrumentation and exploration of fusion at T3, T4, T5, T6, T9, T10, T11, T12, and L1. Posterior segmental instrumentation from T3 to L1 pt was discharged to rehab on 8/30 12/21/22 Today she reports markedly improving preop back pain but frequent falls at home with LE weakness for the past one month. She denies head trauma but feels her legs are weaker and cannot walk/stand long. She denies any other focal neuro deficits today. Continues to use electric wheelchair.  11/16/23 Recent X-rays have revealed eliezer fractures and disconnection of the connectors at the T10-11 level and the T12-L1 level, necessitating immediate surgical attention.  12/5/23 Exploration of fusion C2 to S1. Removal of instrumentation and fractured rods from C2 down to the ilium. Removal and replacement of spinal instrumentation at T1, T2, T3, T10, T11, T12, L1. Revision of posterior lateral fusion T2-3, T3-4, T8-9, T9-10, T10-11, T11-12, T12-L1.  12/10/23 Laminectomy T9-10, T10-11, T11-12, and T12-L1. Repeat spinal fusion with replacement of bone graft from T9-L1. Hospital stay was complicated by PE (pt now on Pradaxa), urinary retention (pt discharged with jiménez). Pt discharged to [facility] on 1/5/24.  Today 1/25/24 Pt denies any back pain.  pt states she is being straight cath twice a day d/t urinary retention and also has abdominal pain d/t urinary retention. Pt is taking Gabapentin and Tylenol daily and Oxycodone 15mg once a day at bedtime. Xray reviewed.

## 2024-01-25 NOTE — ASSESSMENT
[FreeTextEntry1] : Plan:  - PT - neurology referral - urology referral  - RTC in 6 weeks (Xray to be taken at that time)  no known allergies

## 2024-02-08 ENCOUNTER — APPOINTMENT (OUTPATIENT)
Dept: UROLOGY | Facility: CLINIC | Age: 69
End: 2024-02-08
Payer: MEDICARE

## 2024-02-08 VITALS
OXYGEN SATURATION: 100 % | SYSTOLIC BLOOD PRESSURE: 131 MMHG | TEMPERATURE: 97.3 F | HEART RATE: 107 BPM | DIASTOLIC BLOOD PRESSURE: 76 MMHG

## 2024-02-08 PROCEDURE — 99214 OFFICE O/P EST MOD 30 MIN: CPT

## 2024-02-08 NOTE — HISTORY OF PRESENT ILLNESS
[FreeTextEntry1] : Language: English Date of First visit: 2024 Accompanied by: Self Contact info: *** Referring Provider/PCP: Dr. Andressa Sanders       CC/ Problem List:   =============================================================================== FIRST VISIT: The patient is a 68 year female with a very complex history that includes DMII, CAD, intrathecal morphine pump, GAMALIEL, HTN, HLD, Stroke, spinal fusion, eliezer fractures, repeat fusion, Laminectomy, another repeat fusion (only T9-L1), PE on Pradaxa, urinary retention, multiple sclerosis, who first presents 2024 for urinary retention.    NOK are Sisters Radha Redd and Mary Ford (no numbers listed)  the patient has been on CIC since . She had a spinal fusion in  and she stopped voiding. Since then she has been catheterizing every time she drank water. She was not leaking at all between catheterizations. She went to [facility]. It sounds like she had UDs at [facility]. She was given a "maintenance antibiotic" nitrofurantoin to prevent infections. For many years she did not have an infection. Since she had her last surgery, she has not been able to perform CIC. She is only getting CIC once a day at her nursing home/rehab center.   She is fecally incontinent as well and she can't feel when she has BM's. She is not leaking urine. She was told that eventually she will be able to move her legs.  She can tell when she has an infection because she has a more frequent urge to void. She is very depressed about being in her rehab facility.    ------------------------------------------------------------------------------------------- INTERVAL VISITS:     ===============================================================================   PMH: see above PSH: see above POBH: (if applicable) FH:   ALL: NKDA MEDS: Per nursing home notes: Amitiza, ASA 81mg, Duloxetine, Ipratropium, Albuterol NEbs, MEtformin, Midodrine, Nitrofurantoin, OMeprazole, Oxycodone, Pradaxa, Senna, Simvastatin, Tylenol SOC:     ROS: Review of Systems is as per HPI unless otherwise denoted below     =============================================================================== DATA:   LABS:-------------------------------------------------------------------------------------------------------------------  2024: UA mod Blood, Trace ketones, 100 Protein, Turbid, LArge LE, Pos NIT,  Urine culture >100K Pseudomonas: I: imi, Pip/Tazo, S: Ami, Aztreo, Cefepime, Ceftazidime, Cipro, LVQ, Judith Klebsiella: R: Amp, Azt, Ancef, Cefepime, CTX, Cefuroxime, Bactrim; I: Unasyn; S: Aug, Cipro, ERta, Gent, Imi, LVQ, Judith, Nit, Pip/Tazo, Tobra, Bactrim sCre 0.38     RADS:-------------------------------------------------------------------------------------------------------------------       PATHOLOGY/CYTOLOGY:-------------------------------------------------------------------------------------------       VOIDING STUDIES: ----------------------------------------------------------------------------------------------------       STONE STUDIES: (Analysis/LLSA)----------------------------------------------------------------------------------       PROCEDURES: -----------------------------------------------------------------------------------------------         ===============================================================================   PHYSICAL EXAM:  GEN: AAOx3, NAD, Habitus: OW  BARRIERS to CARE: none  PSYCH: Appropriate Behavior, Affect Congruent  HEENT: AT/NC Trachea midline.   Lungs: No labored breathing.  NEURO: + Movement, all 4 extremities grossly intact without deficits. No tremors.  SKIN: Warm dry. No visible rashes or ulcers  GAIT: Gait NA, Stability in wheelchair  =======================================================================================       ASSESSMENT and PLAN     The patient is a 68 year female with a history of the followin. Long term urinary retention s/p multiple back surgeries who was on CIC at home (she did it herself) but since her last back surgery she has not be able to CIC and her nursing home is only cathing her once a day. She denies leaking between caths.  a. There is no instance when a patient makes a normal amount of urine where CIC is adequate only once a day. She should either a) have a jiménez placed or b) have CIC at least 4-5 times per day to keep her catheterized volumes <400cc. If she has a jiménez placed, she needs to have it changed every FOUR WEEKS b. The patient has her own pain management and rehab doctors. Please consider letting her see her own doctors! c. Social work: please get her a social work consult since she is worried about losing her home and assets d. Please have her see one of my Voiding colleagues (e.g. Dr. Analy Waggoner or Dr. Claudia Quintana) in one month.   -----------------------------------------------------------------------------------------------------  LABS/TESTS Ordered: See above  Meds Ordered:  Follow up: Dr. waggoner or Lilian in one month  -----------------------------------------------------------------------------------------------------   The total amount of time I have personally spent preparing for this visit, reviewing the patient's test results, obtaining external history, ordering tests/medications, documenting clinical information, communicating with and counseling the patient/family and/or caregiver(s), and spent face to face with the patient explaining the above was 50 minutes.  Thank you for allowing me to assist in the care of your patient. Should you have any questions please do not hesitate to reach out to me.        Thank you for allowing me to participate in your patient's care. Please feel free to contact me with any questions.   Renate Calvo MD [facility] Department of Urology  [address] [place] [id number] P: [phone]; F: [phone]

## 2024-02-21 NOTE — DISCHARGE NOTE NURSING/CASE MANAGEMENT/SOCIAL WORK - NSDCFUADDAPPT_GEN_ALL_CORE_FT
Recovery period without difficulty. Pt alert and oriented and denies pain. Skin adhesive is clean, dry, and intact. Reviewed discharge instructions with patient, and patient verbalized understanding. Pt escorted to lobby discharge area via wheelchair. Vital signs completed. No C/O.   Please call to make an appointment with Dr. Ott in 2 weeks.    Please call your gastroenterologist for follow up appointment after discharge from rehab.

## 2024-02-27 ENCOUNTER — EMERGENCY (EMERGENCY)
Facility: HOSPITAL | Age: 69
LOS: 1 days | Discharge: HOME | End: 2024-02-27
Attending: STUDENT IN AN ORGANIZED HEALTH CARE EDUCATION/TRAINING PROGRAM | Admitting: STUDENT IN AN ORGANIZED HEALTH CARE EDUCATION/TRAINING PROGRAM
Payer: MEDICAID

## 2024-02-27 VITALS
DIASTOLIC BLOOD PRESSURE: 80 MMHG | OXYGEN SATURATION: 100 % | HEART RATE: 115 BPM | RESPIRATION RATE: 18 BRPM | TEMPERATURE: 98 F | SYSTOLIC BLOOD PRESSURE: 107 MMHG | WEIGHT: 162.04 LBS

## 2024-02-27 DIAGNOSIS — Z98.890 OTHER SPECIFIED POSTPROCEDURAL STATES: Chronic | ICD-10-CM

## 2024-02-27 DIAGNOSIS — Z96.611 PRESENCE OF RIGHT ARTIFICIAL SHOULDER JOINT: Chronic | ICD-10-CM

## 2024-02-27 LAB
ADD ON TEST-SPECIMEN IN LAB: SIGNIFICANT CHANGE UP
ALBUMIN SERPL ELPH-MCNC: 3.7 G/DL — SIGNIFICANT CHANGE UP (ref 3.3–5)
ALP SERPL-CCNC: 101 U/L — SIGNIFICANT CHANGE UP (ref 40–120)
ALT FLD-CCNC: 12 U/L — SIGNIFICANT CHANGE UP (ref 10–45)
AMORPH PHOS CRY # URNS: PRESENT
ANION GAP SERPL CALC-SCNC: 14 MMOL/L — SIGNIFICANT CHANGE UP (ref 5–17)
APPEARANCE UR: ABNORMAL
AST SERPL-CCNC: 15 U/L — SIGNIFICANT CHANGE UP (ref 10–40)
BACTERIA # UR AUTO: ABNORMAL /HPF
BASOPHILS # BLD AUTO: 0.03 K/UL — SIGNIFICANT CHANGE UP (ref 0–0.2)
BASOPHILS NFR BLD AUTO: 0.4 % — SIGNIFICANT CHANGE UP (ref 0–2)
BILIRUB SERPL-MCNC: 0.6 MG/DL — SIGNIFICANT CHANGE UP (ref 0.2–1.2)
BILIRUB UR-MCNC: NEGATIVE — SIGNIFICANT CHANGE UP
BUN SERPL-MCNC: 12 MG/DL — SIGNIFICANT CHANGE UP (ref 7–23)
CALCIUM SERPL-MCNC: 9.5 MG/DL — SIGNIFICANT CHANGE UP (ref 8.4–10.5)
CAST: 6 /LPF — HIGH (ref 0–4)
CHLORIDE SERPL-SCNC: 96 MMOL/L — SIGNIFICANT CHANGE UP (ref 96–108)
CO2 SERPL-SCNC: 25 MMOL/L — SIGNIFICANT CHANGE UP (ref 22–31)
COLOR SPEC: ABNORMAL
CREAT SERPL-MCNC: 0.38 MG/DL — LOW (ref 0.5–1.3)
DIFF PNL FLD: ABNORMAL
EGFR: 108 ML/MIN/1.73M2 — SIGNIFICANT CHANGE UP
EOSINOPHIL # BLD AUTO: 0.16 K/UL — SIGNIFICANT CHANGE UP (ref 0–0.5)
EOSINOPHIL NFR BLD AUTO: 1.9 % — SIGNIFICANT CHANGE UP (ref 0–6)
GLUCOSE SERPL-MCNC: 119 MG/DL — HIGH (ref 70–99)
GLUCOSE UR QL: 100 MG/DL
HCT VFR BLD CALC: 38.9 % — SIGNIFICANT CHANGE UP (ref 34.5–45)
HGB BLD-MCNC: 12.3 G/DL — SIGNIFICANT CHANGE UP (ref 11.5–15.5)
IMM GRANULOCYTES NFR BLD AUTO: 0.4 % — SIGNIFICANT CHANGE UP (ref 0–0.9)
KETONES UR-MCNC: NEGATIVE MG/DL — SIGNIFICANT CHANGE UP
LEUKOCYTE ESTERASE UR-ACNC: ABNORMAL
LYMPHOCYTES # BLD AUTO: 1.71 K/UL — SIGNIFICANT CHANGE UP (ref 1–3.3)
LYMPHOCYTES # BLD AUTO: 20.6 % — SIGNIFICANT CHANGE UP (ref 13–44)
MCHC RBC-ENTMCNC: 24.9 PG — LOW (ref 27–34)
MCHC RBC-ENTMCNC: 31.6 GM/DL — LOW (ref 32–36)
MCV RBC AUTO: 78.7 FL — LOW (ref 80–100)
MONOCYTES # BLD AUTO: 0.73 K/UL — SIGNIFICANT CHANGE UP (ref 0–0.9)
MONOCYTES NFR BLD AUTO: 8.8 % — SIGNIFICANT CHANGE UP (ref 2–14)
NEUTROPHILS # BLD AUTO: 5.66 K/UL — SIGNIFICANT CHANGE UP (ref 1.8–7.4)
NEUTROPHILS NFR BLD AUTO: 67.9 % — SIGNIFICANT CHANGE UP (ref 43–77)
NITRITE UR-MCNC: POSITIVE
NRBC # BLD: 0 /100 WBCS — SIGNIFICANT CHANGE UP (ref 0–0)
PH UR: >=9 (ref 5–8)
PLATELET # BLD AUTO: 336 K/UL — SIGNIFICANT CHANGE UP (ref 150–400)
POTASSIUM SERPL-MCNC: 4 MMOL/L — SIGNIFICANT CHANGE UP (ref 3.5–5.3)
POTASSIUM SERPL-SCNC: 4 MMOL/L — SIGNIFICANT CHANGE UP (ref 3.5–5.3)
PROT SERPL-MCNC: 7.4 G/DL — SIGNIFICANT CHANGE UP (ref 6–8.3)
PROT UR-MCNC: 300 MG/DL
RBC # BLD: 4.94 M/UL — SIGNIFICANT CHANGE UP (ref 3.8–5.2)
RBC # FLD: 17.1 % — HIGH (ref 10.3–14.5)
RBC CASTS # UR COMP ASSIST: 25 /HPF — HIGH (ref 0–4)
SODIUM SERPL-SCNC: 135 MMOL/L — SIGNIFICANT CHANGE UP (ref 135–145)
SP GR SPEC: 1.02 — SIGNIFICANT CHANGE UP (ref 1–1.03)
SQUAMOUS # UR AUTO: 4 /HPF — SIGNIFICANT CHANGE UP (ref 0–5)
TRI-PHOS CRY UR QL COMP ASSIST: PRESENT
UROBILINOGEN FLD QL: 1 MG/DL — SIGNIFICANT CHANGE UP (ref 0.2–1)
WBC # BLD: 8.32 K/UL — SIGNIFICANT CHANGE UP (ref 3.8–10.5)
WBC # FLD AUTO: 8.32 K/UL — SIGNIFICANT CHANGE UP (ref 3.8–10.5)
WBC UR QL: 130 /HPF — HIGH (ref 0–5)

## 2024-02-27 PROCEDURE — 74177 CT ABD & PELVIS W/CONTRAST: CPT | Mod: 26,MC

## 2024-02-27 PROCEDURE — 99285 EMERGENCY DEPT VISIT HI MDM: CPT

## 2024-02-27 RX ORDER — SODIUM CHLORIDE 9 MG/ML
1000 INJECTION INTRAMUSCULAR; INTRAVENOUS; SUBCUTANEOUS ONCE
Refills: 0 | Status: COMPLETED | OUTPATIENT
Start: 2024-02-27 | End: 2024-02-27

## 2024-02-27 RX ORDER — CEFTRIAXONE 500 MG/1
1000 INJECTION, POWDER, FOR SOLUTION INTRAMUSCULAR; INTRAVENOUS ONCE
Refills: 0 | Status: COMPLETED | OUTPATIENT
Start: 2024-02-27 | End: 2024-02-27

## 2024-02-27 RX ORDER — IOHEXOL 300 MG/ML
30 INJECTION, SOLUTION INTRAVENOUS ONCE
Refills: 0 | Status: COMPLETED | OUTPATIENT
Start: 2024-02-27 | End: 2024-02-27

## 2024-02-27 RX ADMIN — CEFTRIAXONE 100 MILLIGRAM(S): 500 INJECTION, POWDER, FOR SOLUTION INTRAMUSCULAR; INTRAVENOUS at 21:58

## 2024-02-27 RX ADMIN — IOHEXOL 30 MILLILITER(S): 300 INJECTION, SOLUTION INTRAVENOUS at 20:24

## 2024-02-27 RX ADMIN — SODIUM CHLORIDE 1000 MILLILITER(S): 9 INJECTION INTRAMUSCULAR; INTRAVENOUS; SUBCUTANEOUS at 21:57

## 2024-02-27 NOTE — ED ADULT NURSE NOTE - OBJECTIVE STATEMENT
69 yo F PMHx HTN, HLD, CVAx3, emphysema, neurogenic bladder, multiple spinal fusions, paraplegia presents to the ED co abdominal pain / distention x 3 days. Pt awake and alert, AOx4. Pt reports she has L flank pain that is radiating down to her b/l lower abdomen. Pt reports her abdomen is very distended and she feels bloated. Pt has intrathecal morphine pump. Pt presents with indwelling urinary catheter that was replaced today at her facility. pt states, "I asked to come to the emergency room because I know if I have an infection in my urine it could get to my kidneys quickly and my left kidney hurts." Pt denies f/c, N/V/D, CP, SOB, lightheadedness, dizziness, numbness, tingling, vision changes. Of note, pt reports she has sensation in her legs, but is unable to move them after her last spinal fusion.

## 2024-02-27 NOTE — ED PROVIDER NOTE - OBJECTIVE STATEMENT
68 year old female with complex medical history including paraplegia, HTN, HLD, CVA x 3, emphysema, neurogenic bladder s/p chronic jiménez, chronic neck/back pain s/p multiple spinal fusions and s/p intrathecal morphine pump, presenting with abdominal pain. States for the past 3-4 days she has had pain radiating across her lower abdomen originating in the L flank, denies associated fever, chills, n/v/d. Has noticed discoloration/debris in her jiménez bag, ?had jiménez exchanged today @ NH.

## 2024-02-27 NOTE — ED PROVIDER NOTE - PATIENT PORTAL LINK FT
You can access the FollowMyHealth Patient Portal offered by [facility] by registering at the following website: [email or web address]. By joining [employer]’s FollowMyHealth portal, you will also be able to view your health information using other applications (apps) compatible with our system.

## 2024-02-27 NOTE — ED ADULT NURSE NOTE - NSFALLRISKINTERV_ED_ALL_ED
Assistance OOB with selected safe patient handling equipment if applicable/Assistance with ambulation/Communicate fall risk and risk factors to all staff, patient, and family/Monitor gait and stability/Provide visual cue: yellow wristband, yellow gown, etc/Reinforce activity limits and safety measures with patient and family/Call bell, personal items and telephone in reach/Instruct patient to call for assistance before getting out of bed/chair/stretcher/Non-slip footwear applied when patient is off stretcher/[place] to call system/Physically safe environment - no spills, clutter or unnecessary equipment/Purposeful Proactive Rounding/Room/bathroom lighting operational, light cord in reach

## 2024-02-27 NOTE — ED PROVIDER NOTE - CLINICAL SUMMARY MEDICAL DECISION MAKING FREE TEXT BOX
68 year old female with complex medical history including paraplegia, HTN, HLD, CVA x 3, emphysema, neurogenic bladder s/p chronic jiménez, chronic neck/back pain s/p multiple spinal fusions and s/p intrathecal morphine pump, presenting with abdominal pain x 3d. Overall nontoxic here with good vitals but abdomen is very tender to palpation in lower quadrants suspicious for possible appy vs colitis vs diverticulitis. Will obtain CT imaging to r/o. Given discolored urine in jiménez bag with +copious debris, may ultimately be due to UTI/cystitis. Dispo pending work up and reassessments.

## 2024-02-27 NOTE — ED ADULT TRIAGE NOTE - CHIEF COMPLAINT QUOTE
pt w/ hx paraplegia presents to ER from nursing home c/o abdominal pain and distension for the past few days. denies n/v, fevers, chest pain and sob. respirations equal and unlabored.

## 2024-02-27 NOTE — ED ADULT NURSE NOTE - RESPIRATORY ASSESSMENT
2/24/2022        RE: Rell Erazo  [facility]  [address]  [facility] [address]        [facility]  Chief Complaint   Patient presents with     Annual Comprehensive Nursing Home     [facility] Medical Record Number:  [id number]  Place of Service where encounter took place:  [facility] [[id number]]    HPI:    Rell Erazo  is a 89 year old  (6/5/1929), who is being seen today for an annual comprehensive visit. HPI information obtained from: facility chart records, facility staff and [facility] Epic chart review.  Today's concerns are:  Pt has been trying to get up often and trying to go to bathroom.  He is incontinent at times also.  When staff tries to stand him, he often is weaker on left side. He is also incont often at night.      Benign prostatic hyperplasia with urinary frequency  Moderate dementia without behavioral disturbance (H)  Traumatic hemorrhage of left cerebrum without loss of consciousness, subsequent encounter  Right leg weakness  TIA (transient ischemic attack)  Transient weakness of left leg  Frequent falls  Generalized muscle weakness  Primary osteoarthritis involving multiple joints  Routine general medical examination at a health care facility     HTN--none    ALLERGIES: Cat hair extract  PAST MEDICAL HISTORY:  has a past medical history of ACP (advance care planning) (04/26/2012), BPH (benign prostatic hyperplasia), CAD (coronary artery disease), Carotid disease, bilateral (H), Chronic atrial fibrillation (H), CVA (cerebral infarction) (01/01/2013), Diverticulosis of large intestine, Hyperlipidemia LDL goal < 100 (04/26/2012), Kidney stone (1981), Personal history of tobacco use, presenting hazards to health, Restless legs syndrome (RLS), and TIA (transient ischemic attack) (05/2013).    He has no past medical history of Basal cell carcinoma, Malignant melanoma (H), or Squamous cell carcinoma of skin,  unspecified.  PAST SURGICAL HISTORY:  has a past surgical history that includes Cardiac surgery (04/01/2012) and Bypass graft artery coronary (04/24/2012).  IMMUNIZATIONS:  Immunization History   Administered Date(s) Administered     COVID-19,PF,Moderna 11/09/2021     COVID-19,PF,Pfizer (12+ Yrs) 01/19/2021, 02/09/2021     FLUAD(HD)65+ QUAD 09/15/2021     Flu 65+ Years 09/17/2018     Flu, Unspecified 10/22/1996, 10/20/1997, 10/20/1998, 10/14/1999, 11/09/2000, 10/23/2001, 10/28/2002, 10/23/2003, 10/18/2004, 10/19/2005, 10/19/2006, 10/24/2007, 10/28/2008, 09/29/2009, 10/20/2010     Influenza (H1N1) 01/11/2010     Influenza (High Dose) 3 valent vaccine 03/10/2016, 10/28/2016, 10/05/2017, 09/15/2019     Influenza (IIV3) PF 11/01/2012, 12/31/2012, 09/25/2014     Influenza Vaccine IM > 6 months Valent IIV4 (Alfuria,Fluzone) 09/18/2020     Pneumococcal 23 valent 06/24/1993     Pneumococcal, Unspecified 09/01/2004     Zoster vaccine recombinant adjuvanted (SHINGRIX) 09/18/2020, 12/15/2020     Above immunizations pulled from [facility]. [facility] and facility records also reconciled. Outstanding information sent to  to update [facility]  .  Future immunizations are not needed at this point as all recommended immunizations are up to date.      Current Outpatient Medications   Medication Sig Dispense Refill     tamsulosin (FLOMAX) 0.4 MG capsule Take 0.4 mg by mouth every evening       ACE/ARB/ARNI NOT PRESCRIBED (INTENTIONAL) Please choose reason not prescribed from choices below.       acetaminophen (TYLENOL) 500 MG tablet Take 1,000 mg by mouth 2 times daily OA       calcium carbonate (TUMS) 500 MG chewable tablet Take 1 chew tab by mouth 2 times daily (with meals)       Vitamin D, Cholecalciferol, 25 MCG (1000 UT) TABS Take 1,000 Units by mouth daily             Case Management:  I have reviewed the Assisted Living care plan, current immunizations and preventive care/cancer screening. .Future cancer  "screening is not clinically indicated secondary to age/goals of care Patient's desire to return to the community is not assessible due to cognitive impairment. Current Level of Care is appropriate.    Advance Directive Discussion:    I reviewed the current advanced directives as reflected in EPIC, the POLST and the facility chart, and verified the congruency of orders  . I contacted the first party--no changes in  and advance directives plan of Care.  I did not due to cognitive impairment review the advance directives with the resident.     Team Discussion:  I communicated with the appropriate disciplines involved with the Plan of Care:   Nursing    Patient's goal is unobtainable secondary to cognitive impairment.  Information reviewed:  Medications, vital signs, orders, and nursing notes.    ROS:  Very limited  secondary to cognitive impairment. Shook head no to: HA, CP, SOB--followed commands during exam to open mouth, squeeze hands, move feet    Vitals:  /55   Pulse 65   Temp 97.7  F (36.5  C)   Resp 18   Ht 1.727 m (5' 8\")   Wt 72.1 kg (159 lb)   SpO2 96%   BMI 24.18 kg/m   Body mass index is 24.18 kg/m .  Exam:  GENERAL APPEARANCE:  Alert, in no distress,   cooperative  ENT:  Mouth with moist mucous membranes,some teeth missing, no open areas noted. No lesions behind ears  EYES:  Conjunctivae, lids, pupils and irises normal  RESP:  respiratory effort  of chest normal, lungs decreased but CTA,  no respiratory distress  CV:  Auscultation of heart done , RRR, no murmur or edema, + pedal pulses  ABDOMEN:  normal bowel sounds, soft, nontender  M/S:   FREIRE though transient weakness left side at times--currently appears same. Was  seen in WC. Has +weakness RLE mildly  SKIN:  Inspection of skin at baseline but limited as pt fully dressed  NEURO:   Cranial nerves  are grossly at patient's baseline--  PSYCH:  oriented X 2, insight and judgement impaired, memory impaired        Lab/Diagnostic data:   Recent " Labs   Lab Test 22  0729 21  0902    140   POTASSIUM 4.1 4.3   CHLORIDE 106 103   CO2 25 27   ANIONGAP 9 10   GLC 91 128*   BUN 21 26   CR 1.31* 1.48*   MING 8.8 8.9     CBC RESULTS: Recent Labs   Lab Test 22  0729   WBC 5.2   RBC 3.78*   HGB 12.0*   HCT 35.5*   MCV 94   MCH 31.7   MCHC 33.8   RDW 12.5        ASSESSMENT / PLAN:  (N40.1,  R35.0) Benign prostatic hyperplasia with urinary frequency  (primary encounter diagnosis)  Comment/Plan: had been on Flomax in past.  2 PVRs today--195 cc and 1.  Will restart Flomax.  His frequency may be from the dementia but fair to restart Flomax also ilda with poor judgement and attempts to get up alone.    (F03.90) Moderate dementia without behavioral disturbance (H)  Comment/Plan: declining more since recent TIA, no changes to POC, ativan prn for bathing     (S06.350D) Traumatic hemorrhage of left cerebrum without loss of consciousness, subsequent encounter   (R29.898) Right leg weakness   (G45.9) TIA (transient ischemic attack)   (R29.898) Transient weakness of left leg  Comment/Plan:  Had old stroke and recently TIA with left side weakness at times.   consider hospice if conts to decline. Monitor. Pt would benefit from a hospital bed--see DME below     (R29.6) Frequent falls  (M62.81) Generalized muscle weakness  Comment/Plan: see above, high fall risk, monitor.     (M89.49) Primary osteoarthritis involving multiple joints  Comment/Plan: tylenol, Tums for Ca++, monitor.    (Z00.00) Routine general medical examination at a health care facility: 2022       Electronically signed by:  ADRYAN Castellanos CNP             Face to Face and Medical Necessity Statement for DME Provider visit    Demographic Information on Rell Erazo:  Gender: male  : 1929  [facility]  [address] [facility] [address]  [phone] (home)     Medical Record: [id number]  Social Security Number: [id number]  Primary Care  Provider: Selam Fowler  Insurance: Payor: MEDICARE / Plan: MEDICARE / Product Type: Medicare /     HPI:   Rell Erazo is a 89 year old  (6/5/1929), who is being seen today for a face to face provider visit at [facility]; medical necessity statement for DME included. This patient requires the following:    DME Ordered and Medical Necessity Statement       The patient does  require positioning of the body in ways not feasible with an ordinary bed due to a medical condition that is expected to last at least 1 month due to new TIA with Left sided intermittent weakness. Has Rt leg weakness earlier stroke.   The patient does not require, for the alleviation of pain, postioning of the body in ways not feasible with an ordinary bed.   The patient does not require the head of bed elevated more than 30* most of the time due to CHF, chronic pulmonary disease or aspiration.  The patient does not require traction that can only be attached to a hospital bed.  The patient does  require a bed height different than a fixed height hospital bed to permit tranfers to wheelchair or standing position.   The patient does  require frequent or immediate changes in body position due to risk of pressure sores and difficulty repositioning self.  Needs grab bars to assist also.       Pt needing above DME with expected length of need of 99 month  months  due to medical necessity associated with following diagnosis:     Benign prostatic hyperplasia with urinary frequency  Moderate dementia without behavioral disturbance (H)  Traumatic hemorrhage of left cerebrum without loss of consciousness, subsequent encounter  Right leg weakness  TIA (transient ischemic attack)  Transient weakness of left leg  Frequent falls  Generalized muscle weakness  Primary osteoarthritis involving multiple joints  Routine general medical examination at a health care facility      [facility]   has a past medical history of ACP (advance care planning)  "(04/26/2012), BPH (benign prostatic hyperplasia), CAD (coronary artery disease), Carotid disease, bilateral (H), Chronic atrial fibrillation (H), CVA (cerebral infarction) (01/01/2013), Diverticulosis of large intestine, Hyperlipidemia LDL goal < 100 (04/26/2012), Kidney stone (1981), Personal history of tobacco use, presenting hazards to health, Restless legs syndrome (RLS), and TIA (transient ischemic attack) (05/2013).    He has no past medical history of Basal cell carcinoma, Malignant melanoma (H), or Squamous cell carcinoma of skin, unspecified.    ROS:Unobtainable secondary to cognitive impairment and  secondary to aphasia.     EXAM  Vitals: /55   Pulse 65   Temp 97.7  F (36.5  C)   Resp 18   Ht 1.727 m (5' 8\")   Wt 72.1 kg (159 lb)   SpO2 96%   BMI 24.18 kg/m  ;BMI= Body mass index is 24.18 kg/m .  See above    ASSESSMENT/PLAN:  1. Benign prostatic hyperplasia with urinary frequency    2. Moderate dementia without behavioral disturbance (H)    3. Traumatic hemorrhage of left cerebrum without loss of consciousness, subsequent encounter    4. Right leg weakness    5. TIA (transient ischemic attack)    6. Transient weakness of left leg    7. Frequent falls    8. Generalized muscle weakness    9. Primary osteoarthritis involving multiple joints    10. Routine general medical examination at a health care facility: 02/24/2022        Orders:  1. Facility staff/TC to contact DME company to get their order form for provider to fill out     ELECTRONICALLY SIGNED BY [facility] CERTIFIED PROVIDER:  ADRYAN Castellanos CNP   NPI: [id number]  [facility]  [address] [place] [address], [address]  [place] [address]                Sincerely,        ADRYAN Castellanos CNP    " - - -

## 2024-02-27 NOTE — ED PROVIDER NOTE - PROGRESS NOTE DETAILS
debra - received on sign out. pt w abd pain. labs ok. found to have UTI.  pending CT imaging. dc on abx if no acute pathology.    CT "IMPRESSION:  1.   Bladder decompressed by a Castillo catheter. Small amount of   intraluminal air  consistent with instrumentation. Diffuse bladder wall thickening with  surrounding edematous change suggestive of a nonspecific cystitis.  2.   Wall thickening with surrounding inflammatory change at the   rectosigmoid  consistent with a nonspecific colitis or proctitis.  3.   Wall thickening at the stomach suggestive of a nonspecific gastritis,  versus artifact related to underdistention."    CT as above. rpt abd exam benign. pain controlled, tolerating po. ok for dc on abx for uti.     All results reviewed with the patient verbally. Discharge plan and return precautions d/w pt who verbalized understanding and agrees with plan. All questions answered. Vitals WNL. Ready for d/c.

## 2024-02-27 NOTE — ED PROVIDER NOTE - NSFOLLOWUPINSTRUCTIONS_ED_ALL_ED_FT
URINARY TRACT INFECTION     TAKE CEFPODOXIME 200mg TWICE A DAY FOR 14 DAYS.       Drink plenty of fluids.   Take antibiotics as prescribed (CEFPODOXIME).   Take tylenol / motrin for pain.     Return to the Emergency Department for persistent, worsening or new symptoms including fever/chills that cannot be controlled by medication, severe abdominal pain, blood in your urine, uncontrollable nausea/vomiting, chest pain, shortness of breath, or any other concerns.    FOLLOW UP RESULTS - A urine culture was sent today from the Emergency Department but the result will not be back for another 2-3 days. If the culture is abnormal someone will call you with the results to discuss the treatment / plan for follow up.    FOLLOW UP WITH A DOCTOR - Follow up with your primary care doctor / Urology provider within 1 week to ensure your symptoms are improving.

## 2024-02-27 NOTE — ED ADULT NURSE NOTE - NS ED NOTE ABUSE RESPONSE YN
HTN (hypertension) Anemia Elevated troponin I level Elevated troponin I level Elevated troponin I level Elevated troponin I level Elevated troponin I level Elevated troponin I level HTN (hypertension) Elevated troponin I level Yes

## 2024-02-28 VITALS
OXYGEN SATURATION: 98 % | TEMPERATURE: 98 F | HEART RATE: 110 BPM | SYSTOLIC BLOOD PRESSURE: 110 MMHG | RESPIRATION RATE: 16 BRPM | DIASTOLIC BLOOD PRESSURE: 69 MMHG

## 2024-02-28 PROCEDURE — 85025 COMPLETE CBC W/AUTO DIFF WBC: CPT

## 2024-02-28 PROCEDURE — 87086 URINE CULTURE/COLONY COUNT: CPT

## 2024-02-28 PROCEDURE — 74177 CT ABD & PELVIS W/CONTRAST: CPT | Mod: MC

## 2024-02-28 PROCEDURE — 80053 COMPREHEN METABOLIC PANEL: CPT

## 2024-02-28 PROCEDURE — 87186 SC STD MICRODIL/AGAR DIL: CPT

## 2024-02-28 PROCEDURE — 96374 THER/PROPH/DIAG INJ IV PUSH: CPT | Mod: XU

## 2024-02-28 PROCEDURE — 81001 URINALYSIS AUTO W/SCOPE: CPT

## 2024-02-28 PROCEDURE — 36415 COLL VENOUS BLD VENIPUNCTURE: CPT

## 2024-02-28 PROCEDURE — 99284 EMERGENCY DEPT VISIT MOD MDM: CPT | Mod: 25

## 2024-02-28 PROCEDURE — 83690 ASSAY OF LIPASE: CPT

## 2024-02-28 RX ORDER — DIAZEPAM 5 MG
5 TABLET ORAL ONCE
Refills: 0 | Status: DISCONTINUED | OUTPATIENT
Start: 2024-02-28 | End: 2024-02-28

## 2024-02-28 RX ORDER — OXYCODONE HYDROCHLORIDE 5 MG/1
15 TABLET ORAL ONCE
Refills: 0 | Status: DISCONTINUED | OUTPATIENT
Start: 2024-02-28 | End: 2024-02-28

## 2024-02-28 RX ORDER — OXYCODONE HYDROCHLORIDE 5 MG/1
30 TABLET ORAL ONCE
Refills: 0 | Status: DISCONTINUED | OUTPATIENT
Start: 2024-02-28 | End: 2024-02-28

## 2024-02-28 RX ORDER — DIAZEPAM 5 MG
2 TABLET ORAL ONCE
Refills: 0 | Status: COMPLETED | OUTPATIENT
Start: 2024-02-28 | End: 2024-02-28

## 2024-02-28 RX ORDER — DIPHENHYDRAMINE HCL 50 MG
25 CAPSULE ORAL ONCE
Refills: 0 | Status: DISCONTINUED | OUTPATIENT
Start: 2024-02-28 | End: 2024-02-28

## 2024-02-28 RX ORDER — GABAPENTIN 400 MG/1
600 CAPSULE ORAL ONCE
Refills: 0 | Status: DISCONTINUED | OUTPATIENT
Start: 2024-02-28 | End: 2024-03-02

## 2024-02-28 RX ORDER — DIPHENHYDRAMINE HCL 50 MG
25 CAPSULE ORAL ONCE
Refills: 0 | Status: COMPLETED | OUTPATIENT
Start: 2024-02-28 | End: 2024-02-28

## 2024-02-28 RX ADMIN — Medication 25 MILLIGRAM(S): at 08:04

## 2024-02-28 RX ADMIN — OXYCODONE HYDROCHLORIDE 15 MILLIGRAM(S): 5 TABLET ORAL at 08:02

## 2024-02-28 RX ADMIN — Medication 5 MILLIGRAM(S): at 02:06

## 2024-02-28 NOTE — ED ADULT NURSE REASSESSMENT NOTE - NS ED NURSE REASSESS COMMENT FT1
RN Huddled with MD Callahan. Per MD Callahan indwelling urinary catheter does not need to be replaced again in the ED. Pt arrived with Jiménez catheter that pt endorses was replaced today. Pt has jiménez catheter at baseline d/t paraplegia.
Assumed care of pt. Awaiting CT results and disposition.

## 2024-02-29 DIAGNOSIS — R10.9 UNSPECIFIED ABDOMINAL PAIN: ICD-10-CM

## 2024-02-29 DIAGNOSIS — Z86.73 PERSONAL HISTORY OF TRANSIENT ISCHEMIC ATTACK (TIA), AND CEREBRAL INFARCTION WITHOUT RESIDUAL DEFICITS: ICD-10-CM

## 2024-02-29 DIAGNOSIS — E78.5 HYPERLIPIDEMIA, UNSPECIFIED: ICD-10-CM

## 2024-02-29 DIAGNOSIS — Z91.013 ALLERGY TO SEAFOOD: ICD-10-CM

## 2024-02-29 DIAGNOSIS — Z88.5 ALLERGY STATUS TO NARCOTIC AGENT: ICD-10-CM

## 2024-02-29 DIAGNOSIS — I10 ESSENTIAL (PRIMARY) HYPERTENSION: ICD-10-CM

## 2024-02-29 LAB
-  AMPICILLIN/SULBACTAM: SIGNIFICANT CHANGE UP
-  AMPICILLIN: SIGNIFICANT CHANGE UP
-  CEFAZOLIN: SIGNIFICANT CHANGE UP
-  CEFEPIME: SIGNIFICANT CHANGE UP
-  CEFTRIAXONE: SIGNIFICANT CHANGE UP
-  CIPROFLOXACIN: SIGNIFICANT CHANGE UP
-  ERTAPENEM: SIGNIFICANT CHANGE UP
-  GENTAMICIN: SIGNIFICANT CHANGE UP
-  NITROFURANTOIN: SIGNIFICANT CHANGE UP
-  PIPERACILLIN/TAZOBACTAM: SIGNIFICANT CHANGE UP
-  TOBRAMYCIN: SIGNIFICANT CHANGE UP
-  TRIMETHOPRIM/SULFAMETHOXAZOLE: SIGNIFICANT CHANGE UP
CULTURE RESULTS: ABNORMAL
METHOD TYPE: SIGNIFICANT CHANGE UP
ORGANISM # SPEC MICROSCOPIC CNT: ABNORMAL
ORGANISM # SPEC MICROSCOPIC CNT: SIGNIFICANT CHANGE UP
SPECIMEN SOURCE: SIGNIFICANT CHANGE UP

## 2024-03-02 ENCOUNTER — EMERGENCY (EMERGENCY)
Facility: HOSPITAL | Age: 69
LOS: 1 days | Discharge: HOME | End: 2024-03-02
Attending: STUDENT IN AN ORGANIZED HEALTH CARE EDUCATION/TRAINING PROGRAM | Admitting: STUDENT IN AN ORGANIZED HEALTH CARE EDUCATION/TRAINING PROGRAM
Payer: MEDICARE

## 2024-03-02 VITALS
HEIGHT: 67 IN | TEMPERATURE: 98 F | OXYGEN SATURATION: 100 % | WEIGHT: 162.04 LBS | HEART RATE: 102 BPM | RESPIRATION RATE: 18 BRPM | SYSTOLIC BLOOD PRESSURE: 95 MMHG | DIASTOLIC BLOOD PRESSURE: 64 MMHG

## 2024-03-02 DIAGNOSIS — Z98.890 OTHER SPECIFIED POSTPROCEDURAL STATES: Chronic | ICD-10-CM

## 2024-03-02 DIAGNOSIS — Z96.611 PRESENCE OF RIGHT ARTIFICIAL SHOULDER JOINT: Chronic | ICD-10-CM

## 2024-03-02 LAB
ALBUMIN SERPL ELPH-MCNC: 3.1 G/DL — LOW (ref 3.3–5)
ALP SERPL-CCNC: 90 U/L — SIGNIFICANT CHANGE UP (ref 40–120)
ALT FLD-CCNC: 12 U/L — SIGNIFICANT CHANGE UP (ref 10–45)
ANION GAP SERPL CALC-SCNC: 13 MMOL/L — SIGNIFICANT CHANGE UP (ref 5–17)
AST SERPL-CCNC: 19 U/L — SIGNIFICANT CHANGE UP (ref 10–40)
BASOPHILS # BLD AUTO: 0.03 K/UL — SIGNIFICANT CHANGE UP (ref 0–0.2)
BASOPHILS NFR BLD AUTO: 0.5 % — SIGNIFICANT CHANGE UP (ref 0–2)
BILIRUB SERPL-MCNC: 0.3 MG/DL — SIGNIFICANT CHANGE UP (ref 0.2–1.2)
BUN SERPL-MCNC: 7 MG/DL — SIGNIFICANT CHANGE UP (ref 7–23)
CALCIUM SERPL-MCNC: 9.4 MG/DL — SIGNIFICANT CHANGE UP (ref 8.4–10.5)
CHLORIDE SERPL-SCNC: 100 MMOL/L — SIGNIFICANT CHANGE UP (ref 96–108)
CO2 SERPL-SCNC: 23 MMOL/L — SIGNIFICANT CHANGE UP (ref 22–31)
CREAT SERPL-MCNC: 0.29 MG/DL — LOW (ref 0.5–1.3)
EGFR: 116 ML/MIN/1.73M2 — SIGNIFICANT CHANGE UP
EOSINOPHIL # BLD AUTO: 0.13 K/UL — SIGNIFICANT CHANGE UP (ref 0–0.5)
EOSINOPHIL NFR BLD AUTO: 2 % — SIGNIFICANT CHANGE UP (ref 0–6)
GLUCOSE SERPL-MCNC: 113 MG/DL — HIGH (ref 70–99)
HCT VFR BLD CALC: 35.5 % — SIGNIFICANT CHANGE UP (ref 34.5–45)
HGB BLD-MCNC: 11.1 G/DL — LOW (ref 11.5–15.5)
IMM GRANULOCYTES NFR BLD AUTO: 0.6 % — SIGNIFICANT CHANGE UP (ref 0–0.9)
LACTATE SERPL-SCNC: 1.1 MMOL/L — SIGNIFICANT CHANGE UP (ref 0.5–2)
LIDOCAIN IGE QN: 7 U/L — SIGNIFICANT CHANGE UP (ref 7–60)
LYMPHOCYTES # BLD AUTO: 1.9 K/UL — SIGNIFICANT CHANGE UP (ref 1–3.3)
LYMPHOCYTES # BLD AUTO: 28.7 % — SIGNIFICANT CHANGE UP (ref 13–44)
MAGNESIUM SERPL-MCNC: 1.5 MG/DL — LOW (ref 1.6–2.6)
MCHC RBC-ENTMCNC: 25 PG — LOW (ref 27–34)
MCHC RBC-ENTMCNC: 31.3 GM/DL — LOW (ref 32–36)
MCV RBC AUTO: 80 FL — SIGNIFICANT CHANGE UP (ref 80–100)
MONOCYTES # BLD AUTO: 0.57 K/UL — SIGNIFICANT CHANGE UP (ref 0–0.9)
MONOCYTES NFR BLD AUTO: 8.6 % — SIGNIFICANT CHANGE UP (ref 2–14)
NEUTROPHILS # BLD AUTO: 3.95 K/UL — SIGNIFICANT CHANGE UP (ref 1.8–7.4)
NEUTROPHILS NFR BLD AUTO: 59.6 % — SIGNIFICANT CHANGE UP (ref 43–77)
NRBC # BLD: 0 /100 WBCS — SIGNIFICANT CHANGE UP (ref 0–0)
OB PNL STL: POSITIVE
PLATELET # BLD AUTO: 381 K/UL — SIGNIFICANT CHANGE UP (ref 150–400)
POTASSIUM SERPL-MCNC: 4.1 MMOL/L — SIGNIFICANT CHANGE UP (ref 3.5–5.3)
POTASSIUM SERPL-SCNC: 4.1 MMOL/L — SIGNIFICANT CHANGE UP (ref 3.5–5.3)
PROT SERPL-MCNC: 6.5 G/DL — SIGNIFICANT CHANGE UP (ref 6–8.3)
RBC # BLD: 4.44 M/UL — SIGNIFICANT CHANGE UP (ref 3.8–5.2)
RBC # FLD: 16.9 % — HIGH (ref 10.3–14.5)
SODIUM SERPL-SCNC: 136 MMOL/L — SIGNIFICANT CHANGE UP (ref 135–145)
WBC # BLD: 6.62 K/UL — SIGNIFICANT CHANGE UP (ref 3.8–10.5)
WBC # FLD AUTO: 6.62 K/UL — SIGNIFICANT CHANGE UP (ref 3.8–10.5)

## 2024-03-02 PROCEDURE — 99284 EMERGENCY DEPT VISIT MOD MDM: CPT | Mod: FS

## 2024-03-02 RX ORDER — MAGNESIUM SULFATE 500 MG/ML
1 VIAL (ML) INJECTION ONCE
Refills: 0 | Status: COMPLETED | OUTPATIENT
Start: 2024-03-02 | End: 2024-03-02

## 2024-03-02 RX ADMIN — Medication 100 GRAM(S): at 22:47

## 2024-03-02 NOTE — ED PROVIDER NOTE - NS ED ATTENDING STATEMENT MOD
Stroke (includes: TIA/SAH/ICH/Ischemic Stroke)
This was a shared visit with the ATIF. I reviewed and verified the documentation.

## 2024-03-02 NOTE — ED ADULT TRIAGE NOTE - CHIEF COMPLAINT QUOTE
Pt presents to ED by EMS from [facility] C/O abd pain, black colored urine in Castillo. Pt states, " Over the summer I had spinal surgery and they placed pain pump I think it is infected and causing these symptoms". Pt states, "I was here two days ago" EMS also reports pt recently seen at [facility]. Hx paraplegic, neurogenic bladder.

## 2024-03-02 NOTE — ED ADULT NURSE NOTE - OBJECTIVE STATEMENT
Pt is a 67 yo F biba from [facility], seen here 4 days ago for abd pain and UTI, dc'd from ED with rx for abx. Pmhx multiple spinal surgeries and neurogenic bladder with chronic jiménez placement. During interview, difficulty to ascertain reason for visit today, pt not answering questions regarding acute changes since dc 4 days ago, however pt does continue to c/o generalized abd pain surrounding implanted pain pump and concerns about her jiménez.   On exam, pt speaking in full and complete sentences with even and unlabored respirations. Jiménez catheter in place draining yellow urine.

## 2024-03-02 NOTE — ED PROVIDER NOTE - PROGRESS NOTE DETAILS
Pt refused CT scan.  She states that her abdominal pain has resolved, and that she "mis-spoke" representing her pain earlier.  VS noted, similar to previous visit.  I advised pt of the blood in her stool, and she expressed clear understanding with teach-back.  She will allow a repeat CBC to evaluate for stability. Spoke to nursing supervisor at [facility], Susan. \Hospital Has a New [facility] and Outlook.\"".  Advised of blood in stool and test results, and that pt refused CT scan; now asymptomatic.  Consider stopping/holding Pradaxa.  Urine Cx from last ED visit reviewed, continue abx.

## 2024-03-02 NOTE — ED PROVIDER NOTE - NS ED ROS FT
CONSTITUTIONAL: No fever, chills, or weakness  NEURO: No headache, no dizziness, no syncope; No focal weakness/tingling/numbness  EYES: No visual changes  ENT: No rhinorrhea or sore throat  PULM: No cough or dyspnea  CV: No chest pain or palpitations  GI: per HPI  : No dysuria, hematuria, frequency  MSK: No neck pain or back pain, no joint pain  SKIN: no rash or unusual bruising

## 2024-03-02 NOTE — ED PROVIDER NOTE - PHYSICAL EXAMINATION
CONSTITUTIONAL: NAD   SKIN: Normal color and turgor.    HEAD: NC/AT.  EYES: Conjunctiva clear. Anicteric sclera.  ENT: Airway clear. Normal voice.   RESPIRATORY:  Normal work of breathing. Lungs CTAB.  CARDIOVASCULAR:  RRR, S1S2. No M/R/G.      GI:  Abdomen soft, tender over RLQ implanted pump; no overlying erythema/warmth/skin changes.  Abd otherwise soft, nontender.  Rectal: soft stool in rectal vault, no stool impaction.  Stool reddish brown.  MSK: Neck supple.  No LE edema or calf tenderness. No joint swelling or ROM limitation.  NEURO: Alert; CN: grossly intact. Speech clear, mild.  Paraplegia. CONSTITUTIONAL: NAD   SKIN: Normal color and turgor.    HEAD: NC/AT.  EYES: Conjunctiva clear. Anicteric sclera.  ENT: Airway clear. Normal voice.   RESPIRATORY:  Normal work of breathing. Lungs CTAB.  CARDIOVASCULAR:  RRR, S1S2. No M/R/G.      GI:  Abdomen soft, tender over RLQ implanted pump; no overlying erythema/warmth/skin changes.  Abd otherwise soft, nontender.  Rectal: soft stool in rectal vault, no stool impaction.  Stool reddish brown.  MSK: Neck supple.  No LE edema or calf tenderness. No joint swelling or ROM limitation.  NEURO: Alert; CN: grossly intact. Speech clear.  Paraplegia.

## 2024-03-02 NOTE — ED ADULT NURSE NOTE - CHIEF COMPLAINT QUOTE
Pt presents to ED by EMS from NH C/O abd pain, black colored urine in Castillo. Pt states, " Over the summer I had spinal surgery and they placed pain pump I think it is infected and causing these symptoms". Pt states, "I was here two days ago" EMS also reports pt recently seen at [facility]. Hx paraplegic, neurogenic bladder.

## 2024-03-02 NOTE — ED PROVIDER NOTE - NSFOLLOWUPINSTRUCTIONS_ED_ALL_ED_FT
Blood noted in stool.  VS remained stable.  Hgb stable.    Consider stopping Pradaxa (dabigatran).  Patient refused CT scan.    Urine culture from prior visit P. mirabilis, sensitive to cefazolin.  Would continue cefpodoxime.    Lab results attached.

## 2024-03-02 NOTE — ED PROVIDER NOTE - OBJECTIVE STATEMENT
68 f paraplegia, HTN, HLD, CVA x 3, emphysema, neurogenic bladder s/p chronic jiménez, chronic neck/back pain s/p multiple spinal fusions and s/p intrathecal morphine pump - c/o abdominal pain.  States she has had pain at pain pump site RLQ ever since it was implanted, seen here several days ago where she had pyuria in jiménez bag and started on cefpodoxime for UTI; CT scan at the time suggestive of possible stercoral colitis.  Pt states today she felt like her "intestines were twisted" and she felt like her abdomen was enlarged.  No hx of bowel obstruction previously; states she had a BM earlier today (she was told by the nursing staff at her rehab facility, she did not see the stool or know if there was any blood).

## 2024-03-02 NOTE — ED PROVIDER NOTE - CLINICAL SUMMARY MEDICAL DECISION MAKING FREE TEXT BOX
Pt with abd pain RLQ since pain pump implanted; reports diffuse abd pain today.  Recent ED visit with ? stercoral colitis, UTI.  Ucx sensitive to current abx.  On exam noted to be tender over pain pump, no signs of infection. Blood noted in stool on SAMREEN, recommended CT scan to evaluate for bowel obstruction/perforation.   Pt refused CT scan, states she no longer has pain.  VS remained stable, consistent with VS from prior visit. Hgb repeated for stability.

## 2024-03-03 VITALS
TEMPERATURE: 98 F | RESPIRATION RATE: 18 BRPM | DIASTOLIC BLOOD PRESSURE: 76 MMHG | OXYGEN SATURATION: 98 % | HEART RATE: 90 BPM | SYSTOLIC BLOOD PRESSURE: 115 MMHG

## 2024-03-03 LAB
HCT VFR BLD CALC: 34.5 % — SIGNIFICANT CHANGE UP (ref 34.5–45)
HGB BLD-MCNC: 10.9 G/DL — LOW (ref 11.5–15.5)
MCHC RBC-ENTMCNC: 25.2 PG — LOW (ref 27–34)
MCHC RBC-ENTMCNC: 31.6 GM/DL — LOW (ref 32–36)
MCV RBC AUTO: 79.9 FL — LOW (ref 80–100)
NRBC # BLD: 0 /100 WBCS — SIGNIFICANT CHANGE UP (ref 0–0)
PLATELET # BLD AUTO: 381 K/UL — SIGNIFICANT CHANGE UP (ref 150–400)
RBC # BLD: 4.32 M/UL — SIGNIFICANT CHANGE UP (ref 3.8–5.2)
RBC # FLD: 17 % — HIGH (ref 10.3–14.5)
WBC # BLD: 6.4 K/UL — SIGNIFICANT CHANGE UP (ref 3.8–10.5)
WBC # FLD AUTO: 6.4 K/UL — SIGNIFICANT CHANGE UP (ref 3.8–10.5)

## 2024-03-03 PROCEDURE — 83605 ASSAY OF LACTIC ACID: CPT

## 2024-03-03 PROCEDURE — 99284 EMERGENCY DEPT VISIT MOD MDM: CPT | Mod: 25

## 2024-03-03 PROCEDURE — 82272 OCCULT BLD FECES 1-3 TESTS: CPT

## 2024-03-03 PROCEDURE — 96374 THER/PROPH/DIAG INJ IV PUSH: CPT

## 2024-03-03 PROCEDURE — 85027 COMPLETE CBC AUTOMATED: CPT

## 2024-03-03 PROCEDURE — 85025 COMPLETE CBC W/AUTO DIFF WBC: CPT

## 2024-03-03 PROCEDURE — 83735 ASSAY OF MAGNESIUM: CPT

## 2024-03-03 PROCEDURE — 36415 COLL VENOUS BLD VENIPUNCTURE: CPT

## 2024-03-03 PROCEDURE — 83690 ASSAY OF LIPASE: CPT

## 2024-03-03 PROCEDURE — 80053 COMPREHEN METABOLIC PANEL: CPT

## 2024-03-03 NOTE — ED ADULT NURSE REASSESSMENT NOTE - NS ED NURSE REASSESS COMMENT FT1
Pt refusing CT to be done at this time despite presentation of risks and benefits and states "I feel better now. I don't need anymore testing. I just want you to talk to my nursing home and tell them I am okay to go back. ALANA Queen called to bedside.
Received report from RN Jimi. Received patient in stretcher. AOX3. Vital signs as noted in flowsheet.  Patient denies chest pain, pain, discomfort, shortness of breath, difficulty breathing and any form of distress not noted. Patient oriented to ED area. Plan of care discussed and verbalized understanding. All needs attended. Fall risk precautions maintained. Purposeful proactive hourly rounding in progress.  Castillo cath in good working condition draining dark yellow urine.

## 2024-03-05 ENCOUNTER — APPOINTMENT (OUTPATIENT)
Dept: UROLOGY | Facility: CLINIC | Age: 69
End: 2024-03-05

## 2024-03-06 DIAGNOSIS — G82.20 PARAPLEGIA, UNSPECIFIED: ICD-10-CM

## 2024-03-06 DIAGNOSIS — K92.1 MELENA: ICD-10-CM

## 2024-03-06 DIAGNOSIS — E78.5 HYPERLIPIDEMIA, UNSPECIFIED: ICD-10-CM

## 2024-03-06 DIAGNOSIS — Z88.5 ALLERGY STATUS TO NARCOTIC AGENT: ICD-10-CM

## 2024-03-06 DIAGNOSIS — R10.31 RIGHT LOWER QUADRANT PAIN: ICD-10-CM

## 2024-03-06 DIAGNOSIS — Z91.013 ALLERGY TO SEAFOOD: ICD-10-CM

## 2024-03-06 DIAGNOSIS — I10 ESSENTIAL (PRIMARY) HYPERTENSION: ICD-10-CM

## 2024-03-06 DIAGNOSIS — Z97.8 PRESENCE OF OTHER SPECIFIED DEVICES: ICD-10-CM

## 2024-03-06 DIAGNOSIS — Z86.73 PERSONAL HISTORY OF TRANSIENT ISCHEMIC ATTACK (TIA), AND CEREBRAL INFARCTION WITHOUT RESIDUAL DEFICITS: ICD-10-CM

## 2024-03-14 ENCOUNTER — EMERGENCY (EMERGENCY)
Facility: HOSPITAL | Age: 69
LOS: 1 days | Discharge: HOME | End: 2024-03-14
Attending: EMERGENCY MEDICINE | Admitting: EMERGENCY MEDICINE
Payer: MEDICARE

## 2024-03-14 VITALS
SYSTOLIC BLOOD PRESSURE: 106 MMHG | HEART RATE: 95 BPM | HEIGHT: 67 IN | OXYGEN SATURATION: 99 % | DIASTOLIC BLOOD PRESSURE: 75 MMHG | RESPIRATION RATE: 16 BRPM | TEMPERATURE: 98 F | WEIGHT: 156.09 LBS

## 2024-03-14 DIAGNOSIS — Z86.73 PERSONAL HISTORY OF TRANSIENT ISCHEMIC ATTACK (TIA), AND CEREBRAL INFARCTION WITHOUT RESIDUAL DEFICITS: ICD-10-CM

## 2024-03-14 DIAGNOSIS — Z91.013 ALLERGY TO SEAFOOD: ICD-10-CM

## 2024-03-14 DIAGNOSIS — I10 ESSENTIAL (PRIMARY) HYPERTENSION: ICD-10-CM

## 2024-03-14 DIAGNOSIS — Z88.5 ALLERGY STATUS TO NARCOTIC AGENT: ICD-10-CM

## 2024-03-14 DIAGNOSIS — Z98.890 OTHER SPECIFIED POSTPROCEDURAL STATES: Chronic | ICD-10-CM

## 2024-03-14 DIAGNOSIS — R10.30 LOWER ABDOMINAL PAIN, UNSPECIFIED: ICD-10-CM

## 2024-03-14 DIAGNOSIS — Z97.8 PRESENCE OF OTHER SPECIFIED DEVICES: ICD-10-CM

## 2024-03-14 DIAGNOSIS — E78.00 PURE HYPERCHOLESTEROLEMIA, UNSPECIFIED: ICD-10-CM

## 2024-03-14 DIAGNOSIS — J44.9 CHRONIC OBSTRUCTIVE PULMONARY DISEASE, UNSPECIFIED: ICD-10-CM

## 2024-03-14 DIAGNOSIS — N39.0 URINARY TRACT INFECTION, SITE NOT SPECIFIED: ICD-10-CM

## 2024-03-14 DIAGNOSIS — Z87.891 PERSONAL HISTORY OF NICOTINE DEPENDENCE: ICD-10-CM

## 2024-03-14 DIAGNOSIS — Z96.611 PRESENCE OF RIGHT ARTIFICIAL SHOULDER JOINT: Chronic | ICD-10-CM

## 2024-03-14 DIAGNOSIS — E83.42 HYPOMAGNESEMIA: ICD-10-CM

## 2024-03-14 LAB
BASOPHILS # BLD AUTO: 0.05 K/UL — SIGNIFICANT CHANGE UP (ref 0–0.2)
BASOPHILS NFR BLD AUTO: 0.7 % — SIGNIFICANT CHANGE UP (ref 0–2)
EOSINOPHIL # BLD AUTO: 0.13 K/UL — SIGNIFICANT CHANGE UP (ref 0–0.5)
EOSINOPHIL NFR BLD AUTO: 1.8 % — SIGNIFICANT CHANGE UP (ref 0–6)
HCT VFR BLD CALC: 35.5 % — SIGNIFICANT CHANGE UP (ref 34.5–45)
HGB BLD-MCNC: 11.3 G/DL — LOW (ref 11.5–15.5)
IMM GRANULOCYTES NFR BLD AUTO: 0.3 % — SIGNIFICANT CHANGE UP (ref 0–0.9)
LYMPHOCYTES # BLD AUTO: 2.38 K/UL — SIGNIFICANT CHANGE UP (ref 1–3.3)
LYMPHOCYTES # BLD AUTO: 33.3 % — SIGNIFICANT CHANGE UP (ref 13–44)
MCHC RBC-ENTMCNC: 25.1 PG — LOW (ref 27–34)
MCHC RBC-ENTMCNC: 31.8 GM/DL — LOW (ref 32–36)
MCV RBC AUTO: 78.9 FL — LOW (ref 80–100)
MONOCYTES # BLD AUTO: 0.6 K/UL — SIGNIFICANT CHANGE UP (ref 0–0.9)
MONOCYTES NFR BLD AUTO: 8.4 % — SIGNIFICANT CHANGE UP (ref 2–14)
NEUTROPHILS # BLD AUTO: 3.96 K/UL — SIGNIFICANT CHANGE UP (ref 1.8–7.4)
NEUTROPHILS NFR BLD AUTO: 55.5 % — SIGNIFICANT CHANGE UP (ref 43–77)
NRBC # BLD: 0 /100 WBCS — SIGNIFICANT CHANGE UP (ref 0–0)
PLATELET # BLD AUTO: 421 K/UL — HIGH (ref 150–400)
RBC # BLD: 4.5 M/UL — SIGNIFICANT CHANGE UP (ref 3.8–5.2)
RBC # FLD: 19.1 % — HIGH (ref 10.3–14.5)
WBC # BLD: 7.14 K/UL — SIGNIFICANT CHANGE UP (ref 3.8–10.5)
WBC # FLD AUTO: 7.14 K/UL — SIGNIFICANT CHANGE UP (ref 3.8–10.5)

## 2024-03-14 PROCEDURE — 99284 EMERGENCY DEPT VISIT MOD MDM: CPT

## 2024-03-14 RX ORDER — OXYCODONE HYDROCHLORIDE 5 MG/1
5 TABLET ORAL ONCE
Refills: 0 | Status: DISCONTINUED | OUTPATIENT
Start: 2024-03-14 | End: 2024-03-14

## 2024-03-14 RX ORDER — DIPHENHYDRAMINE HCL 50 MG
25 CAPSULE ORAL ONCE
Refills: 0 | Status: COMPLETED | OUTPATIENT
Start: 2024-03-14 | End: 2024-03-14

## 2024-03-14 NOTE — ED ADULT NURSE NOTE - CINV DISCH MEDS REVIEWED YN
DR. DUNHAM'S DISCHARGE INSTRUCTIONS  1) Apply ice to surgical site.  2) Call physician for:  - Any signs of wound infection, fever greater than 101.1F, bleeding, separation of incision, pus like drainage, or excessive swelling.  - Any difficulty breathing, chest pain, vomiting, or inability to tolerate oral intake.  - Any pain not controlled by pain medication or anything worrisome.  3) Keep dressing clean and dry. You may remove dressing on Day 3 and replace with band aids daily.   4) You may shower or bathe but keep incisions dry - Cover incision with suitable plastic covering/plastic bag while showering.  5) Activity: as tolerated.  6) Avoid driving while taking narcotic pain medications or experiencing pain  7) Go to your scheduled postoperative appointment.        Patient/Family given For Your Well-Being Teaching Sheet:     Care After Anesthesia/Sedation __xx____     Pain Management Tips              __xx____  About Surgical Site Infections  ___xx___  Smoking and Second hand Smoke Information for Everyone xx_____  Same Day Surgery  Card           __xx____    Supplies: ice pack    Notify your surgeon for any of the followin.)  Trouble moving your fingers.  2.)  Numbness or tingling  3.)  A sudden color change  4.)  Increased coolness or burning sensations  
Yes

## 2024-03-14 NOTE — ED ADULT TRIAGE NOTE - CHIEF COMPLAINT QUOTE
[facility] with chief complaints of lower abdominal pain x today. +jiménez noted.

## 2024-03-14 NOTE — ED ADULT NURSE NOTE - NSFALLRISKINTERV_ED_ALL_ED

## 2024-03-14 NOTE — ED ADULT NURSE NOTE - OBJECTIVE STATEMENT
68y F BIBTOMÁS from [facility], pmHx multiple spinal surgeries with intrathecal morphine pump placed, chronic jiménez (last changed 3/14/24) presents to ED c/o lower abdominal pain x1 day. Reports unspecific jiménez issue yesterday, jiménez changed this morning currently draining yellow urine on arrival. Endorses lower abdominal pain and pain surrounding intrathecal pump s/p jiménez replacement, a/w mild HA, decreased stool output. Denies fever/chills, N/V/D, dark/tarry stool, hematuria, CP/SOb, other acute sx at this time. Pt AAOx4, speaking in full and clear sentences, NAD at this time.

## 2024-03-14 NOTE — ED ADULT NURSE NOTE - CHIEF COMPLAINT QUOTE
[facility] with chief complaints of lower abdominal pain x today. +jiménez noted.

## 2024-03-15 VITALS
HEART RATE: 87 BPM | DIASTOLIC BLOOD PRESSURE: 78 MMHG | TEMPERATURE: 98 F | SYSTOLIC BLOOD PRESSURE: 137 MMHG | RESPIRATION RATE: 17 BRPM | OXYGEN SATURATION: 100 %

## 2024-03-15 LAB
ALBUMIN SERPL ELPH-MCNC: 3.5 G/DL — SIGNIFICANT CHANGE UP (ref 3.3–5)
ALP SERPL-CCNC: 98 U/L — SIGNIFICANT CHANGE UP (ref 40–120)
ALT FLD-CCNC: 6 U/L — LOW (ref 10–45)
ANION GAP SERPL CALC-SCNC: 11 MMOL/L — SIGNIFICANT CHANGE UP (ref 5–17)
APPEARANCE UR: ABNORMAL
AST SERPL-CCNC: 12 U/L — SIGNIFICANT CHANGE UP (ref 10–40)
BACTERIA # UR AUTO: ABNORMAL /HPF
BILIRUB SERPL-MCNC: 0.2 MG/DL — SIGNIFICANT CHANGE UP (ref 0.2–1.2)
BILIRUB UR-MCNC: NEGATIVE — SIGNIFICANT CHANGE UP
BUN SERPL-MCNC: 11 MG/DL — SIGNIFICANT CHANGE UP (ref 7–23)
CALCIUM SERPL-MCNC: 8.9 MG/DL — SIGNIFICANT CHANGE UP (ref 8.4–10.5)
CAST: 1 /LPF — SIGNIFICANT CHANGE UP (ref 0–4)
CHLORIDE SERPL-SCNC: 104 MMOL/L — SIGNIFICANT CHANGE UP (ref 96–108)
CO2 SERPL-SCNC: 23 MMOL/L — SIGNIFICANT CHANGE UP (ref 22–31)
COLOR SPEC: YELLOW — SIGNIFICANT CHANGE UP
CREAT SERPL-MCNC: 0.38 MG/DL — LOW (ref 0.5–1.3)
DIFF PNL FLD: ABNORMAL
EGFR: 108 ML/MIN/1.73M2 — SIGNIFICANT CHANGE UP
GLUCOSE SERPL-MCNC: 103 MG/DL — HIGH (ref 70–99)
GLUCOSE UR QL: NEGATIVE MG/DL — SIGNIFICANT CHANGE UP
KETONES UR-MCNC: NEGATIVE MG/DL — SIGNIFICANT CHANGE UP
LEUKOCYTE ESTERASE UR-ACNC: ABNORMAL
LIDOCAIN IGE QN: 9 U/L — SIGNIFICANT CHANGE UP (ref 7–60)
MAGNESIUM SERPL-MCNC: 1.5 MG/DL — LOW (ref 1.6–2.6)
NITRITE UR-MCNC: POSITIVE
PH UR: 6.5 — SIGNIFICANT CHANGE UP (ref 5–8)
POTASSIUM SERPL-MCNC: 4.2 MMOL/L — SIGNIFICANT CHANGE UP (ref 3.5–5.3)
POTASSIUM SERPL-SCNC: 4.2 MMOL/L — SIGNIFICANT CHANGE UP (ref 3.5–5.3)
PROT SERPL-MCNC: 6.4 G/DL — SIGNIFICANT CHANGE UP (ref 6–8.3)
PROT UR-MCNC: SIGNIFICANT CHANGE UP MG/DL
RBC CASTS # UR COMP ASSIST: 8 /HPF — HIGH (ref 0–4)
SODIUM SERPL-SCNC: 138 MMOL/L — SIGNIFICANT CHANGE UP (ref 135–145)
SP GR SPEC: 1.02 — SIGNIFICANT CHANGE UP (ref 1–1.03)
SQUAMOUS # UR AUTO: 3 /HPF — SIGNIFICANT CHANGE UP (ref 0–5)
UROBILINOGEN FLD QL: 0.2 MG/DL — SIGNIFICANT CHANGE UP (ref 0.2–1)
WBC UR QL: 477 /HPF — HIGH (ref 0–5)

## 2024-03-15 PROCEDURE — 83735 ASSAY OF MAGNESIUM: CPT

## 2024-03-15 PROCEDURE — 87184 SC STD DISK METHOD PER PLATE: CPT

## 2024-03-15 PROCEDURE — 36415 COLL VENOUS BLD VENIPUNCTURE: CPT

## 2024-03-15 PROCEDURE — 83690 ASSAY OF LIPASE: CPT

## 2024-03-15 PROCEDURE — 81001 URINALYSIS AUTO W/SCOPE: CPT

## 2024-03-15 PROCEDURE — 87086 URINE CULTURE/COLONY COUNT: CPT

## 2024-03-15 PROCEDURE — 87186 SC STD MICRODIL/AGAR DIL: CPT

## 2024-03-15 PROCEDURE — 80053 COMPREHEN METABOLIC PANEL: CPT

## 2024-03-15 PROCEDURE — 85025 COMPLETE CBC W/AUTO DIFF WBC: CPT

## 2024-03-15 PROCEDURE — 99283 EMERGENCY DEPT VISIT LOW MDM: CPT

## 2024-03-15 RX ORDER — CEFUROXIME AXETIL 250 MG
1 TABLET ORAL
Qty: 14 | Refills: 0
Start: 2024-03-15 | End: 2024-03-21

## 2024-03-15 RX ORDER — MAGNESIUM OXIDE 400 MG ORAL TABLET 241.3 MG
400 TABLET ORAL ONCE
Refills: 0 | Status: COMPLETED | OUTPATIENT
Start: 2024-03-15 | End: 2024-03-15

## 2024-03-15 RX ORDER — CEFUROXIME AXETIL 250 MG
500 TABLET ORAL ONCE
Refills: 0 | Status: COMPLETED | OUTPATIENT
Start: 2024-03-15 | End: 2024-03-15

## 2024-03-15 RX ORDER — ACETAMINOPHEN 500 MG
650 TABLET ORAL ONCE
Refills: 0 | Status: COMPLETED | OUTPATIENT
Start: 2024-03-15 | End: 2024-03-15

## 2024-03-15 RX ADMIN — OXYCODONE HYDROCHLORIDE 5 MILLIGRAM(S): 5 TABLET ORAL at 00:07

## 2024-03-15 RX ADMIN — MAGNESIUM OXIDE 400 MG ORAL TABLET 400 MILLIGRAM(S): 241.3 TABLET ORAL at 00:40

## 2024-03-15 RX ADMIN — Medication 500 MILLIGRAM(S): at 02:03

## 2024-03-15 RX ADMIN — Medication 25 MILLIGRAM(S): at 00:07

## 2024-03-15 RX ADMIN — Medication 650 MILLIGRAM(S): at 00:45

## 2024-03-15 NOTE — ED PROVIDER NOTE - PROGRESS NOTE DETAILS
+UTI, will treat with ABX. given oral mag replacement  I have discussed the discharge plan with the patient. The patient agrees with the plan, as discussed.  The patient understands Emergency Department diagnosis is a preliminary diagnosis often based on limited information and that the patient must adhere to the follow-up plan as discussed.  The patient understands that if the symptoms worsen or if prescribed medications do not have the desired/planned effect that the patient may return to the Emergency Department at any time for further evaluation and treatment.

## 2024-03-15 NOTE — ED PROVIDER NOTE - CLINICAL SUMMARY MEDICAL DECISION MAKING FREE TEXT BOX
s/p recent jiménez change, likely urinary retention then relieved by second jiménez change. now yellow urine draining, abd soft, no guarding, no rebound, afebrile, no vomiting. tolerating po. doubt sbo. possible pyelo as pain radiates to left flank. less likely kidney stone  -check labs  -check ua  -oxycodone, benadryl

## 2024-03-15 NOTE — ED PROVIDER NOTE - OBJECTIVE STATEMENT
68F hx htn, high chol, cva, GAMALIEL, copd, urinary incontinence with chronic indwelling jiménez, intrathecal morphine pump, c/o lower abd discomfort. pt states yesterday her jiménez was changed. states she felt the nurse did not do it correctly and urine was not draining. states then in the middle of the night the doctor at the NH again changed the jiménez. states then urine drained and pt felt better. states now having some lower abd pain that radiates to left flank. no vomiting, no fevers. pt requesting pain medication, states her pump is currently empty. states she would like oxycodone with benadryl.

## 2024-03-15 NOTE — ED PROVIDER NOTE - PATIENT PORTAL LINK FT
You can access the FollowMyHealth Patient Portal offered by [facility] by registering at the following website: [email or web address]. By joining [employer]’s FollowMyHealth portal, you will also be able to view your health information using other applications (apps) compatible with our system.

## 2024-03-15 NOTE — ED PROVIDER NOTE - CARE PROVIDER_API CALL
Farrah Ott  Neurosurgery  [address] [place] [address], [address]  [place] [address]  Phone: [phone]  Fax: [phone]  Follow Up Time:

## 2024-03-15 NOTE — ED PROVIDER NOTE - NSFOLLOWUPINSTRUCTIONS_ED_ALL_ED_FT
Please reach out to Bina Fournier ([facility] ED clinical referral coordinator) to assist you with your follow-up appointment.     Monday - Friday 11am-7pm  [phone]  [email or web address]    Catheter-associated Urinary Tract Infection    WHAT YOU NEED TO KNOW:    A CAUTI is an infection caused by an indwelling urinary catheter. The infection is caused by germs that do not usually live in the urinary tract. The germ can be a fungus or bacteria. Germs may get into the urinary tract when the catheter is being put in or while the catheter stays in the bladder. The infection may travel along the catheter and into the bladder or kidneys.    DISCHARGE INSTRUCTIONS:    Return to the emergency department if:    You have severe pain in your lower back or abdomen.    You have blood in your urine.    You stop urinating, or you urinate much less than usual.  Call your doctor if:    You have a fever.    Your symptoms do not improve or get worse.    You have questions or concerns about your condition or care.  Medicines: You may need any of the following:    Antibiotics help treat an infection caused by bacteria.    Antifungals help treat an infection caused by fungus.    Acetaminophen decreases pain and fever. It is available without a doctor's order. Ask how much to take and how often to take it. Follow directions. Read the labels of all other medicines you are using to see if they also contain acetaminophen, or ask your doctor or pharmacist. Acetaminophen can cause liver damage if not taken correctly.    NSAIDs, such as ibuprofen, help decrease swelling, pain, and fever. This medicine is available with or without a doctor's order. NSAIDs can cause stomach bleeding or kidney problems in certain people. If you take blood thinner medicine, always ask your healthcare provider if NSAIDs are safe for you. Always read the medicine label and follow directions.    Take your medicine as directed. Contact your healthcare provider if you think your medicine is not helping or if you have side effects. Tell your provider if you are allergic to any medicine. Keep a list of the medicines, vitamins, and herbs you take. Include the amounts, and when and why you take them. Bring the list or the pill bottles to follow-up visits. Carry your medicine list with you in case of an emergency.  Self-care:    Drink liquids as directed. Liquids can help flush bacteria from your urinary tract. Ask how much liquid to drink each day and which liquids are best for you. You may need to drink more liquids than usual to help flush out the bacteria. Do not drink alcohol, caffeine, and citrus juices. These can irritate your bladder and increase your symptoms.    Keep the catheter area clean. Clean your skin around the catheter as directed. Shower once a day. Do not take baths or go in hot tubs until your infection is gone.    Do not have sex until your healthcare provider says it is okay. Sex may delay healing or cause another UTI.  Prevent another CAUTI:    Wash your hands before and after you use the bathroom or touch the catheter. Wash your hands to prevent the spread of infection to your urinary tract.    Clean all parts of your catheter as directed. Keep your catheter tubing clean. Do not place the catheter on the ground. Do not allow the drainage spout to touch the toilet. Use an alcohol swab to clean the end of drainage spout as directed.    Keep the drainage bag below your waist. This may prevent urine from moving back into your bladder, which can cause an infection.    Empty the urine bag as directed. This may prevent urine from flowing back into your bladder.    Women should wipe front to back after a bowel movement. This may prevent germs from getting into the urinary tract.    Keep the catheter secured to your leg as directed. Use tape or a special catheter youssef to prevent your catheter from being pulled. This may also prevent kinks that could cause the urine to move back into the bladder.  Follow up with your doctor as directed: Write down your questions so you remember to ask them during your visits.    Hypomagnesemia    WHAT YOU NEED TO KNOW:    Hypomagnesemia is a condition that develops when the amount of magnesium in your body is too low. Magnesium is a mineral that helps your heart, muscles, and nerves work normally. It also helps strengthen your bones.    DISCHARGE INSTRUCTIONS:    Return to the emergency department if:    You have numbness and tingling in your arms or legs.    You have painful muscle spasms and tremors in your arms or legs.    You are not able to move your muscles, and you have trouble thinking clearly.    Your heartbeat is faster than usual, or is irregular.    You have a seizure.  Call your doctor or dietitian if:    You have fatigue and muscle tremors or twitching.    You become irritable and have trouble sleeping.    You have questions or concerns about your condition or care.  Prevent hypomagnesemia:    Manage health conditions by following your treatment plan. Health conditions such as congestive heart failure, diabetes, and chronic diarrhea can put you at risk for hypomagnesemia.    Eat foods that contain magnesium every day. Ask your dietitian or healthcare provider how much magnesium you need each day.    Limit or do not drink alcohol. Alcohol can prevent your body from absorbing magnesium. Alcohol also makes your body release large amounts of magnesium through your urine.    You may need to take a magnesium supplement. Ask your healthcare provider which supplement to take and how often to take it.  Foods that contain magnesium:    Almonds, cashews, peanuts, and peanut butter    Dark green leafy vegetables, such as spinach    Raisins, bananas, apples, broccoli, and carrots    Soy milk and soy beans    Black beans and kidney beans    Whole-wheat bread and brown rice    Shredded-wheat cereal, oatmeal, and other breakfast cereals fortified with magnesium    Plain low-fat yogurt and milk    Cooked halibut  Follow up with your doctor or dietitian as directed: Write down your questions so you remember to ask them during your visits.

## 2024-03-15 NOTE — ED PROVIDER NOTE - NS_EDPROVIDERDISPOUSERTYPE_ED_A_ED
[facility]  Pulmonary, Critical Care, and Sleep Medicine    Name: Silvio Merlos MRN: [id number]   : 1951 Hospital: [facility]   Date: 2021        IMPRESSION:   · Angioedema- with airway and respiratory failure and collapse; thought due to ACE inhibitor. S/P Emergent Cricthyrotomy [facility]-ED 21- converted to 6.5 ETT intraoperative by anesthesia team 21, 8.0 ETT by anesthesia 21, packing removed evening 21-ENT  · S/P cardiac arrest and acute respiratory failure-resolved  · Tracheostomy - #9 Portex on  with Dr. Thomas Klein  · Pulmonary Infiltrates: suspect aspiration secretions and/or blood due to above- can't exclude other infectious process at this time. · Ileus- mild, noted KUB   · DVT: Popliteal- Left; acute non-occlusive thrombus.   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 · Need for nutrition-PEG tube planned     Patient Active Problem List   Diagnosis Code    Angioedema due to angiotensin converting enzyme inhibitor (ACE-I) T78. 3XXA, [id number]    Respiratory failure ([facility] J96.90    JACQUELYN (acute kidney injury) ([address] N17.9    Cardiac arrest (HCC) I46.9    Obesity (BMI 30-39. 9) E66.9    Diabetic neuropathy associated with type 2 diabetes mellitus ([facility] E11.40    Diabetic retinopathy associated with type 2 diabetes mellitus ([address] E11.319    Pulmonary infiltrates R91.8    Controlled type 2 diabetes mellitus with neurologic complication, without long-term current use of insulin (HCC) E11.49    DVT (deep venous thrombosis) ([facility] I82.409    Encounter for palliative care Z51.5    Goals of care, counseling/discussion Z71.89    Multiple subsegmental pulmonary emboli without acute cor pulmonale (HCC) I26.94      PLAN:       PULM:  · Maintain aspiration precautions: HOB >30 degrees  · SpO2 goal >92%  · Bronchial /oral hygiene   · Trach care per protocol  · Transitioned to TC full time   · Completed course of decadron  · Continue Brovana  · Albuterol PRN  · #9 Portex Trach on 4/26      GI/ NUTRITION:  · NGT Continue and stop low-intermittent suction. Plans for PEG tube insertion noted  · Diet: Tube feeds: Jevity 1.5, 60mls/hr, Free Water: 100mls Q 4  · SUP:Pepcid 20 mg Q 12  · Follow up with nutritional recommendations    RENAL/ METAB/ :  · Monitor I&Os  · Trend electrolytes - replace as needed     HEM/ONC  · Trend Hb/HCT and PLTs  · Hold anticoagulation for PEG tube and then resume Coumadin for treatment of DVT PE     ID  · Antibioitcs: Cipro eye drop, Zosyn 4/4 - 4/8  Vanco: 4/4-6, MRSA swab negative.   · Zosyn restarted on 4/11-4/19, Merrily Nordmann started 4/19-25  · Repeat Covid negative (4/11)  · We will check cultures-tracheal secretions with gram-negative rods.     ENDO  · BGs Q 6,SSI, TSH normal  · C1 inhibitor normal level (4/7/21)     MSK/ ORTHO  · No active issues  · Aggressive PT/OT for deconditioning     SKIN/ WOUNDS  · Per protocol  · Trach sutured to skin      CODE STATUS: Full Code- Full          Subjective/Interval History:        Interval HPI:70 yo M h/o HTN tx w/ lisinopril presenting to [facility] ED 4/4 for rapidly progressing respiratory distress due to severe angioedema. During intubation attempts pt had brief cardiac arrest w/ pulse loss and severe airway swelling leading to emergent cricothyrotomy. Pt stabilized before Night[place] transport to [place] at [facility] pt taken emergently to [address] was converted to ETT. Patient continued to have trouble with o2 requirements disproportionate to CXR findings and despite adjustment of FiO2 and PEEP, now improved.  S/p administration of inhaled epoprostenol. CTA revelaed B/L pulmonary emboli which is likely the cause of his hypoxemia and RH strain. Continuing heparin gtt, which had already been initiated for left popliteal non-occlusive thrombus. Pt is s/p dobutamine and  diureses w/ bumex and metolazone.  COVID - 19 negative. Now with minimal vent support, however, mentation precludes extubation at this time. Patient required tracheostomy placement . His ICU course has been complicated by encephalopathy and critical care myopathy      Subjective   21    LOS: 28    · Patient is comfortable on trach collar  · Secretions via trach and around--thick tan-colored no bleeding  · No new overnight events  · Discussed with Dr. Shawnee Kayser for PEG placement noted  · Will hold Coumadin for procedure           ROS:Review of systems not obtained due to patient factors. Objective:   Vital Signs:    Visit Vitals  /65   Pulse 95   Temp 98.7 °F (37.1 °C)   Resp (!) 38   Ht 5' 11\" (1.803 m)   Wt 101.7 kg (224 lb 3.3 oz)   SpO2 95%   BMI 31.27 kg/m²       O2 Device: Tracheostomy, Tracheal collar, Humidifier   O2 Flow Rate (L/min): 6 l/min   Temp (24hrs), Av.6 °F (37 °C), Min:97.9 °F (36.6 °C), Max:99 °F (37.2 °C)       Intake/Output:   Last shift:      No intake/output data recorded. Last 3 shifts:  1901 -  0700  In: 2440   Out: 1200 [Urine:1200]    Intake/Output Summary (Last 24 hours) at 2021 1036  Last data filed at 2021 0700  Gross per 24 hour   Intake 1520 ml   Output 1200 ml   Net 320 ml      Physical Exam:                 General: NAD. Trach in place on TC.   Keeps head turned to the right  HEENT:  Anicteric sclerae; pink palpebral conjunctivae; mucosa moist  Resp:  Symmetrical chest expansion, no accessory muscle use; good airway entry; no rales/ wheezing/ diminished b/l, green-brown blood tinged secretions via trach   CV:  S1, S2 present; regular rate and rhythm  GI:  Obese. Abdomen soft, non-tender; (+) active bowel sounds  Extremities:  +2 pulses on all extremities; no edema/ cyanosis/ clubbing noted; no restraints   Skin:  Warm; no rashes/ lesions noted, normal turgor/cap refill , dry appearing   Neurologic:  no eye opening or command following this am   Devices:  NGT, Trach, condom cath        DATA:  Labs:  Recent Labs     05/02/21 0351 05/01/21 0108 04/30/21 1947   WBC 10.1 11.7 13.1   HGB 9.5* 10.0* 11.2*   HCT 31.3* 33.1* 37.0    387 400     Recent Labs     05/02/21 0351 05/01/21 0108 04/30/21 1947 04/30/21 0408    138  --  139   K 5.0 4.6  --  4.5    106  --  106   CO2 27 28  --  27   * 153*  --  134*   BUN 52* 47*  --  50*   CREA 1.37* 1.28  --  1.35*   CA 9.3 8.8  --  9.3   MG 2.5 2.2  --  2.4   PHOS  --  3.5  --  3.8   ALB 2.0* 2.2*  --  2.3*   ALT 65*  --   --   --    INR 1.2 1.3* 1.2 1.2     No results for input(s): PH, PCO2, PO2, HCO3, FIO2 in the last 72 hours.     Lab Results   Component Value Date/Time    Hemoglobin A1c 6.6 (H) 04/04/2021 05:11 PM     Lab Results   Component Value Date/Time    TSH 2.62 05/01/2021 04:21 PM    T4, Free 1.2 05/01/2021 04:21 PM     MICRO:  Results     Procedure Component Value Units Date/Time    CULTURE, RESPIRATORY/SPUTUM/BRONCH Rozelle Evans STAIN [[id number]] Collected: 05/01/21 1032    Order Status: Completed Specimen: Sputum from Tracheal Aspirate Updated: 05/01/21 2136     Special Requests: NO SPECIAL REQUESTS        GRAM STAIN RARE WBCS SEEN               RARE EPITHELIAL CELLS SEEN                  OCCASIONAL GRAM NEGATIVE COCCOBACILLI           Culture result: PENDING    CULTURE, RESPIRATORY/SPUTUM/BRONCH Rozelle Evans STAIN [[id number]]  (Abnormal) Collected: 04/30/21 1245    Order Status: Completed Specimen: Sputum,ET Suction Updated: 05/01/21 1504     Special Requests: NO SPECIAL REQUESTS        GRAM STAIN 1+ WBCS SEEN               RARE EPITHELIAL CELLS SEEN                  1+ GRAM POSITIVE COCCI IN CHAINS OCCASIONAL GRAM NEGATIVE COCCOBACILLI           Culture result:       MODERATE GRAM NEGATIVE RODS                  FEW NORMAL RESPIRATORY ANNALEE          COVID-19 RAPID TEST [[id number]] Collected: 04/25/21 1214    Order Status: Completed Specimen: Nasopharyngeal Updated: 04/25/21 1237     Specimen source Nasopharyngeal        COVID-19 rapid test Not detected        Comment: Rapid Abbott ID Now       Rapid NAAT:  The specimen is NEGATIVE for SARS-CoV-2, the novel coronavirus associated with COVID-19. Negative results should be treated as presumptive and, if inconsistent with clinical signs and symptoms or necessary for patient management, should be tested with an alternative molecular assay. Negative results do not preclude SARS-CoV-2 infection and should not be used as the sole basis for patient management decisions. This test has been authorized by the FDA under an Emergency Use Authorization (EUA) for use by authorized laboratories. Fact sheet for Healthcare Providers: ConventionUpdate.co.nz  Fact sheet for Patients: ConventionUpdate.co.nz       Methodology: Isothermal Nucleic Acid Amplification                                                               Echo  04/04/21   ECHO ADULT COMPLETE 04/06/2021 4/6/2021    Narrative · Contrast used: DEFINITY. · Image quality for this study was technically difficult. · LV: Calculated LVEF is 55%. Visually measured ejection fraction. Normal   cavity size, wall thickness and systolic function (ejection fraction   normal). Wall motion: normal. Moderate (grade 2) left ventricular   diastolic dysfunction. · AO: Mild aortic root and ascending aorta dilatation. Ascending aorta   diameter = 3.6 cm. Aortic root measures 3.9 cm  · RA: Dilated right atrium. · RV: Dilated right ventricle. Borderline low systolic function. · TV: Mild tricuspid valve regurgitation is present.   · IVC: Mechanically ventilated; cannot use inferior caval vein diameter to   estimate central venous pressure. · PA: Moderate pulmonary hypertension. Pulmonary arterial systolic   pressure is 61 mmHg. Signed by: Minh Castro MD        Imaging:  [x]I have personally reviewed the patients radiographs    CXR Results  (Last 48 hours)               05/01/21 0910  XR CHEST PORT Final result    Impression:      Stable exam. Stable bibasilar lung opacities. Narrative:  ONE VIEW CHEST RADIOGRAPH        INDICATION: Hemoptysis. Alveolar hemorrhage versus aspiration of blood. COMPARISON: 4/27/2021. FINDINGS:       Feeding tube extends below the diaphragm, tip not included on the exam.   Tracheostomy. Stable cardiomediastinal silhouette. Stable patchy bibasilar lung   opacities. No pneumothorax. Evaluation of the osseous structures demonstrates no   focal abnormality. CT Results (most recent):  Results from Hospital Encounter encounter on 04/04/21   CTA CHEST W OR W WO CONT    Narrative CTA CHEST PULMONARY ANGIOGRAM    HISTORY/INDICATION:  Shortness of breath, clinical concern for pulmonary  embolism, history of cardiac arrest 4/4/2021    COMPARISON:  No prior CTA chest. Reference chest radiograph 4/13/2021. TECHNIQUE:  Helical multidetector array volumetric acquisition of the chest is  performed following intravenous contrast administration of 72cc Isovue-370, per  pulmonary angiogram protocol. These images are reviewed and 2.5 mm and 5 mm  images along with coronal and sagittal 3D reconstruction maximum intensity  projection (MIP) images. Dose reduction techniques:  Automated exposure control,  mAs and/or kVp reductions based on patient size, and iterative reconstruction. CTA SPECIFIC FINDINGS:  Pulmonary arteries: Central contrast filling defect in the left lower lobe,  involving lobar, segmental, and segmental branches. Small contrast filling  defect in a segmental branch in the right upper lobe.     Aorta: No aneurysm. CHEST FINDINGS:   Thyroid:  No focal lesion. Mediastinum: Heart is mildly enlarged. Mild burden of coronary artery  atherosclerotic calcifications. No pericardial effusion. Reflux of trace  contrast into the IVC. No right ventricular dilatation or bowing of the  intraventricular septum. Pleural space: Trace bilateral pleural effusions. No pneumothorax. Lungs: There is dense multifocal consolidations bilaterally and in the dependent  lower lobes. There is suggestion of focal hypoattenuation within the  consolidation in the left lower lobe. Endotracheal tube in appropriate position,  terminating 3.7 cm above the gabriella on the  view. Included Abdomen: Visualized solid organs of the upper abdomen are normal.  Enteric tube tip terminates within the body of the stomach. Skeleton: No suspicious lytic or blastic lesions. No fractures. Soft tissues: Normal.    Lymph Nodes: Increased number of mildly prominent mediastinal and bilateral  hilar nodes, including a 1.0 x 1.8 cm AP window node and a 1.1 x 2.1 cm right  paratracheal node. Impression 1. Moderate burden of acute pulmonary embolism in the left lower lobe. Additional small acute segmental pulmonary embolism in the right upper lobe. 2. There is dense multifocal consolidations bilaterally, as well as in the  dependent lower lobes, favoring a combination of multifocal pneumonia, edema,  and dependent atelectasis. -There is suggestion of focal hypoattenuation within the consolidation in the  left lower lobe, which may be infectious in etiology versus sequela of pulmonary  infarct given #1.    3. Mildly prominent mediastinal and bilateral hilar nodes, likely  benign/reactive. 4. Mild cardiomegaly. Reflux of trace contrast into the IVC, which can be seen  in the setting of right heart dysfunction. 5. Trace bilateral pleural effusions. 6. Endotracheal and enteric tubes noted in appropriate position.     Discussed findings #1-2 with Netta Sy PA-C on 4/13/2021 at 7:19 PM.       Margarette Radford MD  05/02/21  Pulmonary, Critical Care Medicine  [facility] Attending Attestation (For Attendings USE Only)...

## 2024-03-17 LAB
-  AZTREONAM: SIGNIFICANT CHANGE UP
-  CEFEPIME: SIGNIFICANT CHANGE UP
-  CIPROFLOXACIN: SIGNIFICANT CHANGE UP
-  LEVOFLOXACIN: SIGNIFICANT CHANGE UP
-  MEROPENEM: SIGNIFICANT CHANGE UP
-  PIPERACILLIN/TAZOBACTAM: SIGNIFICANT CHANGE UP
-  TOBRAMYCIN: SIGNIFICANT CHANGE UP
CULTURE RESULTS: ABNORMAL
METHOD TYPE: SIGNIFICANT CHANGE UP
ORGANISM # SPEC MICROSCOPIC CNT: ABNORMAL
ORGANISM # SPEC MICROSCOPIC CNT: SIGNIFICANT CHANGE UP
SPECIMEN SOURCE: SIGNIFICANT CHANGE UP

## 2024-03-25 VITALS
HEART RATE: 120 BPM | WEIGHT: 167.99 LBS | HEIGHT: 67 IN | SYSTOLIC BLOOD PRESSURE: 117 MMHG | DIASTOLIC BLOOD PRESSURE: 78 MMHG | OXYGEN SATURATION: 98 % | RESPIRATION RATE: 16 BRPM | TEMPERATURE: 98 F

## 2024-03-25 LAB
ALBUMIN SERPL ELPH-MCNC: 3.9 G/DL — SIGNIFICANT CHANGE UP (ref 3.3–5)
ALP SERPL-CCNC: 90 U/L — SIGNIFICANT CHANGE UP (ref 40–120)
ALT FLD-CCNC: 8 U/L — LOW (ref 10–45)
ANION GAP SERPL CALC-SCNC: 9 MMOL/L — SIGNIFICANT CHANGE UP (ref 5–17)
ANISOCYTOSIS BLD QL: SLIGHT — SIGNIFICANT CHANGE UP
APPEARANCE UR: ABNORMAL
APTT BLD: 45.8 SEC — HIGH (ref 24.5–35.6)
AST SERPL-CCNC: 13 U/L — SIGNIFICANT CHANGE UP (ref 10–40)
BACTERIA # UR AUTO: ABNORMAL /HPF
BASOPHILS # BLD AUTO: 0.06 K/UL — SIGNIFICANT CHANGE UP (ref 0–0.2)
BASOPHILS NFR BLD AUTO: 0.9 % — SIGNIFICANT CHANGE UP (ref 0–2)
BILIRUB SERPL-MCNC: 0.3 MG/DL — SIGNIFICANT CHANGE UP (ref 0.2–1.2)
BILIRUB UR-MCNC: NEGATIVE — SIGNIFICANT CHANGE UP
BLD GP AB SCN SERPL QL: NEGATIVE — SIGNIFICANT CHANGE UP
BUN SERPL-MCNC: 11 MG/DL — SIGNIFICANT CHANGE UP (ref 7–23)
BURR CELLS BLD QL SMEAR: PRESENT — SIGNIFICANT CHANGE UP
CALCIUM SERPL-MCNC: 10 MG/DL — SIGNIFICANT CHANGE UP (ref 8.4–10.5)
CHLORIDE SERPL-SCNC: 104 MMOL/L — SIGNIFICANT CHANGE UP (ref 96–108)
CO2 SERPL-SCNC: 25 MMOL/L — SIGNIFICANT CHANGE UP (ref 22–31)
COD CRY URNS QL: PRESENT
COLOR SPEC: SIGNIFICANT CHANGE UP
CREAT SERPL-MCNC: 0.49 MG/DL — LOW (ref 0.5–1.3)
DIFF PNL FLD: ABNORMAL
EGFR: 102 ML/MIN/1.73M2 — SIGNIFICANT CHANGE UP
EOSINOPHIL # BLD AUTO: 0.37 K/UL — SIGNIFICANT CHANGE UP (ref 0–0.5)
EOSINOPHIL NFR BLD AUTO: 5.3 % — SIGNIFICANT CHANGE UP (ref 0–6)
GLUCOSE SERPL-MCNC: 113 MG/DL — HIGH (ref 70–99)
GLUCOSE UR QL: NEGATIVE MG/DL — SIGNIFICANT CHANGE UP
HCT VFR BLD CALC: 37.7 % — SIGNIFICANT CHANGE UP (ref 34.5–45)
HGB BLD-MCNC: 12 G/DL — SIGNIFICANT CHANGE UP (ref 11.5–15.5)
HYPOCHROMIA BLD QL: SLIGHT — SIGNIFICANT CHANGE UP
INR BLD: 1.17 — SIGNIFICANT CHANGE UP (ref 0.85–1.18)
KETONES UR-MCNC: ABNORMAL MG/DL
LEUKOCYTE ESTERASE UR-ACNC: ABNORMAL
LYMPHOCYTES # BLD AUTO: 2.06 K/UL — SIGNIFICANT CHANGE UP (ref 1–3.3)
LYMPHOCYTES # BLD AUTO: 29.8 % — SIGNIFICANT CHANGE UP (ref 13–44)
MACROCYTES BLD QL: SLIGHT — SIGNIFICANT CHANGE UP
MANUAL SMEAR VERIFICATION: SIGNIFICANT CHANGE UP
MCHC RBC-ENTMCNC: 24.8 PG — LOW (ref 27–34)
MCHC RBC-ENTMCNC: 31.8 GM/DL — LOW (ref 32–36)
MCV RBC AUTO: 78.1 FL — LOW (ref 80–100)
METAMYELOCYTES # FLD: 0.9 % — HIGH (ref 0–0)
MICROCYTES BLD QL: SLIGHT — SIGNIFICANT CHANGE UP
MONOCYTES # BLD AUTO: 0.66 K/UL — SIGNIFICANT CHANGE UP (ref 0–0.9)
MONOCYTES NFR BLD AUTO: 9.6 % — SIGNIFICANT CHANGE UP (ref 2–14)
NEUTROPHILS # BLD AUTO: 3.52 K/UL — SIGNIFICANT CHANGE UP (ref 1.8–7.4)
NEUTROPHILS NFR BLD AUTO: 50.9 % — SIGNIFICANT CHANGE UP (ref 43–77)
NITRITE UR-MCNC: POSITIVE
OVALOCYTES BLD QL SMEAR: SLIGHT — SIGNIFICANT CHANGE UP
PH UR: 6 — SIGNIFICANT CHANGE UP (ref 5–8)
PLAT MORPH BLD: ABNORMAL
PLATELET # BLD AUTO: 378 K/UL — SIGNIFICANT CHANGE UP (ref 150–400)
POIKILOCYTOSIS BLD QL AUTO: SIGNIFICANT CHANGE UP
POLYCHROMASIA BLD QL SMEAR: SIGNIFICANT CHANGE UP
POTASSIUM SERPL-MCNC: 4 MMOL/L — SIGNIFICANT CHANGE UP (ref 3.5–5.3)
POTASSIUM SERPL-SCNC: 4 MMOL/L — SIGNIFICANT CHANGE UP (ref 3.5–5.3)
PROT SERPL-MCNC: 7.2 G/DL — SIGNIFICANT CHANGE UP (ref 6–8.3)
PROT UR-MCNC: 100 MG/DL
PROTHROM AB SERPL-ACNC: 13.3 SEC — HIGH (ref 9.5–13)
RBC # BLD: 4.83 M/UL — SIGNIFICANT CHANGE UP (ref 3.8–5.2)
RBC # FLD: 21 % — HIGH (ref 10.3–14.5)
RBC BLD AUTO: ABNORMAL
RBC CASTS # UR COMP ASSIST: 276 /HPF — HIGH (ref 0–4)
RH IG SCN BLD-IMP: POSITIVE — SIGNIFICANT CHANGE UP
SCHISTOCYTES BLD QL AUTO: SIGNIFICANT CHANGE UP
SODIUM SERPL-SCNC: 138 MMOL/L — SIGNIFICANT CHANGE UP (ref 135–145)
SP GR SPEC: 1.03 — SIGNIFICANT CHANGE UP (ref 1–1.03)
SQUAMOUS # UR AUTO: 13 /HPF — HIGH (ref 0–5)
UROBILINOGEN FLD QL: 1 MG/DL — SIGNIFICANT CHANGE UP (ref 0.2–1)
VARIANT LYMPHS # BLD: 2.6 % — SIGNIFICANT CHANGE UP (ref 0–6)
WBC # BLD: 6.91 K/UL — SIGNIFICANT CHANGE UP (ref 3.8–10.5)
WBC # FLD AUTO: 6.91 K/UL — SIGNIFICANT CHANGE UP (ref 3.8–10.5)
WBC UR QL: 646 /HPF — HIGH (ref 0–5)
YEAST-LIKE CELLS: PRESENT

## 2024-03-25 PROCEDURE — 70450 CT HEAD/BRAIN W/O DYE: CPT | Mod: 26,MC,XU

## 2024-03-25 PROCEDURE — 70496 CT ANGIOGRAPHY HEAD: CPT | Mod: 26,MC

## 2024-03-25 PROCEDURE — 99285 EMERGENCY DEPT VISIT HI MDM: CPT

## 2024-03-25 PROCEDURE — 74177 CT ABD & PELVIS W/CONTRAST: CPT | Mod: 26,MC

## 2024-03-25 PROCEDURE — 70498 CT ANGIOGRAPHY NECK: CPT | Mod: 26,MC

## 2024-03-25 RX ORDER — SODIUM CHLORIDE 9 MG/ML
1000 INJECTION INTRAMUSCULAR; INTRAVENOUS; SUBCUTANEOUS ONCE
Refills: 0 | Status: COMPLETED | OUTPATIENT
Start: 2024-03-25 | End: 2024-03-25

## 2024-03-25 RX ORDER — DIPHENHYDRAMINE HCL 50 MG
25 CAPSULE ORAL ONCE
Refills: 0 | Status: COMPLETED | OUTPATIENT
Start: 2024-03-25 | End: 2024-03-25

## 2024-03-25 RX ORDER — CEFTRIAXONE 500 MG/1
1000 INJECTION, POWDER, FOR SOLUTION INTRAMUSCULAR; INTRAVENOUS ONCE
Refills: 0 | Status: COMPLETED | OUTPATIENT
Start: 2024-03-25 | End: 2024-03-25

## 2024-03-25 RX ORDER — MORPHINE SULFATE 50 MG/1
6 CAPSULE, EXTENDED RELEASE ORAL ONCE
Refills: 0 | Status: DISCONTINUED | OUTPATIENT
Start: 2024-03-25 | End: 2024-03-25

## 2024-03-25 RX ORDER — MORPHINE SULFATE 50 MG/1
4 CAPSULE, EXTENDED RELEASE ORAL ONCE
Refills: 0 | Status: DISCONTINUED | OUTPATIENT
Start: 2024-03-25 | End: 2024-03-25

## 2024-03-25 RX ADMIN — MORPHINE SULFATE 6 MILLIGRAM(S): 50 CAPSULE, EXTENDED RELEASE ORAL at 21:45

## 2024-03-25 RX ADMIN — CEFTRIAXONE 100 MILLIGRAM(S): 500 INJECTION, POWDER, FOR SOLUTION INTRAMUSCULAR; INTRAVENOUS at 22:41

## 2024-03-25 RX ADMIN — SODIUM CHLORIDE 1000 MILLILITER(S): 9 INJECTION INTRAMUSCULAR; INTRAVENOUS; SUBCUTANEOUS at 21:42

## 2024-03-25 RX ADMIN — Medication 25 MILLIGRAM(S): at 22:40

## 2024-03-25 RX ADMIN — MORPHINE SULFATE 4 MILLIGRAM(S): 50 CAPSULE, EXTENDED RELEASE ORAL at 22:41

## 2024-03-25 RX ADMIN — Medication 25 MILLIGRAM(S): at 21:43

## 2024-03-25 NOTE — CONSULT NOTE ADULT - ASSESSMENT
68y Female with PMHx of bilateral lower extremity paralysis, HTN, HLD, CVA x 3, emphysema, neurogenic bladder s/p chronic jiménez, chronic neck/back pain s/p multiple spinal fusions s/p intrathecal morphine pump, multiple UTI's, presenting to [facility] ED with chief complaint of abdominal pain, primarily surrounding her intrathecal morphine pump. Patient is a poor historian but reports that she has been to the ED multiple times for similar symptoms. She additionally reports that 3 days ago, she felt lightheaded. Earlier today, she states "she tried to tell my brain to move my toes, but then I felt numbness travel up my leg, my right hip was in pain, and then my right arm felt numb". She points to her middle finger on her right hand and reports she felt numb there. On exam, patient is paralysed in her bilateral lower extremities. She has some tremors noted, greater on her right arm with dysmetria during finger to nose (reports residual right sided deficits from her prior strokes). Of note, patient is tachycardic to the 130's and grimacing with pain in her abdomen. She states she was sweating earlier which is uncommon for her. Currently awaiting NCHCT, CTA head and neck imaging.     - will follow up above imaging  - low suspicion for central etiology of numbness on right side given sparing of face  - recommend patient continue home antiplatelet/AC regimen  - may warrant imaging of her spine to rule out spinal cause if patient is admitted    Case discussed with Dr. Bennett 68y Female with PMHx of bilateral lower extremity paralysis, HTN, HLD, CVA x 3, emphysema, neurogenic bladder s/p chronic jiménez, chronic neck/back pain s/p multiple spinal fusions s/p intrathecal morphine pump, multiple UTI's, presenting to [facility] ED with chief complaint of abdominal pain, primarily surrounding her intrathecal morphine pump. Patient is a poor historian but reports that she has been to the ED multiple times for similar symptoms. She additionally reports that 3 days ago, she felt lightheaded. Earlier today, she states "she tried to tell my brain to move my toes, but then I felt numbness travel up my leg, my right hip was in pain, and then my right arm felt numb". She points to her middle finger on her right hand and reports she felt numb there. On exam, patient is paralysed in her bilateral lower extremities. She has some tremors noted, greater on her right arm with dysmetria during finger to nose (reports residual right sided deficits from her prior strokes). Of note, patient is tachycardic to the 130's and grimacing with pain in her abdomen. She states she was sweating earlier which is uncommon for her. Currently awaiting NCHCT, CTA head and neck imaging.     - will follow up above imaging  - low suspicion for central etiology of numbness on right side given sparing of face  - recommend patient continue home antiplatelet/AC regimen  - may warrant imaging of her spine to rule out spinal cause if patient is admitted and vEEG to rule out possible seizure contributing to right sided sensation changes    Case discussed with Dr. Bennett 68y Female with PMHx of bilateral lower extremity paralysis, HTN, HLD, CVA x 3, emphysema, neurogenic bladder s/p chronic jiménez, chronic neck/back pain s/p multiple spinal fusions s/p intrathecal morphine pump, multiple UTI's, presenting to [facility] ED with chief complaint of abdominal pain, primarily surrounding her intrathecal morphine pump. Patient is a poor historian but reports that she has been to the ED multiple times for similar symptoms. She additionally reports that 3 days ago, she felt lightheaded. Earlier today, she states "she tried to tell my brain to move my toes, but then I felt numbness travel up my leg, my right hip was in pain, and then my right arm felt numb". She points to her middle finger on her right hand and reports she felt numb there. On exam, patient is paralysed in her bilateral lower extremities. She has some tremors noted, greater on her right arm with dysmetria during finger to nose (reports residual right sided deficits from her prior strokes). Of note, patient is tachycardic to the 130's and grimacing with pain in her abdomen. She states she was sweating earlier which is uncommon for her. Currently awaiting NCHCT, CTA head and neck imaging.     - will follow up above imaging  - recommend patient continue home antiplatelet/AC regimen  - may warrant imaging of her spine to rule out spinal cause if patient is admitted and vEEG to rule out possible seizure contributing to right sided sensation changes    Case discussed with Dr. Bennett 68y Female with PMHx of bilateral lower extremity paralysis, HTN, HLD, CVA x 3, emphysema, neurogenic bladder s/p chronic jiménez, chronic neck/back pain s/p multiple spinal fusions s/p intrathecal morphine pump, multiple UTI's, presenting to [facility] ED with chief complaint of abdominal pain, primarily surrounding her intrathecal morphine pump. Patient is a poor historian but reports that she has been to the ED multiple times for similar symptoms. She additionally reports that 3 days ago, she felt lightheaded. Earlier today, she states "she tried to tell my brain to move my toes, but then I felt numbness travel up my leg, my right hip was in pain, and then my right arm felt numb". She points to her middle finger on her right hand and reports she felt numb there. On exam, patient is paralysed in her bilateral lower extremities. She has some tremors noted, greater on her right arm with dysmetria during finger to nose (reports residual right sided deficits from her prior strokes). Of note, patient is tachycardic to the 130's and grimacing with pain in her abdomen. She states she was sweating earlier which is uncommon for her. Currently awaiting NCHCT, CTA head and neck imaging.     - will follow up above imaging  - recommend patient continue home antiplatelet/AC regimen    Case discussed with Dr. Bennett 68y Female with PMHx of bilateral lower extremity paralysis, HTN, HLD, CVA x 3 (MRI brain from 1/23 demonstrated chronic lacunar infarcts of the left corona radiata, left putamen and left cerebellar hemisphere), PE in 2023 (was prescribed Eliquis for 3-6 months), emphysema, neurogenic bladder s/p chronic jiménez, chronic neck/back pain s/p multiple spinal fusions s/p intrathecal morphine pump, multiple UTI's, multiple ED visits for abdominal pain (noted to have bleeding with digital rectal exam on prior ED visits), presenting to [facility] ED with chief complaint of abdominal pain, primarily surrounding her intrathecal morphine pump. Patient is a poor historian but reports that she has been to the ED multiple times for similar symptoms. She additionally reports that 3 days ago, she felt lightheaded. Earlier today, she states "she tried to tell my brain to move my toes, but then I felt numbness travel up my leg, my right hip was in pain, and then my right arm felt numb". She points to her middle finger on her right hand and reports she felt numb there. On exam, patient is paralysed in her bilateral lower extremities. She has some tremors noted, greater on her right arm with dysmetria during finger to nose (reports residual right sided deficits from her prior strokes). Of note, patient is tachycardic to the 130's and grimacing with pain in her abdomen. She states she was sweating earlier which is uncommon for her. Currently awaiting NCHCT, CTA head and neck imaging.     - will follow up above imaging  - recommend patient continue home antiplatelet/AC regimen    Case discussed with Dr. Bennett 68y Female with PMHx of bilateral lower extremity paralysis, HTN, HLD, CVA x 3 (MRI brain from 1/23 demonstrated chronic lacunar infarcts of the left corona radiata, left putamen and left cerebellar hemisphere), PE in 2023 (was prescribed Eliquis for 3-6 months), emphysema, neurogenic bladder s/p chronic jiménez, chronic neck/back pain s/p multiple spinal fusions s/p intrathecal morphine pump, multiple UTI's, multiple ED visits for abdominal pain (noted to have bleeding with digital rectal exam on prior ED visits), presenting to [facility] ED with chief complaint of abdominal pain, primarily surrounding her intrathecal morphine pump. Patient is a poor historian but reports that she has been to the ED multiple times for similar symptoms. She additionally reports that 3 days ago, she felt lightheaded. Earlier today, she states "she tried to tell my brain to move my toes, but then I felt numbness travel up my leg, my right hip was in pain, and then my right arm felt numb". She points to her middle finger on her right hand and reports she felt numb there. On exam, patient is paralysed in her bilateral lower extremities. She has some tremors noted, greater on her right arm with dysmetria during finger to nose (reports residual right sided deficits from her prior strokes). Of note, patient is tachycardic to the 130's and grimacing with pain in her abdomen. She states she was sweating earlier which is uncommon for her. Currently awaiting NCHCT, CTA head and neck imaging.     - NCHCT without acute infarction, hemorrhage or mass effect. Again noted chronic left corona radiata, left putamen, and left cerebellar infarcts.  - pending CTA head and neck report  - recommend patient continue home antiplatelet/AC regimen    Case discussed with Dr. Bennett 68y Female with PMHx of bilateral lower extremity paralysis, HTN, HLD, CVA x 3 (MRI brain from 1/23 demonstrated chronic lacunar infarcts of the left corona radiata, left putamen and left cerebellar hemisphere), PE in 2023 (was prescribed Eliquis for 3-6 months), emphysema, neurogenic bladder s/p chronic jiménez, chronic neck/back pain s/p multiple spinal fusions s/p intrathecal morphine pump, multiple UTI's, multiple ED visits for abdominal pain (noted to have bleeding with digital rectal exam on prior ED visits), presenting to [facility] ED with chief complaint of abdominal pain, primarily surrounding her intrathecal morphine pump. Patient is a poor historian but reports that she has been to the ED multiple times for similar symptoms. She additionally reports that 3 days ago, she felt lightheaded. Earlier today, she states "she tried to tell my brain to move my toes, but then I felt numbness travel up my leg, my right hip was in pain, and then my right arm felt numb". She points to her middle finger on her right hand and reports she felt numb there. On exam, patient is paralysed in her bilateral lower extremities. She has some tremors noted, greater on her right arm with dysmetria during finger to nose (reports residual right sided deficits from her prior strokes). Of note, patient is tachycardic to the 130's and grimacing with pain in her abdomen. She states she was sweating earlier which is uncommon for her.     - NCHCT without acute infarction, hemorrhage or mass effect. Again noted chronic left corona radiata, left putamen, and left cerebellar infarcts.  - CTA head and neck without steno-occlusive disease  - recommend patient continue home antiplatelet/AC regimen  - no further inpatient stroke work up indicated at this time - please have patient call our office at [phone] to set up an outpatient follow up appt given stroke history  - if patient admitted, consider spine imaging for paresthesias as sensation changed spared the face and patient with extensive spinal surgeries    Case discussed with Dr. Bennett 68y Female with PMHx of bilateral lower extremity paralysis, HTN, HLD, CVA x 3 (MRI brain from 1/23 demonstrated chronic lacunar infarcts of the left corona radiata, left putamen and left cerebellar hemisphere), PE in 2023 (was prescribed Eliquis for 3-6 months), emphysema, neurogenic bladder s/p chronic jiménez, chronic neck/back pain s/p multiple spinal fusions s/p intrathecal morphine pump, multiple UTI's, multiple ED visits for abdominal pain (noted to have bleeding with digital rectal exam on prior ED visits), presenting to [facility] ED with chief complaint of abdominal pain, primarily surrounding her intrathecal morphine pump. Patient is a poor historian but reports that she has been to the ED multiple times for similar symptoms. She additionally reports that 3 days ago, she felt lightheaded. Earlier today, she states "she tried to tell my brain to move my toes, but then I felt numbness travel up my leg, my right hip was in pain, and then my right arm felt numb". She points to her middle finger on her right hand and reports she felt numb there. On exam, patient is paralysed in her bilateral lower extremities. She has some tremors noted, greater on her right arm with dysmetria during finger to nose (reports residual right sided deficits from her prior strokes). Of note, patient is tachycardic to the 130's and grimacing with pain in her abdomen. She states she was sweating earlier which is uncommon for her.     - NCHCT without acute infarction, hemorrhage or mass effect. Again noted chronic left corona radiata, left putamen, and left cerebellar infarcts.  - CTA head and neck without steno-occlusive disease  - recommend patient continue home antiplatelet/AC regimen  - no further inpatient stroke work up indicated at this time - please have patient call our office at [phone] to set up an outpatient follow up appt given stroke history  - if patient admitted, consider spine imaging for paresthesias as sensation changes spared the face and patient with extensive spinal surgeries  - rest of care per ED    Case discussed with Dr. Bennett 68y Female with PMHx of bilateral lower extremity paralysis, HTN, HLD, CVA x 3 (MRI brain from 1/23 demonstrated chronic lacunar infarcts of the left corona radiata, left putamen and left cerebellar hemisphere), PE in 2023 (was prescribed Eliquis for 3-6 months), emphysema, neurogenic bladder s/p chronic jiménez, chronic neck/back pain s/p multiple spinal fusions s/p intrathecal morphine pump, multiple UTI's, multiple ED visits for abdominal pain (noted to have bleeding with digital rectal exam on prior ED visits), presenting to [facility] ED with chief complaint of abdominal pain, primarily surrounding her intrathecal morphine pump. Patient is a poor historian but reports that she has been to the ED multiple times for similar symptoms. She additionally reports that 3 days ago, she felt lightheaded. Earlier today, she states "she tried to tell my brain to move my toes, but then I felt numbness travel up my leg, my right hip was in pain, and then my right arm felt numb". She points to her middle finger on her right hand and reports she felt numb there. On exam, patient is paralysed in her bilateral lower extremities. She has some tremors noted, greater on her right arm with dysmetria during finger to nose (reports residual right sided deficits from her prior strokes). Of note, patient is tachycardic to the 130's and grimacing with pain in her abdomen. She states she was sweating earlier which is uncommon for her.     - NCHCT without acute infarction, hemorrhage or mass effect. Again noted chronic left corona radiata, left putamen, and left cerebellar infarcts.  - CTA head and neck without steno-occlusive disease  - recommend patient continue home antiplatelet/AC regimen  - please have patient call our office at [phone] to set up an outpatient follow up appt given stroke history  - if patient admitted, consider spine imaging for paresthesias as sensation changes spared the face and patient with extensive spinal surgeries  - rest of care per ED    Case discussed with Dr. Bennett 68y Female with PMHx of bilateral lower extremity paralysis, HTN, HLD, CVA x 3 (MRI brain from 1/23 demonstrated chronic lacunar infarcts of the left corona radiata, left putamen and left cerebellar hemisphere), PE in 2023 (was prescribed Eliquis for 3-6 months), emphysema, neurogenic bladder s/p chronic jiménez, chronic neck/back pain s/p multiple spinal fusions (laminectomies and fusion of cervical, thoracic and lumbar spine initially done in 2009 and in 2019 and 2020 at [facility] by Dr. Trinidad, s/p T3-L1 revision of prior fusion in 2022 with Dr. Luz as well as elective C2-S2 instrumentation and fusion in 2023). s/p intrathecal morphine pump, multiple UTI's, multiple ED visits for abdominal pain (noted to have bleeding with digital rectal exam on prior ED visits), presenting to [facility] ED with chief complaint of abdominal pain, primarily surrounding her intrathecal morphine pump. Patient is a poor historian but reports that she has been to the ED multiple times for similar symptoms. She additionally reports that 3 days ago, she felt lightheaded. Earlier today, she states "she tried to tell my brain to move my toes, but then I felt numbness travel up my leg, my right hip was in pain, and then my right arm felt numb". She points to her middle finger on her right hand and reports she felt numb there. On exam, patient is paralysed in her bilateral lower extremities. She has some tremors noted, greater on her right arm with dysmetria during finger to nose (reports residual right sided deficits from her prior strokes).    - NCHCT without acute infarction, hemorrhage or mass effect. Again noted chronic left corona radiata, left putamen, and left cerebellar infarcts.  - CTA head and neck without steno-occlusive disease  - recommend patient continue home antiplatelet/AC regimen  - please have patient call our office at [phone] to set up an outpatient follow up appt given stroke history  - if patient admitted, consider spine imaging for paresthesias as sensation changes spared the face and patient with extensive spinal surgeries  - rest of care per ED    Case discussed with Dr. Bennett

## 2024-03-25 NOTE — ED ADULT NURSE NOTE - NSFALLRISKINTERV_ED_ALL_ED

## 2024-03-25 NOTE — ED ADULT NURSE NOTE - OBJECTIVE STATEMENT
Patient presents to the ED complaining of abdominal pain, patient reports an internal morphine pump which has no medication in it. Patient also reports weakness to her upper arms and states that her right arm feels more weaker. Patient presents to the ED complaining of abdominal pain, patient reports an internal morphine pump which has no medication in it. Patient also reports weakness to her upper arms and states that her right arm feels more weaker. History of CVA, Patient with a jiménez catheter in place from nursing home. Patient upgraded to [facility].

## 2024-03-25 NOTE — CONSULT NOTE ADULT - SUBJECTIVE AND OBJECTIVE BOX
**STROKE CONSULT NOTE**    HPI: 68y Female with PMHx of     T(C): 36.7 (24 @ 20:48), Max: 36.7 (24 @ 20:48)  HR: 120 (24 @ 20:48) (120 - 120)  BP: 117/78 (24 @ 20:48) (117/78 - 117/78)  RR: 16 (24 @ 20:48) (16 - 16)  SpO2: 98% (24 @ 20:48) (98% - 98%)    PAST MEDICAL & SURGICAL HISTORY:  HTN (hypertension)      SS (spinal stenosis)      High cholesterol      Stroke  x3      H/O carpal tunnel syndrome  Bilateral      Chronic GERD      History of chronic constipation      Urinary incontinence  self cath as needed      Pre-diabetes      Anxiety and depression      Eczema      H/O kyphosis      Pancreas cyst      Scoliosis      Wheelchair dependent      Cervical pseudoarthrosis  and lumbar spine      Cervical spondylosis      Chronic headaches      Degenerative joint disease  left shoulder      Lumbosacral spondylosis      COPD (chronic obstructive pulmonary disease)      H/O Spinal surgery  lumbar x 30      H/O breast surgery  left breast implant       S/P cervical discectomy  x4      H/O total shoulder replacement, right          FAMILY HISTORY:      SOCIAL HISTORY:   Patient lives with *** at ***.   Smoking status:  Drinking:  Drug Use:     ROS: ***  Constitutional: No fever, weight loss or fatigue  Eyes: No eye pain, visual disturbances, or discharge  ENMT:  No difficulty hearing, tinnitus; No sinus or throat pain  Neck: No pain or stiffness  Respiratory: No cough, wheezing, chills or hemoptysis  Cardiovascular: No chest pain, palpitations, shortness of breath, or leg swelling  Gastrointestinal: No abdominal pain. No nausea, vomiting or hematemesis; No diarrhea or constipation. Nohematochezia.  Genitourinary: No dysuria, frequency, hematuria or incontinence  Neurological: As per HPI  Skin: No itching, burning, rashes or lesions   Endocrine: No heat or cold intolerance; No hair loss  Musculoskeletal: No joint pain or swelling; No muscle, back or extremity pain  Heme/Lymph: No easy bruising or bleeding gums    MEDICATIONS  (STANDING):    MEDICATIONS  (PRN):    Allergies    Crab (Pruritus)  oxycodone (Unknown)  morphine (Unknown)    Intolerances      Vital Signs Last 24 Hrs  T(C): 36.7 (25 Mar 2024 20:48), Max: 36.7 (25 Mar 2024 20:48)  T(F): 98 (25 Mar 2024 20:48), Max: 98 (25 Mar 2024 20:48)  HR: 120 (25 Mar 2024 20:48) (120 - 120)  BP: 117/78 (25 Mar 2024 20:48) (117/78 - 117/78)  BP(mean): --  RR: 16 (25 Mar 2024 20:48) (16 - 16)  SpO2: 98% (25 Mar 2024 20:48) (98% - 98%)    Parameters below as of 25 Mar 2024 20:48  Patient On (Oxygen Delivery Method): room air        Physical exam:  Constitutional: No acute distress, conversant  Eyes: Anicteric sclerae, moist conjunctivae, see below for CNs  Neck: trachea midline, FROM, supple, no thyromegaly or lymphadenopathy  Cardiovascular: Regular rate and rhythm, no murmurs, rubs, or gallops. No carotid bruits.   Pulmonary: Anterior breath sounds clear bilaterally, no crackles or wheezing. No use of accessory muscles  GI: Abdomen soft, non-distended, non-tender  Extremities: Radial and DP pulses +2, no edema    Neurologic:  -Mental status: Awake, alert, oriented to person, place, and time. Speech is fluent with intact naming, repetition, and comprehension, no dysarthria. Recent and remote memory intact. Follows commands. Attention/concentration intact. Fund of knowledge appropriate.  -Cranial nerves:   II: Visual fields are full to confrontation.  III, IV, VI: Extraocular movements are intact without nystagmus. Pupils equally round and reactive to light  V:  Facial sensation V1-V3 equal and intact   VII: Face is symmetric with normal eye closure and smile  VIII: Hearing is bilaterally intact to finger rub  IX, X: Uvula is midline and soft palate rises symmetrically  XI: Head turning and shoulder shrug are intact.  XII: Tongue protrudes midline  Motor: Normal bulk and tone. No pronator drift. Strength bilateral upper extremity 5/5, bilateral lower extremities 5/5.  Rapid alternating movements intact and symmetric  Sensation: Intact to light touch bilaterally. No neglect or extinction on double simultaneous testing.  Coordination: No dysmetria on finger-to-nose and heel-to-shin bilaterally  Reflexes: Downgoing toes bilaterally   Gait: Narrow gait and steady    NIHSS: **** ASPECT Score: ***** ICH Score: ****** (GCS)    Fingerstick Blood Glucose: CAPILLARY BLOOD GLUCOSE      POCT Blood Glucose.: 119 mg/dL (25 Mar 2024 20:50)    LABS:                        12.0   6.91  )-----------( 378      ( 25 Mar 2024 21:33 )             37.7         138  |  104  |  11  ----------------------------<  113<H>  4.0   |  25  |  0.49<L>    Ca    10.0      25 Mar 2024 21:33    TPro  7.2  /  Alb  3.9  /  TBili  0.3  /  DBili  x   /  AST  13  /  ALT  8<L>  /  AlkPhos  90      PT/INR - ( 25 Mar 2024 21:33 )   PT: 13.3 sec;   INR: 1.17          PTT - ( 25 Mar 2024 21:33 )  PTT:45.8 sec      Urinalysis Basic - ( 25 Mar 2024 22:02 )    Color: Dark Yellow / Appearance: Turbid / S.030 / pH: x  Gluc: x / Ketone: Trace mg/dL  / Bili: Negative / Urobili: 1.0 mg/dL   Blood: x / Protein: 100 mg/dL / Nitrite: Positive   Leuk Esterase: Moderate / RBC: x / WBC x   Sq Epi: x / Non Sq Epi: x / Bacteria: x        RADIOLOGY & ADDITIONAL STUDIES:      -----------------------------------------------------------------------------------------------------------------  IV-tPA (Y/N):    ***                              Bolus time:    Alteplase Dose Verification w/ RN:  Reason IV-tPA not given: ***    **STROKE CONSULT NOTE**    HPI: 68y Female with PMHx of bilateral lower extremity paralysis, HTN, HLD, CVA x 3, emphysema, neurogenic bladder s/p chronic jiménez, chronic neck/back pain s/p multiple spinal fusions s/p intrathecal morphine pump, multiple UTI's, presenting to [facility] ED with chief complaint of abdominal pain, primarily surrounding her intrathecal morphine pump. Patient is a poor historian but reports that she has been to the ED multiple times for similar symptoms. She additionally reports that 3 days ago, she felt lightheaded. Earlier today, she states "she tried to tell my brain to move my toes, but then I felt numbness travel up my leg, my right hip was in pain, and then my right arm felt numb". She points to her middle finger on her right hand and reports she felt numb there. On exam, patient is paralysed in her bilateral lower extremities. She has some tremors noted, greater on her right arm with dysmetria during finger to nose (reports residual right sided deficits from her prior strokes). Of note, patient is tachycardic to the 130's and grimacing with pain in her abdomen. She states she was sweating earlier which is uncommon for her. Currently awaiting NCHCT, CTA head and neck imaging.     T(C): 36.7 (03-25-24 @ 20:48), Max: 36.7 (03-25-24 @ 20:48)  HR: 120 (-25-24 @ 20:48) (120 - 120)  BP: 117/78 (-25-24 @ 20:48) (117/78 - 117/78)  RR: 16 (-25-24 @ 20:48) (16 - 16)  SpO2: 98% (-25-24 @ 20:48) (98% - 98%)    PAST MEDICAL & SURGICAL HISTORY:  HTN (hypertension)      SS (spinal stenosis)      High cholesterol      Stroke  x3      H/O carpal tunnel syndrome  Bilateral      Chronic GERD      History of chronic constipation      Urinary incontinence  self cath as needed      Pre-diabetes      Anxiety and depression      Eczema      H/O kyphosis      Pancreas cyst      Scoliosis      Wheelchair dependent      Cervical pseudoarthrosis  and lumbar spine      Cervical spondylosis      Chronic headaches      Degenerative joint disease  left shoulder      Lumbosacral spondylosis      COPD (chronic obstructive pulmonary disease)      H/O Spinal surgery  lumbar x 30      H/O breast surgery  left breast implant       S/P cervical discectomy  x4      H/O total shoulder replacement, right          FAMILY HISTORY:      SOCIAL HISTORY:   Patient lives in nursing home    ROS:   Constitutional: No fever, weight loss or fatigue  Eyes: No eye pain, visual disturbances, or discharge  ENMT:  No difficulty hearing, tinnitus; No sinus or throat pain  Neck: No pain or stiffness  Respiratory: No cough, wheezing, chills or hemoptysis  Cardiovascular: No chest pain, palpitations, shortness of breath, or leg swelling  Gastrointestinal: + abdominal pain. No nausea, vomiting or hematemesis; No diarrhea or constipation.   Genitourinary: No dysuria, frequency, hematuria or incontinence  Neurological: As per HPI  Skin: No itching, burning, rashes or lesions   Endocrine: No heat or cold intolerance; No hair loss  Musculoskeletal: + right hip pain  Heme/Lymph: No easy bruising or bleeding gums    MEDICATIONS  (STANDING):    MEDICATIONS  (PRN):    Allergies    Crab (Pruritus)  oxycodone (Unknown)  morphine (Unknown)    Intolerances      Vital Signs Last 24 Hrs  T(C): 36.7 (25 Mar 2024 20:48), Max: 36.7 (25 Mar 2024 20:48)  T(F): 98 (25 Mar 2024 20:48), Max: 98 (25 Mar 2024 20:48)  HR: 120 (25 Mar 2024 20:48) (120 - 120)  BP: 117/78 (25 Mar 2024 20:48) (117/78 - 117/78)  BP(mean): --  RR: 16 (25 Mar 2024 20:48) (16 - 16)  SpO2: 98% (25 Mar 2024 20:48) (98% - 98%)    Parameters below as of 25 Mar 2024 20:48  Patient On (Oxygen Delivery Method): room air        Physical exam:  Constitutional: No acute distress, conversant  Eyes: Anicteric sclerae, moist conjunctivae, see below for CNs  Neck: trachea midline  Cardiovascular: Tachycardic on monitor  Pulmonary: No use of accessory muscles  GI: Abdomen distended  Extremities: no edema    Neurologic:  -Mental status: Awake, alert, oriented to person, place, and time. Speech is fluent with intact naming, repetition, and comprehension, no dysarthria. Recent and remote memory intact. Follows commands.-Cranial nerves:   II: Visual fields are full to confrontation.  III, IV, VI: Extraocular movements are intact without nystagmus. Pupils equally round and reactive to light  V:  Facial sensation V1-V3 equal and intact   VII: Face is symmetric with normal eye closure and smile  Motor: Bilateral upper extremities 5/5, bilateral lower extermities 0/5  Sensation: Intact to light touch bilaterally. No neglect or extinction on double simultaneous testing.  Coordination: dysmetria with finger to nose of right upper extremity; unable to perform heel to shin 2/2 paralysis    NIHSS: 9    Fingerstick Blood Glucose: CAPILLARY BLOOD GLUCOSE      POCT Blood Glucose.: 119 mg/dL (25 Mar 2024 20:50)    LABS:                        12.0   6.91  )-----------( 378      ( 25 Mar 2024 21:33 )             37.7         138  |  104  |  11  ----------------------------<  113<H>  4.0   |  25  |  0.49<L>    Ca    10.0      25 Mar 2024 21:33    TPro  7.2  /  Alb  3.9  /  TBili  0.3  /  DBili  x   /  AST  13  /  ALT  8<L>  /  AlkPhos  90      PT/INR - ( 25 Mar 2024 21:33 )   PT: 13.3 sec;   INR: 1.17          PTT - ( 25 Mar 2024 21:33 )  PTT:45.8 sec      Urinalysis Basic - ( 25 Mar 2024 22:02 )    Color: Dark Yellow / Appearance: Turbid / S.030 / pH: x  Gluc: x / Ketone: Trace mg/dL  / Bili: Negative / Urobili: 1.0 mg/dL   Blood: x / Protein: 100 mg/dL / Nitrite: Positive   Leuk Esterase: Moderate / RBC: x / WBC x   Sq Epi: x / Non Sq Epi: x / Bacteria: x        RADIOLOGY & ADDITIONAL STUDIES:        **STROKE CONSULT NOTE**    HPI: 68y Female with PMHx of bilateral lower extremity paralysis, HTN, HLD, CVA x 3 (MRI brain from  demonstrated chronic lacunar infarcts of the left corona radiata, left putamen and left cerebellar hemisphere), emphysema, neurogenic bladder s/p chronic jiménez, chronic neck/back pain s/p multiple spinal fusions s/p intrathecal morphine pump, multiple UTI's, presenting to [facility] ED with chief complaint of abdominal pain, primarily surrounding her intrathecal morphine pump. Patient is a poor historian but reports that she has been to the ED multiple times for similar symptoms. She additionally reports that 3 days ago, she felt lightheaded. Earlier today, she states "she tried to tell my brain to move my toes, but then I felt numbness travel up my leg, my right hip was in pain, and then my right arm felt numb". She points to her middle finger on her right hand and reports she felt numb there. On exam, patient is paralysed in her bilateral lower extremities. She has some tremors noted, greater on her right arm with dysmetria during finger to nose (reports residual right sided deficits from her prior strokes). Of note, patient is tachycardic to the 130's and grimacing with pain in her abdomen. She states she was sweating earlier which is uncommon for her. Currently awaiting NCHCT, CTA head and neck imaging.     T(C): 36.7 (-25-24 @ 20:48), Max: 36.7 (03-25-24 @ 20:48)  HR: 120 (-25-24 @ 20:48) (120 - 120)  BP: 117/78 (-25-24 @ 20:48) (117/78 - 117/78)  RR: 16 (-25-24 @ 20:48) (16 - 16)  SpO2: 98% (25-24 @ 20:48) (98% - 98%)    PAST MEDICAL & SURGICAL HISTORY:  HTN (hypertension)      SS (spinal stenosis)      High cholesterol      Stroke  x3      H/O carpal tunnel syndrome  Bilateral      Chronic GERD      History of chronic constipation      Urinary incontinence  self cath as needed      Pre-diabetes      Anxiety and depression      Eczema      H/O kyphosis      Pancreas cyst      Scoliosis      Wheelchair dependent      Cervical pseudoarthrosis  and lumbar spine      Cervical spondylosis      Chronic headaches      Degenerative joint disease  left shoulder      Lumbosacral spondylosis      COPD (chronic obstructive pulmonary disease)      H/O Spinal surgery  lumbar x 30      H/O breast surgery  left breast implant 's      S/P cervical discectomy  x4      H/O total shoulder replacement, right          FAMILY HISTORY:      SOCIAL HISTORY:   Patient lives in nursing home    ROS:   Constitutional: No fever, weight loss or fatigue  Eyes: No eye pain, visual disturbances, or discharge  ENMT:  No difficulty hearing, tinnitus; No sinus or throat pain  Neck: No pain or stiffness  Respiratory: No cough, wheezing, chills or hemoptysis  Cardiovascular: No chest pain, palpitations, shortness of breath, or leg swelling  Gastrointestinal: + abdominal pain. No nausea, vomiting or hematemesis; No diarrhea or constipation.   Genitourinary: No dysuria, frequency, hematuria or incontinence  Neurological: As per HPI  Skin: No itching, burning, rashes or lesions   Endocrine: No heat or cold intolerance; No hair loss  Musculoskeletal: + right hip pain  Heme/Lymph: No easy bruising or bleeding gums    MEDICATIONS  (STANDING):    MEDICATIONS  (PRN):    Allergies    Crab (Pruritus)  oxycodone (Unknown)  morphine (Unknown)    Intolerances      Vital Signs Last 24 Hrs  T(C): 36.7 (25 Mar 2024 20:48), Max: 36.7 (25 Mar 2024 20:48)  T(F): 98 (25 Mar 2024 20:48), Max: 98 (25 Mar 2024 20:48)  HR: 120 (25 Mar 2024 20:48) (120 - 120)  BP: 117/78 (25 Mar 2024 20:48) (117/78 - 117/78)  BP(mean): --  RR: 16 (25 Mar 2024 20:48) (16 - 16)  SpO2: 98% (25 Mar 2024 20:48) (98% - 98%)    Parameters below as of 25 Mar 2024 20:48  Patient On (Oxygen Delivery Method): room air        Physical exam:  Constitutional: No acute distress, conversant  Eyes: Anicteric sclerae, moist conjunctivae, see below for CNs  Neck: trachea midline  Cardiovascular: Tachycardic on monitor  Pulmonary: No use of accessory muscles  GI: Abdomen distended  Extremities: no edema    Neurologic:  -Mental status: Awake, alert, oriented to person, place, and time. Speech is fluent with intact naming, repetition, and comprehension, no dysarthria. Recent and remote memory intact. Follows commands.-Cranial nerves:   II: Visual fields are full to confrontation.  III, IV, VI: Extraocular movements are intact without nystagmus. Pupils equally round and reactive to light  V:  Facial sensation V1-V3 equal and intact   VII: Face is symmetric with normal eye closure and smile  Motor: Bilateral upper extremities 5/5, bilateral lower extermities 0/5  Sensation: Intact to light touch bilaterally. No neglect or extinction on double simultaneous testing.  Coordination: dysmetria with finger to nose of right upper extremity; unable to perform heel to shin 2/2 paralysis    NIHSS: 9    Fingerstick Blood Glucose: CAPILLARY BLOOD GLUCOSE      POCT Blood Glucose.: 119 mg/dL (25 Mar 2024 20:50)    LABS:                        12.0   6.91  )-----------( 378      ( 25 Mar 2024 21:33 )             37.7         138  |  104  |  11  ----------------------------<  113<H>  4.0   |  25  |  0.49<L>    Ca    10.0      25 Mar 2024 21:33    TPro  7.2  /  Alb  3.9  /  TBili  0.3  /  DBili  x   /  AST  13  /  ALT  8<L>  /  AlkPhos  90      PT/INR - ( 25 Mar 2024 21:33 )   PT: 13.3 sec;   INR: 1.17          PTT - ( 25 Mar 2024 21:33 )  PTT:45.8 sec      Urinalysis Basic - ( 25 Mar 2024 22:02 )    Color: Dark Yellow / Appearance: Turbid / S.030 / pH: x  Gluc: x / Ketone: Trace mg/dL  / Bili: Negative / Urobili: 1.0 mg/dL   Blood: x / Protein: 100 mg/dL / Nitrite: Positive   Leuk Esterase: Moderate / RBC: x / WBC x   Sq Epi: x / Non Sq Epi: x / Bacteria: x        RADIOLOGY & ADDITIONAL STUDIES:        **STROKE CONSULT NOTE**    HPI: 68y Female with PMHx of bilateral lower extremity paralysis, HTN, HLD, CVA x 3 (MRI brain from  demonstrated chronic lacunar infarcts of the left corona radiata, left putamen and left cerebellar hemisphere), emphysema, neurogenic bladder s/p chronic jiménez, chronic neck/back pain s/p multiple spinal fusions s/p intrathecal morphine pump, multiple UTI's, multiple ED visits for abdominal pain (noted to have bleeding with digital rectal exam on prior ED visits), presenting to [facility] ED with chief complaint of abdominal pain, primarily surrounding her intrathecal morphine pump. Patient is a poor historian but reports that she has been to the ED multiple times for similar symptoms. She additionally reports that 3 days ago, she felt lightheaded. Earlier today, she states "she tried to tell my brain to move my toes, but then I felt numbness travel up my leg, my right hip was in pain, and then my right arm felt numb". She points to her middle finger on her right hand and reports she felt numb there. On exam, patient is paralysed in her bilateral lower extremities. She has some tremors noted, greater on her right arm with dysmetria during finger to nose (reports residual right sided deficits from her prior strokes). Of note, patient is tachycardic to the 130's and grimacing with pain in her abdomen. She states she was sweating earlier which is uncommon for her. Currently awaiting NCHCT, CTA head and neck imaging.     T(C): 36.7 (24 @ 20:48), Max: 36.7 (24 @ 20:48)  HR: 120 (24 @ 20:48) (120 - 120)  BP: 117/78 (-24 @ 20:48) (117/78 - 117/78)  RR: 16 (-24 @ 20:48) (16 - 16)  SpO2: 98% (24 @ 20:48) (98% - 98%)    PAST MEDICAL & SURGICAL HISTORY:  HTN (hypertension)      SS (spinal stenosis)      High cholesterol      Stroke  x3      H/O carpal tunnel syndrome  Bilateral      Chronic GERD      History of chronic constipation      Urinary incontinence  self cath as needed      Pre-diabetes      Anxiety and depression      Eczema      H/O kyphosis      Pancreas cyst      Scoliosis      Wheelchair dependent      Cervical pseudoarthrosis  and lumbar spine      Cervical spondylosis      Chronic headaches      Degenerative joint disease  left shoulder      Lumbosacral spondylosis      COPD (chronic obstructive pulmonary disease)      H/O Spinal surgery  lumbar x 30      H/O breast surgery  left breast implant       S/P cervical discectomy  x4      H/O total shoulder replacement, right          FAMILY HISTORY:      SOCIAL HISTORY:   Patient lives in nursing home    ROS:   Constitutional: No fever, weight loss or fatigue  Eyes: No eye pain, visual disturbances, or discharge  ENMT:  No difficulty hearing, tinnitus; No sinus or throat pain  Neck: No pain or stiffness  Respiratory: No cough, wheezing, chills or hemoptysis  Cardiovascular: No chest pain, palpitations, shortness of breath, or leg swelling  Gastrointestinal: + abdominal pain. No nausea, vomiting or hematemesis; No diarrhea or constipation.   Genitourinary: No dysuria, frequency, hematuria or incontinence  Neurological: As per HPI  Skin: No itching, burning, rashes or lesions   Endocrine: No heat or cold intolerance; No hair loss  Musculoskeletal: + right hip pain  Heme/Lymph: No easy bruising or bleeding gums    MEDICATIONS  (STANDING):    MEDICATIONS  (PRN):    Allergies    Crab (Pruritus)  oxycodone (Unknown)  morphine (Unknown)    Intolerances      Vital Signs Last 24 Hrs  T(C): 36.7 (25 Mar 2024 20:48), Max: 36.7 (25 Mar 2024 20:48)  T(F): 98 (25 Mar 2024 20:48), Max: 98 (25 Mar 2024 20:48)  HR: 120 (25 Mar 2024 20:48) (120 - 120)  BP: 117/78 (25 Mar 2024 20:48) (117/78 - 117/78)  BP(mean): --  RR: 16 (25 Mar 2024 20:48) (16 - 16)  SpO2: 98% (25 Mar 2024 20:48) (98% - 98%)    Parameters below as of 25 Mar 2024 20:48  Patient On (Oxygen Delivery Method): room air        Physical exam:  Constitutional: No acute distress, conversant  Eyes: Anicteric sclerae, moist conjunctivae, see below for CNs  Neck: trachea midline  Cardiovascular: Tachycardic on monitor  Pulmonary: No use of accessory muscles  GI: Abdomen distended  Extremities: no edema    Neurologic:  -Mental status: Awake, alert, oriented to person, place, and time. Speech is fluent with intact naming, repetition, and comprehension, no dysarthria. Recent and remote memory intact. Follows commands.-Cranial nerves:   II: Visual fields are full to confrontation.  III, IV, VI: Extraocular movements are intact without nystagmus. Pupils equally round and reactive to light  V:  Facial sensation V1-V3 equal and intact   VII: Face is symmetric with normal eye closure and smile  Motor: Bilateral upper extremities 5/5, bilateral lower extermities 0/5  Sensation: Intact to light touch bilaterally. No neglect or extinction on double simultaneous testing.  Coordination: dysmetria with finger to nose of right upper extremity; unable to perform heel to shin 2/2 paralysis    NIHSS: 9    Fingerstick Blood Glucose: CAPILLARY BLOOD GLUCOSE      POCT Blood Glucose.: 119 mg/dL (25 Mar 2024 20:50)    LABS:                        12.0   6.91  )-----------( 378      ( 25 Mar 2024 21:33 )             37.7         138  |  104  |  11  ----------------------------<  113<H>  4.0   |  25  |  0.49<L>    Ca    10.0      25 Mar 2024 21:33    TPro  7.2  /  Alb  3.9  /  TBili  0.3  /  DBili  x   /  AST  13  /  ALT  8<L>  /  AlkPhos  90  -    PT/INR - ( 25 Mar 2024 21:33 )   PT: 13.3 sec;   INR: 1.17          PTT - ( 25 Mar 2024 21:33 )  PTT:45.8 sec      Urinalysis Basic - ( 25 Mar 2024 22:02 )    Color: Dark Yellow / Appearance: Turbid / S.030 / pH: x  Gluc: x / Ketone: Trace mg/dL  / Bili: Negative / Urobili: 1.0 mg/dL   Blood: x / Protein: 100 mg/dL / Nitrite: Positive   Leuk Esterase: Moderate / RBC: x / WBC x   Sq Epi: x / Non Sq Epi: x / Bacteria: x        RADIOLOGY & ADDITIONAL STUDIES:        **STROKE CONSULT NOTE**    HPI: 68y Female with PMHx of bilateral lower extremity paralysis, HTN, HLD, CVA x 3 (MRI brain from  demonstrated chronic lacunar infarcts of the left corona radiata, left putamen and left cerebellar hemisphere), PE in  (on Eliquis for 3-6 months), emphysema, neurogenic bladder s/p chronic jiménez, chronic neck/back pain s/p multiple spinal fusions s/p intrathecal morphine pump, multiple UTI's, multiple ED visits for abdominal pain (noted to have bleeding with digital rectal exam on prior ED visits), presenting to [facility] ED with chief complaint of abdominal pain, primarily surrounding her intrathecal morphine pump. Patient is a poor historian but reports that she has been to the ED multiple times for similar symptoms. She additionally reports that 3 days ago, she felt lightheaded. Earlier today, she states "she tried to tell my brain to move my toes, but then I felt numbness travel up my leg, my right hip was in pain, and then my right arm felt numb". She points to her middle finger on her right hand and reports she felt numb there. On exam, patient is paralysed in her bilateral lower extremities. She has some tremors noted, greater on her right arm with dysmetria during finger to nose (reports residual right sided deficits from her prior strokes). Of note, patient is tachycardic to the 130's and grimacing with pain in her abdomen. She states she was sweating earlier which is uncommon for her. Currently awaiting NCHCT, CTA head and neck imaging.     T(C): 36.7 (24 @ 20:48), Max: 36.7 (24 @ 20:48)  HR: 120 (24 @ 20:48) (120 - 120)  BP: 117/78 (24 @ 20:48) (117/78 - 117/78)  RR: 16 (24 @ 20:48) (16 - 16)  SpO2: 98% (24 @ 20:48) (98% - 98%)    PAST MEDICAL & SURGICAL HISTORY:  HTN (hypertension)      SS (spinal stenosis)      High cholesterol      Stroke  x3      H/O carpal tunnel syndrome  Bilateral      Chronic GERD      History of chronic constipation      Urinary incontinence  self cath as needed      Pre-diabetes      Anxiety and depression      Eczema      H/O kyphosis      Pancreas cyst      Scoliosis      Wheelchair dependent      Cervical pseudoarthrosis  and lumbar spine      Cervical spondylosis      Chronic headaches      Degenerative joint disease  left shoulder      Lumbosacral spondylosis      COPD (chronic obstructive pulmonary disease)      H/O Spinal surgery  lumbar x 30      H/O breast surgery  left breast implant       S/P cervical discectomy  x4      H/O total shoulder replacement, right          FAMILY HISTORY:      SOCIAL HISTORY:   Patient lives in nursing home    ROS:   Constitutional: No fever, weight loss or fatigue  Eyes: No eye pain, visual disturbances, or discharge  ENMT:  No difficulty hearing, tinnitus; No sinus or throat pain  Neck: No pain or stiffness  Respiratory: No cough, wheezing, chills or hemoptysis  Cardiovascular: No chest pain, palpitations, shortness of breath, or leg swelling  Gastrointestinal: + abdominal pain. No nausea, vomiting or hematemesis; No diarrhea or constipation.   Genitourinary: No dysuria, frequency, hematuria or incontinence  Neurological: As per HPI  Skin: No itching, burning, rashes or lesions   Endocrine: No heat or cold intolerance; No hair loss  Musculoskeletal: + right hip pain  Heme/Lymph: No easy bruising or bleeding gums    MEDICATIONS  (STANDING):    MEDICATIONS  (PRN):    Allergies    Crab (Pruritus)  oxycodone (Unknown)  morphine (Unknown)    Intolerances      Vital Signs Last 24 Hrs  T(C): 36.7 (25 Mar 2024 20:48), Max: 36.7 (25 Mar 2024 20:48)  T(F): 98 (25 Mar 2024 20:48), Max: 98 (25 Mar 2024 20:48)  HR: 120 (25 Mar 2024 20:48) (120 - 120)  BP: 117/78 (25 Mar 2024 20:48) (117/78 - 117/78)  BP(mean): --  RR: 16 (25 Mar 2024 20:48) (16 - 16)  SpO2: 98% (25 Mar 2024 20:48) (98% - 98%)    Parameters below as of 25 Mar 2024 20:48  Patient On (Oxygen Delivery Method): room air        Physical exam:  Constitutional: No acute distress, conversant  Eyes: Anicteric sclerae, moist conjunctivae, see below for CNs  Neck: trachea midline  Cardiovascular: Tachycardic on monitor  Pulmonary: No use of accessory muscles  GI: Abdomen distended  Extremities: no edema    Neurologic:  -Mental status: Awake, alert, oriented to person, place, and time. Speech is fluent with intact naming, repetition, and comprehension, no dysarthria. Recent and remote memory intact. Follows commands.-Cranial nerves:   II: Visual fields are full to confrontation.  III, IV, VI: Extraocular movements are intact without nystagmus. Pupils equally round and reactive to light  V:  Facial sensation V1-V3 equal and intact   VII: Face is symmetric with normal eye closure and smile  Motor: Bilateral upper extremities 5/5, bilateral lower extermities 0/5  Sensation: Intact to light touch bilaterally. No neglect or extinction on double simultaneous testing.  Coordination: dysmetria with finger to nose of right upper extremity; unable to perform heel to shin 2/2 paralysis    NIHSS: 9    Fingerstick Blood Glucose: CAPILLARY BLOOD GLUCOSE      POCT Blood Glucose.: 119 mg/dL (25 Mar 2024 20:50)    LABS:                        12.0   6.91  )-----------( 378      ( 25 Mar 2024 21:33 )             37.7     03    138  |  104  |  11  ----------------------------<  113<H>  4.0   |  25  |  0.49<L>    Ca    10.0      25 Mar 2024 21:33    TPro  7.2  /  Alb  3.9  /  TBili  0.3  /  DBili  x   /  AST  13  /  ALT  8<L>  /  AlkPhos  90  -25    PT/INR - ( 25 Mar 2024 21:33 )   PT: 13.3 sec;   INR: 1.17          PTT - ( 25 Mar 2024 21:33 )  PTT:45.8 sec      Urinalysis Basic - ( 25 Mar 2024 22:02 )    Color: Dark Yellow / Appearance: Turbid / S.030 / pH: x  Gluc: x / Ketone: Trace mg/dL  / Bili: Negative / Urobili: 1.0 mg/dL   Blood: x / Protein: 100 mg/dL / Nitrite: Positive   Leuk Esterase: Moderate / RBC: x / WBC x   Sq Epi: x / Non Sq Epi: x / Bacteria: x        RADIOLOGY & ADDITIONAL STUDIES:        **STROKE CONSULT NOTE**    HPI: 68y Female with PMHx of bilateral lower extremity paralysis, HTN, HLD, CVA x 3 (MRI brain from  demonstrated chronic lacunar infarcts of the left corona radiata, left putamen and left cerebellar hemisphere), PE in  (was prescribed Eliquis for 3-6 months), emphysema, neurogenic bladder s/p chronic jiménez, chronic neck/back pain s/p multiple spinal fusions s/p intrathecal morphine pump, multiple UTI's, multiple ED visits for abdominal pain (noted to have bleeding with digital rectal exam on prior ED visits), presenting to [facility] ED with chief complaint of abdominal pain, primarily surrounding her intrathecal morphine pump. Patient is a poor historian but reports that she has been to the ED multiple times for similar symptoms. She additionally reports that 3 days ago, she felt lightheaded. Earlier today, she states "she tried to tell my brain to move my toes, but then I felt numbness travel up my leg, my right hip was in pain, and then my right arm felt numb". She points to her middle finger on her right hand and reports she felt numb there. On exam, patient is paralysed in her bilateral lower extremities. She has some tremors noted, greater on her right arm with dysmetria during finger to nose (reports residual right sided deficits from her prior strokes). Of note, patient is tachycardic to the 130's and grimacing with pain in her abdomen. She states she was sweating earlier which is uncommon for her. Currently awaiting NCHCT, CTA head and neck imaging.     T(C): 36.7 (24 @ 20:48), Max: 36.7 (24 @ 20:48)  HR: 120 (24 @ 20:48) (120 - 120)  BP: 117/78 (24 @ 20:48) (117/78 - 117/78)  RR: 16 (24 @ 20:48) (16 - 16)  SpO2: 98% (24 @ 20:48) (98% - 98%)    PAST MEDICAL & SURGICAL HISTORY:  HTN (hypertension)      SS (spinal stenosis)      High cholesterol      Stroke  x3      H/O carpal tunnel syndrome  Bilateral      Chronic GERD      History of chronic constipation      Urinary incontinence  self cath as needed      Pre-diabetes      Anxiety and depression      Eczema      H/O kyphosis      Pancreas cyst      Scoliosis      Wheelchair dependent      Cervical pseudoarthrosis  and lumbar spine      Cervical spondylosis      Chronic headaches      Degenerative joint disease  left shoulder      Lumbosacral spondylosis      COPD (chronic obstructive pulmonary disease)      H/O Spinal surgery  lumbar x 30      H/O breast surgery  left breast implant       S/P cervical discectomy  x4      H/O total shoulder replacement, right          FAMILY HISTORY:      SOCIAL HISTORY:   Patient lives in nursing home    ROS:   Constitutional: No fever, weight loss or fatigue  Eyes: No eye pain, visual disturbances, or discharge  ENMT:  No difficulty hearing, tinnitus; No sinus or throat pain  Neck: No pain or stiffness  Respiratory: No cough, wheezing, chills or hemoptysis  Cardiovascular: No chest pain, palpitations, shortness of breath, or leg swelling  Gastrointestinal: + abdominal pain. No nausea, vomiting or hematemesis; No diarrhea or constipation.   Genitourinary: No dysuria, frequency, hematuria or incontinence  Neurological: As per HPI  Skin: No itching, burning, rashes or lesions   Endocrine: No heat or cold intolerance; No hair loss  Musculoskeletal: + right hip pain  Heme/Lymph: No easy bruising or bleeding gums    MEDICATIONS  (STANDING):    MEDICATIONS  (PRN):    Allergies    Crab (Pruritus)  oxycodone (Unknown)  morphine (Unknown)    Intolerances      Vital Signs Last 24 Hrs  T(C): 36.7 (25 Mar 2024 20:48), Max: 36.7 (25 Mar 2024 20:48)  T(F): 98 (25 Mar 2024 20:48), Max: 98 (25 Mar 2024 20:48)  HR: 120 (25 Mar 2024 20:48) (120 - 120)  BP: 117/78 (25 Mar 2024 20:48) (117/78 - 117/78)  BP(mean): --  RR: 16 (25 Mar 2024 20:48) (16 - 16)  SpO2: 98% (25 Mar 2024 20:48) (98% - 98%)    Parameters below as of 25 Mar 2024 20:48  Patient On (Oxygen Delivery Method): room air        Physical exam:  Constitutional: No acute distress, conversant  Eyes: Anicteric sclerae, moist conjunctivae, see below for CNs  Neck: trachea midline  Cardiovascular: Tachycardic on monitor  Pulmonary: No use of accessory muscles  GI: Abdomen distended  Extremities: no edema    Neurologic:  -Mental status: Awake, alert, oriented to person, place, and time. Speech is fluent with intact naming, repetition, and comprehension, no dysarthria. Recent and remote memory intact. Follows commands.-Cranial nerves:   II: Visual fields are full to confrontation.  III, IV, VI: Extraocular movements are intact without nystagmus. Pupils equally round and reactive to light  V:  Facial sensation V1-V3 equal and intact   VII: Face is symmetric with normal eye closure and smile  Motor: Bilateral upper extremities 5/5, bilateral lower extermities 0/5  Sensation: Intact to light touch bilaterally. No neglect or extinction on double simultaneous testing.  Coordination: dysmetria with finger to nose of right upper extremity; unable to perform heel to shin 2/2 paralysis    NIHSS: 9    Fingerstick Blood Glucose: CAPILLARY BLOOD GLUCOSE      POCT Blood Glucose.: 119 mg/dL (25 Mar 2024 20:50)    LABS:                        12.0   6.91  )-----------( 378      ( 25 Mar 2024 21:33 )             37.7         138  |  104  |  11  ----------------------------<  113<H>  4.0   |  25  |  0.49<L>    Ca    10.0      25 Mar 2024 21:33    TPro  7.2  /  Alb  3.9  /  TBili  0.3  /  DBili  x   /  AST  13  /  ALT  8<L>  /  AlkPhos  90  -25    PT/INR - ( 25 Mar 2024 21:33 )   PT: 13.3 sec;   INR: 1.17          PTT - ( 25 Mar 2024 21:33 )  PTT:45.8 sec      Urinalysis Basic - ( 25 Mar 2024 22:02 )    Color: Dark Yellow / Appearance: Turbid / S.030 / pH: x  Gluc: x / Ketone: Trace mg/dL  / Bili: Negative / Urobili: 1.0 mg/dL   Blood: x / Protein: 100 mg/dL / Nitrite: Positive   Leuk Esterase: Moderate / RBC: x / WBC x   Sq Epi: x / Non Sq Epi: x / Bacteria: x        RADIOLOGY & ADDITIONAL STUDIES:        **STROKE CONSULT NOTE**    HPI: 68y Female with PMHx of bilateral lower extremity paralysis, HTN, HLD, CVA x 3 (MRI brain from  demonstrated chronic lacunar infarcts of the left corona radiata, left putamen and left cerebellar hemisphere), PE in  (was prescribed Eliquis for 3-6 months), emphysema, neurogenic bladder s/p chronic jiménez, chronic neck/back pain s/p multiple spinal fusions s/p intrathecal morphine pump, multiple UTI's, multiple ED visits for abdominal pain (noted to have bleeding with digital rectal exam on prior ED visits), presenting to [facility] ED with chief complaint of abdominal pain, primarily surrounding her intrathecal morphine pump. Patient is a poor historian but reports that she has been to the ED multiple times for similar symptoms. She additionally reports that 3 days ago, she felt lightheaded. Earlier today, she states "she tried to tell my brain to move my toes, but then I felt numbness travel up my leg, my right hip was in pain, and then my right arm felt numb". She points to her middle finger on her right hand and reports she felt numb there. On exam, patient is paralysed in her bilateral lower extremities. She has some tremors noted, greater on her right arm with dysmetria during finger to nose (reports residual right sided deficits from her prior strokes). Of note, patient is tachycardic to the 130's and grimacing with pain in her abdomen. She states she was sweating earlier which is uncommon for her. Currently awaiting NCHCT, CTA head and neck imaging.     T(C): 36.7 (24 @ 20:48), Max: 36.7 (24 @ 20:48)  HR: 120 (24 @ 20:48) (120 - 120)  BP: 117/78 (24 @ 20:48) (117/78 - 117/78)  RR: 16 (24 @ 20:48) (16 - 16)  SpO2: 98% (24 @ 20:48) (98% - 98%)    PAST MEDICAL & SURGICAL HISTORY:  HTN (hypertension)      SS (spinal stenosis)      High cholesterol      Stroke  x3      H/O carpal tunnel syndrome  Bilateral      Chronic GERD      History of chronic constipation      Urinary incontinence  self cath as needed      Pre-diabetes      Anxiety and depression      Eczema      H/O kyphosis      Pancreas cyst      Scoliosis      Wheelchair dependent      Cervical pseudoarthrosis  and lumbar spine      Cervical spondylosis      Chronic headaches      Degenerative joint disease  left shoulder      Lumbosacral spondylosis      COPD (chronic obstructive pulmonary disease)      H/O Spinal surgery  lumbar x 30      H/O breast surgery  left breast implant       S/P cervical discectomy  x4      H/O total shoulder replacement, right          FAMILY HISTORY:      SOCIAL HISTORY:   Patient lives in nursing home    ROS:   Constitutional: No fever, weight loss or fatigue  Eyes: No eye pain, visual disturbances, or discharge  ENMT:  No difficulty hearing, tinnitus; No sinus or throat pain  Neck: No pain or stiffness  Respiratory: No cough, wheezing, chills or hemoptysis  Cardiovascular: No chest pain, palpitations, shortness of breath, or leg swelling  Gastrointestinal: + abdominal pain. No nausea, vomiting or hematemesis; No diarrhea or constipation.   Genitourinary: No dysuria, frequency, hematuria or incontinence  Neurological: As per HPI  Skin: No itching, burning, rashes or lesions   Endocrine: No heat or cold intolerance; No hair loss  Musculoskeletal: + right hip pain  Heme/Lymph: No easy bruising or bleeding gums    MEDICATIONS  (STANDING):    MEDICATIONS  (PRN):    Allergies    Crab (Pruritus)  oxycodone (Unknown)  morphine (Unknown)    Intolerances      Vital Signs Last 24 Hrs  T(C): 36.7 (25 Mar 2024 20:48), Max: 36.7 (25 Mar 2024 20:48)  T(F): 98 (25 Mar 2024 20:48), Max: 98 (25 Mar 2024 20:48)  HR: 120 (25 Mar 2024 20:48) (120 - 120)  BP: 117/78 (25 Mar 2024 20:48) (117/78 - 117/78)  BP(mean): --  RR: 16 (25 Mar 2024 20:48) (16 - 16)  SpO2: 98% (25 Mar 2024 20:48) (98% - 98%)    Parameters below as of 25 Mar 2024 20:48  Patient On (Oxygen Delivery Method): room air        Physical exam:  Constitutional: No acute distress, conversant  Eyes: Anicteric sclerae, moist conjunctivae, see below for CNs  Neck: trachea midline  Cardiovascular: Tachycardic on monitor  Pulmonary: No use of accessory muscles  GI: Abdomen distended  Extremities: no edema    Neurologic:  -Mental status: Awake, alert, oriented to person, place, and time. Speech is fluent with intact naming, repetition, and comprehension, no dysarthria. Recent and remote memory intact. Follows commands.-Cranial nerves:   II: Visual fields are full to confrontation.  III, IV, VI: Extraocular movements are intact without nystagmus. Pupils equally round and reactive to light  V:  Facial sensation V1-V3 equal and intact   VII: Face is symmetric with normal eye closure and smile  Motor: Bilateral upper extremities 5/5, bilateral lower extermities 0/5  Sensation: Intact to light touch bilaterally. No neglect or extinction on double simultaneous testing.  Coordination: dysmetria with finger to nose of right upper extremity; unable to perform heel to shin 2/2 paralysis    NIHSS: 9    Fingerstick Blood Glucose: CAPILLARY BLOOD GLUCOSE      POCT Blood Glucose.: 119 mg/dL (25 Mar 2024 20:50)    LABS:                        12.0   6.91  )-----------( 378      ( 25 Mar 2024 21:33 )             37.7         138  |  104  |  11  ----------------------------<  113<H>  4.0   |  25  |  0.49<L>    Ca    10.0      25 Mar 2024 21:33    TPro  7.2  /  Alb  3.9  /  TBili  0.3  /  DBili  x   /  AST  13  /  ALT  8<L>  /  AlkPhos  90      PT/INR - ( 25 Mar 2024 21:33 )   PT: 13.3 sec;   INR: 1.17          PTT - ( 25 Mar 2024 21:33 )  PTT:45.8 sec      Urinalysis Basic - ( 25 Mar 2024 22:02 )    Color: Dark Yellow / Appearance: Turbid / S.030 / pH: x  Gluc: x / Ketone: Trace mg/dL  / Bili: Negative / Urobili: 1.0 mg/dL   Blood: x / Protein: 100 mg/dL / Nitrite: Positive   Leuk Esterase: Moderate / RBC: x / WBC x   Sq Epi: x / Non Sq Epi: x / Bacteria: x        RADIOLOGY & ADDITIONAL STUDIES:    < from: CT Head No Cont (24 @ 23:33) >    IMPRESSION:    No acute transcortical infarction, hemorrhage, or mass effect. Stable   exam since .    < end of copied text >      **STROKE CONSULT NOTE**    HPI: 68y Female with PMHx of bilateral lower extremity paralysis, HTN, HLD, CVA x 3 (MRI brain from  demonstrated chronic lacunar infarcts of the left corona radiata, left putamen and left cerebellar hemisphere), PE in  (was prescribed Eliquis for 3-6 months), emphysema, neurogenic bladder s/p chronic jiménez, chronic neck/back pain s/p multiple spinal fusions s/p intrathecal morphine pump, multiple UTI's, multiple ED visits for abdominal pain (noted to have bleeding with digital rectal exam on prior ED visits), presenting to [facility] ED with chief complaint of abdominal pain, primarily surrounding her intrathecal morphine pump. Patient is a poor historian but reports that she has been to the ED multiple times for similar symptoms. She additionally reports that 3 days ago, she felt lightheaded. Earlier today, she states "she tried to tell my brain to move my toes, but then I felt numbness travel up my leg, my right hip was in pain, and then my right arm felt numb". She points to her middle finger on her right hand and reports she felt numb there. On exam, patient is paralysed in her bilateral lower extremities. She has some tremors noted, greater on her right arm with dysmetria during finger to nose (reports residual right sided deficits from her prior strokes). Of note, patient is tachycardic to the 130's and grimacing with pain in her abdomen. She states she was sweating earlier which is uncommon for her.    T(C): 36.7 (24 @ 20:48), Max: 36.7 (24 @ 20:48)  HR: 120 (-24 @ 20:48) (120 - 120)  BP: 117/78 (25-24 @ 20:48) (117/78 - 117/78)  RR: 16 (-24 @ 20:48) (16 - 16)  SpO2: 98% (24 @ 20:48) (98% - 98%)    PAST MEDICAL & SURGICAL HISTORY:  HTN (hypertension)      SS (spinal stenosis)      High cholesterol      Stroke  x3      H/O carpal tunnel syndrome  Bilateral      Chronic GERD      History of chronic constipation      Urinary incontinence  self cath as needed      Pre-diabetes      Anxiety and depression      Eczema      H/O kyphosis      Pancreas cyst      Scoliosis      Wheelchair dependent      Cervical pseudoarthrosis  and lumbar spine      Cervical spondylosis      Chronic headaches      Degenerative joint disease  left shoulder      Lumbosacral spondylosis      COPD (chronic obstructive pulmonary disease)      H/O Spinal surgery  lumbar x 30      H/O breast surgery  left breast implant       S/P cervical discectomy  x4      H/O total shoulder replacement, right          FAMILY HISTORY:      SOCIAL HISTORY:   Patient lives in nursing home    ROS:   Constitutional: No fever, weight loss or fatigue  Eyes: No eye pain, visual disturbances, or discharge  ENMT:  No difficulty hearing, tinnitus; No sinus or throat pain  Neck: No pain or stiffness  Respiratory: No cough, wheezing, chills or hemoptysis  Cardiovascular: No chest pain, palpitations, shortness of breath, or leg swelling  Gastrointestinal: + abdominal pain. No nausea, vomiting or hematemesis; No diarrhea or constipation.   Genitourinary: No dysuria, frequency, hematuria or incontinence  Neurological: As per HPI  Skin: No itching, burning, rashes or lesions   Endocrine: No heat or cold intolerance; No hair loss  Musculoskeletal: + right hip pain  Heme/Lymph: No easy bruising or bleeding gums    MEDICATIONS  (STANDING):    MEDICATIONS  (PRN):    Allergies    Crab (Pruritus)  oxycodone (Unknown)  morphine (Unknown)    Intolerances      Vital Signs Last 24 Hrs  T(C): 36.7 (25 Mar 2024 20:48), Max: 36.7 (25 Mar 2024 20:48)  T(F): 98 (25 Mar 2024 20:48), Max: 98 (25 Mar 2024 20:48)  HR: 120 (25 Mar 2024 20:48) (120 - 120)  BP: 117/78 (25 Mar 2024 20:48) (117/78 - 117/78)  BP(mean): --  RR: 16 (25 Mar 2024 20:48) (16 - 16)  SpO2: 98% (25 Mar 2024 20:48) (98% - 98%)    Parameters below as of 25 Mar 2024 20:48  Patient On (Oxygen Delivery Method): room air        Physical exam:  Constitutional: No acute distress, conversant  Eyes: Anicteric sclerae, moist conjunctivae, see below for CNs  Neck: trachea midline  Cardiovascular: Tachycardic on monitor  Pulmonary: No use of accessory muscles  GI: Abdomen distended  Extremities: no edema    Neurologic:  -Mental status: Awake, alert, oriented to person, place, and time. Speech is fluent with intact naming, repetition, and comprehension, no dysarthria. Recent and remote memory intact. Follows commands.-Cranial nerves:   II: Visual fields are full to confrontation.  III, IV, VI: Extraocular movements are intact without nystagmus. Pupils equally round and reactive to light  V:  Facial sensation V1-V3 equal and intact   VII: Face is symmetric with normal eye closure and smile  Motor: Bilateral upper extremities 5/5, bilateral lower extermities 0/5  Sensation: Intact to light touch bilaterally. No neglect or extinction on double simultaneous testing.  Coordination: dysmetria with finger to nose of right upper extremity; unable to perform heel to shin 2/2 paralysis    NIHSS: 9    Fingerstick Blood Glucose: CAPILLARY BLOOD GLUCOSE      POCT Blood Glucose.: 119 mg/dL (25 Mar 2024 20:50)    LABS:                        12.0   6.91  )-----------( 378      ( 25 Mar 2024 21:33 )             37.7         138  |  104  |  11  ----------------------------<  113<H>  4.0   |  25  |  0.49<L>    Ca    10.0      25 Mar 2024 21:33    TPro  7.2  /  Alb  3.9  /  TBili  0.3  /  DBili  x   /  AST  13  /  ALT  8<L>  /  AlkPhos  90      PT/INR - ( 25 Mar 2024 21:33 )   PT: 13.3 sec;   INR: 1.17          PTT - ( 25 Mar 2024 21:33 )  PTT:45.8 sec      Urinalysis Basic - ( 25 Mar 2024 22:02 )    Color: Dark Yellow / Appearance: Turbid / S.030 / pH: x  Gluc: x / Ketone: Trace mg/dL  / Bili: Negative / Urobili: 1.0 mg/dL   Blood: x / Protein: 100 mg/dL / Nitrite: Positive   Leuk Esterase: Moderate / RBC: x / WBC x   Sq Epi: x / Non Sq Epi: x / Bacteria: x        RADIOLOGY & ADDITIONAL STUDIES:    < from: CT Head No Cont (24 @ 23:33) >    IMPRESSION:    No acute transcortical infarction, hemorrhage, or mass effect. Stable   exam since .    < end of copied text >    < from: CT Angio Head w/ IV Cont (24 @ 23:35) >  IMPRESSION:    1.  No hemodynamically significant large vessel stenosis or occlusion in   the head and neck.  2.  Few left thyroid nodules measuring up to 2.0 cm. Outpatient follow-up   with thyroid ultrasound is suggested.    < end of copied text >    **STROKE CONSULT NOTE**    HPI: 68y Female with PMHx of bilateral lower extremity paralysis, HTN, HLD, CVA x 3 (MRI brain from  demonstrated chronic lacunar infarcts of the left corona radiata, left putamen and left cerebellar hemisphere), PE in  (was prescribed Eliquis for 3-6 months), emphysema, neurogenic bladder s/p chronic jiménez, chronic neck/back pain s/p multiple spinal fusions (laminectomies and fusion of cervical, thoracic and lumbar spine initially done in  and in  and  at [facility] by Dr. Trinidad, s/p T3-L1 revision of prior fusion in  with Dr. Luz as well as elective C2-S2 instrumentation and fusion in ). s/p intrathecal morphine pump, multiple UTI's, multiple ED visits for abdominal pain (noted to have bleeding with digital rectal exam on prior ED visits), presenting to [facility] ED with chief complaint of abdominal pain, primarily surrounding her intrathecal morphine pump. Patient is a poor historian but reports that she has been to the ED multiple times for similar symptoms. She additionally reports that 3 days ago, she felt lightheaded. Earlier today, she states "she tried to tell my brain to move my toes, but then I felt numbness travel up my leg, my right hip was in pain, and then my right arm felt numb". She points to her middle finger on her right hand and reports she felt numb there. On exam, patient is paralysed in her bilateral lower extremities. She has some tremors noted, greater on her right arm with dysmetria during finger to nose (reports residual right sided deficits from her prior strokes). Of note, patient is tachycardic to the 130's and grimacing with pain in her abdomen. She states she was sweating earlier which is uncommon for her.    T(C): 36.7 (24 @ 20:48), Max: 36.7 (24 @ 20:48)  HR: 120 (24 @ 20:48) (120 - 120)  BP: 117/78 (24 @ 20:48) (117/78 - 117/78)  RR: 16 (24 @ 20:48) (16 - 16)  SpO2: 98% (24 @ 20:48) (98% - 98%)    PAST MEDICAL & SURGICAL HISTORY:  HTN (hypertension)      SS (spinal stenosis)      High cholesterol      Stroke  x3      H/O carpal tunnel syndrome  Bilateral      Chronic GERD      History of chronic constipation      Urinary incontinence  self cath as needed      Pre-diabetes      Anxiety and depression      Eczema      H/O kyphosis      Pancreas cyst      Scoliosis      Wheelchair dependent      Cervical pseudoarthrosis  and lumbar spine      Cervical spondylosis      Chronic headaches      Degenerative joint disease  left shoulder      Lumbosacral spondylosis      COPD (chronic obstructive pulmonary disease)      H/O Spinal surgery  lumbar x 30      H/O breast surgery  left breast implant       S/P cervical discectomy  x4      H/O total shoulder replacement, right          FAMILY HISTORY:      SOCIAL HISTORY:   Patient lives in nursing home    ROS:   Constitutional: No fever, weight loss or fatigue  Eyes: No eye pain, visual disturbances, or discharge  ENMT:  No difficulty hearing, tinnitus; No sinus or throat pain  Neck: No pain or stiffness  Respiratory: No cough, wheezing, chills or hemoptysis  Cardiovascular: No chest pain, palpitations, shortness of breath, or leg swelling  Gastrointestinal: + abdominal pain. No nausea, vomiting or hematemesis; No diarrhea or constipation.   Genitourinary: No dysuria, frequency, hematuria or incontinence  Neurological: As per HPI  Skin: No itching, burning, rashes or lesions   Endocrine: No heat or cold intolerance; No hair loss  Musculoskeletal: + right hip pain  Heme/Lymph: No easy bruising or bleeding gums    MEDICATIONS  (STANDING):    MEDICATIONS  (PRN):    Allergies    Crab (Pruritus)  oxycodone (Unknown)  morphine (Unknown)    Intolerances      Vital Signs Last 24 Hrs  T(C): 36.7 (25 Mar 2024 20:48), Max: 36.7 (25 Mar 2024 20:48)  T(F): 98 (25 Mar 2024 20:48), Max: 98 (25 Mar 2024 20:48)  HR: 120 (25 Mar 2024 20:48) (120 - 120)  BP: 117/78 (25 Mar 2024 20:48) (117/78 - 117/78)  BP(mean): --  RR: 16 (25 Mar 2024 20:48) (16 - 16)  SpO2: 98% (25 Mar 2024 20:48) (98% - 98%)    Parameters below as of 25 Mar 2024 20:48  Patient On (Oxygen Delivery Method): room air        Physical exam:  Constitutional: No acute distress, conversant  Eyes: Anicteric sclerae, moist conjunctivae, see below for CNs  Neck: trachea midline  Cardiovascular: Tachycardic on monitor  Pulmonary: No use of accessory muscles  GI: Abdomen distended  Extremities: no edema    Neurologic:  -Mental status: Awake, alert, oriented to person, place, and time. Speech is fluent with intact naming, repetition, and comprehension, no dysarthria. Recent and remote memory intact. Follows commands.-Cranial nerves:   II: Visual fields are full to confrontation.  III, IV, VI: Extraocular movements are intact without nystagmus. Pupils equally round and reactive to light  V:  Facial sensation V1-V3 equal and intact   VII: Face is symmetric with normal eye closure and smile  Motor: Bilateral upper extremities 5/5, bilateral lower extermities 0/5  Sensation: Intact to light touch bilaterally. No neglect or extinction on double simultaneous testing.  Coordination: dysmetria with finger to nose of right upper extremity; unable to perform heel to shin 2/2 paralysis    NIHSS: 9    Fingerstick Blood Glucose: CAPILLARY BLOOD GLUCOSE      POCT Blood Glucose.: 119 mg/dL (25 Mar 2024 20:50)    LABS:                        12.0   6.91  )-----------( 378      ( 25 Mar 2024 21:33 )             37.7     03-25    138  |  104  |  11  ----------------------------<  113<H>  4.0   |  25  |  0.49<L>    Ca    10.0      25 Mar 2024 21:33    TPro  7.2  /  Alb  3.9  /  TBili  0.3  /  DBili  x   /  AST  13  /  ALT  8<L>  /  AlkPhos  90  03-25    PT/INR - ( 25 Mar 2024 21:33 )   PT: 13.3 sec;   INR: 1.17          PTT - ( 25 Mar 2024 21:33 )  PTT:45.8 sec      Urinalysis Basic - ( 25 Mar 2024 22:02 )    Color: Dark Yellow / Appearance: Turbid / S.030 / pH: x  Gluc: x / Ketone: Trace mg/dL  / Bili: Negative / Urobili: 1.0 mg/dL   Blood: x / Protein: 100 mg/dL / Nitrite: Positive   Leuk Esterase: Moderate / RBC: x / WBC x   Sq Epi: x / Non Sq Epi: x / Bacteria: x        RADIOLOGY & ADDITIONAL STUDIES:    < from: CT Head No Cont (24 @ 23:33) >    IMPRESSION:    No acute transcortical infarction, hemorrhage, or mass effect. Stable   exam since .    < end of copied text >    < from: CT Angio Head w/ IV Cont (24 @ 23:35) >  IMPRESSION:    1.  No hemodynamically significant large vessel stenosis or occlusion in   the head and neck.  2.  Few left thyroid nodules measuring up to 2.0 cm. Outpatient follow-up   with thyroid ultrasound is suggested.    < end of copied text >

## 2024-03-25 NOTE — ED ADULT TRIAGE NOTE - CHIEF COMPLAINT QUOTE
Sent from NH for weakness to upper extremities.  Pt has hx of 3 prior strokes.  States she felt like her R arm was weaker than usual this morning.  Upon assessment, bilateral extremities equal in strength.  Lower extremity paralysis due to prior strokes.  Pt c/o of abd pain.

## 2024-03-26 ENCOUNTER — INPATIENT (INPATIENT)
Facility: HOSPITAL | Age: 69
LOS: 3 days | Discharge: NURSING, REHAB OR LONG-TERM CARE | DRG: 698 | End: 2024-03-30
Attending: STUDENT IN AN ORGANIZED HEALTH CARE EDUCATION/TRAINING PROGRAM | Admitting: GENERAL ACUTE CARE HOSPITAL
Payer: MEDICARE

## 2024-03-26 ENCOUNTER — TRANSCRIPTION ENCOUNTER (OUTPATIENT)
Age: 69
End: 2024-03-26

## 2024-03-26 DIAGNOSIS — I63.9 CEREBRAL INFARCTION, UNSPECIFIED: ICD-10-CM

## 2024-03-26 DIAGNOSIS — J43.9 EMPHYSEMA, UNSPECIFIED: ICD-10-CM

## 2024-03-26 DIAGNOSIS — E78.5 HYPERLIPIDEMIA, UNSPECIFIED: ICD-10-CM

## 2024-03-26 DIAGNOSIS — Z98.890 OTHER SPECIFIED POSTPROCEDURAL STATES: Chronic | ICD-10-CM

## 2024-03-26 DIAGNOSIS — E11.9 TYPE 2 DIABETES MELLITUS WITHOUT COMPLICATIONS: ICD-10-CM

## 2024-03-26 DIAGNOSIS — Z96.611 PRESENCE OF RIGHT ARTIFICIAL SHOULDER JOINT: Chronic | ICD-10-CM

## 2024-03-26 DIAGNOSIS — N39.0 URINARY TRACT INFECTION, SITE NOT SPECIFIED: ICD-10-CM

## 2024-03-26 DIAGNOSIS — Z29.9 ENCOUNTER FOR PROPHYLACTIC MEASURES, UNSPECIFIED: ICD-10-CM

## 2024-03-26 DIAGNOSIS — Z86.711 PERSONAL HISTORY OF PULMONARY EMBOLISM: ICD-10-CM

## 2024-03-26 DIAGNOSIS — I10 ESSENTIAL (PRIMARY) HYPERTENSION: ICD-10-CM

## 2024-03-26 DIAGNOSIS — K59.00 CONSTIPATION, UNSPECIFIED: ICD-10-CM

## 2024-03-26 DIAGNOSIS — G83.10 MONOPLEGIA OF LOWER LIMB AFFECTING UNSPECIFIED SIDE: ICD-10-CM

## 2024-03-26 DIAGNOSIS — N31.9 NEUROMUSCULAR DYSFUNCTION OF BLADDER, UNSPECIFIED: ICD-10-CM

## 2024-03-26 DIAGNOSIS — M54.2 CERVICALGIA: ICD-10-CM

## 2024-03-26 LAB
ANION GAP SERPL CALC-SCNC: 12 MMOL/L — SIGNIFICANT CHANGE UP (ref 5–17)
APPEARANCE UR: ABNORMAL
BACTERIA # UR AUTO: NEGATIVE /HPF — SIGNIFICANT CHANGE UP
BASOPHILS # BLD AUTO: 0.03 K/UL — SIGNIFICANT CHANGE UP (ref 0–0.2)
BASOPHILS NFR BLD AUTO: 0.4 % — SIGNIFICANT CHANGE UP (ref 0–2)
BILIRUB UR-MCNC: NEGATIVE — SIGNIFICANT CHANGE UP
BUN SERPL-MCNC: 8 MG/DL — SIGNIFICANT CHANGE UP (ref 7–23)
CALCIUM SERPL-MCNC: 9.6 MG/DL — SIGNIFICANT CHANGE UP (ref 8.4–10.5)
CAST: 1 /LPF — SIGNIFICANT CHANGE UP (ref 0–4)
CHLORIDE SERPL-SCNC: 105 MMOL/L — SIGNIFICANT CHANGE UP (ref 96–108)
CO2 SERPL-SCNC: 23 MMOL/L — SIGNIFICANT CHANGE UP (ref 22–31)
COLOR SPEC: YELLOW — SIGNIFICANT CHANGE UP
CREAT SERPL-MCNC: 0.43 MG/DL — LOW (ref 0.5–1.3)
DIFF PNL FLD: NEGATIVE — SIGNIFICANT CHANGE UP
EGFR: 105 ML/MIN/1.73M2 — SIGNIFICANT CHANGE UP
EOSINOPHIL # BLD AUTO: 0.2 K/UL — SIGNIFICANT CHANGE UP (ref 0–0.5)
EOSINOPHIL NFR BLD AUTO: 3 % — SIGNIFICANT CHANGE UP (ref 0–6)
GLUCOSE SERPL-MCNC: 136 MG/DL — HIGH (ref 70–99)
GLUCOSE UR QL: NEGATIVE MG/DL — SIGNIFICANT CHANGE UP
HCT VFR BLD CALC: 35 % — SIGNIFICANT CHANGE UP (ref 34.5–45)
HGB BLD-MCNC: 11.1 G/DL — LOW (ref 11.5–15.5)
IMM GRANULOCYTES NFR BLD AUTO: 0.4 % — SIGNIFICANT CHANGE UP (ref 0–0.9)
KETONES UR-MCNC: NEGATIVE MG/DL — SIGNIFICANT CHANGE UP
LEUKOCYTE ESTERASE UR-ACNC: ABNORMAL
LYMPHOCYTES # BLD AUTO: 2.54 K/UL — SIGNIFICANT CHANGE UP (ref 1–3.3)
LYMPHOCYTES # BLD AUTO: 37.7 % — SIGNIFICANT CHANGE UP (ref 13–44)
MAGNESIUM SERPL-MCNC: 1.7 MG/DL — SIGNIFICANT CHANGE UP (ref 1.6–2.6)
MCHC RBC-ENTMCNC: 24.7 PG — LOW (ref 27–34)
MCHC RBC-ENTMCNC: 31.7 GM/DL — LOW (ref 32–36)
MCV RBC AUTO: 77.8 FL — LOW (ref 80–100)
MONOCYTES # BLD AUTO: 0.69 K/UL — SIGNIFICANT CHANGE UP (ref 0–0.9)
MONOCYTES NFR BLD AUTO: 10.2 % — SIGNIFICANT CHANGE UP (ref 2–14)
NEUTROPHILS # BLD AUTO: 3.25 K/UL — SIGNIFICANT CHANGE UP (ref 1.8–7.4)
NEUTROPHILS NFR BLD AUTO: 48.3 % — SIGNIFICANT CHANGE UP (ref 43–77)
NITRITE UR-MCNC: NEGATIVE — SIGNIFICANT CHANGE UP
NRBC # BLD: 0 /100 WBCS — SIGNIFICANT CHANGE UP (ref 0–0)
PH UR: 6 — SIGNIFICANT CHANGE UP (ref 5–8)
PHOSPHATE SERPL-MCNC: 4 MG/DL — SIGNIFICANT CHANGE UP (ref 2.5–4.5)
PLATELET # BLD AUTO: 374 K/UL — SIGNIFICANT CHANGE UP (ref 150–400)
POTASSIUM SERPL-MCNC: 3.2 MMOL/L — LOW (ref 3.5–5.3)
POTASSIUM SERPL-SCNC: 3.2 MMOL/L — LOW (ref 3.5–5.3)
PROT UR-MCNC: 30 MG/DL
RBC # BLD: 4.5 M/UL — SIGNIFICANT CHANGE UP (ref 3.8–5.2)
RBC # FLD: 21.2 % — HIGH (ref 10.3–14.5)
RBC CASTS # UR COMP ASSIST: 0 /HPF — SIGNIFICANT CHANGE UP (ref 0–4)
SODIUM SERPL-SCNC: 140 MMOL/L — SIGNIFICANT CHANGE UP (ref 135–145)
SP GR SPEC: >1.03 — HIGH (ref 1–1.03)
SQUAMOUS # UR AUTO: 1 /HPF — SIGNIFICANT CHANGE UP (ref 0–5)
UROBILINOGEN FLD QL: 1 MG/DL — SIGNIFICANT CHANGE UP (ref 0.2–1)
WBC # BLD: 6.74 K/UL — SIGNIFICANT CHANGE UP (ref 3.8–10.5)
WBC # FLD AUTO: 6.74 K/UL — SIGNIFICANT CHANGE UP (ref 3.8–10.5)
WBC UR QL: 49 /HPF — HIGH (ref 0–5)

## 2024-03-26 PROCEDURE — 99223 1ST HOSP IP/OBS HIGH 75: CPT | Mod: GC,AI

## 2024-03-26 RX ORDER — POTASSIUM CHLORIDE 20 MEQ
40 PACKET (EA) ORAL
Refills: 0 | Status: COMPLETED | OUTPATIENT
Start: 2024-03-26 | End: 2024-03-26

## 2024-03-26 RX ORDER — MAGNESIUM SULFATE 500 MG/ML
1 VIAL (ML) INJECTION ONCE
Refills: 0 | Status: COMPLETED | OUTPATIENT
Start: 2024-03-26 | End: 2024-03-26

## 2024-03-26 RX ORDER — MAGNESIUM SULFATE 500 MG/ML
1 VIAL (ML) INJECTION ONCE
Refills: 0 | Status: DISCONTINUED | OUTPATIENT
Start: 2024-03-26 | End: 2024-03-26

## 2024-03-26 RX ORDER — OXYCODONE HYDROCHLORIDE 5 MG/1
15 TABLET ORAL ONCE
Refills: 0 | Status: DISCONTINUED | OUTPATIENT
Start: 2024-03-26 | End: 2024-03-27

## 2024-03-26 RX ORDER — HYDROMORPHONE HYDROCHLORIDE 2 MG/ML
4 INJECTION INTRAMUSCULAR; INTRAVENOUS; SUBCUTANEOUS ONCE
Refills: 0 | Status: DISCONTINUED | OUTPATIENT
Start: 2024-03-26 | End: 2024-03-26

## 2024-03-26 RX ORDER — DABIGATRAN ETEXILATE MESYLATE 150 MG/1
150 CAPSULE ORAL EVERY 12 HOURS
Refills: 0 | Status: DISCONTINUED | OUTPATIENT
Start: 2024-03-26 | End: 2024-03-30

## 2024-03-26 RX ORDER — DIPHENHYDRAMINE HCL 50 MG
25 CAPSULE ORAL ONCE
Refills: 0 | Status: COMPLETED | OUTPATIENT
Start: 2024-03-26 | End: 2024-03-26

## 2024-03-26 RX ORDER — DULOXETINE HYDROCHLORIDE 30 MG/1
60 CAPSULE, DELAYED RELEASE ORAL DAILY
Refills: 0 | Status: DISCONTINUED | OUTPATIENT
Start: 2024-03-26 | End: 2024-03-30

## 2024-03-26 RX ORDER — PIPERACILLIN AND TAZOBACTAM 4; .5 G/20ML; G/20ML
3.38 INJECTION, POWDER, LYOPHILIZED, FOR SOLUTION INTRAVENOUS ONCE
Refills: 0 | Status: COMPLETED | OUTPATIENT
Start: 2024-03-26 | End: 2024-03-26

## 2024-03-26 RX ORDER — LACTULOSE 10 G/15ML
10 SOLUTION ORAL THREE TIMES A DAY
Refills: 0 | Status: DISCONTINUED | OUTPATIENT
Start: 2024-03-26 | End: 2024-03-30

## 2024-03-26 RX ORDER — PIPERACILLIN AND TAZOBACTAM 4; .5 G/20ML; G/20ML
4.5 INJECTION, POWDER, LYOPHILIZED, FOR SOLUTION INTRAVENOUS EVERY 8 HOURS
Refills: 0 | Status: DISCONTINUED | OUTPATIENT
Start: 2024-03-26 | End: 2024-03-27

## 2024-03-26 RX ORDER — ACETAMINOPHEN 500 MG
650 TABLET ORAL EVERY 6 HOURS
Refills: 0 | Status: DISCONTINUED | OUTPATIENT
Start: 2024-03-26 | End: 2024-03-26

## 2024-03-26 RX ORDER — PIPERACILLIN AND TAZOBACTAM 4; .5 G/20ML; G/20ML
3.38 INJECTION, POWDER, LYOPHILIZED, FOR SOLUTION INTRAVENOUS ONCE
Refills: 0 | Status: DISCONTINUED | OUTPATIENT
Start: 2024-03-26 | End: 2024-03-26

## 2024-03-26 RX ORDER — LACTULOSE 10 G/15ML
10 SOLUTION ORAL ONCE
Refills: 0 | Status: DISCONTINUED | OUTPATIENT
Start: 2024-03-26 | End: 2024-03-26

## 2024-03-26 RX ORDER — ACETAMINOPHEN 500 MG
1000 TABLET ORAL EVERY 8 HOURS
Refills: 0 | Status: DISCONTINUED | OUTPATIENT
Start: 2024-03-26 | End: 2024-03-27

## 2024-03-26 RX ORDER — METOPROLOL TARTRATE 50 MG
25 TABLET ORAL DAILY
Refills: 0 | Status: DISCONTINUED | OUTPATIENT
Start: 2024-03-26 | End: 2024-03-30

## 2024-03-26 RX ORDER — LACTULOSE 10 G/15ML
10 SOLUTION ORAL ONCE
Refills: 0 | Status: COMPLETED | OUTPATIENT
Start: 2024-03-26 | End: 2024-03-26

## 2024-03-26 RX ORDER — PANTOPRAZOLE SODIUM 20 MG/1
40 TABLET, DELAYED RELEASE ORAL
Refills: 0 | Status: DISCONTINUED | OUTPATIENT
Start: 2024-03-26 | End: 2024-03-30

## 2024-03-26 RX ORDER — INFLUENZA VIRUS VACCINE 15; 15; 15; 15 UG/.5ML; UG/.5ML; UG/.5ML; UG/.5ML
0.7 SUSPENSION INTRAMUSCULAR ONCE
Refills: 0 | Status: DISCONTINUED | OUTPATIENT
Start: 2024-03-26 | End: 2024-03-30

## 2024-03-26 RX ORDER — GABAPENTIN 400 MG/1
300 CAPSULE ORAL
Refills: 0 | Status: DISCONTINUED | OUTPATIENT
Start: 2024-03-26 | End: 2024-03-27

## 2024-03-26 RX ADMIN — PIPERACILLIN AND TAZOBACTAM 25 GRAM(S): 4; .5 INJECTION, POWDER, LYOPHILIZED, FOR SOLUTION INTRAVENOUS at 22:22

## 2024-03-26 RX ADMIN — Medication 40 MILLIEQUIVALENT(S): at 11:01

## 2024-03-26 RX ADMIN — PIPERACILLIN AND TAZOBACTAM 200 GRAM(S): 4; .5 INJECTION, POWDER, LYOPHILIZED, FOR SOLUTION INTRAVENOUS at 03:01

## 2024-03-26 RX ADMIN — Medication 25 MILLIGRAM(S): at 18:16

## 2024-03-26 RX ADMIN — GABAPENTIN 300 MILLIGRAM(S): 400 CAPSULE ORAL at 18:16

## 2024-03-26 RX ADMIN — LACTULOSE 10 GRAM(S): 10 SOLUTION ORAL at 22:20

## 2024-03-26 RX ADMIN — Medication 1000 MILLIGRAM(S): at 14:57

## 2024-03-26 RX ADMIN — Medication 1 ENEMA: at 16:10

## 2024-03-26 RX ADMIN — Medication 1000 MILLIGRAM(S): at 15:57

## 2024-03-26 RX ADMIN — PANTOPRAZOLE SODIUM 40 MILLIGRAM(S): 20 TABLET, DELAYED RELEASE ORAL at 07:41

## 2024-03-26 RX ADMIN — PIPERACILLIN AND TAZOBACTAM 200 GRAM(S): 4; .5 INJECTION, POWDER, LYOPHILIZED, FOR SOLUTION INTRAVENOUS at 07:40

## 2024-03-26 RX ADMIN — PIPERACILLIN AND TAZOBACTAM 25 GRAM(S): 4; .5 INJECTION, POWDER, LYOPHILIZED, FOR SOLUTION INTRAVENOUS at 15:00

## 2024-03-26 RX ADMIN — DABIGATRAN ETEXILATE MESYLATE 150 MILLIGRAM(S): 150 CAPSULE ORAL at 18:16

## 2024-03-26 RX ADMIN — HYDROMORPHONE HYDROCHLORIDE 4 MILLIGRAM(S): 2 INJECTION INTRAMUSCULAR; INTRAVENOUS; SUBCUTANEOUS at 01:03

## 2024-03-26 RX ADMIN — Medication 1000 MILLIGRAM(S): at 05:23

## 2024-03-26 RX ADMIN — LACTULOSE 10 GRAM(S): 10 SOLUTION ORAL at 05:52

## 2024-03-26 RX ADMIN — Medication 100 GRAM(S): at 11:01

## 2024-03-26 RX ADMIN — Medication 1000 MILLIGRAM(S): at 22:20

## 2024-03-26 NOTE — DISCHARGE NOTE PROVIDER - NSDCCPCAREPLAN_GEN_ALL_CORE_FT
PRINCIPAL DISCHARGE DIAGNOSIS  Diagnosis: Acute UTI  Assessment and Plan of Treatment: You were treated for a urinary tract infection. We think your abdominal pain is related to your constipation. For your chronic back pain, you have appointmetns scheduled with Dr. Ott to discuss removal of the intrathecal pump. You also have follow-ups with neurosurgery, urology, and a geriatrician/primary care physician.      SECONDARY DISCHARGE DIAGNOSES  Diagnosis: Abdominal pain  Assessment and Plan of Treatment:      PRINCIPAL DISCHARGE DIAGNOSIS  Diagnosis: Acute UTI  Assessment and Plan of Treatment: You were treated for a urinary tract infection but we believe this is colonization. We think your abdominal pain is related to your constipation. For your chronic back pain, you have appointmetns scheduled with Dr. Ott to discuss removal of the intrathecal pump. You also have follow-ups with neurosurgery, urology, and a geriatrician/primary care physician.      SECONDARY DISCHARGE DIAGNOSES  Diagnosis: Abdominal pain  Assessment and Plan of Treatment:

## 2024-03-26 NOTE — DIETITIAN INITIAL EVALUATION ADULT - PROBLEM SELECTOR PLAN 1
Pt with chronic jiménez, multiple ED visits for UTI. Was discharged from ED on cefuroxime started on 3/15. Ucx from 3/15 showing pseudomonas sensitive to levaquin. Per NH papers, appears to have been started on Levaquin on 3/18.   UTI grossly positive in ED on current admission. Jiménez exchanged in ED with cloudy urine.  - continue zosyn for now  - obtain repeat urine studies, ucx

## 2024-03-26 NOTE — CHART NOTE - NSCHARTNOTEFT_GEN_A_CORE
Medications included in paperwork from [place] [facility].    - tylenol   - ASA 81  - lipitor 20  - dabigatran 150 bid  - duloxetine 60 qd  - gabapentin 300 bid  - levaquin 750 qd 3/18-4/1/24  - lupristone 24 mcg bid for opiod pain causing severe constipation  - m etformin 500 qd  - toprol 25 qd  - omeprazole 20 qd  - oxy 15 q4 prn, administer with benadryl 25 by mouth  - miralax qd  - entresto 24-26 bid  - senna Medications included in paperwork from [place] [facility].    - tylenol   - ASA 81  - lipitor 20  - dabigatran 150 bid  - duloxetine 60 qd  - gabapentin 300 bid  - levaquin 750 qd 3/18-4/1/24  - lupristone 24 mcg bid for opiod pain causing severe constipation  - m etformin 500 qd  - toprol 25 qd  - omeprazole 20 qd  - oxy 15 q4 prn, administer with benadryl 25 by mouth  - miralax qd  - entresto 24-26 bid  - senna  - bisacodyl suppositorn prn  - benadryl prn  - duonebs prn

## 2024-03-26 NOTE — DISCHARGE NOTE PROVIDER - NSDCCPTREATMENT_GEN_ALL_CORE_FT
PRINCIPAL PROCEDURE  Procedure: CT abdomen pelvis  Findings and Treatment: MPRESSION:  1.  Feces distended rectum with presacral edema, similar to prior exam   from 2/27/2024. Early stercoral colitis is not excluded.  2.  Urinary bladder wall thickening. Correlate with cystitis.  3.  Moderate/large colonic stool burden.       PRINCIPAL PROCEDURE  Procedure: CT abdomen pelvis  Findings and Treatment: 1.  Feces distended rectum with presacral edema, similar to prior exam   from 2/27/2024. Early stercoral colitis is not excluded.  2.  Urinary bladder wall thickening. Correlate with cystitis.  3.  Moderate/large colonic stool burden.        SECONDARY PROCEDURE  Procedure: CT lumbar spine  Findings and Treatment: Partial visualization of previously seen extensive posterior   cervicothoracic lumbosacral iliac fusion construct extending from   superior extent of the thoracic spine CT (lower cervical region) to the   bilateral sacral guadalupe and iliac wings.  Hardware is essentially well-placed. No evidence for hardware fracture or   loosening.  Redemonstration of multilevel thoracic and lumbar laminectomies.  Intrathecal catheter is seen to enter the spinal canal posteriorly at the   L4 level and is looped at the L4 level. The intrathecal catheter extends   superiorly to the L1-L2 level. The intrathecal position of the catheter   is similar to CT abdomen and pelvis from 2/27/2024.  Similar appearing sclerotic and erosive changes involving the T12 and L1   vertebral bodies and adjoining endplates.  Evaluation of the spinal canal contents is limited      Procedure: CT head  Findings and Treatment: 1.  No hemodynamically significant large vessel stenosis or occlusion in   the head and neck.  2.  Few left thyroid nodules measuring up to 2.0 cm. Outpatient follow-up   with thyroid ultrasound is suggested.

## 2024-03-26 NOTE — DISCHARGE NOTE PROVIDER - NSDCFUSCHEDAPPT_GEN_ALL_CORE_FT
Kacey Jeter  [facility] Physician [facility]  HEARTVASC [address]  Scheduled Appointment: 04/29/2024     Patrice Osorio  [facility] Physician [facility]  SPINECTR [address]  Scheduled Appointment: 04/19/2024    Kacey Jeter  [facility] Physician [facility]  HEARTVASC [address]  Scheduled Appointment: 04/29/2024     Claudia Quintana  [facility]  UROLOGY [address]  Scheduled Appointment: 04/08/2024    Patrice Osorio  [facility]  SPINECTR [address]  Scheduled Appointment: 04/19/2024    Kacey Jeter  [facility]  HEARTVASC [address]  Scheduled Appointment: 04/29/2024     Claudia Quintana  [facility]  UROLOGY [address]  Scheduled Appointment: 04/08/2024    [facility]  GERIATRICS [address]  Scheduled Appointment: 04/09/2024    Patrice Osorio  [facility]  SPINECTR [address]  Scheduled Appointment: 04/19/2024    Farrah Ott  [facility]  NEUROSURG [address]  Scheduled Appointment: 04/23/2024    Kacey Jeter  [facility]  HEARTVASC [address]  Scheduled Appointment: 04/29/2024

## 2024-03-26 NOTE — H&P ADULT - PROBLEM SELECTOR PLAN 7
- continue home atorvastatin 20mg daily - continue home Toprol 25mg daily  - continue home Entresto 24-26 BID

## 2024-03-26 NOTE — H&P ADULT - NSHPLABSRESULTS_GEN_ALL_CORE
LABS:                         12.0   6.91  >-----< 378           ( 24 @ 21:33 )             37.7       138  |  104  |   11  ----------------------< 113    (24 @ 21:33)     4.0  |  25  |  0.49    Anion Gap: 9    Ca   10.0   (24 @ 21:33)  Mg   x    (24 @ 21:33)  Phos x    (24 @ 21:33)       TP 10.0     |  AST 3.9    -------------------------     Alb x     |  ALT x               (24 @ 21:33)  -------------------------     T-bili 3.9  |  AlkPh 90    D-bili x   COAGULATION STUDIES:     aPTT  45.8 sec    (24 @ 21:33)     PT     13.3 sec    (24 @ 21:33)     INR    1.17          (24 @ 21:33)     POCT Blood Glucose.: 119 mg/dL (24 @ 20:50)    Urinalysis Basic - ( 25 Mar 2024 22:02 )    Color: Dark Yellow / Appearance: Turbid / S.030 / pH: x  Gluc: x / Ketone: Trace mg/dL  / Bili: Negative / Urobili: 1.0 mg/dL   Blood: x / Protein: 100 mg/dL / Nitrite: Positive   Leuk Esterase: Moderate / RBC: 276 /HPF /  /HPF   Sq Epi: x / Non Sq Epi: 13 /HPF / Bacteria: Moderate /HPF      I/O SUMMARY:

## 2024-03-26 NOTE — PATIENT PROFILE ADULT - FALL HARM RISK - HARM RISK INTERVENTIONS

## 2024-03-26 NOTE — DIETITIAN INITIAL EVALUATION ADULT - PROBLEM SELECTOR PLAN 4
- continue home Tylenol 650mg q6h  - continue Cymbalta 60mg daily  - continue gabapentin 300mg BID  - continue oxycodone 15mg q4h PRN for pain         - HOLDING Benadryl 25mg q4h PRN with oxycodone (given anticholinergic effects)

## 2024-03-26 NOTE — H&P ADULT - PROBLEM SELECTOR PLAN 3
- continue home Tylenol 650mg q6h  - continue Cymbalta 60mg daily  - continue gabapentin 300mg BID - continue home Tylenol 650mg q6h  - continue Cymbalta 60mg daily  - continue gabapentin 300mg BID  - continue oxycodone 15mg q4h PRN for pain         - HOLDING Benadryl 25mg q4h PRN with oxycodone (given anticholinergic effects) Chronic constipation ISO heavy opioid use for chronic pain. Takes home lubiprostone 24mcg BID.  CT abdomen/pelvis showing abundant stool burden. Bilateral lower quadrant abdominal pain on exam.  - s/p enema in ED  - lactulose 10g once  - senna/miralax BID

## 2024-03-26 NOTE — DIETITIAN INITIAL EVALUATION ADULT - ADD RECOMMEND
1. Recommend to liberalize diet to regular  2. Encourage pt to meet nutritional needs as able   3. Monitor labs: electrolytes, cmp  4. Monitor weights   5. Pain and bowel regimen per team   6. Will continue to assess/honor food preferences as able

## 2024-03-26 NOTE — H&P ADULT - NSHPPHYSICALEXAM_GEN_ALL_CORE
General: in no acute distress  Eyes: EOMI intact bilaterally. Anicteric sclerae, moist conjunctivae  HENT: Moist mucous membranes  Neck: Trachea midline, supple  Lungs: CTA B/L. No wheezes, rales, or rhonchi  Cardiovascular: RRR. No murmurs, rubs, or gallops  Abdomen: Soft, non-tender non-distended; No rebound or guarding  Extremities: WWP, No clubbing, cyanosis or edema  MSK: No midline bony tenderness. No CVA tenderness bilaterally  Neurological: Alert and oriented x3  Skin: Warm and dry. No obvious rash General: intermittently in mild distress due to pain  Eyes: EOMI intact bilaterally. Anicteric sclerae, moist conjunctivae  HENT: Moist mucous membranes  Neck: Trachea midline, supple  Lungs: CTA B/L. No wheezes, rales, or rhonchi  Cardiovascular: RRR. No murmurs, rubs, or gallops  Abdomen: Soft, non-distended; No rebound or guarding. + TTP bilateral lower quadrants to palpation, most pronounced at RLQ where inactive intrathecal morphine pump lies. +BS  Extremities: WWP, No clubbing, cyanosis or edema. 0/5 strength in BL LE. No sensation in RLE. No sensation distal to L knee.  Neurological: Alert and oriented x3  Skin: Warm and dry. No obvious rash

## 2024-03-26 NOTE — DISCHARGE NOTE PROVIDER - PROVIDER TOKENS
PROVIDER:[TOKEN:[[id number]],FOLLOWUP:[2 weeks]] PROVIDER:[TOKEN:[[id number]],FOLLOWUP:[2 weeks]],PROVIDER:[TOKEN:[[id number]],FOLLOWUP:[2 weeks]] PROVIDER:[TOKEN:[[id number]],FOLLOWUP:[2 weeks]],PROVIDER:[TOKEN:[[id number]],SCHEDULEDAPPT:[04/19/2024],SCHEDULEDAPPTTIME:[11:00 AM]] PROVIDER:[TOKEN:[[id number]],SCHEDULEDAPPT:[04/19/2024],SCHEDULEDAPPTTIME:[11:00 AM]],PROVIDER:[TOKEN:[[id number]],SCHEDULEDAPPT:[04/08/2024],SCHEDULEDAPPTTIME:[03:20 PM]] PROVIDER:[TOKEN:[[id number]],SCHEDULEDAPPT:[04/19/2024],SCHEDULEDAPPTTIME:[11:00 AM]],PROVIDER:[TOKEN:[[id number]],SCHEDULEDAPPT:[04/08/2024],SCHEDULEDAPPTTIME:[03:20 PM]],PROVIDER:[TOKEN:[[id number]],SCHEDULEDAPPT:[04/09/2024],SCHEDULEDAPPTTIME:[02:00 PM]]

## 2024-03-26 NOTE — H&P ADULT - NSHPSOCIALHISTORY_GEN_ALL_CORE
Former smoker (x55 years). Denies alcohol or illicit drug use.    Coming from [facility]. Previously living alone in apartment on [address]. Ambulates with motorized wheelchair.

## 2024-03-26 NOTE — DIETITIAN INITIAL EVALUATION ADULT - PERTINENT MEDS FT
MEDICATIONS  (STANDING):  acetaminophen     Tablet .. 1000 milliGRAM(s) Oral every 8 hours  dabigatran 150 milliGRAM(s) Oral every 12 hours  DULoxetine 60 milliGRAM(s) Oral daily  gabapentin 300 milliGRAM(s) Oral two times a day  influenza  Vaccine (HIGH DOSE) 0.7 milliLiter(s) IntraMuscular once  lactulose Syrup 10 Gram(s) Oral three times a day  metoprolol succinate ER 25 milliGRAM(s) Oral daily  oxyCODONE    IR 15 milliGRAM(s) Oral once  pantoprazole    Tablet 40 milliGRAM(s) Oral before breakfast  piperacillin/tazobactam IVPB.. 4.5 Gram(s) IV Intermittent every 8 hours  potassium chloride   Powder 40 milliEquivalent(s) Oral every 2 hours    MEDICATIONS  (PRN):  aluminum hydroxide/magnesium hydroxide/simethicone Suspension 30 milliLiter(s) Oral every 4 hours PRN Dyspepsia  diphenhydrAMINE 25 milliGRAM(s) Oral once PRN Rash and/or Itching

## 2024-03-26 NOTE — DIETITIAN INITIAL EVALUATION ADULT - OTHER INFO
69 year old female with HTN, HLD, admitted for UTI and abdominal pain. Patient seen at bedside for nutrition assessment. Attempted to speak with patient, however requested for me to leave her alone. Subjective information obtained from chart. Current diet order: DASH/TLC. Allergic to crab. No difficulty chewing/swallowing noted. Observed breakfast tray untouched. Dosing weight: 168 pounds, BMI 26.3. Noted with stage 4 pressure ulcer to sacrum. No edema documented. No GI issues documented. Labs: potassium low (3.2), Cr low (0.43). Meds: antibiotic, lactulose, protonix, potassium chloride. Observed with no overt signs and symptoms of muscle or fat wasting, however full NFPE limited at this time. See nutrition recommendations below.

## 2024-03-26 NOTE — H&P ADULT - PROBLEM SELECTOR PLAN 5
- PT/OT eval Reportedly takes dabigatran 150mg BID. Pt unsure of indication.  - formal med rec in AM

## 2024-03-26 NOTE — DISCHARGE NOTE PROVIDER - NSDCFUADDAPPT_GEN_ALL_CORE_FT
Urology follow-up with either Dr. Analy Blackwood or Dr. Claudia Quintana for history of urinary retention, with jiménez, unable to perform clean intermittent straight catheterization.  Urology follow-up with either Dr. Analy Blackwood or Dr. Claudia Quintana for history of urinary retention, with jiménez, unable to perform clean intermittent straight catheterization.     Please bring your Insurance card, Photo ID and Discharge paperwork to the following appointment:    (1) Please follow up with your Neurosurgery Provider, Dr. Patrice Osorio at [address] [place] [address], [address] [place] [address] on 4/19/2024 at 11:00am.    Appointment was scheduled by Ms. ROD Chacko, Referral Coordinator.   Urology follow-up with either Dr. Analy Blackwood or Dr. Claudia Quintana for history of urinary retention, with jiménez, unable to perform clean intermittent straight catheterization.     Please bring your Insurance card, Photo ID and Discharge paperwork to the following appointments:    (1) Please follow up with your Urology Provider, Dr. Claudia Quintana at [address] [address] on 4/8/2024 at 3:20pm.    Appointment was scheduled by Ms. ROD Chacko, Referral Coordinator.    (2) Please follow up with your Neurosurgery Provider, Dr. Patrice Osorio at [address], [address] [place] [id number] on 4/19/2024 at 11:00am.    Appointment was scheduled by Ms. ROD Chacko, Referral Coordinator.   Please bring your Insurance card, Photo ID and Discharge paperwork to the following appointments:    (1) Please follow up with your Urology Provider, Dr. Claudia Quintana at [address] [address] on 4/8/2024 at 3:20pm.    Appointment was scheduled by Ms. ROD Chacko, Referral Coordinator.    (2) Please follow up with your Neurosurgery Provider, Dr. Patrice Osorio at [address] [place] [address], [address] [place] [id number] on 4/19/2024 at 11:00am.    Appointment was scheduled by Ms. ROD Chacko, Referral Coordinator.

## 2024-03-26 NOTE — DIETITIAN INITIAL EVALUATION ADULT - PERTINENT LABORATORY DATA
03-26    140  |  105  |  8   ----------------------------<  136<H>  3.2<L>   |  23  |  0.43<L>    Ca    9.6      26 Mar 2024 07:13  Phos  4.0     03-26  Mg     1.7     03-26    TPro  7.2  /  Alb  3.9  /  TBili  0.3  /  DBili  x   /  AST  13  /  ALT  8<L>  /  AlkPhos  90  03-25  POCT Blood Glucose.: 119 mg/dL (03-25-24 @ 20:50)  A1C with Estimated Average Glucose Result: 7.0 % (12-04-23 @ 21:00)

## 2024-03-26 NOTE — H&P ADULT - PROBLEM SELECTOR PLAN 12
F: Tolerating PO, no IVF  E: Replete K<4, Mg<2  N: DASH  VTE Ppx: dabigatran 150mg BID (formal med rec)  GI: Protonix 40mg daily    Problem: Nutrition, metabolism, and development symptoms

## 2024-03-26 NOTE — H&P ADULT - ASSESSMENT
69F PMH chronic neck/back pain (s/p multiple spinal fusions, inactive intrathecal morphine pump), bilateral LE paralysis, HTN, HLD, CVA x3 (MR brain 1/23 with chronic lacunar infarcts of the left corona radiata, left putamen and left cerebellar hemisphere), hx PE in 2023 (Eliquis for 3-6 months), emphysema, neurogenic bladder (s/p chronic jiménez), multiple UTI, multiple ED visits for abdominal pain presenting with transient RUE weakness since yesterday (now resolved) and abdominal pain.

## 2024-03-26 NOTE — DISCHARGE NOTE PROVIDER - NSDCMRMEDTOKEN_GEN_ALL_CORE_FT
acetaminophen 500 mg oral tablet: 2 tab(s) orally every 8 hours As needed Temp greater or equal to 38.5C (101.3F), Mild Pain (1 - 3)  albuterol 90 mcg/inh inhalation aerosol: 2 puff(s) inhaled every 6 hours, As Needed  apixaban 5 mg oral tablet: 1 tab(s) orally every 12 hours  aspirin 81 mg oral delayed release tablet: 1 tab(s) orally once a day  bisacodyl 5 mg oral delayed release tablet: 1 tab(s) orally once a day (at bedtime)  cefuroxime 500 mg oral tablet: 1 tab(s) orally 2 times a day  diazePAM 5 mg oral tablet: 1 tab(s) orally every 8 hours as needed for  muscle spasm  DULoxetine 60 mg oral delayed release capsule: 1 cap(s) orally once a day  gabapentin 800 mg oral tablet: 1 tab(s) orally 3 times a day  Intrathecal Morphine pump: 20 milligram(s) intrathecal, 1mg/1mL intrathecal, continuous pump. daily dose 0.5283 mg/day (0.022mg/hr) , next expected refill date 1/29/24.  lidocaine 4% topical film: Apply topically to affected area once a day to left shoulder  MetFORMIN (Eqv-Glumetza) 500 mg oral tablet, extended release: 1 tab(s) orally 2 times a day  midodrine 5 mg oral tablet: 1 tab(s) orally every 12 hours 6am, 6pm, hold for SBP&gt;100  Movantik 25 mg oral tablet: 1 tab(s) orally once a day (in the morning)  oxyCODONE 15 mg oral tablet: 1 tab(s) orally every 4 hours As needed Moderate Pain (4 - 6)  oxyCODONE 30 mg oral tablet: 1 tab(s) orally every 4 hours As needed Severe Pain (7 - 10)  pantoprazole 40 mg oral delayed release tablet: 1 tab(s) orally once a day (before a meal)  polyethylene glycol 3350 oral powder for reconstitution: 17 gram(s) orally 2 times a day  senna leaf extract oral tablet: 2 tab(s) orally once a day (at bedtime)  simvastatin 40 mg oral tablet: 1 tab(s) orally once a day (at bedtime)  Vitamin D3 50 mcg (2000 intl units) oral tablet: 1 tab(s) orally once a day   acetaminophen 500 mg oral tablet: 2 tab(s) orally every 8 hours As needed Temp greater or equal to 38.5C (101.3F), Mild Pain (1 - 3)  albuterol 90 mcg/inh inhalation aerosol: 2 puff(s) inhaled every 6 hours, As Needed  aspirin 81 mg oral delayed release tablet: 1 tab(s) orally once a day  bisacodyl 5 mg oral delayed release tablet: 1 tab(s) orally once a day (at bedtime)  celecoxib 200 mg oral capsule: 1 cap(s) orally every 24 hours  diazePAM 5 mg oral tablet: 1 tab(s) orally every 8 hours as needed for  muscle spasm  diphenhydrAMINE 25 mg oral capsule: 1 cap(s) orally every 4 hours As needed Administer with oxy  DULoxetine 60 mg oral delayed release capsule: 1 cap(s) orally once a day  gabapentin 800 mg oral tablet: 1 tab(s) orally 3 times a day  MetFORMIN (Eqv-Glumetza) 500 mg oral tablet, extended release: 1 tab(s) orally 2 times a day  metoprolol succinate 25 mg oral tablet, extended release: 1 tab(s) orally once a day  Movantik 25 mg oral tablet: 1 tab(s) orally once a day (in the morning)  oxyCODONE 15 mg oral tablet: 1 tab(s) orally every 4 hours As needed Moderate Pain (4 - 6)  oxyCODONE 30 mg oral tablet: 1 tab(s) orally every 4 hours As needed Severe Pain (7 - 10)  pantoprazole 40 mg oral delayed release tablet: 1 tab(s) orally once a day (before a meal)  polyethylene glycol 3350 oral powder for reconstitution: 17 gram(s) orally 2 times a day  Pradaxa 150 mg oral capsule: 1 cap(s) orally 2 times a day  senna leaf extract oral tablet: 2 tab(s) orally once a day (at bedtime)  simvastatin 40 mg oral tablet: 1 tab(s) orally once a day (at bedtime)  Vitamin D3 50 mcg (2000 intl units) oral tablet: 1 tab(s) orally once a day

## 2024-03-26 NOTE — DIETITIAN INITIAL EVALUATION ADULT - PROBLEM SELECTOR PLAN 2
History of CVA x3 (MR brain 1/23 with chronic lacunar infarcts of the left corona radiata, left putamen and left cerebellar hemisphere)  - continue ASA 81mg daily    #RUE weakness  Presented with RUE weakness yesterday, resolved in ED. Stroke consulted in ED, CTH and CTA head/neck unyielding.  - stroke consulted, appreciate recs           - recommend patient continue home antiplatelet/AC regimen           - please have patient call our office at [phone] to set up an outpatient follow up appt given stroke history           - if patient admitted, consider spine imaging for paresthesias as sensation changes spared the face and patient with extensive spinal surgeries

## 2024-03-26 NOTE — H&P ADULT - PROBLEM SELECTOR PLAN 1
- continue zosyn for now  - obtain repeat urine studies, ucx Pt with chronic jiménez, multiple ED visits for UTI. Was discharged from ED on cefuroxime started on 3/15. Ucx from 3/15 showing pseudomonas sensitive to levaquin. Per NH papers, appears to have been started on Levaquin on 3/18.   UTI grossly positive in ED on current admission. Jiménez exchanged in ED with cloudy urine.  - continue zosyn for now  - obtain repeat urine studies, ucx

## 2024-03-26 NOTE — ED PROVIDER NOTE - CLINICAL SUMMARY MEDICAL DECISION MAKING FREE TEXT BOX
70 yo M h/o bilateral lower extremity paralysis, HTN, HLD, CVA x 3 (MRI brain from 1/23 demonstrated chronic lacunar infarcts of the left corona radiata, left putamen and left cerebellar hemisphere), PE in 2023 (was prescribed Eliquis for 3-6 months), emphysema, neurogenic bladder s/p chronic jiménez, chronic neck/back pain s/p multiple spinal fusions s/p intrathecal morphine pump, multiple UTI's, multiple ED visits for abdominal pain (noted to have bleeding with digital rectal exam on prior ED visits) here for RUE weakness since yesterday (now resolved) and abdominal pain. Recently treated for UTI, then abd pain returned. Stated RUE weakness yesterday and this morning, now resolved. No vomiting or diarrhea. Has indwelling jiménez. tachcyardic, afebrile uncomfortable appearing, 4/5 RUE strength (baseline, lower extremity paralysis, diffuse abd tenderness, jiménez with dirty urine. Labs around baseline, UA grossly positive. jiménez exchanged. Given CTX. Given multiple rounds of opioid medications. Head CT/CTA H/N neg for acute process. Seen by Neuro for TIA, recommend continue with ASA and AC, no need for MRI. pending CT a/p then will admit for recurrent UTi and pain control. 68 yo M h/o bilateral lower extremity paralysis, HTN, HLD, CVA x 3 (MRI brain from 1/23 demonstrated chronic lacunar infarcts of the left corona radiata, left putamen and left cerebellar hemisphere), PE in 2023 (was prescribed Eliquis for 3-6 months), emphysema, neurogenic bladder s/p chronic jiménez, chronic neck/back pain s/p multiple spinal fusions s/p intrathecal morphine pump, multiple UTI's, multiple ED visits for abdominal pain (noted to have bleeding with digital rectal exam on prior ED visits) here for RUE weakness since yesterday (now resolved) and abdominal pain. Recently treated for UTI, then abd pain returned. Stated RUE weakness yesterday and this morning, now resolved. No vomiting or diarrhea. Has indwelling jiménez. tachcyardic, afebrile uncomfortable appearing, 4/5 RUE strength (baseline, lower extremity paralysis, diffuse abd tenderness, jiménez with dirty urine. Labs around baseline, UA grossly positive. jiménez exchanged. Given CTX. Given multiple rounds of opioid medications. Head CT/CTA H/N neg for acute process. Seen by Neuro for TIA, recommend continue with ASA and AC, no need for MRI. pending CT a/p then will admit for recurrent UTi and pain control.    CT a/p with large burden of stool similar to prior, also with cystitis. Fleet enema ordered. Last UCx actually sensitive to Zosyn, so ordered. Will admit for intractable abd pain and recurrent UTI.

## 2024-03-26 NOTE — CONSULT NOTE ADULT - ASSESSMENT
Physical Therapy Evaluation    Visit Type: Initial Evaluation  Visit: 1  Referring Provider: Abhishek Phillip MD  Medical Diagnosis (from order): M25.512 - Left shoulder pain  S42.252D - Displaced fracture of greater tuberosity of left humerus with routine healing   Treatment Diagnosis: left shoulder - increased pain/symptoms, impaired range of motion, impaired strength and impaired activity tolerance.  Onset  - Date of onset: 8/24/2023  Chart reviewed at time of initial evaluation (relevant co-morbidities, allergies, tests and medications listed):   Colon CA      SUBJECTIVE                                                                                                               DOI was 8/24/23. Pt states she slipped in the shower and had a displaced fracture of the L humerus.  Pt states she is R handed.  Pt started OT on 9-28-23 and had 10 visits.  Pt states she is still in significant pain.  She is gaining some use of the arm however is still limited.     Pain / Symptoms  - Pain/symptom is: constant  - Pain rating (out of 10): ; Best: 4; Worst: 8  - Location: L anterior shoulder/chest and upper arm  - Quality / Description: sharp, burning     - Tingling into fingers  - Alleviating Factors: rest, over-the-counter medication  - Progression since onset: improved    Function:   Limitations / Exacerbation Factors:   - Patient reports pain and difficulty with function reported below.  - upper body dressing, grooming/hygiene/self-care activities, sleep disturbed, reaching and lifting/carrying  Prior Level of Function: no limitation in involved extremity,    Patient Goals: increased motion, increased strength, decreased pain and independence with ADLs/IADLs.    Prior treatment  - outpatient OT  - Discharged from hospital, home health, or skilled nursing facility in last 30 days: no  Home Environment   - Patient lives with: sibling(s)  - Type of home: multiple level home  - Assistance available: consistent  - Denies 2  or more falls or an unexplained fall with injury in the last year.  - Feel safe at home / work / school: yes      OBJECTIVE                                                                                                                     Range of Motion (ROM)   (degrees unless noted; active unless noted; norms in ( ); negative=lacking to 0, positive=beyond 0)  Shoulder:    - Flexion (180):         Left:148  Pain     - Extension (50):         Left: WNL, pain    - Abduction (180):         Left: 131 pain    - Internal Rotation:          Left: pain        - Behind Back:            Left: reaches to mid lumbar spine    - External Rotation:       - Behind the Head:            Left: reaches to back of head     Strength  (out of 5 unless noted, standard test position unless noted)   Shoulder:     - Flexion:          Left: pain, 3+     - Extension:          Left: 4+    - Abduction:         Left: 3+, pain    - Internal Rotation:           Left (at 0°): 4    - External Rotation:          Left (at 0°): 4  Elbow/Forearm:    - Flexion:         Left: 4    - Extension:         Left: 4-           Special Tests  Shoulder: Tendon/Rotator Cuff  - Coracoid Impingement Test:  Left: positive  - Crossover Impingement:  Left: positive  - Empty Can:  Left: positive  - Lift Off Sign:  Left: positive  - Yergason's:  Left: negative            Outcome/Assessments  Outcome Measures:   Quick Disabilities of the Arm, Shoulder and Hand: QuickDash Total Score (Score will not calculate if more then 2 questions are left blank): 72.73  (scored 0-100; a higher score indicates greater disability) see flowsheet for additional documentation          Treatment    Un-attended Electrical Stimulation (70972/): Interferential Current  - Purpose: pain relief  - Location: L shoulder  - Position: sitting  - Pulse Rate:  Hz  - Intensity: patient comfort  - Delivered via: pads  - In conjunction with: moist hot pack  - Duration: 15 minutes    Reaction: no  adverse reaction to treatment      Therapeutic Exercise  UBE 1 min CW/CCW, no resistance  Rows yellow 20x  Shoulder extension yellow 20x  Shoulder add yellow 20x  Shoulder IR yellow 20x  Shoulder external rotation yellow 20x  Bicep curl 2# 20x  Forward raise to shoulder height 1# 10x1  Scaption raise to shoulder height 1# 10x1  Butterfly stretch 10x  IR stretch behind back with dowel jasmine 10x  OH stretch into flexion with 1# bar 10x  Abduction stretch with 1# bar in standing 10x      Home Exercise Program  Pt currently I with HEP from OT instruction      ASSESSMENT                                                                                                          66 year old patient has reported functional limitations listed above impacted by signs and symptoms consistent with treatment diagnosis below.  Treatment Diagnosis:   - Involved: left shoulder.  - Symptoms/impairments: increased pain/symptoms, impaired range of motion, impaired strength and impaired activity tolerance.    Pt has been referred to PT with L shoulder pain and limited motion and strength.  Pt has shown progress with OT and ROM/strength is slowly improving however pain level continues to remain around an 8/10.  Pt is + for some RTC impingement and presents with a shoulder hike with elevation of her UE.  Will continue to attempt to progress pt however if pain continues to be her limiting factor will refer back to MD    Prognosis: Patient will benefit from skilled therapy.  Rehabilitative potential is: good.  Predicted patient presentation: Low (stable) - Patient comorbidities and complexities, as defined above, will have little effect on progress for prescribed plan of care.  Education:   - Present and ready to learn: patient    PLAN                                                                                                                         The following skilled interventions to be implemented to achieve goals listed  {\rtf1\wfdmet65540\ansi\vcjscyq2114\ftnbj\uc1\deff0  {\fonttbl{\f0 \fnil Segoe UI;}{\f1 \fnil \fcharset0 Segoe UI;}{\f2 \fnil Times New Tang;}}  {\colortbl ;\xxc872\ixubp214\sjkb207 ;\red0\green0\blue0 ;\red0\green0\hlvt486 ;\red0\green0\blue0 ;}  {\stylesheet{\f0\fs20 Normal;}{\cs1 Default Paragraph Font;}{\cs2\f0\fs16 Line Number;}{\cs3\f2\fs24\ul\cf3 Hyperlink;}}  {\*\revtbl{Unknown;}}  \rddckd99903\lnftcv25608\gxqee0883\elbcg9539\earix3845\wgahw6788\cgmrbax521\jowxnsr721\nogrowautofit\rhrork192\formshade\nofeaturethrottle1\dntblnsbdb\fet4\aendnotes\aftnnrlc\pgbrdrhead\pgbrdrfoot  \sectd\nnocdv98524\rvxudk29135\guttersxn0\msbccsxz0563\cknpcbak5263\iutsorsw4230\dodjtdqi3813\lzltuvr257\irifbhd001\sbkpage\pgncont\pgndec  \plain\plain\f0\fs24\ql\plain\f0\fs24\plain\f0\fs20\sqnq0076\hich\f0\dbch\f0\loch\f0\fs20\par  I M\par  \par  69 y o F PMH chronic neck/back pain (s/p multiple spinal fusions, inactive intrathecal morphine pump), bilateral LE paralysis, HTN, HLD, CVA x3 (MR brain 1/23 with chronic lacunar infarcts of the left corona radiata, left putamen and left cerebellar hemisphere),   hx PE in 2023 (Eliquis for 3-6 months), emphysema, neurogenic bladder (s/p chronic jiménez), multiple UTI, multiple ED visits for abdominal pain presenting with transient RUE weakness since yesterday (now resolved) and abdominal pain.\par  \par  \plain\f1\fs20\fsws8834\hich\f1\dbch\f1\loch\f1\cf2\fs20\ul{\field{\*\fldinst HYPERLINK [id number] }{\fldrslt Problem/Plan - 1:}}\plain\f0\fs20\huzx6672\hich\f0\dbch\f0\loch\f0\fs20\ql\par  \'b7  {\*\bkmkstart qm97921608854}{\*\bkmkend xt89670435484}Problem: {\*\bkmkstart ql41968676930}{\*\bkmkend wd97103100797}UTI (urinary tract infection). \par  \'b7  {\*\bkmkstart yb00345236133}{\*\bkmkend qr19373947783}Plan: {\*\bkmkstart lp99552052987}{\*\bkmkend vu64485197604}Pt with chronic jiménez, multiple ED visits for UTI. Was discharged from ED on cefuroxime started on 3/15. Ucx from 3/15 showing pseudomonas   sensitive to levaquin. Per NH papers, appears to have been started on Levaquin on 3/18. \par  UTI grossly positive in ED on current admission. Jiménez exchanged in ED with cloudy urine.\par  - continue zosyn for now\par  - obtain repeat urine studies, ucx.\par  \par  \plain\f1\fs20\yshn7652\hich\f1\dbch\f1\loch\f1\cf2\fs20\ul{\field{\*\fldinst HYPERLINK [id number] }{\fldrslt Problem/Plan - 2:}}\plain\f0\fs20\bwbt6577\hich\f0\dbch\f0\loch\f0\fs20\ql\par  \'b7  {\*\bkmkstart mf68800164201}{\*\bkmkend uv49271019116}Problem: {\*\bkmkstart ss97786281862}{\*\bkmkend we22854859056}CVA (cerebrovascular accident).\plain\f1\fs20\vtlg9479\hich\f1\dbch\f1\loch\f1\cf2\fs20\strike\plain\f0\fs20\uion5956\hich\f0\dbch\f0\loch\f0\fs20\par  \'b7  {\*\bkmkstart eo25035261099}{\*\bkmkend ma74019730054}Plan: {\*\bkmkstart jd75845415369}{\*\bkmkend av85316028357}History of CVA x3 (MR brain 1/23 with chronic lacunar infarcts of the left corona radiata, left putamen and left cerebellar hemisphere)\par  - continue ASA 81mg daily\par  \par  #RUE weakness\par  Presented with RUE weakness yesterday, resolved in ED. Stroke consulted in ED, CTH and CTA head/neck unyielding.\par  - stroke consulted, appreciate recs\par           - recommend patient continue home antiplatelet/AC regimen\par           - please have patient call our office at [phone] to set up an outpatient follow up appt given stroke history\par           - if patient admitted, consider spine imaging for paresthesias as sensation changes spared the face and patient with extensive spinal surgeries.\plain\f1\fs20\qttj5288\hich\f1\dbch\f1\loch\f1\cf2\fs20\strike\plain\f0\fs20\tkeq9471\hich\f0\dbch\f0\loch\f0\fs20\par  \par  \plain\f1\fs20\ewdk5685\hich\f1\dbch\f1\loch\f1\cf2\fs20\ul{\field{\*\fldinst HYPERLINK [id number] }{\fldrslt Problem/Plan - 3:}}\plain\f0\fs20\dhfr6525\hich\f0\dbch\f0\loch\f0\fs20\ql\par  \'b7  {\*\bkmkstart tc78943398653}{\*\bkmkend zo38056413660}Problem: {\*\bkmkstart dz60624825802}{\*\bkmkend cm03672471230}Constipation.\plain\f1\fs20\grwk4433\hich\f1\dbch\f1\loch\f1\cf2\fs20\strike\plain\f0\fs20\onpp1409\hich\f0\dbch\f0\loch\f0\fs20\par  \'b7  {\*\bkmkstart nl71399194934}{\*\bkmkend dg27180643205}Plan: {\*\bkmkstart xg01331013380}{\*\bkmkend eh53918532292}Chronic constipation ISO heavy opioid use for chronic pain. Takes home lubiprostone 24mcg BID.\par  CT abdomen/pelvis showing abundant stool burden. Bilateral lower quadrant abdominal pain on exam.\par  - s/p enema in ED\par  - lactulose 10g once\par  - senna/miralax BID.\plain\f1\fs20\htbg5025\hich\f1\dbch\f1\loch\f1\cf2\fs20\strike\plain\f0\fs20\xyvl0951\hich\f0\dbch\f0\loch\f0\fs20\par  \par  \plain\f1\fs20\ihvm8520\hich\f1\dbch\f1\loch\f1\cf2\fs20\ul{\field{\*\fldinst HYPERLINK [id number] }{\fldrslt Problem/Plan - 4:}}\plain\f0\fs20\lyxw9065\hich\f0\dbch\f0\loch\f0\fs20\ql\par  \'b7  {\*\bkmkstart zr59837765425}{\*\bkmkend bn87825193759}Problem: {\*\bkmkstart eh60307118653}{\*\bkmkend vi93484412979}Chronic neck and back pain.\plain\f1\fs20\zpou7580\hich\f1\dbch\f1\loch\f1\cf2\fs20\strike\plain\f0\fs20\pjyf6442\hich\f0\dbch\f0\loch\f0\fs20\par  \'b7  {\*\bkmkstart vp72909252775}{\*\bkmkend om47972990370}Plan: {\*\bkmkstart gq12688833221}{\*\bkmkend uw50249398196}- continue home Tylenol 650mg q6h\par  - continue Cymbalta 60mg daily\par  - continue gabapentin 300mg BID\par  - continue oxycodone 15mg q4h PRN for pain\par         - HOLDING Benadryl 25mg q4h PRN with oxycodone (given anticholinergic effects).\plain\f1\fs20\cxwr1788\hich\f1\dbch\f1\loch\f1\cf2\fs20\strike\plain\f0\fs20\mwpb2734\hich\f0\dbch\f0\loch\f0\fs20\par  \par  \plain\f1\fs20\hcrf8886\hich\f1\dbch\f1\loch\f1\cf2\fs20\ul{\field{\*\fldinst HYPERLINK [id number] }{\fldrslt Problem/Plan - 5:}}\plain\f0\fs20\ubyr5839\hich\f0\dbch\f0\loch\f0\fs20\ql\par  \'b7  {\*\bkmkstart ti21829173829}{\*\bkmkend se68589640044}Problem: {\*\bkmkstart iv22777274499}{\*\bkmkend xc08251002803}History of pulmonary embolus (PE).\plain\f1\fs20\zaql1325\hich\f1\dbch\f1\loch\f1\cf2\fs20\strike\plain\f0\fs20\pvrg2415\hich\f0\dbch\f0\loch\f0\fs20\par  \'b7  {\*\bkmkstart mw22578896107}{\*\bkmkend ks91154509346}Plan: {\*\bkmkstart yv04957210199}{\*\bkmkend ud17728425872}Reportedly takes dabigatran 150mg BID. Pt unsure of indication.\par  - formal med rec in AM.\plain\f1\fs20\jycj1264\hich\f1\dbch\f1\loch\f1\cf2\fs20\strike\plain\f0\fs20\rtii7653\hich\f0\dbch\f0\loch\f0\fs20\par  \par  \plain\f1\fs20\qiww9476\hich\f1\dbch\f1\loch\f1\cf2\fs20\ul{\field{\*\fldinst HYPERLINK [id number] }{\fldrslt Problem/Plan - 6:}}\plain\f0\fs20\nvpu0766\hich\f0\dbch\f0\loch\f0\fs20\ql\par  \'b7  {\*\bkmkstart bn26367498525}{\*\bkmkend ii52445205814}Problem: {\*\bkmkstart yf46392381813}{\*\bkmkend kc38417701216}Paralysis of lower extremity.\plain\f1\fs20\kylv2670\hich\f1\dbch\f1\loch\f1\cf2\fs20\strike\plain\f0\fs20\hjqs2622\hich\f0\dbch\f0\loch\f0\fs20\par  \'b7  {\*\bkmkstart qg21901838423}{\*\bkmkend kn85438847834}Plan: {\*\bkmkstart bj06725788605}{\*\bkmkend hz69062144459}- PT/OT eval.\plain\f1\fs20\lzge0741\hich\f1\dbch\f1\loch\f1\cf2\fs20\strike\plain\f0\fs20\irps1292\hich\f0\dbch\f0\loch\f0\fs20\par  \par  \plain\f1\fs20\jlng2577\hich\f1\dbch\f1\loch\f1\cf2\fs20\ul{\field{\*\fldinst HYPERLINK [id number] }{\fldrslt Problem/Plan - 7:}}\plain\f0\fs20\zvim6522\hich\f0\dbch\f0\loch\f0\fs20\ql\par  \'b7  {\*\bkmkstart bj56730668953}{\*\bkmkend bw42592290603}Problem: {\*\bkmkstart ch20098256106}{\*\bkmkend ae23210568309}HTN (hypertension).\plain\f1\fs20\aeqc6755\hich\f1\dbch\f1\loch\f1\cf2\fs20\strike\plain\f0\fs20\czsk7317\hich\f0\dbch\f0\loch\f0\fs20\par  \'b7  {\*\bkmkstart df63284083028}{\*\bkmkend xr96726824501}Plan: {\*\bkmkstart qp07003180909}{\*\bkmkend lw33160855877}- continue home Toprol 25mg daily\par  - continue home Entresto 24-26 BID.\plain\f1\fs20\wies7487\hich\f1\dbch\f1\loch\f1\cf2\fs20\strike\plain\f0\fs20\ngqe2812\hich\f0\dbch\f0\loch\f0\fs20\par  \par  \plain\f1\fs20\hlqz8330\hich\f1\dbch\f1\loch\f1\cf2\fs20\ul{\field{\*\fldinst HYPERLINK [id number] }{\fldrslt Problem/Plan - 8:}}\plain\f0\fs20\rdrn6167\hich\f0\dbch\f0\loch\f0\fs20\ql\par  \'b7  {\*\bkmkstart lf07925526181}{\*\bkmkend av19751132983}Problem: {\*\bkmkstart pu60673719163}{\*\bkmkend zb97935786987}HLD (hyperlipidemia).\plain\f1\fs20\vzlg8992\hich\f1\dbch\f1\loch\f1\cf2\fs20\strike\plain\f0\fs20\whrh4999\hich\f0\dbch\f0\loch\f0\fs20\par  \'b7  {\*\bkmkstart il08022654936}{\*\bkmkend is07648178097}Plan: {\*\bkmkstart az79854488936}{\*\bkmkend en73206559954}- continue home atorvastatin 20mg daily.\plain\f1\fs20\vatp8165\hich\f1\dbch\f1\loch\f1\cf2\fs20\strike\plain\f0\fs20\zphg1632\hich\f0\dbch\f0\loch\f0\fs20\par  \par  \plain\f1\fs20\tykt2650\hich\f1\dbch\f1\loch\f1\cf2\fs20\ul{\field{\*\fldinst HYPERLINK [id number] }{\fldrslt Problem/Plan - 9:}}\plain\f0\fs20\mfnm6103\hich\f0\dbch\f0\loch\f0\fs20\ql\par  \'b7  {\*\bkmkstart cb63609308797}{\*\bkmkend cr84570652554}Problem: {\*\bkmkstart ge22970242941}{\*\bkmkend ne40193407418}Emphysema of lung. \par  \'b7  {\*\bkmkstart zg40420780816}{\*\bkmkend eu46791977780}Plan: {\*\bkmkstart ha20661861313}{\*\bkmkend kb63893869164}Former smoker x 55 years, quit 2 years ago.\par  - continue duoneb q6h PRN.\par  \par  \plain\f1\fs20\xmgv9318\hich\f1\dbch\f1\loch\f1\cf2\fs20\ul{\field{\*\fldinst HYPERLINK [id number] }{\fldrslt Problem/Plan - 10:}}\plain\f0\fs20\ajsh9760\hich\f0\dbch\f0\loch\f0\fs20\ql\par  \'b7  {\*\bkmkstart uw40033267576}{\*\bkmkend xs41710739898}Problem: {\*\bkmkstart il30215575208}{\*\bkmkend bw63692429270}Neurogenic bladder. \par  \'b7  {\*\bkmkstart pd59530590858}{\*\bkmkend uf10378760068}Plan; {\*\bkmkstart vn45462311497}{\*\bkmkend bx96408148992}- chronic jiménez.\par  \par  \plain\f1\fs20\jifv4132\hich\f1\dbch\f1\loch\f1\cf2\fs20\ul{\field{\*\fldinst HYPERLINK [id number] }{\fldrslt Problem/Plan - 11:}}\plain\f0\fs20\sjsn7411\hich\f0\dbch\f0\loch\f0\fs20\ql\par  \'b7  {\*\bkmkstart lv106949105041}{\*\bkmkend gc106343459981}Problem: {\*\bkmkstart cu014399223201}{\*\bkmkend dg447526982947}Type 2 diabetes mellitus. \par  \'b7  {\*\bkmkstart tl881713566040}{\*\bkmkend es599143607869}Plan: {\*\bkmkstart lk825196072847}{\*\bkmkend ik117675222584}Per chart from [facility], takes metformin 500mg BID.\par  - mISS while inpatient.\par  \par  \plain\f1\fs20\nstx6988\hich\f1\dbch\f1\loch\f1\cf2\fs20\ul{\field{\*\fldinst HYPERLINK [id number] }{\fldrslt Problem/Plan - 12:}}\plain\f0\fs20\inxw0771\hich\f0\dbch\f0\loch\f0\fs20\ql\par  \'b7  {\*\bkmkstart lc380239487333}{\*\bkmkend sg988739660789}Problem: {\*\bkmkstart zm804785565129}{\*\bkmkend ra456480699624}Need for prophylactic measure. \par  \'b7  {\*\bkmkstart ca133400867141}{\*\bkmkend lc560000295746}Plan: {\*\bkmkstart zh912992830295}{\*\bkmkend ib110272006848}F: Tolerating PO, no IVF\par  E: Replete K<4, Mg<2\par  N: DASH\par  VTE Ppx: dabigatran 150mg BID (formal med rec)\par  GI: Protonix 40mg daily\par  \par  Problem: Nutrition, metabolism, and development symptoms.\par  \par  \par  }   below:  Therapeutic Exercise (36478)  Therapeutic Activity (57113)  Manual Therapy (43761)  Electrical Stimulation Unattended (52572 or )  Heat/Cold (37486)  Strapping    Frequency / Duration  2 times per week tapering as patient progresses for 5 weeks for an estimated total of 10 visits    Patient involved in and agreed to plan of care and goals.      Goals   shoulder joint pain   shoulder joint stiffness   shoulder/UE weakness  The above improvements in impairments to assist in obtaining goals listed below  Long Term Goals: to be met by end of plan of care  1. Patient will be able to reach behind back with her left UE to allow for completion of independent upper body dressing tasks such as putting on a jacket and independent hygiene tasks such as washing her back/hair  2. Patient will reach overhead with her left UE without compensation due to left shoulder impairments for completion of household tasks such as putting away items in overhead cabinets by end of the current plan of care.  3. Patient will be able to lift 5-10 pounds using the left UE with proper body mechanics to assist with lifting activities at home  4. Quick DASH: Patient will complete form to reflect an improved calculated score to less than or equal to 50 to indicate patient reported improvement in function/disability/impairment. (minimal clinically important difference = 15.91)  5. Patient will be independent with progressed and modified home exercise program.      Therapy procedure time and total treatment time can be found documented on the Time Entry flowsheet

## 2024-03-26 NOTE — DIETITIAN INITIAL EVALUATION ADULT - OTHER CALCULATIONS
Based on Standards of Care pt above % IBW (124%) thus ideal body weight used for all calculations. Needs adjusted for advanced age, pressure ulcer.

## 2024-03-26 NOTE — ED PROVIDER NOTE - PHYSICAL EXAMINATION
VITAL SIGNS: I have reviewed nursing notes and confirm.  CONSTITUTIONAL: Well appearing, in mod distress   SKIN:  warm and dry, no acute rash.   HEAD:  normocephalic, atraumatic.  EYES: EOM intact; conjunctiva and sclera clear.  ENT: No nasal discharge; airway clear.   NECK: Supple; non tender.  CARD: S1, S2 normal; no murmurs, gallops, or rubs. tachycardic, Regular rhythm.   RESP:  Clear to auscultation b/l, no wheezes, rales or rhonchi.  ABD: Normal bowel sounds; soft; diffusely tender, jiménez with cloudy urine   EXT: Normal ROM. No clubbing, cyanosis or edema. 2+ pulses to b/l ue/le.  NEURO: Alert, oriented, baseline RUE weakness/contractions, bilateral LE paralysis   PSYCH: Cooperative, mood and affect appropriate.

## 2024-03-26 NOTE — ED PROVIDER NOTE - OBJECTIVE STATEMENT
70 yo M h/o bilateral lower extremity paralysis, HTN, HLD, CVA x 3 (MRI brain from 1/23 demonstrated chronic lacunar infarcts of the left corona radiata, left putamen and left cerebellar hemisphere), PE in 2023 (was prescribed Eliquis for 3-6 months), emphysema, neurogenic bladder s/p chronic jiménez, chronic neck/back pain s/p multiple spinal fusions s/p intrathecal morphine pump, multiple UTI's, multiple ED visits for abdominal pain (noted to have bleeding with digital rectal exam on prior ED visits) here for RUE weakness since yesterday (now resolved) and abdominal pain. Recently treated for UTI, then abd pain returned. Stated RUE weakness yesterday and this morning, now resolved. No vomiting or diarrhea. Has indwelling jiménez.

## 2024-03-26 NOTE — H&P ADULT - HISTORY OF PRESENT ILLNESS
CC: "  HPI  Denies fevers, chills, cough, chest pain, dyspnea, nausea, headache, diarrhea, sick contactschange in urinary or bowel habits      In the ED:    - VS: Tmax: , HR:  , BP:  , RR:  , O2:    %     - Pertinent Labs:     - Imaging: CXR: CT: US: Cath: EKG:     - Treatment/interventions:     PMHx:   PSHx:  Meds: See med rec  Allergies:  Social: see below      68 yo M h/o bilateral lower extremity paralysis, HTN, HLD, CVA x 3 (MRI brain from 1/23 demonstrated chronic lacunar infarcts of the left corona radiata, left putamen and left cerebellar hemisphere), PE in 2023 (was prescribed Eliquis for 3-6 months), emphysema, neurogenic bladder s/p chronic jiménez, chronic neck/back pain s/p multiple spinal fusions s/p intrathecal morphine pump, multiple UTI's, multiple ED visits for abdominal pain (noted to have bleeding with digital rectal exam on prior ED visits) here for RUE weakness since yesterday (now resolved) and abdominal pain. Recently treated for UTI, then abd pain returned. Stated RUE weakness yesterday and this morning, now resolved. No vomiting or diarrhea. Has indwelling jiménez.    In the ED:    - VS: Tmax:98, HR: 120 ->99, BP: 117/78, RR: 16, O2: 98% room air    - Pertinent Labs: wbc 6.9, BUN 11, Cr 0.49. UA = SG 1.03, 13 epithelial cells, 646 wbc, moderate LE, positive NIT    - Imaging: CXR: CT: US: Cath: EKG:     - Treatment/interventions:     PMHx:   PSHx:  Meds: See med rec  Allergies:  Social: see below  69F PMH chronic neck/back pain (s/p multiple spinal fusions, inactive intrathecal morphine pump), bilateral LE paralysis, HTN, HLD, CVA x3 (MR brain 1/23 with chronic lacunar infarcts of the left corona radiata, left putamen and left cerebellar hemisphere), hx PE in 2023 (Eliquis for 3-6 months), emphysema, neurogenic bladder (s/p chronic jiménez), multiple UTI's, multiple ED visits for abdominal pain presenting with transient RUE weakness since yesterday (now resolved) and abdominal pain. Pt is a poor historian. At time of interview, pt denies any RUE weakness, but is endorsing abdominal pain as her main complaint.    Pt states that she has had acute on chronic abdominal pain for the last 3 weeks       here for RUE weakness since yesterday (now resolved) and abdominal pain. Recently treated for UTI, then abd pain returned. Stated RUE weakness yesterday and this morning, now resolved. No vomiting or diarrhea. Has indwelling jiménez.    69F PMH     In the ED:    - VS: Tmax:98, HR: 120 ->99, BP: 117/78, RR: 16, O2: 98% room air    - Pertinent Labs: wbc 6.9, BUN 11, Cr 0.49. UA = SG 1.03, 13 epithelial cells, 646 wbc, moderate LE, positive NIT    - Imaging: CXR: CT: US: Cath: EKG:     - Treatment/interventions:     PMHx:   PSHx:  Meds: See med rec  Allergies:  Social: see below  69F PMH chronic neck/back pain (s/p multiple spinal fusions, inactive intrathecal morphine pump), bilateral LE paralysis, HTN, HLD, CVA x3 (MR brain 1/23 with chronic lacunar infarcts of the left corona radiata, left putamen and left cerebellar hemisphere), hx PE in 2023 (Eliquis for 3-6 months), emphysema, neurogenic bladder (s/p chronic jiménez), multiple UTI, multiple ED visits for abdominal pain presenting with transient RUE weakness since yesterday (now resolved) and abdominal pain. Pt is a poor historian. At time of interview, pt denies any RUE weakness, but is endorsing abdominal pain as her main complaint.    Pt states that she has had acute on chronic abdominal pain for the last 3 weeks. She describes it as in the bilateral lower quadrants, colicky, and sharp. Her abdominal pain is most pronounced at the site of a previously active intrathecal morphine pump, that was placed in 12/2023 for her spinal surgery (hospitalized 12/4-1/5 for spinal fusion d/t damaged hardware), but the pump was never removed. States the pain occurs every few minutes, last a few seconds. Notes multiple ED visits, was prescribed PO abx for UTIs. Has chronic jiménez, notes that it seems to be clogged often. Was last exchanged 3 days ago at [facility], and then again today in ER. Completed 1 week of cefuroxime on 3/15 with temporary improvement in symptoms. Then, abdominal pain returned and she states she was started on a different antibiotic (appears to be Levaquin per [place] records), which led to worsening abdominal pain. She then proceeded to ER from [facility] today. Notable history of chronic pain, for which she takes standing oxycodone 15mg q4h accompanied by Benadryl 25mg for mild allergy (described as scalp itching/pulling her hair out, and eyelid burning sensation).    Here, she endorses mild subjective fever, frontal headache, nonproductive cough x3 days, nausea, flank pain. Denies vision change, chest pain, sputum production, vomiting.    She was intended to be discharged from [facility] to home tomorrow, but notes that she is still unable to walk.           here for RUE weakness since yesterday (now resolved) and abdominal pain. Recently treated for UTI, then abd pain returned. Stated RUE weakness yesterday and this morning, now resolved. No vomiting or diarrhea. Has indwelling jiménez.    69F PMH     In the ED:    - VS: Tmax:98, HR: 120 ->99, BP: 117/78, RR: 16, O2: 98% room air    - Pertinent Labs: wbc 6.9, BUN 11, Cr 0.49. UA = SG 1.03, 13 epithelial cells, 646 wbc, moderate LE, positive NIT    - Imaging: CXR: CT: US: Cath: EKG:     - Treatment/interventions:     PMHx:   PSHx:  Meds: See med rec  Allergies:  Social: see below  69F PMH chronic neck/back pain (s/p multiple spinal fusions, inactive intrathecal morphine pump), bilateral LE paralysis, HTN, HLD, CVA x3 (MR brain 1/23 with chronic lacunar infarcts of the left corona radiata, left putamen and left cerebellar hemisphere), hx PE in 2023 (Eliquis for 3-6 months), emphysema, neurogenic bladder (s/p chronic jiménez), multiple UTI, multiple ED visits for abdominal pain presenting with transient RUE weakness since yesterday (now resolved) and abdominal pain. Pt is a poor historian. At time of interview, pt denies any RUE weakness, but is endorsing abdominal pain as her main complaint.    Pt states that she has had acute on chronic abdominal pain for the last 3 weeks. She describes it as in the bilateral lower quadrants, colicky, and sharp. Her abdominal pain is most pronounced at the site of a previously active intrathecal morphine pump, that was placed in 12/2023 for her spinal surgery (hospitalized 12/4-1/5 for spinal fusion d/t damaged hardware), but the pump was never removed. States the pain occurs every few minutes, last a few seconds. Notes multiple ED visits, was prescribed PO abx for UTIs. Has chronic jiménez, notes that it seems to be clogged often. Was last exchanged 3 days ago at [facility], and then again today in ER. Completed 1 week of cefuroxime on 3/15 with temporary improvement in symptoms. Then, abdominal pain returned and she states she was started on a different antibiotic (appears to be Levaquin per [facility] records), which led to worsening abdominal pain. She then proceeded to ER from [facility] today. Notable history of chronic pain, for which she takes standing oxycodone 15mg q4h accompanied by Benadryl 25mg for mild allergy (described as scalp itching/pulling her hair out, and eyelid burning sensation).    Here, she endorses mild subjective fever, frontal headache, nonproductive cough x3 days, nausea, flank pain. Denies vision change, chest pain, sputum production, vomiting.    She was intended to be discharged from [facility] to home tomorrow, but notes that she is still unable to walk.           here for RUE weakness since yesterday (now resolved) and abdominal pain. Recently treated for UTI, then abd pain returned. Stated RUE weakness yesterday and this morning, now resolved. No vomiting or diarrhea. Has indwelling jiménez.    In the ED:    - VS: Tmax:98, HR: 120 ->99, BP: 117/78, RR: 16, O2: 98% room air    - Pertinent Labs: wbc 6.9, BUN 11, Cr 0.49. UA = SG 1.03, 13 epithelial cells, 646 wbc, moderate LE, positive NIT    - Imaging: CXR: CT: US: Cath: EKG:     - Treatment/interventions:     PMHx:   PSHx:  Meds: See med rec  Allergies:  Social: see below  69F PMH chronic neck/back pain (s/p multiple spinal fusions, inactive intrathecal morphine pump), bilateral LE paralysis, HTN, HLD, CVA x3 (MR brain 1/23 with chronic lacunar infarcts of the left corona radiata, left putamen and left cerebellar hemisphere), hx PE in 2023 (Eliquis for 3-6 months), emphysema, neurogenic bladder (s/p chronic jiménez), multiple UTI, multiple ED visits for abdominal pain presenting with transient RUE weakness since yesterday (now resolved) and abdominal pain. Pt is a poor historian. At time of interview, pt denies any RUE weakness, but is endorsing abdominal pain as her main complaint.    Pt states that she has had acute on chronic abdominal pain for the last 3 weeks. She describes it as in the bilateral lower quadrants, colicky, and sharp. Her abdominal pain is most pronounced at the site of a previously active intrathecal morphine pump, that was placed in 12/2023 for her spinal surgery (hospitalized 12/4-1/5 for spinal fusion d/t damaged hardware), but the pump was never removed. States the pain occurs every few minutes, last a few seconds. Notes multiple ED visits, was prescribed PO abx for UTIs. Has chronic jiménez, notes that it seems to be clogged often. Was last exchanged 3 days ago at [facility], and then again today in ER. Completed 1 week of cefuroxime on 3/15 with temporary improvement in symptoms. Then, abdominal pain returned and she states she was started on a different antibiotic (appears to be Levaquin per [place] records), which led to worsening abdominal pain. She then proceeded to ER from [facility] today. Notable history of chronic pain, for which she takes standing oxycodone 15mg q4h accompanied by Benadryl 25mg for mild allergy (described as scalp itching/pulling her hair out, and eyelid burning sensation).    Here, she endorses mild subjective fever, frontal headache, nonproductive cough x3 days, nausea, flank pain. Denies vision change, chest pain, sputum production, vomiting.    She was intended to be discharged from [facility] to home tomorrow, but notes that she is still unable to walk.    In the ED:    - VS: Tmax:98, HR: 120 ->99, BP: 117/78, RR: 16, O2: 98% room air    - Pertinent Labs: wbc 6.9, BUN 11, Cr 0.49. UA = SG 1.03, 13 epithelial cells, 646 wbc, moderate LE, positive NIT    - Imaging:  ·	CTA head/neck:   ·	No hemodynamically significant large vessel stenosis or occlusion in the head and neck.  ·	Few left thyroid nodules measuring up to 2.0 cm. Outpatient follow-up with thyroid ultrasound is suggested.  ·	CTH: No acute transcortical infarction, hemorrhage, or mass effect. Stable exam since 2022.  ·	CT abd/pel with IV:   ·	Feces distended rectum with presacral edema, similar to prior exam from 2/27/2024. Early stercoral colitis is not excluded.  ·	Urinary bladder wall thickening. Correlate with cystitis.  ·	Moderate/large colonic stool burden.    - Treatment/interventions: 1L NS, CTX 1g, zosyn 3.375g, morphine 6mg + Benadryl 25mg, morphine 4mg + Benadryl 25mg, dilaudid 4mg PO    - Consults: stroke    PMHx:   PSHx:  Meds: See med rec  Allergies:  Social: see below

## 2024-03-26 NOTE — CHART NOTE - NSCHARTNOTEFT_GEN_A_CORE
Patient Demographic Information (PDI)       PDI	First Name	Last Name	Birth Date	Gender	Street Address	[facility] Code  A	Aliya Redd	1955	Female	[address]	[address]  B	Aliya Blancas	1955	Male	[address]	[address]  C	Aliya Redd	1955	Female	[address]	[address]    Prescription Information      PDI Filter:    PDI	Current Rx	Drug Type	Rx Written	Rx Dispensed	Drug	Quantity	Days Supply	Prescriber Name	Prescriber STEPHANIE #	Payment Method	Dispenser  A	N	O	01/26/2024	01/26/2024	oxycodone hcl (ir) 15 mg tab	30	10	Robbin Aquino C	[id number]	Insurance	[employer]  A	N	O	01/18/2024	01/18/2024	oxycodone hcl (ir) 15 mg tab	30	7	Kecia Elmore, U	[id number]	Insurance	[employer]  A	N	O	01/18/2024	01/18/2024	oxycodone hcl (ir) 10 mg tab	30	5	Kecia Elmore, U	[id number]	Insurance	[employer]  A	N	O	01/18/2024	01/18/2024	oxycodone hcl (ir) 5 mg cap	30	5	Kecia Elmore, U	[id number]	Insurance	[employer]  A	N	O	01/06/2024	01/06/2024	oxycodone hcl (ir) 15 mg tab	30	5	Familusi, Caitlyn	[id number]	Insurance	[employer]  A	N	B	01/06/2024	01/06/2024	diazepam 5 mg tablet	45	15	Familusi, Caitlyn	[id number]	Insurance	[employer]  B	N	O	10/31/2023	11/14/2023	morphine sulfate powder *	0gm	30	Liabaud, Barthelemy	[id number]	Medicare	[facility]  B	N	O	10/04/2023	10/05/2023	morphine sulfate powder *	0gm	30	Liabaud, Barthelemy	[id number]	Medicare	[facility]  B	N	O	04/24/2023	05/12/2023	morphine sulfate powder *	0gm	30	Liabaud, Barthelemy	[id number]	[place]	[facility]  C	Y	O	03/18/2024	03/18/2024	oxycodone hcl (ir) 15 mg tab	90	15	Howard Sawyer	[id number]	Insurance	[employer]  C	N	O	03/06/2024	03/06/2024	oxycodone hcl (ir) 15 mg tab	60	15	Howard Sawyer	[id number]	Insurance	[employer]  C	N	O	02/23/2024	02/23/2024	oxycodone hcl (ir) 15 mg tab	70	17	Digna Kieranguadalupe	[id number]	Insurance	[employer]  C	N	O	02/21/2024	02/22/2024	oxycodone hcl (ir) 15 mg tab	42	14	Jesús Cameron NP	[id number]	Insurance	[employer]

## 2024-03-26 NOTE — H&P ADULT - ATTENDING COMMENTS
69-year-old female with a PMHx of chronic neck/back pain (s/p multiple fusions and intrathecal morphine pump (inactive)), bilateral LE paralysis, HTN, HLD, CVA x 3, emphysema, neurogenic bladder (chronic jiménez, c/b multiple UTIs) and PE (on AC) who presented with transient RUE weakness and abdominal pain, found to have UTI and significant constipation.     Plan:    -jiménez exchanged on admission, obtain repeat UA/UCx as previous UA appears contaminated (although already received ABx)  -continue with Zosyn given recent UCx (3/14) growing pansensitive pseudomonas   -continue with aggressive bowel regimen given CT-A/P findings    -stroke consulted, no acute findings on imaging, outpatient follow up    -see chart note for iSTOP, will be cautious with opiates given constipation   -palliative consult  -PT consult   -outpatient pain management follow up for further management of pain pump     Rest of plan as per resident note.

## 2024-03-26 NOTE — DIETITIAN INITIAL EVALUATION ADULT - PROBLEM SELECTOR PLAN 3
Chronic constipation ISO heavy opioid use for chronic pain. Takes home lubiprostone 24mcg BID.  CT abdomen/pelvis showing abundant stool burden. Bilateral lower quadrant abdominal pain on exam.  - s/p enema in ED  - lactulose 10g once  - senna/miralax BID

## 2024-03-26 NOTE — DISCHARGE NOTE PROVIDER - CARE PROVIDER_API CALL
Estiven Luz [facility]  Neurosurgery  [address] [address]  Phone: [phone]  Fax: [phone]  Follow Up Time: 2 weeks   Estiven Luz Papa  Neurosurgery  [address] [address]  Phone: [phone]  Fax: [phone]  Follow Up Time: 2 weeks    Analy Blackwood  Urology  [address], [facility] Level [facility] [place] [address]  Phone: [phone]  Fax: [phone]  Follow Up Time: 2 weeks   Analy Blackwood  Urology  [address], [place] [address]  Phone: [phone]  Fax: [phone]  Follow Up Time: 2 weeks    Patrice Osorio  Neurosurgery  [address], [address] [place] [address]  Phone: [phone]  Fax: [phone]  Scheduled Appointment: 04/19/2024 11:00 AM   Patrice Osorio.  Neurosurgery  [address], [address] [place] [address]  Phone: [phone]  Fax: [phone]  Scheduled Appointment: 04/19/2024 11:00 AM    Claudia Quintana  Urology  [address] [address]  Phone: [phone]  Fax: [phone]  Scheduled Appointment: 04/08/2024 03:20 PM   Patrice Osorio  Neurosurgery  [address], [address] [place] [address]  Phone: [phone]  Fax: [phone]  Scheduled Appointment: 04/19/2024 11:00 AM    Claudia Quintana  Urology  [address] [address]  Phone: [phone]  Fax: [phone]  Scheduled Appointment: 04/08/2024 03:20 PM    Antonino Robles  Geriatric Medicine  [address] [address]  Phone: [phone]  Fax: [phone]  Scheduled Appointment: 04/09/2024 02:00 PM

## 2024-03-26 NOTE — DISCHARGE NOTE PROVIDER - CARE PROVIDERS DIRECT ADDRESSES
[email or web address] ,[email or web address] ,[email or web address] ,[email or web address]

## 2024-03-26 NOTE — H&P ADULT - PROBLEM SELECTOR PLAN 2
- PT/OT eval Chronic constipation ISO heavy opioid use for chronic pain.  CT abdomen/pelvis showing abundant stool burden.  - s/p enema in ED  - lactulose 10g once  - senna/miralax BID Chronic constipation ISO heavy opioid use for chronic pain. Takes home lubiprostone 24mcg BID.  CT abdomen/pelvis showing abundant stool burden. Bilateral lower quadrant abdominal pain on exam.  - s/p enema in ED  - lactulose 10g once  - senna/miralax BID History of CVA x3 (MR brain 1/23 with chronic lacunar infarcts of the left corona radiata, left putamen and left cerebellar hemisphere)  - continue ASA 81mg daily    #RUE weakness  Presented with RUE weakness yesterday, resolved in ED. Stroke consulted in ED, CTH and CTA head/neck unyielding.  - stroke consulted, appreciate recs           - recommend patient continue home antiplatelet/AC regimen           - please have patient call our office at [phone] to set up an outpatient follow up appt given stroke history           - if patient admitted, consider spine imaging for paresthesias as sensation changes spared the face and patient with extensive spinal surgeries

## 2024-03-26 NOTE — H&P ADULT - PROBLEM SELECTOR PLAN 8
History of CVA x3 (MR brain 1/23 with chronic lacunar infarcts of the left corona radiata, left putamen and left cerebellar hemisphere)  - continue ASA 81mg daily - continue home atorvastatin 20mg daily

## 2024-03-26 NOTE — DIETITIAN INITIAL EVALUATION ADULT - NSFNSPHYEXAMSKINFT_GEN_A_CORE
Pressure Injury 1: sacrum, Stage IV  Pressure Injury 2: none, none  Pressure Injury 3: none, none  Pressure Injury 4: none, none  Pressure Injury 5: none, none  Pressure Injury 6: none, none  Pressure Injury 7: none, none  Pressure Injury 8: none, none  Pressure Injury 9: none, none  Pressure Injury 10: none, none  Pressure Injury 11: none, none

## 2024-03-26 NOTE — H&P ADULT - PROBLEM SELECTOR PLAN 4
Reportedly takes dabigatran 150mg BID. Pt unsure of indication.  - formal med rec in AM - continue home Tylenol 650mg q6h  - continue Cymbalta 60mg daily  - continue gabapentin 300mg BID  - continue oxycodone 15mg q4h PRN for pain         - HOLDING Benadryl 25mg q4h PRN with oxycodone (given anticholinergic effects)

## 2024-03-26 NOTE — DISCHARGE NOTE PROVIDER - NPI NUMBER (FOR SYSADMIN USE ONLY) :
Patient called that the pharmacist contacted her gyn doctor that her atorvastatin will interact with her diflucan. She wants to know if she should with hold the atorvastatin while taking the diflucan. Please advise. [[id number]] [[id number]],[[id number]] [[id number]],[[id number]] [[id number]],[[id number]] [[id number]],[[id number]],[[id number]]

## 2024-03-26 NOTE — CONSULT NOTE ADULT - SUBJECTIVE AND OBJECTIVE BOX
Patient is a 69y old  Female who presents with a chief complaint of UTI (26 Mar 2024 02:21)      HPI:  69F PMH chronic neck/back pain (s/p multiple spinal fusions, inactive intrathecal morphine pump), bilateral LE paralysis, HTN, HLD, CVA x3 (MR brain 1/23 with chronic lacunar infarcts of the left corona radiata, left putamen and left cerebellar hemisphere), hx PE in 2023 (Eliquis for 3-6 months), emphysema, neurogenic bladder (s/p chronic jiménez), multiple UTI, multiple ED visits for abdominal pain presenting with transient RUE weakness since yesterday (now resolved) and abdominal pain. Pt is a poor historian. At time of interview, pt denies any RUE weakness, but is endorsing abdominal pain as her main complaint.    Pt states that she has had acute on chronic abdominal pain for the last 3 weeks. She describes it as in the bilateral lower quadrants, colicky, and sharp. Her abdominal pain is most pronounced at the site of a previously active intrathecal morphine pump, that was placed in 12/2023 for her spinal surgery (hospitalized 12/4-1/5 for spinal fusion d/t damaged hardware), but the pump was never removed. States the pain occurs every few minutes, last a few seconds. Notes multiple ED visits, was prescribed PO abx for UTIs. Has chronic jiménez, notes that it seems to be clogged often. Was last exchanged 3 days ago at [facility], and then again today in ER. Completed 1 week of cefuroxime on 3/15 with temporary improvement in symptoms. Then, abdominal pain returned and she states she was started on a different antibiotic (appears to be Levaquin per [facility] records), which led to worsening abdominal pain. She then proceeded to ER from [facility] today. Notable history of chronic pain, for which she takes standing oxycodone 15mg q4h accompanied by Benadryl 25mg for mild allergy (described as scalp itching/pulling her hair out, and eyelid burning sensation).    Here, she endorses mild subjective fever, frontal headache, nonproductive cough x3 days, nausea, flank pain. Denies vision change, chest pain, sputum production, vomiting.    She was intended to be discharged from [facility] to home tomorrow, but notes that she is still unable to walk.    In the ED:    - VS: Tmax:98, HR: 120 ->99, BP: 117/78, RR: 16, O2: 98% room air    - Pertinent Labs: wbc 6.9, BUN 11, Cr 0.49. UA = SG 1.03, 13 epithelial cells, 646 wbc, moderate LE, positive NIT    - Imaging:  ·	CTA head/neck:   ·	No hemodynamically significant large vessel stenosis or occlusion in the head and neck.  ·	Few left thyroid nodules measuring up to 2.0 cm. Outpatient follow-up with thyroid ultrasound is suggested.  ·	CTH: No acute transcortical infarction, hemorrhage, or mass effect. Stable exam since 2022.  ·	CT abd/pel with IV:   ·	Feces distended rectum with presacral edema, similar to prior exam from 2/27/2024. Early stercoral colitis is not excluded.  ·	Urinary bladder wall thickening. Correlate with cystitis.  ·	Moderate/large colonic stool burden.    - Treatment/interventions: 1L NS, CTX 1g, zosyn 3.375g, morphine 6mg + Benadryl 25mg, morphine 4mg + Benadryl 25mg, dilaudid 4mg PO    - Consults: stroke    PMHx:   PSHx:  Meds: See med rec  Allergies:  Social: see below  (26 Mar 2024 02:21)    PAST MEDICAL & SURGICAL HISTORY:  HTN (hypertension)      SS (spinal stenosis)      High cholesterol      Stroke  x3      H/O carpal tunnel syndrome  Bilateral      Chronic GERD      History of chronic constipation      Urinary incontinence  self cath as needed      Pre-diabetes      Anxiety and depression      Eczema      H/O kyphosis      Pancreas cyst      Scoliosis      Wheelchair dependent      Cervical pseudoarthrosis  and lumbar spine      Cervical spondylosis      Chronic headaches      Degenerative joint disease  left shoulder      Lumbosacral spondylosis      COPD (chronic obstructive pulmonary disease)      H/O Spinal surgery  lumbar x 30      H/O breast surgery  left breast implant 1980's      S/P cervical discectomy  x4      H/O total shoulder replacement, right        MEDICATIONS  (STANDING):  acetaminophen     Tablet .. 1000 milliGRAM(s) Oral every 8 hours  influenza  Vaccine (HIGH DOSE) 0.7 milliLiter(s) IntraMuscular once  magnesium sulfate  IVPB 1 Gram(s) IV Intermittent once  pantoprazole    Tablet 40 milliGRAM(s) Oral before breakfast  piperacillin/tazobactam IVPB.- 3.375 Gram(s) IV Intermittent once  piperacillin/tazobactam IVPB.- 3.375 Gram(s) IV Intermittent once    MEDICATIONS  (PRN):  aluminum hydroxide/magnesium hydroxide/simethicone Suspension 30 milliLiter(s) Oral every 4 hours PRN Dyspepsia            FAMILY HISTORY:      CBC Full  -  ( 26 Mar 2024 07:13 )  WBC Count : 6.74 K/uL  RBC Count : 4.50 M/uL  Hemoglobin : 11.1 g/dL  Hematocrit : 35.0 %  Platelet Count - Automated : 374 K/uL  Mean Cell Volume : 77.8 fl  Mean Cell Hemoglobin : 24.7 pg  Mean Cell Hemoglobin Concentration : 31.7 gm/dL  Auto Neutrophil # : 3.25 K/uL  Auto Lymphocyte # : 2.54 K/uL  Auto Monocyte # : 0.69 K/uL  Auto Eosinophil # : 0.20 K/uL  Auto Basophil # : 0.03 K/uL  Auto Neutrophil % : 48.3 %  Auto Lymphocyte % : 37.7 %  Auto Monocyte % : 10.2 %  Auto Eosinophil % : 3.0 %  Auto Basophil % : 0.4 %      03-26    140  |  105  |  8   ----------------------------<  136<H>  3.2<L>   |  23  |  0.43<L>    Ca    9.6      26 Mar 2024 07:13  Phos  4.0     03-26  Mg     1.7     03-26    TPro  7.2  /  Alb  3.9  /  TBili  0.3  /  DBili  x   /  AST  13  /  ALT  8<L>  /  AlkPhos  90  03-25      Urinalysis Basic - ( 26 Mar 2024 07:13 )    Color: x / Appearance: x / SG: x / pH: x  Gluc: 136 mg/dL / Ketone: x  / Bili: x / Urobili: x   Blood: x / Protein: x / Nitrite: x   Leuk Esterase: x / RBC: x / WBC x   Sq Epi: x / Non Sq Epi: x / Bacteria: x        Radiology :     < from: CT Head No Cont (03.25.24 @ 23:33) >  ACC: [id number] EXAM:  CT BRAIN   ORDERED BY: KOFI LATIF     PROCEDURE DATE:  03/25/2024          INTERPRETATION:  HEAD CT WITHOUT CONTRAST dated 3/25/2024 11:33 PM    HISTORY: cva.    TECHNIQUE: Head CT was performed without intravenous contrast. Axial,   sagittal, and coronal images were obtained.    COMPARISON: Head CT from 9/16/2022.    FINDINGS:    There are prominent ventricles and sulci most compatible with generalized   parenchymal volume loss. No acute transcortical infarction,hemorrhage,   or mass effect is identified. Chronic lacunar infarcts are again seen   within the left corona radiata, left putamen, and left cerebellar   hemisphere. Grossly stable moderate patchy hypodensities in the   supratentorial white matter are suggestive of chronic microvascular   ischemic disease.    The calvarium is intact. The visualized paranasal sinuses and mastoid air   cells are clear.    IMPRESSION:    No acute transcortical infarction, hemorrhage, or mass effect. Stable   exam since 2022.      < from: CT Angio Head w/ IV Cont (03.25.24 @ 23:35) >    ACC: [id number] EXAM:  CT ANGIO BRAIN (W)AW IC   ORDERED BY: KOFI LATIF     ACC: [id number] EXAM:  CT ANGIO NECK (W)AW IC   ORDERED BY: KOFI LATIF     PROCEDURE DATE:  03/25/2024          INTERPRETATION:  CTA (CT angiogram) of the HEAD and NECK dated 3/25/2024   11:34 PM    CLINICAL STATEMENT: CVA.    TECHNIQUE: Following the administration of intravenous contrast, CT   angiograms of the head and neck were obtained. 3D image postprocessing   was performed with vascular and multi-planar reformats for the evaluation   of the arteries. All qualitative and quantitative assessments of carotid   bifurcation and proximal internal carotid artery stenoses are made   referencing the distal internal carotid artery. 85 cc of Isovue-370   intravenous contrast was administered; 15 cc was discarded.    COMPARISON: None.    FINDINGS:    CTA of the head:  The proximal aspects of anterior, middle, and posterior cerebral arteries   are patent. The intracranial vertebral and basilar arteries are patent.   No hemodynamically significant stenosis or aneurysm is identified.    CTA of the neck:  The vertebral arteries, common carotid arteries, and internal carotid   arteries demonstrate no hemodynamically significant stenosis, dissection,   or aneurysm. Left vertebral artery is diffusely hypoplastic and arises   from the aortic arch, an anatomic variant.    Other:  There are a few left thyroid nodules measuring up to 2.0 cm. Bilateral   breast implants are partially imaged.    IMPRESSION:    1.  No hemodynamically significant large vessel stenosis or occlusion in   the head and neck.  2.  Few left thyroid nodules measuring up to 2.0 cm. Outpatient follow-up   with thyroid ultrasound is suggested.      < from: CT Abdomen and Pelvis w/ IV Cont (03.25.24 @ 23:35) >  ACC: [id number] EXAM:  CT ABDOMEN AND PELVIS IC   ORDERED BY: KOFI LATIF     PROCEDURE DATE:  03/25/2024          INTERPRETATION:  CLINICAL INFORMATION: Weakness. Cerebrovascular accident.    COMPARISON: CT abdomen pelvis from 2/27/2024.    CONTRAST/COMPLICATIONS:  IV Contrast: Isovue 370  95 cc administered   5 cc discarded  Oral Contrast: None  Complications: None reported at time of study completion    PROCEDURE:  CT of the Abdomen and Pelvis was performed.  Sagittal and coronal reformats were performed.    FINDINGS:  LOWER CHEST: Small right middle lobe air cyst. Bilateral breast implants.    Streak artifact related to posterior spinal hardware and right ventral   neurostimulator limits evaluation.    LIVER: Within normal limits.  BILE DUCTS: Normal caliber.  GALLBLADDER: Distended, similar to prior exam. No radiopaque   cholelithiasis or surrounding inflammatory changes.  SPLEEN: Within normal limits.  PANCREAS: Within normal limits.  ADRENALS: Unchanged hypertrophic appearance of the left adrenal gland.  KIDNEYS/URETERS: No hydronephrosis or definite nephrolithiasis. Few right   cortical punctate hypodensities are too small to characterize.    BLADDER: Minimally distended. Markedly thickened, similar to prior exam.   Jiménez balloon in place.  REPRODUCTIVE ORGANS: Left subserosal fibroid again noted.    BOWEL: Feces distended rectum measuring 7 x 9 cm without wall thickening   or perirectal collection, similar to prior exam. Presacral edema.   Moderate/large colonic stool burden. No obstruction. Appendix is normal.  PERITONEUM: No ascites.  VESSELS: Atherosclerotic changes.  RETROPERITONEUM/LYMPH NODES: No lymphadenopathy.  ABDOMINAL WALL: Metallic device in the right ventral wall with spinal   lead.  BONES: Status post fusion of the visualized spine. Right shoulder   arthroplasty.    IMPRESSION:    1.  Feces distended rectum with presacral edema, similar to prior exam   from 2/27/2024. Early stercoral colitis is not excluded.  2.  Urinary bladder wall thickening. Correlate with cystitis.  3.  Moderate/large colonic stool burden.          Review of Systems : per HPI         Vital Signs Last 24 Hrs  T(C): 36.4 (26 Mar 2024 06:02), Max: 36.7 (25 Mar 2024 20:48)  T(F): 97.5 (26 Mar 2024 06:02), Max: 98 (25 Mar 2024 20:48)  HR: 107 (26 Mar 2024 06:02) (76 - 120)  BP: 129/89 (26 Mar 2024 06:02) (117/78 - 157/79)  BP(mean): --  RR: 16 (26 Mar 2024 06:02) (16 - 16)  SpO2: 100% (26 Mar 2024 06:02) (97% - 100%)    Parameters below as of 26 Mar 2024 06:02  Patient On (Oxygen Delivery Method): room air            Physical Exam:  69 y o woman lying comfortably in semi Loving's position , awake , alert , no acute complaints     Head: normocephalic , atraumatic    Eyes: PERRLA , EOMI , no nystagmus , sclera anicteric    ENT / FACE: neg nasal discharge , uvula midline , no oropharyngeal erythema / exudate    Neck: supple , negative JVD , negative carotid bruits , no thyromegaly    Chest: CTA bilaterally , neg wheeze / rhonchi / rales / crackles / egophany    Cardiovascular: regular rate and rhythm , neg murmurs / rubs / gallops    Abdomen: soft , mildly distended ,RLQ tender to palpation, normal bowel sounds     Extremities: WWP , neg cyanosis /clubbing / edema     Neurologic Exam:     Alert and oriented  x 3        Motor Exam:      B UE: > 4/5                             B LE: plegic      Sensation:         dec R LE    DTR:           biceps/brachioradialis: equal                            patella/ankle: equal      PM&R Impression: admitted from [facility] for uti    - paraplegia/ spinal surgeries    - chronic strokes            Recommendations / Plan:       1) Physical / Occupational therapy focusing on therapeutic exercises , equipment evaluation , bed mobility/transfer out of bed evaluation , progressive ambulation with assistive devices prn .    2) Current disposition plan recommendation:   return to [facility]

## 2024-03-26 NOTE — DISCHARGE NOTE PROVIDER - HOSPITAL COURSE
#Discharge: do not delete    Patient is __ yo M/F with past medical history of _____ presented with _____, found to have _____ (one liner)    Hospital course (by problem):     Patient was discharged to: (home/[facility]/acute rehab/hospice, etc, and with what services – home health PT/RN? Home O2?)    New medications:   Changes to old medications:  Medications that were stopped:    Items to follow up as outpatient:    Physical exam at the time of discharge:       #Discharge: do not delete    69F PMH chronic neck/back pain (s/p multiple spinal fusions, inactive intrathecal morphine pump), bilateral LE paralysis, HTN, HLD, CVA x3 (MR brain 1/23 with chronic lacunar infarcts of the left corona radiata, left putamen and left cerebellar hemisphere), hx PE in 2023 (Eliquis for 3-6 months), emphysema, neurogenic bladder (s/p chronic jiménez), multiple UTI, multiple ED visits for abdominal pain presenting with transient RUE weakness and acute on chronic pain.       Hospital course (by problem):   Problem: Chronic neck and back pain.   ·  Plan: Has morphine pump placed by Dr. Lazo. Was previously seen inpatient with morphine pump refills but now appears to have been lost to follow-up. Pt wants to either remove pump (and use oral opioids) or use pump.  Plan:   - continue tylenol  - continue Cymbalta 60mg daily  - continue gabapentin 300mg tid   - resume oxy 15 with benadryl q4 (had been getting at [facility])  - outpatient urology follow-up to discuss jiménez and self-catheterization  - outpatient follow-up with Dr. Ott to discuss removal of intrathecal pump given persistent difficulties with refilling pump      Per inpatient pain [facility] and [employer], patient will need "dye study" to interogate pump further versus removal.   - outpatient NSGY f/u    #UTI (urinary tract infection).   ·  Plan: Pt with chronic jiménez, multiple ED visits for UTI. Was discharged from ED on cefuroxime started on 3/15. Ucx from 3/15 showing pseudomonas sensitive to levaquin. Per NH papers, appears to have been started on Levaquin on 3/18.   UTI grossly positive in ED on current admission. Jiménez exchanged in ED with cloudy urine.  Ucx growing E faecalis, sensitivites not resulted.  - switch zosyn to vanc given E faecalis and outpatient treatment of prior pseudomonal UTI  - narrow pending ucx sensitvities.    #Hx of CVA (cerebrovascular accident).   ·  Plan: History of CVA x3 (MR brain 1/23 with chronic lacunar infarcts of the left corona radiata, left putamen and left cerebellar hemisphere)  - continue ASA 81mg daily    #RUE weakness/neurology  Presented with RUE weakness yesterday, resolved in ED. Stroke consulted in ED, CTH and CTA head/neck unyielding.  - stroke consulted, appreciate recs           - recommend patient continue home antiplatelet/AC regimen           - please have patient call our office at [phone] to set up an outpatient follow up appt given stroke history           - if patient admitted, consider spine imaging for paresthesias as sensation changes spared the face and patient with extensive spinal surgeries.    Constipation.   ·  Plan: Chronic constipation ISO heavy opioid use for chronic pain. Takes home lubiprostone 24mcg BID.  CT abdomen/pelvis showing abundant stool burden. Bilateral lower quadrant abdominal pain on exam.  Having BMs.  - s/p enema in ED  - lactulose tid  - senna/miralax BID.    #History of pulmonary embolus (PE).   ·  Plan: Reportedly takes dabigatran 150mg BID. Pt unsure of indication.  - C/w AC for now.    #HTN (hypertension).   ·  Plan: - continue home Toprol 25mg daily  - continue home Entresto 24-26 BID.    #HLD (hyperlipidemia).   ·  Plan: - continue home atorvastatin 20mg daily.    #Emphysema of lung. Former smoker x 55 years, quit 2 years ago.  - continue duoneb q6h PRN.    #Neurogenic bladder. chronic jiménez  - urology f/u 4/8.    #Type 2 diabetes mellitus.   ·  Plan: Per chart from [facility], takes metformin 500mg BID.  - mISS while inpatient.    Patient was discharged to: [facility]    New medications:   Changes to old medications:  Medications that were stopped:    Items to follow up as outpatient:    Physical exam at the time of discharge:       #Discharge: do not delete    69F PMH chronic neck/back pain (s/p multiple spinal fusions, inactive intrathecal morphine pump), bilateral LE paralysis, HTN, HLD, CVA x3 (MR brain 1/23 with chronic lacunar infarcts of the left corona radiata, left putamen and left cerebellar hemisphere), hx PE in 2023 (Eliquis for 3-6 months), emphysema, neurogenic bladder (s/p chronic jiménez), multiple UTI, multiple ED visits for abdominal pain presenting with transient RUE weakness and acute on chronic pain.       Hospital course (by problem):   Problem: Chronic neck and back pain.   ·  Plan: Has morphine pump placed by Dr. Lazo. Was previously seen inpatient with morphine pump refills but now appears to have been lost to follow-up. Pt wants to either remove pump (and use oral opioids) or use pump.  Plan:   - continue tylenol  - continue Cymbalta 60mg daily  - continue gabapentin 300mg tid   - resume oxy 15 with benadryl q4 (had been getting at [facility])  - outpatient urology follow-up to discuss jiménez and self-catheterization  - outpatient follow-up with Dr. Ott to discuss removal of intrathecal pump given persistent difficulties with refilling pump, not refilled in service      Per inpatient pain [facility] and [employer], patient will need "dye study" to interogate pump further versus removal.   - outpatient NSGY f/u    #UTI (urinary tract infection).   ·  Plan: Pt with chronic jiménez, multiple ED visits for UTI. Was discharged from ED on cefuroxime started on 3/15. Ucx from 3/15 showing pseudomonas sensitive to levaquin. Per NH papers, appears to have been started on Levaquin on 3/18.   UTI grossly positive in ED on current admission. Jiménez exchanged in ED with cloudy urine.  Ucx growing E faecalis and candida glabrata.   - Overall, did not feel pt was symptomatic from UTI and alternative explanation for abd pain (constipation, improved with bowel movements)  - Will discontinue antibiotics     #Hx of CVA (cerebrovascular accident).   ·  Plan: History of CVA x3 (MR brain 1/23 with chronic lacunar infarcts of the left corona radiata, left putamen and left cerebellar hemisphere)  - continue ASA 81mg daily    #RUE weakness/neurology  Presented with RUE weakness yesterday, resolved in ED. Stroke consulted in ED, CTH and CTA head/neck unyielding.  - stroke consulted, appreciate recs           - recommend patient continue home antiplatelet/AC regimen           - please have patient call our office at [phone] to set up an outpatient follow up appt given stroke history           - if patient admitted, consider spine imaging for paresthesias as sensation changes spared the face and patient with extensive spinal surgeries.    Constipation.   ·  Plan: Chronic constipation ISO heavy opioid use for chronic pain. Takes home lubiprostone 24mcg BID.  CT abdomen/pelvis showing abundant stool burden. Bilateral lower quadrant abdominal pain on exam.  Having BMs.  - s/p enema in ED  - lactulose tid  - senna/miralax BID.    #History of pulmonary embolus (PE).   ·  Plan: Reportedly takes dabigatran 150mg BID. Pt unsure of indication.  - C/w AC for now.    #HTN (hypertension).   ·  Plan: - continue home Toprol 25mg daily  - continue home Entresto 24-26 BID.    #HLD (hyperlipidemia).   ·  Plan: - continue home atorvastatin 20mg daily.    #Emphysema of lung. Former smoker x 55 years, quit 2 years ago.  - continue duoneb q6h PRN.    #Neurogenic bladder. chronic jiménez  - urology f/u 4/8.    #Type 2 diabetes mellitus.   ·  Plan: Per chart from [facility], takes metformin 500mg BID.  - mISS while inpatient.    Patient was discharged to: [facility]    New medications:   Changes to old medications:  Medications that were stopped:    Items to follow up as outpatient:    Physical exam at the time of discharge:       #Discharge: do not delete    69F PMH chronic neck/back pain (s/p multiple spinal fusions, inactive intrathecal morphine pump), bilateral LE paralysis, HTN, HLD, CVA x3 (MR brain 1/23 with chronic lacunar infarcts of the left corona radiata, left putamen and left cerebellar hemisphere), hx PE in 2023 (Eliquis for 3-6 months), emphysema, neurogenic bladder (s/p chronic jiménez), multiple UTI, multiple ED visits for abdominal pain presenting with transient RUE weakness and acute on chronic pain.       Hospital course (by problem):   Problem: Chronic neck and back pain.   ·  Plan: Has morphine pump placed by Dr. Lazo. Was previously seen inpatient with morphine pump refills but now appears to have been lost to follow-up. Pt wants to either remove pump (and use oral opioids) or use pump.  Plan:   - continue tylenol  - continue Cymbalta 60mg daily  - continue gabapentin 300mg tid   - resume oxy 15 with benadryl q4 (had been getting at [facility])  - outpatient urology follow-up to discuss jiménez and self-catheterization  - outpatient follow-up with Dr. Ott to discuss removal of intrathecal pump given persistent difficulties with refilling pump, not refilled in service      Per inpatient pain [facility] and [employer], patient will need "dye study" to interogate pump further versus removal.   - outpatient NSGY f/u    #UTI (urinary tract infection).   ·  Plan: Pt with chronic jiménez, multiple ED visits for UTI. Was discharged from ED on cefuroxime started on 3/15. Ucx from 3/15 showing pseudomonas sensitive to levaquin. Per NH papers, appears to have been started on Levaquin on 3/18.   UTI grossly positive in ED on current admission. Jiménez exchanged in ED with cloudy urine.  Ucx growing E faecalis and candida glabrata.   - Overall, did not feel pt was symptomatic from UTI and alternative explanation for abd pain (constipation, improved with bowel movements)  - Will discontinue antibiotics     #Hx of CVA (cerebrovascular accident).   ·  Plan: History of CVA x3 (MR brain 1/23 with chronic lacunar infarcts of the left corona radiata, left putamen and left cerebellar hemisphere)  - continue ASA 81mg daily    #RUE weakness/neurology  Presented with RUE weakness yesterday, resolved in ED. Stroke consulted in ED, CTH and CTA head/neck unyielding.  - stroke consulted, appreciate recs           - recommend patient continue home antiplatelet/AC regimen           - please have patient call our office at [phone] to set up an outpatient follow up appt given stroke history           - if patient admitted, consider spine imaging for paresthesias as sensation changes spared the face and patient with extensive spinal surgeries.    Constipation.   ·  Plan: Chronic constipation ISO heavy opioid use for chronic pain. Takes home lubiprostone 24mcg BID.  CT abdomen/pelvis showing abundant stool burden. Bilateral lower quadrant abdominal pain on exam.  Having BMs.  - s/p enema in ED  - lactulose tid  - senna/miralax BID.    #History of pulmonary embolus (PE).   ·  Plan: Reportedly takes dabigatran 150mg BID. Pt unsure of indication.  - C/w AC for now.    #HTN (hypertension).   ·  Plan: - continue home Toprol 25mg daily  - continue home Entresto 24-26 BID.    #HLD (hyperlipidemia).   ·  Plan: - continue home atorvastatin 20mg daily.    #Emphysema of lung. Former smoker x 55 years, quit 2 years ago.  - continue duoneb q6h PRN.    #Neurogenic bladder. chronic jiménez  - urology f/u 4/8.    #Type 2 diabetes mellitus.   ·  Plan: Per chart from [facility], takes metformin 500mg BID.  - mISS while inpatient.    Patient was discharged to: [facility]

## 2024-03-27 PROCEDURE — 99232 SBSQ HOSP IP/OBS MODERATE 35: CPT | Mod: GC

## 2024-03-27 RX ORDER — ACETAMINOPHEN 500 MG
1000 TABLET ORAL EVERY 6 HOURS
Refills: 0 | Status: DISCONTINUED | OUTPATIENT
Start: 2024-03-27 | End: 2024-03-30

## 2024-03-27 RX ORDER — DIPHENHYDRAMINE HCL 50 MG
25 CAPSULE ORAL EVERY 6 HOURS
Refills: 0 | Status: DISCONTINUED | OUTPATIENT
Start: 2024-03-27 | End: 2024-03-29

## 2024-03-27 RX ORDER — CELECOXIB 200 MG/1
200 CAPSULE ORAL EVERY 24 HOURS
Refills: 0 | Status: DISCONTINUED | OUTPATIENT
Start: 2024-03-27 | End: 2024-03-30

## 2024-03-27 RX ORDER — GABAPENTIN 400 MG/1
300 CAPSULE ORAL EVERY 8 HOURS
Refills: 0 | Status: DISCONTINUED | OUTPATIENT
Start: 2024-03-27 | End: 2024-03-30

## 2024-03-27 RX ORDER — DIPHENHYDRAMINE HCL 50 MG
25 CAPSULE ORAL ONCE
Refills: 0 | Status: COMPLETED | OUTPATIENT
Start: 2024-03-27 | End: 2024-03-27

## 2024-03-27 RX ORDER — VANCOMYCIN HCL 1 G
1250 VIAL (EA) INTRAVENOUS EVERY 12 HOURS
Refills: 0 | Status: COMPLETED | OUTPATIENT
Start: 2024-03-27 | End: 2024-03-28

## 2024-03-27 RX ORDER — OXYCODONE HYDROCHLORIDE 5 MG/1
15 TABLET ORAL EVERY 6 HOURS
Refills: 0 | Status: DISCONTINUED | OUTPATIENT
Start: 2024-03-27 | End: 2024-03-29

## 2024-03-27 RX ADMIN — Medication 1000 MILLIGRAM(S): at 11:32

## 2024-03-27 RX ADMIN — CELECOXIB 200 MILLIGRAM(S): 200 CAPSULE ORAL at 11:32

## 2024-03-27 RX ADMIN — GABAPENTIN 300 MILLIGRAM(S): 400 CAPSULE ORAL at 21:56

## 2024-03-27 RX ADMIN — DABIGATRAN ETEXILATE MESYLATE 150 MILLIGRAM(S): 150 CAPSULE ORAL at 18:55

## 2024-03-27 RX ADMIN — Medication 1000 MILLIGRAM(S): at 04:54

## 2024-03-27 RX ADMIN — DULOXETINE HYDROCHLORIDE 60 MILLIGRAM(S): 30 CAPSULE, DELAYED RELEASE ORAL at 11:32

## 2024-03-27 RX ADMIN — Medication 1000 MILLIGRAM(S): at 04:20

## 2024-03-27 RX ADMIN — Medication 1000 MILLIGRAM(S): at 18:54

## 2024-03-27 RX ADMIN — Medication 25 MILLIGRAM(S): at 06:18

## 2024-03-27 RX ADMIN — OXYCODONE HYDROCHLORIDE 15 MILLIGRAM(S): 5 TABLET ORAL at 07:49

## 2024-03-27 RX ADMIN — OXYCODONE HYDROCHLORIDE 15 MILLIGRAM(S): 5 TABLET ORAL at 15:13

## 2024-03-27 RX ADMIN — LACTULOSE 10 GRAM(S): 10 SOLUTION ORAL at 06:18

## 2024-03-27 RX ADMIN — PANTOPRAZOLE SODIUM 40 MILLIGRAM(S): 20 TABLET, DELAYED RELEASE ORAL at 06:18

## 2024-03-27 RX ADMIN — OXYCODONE HYDROCHLORIDE 15 MILLIGRAM(S): 5 TABLET ORAL at 12:51

## 2024-03-27 RX ADMIN — Medication 25 MILLIGRAM(S): at 12:51

## 2024-03-27 RX ADMIN — LACTULOSE 10 GRAM(S): 10 SOLUTION ORAL at 14:21

## 2024-03-27 RX ADMIN — PIPERACILLIN AND TAZOBACTAM 25 GRAM(S): 4; .5 INJECTION, POWDER, LYOPHILIZED, FOR SOLUTION INTRAVENOUS at 06:18

## 2024-03-27 RX ADMIN — Medication 25 MILLIGRAM(S): at 08:14

## 2024-03-27 RX ADMIN — PIPERACILLIN AND TAZOBACTAM 25 GRAM(S): 4; .5 INJECTION, POWDER, LYOPHILIZED, FOR SOLUTION INTRAVENOUS at 14:20

## 2024-03-27 RX ADMIN — GABAPENTIN 300 MILLIGRAM(S): 400 CAPSULE ORAL at 14:21

## 2024-03-27 RX ADMIN — Medication 166.67 MILLIGRAM(S): at 16:17

## 2024-03-27 RX ADMIN — GABAPENTIN 300 MILLIGRAM(S): 400 CAPSULE ORAL at 06:17

## 2024-03-27 NOTE — PROGRESS NOTE ADULT - PROBLEM SELECTOR PLAN 2
Pt with chronic jiménez, multiple ED visits for UTI. Was discharged from ED on cefuroxime started on 3/15. Ucx from 3/15 showing pseudomonas sensitive to levaquin. Per NH papers, appears to have been started on Levaquin on 3/18.   UTI grossly positive in ED on current admission. Jiménez exchanged in ED with cloudy urine.    Plan:   - continue zosyn for now  - f/u repeat UA with culture/sensitivities Pt with chronic jiménez, multiple ED visits for UTI. Was discharged from ED on cefuroxime started on 3/15. Ucx from 3/15 showing pseudomonas sensitive to levaquin. Per NH papers, appears to have been started on Levaquin on 3/18.   UTI grossly positive in ED on current admission. Jiménez exchanged in ED with cloudy urine.  Ucx growing E faecalis, sensitivites not resulted.    Plan:   - continue zosyn, add vanc  - narrow pending ucx sensitvities

## 2024-03-27 NOTE — PROGRESS NOTE ADULT - PROBLEM SELECTOR PLAN 1
Has morphine pump placed by Dr. Lazo. Does not appear to have arranged appointments.     - continue tylenol  - continue Cymbalta 60mg daily  - continue gabapentin 300mg tid   - resume oxy 15 with benadryl (had been getting at [facility])  - ongoing convo about pain management....chronic pain service does not take consults from medicine service, but pt has previously had morphine pump filled during Dec 2023 inpatient here. Outpatient spine institution recommending inpatient NSGY svc as primary manager of pain pump Has morphine pump placed by Dr. Lazo. Was previously seen inpatient with morphine pump refills but now appears to have been lost to follow-up. Pt wants to either remove pump (and use oral opioids) or use pump.    Plan:   - continue tylenol  - continue Cymbalta 60mg daily  - continue gabapentin 300mg tid   - resume oxy 15 with benadryl (had been getting at [facility])  - ongoing convo about pain management....chronic pain service does not take consults from medicine service, but pt has previously had morphine pump filled during Dec 2023 inpatient here. Outpatient spine institution recommending inpatient NSGY svc as primary manager of pain pump

## 2024-03-27 NOTE — OCCUPATIONAL THERAPY INITIAL EVALUATION ADULT - DIAGNOSIS, OT EVAL
Pt presents with chronic B LE paralysis reporting new onset of L shoulder pain demonstrating decrease balance and strength with agitation and frustration throughout.

## 2024-03-27 NOTE — PHYSICAL THERAPY INITIAL EVALUATION ADULT - IMPAIRMENTS FOUND, PT EVAL
aerobic capacity/endurance/ergonomics and body mechanics/gait, locomotion, and balance/gross motor/muscle strength/poor safety awareness/posture

## 2024-03-27 NOTE — OCCUPATIONAL THERAPY INITIAL EVALUATION ADULT - PERTINENT HX OF CURRENT PROBLEM, REHAB EVAL
69F PMH chronic neck/back pain (s/p multiple spinal fusions, inactive intrathecal morphine pump), bilateral LE paralysis, HTN, HLD, CVA x3 (MR brain 1/23 with chronic lacunar infarcts of the left corona radiata, left putamen and left cerebellar hemisphere), hx PE in 2023 (Eliquis for 3-6 months), emphysema, neurogenic bladder (s/p chronic jiménez), multiple UTI, multiple ED visits for abdominal pain presenting with transient RUE weakness and acute on chronic pain.

## 2024-03-27 NOTE — PHYSICAL THERAPY INITIAL EVALUATION ADULT - ADDITIONAL COMMENTS
pt is an admission from [facility]. pt reports being able to stand and transfer to motorized wheelchair at baseline.

## 2024-03-27 NOTE — PHYSICAL THERAPY INITIAL EVALUATION ADULT - RANGE OF MOTION EXAMINATION, REHAB EVAL
chronic limited range on LUE/bilateral upper extremity ROM was WFL (within functional limits)/bilateral lower extremity ROM was WFL (within functional limits)

## 2024-03-27 NOTE — OCCUPATIONAL THERAPY INITIAL EVALUATION ADULT - RANGE OF MOTION EXAMINATION, UPPER EXTREMITY
except L shoulder not assessed 2/2 pt not compliant to continue to participate/bilateral UE Active ROM was WFL  (within functional limits)

## 2024-03-27 NOTE — PROGRESS NOTE ADULT - SUBJECTIVE AND OBJECTIVE BOX
OVERNIGHT EVENTS: KAMRAN, VSS. Had soft but not liquidy BM yesterday evening.     SUBJECTIVE / INTERVAL HPI: Patient seen and examined at bedside. Reports improvement in abdominal pain, active BMs. Primary concern now is head and lower back pain which she attributes to prior surgery.    PHYSICAL EXAM:     General: NAD, upset with roommate (loud)  HEENT: PERRL, anicteric sclera; MMM  Neck: supple  Cardiovascular: +S1/S2, RRR  Respiratory: CTA B/L; normal wob  Gastrointestinal: soft, NT/ND; +BSx4  Extremities: WWP; no edema, clubbing or cyanosis  Vascular: 2+ radial, DP/PT pulses B/L  Neurological: AAOx3; no focal deficits  Psychiatric: pleasant mood and affect  Dermatologic: no appreciable wounds or damage to the skin    VITAL SIGNS:  Vital Signs Last 24 Hrs  T(C): 36.7 (27 Mar 2024 09:37), Max: 36.7 (27 Mar 2024 09:37)  T(F): 98 (27 Mar 2024 09:37), Max: 98 (27 Mar 2024 09:37)  HR: 97 (27 Mar 2024 09:37) (97 - 98)  BP: 149/95 (27 Mar 2024 09:37) (125/86 - 149/95)  BP(mean): --  RR: 18 (27 Mar 2024 09:37) (18 - 18)  SpO2: 98% (27 Mar 2024 09:37) (98% - 99%)    Parameters below as of 27 Mar 2024 09:37  Patient On (Oxygen Delivery Method): room air          MEDICATIONS:  MEDICATIONS  (STANDING):  acetaminophen     Tablet .. 1000 milliGRAM(s) Oral every 6 hours  dabigatran 150 milliGRAM(s) Oral every 12 hours  DULoxetine 60 milliGRAM(s) Oral daily  gabapentin 300 milliGRAM(s) Oral two times a day  influenza  Vaccine (HIGH DOSE) 0.7 milliLiter(s) IntraMuscular once  lactulose Syrup 10 Gram(s) Oral three times a day  metoprolol succinate ER 25 milliGRAM(s) Oral daily  pantoprazole    Tablet 40 milliGRAM(s) Oral before breakfast  piperacillin/tazobactam IVPB.. 4.5 Gram(s) IV Intermittent every 8 hours    MEDICATIONS  (PRN):  aluminum hydroxide/magnesium hydroxide/simethicone Suspension 30 milliLiter(s) Oral every 4 hours PRN Dyspepsia      ALLERGIES:  Allergies    Crab (Pruritus)  oxycodone (Unknown)  morphine (Unknown)    Intolerances        LABS:                        11.1   6.74  )-----------( 374      ( 26 Mar 2024 07:13 )             35.0     03-26    140  |  105  |  8   ----------------------------<  136<H>  3.2<L>   |  23  |  0.43<L>    Ca    9.6      26 Mar 2024 07:13  Phos  4.0     03-26  Mg     1.7     03-26    TPro  7.2  /  Alb  3.9  /  TBili  0.3  /  DBili  x   /  AST  13  /  ALT  8<L>  /  AlkPhos  90  03-25    PT/INR - ( 25 Mar 2024 21:33 )   PT: 13.3 sec;   INR: 1.17          PTT - ( 25 Mar 2024 21:33 )  PTT:45.8 sec  Urinalysis Basic - ( 26 Mar 2024 17:03 )    Color: Yellow / Appearance: Cloudy / SG: >1.030 / pH: x  Gluc: x / Ketone: Negative mg/dL  / Bili: Negative / Urobili: 1.0 mg/dL   Blood: x / Protein: 30 mg/dL / Nitrite: Negative   Leuk Esterase: Small / RBC: 0 /HPF / WBC 49 /HPF   Sq Epi: x / Non Sq Epi: 1 /HPF / Bacteria: Negative /HPF      CAPILLARY BLOOD GLUCOSE      POCT Blood Glucose.: 119 mg/dL (25 Mar 2024 20:50)      RADIOLOGY & ADDITIONAL TESTS: Reviewed. OVERNIGHT EVENTS: KAMRAN, VSS. Had soft but not liquidy BM yesterday evening.     SUBJECTIVE / INTERVAL HPI: Patient seen and examined at bedside. Reports improvement in abdominal pain, active BMs. Primary concern now is head and lower back pain which she attributes to prior surgery.    PHYSICAL EXAM:     General: NAD, upset with roommate (loud)  HEENT: PERRL, anicteric sclera; MMM  Neck: supple  Cardiovascular: +S1/S2, RRR  Respiratory: CTA B/L; normal wob  Gastrointestinal: distended but not tense, can feel morphine pump subcutaneous RLQ  Extremities: WWP; no edema, clubbing or cyanosis  Vascular: 2+ radial, DP/PT pulses B/L  Neurological: AAOx3; 0/5 bilateral lower extremity weakness, moving upper arms spontaneously  Dermatologic: no appreciable wounds or damage to the skin    VITAL SIGNS:  Vital Signs Last 24 Hrs  T(C): 36.7 (27 Mar 2024 09:37), Max: 36.7 (27 Mar 2024 09:37)  T(F): 98 (27 Mar 2024 09:37), Max: 98 (27 Mar 2024 09:37)  HR: 97 (27 Mar 2024 09:37) (97 - 98)  BP: 149/95 (27 Mar 2024 09:37) (125/86 - 149/95)  BP(mean): --  RR: 18 (27 Mar 2024 09:37) (18 - 18)  SpO2: 98% (27 Mar 2024 09:37) (98% - 99%)    Parameters below as of 27 Mar 2024 09:37  Patient On (Oxygen Delivery Method): room air          MEDICATIONS:  MEDICATIONS  (STANDING):  acetaminophen     Tablet .. 1000 milliGRAM(s) Oral every 6 hours  dabigatran 150 milliGRAM(s) Oral every 12 hours  DULoxetine 60 milliGRAM(s) Oral daily  gabapentin 300 milliGRAM(s) Oral two times a day  influenza  Vaccine (HIGH DOSE) 0.7 milliLiter(s) IntraMuscular once  lactulose Syrup 10 Gram(s) Oral three times a day  metoprolol succinate ER 25 milliGRAM(s) Oral daily  pantoprazole    Tablet 40 milliGRAM(s) Oral before breakfast  piperacillin/tazobactam IVPB.. 4.5 Gram(s) IV Intermittent every 8 hours    MEDICATIONS  (PRN):  aluminum hydroxide/magnesium hydroxide/simethicone Suspension 30 milliLiter(s) Oral every 4 hours PRN Dyspepsia      ALLERGIES:  Allergies    Crab (Pruritus)  oxycodone (Unknown)  morphine (Unknown)    Intolerances        LABS:                        11.1   6.74  )-----------( 374      ( 26 Mar 2024 07:13 )             35.0     03-26    140  |  105  |  8   ----------------------------<  136<H>  3.2<L>   |  23  |  0.43<L>    Ca    9.6      26 Mar 2024 07:13  Phos  4.0     03-26  Mg     1.7     03-26    TPro  7.2  /  Alb  3.9  /  TBili  0.3  /  DBili  x   /  AST  13  /  ALT  8<L>  /  AlkPhos  90  03-25    PT/INR - ( 25 Mar 2024 21:33 )   PT: 13.3 sec;   INR: 1.17          PTT - ( 25 Mar 2024 21:33 )  PTT:45.8 sec  Urinalysis Basic - ( 26 Mar 2024 17:03 )    Color: Yellow / Appearance: Cloudy / SG: >1.030 / pH: x  Gluc: x / Ketone: Negative mg/dL  / Bili: Negative / Urobili: 1.0 mg/dL   Blood: x / Protein: 30 mg/dL / Nitrite: Negative   Leuk Esterase: Small / RBC: 0 /HPF / WBC 49 /HPF   Sq Epi: x / Non Sq Epi: 1 /HPF / Bacteria: Negative /HPF      CAPILLARY BLOOD GLUCOSE      POCT Blood Glucose.: 119 mg/dL (25 Mar 2024 20:50)      RADIOLOGY & ADDITIONAL TESTS: Reviewed.

## 2024-03-27 NOTE — PHYSICAL THERAPY INITIAL EVALUATION ADULT - MANUAL MUSCLE TESTING RESULTS, REHAB EVAL
bilateral LE paralysis and therefore 0/5 in LE, BUE about 3/5 (chronic limited range on LUE)/grossly assessed due to

## 2024-03-27 NOTE — PHYSICAL THERAPY INITIAL EVALUATION ADULT - NS ASR RISK AREAS PT EVAL
Alert and oriented to person, place, time/situation. normal mood and affect. no apparent risk to self or others.
fall

## 2024-03-27 NOTE — OCCUPATIONAL THERAPY INITIAL EVALUATION ADULT - LIVES WITH, PROFILE
Pt admitted from rehab. Unable to obtain clear PLOF from patient as patient was frustrated with questions and did not want to participate.

## 2024-03-28 LAB
ANION GAP SERPL CALC-SCNC: 10 MMOL/L — SIGNIFICANT CHANGE UP (ref 5–17)
BASOPHILS # BLD AUTO: 0.03 K/UL — SIGNIFICANT CHANGE UP (ref 0–0.2)
BASOPHILS NFR BLD AUTO: 0.5 % — SIGNIFICANT CHANGE UP (ref 0–2)
BUN SERPL-MCNC: 8 MG/DL — SIGNIFICANT CHANGE UP (ref 7–23)
CALCIUM SERPL-MCNC: 9.6 MG/DL — SIGNIFICANT CHANGE UP (ref 8.4–10.5)
CHLORIDE SERPL-SCNC: 108 MMOL/L — SIGNIFICANT CHANGE UP (ref 96–108)
CO2 SERPL-SCNC: 23 MMOL/L — SIGNIFICANT CHANGE UP (ref 22–31)
CREAT SERPL-MCNC: 0.33 MG/DL — LOW (ref 0.5–1.3)
EGFR: 111 ML/MIN/1.73M2 — SIGNIFICANT CHANGE UP
EOSINOPHIL # BLD AUTO: 0.3 K/UL — SIGNIFICANT CHANGE UP (ref 0–0.5)
EOSINOPHIL NFR BLD AUTO: 5 % — SIGNIFICANT CHANGE UP (ref 0–6)
GLUCOSE SERPL-MCNC: 96 MG/DL — SIGNIFICANT CHANGE UP (ref 70–99)
HCT VFR BLD CALC: 35.1 % — SIGNIFICANT CHANGE UP (ref 34.5–45)
HGB BLD-MCNC: 11 G/DL — LOW (ref 11.5–15.5)
IMM GRANULOCYTES NFR BLD AUTO: 0 % — SIGNIFICANT CHANGE UP (ref 0–0.9)
LYMPHOCYTES # BLD AUTO: 2.68 K/UL — SIGNIFICANT CHANGE UP (ref 1–3.3)
LYMPHOCYTES # BLD AUTO: 44.3 % — HIGH (ref 13–44)
MAGNESIUM SERPL-MCNC: 1.9 MG/DL — SIGNIFICANT CHANGE UP (ref 1.6–2.6)
MCHC RBC-ENTMCNC: 25.2 PG — LOW (ref 27–34)
MCHC RBC-ENTMCNC: 31.3 GM/DL — LOW (ref 32–36)
MCV RBC AUTO: 80.5 FL — SIGNIFICANT CHANGE UP (ref 80–100)
MONOCYTES # BLD AUTO: 0.55 K/UL — SIGNIFICANT CHANGE UP (ref 0–0.9)
MONOCYTES NFR BLD AUTO: 9.1 % — SIGNIFICANT CHANGE UP (ref 2–14)
NEUTROPHILS # BLD AUTO: 2.49 K/UL — SIGNIFICANT CHANGE UP (ref 1.8–7.4)
NEUTROPHILS NFR BLD AUTO: 41.1 % — LOW (ref 43–77)
NRBC # BLD: 0 /100 WBCS — SIGNIFICANT CHANGE UP (ref 0–0)
PHOSPHATE SERPL-MCNC: 3.5 MG/DL — SIGNIFICANT CHANGE UP (ref 2.5–4.5)
PLATELET # BLD AUTO: 353 K/UL — SIGNIFICANT CHANGE UP (ref 150–400)
POTASSIUM SERPL-MCNC: 3.9 MMOL/L — SIGNIFICANT CHANGE UP (ref 3.5–5.3)
POTASSIUM SERPL-SCNC: 3.9 MMOL/L — SIGNIFICANT CHANGE UP (ref 3.5–5.3)
RBC # BLD: 4.36 M/UL — SIGNIFICANT CHANGE UP (ref 3.8–5.2)
RBC # FLD: 21.7 % — HIGH (ref 10.3–14.5)
SODIUM SERPL-SCNC: 141 MMOL/L — SIGNIFICANT CHANGE UP (ref 135–145)
WBC # BLD: 6.05 K/UL — SIGNIFICANT CHANGE UP (ref 3.8–10.5)
WBC # FLD AUTO: 6.05 K/UL — SIGNIFICANT CHANGE UP (ref 3.8–10.5)

## 2024-03-28 PROCEDURE — 99232 SBSQ HOSP IP/OBS MODERATE 35: CPT | Mod: GC

## 2024-03-28 RX ORDER — DIPHENHYDRAMINE HCL 50 MG
25 CAPSULE ORAL ONCE
Refills: 0 | Status: DISCONTINUED | OUTPATIENT
Start: 2024-03-28 | End: 2024-03-29

## 2024-03-28 RX ORDER — OXYCODONE HYDROCHLORIDE 5 MG/1
15 TABLET ORAL ONCE
Refills: 0 | Status: DISCONTINUED | OUTPATIENT
Start: 2024-03-28 | End: 2024-03-28

## 2024-03-28 RX ADMIN — Medication 1000 MILLIGRAM(S): at 12:28

## 2024-03-28 RX ADMIN — Medication 25 MILLIGRAM(S): at 06:12

## 2024-03-28 RX ADMIN — Medication 1000 MILLIGRAM(S): at 18:20

## 2024-03-28 RX ADMIN — DABIGATRAN ETEXILATE MESYLATE 150 MILLIGRAM(S): 150 CAPSULE ORAL at 06:11

## 2024-03-28 RX ADMIN — DABIGATRAN ETEXILATE MESYLATE 150 MILLIGRAM(S): 150 CAPSULE ORAL at 18:20

## 2024-03-28 RX ADMIN — OXYCODONE HYDROCHLORIDE 15 MILLIGRAM(S): 5 TABLET ORAL at 18:30

## 2024-03-28 RX ADMIN — PANTOPRAZOLE SODIUM 40 MILLIGRAM(S): 20 TABLET, DELAYED RELEASE ORAL at 06:12

## 2024-03-28 RX ADMIN — GABAPENTIN 300 MILLIGRAM(S): 400 CAPSULE ORAL at 21:37

## 2024-03-28 RX ADMIN — Medication 1000 MILLIGRAM(S): at 06:11

## 2024-03-28 RX ADMIN — Medication 1000 MILLIGRAM(S): at 19:20

## 2024-03-28 RX ADMIN — OXYCODONE HYDROCHLORIDE 15 MILLIGRAM(S): 5 TABLET ORAL at 08:12

## 2024-03-28 RX ADMIN — DULOXETINE HYDROCHLORIDE 60 MILLIGRAM(S): 30 CAPSULE, DELAYED RELEASE ORAL at 11:28

## 2024-03-28 RX ADMIN — CELECOXIB 200 MILLIGRAM(S): 200 CAPSULE ORAL at 10:54

## 2024-03-28 RX ADMIN — LACTULOSE 10 GRAM(S): 10 SOLUTION ORAL at 06:12

## 2024-03-28 RX ADMIN — Medication 25 MILLIGRAM(S): at 18:32

## 2024-03-28 RX ADMIN — OXYCODONE HYDROCHLORIDE 15 MILLIGRAM(S): 5 TABLET ORAL at 13:30

## 2024-03-28 RX ADMIN — Medication 166.67 MILLIGRAM(S): at 18:24

## 2024-03-28 RX ADMIN — LACTULOSE 10 GRAM(S): 10 SOLUTION ORAL at 14:15

## 2024-03-28 RX ADMIN — Medication 166.67 MILLIGRAM(S): at 06:10

## 2024-03-28 RX ADMIN — OXYCODONE HYDROCHLORIDE 15 MILLIGRAM(S): 5 TABLET ORAL at 07:12

## 2024-03-28 RX ADMIN — Medication 25 MILLIGRAM(S): at 07:12

## 2024-03-28 RX ADMIN — Medication 1000 MILLIGRAM(S): at 00:00

## 2024-03-28 RX ADMIN — OXYCODONE HYDROCHLORIDE 15 MILLIGRAM(S): 5 TABLET ORAL at 19:30

## 2024-03-28 RX ADMIN — GABAPENTIN 300 MILLIGRAM(S): 400 CAPSULE ORAL at 14:15

## 2024-03-28 RX ADMIN — CELECOXIB 200 MILLIGRAM(S): 200 CAPSULE ORAL at 09:54

## 2024-03-28 RX ADMIN — GABAPENTIN 300 MILLIGRAM(S): 400 CAPSULE ORAL at 06:11

## 2024-03-28 RX ADMIN — Medication 1000 MILLIGRAM(S): at 11:28

## 2024-03-28 RX ADMIN — OXYCODONE HYDROCHLORIDE 15 MILLIGRAM(S): 5 TABLET ORAL at 12:30

## 2024-03-28 RX ADMIN — Medication 25 MILLIGRAM(S): at 12:30

## 2024-03-28 NOTE — PROGRESS NOTE ADULT - PROBLEM SELECTOR PLAN 11
Per chart from [facility], takes metformin 500mg BID.  - mISS while inpatient
Per chart from [facility], takes metformin 500mg BID.  - mISS while inpatient

## 2024-03-28 NOTE — PROGRESS NOTE ADULT - PROBLEM SELECTOR PLAN 5
Reportedly takes dabigatran 150mg BID. Pt unsure of indication.    - C/w AC for now
Reportedly takes dabigatran 150mg BID. Pt unsure of indication.    - C/w AC for now

## 2024-03-28 NOTE — PROGRESS NOTE ADULT - SUBJECTIVE AND OBJECTIVE BOX
OVERNIGHT EVENTS: KAMRAN, VSS.     SUBJECTIVE / INTERVAL HPI: Patient seen and examined at bedside. Abd pain much improved, still with chronic back and headache that improves with oxy 15.     Had one episode of BRPBR per nursing/PT. "Tinged red", HDS, Hgb stable. Monitor for now. Prior BMs have been brown.     PHYSICAL EXAM:     General: NAD  HEENT: PERRL, anicteric sclera; MMM  Neck: supple  Cardiovascular: +S1/S2, RRR  Respiratory: CTA B/L; normal wob  Gastrointestinal: distended but not tense, can feel morphine pump subcutaneous RLQ  Extremities: WWP; no edema, clubbing or cyanosis  Vascular: 2+ radial, DP/PT pulses B/L  Neurological: AAOx3; 0/5 bilateral lower extremity weakness, moving upper arms spontaneously  Dermatologic: no appreciable wounds or damage to the skin    VITAL SIGNS:  Vital Signs Last 24 Hrs  T(C): 36.4 (28 Mar 2024 09:14), Max: 36.9 (27 Mar 2024 21:55)  T(F): 97.5 (28 Mar 2024 09:14), Max: 98.5 (28 Mar 2024 04:56)  HR: 96 (28 Mar 2024 09:14) (73 - 98)  BP: 121/80 (28 Mar 2024 09:14) (102/67 - 143/85)  BP(mean): --  RR: 18 (28 Mar 2024 09:14) (17 - 18)  SpO2: 100% (28 Mar 2024 09:14) (93% - 100%)    Parameters below as of 28 Mar 2024 09:14  Patient On (Oxygen Delivery Method): room air          MEDICATIONS:  MEDICATIONS  (STANDING):  acetaminophen     Tablet .. 1000 milliGRAM(s) Oral every 6 hours  celecoxib 200 milliGRAM(s) Oral every 24 hours  dabigatran 150 milliGRAM(s) Oral every 12 hours  DULoxetine 60 milliGRAM(s) Oral daily  gabapentin 300 milliGRAM(s) Oral every 8 hours  influenza  Vaccine (HIGH DOSE) 0.7 milliLiter(s) IntraMuscular once  lactulose Syrup 10 Gram(s) Oral three times a day  metoprolol succinate ER 25 milliGRAM(s) Oral daily  pantoprazole    Tablet 40 milliGRAM(s) Oral before breakfast  vancomycin  IVPB 1250 milliGRAM(s) IV Intermittent every 12 hours    MEDICATIONS  (PRN):  aluminum hydroxide/magnesium hydroxide/simethicone Suspension 30 milliLiter(s) Oral every 4 hours PRN Dyspepsia  diphenhydrAMINE 25 milliGRAM(s) Oral every 6 hours PRN Rash and/or Itching  diphenhydrAMINE 25 milliGRAM(s) Oral once PRN Rash and/or Itching  oxyCODONE    IR 15 milliGRAM(s) Oral every 6 hours PRN For Mod/Severe Pain      ALLERGIES:  Allergies    Crab (Pruritus)  oxycodone (Unknown)  morphine (Unknown)    Intolerances        LABS:                        11.0   6.05  )-----------( 353      ( 28 Mar 2024 07:12 )             35.1     03-28    141  |  108  |  8   ----------------------------<  96  3.9   |  23  |  0.33<L>    Ca    9.6      28 Mar 2024 07:12  Phos  3.5     03-28  Mg     1.9     03-28        Urinalysis Basic - ( 28 Mar 2024 07:12 )    Color: x / Appearance: x / SG: x / pH: x  Gluc: 96 mg/dL / Ketone: x  / Bili: x / Urobili: x   Blood: x / Protein: x / Nitrite: x   Leuk Esterase: x / RBC: x / WBC x   Sq Epi: x / Non Sq Epi: x / Bacteria: x      CAPILLARY BLOOD GLUCOSE          RADIOLOGY & ADDITIONAL TESTS: Reviewed.

## 2024-03-28 NOTE — PROGRESS NOTE ADULT - SUBJECTIVE AND OBJECTIVE BOX
Physical Medicine and Rehabilitation Progress Note :       Patient is a 69y old  Female who presents with a chief complaint of UTI (28 Mar 2024 09:26)      HPI:  69F PMH chronic neck/back pain (s/p multiple spinal fusions, inactive intrathecal morphine pump), bilateral LE paralysis, HTN, HLD, CVA x3 (MR brain 1/23 with chronic lacunar infarcts of the left corona radiata, left putamen and left cerebellar hemisphere), hx PE in 2023 (Eliquis for 3-6 months), emphysema, neurogenic bladder (s/p chronic jiménez), multiple UTI, multiple ED visits for abdominal pain presenting with transient RUE weakness since yesterday (now resolved) and abdominal pain. Pt is a poor historian. At time of interview, pt denies any RUE weakness, but is endorsing abdominal pain as her main complaint.    Pt states that she has had acute on chronic abdominal pain for the last 3 weeks. She describes it as in the bilateral lower quadrants, colicky, and sharp. Her abdominal pain is most pronounced at the site of a previously active intrathecal morphine pump, that was placed in 12/2023 for her spinal surgery (hospitalized 12/4-1/5 for spinal fusion d/t damaged hardware), but the pump was never removed. States the pain occurs every few minutes, last a few seconds. Notes multiple ED visits, was prescribed PO abx for UTIs. Has chronic jiménez, notes that it seems to be clogged often. Was last exchanged 3 days ago at [facility], and then again today in ER. Completed 1 week of cefuroxime on 3/15 with temporary improvement in symptoms. Then, abdominal pain returned and she states she was started on a different antibiotic (appears to be Levaquin per NH records), which led to worsening abdominal pain. She then proceeded to ER from [facility] today. Notable history of chronic pain, for which she takes standing oxycodone 15mg q4h accompanied by Benadryl 25mg for mild allergy (described as scalp itching/pulling her hair out, and eyelid burning sensation).    Here, she endorses mild subjective fever, frontal headache, nonproductive cough x3 days, nausea, flank pain. Denies vision change, chest pain, sputum production, vomiting.    She was intended to be discharged from [facility] to home tomorrow, but notes that she is still unable to walk.    In the ED:    - VS: Tmax:98, HR: 120 ->99, BP: 117/78, RR: 16, O2: 98% room air    - Pertinent Labs: wbc 6.9, BUN 11, Cr 0.49. UA = SG 1.03, 13 epithelial cells, 646 wbc, moderate LE, positive NIT    - Imaging:  ·	CTA head/neck:   ·	No hemodynamically significant large vessel stenosis or occlusion in the head and neck.  ·	Few left thyroid nodules measuring up to 2.0 cm. Outpatient follow-up with thyroid ultrasound is suggested.  ·	CTH: No acute transcortical infarction, hemorrhage, or mass effect. Stable exam since 2022.  ·	CT abd/pel with IV:   ·	Feces distended rectum with presacral edema, similar to prior exam from 2/27/2024. Early stercoral colitis is not excluded.  ·	Urinary bladder wall thickening. Correlate with cystitis.  ·	Moderate/large colonic stool burden.    - Treatment/interventions: 1L NS, CTX 1g, zosyn 3.375g, morphine 6mg + Benadryl 25mg, morphine 4mg + Benadryl 25mg, dilaudid 4mg PO    - Consults: stroke    PMHx:   PSHx:  Meds: See med rec  Allergies:  Social: see below  (26 Mar 2024 02:21)                            11.0   6.05  )-----------( 353      ( 28 Mar 2024 07:12 )             35.1       03-28    141  |  108  |  8   ----------------------------<  96  3.9   |  23  |  0.33<L>    Ca    9.6      28 Mar 2024 07:12  Phos  3.5     03-28  Mg     1.9     03-28      Vital Signs Last 24 Hrs  T(C): 36.4 (28 Mar 2024 09:14), Max: 36.9 (27 Mar 2024 21:55)  T(F): 97.5 (28 Mar 2024 09:14), Max: 98.5 (28 Mar 2024 04:56)  HR: 96 (28 Mar 2024 09:14) (73 - 98)  BP: 121/80 (28 Mar 2024 09:14) (102/67 - 143/85)  BP(mean): --  RR: 18 (28 Mar 2024 09:14) (17 - 18)  SpO2: 100% (28 Mar 2024 09:14) (93% - 100%)    Parameters below as of 28 Mar 2024 09:14  Patient On (Oxygen Delivery Method): room air        MEDICATIONS  (STANDING):  acetaminophen     Tablet .. 1000 milliGRAM(s) Oral every 6 hours  celecoxib 200 milliGRAM(s) Oral every 24 hours  dabigatran 150 milliGRAM(s) Oral every 12 hours  DULoxetine 60 milliGRAM(s) Oral daily  gabapentin 300 milliGRAM(s) Oral every 8 hours  influenza  Vaccine (HIGH DOSE) 0.7 milliLiter(s) IntraMuscular once  lactulose Syrup 10 Gram(s) Oral three times a day  metoprolol succinate ER 25 milliGRAM(s) Oral daily  oxyCODONE    IR 15 milliGRAM(s) Oral once  pantoprazole    Tablet 40 milliGRAM(s) Oral before breakfast  vancomycin  IVPB 1250 milliGRAM(s) IV Intermittent every 12 hours    MEDICATIONS  (PRN):  aluminum hydroxide/magnesium hydroxide/simethicone Suspension 30 milliLiter(s) Oral every 4 hours PRN Dyspepsia  diphenhydrAMINE 25 milliGRAM(s) Oral every 6 hours PRN Rash and/or Itching  diphenhydrAMINE 25 milliGRAM(s) Oral once PRN Rash and/or Itching  oxyCODONE    IR 15 milliGRAM(s) Oral every 6 hours PRN For Mod/Severe Pain      T(C): 36.4 (03-28-24 @ 09:14), Max: 36.9 (03-27-24 @ 21:55)  HR: 96 (03-28-24 @ 09:14) (73 - 98)  BP: 121/80 (03-28-24 @ 09:14) (102/67 - 143/85)  RR: 18 (03-28-24 @ 09:14) (17 - 18)  SpO2: 100% (03-28-24 @ 09:14) (93% - 100%)        Physical Exam:     General: NAD    HEENT: PERRL, anicteric sclera; MMM    Neck: supple    Cardiovascular: +S1/S2, RRR    Respiratory: CTA B/L; normal wob    Gastrointestinal: distended but not tense, can feel morphine pump subcutaneous RLQ    Extremities: WWP; no edema, clubbing or cyanosis    Vascular: 2+ radial, DP/PT pulses B/L    Neurological: AAOx3; 0/5 bilateral lower extremity weakness, moving upper arms spontaneously    Dermatologic: no appreciable wounds or damage to the skin    Functional Status Assessment :       Pain Assessment/Number Scale (0-10) Adult  Presence of Pain: complains of pain/discomfort  Body Location: L shoulder  Pain Rating (0-10): Rest: 5 (moderate pain)  Pain Rating (0-10): Activity: 5 (moderate pain)    Re-assessment (Number Scale)  Pain Response to Interventions: no change in pain    Safety      AM-PAC Functional Assessment: Basic Mobility  Type of Assessment: Daily assessment  Turning from your back to your side while in a flat bed without using bedrails?: 1 = Total assistance  Moving from lying on your back to sitting on the flat side of a flat bed without using bedrails?: 1 = Total assistance  Moving to and from a bed to a chair (including a wheelchair)?: 1 = Total assistance  Standing up from a chair using your arms (e.g. wheelchair or bedside chair)?: 1 = Total assistance  Walking in hospital room?: 1 = Total assistance  Climbing 3-5 steps with a railing?: 1 = Total assistance  Score: 6   Row Comment: Ask the patient "How much help from another person do you currently need? (If the patient hasn't done an activity recently, how much help from another person do you think he/she needs if he/she tried?)    Therapeutic Interventions      Bed Mobility  Bed Mobility Training Rolling/Turning: maximum assist (25% patient effort);  2 person assist  Bed Mobility Training Sit-to-Supine: maximum assist (25% patient effort);  2 person assist  Bed Mobility Training Supine-to-Sit: maximum assist (25% patient effort);  2 person assist  Bed Mobility Training Limitations: decreased ability to use arms for pushing/pulling;  decreased ability to use legs for bridging/pushing;  decreased strength;  impaired motor control;  impaired postural control    Sit-Stand Transfer Training  Transfer Training Sit-to-Stand Transfer: maximum assist (25% patient effort);  2 person assist;  weight-bearing as tolerated  Transfer Training Stand-to-Sit Transfer: maximum assist (25% patient effort);  2 person assist;  weight-bearing as tolerated  Sit-to-Stand Transfer Training Transfer Safety Analysis: decreased balance;  decreased step length;  decreased weight-shifting ability;  decreased strength            PM&R Impression : as above    Current disposition plan recommendation :    subacute rehab placement

## 2024-03-28 NOTE — PROGRESS NOTE ADULT - PROBLEM SELECTOR PLAN 2
Pt with chronic jiménez, multiple ED visits for UTI. Was discharged from ED on cefuroxime started on 3/15. Ucx from 3/15 showing pseudomonas sensitive to levaquin. Per NH papers, appears to have been started on Levaquin on 3/18.   UTI grossly positive in ED on current admission. Jiménez exchanged in ED with cloudy urine.  Ucx growing E faecalis, sensitivites not resulted.    Plan:   - switch zosyn to vanc given E faecalis and outpatient treatment of prior pseudomonal UTI  - narrow pending ucx sensitvities

## 2024-03-28 NOTE — PROGRESS NOTE ADULT - PROBLEM SELECTOR PLAN 4
Chronic constipation ISO heavy opioid use for chronic pain. Takes home lubiprostone 24mcg BID.  CT abdomen/pelvis showing abundant stool burden. Bilateral lower quadrant abdominal pain on exam.  Having BMs.    Plan:   - s/p enema in ED  - lactulose tid  - senna/miralax BID
Chronic constipation ISO heavy opioid use for chronic pain. Takes home lubiprostone 24mcg BID.  CT abdomen/pelvis showing abundant stool burden. Bilateral lower quadrant abdominal pain on exam.  Having BMs.    Plan:   - s/p enema in ED  - lactulose tid  - senna/miralax BID

## 2024-03-28 NOTE — PROGRESS NOTE ADULT - PROBLEM SELECTOR PLAN 1
Has morphine pump placed by Dr. Lazo. Was previously seen inpatient with morphine pump refills but now appears to have been lost to follow-up. Pt wants to either remove pump (and use oral opioids) or use pump.    Plan:   - continue tylenol  - continue Cymbalta 60mg daily  - continue gabapentin 300mg tid   - resume oxy 15 with benadryl (had been getting at [facility])  - outpatient urology follow-up to discuss jiménez and self-catheterization  - outpatient follow-up with Dr. Ott and neurosurgery team  - ongoing convo about pain management....chronic pain service does not take consults from medicine service, but pt has previously had morphine pump filled during Dec 2023 inpatient here. Outpatient spine institution recommending inpatient NSGY svc as primary manager of pain pump

## 2024-03-28 NOTE — PROGRESS NOTE ADULT - ASSESSMENT
I M    69 y o F PMH chronic neck/back pain (s/p multiple spinal fusions, inactive intrathecal morphine pump), bilateral LE paralysis, HTN, HLD, CVA x3 (MR brain 1/23 with chronic lacunar infarcts of the left corona radiata, left putamen and left cerebellar hemisphere), hx PE in 2023 (Eliquis for 3-6 months), emphysema, neurogenic bladder (s/p chronic jiménez), multiple UTI, multiple ED visits for abdominal pain presenting with transient RUE weakness and acute on chronic pain.     Problem/Plan - 1:  ·  Problem: Chronic neck and back pain.   ·  Plan: Has morphine pump placed by Dr. Lazo. Was previously seen inpatient with morphine pump refills but now appears to have been lost to follow-up. Pt wants to either remove pump (and use oral opioids) or use pump.    Plan:   - continue tylenol  - continue Cymbalta 60mg daily  - continue gabapentin 300mg tid   - resume oxy 15 with benadryl (had been getting at [facility])  - ongoing convo about pain management....chronic pain service does not take consults from medicine service, but pt has previously had morphine pump filled during Dec 2023 inpatient here. Outpatient spine institution recommending inpatient [facility] as primary manager of pain pump.    Problem/Plan - 2:  ·  Problem: UTI (urinary tract infection).   ·  Plan: Pt with chronic jiménez, multiple ED visits for UTI. Was discharged from ED on cefuroxime started on 3/15. Ucx from 3/15 showing pseudomonas sensitive to levaquin. Per NH papers, appears to have been started on Levaquin on 3/18.   UTI grossly positive in ED on current admission. Jiménez exchanged in ED with cloudy urine.  Ucx growing E faecalis, sensitivites not resulted.    Plan:   - continue zosyn, add vanc  - narrow pending ucx sensitvities.    Problem/Plan - 3:  ·  Problem: CVA (cerebrovascular accident).   ·  Plan: History of CVA x3 (MR brain 1/23 with chronic lacunar infarcts of the left corona radiata, left putamen and left cerebellar hemisphere)  - continue ASA 81mg daily    #RUE weakness/neurology  Presented with RUE weakness yesterday, resolved in ED. Stroke consulted in ED, CTH and CTA head/neck unyielding.  - stroke consulted, appreciate recs           - recommend patient continue home antiplatelet/AC regimen           - please have patient call our office at [phone] to set up an outpatient follow up appt given stroke history           - if patient admitted, consider spine imaging for paresthesias as sensation changes spared the face and patient with extensive spinal surgeries.    Problem/Plan - 4:  ·  Problem: Constipation.   ·  Plan: Chronic constipation ISO heavy opioid use for chronic pain. Takes home lubiprostone 24mcg BID.  CT abdomen/pelvis showing abundant stool burden. Bilateral lower quadrant abdominal pain on exam.  Having BMs.    Plan:   - s/p enema in ED  - lactulose tid  - senna/miralax BID.    Problem/Plan - 5:  ·  Problem: History of pulmonary embolus (PE).   ·  Plan: Reportedly takes dabigatran 150mg BID. Pt unsure of indication.    - C/w AC for now.    Problem/Plan - 6:  ·  Problem: Paralysis of lower extremity.   ·  Plan: - PT/OT eval.    Problem/Plan - 7:  ·  Problem: HTN (hypertension).   ·  Plan: - continue home Toprol 25mg daily  - continue home Entresto 24-26 BID.    Problem/Plan - 8:  ·  Problem: HLD (hyperlipidemia).   ·  Plan: - continue home atorvastatin 20mg daily.    Problem/Plan - 9:  ·  Problem: Emphysema of lung.   ·  Plan: Former smoker x 55 years, quit 2 years ago.  - continue duoneb q6h PRN.    Problem/Plan - 10:  ·  Problem: Neurogenic bladder.   ·  Plan; - chronic jiménez  - urology f/u 4/8.    Problem/Plan - 11:  ·  Problem: Type 2 diabetes mellitus.   ·  Plan: Per chart from [facility], takes metformin 500mg BID.  - mISS while inpatient.    Problem/Plan - 12:  ·  Problem: Need for prophylactic measure.   ·  Plan: F: Tolerating PO, no IVF  E: Replete K<4, Mg<2  N: DASH  VTE Ppx: dabigatran 150mg BID  GI: Protonix 40mg daily    Problem: Nutrition, metabolism, and development symptoms.  
69F PMH chronic neck/back pain (s/p multiple spinal fusions, inactive intrathecal morphine pump), bilateral LE paralysis, HTN, HLD, CVA x3 (MR brain 1/23 with chronic lacunar infarcts of the left corona radiata, left putamen and left cerebellar hemisphere), hx PE in 2023 (Eliquis for 3-6 months), emphysema, neurogenic bladder (s/p chronic jiménez), multiple UTI, multiple ED visits for abdominal pain presenting with transient RUE weakness and acute on chronic pain. 
69F PMH chronic neck/back pain (s/p multiple spinal fusions, inactive intrathecal morphine pump), bilateral LE paralysis, HTN, HLD, CVA x3 (MR brain 1/23 with chronic lacunar infarcts of the left corona radiata, left putamen and left cerebellar hemisphere), hx PE in 2023 (Eliquis for 3-6 months), emphysema, neurogenic bladder (s/p chronic jiménez), multiple UTI, multiple ED visits for abdominal pain presenting with transient RUE weakness and acute on chronic pain.

## 2024-03-28 NOTE — PROGRESS NOTE ADULT - PROBLEM SELECTOR PLAN 9
Former smoker x 55 years, quit 2 years ago.  - continue duoneb q6h PRN
Former smoker x 55 years, quit 2 years ago.  - continue duoneb q6h PRN

## 2024-03-28 NOTE — PROGRESS NOTE ADULT - ATTENDING COMMENTS
#Chronic pain  #UTI, complicated  #h/o constipation    -Discussing w/ her outpatient provider but will likely be outpatient follow up for her pain pump. Currently fairly well controlleed.   -c/w course  -bowel regimen      Dispo: pending [facility]
69-year-old female with a PMHx of chronic neck/back pain (s/p multiple fusions and intrathecal morphine pump (inactive)), bilateral LE paralysis, HTN, HLD, CVA x 3, emphysema, neurogenic bladder (chronic jiménez, c/b multiple UTIs) and PE (on AC) who presented with transient RUE weakness and abdominal pain, found to have UTI and significant constipation.     Plan:    -jiménez exchanged on admission,   -completed 7 days of Levaquin previously ,  Urine cx obtained during this admission + for E Fecalis , start vancomycin , DC Zosyn  -continue with aggressive bowel regimen given CT-A/P findings    -stroke consulted, no acute findings on imaging, outpatient follow up    -see chart note for iSTOP, will be cautious with opiates given constipation   -palliative consult  -PT consult   -outpatient pain management follow up for further management of pain pump     Rest of plan as per resident note.

## 2024-03-28 NOTE — PROGRESS NOTE ADULT - PROBLEM SELECTOR PLAN 12
F: Tolerating PO, no IVF  E: Replete K<4, Mg<2  N: DASH  VTE Ppx: dabigatran 150mg BID  GI: Protonix 40mg daily    Problem: Nutrition, metabolism, and development symptoms
F: Tolerating PO, no IVF  E: Replete K<4, Mg<2  N: DASH  VTE Ppx: dabigatran 150mg BID  GI: Protonix 40mg daily    Problem: Nutrition, metabolism, and development symptoms

## 2024-03-28 NOTE — PROGRESS NOTE ADULT - PROBLEM SELECTOR PLAN 7
- continue home Toprol 25mg daily  - continue home Entresto 24-26 BID
- continue home Toprol 25mg daily  - continue home Entresto 24-26 BID

## 2024-03-28 NOTE — PROGRESS NOTE ADULT - PROBLEM SELECTOR PLAN 3
History of CVA x3 (MR brain 1/23 with chronic lacunar infarcts of the left corona radiata, left putamen and left cerebellar hemisphere)  - continue ASA 81mg daily    #RUE weakness/neurology  Presented with RUE weakness yesterday, resolved in ED. Stroke consulted in ED, CTH and CTA head/neck unyielding.  - stroke consulted, appreciate recs           - recommend patient continue home antiplatelet/AC regimen           - please have patient call our office at [phone] to set up an outpatient follow up appt given stroke history           - if patient admitted, consider spine imaging for paresthesias as sensation changes spared the face and patient with extensive spinal surgeries
History of CVA x3 (MR brain 1/23 with chronic lacunar infarcts of the left corona radiata, left putamen and left cerebellar hemisphere)  - continue ASA 81mg daily    #RUE weakness/neurology  Presented with RUE weakness yesterday, resolved in ED. Stroke consulted in ED, CTH and CTA head/neck unyielding.  - stroke consulted, appreciate recs           - recommend patient continue home antiplatelet/AC regimen           - please have patient call our office at [phone] to set up an outpatient follow up appt given stroke history           - if patient admitted, consider spine imaging for paresthesias as sensation changes spared the face and patient with extensive spinal surgeries

## 2024-03-29 ENCOUNTER — TRANSCRIPTION ENCOUNTER (OUTPATIENT)
Age: 69
End: 2024-03-29

## 2024-03-29 VITALS
TEMPERATURE: 98 F | DIASTOLIC BLOOD PRESSURE: 77 MMHG | SYSTOLIC BLOOD PRESSURE: 116 MMHG | OXYGEN SATURATION: 99 % | HEART RATE: 92 BPM | RESPIRATION RATE: 18 BRPM

## 2024-03-29 LAB
-  AMPICILLIN: SIGNIFICANT CHANGE UP
-  CIPROFLOXACIN: SIGNIFICANT CHANGE UP
-  NITROFURANTOIN: SIGNIFICANT CHANGE UP
-  TETRACYCLINE: SIGNIFICANT CHANGE UP
-  VANCOMYCIN: SIGNIFICANT CHANGE UP
METHOD TYPE: SIGNIFICANT CHANGE UP

## 2024-03-29 PROCEDURE — 72128 CT CHEST SPINE W/O DYE: CPT | Mod: MC

## 2024-03-29 PROCEDURE — 97166 OT EVAL MOD COMPLEX 45 MIN: CPT

## 2024-03-29 PROCEDURE — 70496 CT ANGIOGRAPHY HEAD: CPT | Mod: MC

## 2024-03-29 PROCEDURE — 86901 BLOOD TYPING SEROLOGIC RH(D): CPT

## 2024-03-29 PROCEDURE — 97161 PT EVAL LOW COMPLEX 20 MIN: CPT

## 2024-03-29 PROCEDURE — 97530 THERAPEUTIC ACTIVITIES: CPT

## 2024-03-29 PROCEDURE — 85730 THROMBOPLASTIN TIME PARTIAL: CPT

## 2024-03-29 PROCEDURE — 99239 HOSP IP/OBS DSCHRG MGMT >30: CPT

## 2024-03-29 PROCEDURE — 96375 TX/PRO/DX INJ NEW DRUG ADDON: CPT

## 2024-03-29 PROCEDURE — 80053 COMPREHEN METABOLIC PANEL: CPT

## 2024-03-29 PROCEDURE — 36415 COLL VENOUS BLD VENIPUNCTURE: CPT

## 2024-03-29 PROCEDURE — 70450 CT HEAD/BRAIN W/O DYE: CPT | Mod: MC

## 2024-03-29 PROCEDURE — 72128 CT CHEST SPINE W/O DYE: CPT | Mod: 26

## 2024-03-29 PROCEDURE — 70498 CT ANGIOGRAPHY NECK: CPT | Mod: MC

## 2024-03-29 PROCEDURE — 87186 SC STD MICRODIL/AGAR DIL: CPT

## 2024-03-29 PROCEDURE — 81001 URINALYSIS AUTO W/SCOPE: CPT

## 2024-03-29 PROCEDURE — 80048 BASIC METABOLIC PNL TOTAL CA: CPT

## 2024-03-29 PROCEDURE — 86850 RBC ANTIBODY SCREEN: CPT

## 2024-03-29 PROCEDURE — 86900 BLOOD TYPING SEROLOGIC ABO: CPT

## 2024-03-29 PROCEDURE — 74177 CT ABD & PELVIS W/CONTRAST: CPT | Mod: MC

## 2024-03-29 PROCEDURE — 96376 TX/PRO/DX INJ SAME DRUG ADON: CPT

## 2024-03-29 PROCEDURE — 82962 GLUCOSE BLOOD TEST: CPT

## 2024-03-29 PROCEDURE — 83735 ASSAY OF MAGNESIUM: CPT

## 2024-03-29 PROCEDURE — 85610 PROTHROMBIN TIME: CPT

## 2024-03-29 PROCEDURE — 99285 EMERGENCY DEPT VISIT HI MDM: CPT

## 2024-03-29 PROCEDURE — 84100 ASSAY OF PHOSPHORUS: CPT

## 2024-03-29 PROCEDURE — 85025 COMPLETE CBC W/AUTO DIFF WBC: CPT

## 2024-03-29 PROCEDURE — 72131 CT LUMBAR SPINE W/O DYE: CPT | Mod: MC

## 2024-03-29 PROCEDURE — 96374 THER/PROPH/DIAG INJ IV PUSH: CPT

## 2024-03-29 PROCEDURE — 87181 SC STD AGAR DILUTION PER AGT: CPT

## 2024-03-29 PROCEDURE — 87086 URINE CULTURE/COLONY COUNT: CPT

## 2024-03-29 PROCEDURE — 72131 CT LUMBAR SPINE W/O DYE: CPT | Mod: 26

## 2024-03-29 RX ORDER — METOPROLOL TARTRATE 50 MG
1 TABLET ORAL
Qty: 0 | Refills: 0 | DISCHARGE
Start: 2024-03-29

## 2024-03-29 RX ORDER — DIPHENHYDRAMINE HCL 50 MG
25 CAPSULE ORAL EVERY 4 HOURS
Refills: 0 | Status: DISCONTINUED | OUTPATIENT
Start: 2024-03-29 | End: 2024-03-30

## 2024-03-29 RX ORDER — DIPHENHYDRAMINE HCL 50 MG
1 CAPSULE ORAL
Qty: 0 | Refills: 0 | DISCHARGE
Start: 2024-03-29

## 2024-03-29 RX ORDER — OXYCODONE HYDROCHLORIDE 5 MG/1
15 TABLET ORAL EVERY 4 HOURS
Refills: 0 | Status: DISCONTINUED | OUTPATIENT
Start: 2024-03-29 | End: 2024-03-30

## 2024-03-29 RX ORDER — CELECOXIB 200 MG/1
1 CAPSULE ORAL
Qty: 0 | Refills: 0 | DISCHARGE
Start: 2024-03-29

## 2024-03-29 RX ADMIN — OXYCODONE HYDROCHLORIDE 15 MILLIGRAM(S): 5 TABLET ORAL at 06:38

## 2024-03-29 RX ADMIN — DABIGATRAN ETEXILATE MESYLATE 150 MILLIGRAM(S): 150 CAPSULE ORAL at 05:57

## 2024-03-29 RX ADMIN — Medication 1000 MILLIGRAM(S): at 11:29

## 2024-03-29 RX ADMIN — OXYCODONE HYDROCHLORIDE 15 MILLIGRAM(S): 5 TABLET ORAL at 01:03

## 2024-03-29 RX ADMIN — LACTULOSE 10 GRAM(S): 10 SOLUTION ORAL at 05:58

## 2024-03-29 RX ADMIN — OXYCODONE HYDROCHLORIDE 15 MILLIGRAM(S): 5 TABLET ORAL at 17:38

## 2024-03-29 RX ADMIN — Medication 1000 MILLIGRAM(S): at 17:15

## 2024-03-29 RX ADMIN — Medication 25 MILLIGRAM(S): at 17:39

## 2024-03-29 RX ADMIN — OXYCODONE HYDROCHLORIDE 15 MILLIGRAM(S): 5 TABLET ORAL at 13:23

## 2024-03-29 RX ADMIN — DABIGATRAN ETEXILATE MESYLATE 150 MILLIGRAM(S): 150 CAPSULE ORAL at 17:15

## 2024-03-29 RX ADMIN — DULOXETINE HYDROCHLORIDE 60 MILLIGRAM(S): 30 CAPSULE, DELAYED RELEASE ORAL at 11:29

## 2024-03-29 RX ADMIN — PANTOPRAZOLE SODIUM 40 MILLIGRAM(S): 20 TABLET, DELAYED RELEASE ORAL at 06:05

## 2024-03-29 RX ADMIN — Medication 1000 MILLIGRAM(S): at 18:19

## 2024-03-29 RX ADMIN — LACTULOSE 10 GRAM(S): 10 SOLUTION ORAL at 13:24

## 2024-03-29 RX ADMIN — OXYCODONE HYDROCHLORIDE 15 MILLIGRAM(S): 5 TABLET ORAL at 14:19

## 2024-03-29 RX ADMIN — Medication 25 MILLIGRAM(S): at 05:57

## 2024-03-29 RX ADMIN — OXYCODONE HYDROCHLORIDE 15 MILLIGRAM(S): 5 TABLET ORAL at 18:19

## 2024-03-29 RX ADMIN — Medication 1000 MILLIGRAM(S): at 00:34

## 2024-03-29 RX ADMIN — GABAPENTIN 300 MILLIGRAM(S): 400 CAPSULE ORAL at 05:58

## 2024-03-29 RX ADMIN — OXYCODONE HYDROCHLORIDE 15 MILLIGRAM(S): 5 TABLET ORAL at 00:33

## 2024-03-29 RX ADMIN — CELECOXIB 200 MILLIGRAM(S): 200 CAPSULE ORAL at 09:13

## 2024-03-29 RX ADMIN — Medication 1000 MILLIGRAM(S): at 12:15

## 2024-03-29 RX ADMIN — Medication 1000 MILLIGRAM(S): at 05:57

## 2024-03-29 RX ADMIN — CELECOXIB 200 MILLIGRAM(S): 200 CAPSULE ORAL at 02:02

## 2024-03-29 RX ADMIN — Medication 25 MILLIGRAM(S): at 00:33

## 2024-03-29 RX ADMIN — GABAPENTIN 300 MILLIGRAM(S): 400 CAPSULE ORAL at 13:23

## 2024-03-29 RX ADMIN — OXYCODONE HYDROCHLORIDE 15 MILLIGRAM(S): 5 TABLET ORAL at 07:38

## 2024-03-29 NOTE — DISCHARGE NOTE NURSING/CASE MANAGEMENT/SOCIAL WORK - NSDCFUADDAPPT_GEN_ALL_CORE_FT
Please bring your Insurance card, Photo ID and Discharge paperwork to the following appointments:    (1) Please follow up with your Urology Provider, Dr. Claudia Quintana at [address] [address] on 4/8/2024 at 3:20pm.    Appointment was scheduled by Ms. ROD Chacko, Referral Coordinator.    (2) Please follow up with your Neurosurgery Provider, Dr. Patrice Osorio at [address] [place] [address], [address] [id number] on 4/19/2024 at 11:00am.    Appointment was scheduled by Ms. ROD Chacko, Referral Coordinator.

## 2024-03-29 NOTE — PROGRESS NOTE ADULT - SUBJECTIVE AND OBJECTIVE BOX
PAIN MANAGEMENT CONSULT NOTE    Chief Complaint: IT pump    HPI:  69F PMH chronic neck/back pain (s/p multiple spinal fusions, inactive intrathecal morphine pump), bilateral LE paralysis, HTN, HLD, CVA x3 (MR brain 1/23 with chronic lacunar infarcts of the left corona radiata, left putamen and left cerebellar hemisphere), hx PE in 2023 (Eliquis for 3-6 months), emphysema, neurogenic bladder (s/p chronic jiménez), multiple UTI, multiple ED visits for abdominal pain presenting with transient RUE weakness since yesterday (now resolved) and abdominal pain. Pt is a poor historian. At time of interview, pt denies any RUE weakness, but is endorsing abdominal pain as her main complaint.    Pt states that she has had acute on chronic abdominal pain for the last 3 weeks. She describes it as in the bilateral lower quadrants, colicky, and sharp. Her abdominal pain is most pronounced at the site of a previously active intrathecal morphine pump, that was placed in 12/2023 for her spinal surgery (hospitalized 12/4-1/5 for spinal fusion d/t damaged hardware), but the pump was never removed. States the pain occurs every few minutes, last a few seconds. Notes multiple ED visits, was prescribed PO abx for UTIs. Has chronic jiménez, notes that it seems to be clogged often. Was last exchanged 3 days ago at [facility], and then again today in ER. Completed 1 week of cefuroxime on 3/15 with temporary improvement in symptoms. Then, abdominal pain returned and she states she was started on a different antibiotic (appears to be Levaquin per [facility] records), which led to worsening abdominal pain. She then proceeded to ER from [facility] today. Notable history of chronic pain, for which she takes standing oxycodone 15mg q4h accompanied by Benadryl 25mg for mild allergy (described as scalp itching/pulling her hair out, and eyelid burning sensation).    Here, she endorses mild subjective fever, frontal headache, nonproductive cough x3 days, nausea, flank pain. Denies vision change, chest pain, sputum production, vomiting.    She was intended to be discharged from [facility] to home tomorrow, but notes that she is still unable to walk.    In the ED:    - VS: Tmax:98, HR: 120 ->99, BP: 117/78, RR: 16, O2: 98% room air    - Pertinent Labs: wbc 6.9, BUN 11, Cr 0.49. UA = SG 1.03, 13 epithelial cells, 646 wbc, moderate LE, positive NIT    - Imaging:  ·	CTA head/neck:   ·	No hemodynamically significant large vessel stenosis or occlusion in the head and neck.  ·	Few left thyroid nodules measuring up to 2.0 cm. Outpatient follow-up with thyroid ultrasound is suggested.  ·	CTH: No acute transcortical infarction, hemorrhage, or mass effect. Stable exam since 2022.  ·	CT abd/pel with IV:   ·	Feces distended rectum with presacral edema, similar to prior exam from 2/27/2024. Early stercoral colitis is not excluded.  ·	Urinary bladder wall thickening. Correlate with cystitis.  ·	Moderate/large colonic stool burden.    - Treatment/interventions: 1L NS, CTX 1g, zosyn 3.375g, morphine 6mg + Benadryl 25mg, morphine 4mg + Benadryl 25mg, dilaudid 4mg PO    - Consults: stroke    PMHx:   PSHx:  Meds: See med rec  Allergies:  Social: see below  (26 Mar 2024 02:21)      PAST MEDICAL & SURGICAL HISTORY:  HTN (hypertension)      SS (spinal stenosis)      High cholesterol      Stroke  x3      H/O carpal tunnel syndrome  Bilateral      Chronic GERD      History of chronic constipation      Urinary incontinence  self cath as needed      Pre-diabetes      Anxiety and depression      Eczema      H/O kyphosis      Pancreas cyst      Scoliosis      Wheelchair dependent      Cervical pseudoarthrosis  and lumbar spine      Cervical spondylosis      Chronic headaches      Degenerative joint disease  left shoulder      Lumbosacral spondylosis      COPD (chronic obstructive pulmonary disease)      H/O Spinal surgery  lumbar x 30      H/O breast surgery  left breast implant 1980's      S/P cervical discectomy  x4      H/O total shoulder replacement, right          FAMILY HISTORY:      SOCIAL HISTORY:  [ ] Denies Smoking, Alcohol, or Drug Use    HOME MEDICATIONS:   Please refer to initial HNP    PAIN HOME MEDICATIONS:    Allergies    Crab (Pruritus)  oxycodone (Unknown)  morphine (Unknown)    Intolerances        PAIN MEDICATIONS:  acetaminophen     Tablet .. 1000 milliGRAM(s) Oral every 6 hours  celecoxib 200 milliGRAM(s) Oral every 24 hours  diphenhydrAMINE 25 milliGRAM(s) Oral every 4 hours PRN  DULoxetine 60 milliGRAM(s) Oral daily  gabapentin 300 milliGRAM(s) Oral every 8 hours  oxyCODONE    IR 15 milliGRAM(s) Oral every 4 hours PRN    Heme:  dabigatran 150 milliGRAM(s) Oral every 12 hours    Antibiotics:    Cardiovascular:  metoprolol succinate ER 25 milliGRAM(s) Oral daily    GI:  aluminum hydroxide/magnesium hydroxide/simethicone Suspension 30 milliLiter(s) Oral every 4 hours PRN  lactulose Syrup 10 Gram(s) Oral three times a day  pantoprazole    Tablet 40 milliGRAM(s) Oral before breakfast    Endocrine:    All Other Medications:  influenza  Vaccine (HIGH DOSE) 0.7 milliLiter(s) IntraMuscular once      Vital Signs Last 24 Hrs  T(C): 36.9 (29 Mar 2024 08:58), Max: 36.9 (29 Mar 2024 08:58)  T(F): 98.5 (29 Mar 2024 08:58), Max: 98.5 (29 Mar 2024 08:58)  HR: 92 (29 Mar 2024 08:58) (90 - 100)  BP: 116/77 (29 Mar 2024 08:58) (96/62 - 116/77)  BP(mean): --  RR: 18 (29 Mar 2024 08:58) (18 - 19)  SpO2: 99% (29 Mar 2024 08:58) (99% - 100%)    Parameters below as of 29 Mar 2024 08:58  Patient On (Oxygen Delivery Method): room air        LABS:                        11.0   6.05  )-----------( 353      ( 28 Mar 2024 07:12 )             35.1     03-28    141  |  108  |  8   ----------------------------<  96  3.9   |  23  |  0.33<L>    Ca    9.6      28 Mar 2024 07:12  Phos  3.5     03-28  Mg     1.9     03-28        Urinalysis Basic - ( 28 Mar 2024 07:12 )    Color: x / Appearance: x / SG: x / pH: x  Gluc: 96 mg/dL / Ketone: x  / Bili: x / Urobili: x   Blood: x / Protein: x / Nitrite: x   Leuk Esterase: x / RBC: x / WBC x   Sq Epi: x / Non Sq Epi: x / Bacteria: x        RADIOLOGY:    Drug Screen:        REVIEW OF SYSTEMS:  CONSTITUTIONAL: No fever or fatigue O/N.   EYES: No eye pain, visual disturbances  ENMT:  No difficulty hearing. No throat pain  NECK: No pain or stiffness  RESPIRATORY: No cough, wheezing; No shortness of breath  CARDIOVASCULAR: No chest pain, palpitations.   GASTROINTESTINAL: Pt reports passing gas, chronic constipation. No abdominal or epigastric pain. No nausea, vomiting. GENITOURINARY: No dysuria, frequency, or incontinence  NEUROLOGICAL: No headaches, loss of strength, numbness, or tremors. No dizzinesss or lightheadedness with pain medications.   MUSCULOSKELETAL: Incisional back pain. No joint pain or swelling; No muscle, or extremity pain      FUNCTIONAL ASSESSMENT:  PAIN SCORE AT REST:         SCALE USED: (1-10 VNRS)  PAIN SCORE WITH ACTIVITY:         SCALE USED: (1-10 VNRS)    PAIN ASSESSMENT:    PHYSICAL EXAM  GENERAL: Laying in bed, NAD  Neuro: CN II-XII PERRRL, EOMI  Motor exam: unable to move BLE  CHEST/LUNG: Clear to auscultation bilaterally; No rales, rhonchi, wheezing, or rubs  HEART: Regular rate and rhythm; No murmurs, rubs, or gallops  ABDOMEN: Soft, Nontender, Nondistended; Bowel sounds present  EXTREMITIES:  2+ Peripheral Pulses, No clubbing, cyanosis, or edema  SKIN: No rashes or lesions    ASSESSMENT: 69F PMH chronic neck/back pain (s/p multiple spinal fusions, inactive intrathecal morphine pump), bilateral LE paralysis, HTN, HLD, CVA x3 (MR brain 1/23 with chronic lacunar infarcts of the left corona radiata, left putamen and left cerebellar hemisphere), hx PE in 2023 (Eliquis for 3-6 months), emphysema, neurogenic bladder (s/p chronic jiménez), multiple UTI, multiple ED visits for abdominal pain presenting with transient RUE weakness and acute on chronic pain.         PLAN:   - recommend CT thoracic spine to confirm placement of IT pump  - pending confirmation of placement, potentially would plan to refill IT pump with 1mg/1mL morphine 20mL as able  - work on setting up home infusion if unable to refill prior to dc      - Bowel regimen: Senna    - Nausea ppx: Zofran as needed  - Functional Goals: Pt will get OOB with PT today. Pt will resume previous level of activity without impairment from surgery.   - Additional Consults: None recommended.   - Additional Labs/Imaging:  None recommended.     - Follow up, Discharge Planning: per primary team  - Pain Management follow up plan: will continue to follow.    Plan d/w Dr. Mclaughlin.   PAIN MANAGEMENT CONSULT NOTE    Chief Complaint: IT pump    HPI:  69F PMH chronic neck/back pain (s/p multiple spinal fusions, inactive intrathecal morphine pump), bilateral LE paralysis, HTN, HLD, CVA x3 (MR brain 1/23 with chronic lacunar infarcts of the left corona radiata, left putamen and left cerebellar hemisphere), hx PE in 2023 (Eliquis for 3-6 months), emphysema, neurogenic bladder (s/p chronic jiménez), multiple UTI, multiple ED visits for abdominal pain presenting with transient RUE weakness since yesterday (now resolved) and abdominal pain. Pt is a poor historian. At time of interview, pt denies any RUE weakness, but is endorsing abdominal pain as her main complaint.    Pt states that she has had acute on chronic abdominal pain for the last 3 weeks. She describes it as in the bilateral lower quadrants, colicky, and sharp. Her abdominal pain is most pronounced at the site of a previously active intrathecal morphine pump, that was placed in 12/2023 for her spinal surgery (hospitalized 12/4-1/5 for spinal fusion d/t damaged hardware), but the pump was never removed. States the pain occurs every few minutes, last a few seconds. Notes multiple ED visits, was prescribed PO abx for UTIs. Has chronic jiménez, notes that it seems to be clogged often. Was last exchanged 3 days ago at [facility], and then again today in ER. Completed 1 week of cefuroxime on 3/15 with temporary improvement in symptoms. Then, abdominal pain returned and she states she was started on a different antibiotic (appears to be Levaquin per [facility] records), which led to worsening abdominal pain. She then proceeded to ER from [facility] today. Notable history of chronic pain, for which she takes standing oxycodone 15mg q4h accompanied by Benadryl 25mg for mild allergy (described as scalp itching/pulling her hair out, and eyelid burning sensation).    Here, she endorses mild subjective fever, frontal headache, nonproductive cough x3 days, nausea, flank pain. Denies vision change, chest pain, sputum production, vomiting.    She was intended to be discharged from [facility] to home tomorrow, but notes that she is still unable to walk.    In the ED:    - VS: Tmax:98, HR: 120 ->99, BP: 117/78, RR: 16, O2: 98% room air    - Pertinent Labs: wbc 6.9, BUN 11, Cr 0.49. UA = SG 1.03, 13 epithelial cells, 646 wbc, moderate LE, positive NIT    - Imaging:  ·	CTA head/neck:   ·	No hemodynamically significant large vessel stenosis or occlusion in the head and neck.  ·	Few left thyroid nodules measuring up to 2.0 cm. Outpatient follow-up with thyroid ultrasound is suggested.  ·	CTH: No acute transcortical infarction, hemorrhage, or mass effect. Stable exam since 2022.  ·	CT abd/pel with IV:   ·	Feces distended rectum with presacral edema, similar to prior exam from 2/27/2024. Early stercoral colitis is not excluded.  ·	Urinary bladder wall thickening. Correlate with cystitis.  ·	Moderate/large colonic stool burden.    - Treatment/interventions: 1L NS, CTX 1g, zosyn 3.375g, morphine 6mg + Benadryl 25mg, morphine 4mg + Benadryl 25mg, dilaudid 4mg PO    - Consults: stroke    PMHx:   PSHx:  Meds: See med rec  Allergies:  Social: see below  (26 Mar 2024 02:21)      PAST MEDICAL & SURGICAL HISTORY:  HTN (hypertension)      SS (spinal stenosis)      High cholesterol      Stroke  x3      H/O carpal tunnel syndrome  Bilateral      Chronic GERD      History of chronic constipation      Urinary incontinence  self cath as needed      Pre-diabetes      Anxiety and depression      Eczema      H/O kyphosis      Pancreas cyst      Scoliosis      Wheelchair dependent      Cervical pseudoarthrosis  and lumbar spine      Cervical spondylosis      Chronic headaches      Degenerative joint disease  left shoulder      Lumbosacral spondylosis      COPD (chronic obstructive pulmonary disease)      H/O Spinal surgery  lumbar x 30      H/O breast surgery  left breast implant 1980's      S/P cervical discectomy  x4      H/O total shoulder replacement, right          FAMILY HISTORY:      SOCIAL HISTORY:  [ ] Denies Smoking, Alcohol, or Drug Use    HOME MEDICATIONS:   Please refer to initial HNP    PAIN HOME MEDICATIONS:    Allergies    Crab (Pruritus)  oxycodone (Unknown)  morphine (Unknown)    Intolerances        PAIN MEDICATIONS:  acetaminophen     Tablet .. 1000 milliGRAM(s) Oral every 6 hours  celecoxib 200 milliGRAM(s) Oral every 24 hours  diphenhydrAMINE 25 milliGRAM(s) Oral every 4 hours PRN  DULoxetine 60 milliGRAM(s) Oral daily  gabapentin 300 milliGRAM(s) Oral every 8 hours  oxyCODONE    IR 15 milliGRAM(s) Oral every 4 hours PRN    Heme:  dabigatran 150 milliGRAM(s) Oral every 12 hours    Antibiotics:    Cardiovascular:  metoprolol succinate ER 25 milliGRAM(s) Oral daily    GI:  aluminum hydroxide/magnesium hydroxide/simethicone Suspension 30 milliLiter(s) Oral every 4 hours PRN  lactulose Syrup 10 Gram(s) Oral three times a day  pantoprazole    Tablet 40 milliGRAM(s) Oral before breakfast    Endocrine:    All Other Medications:  influenza  Vaccine (HIGH DOSE) 0.7 milliLiter(s) IntraMuscular once      Vital Signs Last 24 Hrs  T(C): 36.9 (29 Mar 2024 08:58), Max: 36.9 (29 Mar 2024 08:58)  T(F): 98.5 (29 Mar 2024 08:58), Max: 98.5 (29 Mar 2024 08:58)  HR: 92 (29 Mar 2024 08:58) (90 - 100)  BP: 116/77 (29 Mar 2024 08:58) (96/62 - 116/77)  BP(mean): --  RR: 18 (29 Mar 2024 08:58) (18 - 19)  SpO2: 99% (29 Mar 2024 08:58) (99% - 100%)    Parameters below as of 29 Mar 2024 08:58  Patient On (Oxygen Delivery Method): room air        LABS:                        11.0   6.05  )-----------( 353      ( 28 Mar 2024 07:12 )             35.1     03-28    141  |  108  |  8   ----------------------------<  96  3.9   |  23  |  0.33<L>    Ca    9.6      28 Mar 2024 07:12  Phos  3.5     03-28  Mg     1.9     03-28        Urinalysis Basic - ( 28 Mar 2024 07:12 )    Color: x / Appearance: x / SG: x / pH: x  Gluc: 96 mg/dL / Ketone: x  / Bili: x / Urobili: x   Blood: x / Protein: x / Nitrite: x   Leuk Esterase: x / RBC: x / WBC x   Sq Epi: x / Non Sq Epi: x / Bacteria: x        RADIOLOGY:    Drug Screen:        REVIEW OF SYSTEMS:  CONSTITUTIONAL: No fever or fatigue O/N.   EYES: No eye pain, visual disturbances  ENMT:  No difficulty hearing. No throat pain  NECK: No pain or stiffness  RESPIRATORY: No cough, wheezing; No shortness of breath  CARDIOVASCULAR: No chest pain, palpitations.   GASTROINTESTINAL: Pt reports passing gas, chronic constipation. No abdominal or epigastric pain. No nausea, vomiting. GENITOURINARY: No dysuria, frequency, or incontinence  NEUROLOGICAL: No headaches, loss of strength, numbness, or tremors. No dizzinesss or lightheadedness with pain medications.   MUSCULOSKELETAL: Incisional back pain. No joint pain or swelling; No muscle, or extremity pain      FUNCTIONAL ASSESSMENT:  PAIN SCORE AT REST:         SCALE USED: (1-10 VNRS)  PAIN SCORE WITH ACTIVITY:         SCALE USED: (1-10 VNRS)    PAIN ASSESSMENT:    PHYSICAL EXAM  GENERAL: Laying in bed, NAD  Neuro: CN II-XII PERRRL, EOMI  Motor exam: unable to move BLE  CHEST/LUNG: Clear to auscultation bilaterally; No rales, rhonchi, wheezing, or rubs  HEART: Regular rate and rhythm; No murmurs, rubs, or gallops  ABDOMEN: Soft, Nontender, Nondistended; Bowel sounds present  EXTREMITIES:  2+ Peripheral Pulses, No clubbing, cyanosis, or edema  SKIN: No rashes or lesions    ASSESSMENT: 69F PMH chronic neck/back pain (s/p multiple spinal fusions, inactive intrathecal morphine pump), bilateral LE paralysis, HTN, HLD, CVA x3 (MR brain 1/23 with chronic lacunar infarcts of the left corona radiata, left putamen and left cerebellar hemisphere), hx PE in 2023 (Eliquis for 3-6 months), emphysema, neurogenic bladder (s/p chronic jiménez), multiple UTI, multiple ED visits for abdominal pain presenting with transient RUE weakness and acute on chronic pain.         PLAN:   - continue tylenol 1gm q6h  - continue celebrex 200mg qd  - continue gabapentin 300mg q8h  - continue 15mg oxycodone q6h prn pain  - recommend CT thoracic spine to confirm placement of IT pump  - [employer] rep to bedside today to interrogate pump  - pending confirmation of placement, potentially would plan to refill IT pump with 1mg/1mL morphine 20mL as able  - work on setting up home infusion if unable to refill prior to dc        - Bowel regimen: Senna    - Nausea ppx: Zofran as needed  - Functional Goals: Pt will get OOB with PT today. Pt will resume previous level of activity without impairment from surgery.   - Additional Consults: None recommended.   - Additional Labs/Imaging:  None recommended.     - Follow up, Discharge Planning: per primary team  - Pain Management follow up plan: will continue to follow.    Plan d/w Dr. Mclaughlin.   PAIN MANAGEMENT CONSULT NOTE    Chief Complaint: IT pump    HPI:  69F PMH chronic neck/back pain (s/p multiple spinal fusions, inactive intrathecal morphine pump), bilateral LE paralysis, HTN, HLD, CVA x3 (MR brain 1/23 with chronic lacunar infarcts of the left corona radiata, left putamen and left cerebellar hemisphere), hx PE in 2023 (Eliquis for 3-6 months), emphysema, neurogenic bladder (s/p chronic jiménez), multiple UTI, multiple ED visits for abdominal pain presenting with transient RUE weakness since yesterday (now resolved) and abdominal pain. Pt is a poor historian. At time of interview, pt denies any RUE weakness, but is endorsing abdominal pain as her main complaint.    Pt states that she has had acute on chronic abdominal pain for the last 3 weeks. She describes it as in the bilateral lower quadrants, colicky, and sharp. Her abdominal pain is most pronounced at the site of a previously active intrathecal morphine pump, that was placed in 12/2023 for her spinal surgery (hospitalized 12/4-1/5 for spinal fusion d/t damaged hardware), but the pump was never removed. States the pain occurs every few minutes, last a few seconds. Notes multiple ED visits, was prescribed PO abx for UTIs. Has chronic jiménez, notes that it seems to be clogged often. Was last exchanged 3 days ago at [facility], and then again today in ER. Completed 1 week of cefuroxime on 3/15 with temporary improvement in symptoms. Then, abdominal pain returned and she states she was started on a different antibiotic (appears to be Levaquin per [facility] records), which led to worsening abdominal pain. She then proceeded to ER from [facility] today. Notable history of chronic pain, for which she takes standing oxycodone 15mg q4h accompanied by Benadryl 25mg for mild allergy (described as scalp itching/pulling her hair out, and eyelid burning sensation).    Here, she endorses mild subjective fever, frontal headache, nonproductive cough x3 days, nausea, flank pain. Denies vision change, chest pain, sputum production, vomiting.    She was intended to be discharged from [facility] to home tomorrow, but notes that she is still unable to walk.    In the ED:    - VS: Tmax:98, HR: 120 ->99, BP: 117/78, RR: 16, O2: 98% room air    - Pertinent Labs: wbc 6.9, BUN 11, Cr 0.49. UA = SG 1.03, 13 epithelial cells, 646 wbc, moderate LE, positive NIT    - Imaging:  ·	CTA head/neck:   ·	No hemodynamically significant large vessel stenosis or occlusion in the head and neck.  ·	Few left thyroid nodules measuring up to 2.0 cm. Outpatient follow-up with thyroid ultrasound is suggested.  ·	CTH: No acute transcortical infarction, hemorrhage, or mass effect. Stable exam since 2022.  ·	CT abd/pel with IV:   ·	Feces distended rectum with presacral edema, similar to prior exam from 2/27/2024. Early stercoral colitis is not excluded.  ·	Urinary bladder wall thickening. Correlate with cystitis.  ·	Moderate/large colonic stool burden.    - Treatment/interventions: 1L NS, CTX 1g, zosyn 3.375g, morphine 6mg + Benadryl 25mg, morphine 4mg + Benadryl 25mg, dilaudid 4mg PO    - Consults: stroke    PMHx:   PSHx:  Meds: See med rec  Allergies:  Social: see below  (26 Mar 2024 02:21)      PAST MEDICAL & SURGICAL HISTORY:  HTN (hypertension)      SS (spinal stenosis)      High cholesterol      Stroke  x3      H/O carpal tunnel syndrome  Bilateral      Chronic GERD      History of chronic constipation      Urinary incontinence  self cath as needed      Pre-diabetes      Anxiety and depression      Eczema      H/O kyphosis      Pancreas cyst      Scoliosis      Wheelchair dependent      Cervical pseudoarthrosis  and lumbar spine      Cervical spondylosis      Chronic headaches      Degenerative joint disease  left shoulder      Lumbosacral spondylosis      COPD (chronic obstructive pulmonary disease)      H/O Spinal surgery  lumbar x 30      H/O breast surgery  left breast implant 1980's      S/P cervical discectomy  x4      H/O total shoulder replacement, right          FAMILY HISTORY:      SOCIAL HISTORY:  [ ] Denies Smoking, Alcohol, or Drug Use    HOME MEDICATIONS:   Please refer to initial HNP    PAIN HOME MEDICATIONS:    Allergies    Crab (Pruritus)  oxycodone (Unknown)  morphine (Unknown)    Intolerances        PAIN MEDICATIONS:  acetaminophen     Tablet .. 1000 milliGRAM(s) Oral every 6 hours  celecoxib 200 milliGRAM(s) Oral every 24 hours  diphenhydrAMINE 25 milliGRAM(s) Oral every 4 hours PRN  DULoxetine 60 milliGRAM(s) Oral daily  gabapentin 300 milliGRAM(s) Oral every 8 hours  oxyCODONE    IR 15 milliGRAM(s) Oral every 4 hours PRN    Heme:  dabigatran 150 milliGRAM(s) Oral every 12 hours    Antibiotics:    Cardiovascular:  metoprolol succinate ER 25 milliGRAM(s) Oral daily    GI:  aluminum hydroxide/magnesium hydroxide/simethicone Suspension 30 milliLiter(s) Oral every 4 hours PRN  lactulose Syrup 10 Gram(s) Oral three times a day  pantoprazole    Tablet 40 milliGRAM(s) Oral before breakfast    Endocrine:    All Other Medications:  influenza  Vaccine (HIGH DOSE) 0.7 milliLiter(s) IntraMuscular once      Vital Signs Last 24 Hrs  T(C): 36.9 (29 Mar 2024 08:58), Max: 36.9 (29 Mar 2024 08:58)  T(F): 98.5 (29 Mar 2024 08:58), Max: 98.5 (29 Mar 2024 08:58)  HR: 92 (29 Mar 2024 08:58) (90 - 100)  BP: 116/77 (29 Mar 2024 08:58) (96/62 - 116/77)  BP(mean): --  RR: 18 (29 Mar 2024 08:58) (18 - 19)  SpO2: 99% (29 Mar 2024 08:58) (99% - 100%)    Parameters below as of 29 Mar 2024 08:58  Patient On (Oxygen Delivery Method): room air        LABS:                        11.0   6.05  )-----------( 353      ( 28 Mar 2024 07:12 )             35.1     03-28    141  |  108  |  8   ----------------------------<  96  3.9   |  23  |  0.33<L>    Ca    9.6      28 Mar 2024 07:12  Phos  3.5     03-28  Mg     1.9     03-28        Urinalysis Basic - ( 28 Mar 2024 07:12 )    Color: x / Appearance: x / SG: x / pH: x  Gluc: 96 mg/dL / Ketone: x  / Bili: x / Urobili: x   Blood: x / Protein: x / Nitrite: x   Leuk Esterase: x / RBC: x / WBC x   Sq Epi: x / Non Sq Epi: x / Bacteria: x        RADIOLOGY:    Drug Screen:        REVIEW OF SYSTEMS:  CONSTITUTIONAL: No fever or fatigue O/N.   EYES: No eye pain, visual disturbances  ENMT:  No difficulty hearing. No throat pain  NECK: No pain or stiffness  RESPIRATORY: No cough, wheezing; No shortness of breath  CARDIOVASCULAR: No chest pain, palpitations.   GASTROINTESTINAL: Pt reports passing gas, chronic constipation. No abdominal or epigastric pain. No nausea, vomiting. GENITOURINARY: No dysuria, frequency, or incontinence  NEUROLOGICAL: No headaches, loss of strength, numbness, or tremors. No dizzinesss or lightheadedness with pain medications.   MUSCULOSKELETAL: Incisional back pain. No joint pain or swelling; No muscle, or extremity pain      FUNCTIONAL ASSESSMENT:  PAIN SCORE AT REST:         SCALE USED: (1-10 VNRS)  PAIN SCORE WITH ACTIVITY:         SCALE USED: (1-10 VNRS)    PAIN ASSESSMENT:    PHYSICAL EXAM  GENERAL: Laying in bed, NAD  Neuro: CN II-XII PERRRL, EOMI  Motor exam: unable to move BLE  CHEST/LUNG: Clear to auscultation bilaterally; No rales, rhonchi, wheezing, or rubs  HEART: Regular rate and rhythm; No murmurs, rubs, or gallops  ABDOMEN: Soft, Nontender, Nondistended; Bowel sounds present  EXTREMITIES:  2+ Peripheral Pulses, No clubbing, cyanosis, or edema  SKIN: No rashes or lesions    ASSESSMENT: 69F PMH chronic neck/back pain (s/p multiple spinal fusions, inactive intrathecal morphine pump), bilateral LE paralysis, HTN, HLD, CVA x3 (MR brain 1/23 with chronic lacunar infarcts of the left corona radiata, left putamen and left cerebellar hemisphere), hx PE in 2023 (Eliquis for 3-6 months), emphysema, neurogenic bladder (s/p chronic jiménez), multiple UTI, multiple ED visits for abdominal pain presenting with transient RUE weakness and acute on chronic pain.         PLAN:   - continue tylenol 1gm q6h  - continue celebrex 200mg qd  - continue gabapentin 300mg q8h  - continue 15mg oxycodone q6h prn pain  - recommend CT thoracic spine to confirm placement of IT pump  - [employer] rep to bedside today to interrogate pump  - likely to dc today- needs outpatient follow up with Dr. Ott to coordinate dye study      - Bowel regimen: Senna    - Nausea ppx: Zofran as needed  - Functional Goals: Pt will get OOB with PT today. Pt will resume previous level of activity without impairment from surgery.   - Additional Consults: None recommended.   - Additional Labs/Imaging:  None recommended.     - Follow up, Discharge Planning: per primary team  - Pain Management follow up plan: will continue to follow.    Plan d/w Dr. Mclaughlin.

## 2024-03-29 NOTE — DISCHARGE NOTE NURSING/CASE MANAGEMENT/SOCIAL WORK - PATIENT PORTAL LINK FT
You can access the FollowMyHealth Patient Portal offered by [facility] by registering at the following website: [email or web address]. By joining [facility]’s FollowMyHealth portal, you will also be able to view your health information using other applications (apps) compatible with our system.

## 2024-03-30 LAB
-  DAPTOMYCIN: SIGNIFICANT CHANGE UP
CULTURE RESULTS: ABNORMAL
METHOD TYPE: SIGNIFICANT CHANGE UP
ORGANISM # SPEC MICROSCOPIC CNT: ABNORMAL
ORGANISM # SPEC MICROSCOPIC CNT: ABNORMAL
ORGANISM # SPEC MICROSCOPIC CNT: SIGNIFICANT CHANGE UP
SPECIMEN SOURCE: SIGNIFICANT CHANGE UP

## 2024-04-04 DIAGNOSIS — Z86.73 PERSONAL HISTORY OF TRANSIENT ISCHEMIC ATTACK (TIA), AND CEREBRAL INFARCTION WITHOUT RESIDUAL DEFICITS: ICD-10-CM
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
## 2024-04-04 DIAGNOSIS — Z79.84 LONG TERM (CURRENT) USE OF ORAL HYPOGLYCEMIC DRUGS: ICD-10-CM

## 2024-04-04 DIAGNOSIS — Z96.611 PRESENCE OF RIGHT ARTIFICIAL SHOULDER JOINT: ICD-10-CM

## 2024-04-04 DIAGNOSIS — E78.5 HYPERLIPIDEMIA, UNSPECIFIED: ICD-10-CM

## 2024-04-04 DIAGNOSIS — Z91.013 ALLERGY TO SEAFOOD: ICD-10-CM

## 2024-04-04 DIAGNOSIS — Z79.82 LONG TERM (CURRENT) USE OF ASPIRIN: ICD-10-CM

## 2024-04-04 DIAGNOSIS — N31.9 NEUROMUSCULAR DYSFUNCTION OF BLADDER, UNSPECIFIED: ICD-10-CM

## 2024-04-04 DIAGNOSIS — J43.9 EMPHYSEMA, UNSPECIFIED: ICD-10-CM

## 2024-04-04 DIAGNOSIS — Z87.891 PERSONAL HISTORY OF NICOTINE DEPENDENCE: ICD-10-CM

## 2024-04-04 DIAGNOSIS — Z88.5 ALLERGY STATUS TO NARCOTIC AGENT: ICD-10-CM

## 2024-04-04 DIAGNOSIS — L89.154 PRESSURE ULCER OF SACRAL REGION, STAGE 4: ICD-10-CM

## 2024-04-04 DIAGNOSIS — G89.29 OTHER CHRONIC PAIN: ICD-10-CM
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
## 2024-04-04 DIAGNOSIS — B95.2 ENTEROCOCCUS AS THE CAUSE OF DISEASES CLASSIFIED ELSEWHERE: ICD-10-CM

## 2024-04-04 DIAGNOSIS — Z87.440 PERSONAL HISTORY OF URINARY (TRACT) INFECTIONS: ICD-10-CM

## 2024-04-04 DIAGNOSIS — F41.8 OTHER SPECIFIED ANXIETY DISORDERS: ICD-10-CM

## 2024-04-04 DIAGNOSIS — I10 ESSENTIAL (PRIMARY) HYPERTENSION: ICD-10-CM

## 2024-04-04 DIAGNOSIS — Z98.1 ARTHRODESIS STATUS: ICD-10-CM

## 2024-04-04 DIAGNOSIS — N39.0 URINARY TRACT INFECTION, SITE NOT SPECIFIED: ICD-10-CM

## 2024-04-04 DIAGNOSIS — B37.49 OTHER UROGENITAL CANDIDIASIS: ICD-10-CM

## 2024-04-04 DIAGNOSIS — Z86.711 PERSONAL HISTORY OF PULMONARY EMBOLISM: ICD-10-CM

## 2024-04-04 DIAGNOSIS — T83.511A INFECTION AND INFLAMMATORY REACTION DUE TO INDWELLING URETHRAL CATHETER, INITIAL ENCOUNTER: ICD-10-CM

## 2024-04-04 DIAGNOSIS — G82.20 PARAPLEGIA, UNSPECIFIED: ICD-10-CM

## 2024-04-04 DIAGNOSIS — Z79.01 LONG TERM (CURRENT) USE OF ANTICOAGULANTS: ICD-10-CM

## 2024-04-04 DIAGNOSIS — K59.09 OTHER CONSTIPATION: ICD-10-CM

## 2024-04-04 DIAGNOSIS — B96.89 OTHER SPECIFIED BACTERIAL AGENTS AS THE CAUSE OF DISEASES CLASSIFIED ELSEWHERE: ICD-10-CM

## 2024-04-04 DIAGNOSIS — Z99.3 DEPENDENCE ON WHEELCHAIR: ICD-10-CM

## 2024-04-04 DIAGNOSIS — R10.9 UNSPECIFIED ABDOMINAL PAIN: ICD-10-CM

## 2024-04-04 DIAGNOSIS — M54.9 DORSALGIA, UNSPECIFIED: ICD-10-CM

## 2024-04-04 DIAGNOSIS — M47.812 SPONDYLOSIS WITHOUT MYELOPATHY OR RADICULOPATHY, CERVICAL REGION: ICD-10-CM

## 2024-04-08 ENCOUNTER — APPOINTMENT (OUTPATIENT)
Dept: UROLOGY | Facility: CLINIC | Age: 69
End: 2024-04-08

## 2024-04-09 ENCOUNTER — APPOINTMENT (OUTPATIENT)
Dept: GERIATRICS | Facility: CLINIC | Age: 69
End: 2024-04-09

## 2024-04-11 ENCOUNTER — NON-APPOINTMENT (OUTPATIENT)
Age: 69
End: 2024-04-11

## 2024-04-11 NOTE — ASSESSMENT
[FreeTextEntry1] : Assessment/Plan:   # Chronic neck and back pain. -  Plan: Has morphine pump placed by Dr. Lazo. Was previously seen inpatient with morphine pump refills but now appears to have been lost to follow-up. Pt wants to either remove pump (and use oral opioids) or use pump. Plan: - continue tylenol - continue Cymbalta 60mg daily - continue gabapentin 300mg tid - resume oxy 15 with benadryl q4 (had been getting at [facility]) - outpatient urology follow-up to discuss jiménez and self-catheterization - outpatient follow-up with Dr. Ott to discuss removal of intrathecal pump given persistent difficulties with refilling pump, not refilled in service     Per inpatient pain sv and [employer], patient will need "dye study" to interogate pump further versus removal. - outpatient NSGY f/u   #UTI (urinary tract infection). -  Plan: Pt with chronic jiménez, multiple ED visits for UTI. Was discharged from ED on cefuroxime started on 3/15. Ucx from 3/15 showing pseudomonas sensitive to levaquin. Per NH papers, appears to have been started on Levaquin on 3/18. UTI grossly positive in ED on current admission. Jiménez exchanged in ED with cloudy urine. Ucx growing E faecalis and candida glabrata. - Overall, did not feel pt was symptomatic from UTI and alternative explanation for abd pain (constipation, improved with bowel movements) - Will discontinue antibiotics   #Hx of CVA (cerebrovascular accident). -  Plan: History of CVA x3 (MR brain 1/23 with chronic lacunar infarcts of the left corona radiata, left putamen and left cerebellar hemisphere) - continue ASA 81mg daily   #RUE weakness/neurology Presented with RUE weakness yesterday, resolved in ED. Stroke consulted in ED, CTH and CTA head/neck unyielding. - stroke consulted, appreciate recs          - recommend patient continue home antiplatelet/AC regimen          - please have patient call our office at [phone] to set up an outpatient follow up appt given stroke history          - if patient admitted, consider spine imaging for paresthesias as sensation changes spared the face and patient with extensive spinal surgeries.   Constipation. -  Plan: Chronic constipation ISO heavy opioid use for chronic pain. Takes home lubiprostone 24mcg BID. CT abdomen/pelvis showing abundant stool burden. Bilateral lower quadrant abdominal pain on exam. Having BMs. - s/p enema in ED - lactulose tid - senna/miralax BID.   #History of pulmonary embolus (PE). -  Plan: Reportedly takes dabigatran 150mg BID. Pt unsure of indication. - C/w AC for now.   #HTN (hypertension). -  Plan: - continue home Toprol 25mg daily - continue home Entresto 24-26 BID.   #HLD (hyperlipidemia). -  Plan: - continue home atorvastatin 20mg daily.   #Emphysema of lung. Former smoker x 55 years, quit 2 years ago. - continue duoneb q6h PRN.   #Neurogenic bladder. chronic jiménez - urology f/u 4/8.   #Type 2 diabetes mellitus. -  Plan: Per chart from [facility], takes metformin 500mg BID. - mISS while inpatient.

## 2024-04-11 NOTE — HISTORY OF PRESENT ILLNESS
[FreeTextEntry1] : 69 year old woman with multiple medical conditions including HTN, HLD, CVA x3 (MR brain 1/23 with chronic lacunar infarcts of the left corona radiata, left putamen and left cerebellar hemisphere), chronic neck/back pain (s/p multiple spinal fusions, inactive intrathecal morphine pump), bilateral LE paralysis, hx PE in 2023 Eliquis for 3-6 months), emphysema, neurogenic bladder (s/p chronic jiménez), multiple UTI, multiple ED visits, who presented today to establish geriatric care.   Recent notes, labs and data in EMR reviewed prior to today's visit. Recent hospital discharge summary and some notes reviewed.    She was last seen by her new PCP on 11/30/23.   She was hospitalized   Discharge Medications per discharge summary  acetaminophen 500 mg oral tablet: 2 tab(s) orally every 8 hours As needed Temp greater or equal to 38.5C (101.3F), Mild Pain (1 - 3) albuterol 90 mcg/inh inhalation aerosol: 2 puff(s) inhaled every 6 hours, As Needed aspirin 81 mg oral delayed release tablet: 1 tab(s) orally once a day bisacodyl 5 mg oral delayed release tablet: 1 tab(s) orally once a day (at bedtime) celecoxib 200 mg oral capsule: 1 cap(s) orally every 24 hours diazePAM 5 mg oral tablet: 1 tab(s) orally every 8 hours as needed for  muscle spasm diphenhydrAMINE 25 mg oral capsule: 1 cap(s) orally every 4 hours As needed Administer with oxy DULoxetine 60 mg oral delayed release capsule: 1 cap(s) orally once a day gabapentin 800 mg oral tablet: 1 tab(s) orally 3 times a day MetFORMIN (Eqv-Glumetza) 500 mg oral tablet, extended release: 1 tab(s) orally 2 times a day metoprolol succinate 25 mg oral tablet, extended release: 1 tab(s) orally once a day Movantik 25 mg oral tablet: 1 tab(s) orally once a day (in the morning) oxyCODONE 15 mg oral tablet: 1 tab(s) orally every 4 hours As needed Moderate Pain (4 - 6) oxyCODONE 30 mg oral tablet: 1 tab(s) orally every 4 hours As needed Severe Pain (7 - 10) pantoprazole 40 mg oral delayed release tablet: 1 tab(s) orally once a day (before a meal) polyethylene glycol 3350 oral powder for reconstitution: 17 gram(s) orally 2 times a day Pradaxa 150 mg oral capsule: 1 cap(s) orally 2 times a day senna leaf extract oral tablet: 2 tab(s) orally once a day (at bedtime) simvastatin 40 mg oral tablet: 1 tab(s) orally once a day (at bedtime) Vitamin D3 50 mcg (2000 intl units) oral tablet: 1 tab(s) orally once a day   Her main concern today is [ ] [ ]

## 2024-04-19 ENCOUNTER — APPOINTMENT (OUTPATIENT)
Dept: SPINE | Facility: CLINIC | Age: 69
End: 2024-04-19

## 2024-04-19 DIAGNOSIS — Z98.1 ARTHRODESIS STATUS: ICD-10-CM

## 2024-04-19 NOTE — ED PROVIDER NOTE - PHYSICAL EXAMINATION
Gen - NAD; well-appearing, mildly dysarthric; A+Ox3   HEENT - NCAT, EOMI, somewhat dry oral mucosa  Neck - supple  Resp - CTAB, no increased WOB  CV -  RRR, no m/r/g  Abd - soft, TTP in lower quadrants greatest in RLQ, +palpable implanted pump in R lower abdomen; no guarding or rebound; +jiménez in place  Back - no midline, paraspinous, or CVA tenderness  MSK - no gross deformities  Extrem - no LE edema  Neuro - no focal motor or sensation deficits  Skin - warm, well perfused
121

## 2024-04-23 ENCOUNTER — APPOINTMENT (OUTPATIENT)
Dept: NEUROSURGERY | Facility: CLINIC | Age: 69
End: 2024-04-23

## 2024-04-23 NOTE — ED PROVIDER NOTE - IV ALTEPLASE EXCL REL HIDDEN
Care Due:                  Date            Visit Type   Department     Provider  --------------------------------------------------------------------------------                                ESTABLISHED                              PATIENT -    BOGDAN SHEYLAJORDAN  Last Visit: 01-      VIRTUAL      PRIMARY CARE   Marco A Montes  Next Visit: None Scheduled  None         None Found                                                            Last  Test          Frequency    Reason                     Performed    Due Date  --------------------------------------------------------------------------------    CBC.........  12 months..  meloxicam................  06-   06-    CMP.........  12 months..  furosemide, meloxicam....  06-   06-    [facility] Embedded Care Due Messages. Reference number: [id number].   4/22/2024 6:59:23 PM CDT  
Please see the attached refill request.  
show

## 2024-04-27 ENCOUNTER — INPATIENT (INPATIENT)
Facility: HOSPITAL | Age: 69
LOS: 0 days | Discharge: NURSING, REHAB OR LONG-TERM CARE | End: 2024-04-28
Attending: STUDENT IN AN ORGANIZED HEALTH CARE EDUCATION/TRAINING PROGRAM | Admitting: STUDENT IN AN ORGANIZED HEALTH CARE EDUCATION/TRAINING PROGRAM
Payer: COMMERCIAL

## 2024-04-27 VITALS
SYSTOLIC BLOOD PRESSURE: 94 MMHG | DIASTOLIC BLOOD PRESSURE: 71 MMHG | HEIGHT: 67 IN | OXYGEN SATURATION: 98 % | WEIGHT: 158.07 LBS | TEMPERATURE: 98 F | RESPIRATION RATE: 18 BRPM | HEART RATE: 87 BPM

## 2024-04-27 DIAGNOSIS — I10 ESSENTIAL (PRIMARY) HYPERTENSION: ICD-10-CM

## 2024-04-27 DIAGNOSIS — Z98.890 OTHER SPECIFIED POSTPROCEDURAL STATES: Chronic | ICD-10-CM

## 2024-04-27 DIAGNOSIS — D64.9 ANEMIA, UNSPECIFIED: ICD-10-CM

## 2024-04-27 DIAGNOSIS — G83.10 MONOPLEGIA OF LOWER LIMB AFFECTING UNSPECIFIED SIDE: ICD-10-CM

## 2024-04-27 DIAGNOSIS — Z96.611 PRESENCE OF RIGHT ARTIFICIAL SHOULDER JOINT: Chronic | ICD-10-CM

## 2024-04-27 DIAGNOSIS — Z00.00 ENCOUNTER FOR GENERAL ADULT MEDICAL EXAMINATION WITHOUT ABNORMAL FINDINGS: ICD-10-CM

## 2024-04-27 DIAGNOSIS — E78.5 HYPERLIPIDEMIA, UNSPECIFIED: ICD-10-CM

## 2024-04-27 DIAGNOSIS — E11.9 TYPE 2 DIABETES MELLITUS WITHOUT COMPLICATIONS: ICD-10-CM

## 2024-04-27 DIAGNOSIS — Z86.711 PERSONAL HISTORY OF PULMONARY EMBOLISM: ICD-10-CM

## 2024-04-27 DIAGNOSIS — J43.9 EMPHYSEMA, UNSPECIFIED: ICD-10-CM

## 2024-04-27 DIAGNOSIS — N31.9 NEUROMUSCULAR DYSFUNCTION OF BLADDER, UNSPECIFIED: ICD-10-CM

## 2024-04-27 DIAGNOSIS — I63.9 CEREBRAL INFARCTION, UNSPECIFIED: ICD-10-CM

## 2024-04-27 DIAGNOSIS — K56.41 FECAL IMPACTION: ICD-10-CM

## 2024-04-27 DIAGNOSIS — M54.2 CERVICALGIA: ICD-10-CM

## 2024-04-27 LAB
ALBUMIN SERPL ELPH-MCNC: 3.9 G/DL — SIGNIFICANT CHANGE UP (ref 3.3–5)
ALP SERPL-CCNC: 81 U/L — SIGNIFICANT CHANGE UP (ref 40–120)
ALT FLD-CCNC: 11 U/L — SIGNIFICANT CHANGE UP (ref 10–45)
ANION GAP SERPL CALC-SCNC: 10 MMOL/L — SIGNIFICANT CHANGE UP (ref 5–17)
ANISOCYTOSIS BLD QL: SLIGHT — SIGNIFICANT CHANGE UP
APTT BLD: 35.5 SEC — SIGNIFICANT CHANGE UP (ref 24.5–35.6)
AST SERPL-CCNC: 17 U/L — SIGNIFICANT CHANGE UP (ref 10–40)
BASOPHILS # BLD AUTO: 0.06 K/UL — SIGNIFICANT CHANGE UP (ref 0–0.2)
BASOPHILS NFR BLD AUTO: 0.9 % — SIGNIFICANT CHANGE UP (ref 0–2)
BILIRUB SERPL-MCNC: 0.2 MG/DL — SIGNIFICANT CHANGE UP (ref 0.2–1.2)
BLD GP AB SCN SERPL QL: NEGATIVE — SIGNIFICANT CHANGE UP
BUN SERPL-MCNC: 9 MG/DL — SIGNIFICANT CHANGE UP (ref 7–23)
BURR CELLS BLD QL SMEAR: PRESENT — SIGNIFICANT CHANGE UP
CALCIUM SERPL-MCNC: 9.3 MG/DL — SIGNIFICANT CHANGE UP (ref 8.4–10.5)
CHLORIDE SERPL-SCNC: 102 MMOL/L — SIGNIFICANT CHANGE UP (ref 96–108)
CO2 SERPL-SCNC: 28 MMOL/L — SIGNIFICANT CHANGE UP (ref 22–31)
CREAT SERPL-MCNC: 0.4 MG/DL — LOW (ref 0.5–1.3)
DACRYOCYTES BLD QL SMEAR: SLIGHT — SIGNIFICANT CHANGE UP
EGFR: 106 ML/MIN/1.73M2 — SIGNIFICANT CHANGE UP
EOSINOPHIL # BLD AUTO: 0.17 K/UL — SIGNIFICANT CHANGE UP (ref 0–0.5)
EOSINOPHIL NFR BLD AUTO: 2.6 % — SIGNIFICANT CHANGE UP (ref 0–6)
GLUCOSE SERPL-MCNC: 111 MG/DL — HIGH (ref 70–99)
HCT VFR BLD CALC: 35.1 % — SIGNIFICANT CHANGE UP (ref 34.5–45)
HGB BLD-MCNC: 11 G/DL — LOW (ref 11.5–15.5)
HYPOCHROMIA BLD QL: SLIGHT — SIGNIFICANT CHANGE UP
INR BLD: 0.98 — SIGNIFICANT CHANGE UP (ref 0.85–1.18)
LYMPHOCYTES # BLD AUTO: 2.36 K/UL — SIGNIFICANT CHANGE UP (ref 1–3.3)
LYMPHOCYTES # BLD AUTO: 35.6 % — SIGNIFICANT CHANGE UP (ref 13–44)
MANUAL SMEAR VERIFICATION: SIGNIFICANT CHANGE UP
MCHC RBC-ENTMCNC: 26.1 PG — LOW (ref 27–34)
MCHC RBC-ENTMCNC: 31.3 GM/DL — LOW (ref 32–36)
MCV RBC AUTO: 83.2 FL — SIGNIFICANT CHANGE UP (ref 80–100)
MICROCYTES BLD QL: SLIGHT — SIGNIFICANT CHANGE UP
MONOCYTES # BLD AUTO: 0.75 K/UL — SIGNIFICANT CHANGE UP (ref 0–0.9)
MONOCYTES NFR BLD AUTO: 11.3 % — SIGNIFICANT CHANGE UP (ref 2–14)
NEUTROPHILS # BLD AUTO: 3.22 K/UL — SIGNIFICANT CHANGE UP (ref 1.8–7.4)
NEUTROPHILS NFR BLD AUTO: 48.7 % — SIGNIFICANT CHANGE UP (ref 43–77)
OVALOCYTES BLD QL SMEAR: SLIGHT — SIGNIFICANT CHANGE UP
PLAT MORPH BLD: NORMAL — SIGNIFICANT CHANGE UP
PLATELET # BLD AUTO: 333 K/UL — SIGNIFICANT CHANGE UP (ref 150–400)
POIKILOCYTOSIS BLD QL AUTO: SLIGHT — SIGNIFICANT CHANGE UP
POLYCHROMASIA BLD QL SMEAR: SLIGHT — SIGNIFICANT CHANGE UP
POTASSIUM SERPL-MCNC: 4.5 MMOL/L — SIGNIFICANT CHANGE UP (ref 3.5–5.3)
POTASSIUM SERPL-SCNC: 4.5 MMOL/L — SIGNIFICANT CHANGE UP (ref 3.5–5.3)
PROT SERPL-MCNC: 6.7 G/DL — SIGNIFICANT CHANGE UP (ref 6–8.3)
PROTHROM AB SERPL-ACNC: 11.2 SEC — SIGNIFICANT CHANGE UP (ref 9.5–13)
RBC # BLD: 4.22 M/UL — SIGNIFICANT CHANGE UP (ref 3.8–5.2)
RBC # FLD: 22.6 % — HIGH (ref 10.3–14.5)
RBC BLD AUTO: ABNORMAL
RH IG SCN BLD-IMP: POSITIVE — SIGNIFICANT CHANGE UP
SODIUM SERPL-SCNC: 140 MMOL/L — SIGNIFICANT CHANGE UP (ref 135–145)
VARIANT LYMPHS # BLD: 0.9 % — SIGNIFICANT CHANGE UP (ref 0–6)
WBC # BLD: 6.62 K/UL — SIGNIFICANT CHANGE UP (ref 3.8–10.5)
WBC # FLD AUTO: 6.62 K/UL — SIGNIFICANT CHANGE UP (ref 3.8–10.5)

## 2024-04-27 PROCEDURE — 93010 ELECTROCARDIOGRAM REPORT: CPT

## 2024-04-27 PROCEDURE — 99285 EMERGENCY DEPT VISIT HI MDM: CPT

## 2024-04-27 PROCEDURE — 74177 CT ABD & PELVIS W/CONTRAST: CPT | Mod: 26,MC

## 2024-04-27 PROCEDURE — 99223 1ST HOSP IP/OBS HIGH 75: CPT | Mod: GC

## 2024-04-27 PROCEDURE — 71045 X-RAY EXAM CHEST 1 VIEW: CPT | Mod: 26

## 2024-04-27 RX ORDER — ONDANSETRON 8 MG/1
4 TABLET, FILM COATED ORAL EVERY 8 HOURS
Refills: 0 | Status: DISCONTINUED | OUTPATIENT
Start: 2024-04-27 | End: 2024-04-28

## 2024-04-27 RX ORDER — SIMVASTATIN 20 MG/1
40 TABLET, FILM COATED ORAL AT BEDTIME
Refills: 0 | Status: DISCONTINUED | OUTPATIENT
Start: 2024-04-27 | End: 2024-04-28

## 2024-04-27 RX ORDER — ASPIRIN/CALCIUM CARB/MAGNESIUM 324 MG
81 TABLET ORAL DAILY
Refills: 0 | Status: DISCONTINUED | OUTPATIENT
Start: 2024-04-27 | End: 2024-04-28

## 2024-04-27 RX ORDER — DIPHENHYDRAMINE HCL 50 MG
25 CAPSULE ORAL ONCE
Refills: 0 | Status: COMPLETED | OUTPATIENT
Start: 2024-04-27 | End: 2024-04-27

## 2024-04-27 RX ORDER — ALBUTEROL 90 UG/1
2 AEROSOL, METERED ORAL EVERY 6 HOURS
Refills: 0 | Status: DISCONTINUED | OUTPATIENT
Start: 2024-04-27 | End: 2024-04-28

## 2024-04-27 RX ORDER — GABAPENTIN 400 MG/1
400 CAPSULE ORAL ONCE
Refills: 0 | Status: COMPLETED | OUTPATIENT
Start: 2024-04-27 | End: 2024-04-27

## 2024-04-27 RX ORDER — OXYCODONE HYDROCHLORIDE 5 MG/1
15 TABLET ORAL ONCE
Refills: 0 | Status: DISCONTINUED | OUTPATIENT
Start: 2024-04-27 | End: 2024-04-27

## 2024-04-27 RX ORDER — DULOXETINE HYDROCHLORIDE 30 MG/1
60 CAPSULE, DELAYED RELEASE ORAL DAILY
Refills: 0 | Status: DISCONTINUED | OUTPATIENT
Start: 2024-04-27 | End: 2024-04-28

## 2024-04-27 RX ORDER — DIPHENHYDRAMINE HCL 50 MG
25 CAPSULE ORAL EVERY 4 HOURS
Refills: 0 | Status: DISCONTINUED | OUTPATIENT
Start: 2024-04-27 | End: 2024-04-28

## 2024-04-27 RX ORDER — CELECOXIB 200 MG/1
200 CAPSULE ORAL EVERY 24 HOURS
Refills: 0 | Status: DISCONTINUED | OUTPATIENT
Start: 2024-04-27 | End: 2024-04-27

## 2024-04-27 RX ORDER — GABAPENTIN 400 MG/1
800 CAPSULE ORAL THREE TIMES A DAY
Refills: 0 | Status: DISCONTINUED | OUTPATIENT
Start: 2024-04-27 | End: 2024-04-28

## 2024-04-27 RX ORDER — SODIUM CHLORIDE 9 MG/ML
1000 INJECTION INTRAMUSCULAR; INTRAVENOUS; SUBCUTANEOUS ONCE
Refills: 0 | Status: COMPLETED | OUTPATIENT
Start: 2024-04-27 | End: 2024-04-27

## 2024-04-27 RX ORDER — IOHEXOL 300 MG/ML
30 INJECTION, SOLUTION INTRAVENOUS ONCE
Refills: 0 | Status: COMPLETED | OUTPATIENT
Start: 2024-04-27 | End: 2024-04-27

## 2024-04-27 RX ORDER — OXYCODONE HYDROCHLORIDE 5 MG/1
15 TABLET ORAL EVERY 4 HOURS
Refills: 0 | Status: DISCONTINUED | OUTPATIENT
Start: 2024-04-27 | End: 2024-04-28

## 2024-04-27 RX ORDER — POLYETHYLENE GLYCOL 3350 17 G/17G
17 POWDER, FOR SOLUTION ORAL
Refills: 0 | Status: DISCONTINUED | OUTPATIENT
Start: 2024-04-27 | End: 2024-04-28

## 2024-04-27 RX ORDER — OXYCODONE HYDROCHLORIDE 5 MG/1
30 TABLET ORAL EVERY 4 HOURS
Refills: 0 | Status: DISCONTINUED | OUTPATIENT
Start: 2024-04-27 | End: 2024-04-28

## 2024-04-27 RX ORDER — CHOLECALCIFEROL (VITAMIN D3) 125 MCG
2000 CAPSULE ORAL DAILY
Refills: 0 | Status: DISCONTINUED | OUTPATIENT
Start: 2024-04-27 | End: 2024-04-28

## 2024-04-27 RX ORDER — MULTIVIT WITH MIN/MFOLATE/K2 340-15/3 G
296 POWDER (GRAM) ORAL EVERY 24 HOURS
Refills: 0 | Status: DISCONTINUED | OUTPATIENT
Start: 2024-04-27 | End: 2024-04-28

## 2024-04-27 RX ORDER — GABAPENTIN 400 MG/1
800 CAPSULE ORAL ONCE
Refills: 0 | Status: DISCONTINUED | OUTPATIENT
Start: 2024-04-27 | End: 2024-04-27

## 2024-04-27 RX ORDER — DABIGATRAN ETEXILATE MESYLATE 150 MG/1
150 CAPSULE ORAL EVERY 12 HOURS
Refills: 0 | Status: DISCONTINUED | OUTPATIENT
Start: 2024-04-28 | End: 2024-04-28

## 2024-04-27 RX ORDER — ACETAMINOPHEN 500 MG
650 TABLET ORAL EVERY 6 HOURS
Refills: 0 | Status: DISCONTINUED | OUTPATIENT
Start: 2024-04-27 | End: 2024-04-28

## 2024-04-27 RX ORDER — SENNA PLUS 8.6 MG/1
2 TABLET ORAL AT BEDTIME
Refills: 0 | Status: DISCONTINUED | OUTPATIENT
Start: 2024-04-27 | End: 2024-04-28

## 2024-04-27 RX ORDER — PANTOPRAZOLE SODIUM 20 MG/1
40 TABLET, DELAYED RELEASE ORAL
Refills: 0 | Status: DISCONTINUED | OUTPATIENT
Start: 2024-04-27 | End: 2024-04-28

## 2024-04-27 RX ORDER — PSYLLIUM SEED (WITH DEXTROSE)
1 POWDER (GRAM) ORAL THREE TIMES A DAY
Refills: 0 | Status: DISCONTINUED | OUTPATIENT
Start: 2024-04-27 | End: 2024-04-28

## 2024-04-27 RX ORDER — GABAPENTIN 400 MG/1
300 CAPSULE ORAL ONCE
Refills: 0 | Status: DISCONTINUED | OUTPATIENT
Start: 2024-04-27 | End: 2024-04-27

## 2024-04-27 RX ORDER — LANOLIN ALCOHOL/MO/W.PET/CERES
3 CREAM (GRAM) TOPICAL AT BEDTIME
Refills: 0 | Status: DISCONTINUED | OUTPATIENT
Start: 2024-04-27 | End: 2024-04-28

## 2024-04-27 RX ORDER — METOPROLOL TARTRATE 50 MG
25 TABLET ORAL DAILY
Refills: 0 | Status: DISCONTINUED | OUTPATIENT
Start: 2024-04-27 | End: 2024-04-28

## 2024-04-27 RX ORDER — NALOXEGOL OXALATE 12.5 MG/1
25 TABLET, FILM COATED ORAL
Refills: 0 | Status: DISCONTINUED | OUTPATIENT
Start: 2024-04-27 | End: 2024-04-28

## 2024-04-27 RX ADMIN — GABAPENTIN 400 MILLIGRAM(S): 400 CAPSULE ORAL at 19:30

## 2024-04-27 RX ADMIN — IOHEXOL 30 MILLILITER(S): 300 INJECTION, SOLUTION INTRAVENOUS at 17:10

## 2024-04-27 RX ADMIN — OXYCODONE HYDROCHLORIDE 15 MILLIGRAM(S): 5 TABLET ORAL at 18:50

## 2024-04-27 RX ADMIN — Medication 25 MILLIGRAM(S): at 18:50

## 2024-04-27 RX ADMIN — SODIUM CHLORIDE 1000 MILLILITER(S): 9 INJECTION INTRAMUSCULAR; INTRAVENOUS; SUBCUTANEOUS at 17:10

## 2024-04-27 NOTE — PATIENT PROFILE ADULT - FUNCTIONAL ASSESSMENT - BASIC MOBILITY 6.
2-calculated by average/Not able to assess (calculate score using [facility] averaging method)

## 2024-04-27 NOTE — H&P ADULT - PROBLEM SELECTOR PLAN 10
- continue home atorvastatin 20mg daily. - C/w home atorvastatin 20mg Qd - C/w home Simvastatin 40mg Qd

## 2024-04-27 NOTE — ED PROVIDER NOTE - OBJECTIVE STATEMENT
69F PMH chronic neck/back pain (s/p multiple spinal fusions, inactive intrathecal morphine pump), bilateral LE paralysis, HTN, HLD, CVA x3 (MR brain 1/23 with chronic lacunar infarcts of the left corona radiata, left putamen and left cerebellar hemisphere), hx PE in 2023 (Eliquis for 3-6 months), emphysema, neurogenic bladder (s/p chronic jiménez), multiple UTsI, multiple ED visits for abdominal pain who p/w LLQ abdominal pain, feels like a "hole" in her abdomen, associated with blood in stool since 3 am. Pt states that when she was being cleaned at the rehab facility she was told that there were streaks of blood in her stool. Pt denies n/v, no f/c, no cp/sob, no other complaints at this time. Requesting her dose fo pain meds (oxy 15mg, gabapentin 300mg and benadryl 25mg for itching). 69F PMH chronic neck/back pain (s/p multiple spinal fusions, inactive intrathecal morphine pump), bilateral LE paralysis, HTN, HLD, CVA x3 (MR brain 1/23 with chronic lacunar infarcts of the left corona radiata, left putamen and left cerebellar hemisphere), hx PE in 2023 (Eliquis for 3-6 months), emphysema, neurogenic bladder (s/p chronic jiménez), multiple UTIs, multiple ED visits for abdominal pain who p/w LLQ abdominal pain, feels like a "hole" in her abdomen, associated with blood in stool since 3 am. Pt states that when she was being cleaned at the rehab facility she was told that there were streaks of blood in her stool. Pt denies n/v, no f/c, no cp/sob, no other complaints at this time. Requesting her dose fo pain meds (oxy 15mg, gabapentin 800mg and benadryl 25mg for itching).

## 2024-04-27 NOTE — H&P ADULT - ATTENDING COMMENTS
#Constipation: CTAP + heavy stool burden, possible stercoral colitis, comparable to previous CT. 0/4 SIRS. +mild LLQ tenderness on exam, no rebound. Attempted disimpaction in ED unsuccesul however now s/p enema w/ large BM in ED. Pt feels much better. c/w home bowel regimen, cw enema, consider lactulose prn. SAEs.      #BRBPR: hx of internal hemmorids, and anal fissure. No fissure/ext hemmoroid on ED exam, +brown stool, and streaks of blood. Hgb at baseline and HDS. Likely 2/2 hemmoroids. Cont to monitor, active TS, 2 large bore Ivs. Consider gi conuslt

## 2024-04-27 NOTE — H&P ADULT - NSHPLABSRESULTS_GEN_ALL_CORE
LABS:                         11.0   6.62  )-----------( 333      ( 27 Apr 2024 16:50 )             35.1     04-27    140  |  102  |  9   ----------------------------<  111<H>  4.5   |  28  |  0.40<L>    Ca    9.3      27 Apr 2024 16:50    TPro  6.7  /  Alb  3.9  /  TBili  0.2  /  DBili  x   /  AST  17  /  ALT  11  /  AlkPhos  81  04-27    PT/INR - ( 27 Apr 2024 16:50 )   PT: 11.2 sec;   INR: 0.98          PTT - ( 27 Apr 2024 16:50 )  PTT:35.5 sec  Urinalysis Basic - ( 27 Apr 2024 16:50 )    Color: x / Appearance: x / SG: x / pH: x  Gluc: 111 mg/dL / Ketone: x  / Bili: x / Urobili: x   Blood: x / Protein: x / Nitrite: x   Leuk Esterase: x / RBC: x / WBC x   Sq Epi: x / Non Sq Epi: x / Bacteria: x                RADIOLOGY, EKG & ADDITIONAL TESTS: LABS:                         11.0   6.62  )-----------( 333      ( 27 Apr 2024 16:50 )             35.1     04-27    140  |  102  |  9   ----------------------------<  111<H>  4.5   |  28  |  0.40<L>    Ca    9.3      27 Apr 2024 16:50    TPro  6.7  /  Alb  3.9  /  TBili  0.2  /  DBili  x   /  AST  17  /  ALT  11  /  AlkPhos  81  04-27    PT/INR - ( 27 Apr 2024 16:50 )   PT: 11.2 sec;   INR: 0.98       PTT - ( 27 Apr 2024 16:50 )  PTT:35.5 sec  Urinalysis Basic - ( 27 Apr 2024 16:50 )    Color: x / Appearance: x / SG: x / pH: x  Gluc: 111 mg/dL / Ketone: x  / Bili: x / Urobili: x   Blood: x / Protein: x / Nitrite: x   Leuk Esterase: x / RBC: x / WBC x   Sq Epi: x / Non Sq Epi: x / Bacteria: x    RADIOLOGY, EKG & ADDITIONAL TESTS:

## 2024-04-27 NOTE — H&P ADULT - HISTORY OF PRESENT ILLNESS
69F Hx chronic neck/back pain (s/p multiple spinal fusions, inactive intrathecal morphine pump), bilateral LE paralysis, HTN, HLD, CVA x3 (MR brain 1/23 with chronic lacunar infarcts of the left corona radiata, left putamen and left cerebellar hemisphere), hx PE in 2023 (Eliquis for 3-6 months), emphysema, neurogenic bladder (s/p chronic jiménez), multiple UTIs, Multiple ED visits for abdominal pain who p/w LLQ abdominal pain, feels like a "hole" in her abdomen, associated with blood in stool since 3 am. Pt states that when she was being cleaned at the rehab facility she was told that there were streaks of blood in her stool. Pt denies n/v, no f/c, no cp/sob, no other complaints at this time. Requesting her dose of pain meds (oxy 15mg, gabapentin 300mg and benadryl 25mg for itching).    In the ED VSS. Labs pristine except normocytic anemia Hgb 11.0 (at baseline). 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 She received PO Oxycodone 15mg, Gabapentin [id number], PO Benadryl 25g & 1L 0.9% bolus. 69F Hx chronic neck/back pain (s/p multiple spinal fusions, inactive intrathecal morphine pump), bilateral LE paralysis, neurogenic bladder (s/p chronic jiménez), multiple UTIs, CVA x3 (MR brain 1/23 with chronic lacunar infarcts of the left corona radiata, left putamen and left cerebellar hemisphere), hx PE in 2023 (Eliquis for 3-6 months), HTN, HLD, emphysema, Multiple ED visits for abdominal pain who p/w LLQ abdominal pain, feels like a "hole" in her abdomen, associated with blood in stool since 3 am. Pt states that when she was being cleaned at the rehab facility she was told that there were streaks of blood in her stool. Pt denies n/v, no f/c, no cp/sob, no other complaints at this time. Requesting her dose of pain meds (oxy 15mg, gabapentin 300mg and benadryl 25mg for itching).    In the ED VSS. Labs pristine except normocytic anemia Hgb 11.0 (at baseline). 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 She received PO Oxycodone 15mg, Gabapentin [id number], PO Benadryl 25g & 1L 0.9% bolus.

## 2024-04-27 NOTE — ED ADULT NURSE NOTE - NSFALLUNIVINTERV_ED_ALL_ED
Bed/Stretcher in lowest position, wheels locked, appropriate side rails in place/Call bell, personal items and telephone in reach/Instruct patient to call for assistance before getting out of bed/chair/stretcher/Non-slip footwear applied when patient is off stretcher/Pool to call system/Physically safe environment - no spills, clutter or unnecessary equipment/Purposeful proactive rounding/Room/bathroom lighting operational, light cord in reach

## 2024-04-27 NOTE — ED ADULT NURSE NOTE - CHPI ED NUR SYMPTOMS NEG
no burning urination/no diarrhea/no dysuria/no fever/no hematuria/no nausea/no vomiting (4) rarely moist

## 2024-04-27 NOTE — ED ADULT TRIAGE NOTE - CHIEF COMPLAINT QUOTE
Pt presents from nursing home/SNF: reports LLQ abdominal pain beginning last night without N/V. Pt alert, oriented, and able to follow instructions at time of triage. Reports recent spinal surgery. Denies SOB or chest pain. Denies dizziness or weakness. Denies fevers or chills. Hx of COPD. Chronic indwelling jiménez noted.

## 2024-04-27 NOTE — H&P ADULT - PROBLEM SELECTOR PLAN 4
Has morphine pump placed by Dr. Lazo. Was previously seen inpatient with morphine pump refills but now appears to have been lost to follow-up. Pt wants to either remove pump (and use oral opioids) or use pump.    Plan:   - continue tylenol  - continue Cymbalta 60mg daily  - continue gabapentin 300mg tid   - resume oxy 15 with benadryl (had been getting at [facility])  - outpatient urology follow-up to discuss jiménez and self-catheterization  - outpatient follow-up with Dr. Ott and neurosurgery team  - ongoing convo about pain management....chronic pain service does not take consults from medicine service, but pt has previously had morphine pump filled during Dec 2023 inpatient here. Outpatient spine institution recommending inpatient NSGY svc as primary manager of pain pump Hx multiple spinal fusions c/b b/l LE paralysis and neurogenic bladder. Has inactive intrathecal morphine pump placed by Dr. Lazo that per patient is inactive as she has been lost to follow-up for pump refills. Patient desires to remove pump as outpatient. Endorses pain is "12/10" in intensity.  - Standing pain regimen: home Duloxetine 60mg Qd & home Gabapentin 800mg TID  - PRN pain regimen: PO Tylenol 650mg q6h (mild), home PO Oxycodone 15/30mg q4h + PO Benadryl 25mg(mod/severe) Hx multiple spinal fusions c/b b/l LE paralysis and neurogenic bladder. Has inactive intrathecal morphine pump placed by Dr. Lazo that per patient is inactive as she has been lost to follow-up for pump refills. Patient desires to remove pump as outpatient. Endorses pain is "12/10" in intensity.  - C/w home regimen of Duloxetine 60mg Qd, Gabapentin 800mg TID, PRN PO Oxycodone 15mg q4h + PO Benadryl 25mg(mod)  - Added standing PO Tylenol 650mg q6h to decreased PRN opioid requirements

## 2024-04-27 NOTE — H&P ADULT - PROBLEM SELECTOR PLAN 11
Former smoker x 55 years, quit 2 years ago.  - continue duoneb q6h PRN. Former smoker x 55 years, quit 2 years ago.  - C/w duoneb q6h PRN. Former smoker x 55 years, quit 2 years ago.  - C/w Duoneb q6h PRN.

## 2024-04-27 NOTE — ED ADULT TRIAGE NOTE - HEIGHT IN CM
[FreeTextEntry1] : PLAN\par - PT for pain modality/gait and balance training\par - ortho referral for hip joint steroid injection\par - RTC in 6 weeks (Xray L-spine 4 views at that time)
170.18

## 2024-04-27 NOTE — ED ADULT NURSE NOTE - OBJECTIVE STATEMENT
6oyoF BIBEMS PMH of spinal fusion, states her last surgery was 12/23, neurogenic bladder with chronic jiménez. from nursing home c/o LLQ abdominal pain x1 day. Pt states that when she was being cleaned at the Nursing home she noticed blood streaks in her stool. Pt denies chest pain, SOB, fever. Talking in complete sentences. Pt requesting oxy and gabapentin for her pain. states her last dose was three hours ago.

## 2024-04-27 NOTE — H&P ADULT - PROBLEM SELECTOR PLAN 7
- chronic jiménez  - urology f/u 4/8.     Pt with chronic jiménez, multiple ED visits for UTI. Was discharged from ED on cefuroxime started on 3/15. Ucx from 3/15 showing pseudomonas sensitive to levaquin. Per NH papers, appears to have been started on Levaquin on 3/18.   UTI grossly positive in ED on current admission. Jiménez exchanged in ED with cloudy urine.  Ucx growing E faecalis, sensitivites not resulted.    Plan:   - switch zosyn to vanc given E faecalis and outpatient treatment of prior pseudomonal UTI  - narrow pending ucx sensitvities. Complication of spinal fusion surgery. Chronic jiménez. Multiple ED visits for UTI  - Chronic jiménez

## 2024-04-27 NOTE — H&P ADULT - NSHPPHYSICALEXAM_GEN_ALL_CORE
.  VITAL SIGNS:  T(C): 36.8 (04-27-24 @ 21:24), Max: 36.8 (04-27-24 @ 21:24)  T(F): 98.3 (04-27-24 @ 21:24), Max: 98.3 (04-27-24 @ 21:24)  HR: 80 (04-27-24 @ 21:24) (80 - 87)  BP: 111/59 (04-27-24 @ 21:24) (94/71 - 111/59)  BP(mean): --  RR: 18 (04-27-24 @ 21:24) (18 - 18)  SpO2: 99% (04-27-24 @ 21:24) (98% - 99%)  Wt(kg): --    PHYSICAL EXAM:    Constitutional: WDWN resting comfortably in bed; NAD  Head: NC/AT  Eyes: PERRL, EOMI, clear conjunctiva  ENT: no nasal discharge; uvula midline, no oropharyngeal erythema or exudates; MMM  Neck: supple; no JVD or thyromegaly  Respiratory: CTA B/L; no W/R/R, no retractions  Cardiac: +S1/S2; RRR; no M/R/G;  Gastrointestinal: soft, NT/ND; no rebound or guarding; +BSx4  Extremities: WWP, no clubbing or cyanosis; no peripheral edema  Musculoskeletal: NROM x4; no joint swelling, tenderness or erythema  Vascular: 2+ radial, femoral, DP/PT pulses B/L  Dermatologic: skin warm, dry and intact; no rashes, wounds, or scars  Neurologic: AAOx3; CNII-XII grossly intact; no focal deficits  Psychiatric: affect and characteristics of appearance, verbalizations, behaviors are appropriate VITAL SIGNS:  T(C): 36.8 (04-27-24 @ 21:24), Max: 36.8 (04-27-24 @ 21:24)  T(F): 98.3 (04-27-24 @ 21:24), Max: 98.3 (04-27-24 @ 21:24)  HR: 80 (04-27-24 @ 21:24) (80 - 87)  BP: 111/59 (04-27-24 @ 21:24) (94/71 - 111/59)  BP(mean): --  RR: 18 (04-27-24 @ 21:24) (18 - 18)  SpO2: 99% (04-27-24 @ 21:24) (98% - 99%)  Wt(kg): --    PHYSICAL EXAM:  Constitutional: resting comfortably in bed; NAD  Head: NC/AT  Eyes: PERRL, EOMI, clear conjunctiva  ENT: no nasal discharge; uvula midline, no oropharyngeal erythema or exudates; DMM  Neck: supple; no JVD or thyromegaly  Respiratory: CTA B/L; no W/R/R, no retractions  Cardiac: +S1/S2; RRR; no M/R/G;  Gastrointestinal: soft, NT/ND; no rebound or guarding; +BSx4  Extremities: WWP, no clubbing or cyanosis; no peripheral edema  Musculoskeletal: NROM x4; no joint swelling, tenderness or erythema  Vascular: 2+ radial, femoral, DP/PT pulses B/L  Dermatologic: skin warm, dry, sacral ulcer  Neurologic: AAOx3; CNII-XII grossly intact; b/l UE strength 5/5, b/l LE strength 0/5. sensation grossly intact  Psychiatric: affect and characteristics of appearance, verbalizations, behaviors are appropriate VITAL SIGNS:  T(C): 36.8 (04-27-24 @ 21:24), Max: 36.8 (04-27-24 @ 21:24)  T(F): 98.3 (04-27-24 @ 21:24), Max: 98.3 (04-27-24 @ 21:24)  HR: 80 (04-27-24 @ 21:24) (80 - 87)  BP: 111/59 (04-27-24 @ 21:24) (94/71 - 111/59)  BP(mean): --  RR: 18 (04-27-24 @ 21:24) (18 - 18)  SpO2: 99% (04-27-24 @ 21:24) (98% - 99%)  Wt(kg): --    PHYSICAL EXAM:  Constitutional: resting comfortably in bed; NAD  Head: NC/AT  Eyes: PERRL, EOMI, clear conjunctiva  ENT: no nasal discharge; uvula midline, no oropharyngeal erythema or exudates; DMM  Neck: supple; no JVD or thyromegaly  Respiratory: CTA B/L; no W/R/R, no retractions  Cardiac: +S1/S2; RRR; no M/R/G;  Gastrointestinal: soft, palpable pain pump in RLQ, LLQ not painful to light/moderate palpation, ND; no rebound or guarding; +BSx4  Extremities: WWP, no clubbing or cyanosis; no peripheral edema  Musculoskeletal: NROM x4; no joint swelling, tenderness or erythema  Vascular: 2+ radial, femoral, DP/PT pulses B/L  Dermatologic: skin warm, dry, sacral ulcer  Neurologic: AAOx3; CNII-XII grossly intact; b/l UE strength 5/5, b/l LE strength 0/5. sensation grossly intact  Psychiatric: affect and characteristics of appearance, verbalizations, behaviors are appropriate

## 2024-04-27 NOTE — H&P ADULT - PROBLEM SELECTOR PLAN 5
History of CVA x3 (MR brain 1/23 with chronic lacunar infarcts of the left corona radiata, left putamen and left cerebellar hemisphere)  - continue ASA 81mg daily    #RUE weakness/neurology  Presented with RUE weakness yesterday, resolved in ED. Stroke consulted in ED, CTH and CTA head/neck unyielding.  - stroke consulted, appreciate recs           - recommend patient continue home antiplatelet/AC regimen           - please have patient call our office at [phone] to set up an outpatient follow up appt given stroke history           - if patient admitted, consider spine imaging for paresthesias as sensation changes spared the face and patient with extensive spinal surgeries. History of CVA x3 (MR brain 1/23 with chronic lacunar infarcts of the left corona radiata, left putamen and left cerebellar hemisphere)  - C/w home Aspirin 81mg Qd

## 2024-04-27 NOTE — H&P ADULT - PROBLEM SELECTOR PLAN 2
Hgb 11.0 (at baseline).  Rectal exam w/ soft brown stool with streaks of blood on gloved finger, no active hemorrhage  - Active T+S, transfuse to keep hgb >7  - Monitor rectal bleeding Hgb 11.0 (at baseline).  Rectal exam w/ soft brown stool with streaks of blood on gloved finger, no active hemorrhage  - Active T+S, transfuse to keep hgb >7  - Monitor rectal bleeding  - consider GI consult if worsening

## 2024-04-27 NOTE — H&P ADULT - PROBLEM SELECTOR PLAN 3
Reportedly takes dabigatran 150mg BID. Pt unsure of indication.    - C/w AC for now. - C/w home Dabigatran 150mg BID. Hold if rectal bleeding worsens

## 2024-04-27 NOTE — H&P ADULT - PROBLEM SELECTOR PLAN 8
Per chart from [facility], takes metformin 500mg BID.  - mISS while inpatient. - Hold home metformin 500mg BID.  - mISS while inpatient.

## 2024-04-27 NOTE — PATIENT PROFILE ADULT - NSPROHMSYMPCOND_GEN_A_NUR
May 25, 2023       Carolann Weeks MD  [address] [address]  Via In Basket      Patient: Randal Boyer   YOB: 2017   Date of Visit: 5/25/2023       Dear Dr. Weeks:    Thank you for referring Randal Boyer to me for evaluation. Below are my notes for this visit with him.    If you have questions, please do not hesitate to call me. I look forward to following your patient along with you.      Sincerely,        Dorian Sheppard MD        CC: No Recipients    Dorian Sheppard MD  5/25/2023  2:49 PM  Signed  Randal Boyer is a(n) 5 year old male who presents for a telehealth visit via live interactive video with a secure connection. This visit was not recorded or stored.     Consent for telehealth visit was verified including risk of electronic data, possibility of equipment failure or need for in person visit if condition cannot be fully assessed by telehealth.      Provider location: Home  Patient Location: school in the [facility] he was accompanied by Mother    Reason for visit: physician consult  Reason for use of telehealth: minimize risk of potential exposure to viral respiratory illness during Covid 19 pandemic      CC:  Snoring.    HPI: Randal is a(n) 5 year old referred by pediatrician Dr. Carolann Weeks for loud snoring every night. This used to be only when he was sick but for the last 1-2 years he is snoring all the time. Mom not sure if patient has pauses in breathing or gasping. Mom states that per Dr. Weeks, patient has large tonsils to the point they are almost touching. [place] also told him that his tonsils are large. Patient also mouth breathes. Patient sleeps in his own bed, own room. Bedtime is around 2100 and falls asleep between 5-10 minutes. Patient sleeps through the night, will sometimes wake up for a glass of water but falls back asleep quickly. Wake up is at 0700. No instances of bedwetting or sleepwalking,  does not sweat in sleep or move around a lot. They have not noticed any exacerbating or ameliorating factors.     Pediatric Sleep Intake    Bedtime: 2230 (05/25/23 1408)  Wake Time: 2100 (05/25/23 1408)  Length of time awake in bed prior to falling asleep (minutes).: 10 (05/25/23 1408)  Does the child sleep in different environments?: No (05/25/23 1408)  Awakenings: Yes (05/25/23 1408)  How often?: Not much (05/25/23 1408)  What time are the awakenings?: 2 (05/25/23 1408)  How long does it take to fall back asleep?: He fall back to sleep Right away (05/25/23 1408)  Does patient come out of bed before failling asleep?: Never (05/25/23 1408)  Does patient worry about things at bedtime?: Always (05/25/23 1408)  Does patient have racing thoughts at bedtime?: Never (05/25/23 1408)  Is the weekend sleep schedule the same as the weekday?: No (05/25/23 1408)  Bedtime: 2300 (05/25/23 1408)  Waketime: 2100 (05/25/23 1408)  Length of time awake in bed prior to falling asleep (minutes)?: 10 (05/25/23 1408)  Does the patient eat or drink caffeine products?: No (05/25/23 1408)  Does your child have access to a \"screen\" (mobile device, Rae, laptop, etc.)?: No (05/25/23 1408)  Does the child ever use medication to help them sleep?: No (05/25/23 1408)  Snores loudly 3 or more nights a week.: Yes (05/25/23 1408)  Mouth breathing?: Yes (05/25/23 1408)  Stops breathing/wakes up gasping for breath when sleeping: No (05/25/23 1408)  Restless sleep?: Yes (05/25/23 1408)  Kicks legs in sleep.: No (05/25/23 1408)  Does patient grind their teeth?: No (05/25/23 1408)  Does patient have morning headaches?: No (05/25/23 1408)  Sweats during sleep: No (05/25/23 1408)  Sleepwalks: No (05/25/23 1408)  Head bangs/body rocks at bedtime: No (05/25/23 1408)  Bedwet: No (05/25/23 1408)  Complains of pain, creeping, crawling, or aching in limbs at bedtime.: No (05/25/23 1408)  Does the patient have inattention, hyperacitvity, or poor impulse control:  No (05/25/23 1408)  Are there days that the patient is not sleepy: No (05/25/23 1408)  Does the patient ever fall asleep unexpectedly?: No (05/25/23 1408)  Ever felt muscle weakness in periods of high emotion (laughing, being startled, etc.): No (05/25/23 1408)  Any episodes of slurred speech, mouth dropping open, or tongue hanging out: No (05/25/23 1408)  Does the patient hear things that are not there just while falling asleep or waking up?: No (05/25/23 1408)  Does the patient ever wake up unable to move at all?: No (05/25/23 1408)    ROS:  As per history of present illness above. All other systems have been reviewed and are negative.      Past Medical History:   Diagnosis Date   • No known health problems    • Poor weight gain in child 2/16/2023       Past Surgical History:   Procedure Laterality Date   • No past surgeries         Family History  No sleep problems in the family.     Social History  Lives at home with mother, father, and younger sister.  In . School is going well.     Current Outpatient Medications   Medication Sig Dispense Refill   • ondansetron (Zofran ODT) 4 MG disintegrating tablet Place 1 tablet onto the tongue every 6 hours as needed for Nausea. 3 tablet 0     No current facility-administered medications for this visit.         Impression:  Randal is a(n) 5 year old with snoring, mouth breathing, restless sleep, and tonsillar hypertrophy per mother.  A sleep study is required to better characterize his sleep related respiratory status.    Recommendations:  1. A sleep study should be scheduled.  2. Follow up in clinic 1-2 weeks after that study.     Total time spent preparing for the visit, face to face time with patient and/or parent/caregiver, and post-visit coordination and documentation: 25 minutes.   Moderately complex MDM.  I reviewed the above recommendations with Randal's mother who expressed understanding and agreed with this plan.      Dorian Sheppard MD FAAP  [facility]  Director, [facility]       cardiovascular/neurologic

## 2024-04-27 NOTE — H&P ADULT - ASSESSMENT
69F Hx chronic neck/back pain (s/p multiple spinal fusions, inactive intrathecal morphine pump), bilateral LE paralysis, HTN, HLD, CVA x3 (MR brain 1/23 with chronic lacunar infarcts of the left corona radiata, left putamen and left cerebellar hemisphere), hx PE in 2023 (Eliquis for 3-6 months), emphysema, neurogenic bladder (s/p chronic jiménez), multiple UTIs, Multiple ED visits for abdominal pain who p/w LLQ abdominal pain, feels like a "hole" in her abdomen, associated with blood in stool since 3 am. Pt states that when she was being cleaned at the rehab facility she was told that there were streaks of blood in her stool. Pt denies n/v, no f/c, no cp/sob, no other complaints at this time. Requesting her dose of pain meds (oxy 15mg, gabapentin 300mg and benadryl 25mg for itching).    In the ED VSS. Labs pristine except normocytic anemia Hgb 11.0 (at baseline). 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 She received PO Oxycodone 15mg, Gabapentin 133jly3, PO Benadryl 25g & 1L 0.9% bolus. 69F Hx chronic neck/back pain (s/p multiple spinal fusions, inactive intrathecal morphine pump), bilateral LE paralysis, neurogenic bladder (s/p chronic jiménez), multiple UTIs, CVA x3 (MR brain 1/23 with chronic lacunar infarcts of the left corona radiata, left putamen and left cerebellar hemisphere), hx PE in 2023 (Eliquis for 3-6 months), HTN, HLD, emphysema, Multiple ED visits for abdominal pain who p/w LLQ abdominal pain. 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

## 2024-04-27 NOTE — H&P ADULT - PROBLEM SELECTOR PLAN 12
F: None   E: Replete as necessary K>4 Mg>2  N: DASH diet     DVT Prophylaxis: Lovenox 40mg daily   GI prophylaxis: None   CODE STATUS: FULL F: s/p  1L 0.9%NS  E: Replete as necessary K>4 Mg>2  N: CC/high fiber diet     DVT Prophylaxis:  Dabigatran 150mg BID  GI prophylaxis: Protonix 40mg qAM  CODE STATUS: FULL

## 2024-04-27 NOTE — ED PROVIDER NOTE - PHYSICAL EXAMINATION
GEN: Chronically ill appearing, well developed, awake, alert, oriented to person, place, time/situation and in no apparent distress. NTAF  ENT: Airway patent, Nasal mucosa clear. Mouth with normal mucosa.  EYES: Clear bilaterally. PERRL, EOMI  RESPIRATORY: Breathing comfortably with normal RR. No W/C/R, no hypoxia or resp distress.  CARDIAC: Regular rate and rhythm, no M/R/G  ABDOMEN: Soft, some TTp LLQ, +bowel sounds, no rebound, rigidity, or guarding.  Rectal: soft brown stool with streaks of blood on gloved finger, no active hemorrhage, no hard ball of stool palpated.   MSK: Range of motion is not limited, distal pulses +2.   NEURO: Alert and oriented x 3, b/l LE plegic, b/l UE 4+/5.   SKIN: Skin normal color for race, warm, dry and intact. No evidence of rash.  PSYCH: Alert and oriented to person, place, time/situation. intermittently irritable mood and affect. no apparent risk to self or others.

## 2024-04-27 NOTE — H&P ADULT - PROBLEM SELECTOR PLAN 1
Chronic constipation ISO heavy opioid use for chronic pain. Takes home lubiprostone 24mcg BID.  CT abdomen/pelvis showing abundant stool burden. Bilateral lower quadrant abdominal pain on exam.  Having BMs.    Plan:   - s/p enema in ED  - lactulose tid  - senna/miralax BID. Hx chronic constipation iso heavy opioid use 2/2 chronic pain. P/w LLQ pain & endorses decreased bowel movements. 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 Rectal exam w/ soft brown stool with streaks of blood on gloved finger, no active hemorrhage, no hard ball of stool palpable.  - C/ w home Bisacodyl 5mg qhs, Movantik 25mg qAM, PEG 17g BID, Senna 2tabs qhs  - Started Psyllium powder TID, PO MagCitrate q24h  - Monitor stool counts & I&Os, consider IVF if necessary to maintain euvolemia  - High fiber diet  - Consider GI consult if unimproved Hx chronic constipation iso heavy opioid use 2/2 chronic pain. P/w LLQ pain & endorses decreased bowel movements. 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 Rectal exam w/ soft brown stool with streaks of blood on gloved finger, no active hemorrhage, no hard ball of stool palpable.  - C/ w home Bisacodyl 5mg qhs, Movantik 25mg qAM, PEG 17g BID, Senna 2tabs qhs  - Started Psyllium powder TID, PO Lactulose 10mg Qd, Mineral oil enema Qd  - Consider PO MagCitrate q24h if above regimen not efficacious  - Monitor stool counts & I&Os, consider IVF if necessary to maintain euvolemia  - High fiber diet  - Consider GI consult if unimproved Hx chronic constipation iso heavy opioid use 2/2 chronic pain. P/w LLQ pain & endorses decreased bowel movements. 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 SIRS 0/4. Rectal exam w/ soft brown stool with streaks of blood on gloved finger, no active hemorrhage, no hard ball of stool palpable.  - C/ w home Bisacodyl 5mg qhs, Movantik 25mg qAM, PEG 17g BID, Senna 2tabs qhs  - Started Psyllium powder TID, PO Lactulose 10mg Qd, Mineral oil enema Qd  - Consider PO MagCitrate q24h if above regimen not efficacious  - Monitor stool counts & I&Os, consider IVF if necessary to maintain euvolemia  - High fiber diet  - Consider GI consult if unimproved

## 2024-04-27 NOTE — ED PROVIDER NOTE - CLINICAL SUMMARY MEDICAL DECISION MAKING FREE TEXT BOX
69F PMH chronic neck/back pain (s/p multiple spinal fusions, inactive intrathecal morphine pump), bilateral LE paralysis, HTN, HLD, CVA x3 (MR brain 1/23 with chronic lacunar infarcts of the left corona radiata, left putamen and left cerebellar hemisphere), hx PE in 2023 (Eliquis for 3-6 months), emphysema, neurogenic bladder (s/p chronic jiménez), multiple UTsI, multiple ED visits for abdominal pain who p/w LLQ abdominal pain, feels like a "hole" in her abdomen, associated with blood in stool since 3 am. Pt states that when she was being cleaned at the rehab facility she was told that there were streaks of blood in her stool. Pt denies n/v, no f/c, no cp/sob, no other complaints at this time. Requesting her dose fo pain meds (oxy 15mg, gabapentin 800mg and benadryl 25mg for itching).  In ER, VSS, borderline BP, will give IVFs and recheck, +TTP LLQ, no r/r/g. Rectal shows Soft brown stool with streaks of blood on gloved finger, no active hemorrhage, no hard ball of stool palpable.  Plan for labs, pain control, CT a/p to r/o infection or obstruction.  Labs with no emergent findings. CT shows large fecaloma and probable stercoral colitis, similar to CT from Feb and March 2024. Not able to disimpact in the ER at this time due no hard stool palpable on rectal exam, also pt with rectal bleeding (red streaks in stool). Pt will be admitted to [facility] for bowel regimen and further mgmt for rectal bleeding and fecaloma/stercoral colitis.

## 2024-04-27 NOTE — H&P ADULT - PROBLEM SELECTOR PLAN 9
- continue home Toprol 25mg daily  - continue home Entresto 24-26 BID. - C/w home Toprol 25mg Qd w/ hold parameters

## 2024-04-27 NOTE — PATIENT PROFILE ADULT - NSPROGENOTHERPROVIDER_GEN_A_NUR
----- Message from JOE Gonzales sent at 4/12/2023  7:10 AM CDT -----  Kidney and liver function, blood counts and CRP stable. ESR normal. Continue present plan  
Left provider's results message on patient's voicemail and to call back and ask to speak with a registered nurse if any questions    
none

## 2024-04-27 NOTE — PATIENT PROFILE ADULT - FALL HARM RISK - HARM RISK INTERVENTIONS

## 2024-04-27 NOTE — H&P ADULT - PROBLEM SELECTOR PLAN 6
- PT/OT babitaal. Complication of spinal fusion surgery  - PT while inpatinet  - Ambulate with assistance    #Sacral ulcer  - Wound care consult ordered Complication of spinal fusion surgery  - PT while inpatient  - Ambulate with assistance    #Sacral ulcer  - Wound care consult ordered

## 2024-04-27 NOTE — ED PROVIDER NOTE - CARE PLAN
1 Principal Discharge DX:	Fecaloma  Secondary Diagnosis:	Chronic back pain  Secondary Diagnosis:	Rectal bleeding

## 2024-04-28 ENCOUNTER — TRANSCRIPTION ENCOUNTER (OUTPATIENT)
Age: 69
End: 2024-04-28

## 2024-04-28 VITALS
TEMPERATURE: 97 F | HEART RATE: 82 BPM | OXYGEN SATURATION: 99 % | SYSTOLIC BLOOD PRESSURE: 123 MMHG | RESPIRATION RATE: 18 BRPM | DIASTOLIC BLOOD PRESSURE: 78 MMHG

## 2024-04-28 PROCEDURE — 99239 HOSP IP/OBS DSCHRG MGMT >30: CPT

## 2024-04-28 PROCEDURE — 97161 PT EVAL LOW COMPLEX 20 MIN: CPT

## 2024-04-28 PROCEDURE — 71045 X-RAY EXAM CHEST 1 VIEW: CPT

## 2024-04-28 PROCEDURE — 86850 RBC ANTIBODY SCREEN: CPT

## 2024-04-28 PROCEDURE — 86900 BLOOD TYPING SEROLOGIC ABO: CPT

## 2024-04-28 PROCEDURE — 93005 ELECTROCARDIOGRAM TRACING: CPT

## 2024-04-28 PROCEDURE — 86901 BLOOD TYPING SEROLOGIC RH(D): CPT

## 2024-04-28 PROCEDURE — 80053 COMPREHEN METABOLIC PANEL: CPT

## 2024-04-28 PROCEDURE — 85025 COMPLETE CBC W/AUTO DIFF WBC: CPT

## 2024-04-28 PROCEDURE — 36415 COLL VENOUS BLD VENIPUNCTURE: CPT

## 2024-04-28 PROCEDURE — 99285 EMERGENCY DEPT VISIT HI MDM: CPT

## 2024-04-28 PROCEDURE — 85730 THROMBOPLASTIN TIME PARTIAL: CPT

## 2024-04-28 PROCEDURE — 85610 PROTHROMBIN TIME: CPT

## 2024-04-28 PROCEDURE — 74177 CT ABD & PELVIS W/CONTRAST: CPT | Mod: MC

## 2024-04-28 RX ORDER — SODIUM CHLORIDE 9 MG/ML
1000 INJECTION, SOLUTION INTRAVENOUS
Refills: 0 | Status: DISCONTINUED | OUTPATIENT
Start: 2024-04-28 | End: 2024-04-28

## 2024-04-28 RX ORDER — MINERAL OIL
133 OIL (ML) MISCELLANEOUS DAILY
Refills: 0 | Status: DISCONTINUED | OUTPATIENT
Start: 2024-04-28 | End: 2024-04-28

## 2024-04-28 RX ORDER — LACTULOSE 10 G/15ML
10 SOLUTION ORAL DAILY
Refills: 0 | Status: DISCONTINUED | OUTPATIENT
Start: 2024-04-28 | End: 2024-04-28

## 2024-04-28 RX ORDER — SOD SULF/SODIUM/NAHCO3/KCL/PEG
4000 SOLUTION, RECONSTITUTED, ORAL ORAL ONCE
Refills: 0 | Status: COMPLETED | OUTPATIENT
Start: 2024-04-28 | End: 2024-04-28

## 2024-04-28 RX ORDER — ACETAMINOPHEN 500 MG
650 TABLET ORAL EVERY 6 HOURS
Refills: 0 | Status: DISCONTINUED | OUTPATIENT
Start: 2024-04-28 | End: 2024-04-28

## 2024-04-28 RX ORDER — GLUCAGON INJECTION, SOLUTION 0.5 MG/.1ML
1 INJECTION, SOLUTION SUBCUTANEOUS ONCE
Refills: 0 | Status: DISCONTINUED | OUTPATIENT
Start: 2024-04-28 | End: 2024-04-28

## 2024-04-28 RX ORDER — DEXTROSE 10 % IN WATER 10 %
125 INTRAVENOUS SOLUTION INTRAVENOUS ONCE
Refills: 0 | Status: DISCONTINUED | OUTPATIENT
Start: 2024-04-28 | End: 2024-04-28

## 2024-04-28 RX ORDER — DULOXETINE HYDROCHLORIDE 30 MG/1
60 CAPSULE, DELAYED RELEASE ORAL ONCE
Refills: 0 | Status: COMPLETED | OUTPATIENT
Start: 2024-04-28 | End: 2024-04-28

## 2024-04-28 RX ORDER — GABAPENTIN 400 MG/1
800 CAPSULE ORAL ONCE
Refills: 0 | Status: COMPLETED | OUTPATIENT
Start: 2024-04-28 | End: 2024-04-28

## 2024-04-28 RX ORDER — DEXTROSE 50 % IN WATER 50 %
12.5 SYRINGE (ML) INTRAVENOUS ONCE
Refills: 0 | Status: DISCONTINUED | OUTPATIENT
Start: 2024-04-28 | End: 2024-04-28

## 2024-04-28 RX ORDER — ACETAMINOPHEN 500 MG
650 TABLET ORAL ONCE
Refills: 0 | Status: COMPLETED | OUTPATIENT
Start: 2024-04-28 | End: 2024-04-28

## 2024-04-28 RX ORDER — INSULIN LISPRO 100/ML
VIAL (ML) SUBCUTANEOUS
Refills: 0 | Status: DISCONTINUED | OUTPATIENT
Start: 2024-04-28 | End: 2024-04-28

## 2024-04-28 RX ORDER — DEXTROSE 50 % IN WATER 50 %
15 SYRINGE (ML) INTRAVENOUS ONCE
Refills: 0 | Status: DISCONTINUED | OUTPATIENT
Start: 2024-04-28 | End: 2024-04-28

## 2024-04-28 RX ORDER — DEXTROSE 50 % IN WATER 50 %
25 SYRINGE (ML) INTRAVENOUS ONCE
Refills: 0 | Status: DISCONTINUED | OUTPATIENT
Start: 2024-04-28 | End: 2024-04-28

## 2024-04-28 RX ADMIN — PANTOPRAZOLE SODIUM 40 MILLIGRAM(S): 20 TABLET, DELAYED RELEASE ORAL at 06:22

## 2024-04-28 RX ADMIN — POLYETHYLENE GLYCOL 3350 17 GRAM(S): 17 POWDER, FOR SOLUTION ORAL at 06:21

## 2024-04-28 RX ADMIN — GABAPENTIN 800 MILLIGRAM(S): 400 CAPSULE ORAL at 06:21

## 2024-04-28 RX ADMIN — OXYCODONE HYDROCHLORIDE 15 MILLIGRAM(S): 5 TABLET ORAL at 00:52

## 2024-04-28 RX ADMIN — OXYCODONE HYDROCHLORIDE 15 MILLIGRAM(S): 5 TABLET ORAL at 06:22

## 2024-04-28 RX ADMIN — Medication 1 PACKET(S): at 06:21

## 2024-04-28 RX ADMIN — GABAPENTIN 800 MILLIGRAM(S): 400 CAPSULE ORAL at 13:43

## 2024-04-28 RX ADMIN — OXYCODONE HYDROCHLORIDE 15 MILLIGRAM(S): 5 TABLET ORAL at 01:34

## 2024-04-28 RX ADMIN — LACTULOSE 10 GRAM(S): 10 SOLUTION ORAL at 00:52

## 2024-04-28 RX ADMIN — OXYCODONE HYDROCHLORIDE 15 MILLIGRAM(S): 5 TABLET ORAL at 13:38

## 2024-04-28 RX ADMIN — Medication 650 MILLIGRAM(S): at 03:30

## 2024-04-28 RX ADMIN — OXYCODONE HYDROCHLORIDE 15 MILLIGRAM(S): 5 TABLET ORAL at 14:45

## 2024-04-28 RX ADMIN — OXYCODONE HYDROCHLORIDE 15 MILLIGRAM(S): 5 TABLET ORAL at 07:22

## 2024-04-28 RX ADMIN — Medication 650 MILLIGRAM(S): at 02:30

## 2024-04-28 RX ADMIN — Medication 25 MILLIGRAM(S): at 06:22

## 2024-04-28 NOTE — DIETITIAN INITIAL EVALUATION ADULT - PROBLEM SELECTOR PLAN 4
Hx multiple spinal fusions c/b b/l LE paralysis and neurogenic bladder. Has inactive intrathecal morphine pump placed by Dr. Lazo that per patient is inactive as she has been lost to follow-up for pump refills. Patient desires to remove pump as outpatient. Endorses pain is "12/10" in intensity.  - C/w home regimen of Duloxetine 60mg Qd, Gabapentin 800mg TID, PRN PO Oxycodone 15mg q4h + PO Benadryl 25mg(mod)  - Added standing PO Tylenol 650mg q6h to decreased PRN opioid requirements

## 2024-04-28 NOTE — DIETITIAN INITIAL EVALUATION ADULT - OTHER CALCULATIONS
Estimated needs based on dosing wt as within % IBW 135lb/61.4kg (117%). Needs adjusted for age, BMI, wound healing, emphysema, malnutrition, and clinical status.

## 2024-04-28 NOTE — DIETITIAN INITIAL EVALUATION ADULT - ADD RECOMMEND
1. Continue Consistent Carbohydrate High Fiber diet.   >>Encourage & monitor PO intake. [place] dietary preferences as able.   2. Monitor GI tolerance, weight trends, labs, & skin integrity.  3. Defer bowel and pain regimens to team.   4. RD to remain available for diet education/intervention prn.

## 2024-04-28 NOTE — DIETITIAN INITIAL EVALUATION ADULT - PROBLEM SELECTOR PLAN 7
Complication of spinal fusion surgery. Chronic jiménez. Multiple ED visits for UTI  - Chronic jiménez

## 2024-04-28 NOTE — DISCHARGE NOTE PROVIDER - YES NO FOR MLM POSITIVE OR NEGATIVE COVID RESULT
Airway  Date/Time: 8/28/2019 12:16 PM  Performed by: Anurag Baez M.D.  Authorized by: Anurag Baez M.D.     Location:  OR  Urgency:  Elective  Indications for Airway Management:  Anesthesia  Spontaneous Ventilation: absent    Sedation Level:  Deep  Preoxygenated: Yes    Patient Position:  Sniffing  Final Airway Type:  Endotracheal airway  Final Endotracheal Airway:  ETT  Cuffed: Yes    Technique Used for Successful ETT Placement:  Direct laryngoscopy  Insertion Site:  Oral  Blade Type:  Fabricio  Laryngoscope Blade/Videolaryngoscope Blade Size:  3  ETT Size (mm):  7.0  Measured from:  Teeth  ETT to Teeth (cm):  22  Placement Verified by: auscultation and capnometry    Cormack-Lehane Classification:  Grade I - full view of glottis  Number of Attempts at Approach:  1        
,

## 2024-04-28 NOTE — DIETITIAN INITIAL EVALUATION ADULT - NUTRITIONGOAL OUTCOME1
Pt to consistently meet >75% nutrient needs. Reduce signs and symptoms of protein-calorie malnutrition.

## 2024-04-28 NOTE — CONSULT NOTE ADULT - ASSESSMENT
{\rtf1\mgbpbi13806\ansi\zkrzwgc2960\ftnbj\uc1\deff0  {\fonttbl{\f0 \fnil Segoe UI;}{\f1 \fnil \fcharset0 Segoe UI;}{\f2 \fnil Times New Tang;}}  {\colortbl ;\xjt747\nstvg655\sqov785 ;\red0\green0\blue0 ;\red0\green0\ofpk574 ;\red0\green0\blue0 ;}  {\stylesheet{\f0\fs20 Normal;}{\cs1 Default Paragraph Font;}{\cs2\f0\fs16 Line Number;}{\cs3\f2\fs24\ul\cf3 Hyperlink;}}  {\*\revtbl{Unknown;}}  \dsimnv39029\hnxwrz88898\mellc5613\tjjni0677\dwyrq3320\ezxzt2977\ahviyoi502\haczgqs849\nogrowautofit\xcijwr659\formshade\nofeaturethrottle1\dntblnsbdb\fet4\aendnotes\aftnnrlc\pgbrdrhead\pgbrdrfoot  \sectd\dzefry79448\vypydf13189\guttersxn0\aoxohyca0933\rznugrkr7515\eiiddffd8094\sevyupze5839\cpfbmvu721\awyrmex282\sbkpage\pgncont\pgndec  \plain\plain\f0\fs24\ql\plain\f0\fs24\plain\f0\fs20\xwxs4033\hich\f0\dbch\f0\loch\f0\fs20\par  I M\par  \par  69 y o F Hx chronic neck/back pain (s/p multiple spinal fusions, inactive intrathecal morphine pump), bilateral LE paralysis, neurogenic bladder (s/p chronic jiménez), multiple UTIs, CVA x3 (MR brain 1/23 with chronic lacunar infarcts of the left corona   radiata, left putamen and left cerebellar hemisphere), hx PE in 2023 (Eliquis for 3-6 months), HTN, HLD, emphysema, Multiple ED visits for abdominal pain who p/w LLQ abdominal pain. 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 \par  \'b7  {\*\bkmkstart it57071656563}{\*\bkmkend xu47912225562}Plan: {\*\bkmkstart qa59690081839}{\*\bkmkend ik86438571547}Hx chronic constipation iso heavy opioid use 2/2 chronic pain. P/w LLQ pain & endorses decreased bowel movements. CTAP suspect stercoral   proctitis due to large fecaloma, similar to March 25, 2024. SIRS 0/4. Rectal exam w/ soft brown stool with streaks of blood on gloved finger, no active hemorrhage, no hard ball of stool palpable.\par  - C/ w home Bisacodyl 5mg qhs, Movantik 25mg qAM, PEG 17g BID, Senna 2tabs qhs\par  - Started Psyllium powder TID, PO Lactulose 10mg Qd, Mineral oil enema Qd\par  - Consider PO MagCitrate q24h if above regimen not efficacious\par  - Monitor stool counts & I&Os, consider IVF if necessary to maintain euvolemia\par  - High fiber diet\par  - Consider GI consult if unimproved.\par  \par  \plain\f1\fs20\hrcf5510\hich\f1\dbch\f1\loch\f1\cf2\fs20\ul{\field{\*\fldinst HYPERLINK [id number] }{\fldrslt Problem/Plan - 2:}}\plain\f0\fs20\xlru4134\hich\f0\dbch\f0\loch\f0\fs20\ql\par  \'b7  {\*\bkmkstart wl47215270177}{\*\bkmkend ah79051889787}Problem: {\*\bkmkstart zx03961771733}{\*\bkmkend xj63338764498}Normocytic anemia. \par  \'b7  {\*\bkmkstart go83790848298}{\*\bkmkend ta23583180955}Plan: {\*\bkmkstart as86553742544}{\*\bkmkend to17399709317}Hgb 11.0 (at baseline).  Rectal exam w/ soft brown stool with streaks of blood on gloved finger, no active hemorrhage\par  - Active T+S, transfuse to keep hgb >7\par  - Monitor rectal bleeding\par  - consider GI consult if worsening.\plain\f1\fs20\uhbq1715\hich\f1\dbch\f1\loch\f1\cf2\fs20\strike\plain\f0\fs20\qadr3050\hich\f0\dbch\f0\loch\f0\fs20\par  \par  \plain\f1\fs20\eiyu1425\hich\f1\dbch\f1\loch\f1\cf2\fs20\ul{\field{\*\fldinst HYPERLINK [id number] }{\fldrslt Problem/Plan - 3:}}\plain\f0\fs20\zbwz3462\hich\f0\dbch\f0\loch\f0\fs20\ql\par  \'b7  {\*\bkmkstart kj05982220898}{\*\bkmkend mp81601747355}Problem: {\*\bkmkstart sb20333849872}{\*\bkmkend ac77758673789}History of pulmonary embolism. \par  \'b7  {\*\bkmkstart la81242134594}{\*\bkmkend if10333595453}Plan: {\*\bkmkstart js66674843430}{\*\bkmkend xz55945894055}- C/w home Dabigatran 150mg BID. Hold if rectal bleeding worsens.\par  \par  \plain\f1\fs20\mjmd7739\hich\f1\dbch\f1\loch\f1\cf2\fs20\ul{\field{\*\fldinst HYPERLINK [id number] }{\fldrslt Problem/Plan - 4:}}\plain\f0\fs20\nhpk8668\hich\f0\dbch\f0\loch\f0\fs20\ql\par  \'b7  {\*\bkmkstart mn72156050170}{\*\bkmkend zy13570671673}Problem: {\*\bkmkstart zg35628468157}{\*\bkmkend zc24171227313}Chronic neck and back pain. \par  \'b7  {\*\bkmkstart ka27799787277}{\*\bkmkend rt02582133321}Plan: {\*\bkmkstart sf54182644046}{\*\bkmkend qt71572002541}Hx multiple spinal fusions c/b b/l LE paralysis and neurogenic bladder. Has inactive intrathecal morphine pump placed by Dr. Lazo   that per patient is inactive as she has been lost to follow-up for pump refills. Patient desires to remove pump as outpatient. Endorses pain is "12/10" in intensity.\par  - C/w home regimen of Duloxetine 60mg Qd, Gabapentin 800mg TID, PRN PO Oxycodone 15mg q4h + PO Benadryl 25mg(mod)\par  - Added standing PO Tylenol 650mg q6h to decreased PRN opioid requirements.\par  \par  \plain\f1\fs20\ycbb3220\hich\f1\dbch\f1\loch\f1\cf2\fs20\ul{\field{\*\fldinst HYPERLINK [id number] }{\fldrslt Problem/Plan - 5:}}\plain\f0\fs20\sijv3965\hich\f0\dbch\f0\loch\f0\fs20\ql\par  \'b7  {\*\bkmkstart bx39290555790}{\*\bkmkend lr08328299380}Problem: {\*\bkmkstart xx51676122891}{\*\bkmkend jl29872369187}CVA (cerebrovascular accident). \par  \'b7  {\*\bkmkstart rj33089426059}{\*\bkmkend xf55439618941}Plan: {\*\bkmkstart ww42106424565}{\*\bkmkend uo26806769561}History of CVA x3 (MR brain 1/23 with chronic lacunar infarcts of the left corona radiata, left putamen and left cerebellar hemisphere)\par  - C/w home Aspirin 81mg Qd.\par  \par  \plain\f1\fs20\lmei4197\hich\f1\dbch\f1\loch\f1\cf2\fs20\ul{\field{\*\fldinst HYPERLINK [id number] }{\fldrslt Problem/Plan - 6:}}\plain\f0\fs20\oghe5628\hich\f0\dbch\f0\loch\f0\fs20\ql\par  \'b7  {\*\bkmkstart qj35879552899}{\*\bkmkend lo85088766907}Problem: {\*\bkmkstart rk54695247984}{\*\bkmkend jm28516002907}Paralysis of lower extremity. \par  \'b7  {\*\bkmkstart ic75430380904}{\*\bkmkend ll52372461927}Plan: {\*\bkmkstart lh75861836843}{\*\bkmkend ul56769322890}Complication of spinal fusion surgery\par  - PT while inpatient\par  - Ambulate with assistance\par  \par  #Sacral ulcer\par  - Wound care consult ordered.\par  \par  \plain\f1\fs20\mauf9346\hich\f1\dbch\f1\loch\f1\cf2\fs20\ul{\field{\*\fldinst HYPERLINK [id number] }{\fldrslt Problem/Plan - 7:}}\plain\f0\fs20\nfsu7216\hich\f0\dbch\f0\loch\f0\fs20\ql\par  \'b7  {\*\bkmkstart ov53943823580}{\*\bkmkend qi89769710730}Problem: {\*\bkmkstart fe65164450278}{\*\bkmkend qq02238710531}Neurogenic bladder. \par  \'b7  {\*\bkmkstart lc82465775912}{\*\bkmkend np49676851470}Plan: {\*\bkmkstart uq42094231951}{\*\bkmkend xu60422287626}Complication of spinal fusion surgery. Chronic jiménez. Multiple ED visits for UTI\par  - Chronic jiménez.\par  \par  \plain\f1\fs20\sflx1803\hich\f1\dbch\f1\loch\f1\cf2\fs20\ul{\field{\*\fldinst HYPERLINK [id number] }{\fldrslt Problem/Plan - 8:}}\plain\f0\fs20\yzxh2986\hich\f0\dbch\f0\loch\f0\fs20\ql\par  \'b7  {\*\bkmkstart dl95448460009}{\*\bkmkend jc65248043568}Problem: {\*\bkmkstart yb54357012024}{\*\bkmkend mv43354319110}T2DM (type 2 diabetes mellitus). \par  \'b7  {\*\bkmkstart ro58877284043}{\*\bkmkend mx77287118046}Plan: {\*\bkmkstart he99406954066}{\*\bkmkend es11093300229}- Hold home metformin 500mg BID.\par  - mISS while inpatient.\par  \par  \plain\f1\fs20\uzju5421\hich\f1\dbch\f1\loch\f1\cf2\fs20\ul{\field{\*\fldinst HYPERLINK [id number] }{\fldrslt Problem/Plan - 9:}}\plain\f0\fs20\hzuw8562\hich\f0\dbch\f0\loch\f0\fs20\ql\par  \'b7  {\*\bkmkstart hu51071070935}{\*\bkmkend jx25223435670}Problem: {\*\bkmkstart ln59496694116}{\*\bkmkend yy42058634363}HTN (hypertension). \par  \'b7  {\*\bkmkstart ry52934860801}{\*\bkmkend eu79184340432}Plan: {\*\bkmkstart lp81437205583}{\*\bkmkend od24883357334}- C/w home Toprol 25mg Qd w/ hold parameters.\par  \par  \plain\f1\fs20\idce6403\hich\f1\dbch\f1\loch\f1\cf2\fs20\ul{\field{\*\fldinst HYPERLINK [id number] }{\fldrslt Problem/Plan - 10:}}\plain\f0\fs20\xiwv3309\hich\f0\dbch\f0\loch\f0\fs20\ql\par  \'b7  {\*\bkmkstart je73789302108}{\*\bkmkend le07778304579}Problem: {\*\bkmkstart kh02828177560}{\*\bkmkend hp10933104587}HLD (hyperlipidemia). \par  \'b7  {\*\bkmkstart tg38353637845}{\*\bkmkend xy02187283365}Plan; {\*\bkmkstart bh44088978128}{\*\bkmkend fd70574764878}- C/w home Simvastatin 40mg Qd.\par  \par  \plain\f1\fs20\xasm1449\hich\f1\dbch\f1\loch\f1\cf2\fs20\ul{\field{\*\fldinst HYPERLINK [id number] }{\fldrslt Problem/Plan - 11:}}\plain\f0\fs20\kktq1982\hich\f0\dbch\f0\loch\f0\fs20\ql\par  \'b7  {\*\bkmkstart zf368034226005}{\*\bkmkend jt433307851484}Problem: {\*\bkmkstart ib970352699304}{\*\bkmkend ld783989619719}Emphysema lung. \par  \'b7  {\*\bkmkstart fx109999644893}{\*\bkmkend br384108518776}Plan: {\*\bkmkstart my385753237827}{\*\bkmkend qu927905551285}Former smoker x 55 years, quit 2 years ago.\par  - C/w Duoneb q6h PRN.\par  \par  \plain\f1\fs20\hcyr0291\hich\f1\dbch\f1\loch\f1\cf2\fs20\ul{\field{\*\fldinst HYPERLINK [id number] }{\fldrslt Problem/Plan - 12:}}\plain\f0\fs20\aaox7135\hich\f0\dbch\f0\loch\f0\fs20\ql\par  \'b7  {\*\bkmkstart xq950269570418}{\*\bkmkend ha573585740881}Problem: {\*\bkmkstart ey477787039001}{\*\bkmkend gd738198480290}Healthcare maintenance. \par  \'b7  {\*\bkmkstart pb331375561005}{\*\bkmkend ve868708072546}Plan: {\*\bkmkstart tz031682935932}{\*\bkmkend tp903141774826}F: s/p  1L 0.9%NS\par  E: Replete as necessary K>4 Mg>2\par  N: CC/high fiber diet \par  \par  DVT Prophylaxis:  Dabigatran 150mg BID\par  GI prophylaxis: Protonix 40mg qAM\par  CODE STATUS: FULL.\par  \par  }

## 2024-04-28 NOTE — DISCHARGE NOTE PROVIDER - NSDCMRMEDTOKEN_GEN_ALL_CORE_FT
acetaminophen 500 mg oral tablet: 2 tab(s) orally every 8 hours As needed Temp greater or equal to 38.5C (101.3F), Mild Pain (1 - 3)  albuterol 90 mcg/inh inhalation aerosol: 2 puff(s) inhaled every 6 hours, As Needed  aspirin 81 mg oral delayed release tablet: 1 tab(s) orally once a day  bisacodyl 5 mg oral delayed release tablet: 1 tab(s) orally once a day (at bedtime)  celecoxib 200 mg oral capsule: 1 cap(s) orally every 24 hours  diazePAM 5 mg oral tablet: 1 tab(s) orally every 8 hours as needed for  muscle spasm  diphenhydrAMINE 25 mg oral capsule: 1 cap(s) orally every 4 hours As needed Administer with oxy  DULoxetine 60 mg oral delayed release capsule: 1 cap(s) orally once a day  gabapentin 800 mg oral tablet: 1 tab(s) orally 3 times a day  MetFORMIN (Eqv-Glumetza) 500 mg oral tablet, extended release: 1 tab(s) orally 2 times a day  metoprolol succinate 25 mg oral tablet, extended release: 1 tab(s) orally once a day  Movantik 25 mg oral tablet: 1 tab(s) orally once a day (in the morning)  oxyCODONE 15 mg oral tablet: 1 tab(s) orally every 4 hours As needed Moderate Pain (4 - 6)  oxyCODONE 30 mg oral tablet: 1 tab(s) orally every 4 hours As needed Severe Pain (7 - 10)  pantoprazole 40 mg oral delayed release tablet: 1 tab(s) orally once a day (before a meal)  polyethylene glycol 3350 oral powder for reconstitution: 17 gram(s) orally 2 times a day  Pradaxa 150 mg oral capsule: 1 cap(s) orally 2 times a day  senna leaf extract oral tablet: 2 tab(s) orally once a day (at bedtime)  simvastatin 40 mg oral tablet: 1 tab(s) orally once a day (at bedtime)  Vitamin D3 50 mcg (2000 intl units) oral tablet: 1 tab(s) orally once a day   acetaminophen 500 mg oral tablet: 2 tab(s) orally every 8 hours As needed Temp greater or equal to 38.5C (101.3F), Mild Pain (1 - 3)  albuterol 90 mcg/inh inhalation aerosol: 2 puff(s) inhaled every 6 hours, As Needed  aspirin 81 mg oral delayed release tablet: 1 tab(s) orally once a day  bisacodyl 5 mg oral delayed release tablet: 1 tab(s) orally once a day (at bedtime)  celecoxib 200 mg oral capsule: 1 cap(s) orally every 24 hours  diazePAM 5 mg oral tablet: 1 tab(s) orally every 8 hours as needed for  muscle spasm  diphenhydrAMINE 25 mg oral capsule: 1 cap(s) orally every 4 hours As needed Administer with oxy  DULoxetine 60 mg oral delayed release capsule: 1 cap(s) orally once a day  gabapentin 800 mg oral tablet: 1 tab(s) orally 3 times a day  MetFORMIN (Eqv-Glumetza) 500 mg oral tablet, extended release: 1 tab(s) orally 2 times a day  metoprolol succinate 25 mg oral tablet, extended release: 1 tab(s) orally once a day  Movantik 25 mg oral tablet: 1 tab(s) orally once a day (in the morning)  oxyCODONE 15 mg oral tablet: 1 tab(s) orally every 4 hours As needed Moderate Pain (4 - 6)  oxyCODONE 30 mg oral tablet: 1 tab(s) orally every 4 hours As needed Severe Pain (7 - 10)  pantoprazole 40 mg oral delayed release tablet: 1 tab(s) orally once a day (before a meal)  polyethylene glycol 3350 oral powder for reconstitution: 17 gram(s) orally 2 times a day  Pradaxa 150 mg oral capsule: 1 cap(s) orally 2 times a day  senna leaf extract oral tablet: 2 tab(s) orally once a day (at bedtime)  simvastatin 40 mg oral tablet: 1 tab(s) orally once a day (at bedtime)  Tap Water Enema, Every Other Day: Please administer tap water enema every other day  Vitamin D3 50 mcg (2000 intl units) oral tablet: 1 tab(s) orally once a day

## 2024-04-28 NOTE — DISCHARGE NOTE PROVIDER - DETAILS OF MALNUTRITION DIAGNOSIS/DIAGNOSES
This patient has been assessed with a concern for Malnutrition and was treated during this hospitalization for the following Nutrition diagnosis/diagnoses:     -  04/28/2024: Moderate protein-calorie malnutrition

## 2024-04-28 NOTE — DIETITIAN INITIAL EVALUATION ADULT - PROBLEM SELECTOR PLAN 2
Hgb 11.0 (at baseline).  Rectal exam w/ soft brown stool with streaks of blood on gloved finger, no active hemorrhage  - Active T+S, transfuse to keep hgb >7  - Monitor rectal bleeding  - consider GI consult if worsening

## 2024-04-28 NOTE — DIETITIAN INITIAL EVALUATION ADULT - PROBLEM SELECTOR PLAN 6
Complication of spinal fusion surgery  - PT while inpatient  - Ambulate with assistance    #Sacral ulcer  - Wound care consult ordered

## 2024-04-28 NOTE — DIETITIAN INITIAL EVALUATION ADULT - PROBLEM SELECTOR PLAN 1
Hx chronic constipation iso heavy opioid use 2/2 chronic pain. P/w LLQ pain & endorses decreased bowel movements. 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 SIRS 0/4. Rectal exam w/ soft brown stool with streaks of blood on gloved finger, no active hemorrhage, no hard ball of stool palpable.  - C/ w home Bisacodyl 5mg qhs, Movantik 25mg qAM, PEG 17g BID, Senna 2tabs qhs  - Started Psyllium powder TID, PO Lactulose 10mg Qd, Mineral oil enema Qd  - Consider PO MagCitrate q24h if above regimen not efficacious  - Monitor stool counts & I&Os, consider IVF if necessary to maintain euvolemia  - High fiber diet  - Consider GI consult if unimproved

## 2024-04-28 NOTE — DISCHARGE NOTE NURSING/CASE MANAGEMENT/SOCIAL WORK - PATIENT PORTAL LINK FT
You can access the FollowMyHealth Patient Portal offered by [facility] by registering at the following website: [email or web address]. By joining [employer]’s FollowMyHealth portal, you will also be able to view your health information using other applications (apps) compatible with our system.

## 2024-04-28 NOTE — PHYSICAL THERAPY INITIAL EVALUATION ADULT - PERTINENT HX OF CURRENT PROBLEM, REHAB EVAL
ADHD, found something that would work for [employer], then it would stop working, different medications caused different issues, two years ago patient was home schooled, patient got in trouble at school, did not want to go to a \"[employer]\" school, so mother home schooled him, complains of feeling foggy in his head, got his temporary driving permit, so anxious when driving, does not want to even learn, getting ready to start [employer], does not think anxiety is related to that, has been going on for awhile, used to be able to drive, can not anymore, freaks him out    Also obsesses over anything bad that is happening in this world, reads about a disease and obsesses that he will get the disease, has never been treated for anxiety or depression     Has seen several psychologists over the years    Gets anxious, and then gets agitated and dwells on everything     When driving he feels like he can not focus, he feels like his head is foggy, like a cloud is over his head, feels like he is going to make a mistake     Has a lot of mood swings, father is bipolar, denies feelings of depression     Denies SI/HI    Reviewed the following history:    Past Medical History:   Diagnosis Date    ADHD (attention deficit hyperactivity disorder)     Seizures ([facility] [address]      No past surgical history on file. Family History   Problem Relation Age of Onset    Seizures Sister     High Blood Pressure Maternal Grandmother     High Blood Pressure Maternal Grandfather        No Known Allergies    No current outpatient medications on file prior to visit. No current facility-administered medications on file prior to visit. Review of Systems   Constitutional: Negative for chills and fever. Respiratory: Negative for cough and shortness of breath. Cardiovascular: Negative for chest pain. Gastrointestinal: Positive for abdominal pain, constipation and diarrhea (occasional). Negative for blood in stool, nausea and vomiting. Objective    Vitals:    08/20/21 0949   BP: 112/74   Site: Left Upper Arm   Position: Sitting   Cuff Size: Small Adult   Pulse: 73   Temp: 98.1 °F (36.7 °C)   TempSrc: Infrared   SpO2: 98%   Weight: 142 lb 9.6 oz (64.7 kg)   Height: 6' 0.5\" (1.842 m)       Physical Exam  Constitutional:       General: He is not in acute distress. Appearance: Normal appearance. He is not ill-appearing or toxic-appearing. HENT:      Head: Normocephalic and atraumatic. Right Ear: Hearing and external ear normal.      Left Ear: Hearing and external ear normal.   Eyes:      General: Lids are normal.      Conjunctiva/sclera: Conjunctivae normal.   Cardiovascular:      Rate and Rhythm: Normal rate and regular rhythm. Heart sounds: Normal heart sounds. Pulmonary:      Effort: Pulmonary effort is normal. No respiratory distress. Breath sounds: Normal breath sounds. No decreased breath sounds, wheezing, rhonchi or rales. Abdominal:      General: Abdomen is flat. Bowel sounds are normal.      Palpations: Abdomen is soft. Tenderness: There is no abdominal tenderness. Musculoskeletal:      Cervical back: Normal range of motion and neck supple. Skin:     General: Skin is warm and dry. Neurological:      General: No focal deficit present. Mental Status: He is alert and oriented to person, place, and time. Psychiatric:         Attention and Perception: Attention normal.         Mood and Affect: Mood normal.         Speech: Speech normal.         Behavior: Behavior normal.         Thought Content: Thought content normal.         Cognition and Memory: Cognition normal.         Judgment: Judgment normal.       Assessment and Plan    Dominguez Edwards 12 y.o. male presenting for anxiety and abdominal pain     1. Anxiety  - FLUoxetine (PROZAC) 10 MG tablet; Take 1 tablet by mouth daily  Dispense: 30 tablet; Refill: 0  - Refused speaking with psychologist at this time.  Will start Prozac and see back in 4 69F Hx chronic neck/back pain (s/p multiple spinal fusions, inactive intrathecal morphine pump), bilateral LE paralysis, neurogenic bladder (s/p chronic jiménez), multiple UTIs, CVA x3 (MR brain 1/23 with chronic lacunar infarcts of the left corona radiata, left putamen and left cerebellar hemisphere), hx PE in 2023 (Eliquis for 3-6 months), HTN, HLD, emphysema, Multiple ED visits for abdominal pain who p/w LLQ abdominal pain. 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

## 2024-04-28 NOTE — DIETITIAN NUTRITION RISK NOTIFICATION - TREATMENT: THE FOLLOWING DIET HAS BEEN RECOMMENDED
Diet, Consistent Carbohydrate w/Evening Snack:   High Fiber (HIFIBER) (04-27-24 @ 22:15) [Active]

## 2024-04-28 NOTE — DIETITIAN INITIAL EVALUATION ADULT - PERTINENT MEDS FT
MEDICATIONS  (STANDING):  acetaminophen     Tablet .. 650 milliGRAM(s) Oral every 6 hours  aspirin enteric coated 81 milliGRAM(s) Oral daily  bisacodyl 5 milliGRAM(s) Oral at bedtime  cholecalciferol 2000 Unit(s) Oral daily  dabigatran 150 milliGRAM(s) Oral every 12 hours  dextrose 10% Bolus 125 milliLiter(s) IV Bolus once  dextrose 5%. 1000 milliLiter(s) (100 mL/Hr) IV Continuous <Continuous>  dextrose 5%. 1000 milliLiter(s) (50 mL/Hr) IV Continuous <Continuous>  dextrose 50% Injectable 12.5 Gram(s) IV Push once  dextrose 50% Injectable 25 Gram(s) IV Push once  DULoxetine 60 milliGRAM(s) Oral daily  gabapentin 800 milliGRAM(s) Oral three times a day  glucagon  Injectable 1 milliGRAM(s) IntraMuscular once  insulin lispro (ADMELOG) corrective regimen sliding scale   SubCutaneous three times a day before meals  metoprolol succinate ER 25 milliGRAM(s) Oral daily  mineral oil enema 133 milliLiter(s) Rectal daily  naloxegol 25 milliGRAM(s) Oral before breakfast  pantoprazole    Tablet 40 milliGRAM(s) Oral before breakfast  polyethylene glycol 3350 17 Gram(s) Oral two times a day  polyethylene glycol/electrolyte Solution. 4000 milliLiter(s) Oral once  senna 2 Tablet(s) Oral at bedtime  simvastatin 40 milliGRAM(s) Oral at bedtime    MEDICATIONS  (PRN):  albuterol    90 MICROgram(s) HFA Inhaler 2 Puff(s) Inhalation every 6 hours PRN Shortness of Breath and/or Wheezing  aluminum hydroxide/magnesium hydroxide/simethicone Suspension 30 milliLiter(s) Oral every 4 hours PRN Dyspepsia  dextrose Oral Gel 15 Gram(s) Oral once PRN Blood Glucose LESS THAN 70 milliGRAM(s)/deciliter  diphenhydrAMINE 25 milliGRAM(s) Oral every 4 hours PRN Rash and/or Itching  melatonin 3 milliGRAM(s) Oral at bedtime PRN Insomnia  ondansetron Injectable 4 milliGRAM(s) IV Push every 8 hours PRN Nausea and/or Vomiting  oxyCODONE    IR 15 milliGRAM(s) Oral every 4 hours PRN Moderate Pain (4 - 6)

## 2024-04-28 NOTE — DIETITIAN INITIAL EVALUATION ADULT - NUTRITON FOCUSED PHYSICAL EXAM
Telephone Encounter by Tona Taylor at 05/16/18 09:06 AM     Author:  Tona Taylor Service:  (none) Author Type:  Patient      Filed:  05/16/18 09:06 AM Encounter Date:  5/8/2018 Status:  Signed     :  Tona Taylor (Patient )            Left message on answering machine to call back.  Please give message below.  Thanks![MS1.1T]       Revision History        User Key Date/Time User Provider Type Action    > MS1.1 05/16/18 09:06 AM Tona Taylor Patient  Sign    T - Template             yes...

## 2024-04-28 NOTE — CHART NOTE - NSCHARTNOTEFT_GEN_A_CORE
Patient had one large BM in the ED, nonbloody. Ordered Golytely to help with relieve further stool impaction, however, pt declined. Pt does not want to drink Golytely, believes that we are forcing her to get a colonoscopy, despite explaining to her (numerous times) that we are not performing a colonoscopy and this is just to help her have BM. Pt states that she wants to be discharged home and is declining any further care/medical advise. Pt states that we are not her primary care physician and she does not want any further care from us. Would like to follow up with her primary care physician for further management.

## 2024-04-28 NOTE — DIETITIAN NUTRITION RISK NOTIFICATION - ADDITIONAL COMMENTS/DIETITIAN RECOMMENDATIONS
Continue Consistent Carbohydrate High Fiber diet.   >>Encourage & monitor PO intake. [place] dietary preferences as able.

## 2024-04-28 NOTE — DIETITIAN INITIAL EVALUATION ADULT - PERTINENT LABORATORY DATA
04-27    140  |  102  |  9   ----------------------------<  111<H>  4.5   |  28  |  0.40<L>    Ca    9.3      27 Apr 2024 16:50    TPro  6.7  /  Alb  3.9  /  TBili  0.2  /  DBili  x   /  AST  17  /  ALT  11  /  AlkPhos  81  04-27  A1C with Estimated Average Glucose Result: 7.0 % (12-04-23 @ 21:00)

## 2024-04-28 NOTE — DISCHARGE NOTE PROVIDER - NSDCCPCAREPLAN_GEN_ALL_CORE_FT
PRINCIPAL DISCHARGE DIAGNOSIS  Diagnosis: Abdominal pain  Assessment and Plan of Treatment: You presented to the hospital with abdominal pain and were found to have a large stool burden on CT abdomen. You were given enema in addition to your home laxatives. You had one large bowel movement that was not bloody.   - Please continue taking your home medications  - Please start using tap water enema every other day  - Please follow up with your primary care physician within 1-2 weeks

## 2024-04-28 NOTE — DISCHARGE NOTE PROVIDER - ATTENDING DISCHARGE PHYSICAL EXAMINATION:
Patient is 69F Hx chronic neck/back pain (s/p multiple spinal fusions, inactive intrathecal morphine pump), bilateral LE paralysis, neurogenic bladder (s/p chronic jiménez), multiple UTIs, CVA x3 (MR brain 1/23 with chronic lacunar infarcts of the left corona radiata, left putamen and left cerebellar hemisphere), hx PE in 2023 (Eliquis for 3-6 months), HTN, HLD, emphysema, Multiple ED visits for abdominal pain who p/w LLQ abdominal pain. 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   On this admission, no leukocytosis, no fever.  Antibiotics not indicated, given no signs of infection (risk of administering abx outweighs benefit, given risk of cdiff)   Had a large bowel movement after getting an enema on the night of 4/27/24. By afternoon of 4/28/24, abdomen feeling much more comfortable vs time of admission.   Continuing to have soft , nonbloody bowel movements (medium amount of soft, brown stool found when examining the patient with patient care associate). Hgb stable.   She is feeling much better this afternoon; offered option of continuing to receive bowel regimen here at [facility]. Patient requesting transfer back to rehab to continue receiving bowel regimen there.   Explained to patient fact that rectum appeared dilated on CT abd, rectal contractions may not be as effectual as before .   may require periodic enemas to evacuate rectal vault . Patient expressed understanding.  Recommend Tap water enema every other day in addition to previous bowel regimen ; reassess need for tap water enema after a week.     Gen: reclining in bed at time of exam  HEENT: NCAT, MMM  Neck: supple, trachea at midline  CV: RRR, +S1/S2  Pulm: adequate respiratory effort, no increased work of breathing  Abd: soft, non tender   Skin: warm and dry  Neuro: AOx3,

## 2024-04-28 NOTE — DIETITIAN INITIAL EVALUATION ADULT - OTHER INFO
69F with PMH of chronic neck/back pain (status post multiple spinal fusions, inactive intrathecal morphine pump), bilateral LE paralysis, neurogenic bladder (status post chronic jiménez), multiple UTIs, CVA x3, PE (2023), HTN, HLD, emphysema, constipation in setting of opioid use, and multiple ED visits for abdominal pain, presented with LLQ abdominal pain. CTAP concerning for stercoral proctitis due to large fecaloma.    Pt seen on 4UR for assessment. Labs and medication orders reviewed. Ordered for vitamin D3. Electrolytes WNL. On Consistent Carbohydrate High Fiber diet. Pt reports decreased appetite in setting of constipation and associated abdominal discomfort/distension. Endorses completion of ~50% meals in-house. Reports now status post BM last night following enema - bowel regimen ordered, repeat enema ordered 4/28. Per prior admission RD note (3/26/24) pt wt 168lb; current admission wt 158lb indicates 10lb/6% wt loss in 1 month - clinically significant. Nutrition-focused physical examination insignificant for overt signs of wasting. Per ASPEN guidelines, pt meets criteria for malnutrition - see nutrition risk notification. Pt denies difficulty chewing/swallowing. Denies nausea/vomiting. Reports no known food allergies, crab allergy per medical record. No Church/ethnic/cultural food preferences noted. No edema documented, stage III sacral pressure injury noted, Filippo score 16. Reviewed formulary, pt declined oral nutrition supplements. See nutrition recommendations. RD to remain available.

## 2024-04-28 NOTE — DISCHARGE NOTE PROVIDER - NSDCCPTREATMENT_GEN_ALL_CORE_FT
PRINCIPAL PROCEDURE  Procedure: CT abdomen  Findings and Treatment: PROCEDURE:  CT of the Abdomen and Pelvis was performed.  Sagittal and coronal reformats were performed.  FINDINGS:  LOWER CHEST: Within normal limits.  LIVER: Within normal limits.  BILE DUCTS: Normal caliber.  GALLBLADDER: Within normal limits.  SPLEEN: Within normal limits.  PANCREAS: Within normal limits.  ADRENALS: Within normal limits.  KIDNEYS/URETERS: Within normal limits.  BLADDER: Collapsed around Castillo catheter.  REPRODUCTIVE ORGANS: Fibroid uterus..  BOWEL: Dilated rectum and sigmoid filled with abundant stool.   Circumferential rectal wall thickening and presacral edema. No bowel   obstruction.  PERITONEUM: No ascites.  VESSELS: Within normal limits.  RETROPERITONEUM/LYMPH NODES: No lymphadenopathy.  ABDOMINAL WALL: Stable postsurgical changes.  BONES: No acute fractures. Multilevel laminectomies, unchanged chronic   spondylosis spondylolisthesis, thoracic-lumbar-sacral iliac fusion,   intrathecal pump in place.  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

## 2024-04-28 NOTE — DISCHARGE NOTE PROVIDER - NSDCFUSCHEDAPPT_GEN_ALL_CORE_FT
Kacey Jeter  [facility]  HEARTVASC [address]  Scheduled Appointment: 04/29/2024    Claudia Quintana  [facility]  UROLOGY [address]  Scheduled Appointment: 05/08/2024    Patrice Osorio  [facility]  SPINECTR [address]  Scheduled Appointment: 06/06/2024

## 2024-04-28 NOTE — PHYSICAL THERAPY INITIAL EVALUATION ADULT - ADDITIONAL COMMENTS
pt is an admission from [facility]. Pt reports that she has been able to transfer from bed to w/c at [facility] with slide board. Pt's goal is to go to [facility] for about a week or so and then d/c home

## 2024-04-28 NOTE — CHART NOTE - NSCHARTNOTEFT_GEN_A_CORE
Had a large bowel movement in the ED last night.   Ordered 1L golytely for patient to help ease stool burden    ****incomplete note Had a large bowel movement in the ED last night.     Ordered 1L golytely for patient to further ease stool burden.   Patient declined golytely, thought that medical team was prepping her for a colonoscopy. Explained purpose of 1L golytely (same compound as in miralax). Patient expressed understanding after I explained this to her. .   However, not interested in Golytely, says that she feels well enough to return to rehab to continue receiving laxatives there. Requests 'prescription' for what she should take there.     Explained to patient fact that rectum appeared dilated on CT abd, rectal contractions may not be as effectual as before .   may require periodic enemas to evacuate rectal vault . Patient expressed understanding.

## 2024-04-28 NOTE — DIETITIAN INITIAL EVALUATION ADULT - PROBLEM SELECTOR PLAN 5
History of CVA x3 (MR brain 1/23 with chronic lacunar infarcts of the left corona radiata, left putamen and left cerebellar hemisphere)  - C/w home Aspirin 81mg Qd

## 2024-04-28 NOTE — CHART NOTE - NSCHARTNOTEFT_GEN_A_CORE
PDI	Current Rx	Drug Type	Rx Written	Rx Dispensed	Drug	Quantity	Days Supply	Prescriber Name	Prescriber STEPHANIE #	Payment Method	Dispenser  A	N	O	01/26/2024	01/26/2024	oxycodone hcl (ir) 15 mg tab	30	10	Robbin Aquino	[id number]	Insurance	[employer]  A	N	O	01/18/2024	01/18/2024	oxycodone hcl (ir) 15 mg tab	30	7	Papo Elmorehammad, U	[id number]	Insurance	[employer]  A	N	O	01/18/2024	01/18/2024	oxycodone hcl (ir) 10 mg tab	30	5	Eric Elmored, U	[id number]	Insurance	[employer]  A	N	O	01/18/2024	01/18/2024	oxycodone hcl (ir) 5 mg cap	30	5	Papo Elmorehammad, U	[id number]	Insurance	[employer]  A	N	O	01/06/2024	01/06/2024	oxycodone hcl (ir) 15 mg tab	30	5	Caitlyn Medina	[id number]	Insurance	[employer]  A	N	B	01/06/2024	01/06/2024	diazepam 5 mg tablet	45	15	Caitlyn Medina	[id number]	Insurance	[employer]  B	Y	O	04/24/2024	04/24/2024	oxycodone hcl (ir) 30 mg tab	84	14	Howard Sawyer	[id number]	Insurance	[employer]  B	N	O	04/11/2024	04/11/2024	oxycodone hcl (ir) 30 mg tab	84	14	Howard Sawyer	[id number]	Insurance	[employer]  B	N	O	04/01/2024	04/01/2024	oxycodone hcl (ir) 15 mg tab	30	5	Jesús Cameron NP	[id number]	Insurance	[employer]  B	N	O	04/01/2024	04/01/2024	oxycodone hcl (ir) 30 mg tab	30	5	Jesús Cameron NP	[id number]	Insurance	[employer]  B	N	B	03/31/2024	03/31/2024	diazepam 5 mg tablet	30	10	Howard Sawyer	[id number]	Medicare	[employer]  B	N	O	03/18/2024	03/18/2024	oxycodone hcl (ir) 15 mg tab	90	15	Howard Sawyer	[id number]	Insurance	[employer]  B	N	O	03/06/2024	03/06/2024	oxycodone hcl (ir) 15 mg tab	60	15	Howard Sawyer	[id number]	Insurance	[employer]  B	N	O	02/23/2024	02/23/2024	oxycodone hcl (ir) 15 mg tab	70	17	Howard Sawyer	[id number]	Insurance	[employer]  B	N	O	02/21/2024	02/22/2024	oxycodone hcl (ir) 15 mg tab	42	14	Jesús Cameron NP	[id number]	Insurance	[employer]  C	N	O	10/31/2023	11/14/2023	morphine sulfate powder *	0gm	30	Liabaud, Barthelemy	[id number]	Medicare	[facility]  C	N	O	10/04/2023	10/05/2023	morphine sulfate powder *	0gm	30	Liabaud, Barthelemy	[id number]	Medicare	[facility]  C	N	O	04/24/2023	05/12/2023	morphine sulfate powder *	0gm	30	Liabaud, Barthelemy	[id number]	Phyllis	[facility]    * - Details of Drug Type : O = Opioid, B = Benzodiazepine, S = Stimulant

## 2024-04-28 NOTE — DISCHARGE NOTE PROVIDER - HOSPITAL COURSE
#Discharge: do not delete    Patient is 69F Hx chronic neck/back pain (s/p multiple spinal fusions, inactive intrathecal morphine pump), bilateral LE paralysis, neurogenic bladder (s/p chronic jiménez), multiple UTIs, CVA x3 (MR brain 1/23 with chronic lacunar infarcts of the left corona radiata, left putamen and left cerebellar hemisphere), hx PE in 2023 (Eliquis for 3-6 months), HTN, HLD, emphysema, Multiple ED visits for abdominal pain who p/w LLQ abdominal pain. 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    Hospital course (by problem):   #Fecaloma.   Hx chronic constipation iso heavy opioid use 2/2 chronic pain. P/w LLQ pain & endorses decreased bowel movements. 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 SIRS 0/4. Rectal exam w/ soft brown stool with streaks of blood on gloved finger, no active hemorrhage, no hard ball of stool palpable. S/p lactulose and mineral oil enema -> had one BM, nonbloody in the ED.   - C/ w home Bisacodyl 5mg qhs, Movantik 25mg qAM, PEG 17g BID, Senna 2tabs qhs  - Start tap water enema every other day    #Normocytic anemia.   Hgb 11.0 (at baseline).  Rectal exam w/ soft brown stool with streaks of blood on gloved finger, no active hemorrhage    #History of pulmonary embolism.   - C/w home Dabigatran 150mg BID. Hold if rectal bleeding worsens.    #Chronic neck and back pain.   Hx multiple spinal fusions c/b b/l LE paralysis and neurogenic bladder. Has inactive intrathecal morphine pump placed by Dr. Lazo that per patient is inactive as she has been lost to follow-up for pump refills. Patient desires to remove pump as outpatient. Endorses pain is "12/10" in intensity.  - C/w home regimen of Duloxetine 60mg Qd, Gabapentin 800mg TID, PRN PO Oxycodone 15mg q4h + PO Benadryl 25mg(mod)    #CVA (cerebrovascular accident).   History of CVA x3 (MR brain 1/23 with chronic lacunar infarcts of the left corona radiata, left putamen and left cerebellar hemisphere)  - C/w home Aspirin 81mg Qd.    #Paralysis of lower extremity.   Complication of spinal fusion surgery  - PT while inpatient  - Ambulate with assistance    #Sacral ulcer, present on admission.   - Wound care: clean with saline daily, apply triad dressing and Allevyn.     #Neurogenic bladder.   Complication of spinal fusion surgery. Chronic jiménez. Multiple ED visits for UTI  - Chronic jiménez.    #T2DM (type 2 diabetes mellitus).   - C/w home metformin 500mg BID    #HTN (hypertension).   - C/w home Toprol 25mg Qd w/ hold parameters.    #HLD (hyperlipidemia).   - C/w home Simvastatin 40mg Qd.    #Emphysema lung.   Former smoker x 55 years, quit 2 years ago.  - C/w Duoneb q6h PRN.      Patient was discharged to: (home/[facility]/acute rehab/hospice, etc, and with what services – home health PT/RN? Home O2?)    New medications:   Changes to old medications:  Medications that were stopped:    Items to follow up as outpatient:    Physical exam at the time of discharge:       #Discharge: do not delete    Patient is 69F Hx chronic neck/back pain (s/p multiple spinal fusions, inactive intrathecal morphine pump), bilateral LE paralysis, neurogenic bladder (s/p chronic jiménez), multiple UTIs, CVA x3 (MR brain 1/23 with chronic lacunar infarcts of the left corona radiata, left putamen and left cerebellar hemisphere), hx PE in 2023 (Eliquis for 3-6 months), HTN, HLD, emphysema, Multiple ED visits for abdominal pain who p/w LLQ abdominal pain. 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    Hospital course (by problem):   #Fecaloma.   Hx chronic constipation iso heavy opioid use 2/2 chronic pain. P/w LLQ pain & endorses decreased bowel movements. 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 SIRS 0/4. Rectal exam w/ soft brown stool with streaks of blood on gloved finger, no active hemorrhage, no hard ball of stool palpable. S/p lactulose and mineral oil enema -> had one BM, nonbloody in the ED.   - C/ w home Bisacodyl 5mg qhs, Movantik 25mg qAM, PEG 17g BID, Senna 2tabs qhs  - Start tap water enema every other day    #Normocytic anemia.   Hgb 11.0 (at baseline).  Rectal exam w/ soft brown stool with streaks of blood on gloved finger, no active hemorrhage    #History of pulmonary embolism.   - C/w home Dabigatran 150mg BID. Hold if rectal bleeding worsens.    #Chronic neck and back pain.   Hx multiple spinal fusions c/b b/l LE paralysis and neurogenic bladder. Has inactive intrathecal morphine pump placed by Dr. Lazo that per patient is inactive as she has been lost to follow-up for pump refills. Patient desires to remove pump as outpatient. Endorses pain is "12/10" in intensity.  - C/w home regimen of Duloxetine 60mg Qd, Gabapentin 800mg TID, PRN PO Oxycodone 15mg q4h + PO Benadryl 25mg(mod)    #CVA (cerebrovascular accident).   History of CVA x3 (MR brain 1/23 with chronic lacunar infarcts of the left corona radiata, left putamen and left cerebellar hemisphere)  - C/w home Aspirin 81mg Qd.    #Paralysis of lower extremity.   Complication of spinal fusion surgery  - PT while inpatient  - Ambulate with assistance    #Sacral ulcer, present on admission.   - Wound care: clean with saline daily, apply triad dressing and Allevyn.     #Neurogenic bladder.   Complication of spinal fusion surgery. Chronic jiménez. Multiple ED visits for UTI  - Chronic jiménez.    #T2DM (type 2 diabetes mellitus).   - C/w home metformin 500mg BID    #HTN (hypertension).   - C/w home Toprol 25mg Qd w/ hold parameters.    #HLD (hyperlipidemia).   - C/w home Simvastatin 40mg Qd.    #Emphysema lung.   Former smoker x 55 years, quit 2 years ago.  - C/w Duoneb q6h PRN.      Patient was discharged to: (home/[facility]/acute rehab/hospice, etc, and with what services – home health PT/RN? Home O2?)    New medications:  Tap water enema every other day  Changes to old medications:  Medications that were stopped:    Items to follow up as outpatient:    Physical exam at the time of discharge:       Patient is 69F Hx chronic neck/back pain (s/p multiple spinal fusions, inactive intrathecal morphine pump), bilateral LE paralysis, neurogenic bladder (s/p chronic jiménez), multiple UTIs, CVA x3 (MR brain 1/23 with chronic lacunar infarcts of the left corona radiata, left putamen and left cerebellar hemisphere), hx PE in 2023 (Eliquis for 3-6 months), HTN, HLD, emphysema, Multiple ED visits for abdominal pain who p/w LLQ abdominal pain. A/f stercoral proctitis due to large fecaloma, similar to previous admission.    Hospital course (by problem):   #Fecaloma.   Hx chronic constipation iso heavy opioid use 2/2 chronic pain. P/w LLQ pain & endorses decreased bowel movements. 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 SIRS 0/4. Rectal exam w/ soft brown stool with streaks of blood on gloved finger, no active hemorrhage, no hard ball of stool palpable. S/p lactulose and mineral oil enema -> had one BM, nonbloody in the ED.   - C/ w home Bisacodyl 5mg qhs, Movantik 25mg qAM, PEG 17g BID, Senna 2tabs qhs    Dilated rectum, 10cm on CT Abdomen ;   Rectal contractions likely ineffectual due to rectal dilatation; Patient may be enema dependent for the time being   - Start tap water enema every other day  ; reassess need for tap water enema after a week.     #Normocytic anemia.   Hgb 11.0 (at baseline).  Rectal exam w/ soft brown stool with streaks of blood on gloved finger, no active hemorrhage    #History of pulmonary embolism.   - C/w home Dabigatran 150mg BID. Hold if rectal bleeding worsens.    #Chronic neck and back pain.   Hx multiple spinal fusions c/b b/l LE paralysis and neurogenic bladder. Has inactive intrathecal morphine pump placed by Dr. Lazo that per patient is inactive as she has been lost to follow-up for pump refills. Patient desires to remove pump as outpatient. Endorses pain is "12/10" in intensity.  - C/w home regimen of Duloxetine 60mg Qd, Gabapentin 800mg TID, PRN PO Oxycodone 15mg q4h + PO Benadryl 25mg(mod)    #CVA (cerebrovascular accident).   History of CVA x3 (MR brain 1/23 with chronic lacunar infarcts of the left corona radiata, left putamen and left cerebellar hemisphere)  - C/w home Aspirin 81mg Qd.    #Paralysis of lower extremity.   Complication of spinal fusion surgery  - PT while inpatient  - Ambulate with assistance    #Sacral ulcer, present on admission.   - Wound care: clean with saline daily, apply triad dressing and Allevyn.     #Neurogenic bladder.   Complication of spinal fusion surgery. Chronic jiménez. Multiple ED visits for UTI  - Chronic jiménez.    #T2DM (type 2 diabetes mellitus).   - C/w home metformin 500mg BID    #HTN (hypertension).   - C/w home Toprol 25mg Qd w/ hold parameters.    #HLD (hyperlipidemia).   - C/w home Simvastatin 40mg Qd.    #Emphysema lung.   Former smoker x 55 years, quit 2 years ago.  - C/w Duoneb q6h PRN.      Patient was discharged to: (home/[facility]/acute rehab/hospice, etc, and with what services – home health PT/RN? Home O2?)    New medications:  Tap water enema every other day in addition to previous bowel regimen ; reassess need for tap water enema after a week.   Changes to old medications:  Medications that were stopped:    Items to follow up as outpatient:    Physical exam at the time of discharge:       Patient is 69F Hx chronic neck/back pain (s/p multiple spinal fusions, inactive intrathecal morphine pump), bilateral LE paralysis, neurogenic bladder (s/p chronic jiménez), multiple UTIs, CVA x3 (MR brain 1/23 with chronic lacunar infarcts of the left corona radiata, left putamen and left cerebellar hemisphere), hx PE in 2023 (Eliquis for 3-6 months), HTN, HLD, emphysema, Multiple ED visits for abdominal pain who p/w LLQ abdominal pain. A/f stercoral proctitis due to large fecaloma, similar to previous admission.    Hospital course (by problem):   #Fecaloma.   Hx chronic constipation iso heavy opioid use 2/2 chronic pain. P/w LLQ pain & endorses decreased bowel movements. 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 SIRS 0/4. Rectal exam w/ soft brown stool with streaks of blood on gloved finger, no active hemorrhage, no hard ball of stool palpable. S/p lactulose and mineral oil enema -> had one BM, nonbloody in the ED.   - C/ w home Bisacodyl 5mg qhs, Movantik 25mg qAM, PEG 17g BID, Senna 2tabs qhs    Dilated rectum, 10cm on CT Abdomen ;   Rectal contractions likely ineffectual due to rectal dilatation; Patient may be enema dependent for the time being   - Start tap water enema every other day  ; reassess need for tap water enema after a week.     #Normocytic anemia.   Hgb 11.0 (at baseline).  Rectal exam w/ soft brown stool with streaks of blood on gloved finger, no active hemorrhage    #History of pulmonary embolism.   - C/w home Dabigatran 150mg BID. Hold if rectal bleeding worsens.    #Chronic neck and back pain.   Hx multiple spinal fusions c/b b/l LE paralysis and neurogenic bladder. Has inactive intrathecal morphine pump placed by Dr. Lazo that per patient is inactive as she has been lost to follow-up for pump refills. Patient desires to remove pump as outpatient. Endorses pain is "12/10" in intensity.  - C/w home regimen of Duloxetine 60mg Qd, Gabapentin 800mg TID, PRN PO Oxycodone 15mg q4h + PO Benadryl 25mg(mod)  **Oxycodone pain regimen was self-reported. On return to [facility], please resume prior pain regimen that patient was on at [facility].     #CVA (cerebrovascular accident).   History of CVA x3 (MR brain 1/23 with chronic lacunar infarcts of the left corona radiata, left putamen and left cerebellar hemisphere)  - C/w home Aspirin 81mg Qd.    #Paralysis of lower extremity.   Complication of spinal fusion surgery  - PT while inpatient  - Ambulate with assistance    #Sacral ulcer (~1.5cm long), unstage-able ; present on admission.   - patient requested discharge back to rehab facility before she could be evaluated by wound care at [facility].   - Wound care: clean with saline daily, apply triad dressing and Allevyn.     #Neurogenic bladder.   Complication of spinal fusion surgery. Chronic jiménez. Multiple ED visits for UTI  - Chronic jiménez.    #T2DM (type 2 diabetes mellitus).   - C/w home metformin 500mg BID    #HTN (hypertension).   - C/w home Toprol 25mg Qd w/ hold parameters.    #HLD (hyperlipidemia).   - C/w home Simvastatin 40mg Qd.    #Emphysema lung.   Former smoker x 55 years, quit 2 years ago.  - C/w Duoneb q6h PRN.      Patient was discharged to: (home/[facility]/acute rehab/hospice, etc, and with what services – home health PT/RN? Home O2?)    New medications:  Tap water enema every other day in addition to previous bowel regimen ; reassess need for tap water enema after a week.   Changes to old medications:  Medications that were stopped:    Items to follow up as outpatient:    Physical exam at the time of discharge:

## 2024-04-28 NOTE — PHYSICAL THERAPY INITIAL EVALUATION ADULT - IMPAIRMENTS FOUND, PT EVAL
aerobic capacity/endurance/decreased midline orientation/ergonomics and body mechanics/gait, locomotion, and balance/gross motor/muscle strength/neuromotor development and sensory integration/poor safety awareness/posture

## 2024-04-28 NOTE — CONSULT NOTE ADULT - SUBJECTIVE AND OBJECTIVE BOX
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 Pt states that when she was being cleaned at the rehab facility she was told that there were streaks of blood in her stool. Pt denies n/v, no f/c, no cp/sob, no other complaints at this time. Requesting her dose of pain meds (oxy 15mg, gabapentin 300mg and benadryl 25mg for itching).    In the ED VSS. Labs pristine except normocytic anemia Hgb 11.0 (at baseline). 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 She received PO Oxycodone 15mg, Gabapentin 650ppu7, PO Benadryl 25g & 1L 0.9% bolus. (27 Apr 2024 21:18)    PAST MEDICAL & SURGICAL HISTORY:  HTN (hypertension)      SS (spinal stenosis)      High cholesterol      Stroke  x3      H/O carpal tunnel syndrome  Bilateral      Chronic GERD      History of chronic constipation      Urinary incontinence  self cath as needed      Pre-diabetes      Anxiety and depression      Eczema      H/O kyphosis      Pancreas cyst      Scoliosis      Wheelchair dependent      Cervical pseudoarthrosis  and lumbar spine      Cervical spondylosis      Chronic headaches      Degenerative joint disease  left shoulder      Lumbosacral spondylosis      COPD (chronic obstructive pulmonary disease)      H/O Spinal surgery  lumbar x 30      H/O breast surgery  left breast implant 1980's      S/P cervical discectomy  x4      H/O total shoulder replacement, right        MEDICATIONS  (STANDING):  acetaminophen     Tablet .. 650 milliGRAM(s) Oral every 6 hours  aspirin enteric coated 81 milliGRAM(s) Oral daily  bisacodyl 5 milliGRAM(s) Oral at bedtime  cholecalciferol 2000 Unit(s) Oral daily  dabigatran 150 milliGRAM(s) Oral every 12 hours  dextrose 10% Bolus 125 milliLiter(s) IV Bolus once  dextrose 5%. 1000 milliLiter(s) (50 mL/Hr) IV Continuous <Continuous>  dextrose 5%. 1000 milliLiter(s) (100 mL/Hr) IV Continuous <Continuous>  dextrose 50% Injectable 12.5 Gram(s) IV Push once  dextrose 50% Injectable 25 Gram(s) IV Push once  DULoxetine 60 milliGRAM(s) Oral daily  gabapentin 800 milliGRAM(s) Oral three times a day  glucagon  Injectable 1 milliGRAM(s) IntraMuscular once  insulin lispro (ADMELOG) corrective regimen sliding scale   SubCutaneous three times a day before meals  lactulose Syrup 10 Gram(s) Oral daily  metoprolol succinate ER 25 milliGRAM(s) Oral daily  mineral oil enema 133 milliLiter(s) Rectal daily  naloxegol 25 milliGRAM(s) Oral before breakfast  pantoprazole    Tablet 40 milliGRAM(s) Oral before breakfast  polyethylene glycol 3350 17 Gram(s) Oral two times a day  polyethylene glycol/electrolyte Solution. 4000 milliLiter(s) Oral once  senna 2 Tablet(s) Oral at bedtime  simvastatin 40 milliGRAM(s) Oral at bedtime    MEDICATIONS  (PRN):  albuterol    90 MICROgram(s) HFA Inhaler 2 Puff(s) Inhalation every 6 hours PRN Shortness of Breath and/or Wheezing  aluminum hydroxide/magnesium hydroxide/simethicone Suspension 30 milliLiter(s) Oral every 4 hours PRN Dyspepsia  dextrose Oral Gel 15 Gram(s) Oral once PRN Blood Glucose LESS THAN 70 milliGRAM(s)/deciliter  diphenhydrAMINE 25 milliGRAM(s) Oral every 4 hours PRN Rash and/or Itching  melatonin 3 milliGRAM(s) Oral at bedtime PRN Insomnia  ondansetron Injectable 4 milliGRAM(s) IV Push every 8 hours PRN Nausea and/or Vomiting  oxyCODONE    IR 15 milliGRAM(s) Oral every 4 hours PRN Moderate Pain (4 - 6)          FAMILY HISTORY:      CBC Full  -  ( 27 Apr 2024 16:50 )  WBC Count : 6.62 K/uL  RBC Count : 4.22 M/uL  Hemoglobin : 11.0 g/dL  Hematocrit : 35.1 %  Platelet Count - Automated : 333 K/uL  Mean Cell Volume : 83.2 fl  Mean Cell Hemoglobin : 26.1 pg  Mean Cell Hemoglobin Concentration : 31.3 gm/dL  Auto Neutrophil # : 3.22 K/uL  Auto Lymphocyte # : 2.36 K/uL  Auto Monocyte # : 0.75 K/uL  Auto Eosinophil # : 0.17 K/uL  Auto Basophil # : 0.06 K/uL  Auto Neutrophil % : 48.7 %  Auto Lymphocyte % : 35.6 %  Auto Monocyte % : 11.3 %  Auto Eosinophil % : 2.6 %  Auto Basophil % : 0.9 %      04-27    140  |  102  |  9   ----------------------------<  111<H>  4.5   |  28  |  0.40<L>    Ca    9.3      27 Apr 2024 16:50    TPro  6.7  /  Alb  3.9  /  TBili  0.2  /  DBili  x   /  AST  17  /  ALT  11  /  AlkPhos  81  04-27      Urinalysis Basic - ( 27 Apr 2024 16:50 )    Color: x / Appearance: x / SG: x / pH: x  Gluc: 111 mg/dL / Ketone: x  / Bili: x / Urobili: x   Blood: x / Protein: x / Nitrite: x   Leuk Esterase: x / RBC: x / WBC x   Sq Epi: x / Non Sq Epi: x / Bacteria: x        Radiology :     < from: CT Abdomen and Pelvis w/ Oral Cont and w/ IV Cont (04.27.24 @ 19:28) >    ACC: [id number] EXAM:  CT ABDOMEN AND PELVIS OC IC   ORDERED BY: GEOFF RAMESH     PROCEDURE DATE:  04/27/2024          INTERPRETATION:  CLINICAL INFORMATION: Left lower quadrant abdominal   pain. Bloody stool.    COMPARISON: CT lumbar spine March 9, 2024, CT abdomen pelvis March 25, 2024.    CONTRAST/COMPLICATIONS:  IV Contrast: Isovue 370  90 cc administered   10 cc discarded  Oral Contrast: Omnipaque 300  Complications: None reported at time of study completion    PROCEDURE:  CT of the Abdomen and Pelvis was performed.  Sagittal and coronal reformats were performed.    FINDINGS:  LOWER CHEST: Within normal limits.    LIVER: Within normal limits.  BILE DUCTS: Normal caliber.  GALLBLADDER: Within normal limits.  SPLEEN: Within normal limits.  PANCREAS: Within normal limits.  ADRENALS: Within normal limits.  KIDNEYS/URETERS: Within normal limits.    BLADDER: Collapsed around Jiménez catheter.  REPRODUCTIVE ORGANS: Fibroid uterus..    BOWEL: Dilated rectum and sigmoid filled with abundantstool.   Circumferential rectal wall thickening and presacral edema. No bowel   obstruction.  PERITONEUM: No ascites.  VESSELS: Within normal limits.  RETROPERITONEUM/LYMPH NODES: No lymphadenopathy.  ABDOMINAL WALL: Stable postsurgical changes.  BONES: No acute fractures. Multilevel laminectomies, unchanged chronic   spondylosis spondylolisthesis, thoracic-lumbar-sacral iliac fusion,   intrathecal pump in place.    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          Review of Systems : per HPI         Vital Signs Last 24 Hrs  T(C): 36.4 (28 Apr 2024 05:52), Max: 36.8 (27 Apr 2024 21:24)  T(F): 97.6 (28 Apr 2024 05:52), Max: 98.3 (27 Apr 2024 21:24)  HR: 85 (28 Apr 2024 05:52) (75 - 87)  BP: 135/80 (28 Apr 2024 05:52) (94/71 - 144/83)  BP(mean): --  RR: 18 (28 Apr 2024 05:52) (18 - 18)  SpO2: 98% (28 Apr 2024 05:52) (95% - 99%)    Parameters below as of 28 Apr 2024 05:52  Patient On (Oxygen Delivery Method): room air            Physical Exam:   69 y o woman lying comfortably in semi Loving's position , awake , alert , no acute complaints     Head: normocephalic , atraumatic    Eyes: PERRLA , EOMI , no nystagmus , sclera anicteric    ENT / FACE: neg nasal discharge , uvula midline , no oropharyngeal erythema / exudate    Neck: supple , negative JVD , negative carotid bruits , no thyromegaly    Chest: CTA bilaterally , neg wheeze / rhonchi / rales / crackles / egophany    Cardiovascular: regular rate and rhythm , neg murmurs / rubs / gallops    Abdomen: LLQ tenderness to palpation  ,  normal bowel sounds     Extremities: WWP , neg cyanosis /clubbing / edema     Neurologic Exam:     Alert and oriented x 3     Motor Exam:        B UE : > 3/5 , dec ROM LUE                             paraplegic     Sensation:        dec LE's     DTR:           biceps/brachioradialis: equal                            patella/ankle: equal          PM&R Impression: admitted for 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    2) Current disposition plan recommendation:    return to [facility]

## 2024-04-28 NOTE — DIETITIAN INITIAL EVALUATION ADULT - EDUCATION DIETARY MODIFICATIONS
Educated on strategies to promote intake in setting of decreased appetite. Discussed nutrition therapy for constipation including high fiber foods and importance of adequate hydration. Pt aware RD remains available for additional questions/concerns./(2) meets goals/outcomes/verbalization

## 2024-04-29 ENCOUNTER — APPOINTMENT (OUTPATIENT)
Dept: HEART AND VASCULAR | Facility: CLINIC | Age: 69
End: 2024-04-29

## 2024-05-06 NOTE — ED PROVIDER NOTE - CARE PLAN
A balloon catheter was inserted. Supply used: (CATHETER BLN EUPHORA 3.5MM 12MM 142CM RX TPR SHAFT LOWPRFL). Principal Discharge DX:	Acute UTI   1

## 2024-05-08 ENCOUNTER — APPOINTMENT (OUTPATIENT)
Dept: UROLOGY | Facility: CLINIC | Age: 69
End: 2024-05-08
Payer: MEDICARE

## 2024-05-08 VITALS
SYSTOLIC BLOOD PRESSURE: 104 MMHG | OXYGEN SATURATION: 100 % | TEMPERATURE: 97.8 F | DIASTOLIC BLOOD PRESSURE: 79 MMHG | HEART RATE: 100 BPM

## 2024-05-08 DIAGNOSIS — M47.812 SPONDYLOSIS WITHOUT MYELOPATHY OR RADICULOPATHY, CERVICAL REGION: ICD-10-CM

## 2024-05-08 DIAGNOSIS — N31.9 NEUROMUSCULAR DYSFUNCTION OF BLADDER, UNSPECIFIED: ICD-10-CM

## 2024-05-08 DIAGNOSIS — E78.5 HYPERLIPIDEMIA, UNSPECIFIED: ICD-10-CM

## 2024-05-08 DIAGNOSIS — K59.09 OTHER CONSTIPATION: ICD-10-CM

## 2024-05-08 DIAGNOSIS — G89.29 OTHER CHRONIC PAIN: ICD-10-CM

## 2024-05-08 DIAGNOSIS — G82.20 PARAPLEGIA, UNSPECIFIED: ICD-10-CM

## 2024-05-08 DIAGNOSIS — Z86.73 PERSONAL HISTORY OF TRANSIENT ISCHEMIC ATTACK (TIA), AND CEREBRAL INFARCTION WITHOUT RESIDUAL DEFICITS: ICD-10-CM

## 2024-05-08 DIAGNOSIS — D64.9 ANEMIA, UNSPECIFIED: ICD-10-CM

## 2024-05-08 DIAGNOSIS — Z79.899 OTHER LONG TERM (CURRENT) DRUG THERAPY: ICD-10-CM

## 2024-05-08 DIAGNOSIS — L89.159 PRESSURE ULCER OF SACRAL REGION, UNSPECIFIED STAGE: ICD-10-CM

## 2024-05-08 DIAGNOSIS — K64.8 OTHER HEMORRHOIDS: ICD-10-CM

## 2024-05-08 DIAGNOSIS — Z87.440 PERSONAL HISTORY OF URINARY (TRACT) INFECTIONS: ICD-10-CM

## 2024-05-08 DIAGNOSIS — M41.9 SCOLIOSIS, UNSPECIFIED: ICD-10-CM

## 2024-05-08 DIAGNOSIS — Z86.711 PERSONAL HISTORY OF PULMONARY EMBOLISM: ICD-10-CM

## 2024-05-08 DIAGNOSIS — J43.9 EMPHYSEMA, UNSPECIFIED: ICD-10-CM

## 2024-05-08 DIAGNOSIS — M54.9 DORSALGIA, UNSPECIFIED: ICD-10-CM

## 2024-05-08 DIAGNOSIS — I10 ESSENTIAL (PRIMARY) HYPERTENSION: ICD-10-CM

## 2024-05-08 DIAGNOSIS — Z91.013 ALLERGY TO SEAFOOD: ICD-10-CM

## 2024-05-08 PROCEDURE — 99213 OFFICE O/P EST LOW 20 MIN: CPT

## 2024-05-08 NOTE — HISTORY OF PRESENT ILLNESS
[FreeTextEntry1] : 2024 -- Pt is a 69 yo F with complex hx DMII, CAD, intrathecal morphine pump, GAMALIEL, HTN, HLD, Stroke, spinal fusion, eliezer fractures, repeat fusion, Laminectomy, another repeat fusion (only T9-L1), PE on Pradaxa, urinary retention, multiple sclerosis.   She was previously seen by Dr. Calvo for urinary retention 24.  She presents today with a health aide. Patient now in a nursing home "temporarily" as per patient- jiménez catheter in place. She was doing well w CIC in the past- now unable to do CIC due to recent ?spinal surgery.   Of note: [facility] arch care supervisor @ nursing home #[phone]  10/14/21-- 65 yo F  with MS on CIC with bothersome urgency/frequency here today for cystoscopy/Botox injection. Pt is wheelchair bound since .  Lately due to overactive bladder symptoms she has been catheterizing several times an hour due to irritative symptoms, but historically she caths 4-6 times per day.  She was placed on Mirabegron but it is not helping her.  She reports prior UDS at [place] where she has been receiving care but has not yet provided her records.    Udip: negative  PMH: HTN, emphysema/COPD,sleep apnea, HLD, CVA x3, MS, GAMALIEL PSH: cervical laminectomy, multiple spinal surgeries  FH: SH:former smoker; no current alcohol usage; no illicit drug use; single; unemployed Allergies: Opioids Meds: nitrofurantoin qod, mybertriq, simvstatin

## 2024-05-08 NOTE — ASSESSMENT
[FreeTextEntry1] : I discussed the findings and options with Ms. VANNA DIAZ in detail.   Given her complex hx, fecal incontinence, we discussed possible SPT placement. I have described the procedure to her in great detail along with its associated risks and benefits.   Return in 1 month for CIC trial. If pt unable to CIC- will consider SPT placement.   Message left with nursing home to test for cdif due to smell of feces.   Catheter will be changed at nursing home

## 2024-05-08 NOTE — ADDENDUM
[FreeTextEntry1] : A portion of this note was written by [Stewart Meyer] on 05/08/2024 acting as a scribe for Dr. Quintana.   I have personally reviewed the chart and agree that the record accurately reflects my personal performance of the history, physical exam, assessment, and plan.

## 2024-05-27 NOTE — DISCHARGE NOTE PROVIDER - NSDCHC_MEDRECSTATUS_GEN_ALL_CORE
Tolerated PO Admission Reconciliation is Completed  Discharge Reconciliation is Not Complete Admission Reconciliation is Completed  Discharge Reconciliation is Completed

## 2024-05-29 PROBLEM — Z98.1 S/P FUSION OF THORACIC SPINE: Status: ACTIVE | Noted: 2022-10-19

## 2024-06-04 ENCOUNTER — APPOINTMENT (OUTPATIENT)
Dept: NEUROSURGERY | Facility: CLINIC | Age: 69
End: 2024-06-04

## 2024-06-05 ENCOUNTER — APPOINTMENT (OUTPATIENT)
Dept: UROLOGY | Facility: CLINIC | Age: 69
End: 2024-06-05
Payer: MEDICARE

## 2024-06-05 VITALS — SYSTOLIC BLOOD PRESSURE: 99 MMHG | DIASTOLIC BLOOD PRESSURE: 72 MMHG | HEART RATE: 92 BPM | TEMPERATURE: 97.8 F

## 2024-06-05 PROCEDURE — 51702 INSERT TEMP BLADDER CATH: CPT

## 2024-06-05 NOTE — HISTORY OF PRESENT ILLNESS
[FreeTextEntry1] : Pt is a 67 yo F with complex hx DMII, CAD, intrathecal morphine pump, GAMALIEL, HTN, HLD, Stroke, spinal fusion, eliezer fractures, repeat fusion, Laminectomy, another repeat fusion (only T9-L1), PE on Pradaxa, urinary retention, multiple sclerosis. She presents today for CIC teaching.   2024 -- Pt is a 67 yo F with complex hx DMII, CAD, intrathecal morphine pump, GAMALIEL, HTN, HLD, Stroke, spinal fusion, eliezer fractures, repeat fusion, Laminectomy, another repeat fusion (only T9-L1), PE on Pradaxa, urinary retention, multiple sclerosis.  She was previously seen by Dr. Calvo for urinary retention 24. She presents today with a health aide. Patient now in a nursing home "temporarily" as per patient- jiménez catheter in place. She was doing well w CIC in the past- now unable to do CIC due to recent ?spinal surgery.  Of note: [facility] arch care supervisor @ nursing home #[phone]  10/14/21-- 65 yo F  with MS on CIC with bothersome urgency/frequency here today for cystoscopy/Botox injection. Pt is wheelchair bound since . Lately due to overactive bladder symptoms she has been catheterizing several times an hour due to irritative symptoms, but historically she caths 4-6 times per day. She was placed on Mirabegron but it is not helping her. She reports prior UDS at [place] where she has been receiving care but has not yet provided her records.  Udip: negative  PMH: HTN, emphysema/COPD,sleep apnea, HLD, CVA x3, MS, GAMALIEL PSH: cervical laminectomy, multiple spinal surgeries FH: SH:former smoker; no current alcohol usage; no illicit drug use; single; unemployed Allergies: Opioids Meds: nitrofurantoin qod, mybertriq, simvstatin

## 2024-06-05 NOTE — ASSESSMENT
[FreeTextEntry1] : Pt is a 67 yo F with complex hx DMII, CAD, intrathecal morphine pump, GAMALIEL, HTN, HLD, Stroke, spinal fusion, eliezer fractures, repeat fusion, Laminectomy, another repeat fusion (only T9-L1), PE on Pradaxa, urinary retention, multiple sclerosis. She presents today for CIC teaching.   Patient was unable to catheterize herself due to limited dexterity.   16F catheter inserted using sterile technique.   Patient will contact IR for SPT placement.   Patient expressed understanding.

## 2024-06-06 ENCOUNTER — OUTPATIENT (OUTPATIENT)
Dept: OUTPATIENT SERVICES | Facility: HOSPITAL | Age: 69
LOS: 1 days | End: 2024-06-06
Payer: MEDICARE

## 2024-06-06 ENCOUNTER — APPOINTMENT (OUTPATIENT)
Dept: SPINE | Facility: CLINIC | Age: 69
End: 2024-06-06
Payer: MEDICAID

## 2024-06-06 DIAGNOSIS — Z96.611 PRESENCE OF RIGHT ARTIFICIAL SHOULDER JOINT: Chronic | ICD-10-CM

## 2024-06-06 DIAGNOSIS — Z98.1 ARTHRODESIS STATUS: ICD-10-CM

## 2024-06-06 DIAGNOSIS — Z98.890 OTHER SPECIFIED POSTPROCEDURAL STATES: Chronic | ICD-10-CM

## 2024-06-06 PROCEDURE — 99204 OFFICE O/P NEW MOD 45 MIN: CPT

## 2024-06-06 PROCEDURE — 72082 X-RAY EXAM ENTIRE SPI 2/3 VW: CPT | Mod: 26

## 2024-06-06 PROCEDURE — 72082 X-RAY EXAM ENTIRE SPI 2/3 VW: CPT

## 2024-06-06 NOTE — REASON FOR VISIT
[Follow-Up: _____] : a [unfilled] follow-up visit [de-identified] : Laminectomy T9-10, T10-11, T11-12, and T12-L1 [de-identified] : 12/10/23

## 2024-06-06 NOTE — ADDENDUM
[FreeTextEntry1] : The patient is a 69-year-old woman who is approximately 6 months status post revision fusion that was complicated by spinal cord injury and complete paraplegia.  She states that her lower extremity function is very slowly improving.  She states she is able to wiggle her toes and has sensation in the back of the toes.  She continues to require 24-hour care.  X-rays of the spine show anatomic alignment of the spine without evidence of instrumentation failure.  At this point, I recommend the patient continue with aggressive physical therapy.  No further surgery is required.  The patient will follow-up in 6 months as per routine.  She knows to call the office if she should have any questions, concerns or complaints.

## 2024-06-06 NOTE — HISTORY OF PRESENT ILLNESS
[FreeTextEntry1] : 68yF with hx of failed back surgery syndrome, R shoulder replacement, DMII, CAD, s/p intrathecal morphine pump for pain, GAMALIEL, HTN, HLD, CVA, former smoker. She has been experiencing ongoing postural issues and upper back pain.  8/17/22 Removal of instrumentation and exploration of fusion at T3, T4, T5, T6, T9, T10, T11, T12, and L1. Posterior segmental instrumentation from T3 to L1 pt was discharged to rehab on 8/30 12/21/22 Today she reports markedly improving preop back pain but frequent falls at home with LE weakness for the past one month. She denies head trauma but feels her legs are weaker and cannot walk/stand long. She denies any other focal neuro deficits today. Continues to use electric wheelchair.  11/16/23 Recent X-rays have revealed eliezer fractures and disconnection of the connectors at the T10-11 level and the T12-L1 level, necessitating immediate surgical attention.  12/5/23 Exploration of fusion C2 to S1. Removal of instrumentation and fractured rods from C2 down to the ilium. Removal and replacement of spinal instrumentation at T1, T2, T3, T10, T11, T12, L1. Revision of posterior lateral fusion T2-3, T3-4, T8-9, T9-10, T10-11, T11-12, T12-L1.  12/10/23 Laminectomy T9-10, T10-11, T11-12, and T12-L1. Repeat spinal fusion with replacement of bone graft from T9-L1. Hospital stay was complicated by PE (pt now on Pradaxa), urinary retention (pt discharged with jiménez). Pt discharged to [facility] on 1/5/24.  Office Visit 1/25/24 Pt denies any back pain.  pt states she is being straight cath twice a day d/t urinary retention and also has abdominal pain d/t urinary retention. Pt is taking Gabapentin and Tylenol daily and Oxycodone 15mg once a day at bedtime. Xray reviewed.   Today 06/06/24 Patient is wheelchair bound in a nursing. She recently saw urology for overactive bladder-currently on Mirabegron. XRAYS show all hardware intact.

## 2024-06-20 RX ORDER — ALBUTEROL 90 UG/1
2 AEROSOL, METERED ORAL
Qty: 0 | Refills: 0 | DISCHARGE

## 2024-06-20 RX ORDER — NALOXEGOL OXALATE 12.5 MG/1
1 TABLET, FILM COATED ORAL
Qty: 0 | Refills: 0 | DISCHARGE

## 2024-06-20 RX ORDER — DULOXETINE HYDROCHLORIDE 30 MG/1
1 CAPSULE, DELAYED RELEASE ORAL
Qty: 0 | Refills: 0 | DISCHARGE

## 2024-06-20 RX ORDER — METFORMIN HYDROCHLORIDE 850 MG/1
1 TABLET ORAL
Refills: 0 | DISCHARGE

## 2024-06-20 RX ORDER — SIMVASTATIN 20 MG/1
1 TABLET, FILM COATED ORAL
Qty: 0 | Refills: 0 | DISCHARGE

## 2024-06-20 RX ORDER — CHOLECALCIFEROL (VITAMIN D3) 125 MCG
1 CAPSULE ORAL
Qty: 0 | Refills: 0 | DISCHARGE

## 2024-06-20 RX ORDER — GABAPENTIN 400 MG/1
1 CAPSULE ORAL
Refills: 0 | DISCHARGE

## 2024-06-20 RX ORDER — DABIGATRAN ETEXILATE MESYLATE 150 MG/1
1 CAPSULE ORAL
Qty: 0 | Refills: 0 | DISCHARGE

## 2024-06-21 ENCOUNTER — RESULT REVIEW (OUTPATIENT)
Age: 69
End: 2024-06-21

## 2024-06-25 ENCOUNTER — APPOINTMENT (OUTPATIENT)
Dept: INTERVENTIONAL RADIOLOGY/VASCULAR | Facility: HOSPITAL | Age: 69
End: 2024-06-25

## 2024-06-27 ENCOUNTER — RESULT REVIEW (OUTPATIENT)
Age: 69
End: 2024-06-27

## 2024-06-27 NOTE — PRE-OP CHECKLIST - NS PREOP CHK CHLOROHEX WASH
Attempted to call patient per DR oden to set up next available virtual appt. No answer. Left message for call back  Nerissa Wharton RN on 6/27/2024 at 12:30 PM    in ASU:

## 2024-06-28 NOTE — PATIENT PROFILE ADULT - NSPROPOAPRESSUREINJURY_GEN_A_NUR
no

## 2024-07-04 ENCOUNTER — TRANSCRIPTION ENCOUNTER (OUTPATIENT)
Age: 69
End: 2024-07-04

## 2024-07-15 ENCOUNTER — RESULT REVIEW (OUTPATIENT)
Age: 69
End: 2024-07-15

## 2024-07-23 ENCOUNTER — APPOINTMENT (OUTPATIENT)
Dept: INTERNAL MEDICINE | Facility: CLINIC | Age: 69
End: 2024-07-23

## 2024-11-15 NOTE — ED ADULT NURSE REASSESSMENT NOTE - NS ED NURSE REASSESS COMMENT FT1
Pt has a sacral stage4 pressure ulcer, covered with the Mepilex. No drain noted. b/l heels discoloration with the dry flaky skin. DMDD (disruptive mood dysregulation disorder)   F34.81

## 2024-12-10 ENCOUNTER — APPOINTMENT (OUTPATIENT)
Dept: INTERNAL MEDICINE | Facility: CLINIC | Age: 69
End: 2024-12-10

## 2024-12-12 VITALS
WEIGHT: 110.01 LBS | RESPIRATION RATE: 18 BRPM | DIASTOLIC BLOOD PRESSURE: 79 MMHG | TEMPERATURE: 97 F | OXYGEN SATURATION: 100 % | HEIGHT: 67 IN | SYSTOLIC BLOOD PRESSURE: 125 MMHG | HEART RATE: 110 BPM

## 2024-12-12 LAB
ANION GAP SERPL CALC-SCNC: 10 MMOL/L — SIGNIFICANT CHANGE UP (ref 5–17)
APTT BLD: 26.8 SEC — SIGNIFICANT CHANGE UP (ref 24.5–35.6)
BLD GP AB SCN SERPL QL: NEGATIVE — SIGNIFICANT CHANGE UP
BUN SERPL-MCNC: 4 MG/DL — LOW (ref 7–23)
CALCIUM SERPL-MCNC: 7.7 MG/DL — LOW (ref 8.4–10.5)
CHLORIDE SERPL-SCNC: 107 MMOL/L — SIGNIFICANT CHANGE UP (ref 96–108)
CO2 SERPL-SCNC: 24 MMOL/L — SIGNIFICANT CHANGE UP (ref 22–31)
CREAT SERPL-MCNC: 0.34 MG/DL — LOW (ref 0.5–1.3)
EGFR: 111 ML/MIN/1.73M2 — SIGNIFICANT CHANGE UP
GLUCOSE SERPL-MCNC: 81 MG/DL — SIGNIFICANT CHANGE UP (ref 70–99)
HCT VFR BLD CALC: 20.9 % — CRITICAL LOW (ref 34.5–45)
HGB BLD-MCNC: 6.5 G/DL — CRITICAL LOW (ref 11.5–15.5)
INR BLD: 0.99 — SIGNIFICANT CHANGE UP (ref 0.85–1.16)
MCHC RBC-ENTMCNC: 25.3 PG — LOW (ref 27–34)
MCHC RBC-ENTMCNC: 31.1 G/DL — LOW (ref 32–36)
MCV RBC AUTO: 81.3 FL — SIGNIFICANT CHANGE UP (ref 80–100)
NRBC # BLD: 0 /100 WBCS — SIGNIFICANT CHANGE UP (ref 0–0)
PLATELET # BLD AUTO: 577 K/UL — HIGH (ref 150–400)
POTASSIUM SERPL-MCNC: 2.7 MMOL/L — CRITICAL LOW (ref 3.5–5.3)
POTASSIUM SERPL-SCNC: 2.7 MMOL/L — CRITICAL LOW (ref 3.5–5.3)
PROTHROM AB SERPL-ACNC: 11.6 SEC — SIGNIFICANT CHANGE UP (ref 9.9–13.4)
RBC # BLD: 2.57 M/UL — LOW (ref 3.8–5.2)
RBC # FLD: 19 % — HIGH (ref 10.3–14.5)
RH IG SCN BLD-IMP: POSITIVE — SIGNIFICANT CHANGE UP
SODIUM SERPL-SCNC: 141 MMOL/L — SIGNIFICANT CHANGE UP (ref 135–145)
WBC # BLD: 10.29 K/UL — SIGNIFICANT CHANGE UP (ref 3.8–10.5)
WBC # FLD AUTO: 10.29 K/UL — SIGNIFICANT CHANGE UP (ref 3.8–10.5)

## 2024-12-12 PROCEDURE — 99285 EMERGENCY DEPT VISIT HI MDM: CPT | Mod: FS

## 2024-12-12 PROCEDURE — 71045 X-RAY EXAM CHEST 1 VIEW: CPT | Mod: 26

## 2024-12-12 PROCEDURE — 93010 ELECTROCARDIOGRAM REPORT: CPT

## 2024-12-12 RX ORDER — ACETAMINOPHEN 500MG 500 MG/1
750 TABLET, COATED ORAL ONCE
Refills: 0 | Status: DISCONTINUED | OUTPATIENT
Start: 2024-12-12 | End: 2024-12-12

## 2024-12-12 RX ORDER — POTASSIUM CHLORIDE 600 MG/1
20 TABLET, EXTENDED RELEASE ORAL
Refills: 0 | Status: COMPLETED | OUTPATIENT
Start: 2024-12-12 | End: 2024-12-13

## 2024-12-12 RX ORDER — VANCOMYCIN HCL 900 MCG/MG
1000 POWDER (GRAM) MISCELLANEOUS ONCE
Refills: 0 | Status: COMPLETED | OUTPATIENT
Start: 2024-12-12 | End: 2024-12-13

## 2024-12-12 RX ORDER — ACETAMINOPHEN 500MG 500 MG/1
1000 TABLET, COATED ORAL ONCE
Refills: 0 | Status: COMPLETED | OUTPATIENT
Start: 2024-12-12 | End: 2024-12-12

## 2024-12-12 RX ORDER — SODIUM CHLORIDE 9 MG/ML
1000 INJECTION, SOLUTION INTRAMUSCULAR; INTRAVENOUS; SUBCUTANEOUS ONCE
Refills: 0 | Status: COMPLETED | OUTPATIENT
Start: 2024-12-12 | End: 2024-12-12

## 2024-12-12 RX ORDER — ACETAMINOPHEN 500MG 500 MG/1
1000 TABLET, COATED ORAL ONCE
Refills: 0 | Status: DISCONTINUED | OUTPATIENT
Start: 2024-12-12 | End: 2024-12-12

## 2024-12-12 RX ADMIN — ACETAMINOPHEN 500MG 400 MILLIGRAM(S): 500 TABLET, COATED ORAL at 21:26

## 2024-12-12 RX ADMIN — SODIUM CHLORIDE 1000 MILLILITER(S): 9 INJECTION, SOLUTION INTRAMUSCULAR; INTRAVENOUS; SUBCUTANEOUS at 21:25

## 2024-12-12 NOTE — ED ADULT NURSE NOTE - OBJECTIVE STATEMENT
Pt alert and oriented times three, brought in by ambulance due to HHA  leaving the patient and pt needs medical care. pt denies chets pain, sob, fever/chills, jiménez in tact and draining cloudy urine.. Pt noted to have 2 sacral pressure woundds unstagable with allevyn dressing CDI

## 2024-12-12 NOTE — ED PROVIDER NOTE - CLINICAL SUMMARY MEDICAL DECISION MAKING FREE TEXT BOX
68 yo woman with paraplegia due to Spinal surgery, Chronic Pain s/p Intrathecal morphine pump  that is now non functional sacral ulcers here today for   1. burning with urination (has jiménez)  2. more painful and foul smelling sacral ulcers  3. feels neglected by HHA. They don't help dress her wounds or assist her.     Low grade fever. C/f UTI vs sacral wound infxn. Plan for labs, cultures, IVF, tylenol, vancomycin, admission.

## 2024-12-12 NOTE — ED ADULT TRIAGE NOTE - CHIEF COMPLAINT QUOTE
Pt presents to ED BIBA from home here for wound check on sacral ulcer and per EMS pt got abandoned by her HHA. Pt A&Ox4, NAD. Denies CP or SOB.

## 2024-12-12 NOTE — ED PROVIDER NOTE - OBJECTIVE STATEMENT
68 yo woman with paraplegia due to Spinal surgery, Chronic Pain s/p Intrathecal morphine pump  that is now non functional sacral ulcers here today for   1. burning with urination (has jiménez)  2. more painful and foul smelling sacral ulcers  3. feels neglected by HHA. They don't help dress her wounds or assist her.

## 2024-12-12 NOTE — ED ADULT NURSE REASSESSMENT NOTE - NS ED NURSE REASSESS COMMENT FT1
Dr. Francisco put US guided IV to Lt ac#20, it got infiltrated, minor swelling to the insertion site, IV was removed, MD was notified. Pt. is pending med, MD Francisco at bedside with US. Pt. is alert and awake, communicates w/o difficulty, VS stable, pt. had a sandwich and water, tolerated well.

## 2024-12-12 NOTE — ED ADULT NURSE REASSESSMENT NOTE - NS ED NURSE REASSESS COMMENT FT1
heplock was put to Rt upper arm by Md. ANDRY Francisco got infiltrated, minor swelling to the insertion site, iV was removed, MD aware.

## 2024-12-13 ENCOUNTER — INPATIENT (INPATIENT)
Facility: HOSPITAL | Age: 69
LOS: 4 days | Discharge: NURSING, REHAB OR LONG-TERM CARE | DRG: 592 | End: 2024-12-18
Attending: STUDENT IN AN ORGANIZED HEALTH CARE EDUCATION/TRAINING PROGRAM | Admitting: INTERNAL MEDICINE
Payer: MEDICARE

## 2024-12-13 DIAGNOSIS — D64.9 ANEMIA, UNSPECIFIED: ICD-10-CM

## 2024-12-13 DIAGNOSIS — M54.9 DORSALGIA, UNSPECIFIED: ICD-10-CM

## 2024-12-13 DIAGNOSIS — Z29.9 ENCOUNTER FOR PROPHYLACTIC MEASURES, UNSPECIFIED: ICD-10-CM

## 2024-12-13 DIAGNOSIS — I26.99 OTHER PULMONARY EMBOLISM WITHOUT ACUTE COR PULMONALE: ICD-10-CM

## 2024-12-13 DIAGNOSIS — Z96.611 PRESENCE OF RIGHT ARTIFICIAL SHOULDER JOINT: Chronic | ICD-10-CM

## 2024-12-13 DIAGNOSIS — E87.6 HYPOKALEMIA: ICD-10-CM

## 2024-12-13 DIAGNOSIS — L89.93 PRESSURE ULCER OF UNSPECIFIED SITE, STAGE 3: ICD-10-CM

## 2024-12-13 DIAGNOSIS — Z98.890 OTHER SPECIFIED POSTPROCEDURAL STATES: Chronic | ICD-10-CM

## 2024-12-13 DIAGNOSIS — N39.0 URINARY TRACT INFECTION, SITE NOT SPECIFIED: ICD-10-CM

## 2024-12-13 DIAGNOSIS — G82.20 PARAPLEGIA, UNSPECIFIED: ICD-10-CM

## 2024-12-13 DIAGNOSIS — Z91.89 OTHER SPECIFIED PERSONAL RISK FACTORS, NOT ELSEWHERE CLASSIFIED: ICD-10-CM

## 2024-12-13 LAB
ADD ON TEST-SPECIMEN IN LAB: SIGNIFICANT CHANGE UP
ADD ON TEST-SPECIMEN IN LAB: SIGNIFICANT CHANGE UP
ANION GAP SERPL CALC-SCNC: 7 MMOL/L — SIGNIFICANT CHANGE UP (ref 5–17)
APPEARANCE UR: ABNORMAL
BILIRUB UR-MCNC: NEGATIVE — SIGNIFICANT CHANGE UP
BUN SERPL-MCNC: 6 MG/DL — LOW (ref 7–23)
CALCIUM SERPL-MCNC: 7.5 MG/DL — LOW (ref 8.4–10.5)
CHLORIDE SERPL-SCNC: 107 MMOL/L — SIGNIFICANT CHANGE UP (ref 96–108)
CO2 SERPL-SCNC: 24 MMOL/L — SIGNIFICANT CHANGE UP (ref 22–31)
COLOR SPEC: YELLOW — SIGNIFICANT CHANGE UP
CREAT SERPL-MCNC: 0.45 MG/DL — LOW (ref 0.5–1.3)
DIFF PNL FLD: ABNORMAL
EGFR: 103 ML/MIN/1.73M2 — SIGNIFICANT CHANGE UP
GAS PNL BLDV: SIGNIFICANT CHANGE UP
GLUCOSE SERPL-MCNC: 99 MG/DL — SIGNIFICANT CHANGE UP (ref 70–99)
GLUCOSE UR QL: NEGATIVE MG/DL — SIGNIFICANT CHANGE UP
KETONES UR-MCNC: NEGATIVE MG/DL — SIGNIFICANT CHANGE UP
LEUKOCYTE ESTERASE UR-ACNC: ABNORMAL
NITRITE UR-MCNC: POSITIVE
PH UR: 6 — SIGNIFICANT CHANGE UP (ref 5–8)
POTASSIUM SERPL-MCNC: 4.1 MMOL/L — SIGNIFICANT CHANGE UP (ref 3.5–5.3)
POTASSIUM SERPL-SCNC: 4.1 MMOL/L — SIGNIFICANT CHANGE UP (ref 3.5–5.3)
PROT UR-MCNC: 100 MG/DL
SODIUM SERPL-SCNC: 138 MMOL/L — SIGNIFICANT CHANGE UP (ref 135–145)
SP GR SPEC: 1.02 — SIGNIFICANT CHANGE UP (ref 1–1.03)
UROBILINOGEN FLD QL: 0.2 MG/DL — SIGNIFICANT CHANGE UP (ref 0.2–1)

## 2024-12-13 PROCEDURE — 99223 1ST HOSP IP/OBS HIGH 75: CPT | Mod: GC

## 2024-12-13 RX ORDER — LIDOCAINE 40 MG/G
1 CREAM TOPICAL EVERY 24 HOURS
Refills: 0 | Status: DISCONTINUED | OUTPATIENT
Start: 2024-12-13 | End: 2024-12-18

## 2024-12-13 RX ORDER — PIPERACILLIN SODIUM AND TAZOBACTAM SODIUM 4; .5 G/20ML; G/20ML
3.38 INJECTION, POWDER, LYOPHILIZED, FOR SOLUTION INTRAVENOUS ONCE
Refills: 0 | Status: COMPLETED | OUTPATIENT
Start: 2024-12-13 | End: 2024-12-13

## 2024-12-13 RX ORDER — HYDROMORPHONE HYDROCHLORIDE 2 MG/1
0.5 TABLET ORAL ONCE
Refills: 0 | Status: DISCONTINUED | OUTPATIENT
Start: 2024-12-13 | End: 2024-12-13

## 2024-12-13 RX ORDER — PANTOPRAZOLE SODIUM 40 MG/1
40 TABLET, DELAYED RELEASE ORAL
Refills: 0 | Status: DISCONTINUED | OUTPATIENT
Start: 2024-12-13 | End: 2024-12-18

## 2024-12-13 RX ORDER — DIAZEPAM 10 MG/1
5 TABLET ORAL DAILY
Refills: 0 | Status: DISCONTINUED | OUTPATIENT
Start: 2024-12-13 | End: 2024-12-18

## 2024-12-13 RX ORDER — CEFTRIAXONE SODIUM 1 G
1000 VIAL (EA) INJECTION EVERY 24 HOURS
Refills: 0 | Status: DISCONTINUED | OUTPATIENT
Start: 2024-12-13 | End: 2024-12-14

## 2024-12-13 RX ORDER — OXYCODONE HYDROCHLORIDE 30 MG/1
15 TABLET ORAL EVERY 6 HOURS
Refills: 0 | Status: DISCONTINUED | OUTPATIENT
Start: 2024-12-13 | End: 2024-12-18

## 2024-12-13 RX ORDER — POTASSIUM CHLORIDE 600 MG/1
40 TABLET, EXTENDED RELEASE ORAL ONCE
Refills: 0 | Status: COMPLETED | OUTPATIENT
Start: 2024-12-13 | End: 2024-12-13

## 2024-12-13 RX ADMIN — POTASSIUM CHLORIDE 50 MILLIEQUIVALENT(S): 600 TABLET, EXTENDED RELEASE ORAL at 02:16

## 2024-12-13 RX ADMIN — LIDOCAINE 1 PATCH: 40 CREAM TOPICAL at 20:10

## 2024-12-13 RX ADMIN — OXYCODONE HYDROCHLORIDE 15 MILLIGRAM(S): 30 TABLET ORAL at 15:33

## 2024-12-13 RX ADMIN — POTASSIUM CHLORIDE 40 MILLIEQUIVALENT(S): 600 TABLET, EXTENDED RELEASE ORAL at 04:00

## 2024-12-13 RX ADMIN — OXYCODONE HYDROCHLORIDE 15 MILLIGRAM(S): 30 TABLET ORAL at 23:17

## 2024-12-13 RX ADMIN — DIAZEPAM 5 MILLIGRAM(S): 10 TABLET ORAL at 15:33

## 2024-12-13 RX ADMIN — LIDOCAINE 1 PATCH: 40 CREAM TOPICAL at 15:31

## 2024-12-13 RX ADMIN — OXYCODONE HYDROCHLORIDE 15 MILLIGRAM(S): 30 TABLET ORAL at 22:17

## 2024-12-13 RX ADMIN — Medication 250 MILLIGRAM(S): at 04:00

## 2024-12-13 RX ADMIN — HYDROMORPHONE HYDROCHLORIDE 0.5 MILLIGRAM(S): 2 TABLET ORAL at 03:01

## 2024-12-13 RX ADMIN — HYDROMORPHONE HYDROCHLORIDE 0.5 MILLIGRAM(S): 2 TABLET ORAL at 05:37

## 2024-12-13 RX ADMIN — POTASSIUM CHLORIDE 50 MILLIEQUIVALENT(S): 600 TABLET, EXTENDED RELEASE ORAL at 04:35

## 2024-12-13 RX ADMIN — POTASSIUM CHLORIDE 50 MILLIEQUIVALENT(S): 600 TABLET, EXTENDED RELEASE ORAL at 07:13

## 2024-12-13 RX ADMIN — LIDOCAINE 1 PATCH: 40 CREAM TOPICAL at 15:32

## 2024-12-13 RX ADMIN — Medication 100 MILLIGRAM(S): at 15:33

## 2024-12-13 RX ADMIN — PIPERACILLIN SODIUM AND TAZOBACTAM SODIUM 200 GRAM(S): 4; .5 INJECTION, POWDER, LYOPHILIZED, FOR SOLUTION INTRAVENOUS at 02:11

## 2024-12-13 NOTE — H&P ADULT - HISTORY OF PRESENT ILLNESS
Patient is a 69-year-old female with PMHx multiple spinal fusions c/b chronic neck/back pain and BLE paralysis, s/p non-functional intrathecal morphine pump, neurogenic bladder s/p chronic jiménez c/b recurrent UTIs, multiple PEs (Dec 2023, July 2024), CVA x3 (MR brain Jan 2023 w/ chronic lacunar infarcts), HTN, HLD, and emphysema presenting with cloudy urine and pressure wounds not being sufficiently managed by HHA. Patient reports that her HHA "comes in, eats breakfast, and sleeps in the living room". She reports that her jiménez is not being emptied and that she was not receiving sufficient wound care and pressure offloading. Her jiménez was last exchanged 2 months ago. She denies fever but reports chills. She reports chronic persistent neck, back, and abdominal pain. She denies chest pain and SOB. She has been able to eat and drink and denies diarrhea. LBM yesterday.     ED Course:   Vitals – T 97.3F, , /79, RR 18 SpO2 100%   Labs – WBC 10.29, HGB 6.5, , K 2.7 --> 4.1. UA w/ epithelial cells, large leuk, +nitrate, >998 WBC, bacteria   Imaging – CXR grossly unremarkable, official read pending   Interventions – Ofirmev 1g, Percocet x2, Dilaudid IV 0.5 x2, Vanc 1g, Zosyn 3.375g, KCl 100meq, NS 1L, pRBC pending  Patient is a 69-year-old female with PMHx multiple spinal fusions c/b chronic neck/back pain and BLE paralysis, s/p non-functional intrathecal morphine pump, neurogenic bladder s/p chronic jiménez c/b recurrent UTIs, multiple PEs (Dec 2023, July 2024), CVA x3 (MR brain Jan 2023 w/ chronic lacunar infarcts), HTN, HLD, and emphysema presenting with cloudy urine and pressure wounds not being sufficiently managed by HHA. Patient reports that her HHA "comes in, eats breakfast, and sleeps in the living room". She called the HHA agency to have the HHA removed, and the agency called EMS to bring the patient to the hospital. She reports that her jiménez is not being emptied and that she was not receiving sufficient wound care and pressure offloading. Her jiménez was last exchanged 2 months ago. She denies fever but reports chills. She reports chronic persistent neck, back, and abdominal pain. She denies chest pain and SOB. She has been able to eat and drink and denies nausea/vomiting and diarrhea. LBM yesterday. She denies hematemesis, hematuria, and hematochezia/melena.    ED Course:   Vitals – T 97.3F, , /79, RR 18 SpO2 100%   Labs – WBC 10.29, HGB 6.5, , K 2.7 --> 4.1. UA w/ epithelial cells, large leuk, +nitrate, >998 WBC, bacteria   Imaging – CXR grossly unremarkable, official read pending   Interventions – Ofirmev 1g, Percocet x2, Dilaudid IV 0.5 x2, Vanc 1g, Zosyn 3.375g, KCl 100meq, NS 1L, pRBC pending

## 2024-12-13 NOTE — ED ADULT NURSE REASSESSMENT NOTE - NS ED NURSE REASSESS COMMENT FT1
IV to Rt ac #22 got infiltrated, swelling to the surrounding area on flushing. ALANA Vicente was notified. medications are pending.

## 2024-12-13 NOTE — PATIENT PROFILE ADULT - LOCATION #3
Can you check to see if his 24 hr Brooke monitor is back and give to [facility], please  DOS 5/4/2022. Patient came in to see SMM today and was asking about it.   Thank you Left Ishium

## 2024-12-13 NOTE — H&P ADULT - PROBLEM SELECTOR PLAN 7
Fluids - S/p 1L NS  Electrolytes - Replete to Mg>2, Phos>3, K>4  Nutrition - Regular diet  GI Prophylaxis - N/A  VTE Prophylaxis - Lovenox 40mg subq  Code Status - Full code per 12/13 discussion Fluids - S/p 1L NS  Electrolytes - Replete to Mg>2, Phos>3, K>4  Nutrition - Regular diet  GI Prophylaxis - Pantoprazole as above  VTE Prophylaxis - Lovenox 40mg subq  Code Status - Full code per 12/13 discussion

## 2024-12-13 NOTE — H&P ADULT - PROBLEM SELECTOR PLAN 6
Hx multiple PEs (Dec 2023 and July 2024), on home Eliquis 5mg q12h. On admission, tachycardic to 100s, breathing and saturating well on room air.     - Hold home Eliquis iso severe anemia  - VTE prophylaxis only - Lovenox 40mg subq  - Need formal med rec to determine if patient received appropriate Eliquis loading

## 2024-12-13 NOTE — ED ADULT NURSE REASSESSMENT NOTE - NS ED NURSE REASSESS COMMENT FT1
pt. is awake, c/o abdominal and back pain, pt. declined blood work, provider was notified. meds in progress, iv intact.

## 2024-12-13 NOTE — H&P ADULT - PROBLEM SELECTOR PLAN 1
Patient with HGB 6.5 on admission, MCV 81.3. Currently no active signs of bleeding--denies hematemesis, hematuria, hematochezia/melena. Brown stool visualized on admission exam.     - Transfusing 1u pRBC   - Check post-transfusion CBC, hemolysis labs  - maintain active T&S  - transfuse if Hgb <7 Patient with HGB 6.5 on admission, MCV 81.3. Currently no active signs of bleeding--denies hematemesis, hematuria, hematochezia/melena. Brown stool visualized on admission exam.     - Transfusing 1u pRBC   - Check post-transfusion CBC, hemolysis labs  - Can check iron when more appropriate, as patient is receiving pRBC on admission  - maintain active T&S  - transfuse if Hgb <7 Patient with HGB 6.5 on admission, MCV 81.3. Currently no active signs of bleeding--denies hematemesis, hematuria, hematochezia/melena. Brown stool visualized on admission exam.     - Transfusing 1u pRBC   - Check post-transfusion CBC, hemolysis labs  - Can check iron when more appropriate, as patient is receiving pRBC on admission  - GI prophylaxis - Pantoprazole 40mg qd  - maintain active T&S  - transfuse if Hgb <7

## 2024-12-13 NOTE — H&P ADULT - PROBLEM SELECTOR PLAN 3
Present on admission. 3x pressure ulcers present in sacral and bilateral ischial regions. Pink base without visible tissue necrosis or surrounding erythema or purulent drainage. Patient reports not getting appropriate wound care and pressure offloading at home.     - Wound care consult

## 2024-12-13 NOTE — PATIENT PROFILE ADULT - FALL HARM RISK - HARM RISK INTERVENTIONS
Assistance with ambulation/Assistance OOB with selected safe patient handling equipment/Communicate Risk of Fall with Harm to all staff/Discuss with provider need for PT consult/Monitor gait and stability/Reinforce activity limits and safety measures with patient and family/Tailored Fall Risk Interventions/Visual Cue: Yellow wristband and red socks/Bed in lowest position, wheels locked, appropriate side rails in place/Call bell, personal items and telephone in reach/Instruct patient to call for assistance before getting out of bed or chair/Non-slip footwear when patient is out of bed/[place] to call system/Physically safe environment - no spills, clutter or unnecessary equipment/Purposeful Proactive Rounding/Room/bathroom lighting operational, light cord in reach

## 2024-12-13 NOTE — H&P ADULT - PROBLEM SELECTOR PLAN 4
IMPROVED. K 2.7 on admission, corrected to 4.1 after receiving 100meq total of KCl repletion. Etiology could be medication-induced or due to poor PO intake.  ECG poor quality but grossly unremarkable    - CTM

## 2024-12-13 NOTE — H&P ADULT - ATTENDING COMMENTS
Patient was seen at bedside. Pt refused physical exam stating that she had already been examined several times already.  Case discuss with resident.     A/P: 69-year-old female with PMHx multiple spinal fusions c/b chronic neck/back pain and BLE paralysis, s/p non-functional intrathecal morphine pump, neurogenic bladder s/p chronic jiménez c/b recurrent UTIs, multiple PEs (Dec 2023, July 2024), CVA x3 (MR brain Jan 2023 w/ chronic lacunar infarcts), HTN, HLD, and emphysema presenting with cloudy urine and sacral ulcer,ABLA with  Hgb 6.5,  positive UA, concerning for acute UT vs colonization 2/2 chronic indwelling jiménez . [facility]      #Acute blood loss anemia   -Pt denied SOB, chest pain and pt w/o dark stool   -Pt s/p transfusion of 1U PRBCs today; Will f/u post transfusion CBC   -Pt had abdominal pain on last admission in July 2024 and pt was suppose to f/u outpatient for EGD   -GI consult for possible EGD   -Will continue PPI     # Questionable UTI vs colonization given chronic indwelling jiménez   #Neurogenic bladder (s/p chronic jiménez, exchanged in ED)  #Hx of multiple UTI   -Pt was  given Zosyn and vancomycin in the ER. Pt's jiménez was changed in the ER   - Pt was admitted in July 2024 with Septic shock and Klebsiella bacteremia and pt was tx'ed with 2 week course of CTX   -Pt is afebrile and no leukocytosis   -Will continue CTX for now   -Will f/u Blood and urine cx     #Paraplegia due to Spinal surgery  # Chronic pain s/p Intrathecal morphine pump  that is now non functional  #Sacral Ulcer   -Pt refused to let me see and exam her sacral ulcers   -Wound care consult   - Continue frequent turning and pain medication prn   -Will continue diazepam  -Nutrition consult  -IStop done and documented in separate chart note     #Tachycardia  -Likely 2/2 anemia as well as pt with hx of tachycardia on prior admission in July 2024 where the pt was found to have a pulmonary embolism tx with AC and pt was also started on B blocker and pain medication during that admission with improvement in HR  -Will f/u EKG and TSH   -Will continue pain medication   -Will f/u post transfusion CBC and will transfuse to maintain Hgb of 8.     #Hx of multiple Pulmonary embolism  (Dec 2023, July 2024)  -Will hold AC iso of acute blood loss anemia        #DVT prop  -Systemic AC held iso of ABLA; Will continue to monitor serial CBC and resume if Hgb is stable     #DISPO  -SW for assistance with dispo    Time based billing  40 minutes spent on total encounter. The necessity of the time spent during the encounter on this date of service was due to:    Review of hospital course, labs, vitals, radiology and medical records.  Direct patient encounter including bedside exam   Discussed plan of care with resident team and interdisciplinary team.   Documenting the encounter. Patient was seen at bedside. Pt refused physical exam stating that she had already been examined several times already.  Case discuss with resident.     A/P: 69-year-old female with PMHx multiple spinal fusions c/b chronic neck/back pain and BLE paralysis, s/p non-functional intrathecal morphine pump, neurogenic bladder s/p chronic jiménez c/b recurrent UTIs, multiple PEs (Dec 2023, July 2024), CVA x3 (MR brain Jan 2023 w/ chronic lacunar infarcts), HTN, HLD, and emphysema presenting with cloudy urine and sacral ulcer,ABLA with  Hgb 6.5,  positive UA, concerning for acute UT vs colonization 2/2 chronic indwelling jiménez . [facility]      #Acute blood loss anemia   -Pt denied SOB, chest pain and pt w/o dark stool   -Pt s/p transfusion of 1U PRBCs today; Will f/u post transfusion CBC   -Pt had abdominal pain on last admission in July 2024 and pt was suppose to f/u outpatient for EGD   -GI consult for possible EGD   -Will continue PPI     # Questionable UTI vs colonization given chronic indwelling jiménez   #Neurogenic bladder (s/p chronic jiménez, exchanged in ED)  #Hx of multiple UTI   -Pt was  given Zosyn and vancomycin in the ER. Pt's jiménez was changed in the ER   - Pt was admitted in July 2024 with Septic shock and Klebsiella bacteremia and pt was tx'ed with 2 week course of CTX   -Pt is afebrile and no leukocytosis   -Will continue CTX for now   -Will f/u Blood and urine cx     #Paraplegia due to Spinal surgery  # Chronic pain s/p Intrathecal morphine pump  that is now non functional  #Sacral Ulcer   -Pt refused to let me see and exam her sacral ulcers   -Wound care consult   - Continue frequent turning and pain medication prn   -Will continue diazepam  -Nutrition consult  -IStop done and documented in separate chart note     #Tachycardia  -Likely 2/2 anemia as well as pt with hx of tachycardia on prior admission in July 2024 where the pt was found to have a pulmonary embolism tx with AC and pt was also started on B blocker and pain medication during that admission with improvement in HR  -Will f/u EKG and TSH   -Will continue pain medication   -Will f/u post transfusion CBC and will transfuse to maintain Hgb of 8.     #Hx of multiple Pulmonary embolism  (Dec 2023, July 2024)  -Will hold AC iso of acute blood loss anemia        #DVT prop  -Systemic AC held iso of ABLA; Will continue to monitor serial CBC and resume if Hgb is stable     #DISPO  -SW for assistance with dispo    Time based billing  76minutes spent on total encounter. The necessity of the time spent during the encounter on this date of service was due to:    Review of hospital course, labs, vitals, radiology and medical records.  Direct patient encounter including bedside exam   Discussed plan of care with resident team and interdisciplinary team.   Documenting the encounter.

## 2024-12-13 NOTE — H&P ADULT - NSHPPHYSICALEXAM_GEN_ALL_CORE
Vital Signs Last 24 Hrs  T(C): 36.7 (13 Dec 2024 09:10), Max: 37.5 (12 Dec 2024 19:25)  T(F): 98.1 (13 Dec 2024 09:10), Max: 99.5 (12 Dec 2024 19:25)  HR: 108 (13 Dec 2024 09:10) (101 - 112)  BP: 120/79 (13 Dec 2024 09:10) (120/79 - 163/90)  BP(mean): --  RR: 16 (13 Dec 2024 09:10) (16 - 18)  SpO2: 100% (13 Dec 2024 09:10) (96% - 100%)    Parameters below as of 13 Dec 2024 09:10  Patient On (Oxygen Delivery Method): room air    Constitutional - NAD, Uncomfortable due to pain, Tearful  HEENT - NCAT, EOMI  Neck - No limited ROM  Chest - Breathing comfortably on room air, CTAB   Cardio - Warm and well perfused, RRR  Abdomen -  Soft, (+)subcutaneous intrathecal pump present on Right side of abdomen, (+)diffuse tenderness to palpation with voluntary guarding. No rigidity, rebound tenderness. Brown stool visualized in anal region   - Castillo in place with clear light yellow urine  Extremities - No peripheral edema, No calf tenderness   Neurologic Exam:                    Cognitive - Fully awake and alert.      Motor -                      LEFT    UE - ShAB 1/5, EF 5/5, EE 4/5,  weak                    RIGHT UE - ShAB 5/5, EF 5/5, EE 4/5,  weak                    LEFT    LE - HF 0/5 throughout                    RIGHT LE - HF 0/5 throughout     Sensory - (+)Intact but dull sensation to LT bilateral  Psychiatric - Mood stable, Affect WNL  Skin on admission: (+)3x Stage 3 pressure ulcers with pink/white bases in the sacral and bilateral ischial regions. No visible tissue necrosis. No surrounding erythema or purulent drainage.

## 2024-12-13 NOTE — H&P ADULT - PROBLEM SELECTOR PLAN 5
Hx multiple spinal surgeries c/b BLE paralysis and chronic neck/back pain. Has implanted intrathecal morphine pump that is nonfunctional. Home meds: Diazepam 5mg q24h, Oxycodone 15mg q6h PRN per chart, but patient states Oxycodone is 30mg.    - Continue home Diazepam 5mg q24h, Oxycodone 15mg q6h PRN  - F/u formal med rec, I-STOP to verify Oxycodone dose Hx multiple spinal surgeries c/b BLE paralysis and chronic neck/back pain. Has implanted intrathecal morphine pump that is nonfunctional. Home meds: Diazepam 5mg q24h, Oxycodone 15mg q6h PRN, Lidocaine patch PRN per chart, but patient states Oxycodone is 30mg.    - Continue home Diazepam 5mg q24h, Oxycodone 15mg q6h PRN, Lidocaine patch PRN  - F/u formal med rec, I-STOP to verify Oxycodone dose

## 2024-12-13 NOTE — H&P ADULT - CONVERSATION DETAILS
Discussed code status with patient at the bedside. Patient expressed sufficient understanding of both resuscitation and intubation. Elected to remain FULL CODE.

## 2024-12-13 NOTE — ED ADULT NURSE REASSESSMENT NOTE - NS ED NURSE REASSESS COMMENT FT1
Castillo dated 10/30 was replaced today to [place] #[id number], pt. tolerated well, yellow cloudy urine draining.

## 2024-12-13 NOTE — H&P ADULT - PROBLEM SELECTOR PLAN 2
Hx neurogenic bladder s/p chronic jiménez, with hx recurrent UTIs. Admitted in July 2024 for septic shock 2/2 Klebsiella pneumoniae UTI and bacteremia--completed Ceftriaxone 2g q12h (6/22-7/5). Hx neurogenic bladder s/p chronic jiménez, with hx recurrent UTIs. Admitted in July 2024 for septic shock 2/2 Klebsiella pneumoniae and Proteus mirabilis UTI and bacteremia--completed Ceftriaxone 2g q12h (6/22-7/5). Currently meeting 1/4 SIRS criteria (tachycardia).   S/p Vancomycin 1g, Zosyn 3.375g in ED    - F/u UCx, BCx x2  - Start Ceftriaxone 1g q24h x5 days, as prior UCx from 6/20/24 was sensitive Hx neurogenic bladder s/p chronic jiménez, with hx recurrent UTIs. Admitted in July 2024 for septic shock 2/2 Klebsiella pneumoniae and Proteus mirabilis UTI and bacteremia--completed Ceftriaxone 2g q12h (6/22-7/5). Currently meeting 1/4 SIRS criteria (tachycardia).   S/p Vancomycin 1g, Zosyn 3.375g in ED. Jiménez exchanged in ED.    - F/u UCx, BCx x2  - Start Ceftriaxone 1g q24h x5 days, as prior UCx from 6/20/24 was sensitive

## 2024-12-13 NOTE — H&P ADULT - ASSESSMENT
Patient is a 69-year-old female with PMHx multiple spinal fusions c/b chronic neck/back pain and BLE paralysis, s/p non-functional intrathecal morphine pump, neurogenic bladder s/p chronic jiménez c/b recurrent UTIs, multiple PEs (Dec 2023, July 2024), CVA x3 (MR brain Jan 2023 w/ chronic lacunar infarcts), HTN, HLD, and emphysema presenting with cloudy urine and pressure wounds, found to have 3x stage 3 pressure ulcers in the sacral, bilateral ischial regions, HGB 6.5, contaminated but positive UA, concerning for acute UTI in the setting of insufficient care at home.

## 2024-12-13 NOTE — H&P ADULT - NSHPLABSRESULTS_GEN_ALL_CORE
6.5    10.29 )-----------( 577      ( 12 Dec 2024 21:17 )             20.9       12-13    138  |  107  |  6[L]  ----------------------------<  99  4.1   |  24  |  0.45[L]    Ca    7.5[L]      13 Dec 2024 05:52      Urinalysis Basic - ( 13 Dec 2024 05:52 )  Urine Appearance: Turbid  Color: Yellow  Specific Gravity: 1.016  pH Urine: 6.0  Protein, Urine: 100 mg/dL  Glucose Qualitative, Urine: Negative mg/dL  Ketone - Urine: Negative mg/dL  Blood, Urine: Moderate  Bilirubin: Negative  Urobilinogen: 0.2 mg/dL  Leukocyte Esterase Concentration: Large  Nitrite: Positive    PT/INR - ( 12 Dec 2024 22:20 )   PT: 11.6 sec;   INR: 0.99     PTT - ( 12 Dec 2024 22:20 )  PTT:26.8 sec

## 2024-12-14 DIAGNOSIS — R00.0 TACHYCARDIA, UNSPECIFIED: ICD-10-CM

## 2024-12-14 DIAGNOSIS — G93.41 METABOLIC ENCEPHALOPATHY: ICD-10-CM

## 2024-12-14 LAB
A1C WITH ESTIMATED AVERAGE GLUCOSE RESULT: 5.4 % — SIGNIFICANT CHANGE UP (ref 4–5.6)
ALBUMIN SERPL ELPH-MCNC: 1.8 G/DL — LOW (ref 3.3–5)
ALP SERPL-CCNC: 121 U/L — HIGH (ref 40–120)
ALT FLD-CCNC: <5 U/L — LOW (ref 10–45)
ANION GAP SERPL CALC-SCNC: 10 MMOL/L — SIGNIFICANT CHANGE UP (ref 5–17)
APTT BLD: 29.7 SEC — SIGNIFICANT CHANGE UP (ref 24.5–35.6)
AST SERPL-CCNC: 5 U/L — LOW (ref 10–40)
BASOPHILS # BLD AUTO: 0.05 K/UL — SIGNIFICANT CHANGE UP (ref 0–0.2)
BASOPHILS # BLD AUTO: 0.06 K/UL — SIGNIFICANT CHANGE UP (ref 0–0.2)
BASOPHILS NFR BLD AUTO: 0.4 % — SIGNIFICANT CHANGE UP (ref 0–2)
BASOPHILS NFR BLD AUTO: 0.5 % — SIGNIFICANT CHANGE UP (ref 0–2)
BILIRUB SERPL-MCNC: 0.2 MG/DL — SIGNIFICANT CHANGE UP (ref 0.2–1.2)
BUN SERPL-MCNC: 4 MG/DL — LOW (ref 7–23)
CALCIUM SERPL-MCNC: 8.1 MG/DL — LOW (ref 8.4–10.5)
CHLORIDE SERPL-SCNC: 106 MMOL/L — SIGNIFICANT CHANGE UP (ref 96–108)
CO2 SERPL-SCNC: 21 MMOL/L — LOW (ref 22–31)
CREAT SERPL-MCNC: 0.34 MG/DL — LOW (ref 0.5–1.3)
EGFR: 111 ML/MIN/1.73M2 — SIGNIFICANT CHANGE UP
EOSINOPHIL # BLD AUTO: 0.09 K/UL — SIGNIFICANT CHANGE UP (ref 0–0.5)
EOSINOPHIL # BLD AUTO: 0.15 K/UL — SIGNIFICANT CHANGE UP (ref 0–0.5)
EOSINOPHIL NFR BLD AUTO: 0.6 % — SIGNIFICANT CHANGE UP (ref 0–6)
EOSINOPHIL NFR BLD AUTO: 1.3 % — SIGNIFICANT CHANGE UP (ref 0–6)
ESTIMATED AVERAGE GLUCOSE: 108 MG/DL — SIGNIFICANT CHANGE UP (ref 68–114)
GLUCOSE SERPL-MCNC: 66 MG/DL — LOW (ref 70–99)
HCT VFR BLD CALC: 22.6 % — LOW (ref 34.5–45)
HCT VFR BLD CALC: 25.4 % — LOW (ref 34.5–45)
HGB BLD-MCNC: 7 G/DL — CRITICAL LOW (ref 11.5–15.5)
HGB BLD-MCNC: 7.9 G/DL — LOW (ref 11.5–15.5)
IMM GRANULOCYTES NFR BLD AUTO: 0.4 % — SIGNIFICANT CHANGE UP (ref 0–0.9)
IMM GRANULOCYTES NFR BLD AUTO: 0.4 % — SIGNIFICANT CHANGE UP (ref 0–0.9)
INR BLD: 0.98 — SIGNIFICANT CHANGE UP (ref 0.85–1.16)
LYMPHOCYTES # BLD AUTO: 17.1 % — SIGNIFICANT CHANGE UP (ref 13–44)
LYMPHOCYTES # BLD AUTO: 19.2 % — SIGNIFICANT CHANGE UP (ref 13–44)
LYMPHOCYTES # BLD AUTO: 2.22 K/UL — SIGNIFICANT CHANGE UP (ref 1–3.3)
LYMPHOCYTES # BLD AUTO: 2.44 K/UL — SIGNIFICANT CHANGE UP (ref 1–3.3)
MAGNESIUM SERPL-MCNC: 1.7 MG/DL — SIGNIFICANT CHANGE UP (ref 1.6–2.6)
MCHC RBC-ENTMCNC: 24.6 PG — LOW (ref 27–34)
MCHC RBC-ENTMCNC: 25.1 PG — LOW (ref 27–34)
MCHC RBC-ENTMCNC: 31 G/DL — LOW (ref 32–36)
MCHC RBC-ENTMCNC: 31.1 G/DL — LOW (ref 32–36)
MCV RBC AUTO: 79.3 FL — LOW (ref 80–100)
MCV RBC AUTO: 80.6 FL — SIGNIFICANT CHANGE UP (ref 80–100)
MONOCYTES # BLD AUTO: 0.78 K/UL — SIGNIFICANT CHANGE UP (ref 0–0.9)
MONOCYTES # BLD AUTO: 0.84 K/UL — SIGNIFICANT CHANGE UP (ref 0–0.9)
MONOCYTES NFR BLD AUTO: 5.9 % — SIGNIFICANT CHANGE UP (ref 2–14)
MONOCYTES NFR BLD AUTO: 6.7 % — SIGNIFICANT CHANGE UP (ref 2–14)
NEUTROPHILS # BLD AUTO: 10.75 K/UL — HIGH (ref 1.8–7.4)
NEUTROPHILS # BLD AUTO: 8.33 K/UL — HIGH (ref 1.8–7.4)
NEUTROPHILS NFR BLD AUTO: 71.9 % — SIGNIFICANT CHANGE UP (ref 43–77)
NEUTROPHILS NFR BLD AUTO: 75.6 % — SIGNIFICANT CHANGE UP (ref 43–77)
NRBC # BLD: 0 /100 WBCS — SIGNIFICANT CHANGE UP (ref 0–0)
NRBC # BLD: 0 /100 WBCS — SIGNIFICANT CHANGE UP (ref 0–0)
PHOSPHATE SERPL-MCNC: 2.7 MG/DL — SIGNIFICANT CHANGE UP (ref 2.5–4.5)
PLATELET # BLD AUTO: 492 K/UL — HIGH (ref 150–400)
PLATELET # BLD AUTO: 543 K/UL — HIGH (ref 150–400)
POTASSIUM SERPL-MCNC: 3.9 MMOL/L — SIGNIFICANT CHANGE UP (ref 3.5–5.3)
POTASSIUM SERPL-SCNC: 3.9 MMOL/L — SIGNIFICANT CHANGE UP (ref 3.5–5.3)
PROT SERPL-MCNC: 6.3 G/DL — SIGNIFICANT CHANGE UP (ref 6–8.3)
PROTHROM AB SERPL-ACNC: 11.5 SEC — SIGNIFICANT CHANGE UP (ref 9.9–13.4)
RBC # BLD: 2.85 M/UL — LOW (ref 3.8–5.2)
RBC # BLD: 3.15 M/UL — LOW (ref 3.8–5.2)
RBC # FLD: 18 % — HIGH (ref 10.3–14.5)
RBC # FLD: 18.5 % — HIGH (ref 10.3–14.5)
SODIUM SERPL-SCNC: 137 MMOL/L — SIGNIFICANT CHANGE UP (ref 135–145)
WBC # BLD: 11.59 K/UL — HIGH (ref 3.8–10.5)
WBC # BLD: 14.23 K/UL — HIGH (ref 3.8–10.5)
WBC # FLD AUTO: 11.59 K/UL — HIGH (ref 3.8–10.5)
WBC # FLD AUTO: 14.23 K/UL — HIGH (ref 3.8–10.5)

## 2024-12-14 PROCEDURE — 99233 SBSQ HOSP IP/OBS HIGH 50: CPT | Mod: GC

## 2024-12-14 PROCEDURE — 70450 CT HEAD/BRAIN W/O DYE: CPT | Mod: 26

## 2024-12-14 PROCEDURE — 74018 RADEX ABDOMEN 1 VIEW: CPT | Mod: 26

## 2024-12-14 RX ORDER — GLUCAGON INJECTION, SOLUTION 0.5 MG/.1ML
1 INJECTION, SOLUTION SUBCUTANEOUS ONCE
Refills: 0 | Status: COMPLETED | OUTPATIENT
Start: 2024-12-14 | End: 2024-12-14

## 2024-12-14 RX ORDER — ZINC SULFATE 50(220)MG
220 TABLET ORAL DAILY
Refills: 0 | Status: DISCONTINUED | OUTPATIENT
Start: 2024-12-14 | End: 2024-12-18

## 2024-12-14 RX ORDER — MULTIVIT WITH MINERALS/LUTEIN
500 TABLET ORAL DAILY
Refills: 0 | Status: DISCONTINUED | OUTPATIENT
Start: 2024-12-14 | End: 2024-12-18

## 2024-12-14 RX ORDER — CYANOCOBALAMIN/FOLIC AC/VIT B6 1-2.2-25MG
1 TABLET ORAL DAILY
Refills: 0 | Status: DISCONTINUED | OUTPATIENT
Start: 2024-12-14 | End: 2024-12-18

## 2024-12-14 RX ADMIN — LIDOCAINE 1 PATCH: 40 CREAM TOPICAL at 03:25

## 2024-12-14 RX ADMIN — Medication 25 MILLILITER(S): at 15:25

## 2024-12-14 RX ADMIN — OXYCODONE HYDROCHLORIDE 15 MILLIGRAM(S): 30 TABLET ORAL at 06:25

## 2024-12-14 RX ADMIN — OXYCODONE HYDROCHLORIDE 15 MILLIGRAM(S): 30 TABLET ORAL at 07:54

## 2024-12-14 RX ADMIN — OXYCODONE HYDROCHLORIDE 15 MILLIGRAM(S): 30 TABLET ORAL at 23:10

## 2024-12-14 RX ADMIN — PANTOPRAZOLE SODIUM 40 MILLIGRAM(S): 40 TABLET, DELAYED RELEASE ORAL at 06:25

## 2024-12-14 RX ADMIN — GLUCAGON INJECTION, SOLUTION 1 MILLIGRAM(S): 0.5 INJECTION, SOLUTION SUBCUTANEOUS at 16:53

## 2024-12-14 NOTE — PROGRESS NOTE ADULT - PROBLEM SELECTOR PLAN 1
Patient with HGB 6.5 on admission, MCV 81.3. Currently no active signs of bleeding--denies hematemesis, hematuria, hematochezia/melena. Brown stool visualized on admission exam.     - Transfusing 1u pRBC   - Check post-transfusion CBC, hemolysis labs  - Can check iron when more appropriate, as patient is receiving pRBC on admission  - GI prophylaxis - Pantoprazole 40mg qd  - maintain active T&S  - transfuse if Hgb <7 Patient with HGB 6.5 on admission, MCV 81.3. Currently no active signs of bleeding--denies hematemesis, hematuria, hematochezia/melena. Brown stool visualized on admission exam. Unsure based on history if having ssx GIB.    - GI consult for possible GIB  - f/u iron studies, haptoglobin, ldh, retic count  - GI prophylaxis - Pantoprazole 40mg qd  - maintain active T&S  - transfuse if Hgb <7

## 2024-12-14 NOTE — PROGRESS NOTE ADULT - ATTENDING COMMENTS
Patient was seen and examined at bedside on 12/14/2024 at 1130 am. History and ROS is difficult to assess given patient intermittently responds to questions - unclear if history provided is accurate. Vitals, labwork and pertinent imaging reviewed. Physical exam - NAD, alert, PERRLA, EOMI, supple neck, chest - decreased BS b/l, CV - rrr, s1s2, no m/r/g, abd - soft, TTP throughout, + BS, + pain pump, ext - wwp, psych - normal affect, skin - no rash, psych - normal affect, patient appears cachectic and malnourished    Plan  -Unclear if this is patient's baseline - will check CTH, ensure patient is not hypoglycemic (start patient on q6 FS) and will need to obtain collateral regarding her baseline - if this is NOT her baseline will consult Neurology after CTH  -GI consult given new anemia  -Iron studies, haptoglobin, LDH, reticulocyte count  -Dopplers b/l LE   -Stop abx, ensure FC is replaced  -Patient remains tachycardic - check EKG, rectal temp done in ED (pt afebrile), will consider repeat Echo  -Palliative consult  -Wound care; will need to assess her sacral ulcers  -Will likely start heparin gtt given that it is unclear if patient had any evidence of bleed and if patient remains HDS without signs of active bleed  -Bowel regimen  -PT/OT

## 2024-12-14 NOTE — DIETITIAN INITIAL EVALUATION ADULT - ADD RECOMMEND
1. Continue Regular diet, consider addition of Ensure Max (150kcal, 30g protein) x2/day to promote intake.   >>Once pt tolerating diet and electrolytes consistently WNL, recommend change oral nutrition supplement to Ensure Plus High Protein (350kcal, 20g protein).   >>Encourage & monitor PO intake. [place] dietary preferences as able.   2. Recommend add multivitamin, vitamin C, zinc sulfate (220mg x10d) pending no medical contraindications to promote wound healing.   3. Monitor GI tolerance, weight trends, labs, and skin integrity.  4. Defer bowel and pain regimens to team.   5. RD to remain available for diet education/intervention prn.

## 2024-12-14 NOTE — DIETITIAN INITIAL EVALUATION ADULT - PERTINENT MEDS FT
MEDICATIONS  (STANDING):  cefTRIAXone   IVPB 1000 milliGRAM(s) IV Intermittent every 24 hours  diazepam    Tablet 5 milliGRAM(s) Oral daily  lidocaine   4% Patch 1 Patch Transdermal every 24 hours  lidocaine   4% Patch 1 Patch Transdermal every 24 hours  pantoprazole    Tablet 40 milliGRAM(s) Oral before breakfast    MEDICATIONS  (PRN):  oxyCODONE    IR 15 milliGRAM(s) Oral every 6 hours PRN Severe Pain (7 - 10)

## 2024-12-14 NOTE — DIETITIAN INITIAL EVALUATION ADULT - PROBLEM SELECTOR PLAN 2
Hx neurogenic bladder s/p chronic jiménez, with hx recurrent UTIs. Admitted in July 2024 for septic shock 2/2 Klebsiella pneumoniae and Proteus mirabilis UTI and bacteremia--completed Ceftriaxone 2g q12h (6/22-7/5). Currently meeting 1/4 SIRS criteria (tachycardia).   S/p Vancomycin 1g, Zosyn 3.375g in ED. Jiménez exchanged in ED.    - F/u UCx, BCx x2  - Start Ceftriaxone 1g q24h x5 days, as prior UCx from 6/20/24 was sensitive

## 2024-12-14 NOTE — DIETITIAN NUTRITION RISK NOTIFICATION - TREATMENT: THE FOLLOWING DIET HAS BEEN RECOMMENDED
Continue Regular diet, consider addition of Ensure Max (150kcal, 30g protein) x2/day to promote intake.   >>Once pt tolerating diet and electrolytes consistently WNL, recommend change oral nutrition supplement to Ensure Plus High Protein (350kcal, 20g protein).   >>Encourage & monitor PO intake. [place] dietary preferences as able.

## 2024-12-14 NOTE — PROGRESS NOTE ADULT - PROBLEM SELECTOR PLAN 5
Hx multiple spinal surgeries c/b BLE paralysis and chronic neck/back pain. Has implanted intrathecal morphine pump that is nonfunctional. Home meds: Diazepam 5mg q24h, Oxycodone 15mg q6h PRN, Lidocaine patch PRN per chart, but patient states Oxycodone is 30mg.    - Continue home Diazepam 5mg q24h, Oxycodone 15mg q6h PRN, Lidocaine patch PRN  - F/u formal med rec, I-STOP to verify Oxycodone dose Present on admission. 3x pressure ulcers present in sacral and bilateral ischial regions. Pink base without visible tissue necrosis or surrounding erythema or purulent drainage. Patient reports not getting appropriate wound care and pressure offloading at home.     - Wound care consult

## 2024-12-14 NOTE — PROGRESS NOTE ADULT - PROBLEM SELECTOR PLAN 2
Hx neurogenic bladder s/p chronic jiménez, with hx recurrent UTIs. Admitted in July 2024 for septic shock 2/2 Klebsiella pneumoniae and Proteus mirabilis UTI and bacteremia--completed Ceftriaxone 2g q12h (6/22-7/5). Currently meeting 1/4 SIRS criteria (tachycardia).   S/p Vancomycin 1g, Zosyn 3.375g in ED. Jiménez exchanged in ED.    - F/u UCx, BCx x2  - Start Ceftriaxone 1g q24h x5 days, as prior UCx from 6/20/24 was sensitive Patient somewhat responsive but unclear at times. Unsure what her baseline is. A+O 1-2    Plan:  - Obtain collateral from [facility] or contacts  - CTH to rule out intracranial pathology  - Consider neuro consult if CTH clear and confirmed she is altered from baseline

## 2024-12-14 NOTE — DIETITIAN INITIAL EVALUATION ADULT - PROBLEM SELECTOR PLAN 5
Hx multiple spinal surgeries c/b BLE paralysis and chronic neck/back pain. Has implanted intrathecal morphine pump that is nonfunctional. Home meds: Diazepam 5mg q24h, Oxycodone 15mg q6h PRN, Lidocaine patch PRN per chart, but patient states Oxycodone is 30mg.    - Continue home Diazepam 5mg q24h, Oxycodone 15mg q6h PRN, Lidocaine patch PRN  - F/u formal med rec, I-STOP to verify Oxycodone dose

## 2024-12-14 NOTE — DIETITIAN INITIAL EVALUATION ADULT - OTHER CALCULATIONS
Estimated needs based on dosing wt as within % IBW 135lb/61.4kg (81%). Needs adjusted for age, malnutrition, wound healing, emphysema, and clinical status.

## 2024-12-14 NOTE — PROGRESS NOTE ADULT - PROBLEM SELECTOR PLAN 7
Fluids - S/p 1L NS  Electrolytes - Replete to Mg>2, Phos>3, K>4  Nutrition - Regular diet  GI Prophylaxis - Pantoprazole as above  VTE Prophylaxis - Lovenox 40mg subq  Code Status - Full code per 12/13 discussion Hx multiple spinal surgeries c/b BLE paralysis and chronic neck/back pain. Has implanted intrathecal morphine pump that is nonfunctional. Home meds: Diazepam 5mg q24h, Oxycodone 15mg q6h PRN, Lidocaine patch PRN per chart, but patient states Oxycodone is 30mg.    - Continue home Diazepam 5mg q24h, Oxycodone 15mg q6h PRN, Lidocaine patch PRN  - F/u formal med rec, I-STOP to verify Oxycodone dose  - Palliative consult

## 2024-12-14 NOTE — PROGRESS NOTE ADULT - PROBLEM SELECTOR PLAN 6
Hx multiple PEs (Dec 2023 and July 2024), on home Eliquis 5mg q12h. On admission, tachycardic to 100s, breathing and saturating well on room air.     - Hold home Eliquis iso severe anemia  - VTE prophylaxis only - Lovenox 40mg subq  - Need formal med rec to determine if patient received appropriate Eliquis loading IMPROVED. K 2.7 on admission, corrected to 4.1 after receiving 100meq total of KCl repletion. Etiology could be medication-induced or due to poor PO intake.  ECG poor quality but grossly unremarkable    - CTM

## 2024-12-14 NOTE — PROGRESS NOTE ADULT - SUBJECTIVE AND OBJECTIVE BOX
OVERNIGHT EVENTS:    SUBJECTIVE / INTERVAL HPI: Patient seen and examined at bedside.     VITAL SIGNS:  Vital Signs Last 24 Hrs  T(C): 36.9 (14 Dec 2024 05:51), Max: 36.9 (14 Dec 2024 05:51)  T(F): 98.4 (14 Dec 2024 05:51), Max: 98.4 (14 Dec 2024 05:51)  HR: 112 (14 Dec 2024 07:48) (107 - 116)  BP: 124/87 (14 Dec 2024 05:51) (111/76 - 143/83)  BP(mean): --  RR: 17 (14 Dec 2024 05:51) (15 - 17)  SpO2: 100% (14 Dec 2024 05:51) (100% - 100%)    Parameters below as of 13 Dec 2024 21:35  Patient On (Oxygen Delivery Method): room air      I&O's Summary    13 Dec 2024 07:01  -  14 Dec 2024 07:00  --------------------------------------------------------  IN: 0 mL / OUT: 1000 mL / NET: -1000 mL        PHYSICAL EXAM:    General: NAD  HEENT: NC/AT; PERRL, anicteric sclera; MMM  Neck: supple  Cardiovascular: +S1/S2; RRR  Respiratory: CTA B/L; no W/R/R  Gastrointestinal: soft, NT/ND; +BSx4  Extremities: WWP; no edema, clubbing or cyanosis  Vascular: 2+ radial, DP/PT pulses B/L  Neurological: AAOx3; no focal deficits    MEDICATIONS:  MEDICATIONS  (STANDING):  cefTRIAXone   IVPB 1000 milliGRAM(s) IV Intermittent every 24 hours  diazepam    Tablet 5 milliGRAM(s) Oral daily  lidocaine   4% Patch 1 Patch Transdermal every 24 hours  lidocaine   4% Patch 1 Patch Transdermal every 24 hours  pantoprazole    Tablet 40 milliGRAM(s) Oral before breakfast    MEDICATIONS  (PRN):  oxyCODONE    IR 15 milliGRAM(s) Oral every 6 hours PRN Severe Pain (7 - 10)      ALLERGIES:  Allergies    Crab (Pruritus)  morphine (Unknown)    Intolerances        LABS:                        6.5    10.29 )-----------( 577      ( 12 Dec 2024 21:17 )             20.9     12-13    138  |  107  |  6[L]  ----------------------------<  99  4.1   |  24  |  0.45[L]    Ca    7.5[L]      13 Dec 2024 05:52    TPro  6.2  /  Alb  2.2[L]  /  TBili  <0.2  /  DBili  <0.1  /  AST  10  /  ALT  <5[L]  /  AlkPhos  121[H]  12-13    PT/INR - ( 12 Dec 2024 22:20 )   PT: 11.6 sec;   INR: 0.99          PTT - ( 12 Dec 2024 22:20 )  PTT:26.8 sec  Urinalysis Basic - ( 13 Dec 2024 05:52 )    Color: x / Appearance: x / SG: x / pH: x  Gluc: 99 mg/dL / Ketone: x  / Bili: x / Urobili: x   Blood: x / Protein: x / Nitrite: x   Leuk Esterase: x / RBC: x / WBC x   Sq Epi: x / Non Sq Epi: x / Bacteria: x      CAPILLARY BLOOD GLUCOSE          RADIOLOGY & ADDITIONAL TESTS: Reviewed.   OVERNIGHT EVENTS: KAMRAN     SUBJECTIVE / INTERVAL HPI: Patient seen and examined at bedside. Patient difficult to interview in AM. Nods to some questions and does not respond to others. States 'I have belly pain'. Does not answer about chronicity, location, last BM. Does not answer if she has any contacts to call.     VITAL SIGNS:  Vital Signs Last 24 Hrs  T(C): 36.9 (14 Dec 2024 05:51), Max: 36.9 (14 Dec 2024 05:51)  T(F): 98.4 (14 Dec 2024 05:51), Max: 98.4 (14 Dec 2024 05:51)  HR: 112 (14 Dec 2024 07:48) (107 - 116)  BP: 124/87 (14 Dec 2024 05:51) (111/76 - 143/83)  BP(mean): --  RR: 17 (14 Dec 2024 05:51) (15 - 17)  SpO2: 100% (14 Dec 2024 05:51) (100% - 100%)    Parameters below as of 13 Dec 2024 21:35  Patient On (Oxygen Delivery Method): room air      I&O's Summary    13 Dec 2024 07:01  -  14 Dec 2024 07:00  --------------------------------------------------------  IN: 0 mL / OUT: 1000 mL / NET: -1000 mL        PHYSICAL EXAM:    General: NAD. Alert, nods and answers some questions  HEENT: NC/AT; PERRL, anicteric sclera; MMM  Neck: supple  Cardiovascular: +S1/S2; RRR  Respiratory: CTA B/L; no W/R/R  Gastrointestinal: soft, ND; +BSx4. Is somewhat tender to palpation, but reacts to any movement or touch, unclear if pain or anxiety  Extremities: WWP; no edema, clubbing or cyanosis  Vascular: 2+ radial, DP/PT pulses B/L  Neurological: AAOx3; no focal deficits  Psych: Seems anxious, unable to or will not fully express verbally    MEDICATIONS:  MEDICATIONS  (STANDING):  cefTRIAXone   IVPB 1000 milliGRAM(s) IV Intermittent every 24 hours  diazepam    Tablet 5 milliGRAM(s) Oral daily  lidocaine   4% Patch 1 Patch Transdermal every 24 hours  lidocaine   4% Patch 1 Patch Transdermal every 24 hours  pantoprazole    Tablet 40 milliGRAM(s) Oral before breakfast    MEDICATIONS  (PRN):  oxyCODONE    IR 15 milliGRAM(s) Oral every 6 hours PRN Severe Pain (7 - 10)      ALLERGIES:  Allergies    Crab (Pruritus)  morphine (Unknown)    Intolerances        LABS:                        6.5    10.29 )-----------( 577      ( 12 Dec 2024 21:17 )             20.9     12-13    138  |  107  |  6[L]  ----------------------------<  99  4.1   |  24  |  0.45[L]    Ca    7.5[L]      13 Dec 2024 05:52    TPro  6.2  /  Alb  2.2[L]  /  TBili  <0.2  /  DBili  <0.1  /  AST  10  /  ALT  <5[L]  /  AlkPhos  121[H]  12-13    PT/INR - ( 12 Dec 2024 22:20 )   PT: 11.6 sec;   INR: 0.99          PTT - ( 12 Dec 2024 22:20 )  PTT:26.8 sec  Urinalysis Basic - ( 13 Dec 2024 05:52 )    Color: x / Appearance: x / SG: x / pH: x  Gluc: 99 mg/dL / Ketone: x  / Bili: x / Urobili: x   Blood: x / Protein: x / Nitrite: x   Leuk Esterase: x / RBC: x / WBC x   Sq Epi: x / Non Sq Epi: x / Bacteria: x      CAPILLARY BLOOD GLUCOSE          RADIOLOGY & ADDITIONAL TESTS: Reviewed.

## 2024-12-14 NOTE — PROGRESS NOTE ADULT - PROBLEM SELECTOR PLAN 3
Present on admission. 3x pressure ulcers present in sacral and bilateral ischial regions. Pink base without visible tissue necrosis or surrounding erythema or purulent drainage. Patient reports not getting appropriate wound care and pressure offloading at home.     - Wound care consult Hx neurogenic bladder s/p chronic jiménez, with hx recurrent UTIs. Admitted in July 2024 for septic shock 2/2 Klebsiella pneumoniae and Proteus mirabilis UTI and bacteremia--completed Ceftriaxone 2g q12h (6/22-7/5). Currently meeting 1/4 SIRS criteria (tachycardia).   S/p Vancomycin 1g, Zosyn 3.375g in ED. Jiménez exchanged in ED.    - F/u UCx, BCx x2  - d/c Ceftriaxone 1g d/t no urinary sx

## 2024-12-14 NOTE — DIETITIAN INITIAL EVALUATION ADULT - OTHER INFO
69F with PMH of multiple spinal fusions complicated by chronic neck/back pain and BLE paralysis, status post non-functional intrathecal morphine pump, neurogenic bladder status post chronic jiménez complicated by recurrent UTIs, multiple PEs (Dec 2023, July 2024), CVA x3 (MR brain Jan 2023 with chronic lacunar infarcts), HTN, HLD, and emphysema who presented with cloudy urine and pressure wounds, found to have HGB 6.5, contaminated but positive UA, admitted for acute UTI in the setting of insufficient care at home.    Pt seen on 4UR for assessment. Labs and medication orders reviewed. Electrolytes WNL, HgbA1c (12/14) WNL, BUN/Cr 4/0.34 <low>, AST/ALT 5/<5 <low>. On Regular diet. Pt resting in bed on visit, adamantly declined RD interview, reasoning unclear, pt oriented x4 per nursing documentation. Wt history per prior admission RD notes: (3/26) 168lb, (4/28) 158lb, (6/21) 135lb; current admission wt 110lb/49.9kg indicates 58lb/35% wt loss over x9 months - clinically significant. Overt signs of severe muscle and fat wasting observed. Per ASPEN guidelines, pt meets criteria for severe malnutrition - see nutrition risk notification. No nausea/vomiting/diarrhea/constipation documented, last recorded BM 12/12. Sacral stage IV, right ischium unstageable, and left ischium unstageable pressure injuries documented. No edema noted. Filippo score 12. See nutrition recommendations. RD to remain available.  69F with PMH of multiple spinal fusions complicated by chronic neck/back pain and BLE paralysis, status post non-functional intrathecal morphine pump, neurogenic bladder status post chronic jiménez complicated by recurrent UTIs, multiple PEs (Dec 2023, July 2024), CVA x3 (MR brain Jan 2023 with chronic lacunar infarcts), HTN, HLD, and emphysema who presented with cloudy urine and pressure wounds, found to have HGB 6.5, contaminated but positive UA, admitted for acute UTI in the setting of insufficient care at home.    Pt seen on 4UR for assessment. Labs and medication orders reviewed. Electrolytes WNL, HgbA1c (12/14) WNL, BUN/Cr 4/0.34 <low>, AST/ALT 5/<5 <low>. On Regular diet. Pt resting in bed on visit, adamantly declined RD interview, reasoning unclear, pt oriented x4 per nursing documentation. Wt history per prior admission RD notes: (3/26) 168lb, (4/28) 158lb, (6/21) 135lb; current admission wt 110lb/49.9kg indicates 58lb/35% wt loss over x9 months - clinically significant. Overt signs of severe muscle and fat wasting observed. Per ASPEN guidelines, pt meets criteria for severe malnutrition - see nutrition risk notification. No nausea/vomiting/diarrhea/constipation documented, last recorded BM 12/12. Crab allergy documented. No Mandaeism/ethnic/cultural food preferences noted. Sacral stage IV, right ischium unstageable, and left ischium unstageable pressure injuries documented. No edema noted. Filippo score 12. See nutrition recommendations. RD to remain available.  69F with PMH of multiple spinal fusions complicated by chronic neck/back pain and BLE paralysis, status post non-functional intrathecal morphine pump, neurogenic bladder status post chronic jiménez complicated by recurrent UTIs, multiple PEs (Dec 2023, July 2024), CVA x3 (MR brain Jan 2023 with chronic lacunar infarcts), HTN, HLD, and emphysema who presented with cloudy urine and pressure wounds, found to have HGB 6.5, contaminated but positive UA, admitted for acute UTI in the setting of insufficient care at home.    Pt seen on 4UR for assessment. Labs and medication orders reviewed. Electrolytes WNL, HgbA1c (12/14) WNL, BUN/Cr 4/0.34 <low>, AST/ALT 5/<5 <low>. On Regular diet. Pt resting in bed on visit, adamantly declined RD interview, reasoning unclear, pt oriented x4 per nursing documentation. Wt history per prior admission RD notes: (3/26) 168lb, (4/28) 158lb, (6/21) 135lb; current admission wt 110lb/49.9kg indicates 58lb/35% wt loss over x9 months - clinically significant. Overt signs of severe muscle and fat wasting observed. Per ASPEN guidelines, pt meets criteria for severe malnutrition - see nutrition risk notification. No nausea/vomiting/diarrhea/constipation documented, last recorded BM 12/12. Crab allergy documented. No Christianity/ethnic/cultural food preferences noted. Sacral stage IV, right ischium unstageable, and left ischium unstageable pressure injuries documented. No edema noted. Filippo score 12. See nutrition recommendations - RD communicated to team. RD to remain available.

## 2024-12-14 NOTE — DIETITIAN INITIAL EVALUATION ADULT - PERTINENT LABORATORY DATA
12-14    137  |  106  |  4[L]  ----------------------------<  66[L]  3.9   |  21[L]  |  0.34[L]    Ca    8.1[L]      14 Dec 2024 05:30  Phos  2.7     12-14  Mg     1.7     12-14    TPro  6.3  /  Alb  1.8[L]  /  TBili  0.2  /  DBili  x   /  AST  5[L]  /  ALT  <5[L]  /  AlkPhos  121[H]  12-14  A1C with Estimated Average Glucose Result: 5.4 % (12-14-24 @ 05:30)

## 2024-12-14 NOTE — DIETITIAN INITIAL EVALUATION ADULT - NSFNSPHYEXAMSKINFT_GEN_A_CORE
Pressure Injury 1: sacrum, Stage IV  Pressure Injury 2: Right:, ischium, Unstageable  Pressure Injury 3: Left:, ischium, Unstageable

## 2024-12-14 NOTE — PROGRESS NOTE ADULT - PROBLEM SELECTOR PLAN 4
IMPROVED. K 2.7 on admission, corrected to 4.1 after receiving 100meq total of KCl repletion. Etiology could be medication-induced or due to poor PO intake.  ECG poor quality but grossly unremarkable    - CTM Persistently tachycardic through admission. Per chart review, she has been tachy during previous admissions as well. Denies chest pain.    Plan:  - f/u ECG

## 2024-12-14 NOTE — PROGRESS NOTE ADULT - PROBLEM SELECTOR PLAN 9
Fluids - S/p 1L NS  Electrolytes - Replete to Mg>2, Phos>3, K>4  Nutrition - Regular diet  GI Prophylaxis - Pantoprazole as above  VTE Prophylaxis - Lovenox 40mg subq  Code Status - Full code per 12/13 discussion

## 2024-12-14 NOTE — DIETITIAN INITIAL EVALUATION ADULT - PROBLEM SELECTOR PLAN 1
Patient with HGB 6.5 on admission, MCV 81.3. Currently no active signs of bleeding--denies hematemesis, hematuria, hematochezia/melena. Brown stool visualized on admission exam.     - Transfusing 1u pRBC   - Check post-transfusion CBC, hemolysis labs  - Can check iron when more appropriate, as patient is receiving pRBC on admission  - GI prophylaxis - Pantoprazole 40mg qd  - maintain active T&S  - transfuse if Hgb <7

## 2024-12-14 NOTE — DIETITIAN NUTRITION RISK NOTIFICATION - ADDITIONAL COMMENTS/DIETITIAN RECOMMENDATIONS
Wt history per prior admission RD notes: (3/26) 168lb, (4/28) 158lb, (6/21) 135lb; current admission wt 110lb/49.9kg indicates 58lb/35% wt loss over x9 months - clinically significant. Overt signs of severe muscle and fat wasting observed - pt declined full assessment/exam. Per ASPEN guidelines, pt meets criteria for severe malnutrition.

## 2024-12-15 DIAGNOSIS — L98.429 NON-PRESSURE CHRONIC ULCER OF BACK WITH UNSPECIFIED SEVERITY: ICD-10-CM

## 2024-12-15 PROCEDURE — 99233 SBSQ HOSP IP/OBS HIGH 50: CPT | Mod: GC

## 2024-12-15 PROCEDURE — 99221 1ST HOSP IP/OBS SF/LOW 40: CPT

## 2024-12-15 RX ORDER — CEFTRIAXONE SODIUM 1 G
1000 VIAL (EA) INJECTION EVERY 24 HOURS
Refills: 0 | Status: DISCONTINUED | OUTPATIENT
Start: 2024-12-15 | End: 2024-12-15

## 2024-12-15 RX ORDER — ERTAPENEM 1 G/1
1000 INJECTION, POWDER, LYOPHILIZED, FOR SOLUTION INTRAMUSCULAR; INTRAVENOUS EVERY 24 HOURS
Refills: 0 | Status: DISCONTINUED | OUTPATIENT
Start: 2024-12-16 | End: 2024-12-16

## 2024-12-15 RX ORDER — ERTAPENEM 1 G/1
1000 INJECTION, POWDER, LYOPHILIZED, FOR SOLUTION INTRAMUSCULAR; INTRAVENOUS EVERY 24 HOURS
Refills: 0 | Status: DISCONTINUED | OUTPATIENT
Start: 2024-12-15 | End: 2024-12-15

## 2024-12-15 RX ORDER — VANCOMYCIN HCL 900 MCG/MG
750 POWDER (GRAM) MISCELLANEOUS EVERY 12 HOURS
Refills: 0 | Status: COMPLETED | OUTPATIENT
Start: 2024-12-15 | End: 2024-12-16

## 2024-12-15 RX ORDER — ERTAPENEM 1 G/1
1000 INJECTION, POWDER, LYOPHILIZED, FOR SOLUTION INTRAMUSCULAR; INTRAVENOUS ONCE
Refills: 0 | Status: COMPLETED | OUTPATIENT
Start: 2024-12-15 | End: 2024-12-15

## 2024-12-15 RX ORDER — VANCOMYCIN HCL 900 MCG/MG
500 POWDER (GRAM) MISCELLANEOUS EVERY 12 HOURS
Refills: 0 | Status: DISCONTINUED | OUTPATIENT
Start: 2024-12-15 | End: 2024-12-15

## 2024-12-15 RX ADMIN — ERTAPENEM 120 MILLIGRAM(S): 1 INJECTION, POWDER, LYOPHILIZED, FOR SOLUTION INTRAMUSCULAR; INTRAVENOUS at 14:24

## 2024-12-15 RX ADMIN — Medication 250 MILLIGRAM(S): at 18:31

## 2024-12-15 RX ADMIN — LIDOCAINE 1 PATCH: 40 CREAM TOPICAL at 14:25

## 2024-12-15 RX ADMIN — OXYCODONE HYDROCHLORIDE 15 MILLIGRAM(S): 30 TABLET ORAL at 00:10

## 2024-12-15 RX ADMIN — LIDOCAINE 1 PATCH: 40 CREAM TOPICAL at 19:32

## 2024-12-15 RX ADMIN — PANTOPRAZOLE SODIUM 40 MILLIGRAM(S): 40 TABLET, DELAYED RELEASE ORAL at 08:24

## 2024-12-15 RX ADMIN — OXYCODONE HYDROCHLORIDE 15 MILLIGRAM(S): 30 TABLET ORAL at 22:13

## 2024-12-15 RX ADMIN — OXYCODONE HYDROCHLORIDE 15 MILLIGRAM(S): 30 TABLET ORAL at 23:13

## 2024-12-15 RX ADMIN — Medication 500 MILLIGRAM(S): at 12:32

## 2024-12-15 RX ADMIN — Medication 1 TABLET(S): at 12:32

## 2024-12-15 RX ADMIN — Medication 220 MILLIGRAM(S): at 12:32

## 2024-12-15 RX ADMIN — DIAZEPAM 5 MILLIGRAM(S): 10 TABLET ORAL at 12:31

## 2024-12-15 NOTE — PROGRESS NOTE ADULT - PROBLEM SELECTOR PLAN 6
Hx multiple spinal surgeries c/b BLE paralysis and chronic neck/back pain. Has implanted intrathecal morphine pump that is nonfunctional. Home meds: Diazepam 5mg q24h, Oxycodone 15mg q6h PRN, Lidocaine patch PRN per chart, but patient states Oxycodone is 30mg.    - Continue home Diazepam 5mg q24h, Oxycodone 15mg q6h PRN, Lidocaine patch PRN  - F/u formal med rec, I-STOP to verify Oxycodone dose  - Palliative consult

## 2024-12-15 NOTE — PROGRESS NOTE ADULT - PROBLEM SELECTOR PLAN 3
Patient somewhat responsive but unclear at times. Unsure what her baseline is. A+O 1-2    Plan:  - Obtain collateral from [facility] or contacts  - CT w/o acute intracranial pathology - past stroke, chronic microvascular disease  - Neurology/Psychiatry
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
## 2024-12-15 NOTE — BH CONSULTATION LIAISON ASSESSMENT NOTE - HPI (INCLUDE ILLNESS QUALITY, SEVERITY, DURATION, TIMING, CONTEXT, MODIFYING FACTORS, ASSOCIATED SIGNS AND SYMPTOMS)
Pt is a 68 yo F, originally from [place], living in  since 1987 with no prior psychiatric history, with PMH of multiple spinal fusions c/b chronic neck/back pain and BLE paralysis, s/p non-functional intrathecal morphine pump, neurogenic bladder s/p chronic jiménez c/b recurrent UTIs, multiple PEs (Dec 2023, July 2024), CVA x3 (MR brain Jan 2023 w/ chronic lacunar infarcts), HTN, HLD, and emphysema presenting with cloudy urine and pressure wounds not being sufficiently managed by HHA. Patient reports that her HHA "comes in, eats breakfast, and sleeps in the living room". She called the HHA agency to have the HHA removed, and the agency called EMS to bring the patient to the hospital. She is currently admitted for UTI, pressure wounds, and insufficient care at home.     On evaluation, patient is resting in bed. She asks writer to take off her mask as she appears to be very hard of hearing; when asked if she prefers Gabonese or English in Gabonese, patient does not respond and continues speaking in English. She exclaims "Dr. Ott put this on me" when asked how she is feeling. She stares at writer incredulously when asked what she is referring to, and gestures towards her abdomen. She appears almost tearful as she states "Dr. Pollock is trying to kill me. I'm going to tate her." She clarifies that Dr. Ott put a pen pump on her and it is killing her. When asked if she would like it removed, she states "No, she's going to kill me." She states that Dr. Ott is a physician at [facility], [place]. Writer attempts to redirect her attention to the matter of her blood transfusion; she states that she does not want it as she just received it this morning. When asked if she understands why she needs blood, she responds "Yes" but is unable to articulate why. When writer explained that she having low blood volume could make her feel weak, she agrees that she is feeling weak but continues to refuse. When asked if it hurts to receive it, she says "Yes." When explained that she could die if she continues to lose blood, she responds "Then I'll die." When asked if she wants to die, she states "Yes!" Throughout evaluation, patient repeatedly fixates on Dr. Pollock trying to kill her and requires significant redirection; she then asks if writer wants to kill her too. When asked why we would be trying to kill her, she stares at writer and asks "Why do you keep asking stupid questions? I already told you no I don't want the blood transfusion." She states that she is here in the hospital to receive wound care for her pressure wounds, and asks "Are you stupid?" when writer asks if there is anything else that we can help her with. When writer explains the need for bloodwork to monitor her, she states that she does not want that either and asks if writer is trying to kill her. She adamantly denies having a healthcare proxy and denies having anyone that writer can speak with. Interview terminated as patient became increasingly agitated, repeatedly asking "Are you stupid?" and was perseverative on Dr. Pollock.

## 2024-12-15 NOTE — PROGRESS NOTE ADULT - PROBLEM SELECTOR PLAN 2
Patient with HGB 6.5 on admission, MCV 81.3. Currently no active signs of bleeding--denies hematemesis, hematuria, hematochezia/melena. Brown stool visualized on admission exam. Unsure based on history if having ssx GIB.    - GI consult for possible GIB   - f/u iron studies, haptoglobin, ldh, retic count  - GI prophylaxis - Pantoprazole 40mg qd  - maintain active T&S  - transfuse if Hgb <7  -Attempted to transfuse patient overnight but unable to as she "refused"

## 2024-12-15 NOTE — BH CONSULTATION LIAISON ASSESSMENT NOTE - NSBHREFERDETAILS_PSY_A_CORE_FT
Psychiatry consulted for capacity as patient is refusing bloodwork and blood transfusions. Patient has been adamantly refusing and has not been giving a reason why; she does not appear to understand what she is refusing.

## 2024-12-15 NOTE — PROGRESS NOTE ADULT - SUBJECTIVE AND OBJECTIVE BOX
OVERNIGHT EVENTS: KAMRAN     SUBJECTIVE / INTERVAL HPI: Patient seen and examined at bedside. Patient difficult to interview in AM. Nods to some questions and does not respond to others. States 'I have belly pain'. Does not answer about chronicity, location, last BM. Does not answer if she has any contacts to call. ROS is otherwise negative.       Vital Signs Last 24 Hrs  T(C): 36.8 (15 Dec 2024 10:17), Max: 36.9 (14 Dec 2024 21:00)  T(F): 98.2 (15 Dec 2024 10:17), Max: 98.4 (14 Dec 2024 21:00)  HR: 95 (15 Dec 2024 10:17) (95 - 124)  BP: 135/82 (15 Dec 2024 10:17) (110/80 - 142/90)  BP(mean): --  RR: 18 (15 Dec 2024 10:17) (17 - 18)  SpO2: 100% (15 Dec 2024 10:17) (100% - 100%)    Parameters below as of 15 Dec 2024 10:17  Patient On (Oxygen Delivery Method): room air            PHYSICAL EXAM:    General: NAD. Alert, nods and answers some questions  HEENT: NC/AT; PERRL, anicteric sclera; MMM  Neck: supple  Cardiovascular: +S1/S2; RRR  Respiratory: CTA B/L; no W/R/R  Gastrointestinal: soft, ND; +BSx4. Is somewhat tender to palpation, but reacts to any movement or touch, unclear if pain or anxiety  Extremities: WWP; no edema, clubbing or cyanosis  Vascular: 2+ radial, DP/PT pulses B/L  Neurological: Alert; no focal deficits  : + FC  Psych: Seems anxious, unable to or will not fully express verbally  Back: multiple unstageable ulcers, malodorous      MEDICATIONS  (STANDING):  ascorbic acid 500 milliGRAM(s) Oral daily  diazepam    Tablet 5 milliGRAM(s) Oral daily  lidocaine   4% Patch 1 Patch Transdermal every 24 hours  lidocaine   4% Patch 1 Patch Transdermal every 24 hours  multivitamin 1 Tablet(s) Oral daily  pantoprazole    Tablet 40 milliGRAM(s) Oral before breakfast  zinc sulfate 220 milliGRAM(s) Oral daily    MEDICATIONS  (PRN):  oxyCODONE    IR 15 milliGRAM(s) Oral every 6 hours PRN Severe Pain (7 - 10)          ALLERGIES:  Allergies    Crab (Pruritus)  morphine (Unknown)    Intolerances        LABS:                          7.0    14.23 )-----------( 543      ( 14 Dec 2024 22:46 )             22.6   12-14    137  |  106  |  4[L]  ----------------------------<  66[L]  3.9   |  21[L]  |  0.34[L]    Ca    8.1[L]      14 Dec 2024 05:30  Phos  2.7     12-14  Mg     1.7     12-14    TPro  6.3  /  Alb  1.8[L]  /  TBili  0.2  /  DBili  x   /  AST  5[L]  /  ALT  <5[L]  /  AlkPhos  121[H]  12-14        RADIOLOGY & ADDITIONAL TESTS: Reviewed.

## 2024-12-15 NOTE — PHYSICAL THERAPY INITIAL EVALUATION ADULT - PERTINENT HX OF CURRENT PROBLEM, REHAB EVAL
Patient is a 69-year-old female with PMHx multiple spinal fusions c/b chronic neck/back pain and BLE paralysis, s/p non-functional intrathecal morphine pump, neurogenic bladder s/p chronic jiménez c/b recurrent UTIs, multiple PEs (Dec 2023, July 2024), CVA x3 (MR brain Jan 2023 w/ chronic lacunar infarcts), HTN, HLD, and emphysema presenting with cloudy urine and pressure wounds, found to have 3x stage 3 pressure ulcers in the sacral, bilateral ischial regions, HGB 6.5, contaminated but positive UA, concerning for acute UTI in the setting of insufficient care at home

## 2024-12-15 NOTE — PHYSICAL THERAPY INITIAL EVALUATION ADULT - GENERAL OBSERVATIONS, REHAB EVAL
PT IE Completed. Pt received sidelying in bed w/ MDs present looking at pt's sacral wound, NAD, +hep-lock, +jiménez, +CB, +alarm. Pt requires 2 person assist for mobility, BLE paralysis.

## 2024-12-15 NOTE — BH CONSULTATION LIAISON ASSESSMENT NOTE - NSICDXBHSECONDARYDX_PSY_ALL_CORE
Acute on chronic anemia   D64.9  Acute UTI   N39.0  Pressure ulcer, stage 3   L89.93  Prophylactic measure   Z29.9  Pulmonary thromboembolism   [id number]  Pulmonary embolism   I26.99  Chronic paraplegia   G82.20  Hypokalemia   E87.6  Acute metabolic encephalopathy   G93.41  Tachycardia   R00.0

## 2024-12-15 NOTE — BH CONSULTATION LIAISON ASSESSMENT NOTE - SUMMARY
Pt is a 70 yo F, originally from [place], living in  since 1987 with no prior psychiatric history, with PMH of multiple spinal fusions c/b chronic neck/back pain and BLE paralysis, s/p non-functional intrathecal morphine pump, neurogenic bladder s/p chronic jiménez c/b recurrent UTIs, multiple PEs (Dec 2023, July 2024), CVA x3 (MR brain Jan 2023 w/ chronic lacunar infarcts), HTN, HLD, and emphysema presenting with cloudy urine and pressure wounds not being sufficiently managed by HHA. Patient reports that her HHA "comes in, eats breakfast, and sleeps in the living room". She called the HHA agency to have the HHA removed, and the agency called EMS to bring the patient to the hospital. She is currently admitted for UTI, pressure wounds, and insufficient care at home.     On evaluation, patient is irritable and perseverative on someone named Dr. Ott trying to kill her; she is not able to engage in an appropriate discussion regarding her refusal of bloodwork and blood transfusions. Although she consistently expresses a choice to refuse these interventions, she does not appear to demonstrate capacity to appreciate the situation, understanding, and reasoning. She mistakenly believes that she already received a blood transfusion this morning and is not able to articulate the risks and benefits of bloodwork and blood transfusions even with significant prompting. She provocatively accuses writer of trying to kill her based on writer asking repeated questions about the blood transfusion and states that she'd rather die; however, she appears to be help-seeking and future-oriented as she is also asking for help with her pressure wounds. She expresses willingness to stay in the hospital for treatment of her wounds, but shows limited understanding of her other medical issues. She is not able to engage in reasoning as she appears acutely paranoid and suspicious of medical interventions at this time. Per chart review, patient appears to have fluctuating attention and may be delirious.     - No psychiatric 1:1 indicated as patient does not appear acutely suicidal   - Patient does not have capacity to refuse bloodwork and blood transfusions at this time   - Follow up with collateral to determine patient's baseline and HCP status (patient denies having one)   - Medical providers can also determine capacity by assessing patient's ability to communicate a choice, understand the situation and risks & benefits, appreciate relevance of pertinent information to self, and manipulate information rationally   - Psychiatry to follow as patient is exhibiting paranoia likely in the context of delirium

## 2024-12-15 NOTE — CONSULT NOTE ADULT - ATTENDING COMMENTS
68yo F w/ PMH multiple spinal fusions c/b chronic neck/back pain and BLE paralysis, s/p non-functional intrathecal morphine pump, neurogenic bladder s/p chronic jiménez c/b recurrent UTIs, multiple PEs (Dec 2023, July 2024), CVA x3 (MR brain Jan 2023 w/ chronic lacunar infarcts), HTN, HLD, and emphysema after pt aid unable to provide pt care. While at [facility], pt afebrile, tachy 120s resolving to 90s, normotensive. Labs sx for leukocytosis WBC 14.2 (11), (+)UA on admission. On exam, 3 large sacral ulcers w/ R-lateral likely stage 3, central ulcer likely stage 3 and L-lateral ulcer stage 4. Sacral wounds at this time appear clean, granulating. Mild exudate at sacral site w/ no obvious sign of infection, no purulence, erythema, drainage or induration. Can consider spine surgery consult given extent of wounds and proximal hardware as to whether or not further w/u is required.     - no acute surgical intervention   - nutritional optimization and offloading   - consider spine surgery for further opinion or w/u given proximity of wound and spine hardware     Team 4C to follow, please reach out with any questions or concerns

## 2024-12-15 NOTE — BH CONSULTATION LIAISON ASSESSMENT NOTE - NSBHCHARTREVIEWVS_PSY_A_CORE FT
Vital Signs Last 24 Hrs  T(C): 36.8 (15 Dec 2024 10:17), Max: 36.9 (14 Dec 2024 21:00)  T(F): 98.2 (15 Dec 2024 10:17), Max: 98.4 (14 Dec 2024 21:00)  HR: 95 (15 Dec 2024 10:17) (95 - 124)  BP: 135/82 (15 Dec 2024 10:17) (110/80 - 142/90)  BP(mean): --  RR: 18 (15 Dec 2024 10:17) (17 - 18)  SpO2: 100% (15 Dec 2024 10:17) (100% - 100%)    Parameters below as of 15 Dec 2024 10:17  Patient On (Oxygen Delivery Method): room air

## 2024-12-15 NOTE — BH CONSULTATION LIAISON ASSESSMENT NOTE - RISK ASSESSMENT
Clinical Pharmacy - Warfarin Dosing Consult     Pharmacy has been consulted to manage this patient s warfarin therapy.  Indication: Atrial Fibrillation  Therapy Goal: INR 2-3  Warfarin Prior to Admission: Yes  Warfarin PTA Regimen: 8 mg MWF, 5 mg all other days  Significant drug interactions: Doxycycline, allopurinol, trazodone  Dose Comments: Last PTA dose 5 mg on 11/5    INR   Date Value Ref Range Status   11/06/2022 3.10 (H) 0.85 - 1.15 Final   11/05/2022 3.74 (H) 0.85 - 1.15 Final       Recommend warfarin 2.5 mg today as patient above INR goal but trending down. Pharmacy will monitor Eduardo Laughlin daily and order warfarin doses to achieve specified goal.      Please contact pharmacy as soon as possible if the warfarin needs to be held for a procedure or if the warfarin goals change.      Thank you,   Marcela Beck, PharmD  Pharmacy Resident       Patient is at low acute risk of suicide as she appears help-seeking and future-oriented, and appears to voice SI provocatively out of frustration rather than genuine wish to die. Patient is at low chronic risk of suicide as she does not have any history of self-injurious or suicidal ideation. She is at low acute and chronic risk of violence as she demonstrates adequate behavioral control despite irritability and paranoia.

## 2024-12-15 NOTE — GOALS OF CARE CONVERSATION - ADVANCED CARE PLANNING - CONVERSATION DETAILS
Called patient's niece, Melissa, who is listed as the emergency contact. She states she is the closest family member to the patient. Patient does have some sisters, but they are estranged from her. Per Melissa, she was made HCP at a recent hospitalization.     Obtained some history from [facility]. She was unsurprised to hear that patient has been declining care at the [facility] as she has been doing the same from aides at the house. Per Melissa, patient wants her niece there and does not want other people to help her. Was unable to see doctor as she is bedbound. She has been more "aggressive" at home. She feels the back wound has been getting worse, does not feel she has been receiving sufficient wound care at home.     Melissa expresses some frustration at having to make medical decisions for the patient. She states she is her proxy, but the patient is sometimes "angry" at [facility] for making certain medical decisions. She understands the patient's worsening prognosis given she has been admitted to the hospital 3-4 times in the past year with infections, most recently at [facility] where she had ESBL bacteremia. I explained that her poor functional status (patient is bedbound), low albumin and large sacral wound coupled with increasingly resistant infections does indicate poor prognosis. I introduced the idea of symptom directed care as an option in the future, Melissa open to further discussion.     At this time, Melissa believes we should continue to treat the patient including giving her blood products and continuing blood draws even if she refuses. She would like surgery to evaluate the wound as a possible source of infection. She will be visiting the patient in the hospital later today and the team will continue exploring [facility] discussions at that time.

## 2024-12-15 NOTE — PROGRESS NOTE ADULT - PROBLEM SELECTOR PLAN 7
Hx multiple PEs (Dec 2023 and July 2024), on home Eliquis 5mg q12h. On admission, tachycardic to 100s, breathing and saturating well on room air.     - Hold home Eliquis iso severe anemia  - VTE prophylaxis only - Lovenox 40mg subq  - Need formal med rec to determine if patient received appropriate Eliquis loading  - f/u b/l le dopplers

## 2024-12-15 NOTE — PROGRESS NOTE ADULT - PROBLEM SELECTOR PLAN 4
Persistently tachycardic through admission. Per chart review, she has been tachy during previous admissions as well. Denies chest pain.    Plan:  - f/u ECG  -Rectal temp

## 2024-12-15 NOTE — BH CONSULTATION LIAISON ASSESSMENT NOTE - CURRENT MEDICATION
MEDICATIONS  (STANDING):  ascorbic acid 500 milliGRAM(s) Oral daily  diazepam    Tablet 5 milliGRAM(s) Oral daily  lidocaine   4% Patch 1 Patch Transdermal every 24 hours  lidocaine   4% Patch 1 Patch Transdermal every 24 hours  multivitamin 1 Tablet(s) Oral daily  pantoprazole    Tablet 40 milliGRAM(s) Oral before breakfast  zinc sulfate 220 milliGRAM(s) Oral daily    MEDICATIONS  (PRN):  oxyCODONE    IR 15 milliGRAM(s) Oral every 6 hours PRN Severe Pain (7 - 10)

## 2024-12-15 NOTE — PROGRESS NOTE ADULT - NSPROGADDITIONALINFOA_GEN_ALL_CORE
-Patient does not have capacity based on my assessment; psychiatry also deemed that patient does not have capacity  -Until GOC are established low threshold for ICU consult

## 2024-12-15 NOTE — PHYSICAL THERAPY INITIAL EVALUATION ADULT - ADDITIONAL COMMENTS
Pt does not answer questions about home or PLOF. Per chart pt has HHA and is wheelchair bound at baseline 2/2 chronic LE paralysis. As certified below, I, or a nurse practitioner or physician assistant working with me, had a face-to-face encounter that meets the physician face-to-face encounter requirements.

## 2024-12-16 DIAGNOSIS — Z71.89 OTHER SPECIFIED COUNSELING: ICD-10-CM

## 2024-12-16 DIAGNOSIS — G93.49 OTHER ENCEPHALOPATHY: ICD-10-CM

## 2024-12-16 DIAGNOSIS — Z91.89 OTHER SPECIFIED PERSONAL RISK FACTORS, NOT ELSEWHERE CLASSIFIED: ICD-10-CM

## 2024-12-16 DIAGNOSIS — Z51.5 ENCOUNTER FOR PALLIATIVE CARE: ICD-10-CM

## 2024-12-16 LAB
ALBUMIN SERPL ELPH-MCNC: 1.9 G/DL — LOW (ref 3.3–5)
ALP SERPL-CCNC: 110 U/L — SIGNIFICANT CHANGE UP (ref 40–120)
ALT FLD-CCNC: <5 U/L — LOW (ref 10–45)
ANION GAP SERPL CALC-SCNC: 8 MMOL/L — SIGNIFICANT CHANGE UP (ref 5–17)
AST SERPL-CCNC: 8 U/L — LOW (ref 10–40)
BASOPHILS # BLD AUTO: 0.05 K/UL — SIGNIFICANT CHANGE UP (ref 0–0.2)
BASOPHILS NFR BLD AUTO: 0.5 % — SIGNIFICANT CHANGE UP (ref 0–2)
BILIRUB SERPL-MCNC: 0.2 MG/DL — SIGNIFICANT CHANGE UP (ref 0.2–1.2)
BLD GP AB SCN SERPL QL: NEGATIVE — SIGNIFICANT CHANGE UP
BUN SERPL-MCNC: 3 MG/DL — LOW (ref 7–23)
CALCIUM SERPL-MCNC: 7.8 MG/DL — LOW (ref 8.4–10.5)
CHLORIDE SERPL-SCNC: 104 MMOL/L — SIGNIFICANT CHANGE UP (ref 96–108)
CO2 SERPL-SCNC: 24 MMOL/L — SIGNIFICANT CHANGE UP (ref 22–31)
CREAT SERPL-MCNC: 0.32 MG/DL — LOW (ref 0.5–1.3)
CRP SERPL-MCNC: 81.8 MG/L — HIGH (ref 0–4)
EGFR: 112 ML/MIN/1.73M2 — SIGNIFICANT CHANGE UP
EOSINOPHIL # BLD AUTO: 0.19 K/UL — SIGNIFICANT CHANGE UP (ref 0–0.5)
EOSINOPHIL NFR BLD AUTO: 2 % — SIGNIFICANT CHANGE UP (ref 0–6)
GLUCOSE SERPL-MCNC: 92 MG/DL — SIGNIFICANT CHANGE UP (ref 70–99)
HCT VFR BLD CALC: 25.2 % — LOW (ref 34.5–45)
HGB BLD-MCNC: 7.7 G/DL — LOW (ref 11.5–15.5)
IMM GRANULOCYTES NFR BLD AUTO: 0.3 % — SIGNIFICANT CHANGE UP (ref 0–0.9)
LYMPHOCYTES # BLD AUTO: 2.5 K/UL — SIGNIFICANT CHANGE UP (ref 1–3.3)
LYMPHOCYTES # BLD AUTO: 26.3 % — SIGNIFICANT CHANGE UP (ref 13–44)
MAGNESIUM SERPL-MCNC: 1.8 MG/DL — SIGNIFICANT CHANGE UP (ref 1.6–2.6)
MCHC RBC-ENTMCNC: 24.7 PG — LOW (ref 27–34)
MCHC RBC-ENTMCNC: 30.6 G/DL — LOW (ref 32–36)
MCV RBC AUTO: 80.8 FL — SIGNIFICANT CHANGE UP (ref 80–100)
MONOCYTES # BLD AUTO: 0.89 K/UL — SIGNIFICANT CHANGE UP (ref 0–0.9)
MONOCYTES NFR BLD AUTO: 9.3 % — SIGNIFICANT CHANGE UP (ref 2–14)
NEUTROPHILS # BLD AUTO: 5.86 K/UL — SIGNIFICANT CHANGE UP (ref 1.8–7.4)
NEUTROPHILS NFR BLD AUTO: 61.6 % — SIGNIFICANT CHANGE UP (ref 43–77)
NRBC # BLD: 0 /100 WBCS — SIGNIFICANT CHANGE UP (ref 0–0)
PHOSPHATE SERPL-MCNC: 2.6 MG/DL — SIGNIFICANT CHANGE UP (ref 2.5–4.5)
PLATELET # BLD AUTO: 417 K/UL — HIGH (ref 150–400)
POTASSIUM SERPL-MCNC: 3.6 MMOL/L — SIGNIFICANT CHANGE UP (ref 3.5–5.3)
POTASSIUM SERPL-SCNC: 3.6 MMOL/L — SIGNIFICANT CHANGE UP (ref 3.5–5.3)
PROT SERPL-MCNC: 6.5 G/DL — SIGNIFICANT CHANGE UP (ref 6–8.3)
RBC # BLD: 3.12 M/UL — LOW (ref 3.8–5.2)
RBC # FLD: 18.4 % — HIGH (ref 10.3–14.5)
RH IG SCN BLD-IMP: POSITIVE — SIGNIFICANT CHANGE UP
SODIUM SERPL-SCNC: 136 MMOL/L — SIGNIFICANT CHANGE UP (ref 135–145)
WBC # BLD: 9.52 K/UL — SIGNIFICANT CHANGE UP (ref 3.8–10.5)
WBC # FLD AUTO: 9.52 K/UL — SIGNIFICANT CHANGE UP (ref 3.8–10.5)

## 2024-12-16 PROCEDURE — 99222 1ST HOSP IP/OBS MODERATE 55: CPT

## 2024-12-16 PROCEDURE — 99232 SBSQ HOSP IP/OBS MODERATE 35: CPT

## 2024-12-16 PROCEDURE — 99232 SBSQ HOSP IP/OBS MODERATE 35: CPT | Mod: GC

## 2024-12-16 PROCEDURE — 99497 ADVNCD CARE PLAN 30 MIN: CPT | Mod: 25

## 2024-12-16 RX ORDER — ENOXAPARIN SODIUM 30 MG/.3ML
40 INJECTION SUBCUTANEOUS EVERY 24 HOURS
Refills: 0 | Status: DISCONTINUED | OUTPATIENT
Start: 2024-12-16 | End: 2024-12-18

## 2024-12-16 RX ORDER — SENNOSIDES 8.6 MG
2 TABLET ORAL AT BEDTIME
Refills: 0 | Status: DISCONTINUED | OUTPATIENT
Start: 2024-12-16 | End: 2024-12-18

## 2024-12-16 RX ORDER — POTASSIUM PHOSPHATE, MONOBASIC POTASSIUM PHOSPHATE, DIBASIC INJECTION, 236; 224 MG/ML; MG/ML
15 SOLUTION, CONCENTRATE INTRAVENOUS ONCE
Refills: 0 | Status: COMPLETED | OUTPATIENT
Start: 2024-12-16 | End: 2024-12-16

## 2024-12-16 RX ORDER — POLYETHYLENE GLYCOL 3350 17 G/17G
17 POWDER, FOR SOLUTION ORAL EVERY 12 HOURS
Refills: 0 | Status: DISCONTINUED | OUTPATIENT
Start: 2024-12-16 | End: 2024-12-18

## 2024-12-16 RX ADMIN — OXYCODONE HYDROCHLORIDE 15 MILLIGRAM(S): 30 TABLET ORAL at 05:40

## 2024-12-16 RX ADMIN — Medication 250 MILLIGRAM(S): at 19:18

## 2024-12-16 RX ADMIN — LIDOCAINE 1 PATCH: 40 CREAM TOPICAL at 19:21

## 2024-12-16 RX ADMIN — POTASSIUM PHOSPHATE, MONOBASIC POTASSIUM PHOSPHATE, DIBASIC INJECTION, 62.5 MILLIMOLE(S): 236; 224 SOLUTION, CONCENTRATE INTRAVENOUS at 17:32

## 2024-12-16 RX ADMIN — PANTOPRAZOLE SODIUM 40 MILLIGRAM(S): 40 TABLET, DELAYED RELEASE ORAL at 07:11

## 2024-12-16 RX ADMIN — LIDOCAINE 1 PATCH: 40 CREAM TOPICAL at 16:42

## 2024-12-16 RX ADMIN — Medication 250 MILLIGRAM(S): at 05:41

## 2024-12-16 RX ADMIN — OXYCODONE HYDROCHLORIDE 15 MILLIGRAM(S): 30 TABLET ORAL at 06:40

## 2024-12-16 RX ADMIN — LIDOCAINE 1 PATCH: 40 CREAM TOPICAL at 16:41

## 2024-12-16 NOTE — PROGRESS NOTE ADULT - PROBLEM SELECTOR PLAN 4
Persistently tachycardic through admission. Per chart review, she has been tachy during previous admissions as well. Denies chest pain.    Plan:  - f/u ECG

## 2024-12-16 NOTE — BH CONSULTATION LIAISON PROGRESS NOTE - NSBHCHARTREVIEWVS_PSY_A_CORE FT
Vital Signs Last 24 Hrs  T(C): 37.1 (16 Dec 2024 08:53), Max: 37.1 (16 Dec 2024 08:53)  T(F): 98.8 (16 Dec 2024 08:53), Max: 98.8 (16 Dec 2024 08:53)  HR: 116 (16 Dec 2024 08:53) (102 - 127)  BP: 133/87 (16 Dec 2024 08:53) (115/78 - 164/94)  BP(mean): --  RR: 17 (16 Dec 2024 08:53) (17 - 19)  SpO2: 100% (16 Dec 2024 08:53) (99% - 100%)    Parameters below as of 16 Dec 2024 08:53  Patient On (Oxygen Delivery Method): room air

## 2024-12-16 NOTE — PROGRESS NOTE ADULT - ASSESSMENT
70 y/o Female with PMHx of chronic neck/back pain (s/p multiple spinal fusions, inactive intrathecal morphine pump), bilateral LE paralysis, neurogenic bladder (s/p chronic jiménez), multiple UTIs, CVA x3 (MR brain 1/23 with chronic lacunar infarcts of the left corona radiata, left putamen and left cerebellar hemisphere), hx PE in 2023 (Eliquis for 3-6 months), HTN, HLD, emphysema currently admitted for the treatment of UTI, sacral ulcers, and anemia. Neurology consulted due to AMS. Patient was A&Ox3 today, however interview required constant encouragement by interviewer and loud voice (patient is extremely hard of hearing). This appears to be an improvement from her prior status. She is currently being treated for several active medical issues including UTI which can potentially render a patient encephalopathic during a hospitalization. Due to her chronic pain, she is currently receiving opioid medication in addition benzodiazepines, which can further contribute to her mental status changes. Patient is also extremely hard of hearing, making communication difficult, putting her at an even higher risk of delirium.     On exam patient displayed intermittent movements of the RUE, which appear unintentional, however patient did not wish to discuss them. She has a known history of basal ganglia and cerebellar L sided strokes, which localizes appropriately to the affected arm. Additionally, she was on vEEG last June during an ICU admission for sepsis (to rule out seizures as a cause for her AMS), and her EEG remained non-epileptiform. Given the localization of prior strokes and past vEEG studies, her UE movements unlikely to be secondary to seizures. 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    Recommendations:  - continue to treat primary medical issues  - Maintain adequate proper circadian rhythms, offer hearing assistance to facilitate engagement  - Minimize opioid and benzo use as possible   - If RUE movements are characterized as involuntary, can consider routine EEG.   - Rest of care per primary    Recs not final until note amended/signed by neurology attending.      68 y/o Female with PMHx of chronic neck/back pain (s/p multiple spinal fusions, inactive intrathecal morphine pump), bilateral LE paralysis, neurogenic bladder (s/p chronic jiménez), multiple UTIs, CVA x3 (MR brain 1/23 with chronic lacunar infarcts of the left corona radiata, left putamen and left cerebellar hemisphere), hx PE in 2023 (Eliquis for 3-6 months), HTN, HLD, emphysema currently admitted for the treatment of UTI, sacral ulcers, and anemia. Neurology consulted due to AMS. Patient was A&Ox3 today, however interview required constant encouragement by interviewer and loud voice (patient is extremely hard of hearing). This appears to be an improvement from her prior status. She is currently being treated for several active medical issues including UTI which can potentially render a patient encephalopathic during a hospitalization. Due to her chronic pain, she is currently receiving opioid medication in addition benzodiazepines, which can further contribute to her mental status changes. Patient is also extremely hard of hearing, making communication difficult, putting her at an even higher risk of delirium.     On exam patient displayed intermittent movements of the RUE, which appear unintentional, however patient did not wish to discuss them. She has a known history of basal ganglia and cerebellar L sided strokes, which localizes appropriately to the affected arm. Additionally, she was on vEEG last June during an ICU admission for sepsis (to rule out seizures as a cause for her AMS), and her EEG remained non-epileptiform. Given the localization of prior strokes and past vEEG studies, her UE movements unlikely to be secondary to seizures. 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    Recommendations:  - continue to treat primary medical issues  - Maintain adequate proper circadian rhythms, offer hearing assistance to facilitate engagement  - Minimize opioid and benzo use as possible   - Can consider vEEG and MRI brain n/c pending [facility] conversation with Palliative.  - Rest of care per primary  - Neurology will sign-off. Please contact us if further consultation is needed for this patient.    Case discussed with Dr. Connors.

## 2024-12-16 NOTE — CONSULT NOTE ADULT - PROBLEM SELECTOR RECOMMENDATION 6
.  Will continue to follow for ongoing GOC discussion / dipo planning. Emotional support provided, questions answered.    Coping:  well[  ] with difficulty[  ] poor coping[  ] unable to assess[  ]   Social support: strong[  ] adequate[  ] inadequate[  ]  shelter support system at home: strong[  ] adequate[  ] inadequate[  ]    Active Psychosocial Referrals:  SW/CM[  ] PT/OT[  ] Hospice[  ]  Ethics[  ]  [facility] Patient/Family Support[  ] Chaplaincy[   ]    Massage Therapy[ x ] Music Therapy [x  ] Holistic RN[  ]     For new or uncontrolled symptoms, please call Palliative Care at [phone] ([phone]). The service is available 24/7 (including nights & weekends) to provide symptom management recommendations over the phone as appropriate.

## 2024-12-16 NOTE — BH CONSULTATION LIAISON PROGRESS NOTE - NSBHCHARTREVIEWLAB_PSY_A_CORE FT
7.7    9.52  )-----------( 417      ( 16 Dec 2024 05:30 )             25.2     12-16    136  |  104  |  3[L]  ----------------------------<  92  3.6   |  24  |  0.32[L]    Ca    7.8[L]      16 Dec 2024 05:30  Phos  2.6     12-16  Mg     1.8     12-16    TPro  6.5  /  Alb  1.9[L]  /  TBili  0.2  /  DBili  x   /  AST  8[L]  /  ALT  <5[L]  /  AlkPhos  110  12-16

## 2024-12-16 NOTE — PROGRESS NOTE ADULT - PROBLEM SELECTOR PLAN 1
Stage 4 pressure ulcers on lateral hips and sacrum.    -Surgery consult, do not appear infected, no intervention indicated at this time  -DC Vancomycin, CTX  -Wound care  - Palliative consulted

## 2024-12-16 NOTE — ADVANCED PRACTICE NURSE CONSULT - ASSESSMENT
Patient is a 69-year-old female with PMHx multiple spinal fusions c/b chronic neck/back pain and BLE paralysis, s/p non-functional intrathecal morphine pump, neurogenic bladder s/p chronic jiménez c/b recurrent UTIs, multiple PEs (Dec 2023, July 2024), CVA x3 (MR brain Jan 2023 w/ chronic lacunar infarcts), HTN, HLD, and emphysema presenting with cloudy urine and pressure injuries, HGB 6.5, contaminated but positive UA, concerning for acute UTI in the setting of insufficient care at home. Pt with significant change in pressure injuries since pt was seen by [facility] in July of 2024. Pressure injuries are as follows: Stage 4 pressure injury to sacrum measuring 9.5 x 13 x 2 cm with undermining from 6-12 o'clock, deepest aspect at 9 o'clock,  3 cm. Approx 30% of wound is fibrin, remainder is muscle. Left ischial tuberosity stage 4 measuring 5.5 x 7x 2 cm with undermining from 11-1 o'clock, deepest aspect at 12 o'clock, 2 cm. Wound bed with muscle visible, very dark red tissue also scattered over wound. Right ischial tuberosity stage 4 measures 6 x 6 x 2 cm, undermining from 12 -1 o'clock, deepest aspect at 12 o'clock, 1.5 cm, muscle visible in wound bed. Efrain moistened with Vashe wound cleanser and lightly packed into wound beds, covered with foam dressings. Pt turned with offloading pillow, heels offloaded with pillows. Extra supplies at bedside.

## 2024-12-16 NOTE — CONSULT NOTE ADULT - CONVERSATION DETAILS
A detailed discussion was held with the patient's surrogate niece Melissa regarding patient's current medical condition, prognosis, and goals of care as patient was seen by  and deemed not to have capacity for complex medical decisions. Introduced the role of palliative medicine for ACP, GOC, and support.    We discussed patient's functional decline and poor prognosis, including her chronic wounds, recurrent infections, and intermittent refusal of interventions (labs, transfusions). The surrogate acknowledged the patient's bedbound state with no anticipated functional improvement. She shared that aggressive hospitalizations cause patient more distress than benefit, negatively impacting her quality of life.     Her primary goal is to maximize the patient's comfort, quality of life, and respect her decisions to decline interventions. Melissa expressed interest in transfer to [facility] for wound care and acute end-of-life care as a potential bridge to home hospice as long as [facility] agency is changed and [facility] care is improved.     Melissa appreciates that patient does not have capacity to make complex high level decisions. She expressed her discomfort in making decisions regarding resuscitation and defers these decisions to two physicians.

## 2024-12-16 NOTE — BH CONSULTATION LIAISON PROGRESS NOTE - NSBHFUPINTERVALHXFT_PSY_A_CORE
Patient is lying in bed and somnolent, she opens her eyes briefly to a tap on the shoulder by a medical student and nods her head in response to "how are you doing today?" She does not otherwise keep her eyes open or speak and is unable to participate in the interview.    Per primary team, this has been patient's baseline today. They are considering initiating palliative care, in conversation with the patient's next of kin, her niece. Previously they consulted psychiatry for evaluation of the patient's capacity to refuse a blood draw and a blood transfusion. At this time, the patient's Hgb has improved and they do not see an emergent need to go through with the transfusion.

## 2024-12-16 NOTE — PROGRESS NOTE ADULT - ASSESSMENT
68yo F w/ PMH multiple spinal fusions c/b chronic neck/back pain and BLE paralysis, s/p non-functional intrathecal morphine pump, neurogenic bladder s/p chronic jiménez c/b recurrent UTIs, multiple PEs (Dec 2023, July 2024), CVA x3 (MR brain Jan 2023 w/ chronic lacunar infarcts), HTN, HLD, and emphysema after pt aid unable to provide pt care. While at [facility], pt afebrile, tachy 120s resolving to 90s, normotensive. Labs sx for leukocytosis WBC 14.2 (11), (+)UA on admission. On exam, 3 large sacral ulcers w/ R-lateral likely stage 3, central ulcer likely stage 3 and L-lateral ulcer stage 4. Sacral wounds at this time appear clean, granulating. Mild exudate at sacral site w/ no obvious sign of infection, no purulence, erythema, drainage or induration.    Recommendations:  - no acute surgical intervention at this time  - nutritional optimization and offloading   - rest of care per primary team  - Team 4C will sign off at this time

## 2024-12-16 NOTE — CONSULT NOTE ADULT - PROBLEM SELECTOR RECOMMENDATION 2
.  Patient with several risk factors for chronic constipation including bedbound functional status, concurrent medication use, decreased caloric intake, comorbidities, female gender  - Recommend mLax qAM 17 g/day standing, hold for loose stool  - Recommend senna 2 tabs PO q12h PRN No BM for >3 days  - If above is ineffective, recommend bisacodyl suppository   - Recommend daily fiber intake 20-25 g/day, increasing fluid intake and OOB/movement as able  - Primary goal is to relieve symptoms, secondary is to have at least 3-4 easy to pass BMs per week

## 2024-12-16 NOTE — PROGRESS NOTE ADULT - SUBJECTIVE AND OBJECTIVE BOX
Neurology Progress Note    Interval History:          PAST MEDICAL & SURGICAL HISTORY:  HTN (hypertension)    SS (spinal stenosis)    High cholesterol    Stroke  x3    H/O carpal tunnel syndrome  Bilateral    Chronic GERD    History of chronic constipation    Urinary incontinence  self cath as needed    Pre-diabetes    Anxiety and depression    Eczema    H/O kyphosis    Pancreas cyst    Scoliosis    Wheelchair dependent    Cervical pseudoarthrosis  and lumbar spine    Cervical spondylosis    Chronic headaches    Degenerative joint disease  left shoulder    Lumbosacral spondylosis    COPD (chronic obstructive pulmonary disease)    H/O Spinal surgery  lumbar x 30    H/O breast surgery  left breast implant 1980's    S/P cervical discectomy  x4    H/O total shoulder replacement, right            Medications:  ascorbic acid 500 milliGRAM(s) Oral daily  diazepam    Tablet 5 milliGRAM(s) Oral daily  ertapenem  IVPB 1000 milliGRAM(s) IV Intermittent every 24 hours  lidocaine   4% Patch 1 Patch Transdermal every 24 hours  lidocaine   4% Patch 1 Patch Transdermal every 24 hours  multivitamin 1 Tablet(s) Oral daily  oxyCODONE    IR 15 milliGRAM(s) Oral every 6 hours PRN  pantoprazole    Tablet 40 milliGRAM(s) Oral before breakfast  vancomycin  IVPB 750 milliGRAM(s) IV Intermittent every 12 hours  zinc sulfate 220 milliGRAM(s) Oral daily      Vital Signs Last 24 Hrs  T(C): 36.8 (16 Dec 2024 05:52), Max: 36.9 (15 Dec 2024 21:30)  T(F): 98.3 (16 Dec 2024 05:52), Max: 98.5 (15 Dec 2024 21:30)  HR: 116 (16 Dec 2024 05:52) (95 - 127)  BP: 150/91 (16 Dec 2024 05:52) (115/78 - 164/94)  BP(mean): --  RR: 18 (16 Dec 2024 05:52) (18 - 19)  SpO2: 100% (16 Dec 2024 05:52) (99% - 100%)    Parameters below as of 16 Dec 2024 05:52  Patient On (Oxygen Delivery Method): room air        Neurological Exam:   Mental status: Awake, alert and oriented x3.  Recent and remote memory intact.  Naming, repetition and comprehension intact.  Attention/concentration intact.  No dysarthria, no aphasia.  Fund of knowledge appropriate.    Cranial nerves: Pupils equally round and reactive to light, visual fields full, no nystagmus, extraocular muscles intact, V1 through V3 intact bilaterally and symmetric, face symmetric, hearing intact to finger rub, palate elevation symmetric, tongue was midline.  Motor:  MRC grading 5/5 b/l UE/LE.   strength 5/5.  Normal tone and bulk.  No abnormal movements.    Sensation: Intact to light touch, proprioception, and pinprick.   Coordination: No dysmetria on finger-to-nose and heel-to-shin.  No dysdiadokinesia.  Reflexes: 2+ in bilateral UE/LE, downgoing toes bilaterally. (-) Soni.  Gait: Narrow and steady. No ataxia.  Romberg negative    Labs:  CBC Full  -  ( 16 Dec 2024 05:30 )  WBC Count : 9.52 K/uL  RBC Count : 3.12 M/uL  Hemoglobin : 7.7 g/dL  Hematocrit : 25.2 %  Platelet Count - Automated : 417 K/uL  Mean Cell Volume : 80.8 fl  Mean Cell Hemoglobin : 24.7 pg  Mean Cell Hemoglobin Concentration : 30.6 g/dL  Auto Neutrophil # : 5.86 K/uL  Auto Lymphocyte # : 2.50 K/uL  Auto Monocyte # : 0.89 K/uL  Auto Eosinophil # : 0.19 K/uL  Auto Basophil # : 0.05 K/uL  Auto Neutrophil % : 61.6 %  Auto Lymphocyte % : 26.3 %  Auto Monocyte % : 9.3 %  Auto Eosinophil % : 2.0 %  Auto Basophil % : 0.5 %            PT/INR - ( 14 Dec 2024 22:46 )   PT: 11.5 sec;   INR: 0.98          PTT - ( 14 Dec 2024 22:46 )  PTT:29.7 sec      Assessment:  This is a 69y Female w/ h/o     Plan: Neurology Progress Note    Interval History:    The patient was encountered lying in bed, awake and alert, but not oriented to specific place or time, only self. She was largely non-verbal, but was following some basic commands. Patient is very hard of hearing and needs to be spoken to in a loud volume; attempted to find an amplifier but was unsuccessful. The patient was also a bit tearful, but  not in any acute distress. She c/o pain in her RUE and shoulder, but no new or acute complaints or changes overnight.       PAST MEDICAL & SURGICAL HISTORY:  HTN (hypertension)    SS (spinal stenosis)    High cholesterol    Stroke  x3    H/O carpal tunnel syndrome  Bilateral    Chronic GERD    History of chronic constipation    Urinary incontinence  self cath as needed    Pre-diabetes    Anxiety and depression    Eczema    H/O kyphosis    Pancreas cyst    Scoliosis    Wheelchair dependent    Cervical pseudoarthrosis  and lumbar spine    Cervical spondylosis    Chronic headaches    Degenerative joint disease  left shoulder    Lumbosacral spondylosis    COPD (chronic obstructive pulmonary disease)    H/O Spinal surgery  lumbar x 30    H/O breast surgery  left breast implant 1980's    S/P cervical discectomy  x4    H/O total shoulder replacement, right            Medications:  ascorbic acid 500 milliGRAM(s) Oral daily  diazepam    Tablet 5 milliGRAM(s) Oral daily  ertapenem  IVPB 1000 milliGRAM(s) IV Intermittent every 24 hours  lidocaine   4% Patch 1 Patch Transdermal every 24 hours  lidocaine   4% Patch 1 Patch Transdermal every 24 hours  multivitamin 1 Tablet(s) Oral daily  oxyCODONE    IR 15 milliGRAM(s) Oral every 6 hours PRN  pantoprazole    Tablet 40 milliGRAM(s) Oral before breakfast  vancomycin  IVPB 750 milliGRAM(s) IV Intermittent every 12 hours  zinc sulfate 220 milliGRAM(s) Oral daily      Vital Signs Last 24 Hrs  T(C): 36.8 (16 Dec 2024 05:52), Max: 36.9 (15 Dec 2024 21:30)  T(F): 98.3 (16 Dec 2024 05:52), Max: 98.5 (15 Dec 2024 21:30)  HR: 116 (16 Dec 2024 05:52) (95 - 127)  BP: 150/91 (16 Dec 2024 05:52) (115/78 - 164/94)  BP(mean): --  RR: 18 (16 Dec 2024 05:52) (18 - 19)  SpO2: 100% (16 Dec 2024 05:52) (99% - 100%)    Parameters below as of 16 Dec 2024 05:52  Patient On (Oxygen Delivery Method): room air        Neurological Exam (**limited d/t patient's mental status**)  MENTAL STATUS: Awake and alert, very hard of hearing. Oriented to self, knows that she in the hospital, knows the month but not the day.  LANG/SPEECH: non-dysarthric. Follows basic commands.   CRANIAL NERVES:  II: Pupils equal and reactive, no RAPD, normal visual field and fundus  III, IV, VI: EOM intact, no gaze preference or deviation  V: normal  VII: no facial asymmetry  VIII: decreased hearing bilaterally  MOTOR: 4+/5 in both upper extremities, 0/5 b/l Le (chronic). Intermittent medium amplitude tremor noted on RUE.   REFLEXES: 1+ b/l UE, Mute patellars and babinski   SENSORY: Intact to light touch, vibration, and temperature in b/l UE. Absent light touch and temp on the LE's   COORD: Able to hit target during finger to nose b/l with moderate tremor mid range.       Labs:  CBC Full  -  ( 16 Dec 2024 05:30 )  WBC Count : 9.52 K/uL  RBC Count : 3.12 M/uL  Hemoglobin : 7.7 g/dL  Hematocrit : 25.2 %  Platelet Count - Automated : 417 K/uL  Mean Cell Volume : 80.8 fl  Mean Cell Hemoglobin : 24.7 pg  Mean Cell Hemoglobin Concentration : 30.6 g/dL  Auto Neutrophil # : 5.86 K/uL  Auto Lymphocyte # : 2.50 K/uL  Auto Monocyte # : 0.89 K/uL  Auto Eosinophil # : 0.19 K/uL  Auto Basophil # : 0.05 K/uL  Auto Neutrophil % : 61.6 %  Auto Lymphocyte % : 26.3 %  Auto Monocyte % : 9.3 %  Auto Eosinophil % : 2.0 %  Auto Basophil % : 0.5 %            PT/INR - ( 14 Dec 2024 22:46 )   PT: 11.5 sec;   INR: 0.98          PTT - ( 14 Dec 2024 22:46 )  PTT:29.7 sec      Assessment:  This is a 69y Female w/ h/o     Plan:

## 2024-12-16 NOTE — CONSULT NOTE ADULT - PROBLEM SELECTOR RECOMMENDATION 9
.  history of multiple strokes. Patient with risk factors associated with delirium including: advanced age, cognitive impairment, history of delirium, cumulative comorbidities, malnutrition, functional, and hearing impairments  - [facility] appreciated   - Delirium precautions: bedside window with blinds open; frequent re-orientation; pictures of family if possible; minimize lines, physical restraints, nighttime awakenings as medically feasible; ensure bowel movements daily or every other day, monitor for urinary retention; avoid anticholinergic medications or benzodiazepines as clinically feasible.

## 2024-12-16 NOTE — PROGRESS NOTE ADULT - ATTENDING COMMENTS
Patient is a 69 year old woman with significant history including spinal fusion, lower extremity paralysis, neurogenic bladder, recurrent UTI's, PE's, CVA, HTN, HLD, emphysema now being evaluated for concern of sacral decubitus ulcer. At time of evaluation, afebrile, hemodynamically stable, sacral decubitus ulcer present with red granulation tissue, some fibrinous exudate, no evidence of undrained collections or purulence. Continue local wound care at this time, offloading, no role for general surgery intervention at this time.

## 2024-12-16 NOTE — BH CONSULTATION LIAISON PROGRESS NOTE - CURRENT MEDICATION
MEDICATIONS  (STANDING):  ascorbic acid 500 milliGRAM(s) Oral daily  diazepam    Tablet 5 milliGRAM(s) Oral daily  enoxaparin Injectable 40 milliGRAM(s) SubCutaneous every 24 hours  lidocaine   4% Patch 1 Patch Transdermal every 24 hours  lidocaine   4% Patch 1 Patch Transdermal every 24 hours  multivitamin 1 Tablet(s) Oral daily  pantoprazole    Tablet 40 milliGRAM(s) Oral before breakfast  polyethylene glycol 3350 17 Gram(s) Oral every 12 hours  senna 2 Tablet(s) Oral at bedtime  vancomycin  IVPB 750 milliGRAM(s) IV Intermittent every 12 hours  zinc sulfate 220 milliGRAM(s) Oral daily    MEDICATIONS  (PRN):  oxyCODONE    IR 15 milliGRAM(s) Oral every 6 hours PRN Severe Pain (7 - 10)

## 2024-12-16 NOTE — ADVANCED PRACTICE NURSE CONSULT - RECOMMEDATIONS
Recommend the above wound regime daily. Spoke with REDDY Black and house staff. Total time spent on encounter=40 minutes.  Recommend the above wound regime daily. Spoke with RN Sofia and house staff Behzad. Total time spent on encounter=40 minutes.

## 2024-12-16 NOTE — PROGRESS NOTE ADULT - ATTENDING COMMENTS
69-year-old woman with altered mental status in the setting of UTI, failure to thrive with multiple pressure ulcers, and significant hearing impairment, and worsening anemia. At baseline bedbound and paraplegic with extensive spine surgery history. No definitively new neurological focality despite a history of prior infarcts.  Last MRI from June 2024 that was done in the setting of altered mental status secondary to hypotension from UTI sepsis showed no acute infarct and evidence of cerebral small vessel disease.  EEG at the time showed generalized slowing with short runs FIRDA.  Recommend supportive medical management and outpatient follow-up in neurology.  No further inpatient neurologic workup recommended at this time as her mental status changes can be explained by her medical issues without new focality to suggest acute neurological process. 69-year-old woman with altered mental status in the setting of UTI, failure to thrive with multiple pressure ulcers, and significant hearing impairment, and worsening anemia. At baseline bedbound and paraplegic with extensive spine surgery history. No definitively new neurological focality despite a history of prior infarcts.  Last MRI from June 2024 that was done in the setting of altered mental status secondary to hypotension from UTI sepsis showed no acute infarct and evidence of cerebral small vessel disease.  EEG at the time showed generalized slowing with short runs FIRDA.  The patient has a fluctuating neurological examination during this admissoin, likely suggestive of metabolic encephalopathy in the setting of UTI and nonhealing sacral decubitus ulcers.  The patient was following commands and moving upper extremities on the resident's evaluation in the early morning.  On my evaluation a few hours later, we woke her out of her sleep, and the patient was able to follow simple commands with difficulty, appeared mildly confused, and lifted the right arm against gravity, left arm appeared to be pain limited.  Further workup including MRI brain and EEG are pending goals-of-care discussion.  Continue supportive medical management. Primary team will contact neurology if further consultation requested.

## 2024-12-16 NOTE — PROGRESS NOTE ADULT - PROBLEM SELECTOR PLAN 2
Patient with HGB 6.5 on admission, MCV 81.3. Currently no active signs of bleeding--denies hematemesis, hematuria, hematochezia/melena. Brown stool visualized on admission exam. Unsure based on history if having ssx GIB, although less likely given no evidence of hematochezia, hematemesis, had normal brown stool in ED.    - GI consult for r/o GIB  - f/u iron studies, haptoglobin, ldh, retic count  - GI prophylaxis - Pantoprazole 40mg qd  - maintain active T&S  - transfuse if Hgb <7

## 2024-12-16 NOTE — BH CONSULTATION LIAISON PROGRESS NOTE - MSE UNSTRUCTURED FT
Patient is lying in bed, somnolent and arousable intermittently only to loud auditory stimulus and tactile stimulus. She opens her eyes and nods her head in response to stimulus but does not speak.  Unable to assess remainder of mental status exam due to patient's somnolence. She is unable to participate in a discussion to evaluate her mood, thought process, thought content, perceptions, insight, or judgment.

## 2024-12-16 NOTE — PROGRESS NOTE ADULT - SUBJECTIVE AND OBJECTIVE BOX
SUBJECTIVE: Patient was evaluated at bedside this AM by general surgery team. Patient is AxO x0 and is unable to answer examiner's questions due to confusion. Patient does not appear to be in pain at this time.    MEDICATIONS  (STANDING):  ascorbic acid 500 milliGRAM(s) Oral daily  diazepam    Tablet 5 milliGRAM(s) Oral daily  ertapenem  IVPB 1000 milliGRAM(s) IV Intermittent every 24 hours  lidocaine   4% Patch 1 Patch Transdermal every 24 hours  lidocaine   4% Patch 1 Patch Transdermal every 24 hours  multivitamin 1 Tablet(s) Oral daily  pantoprazole    Tablet 40 milliGRAM(s) Oral before breakfast  vancomycin  IVPB 750 milliGRAM(s) IV Intermittent every 12 hours  zinc sulfate 220 milliGRAM(s) Oral daily    MEDICATIONS  (PRN):  oxyCODONE    IR 15 milliGRAM(s) Oral every 6 hours PRN Severe Pain (7 - 10)      Vital Signs Last 24 Hrs  T(C): 36.8 (16 Dec 2024 05:52), Max: 36.9 (15 Dec 2024 21:30)  T(F): 98.3 (16 Dec 2024 05:52), Max: 98.5 (15 Dec 2024 21:30)  HR: 116 (16 Dec 2024 05:52) (95 - 127)  BP: 150/91 (16 Dec 2024 05:52) (115/78 - 164/94)  BP(mean): --  RR: 18 (16 Dec 2024 05:52) (18 - 19)  SpO2: 100% (16 Dec 2024 05:52) (99% - 100%)    Parameters below as of 16 Dec 2024 05:52  Patient On (Oxygen Delivery Method): room air        Physical Exam:  General: NAD, resting comfortably in bed  Pulmonary: Nonlabored breathing, no respiratory distress  Cardiovascular: NSR  Abdominal: soft, NT/ND  SACRUM: 3 sacral decubs, R-lateral decubitus ulcer 4x5cm clean/no purulence, central decubitus ulcer 8x7cm w/ fibrinous exudate w/ no purulence, L-lateral wound w/ palpable bone at base clean/dry w/ no purulence or drainage,   Extremities: WWP, normal strength of bilateral upper extremities  Neuro: A/O x 1  Pulses: palpable distal pulses    I&O's Summary    15 Dec 2024 07:01  -  16 Dec 2024 07:00  --------------------------------------------------------  IN: 0 mL / OUT: 1050 mL / NET: -1050 mL        LABS:                        7.7    9.52  )-----------( 417      ( 16 Dec 2024 05:30 )             25.2     12-16    136  |  104  |  3[L]  ----------------------------<  92  3.6   |  24  |  0.32[L]    Ca    7.8[L]      16 Dec 2024 05:30  Phos  2.6     12-16  Mg     1.8     12-16    TPro  6.5  /  Alb  1.9[L]  /  TBili  0.2  /  DBili  x   /  AST  8[L]  /  ALT  <5[L]  /  AlkPhos  110  12-16    PT/INR - ( 14 Dec 2024 22:46 )   PT: 11.5 sec;   INR: 0.98          PTT - ( 14 Dec 2024 22:46 )  PTT:29.7 sec  Urinalysis Basic - ( 16 Dec 2024 05:30 )    Color: x / Appearance: x / SG: x / pH: x  Gluc: 92 mg/dL / Ketone: x  / Bili: x / Urobili: x   Blood: x / Protein: x / Nitrite: x   Leuk Esterase: x / RBC: x / WBC x   Sq Epi: x / Non Sq Epi: x / Bacteria: x      CAPILLARY BLOOD GLUCOSE        LIVER FUNCTIONS - ( 16 Dec 2024 05:30 )  Alb: 1.9 g/dL / Pro: 6.5 g/dL / ALK PHOS: 110 U/L / ALT: <5 U/L / AST: 8 U/L / GGT: x             RADIOLOGY & ADDITIONAL STUDIES:

## 2024-12-16 NOTE — CONSULT NOTE ADULT - PROBLEM SELECTOR RECOMMENDATION 4
.  complication from being bedbound  - supportive care  - recommend low threshold for scheduling Tylenol 650 mg PO q8 ATC

## 2024-12-16 NOTE — CONSULT NOTE ADULT - ASSESSMENT
68yo F w/ PMH multiple spinal fusions c/b chronic neck/back pain and BLE paralysis, s/p non-functional intrathecal morphine pump, neurogenic bladder s/p chronic jiménez c/b recurrent UTIs, multiple PEs (Dec 2023, July 2024), CVA x3 (MR brain Jan 2023 w/ chronic lacunar infarcts), HTN, HLD, and emphysema after pt aid unable to provide pt care. While at [facility], pt afebrile, tachy 120s resolving to 90s, normotensive. Labs sx for leukocytosis WBC 14.2 (11), (+)UA on admission. On exam, 3 large sacral ulcers w/ R-lateral likely stage 3, central ulcer likely stage 3 and L-lateral ulcer stage 4. Sacral wounds at this time appear clean, granulating. Mild exudate at sacral site w/ no obvious sign of infection, no purulence, erythema, drainage or induration. Can consider spine surgery consult given extent of wounds and proximal hardware as to whether or not further w/u is required.     - no acute surgical intervention   - nutritional optimization and offloading   - consider spine surgery for further opinion or w/u given proximity of wound and spine hardware     Team 4C to follow, please reach out with any questions or concerns   
 68 y/o Female with PMHx of chronic neck/back pain (s/p multiple spinal fusions, inactive intrathecal morphine pump), bilateral LE paralysis, neurogenic bladder (s/p chronic jiménez), multiple UTIs, CVA x3 (MR brain 1/23 with chronic lacunar infarcts of the left corona radiata, left putamen and left cerebellar hemisphere), hx PE in 2023 (Eliquis for 3-6 months), HTN, HLD, emphysema currently admitted for the treatment of UTI, sacral ulcers, and anemia. Neurology consulted due to AMS. Patient was A&Ox3 today, however interview required constant encouragement by interviewer and loud voice (patient is extremely hard of hearing). This appears to be an improvement from her prior status. She is currently being treated for several active medical issues including UTI which can potentially render a patient encephalopathic during a hospitalization. Due to her chronic pain, she is currently receiving opioid medication in addition benzodiazepines, which can further contribute to her mental status changes. Patient is also extremely hard of hearing, making communication difficult, putting her at an even higher risk of delirium.     On exam patient displayed intermittent movements of the RUE, which appear unintentional, however patient did not wish to discuss them. She has a known history of basal ganglia and cerebellar L sided strokes, which localizes appropriately to the affected arm. Additionally, she was on vEEG last June during an ICU admission for sepsis (to rule out seizures as a cause for her AMS), and her EEG remained non-epileptiform. Given the localization of prior strokes and past vEEG studies, her UE movements unlikely to be secondary to seizures. 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    Recommendations:  - continue to treat primary medical issues  - Maintain adequate proper circadian rhythms, offer hearing assistance to facilitate engagement  - Minimize opioid and benzo use as possible   - If RUE movements are characterized as involuntary, can consider routine EEG.   - Rest of care per primary    Recs not final until note amended/signed by neurology attending.    
69-year-old female with PMHx multiple spinal fusions c/b chronic neck/back pain and BLE paralysis, s/p non-functional intrathecal morphine pump, neurogenic bladder s/p chronic jiménez c/b recurrent UTIs, multiple PEs (Dec 2023, July 2024), CVA x3 (MR brain Jan 2023 w/ chronic lacunar infarcts), HTN, HLD, and emphysema presenting with cloudy urine and pressure wounds, found to have 3x stage 3 pressure ulcers in the sacral, bilateral ischial regions, HGB 6.5, contaminated but positive UA, concerning for acute UTI in the setting of insufficient care at home. GI consulted for anemia    Unknown history, no history of past egd/colonoscopy in the past  hgb 6.5 on admission with negative SAMREEN and no signs of active bleeding.     Recommendations:  -GI can consider full ALEJANDRA work up with egd/colon if family and patient wishing to pursue. Given history and clinical status will discuss with proxy and family what patient's wishes are.   -Reports extensive pain with history of non-functional intrathecal morphine pump. Recommend Pain Management to see patient or Palliative Care for pain management.   -Nutrition Consult given albumin 1.8 and concern for severe protein calorie malnutrition    GI to discuss with family/proxy 12/15    Patient seen and discussed with GI Attending on Rounds Dr. Everette Barber DO, PhD  Gastroenterology Fellow  GI Consult Weekday 7am-5pm Pager: [phone]  Weeknights/Weekend/Holiday Coverage: Please Call the  for contact info    
Recommendation:?In accordance with [facility], and in the absence of known persons who may supersede her standing, Ms. Melissa Ford (patient’s niece) may be recognized as Ms. Aliya Redd’s surrogate decision-maker while the patient remains without decision-making capacity. Of note, capacity is decision specific and can wax and wane over time. In that regard, capacity is an ongoing process, not a single event or assessment, and it involves a willingness on the part of the practitioners to reassess capacity over time.?     Regardless of capacity or whether a surrogate provides consent on behalf of an incapacitated patient, a patient who is actively refusing treatment cannot be forced against her will to receive it. She should not be coerced unless the medical intervention is emergent. The patient's right to refuse, even when she does not have capacity, must be respected.     Ethics recommends continued supportive communications to foster trust with the patient and to encourage her to assent to procedures while obtaining consent from the surrogate, while she remains without capacity. The team can engage in directive counselling to facilitate the patient's participation with her care. Should the patient continue to refuse, and the clinical team determines it is in the best interest of the patient to undergo the procedure, a court order for treatment over objection would be required.      Thank you for including the Ethics Consultation Service in this challenging case.?     Please do not hesitate to call us if there are any questions. ?     Ethics Service will remain available for further conversation about the patient’s current condition and decision points that may occur.??     Case discussed with Arely Arzola MS, ERIKAMaguiC (Ethics Attending) and Silva Jonas MD, MPH, [facility] ( of Medical Ethics)??     More than 50% of the time of this consultation was spent in coordination of Care of Patient?     References?   1. Brenda, TBRUNO., & Beena, J.F. (2019). Principles of biomedical ethics (8th ed.) [employer]?   2. Appelbaum, P. S. (2007). Assessment of patient’s competence to consent to treatment. [employer] Journal of Medicine,         357(18), 114–6546. https://doi.org/10.1056/GBSLbh86117   3. [facility] HEALTH LAW § 2981 (2023)   4. [place] [employer] HEALTH LAW § 2994-D (2023)   5. [facility]. HEALTH LAW § 2994-D.1.   6. CELESTE Yang. (1972). The tactics and ethics of persuasion. Attitudes, conflict, and social change, 81-99.?   7. GREGORY Viera, & FAZAL Grimes (2002). Treatment over Objection: Minds, Bodies and Beneficence. International Journal of Mental        Health Law, 7, 105-118. https://doi.org/10.29611/[facility].v0i7.345?   8. JAYESH Stafford., KEN Vides, ANNI Linton., JAYESH Olsen., & TOY Dupree (2007). Surviving surrogate decision-making: What helps        and hampers the experience of making medical decisions for others. Journal of General Internal Medicine, 22(9), 1274–1279.         https://doi.org/10.1007/u28419-789-9786-b???   9. FAZAL Cooper (2023, March 8). “I’m not sick!”: Ethical dilemmas with treatment over objection. [employer].        https://www.psychiatrictimes.com/ view/im-not-sick-ethical-dilemmas-with-treatment-over-objection 
68yo F multiple spinal fusions cb chronic neck/back pain, paralysis, chronic pain (non functional MS IT pump) recurrent UTIs, multiple strokes, and blood clots presented with cloudy urine, pressure wounds, and anemia. She was found to have three stage 3 pressure ulcers and a likely UTI, attributed to inadequate home care. Patient intermittently declining interventions ie labs, transfusions. Palliative consulted to discuss goals of care and treatment preferences in light of current health status.     Prognosis is poor, given the patient's multiple comorbidities, poor baseline functional status with no anticipated recovery/improvement, high risk for recurrent infections and further decline. Recommend 2PC discussions regarding benefits/burdens of 2PC DNR DNI with surrogate, Ami. GOC maximize comfort and QOL. Referral for [facility].

## 2024-12-16 NOTE — BH CONSULTATION LIAISON PROGRESS NOTE - NSBHCONSULTRECOMMENDOTHER_PSY_A_CORE FT
Delirium Precautions:    Orientation protocols: Provision of clocks, calendars, windows with outside views and frequent verbal reorientation of patients.    Avoiding and/or monitoring the use of problematic medications: Avoid benzodiazepines. Use caution when prescribing opioids, dihydropyridines, anticholinergics, antihistamines.    Cognitive stimulation: Regular visits from family and friends. Avoid overstimulation (particularly at night)Facilitation of physiologic sleep: Nursing and medical procedures, including the administration of medications, should be avoided during sleeping hours when possible. Night-time noise should be reduced.    Early mobilization and minimized use of physical restraints for patients with limited mobility.    Visual and hearing aids for patients with these impairments    Please note that capacity can fluctuate over time and patients can regain/lose their capacity as a response to their underlying medical problems. A patient’s capacity should be intermittently re-assessed. Any licensed medical provider is able to provide capacity assessments.

## 2024-12-16 NOTE — CONSULT NOTE ADULT - SUBJECTIVE AND OBJECTIVE BOX
Consult requested by:  	Thania Mccullough NP	Service:  Palliative   Attending: Bharath Gongora MD   Consultant: Milagros Jaffe, MSN, RN, [facility], [facility] (Ethics Fellow) Contact #s: [phone] (cell) [phone] (office)     Consult purpose:  To assist the healthcare team with the ethical dilemma of a 69-year-old female with complex medical history, who was admitted for sacral wounds, and intermittently refusing care, regarding plan of care.      Clinical summary: This is the case of a 69-year-old female with a past medical history of significant hearing impairment, multiple spinal fusions complicated by chronic neck/back pain and bilateral lower extremity paralysis, status post non-functional intrathecal morphine pump, neurogenic bladder, status post chronic jiménez complicated by recurrent urinary tract infections (UTIs), multiple pulmonary emboli (12/2023, 7/2024), cerebral vascular accidents x3, hypertension, hyperlipidemia, and emphysema. Patient present to the ED with cloudy urine and unmanaged pressure wounds. In the ED, workup was notable for three stage-3 pressure ulcers in the sacral and bilateral ischial regions, Hgb 6.5, and urine culture concerning for acute versus chronic UTI. Patient was transfused with 1 unit of packed red blood cells. On 12/15, Gastroenterology consulted and plan for workup of iron deficiency anemia with esophagogastroduodenoscopy/colonoscopy pending discussion with the patient’s surrogate decision maker. Course complicated by change in patient’s mental status and refusal of care interventions. Per Neurology (12/16), mental status change likely related to toxic-metabolic encephalopathy versus neurological pathology. On 12/16 and 12/16, Behavioral Health () noted patient lacks capacity for medical decision-making. Per , patient with ongoing acute delirium in the setting of many chronic medical problems. Surgery consulted on 12/16 and recommended no acute surgical intervention for wound management at present time. Palliative on consult. Ethics consult initiated to facilitate care planning for a patient without capacity and who is intermittently refusing care.     Prognosis Estimate (survival in hrs, days, wks, mos, yrs):  Poor (see Palliative note 12/16)   Patient Decision-Making Capacity: Lacks capacity  (per  notes 12/15 and 12/16)   Patient Aware of:  Diagnosis: Unknown	Prognosis:    Unknown   Name of medical decision-maker should patient lack capacity:  Melissa Ford (niece, [phone])   Role:  Legal Surrogate   Decision-Maker Aware of:  Diagnosis: Yes Prognosis:  Yes     Other Stakeholders:  Sisters (estranged, no contact information)   Evidence of Patient’s Preference of Life-Sustaining Treatment: Verbal (see H&P/GOC note 12/13)   Resuscitation status:   DNR: No      DNI:  No              Discussions:       Discussion with CELESTE Serrato MD (Medicine) on 12/13/2024 (12:30-12:35). Discussed case in detail. Clinical team shared that patient had a change in mental status since admission and has been intermittently refusing interventions. Behavioral Health was consulted and determined patient lacked capacity for making medical decisions. The team is aware that family has been involved and wanted clarification on permissible actions should family be relied upon for decision and provide consent. Ethics conveyed that regardless of capacity and whether a surrogate or health care proxy provides consent, a patient may refuse non-emergent care interventions. A patient’s refusal cannot be overridden unless an intervention is necessary to address an imminent life-threatening condition.      Discussion with CLOVIS Mccullough NP (Palliative) on 12/13/2024 (13:00-13:05). Discussed case in detail. NIRAJ Mccullough shared that patient’s niece is surrogate and has been involved in patient’s care. Other family (sisters) are estranged and there is no contact information for them. The patient may have completed health care proxy form appointing the niece as her health care agent but a copy is not available and not found in chart. In previous conversions, patient’s niece would be amenable to seeking a symptom directed approach for her aunt. Palliative to follow up with niece regarding plan of care.     Bioethics analysis: The central ethical issue that exists in this case is (A) the respect for patient’s autonomy versus (B) the beneficence/maleficence of the clinical team.     Respect for a person’s autonomy involves granting patients who have the capacity to make care-related decisions, regarding what is in their best interest as it pertains to their health and well-being, after being fully informed about recommendations provided by the healthcare team, including the benefits of accepting, or risks of declining, these recommendations.(1) The provider’s beneficence pertains to their wish to consider what they believe is best for their patient given the patient’s unique circumstances, while non-maleficence focuses on the clinician’s desire to do no harm.??     Essential to the notion of autonomy is that of capacity, the ability to comprehend, understand, appreciate and reason (C-U-A-R).(2) Crucial to capacity is the ability to communicate an understanding of why medical interventions are necessary and an appreciation of the risks and benefits of agreeing to or declining the medical interventions.      Presently, the patient has not been able to demonstrate that she has capacity for making care decisions (see  notes 12/15 and 12/16). It is important to note that capacity is decision-specific and can thus wax and wane over time. In that regard, capacity is an ongoing process, not a single event or assessment, and it involves a willingness on the part of the practitioners to reassess capacity over time.?     When a patient lacks capacity to make health care decisions, a person whom the patient has appointed as their health care agent has the authority to make substituted judgements on behalf of the patient or decisions in the “best interest” of the patient. In [facility] (Public Health Law - [facility] § 2981)(3) a valid Health Care Proxy (HCP) form (i.e., signed by patient, dated, with agent(s) listed who meet the criteria for serving as an agent, and two witnesses to the form who are neither the appointed agent nor alternate agent listed) must exist for a person to be confirmed as the patient’s appointed HCP.     If a valid [facility] HCP form is not available, in accordance with the 2010 [facility] Family Health Care Decision Act (FHCDA),(4) an appropriate surrogate may make decisions in the best interest of the incapacitated patient. According to the FHCDA, the legally recognized surrogate is determined by the following hierarchy as stated below:      -an [facility] Article 81 court-appointed guardian (if there is one, and if empowered by the court order to make health care decisions);   -the spouse or domestic partner (as defined in the FHCDA);   -a parent;   -an adult brother or sister;   -a close friend.(5)     One person from the list must be reasonably available, willing, and competent to act.(5) Such person shall be the surrogate provided no other person in a class higher in priority than the person designated is available.(5) Also, that person may designate any other person on the list to be surrogate, provided no one in a class higher in priority than the person designated objects.(5) When there are multiple parties within the same hierarchical level that may meet the criteria to serve as surrogate (e.g., when there are multiple adult children, both parents living, multiple siblings), they share equal weight in making care decisions.     Here, in the absence of formal HCP documentation, and any known persons who may supersede her standing, Ms. Melissa Ford (patient’s niece) would be recognized as the surrogate decision-maker for Ms. Abdoul Redd. The patient’s niece has expressed she is willing to serve in this role but would defer resuscitation decisions to physicians (see Palliative note 12/16).     Still, regardless of capacity, or whether a surrogate is available to consent to treatment on an incapacitated patient’s behalf, a patient who is actively refusing treatment cannot be forced against their will to receive it. The patient should not be coerced. The patient can still refuse an intervention in the absence of an emergency, an emergent issue, or the potential for clear and imminent harm to the patient herself or third parties.?     Ms. Aliya Redd has innate MORAL STATUS – that is, her distinct personhood, personal experience, deion traditions, and interpretation of suffering, life-story- which must be respected. While she presently lacks the capacity to make care decisions, garnering Ms. Redd’s assent for interventions and respecting her refusals respects her moral status.?     Ethically, to compel or coerce a patient absent her assent requires an imminent life-threatening condition, as compelling or coercing treatment directly conflicts with autonomy. It is reasonable to believe that most people accept and find comfort in the thought that they will be provided medical care if they become too ill to consent to the care needed, and that it is therefore in line with their autonomous will as healthy individual.(6) Coercive treatment is thus acceptable only on the condition that:(7)?     (1) The patient cannot make an autonomous decision about treatment and?   (2) Treatment is in the patient’s authentic interest?     A commitment to respect for autonomous persons means tolerating a decision that may be unwise. It is ethically and morally challenging for any prudent observer to have patients who are deemed by clinicians to “make wrong choices.” Logically speaking, these are choices that entail an unfavorable benefit/burden ratio involving either diminishing benefit (such as by refusing something helpful) or maximizing burden (such as by requesting something especially harmful).?     To fulfil their ethical duties, clinicians should arrange for the patient to have all the pertinent information to make decisions and explore more fully the reasons for their refusal and attempt to educate. Communication is central in the practitioner-patient relationship.(8) Practical tools such as extensive communication with the patient and active listening can help in this regard. The team can utilize directive counselling to barrett the patient’s assent to care interventions. However, should the patient continue to refuse non-emergent interventions, and the clinical team determines it is in the best interest of the patient to receive these interventions, a court order for treatment over objection would be required.(9)? 
====SURGERY CONSULT====      VANNA DIAZ  [id number]    HPI:   68yo F w/ PMH multiple spinal fusions c/b chronic neck/back pain and BLE paralysis, s/p non-functional intrathecal morphine pump, neurogenic bladder s/p chronic jiménez c/b recurrent UTIs, multiple PEs (Dec 2023, July 2024), CVA x3 (MR brain Jan 2023 w/ chronic lacunar infarcts), HTN, HLD, and emphysema presenting w/ insufficient care at home for positioning and offloading of sacral decubitus ulcers.       Surgery consulted for sacral decubitus ulcers and r/o wound infection. Pt seen and examined at bedside, pt compliant w/ exam however unable to answer interview questions. Pt AOx1.     On exam, 3 sacral decubiti, R-lateral decubitus ulcer 4x5cm clean/no purulence, central decubitus ulcer 8x7cm w/ fibrinous exudate w/ no purulence, L-lateral wound w/ palpable bone at base clean/dry w/ no purulence or drainage,           LABS:                        7.0    14.23 )-----------( 543      ( 14 Dec 2024 22:46 )             22.6     12-14    137  |  106  |  4[L]  ----------------------------<  66[L]  3.9   |  21[L]  |  0.34[L]    Ca    8.1[L]      14 Dec 2024 05:30  Phos  2.7     12-14  Mg     1.7     12-14    TPro  6.3  /  Alb  1.8[L]  /  TBili  0.2  /  DBili  x   /  AST  5[L]  /  ALT  <5[L]  /  AlkPhos  121[H]  12-14    PT/INR - ( 14 Dec 2024 22:46 )   PT: 11.5 sec;   INR: 0.98          PTT - ( 14 Dec 2024 22:46 )  PTT:29.7 sec          GENERAL: NAD, lying in bed comfortably  HEAD:  Atraumatic, normocephalic  EYES: EOMI, PERRLA, conjunctiva and sclera clear  NECK: Supple, trachea midline, no JVD  HEART: Regular rate and rhythm, no murmurs, rubs, or gallops  LUNGS: Unlabored respirations.  Clear to auscultation bilaterally, no crackles, wheezing, or rhonchi  ABDOMEN: Soft, nontender, nondistended, +BS  EXTREMITIES: 2+ peripheral pulses bilaterally. No clubbing, cyanosis, or edema  NERVOUS SYSTEM:  A&Ox1  SACRUM: 3 sacral decubiti, R-lateral decubitus ulcer 4x5cm clean/no purulence, central decubitus ulcer 8x7cm w/ fibrinous exudate w/ no purulence, L-lateral wound w/ palpable bone at base clean/dry w/ no purulence or drainage,   
GASTROENTEROLOGY CONSULT NOTE  HPI:  Patient is a 69-year-old female with PMHx multiple spinal fusions c/b chronic neck/back pain and BLE paralysis, s/p non-functional intrathecal morphine pump, neurogenic bladder s/p chronic jiménez c/b recurrent UTIs, multiple PEs (Dec 2023, July 2024), CVA x3 (MR brain Jan 2023 w/ chronic lacunar infarcts), HTN, HLD, and emphysema presenting with cloudy urine and pressure wounds not being sufficiently managed by HHA. Patient reports that her HHA "comes in, eats breakfast, and sleeps in the living room". She called the HHA agency to have the HHA removed, and the agency called EMS to bring the patient to the hospital. She reports that her jiménez is not being emptied and that she was not receiving sufficient wound care and pressure offloading. Her jiménez was last exchanged 2 months ago. She denies fever but reports chills. She reports chronic persistent neck, back, and abdominal pain. She denies chest pain and SOB. She has been able to eat and drink and denies nausea/vomiting and diarrhea. LBM yesterday. She denies hematemesis, hematuria, and hematochezia/melena.    ED Course:   Vitals – T 97.3F, , /79, RR 18 SpO2 100%   Labs – WBC 10.29, HGB 6.5, , K 2.7 --> 4.1. UA w/ epithelial cells, large leuk, +nitrate, >998 WBC, bacteria   Imaging – CXR grossly unremarkable, official read pending   Interventions – Ofirmev 1g, Percocet x2, Dilaudid IV 0.5 x2, Vanc 1g, Zosyn 3.375g, KCl 100meq, NS 1L, pRBC pending  (13 Dec 2024 11:52)    Allergies    Crab (Pruritus)  morphine (Unknown)    Intolerances      Home Medications:  acetaminophen 500 mg oral tablet: 2 tab(s) orally every 8 hours As needed Temp greater or equal to 38.5C (101.3F), Mild Pain (1 - 3) (20 Jun 2024 19:03)  albuterol 90 mcg/inh inhalation aerosol: 2 puff(s) inhaled every 6 hours, As Needed (20 Jun 2024 19:03)  apixaban 5 mg oral tablet: 1 tab(s) orally every 12 hours (22 Jul 2024 09:57)  apixaban 5 mg oral tablet: 2 tab(s) orally every 12 hours Until 7/19/2024. Starting 7/20/2024, pt to take 5mg BID (16 Jul 2024 14:02)  aspirin 81 mg oral delayed release tablet: 1 tab(s) orally once a day (20 Jun 2024 19:03)  bisacodyl 5 mg oral delayed release tablet: 1 tab(s) orally once a day (at bedtime) (20 Jun 2024 19:03)  celecoxib 200 mg oral capsule: 1 cap(s) orally every 24 hours (20 Jun 2024 19:03)  diazePAM 5 mg oral tablet: 1 tab(s) orally once a day (16 Jul 2024 14:02)  diazePAM 5 mg oral tablet: 1 tab(s) orally every 8 hours as needed for  muscle spasm (20 Jun 2024 19:03)  droNABinol 2.5 mg oral capsule: 1 cap(s) orally 2 times a day (16 Jul 2024 14:02)  gabapentin 800 mg oral tablet: 1 tab(s) orally 3 times a day (20 Jun 2024 19:03)  lidocaine 4% topical film: Apply topically to affected area once a day as needed for  severe pain (16 Jul 2024 14:02)  lidocaine 4% topical film: Apply topically to affected area 2 times a day as needed for  severe pain (16 Jul 2024 14:02)  MetFORMIN (Eqv-Glumetza) 500 mg oral tablet, extended release: 1 tab(s) orally 2 times a day (20 Jun 2024 19:03)  Metoprolol Tartrate 25 mg oral tablet: 1 tab(s) orally every 12 hours (17 Jul 2024 09:42)  Movantik 25 mg oral tablet: 1 tab(s) orally once a day (in the morning) (20 Jun 2024 19:03)  Multiple Vitamins oral tablet: 1 tab(s) orally once a day (16 Jul 2024 14:02)  oxyCODONE 15 mg oral tablet: 1 tab(s) orally every 6 hours As needed Severe Pain (7 - 10) (22 Jul 2024 09:57)  pantoprazole 40 mg oral delayed release tablet: 1 tab(s) orally once a day (before a meal) (20 Jun 2024 19:03)  polyethylene glycol 3350 oral powder for reconstitution: 17 gram(s) orally 2 times a day (20 Jun 2024 19:03)  Pradaxa 150 mg oral capsule: 1 cap(s) orally 2 times a day (20 Jun 2024 19:03)  senna leaf extract oral tablet: 2 tab(s) orally once a day (at bedtime) (20 Jun 2024 19:03)  simvastatin 40 mg oral tablet: 1 tab(s) orally once a day (at bedtime) (20 Jun 2024 19:03)  Vitamin D3 50 mcg (2000 intl units) oral tablet: 1 tab(s) orally once a day (20 Jun 2024 19:03)    MEDICATIONS:  MEDICATIONS  (STANDING):  ascorbic acid 500 milliGRAM(s) Oral daily  diazepam    Tablet 5 milliGRAM(s) Oral daily  lidocaine   4% Patch 1 Patch Transdermal every 24 hours  lidocaine   4% Patch 1 Patch Transdermal every 24 hours  multivitamin 1 Tablet(s) Oral daily  pantoprazole    Tablet 40 milliGRAM(s) Oral before breakfast  zinc sulfate 220 milliGRAM(s) Oral daily    MEDICATIONS  (PRN):  oxyCODONE    IR 15 milliGRAM(s) Oral every 6 hours PRN Severe Pain (7 - 10)    PAST MEDICAL & SURGICAL HISTORY:  HTN (hypertension)  SS (spinal stenosis)  High cholesterol  Stroke  x3  H/O carpal tunnel syndrome  Bilateral  Chronic GERD  History of chronic constipation  Urinary incontinence  Pre-diabetes  Anxiety and depression  Eczema  H/O kyphosis  Pancreas cyst  Scoliosis  Wheelchair dependent  Cervical pseudoarthrosis  and lumbar spine  Cervical spondylosis  Chronic headaches  Degenerative joint disease  left shoulder  Lumbosacral spondylosis  COPD (chronic obstructive pulmonary disease)    H/O Spinal surgery  lumbar x 30  H/O breast surgery  left breast implant 1980's  S/P cervical discectomy  H/O total shoulder replacement, right    FAMILY HISTORY:  Unable to assess given patient's current status.    SOCIAL HISTORY:  Unable to assess given patient's current status.    REVIEW OF SYSTEMS:  Unable to assess given patient's current status.    Vital Signs Last 24 Hrs  T(C): 36.9 (14 Dec 2024 05:51), Max: 36.9 (14 Dec 2024 05:51)  T(F): 98.4 (14 Dec 2024 05:51), Max: 98.4 (14 Dec 2024 05:51)  HR: 112 (14 Dec 2024 07:48) (112 - 116)  BP: 124/87 (14 Dec 2024 05:51) (111/76 - 124/87)  BP(mean): --  RR: 17 (14 Dec 2024 05:51) (16 - 17)  SpO2: 100% (14 Dec 2024 05:51) (100% - 100%)    Parameters below as of 13 Dec 2024 21:35  Patient On (Oxygen Delivery Method): room air      12-13 @ 07:01  -  12-14 @ 07:00  --------------------------------------------------------  IN: 0 mL / OUT: 1000 mL / NET: -1000 mL    12-14 @ 07:01  -  12-14 @ 17:33  --------------------------------------------------------  IN: 0 mL / OUT: 1700 mL / NET: -1700 mL      PHYSICAL EXAM:  General: elderly, lying in bed, in no acute distress  HEENT: Neck supple, mmm, no jvd  Lungs: Normal respiratory effort, no intercostal retractions  Cardiovascular: regular rate  Abdomen: Soft, mild diffuse abd tenderness non-distended; No rebound or guarding  Extremities: wwp, no cce  Neurological: CHANDLER, speech fluent  Skin: Warm and dry. No obvious rash    LABS:                        7.9    11.59 )-----------( 492      ( 14 Dec 2024 05:30 )             25.4     12-14    137  |  106  |  4[L]  ----------------------------<  66[L]  3.9   |  21[L]  |  0.34[L]    Ca    8.1[L]      14 Dec 2024 05:30  Phos  2.7     12-14  Mg     1.7     12-14    TPro  6.3  /  Alb  1.8[L]  /  TBili  0.2  /  DBili  x   /  AST  5[L]  /  ALT  <5[L]  /  AlkPhos  121[H]  12-14    PT/INR - ( 12 Dec 2024 22:20 )   PT: 11.6 sec;   INR: 0.99        PTT - ( 12 Dec 2024 22:20 )  PTT:26.8 sec    Culture - Urine (collected 13 Dec 2024 00:48)  Source: Clean Catch None  Final Report (14 Dec 2024 12:15):    >=3 organisms. Probable collection contamination.    Urinalysis with Rflx Culture (collected 13 Dec 2024 00:48)    Culture - Blood (collected 12 Dec 2024 21:18)  Source: .Blood BLOOD  Preliminary Report (14 Dec 2024 04:01):    No growth at 24 hours    Culture - Blood (collected 12 Dec 2024 21:17)  Source: .Blood BLOOD  Preliminary Report (14 Dec 2024 04:01):    No growth at 24 hours      RADIOLOGY & ADDITIONAL STUDIES:     Reviewed  
NEUROLOGY CONSULT    HPI:  Patient is a 69-year-old female with PMHx multiple spinal fusions c/b chronic neck/back pain and BLE paralysis, s/p non-functional intrathecal morphine pump, neurogenic bladder s/p chronic jiménez c/b recurrent UTIs, multiple PEs (Dec 2023, July 2024), CVA x3 (MR brain Jan 2023 w/ chronic cerebellar and L BG infarcts), HTN, HLD, and emphysema presenting with cloudy urine and pressure wounds not being sufficiently managed by HHA. Patient reports that her HHA "comes in, eats breakfast, and sleeps in the living room". She called the HHA agency to have the HHA removed, and the agency called EMS to bring the patient to the hospital. She reports that her jiménez is not being emptied and that she was not receiving sufficient wound care and pressure offloading. Her jiménez was last exchanged 2 months ago. She denies fever but reports chills. Admitted after found to have 3x stage 3 pressure ulcers in the sacral, bilateral ischial regions, HGB 6.5, contaminated but positive UA, concerning for acute UTI. Patient received a unit of blood with good response but Hg rapidly declined, no clear source of bleeding, but ALEJANDRA being considered. Neurology consulted for AMS.     Per chart review patient has been closely followed by neurosurgery, with outpatient neurology visits (last one in 5/2023). Patient worked up for suspected MS which was eventually ruled out (Last LP in 2022). She was evaluated by the stroke team this last June after a stroke code was called due to dysarthria in the context of hypotension secondary to sepsis. Symptoms deemed to be due recrudescence of past strokes. CTH during this admission, non-acute.  Patient afebrile overnight, but leukocytosis uptrending, Hb downtrending.              ED Course:   Vitals – T 97.3F, , /79, RR 18 SpO2 100%   Labs – WBC 10.29, HGB 6.5, , K 2.7 --> 4.1. UA w/ epithelial cells, large leuk, +nitrate, >998 WBC, bacteria   Imaging – CXR grossly unremarkable, official read pending   Interventions – Ofirmev 1g, Percocet x2, Dilaudid IV 0.5 x2, Vanc 1g, Zosyn 3.375g, KCl 100meq, NS 1L, pRBC pending  (13 Dec 2024 11:52)     MEDICATIONS  Home Medications:  acetaminophen 500 mg oral tablet: 2 tab(s) orally every 8 hours As needed Temp greater or equal to 38.5C (101.3F), Mild Pain (1 - 3) (20 Jun 2024 19:03)  albuterol 90 mcg/inh inhalation aerosol: 2 puff(s) inhaled every 6 hours, As Needed (20 Jun 2024 19:03)  apixaban 5 mg oral tablet: 1 tab(s) orally every 12 hours (22 Jul 2024 09:57)  apixaban 5 mg oral tablet: 2 tab(s) orally every 12 hours Until 7/19/2024. Starting 7/20/2024, pt to take 5mg BID (16 Jul 2024 14:02)  aspirin 81 mg oral delayed release tablet: 1 tab(s) orally once a day (20 Jun 2024 19:03)  bisacodyl 5 mg oral delayed release tablet: 1 tab(s) orally once a day (at bedtime) (20 Jun 2024 19:03)  celecoxib 200 mg oral capsule: 1 cap(s) orally every 24 hours (20 Jun 2024 19:03)  diazePAM 5 mg oral tablet: 1 tab(s) orally once a day (16 Jul 2024 14:02)  diazePAM 5 mg oral tablet: 1 tab(s) orally every 8 hours as needed for  muscle spasm (20 Jun 2024 19:03)  droNABinol 2.5 mg oral capsule: 1 cap(s) orally 2 times a day (16 Jul 2024 14:02)  gabapentin 800 mg oral tablet: 1 tab(s) orally 3 times a day (20 Jun 2024 19:03)  lidocaine 4% topical film: Apply topically to affected area once a day as needed for  severe pain (16 Jul 2024 14:02)  lidocaine 4% topical film: Apply topically to affected area 2 times a day as needed for  severe pain (16 Jul 2024 14:02)  MetFORMIN (Eqv-Glumetza) 500 mg oral tablet, extended release: 1 tab(s) orally 2 times a day (20 Jun 2024 19:03)  Metoprolol Tartrate 25 mg oral tablet: 1 tab(s) orally every 12 hours (17 Jul 2024 09:42)  Movantik 25 mg oral tablet: 1 tab(s) orally once a day (in the morning) (20 Jun 2024 19:03)  Multiple Vitamins oral tablet: 1 tab(s) orally once a day (16 Jul 2024 14:02)  oxyCODONE 15 mg oral tablet: 1 tab(s) orally every 6 hours As needed Severe Pain (7 - 10) (22 Jul 2024 09:57)  pantoprazole 40 mg oral delayed release tablet: 1 tab(s) orally once a day (before a meal) (20 Jun 2024 19:03)  polyethylene glycol 3350 oral powder for reconstitution: 17 gram(s) orally 2 times a day (20 Jun 2024 19:03)  Pradaxa 150 mg oral capsule: 1 cap(s) orally 2 times a day (20 Jun 2024 19:03)  senna leaf extract oral tablet: 2 tab(s) orally once a day (at bedtime) (20 Jun 2024 19:03)  simvastatin 40 mg oral tablet: 1 tab(s) orally once a day (at bedtime) (20 Jun 2024 19:03)  Vitamin D3 50 mcg (2000 intl units) oral tablet: 1 tab(s) orally once a day (20 Jun 2024 19:03)    MEDICATIONS  (STANDING):  ascorbic acid 500 milliGRAM(s) Oral daily  diazepam    Tablet 5 milliGRAM(s) Oral daily  lidocaine   4% Patch 1 Patch Transdermal every 24 hours  lidocaine   4% Patch 1 Patch Transdermal every 24 hours  multivitamin 1 Tablet(s) Oral daily  pantoprazole    Tablet 40 milliGRAM(s) Oral before breakfast  zinc sulfate 220 milliGRAM(s) Oral daily    MEDICATIONS  (PRN):  oxyCODONE    IR 15 milliGRAM(s) Oral every 6 hours PRN Severe Pain (7 - 10)      FAMILY HISTORY:    SOCIAL HISTORY: negative for tobacco, alcohol, or ilicit drug use.    Allergies    Crab (Pruritus)  morphine (Unknown)    Intolerances        GEN: NAD, pleasant, cooperative    NEURO:   MENTAL STATUS: Awake and alert, very hard of hearing. Oriented to self, knows that she in the hospital, knows the month but not the day (required effusive encouragement as she only wanted to talk about her pain)  LANG/SPEECH: non-dysarthric. Follows commands.   CRANIAL NERVES:  II: Pupils equal and reactive, no RAPD, normal visual field and fundus  III, IV, VI: EOM intact, no gaze preference or deviation  V: normal  VII: no facial asymmetry  VIII: decreased hearing bilaterally  MOTOR: 4+/5 in both upper extremities, 0/5 b/l Le (chronic). Intermittent mid amplitude flailing movement on RUE. Unclear if intentional (not see in the JASWINDER), patient refused to answer questions regarding those movements.   REFLEXES: 1+ b/l UE, Mute patellars and babinski   SENSORY: Normal to touch, temperature in all b/l UE. Absent soft touch and temp on the LE's   COORD: Able to hit target during finger to nose b/l with mild flailing mid range.       LABS:                        7.0    14.23 )-----------( 543      ( 14 Dec 2024 22:46 )             22.6     12-14    137  |  106  |  4[L]  ----------------------------<  66[L]  3.9   |  21[L]  |  0.34[L]    Ca    8.1[L]      14 Dec 2024 05:30  Phos  2.7     12-14  Mg     1.7     12-14    TPro  6.3  /  Alb  1.8[L]  /  TBili  0.2  /  DBili  x   /  AST  5[L]  /  ALT  <5[L]  /  AlkPhos  121[H]  12-14    Hemoglobin A1C:   Vitamin B12   PT/INR - ( 14 Dec 2024 22:46 )   PT: 11.5 sec;   INR: 0.98          PTT - ( 14 Dec 2024 22:46 )  PTT:29.7 sec  CAPILLARY BLOOD GLUCOSE          Urinalysis Basic - ( 14 Dec 2024 05:30 )    Color: x / Appearance: x / SG: x / pH: x  Gluc: 66 mg/dL / Ketone: x  / Bili: x / Urobili: x   Blood: x / Protein: x / Nitrite: x   Leuk Esterase: x / RBC: x / WBC x   Sq Epi: x / Non Sq Epi: x / Bacteria: x            Microbiology:    Culture - Urine (collected 13 Dec 2024 00:48)  Source: Clean Catch None  Final Report (14 Dec 2024 12:15):    >=3 organisms. Probable collection contamination.    Urinalysis with Rflx Culture (collected 13 Dec 2024 00:48)    Culture - Blood (collected 12 Dec 2024 21:18)  Source: .Blood BLOOD  Preliminary Report (15 Dec 2024 04:01):    No growth at 48 Hours    Culture - Blood (collected 12 Dec 2024 21:17)  Source: .Blood BLOOD  Preliminary Report (15 Dec 2024 04:01):    No growth at 48 Hours        RADIOLOGY, EKG AND ADDITIONAL TESTS: Reviewed.          
[facility]  Thania Mccullough, Geriatrics & Palliative Care NP  Contact Info: Call [phone] (HEAL Line) or message on Microsoft Teams    HPI:  Patient is a 69-year-old female with PMHx multiple spinal fusions c/b chronic neck/back pain and BLE paralysis, s/p non-functional intrathecal morphine pump, neurogenic bladder s/p chronic jiménez c/b recurrent UTIs, multiple PEs (Dec 2023, July 2024), CVA x3 (MR brain Jan 2023 w/ chronic lacunar infarcts), HTN, HLD, and emphysema presenting with cloudy urine and pressure wounds not being sufficiently managed by HHA. Patient reports that her HHA "comes in, eats breakfast, and sleeps in the living room". She called the HHA agency to have the HHA removed, and the agency called EMS to bring the patient to the hospital. She reports that her jiménez is not being emptied and that she was not receiving sufficient wound care and pressure offloading. Her jiménez was last exchanged 2 months ago. She denies fever but reports chills. She reports chronic persistent neck, back, and abdominal pain. She denies chest pain and SOB. She has been able to eat and drink and denies nausea/vomiting and diarrhea. LBM yesterday. She denies hematemesis, hematuria, and hematochezia/melena.    (13 Dec 2024 11:52)    Hospital course, primary team, and consultant notes reviewed. 70yo F multiple spinal fusions cb chronic neck/back pain, paralysis, chronic pain (non functional MS IT pump) recurrent UTIs, multiple strokes, and blood clots presented with cloudy urine, pressure wounds, and anemia. She was found to have three stage 3 pressure ulcers and a likely UTI, attributed to inadequate home care. Intermittently declining interventions. Seen by , deemed not to have capacity to make make complex decisions.     Pt seen and examined. Overtly comfortable lying in bed. NAD. c/o back pain, but does not/unable to further describe when prompted. Feels hungry. c/o dry mouth. Comprehensive ROS as noted below, collateral obtained from chart/team/family. Exploration of GOC documented as below with NOK.     PAST MEDICAL & SURGICAL HISTORY:  HTN (hypertension)  SS (spinal stenosis)  High cholesterol  Stroke  x3  H/O carpal tunnel syndrome  Bilateral  Chronic GERD  History of chronic constipation  Urinary incontinence  self cath as needed  Pre-diabetes  Anxiety and depression  Eczema  H/O kyphosis  Pancreas cyst  Scoliosis  Wheelchair dependent  Cervical pseudoarthrosis  and lumbar spine  Cervical spondylosis  Chronic headaches  Degenerative joint disease  left shoulder  Lumbosacral spondylosis  COPD (chronic obstructive pulmonary disease)  H/O Spinal srgery  lumbar x 30  H/O breast surgery  left breast implant 1980's  S/P cervical discectomy  x4  H/O total shoulder replacement, right      FAMILY HISTORY:   Reviewed; no history of     in mother or father    Opiate Naive (Y/N):   iStop reviewed (Y/N): Yes.   No Rx found on iStop review (Ref#:           Items that are not checked are not present  PSYCHOSOCIAL ASSESSMENT:  - Significant other/partner: [  ]  Children: [  ]  - Living Situation: Home [  x] Long term care [  ]  Rehab[  ]  Other[  ]  - Support system: strong[  ] adequate[x  ] inadequate[  ]  - Hindu/Spiritual practice: deferred   - Role of organized Advent important [  ] some [  ] unable to assess dt pt mentation [ x ]  - Coping: well[  ]  with difficulty[  ]  poor coping[  ]  unable to assess dt pt mentation [ x ]    ADVANCE DIRECTIVES:    - MOLST[  ]     - Living Will [  ]     DECISION MAKER(s):  - Health Care Proxy(s) [  ]  Surrogate(s)[  ] Guardian[  ]           Name(s)/Phone Number(s):   - per chart review, HCP completed naming niece Melissa, scanned into EMR, however does not appear to be on file    BASELINE (I)ADLs (prior to admission):  - [place]:  Total[  ] Moderate[  ] Dependent[ x ]  - bedbound for over a year, able to occasionally feeds self, otherwise requires total assist with adls    Allergies  Crab (Pruritus)  morphine (Unknown)  Intolerances      Medications:      MEDICATIONS  (STANDING):  ascorbic acid 500 milliGRAM(s) Oral daily  diazepam    Tablet 5 milliGRAM(s) Oral daily  enoxaparin Injectable 40 milliGRAM(s) SubCutaneous every 24 hours  lidocaine   4% Patch 1 Patch Transdermal every 24 hours  lidocaine   4% Patch 1 Patch Transdermal every 24 hours  multivitamin 1 Tablet(s) Oral daily  pantoprazole    Tablet 40 milliGRAM(s) Oral before breakfast  polyethylene glycol 3350 17 Gram(s) Oral every 12 hours  senna 2 Tablet(s) Oral at bedtime  vancomycin  IVPB 750 milliGRAM(s) IV Intermittent every 12 hours  zinc sulfate 220 milliGRAM(s) Oral daily    MEDICATIONS  (PRN):  oxyCODONE    IR 15 milliGRAM(s) Oral every 6 hours PRN Severe Pain (7 - 10)      24 hour PRN use:      oxyCODONE    IR   15 milliGRAM(s) Oral (12-16-24 @ 05:40)   15 milliGRAM(s) Oral (12-15-24 @ 22:13)        PRESENT SYMPTOMS:   [x ] Limited ability to self report   Source if other than patient:  [ ]Family   [ ]Team     Pain [x  ] "back" and "stomach"  PAIN AD Score:  1  [email or web address] (press ctrl +  left click to view)    If [  ], pt denies symptom.   Dyspnea:         [  ]  Anxiety:           [  ]  Difficulty sleeping: [  ]  Fatigue:           [  ]  Nausea:           [  ]  Loss of appetite:     [  ]  Dysphagia: [  ]  Constipation:   [ x ]        Other Symptoms: xerostomia     All other review of systems negative [ x ]    ECOG Performance:     4  Current Palliative Performance Scale/Karnofsky Score:  40   %  Preadmit Karnofsky:  40  %          PEx:  General:  woman lying in bed, awake   alert  oriented x   name only  verbal  Behavioral: Cooperative, inattentive at times, calm   HEENT: atraumatic,  No temporal wasting,  +dry mouth  RESP: Reg rhythm, No  tachypnea/labored breathing,  No audible excessive secretions,  CTAB, diminished bilat bases  CV: RRR, S1S2,  No  tachycardia  GI: soft non distended non tender functionally incontinent   MUSK: weakness x4, No edema, mostly/fully  BB/WC bound  SKIN:  Poor skin turgor, Pressure ulcer stage: multiple stage III ulcers- see primary team exam   NEURO:  No deficits, cognitive impairment, encephalopathic dsyphagia dysarthria paresis  Oral intake ability:  minimal capability    T(C): 37.1 (12-16-24 @ 08:53), Max: 37.1 (12-16-24 @ 08:53)  HR: 116 (12-16-24 @ 08:53) (102 - 127)  BP: 133/87 (12-16-24 @ 08:53) (115/78 - 164/94)  RR: 17 (12-16-24 @ 08:53) (17 - 19)  SpO2: 100% (12-16-24 @ 08:53) (99% - 100%)  Wt(kg): --    Labs:    CBC:                        7.7    9.52  )-----------( 417      ( 16 Dec 2024 05:30 )             25.2     CMP:    12-16    136  |  104  |  3[L]  ----------------------------<  92  3.6   |  24  |  0.32[L]    Ca    7.8[L]      16 Dec 2024 05:30  Phos  2.6     12-16  Mg     1.8     12-16    TPro  6.5  /  Alb  1.9[L]  /  TBili  0.2  /  DBili  x   /  AST  8[L]  /  ALT  <5[L]  /  AlkPhos  110  12-16    PT/INR - ( 14 Dec 2024 22:46 )   PT: 11.5 sec;   INR: 0.98          PTT - ( 14 Dec 2024 22:46 )  PTT:29.7 sec   Urinalysis Basic - ( 16 Dec 2024 05:30 )    Color: x / Appearance: x / SG: x / pH: x  Gluc: 92 mg/dL / Ketone: x  / Bili: x / Urobili: x   Blood: x / Protein: x / Nitrite: x   Leuk Esterase: x / RBC: x / WBC x   Sq Epi: x / Non Sq Epi: x / Bacteria: x    RADIOLOGY & ADDITIONAL STUDIES:  Imaging:  Reviewed    [facility] discussion:

## 2024-12-16 NOTE — CONSULT NOTE ADULT - REASON FOR ADMISSION
UTI, pressure wounds, insufficient care at home

## 2024-12-16 NOTE — CONSULT NOTE ADULT - CONSULT REASON
anemia
AMS
To assist the healthcare team with the ethical dilemma of a 69-year-old female with complex medical history, who was admitted for sacral wounds, and intermittently refusing care, regarding plan of care.
sacral decubitus ulcers x3
Palliative consulted to discuss goals of care and treatment preferences in light of current health status.

## 2024-12-16 NOTE — BH CONSULTATION LIAISON PROGRESS NOTE - NSBHATTESTCOMMENTATTENDFT_PSY_A_CORE
70yo woman, originally from [place], living in  since 1987 with limited known PPH (dx with adjustment d/o during medical admission June-July 2024), PMH of multiple spinal fusions c/b chronic neck/back pain and BLE paralysis, s/p non-functional intrathecal morphine pump, neurogenic bladder s/p chronic jiménez c/b recurrent UTIs, multiple PEs (Dec 2023, July 2024), CVA x3 (MR brain Jan 2023 w/ chronic lacunar infarcts), HTN, HLD, and emphysema who presents with acute delirium in the setting of hospitalization for treatment of UTI, pressure wounds and concern for ability to care for self at home. Pt seen yesterday by CL service with notable paranoia, persecutory delusions, illogical thinking at that time; pt found to lack capacity to refuse recommended blood transfusions. Pt seen today for f/u.    On reassessment, pt found asleep, arousable to voice, calm, did not answer questions about name, location, date or situation. When given choices about location, pt chose “doctor’s office”. Pt indicated that she had pain in her lower chest/upper abdomen but was unable to state any other medical problems or reason for hospitalization. She repeatedly stated “I love you” when asked other unrelated questions. Unable to obtain further hx.    Impression is of pt with ongoing acute delirium in the setting of many chronic medical problems, acutely with UTI and pressure ulcers. Pt is confused, inattentive, disoriented, without ongoing overt persecutory delusions, no agitation, no SI/HI. Pt does not demonstrate any understanding of her acute medical problems, is unable to logically reason, and does not have capacity to make decisions about her acute medical care at this time.    Recommendations: Supervised room given confusional state. Defer to surrogate decision maker re: any acute medical decisions such as need for transfusion as pt LACKS capacity at this time. Note that decisional capacity can fluctuate and reassessments should be attempted. Recommend haloperidol 1mg po/IM/IV Q6H PRN for acute agitation that is not verbally redirectable. Delirium precautions and treatment of ongoing contributors. Will remain available as needed. Please contact with questions.

## 2024-12-16 NOTE — PROGRESS NOTE ADULT - SUBJECTIVE AND OBJECTIVE BOX
OVERNIGHT EVENTS: KAMRAN    SUBJECTIVE / INTERVAL HPI: Patient seen and examined at bedside. Difficult interview Indonesian  used. Patient does not answer most questions, intermittently says 'I love you'. States in Vietnamese that she has belly pain but does not respond to subsequent questions.    VITAL SIGNS:  Vital Signs Last 24 Hrs  T(C): 37.1 (16 Dec 2024 08:53), Max: 37.1 (16 Dec 2024 08:53)  T(F): 98.8 (16 Dec 2024 08:53), Max: 98.8 (16 Dec 2024 08:53)  HR: 116 (16 Dec 2024 08:53) (102 - 127)  BP: 133/87 (16 Dec 2024 08:53) (115/78 - 164/94)  BP(mean): --  RR: 17 (16 Dec 2024 08:53) (17 - 19)  SpO2: 100% (16 Dec 2024 08:53) (99% - 100%)    Parameters below as of 16 Dec 2024 08:53  Patient On (Oxygen Delivery Method): room air      I&O's Summary    15 Dec 2024 07:01  -  16 Dec 2024 07:00  --------------------------------------------------------  IN: 0 mL / OUT: 1050 mL / NET: -1050 mL        PHYSICAL EXAM:    General: Lying in bed. Appears not in acute distress.  HEENT: NC/AT; PERRL, anicteric sclera; MMM  Neck: supple  Cardiovascular: +S1/S2; RRR  Respiratory: CTA B/L; no W/R/R  Gastrointestinal: soft, BSx4, some tenderness to palpation diffusely  Extremities: WWP; no edema, clubbing or cyanosis  Vascular: 2+ radial, DP/PT pulses B/L  Neurological: AAOx3; no focal deficits    MEDICATIONS:  MEDICATIONS  (STANDING):  ascorbic acid 500 milliGRAM(s) Oral daily  diazepam    Tablet 5 milliGRAM(s) Oral daily  enoxaparin Injectable 40 milliGRAM(s) SubCutaneous every 24 hours  lidocaine   4% Patch 1 Patch Transdermal every 24 hours  lidocaine   4% Patch 1 Patch Transdermal every 24 hours  multivitamin 1 Tablet(s) Oral daily  pantoprazole    Tablet 40 milliGRAM(s) Oral before breakfast  polyethylene glycol 3350 17 Gram(s) Oral every 12 hours  senna 2 Tablet(s) Oral at bedtime  vancomycin  IVPB 750 milliGRAM(s) IV Intermittent every 12 hours  zinc sulfate 220 milliGRAM(s) Oral daily    MEDICATIONS  (PRN):  oxyCODONE    IR 15 milliGRAM(s) Oral every 6 hours PRN Severe Pain (7 - 10)      ALLERGIES:  Allergies    Crab (Pruritus)  morphine (Unknown)    Intolerances        LABS:                        7.7    9.52  )-----------( 417      ( 16 Dec 2024 05:30 )             25.2     12-16    136  |  104  |  3[L]  ----------------------------<  92  3.6   |  24  |  0.32[L]    Ca    7.8[L]      16 Dec 2024 05:30  Phos  2.6     12-16  Mg     1.8     12-16    TPro  6.5  /  Alb  1.9[L]  /  TBili  0.2  /  DBili  x   /  AST  8[L]  /  ALT  <5[L]  /  AlkPhos  110  12-16    PT/INR - ( 14 Dec 2024 22:46 )   PT: 11.5 sec;   INR: 0.98          PTT - ( 14 Dec 2024 22:46 )  PTT:29.7 sec  Urinalysis Basic - ( 16 Dec 2024 05:30 )    Color: x / Appearance: x / SG: x / pH: x  Gluc: 92 mg/dL / Ketone: x  / Bili: x / Urobili: x   Blood: x / Protein: x / Nitrite: x   Leuk Esterase: x / RBC: x / WBC x   Sq Epi: x / Non Sq Epi: x / Bacteria: x      CAPILLARY BLOOD GLUCOSE      POCT Blood Glucose.: 84 mg/dL (16 Dec 2024 12:19)      RADIOLOGY & ADDITIONAL TESTS: Reviewed.   OVERNIGHT EVENTS: KAMRAN    SUBJECTIVE / INTERVAL HPI: Patient seen and examined at bedside. Difficult interview Kinyarwanda  used. Patient does not answer most questions, intermittently says 'I love you'. States in Romanian that she has belly pain but does not respond to subsequent questions.    VITAL SIGNS:  Vital Signs Last 24 Hrs  T(C): 37.1 (16 Dec 2024 08:53), Max: 37.1 (16 Dec 2024 08:53)  T(F): 98.8 (16 Dec 2024 08:53), Max: 98.8 (16 Dec 2024 08:53)  HR: 116 (16 Dec 2024 08:53) (102 - 127)  BP: 133/87 (16 Dec 2024 08:53) (115/78 - 164/94)  BP(mean): --  RR: 17 (16 Dec 2024 08:53) (17 - 19)  SpO2: 100% (16 Dec 2024 08:53) (99% - 100%)    Parameters below as of 16 Dec 2024 08:53  Patient On (Oxygen Delivery Method): room air      I&O's Summary    15 Dec 2024 07:01  -  16 Dec 2024 07:00  --------------------------------------------------------  IN: 0 mL / OUT: 1050 mL / NET: -1050 mL        PHYSICAL EXAM:    General: Lying in bed. Appears not in acute distress.  HEENT: NC/AT; PERRL, anicteric sclera; MMM  Neck: supple  Cardiovascular: +S1/S2; RRR  Respiratory: CTA B/L; no W/R/R  Gastrointestinal: soft, BSx4, some tenderness to palpation diffusely  Extremities: WWP; no edema, clubbing or cyanosis  Vascular: 2+ radial, DP/PT pulses B/L  Neurological: AAOx1    MEDICATIONS:  MEDICATIONS  (STANDING):  ascorbic acid 500 milliGRAM(s) Oral daily  diazepam    Tablet 5 milliGRAM(s) Oral daily  enoxaparin Injectable 40 milliGRAM(s) SubCutaneous every 24 hours  lidocaine   4% Patch 1 Patch Transdermal every 24 hours  lidocaine   4% Patch 1 Patch Transdermal every 24 hours  multivitamin 1 Tablet(s) Oral daily  pantoprazole    Tablet 40 milliGRAM(s) Oral before breakfast  polyethylene glycol 3350 17 Gram(s) Oral every 12 hours  senna 2 Tablet(s) Oral at bedtime  vancomycin  IVPB 750 milliGRAM(s) IV Intermittent every 12 hours  zinc sulfate 220 milliGRAM(s) Oral daily    MEDICATIONS  (PRN):  oxyCODONE    IR 15 milliGRAM(s) Oral every 6 hours PRN Severe Pain (7 - 10)      ALLERGIES:  Allergies    Crab (Pruritus)  morphine (Unknown)    Intolerances        LABS:                        7.7    9.52  )-----------( 417      ( 16 Dec 2024 05:30 )             25.2     12-16    136  |  104  |  3[L]  ----------------------------<  92  3.6   |  24  |  0.32[L]    Ca    7.8[L]      16 Dec 2024 05:30  Phos  2.6     12-16  Mg     1.8     12-16    TPro  6.5  /  Alb  1.9[L]  /  TBili  0.2  /  DBili  x   /  AST  8[L]  /  ALT  <5[L]  /  AlkPhos  110  12-16    PT/INR - ( 14 Dec 2024 22:46 )   PT: 11.5 sec;   INR: 0.98          PTT - ( 14 Dec 2024 22:46 )  PTT:29.7 sec  Urinalysis Basic - ( 16 Dec 2024 05:30 )    Color: x / Appearance: x / SG: x / pH: x  Gluc: 92 mg/dL / Ketone: x  / Bili: x / Urobili: x   Blood: x / Protein: x / Nitrite: x   Leuk Esterase: x / RBC: x / WBC x   Sq Epi: x / Non Sq Epi: x / Bacteria: x      CAPILLARY BLOOD GLUCOSE      POCT Blood Glucose.: 84 mg/dL (16 Dec 2024 12:19)      RADIOLOGY & ADDITIONAL TESTS: Reviewed.

## 2024-12-16 NOTE — BH CONSULTATION LIAISON PROGRESS NOTE - LEVEL OF CONSCIOUSNESS
[email or web address] living calling in on Jun. She is becoming even more forgetful. The forgetfulness has really worsened in the past year. Her home dynamics are not good. Katlyn's  Kenan Montalvo is her main care giver. He tries his best but Jun is so forgetful. She flips him off, mother F's him, accuses him of not giving her her pills or checking her sugars. He is doing all this for her. She refuses an aid because she doesn't want a . She refuses a dietary aid to prep her meals. they have mom on wheels coming on fri to see if she will accept that,. She has been speaking with JOSEPHINE Dorantes at [facility]. Leia Sandoval is close to calling adult protective services to step in, due to her being in the hospital 3times in two months for her uncontrolled sugars. They are really trying their best to keep her out of the hospital or a nursing home. They ask when she comes in on 01/18 if you will do a cognitive assessment. Lethargic, arousable to verbal stimulus

## 2024-12-16 NOTE — PROGRESS NOTE ADULT - SUBJECTIVE AND OBJECTIVE BOX
HPI:  Patient is a 69-year-old female with PMHx multiple spinal fusions c/b chronic neck/back pain and BLE paralysis, s/p non-functional intrathecal morphine pump, neurogenic bladder s/p chronic jiménez c/b recurrent UTIs, multiple PEs (Dec 2023, July 2024), CVA x3 (MR brain Jan 2023 w/ chronic lacunar infarcts), HTN, HLD, and emphysema presenting with cloudy urine and pressure wounds not being sufficiently managed by HHA. Patient reports that her HHA "comes in, eats breakfast, and sleeps in the living room". She called the HHA agency to have the HHA removed, and the agency called EMS to bring the patient to the hospital. She reports that her jiménez is not being emptied and that she was not receiving sufficient wound care and pressure offloading. Her jiménez was last exchanged 2 months ago. She denies fever but reports chills. She reports chronic persistent neck, back, and abdominal pain. She denies chest pain and SOB. She has been able to eat and drink and denies nausea/vomiting and diarrhea. LBM yesterday. She denies hematemesis, hematuria, and hematochezia/melena.    ED Course:   Vitals – T 97.3F, , /79, RR 18 SpO2 100%   Labs – WBC 10.29, HGB 6.5, , K 2.7 --> 4.1. UA w/ epithelial cells, large leuk, +nitrate, >998 WBC, bacteria   Imaging – CXR grossly unremarkable, official read pending   Interventions – Ofirmev 1g, Percocet x2, Dilaudid IV 0.5 x2, Vanc 1g, Zosyn 3.375g, KCl 100meq, NS 1L, pRBC pending  (13 Dec 2024 11:52)    Ethics:  Ethics consult initiated and case discussed with clinical team and palliative.    At present, per Behavioral Health, patient lacks medical decision making capacity. It is important to note that capacity is decision-specific and can wax and wane over time. As such, patient's niece is the appropriate surrogate decision maker for patient according to the [facility] Family Health Care Decision Act.    Still, regardless of capacity, a patient retains the right to refuse non-emergent interventions. While the patient's niece can consent to interventions, the patient must assent.    Full consult to follow.    Arely Arzola MS, [facility]  Ethics Attending  [phone]

## 2024-12-16 NOTE — PROGRESS NOTE ADULT - PROBLEM SELECTOR PLAN 3
Patient somewhat responsive but unclear at times. Unsure what her baseline is. A+O 1-2. Intermittently refuses care including vitals, labs, treatment (transfusion after anemia <7 Hb)    Plan:  - Obtain collateral from HHA or contacts  - CT w/o acute intracranial pathology - past stroke, chronic microvascular disease  - Neurology/Psychiatry

## 2024-12-16 NOTE — CONSULT NOTE ADULT - PROBLEM SELECTOR RECOMMENDATION 3
.  - wound care recs appreciated   - cw supportive care including repositioning and skin/wound care  - Oral care q6 ATC  - Maximize independence and mobility within the patient's capabilities

## 2024-12-16 NOTE — CONSULT NOTE ADULT - PROBLEM SELECTOR RECOMMENDATION 5
.  - recommend discussion between Ami/surrogate and two physicians regarding code status  - GOC: Maximize comfort and QOL; surrogate prefers to avoid aggressive interventions and is interested in [facility] for wound care and end-of-life care as a bridge to home hospice    In addition to the E/M visit, an advance care planning meeting was performed. Start time: 1100; End time:1130 ; Total time: 30 min. A teleconference meeting to discuss advance care planning was held today regarding:   Aliya Redd  Primary decision maker:  Patient is unable to participate in decision making;  Alternate/surrogate:  Ami   . Discussed advance directives including, but not limited to, healthcare proxy and code status, as well as disease trajectory, patient's values/goals, and health care options that are available for end of life care. Decision regarding code status: pending 2PC discussion ; Documentation completed today: GOC note

## 2024-12-16 NOTE — BH CONSULTATION LIAISON PROGRESS NOTE - NSBHASSESSMENTFT_PSY_ALL_CORE
Pt is a 68 yo F, originally from [place], living in  since 1987 with no prior psychiatric history, with PMH of multiple spinal fusions c/b chronic neck/back pain and BLE paralysis, s/p non-functional intrathecal morphine pump, neurogenic bladder s/p chronic jiménez c/b recurrent UTIs, multiple PEs (Dec 2023, July 2024), CVA x3 (MR brain Jan 2023 w/ chronic lacunar infarcts), HTN, HLD, and emphysema presenting with cloudy urine and pressure wounds not being sufficiently managed by HHA. Patient reports that her HHA "comes in, eats breakfast, and sleeps in the living room". She called the HHA agency to have the HHA removed, and the agency called EMS to bring the patient to the hospital. She is currently admitted for UTI, pressure wounds, and insufficient care at home.     Per primary team, patient's next of kin (niece) is considering palliative care for this patient, but the patient herself has been unable to participate in this conversation.    On evaluation, patient is somnolent and only intermittently responsive to tactile stimuli; she is not able to engage in an appropriate discussion regarding her refusal of blood work and blood transfusions. Per chart review, patient appears to have fluctuating attention and may be delirious. Patient cannot communicate a choice, demonstrate understanding of the relevant information, appreciate the situation and its consequences, or engage in a rational process of interpreting the relevant information.  She does not have the capacity to make decisions regarding her care at this time.    Please note that capacity can fluctuate over time and patients can regain/lose their capacity as a response to their underlying medical problems. A patient’s capacity should be intermittently re-assessed. Any licensed medical provider is able to provide capacity assessments.    - No psychiatric 1:1 indicated as patient does not appear acutely suicidal   - Patient does not have capacity to make a decision regarding initiation of palliative care.  - Follow up with collateral to determine patient's baseline and HCP status (patient denies having one)   - Medical providers can also determine capacity by assessing patient's ability to communicate a choice, understand the situation and risks & benefits, appreciate relevance of pertinent information to self, and manipulate information rationally   - Psychiatry to follow as patient is exhibiting delirium secondary to her medical conditions   Pt is a 70 yo F, originally from [place], living in  since 1987 with no prior psychiatric history, with PMH of multiple spinal fusions c/b chronic neck/back pain and BLE paralysis, s/p non-functional intrathecal morphine pump, neurogenic bladder s/p chronic jiménez c/b recurrent UTIs, multiple PEs (Dec 2023, July 2024), CVA x3 (MR brain Jan 2023 w/ chronic lacunar infarcts), HTN, HLD, and emphysema presenting with cloudy urine and pressure wounds not being sufficiently managed by HHA. Patient reports that her HHA "comes in, eats breakfast, and sleeps in the living room". She called the HHA agency to have the HHA removed, and the agency called EMS to bring the patient to the hospital. She is currently admitted for UTI, pressure wounds, and insufficient care at home.     Per primary team, patient's next of kin (niece) is considering palliative care for this patient, but the patient herself has been unable to participate in this conversation.    On evaluation, patient is somnolent and only intermittently responsive to tactile stimuli; she is not able to engage in an appropriate discussion regarding her refusal of blood work and blood transfusions. Per chart review, patient appears to have fluctuating attention and is likely delirious. Patient cannot communicate a choice, demonstrate understanding of the relevant information, appreciate the situation and its consequences, or engage in a rational process of interpreting the relevant information.  She does not have the capacity to make decisions regarding her care at this time.    Please note that capacity can fluctuate over time and patients can regain/lose their capacity as a response to their underlying medical problems. A patient’s capacity should be intermittently re-assessed. Any licensed medical provider is able to provide capacity assessments.    - No psychiatric 1:1 indicated for SI/HI; consider supervised room given cognitive deficits  - Recommend involvement of next of kin (HCP if established) given impaired decisional capacity  - appreciate palliative care involvement and discussions re: code status and goals of care  - delirium precautions and treatment of underlying contributors  - haloperidol 1mg po/IM/IV q6H PRN for acute agitation that is not verbally redirectable. Please monitor EKG for QTc prolongation if antipsychotic used  - will remain available as needed. Please contact with questions.    For new consults/questions/concerns: Monday to Friday (8am – 5pm): [phone] or internally ext# [phone]  For Afterhours (5pm-11pm), including weekends (Sat/Sun) please call the [employer] line: [phone]  For emergencies during night hours (11pm-8am) please call the Attending on call (for direct contact information check [email or web address]

## 2024-12-16 NOTE — CONSULT NOTE ADULT - TIME BILLING
Emotional Support/Supportive Care and Clarification of Potential Disease Trajectory related to complications from debility, wound, recurrent potentially life threatening infections   Assessment of Symptom [place] and Palliative regimen  Discharge Facilitation with ongoing coordination  Exploration of GOC/Advanced Directives including HCP designation, code status, and hospice eligibility  Time exclusive of ACP discussion  Time inclusive of chart review, medication ordering, discussion with primary team, clinical documentation, and communication with family/caregiver

## 2024-12-17 ENCOUNTER — TRANSCRIPTION ENCOUNTER (OUTPATIENT)
Age: 69
End: 2024-12-17

## 2024-12-17 VITALS
RESPIRATION RATE: 18 BRPM | HEART RATE: 123 BPM | TEMPERATURE: 98 F | OXYGEN SATURATION: 99 % | SYSTOLIC BLOOD PRESSURE: 121 MMHG | DIASTOLIC BLOOD PRESSURE: 84 MMHG

## 2024-12-17 PROCEDURE — 93970 EXTREMITY STUDY: CPT | Mod: 26

## 2024-12-17 PROCEDURE — 99233 SBSQ HOSP IP/OBS HIGH 50: CPT | Mod: GC

## 2024-12-17 PROCEDURE — 99232 SBSQ HOSP IP/OBS MODERATE 35: CPT

## 2024-12-17 RX ORDER — 0.9 % SODIUM CHLORIDE 0.9 %
250 INTRAVENOUS SOLUTION INTRAVENOUS ONCE
Refills: 0 | Status: COMPLETED | OUTPATIENT
Start: 2024-12-17 | End: 2024-12-17

## 2024-12-17 RX ADMIN — OXYCODONE HYDROCHLORIDE 15 MILLIGRAM(S): 30 TABLET ORAL at 03:36

## 2024-12-17 RX ADMIN — OXYCODONE HYDROCHLORIDE 15 MILLIGRAM(S): 30 TABLET ORAL at 19:58

## 2024-12-17 RX ADMIN — Medication 2 TABLET(S): at 21:35

## 2024-12-17 RX ADMIN — POLYETHYLENE GLYCOL 3350 17 GRAM(S): 17 POWDER, FOR SOLUTION ORAL at 05:34

## 2024-12-17 RX ADMIN — Medication 500 MILLILITER(S): at 22:43

## 2024-12-17 NOTE — PROGRESS NOTE ADULT - PROBLEM SELECTOR PLAN 6
Hx multiple spinal surgeries c/b BLE paralysis and chronic neck/back pain. Has implanted intrathecal morphine pump that is nonfunctional. Home meds: Diazepam 5mg q24h, Oxycodone 15mg q6h PRN, Lidocaine patch PRN per chart, but patient states Oxycodone is 30mg.    - Continue home Diazepam 5mg q24h, Oxycodone 15mg q6h PRN, Lidocaine patch PRN  - F/u formal med rec, I-STOP to verify Oxycodone dose  - Palliative consult Hx multiple spinal surgeries c/b BLE paralysis and chronic neck/back pain. Has implanted intrathecal morphine pump that is nonfunctional. Home meds: Diazepam 5mg q24h, Oxycodone 15mg q6h PRN, Lidocaine patch PRN per chart, but patient states Oxycodone is 30mg.    - Continue home Diazepam 5mg q24h, Oxycodone 15mg q6h PRN, Lidocaine patch PRN  - Palliative consult

## 2024-12-17 NOTE — PROGRESS NOTE ADULT - PROBLEM SELECTOR PROBLEM 7
Med rec updated and complete. Allergies reviewed. Pt denies taking medications.  Home pharmacy [facility].   Pulmonary embolism

## 2024-12-17 NOTE — DISCHARGE NOTE NURSING/CASE MANAGEMENT/SOCIAL WORK - PATIENT PORTAL LINK FT
You can access the FollowMyHealth Patient Portal offered by [facility] by registering at the following website: [email or web address]. By joining [facility]’s FollowMyHealth portal, you will also be able to view your health information using other applications (apps) compatible with our system.

## 2024-12-17 NOTE — PROGRESS NOTE ADULT - ASSESSMENT
70yo F multiple spinal fusions cb chronic neck/back pain, paralysis, chronic pain (non functional MS IT pump) recurrent UTIs, multiple strokes, and blood clots presented with cloudy urine, pressure wounds, and anemia. She was found to have three stage 3 pressure ulcers and a likely UTI, attributed to inadequate home care. Patient frequently declining interventions ie labs, transfusions. Palliative consulted to discuss goals of care in light of current health status.     Prognosis is poor, given the patient's multiple comorbidities, poor baseline functional status with no anticipated recovery/improvement, high risk for recurrent infections and further decline. 2PC DNR DNI MOLST completed. GO 12/16 to maximize comfort and QOL. Coordinating possible transfer to [facility] for acute end of life care.

## 2024-12-17 NOTE — DISCHARGE NOTE NURSING/CASE MANAGEMENT/SOCIAL WORK - NSDCPEFALRISK_GEN_ALL_CORE
For information on Fall & Injury Prevention, visit: [email or web address] OR  [email or web address] OR  https://www.cdc.gov/steadi/patient.html

## 2024-12-17 NOTE — PROGRESS NOTE ADULT - SUBJECTIVE AND OBJECTIVE BOX
OVERNIGHT EVENTS:    SUBJECTIVE / INTERVAL HPI: Patient seen and examined at bedside.     VITAL SIGNS:  Vital Signs Last 24 Hrs  T(C): 36.7 (16 Dec 2024 21:18), Max: 37.1 (16 Dec 2024 08:53)  T(F): 98.1 (16 Dec 2024 21:18), Max: 98.8 (16 Dec 2024 08:53)  HR: 119 (17 Dec 2024 08:11) (98 - 119)  BP: 133/91 (16 Dec 2024 21:18) (133/87 - 133/91)  BP(mean): --  RR: 18 (16 Dec 2024 21:18) (17 - 18)  SpO2: 99% (16 Dec 2024 21:18) (99% - 100%)    Parameters below as of 16 Dec 2024 21:18  Patient On (Oxygen Delivery Method): room air      I&O's Summary    16 Dec 2024 07:01  -  17 Dec 2024 07:00  --------------------------------------------------------  IN: 0 mL / OUT: 1450 mL / NET: -1450 mL        PHYSICAL EXAM:    General: NAD  HEENT: NC/AT; PERRL, anicteric sclera; MMM  Neck: supple  Cardiovascular: +S1/S2; RRR  Respiratory: CTA B/L; no W/R/R  Gastrointestinal: soft, NT/ND; +BSx4  Extremities: WWP; no edema, clubbing or cyanosis  Vascular: 2+ radial, DP/PT pulses B/L  Neurological: AAOx3; no focal deficits    MEDICATIONS:  MEDICATIONS  (STANDING):  ascorbic acid 500 milliGRAM(s) Oral daily  diazepam    Tablet 5 milliGRAM(s) Oral daily  enoxaparin Injectable 40 milliGRAM(s) SubCutaneous every 24 hours  lidocaine   4% Patch 1 Patch Transdermal every 24 hours  lidocaine   4% Patch 1 Patch Transdermal every 24 hours  multivitamin 1 Tablet(s) Oral daily  pantoprazole    Tablet 40 milliGRAM(s) Oral before breakfast  polyethylene glycol 3350 17 Gram(s) Oral every 12 hours  senna 2 Tablet(s) Oral at bedtime  zinc sulfate 220 milliGRAM(s) Oral daily    MEDICATIONS  (PRN):  oxyCODONE    IR 15 milliGRAM(s) Oral every 6 hours PRN Severe Pain (7 - 10)      ALLERGIES:  Allergies    Crab (Pruritus)  morphine (Unknown)    Intolerances        LABS:                        7.7    9.52  )-----------( 417      ( 16 Dec 2024 05:30 )             25.2     12-16    136  |  104  |  3[L]  ----------------------------<  92  3.6   |  24  |  0.32[L]    Ca    7.8[L]      16 Dec 2024 05:30  Phos  2.6     12-16  Mg     1.8     12-16    TPro  6.5  /  Alb  1.9[L]  /  TBili  0.2  /  DBili  x   /  AST  8[L]  /  ALT  <5[L]  /  AlkPhos  110  12-16      Urinalysis Basic - ( 16 Dec 2024 05:30 )    Color: x / Appearance: x / SG: x / pH: x  Gluc: 92 mg/dL / Ketone: x  / Bili: x / Urobili: x   Blood: x / Protein: x / Nitrite: x   Leuk Esterase: x / RBC: x / WBC x   Sq Epi: x / Non Sq Epi: x / Bacteria: x      CAPILLARY BLOOD GLUCOSE      POCT Blood Glucose.: 83 mg/dL (17 Dec 2024 06:32)      RADIOLOGY & ADDITIONAL TESTS: Reviewed.   OVERNIGHT EVENTS: KAMRAN    SUBJECTIVE / INTERVAL HPI: Patient seen and examined at bedside. Patient awake. Answers some questions. Does not endorse pain.     VITAL SIGNS:  Vital Signs Last 24 Hrs  T(C): 36.7 (16 Dec 2024 21:18), Max: 37.1 (16 Dec 2024 08:53)  T(F): 98.1 (16 Dec 2024 21:18), Max: 98.8 (16 Dec 2024 08:53)  HR: 119 (17 Dec 2024 08:11) (98 - 119)  BP: 133/91 (16 Dec 2024 21:18) (133/87 - 133/91)  BP(mean): --  RR: 18 (16 Dec 2024 21:18) (17 - 18)  SpO2: 99% (16 Dec 2024 21:18) (99% - 100%)    Parameters below as of 16 Dec 2024 21:18  Patient On (Oxygen Delivery Method): room air      I&O's Summary    16 Dec 2024 07:01  -  17 Dec 2024 07:00  --------------------------------------------------------  IN: 0 mL / OUT: 1450 mL / NET: -1450 mL        PHYSICAL EXAM:    General: Lying in bed. Appears not in acute distress.  HEENT: NC/AT; PERRL, anicteric sclera; MMM  Neck: supple  Cardiovascular: +S1/S2; RRR  Respiratory: CTA B/L; no W/R/R  Gastrointestinal: soft, BSx4, some tenderness to palpation diffusely  Extremities: WWP; no edema, clubbing or cyanosis  Vascular: 2+ radial, DP/PT pulses B/L  Neurological: AAOx1; no focal deficits    MEDICATIONS:  MEDICATIONS  (STANDING):  ascorbic acid 500 milliGRAM(s) Oral daily  diazepam    Tablet 5 milliGRAM(s) Oral daily  enoxaparin Injectable 40 milliGRAM(s) SubCutaneous every 24 hours  lidocaine   4% Patch 1 Patch Transdermal every 24 hours  lidocaine   4% Patch 1 Patch Transdermal every 24 hours  multivitamin 1 Tablet(s) Oral daily  pantoprazole    Tablet 40 milliGRAM(s) Oral before breakfast  polyethylene glycol 3350 17 Gram(s) Oral every 12 hours  senna 2 Tablet(s) Oral at bedtime  zinc sulfate 220 milliGRAM(s) Oral daily    MEDICATIONS  (PRN):  oxyCODONE    IR 15 milliGRAM(s) Oral every 6 hours PRN Severe Pain (7 - 10)      ALLERGIES:  Allergies    Crab (Pruritus)  morphine (Unknown)    Intolerances        LABS:                        7.7    9.52  )-----------( 417      ( 16 Dec 2024 05:30 )             25.2     12-16    136  |  104  |  3[L]  ----------------------------<  92  3.6   |  24  |  0.32[L]    Ca    7.8[L]      16 Dec 2024 05:30  Phos  2.6     12-16  Mg     1.8     12-16    TPro  6.5  /  Alb  1.9[L]  /  TBili  0.2  /  DBili  x   /  AST  8[L]  /  ALT  <5[L]  /  AlkPhos  110  12-16      Urinalysis Basic - ( 16 Dec 2024 05:30 )    Color: x / Appearance: x / SG: x / pH: x  Gluc: 92 mg/dL / Ketone: x  / Bili: x / Urobili: x   Blood: x / Protein: x / Nitrite: x   Leuk Esterase: x / RBC: x / WBC x   Sq Epi: x / Non Sq Epi: x / Bacteria: x      CAPILLARY BLOOD GLUCOSE      POCT Blood Glucose.: 83 mg/dL (17 Dec 2024 06:32)      RADIOLOGY & ADDITIONAL TESTS: Reviewed.

## 2024-12-17 NOTE — PROGRESS NOTE ADULT - PROBLEM SELECTOR PLAN 1
Stage 4 pressure ulcers on lateral hips and sacrum.    -Surgery consult, do not appear infected, no intervention indicated at this time  -DC Vancomycin, CTX  -Wound care  - Palliative consulted W[facility]

## 2024-12-17 NOTE — DISCHARGE NOTE NURSING/CASE MANAGEMENT/SOCIAL WORK - FINANCIAL ASSISTANCE
[facility] provides services at a reduced cost to those who are determined to be eligible through [facility]’s financial assistance program. Information regarding [facility]’s financial assistance program can be found by going to [email or web address] or by calling [phone]. Rhofade Counseling: Rhofade is a topical medication which can decrease superficial blood flow where applied. Side effects are uncommon and include stinging, redness and allergic reactions.

## 2024-12-17 NOTE — PROGRESS NOTE ADULT - PROBLEM SELECTOR PLAN 1
.  history of multiple strokes. pt awake, oriented to name at baseline, mentation waxes and wanes. Patient with risk factors associated with delirium including: advanced age, cognitive impairment, history of delirium, cumulative comorbidities, malnutrition, functional, and hearing impairments  -  recs appreciated   - Delirium precautions: bedside window with blinds open; frequent re-orientation; pictures of family if possible; minimize lines, physical restraints, nighttime awakenings as medically feasible; ensure bowel movements daily or every other day, monitor for urinary retention; avoid anticholinergic medications or benzodiazepines as clinically feasible.

## 2024-12-17 NOTE — DISCHARGE NOTE PROVIDER - NSDCCPCAREPLAN_GEN_ALL_CORE_FT
PRINCIPAL DISCHARGE DIAGNOSIS  Diagnosis: Adult failure to thrive  Assessment and Plan of Treatment: Adult failure to thrive (AFTT) is a state of decline in older adults that may be caused by chronic diseases, poor nutrition, inactivity, depression, or other factors. You were seen by a team of physicans including primary team, palliative care, wound care, psychiatry, and ethics. Due to poor prognosis and given the patient's multiple comorbidities, poor baseline functional status with no anticipated recovery/improvement, high risk for recurrent infections and further decline.

## 2024-12-17 NOTE — DISCHARGE NOTE PROVIDER - ATTENDING DISCHARGE PHYSICAL EXAMINATION:
******************************************************  ATTENDING ATTESTATION    I have read and agree with the resident Discharge Note above. Patient seen and discussed with resident team on the day of discharge.     Briefly, Ms. Redd is a 70yo woman with PMHx multiple spinal fusions c/b chronic neck/back pain and BLE paralysis, s/p non-functional intrathecal morphine pump, neurogenic bladder s/p chronic jiménez c/b recurrent UTIs, multiple PEs (Dec 2023, July 2024), CVA x3 (MR brain Jan 2023 w/ chronic lacunar infarcts), HTN, HLD, and emphysema who was sent in by HCP for c/f insufficient care at home, now DNR/DNI via 2PC and stable for transfer to [facility] for hospice.    Physical Exam:  T(C): 36.8  HR: 123  BP: 121/84  RR: 18  SpO2: 99%    Gen: sitting upright in bed at time of exam, moaning  HEENT: NCAT, MMM, clear OP  Neck: supple, trachea at midline  CV: RRR, +S1/S2  Pulm: adequate respiratory effort, no increased work of breathing  Abd: soft, NTND  Skin: warm and dry, no new rashes vs prior report  Ext: WWP  Neuro: Alert, moving b/l UEs, not answering questions appropriately  Psych: affect and behavior appropriate    I was physically present for the evaluation and management services provided. I agree with the included history, physical, and plan which I reviewed and edited where appropriate. I spent 33 minutes on direct patient care and discharge planning with more than 50% of the visit spent on counseling and/or coordination of care.

## 2024-12-17 NOTE — PROGRESS NOTE ADULT - PROBLEM SELECTOR PLAN 4
.  complication from being bedbound  - supportive care  - recommend scheduling tylenol 650 mg PO q8 ATC  - recommend msir 7.5 mg po q4 PRN moderate pain   - Goal is comfort, recommend low threshold to repeat/increase dose and/or increase frequency in order to ensure comfort through end of the patient's life

## 2024-12-17 NOTE — PROGRESS NOTE ADULT - PROBLEM SELECTOR PLAN 6
.  Will continue to follow for ongoing symptom management / dipo planning. Emotional support provided, questions answered.    Coping:  well[  ] with difficulty[  ] poor coping[  ] unable to assess[ x ]   Social support: strong[  ] adequate[  ] inadequate[ x ]  group home support system at home: strong[  ] adequate[x  ] inadequate[  ]    Active Psychosocial Referrals:  SW/CM[ x ] PT/OT[  ] Hospice[  ]  Ethics[  ]  [facility] Patient/Family Support[  ] Chaplaincy[   ]    Massage Therapy[ x ] Music Therapy [x  ] Holistic RN[  ]     For new or uncontrolled symptoms, please call Palliative Care at [phone]). The service is available 24/7 (including nights & weekends) to provide symptom management recommendations over the phone as appropriate.

## 2024-12-17 NOTE — PROGRESS NOTE ADULT - PROBLEM SELECTOR PLAN 5
.  - 2PC DNR DNI 12/17  - surrogate and reported HCP (now ppwk) is Ami  - GOC: Maximize comfort and QOL; surrogate prefers to avoid aggressive interventions and is interested in [facility] for wound care and acute end of life care and/or as a bridge to home hospice

## 2024-12-17 NOTE — DISCHARGE NOTE PROVIDER - HOSPITAL COURSE
68yo F multiple spinal fusions cb chronic neck/back pain, paralysis, chronic pain (non functional MS IT pump) recurrent UTIs, multiple strokes, and blood clots presented with cloudy urine, pressure wounds, and anemia. She was found to have three stage 3 pressure ulcers and a likely UTI, attributed to inadequate home care. Patient frequently declining interventions ie labs, transfusions. Prognosis is poor, given the patient's multiple comorbidities, poor baseline functional status with no anticipated recovery/improvement, high risk for recurrent infections and further decline. 2PC DNR DNI MOLST completed. GO 12/16 to maximize comfort and QOL. Coordinating possible transfer to [facility] for acute end of life care.       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   history of multiple strokes. pt awake, oriented to name at baseline, mentation waxes and wanes. Patient with risk factors associated with delirium including: advanced age, cognitive impairment, history of delirium, cumulative comorbidities, malnutrition, functional, and hearing impairments  -  recs appreciated   - Delirium precautions: bedside window with blinds open; frequent re-orientation; pictures of family if possible; minimize lines, physical restraints, nighttime awakenings as medically feasible; ensure bowel movements daily or every other day, monitor for urinary retention; avoid anticholinergic medications or benzodiazepines as clinically feasible.    At risk for constipation.   Patient with several risk factors for chronic constipation including bedbound functional status, concurrent medication use, decreased caloric intake, comorbidities, female gender  - Recommend mLax qAM 17 g/day standing, hold for loose stool  - Recommend senna 2 tabs PO q12h PRN No BM for >3 days  - If above is ineffective, recommend bisacodyl suppository   - Recommend daily fiber intake 20-25 g/day, increasing fluid intake and OOB/movement as able  - Primary goal is to relieve symptoms, secondary is to have at least 3-4 easy to pass BMs per week.    Chronic paraplegia.   - wound care recs appreciated   - cw supportive care including repositioning and skin/wound care  - Oral care q6 ATC  - Maximize independence and mobility within the patient's capabilities.    Pressure ulcer, stage 3.   complication from being bedbound  - supportive care  - recommend scheduling tylenol 650 mg PO q8 ATC  - recommend msir 7.5 mg po q4 PRN moderate pain   - Goal is comfort, recommend low threshold to repeat/increase dose and/or increase frequency in order to ensure comfort through end of the patient's life.    Physical exam on DC:  General:  woman lying in bed, awake   alert  oriented x   name only  verbal  Behavioral: Cooperative, inattentive at times, calm   HEENT: atraumatic,  No temporal wasting,  +dry mouth  RESP: Reg rhythm, No  tachypnea/labored breathing,  No audible excessive secretions,  CTAB, diminished bilat bases  CV: RRR, S1S2,  No  tachycardia  GI: soft non distended non tender functionally incontinent   MUSK: weakness x4, No edema, mostly/fully  BB/WC bound  SKIN:  Poor skin turgor, Pressure ulcer stage: multiple stage III ulcers- see primary team exam   NEURO:  No deficits, cognitive impairment, encephalopathic dsyphagia dysarthria paresis  Oral intake ability:  minimal capability

## 2024-12-17 NOTE — DISCHARGE NOTE PROVIDER - NSDCMRMEDTOKEN_GEN_ALL_CORE_FT
acetaminophen 500 mg oral tablet: 2 tab(s) orally every 8 hours As needed Temp greater or equal to 38.5C (101.3F), Mild Pain (1 - 3)  albuterol 90 mcg/inh inhalation aerosol: 2 puff(s) inhaled every 6 hours, As Needed  apixaban 5 mg oral tablet: 1 tab(s) orally every 12 hours  apixaban 5 mg oral tablet: 2 tab(s) orally every 12 hours Until 7/19/2024. Starting 7/20/2024, pt to take 5mg BID  aspirin 81 mg oral delayed release tablet: 1 tab(s) orally once a day  bisacodyl 5 mg oral delayed release tablet: 1 tab(s) orally once a day (at bedtime)  celecoxib 200 mg oral capsule: 1 cap(s) orally every 24 hours  diazePAM 5 mg oral tablet: 1 tab(s) orally once a day  diazePAM 5 mg oral tablet: 1 tab(s) orally every 8 hours as needed for  muscle spasm  droNABinol 2.5 mg oral capsule: 1 cap(s) orally 2 times a day  gabapentin 800 mg oral tablet: 1 tab(s) orally 3 times a day  lidocaine 4% topical film: Apply topically to affected area once a day as needed for  severe pain  lidocaine 4% topical film: Apply topically to affected area 2 times a day as needed for  severe pain  MetFORMIN (Eqv-Glumetza) 500 mg oral tablet, extended release: 1 tab(s) orally 2 times a day  Metoprolol Tartrate 25 mg oral tablet: 1 tab(s) orally every 12 hours  Movantik 25 mg oral tablet: 1 tab(s) orally once a day (in the morning)  Multiple Vitamins oral tablet: 1 tab(s) orally once a day  oxyCODONE 15 mg oral tablet: 1 tab(s) orally every 6 hours As needed Severe Pain (7 - 10)  pantoprazole 40 mg oral delayed release tablet: 1 tab(s) orally once a day (before a meal)  polyethylene glycol 3350 oral powder for reconstitution: 17 gram(s) orally 2 times a day  Pradaxa 150 mg oral capsule: 1 cap(s) orally 2 times a day  senna leaf extract oral tablet: 2 tab(s) orally once a day (at bedtime)  simvastatin 40 mg oral tablet: 1 tab(s) orally once a day (at bedtime)  Tap Water Enema, Every Other Day: Please administer tap water enema every other day  Vitamin D3 50 mcg (2000 intl units) oral tablet: 1 tab(s) orally once a day   acetaminophen 500 mg oral tablet: 2 tab(s) orally every 8 hours As needed Temp greater or equal to 38.5C (101.3F), Mild Pain (1 - 3)  albuterol 90 mcg/inh inhalation aerosol: 2 puff(s) inhaled every 6 hours, As Needed  aspirin 81 mg oral delayed release tablet: 1 tab(s) orally once a day  bisacodyl 5 mg oral delayed release tablet: 1 tab(s) orally once a day (at bedtime)  celecoxib 200 mg oral capsule: 1 cap(s) orally every 24 hours  diazePAM 5 mg oral tablet: 1 tab(s) orally once a day  diazePAM 5 mg oral tablet: 1 tab(s) orally every 8 hours as needed for  muscle spasm  droNABinol 2.5 mg oral capsule: 1 cap(s) orally 2 times a day  DULoxetine 30 mg oral delayed release capsule: 3 cap(s) orally every 24 hours  gabapentin 800 mg oral tablet: 1 tab(s) orally 3 times a day  lidocaine 4% topical film: Apply topically to affected area once a day as needed for  severe pain  lidocaine 4% topical film: Apply topically to affected area 2 times a day as needed for  severe pain  Metoprolol Tartrate 25 mg oral tablet: 1 tab(s) orally every 12 hours  Movantik 25 mg oral tablet: 1 tab(s) orally once a day (in the morning)  oxyCODONE 15 mg oral tablet: 1 tab(s) orally every 6 hours As needed Severe Pain (7 - 10)  polyethylene glycol 3350 oral powder for reconstitution: 17 gram(s) orally 2 times a day  senna leaf extract oral tablet: 2 tab(s) orally once a day (at bedtime)

## 2024-12-17 NOTE — DISCHARGE NOTE PROVIDER - DETAILS OF MALNUTRITION DIAGNOSIS/DIAGNOSES
This patient has been assessed with a concern for Malnutrition and was treated during this hospitalization for the following Nutrition diagnosis/diagnoses:     -  12/14/2024: Severe protein-calorie malnutrition   -  12/14/2024: Underweight (BMI < 19)

## 2024-12-17 NOTE — CHART NOTE - NSCHARTNOTEFT_GEN_A_CORE
ISTOP REVIEWED:    A	N	N	O	01/26/2024	01/26/2024	oxycodone hcl (ir) 15 mg tab	30	10	Robbin Aquino	[id number]	Insurance	[employer]  A	N	N	O	01/18/2024	01/18/2024	oxycodone hcl (ir) 15 mg tab	30	7	Kecia Elmore, U	[id number]	Insurance	[employer]  A	N	N	O	01/18/2024	01/18/2024	oxycodone hcl (ir) 10 mg tab	30	5	Kecia Elmore, U	[id number]	Insurance	[employer]  A	N	N	O	01/18/2024	01/18/2024	oxycodone hcl (ir) 5 mg cap	30	5	Kecia Elmore, U	[id number]	Insurance	[employer]  A	N	N	O	01/06/2024	01/06/2024	oxycodone hcl (ir) 15 mg tab	30	5	Familusi, Caitlyn	[id number]	Insurance	[employer]  A	N	N	B	01/06/2024	01/06/2024	diazepam 5 mg tablet	45	15	Familusi, Caitlyn	[id number]	Insurance	[employer]  B	N	N	O	11/18/2024	11/18/2024	oxycodone hcl (ir) 5 mg tablet	12	3	Betzy Guthrie	[id number]	Insurance	[facility]  C	N	N	O	10/07/2024	10/07/2024	oxycodone hcl (ir) 5 mg tablet	120	30	Grlic, Ryan	[id number]	Medicaid	[facility]  C	N	N	O	09/20/2024	09/20/2024	oxycodone hcl (ir) 5 mg tablet	60	15	Grlic, Ryan	[id number]	[place]	[facility]
Patient with extensive medical history including recurrent PEs (on Eliquis at home, held during admission iso severe anemia) found to have multiple sacral ulcers, positive UA and acute on chronic anemia s/p 1U PRBC on December 12th. Stat CBC, PT/PTT/INR ordered. Hemoglobin of 7.0 (Trend 6.5>>7.9>>7.0), vitals remarkable for tachycardia with HR of 124. Patient not currently displaying signs of bleeding - hematemesis, hematuria, hematochezia/melena.   1u PRBC ordered. Patient refused transfusion with nursing staff and medical intern. Kiswahili  was used during these discussions to ensure clear communication. The potential risks associated with severe anemia were thoroughly explained, and the patient expressed understanding but remained resolute in refusing the transfusion. Despite the necessity for medical intervention, the patient's refusal of care further raises concern of decisional capacity.
Patient not amenable to blood draw attempt by team; unable to obtain labs by phlebotomy earlier in the day as patient declined. Will attempt later once niece is at bedside to help educate patient on the need for lab draws.
Patient transitioned to DNR/DNI with 2 physician consent. Patient without capacity to make decisions. Next surrogate decision maker (Ami) did not wish to make decision regarding code status, was deferring to providers. MOLST completed, placed in chart.

## 2024-12-17 NOTE — PROGRESS NOTE ADULT - ATTENDING COMMENTS
Ms. Redd is a 68yo woman with PMHx multiple spinal fusions c/b chronic neck/back pain and BLE paralysis, s/p non-functional intrathecal morphine pump, neurogenic bladder s/p chronic jiménez c/b recurrent UTIs, multiple PEs (Dec 2023, July 2024), CVA x3 (MR brain Jan 2023 w/ chronic lacunar infarcts), HTN, HLD, and emphysema who was sent in by HCP for c/f insufficient care at home.     Patient seen by palliative care team and ethics, determined that patient does not have decision-making capacity at this time, patient's niece (Melissa Ford) to act as surrogate decision-maker. Per ethics consult, if patient is refusing treatment, she should not be forced to receive it. Patient now DNR/DNI via 2 physician consent, MOLST completed. Goals are to maximize comfort and quality of life. Palliative care assisting with discharge to [facility].     Patient seen this afternoon, laying comfortably in bed. Moving b/l UEs anti-gravity. States she has pain in her neck, otherwise not answering questions appropriately, groaning intermittently.     Doppler u/s done this morning, noted DVT of right deep femoral vein. Patient has been refusing DVT ppx. On Eliquis at home, which was held on admission 2/2 anemia. Now held iso patient refusing and transitioning to hospice care. Will update family, but will defer resumption of Eliquis due to patient refusal and transition to hospice.

## 2024-12-17 NOTE — PROGRESS NOTE ADULT - SUBJECTIVE AND OBJECTIVE BOX
[facility] Geriatrics and Palliative Care  Thania Mccullough Geriatrics & Palliative Care NP  Contact Info: Call [phone] (HEAL Line) or message on [employer] Teams    SUBJECTIVE/OBJECTIVE: Interval events noted. See patient's PRN use for the past 24hrs noted below. Pt c/o back pain, but does not/unable to further describe when prompted. Does not/unable to report side effects from PRN opioids.  c/o dry mouth and feeling tired. Comprehensive symptom assessment as below.  Extensive time spent discussing plan of care with primary team, pall sw.     ALLERGIES: Crab (Pruritus)  morphine (Unknown)      MEDICATIONS: REVIEWED  MEDICATIONS  (STANDING):  ascorbic acid 500 milliGRAM(s) Oral daily  diazepam    Tablet 5 milliGRAM(s) Oral daily  enoxaparin Injectable 40 milliGRAM(s) SubCutaneous every 24 hours  lidocaine   4% Patch 1 Patch Transdermal every 24 hours  lidocaine   4% Patch 1 Patch Transdermal every 24 hours  multivitamin 1 Tablet(s) Oral daily  pantoprazole    Tablet 40 milliGRAM(s) Oral before breakfast  polyethylene glycol 3350 17 Gram(s) Oral every 12 hours  senna 2 Tablet(s) Oral at bedtime  zinc sulfate 220 milliGRAM(s) Oral daily    MEDICATIONS  (PRN):  oxyCODONE    IR 15 milliGRAM(s) Oral every 6 hours PRN Severe Pain (7 - 10)      Analgesic Use (Scheduled and PRNs) for past 24 hours (6a-6a):      oxyCODONE    IR   15 milliGRAM(s) Oral (12-17-24 @ 03:36)    PRESENT SYMPTOMS:   [x ] Limited ability to self report   Source if other than patient:  [ ]Family   [ ]Team     Pain [x  ] "back" and "stomach"  PAIN AD Score:  1  [email or web address] (press ctrl +  left click to view)    If [  ], pt denies symptom.   Dyspnea:         [  ]  Anxiety:           [  ]  Difficulty sleeping: [  ]  Fatigue:           [  ]  Nausea:           [  ]  Loss of appetite:     [  ]  Dysphagia: [  ]  Constipation:   [ x ]        Other Symptoms: xerostomia     All other review of systems negative [ x ]    ECOG Performance:     4  Current Palliative Performance Scale/Karnofsky Score:  40   %  Preadmit Karnofsky:  40  %          PEx:  General:  woman lying in bed, awake   alert  oriented x   name only  verbal  Behavioral: Cooperative, inattentive at times, calm   HEENT: atraumatic,  No temporal wasting,  +dry mouth  RESP: Reg rhythm, No  tachypnea/labored breathing,  No audible excessive secretions,  CTAB, diminished bilat bases  CV: RRR, S1S2,  No  tachycardia  GI: soft non distended non tender functionally incontinent   MUSK: weakness x4, No edema, mostly/fully  BB/WC bound  SKIN:  Poor skin turgor, Pressure ulcer stage: multiple stage III ulcers- see primary team exam   NEURO:  No deficits, cognitive impairment, encephalopathic dsyphagia dysarthria paresis  Oral intake ability:  minimal capability    T(C): 37.2 (12-17-24 @ 09:47), Max: 37.2 (12-17-24 @ 09:47)  HR: 123 (12-17-24 @ 09:47) (98 - 123)  BP: 129/89 (12-17-24 @ 09:47) (129/89 - 133/91)  RR: 18 (12-17-24 @ 09:47) (18 - 18)  SpO2: 99% (12-17-24 @ 09:47) (99% - 99%)  Wt(kg): --      LABS: REVIEWED  CBC:                        7.7    9.52  )-----------( 417      ( 16 Dec 2024 05:30 )             25.2     CMP:    12-16    136  |  104  |  3[L]  ----------------------------<  92  3.6   |  24  |  0.32[L]    Ca    7.8[L]      16 Dec 2024 05:30  Phos  2.6     12-16  Mg     1.8     12-16    TPro  6.5  /  Alb  1.9[L]  /  TBili  0.2  /  DBili  x   /  AST  8[L]  /  ALT  <5[L]  /  AlkPhos  110  12-16      RADIOLOGY & ADDITIONAL STUDIES: No new    DISCUSSION OF CASE: Family - to provide updates and emotional support; Primary Team and RN - to discuss plan of care    CARE COORDINATION:        [facility] Geriatrics and Palliative [facility]  Thania Mccullough Geriatrics & Palliative Care NP  Contact Info: Call [phone] (HEAL Line) or message on [employer] Teams    SUBJECTIVE/OBJECTIVE: Ethics consult noted. See patient's PRN use for the past 24hrs noted below. Pt c/o "pain", bPatient reports pain but is unable to describe its characteristics or the effects of PRN opioids.  c/o fatigue and wants to rest. Per RN, pt with acceptable analgesic relief on current regimen.Per RN, pt comfortable on current analgesic regimen. Pt full feed and tolerating minimal PO intake. Extensive time spent discussing plan of care with primary team, pall sw.     ALLERGIES: Crab (Pruritus)  morphine (Unknown)    MEDICATIONS: REVIEWED  MEDICATIONS  (STANDING):  ascorbic acid 500 milliGRAM(s) Oral daily  diazepam    Tablet 5 milliGRAM(s) Oral daily  enoxaparin Injectable 40 milliGRAM(s) SubCutaneous every 24 hours  lidocaine   4% Patch 1 Patch Transdermal every 24 hours  lidocaine   4% Patch 1 Patch Transdermal every 24 hours  multivitamin 1 Tablet(s) Oral daily  pantoprazole    Tablet 40 milliGRAM(s) Oral before breakfast  polyethylene glycol 3350 17 Gram(s) Oral every 12 hours  senna 2 Tablet(s) Oral at bedtime  zinc sulfate 220 milliGRAM(s) Oral daily    MEDICATIONS  (PRN):  oxyCODONE    IR 15 milliGRAM(s) Oral every 6 hours PRN Severe Pain (7 - 10)    Analgesic Use (Scheduled and PRNs) for past 24 hours (6a-6a):  oxyCODONE    IR   15 milliGRAM(s) Oral (12-17-24 @ 03:36)    PRESENT SYMPTOMS:   [x ] Limited ability to self report   Source if other than patient:  [ ]Family   [ ]Team     Pain [x  ] "back" and "stomach"  PAIN AD Score:  1  [email or web address] (press ctrl +  left click to view)    If [  ], pt denies symptom.   Dyspnea:         [  ]  Anxiety:           [  ]  Difficulty sleeping: [  ]x  Fatigue:           [  ]  Nausea:           [  ]  Loss of appetite:     [  ]  Dysphagia: [  ]  Constipation:   [ x ]        Other Symptoms: xerostomia     All other review of systems negative [ x ]    ECOG Performance:     4  Current Palliative Performance Scale/Karnofsky Score:  40   %  Preadmit Karnofsky:  40  %          PEx:  General:  woman lying in bed, awake   alert  oriented x   name only  verbal  Behavioral: Cooperative, inattentive at times, calm   HEENT: atraumatic,  No temporal wasting,  +dry mouth  RESP: Reg rhythm, No  tachypnea/labored breathing,  No audible excessive secretions,  CTAB, diminished bilat bases  CV: RRR, S1S2,  No  tachycardia  GI: soft non distended non tender functionally incontinent   MUSK: weakness x4, No edema, mostly/fully  BB/WC bound  SKIN:  Poor skin turgor, Pressure ulcer stage: multiple stage III ulcers- see primary team exam   NEURO:  No deficits, cognitive impairment, encephalopathic dsyphagia dysarthria paresis  Oral intake ability:  minimal capability    T(C): 37.2 (12-17-24 @ 09:47), Max: 37.2 (12-17-24 @ 09:47)  HR: 123 (12-17-24 @ 09:47) (98 - 123)  BP: 129/89 (12-17-24 @ 09:47) (129/89 - 133/91)  RR: 18 (12-17-24 @ 09:47) (18 - 18)  SpO2: 99% (12-17-24 @ 09:47) (99% - 99%)  Wt(kg): --      LABS: REVIEWED  CBC:                        7.7    9.52  )-----------( 417      ( 16 Dec 2024 05:30 )             25.2     CMP:    12-16    136  |  104  |  3[L]  ----------------------------<  92  3.6   |  24  |  0.32[L]    Ca    7.8[L]      16 Dec 2024 05:30  Phos  2.6     12-16  Mg     1.8     12-16    TPro  6.5  /  Alb  1.9[L]  /  TBili  0.2  /  DBili  x   /  AST  8[L]  /  ALT  <5[L]  /  AlkPhos  110  12-16      RADIOLOGY & ADDITIONAL STUDIES: No new    DISCUSSION OF CASE: Family - to provide updates and emotional support; Primary Team and RN - to discuss plan of care    CARE COORDINATION:   - see pall SW [facility] Care Coordination note re: updates on [place] acceptable and coordination of transfer

## 2024-12-18 LAB
CULTURE RESULTS: SIGNIFICANT CHANGE UP
CULTURE RESULTS: SIGNIFICANT CHANGE UP
SPECIMEN SOURCE: SIGNIFICANT CHANGE UP
SPECIMEN SOURCE: SIGNIFICANT CHANGE UP

## 2024-12-18 PROCEDURE — 87040 BLOOD CULTURE FOR BACTERIA: CPT

## 2024-12-18 PROCEDURE — 86923 COMPATIBILITY TEST ELECTRIC: CPT

## 2024-12-18 PROCEDURE — 85025 COMPLETE CBC W/AUTO DIFF WBC: CPT

## 2024-12-18 PROCEDURE — 83615 LACTATE (LD) (LDH) ENZYME: CPT

## 2024-12-18 PROCEDURE — 93005 ELECTROCARDIOGRAM TRACING: CPT

## 2024-12-18 PROCEDURE — 85610 PROTHROMBIN TIME: CPT

## 2024-12-18 PROCEDURE — 83735 ASSAY OF MAGNESIUM: CPT

## 2024-12-18 PROCEDURE — 82803 BLOOD GASES ANY COMBINATION: CPT

## 2024-12-18 PROCEDURE — 70450 CT HEAD/BRAIN W/O DYE: CPT | Mod: MC

## 2024-12-18 PROCEDURE — 99239 HOSP IP/OBS DSCHRG MGMT >30: CPT

## 2024-12-18 PROCEDURE — 86850 RBC ANTIBODY SCREEN: CPT

## 2024-12-18 PROCEDURE — 93970 EXTREMITY STUDY: CPT

## 2024-12-18 PROCEDURE — P9016: CPT

## 2024-12-18 PROCEDURE — 81001 URINALYSIS AUTO W/SCOPE: CPT

## 2024-12-18 PROCEDURE — 82330 ASSAY OF CALCIUM: CPT

## 2024-12-18 PROCEDURE — 87086 URINE CULTURE/COLONY COUNT: CPT

## 2024-12-18 PROCEDURE — 97161 PT EVAL LOW COMPLEX 20 MIN: CPT

## 2024-12-18 PROCEDURE — 82962 GLUCOSE BLOOD TEST: CPT

## 2024-12-18 PROCEDURE — 36415 COLL VENOUS BLD VENIPUNCTURE: CPT

## 2024-12-18 PROCEDURE — 86901 BLOOD TYPING SEROLOGIC RH(D): CPT

## 2024-12-18 PROCEDURE — 84295 ASSAY OF SERUM SODIUM: CPT

## 2024-12-18 PROCEDURE — 74018 RADEX ABDOMEN 1 VIEW: CPT

## 2024-12-18 PROCEDURE — 80053 COMPREHEN METABOLIC PANEL: CPT

## 2024-12-18 PROCEDURE — 86140 C-REACTIVE PROTEIN: CPT

## 2024-12-18 PROCEDURE — 85730 THROMBOPLASTIN TIME PARTIAL: CPT

## 2024-12-18 PROCEDURE — 71045 X-RAY EXAM CHEST 1 VIEW: CPT

## 2024-12-18 PROCEDURE — 85027 COMPLETE CBC AUTOMATED: CPT

## 2024-12-18 PROCEDURE — 83036 HEMOGLOBIN GLYCOSYLATED A1C: CPT

## 2024-12-18 PROCEDURE — 80076 HEPATIC FUNCTION PANEL: CPT

## 2024-12-18 PROCEDURE — 99232 SBSQ HOSP IP/OBS MODERATE 35: CPT

## 2024-12-18 PROCEDURE — 86900 BLOOD TYPING SEROLOGIC ABO: CPT

## 2024-12-18 PROCEDURE — 36430 TRANSFUSION BLD/BLD COMPNT: CPT

## 2024-12-18 PROCEDURE — 99285 EMERGENCY DEPT VISIT HI MDM: CPT

## 2024-12-18 PROCEDURE — 83010 ASSAY OF HAPTOGLOBIN QUANT: CPT

## 2024-12-18 PROCEDURE — 84132 ASSAY OF SERUM POTASSIUM: CPT

## 2024-12-18 PROCEDURE — 84100 ASSAY OF PHOSPHORUS: CPT

## 2024-12-18 PROCEDURE — 80048 BASIC METABOLIC PNL TOTAL CA: CPT

## 2024-12-18 RX ORDER — POLYETHYLENE GLYCOL 3350 17 G/17G
17 POWDER, FOR SOLUTION ORAL DAILY
Refills: 0 | Status: DISCONTINUED | OUTPATIENT
Start: 2024-12-18 | End: 2024-12-18

## 2024-12-18 RX ORDER — OXYCODONE HYDROCHLORIDE 30 MG/1
10 TABLET ORAL EVERY 8 HOURS
Refills: 0 | Status: DISCONTINUED | OUTPATIENT
Start: 2024-12-18 | End: 2024-12-18

## 2024-12-18 RX ORDER — ACETAMINOPHEN 500MG 500 MG/1
650 TABLET, COATED ORAL EVERY 8 HOURS
Refills: 0 | Status: DISCONTINUED | OUTPATIENT
Start: 2024-12-18 | End: 2024-12-18

## 2024-12-18 RX ADMIN — ACETAMINOPHEN 500MG 650 MILLIGRAM(S): 500 TABLET, COATED ORAL at 10:47

## 2024-12-18 RX ADMIN — OXYCODONE HYDROCHLORIDE 10 MILLIGRAM(S): 30 TABLET ORAL at 10:47

## 2024-12-18 RX ADMIN — OXYCODONE HYDROCHLORIDE 10 MILLIGRAM(S): 30 TABLET ORAL at 11:47

## 2024-12-18 NOTE — PROGRESS NOTE ADULT - REASON FOR ADMISSION
UTI, pressure wounds, insufficient care at home

## 2024-12-18 NOTE — PROGRESS NOTE ADULT - PROBLEM SELECTOR PROBLEM 1
Acute on chronic anemia
Sacral ulcer
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
Sacral ulcer

## 2024-12-18 NOTE — PROGRESS NOTE ADULT - TIME BILLING
Assessment of Symptom [place] and Palliative regimen  Discharge Facilitation with ongoing coordination  Time exclusive of ACP discussion  Time inclusive of chart review, medication ordering, discussion with primary team, clinical documentation, and communication with family/caregiver
Evaluation of patient, review of charts, d/w RN
evaluation and management of sacral decubitus ulcer, review of labs and imaging, discussion with consultants, documentation
Assessment of Symptom [place] and Palliative regimen  Education and Monitoring of Opiates including titration and management of high risk medications  Discharge Facilitation with ongoing coordination    Time exclusive of ACP discussion  Time inclusive of chart review, medication ordering, discussion with primary team, clinical documentation, and communication with family/caregiver
Evaluation of patient, review of charts, d/w PCM
Bedside exam and interview   Reviewed vitals, labs   Discussed patient's plan of care with housestaff   Documentation of encounter  Excludes teaching and separately reported services

## 2024-12-18 NOTE — PROGRESS NOTE ADULT - SUBJECTIVE AND OBJECTIVE BOX
OVERNIGHT EVENTS:    SUBJECTIVE / INTERVAL HPI: Patient seen and examined at bedside.     VITAL SIGNS:  Vital Signs Last 24 Hrs  T(C): 36.8 (17 Dec 2024 21:34), Max: 37.2 (17 Dec 2024 09:47)  T(F): 98.3 (17 Dec 2024 21:34), Max: 98.9 (17 Dec 2024 09:47)  HR: 123 (17 Dec 2024 21:34) (119 - 128)  BP: 121/84 (17 Dec 2024 21:34) (121/84 - 129/89)  BP(mean): --  RR: 18 (17 Dec 2024 21:34) (18 - 18)  SpO2: 99% (17 Dec 2024 21:34) (98% - 99%)    Parameters below as of 17 Dec 2024 21:34  Patient On (Oxygen Delivery Method): room air      I&O's Summary      PHYSICAL EXAM:    General: NAD  HEENT: NC/AT; PERRL, anicteric sclera; MMM  Neck: supple  Cardiovascular: +S1/S2; RRR  Respiratory: CTA B/L; no W/R/R  Gastrointestinal: soft, NT/ND; +BSx4  Extremities: WWP; no edema, clubbing or cyanosis  Vascular: 2+ radial, DP/PT pulses B/L  Neurological: AAOx3; no focal deficits    MEDICATIONS:  MEDICATIONS  (STANDING):  ascorbic acid 500 milliGRAM(s) Oral daily  diazepam    Tablet 5 milliGRAM(s) Oral daily  enoxaparin Injectable 40 milliGRAM(s) SubCutaneous every 24 hours  lidocaine   4% Patch 1 Patch Transdermal every 24 hours  lidocaine   4% Patch 1 Patch Transdermal every 24 hours  multivitamin 1 Tablet(s) Oral daily  pantoprazole    Tablet 40 milliGRAM(s) Oral before breakfast  polyethylene glycol 3350 17 Gram(s) Oral every 12 hours  senna 2 Tablet(s) Oral at bedtime  zinc sulfate 220 milliGRAM(s) Oral daily    MEDICATIONS  (PRN):  oxyCODONE    IR 15 milliGRAM(s) Oral every 6 hours PRN Severe Pain (7 - 10)      ALLERGIES:  Allergies    Crab (Pruritus)  morphine (Unknown)    Intolerances        LABS:              CAPILLARY BLOOD GLUCOSE      POCT Blood Glucose.: 83 mg/dL (17 Dec 2024 06:32)      RADIOLOGY & ADDITIONAL TESTS: Reviewed.

## 2024-12-18 NOTE — PROGRESS NOTE ADULT - PROBLEM SELECTOR PLAN 6
.  Coping:  well[  ] with difficulty[  ] poor coping[  ] unable to assess[ x ]   Social support: strong[  ] adequate[  ] inadequate[ x ]  long term support system at home: strong[  ] adequate[x  ] inadequate[  ]    Active Psychosocial Referrals:  SW/CM[ x ] PT/OT[  ] Hospice[  ]  Ethics[  ]  [facility] [place] Patient/Family Support[  ] Chaplaincy[   ]    Massage Therapy[ x ] Music Therapy [x  ] Holistic RN[  ]     For new or uncontrolled symptoms, please call Palliative Care at [phone] ([phone]). The service is available 24/7 (including nights & weekends) to provide symptom management recommendations over the phone as appropriate.

## 2024-12-18 NOTE — PROGRESS NOTE ADULT - PROBLEM SELECTOR PLAN 6
Hx multiple spinal surgeries c/b BLE paralysis and chronic neck/back pain. Has implanted intrathecal morphine pump that is nonfunctional. Home meds: Diazepam 5mg q24h, Oxycodone 15mg q6h PRN, Lidocaine patch PRN per chart, but patient states Oxycodone is 30mg.    - Continue home Diazepam 5mg q24h, Oxycodone 15mg q6h PRN, Lidocaine patch PRN  - Palliative consult

## 2024-12-18 NOTE — PROGRESS NOTE ADULT - PROBLEM SELECTOR PLAN 8
Fluids - S/p 1L NS  Electrolytes - Replete to Mg>2, Phos>3, K>4  Nutrition - Regular diet  GI Prophylaxis - Pantoprazole as above  VTE Prophylaxis - Lovenox 40mg subq  Code Status - Full code per 12/13 discussion  -Palliative care consult
Hx multiple PEs (Dec 2023 and July 2024), on home Eliquis 5mg q12h. On admission, tachycardic to 100s, breathing and saturating well on room air.     - Hold home Eliquis iso severe anemia  - VTE prophylaxis only - Lovenox 40mg subq  - Need formal med rec to determine if patient received appropriate Eliquis loading  - f/u b/l le dopplers

## 2024-12-18 NOTE — PROGRESS NOTE ADULT - PROBLEM SELECTOR PLAN 5
.  - 2PC DNR DNI 12/17  - surrogate and reported HCP (now ppwk) is Ami  - GOC: Maximize comfort and QOL; surrogate prefers to avoid aggressive interventions and is interested in [facility] for wound care and acute end of life care and possible bridge to home hospice

## 2024-12-18 NOTE — PROGRESS NOTE ADULT - PROBLEM SELECTOR PROBLEM 8
Prophylactic measure
Pulmonary embolism

## 2024-12-18 NOTE — PROGRESS NOTE ADULT - ASSESSMENT
70yo F multiple spinal fusions cb chronic neck/back pain, paralysis, chronic pain (non functional MS IT pump) recurrent UTIs, multiple strokes, and blood clots presented with cloudy urine, pressure wounds, and anemia. She was found to have three stage 3 pressure ulcers and a likely UTI, attributed to inadequate home care. Patient frequently declining interventions ie labs, transfusions. Palliative consulted to discuss goals of care in light of current health status.     Prognosis is poor, given the patient's multiple comorbidities, poor baseline functional status with no anticipated recovery/improvement, high risk for recurrent infections and further decline. 2PC DNR DNI MOLST completed. [facility] 12/16 to maximize comfort and QOL. Transfer to [facility] for acute end of life care today.

## 2024-12-18 NOTE — PROGRESS NOTE ADULT - SUBJECTIVE AND OBJECTIVE BOX
[facility] Geriatric[facility] and Palliative Care  Thania Mccullough Geriatrics & Palliative Care NP  Contact Info: Call [phone] (HEAL Line) or message on [employer] Teams    SUBJECTIVE/OBJECTIVE: Interval events noted. See patient's PRN use for the past 24hrs noted below. Patient c/o fatigue and wants to rest. Per RN, pt with acceptable analgesic relief on current regimen. Per RN, pt comfortable on current analgesic regimen. Pt full feed and tolerating minimal PO intake. Extensive time spent discussing plan of care with primary team, pall sw. Pending transfer to [facility] today.     ALLERGIES: Crab (Pruritus)  morphine (Unknown)    MEDICATIONS: REVIEWED  MEDICATIONS  (STANDING):  acetaminophen     Tablet .. 650 milliGRAM(s) Oral every 8 hours  ascorbic acid 500 milliGRAM(s) Oral daily  diazepam    Tablet 5 milliGRAM(s) Oral daily  enoxaparin Injectable 40 milliGRAM(s) SubCutaneous every 24 hours  lidocaine   4% Patch 1 Patch Transdermal every 24 hours  lidocaine   4% Patch 1 Patch Transdermal every 24 hours  multivitamin 1 Tablet(s) Oral daily  pantoprazole    Tablet 40 milliGRAM(s) Oral before breakfast  polyethylene glycol 3350 17 Gram(s) Oral daily  senna 2 Tablet(s) Oral at bedtime  zinc sulfate 220 milliGRAM(s) Oral daily    MEDICATIONS  (PRN):  oxyCODONE    IR 10 milliGRAM(s) Oral every 8 hours PRN Moderate Pain (4 - 6)    Analgesic Use (Scheduled and PRNs) for past 24 hours (6a-6a):  oxyCODONE    IR   15 milliGRAM(s) Oral (12-17-24 @ 19:58)      PRESENT SYMPTOMS:   [x ] Limited ability to self report   Source if other than patient:  [ ]Family   [ ]Team     Pain [x  ] historically "back" and "stomach"  PAIN AD Score:  1  [email or web address] (press ctrl +  left click to view)    If [  ], pt denies symptom.   Dyspnea:         [  ]  Anxiety:           [  ]  Difficulty sleeping: [ x]  Fatigue:           [  ]  Nausea:           [  ]  Loss of appetite:     [  ]  Dysphagia: [  ]  Constipation:   [ x ]        Other Symptoms: xerostomia     All other review of systems negative [ x ]    ECOG Performance:     4  Current Palliative Performance Scale/Karnofsky Score:  40   %  Preadmit Karnofsky:  40  %          PEx:  General:  woman lying in bed, awake   alert  oriented x   name only  verbal  Behavioral: Cooperative, inattentive at times, calm   HEENT: atraumatic,  No temporal wasting,  +dry mouth  RESP: Reg rhythm, No  tachypnea/labored breathing,  No audible excessive secretions,  CTAB, diminished bilat bases  CV: RRR, S1S2,  No  tachycardia  GI: soft non distended non tender functionally incontinent   MUSK: weakness x4, No edema, mostly/fully  BB/WC bound  SKIN:  Poor skin turgor, Pressure ulcer stage: multiple stage III ulcers- see primary team exam   NEURO:  No deficits, cognitive impairment, encephalopathic dsyphagia dysarthria paresis  Oral intake ability:  minimal capability    T(C): 36.8 (12-17-24 @ 21:34), Max: 36.9 (12-17-24 @ 17:39)  HR: 123 (12-17-24 @ 21:34) (123 - 128)  BP: 121/84 (12-17-24 @ 21:34) (121/84 - 127/78)  RR: 18 (12-17-24 @ 21:34) (18 - 18)  SpO2: 99% (12-17-24 @ 21:34) (98% - 99%)  Wt(kg): --      LABS: REVIEWED    RADIOLOGY & ADDITIONAL STUDIES:  No new    DISCUSSION OF CASE: Family - to provide updates and emotional support; Primary Team and RN - to discuss plan of care    CARE COORDINATION:   PROGRESS NOTE  Date & Time of Note   2024-12-17 04:25    Notes    Notes: Palliative Care Team Social Work note: Pt medically accepted with a bed  tomorrow at [facility] per [facility] Tania. Per primary  medical team, pt medically ready for discharge and ambulance requested for 12  noon.  scheduled [facility] trip# [id number] for 12 noon 12/18/2024.   spoke with pt's friend/surrogate Ami who is aware of and  agreeable to discharge plan. Contact information for [facility] provided.  Primary medical team and RN aware of plan. Unit  and   aware. Palliative Care team will continue to follow, provide support, and  assist with symptom management.    Friend/surrogate: Melissa c: [phone]    [place] [address]  p: [phone]       Electronically signed by:  Lana Sheikh  Electronically signed on:  2024-12-17  16:30

## 2024-12-18 NOTE — PROGRESS NOTE ADULT - NUTRITIONAL ASSESSMENT
This patient has been assessed with a concern for Malnutrition and has been determined to have a diagnosis/diagnoses of Severe protein-calorie malnutrition and Underweight (BMI < 19).    This patient is being managed with:   Diet Regular-  Supplement Feeding Modality:  Oral  Ensure Plus High Protein Cans or Servings Per Day:  1       Frequency:  Two Times a day  Entered: Dec 14 2024  2:19PM  

## 2024-12-18 NOTE — PROGRESS NOTE ADULT - PROVIDER SPECIALTY LIST ADULT
Ethics
Neurology
Palliative Care
Surgery
Internal Medicine
Palliative Care
Hospitalist

## 2024-12-23 ENCOUNTER — APPOINTMENT (OUTPATIENT)
Dept: INTERNAL MEDICINE | Facility: CLINIC | Age: 69
End: 2024-12-23

## 2024-12-24 DIAGNOSIS — L30.9 DERMATITIS, UNSPECIFIED: ICD-10-CM

## 2024-12-24 DIAGNOSIS — T74.01XA ADULT NEGLECT OR ABANDONMENT, CONFIRMED, INITIAL ENCOUNTER: ICD-10-CM

## 2024-12-24 DIAGNOSIS — T85.695A OTHER MECHANICAL COMPLICATION OF OTHER NERVOUS SYSTEM DEVICE, IMPLANT OR GRAFT, INITIAL ENCOUNTER: ICD-10-CM

## 2024-12-24 DIAGNOSIS — R41.0 DISORIENTATION, UNSPECIFIED: ICD-10-CM

## 2024-12-24 DIAGNOSIS — N39.0 URINARY TRACT INFECTION, SITE NOT SPECIFIED: ICD-10-CM

## 2024-12-24 DIAGNOSIS — D72.829 ELEVATED WHITE BLOOD CELL COUNT, UNSPECIFIED: ICD-10-CM

## 2024-12-24 DIAGNOSIS — R64 CACHEXIA: ICD-10-CM

## 2024-12-24 DIAGNOSIS — F41.9 ANXIETY DISORDER, UNSPECIFIED: ICD-10-CM

## 2024-12-24 DIAGNOSIS — Z51.5 ENCOUNTER FOR PALLIATIVE CARE: ICD-10-CM

## 2024-12-24 DIAGNOSIS — M19.90 UNSPECIFIED OSTEOARTHRITIS, UNSPECIFIED SITE: ICD-10-CM

## 2024-12-24 DIAGNOSIS — K59.00 CONSTIPATION, UNSPECIFIED: ICD-10-CM

## 2024-12-24 DIAGNOSIS — J44.9 CHRONIC OBSTRUCTIVE PULMONARY DISEASE, UNSPECIFIED: ICD-10-CM

## 2024-12-24 DIAGNOSIS — Z79.82 LONG TERM (CURRENT) USE OF ASPIRIN: ICD-10-CM

## 2024-12-24 DIAGNOSIS — I10 ESSENTIAL (PRIMARY) HYPERTENSION: ICD-10-CM

## 2024-12-24 DIAGNOSIS — R32 UNSPECIFIED URINARY INCONTINENCE: ICD-10-CM
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
## 2024-12-24 DIAGNOSIS — M41.9 SCOLIOSIS, UNSPECIFIED: ICD-10-CM

## 2024-12-24 DIAGNOSIS — Z86.73 PERSONAL HISTORY OF TRANSIENT ISCHEMIC ATTACK (TIA), AND CEREBRAL INFARCTION WITHOUT RESIDUAL DEFICITS: ICD-10-CM

## 2024-12-24 DIAGNOSIS — G93.41 METABOLIC ENCEPHALOPATHY: ICD-10-CM

## 2024-12-24 DIAGNOSIS — Z66 DO NOT RESUSCITATE: ICD-10-CM

## 2024-12-24 DIAGNOSIS — Z99.3 DEPENDENCE ON WHEELCHAIR: ICD-10-CM

## 2024-12-24 DIAGNOSIS — M48.00 SPINAL STENOSIS, SITE UNSPECIFIED: ICD-10-CM

## 2024-12-24 DIAGNOSIS — N31.0 UNINHIBITED NEUROPATHIC BLADDER, NOT ELSEWHERE CLASSIFIED: ICD-10-CM

## 2024-12-24 DIAGNOSIS — M47.897 OTHER SPONDYLOSIS, LUMBOSACRAL REGION: ICD-10-CM

## 2024-12-24 DIAGNOSIS — D64.9 ANEMIA, UNSPECIFIED: ICD-10-CM

## 2024-12-24 DIAGNOSIS — R30.0 DYSURIA: ICD-10-CM

## 2024-12-24 DIAGNOSIS — Z88.5 ALLERGY STATUS TO NARCOTIC AGENT: ICD-10-CM

## 2024-12-24 DIAGNOSIS — Z96.611 PRESENCE OF RIGHT ARTIFICIAL SHOULDER JOINT: ICD-10-CM

## 2024-12-24 DIAGNOSIS — Z87.440 PERSONAL HISTORY OF URINARY (TRACT) INFECTIONS: ICD-10-CM

## 2024-12-24 DIAGNOSIS — K86.2 CYST OF PANCREAS: ICD-10-CM

## 2024-12-24 DIAGNOSIS — Z86.711 PERSONAL HISTORY OF PULMONARY EMBOLISM: ICD-10-CM

## 2024-12-24 DIAGNOSIS — Z53.20 PROCEDURE AND TREATMENT NOT CARRIED OUT BECAUSE OF PATIENT'S DECISION FOR UNSPECIFIED REASONS: ICD-10-CM

## 2024-12-24 DIAGNOSIS — Z79.51 LONG TERM (CURRENT) USE OF INHALED STEROIDS: ICD-10-CM

## 2024-12-24 DIAGNOSIS — R68.2 DRY MOUTH, UNSPECIFIED: ICD-10-CM

## 2024-12-24 DIAGNOSIS — K21.9 GASTRO-ESOPHAGEAL REFLUX DISEASE WITHOUT ESOPHAGITIS: ICD-10-CM

## 2024-12-24 DIAGNOSIS — H91.90 UNSPECIFIED HEARING LOSS, UNSPECIFIED EAR: ICD-10-CM

## 2024-12-24 DIAGNOSIS — E78.5 HYPERLIPIDEMIA, UNSPECIFIED: ICD-10-CM

## 2024-12-24 DIAGNOSIS — F32.A DEPRESSION, UNSPECIFIED: ICD-10-CM

## 2024-12-24 DIAGNOSIS — R73.03 PREDIABETES: ICD-10-CM

## 2024-12-24 DIAGNOSIS — M54.2 CERVICALGIA: ICD-10-CM

## 2024-12-24 DIAGNOSIS — L89.153 PRESSURE ULCER OF SACRAL REGION, STAGE 3: ICD-10-CM

## 2024-12-24 DIAGNOSIS — E43 UNSPECIFIED SEVERE PROTEIN-CALORIE MALNUTRITION: ICD-10-CM
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
## 2024-12-24 DIAGNOSIS — R62.7 ADULT FAILURE TO THRIVE: ICD-10-CM

## 2024-12-24 DIAGNOSIS — Z74.01 BED CONFINEMENT STATUS: ICD-10-CM

## 2024-12-24 DIAGNOSIS — E87.6 HYPOKALEMIA: ICD-10-CM

## 2024-12-24 DIAGNOSIS — L89.223 PRESSURE ULCER OF LEFT HIP, STAGE 3: ICD-10-CM

## 2024-12-24 DIAGNOSIS — Z79.84 LONG TERM (CURRENT) USE OF ORAL HYPOGLYCEMIC DRUGS: ICD-10-CM

## 2024-12-24 DIAGNOSIS — L89.213 PRESSURE ULCER OF RIGHT HIP, STAGE 3: ICD-10-CM

## 2024-12-24 DIAGNOSIS — Z79.01 LONG TERM (CURRENT) USE OF ANTICOAGULANTS: ICD-10-CM

## 2024-12-24 DIAGNOSIS — R00.0 TACHYCARDIA, UNSPECIFIED: ICD-10-CM

## 2024-12-24 DIAGNOSIS — G82.20 PARAPLEGIA, UNSPECIFIED: ICD-10-CM

## 2024-12-24 NOTE — BH CONSULTATION LIAISON ASSESSMENT NOTE - DIFFERENTIAL
Syncope vs trip and fall. She said she tripped and fell, couldn't get up. She uses walker at home, RLE has been worsening with sensation and possible from brain mets  - Tele, Echo  - Daughter, Rica wanted to talk to Hospice for further plans  - fall precaution, PT rodolfo Delirium due to acute medical condition

## 2025-03-25 NOTE — ED ADULT NURSE NOTE - NSFALLRSKUNASSIST_ED_ALL_ED
Chief Complaint   Patient presents with    Annual Exam        /71   Pulse 77   Temp 97.9 °F (36.6 °C) (Oral)   Resp 14   Ht 1.588 m (5' 2.5\")   Wt 77.1 kg (170 lb)   SpO2 98%   BMI 30.60 kg/m²      1. Have you been to the ER, urgent care clinic since your last visit?  Hospitalized since your last visit?No    2. Have you seen or consulted any other health care providers outside of the [facility] since your last visit?  Include any pap smears or colon screening. No   breathing is unlabored without accessory muscle use no

## 2025-07-12 NOTE — PROVIDER CONTACT NOTE (CRITICAL VALUE NOTIFICATION) - NS PROVIDER READ BACK TO LAB
Patient mobility status ambulates with no difficulty.     I have reviewed discharge instructions with the patient.  The patient verbalized understanding.    Patient left ED via Discharge Method: ambulatory to Home with self.    Opportunity for questions and clarification provided.     Patient given 1 scripts.        
yes

## (undated) DEVICE — MARKING PEN W RULER

## (undated) DEVICE — SUT MONOCRYL 3-0 27" PS-1 UNDYED

## (undated) DEVICE — SUT ETHILON 2-0 18" PS-2

## (undated) DEVICE — TUBING XTRA AAL ASPIRATION AND ANTICOAGULATION LINE 1/4"

## (undated) DEVICE — DRAIN JACKSON PRATT 10MM FLAT 3/4 NO TROCAR

## (undated) DEVICE — STRYKER SONOPET IQ TUBING SET

## (undated) DEVICE — DRAPE C ARM 41X74"

## (undated) DEVICE — DRAPE 3/4 SHEET 52X76"

## (undated) DEVICE — SUT PDS II 0 27" CT-1

## (undated) DEVICE — DRAPE GENERAL ENDOSCOPY

## (undated) DEVICE — STRYKER SONOPET IQ TIP 12CM APEX 360

## (undated) DEVICE — MIDAS REX LEGEND MATCH HEAD FLUTED SM BORE 3.0MM X 10CM

## (undated) DEVICE — SUT GORETEX CV-5 (4-0) 30" THC-13

## (undated) DEVICE — POSITIONER FOAM EGG CRATE ULNAR 2PCS (PINK)

## (undated) DEVICE — DRSG BIOPATCH DISK W CHG 1" W 4.0MM HOLE

## (undated) DEVICE — GLV 7 PROTEXIS (WHITE)

## (undated) DEVICE — ELCTR BOVIE TIP BLADE INSULATED 4" EDGE

## (undated) DEVICE — TUBING BRAT 2 SUCTION ASSEMBLY TWIST LOCK

## (undated) DEVICE — SUT PDS II PLUS 0 36" CT-1

## (undated) DEVICE — SYR LUER LOK 20CC

## (undated) DEVICE — STAPLER SKIN PROXIMATE

## (undated) DEVICE — DRAIN JACKSON PRATT 3 SPRING RESERVOIR W 10FR PVC DRAIN

## (undated) DEVICE — NDL SPINAL 18G X 3.5" (PINK)

## (undated) DEVICE — SOL IRR POUR NS 0.9% 500ML

## (undated) DEVICE — ELCTR AQUAMANTYS BIPOLAR SEALER 6.0

## (undated) DEVICE — SUT MONOCRYL 3-0 18" PS-1

## (undated) DEVICE — SPONGE PEANUT AUTO COUNT

## (undated) DEVICE — DRAPE MICROSCOPE EXOSCOPE 12" X 79"

## (undated) DEVICE — Device

## (undated) DEVICE — DRAPE INSTRUMENT POUCH 6.75" X 11"

## (undated) DEVICE — ELCTR STRYKER NEPTUNE SMOKE EVACUATION PENCIL (GREEN)

## (undated) DEVICE — NDL HYPO SAFE 22G X 1.5" (BLACK)

## (undated) DEVICE — SET BOWL XTRA 225

## (undated) DEVICE — ELCTR GROUNDING PAD ADULT COVIDIEN

## (undated) DEVICE — DRAPE TOP SHEET 53" X 101"

## (undated) DEVICE — DRSG STERISTRIPS 0.5 X 4"

## (undated) DEVICE — BLADE SCALPEL SAFETYLOCK #15

## (undated) DEVICE — GLV 8 PROTEXIS ORTHO (CREAM)

## (undated) DEVICE — MIDAS REX MR8 MATCH HEAD FLUTED SM BORE 3MM X 10CM

## (undated) DEVICE — STRYKER INSTRUMENT BATTERY

## (undated) DEVICE — RESERVOIR XTRA B BLD COLLECT

## (undated) DEVICE — SUT ETHILON 3-0 18" PS-1

## (undated) DEVICE — SUT MONOCRYL 4-0 27" PS-2 UNDYED

## (undated) DEVICE — ELCTR BOVIE PENCIL SMOKE EVACUATION

## (undated) DEVICE — WARMING BLANKET LOWER ADULT

## (undated) DEVICE — PACK SPINE

## (undated) DEVICE — DRAPE 1/2 SHEET 40X57"

## (undated) DEVICE — SUT PDS II 2-0 27" CT-2

## (undated) DEVICE — STRYKER SONOPET IQ TIP 10CM BROAD KNIFE

## (undated) DEVICE — DRSG AQUACEL 3.5 X 14"

## (undated) DEVICE — DRAPE SPLIT SHEET 77" X 120"

## (undated) DEVICE — ELCTR BOVIE TIP BLADE INSULATED 2.75" EDGE

## (undated) DEVICE — FEEDING TUBE ENTERAL KANGAROO CONNECT ENPLUS SPIKE SET

## (undated) DEVICE — FOLEY TRAY 16FR 5CC LF UMETER CLOSED

## (undated) DEVICE — MIDAS REX LEGEND METAL CUTTER 3.0MM X 18.3MM

## (undated) DEVICE — DRAPE LIGHT HANDLE COVER (GREEN)

## (undated) DEVICE — DRSG MASTISOL

## (undated) DEVICE — SOL IRR POUR H2O 250ML

## (undated) DEVICE — DRSG TEGADERM 4X4.75"

## (undated) DEVICE — MEDICATION LABELS W MARKER

## (undated) DEVICE — SPONGE SURGICAL STRIP 1/2 X 6"

## (undated) DEVICE — BIPOLAR FORCEP SYMMETRY BAYONET STR 8.25" X 1.5MM (BLUE)

## (undated) DEVICE — SUT SILK 2-0 30" PSL

## (undated) DEVICE — SUT SILK 3-0 18" SH (POP-OFF)

## (undated) DEVICE — SUT VICRYL PLUS 2-0 18" CT-2 (POP-OFF)

## (undated) DEVICE — DRSG TEGADERM 3.5X6"

## (undated) DEVICE — NDL IMBIBE

## (undated) DEVICE — PREP CHLORAPREP HI-LITE ORANGE 26ML

## (undated) DEVICE — DRILL GUIDE PIN W/ SUTURE EYE 2.4X435MM

## (undated) DEVICE — STRYKER BONE MILL BLADE FINE 3.2MM

## (undated) DEVICE — BUR STRYKER MULTI FLUTE ROUND 5MM

## (undated) DEVICE — DRSG DERMABOND PRINEO 22CM

## (undated) DEVICE — DRAPE FOR 2 TIER TABLE W/ 8" BACK 72" LONG

## (undated) DEVICE — SYR CONTROL LUER LOK 10CC

## (undated) DEVICE — MIDAS REX MR8 MATCH HEAD FLUTED LG BORE 3MM X 14CM

## (undated) DEVICE — VENODYNE/SCD SLEEVE CALF MEDIUM

## (undated) DEVICE — DRSG GAUZE MOISTURIZER 0.5 OZ 4X8

## (undated) DEVICE — SUT VICRYL PLUS 0 36" CT-1

## (undated) DEVICE — GLV 7.5 PROTEXIS (WHITE)

## (undated) DEVICE — BIPOLAR FORCEP SYMMETRY BAYONET 7" X 1.5MM SMOOTH (SILVER)

## (undated) DEVICE — GLV 8.5 PROTEXIS ORTHO (CREAM)

## (undated) DEVICE — POLISHER OR CAUTERY TIP

## (undated) DEVICE — TUBING SMOKE EVAC 3/8" X 10FT FOR NEPTUNE

## (undated) DEVICE — CATH IV SAFE INSYTE 16G X 1.16" (GRAY)

## (undated) DEVICE — NDL T HANDLE JAMSHIDI 11G 6"

## (undated) DEVICE — BUR STRYKER CARBIDE MATCH HEAD 3MM

## (undated) DEVICE — PACK MINOR

## (undated) DEVICE — SUT BOOT STANDARD (ORIGINAL YELLOW) 5 PAIR

## (undated) DEVICE — GLV 6.5 PROTEXIS (WHITE)

## (undated) DEVICE — MIDAS REX LEGEND MATCH HEAD FLUTED LG BORE 3.0MM X 14CM